# Patient Record
Sex: MALE | Race: BLACK OR AFRICAN AMERICAN | NOT HISPANIC OR LATINO | URBAN - METROPOLITAN AREA
[De-identification: names, ages, dates, MRNs, and addresses within clinical notes are randomized per-mention and may not be internally consistent; named-entity substitution may affect disease eponyms.]

---

## 2019-02-12 VITALS
OXYGEN SATURATION: 98 % | SYSTOLIC BLOOD PRESSURE: 147 MMHG | RESPIRATION RATE: 18 BRPM | WEIGHT: 192.9 LBS | DIASTOLIC BLOOD PRESSURE: 86 MMHG | TEMPERATURE: 98 F | HEART RATE: 107 BPM

## 2019-02-12 LAB
ALBUMIN SERPL ELPH-MCNC: 3.8 G/DL — SIGNIFICANT CHANGE UP (ref 3.3–5)
ALP SERPL-CCNC: 115 U/L — SIGNIFICANT CHANGE UP (ref 40–120)
ALT FLD-CCNC: SIGNIFICANT CHANGE UP U/L (ref 10–45)
ANION GAP SERPL CALC-SCNC: 12 MMOL/L — SIGNIFICANT CHANGE UP (ref 5–17)
APTT BLD: 28.9 SEC — SIGNIFICANT CHANGE UP (ref 27.5–36.3)
AST SERPL-CCNC: SIGNIFICANT CHANGE UP U/L (ref 10–40)
BASOPHILS # BLD AUTO: 0.02 K/UL — SIGNIFICANT CHANGE UP (ref 0–0.2)
BASOPHILS NFR BLD AUTO: 0.2 % — SIGNIFICANT CHANGE UP (ref 0–2)
BILIRUB SERPL-MCNC: 0.4 MG/DL — SIGNIFICANT CHANGE UP (ref 0.2–1.2)
BUN SERPL-MCNC: 42 MG/DL — HIGH (ref 7–23)
CALCIUM SERPL-MCNC: 9.2 MG/DL — SIGNIFICANT CHANGE UP (ref 8.4–10.5)
CHLORIDE SERPL-SCNC: 112 MMOL/L — HIGH (ref 96–108)
CO2 SERPL-SCNC: 16 MMOL/L — LOW (ref 22–31)
CREAT SERPL-MCNC: 3.31 MG/DL — HIGH (ref 0.5–1.3)
EOSINOPHIL # BLD AUTO: 0.45 K/UL — SIGNIFICANT CHANGE UP (ref 0–0.5)
EOSINOPHIL NFR BLD AUTO: 3.8 % — SIGNIFICANT CHANGE UP (ref 0–6)
GLUCOSE SERPL-MCNC: 245 MG/DL — HIGH (ref 70–99)
HCT VFR BLD CALC: 40.7 % — SIGNIFICANT CHANGE UP (ref 39–50)
HGB BLD-MCNC: 12.2 G/DL — LOW (ref 13–17)
IMM GRANULOCYTES NFR BLD AUTO: 0.5 % — SIGNIFICANT CHANGE UP (ref 0–1.5)
INR BLD: 1 — SIGNIFICANT CHANGE UP (ref 0.88–1.16)
LYMPHOCYTES # BLD AUTO: 1.44 K/UL — SIGNIFICANT CHANGE UP (ref 1–3.3)
LYMPHOCYTES # BLD AUTO: 12 % — LOW (ref 13–44)
MAGNESIUM SERPL-MCNC: 2.3 MG/DL — SIGNIFICANT CHANGE UP (ref 1.6–2.6)
MCHC RBC-ENTMCNC: 27.1 PG — SIGNIFICANT CHANGE UP (ref 27–34)
MCHC RBC-ENTMCNC: 30 GM/DL — LOW (ref 32–36)
MCV RBC AUTO: 90.2 FL — SIGNIFICANT CHANGE UP (ref 80–100)
MONOCYTES # BLD AUTO: 1.02 K/UL — HIGH (ref 0–0.9)
MONOCYTES NFR BLD AUTO: 8.5 % — SIGNIFICANT CHANGE UP (ref 2–14)
NEUTROPHILS # BLD AUTO: 8.97 K/UL — HIGH (ref 1.8–7.4)
NEUTROPHILS NFR BLD AUTO: 75 % — SIGNIFICANT CHANGE UP (ref 43–77)
NRBC # BLD: 0 /100 WBCS — SIGNIFICANT CHANGE UP (ref 0–0)
PLATELET # BLD AUTO: 207 K/UL — SIGNIFICANT CHANGE UP (ref 150–400)
POTASSIUM SERPL-MCNC: SIGNIFICANT CHANGE UP MMOL/L (ref 3.5–5.3)
POTASSIUM SERPL-SCNC: SIGNIFICANT CHANGE UP MMOL/L (ref 3.5–5.3)
PROT SERPL-MCNC: 7.9 G/DL — SIGNIFICANT CHANGE UP (ref 6–8.3)
PROTHROM AB SERPL-ACNC: 11.3 SEC — SIGNIFICANT CHANGE UP (ref 10–12.9)
RAPID RVP RESULT: SIGNIFICANT CHANGE UP
RBC # BLD: 4.51 M/UL — SIGNIFICANT CHANGE UP (ref 4.2–5.8)
RBC # FLD: 17.6 % — HIGH (ref 10.3–14.5)
SODIUM SERPL-SCNC: 140 MMOL/L — SIGNIFICANT CHANGE UP (ref 135–145)
WBC # BLD: 11.96 K/UL — HIGH (ref 3.8–10.5)
WBC # FLD AUTO: 11.96 K/UL — HIGH (ref 3.8–10.5)

## 2019-02-12 PROCEDURE — 71045 X-RAY EXAM CHEST 1 VIEW: CPT | Mod: 26

## 2019-02-12 PROCEDURE — 70544 MR ANGIOGRAPHY HEAD W/O DYE: CPT | Mod: 26,59

## 2019-02-12 PROCEDURE — 70551 MRI BRAIN STEM W/O DYE: CPT | Mod: 26

## 2019-02-12 PROCEDURE — 70450 CT HEAD/BRAIN W/O DYE: CPT | Mod: 26

## 2019-02-12 PROCEDURE — 70547 MR ANGIOGRAPHY NECK W/O DYE: CPT | Mod: 26

## 2019-02-12 RX ORDER — SODIUM CHLORIDE 9 MG/ML
1000 INJECTION INTRAMUSCULAR; INTRAVENOUS; SUBCUTANEOUS ONCE
Qty: 0 | Refills: 0 | Status: COMPLETED | OUTPATIENT
Start: 2019-02-12 | End: 2019-02-12

## 2019-02-12 RX ADMIN — SODIUM CHLORIDE 2000 MILLILITER(S): 9 INJECTION INTRAMUSCULAR; INTRAVENOUS; SUBCUTANEOUS at 21:09

## 2019-02-12 RX ADMIN — SODIUM CHLORIDE 2000 MILLILITER(S): 9 INJECTION INTRAMUSCULAR; INTRAVENOUS; SUBCUTANEOUS at 18:59

## 2019-02-12 NOTE — ED PROVIDER NOTE - OBJECTIVE STATEMENT
52 y/o M w/hx IDDM, prior CVA w/L sided weakness, reports no other medical problems, p/w slurred speech, global fatigue and worsening of his LLE weakness now also with RLE weakness, unable to stand/walk. Sx began yesterday morning (>24hrs onset), and when he attempted to walk from bed to bathroom he collapsed and hit the front of his head/forehead. No LOC, remembers event. Crawled into bed, has been urinating into a jar near his bed since that time. Denies CP, SOB, palpitations. No cough or fever/chills. Denies n/v/c/d or abd pain. No urinary sx.  No LE pain or swelling. Denies HA, visual changes, neck pain/stiffness.

## 2019-02-12 NOTE — ED ADULT NURSE REASSESSMENT NOTE - NS ED NURSE REASSESS COMMENT FT1
Patient returned from [facility], no distress noted.  IVF continue to infuse as order, rpt CMP to be done after IVF infusing.

## 2019-02-12 NOTE — ED ADULT NURSE NOTE - CHPI ED NUR SYMPTOMS NEG
no fever/no confusion/no blurred vision/no vomiting/no dizziness/no loss of consciousness/no nausea/no numbness/no change in level of consciousness

## 2019-02-12 NOTE — ED ADULT TRIAGE NOTE - CHIEF COMPLAINT QUOTE
pt BIBA c/o bilateral leg weakness since yesterday morning. as per pt he was feeling weak mostly on his right leg yesterday abd had a fall last night. denies head injury or LOC. pt A&O x4 at triage, able to move all extremities, no sensory deficits present on exam, able to speak clear and in full sentences.  denies dizziness, blurred vision.  PMH CVA 2010. .

## 2019-02-12 NOTE — ED PROVIDER NOTE - CLINICAL SUMMARY MEDICAL DECISION MAKING FREE TEXT BOX
weakness w/report of slurred speech, though speech is intact on exam, onset >24hrs, d/w Stroke attending Dr. Javed, will obtain MRI/MRA given pt's severe CKD, unknown if CKD is new. Attempts to contact PMD's office were unsuccessful after hours. Pt states he normally has good renal function. Pending MR results, will require admission for IV hydration, trending of Cr. Mild leukocytosis w/out fever, no clear source of infection, uc sent. weakness w/report of slurred speech, though speech is intact on exam, onset >24hrs, d/w Stroke attending Dr. Javed, will obtain MRI/MRA given pt's severe CKD, unknown if CKD is new. Attempts to contact PMD's office were unsuccessful after hours. Pt states he normally has good renal function. Pending MR results, will require admission for IV hydration, trending of Cr. Mild leukocytosis w/out fever, no clear source of infection, uc sent.    + acute lacunar infacrts L macrina, accepted to stroke team under telemetry

## 2019-02-12 NOTE — ED ADULT NURSE NOTE - NSIMPLEMENTINTERV_GEN_ALL_ED
Implemented All Universal Safety Interventions:  [place] to call system. Call bell, personal items and telephone within reach. Instruct patient to call for assistance. Room bathroom lighting operational. Non-slip footwear when patient is off stretcher. Physically safe environment: no spills, clutter or unnecessary equipment. Stretcher in lowest position, wheels locked, appropriate side rails in place.

## 2019-02-12 NOTE — ED ADULT NURSE NOTE - OBJECTIVE STATEMENT
53 year old male patient brought in by EMS for weakness since last night.  Patient states alittle before 11PM he went to shut the lights off in the kitchen and fell.  "My legs just gave out on me".  PAtient states hx of CVA 2010.  Patient denies tingling dizziness, loc @ time of fall, abrasion between eyebrows noted with mild swelling.  Patient states "it's from the fall".  A+OX4, weakness noted to bilateral legs & L arm.  As per patient "my legs are always weak but they feel weaker they normal".  Speech noted to be slightly slurred, as per patient "my speech is never slurred & I was on the phone w my friend and she heard my speech which is why she made me call the ambulance because it sounded slurred to her".  NO distress noted.  No facial droop noted.  Following commands appropriately.  Denies change in vision.  Breathing appears easy & unlabored, denies chest pain.

## 2019-02-12 NOTE — ED PROVIDER NOTE - PHYSICAL EXAMINATION
VITAL SIGNS: I have reviewed nursing notes and confirm.  CONSTITUTIONAL: Well-developed; well-nourished; in no acute distress.  SKIN: Agree with RN documentation regarding decubitus evaluation. Remainder of skin exam is warm and dry, no acute rash.  HEAD: Normocephalic; atraumatic.  EYES: PERRL, EOM intact; conjunctiva and sclera clear.  ENT: No nasal discharge; airway clear.  NECK: Supple; non tender.  CARD: S1, S2 normal; no murmurs, gallops, or rubs. RRR  RESP: No wheezes, rales or rhonchi. CTA w/good excursion  ABD: Normal bowel sounds; soft; non-distended; non-tender  EXT: Normal ROM. No clubbing, cyanosis or edema.  LYMPH: No acute cervical adenopathy.  NEURO: Alert, oriented. CN 2-12 intact b/l, + mildly slurred speech, 5/5 strength UE's b/l, 5/5 strength RLE, 4/5 strength LLE, no sensory deficits,   PSYCH: Cooperative, appropriate.

## 2019-02-12 NOTE — ED PROVIDER NOTE - SECONDARY DIAGNOSIS.
Chronic kidney disease, unspecified CKD stage Type 2 diabetes mellitus with complication, with long-term current use of insulin CVA (cerebral vascular accident)

## 2019-02-12 NOTE — ED PROVIDER NOTE - CARE PLAN
Principal Discharge DX:	Weakness of both legs  Secondary Diagnosis:	Chronic kidney disease, unspecified CKD stage  Secondary Diagnosis:	Type 2 diabetes mellitus with complication, with long-term current use of insulin  Secondary Diagnosis:	CVA (cerebral vascular accident) Principal Discharge DX:	Cerebrovascular accident (CVA), unspecified mechanism  Secondary Diagnosis:	Chronic kidney disease, unspecified CKD stage  Secondary Diagnosis:	Type 2 diabetes mellitus with complication, with long-term current use of insulin

## 2019-02-13 ENCOUNTER — INPATIENT (INPATIENT)
Facility: HOSPITAL | Age: 54
LOS: 11 days | Discharge: NURSING, REHAB OR LONG-TERM CARE | DRG: 65 | End: 2019-02-25
Attending: PSYCHIATRY & NEUROLOGY | Admitting: PSYCHIATRY & NEUROLOGY
Payer: MEDICAID

## 2019-02-13 DIAGNOSIS — E11.9 TYPE 2 DIABETES MELLITUS WITHOUT COMPLICATIONS: ICD-10-CM

## 2019-02-13 DIAGNOSIS — I10 ESSENTIAL (PRIMARY) HYPERTENSION: ICD-10-CM

## 2019-02-13 DIAGNOSIS — Z29.9 ENCOUNTER FOR PROPHYLACTIC MEASURES, UNSPECIFIED: ICD-10-CM

## 2019-02-13 DIAGNOSIS — N18.4 CHRONIC KIDNEY DISEASE, STAGE 4 (SEVERE): ICD-10-CM

## 2019-02-13 DIAGNOSIS — I63.9 CEREBRAL INFARCTION, UNSPECIFIED: ICD-10-CM

## 2019-02-13 DIAGNOSIS — E78.5 HYPERLIPIDEMIA, UNSPECIFIED: ICD-10-CM

## 2019-02-13 DIAGNOSIS — R63.8 OTHER SYMPTOMS AND SIGNS CONCERNING FOOD AND FLUID INTAKE: ICD-10-CM

## 2019-02-13 DIAGNOSIS — Z91.89 OTHER SPECIFIED PERSONAL RISK FACTORS, NOT ELSEWHERE CLASSIFIED: ICD-10-CM

## 2019-02-13 LAB
ALBUMIN SERPL ELPH-MCNC: 3.3 G/DL — SIGNIFICANT CHANGE UP (ref 3.3–5)
ALP SERPL-CCNC: 105 U/L — SIGNIFICANT CHANGE UP (ref 40–120)
ALT FLD-CCNC: 14 U/L — SIGNIFICANT CHANGE UP (ref 10–45)
ANION GAP SERPL CALC-SCNC: 11 MMOL/L — SIGNIFICANT CHANGE UP (ref 5–17)
ANION GAP SERPL CALC-SCNC: 16 MMOL/L — SIGNIFICANT CHANGE UP (ref 5–17)
AST SERPL-CCNC: 14 U/L — SIGNIFICANT CHANGE UP (ref 10–40)
BILIRUB SERPL-MCNC: 0.4 MG/DL — SIGNIFICANT CHANGE UP (ref 0.2–1.2)
BUN SERPL-MCNC: 44 MG/DL — HIGH (ref 7–23)
BUN SERPL-MCNC: 45 MG/DL — HIGH (ref 7–23)
CALCIUM SERPL-MCNC: 8.3 MG/DL — LOW (ref 8.4–10.5)
CALCIUM SERPL-MCNC: 9.4 MG/DL — SIGNIFICANT CHANGE UP (ref 8.4–10.5)
CHLORIDE SERPL-SCNC: 110 MMOL/L — HIGH (ref 96–108)
CHLORIDE SERPL-SCNC: 111 MMOL/L — HIGH (ref 96–108)
CHOLEST SERPL-MCNC: 144 MG/DL — SIGNIFICANT CHANGE UP (ref 10–199)
CO2 SERPL-SCNC: 14 MMOL/L — LOW (ref 22–31)
CO2 SERPL-SCNC: 17 MMOL/L — LOW (ref 22–31)
CREAT SERPL-MCNC: 3.21 MG/DL — HIGH (ref 0.5–1.3)
CREAT SERPL-MCNC: 3.25 MG/DL — HIGH (ref 0.5–1.3)
GLUCOSE BLDC GLUCOMTR-MCNC: 203 MG/DL — HIGH (ref 70–99)
GLUCOSE BLDC GLUCOMTR-MCNC: 258 MG/DL — HIGH (ref 70–99)
GLUCOSE SERPL-MCNC: 122 MG/DL — HIGH (ref 70–99)
GLUCOSE SERPL-MCNC: 363 MG/DL — HIGH (ref 70–99)
HBA1C BLD-MCNC: 8.1 % — HIGH (ref 4–5.6)
HCT VFR BLD CALC: 40.6 % — SIGNIFICANT CHANGE UP (ref 39–50)
HDLC SERPL-MCNC: 53 MG/DL — SIGNIFICANT CHANGE UP
HGB BLD-MCNC: 11.8 G/DL — LOW (ref 13–17)
LIPID PNL WITH DIRECT LDL SERPL: 66 MG/DL — SIGNIFICANT CHANGE UP
MAGNESIUM SERPL-MCNC: 2.2 MG/DL — SIGNIFICANT CHANGE UP (ref 1.6–2.6)
MCHC RBC-ENTMCNC: 26.8 PG — LOW (ref 27–34)
MCHC RBC-ENTMCNC: 29.1 GM/DL — LOW (ref 32–36)
MCV RBC AUTO: 92.3 FL — SIGNIFICANT CHANGE UP (ref 80–100)
NRBC # BLD: 0 /100 WBCS — SIGNIFICANT CHANGE UP (ref 0–0)
PHOSPHATE SERPL-MCNC: 3.4 MG/DL — SIGNIFICANT CHANGE UP (ref 2.5–4.5)
PLATELET # BLD AUTO: 185 K/UL — SIGNIFICANT CHANGE UP (ref 150–400)
POTASSIUM SERPL-MCNC: 4.5 MMOL/L — SIGNIFICANT CHANGE UP (ref 3.5–5.3)
POTASSIUM SERPL-MCNC: 4.6 MMOL/L — SIGNIFICANT CHANGE UP (ref 3.5–5.3)
POTASSIUM SERPL-SCNC: 4.5 MMOL/L — SIGNIFICANT CHANGE UP (ref 3.5–5.3)
POTASSIUM SERPL-SCNC: 4.6 MMOL/L — SIGNIFICANT CHANGE UP (ref 3.5–5.3)
PROT SERPL-MCNC: 6.5 G/DL — SIGNIFICANT CHANGE UP (ref 6–8.3)
RBC # BLD: 4.4 M/UL — SIGNIFICANT CHANGE UP (ref 4.2–5.8)
RBC # FLD: 17.4 % — HIGH (ref 10.3–14.5)
SODIUM SERPL-SCNC: 138 MMOL/L — SIGNIFICANT CHANGE UP (ref 135–145)
SODIUM SERPL-SCNC: 141 MMOL/L — SIGNIFICANT CHANGE UP (ref 135–145)
TOTAL CHOLESTEROL/HDL RATIO MEASUREMENT: 2.7 RATIO — LOW (ref 3.4–9.6)
TRIGL SERPL-MCNC: 125 MG/DL — SIGNIFICANT CHANGE UP (ref 10–149)
TSH SERPL-MCNC: 0.53 UIU/ML — SIGNIFICANT CHANGE UP (ref 0.35–4.94)
WBC # BLD: 12.57 K/UL — HIGH (ref 3.8–10.5)
WBC # FLD AUTO: 12.57 K/UL — HIGH (ref 3.8–10.5)

## 2019-02-13 PROCEDURE — 99285 EMERGENCY DEPT VISIT HI MDM: CPT

## 2019-02-13 PROCEDURE — 93306 TTE W/DOPPLER COMPLETE: CPT | Mod: 26

## 2019-02-13 RX ORDER — DEXTROSE 50 % IN WATER 50 %
25 SYRINGE (ML) INTRAVENOUS ONCE
Qty: 0 | Refills: 0 | Status: DISCONTINUED | OUTPATIENT
Start: 2019-02-13 | End: 2019-02-25

## 2019-02-13 RX ORDER — ATORVASTATIN CALCIUM 80 MG/1
80 TABLET, FILM COATED ORAL AT BEDTIME
Qty: 0 | Refills: 0 | Status: DISCONTINUED | OUTPATIENT
Start: 2019-02-13 | End: 2019-02-25

## 2019-02-13 RX ORDER — HEPARIN SODIUM 5000 [USP'U]/ML
5000 INJECTION INTRAVENOUS; SUBCUTANEOUS EVERY 8 HOURS
Qty: 0 | Refills: 0 | Status: DISCONTINUED | OUTPATIENT
Start: 2019-02-13 | End: 2019-02-25

## 2019-02-13 RX ORDER — POTASSIUM CHLORIDE 20 MEQ
40 PACKET (EA) ORAL ONCE
Qty: 0 | Refills: 0 | Status: DISCONTINUED | OUTPATIENT
Start: 2019-02-13 | End: 2019-02-13

## 2019-02-13 RX ORDER — LISINOPRIL 2.5 MG/1
10 TABLET ORAL DAILY
Qty: 0 | Refills: 0 | Status: DISCONTINUED | OUTPATIENT
Start: 2019-02-13 | End: 2019-02-13

## 2019-02-13 RX ORDER — DEXTROSE 50 % IN WATER 50 %
15 SYRINGE (ML) INTRAVENOUS ONCE
Qty: 0 | Refills: 0 | Status: DISCONTINUED | OUTPATIENT
Start: 2019-02-13 | End: 2019-02-25

## 2019-02-13 RX ORDER — ASPIRIN/CALCIUM CARB/MAGNESIUM 324 MG
81 TABLET ORAL DAILY
Qty: 0 | Refills: 0 | Status: DISCONTINUED | OUTPATIENT
Start: 2019-02-13 | End: 2019-02-25

## 2019-02-13 RX ORDER — DEXTROSE 50 % IN WATER 50 %
12.5 SYRINGE (ML) INTRAVENOUS ONCE
Qty: 0 | Refills: 0 | Status: DISCONTINUED | OUTPATIENT
Start: 2019-02-13 | End: 2019-02-25

## 2019-02-13 RX ORDER — SODIUM CHLORIDE 9 MG/ML
1000 INJECTION, SOLUTION INTRAVENOUS
Qty: 0 | Refills: 0 | Status: DISCONTINUED | OUTPATIENT
Start: 2019-02-13 | End: 2019-02-25

## 2019-02-13 RX ORDER — CLOPIDOGREL BISULFATE 75 MG/1
300 TABLET, FILM COATED ORAL ONCE
Qty: 0 | Refills: 0 | Status: COMPLETED | OUTPATIENT
Start: 2019-02-13 | End: 2019-02-13

## 2019-02-13 RX ORDER — INSULIN HUMAN 100 [IU]/ML
10 INJECTION, SOLUTION SUBCUTANEOUS ONCE
Qty: 0 | Refills: 0 | Status: COMPLETED | OUTPATIENT
Start: 2019-02-13 | End: 2019-02-13

## 2019-02-13 RX ORDER — CLOPIDOGREL BISULFATE 75 MG/1
75 TABLET, FILM COATED ORAL DAILY
Qty: 0 | Refills: 0 | Status: DISCONTINUED | OUTPATIENT
Start: 2019-02-14 | End: 2019-02-25

## 2019-02-13 RX ORDER — GLUCAGON INJECTION, SOLUTION 0.5 MG/.1ML
1 INJECTION, SOLUTION SUBCUTANEOUS ONCE
Qty: 0 | Refills: 0 | Status: DISCONTINUED | OUTPATIENT
Start: 2019-02-13 | End: 2019-02-25

## 2019-02-13 RX ORDER — INFLUENZA VIRUS VACCINE 15; 15; 15; 15 UG/.5ML; UG/.5ML; UG/.5ML; UG/.5ML
0.5 SUSPENSION INTRAMUSCULAR ONCE
Qty: 0 | Refills: 0 | Status: COMPLETED | OUTPATIENT
Start: 2019-02-13 | End: 2019-02-19

## 2019-02-13 RX ORDER — INSULIN LISPRO 100/ML
VIAL (ML) SUBCUTANEOUS
Qty: 0 | Refills: 0 | Status: DISCONTINUED | OUTPATIENT
Start: 2019-02-13 | End: 2019-02-25

## 2019-02-13 RX ORDER — METOPROLOL TARTRATE 50 MG
25 TABLET ORAL DAILY
Qty: 0 | Refills: 0 | Status: DISCONTINUED | OUTPATIENT
Start: 2019-02-13 | End: 2019-02-25

## 2019-02-13 RX ORDER — INSULIN GLARGINE 100 [IU]/ML
20 INJECTION, SOLUTION SUBCUTANEOUS AT BEDTIME
Qty: 0 | Refills: 0 | Status: DISCONTINUED | OUTPATIENT
Start: 2019-02-13 | End: 2019-02-16

## 2019-02-13 RX ADMIN — CLOPIDOGREL BISULFATE 300 MILLIGRAM(S): 75 TABLET, FILM COATED ORAL at 18:13

## 2019-02-13 RX ADMIN — Medication 81 MILLIGRAM(S): at 03:13

## 2019-02-13 RX ADMIN — ATORVASTATIN CALCIUM 80 MILLIGRAM(S): 80 TABLET, FILM COATED ORAL at 03:13

## 2019-02-13 RX ADMIN — Medication 6: at 16:49

## 2019-02-13 RX ADMIN — Medication 25 MILLIGRAM(S): at 06:19

## 2019-02-13 RX ADMIN — Medication 2: at 11:48

## 2019-02-13 RX ADMIN — HEPARIN SODIUM 5000 UNIT(S): 5000 INJECTION INTRAVENOUS; SUBCUTANEOUS at 15:29

## 2019-02-13 RX ADMIN — HEPARIN SODIUM 5000 UNIT(S): 5000 INJECTION INTRAVENOUS; SUBCUTANEOUS at 06:19

## 2019-02-13 RX ADMIN — Medication 4: at 21:30

## 2019-02-13 RX ADMIN — INSULIN GLARGINE 20 UNIT(S): 100 INJECTION, SOLUTION SUBCUTANEOUS at 21:31

## 2019-02-13 RX ADMIN — HEPARIN SODIUM 5000 UNIT(S): 5000 INJECTION INTRAVENOUS; SUBCUTANEOUS at 21:31

## 2019-02-13 RX ADMIN — ATORVASTATIN CALCIUM 80 MILLIGRAM(S): 80 TABLET, FILM COATED ORAL at 21:31

## 2019-02-13 RX ADMIN — INSULIN HUMAN 10 UNIT(S): 100 INJECTION, SOLUTION SUBCUTANEOUS at 02:00

## 2019-02-13 NOTE — PROGRESS NOTE ADULT - SUBJECTIVE AND OBJECTIVE BOX
OVERNIGHT EVENTS: ZULY admitted to [facility]    SUBJECTIVE / INTERVAL HPI: Patient seen and examined at bedside. Pt mentions continued motor weakness of LLE; Otherwise denies any new motor weakness, paresthesias, HA, nausea, emesis;     VITAL SIGNS:  Vital Signs Last 24 Hrs  T(C): 36.6 (13 Feb 2019 13:10), Max: 36.8 (12 Feb 2019 18:27)  T(F): 97.9 (13 Feb 2019 13:10), Max: 98.3 (12 Feb 2019 20:58)  HR: 86 (13 Feb 2019 15:12) (86 - 107)  BP: 179/83 (13 Feb 2019 15:12) (120/84 - 183/96)  BP(mean): 110 (13 Feb 2019 13:35) (106 - 139)  RR: 16 (13 Feb 2019 15:12) (16 - 18)  SpO2: 98% (13 Feb 2019 15:12) (94% - 99%)    PHYSICAL EXAM:    General: WDWN  HEENT: NC/AT; PERRL, anicteric sclera; MMM  Neck: supple  Cardiovascular: +S1/S2, RRR  Respiratory: CTA B/L; no W/R/R  Gastrointestinal: soft, NT/ND; +BSx4  Extremities: WWP; no edema, clubbing or cyanosis  Vascular: 2+ radial, DP/PT pulses B/L  Neurological: AAOx3; no focal deficits    MEDICATIONS:  MEDICATIONS  (STANDING):  aspirin  chewable 81 milliGRAM(s) Oral daily  atorvastatin 80 milliGRAM(s) Oral at bedtime  clopidogrel Tablet 300 milliGRAM(s) Oral once  dextrose 5%. 1000 milliLiter(s) (50 mL/Hr) IV Continuous <Continuous>  dextrose 50% Injectable 12.5 Gram(s) IV Push once  dextrose 50% Injectable 25 Gram(s) IV Push once  dextrose 50% Injectable 25 Gram(s) IV Push once  heparin  Injectable 5000 Unit(s) SubCutaneous every 8 hours  influenza   Vaccine 0.5 milliLiter(s) IntraMuscular once  insulin glargine Injectable (LANTUS) 20 Unit(s) SubCutaneous at bedtime  insulin lispro (HumaLOG) corrective regimen sliding scale   SubCutaneous Before meals and at bedtime  metoprolol succinate ER 25 milliGRAM(s) Oral daily    MEDICATIONS  (PRN):  dextrose 40% Gel 15 Gram(s) Oral once PRN Blood Glucose LESS THAN 70 milliGRAM(s)/deciliter  glucagon  Injectable 1 milliGRAM(s) IntraMuscular once PRN Glucose LESS THAN 70 milligrams/deciliter      ALLERGIES:  Allergies    No Known Allergies    Intolerances        LABS:                        11.8   12.57 )-----------( 185      ( 13 Feb 2019 06:27 )             40.6     02-13    141  |  111<H>  |  45<H>  ----------------------------<  122<H>  4.6   |  14<L>  |  3.25<H>    Ca    9.4      13 Feb 2019 06:26  Phos  3.4     02-13  Mg     2.2     02-13    TPro  6.5  /  Alb  3.3  /  TBili  0.4  /  DBili  x   /  AST  14  /  ALT  14  /  AlkPhos  105  02-13    PT/INR - ( 12 Feb 2019 19:02 )   PT: 11.3 sec;   INR: 1.00          PTT - ( 12 Feb 2019 19:02 )  PTT:28.9 sec    CAPILLARY BLOOD GLUCOSE      POCT Blood Glucose.: 258 mg/dL (13 Feb 2019 16:36)      RADIOLOGY & ADDITIONAL TESTS: Reviewed. OVERNIGHT EVENTS: ZULY admitted to [facility]    SUBJECTIVE / INTERVAL HPI: Patient seen and examined at bedside. Pt mentions continued motor weakness of LLE; Otherwise denies any new motor weakness, paresthesias, HA, nausea, emesis;     VITAL SIGNS:  Vital Signs Last 24 Hrs  T(C): 36.6 (13 Feb 2019 13:10), Max: 36.8 (12 Feb 2019 18:27)  T(F): 97.9 (13 Feb 2019 13:10), Max: 98.3 (12 Feb 2019 20:58)  HR: 86 (13 Feb 2019 15:12) (86 - 107)  BP: 179/83 (13 Feb 2019 15:12) (120/84 - 183/96)  BP(mean): 110 (13 Feb 2019 13:35) (106 - 139)  RR: 16 (13 Feb 2019 15:12) (16 - 18)  SpO2: 98% (13 Feb 2019 15:12) (94% - 99%)    PHYSICAL EXAM:  General:  in NAD   HEENT: NCAT, PERRL, EOMI, MMM; poor dentition   Neck: supple, no LAD or thyromegaly, no JVD  Respiratory: CTA b/l   Cardiovascular: RRR, normal S1/S2  Abdominal: soft, non-tender, non-distended, +BS  Extremities: WWP, no edema; s/p amputation of left 4th and 5th toes  Vascular: radial pulses 2+ bilaterally   Neurological: AAOx3, CN intact and symmetric bilaterally, strength 5/5 in bilateral UE, 5/5 in RLE, 4/4 in LLE, sensation intact throughout  Skin: no gross skin abnormalities or rashes    MEDICATIONS:  MEDICATIONS  (STANDING):  aspirin  chewable 81 milliGRAM(s) Oral daily  atorvastatin 80 milliGRAM(s) Oral at bedtime  clopidogrel Tablet 300 milliGRAM(s) Oral once  dextrose 5%. 1000 milliLiter(s) (50 mL/Hr) IV Continuous <Continuous>  dextrose 50% Injectable 12.5 Gram(s) IV Push once  dextrose 50% Injectable 25 Gram(s) IV Push once  dextrose 50% Injectable 25 Gram(s) IV Push once  heparin  Injectable 5000 Unit(s) SubCutaneous every 8 hours  influenza   Vaccine 0.5 milliLiter(s) IntraMuscular once  insulin glargine Injectable (LANTUS) 20 Unit(s) SubCutaneous at bedtime  insulin lispro (HumaLOG) corrective regimen sliding scale   SubCutaneous Before meals and at bedtime  metoprolol succinate ER 25 milliGRAM(s) Oral daily    MEDICATIONS  (PRN):  dextrose 40% Gel 15 Gram(s) Oral once PRN Blood Glucose LESS THAN 70 milliGRAM(s)/deciliter  glucagon  Injectable 1 milliGRAM(s) IntraMuscular once PRN Glucose LESS THAN 70 milligrams/deciliter      ALLERGIES:  Allergies    No Known Allergies    Intolerances        LABS:                        11.8   12.57 )-----------( 185      ( 13 Feb 2019 06:27 )             40.6     02-13    141  |  111<H>  |  45<H>  ----------------------------<  122<H>  4.6   |  14<L>  |  3.25<H>    Ca    9.4      13 Feb 2019 06:26  Phos  3.4     02-13  Mg     2.2     02-13    TPro  6.5  /  Alb  3.3  /  TBili  0.4  /  DBili  x   /  AST  14  /  ALT  14  /  AlkPhos  105  02-13    PT/INR - ( 12 Feb 2019 19:02 )   PT: 11.3 sec;   INR: 1.00          PTT - ( 12 Feb 2019 19:02 )  PTT:28.9 sec    CAPILLARY BLOOD GLUCOSE      POCT Blood Glucose.: 258 mg/dL (13 Feb 2019 16:36)      RADIOLOGY & ADDITIONAL TESTS: Reviewed. OVERNIGHT EVENTS: ZULY admitted to [facility]    SUBJECTIVE / INTERVAL HPI: Patient seen and examined at bedside. Pt mentions continued motor weakness of LLE; Otherwise denies any new motor weakness, paresthesias, HA, nausea, emesis; Plan for pt is to continue with asa/plavix/lipitor.and plavix loaded dose 300qd today with continued 75qd tomorrow; KATHY ordered in setting of likely emboli;     VITAL SIGNS:  Vital Signs Last 24 Hrs  T(C): 36.6 (13 Feb 2019 13:10), Max: 36.8 (12 Feb 2019 18:27)  T(F): 97.9 (13 Feb 2019 13:10), Max: 98.3 (12 Feb 2019 20:58)  HR: 86 (13 Feb 2019 15:12) (86 - 107)  BP: 179/83 (13 Feb 2019 15:12) (120/84 - 183/96)  BP(mean): 110 (13 Feb 2019 13:35) (106 - 139)  RR: 16 (13 Feb 2019 15:12) (16 - 18)  SpO2: 98% (13 Feb 2019 15:12) (94% - 99%)    PHYSICAL EXAM:  General:  in NAD   HEENT: NCAT, PERRL, EOMI, MMM; poor dentition   Neck: supple, no LAD or thyromegaly, no JVD  Respiratory: CTA b/l   Cardiovascular: RRR, normal S1/S2  Abdominal: soft, non-tender, non-distended, +BS  Extremities: WWP, no edema; s/p amputation of left 4th and 5th toes  Vascular: radial pulses 2+ bilaterally   Neurological: AAOx3, CN intact and symmetric bilaterally, strength 5/5 in bilateral UE, 5/5 in RLE, 4/4 in LLE, sensation intact throughout  Skin: no gross skin abnormalities or rashes    MEDICATIONS:  MEDICATIONS  (STANDING):  aspirin  chewable 81 milliGRAM(s) Oral daily  atorvastatin 80 milliGRAM(s) Oral at bedtime  clopidogrel Tablet 300 milliGRAM(s) Oral once  dextrose 5%. 1000 milliLiter(s) (50 mL/Hr) IV Continuous <Continuous>  dextrose 50% Injectable 12.5 Gram(s) IV Push once  dextrose 50% Injectable 25 Gram(s) IV Push once  dextrose 50% Injectable 25 Gram(s) IV Push once  heparin  Injectable 5000 Unit(s) SubCutaneous every 8 hours  influenza   Vaccine 0.5 milliLiter(s) IntraMuscular once  insulin glargine Injectable (LANTUS) 20 Unit(s) SubCutaneous at bedtime  insulin lispro (HumaLOG) corrective regimen sliding scale   SubCutaneous Before meals and at bedtime  metoprolol succinate ER 25 milliGRAM(s) Oral daily    MEDICATIONS  (PRN):  dextrose 40% Gel 15 Gram(s) Oral once PRN Blood Glucose LESS THAN 70 milliGRAM(s)/deciliter  glucagon  Injectable 1 milliGRAM(s) IntraMuscular once PRN Glucose LESS THAN 70 milligrams/deciliter      ALLERGIES:  Allergies    No Known Allergies    Intolerances        LABS:                        11.8   12.57 )-----------( 185      ( 13 Feb 2019 06:27 )             40.6     02-13    141  |  111<H>  |  45<H>  ----------------------------<  122<H>  4.6   |  14<L>  |  3.25<H>    Ca    9.4      13 Feb 2019 06:26  Phos  3.4     02-13  Mg     2.2     02-13    TPro  6.5  /  Alb  3.3  /  TBili  0.4  /  DBili  x   /  AST  14  /  ALT  14  /  AlkPhos  105  02-13    PT/INR - ( 12 Feb 2019 19:02 )   PT: 11.3 sec;   INR: 1.00          PTT - ( 12 Feb 2019 19:02 )  PTT:28.9 sec    CAPILLARY BLOOD GLUCOSE      POCT Blood Glucose.: 258 mg/dL (13 Feb 2019 16:36)      RADIOLOGY & ADDITIONAL TESTS: Reviewed.

## 2019-02-13 NOTE — H&P ADULT - PROBLEM SELECTOR PLAN 3
- hold home lisinopril 10mg qd in setting of acute ischemic stroke  - permissive HTN, goal 140-180 x24 hours - hold home lisinopril 10mg qd in setting of acute ischemic stroke  - continue home Toprol 25mg qd  - permissive HTN, goal 140-180 x24 hours  - obtain collateral from PCP on PMH and medication

## 2019-02-13 NOTE — H&P ADULT - PROBLEM SELECTOR PLAN 1
Patient has history of previous CVA with residual left-sided weakness but had new onset of bilateral LE weakness one day prior to admission. MRI brain significant for acute left lacunar infarcts in corona radiata and macrina, in addition to chronic bilateral basal ganglia infarcts, microangiopathic disease. CT head and MRA head/neck negative.  - ASA 81mg qd  - Lipitor 80mg qhs  - f/u lipid panel, HbA1c, TSH  - PT/OT  - dysphagia screen  - f/u echo

## 2019-02-13 NOTE — H&P ADULT - PROBLEM SELECTOR PLAN 2
History of insulin-dependent diabetes mellitus, on lantus and lispro at home. FSG elevated to 300s in ED, given 10u regular insulin.  - f/u HbA1c History of insulin-dependent diabetes mellitus, on Basaglar 20u qhs, Humalog sliding scale at home. FSG elevated to 300s in ED, given 10u regular insulin.  - f/u HbA1c  - continue lantus 20u qhs  - MISS

## 2019-02-13 NOTE — H&P ADULT - HISTORY OF PRESENT ILLNESS
52yo M with PMH of IDDM, prior CVA (residual left-sided weakness), who presents to the ED with one day of slurred speech, worsening weakness of BLE. He reports that symptoms began upon waking up one day prior to admission, with worsening of his baseline LLE weakness and new onset of weakness in his RLE, which progressively worsened until he had difficulty walking. He had one fall upon walking to the restroom, which he attributes to worsening weakness. He reports hitting his head during this fall, denies LOC. Denies slurred speech or difficulty speaking, denies chest pain, palpitations, shortness of breath, abdominal pain, nausea/vomiting, headache, dizziness, or lightheadedness.     ED Course: Vitals upon arrival T 98.2, ->93, /86, RR 18, SpO2 98%. Labs significant for WBCs 12k, Hb 12.2, hyperchloremic metabolic acidosis with HCO3 16, BUN 42/3.31. CT head with no acute infarcts, bilateral chronic basal ganglia infarcts, MRI brain with acute infarcts in L corona radiata an L macrina, and MRA head/neck with no occlusions or stenosis. Patient given 2L NS boluses, 10u regular insulin while in ED. Patient admitted to [facility] for CVA workup and management. 52yo M with PMH of IDDM, prior CVA (residual left-sided weakness), HTN, HLD, and CKD, who presents to the ED with one day of worsening BLE weakness progressing until patient was unable to get out of bed. He reports that symptoms began upon waking up one day prior to admission, with worsening of his baseline LLE weakness and new onset of weakness in his RLE, which progressively worsened until he had difficulty walking and inability to get out of bed. He had one fall upon walking to the restroom, which he attributes to worsening weakness. He reports hitting his head during this fall, denies LOC. He also reports that he spoke with a friend on the phone, who told him his speech sounded slurred, which prompted him to come to the ED. Denies chest pain, palpitations, shortness of breath, abdominal pain, nausea/vomiting, headache, dizziness, or lightheadedness, constipation, or diarrhea.     ED Course: Vitals upon arrival T 98.2, ->93, /86, RR 18, SpO2 98%. Labs significant for WBCs 12k, Hb 12.2, hyperchloremic metabolic acidosis with HCO3 16, BUN 42/3.31. CT head with no acute infarcts, bilateral chronic basal ganglia infarcts, MRI brain with acute infarcts in L corona radiata an L macrina, and MRA head/neck with no occlusions or stenosis. Patient given 2L NS boluses, 10u regular insulin while in ED. Patient admitted to [facility] for CVA workup and management.

## 2019-02-13 NOTE — PHYSICAL THERAPY INITIAL EVALUATION ADULT - COORDINATION ASSESSED, REHAB EVAL
heel to shin/finger to nose/tremors noted in FTN, HTS impaired bilaterally due to decreased ROM and strength

## 2019-02-13 NOTE — ED ADULT NURSE REASSESSMENT NOTE - NS ED NURSE REASSESS COMMENT FT1
Pt received from Karla KERR at midnight. Pt remains aox3, calm, and cooperative. Pt continues to report generalized weakness and unable to ambulate due to bilateral leg weakness. pt speaks clear, MAEx4, unlabored breathing. Abd soft, nt nd. Skin dry warm.  Pt was given 10 units of Humalin due to high FS, see lab results. Pt is admitted to [facility]. Will endorse to [facility].

## 2019-02-13 NOTE — PATIENT PROFILE ADULT - HAS THE PATIENT EXPERIENCED ANY OF THE FOLLOWING WITHIN THE WEEK PRIOR TO ADMISSION?
GENERAL:  Patient denies fevers, chills, night sweats, excessive fatigue, anorexia, weight loss, Appetite fair. Weight up 6 lbs.   ALLERGIC/IMMUNOLOGIC: no reactions  EYES:  Patient denies  significant visual difficulties, diplopia  ENT/MOUTH:  Patient denies problems with hearing, sore throat, sinus drainage, mouth sores  ENDOCRINE:  Patient denies diabetes, thyroid disease, hormone replacement, hot flashes or night sweats  HEMATOLOGIC/LYMPHATIC: Patient denies easy bruising or bleeding, tender or palpable lymph nodes  BREASTS: Patient denies abnormal masses of breast, nipple discharge, pain  RESPIRATORY:  Patient denies dyspnea on exertion, chest pain, cough, hemoptysis  CARDIOVASCULAR:  Patient denies anginal chest pain, palpitations, orthopnea, peripheral edema   GASTROINTESTINAL: Patient denies nausea, vomiting, diarrhea, GI bleeding, constipation, change in bowel habits, heartburn, early satiety   (FEMALE):  Patient denies abnormal genital masses, hematuria, hesitancy, incontinence, vaginal bleeding, discharge, other problems with urination, Is on an antibiotic for a UTI   MUSCULOSKELETAL:  Patient denies joint pain, swelling or redness, decreased range of motion, leg or ankle swelling  SKIN:  Patient denies chronic rashes, inflammation, ulcerations or skin changes  NEUROLOGIC:  Patient denies headache, blurred vision, areas of focal weakness or numbness, abnormal gait, sensory problems  PSYCHIATRIC: Patient denies anxiety, insomnia, depression, laney or mood swings, psychotropic drugs    Permission obtained to discuss medical information with family present.      cerebrovascular accident

## 2019-02-13 NOTE — PHYSICAL THERAPY INITIAL EVALUATION ADULT - RANGE OF MOTION EXAMINATION, REHAB EVAL
deficits as listed below/left foot deformity with amputated digits/bilateral upper extremity ROM was WFL (within functional limits)/Right LE ROM was WFL (within functional limits)

## 2019-02-13 NOTE — PROGRESS NOTE ADULT - PROBLEM SELECTOR PLAN 2
History of insulin-dependent diabetes mellitus, on Basaglar 20u qhs, Humalog sliding scale at home. FSG elevated to 300s in ED, given 10u regular insulin.  - f/u HbA1c  - continue lantus 20u qhs  - MISS History of insulin-dependent diabetes mellitus, on Basaglar 20u qhs, Humalog sliding scale at home. FSG elevated to 300s in ED, given 10u regular insulin.  - f/u HbA1c 8.1   - continue lantus 20u qhs  - MISS

## 2019-02-13 NOTE — PHYSICAL THERAPY INITIAL EVALUATION ADULT - ADDITIONAL COMMENTS
Patient lives in residence with elevator access no steps to negotiate. Patient has RW and uses cane. Patient lives in residence with elevator access no steps to negotiate. Patient has RW and uses cane. NO other DME and no home health aid.     Face symmetric, tongue midline, vision intact with smooth pursuit, peripheral fields intact. Patient legally blind in right eye.

## 2019-02-13 NOTE — H&P ADULT - NSHPLABSRESULTS_GEN_ALL_CORE
LABS:                        12.2   11.96 )-----------( 207      ( 12 Feb 2019 19:02 )             40.7     02-13    138  |  110<H>  |  44<H>  ----------------------------<  363<H>  4.5   |  17<L>  |  3.21<H>    Ca    8.3<L>      13 Feb 2019 00:51  Mg     2.3     02-12    TPro  6.5  /  Alb  3.3  /  TBili  0.4  /  DBili  x   /  AST  14  /  ALT  14  /  AlkPhos  105  02-13    PT/INR - ( 12 Feb 2019 19:02 )   PT: 11.3 sec;   INR: 1.00     PTT - ( 12 Feb 2019 19:02 )  PTT:28.9 sec      RADIOLOGY & ADDITIONAL TESTS:  CT head Non-contrast  IMPRESSION:   1.  No intracranial hemorrhage or calvarial fracture. No CT evidence of recent transcortical infarction.  2.  Chronic microangiopathic disease including bilateral chronic (remote) infarcts of the basal ganglia, as above. If there is concern for acute infarct, consideration could be given to brain MR with diffusion-weighted imaging as this is a more sensitive study for its detection.  3.  Progression of parenchymal volume loss since 2009.    MR Head No Cont (02.12.19 @ 23:24)  IMPRESSION:   1.  Acute lacunar infarctions within the left corona radiata and left macrina. No hemorrhage.  2.  Multiple chronic lacunar infarctions as above.  3.  Moderate small vessel ischemic disease.< from: MR Angio Head No Cont (02.12.19 @ 23:24) >    MR Angio Head No Cont (02.12.19 @ 23:24)  IMPRESSION: No large vessel occlusion or significant stenosis.    MR Angio Neck No Cont (02.12.19 @ 23:24)  IMPRESSION:   1.  No significant stenosis of the carotid or vertebral arteries.  2.  Dilated esophagus.

## 2019-02-13 NOTE — PROGRESS NOTE ADULT - PROBLEM SELECTOR PLAN 3
- hold home lisinopril 10mg qd in setting of acute ischemic stroke  - continue home Toprol 25mg qd  - permissive HTN, goal 140-180 x24 hours  - obtain collateral from PCP on PMH and medication - hold home lisinopril 10mg qd in setting of acute ischemic stroke  - continue home Toprol 25mg qd  - permissive HTN, goal 140-180 x24 hours

## 2019-02-13 NOTE — PROGRESS NOTE ADULT - PROBLEM SELECTOR PLAN 1
Patient has history of previous CVA with residual left-sided weakness but had new onset of bilateral LE weakness one day prior to admission. MRI brain significant for acute left lacunar infarcts in corona radiata and macrina, in addition to chronic bilateral basal ganglia infarcts, microangiopathic disease. CT head and MRA head/neck negative.  - ASA 81mg qd  - Lipitor 80mg qhs  - f/u lipid panel, HbA1c 8.1 not well controlled, TSH nL   - PT/OT  - dysphagia screen- passed   - f/u echo- EF 50-55 Conc LVH impaired LV relaxation Patient has history of previous CVA with residual left-sided weakness but had new onset of bilateral LE weakness one day prior to admission. MRI brain significant for acute left lacunar infarcts in corona radiata and macrina, in addition to chronic bilateral basal ganglia infarcts, microangiopathic disease. CT head and MRA head/neck negative.  - ASA 81mg qd, Lipitor 80mg qhs  - Plavix 300 loaded 2/13 and to continue 75qd tomorrow   - f/u lipid panel, HbA1c 8.1 not well controlled, TSH nL   - PT/OT  - dysphagia screen- passed   - f/u echo- EF 50-55 Conc LVH impaired LV relaxation  - KATHY ordered in setting of likely emboli  - C/w Permissive -180

## 2019-02-13 NOTE — H&P ADULT - ASSESSMENT
54yo M with PMH of IDDM, prior CVA (residual left-sided weakness), who presents to the ED with one day of slurred speech, worsening weakness of BLE, found to have acute lacunar infarct in left corona radiata and macrina on MRI, admitted for management of acute CVA.

## 2019-02-13 NOTE — H&P ADULT - PMH
Cerebral artery occlusion with cerebral infarction  Cerebral vascular accident  Hyperlipidemia  Hyperlipidemia  Hypertension    Type 2 diabetes mellitus  Diabetes mellitus

## 2019-02-13 NOTE — H&P ADULT - PROBLEM SELECTOR PLAN 8
1) PCP Contacted on Admission: (Y/N) --> Name & Phone #:  2) Date of Contact with PCP:  3) PCP Contacted at Discharge: (Y/N)  4) Summary of Handoff Given to PCP:   5) Post-Discharge Appointment Date and Location: 1) PCP Contacted on Admission: (Y/N) --> Dr. Anil Hahn, [phone]  2) Date of Contact with PCP:  3) PCP Contacted at Discharge: (Y/N)  4) Summary of Handoff Given to PCP:   5) Post-Discharge Appointment Date and Location:

## 2019-02-13 NOTE — H&P ADULT - NSHPPHYSICALEXAM_GEN_ALL_CORE
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

## 2019-02-13 NOTE — H&P ADULT - PROBLEM SELECTOR PLAN 4
BUN/Cr elevated to 42/3.31 on admission, unknown baseline. Cr 3.21 following 2L NS in ED.  - monitor BMP  - renal dosing of meds, avoid nephrotoxic medications

## 2019-02-14 LAB
ANION GAP SERPL CALC-SCNC: 13 MMOL/L — SIGNIFICANT CHANGE UP (ref 5–17)
BUN SERPL-MCNC: 40 MG/DL — HIGH (ref 7–23)
CALCIUM SERPL-MCNC: 9.2 MG/DL — SIGNIFICANT CHANGE UP (ref 8.4–10.5)
CHLORIDE SERPL-SCNC: 113 MMOL/L — HIGH (ref 96–108)
CO2 SERPL-SCNC: 16 MMOL/L — LOW (ref 22–31)
CREAT SERPL-MCNC: 3.16 MG/DL — HIGH (ref 0.5–1.3)
GLUCOSE BLDC GLUCOMTR-MCNC: 110 MG/DL — HIGH (ref 70–99)
GLUCOSE BLDC GLUCOMTR-MCNC: 119 MG/DL — HIGH (ref 70–99)
GLUCOSE BLDC GLUCOMTR-MCNC: 173 MG/DL — HIGH (ref 70–99)
GLUCOSE BLDC GLUCOMTR-MCNC: 183 MG/DL — HIGH (ref 70–99)
GLUCOSE SERPL-MCNC: 200 MG/DL — HIGH (ref 70–99)
HCT VFR BLD CALC: 37.5 % — LOW (ref 39–50)
HGB BLD-MCNC: 11.2 G/DL — LOW (ref 13–17)
MAGNESIUM SERPL-MCNC: 2 MG/DL — SIGNIFICANT CHANGE UP (ref 1.6–2.6)
MCHC RBC-ENTMCNC: 26.9 PG — LOW (ref 27–34)
MCHC RBC-ENTMCNC: 29.9 GM/DL — LOW (ref 32–36)
MCV RBC AUTO: 90.1 FL — SIGNIFICANT CHANGE UP (ref 80–100)
NRBC # BLD: 0 /100 WBCS — SIGNIFICANT CHANGE UP (ref 0–0)
PHOSPHATE SERPL-MCNC: 3.2 MG/DL — SIGNIFICANT CHANGE UP (ref 2.5–4.5)
PLATELET # BLD AUTO: 198 K/UL — SIGNIFICANT CHANGE UP (ref 150–400)
POTASSIUM SERPL-MCNC: 4.6 MMOL/L — SIGNIFICANT CHANGE UP (ref 3.5–5.3)
POTASSIUM SERPL-SCNC: 4.6 MMOL/L — SIGNIFICANT CHANGE UP (ref 3.5–5.3)
RBC # BLD: 4.16 M/UL — LOW (ref 4.2–5.8)
RBC # FLD: 17.2 % — HIGH (ref 10.3–14.5)
SODIUM SERPL-SCNC: 142 MMOL/L — SIGNIFICANT CHANGE UP (ref 135–145)
WBC # BLD: 10.25 K/UL — SIGNIFICANT CHANGE UP (ref 3.8–10.5)
WBC # FLD AUTO: 10.25 K/UL — SIGNIFICANT CHANGE UP (ref 3.8–10.5)

## 2019-02-14 PROCEDURE — 93320 DOPPLER ECHO COMPLETE: CPT | Mod: 26

## 2019-02-14 PROCEDURE — 99233 SBSQ HOSP IP/OBS HIGH 50: CPT

## 2019-02-14 PROCEDURE — 93325 DOPPLER ECHO COLOR FLOW MAPG: CPT | Mod: 26

## 2019-02-14 PROCEDURE — 93312 ECHO TRANSESOPHAGEAL: CPT | Mod: 26

## 2019-02-14 RX ADMIN — Medication 25 MILLIGRAM(S): at 06:28

## 2019-02-14 RX ADMIN — CLOPIDOGREL BISULFATE 75 MILLIGRAM(S): 75 TABLET, FILM COATED ORAL at 16:43

## 2019-02-14 RX ADMIN — INSULIN GLARGINE 20 UNIT(S): 100 INJECTION, SOLUTION SUBCUTANEOUS at 21:43

## 2019-02-14 RX ADMIN — HEPARIN SODIUM 5000 UNIT(S): 5000 INJECTION INTRAVENOUS; SUBCUTANEOUS at 21:43

## 2019-02-14 RX ADMIN — Medication 81 MILLIGRAM(S): at 16:44

## 2019-02-14 RX ADMIN — Medication 2: at 07:26

## 2019-02-14 RX ADMIN — ATORVASTATIN CALCIUM 80 MILLIGRAM(S): 80 TABLET, FILM COATED ORAL at 21:42

## 2019-02-14 RX ADMIN — HEPARIN SODIUM 5000 UNIT(S): 5000 INJECTION INTRAVENOUS; SUBCUTANEOUS at 06:27

## 2019-02-14 RX ADMIN — Medication 2: at 21:43

## 2019-02-14 NOTE — PROGRESS NOTE ADULT - PROBLEM SELECTOR PLAN 1
Patient has history of previous CVA with residual left-sided weakness but had new onset of bilateral LE weakness one day prior to admission. MRI brain significant for acute left lacunar infarcts in corona radiata and macrina, in addition to chronic bilateral basal ganglia infarcts, microangiopathic disease. CT head and MRA head/neck negative.  - ASA 81mg qd, Lipitor 80mg qhs  - Plavix 300 loaded 2/13 and continue 75qd  - f/u lipid panel nL , HbA1c 8.1 not well controlled, TSH nL   - PT/OT  - dysphagia screen- passed   - f/u echo- EF 50-55 Conc LVH impaired LV relaxation  - KATHY ordered in setting of likely emboli  - C/w Permissive -180  - F/u hypercoag workup

## 2019-02-14 NOTE — PROGRESS NOTE ADULT - PROBLEM SELECTOR PLAN 3
- hold home lisinopril 10mg qd in setting of acute ischemic stroke  - continue home Toprol 25mg qd  - permissive HTN, goal 140-180 x24 hours

## 2019-02-14 NOTE — PROGRESS NOTE ADULT - SUBJECTIVE AND OBJECTIVE BOX
OVERNIGHT EVENTS: NAEO sBPs in 160-170s range;     SUBJECTIVE / INTERVAL HPI: Patient seen and examined at bedside. Patient remains with continue LLE weakness, unchanged from presentation, otherwise denies any new neurological changes; Plan for patient is KATHY today and hypercoagulability workup 2/2 likely embolic picture;     VITAL SIGNS:  Vital Signs Last 24 Hrs  T(C): 36.3 (14 Feb 2019 10:32), Max: 37.1 (13 Feb 2019 18:47)  T(F): 97.4 (14 Feb 2019 10:32), Max: 98.8 (13 Feb 2019 18:47)  HR: 98 (14 Feb 2019 09:27) (86 - 108)  BP: 163/83 (14 Feb 2019 09:27) (149/72 - 179/83)  BP(mean): 110 (14 Feb 2019 09:27) (108 - 154)  RR: 16 (14 Feb 2019 09:27) (16 - 16)  SpO2: 98% (14 Feb 2019 09:27) (96% - 99%)    PHYSICAL EXAM:  General:  in NAD   HEENT: NCAT, PERRL, EOMI, MMM; poor dentition   Neck: supple, no LAD or thyromegaly, no JVD  Respiratory: CTA b/l   Cardiovascular: RRR, normal S1/S2  Abdominal: soft, non-tender, non-distended, +BS  Extremities: WWP, no edema; s/p amputation of left 4th and 5th toes  Vascular: radial pulses 2+ bilaterally   Neurological: AAOx3, CN intact and symmetric bilaterally, strength 5/5 in bilateral UE, 5/5 in RLE, 4/4 in LLE, sensation intact throughout  Skin: no gross skin abnormalities or rashes    MEDICATIONS:  MEDICATIONS  (STANDING):  aspirin  chewable 81 milliGRAM(s) Oral daily  atorvastatin 80 milliGRAM(s) Oral at bedtime  clopidogrel Tablet 75 milliGRAM(s) Oral daily  dextrose 5%. 1000 milliLiter(s) (50 mL/Hr) IV Continuous <Continuous>  dextrose 50% Injectable 12.5 Gram(s) IV Push once  dextrose 50% Injectable 25 Gram(s) IV Push once  dextrose 50% Injectable 25 Gram(s) IV Push once  heparin  Injectable 5000 Unit(s) SubCutaneous every 8 hours  influenza   Vaccine 0.5 milliLiter(s) IntraMuscular once  insulin glargine Injectable (LANTUS) 20 Unit(s) SubCutaneous at bedtime  insulin lispro (HumaLOG) corrective regimen sliding scale   SubCutaneous Before meals and at bedtime  metoprolol succinate ER 25 milliGRAM(s) Oral daily    MEDICATIONS  (PRN):  dextrose 40% Gel 15 Gram(s) Oral once PRN Blood Glucose LESS THAN 70 milliGRAM(s)/deciliter  glucagon  Injectable 1 milliGRAM(s) IntraMuscular once PRN Glucose LESS THAN 70 milligrams/deciliter      ALLERGIES:  Allergies    No Known Allergies    Intolerances        LABS:                        11.2   10.25 )-----------( 198      ( 14 Feb 2019 08:55 )             37.5     02-14    142  |  113<H>  |  40<H>  ----------------------------<  200<H>  4.6   |  16<L>  |  3.16<H>    Ca    9.2      14 Feb 2019 07:26  Phos  3.2     02-14  Mg     2.0     02-14    TPro  6.5  /  Alb  3.3  /  TBili  0.4  /  DBili  x   /  AST  14  /  ALT  14  /  AlkPhos  105  02-13    PT/INR - ( 12 Feb 2019 19:02 )   PT: 11.3 sec;   INR: 1.00          PTT - ( 12 Feb 2019 19:02 )  PTT:28.9 sec    CAPILLARY BLOOD GLUCOSE      POCT Blood Glucose.: 183 mg/dL (14 Feb 2019 06:59)      RADIOLOGY & ADDITIONAL TESTS: Reviewed.

## 2019-02-14 NOTE — PROGRESS NOTE ADULT - PROBLEM SELECTOR PLAN 2
History of insulin-dependent diabetes mellitus, on Basaglar 20u qhs, Humalog sliding scale at home. FSG elevated to 300s in ED, given 10u regular insulin.  - f/u HbA1c 8.1   - continue lantus 20u qhs  - MISS

## 2019-02-14 NOTE — PROGRESS NOTE ADULT - PROBLEM SELECTOR PLAN 4
BUN/Cr elevated to 42/3.31 on admission, unknown baseline. Cr 3.21 following 2L NS in ED. Likely 2/2 HTN HLD;   - monitor BMP  - renal dosing of meds, avoid nephrotoxic medications

## 2019-02-15 LAB
ANION GAP SERPL CALC-SCNC: 11 MMOL/L — SIGNIFICANT CHANGE UP (ref 5–17)
APCR PPP: 2.93 RATIO — SIGNIFICANT CHANGE UP
AT III ACT/NOR PPP CHRO: 98 % — SIGNIFICANT CHANGE UP (ref 85–135)
B2 GLYCOPROT1 AB SER QL: NEGATIVE — SIGNIFICANT CHANGE UP
BUN SERPL-MCNC: 40 MG/DL — HIGH (ref 7–23)
CALCIUM SERPL-MCNC: 9.2 MG/DL — SIGNIFICANT CHANGE UP (ref 8.4–10.5)
CARDIOLIPIN AB SER-ACNC: NEGATIVE — SIGNIFICANT CHANGE UP
CHLORIDE SERPL-SCNC: 112 MMOL/L — HIGH (ref 96–108)
CO2 SERPL-SCNC: 17 MMOL/L — LOW (ref 22–31)
CONFIRM APTT STACLOT: POSITIVE
CREAT SERPL-MCNC: 3.18 MG/DL — HIGH (ref 0.5–1.3)
DRVVT SCREEN TO CONFIRM RATIO: SIGNIFICANT CHANGE UP
GLUCOSE BLDC GLUCOMTR-MCNC: 109 MG/DL — HIGH (ref 70–99)
GLUCOSE BLDC GLUCOMTR-MCNC: 129 MG/DL — HIGH (ref 70–99)
GLUCOSE BLDC GLUCOMTR-MCNC: 177 MG/DL — HIGH (ref 70–99)
GLUCOSE BLDC GLUCOMTR-MCNC: 61 MG/DL — LOW (ref 70–99)
GLUCOSE BLDC GLUCOMTR-MCNC: 74 MG/DL — SIGNIFICANT CHANGE UP (ref 70–99)
GLUCOSE SERPL-MCNC: 74 MG/DL — SIGNIFICANT CHANGE UP (ref 70–99)
HCT VFR BLD CALC: 38.5 % — LOW (ref 39–50)
HCYS SERPL-MCNC: 11.8 UMOL/L — SIGNIFICANT CHANGE UP (ref 5–15)
HGB BLD-MCNC: 11.5 G/DL — LOW (ref 13–17)
LA NT DPL PPP QL: 28.4 SEC — SIGNIFICANT CHANGE UP
MAGNESIUM SERPL-MCNC: 2 MG/DL — SIGNIFICANT CHANGE UP (ref 1.6–2.6)
MCHC RBC-ENTMCNC: 26.9 PG — LOW (ref 27–34)
MCHC RBC-ENTMCNC: 29.9 GM/DL — LOW (ref 32–36)
MCV RBC AUTO: 90 FL — SIGNIFICANT CHANGE UP (ref 80–100)
NRBC # BLD: 0 /100 WBCS — SIGNIFICANT CHANGE UP (ref 0–0)
PLATELET # BLD AUTO: 215 K/UL — SIGNIFICANT CHANGE UP (ref 150–400)
POTASSIUM SERPL-MCNC: 4.2 MMOL/L — SIGNIFICANT CHANGE UP (ref 3.5–5.3)
POTASSIUM SERPL-SCNC: 4.2 MMOL/L — SIGNIFICANT CHANGE UP (ref 3.5–5.3)
PROT C ACT/NOR PPP: 92 % — SIGNIFICANT CHANGE UP (ref 74–150)
RBC # BLD: 4.28 M/UL — SIGNIFICANT CHANGE UP (ref 4.2–5.8)
RBC # FLD: 17.4 % — HIGH (ref 10.3–14.5)
SODIUM SERPL-SCNC: 140 MMOL/L — SIGNIFICANT CHANGE UP (ref 135–145)
WBC # BLD: 9.16 K/UL — SIGNIFICANT CHANGE UP (ref 3.8–10.5)
WBC # FLD AUTO: 9.16 K/UL — SIGNIFICANT CHANGE UP (ref 3.8–10.5)

## 2019-02-15 PROCEDURE — 99233 SBSQ HOSP IP/OBS HIGH 50: CPT

## 2019-02-15 RX ADMIN — Medication 81 MILLIGRAM(S): at 10:25

## 2019-02-15 RX ADMIN — INSULIN GLARGINE 20 UNIT(S): 100 INJECTION, SOLUTION SUBCUTANEOUS at 22:35

## 2019-02-15 RX ADMIN — ATORVASTATIN CALCIUM 80 MILLIGRAM(S): 80 TABLET, FILM COATED ORAL at 22:35

## 2019-02-15 RX ADMIN — CLOPIDOGREL BISULFATE 75 MILLIGRAM(S): 75 TABLET, FILM COATED ORAL at 10:25

## 2019-02-15 RX ADMIN — Medication 25 MILLIGRAM(S): at 06:28

## 2019-02-15 RX ADMIN — Medication 2: at 16:46

## 2019-02-15 RX ADMIN — HEPARIN SODIUM 5000 UNIT(S): 5000 INJECTION INTRAVENOUS; SUBCUTANEOUS at 06:27

## 2019-02-15 RX ADMIN — HEPARIN SODIUM 5000 UNIT(S): 5000 INJECTION INTRAVENOUS; SUBCUTANEOUS at 22:35

## 2019-02-15 RX ADMIN — HEPARIN SODIUM 5000 UNIT(S): 5000 INJECTION INTRAVENOUS; SUBCUTANEOUS at 14:55

## 2019-02-15 NOTE — PROGRESS NOTE ADULT - ASSESSMENT
52yo M with PMH of IDDM, prior CVA (residual left-sided weakness), who presents to the ED with one day of slurred speech, worsening weakness of BLE, found to have acute lacunar infarct in left corona radiata and macrina on MRI, admitted for management of acute CVA.

## 2019-02-15 NOTE — PROGRESS NOTE ADULT - PROBLEM SELECTOR PLAN 4
- On admission, BUN/Cr elevated to 42/3.31, downtrending to 3.18 s/p IV NS with baseline CKD  - renal dosing of meds, avoid nephrotoxic medications

## 2019-02-15 NOTE — PROGRESS NOTE ADULT - SUBJECTIVE AND OBJECTIVE BOX
OVERNIGHT EVENTS: NAEO BP within goal.    SUBJECTIVE / INTERVAL HPI: Patient seen and examined at bedside. Pt mentions feeling well, with continued unchanged LLE weakness but otherwise denies any new changes. Pt also with continued chronic incontinence.     VITAL SIGNS:  Vital Signs Last 24 Hrs  T(C): 36.7 (15 Feb 2019 10:01), Max: 37.2 (14 Feb 2019 22:28)  T(F): 98 (15 Feb 2019 10:01), Max: 99 (14 Feb 2019 22:28)  HR: 86 (15 Feb 2019 08:40) (86 - 100)  BP: 120/80 (15 Feb 2019 08:40) (120/61 - 173/100)  BP(mean): 93 (15 Feb 2019 08:40) (82 - 128)  RR: 16 (15 Feb 2019 08:40) (15 - 16)  SpO2: 98% (15 Feb 2019 08:40) (96% - 100%)    PHYSICAL EXAM:    General: WDWN  HEENT: NC/AT; PERRL, anicteric sclera; MMM  Neck: supple  Cardiovascular: +S1/S2, RRR  Respiratory: CTA B/L; no W/R/R  Gastrointestinal: soft, NT/ND; +BSx4  Extremities: WWP; no edema, clubbing or cyanosis  Vascular: 2+ radial, DP/PT pulses B/L  Neurological: AAOx3; no focal deficits    MEDICATIONS:  MEDICATIONS  (STANDING):  aspirin  chewable 81 milliGRAM(s) Oral daily  atorvastatin 80 milliGRAM(s) Oral at bedtime  clopidogrel Tablet 75 milliGRAM(s) Oral daily  dextrose 5%. 1000 milliLiter(s) (50 mL/Hr) IV Continuous <Continuous>  dextrose 50% Injectable 12.5 Gram(s) IV Push once  dextrose 50% Injectable 25 Gram(s) IV Push once  dextrose 50% Injectable 25 Gram(s) IV Push once  heparin  Injectable 5000 Unit(s) SubCutaneous every 8 hours  influenza   Vaccine 0.5 milliLiter(s) IntraMuscular once  insulin glargine Injectable (LANTUS) 20 Unit(s) SubCutaneous at bedtime  insulin lispro (HumaLOG) corrective regimen sliding scale   SubCutaneous Before meals and at bedtime  metoprolol succinate ER 25 milliGRAM(s) Oral daily    MEDICATIONS  (PRN):  dextrose 40% Gel 15 Gram(s) Oral once PRN Blood Glucose LESS THAN 70 milliGRAM(s)/deciliter  glucagon  Injectable 1 milliGRAM(s) IntraMuscular once PRN Glucose LESS THAN 70 milligrams/deciliter      ALLERGIES:  Allergies    No Known Allergies    Intolerances        LABS:                        11.5   9.16  )-----------( 215      ( 15 Feb 2019 07:00 )             38.5     02-15    140  |  112<H>  |  40<H>  ----------------------------<  74  4.2   |  17<L>  |  3.18<H>    Ca    9.2      15 Feb 2019 07:00  Phos  3.2     02-14  Mg     2.0     02-15          CAPILLARY BLOOD GLUCOSE      POCT Blood Glucose.: 129 mg/dL (15 Feb 2019 11:48)      RADIOLOGY & ADDITIONAL TESTS: Reviewed. TRANSFER [facility] TO [facility]:  HOSPITAL COURSE:  53YOM with PMH of IDDM, prior CVA (residual left-sided weakness), HTN, HLD, and CKD, who presented to the ED with one day of worsening BLE weakness with inability to get OOB. Patient with baseline LLE weakness after CVA, but now noticed worsening of weakness resulting in fall with head trama, no LOC, as well as associated dysarthria. At ED Vitals stable except for tachycardia in low 100s that then resolved (T 98.2, ->93, /86, RR 18, SpO2 98%). Labs significant for WBCs 12k, Hb 12.2, hyperchloremic metabolic acidosis with HCO3 16, BUN 42/3.31. CT head with no acute infarcts, bilateral chronic basal ganglia infarcts, MRI brain with acute infarcts in L corona radiata an L macrina, and MRA head/neck with no occlusions or stenosis. Patient given 2L NS boluses, 10u regular insulin while in ED and admitted to [facility] for CVA workup. Patient continued with ASA 81mg qd, Lipitor 80mg qhs, Plavix 300 loaded 2/13 and continued with 75qd. Lipid panel nL , HbA1c 8.1 not well controlled, TSH nL. Echo illustrated- EF 50-55 Conc LVH impaired LV relaxation and KATHY with no sx of clot. Patient remained with permissive -180. Hypercoagulability workup obtained with so far Hexagonal+. Patient remained with VSS and unchanged neuro exam(continued LLE weakness), therefore stepped down to [facility], pending acute rehab.     OVERNIGHT EVENTS: NAEO BP within goal.    SUBJECTIVE / INTERVAL HPI: Patient seen and examined at bedside. Pt mentions feeling well, with continued unchanged LLE weakness but otherwise denies any new changes. Pt also with continued chronic incontinence.     VITAL SIGNS:  Vital Signs Last 24 Hrs  T(C): 36.7 (15 Feb 2019 10:01), Max: 37.2 (14 Feb 2019 22:28)  T(F): 98 (15 Feb 2019 10:01), Max: 99 (14 Feb 2019 22:28)  HR: 86 (15 Feb 2019 08:40) (86 - 100)  BP: 120/80 (15 Feb 2019 08:40) (120/61 - 173/100)  BP(mean): 93 (15 Feb 2019 08:40) (82 - 128)  RR: 16 (15 Feb 2019 08:40) (15 - 16)  SpO2: 98% (15 Feb 2019 08:40) (96% - 100%)    PHYSICAL EXAM:  General:  in NAD   HEENT: NCAT, PERRL, EOMI, MMM; poor dentition   Neck: supple, no LAD or thyromegaly, no JVD  Respiratory: CTA b/l   Cardiovascular: RRR, normal S1/S2  Abdominal: soft, non-tender, non-distended, +BS  Extremities: WWP, no edema; s/p amputation of left 4th and 5th toes  Vascular: radial pulses 2+ bilaterally   Neurological: AAOx3, CN intact and symmetric bilaterally, strength 5/5 in bilateral UE, 5/5 in RLE, 4/4 in LLE, sensation intact throughout  Skin: no gross skin abnormalities or rashes    MEDICATIONS:  MEDICATIONS  (STANDING):  aspirin  chewable 81 milliGRAM(s) Oral daily  atorvastatin 80 milliGRAM(s) Oral at bedtime  clopidogrel Tablet 75 milliGRAM(s) Oral daily  dextrose 5%. 1000 milliLiter(s) (50 mL/Hr) IV Continuous <Continuous>  dextrose 50% Injectable 12.5 Gram(s) IV Push once  dextrose 50% Injectable 25 Gram(s) IV Push once  dextrose 50% Injectable 25 Gram(s) IV Push once  heparin  Injectable 5000 Unit(s) SubCutaneous every 8 hours  influenza   Vaccine 0.5 milliLiter(s) IntraMuscular once  insulin glargine Injectable (LANTUS) 20 Unit(s) SubCutaneous at bedtime  insulin lispro (HumaLOG) corrective regimen sliding scale   SubCutaneous Before meals and at bedtime  metoprolol succinate ER 25 milliGRAM(s) Oral daily    MEDICATIONS  (PRN):  dextrose 40% Gel 15 Gram(s) Oral once PRN Blood Glucose LESS THAN 70 milliGRAM(s)/deciliter  glucagon  Injectable 1 milliGRAM(s) IntraMuscular once PRN Glucose LESS THAN 70 milligrams/deciliter      ALLERGIES:  Allergies    No Known Allergies    Intolerances        LABS:                        11.5   9.16  )-----------( 215      ( 15 Feb 2019 07:00 )             38.5     02-15    140  |  112<H>  |  40<H>  ----------------------------<  74  4.2   |  17<L>  |  3.18<H>    Ca    9.2      15 Feb 2019 07:00  Phos  3.2     02-14  Mg     2.0     02-15          CAPILLARY BLOOD GLUCOSE      POCT Blood Glucose.: 129 mg/dL (15 Feb 2019 11:48)      RADIOLOGY & ADDITIONAL TESTS: Reviewed.

## 2019-02-15 NOTE — PROGRESS NOTE ADULT - SUBJECTIVE AND OBJECTIVE BOX
TRANSFER ACCEPT NOTE [facility] to [facility]    Hospital Course     This is a 52 yo man with history of IDDM, prior CVA (residual left-sided weakness), HTN, HLD, and CKD who presented to ED for one day of bilateral LE weakness, inability to get out of bed, and reported slurred speech. Patient reports a fall with head trauma but no loss of consciousness On admission, patient /86,  --> 93. CT head without acute bleeds. MRI with acute L corona radiata and L macrina infarcts, and MRA head/neck without occlusion or stenosis Patient was admitted for post CVA monitoring and workup to telemetry unit. Aspirin, Lipitor were continued. Plavix was loaded and continued. Echo showed EF 50-55% with concentric LVH and impaired LV relaxation, KATHY without clots. Patient neurologic exam remained unchanged with LLE weakness and patient was stable for stepdown to [facility] pending acute rehab placement.    SUBJECTIVE / INTERVAL HPI: Patient seen and examined at bedside. reports feeling well, continued LLE weakness. Reports being back to baseline. Tolerating PO, +BM. No HA, no shortness of breath, no CP, no N/V, no lightheadedness or dizziness.    VITAL SIGNS:  Vital Signs Last 24 Hrs  T(C): 37.2 (15 Feb 2019 23:08), Max: 37.2 (15 Feb 2019 23:08)  T(F): 98.9 (15 Feb 2019 23:08), Max: 98.9 (15 Feb 2019 23:08)  HR: 96 (15 Feb 2019 20:19) (86 - 100)  BP: 122/75 (15 Feb 2019 20:19) (120/61 - 155/81)  BP(mean): 93 (15 Feb 2019 20:19) (82 - 114)  RR: 16 (15 Feb 2019 20:19) (16 - 16)  SpO2: 99% (15 Feb 2019 20:19) (98% - 99%)    PHYSICAL EXAM:    General: Well developed, well nourished  man, no acute distress  HEENT: NC/AT; PERRL, anicteric sclera; MMM  Neck: supple, no LAD  Cardiovascular: +S1/S2, RRR, no murmurs, rubs, gallops  Respiratory: CTA B/L; no W/R/R  Gastrointestinal: soft, NT/ND; +BSx4  Extremities: WWP; no LE edema  Vascular: 2+ radial and pedal pulses bilaterally  Neurological: AAOx3; 5/5 RUE and LUE, 5/5 RLE, 4+/5 LLE, intact sensation to light touch bilaterally    MEDICATIONS:  MEDICATIONS  (STANDING):  aspirin  chewable 81 milliGRAM(s) Oral daily  atorvastatin 80 milliGRAM(s) Oral at bedtime  clopidogrel Tablet 75 milliGRAM(s) Oral daily  dextrose 5%. 1000 milliLiter(s) (50 mL/Hr) IV Continuous <Continuous>  dextrose 50% Injectable 12.5 Gram(s) IV Push once  dextrose 50% Injectable 25 Gram(s) IV Push once  dextrose 50% Injectable 25 Gram(s) IV Push once  heparin  Injectable 5000 Unit(s) SubCutaneous every 8 hours  influenza   Vaccine 0.5 milliLiter(s) IntraMuscular once  insulin glargine Injectable (LANTUS) 20 Unit(s) SubCutaneous at bedtime  insulin lispro (HumaLOG) corrective regimen sliding scale   SubCutaneous Before meals and at bedtime  metoprolol succinate ER 25 milliGRAM(s) Oral daily    MEDICATIONS  (PRN):  dextrose 40% Gel 15 Gram(s) Oral once PRN Blood Glucose LESS THAN 70 milliGRAM(s)/deciliter  glucagon  Injectable 1 milliGRAM(s) IntraMuscular once PRN Glucose LESS THAN 70 milligrams/deciliter      ALLERGIES:  Allergies    No Known Allergies    LABS:                        11.5   9.16  )-----------( 215      ( 15 Feb 2019 07:00 )             38.5     02-15    140  |  112<H>  |  40<H>  ----------------------------<  74  4.2   |  17<L>  |  3.18<H>    Ca    9.2      15 Feb 2019 07:00  Phos  3.2     02-14  Mg     2.0     02-15    POCT Blood Glucose.: 109 mg/dL (15 Feb 2019 20:53)    RADIOLOGY & ADDITIONAL TESTS: Reviewed.

## 2019-02-15 NOTE — PROGRESS NOTE ADULT - PROBLEM SELECTOR PLAN 1
- Hx of CVA with residual L weakness  - Presented with new b/l LE weakness, fall, and slurred speech  - MRI showing L corona radiata and macrina lacunar infarct  - Continue ASA 81mg daily, Lipitor 80mg daily, plavix 75mg daily (new medication on this admission)  - A1c 8.1%, total cholesterol 144, LDL 66  - Echo with EF 50-55%, concentric LVH and impaired LV relaxation. KATHY without clots  - Pending acute rehab placement, continue PT/OT inpatient

## 2019-02-15 NOTE — PROGRESS NOTE ADULT - ASSESSMENT
This is a 52 yo man with history of IDDM, prior CVA with residual L weakness, HTN, and HLD who presents for 1 day of LE weakness with subsequent fall and slurred speech admitted for acute lacunar infarct of L corona radiata and macrina now pending acute rehab.

## 2019-02-15 NOTE — PROGRESS NOTE ADULT - PROBLEM SELECTOR PLAN 2
- On home Basaglar 20u qhs, Humalog sliding scale at home  - Continue lantus 20u qhs  - mISS, accuchecks  - Consider adding premeal if necessary

## 2019-02-15 NOTE — PROGRESS NOTE ADULT - PROBLEM SELECTOR PLAN 3
- Home lisinopril 10mg held in setting of acute ischemic stroke. Consider restarting tomorrow for BP control  - Continue home Toprol 25mg daily

## 2019-02-15 NOTE — PROGRESS NOTE ADULT - PROBLEM SELECTOR PLAN 1
Patient has history of previous CVA with residual left-sided weakness but had new onset of bilateral LE weakness one day prior to admission. MRI brain significant for acute left lacunar infarcts in corona radiata and macrina, in addition to chronic bilateral basal ganglia infarcts, microangiopathic disease. CT head and MRA head/neck negative.  - ASA 81mg qd, Lipitor 80mg qhs  - Plavix 300 loaded 2/13 and continue 75qd  - f/u lipid panel nL , HbA1c 8.1 not well controlled, TSH nL   - PT/OT  - dysphagia screen- passed   - f/u echo- EF 50-55 Conc LVH impaired LV relaxation  - KATHY EF55%   - C/w Permissive -180  - F/u hypercoag workup- Hexagonal+;

## 2019-02-16 LAB
ANION GAP SERPL CALC-SCNC: 13 MMOL/L — SIGNIFICANT CHANGE UP (ref 5–17)
BUN SERPL-MCNC: 46 MG/DL — HIGH (ref 7–23)
CALCIUM SERPL-MCNC: 9.3 MG/DL — SIGNIFICANT CHANGE UP (ref 8.4–10.5)
CHLORIDE SERPL-SCNC: 111 MMOL/L — HIGH (ref 96–108)
CO2 SERPL-SCNC: 17 MMOL/L — LOW (ref 22–31)
CREAT SERPL-MCNC: 3.42 MG/DL — HIGH (ref 0.5–1.3)
GLUCOSE BLDC GLUCOMTR-MCNC: 118 MG/DL — HIGH (ref 70–99)
GLUCOSE BLDC GLUCOMTR-MCNC: 127 MG/DL — HIGH (ref 70–99)
GLUCOSE BLDC GLUCOMTR-MCNC: 150 MG/DL — HIGH (ref 70–99)
GLUCOSE BLDC GLUCOMTR-MCNC: 155 MG/DL — HIGH (ref 70–99)
GLUCOSE BLDC GLUCOMTR-MCNC: 69 MG/DL — LOW (ref 70–99)
GLUCOSE SERPL-MCNC: 99 MG/DL — SIGNIFICANT CHANGE UP (ref 70–99)
HCT VFR BLD CALC: 35.6 % — LOW (ref 39–50)
HGB BLD-MCNC: 10.8 G/DL — LOW (ref 13–17)
MAGNESIUM SERPL-MCNC: 2.1 MG/DL — SIGNIFICANT CHANGE UP (ref 1.6–2.6)
MCHC RBC-ENTMCNC: 27.2 PG — SIGNIFICANT CHANGE UP (ref 27–34)
MCHC RBC-ENTMCNC: 30.3 GM/DL — LOW (ref 32–36)
MCV RBC AUTO: 89.7 FL — SIGNIFICANT CHANGE UP (ref 80–100)
NRBC # BLD: 0 /100 WBCS — SIGNIFICANT CHANGE UP (ref 0–0)
PLATELET # BLD AUTO: 212 K/UL — SIGNIFICANT CHANGE UP (ref 150–400)
POTASSIUM SERPL-MCNC: 4.7 MMOL/L — SIGNIFICANT CHANGE UP (ref 3.5–5.3)
POTASSIUM SERPL-SCNC: 4.7 MMOL/L — SIGNIFICANT CHANGE UP (ref 3.5–5.3)
RBC # BLD: 3.97 M/UL — LOW (ref 4.2–5.8)
RBC # FLD: 17.3 % — HIGH (ref 10.3–14.5)
SODIUM SERPL-SCNC: 141 MMOL/L — SIGNIFICANT CHANGE UP (ref 135–145)
WBC # BLD: 10.43 K/UL — SIGNIFICANT CHANGE UP (ref 3.8–10.5)
WBC # FLD AUTO: 10.43 K/UL — SIGNIFICANT CHANGE UP (ref 3.8–10.5)

## 2019-02-16 PROCEDURE — 99233 SBSQ HOSP IP/OBS HIGH 50: CPT

## 2019-02-16 RX ORDER — INSULIN GLARGINE 100 [IU]/ML
15 INJECTION, SOLUTION SUBCUTANEOUS AT BEDTIME
Qty: 0 | Refills: 0 | Status: DISCONTINUED | OUTPATIENT
Start: 2019-02-16 | End: 2019-02-25

## 2019-02-16 RX ORDER — NYSTATIN CREAM 100000 [USP'U]/G
1 CREAM TOPICAL
Qty: 0 | Refills: 0 | Status: DISCONTINUED | OUTPATIENT
Start: 2019-02-16 | End: 2019-02-16

## 2019-02-16 RX ADMIN — INSULIN GLARGINE 15 UNIT(S): 100 INJECTION, SOLUTION SUBCUTANEOUS at 22:52

## 2019-02-16 RX ADMIN — HEPARIN SODIUM 5000 UNIT(S): 5000 INJECTION INTRAVENOUS; SUBCUTANEOUS at 05:53

## 2019-02-16 RX ADMIN — Medication 2: at 17:47

## 2019-02-16 RX ADMIN — CLOPIDOGREL BISULFATE 75 MILLIGRAM(S): 75 TABLET, FILM COATED ORAL at 12:41

## 2019-02-16 RX ADMIN — HEPARIN SODIUM 5000 UNIT(S): 5000 INJECTION INTRAVENOUS; SUBCUTANEOUS at 22:52

## 2019-02-16 RX ADMIN — Medication 81 MILLIGRAM(S): at 12:41

## 2019-02-16 RX ADMIN — Medication 25 MILLIGRAM(S): at 05:53

## 2019-02-16 RX ADMIN — ATORVASTATIN CALCIUM 80 MILLIGRAM(S): 80 TABLET, FILM COATED ORAL at 22:52

## 2019-02-16 NOTE — PROGRESS NOTE ADULT - SUBJECTIVE AND OBJECTIVE BOX
OVERNIGHT EVENTS: ZULY    SUBJECTIVE: He reports feeling well. No pain anywhere. Left leg is still weak. Patient says he did not sleep as well because he was being moved down in the middle of the night from [facility]. Otherwise, no chest pain, shortness of breath, no urinary issues. He is having bowel movements, last one yesterday.     Vital Signs Last 12 Hrs  T(F): 98.7 (02-16-19 @ 05:15), Max: 98.9 (02-15-19 @ 23:08)  HR: 84 (02-16-19 @ 05:15) (84 - 98)  BP: 154/82 (02-16-19 @ 05:15) (114/74 - 154/82)  BP(mean): 89 (02-15-19 @ 23:32) (89 - 89)  RR: 17 (02-16-19 @ 05:15) (16 - 17)  SpO2: 93% (02-16-19 @ 05:15) (93% - 98%)  I&O's Summary    15 Feb 2019 07:01  -  16 Feb 2019 07:00  --------------------------------------------------------  IN: 970 mL / OUT: 1300 mL / NET: -330 mL      PHYSICAL EXAM:  Constitutional: NAD, comfortable in bed.  HEENT: NC/AT, PERRLA, EOMI, no conjunctival pallor or scleral icterus, MMM  Neck: Supple, no JVD  Respiratory: Normal rate, rhythm, depth, effort. CTAB. No w/r/r.   Cardiovascular: RRR, normal S1 and S2, no m/r/g.   Gastrointestinal: +BS, soft NTND, no guarding or rebound tenderness, no palpable masses   Extremities: wwp; no cyanosis, clubbing or edema.   Vascular: Pulses equal and strong throughout.   Neurological: AAOx3, CN II to XII intact, no facial droop, LLE weakness 4/5 in strength compared to RLE 5/5. Upper extremity 5/5 strength throughout. No sensory deficits.   Skin: No gross skin abnormalities or rashes        LABS:                        10.8   10.43 )-----------( 212      ( 16 Feb 2019 07:13 )             35.6     02-16    141  |  111<H>  |  46<H>  ----------------------------<  99  4.7   |  17<L>  |  3.42<H>    Ca    9.3      16 Feb 2019 07:13  Mg     2.1     02-16        RADIOLOGY & ADDITIONAL TESTS:  < from: CT Head No Cont (02.12.19 @ 19:52) >  IMPRESSION:   1.  No intracranial hemorrhage or calvarial fracture. No CT evidence of   recent transcortical infarction.  2.  Chronic microangiopathic disease including bilateral chronic (remote)   infarcts of the basal ganglia, as above. If there is concern for acute   infarct, consideration could be given to brain MR with diffusion-weighted   imaging as this is a more sensitive study for its detection.  3.  Progression of parenchymal volume loss since 2009.    < end of copied text >        MEDICATIONS  (STANDING):  aspirin  chewable 81 milliGRAM(s) Oral daily  atorvastatin 80 milliGRAM(s) Oral at bedtime  clopidogrel Tablet 75 milliGRAM(s) Oral daily  dextrose 5%. 1000 milliLiter(s) (50 mL/Hr) IV Continuous <Continuous>  dextrose 50% Injectable 12.5 Gram(s) IV Push once  dextrose 50% Injectable 25 Gram(s) IV Push once  dextrose 50% Injectable 25 Gram(s) IV Push once  heparin  Injectable 5000 Unit(s) SubCutaneous every 8 hours  influenza   Vaccine 0.5 milliLiter(s) IntraMuscular once  insulin glargine Injectable (LANTUS) 20 Unit(s) SubCutaneous at bedtime  insulin lispro (HumaLOG) corrective regimen sliding scale   SubCutaneous Before meals and at bedtime  metoprolol succinate ER 25 milliGRAM(s) Oral daily    MEDICATIONS  (PRN):  dextrose 40% Gel 15 Gram(s) Oral once PRN Blood Glucose LESS THAN 70 milliGRAM(s)/deciliter  glucagon  Injectable 1 milliGRAM(s) IntraMuscular once PRN Glucose LESS THAN 70 milligrams/deciliter

## 2019-02-16 NOTE — PROGRESS NOTE ADULT - ASSESSMENT
This is a 54 yo man with history of IDDM, prior CVA with residual L weakness, HTN, and HLD who presents for 1 day of LE weakness with subsequent fall and slurred speech admitted for acute lacunar infarct of L corona radiata and macrina now pending acute rehab.

## 2019-02-17 LAB
ANION GAP SERPL CALC-SCNC: 11 MMOL/L — SIGNIFICANT CHANGE UP (ref 5–17)
BUN SERPL-MCNC: 48 MG/DL — HIGH (ref 7–23)
CALCIUM SERPL-MCNC: 9.3 MG/DL — SIGNIFICANT CHANGE UP (ref 8.4–10.5)
CHLORIDE SERPL-SCNC: 111 MMOL/L — HIGH (ref 96–108)
CO2 SERPL-SCNC: 17 MMOL/L — LOW (ref 22–31)
CREAT SERPL-MCNC: 3.19 MG/DL — HIGH (ref 0.5–1.3)
GLUCOSE BLDC GLUCOMTR-MCNC: 108 MG/DL — HIGH (ref 70–99)
GLUCOSE BLDC GLUCOMTR-MCNC: 109 MG/DL — HIGH (ref 70–99)
GLUCOSE BLDC GLUCOMTR-MCNC: 133 MG/DL — HIGH (ref 70–99)
GLUCOSE BLDC GLUCOMTR-MCNC: 159 MG/DL — HIGH (ref 70–99)
GLUCOSE SERPL-MCNC: 116 MG/DL — HIGH (ref 70–99)
HCT VFR BLD CALC: 35.9 % — LOW (ref 39–50)
HGB BLD-MCNC: 10.8 G/DL — LOW (ref 13–17)
MCHC RBC-ENTMCNC: 26.8 PG — LOW (ref 27–34)
MCHC RBC-ENTMCNC: 30.1 GM/DL — LOW (ref 32–36)
MCV RBC AUTO: 89.1 FL — SIGNIFICANT CHANGE UP (ref 80–100)
NRBC # BLD: 0 /100 WBCS — SIGNIFICANT CHANGE UP (ref 0–0)
PLATELET # BLD AUTO: 227 K/UL — SIGNIFICANT CHANGE UP (ref 150–400)
POTASSIUM SERPL-MCNC: 4.5 MMOL/L — SIGNIFICANT CHANGE UP (ref 3.5–5.3)
POTASSIUM SERPL-SCNC: 4.5 MMOL/L — SIGNIFICANT CHANGE UP (ref 3.5–5.3)
RBC # BLD: 4.03 M/UL — LOW (ref 4.2–5.8)
RBC # FLD: 17.1 % — HIGH (ref 10.3–14.5)
SODIUM SERPL-SCNC: 139 MMOL/L — SIGNIFICANT CHANGE UP (ref 135–145)
WBC # BLD: 9.78 K/UL — SIGNIFICANT CHANGE UP (ref 3.8–10.5)
WBC # FLD AUTO: 9.78 K/UL — SIGNIFICANT CHANGE UP (ref 3.8–10.5)

## 2019-02-17 PROCEDURE — 99232 SBSQ HOSP IP/OBS MODERATE 35: CPT

## 2019-02-17 RX ADMIN — ATORVASTATIN CALCIUM 80 MILLIGRAM(S): 80 TABLET, FILM COATED ORAL at 22:44

## 2019-02-17 RX ADMIN — HEPARIN SODIUM 5000 UNIT(S): 5000 INJECTION INTRAVENOUS; SUBCUTANEOUS at 14:06

## 2019-02-17 RX ADMIN — Medication 25 MILLIGRAM(S): at 06:42

## 2019-02-17 RX ADMIN — Medication 81 MILLIGRAM(S): at 12:19

## 2019-02-17 RX ADMIN — Medication 2: at 13:01

## 2019-02-17 RX ADMIN — HEPARIN SODIUM 5000 UNIT(S): 5000 INJECTION INTRAVENOUS; SUBCUTANEOUS at 22:45

## 2019-02-17 RX ADMIN — INSULIN GLARGINE 15 UNIT(S): 100 INJECTION, SOLUTION SUBCUTANEOUS at 22:44

## 2019-02-17 RX ADMIN — HEPARIN SODIUM 5000 UNIT(S): 5000 INJECTION INTRAVENOUS; SUBCUTANEOUS at 06:42

## 2019-02-17 RX ADMIN — CLOPIDOGREL BISULFATE 75 MILLIGRAM(S): 75 TABLET, FILM COATED ORAL at 12:20

## 2019-02-17 NOTE — DIETITIAN INITIAL EVALUATION ADULT. - PROBLEM SELECTOR PLAN 2
History of insulin-dependent diabetes mellitus, on Basaglar 20u qhs, Humalog sliding scale at home. FSG elevated to 300s in ED, given 10u regular insulin.  - f/u HbA1c  - continue lantus 20u qhs  - MISS

## 2019-02-17 NOTE — DIETITIAN INITIAL EVALUATION ADULT. - OTHER INFO
53M with PMH of IDDM, prior CVA (residual left-sided weakness), HTN, HLD, and CKD, who presents to the ED with one day of worsening BLE weakness progressing until patient was unable to get out of bed, presenting with CVA. Pt is now stable pending placement at rehab. Pt moved from 7LA o/n and is lethargic d/t lack of sleep, requesting to return later, reported tolerating diet. No noted n/v/c, chewing/ swallowing issues or pain impacting intake, skin is intact. NKFA or changes in wt PTA. Will continue to follow per protocol.

## 2019-02-17 NOTE — DIETITIAN INITIAL EVALUATION ADULT. - ENERGY NEEDS
ABW used for calculations as pt between % of IBW.   ABW 79.8kg, IBW 78kg, 102% IBW, ht 71", BMI 24.5   Nutrient needs based on [facility] standards of care for maintenance in adults.

## 2019-02-17 NOTE — DIETITIAN INITIAL EVALUATION ADULT. - PROBLEM SELECTOR PLAN 3
- hold home lisinopril 10mg qd in setting of acute ischemic stroke  - continue home Toprol 25mg qd  - permissive HTN, goal 140-180 x24 hours  - obtain collateral from PCP on PMH and medication

## 2019-02-17 NOTE — DIETITIAN INITIAL EVALUATION ADULT. - PROBLEM SELECTOR PLAN 8
1) PCP Contacted on Admission: (Y/N) --> Dr. Anil Hahn, [phone]  2) Date of Contact with PCP:  3) PCP Contacted at Discharge: (Y/N)  4) Summary of Handoff Given to PCP:   5) Post-Discharge Appointment Date and Location:

## 2019-02-18 ENCOUNTER — TRANSCRIPTION ENCOUNTER (OUTPATIENT)
Age: 54
End: 2019-02-18

## 2019-02-18 LAB
ANION GAP SERPL CALC-SCNC: 13 MMOL/L — SIGNIFICANT CHANGE UP (ref 5–17)
BUN SERPL-MCNC: 54 MG/DL — HIGH (ref 7–23)
CALCIUM SERPL-MCNC: 8.9 MG/DL — SIGNIFICANT CHANGE UP (ref 8.4–10.5)
CHLORIDE SERPL-SCNC: 105 MMOL/L — SIGNIFICANT CHANGE UP (ref 96–108)
CO2 SERPL-SCNC: 16 MMOL/L — LOW (ref 22–31)
CREAT SERPL-MCNC: 3.31 MG/DL — HIGH (ref 0.5–1.3)
GLUCOSE BLDC GLUCOMTR-MCNC: 130 MG/DL — HIGH (ref 70–99)
GLUCOSE BLDC GLUCOMTR-MCNC: 169 MG/DL — HIGH (ref 70–99)
GLUCOSE BLDC GLUCOMTR-MCNC: 180 MG/DL — HIGH (ref 70–99)
GLUCOSE BLDC GLUCOMTR-MCNC: 86 MG/DL — SIGNIFICANT CHANGE UP (ref 70–99)
GLUCOSE SERPL-MCNC: 250 MG/DL — HIGH (ref 70–99)
HCT VFR BLD CALC: 35.3 % — LOW (ref 39–50)
HGB BLD-MCNC: 10.8 G/DL — LOW (ref 13–17)
MAGNESIUM SERPL-MCNC: 2.1 MG/DL — SIGNIFICANT CHANGE UP (ref 1.6–2.6)
MCHC RBC-ENTMCNC: 27.4 PG — SIGNIFICANT CHANGE UP (ref 27–34)
MCHC RBC-ENTMCNC: 30.6 GM/DL — LOW (ref 32–36)
MCV RBC AUTO: 89.6 FL — SIGNIFICANT CHANGE UP (ref 80–100)
NRBC # BLD: 0 /100 WBCS — SIGNIFICANT CHANGE UP (ref 0–0)
PLATELET # BLD AUTO: 236 K/UL — SIGNIFICANT CHANGE UP (ref 150–400)
POTASSIUM SERPL-MCNC: 4.3 MMOL/L — SIGNIFICANT CHANGE UP (ref 3.5–5.3)
POTASSIUM SERPL-SCNC: 4.3 MMOL/L — SIGNIFICANT CHANGE UP (ref 3.5–5.3)
RBC # BLD: 3.94 M/UL — LOW (ref 4.2–5.8)
RBC # FLD: 16.8 % — HIGH (ref 10.3–14.5)
SODIUM SERPL-SCNC: 134 MMOL/L — LOW (ref 135–145)
WBC # BLD: 9.14 K/UL — SIGNIFICANT CHANGE UP (ref 3.8–10.5)
WBC # FLD AUTO: 9.14 K/UL — SIGNIFICANT CHANGE UP (ref 3.8–10.5)

## 2019-02-18 PROCEDURE — 99231 SBSQ HOSP IP/OBS SF/LOW 25: CPT

## 2019-02-18 RX ADMIN — ATORVASTATIN CALCIUM 80 MILLIGRAM(S): 80 TABLET, FILM COATED ORAL at 22:46

## 2019-02-18 RX ADMIN — INSULIN GLARGINE 15 UNIT(S): 100 INJECTION, SOLUTION SUBCUTANEOUS at 22:49

## 2019-02-18 RX ADMIN — HEPARIN SODIUM 5000 UNIT(S): 5000 INJECTION INTRAVENOUS; SUBCUTANEOUS at 06:46

## 2019-02-18 RX ADMIN — HEPARIN SODIUM 5000 UNIT(S): 5000 INJECTION INTRAVENOUS; SUBCUTANEOUS at 22:46

## 2019-02-18 RX ADMIN — Medication 2: at 08:27

## 2019-02-18 RX ADMIN — HEPARIN SODIUM 5000 UNIT(S): 5000 INJECTION INTRAVENOUS; SUBCUTANEOUS at 13:11

## 2019-02-18 RX ADMIN — Medication 81 MILLIGRAM(S): at 13:11

## 2019-02-18 RX ADMIN — CLOPIDOGREL BISULFATE 75 MILLIGRAM(S): 75 TABLET, FILM COATED ORAL at 13:11

## 2019-02-18 RX ADMIN — Medication 25 MILLIGRAM(S): at 06:46

## 2019-02-18 NOTE — DISCHARGE NOTE ADULT - PROVIDER TOKENS
PROVIDER:[TOKEN:[[id number]]] PROVIDER:[TOKEN:[[id number]]],PROVIDER:[TOKEN:[[id number]]]

## 2019-02-18 NOTE — DISCHARGE NOTE ADULT - PATIENT PORTAL LINK FT
You can access the [facility] Patient Portal, offered by [facility], by registering with the following website: [email or web address]

## 2019-02-18 NOTE — PROGRESS NOTE ADULT - SUBJECTIVE AND OBJECTIVE BOX
OVERNIGHT EVENTS: ZULY     SUBJECTIVE: He reports he is feeling well. No complaints. Still wants to see a podiatrist for his feet as he feels that it prevents him from walking well. No shortness of breath, chest pain,     Vital Signs Last 12 Hrs  T(F): 97.9 (02-18-19 @ 05:04), Max: 97.9 (02-18-19 @ 05:04)  HR: 89 (02-18-19 @ 05:04) (79 - 89)  BP: 154/78 (02-18-19 @ 05:04) (138/81 - 154/78)  RR: 17 (02-18-19 @ 05:04) (17 - 18)  SpO2: 97% (02-18-19 @ 05:04) (96% - 97%)  I&O's Summary      PHYSICAL EXAM:  Constitutional: NAD, comfortable in bed.  HEENT: NC/AT, PERRLA, EOMI, no conjunctival pallor or scleral icterus, MMM  Neck: Supple, no JVD  Respiratory: Normal rate, rhythm, depth, effort. CTAB. No w/r/r.   Cardiovascular: RRR, normal S1 and S2, no m/r/g.   Gastrointestinal: +BS, soft NTND, no guarding or rebound tenderness, no palpable masses   Extremities: wwp; no cyanosis, clubbing or edema.   Vascular: Pulses equal and strong throughout.   Neurological: AAOx3, no CN deficits, strength 4/5 RLE and 5/5 LLE, sensation intact throughout  Skin: No gross skin abnormalities or rashes        LABS:                        10.8   9.14  )-----------( 236      ( 18 Feb 2019 06:39 )             35.3     02-18    134<L>  |  105  |  54<H>  ----------------------------<  250<H>  4.3   |  16<L>  |  3.31<H>    Ca    8.9      18 Feb 2019 06:39  Mg     2.1     02-18            RADIOLOGY & ADDITIONAL TESTS:    MEDICATIONS  (STANDING):  aspirin  chewable 81 milliGRAM(s) Oral daily  atorvastatin 80 milliGRAM(s) Oral at bedtime  clopidogrel Tablet 75 milliGRAM(s) Oral daily  dextrose 5%. 1000 milliLiter(s) (50 mL/Hr) IV Continuous <Continuous>  dextrose 50% Injectable 12.5 Gram(s) IV Push once  dextrose 50% Injectable 25 Gram(s) IV Push once  dextrose 50% Injectable 25 Gram(s) IV Push once  heparin  Injectable 5000 Unit(s) SubCutaneous every 8 hours  influenza   Vaccine 0.5 milliLiter(s) IntraMuscular once  insulin glargine Injectable (LANTUS) 15 Unit(s) SubCutaneous at bedtime  insulin lispro (HumaLOG) corrective regimen sliding scale   SubCutaneous Before meals and at bedtime  metoprolol succinate ER 25 milliGRAM(s) Oral daily    MEDICATIONS  (PRN):  dextrose 40% Gel 15 Gram(s) Oral once PRN Blood Glucose LESS THAN 70 milliGRAM(s)/deciliter  glucagon  Injectable 1 milliGRAM(s) IntraMuscular once PRN Glucose LESS THAN 70 milligrams/deciliter OVERNIGHT EVENTS: ZULY     SUBJECTIVE: He reports he is feeling well. No complaints. Still wants to see a podiatrist for his feet as he feels that it prevents him from walking well. No shortness of breath, chest pain,     Vital Signs Last 12 Hrs  T(F): 97.9 (02-18-19 @ 05:04), Max: 97.9 (02-18-19 @ 05:04)  HR: 89 (02-18-19 @ 05:04) (79 - 89)  BP: 154/78 (02-18-19 @ 05:04) (138/81 - 154/78)  RR: 17 (02-18-19 @ 05:04) (17 - 18)  SpO2: 97% (02-18-19 @ 05:04) (96% - 97%)  I&O's Summary      PHYSICAL EXAM:  Constitutional: NAD, comfortable in bed.  HEENT: NC/AT, PERRLA, EOMI, no conjunctival pallor or scleral icterus, MMM  Neck: Supple, no JVD  Respiratory: Normal rate, rhythm, depth, effort. CTAB. No w/r/r.   Cardiovascular: RRR, normal S1 and S2, no m/r/g.   Gastrointestinal: +BS, soft NTND, no guarding or rebound tenderness, no palpable masses   Extremities: wwp; no cyanosis, clubbing or edema.   Vascular: Pulses equal and strong throughout.   Neurological: AAOx3, no CN deficits, strength 4/5 RLE and 5/5 LLE, sensation intact throughout, no facial droop, no dysmetry  Skin: No gross skin abnormalities or rashes        LABS:                        10.8   9.14  )-----------( 236      ( 18 Feb 2019 06:39 )             35.3     02-18    134<L>  |  105  |  54<H>  ----------------------------<  250<H>  4.3   |  16<L>  |  3.31<H>    Ca    8.9      18 Feb 2019 06:39  Mg     2.1     02-18            RADIOLOGY & ADDITIONAL TESTS:    MEDICATIONS  (STANDING):  aspirin  chewable 81 milliGRAM(s) Oral daily  atorvastatin 80 milliGRAM(s) Oral at bedtime  clopidogrel Tablet 75 milliGRAM(s) Oral daily  dextrose 5%. 1000 milliLiter(s) (50 mL/Hr) IV Continuous <Continuous>  dextrose 50% Injectable 12.5 Gram(s) IV Push once  dextrose 50% Injectable 25 Gram(s) IV Push once  dextrose 50% Injectable 25 Gram(s) IV Push once  heparin  Injectable 5000 Unit(s) SubCutaneous every 8 hours  influenza   Vaccine 0.5 milliLiter(s) IntraMuscular once  insulin glargine Injectable (LANTUS) 15 Unit(s) SubCutaneous at bedtime  insulin lispro (HumaLOG) corrective regimen sliding scale   SubCutaneous Before meals and at bedtime  metoprolol succinate ER 25 milliGRAM(s) Oral daily    MEDICATIONS  (PRN):  dextrose 40% Gel 15 Gram(s) Oral once PRN Blood Glucose LESS THAN 70 milliGRAM(s)/deciliter  glucagon  Injectable 1 milliGRAM(s) IntraMuscular once PRN Glucose LESS THAN 70 milligrams/deciliter

## 2019-02-18 NOTE — DISCHARGE NOTE ADULT - CARE PROVIDERS DIRECT ADDRESSES
[email or web address]

## 2019-02-18 NOTE — DISCHARGE NOTE ADULT - HOSPITAL COURSE
This is a 54 yo man with history of IDDM, prior CVA (residual left-sided weakness), HTN, HLD, and CKD who presented to ED for one day of bilateral LE weakness, inability to get out of bed, and reported slurred speech. Patient reports a fall with head trauma but no loss of consciousness On admission, patient /86,  --> 93. CT head without acute bleeds. MRI with acute L corona radiata and L macrina infarcts, and MRA head/neck without occlusion or stenosis. Patient was admitted for post CVA monitoring and workup to telemetry unit. Aspirin, Lipitor were continued. Plavix was loaded and continued. Echo showed EF 50-55% with concentric LVH and impaired LV relaxation, KATHY without clots. Patient neurologic exam remained unchanged with LLE weakness but with some improvement and patient is stable for discharge to acute rehab on aspirin, plavix and statin. This is a 54yo M with PMH of IDDM, prior CVA (residual left-sided weakness), HTN, HLD, and CKD, who presents to the ED with 1 day of worsening b/l lower extremity weakness and slurred speech to the point he was unable to get out of bed; initially admitted on 2/13. Hospital course c/b acute lacunar infarcts in the L corona radiata and macrina in which he was started on aspirin, plavix and statin. MRA head/neck without occlusion or stenosis. KATHY negative for clots and EF 50-55% with concentric LVH and impaired relaxation. Initially admitted to [facility] for further tele/monitoring and BPs stable in the 140s and stepped down to [facility] on 2/15. Patient's neurological exam remained unchanged with LLE weakness 4/5 in strength. Started patient on 10mg lisinopril 2/19 (home dose). Patients creatinine remains 3.2-3.3 and believe that this is his baseline. Patient stable for discharge to acute rehab

## 2019-02-18 NOTE — DISCHARGE NOTE ADULT - CARE PROVIDER_API CALL
Casandra San)  Neurology; Vascular Neurology  [address], [address] [place] [address]  Phone: [phone]  Fax: [phone]  Follow Up Time: Casandra San)  Neurology; Vascular Neurology  [address], [address] [place] [address]  Phone: [phone]  Fax: [phone]  Follow Up Time:     Elda Petty)  Internal Medicine  [address] [place] [address]  Phone: [phone]  Fax: [phone]  Follow Up Time:

## 2019-02-18 NOTE — PROGRESS NOTE ADULT - PROBLEM SELECTOR PLAN 4
- On admission, BUN/Cr elevated to 42/3.31, downtrending to 3.18 s/p IV NS with baseline CKD  - renal dosing of meds, avoid nephrotoxic medications - On admission, BUN/Cr elevated to 42/3.31; this appears to be his baseline creatinine   - renal dosing of meds, avoid nephrotoxic medications

## 2019-02-18 NOTE — DISCHARGE NOTE ADULT - MEDICATION SUMMARY - MEDICATIONS TO TAKE
I will START or STAY ON the medications listed below when I get home from the hospital:    aspirin 81 mg oral tablet  -- 1 tab(s) by mouth once a day  -- Indication: For Stroke Prophylaxis    lisinopril 10 mg oral tablet  -- 1 tab(s) by mouth once a day  -- Indication: For Hypertension    Lipitor 20 mg oral tablet  -- 1 tab(s) by mouth once a day  -- Indication: For Stroke Prophylaxis    Metoprolol Succinate ER 25 mg oral tablet, extended release  -- 1 tab(s) by mouth once a day  -- Indication: For Hypertension

## 2019-02-18 NOTE — DISCHARGE NOTE ADULT - SECONDARY DIAGNOSIS.
CKD (chronic kidney disease) stage 4, GFR 15-29 ml/min Hyperlipidemia Hypertension Type 2 diabetes mellitus

## 2019-02-18 NOTE — DISCHARGE NOTE ADULT - ADDITIONAL INSTRUCTIONS
Please follow up with Dr. San (neurology) within 2 weeks from discharge. Please follow up with Dr. San (neurology) within 2 weeks from discharge.  Please follow up with Dr. Petty in 5 weeks

## 2019-02-18 NOTE — DISCHARGE NOTE ADULT - PLAN OF CARE
to improve cognition and motor function You were found to have a stroke which caused you to have left sided weakness. You will go to rehab to regain strength. You were started on medications here to prevent future stroke. Please try lifestyle modifications such as decreasing the amount of cholesterol you eat as well. You are stable to go to rehab with close follow up with neurology. to improve kidney function You had elevated creatinine level which is a function of your kidney status. There was no evidence of obstruction and you are not having any abdominal pain with urination. We believe this is your baseline creatinine functioning. Please follow up with your PCP. to improve cholesterol You were previously diagnosed with high cholesterol. Please continue on your home medications at this time and follow up with your PMD for further surveillance and management of your high cholesterol. to improve blood pressure You were previously diagnosed with high blood pressure. Please continue on your home medications at this time and follow up with your PMD for further surveillance and management of your high blood pressure. to improve diabetes You were previously diagnosed with Diabetes. Please continue on your home medications at this time and follow up with your PMD for further surveillance and management of your Diabetes.

## 2019-02-18 NOTE — DISCHARGE NOTE ADULT - CARE PLAN
Principal Discharge DX:	Cerebrovascular accident (CVA), unspecified mechanism  Goal:	to improve cognition and motor function  Assessment and plan of treatment:	You were found to have a stroke which caused you to have left sided weakness. You will go to rehab to regain strength. You were started on medications here to prevent future stroke. Please try lifestyle modifications such as decreasing the amount of cholesterol you eat as well. You are stable to go to rehab with close follow up with neurology.  Secondary Diagnosis:	CKD (chronic kidney disease) stage 4, GFR 15-29 ml/min  Goal:	to improve kidney function  Assessment and plan of treatment:	You had elevated creatinine level which is a function of your kidney status. There was no evidence of obstruction and you are not having any abdominal pain with urination. We believe this is your baseline creatinine functioning. Please follow up with your PCP.  Secondary Diagnosis:	Hyperlipidemia  Goal:	to improve cholesterol  Assessment and plan of treatment:	You were previously diagnosed with high cholesterol. Please continue on your home medications at this time and follow up with your PMD for further surveillance and management of your high cholesterol.  Secondary Diagnosis:	Hypertension  Goal:	to improve blood pressure  Assessment and plan of treatment:	You were previously diagnosed with high blood pressure. Please continue on your home medications at this time and follow up with your PMD for further surveillance and management of your high blood pressure.  Secondary Diagnosis:	Type 2 diabetes mellitus  Goal:	to improve diabetes  Assessment and plan of treatment:	You were previously diagnosed with Diabetes. Please continue on your home medications at this time and follow up with your PMD for further surveillance and management of your Diabetes.

## 2019-02-19 LAB
ANION GAP SERPL CALC-SCNC: 14 MMOL/L — SIGNIFICANT CHANGE UP (ref 5–17)
BUN SERPL-MCNC: 53 MG/DL — HIGH (ref 7–23)
CALCIUM SERPL-MCNC: 9.1 MG/DL — SIGNIFICANT CHANGE UP (ref 8.4–10.5)
CHLORIDE SERPL-SCNC: 108 MMOL/L — SIGNIFICANT CHANGE UP (ref 96–108)
CO2 SERPL-SCNC: 16 MMOL/L — LOW (ref 22–31)
CREAT SERPL-MCNC: 3.21 MG/DL — HIGH (ref 0.5–1.3)
GLUCOSE BLDC GLUCOMTR-MCNC: 103 MG/DL — HIGH (ref 70–99)
GLUCOSE BLDC GLUCOMTR-MCNC: 116 MG/DL — HIGH (ref 70–99)
GLUCOSE BLDC GLUCOMTR-MCNC: 150 MG/DL — HIGH (ref 70–99)
GLUCOSE BLDC GLUCOMTR-MCNC: 168 MG/DL — HIGH (ref 70–99)
GLUCOSE SERPL-MCNC: 117 MG/DL — HIGH (ref 70–99)
POTASSIUM SERPL-MCNC: 4.2 MMOL/L — SIGNIFICANT CHANGE UP (ref 3.5–5.3)
POTASSIUM SERPL-SCNC: 4.2 MMOL/L — SIGNIFICANT CHANGE UP (ref 3.5–5.3)
PROT S FREE PPP-ACNC: 22 % NORMAL — LOW (ref 70–150)
SODIUM SERPL-SCNC: 138 MMOL/L — SIGNIFICANT CHANGE UP (ref 135–145)

## 2019-02-19 PROCEDURE — 99231 SBSQ HOSP IP/OBS SF/LOW 25: CPT

## 2019-02-19 RX ORDER — LISINOPRIL 2.5 MG/1
10 TABLET ORAL EVERY 24 HOURS
Qty: 0 | Refills: 0 | Status: DISCONTINUED | OUTPATIENT
Start: 2019-02-19 | End: 2019-02-25

## 2019-02-19 RX ADMIN — HEPARIN SODIUM 5000 UNIT(S): 5000 INJECTION INTRAVENOUS; SUBCUTANEOUS at 13:36

## 2019-02-19 RX ADMIN — HEPARIN SODIUM 5000 UNIT(S): 5000 INJECTION INTRAVENOUS; SUBCUTANEOUS at 06:41

## 2019-02-19 RX ADMIN — ATORVASTATIN CALCIUM 80 MILLIGRAM(S): 80 TABLET, FILM COATED ORAL at 21:59

## 2019-02-19 RX ADMIN — LISINOPRIL 10 MILLIGRAM(S): 2.5 TABLET ORAL at 10:22

## 2019-02-19 RX ADMIN — INSULIN GLARGINE 15 UNIT(S): 100 INJECTION, SOLUTION SUBCUTANEOUS at 22:03

## 2019-02-19 RX ADMIN — Medication 1 DROP(S): at 17:09

## 2019-02-19 RX ADMIN — Medication 2: at 17:38

## 2019-02-19 RX ADMIN — Medication 25 MILLIGRAM(S): at 06:41

## 2019-02-19 RX ADMIN — INFLUENZA VIRUS VACCINE 0.5 MILLILITER(S): 15; 15; 15; 15 SUSPENSION INTRAMUSCULAR at 10:20

## 2019-02-19 RX ADMIN — HEPARIN SODIUM 5000 UNIT(S): 5000 INJECTION INTRAVENOUS; SUBCUTANEOUS at 21:59

## 2019-02-19 RX ADMIN — Medication 81 MILLIGRAM(S): at 11:45

## 2019-02-19 RX ADMIN — CLOPIDOGREL BISULFATE 75 MILLIGRAM(S): 75 TABLET, FILM COATED ORAL at 11:45

## 2019-02-19 NOTE — PROGRESS NOTE ADULT - PROBLEM SELECTOR PLAN 2
- On home Basaglar 20u qhs, Humalog sliding scale at home  - Continue lantus 20u qhs  - mISS, accuchecks  - Consider adding premeal if necessary - On home Basaglar 20u qhs, Humalog sliding scale at home  - Continue lantus 15u qhs  - mISS, accuchecks  - Consider adding premeal if necessary

## 2019-02-19 NOTE — PROGRESS NOTE ADULT - PROBLEM SELECTOR PLAN 3
- Home lisinopril 10mg held in setting of acute ischemic stroke. Consider restarting tomorrow for BP control  - Continue home Toprol 25mg daily - Home lisinopril 10mg held in setting of acute ischemic stroke. Restarted today 2/19; believe his baseline creatinine is 3.3 and was on it prior to admission and will restart it   - Continue home Toprol 25mg daily

## 2019-02-19 NOTE — PROGRESS NOTE ADULT - PROBLEM SELECTOR PLAN 4
- On admission, BUN/Cr elevated to 42/3.31; this appears to be his baseline creatinine   - renal dosing of meds, avoid nephrotoxic medications - On admission, BUN/Cr elevated to 42/3.31; this appears to be his baseline creatinine and restarted him on lisinopril 10mg today 2/19

## 2019-02-19 NOTE — PROGRESS NOTE ADULT - SUBJECTIVE AND OBJECTIVE BOX
OVERNIGHT EVENTS: ZULY     SUBJECTIVE: Patient stating he is doing well. No complaints of chest pain, shortness of breath. He walked yesterday with PT with the walker and felt that he had more strength in his left leg. No numbness/tingling. Having bowel movements.     Vital Signs Last 12 Hrs  T(F): 98 (02-19-19 @ 05:08), Max: 98.2 (02-18-19 @ 21:22)  HR: 88 (02-19-19 @ 05:08) (87 - 88)  BP: 136/78 (02-19-19 @ 05:08) (131/79 - 136/78)  RR: 18 (02-19-19 @ 05:08) (18 - 18)  SpO2: 98% (02-19-19 @ 05:08) (97% - 98%)  I&O's Summary      PHYSICAL EXAM:  Constitutional: NAD, comfortable in bed.  HEENT: NC/AT, PERRLA, EOMI, no conjunctival pallor or scleral icterus, MMM  Neck: Supple, no JVD  Respiratory: Normal rate, rhythm, depth, effort. CTAB. No w/r/r.   Cardiovascular: RRR, normal S1 and S2, no m/r/g.   Gastrointestinal: +BS, soft NTND, no guarding or rebound tenderness, no palpable masses   Extremities: wwp; no cyanosis, clubbing or edema.   Vascular: Pulses equal and strong throughout.   Neurological: AAOx3, no CN deficits, strength and sensation intact throughout.   Skin: No gross skin abnormalities or rashes        LABS:                        10.8   9.14  )-----------( 236      ( 18 Feb 2019 06:39 )             35.3     02-19    138  |  108  |  53<H>  ----------------------------<  117<H>  4.2   |  16<L>  |  3.21<H>    Ca    9.1      19 Feb 2019 06:22  Mg     2.1     02-18      RADIOLOGY & ADDITIONAL TESTS:  < from: MR Head No Cont (02.12.19 @ 23:24) >    IMPRESSION:   1.  Acute lacunar infarctions within the left corona radiata and left   macrina. No hemorrhage.  2.  Multiple chronic lacunar infarctions as above.  3.  Moderate small vessel ischemic disease.      < end of copied text >        < from: CT Head No Cont (02.12.19 @ 19:52) >  COMPARISON: CT head 1/2/2009.     FINDINGS:     There is mild parenchymal volume loss as manifested by enlargement of the   ventricles, cisternal spaces, and cortical sulci throughout, progressed   from the prior exam.    There is no acute intracranial hemorrhage. There is no mass effect,   midline shift or extra axial collection.     The gray white differentiation appears preserved without evidence of an   acute transcortical infarction. There is moderate patchy periventricular   and subcortical white matter diminished attenuation, likely the sequela   of small vessel ischemic disease. Chronic lacunar infarctions are seen   within the left caudate/anterior limb of the internal capsule, new from   the prior exam. There is associated ex vacuo dilatation of thefrontal   horn of the left lateral ventricle. 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     The bony windows demonstrates no calvarial fracture. The visualized   paranasal sinuses and mastoid air cells are predominantly clear. The   right native occular lens is again absent.    IMPRESSION:   1.  No intracranial hemorrhage or calvarial fracture. No CT evidence of   recent transcortical infarction.  2.  Chronic microangiopathic disease including bilateral chronic (remote)   infarcts of the basal ganglia, as above. If there is concern for acute   infarct, consideration could be given to brain MR with diffusion-weighted   imaging as this is a more sensitive study for its detection.  3.  Progression of parenchymal volume loss since 2009.      < end of copied text >      MEDICATIONS  (STANDING):  artificial  tears Solution 1 Drop(s) Both EYES two times a day  aspirin  chewable 81 milliGRAM(s) Oral daily  atorvastatin 80 milliGRAM(s) Oral at bedtime  clopidogrel Tablet 75 milliGRAM(s) Oral daily  dextrose 5%. 1000 milliLiter(s) (50 mL/Hr) IV Continuous <Continuous>  dextrose 50% Injectable 12.5 Gram(s) IV Push once  dextrose 50% Injectable 25 Gram(s) IV Push once  dextrose 50% Injectable 25 Gram(s) IV Push once  heparin  Injectable 5000 Unit(s) SubCutaneous every 8 hours  influenza   Vaccine 0.5 milliLiter(s) IntraMuscular once  insulin glargine Injectable (LANTUS) 15 Unit(s) SubCutaneous at bedtime  insulin lispro (HumaLOG) corrective regimen sliding scale   SubCutaneous Before meals and at bedtime  lisinopril 10 milliGRAM(s) Oral every 24 hours  metoprolol succinate ER 25 milliGRAM(s) Oral daily    MEDICATIONS  (PRN):  dextrose 40% Gel 15 Gram(s) Oral once PRN Blood Glucose LESS THAN 70 milliGRAM(s)/deciliter  glucagon  Injectable 1 milliGRAM(s) IntraMuscular once PRN Glucose LESS THAN 70 milligrams/deciliter Hospital Course  This is a 52yo M with PMH of IDDM, prior CVA (residual left-sided weakness), HTN, HLD, and CKD, who presents to the ED with one day of worsening b/l lower extremity weakness and slurred speech progressively worsening with being unable to get out of bed initially admitted on February 13. Hospital course c/b acute lacunar infarcts in the L corona radiata and macrina in which he was started on aspirin, plavix and statin. He had no chest pain, palpitations, shortness of breath, abdominal pain, nausea/vomiting, headache, dizziness, or lightheadedness, constipation, or diarrhea.     OVERNIGHT EVENTS: ZULY     SUBJECTIVE: Patient stating he is doing well. No complaints of chest pain, shortness of breath. He walked yesterday with PT with the walker and felt that he had more strength in his left leg. No numbness/tingling. Having bowel movements.     Vital Signs Last 12 Hrs  T(F): 98 (02-19-19 @ 05:08), Max: 98.2 (02-18-19 @ 21:22)  HR: 88 (02-19-19 @ 05:08) (87 - 88)  BP: 136/78 (02-19-19 @ 05:08) (131/79 - 136/78)  RR: 18 (02-19-19 @ 05:08) (18 - 18)  SpO2: 98% (02-19-19 @ 05:08) (97% - 98%)  I&O's Summary      PHYSICAL EXAM:  Constitutional: NAD, comfortable in bed.  HEENT: NC/AT, PERRLA, EOMI, no conjunctival pallor or scleral icterus, MMM  Neck: Supple, no JVD  Respiratory: Normal rate, rhythm, depth, effort. CTAB. No w/r/r.   Cardiovascular: RRR, normal S1 and S2, no m/r/g.   Gastrointestinal: +BS, soft NTND, no guarding or rebound tenderness, no palpable masses   Extremities: wwp; no cyanosis, clubbing or edema.   Vascular: Pulses equal and strong throughout.   Neurological: AAOx3, no CN deficits, strength and sensation intact throughout.   Skin: No gross skin abnormalities or rashes        LABS:                        10.8   9.14  )-----------( 236      ( 18 Feb 2019 06:39 )             35.3     02-19    138  |  108  |  53<H>  ----------------------------<  117<H>  4.2   |  16<L>  |  3.21<H>    Ca    9.1      19 Feb 2019 06:22  Mg     2.1     02-18      RADIOLOGY & ADDITIONAL TESTS:  < from: MR Head No Cont (02.12.19 @ 23:24) >    IMPRESSION:   1.  Acute lacunar infarctions within the left corona radiata and left   macrina. No hemorrhage.  2.  Multiple chronic lacunar infarctions as above.  3.  Moderate small vessel ischemic disease.      < end of copied text >        < from: CT Head No Cont (02.12.19 @ 19:52) >  COMPARISON: CT head 1/2/2009.     FINDINGS:     There is mild parenchymal volume loss as manifested by enlargement of the   ventricles, cisternal spaces, and cortical sulci throughout, progressed   from the prior exam.    There is no acute intracranial hemorrhage. There is no mass effect,   midline shift or extra axial collection.     The gray white differentiation appears preserved without evidence of an   acute transcortical infarction. There is moderate patchy periventricular   and subcortical white matter diminished attenuation, likely the sequela   of small vessel ischemic disease. Chronic lacunar infarctions are seen   within the left caudate/anterior limb of the internal capsule, new from   the prior exam. There is associated ex vacuo dilatation of thefrontal   horn of the left lateral ventricle. 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     The bony windows demonstrates no calvarial fracture. The visualized   paranasal sinuses and mastoid air cells are predominantly clear. The   right native occular lens is again absent.    IMPRESSION:   1.  No intracranial hemorrhage or calvarial fracture. No CT evidence of   recent transcortical infarction.  2.  Chronic microangiopathic disease including bilateral chronic (remote)   infarcts of the basal ganglia, as above. If there is concern for acute   infarct, consideration could be given to brain MR with diffusion-weighted   imaging as this is a more sensitive study for its detection.  3.  Progression of parenchymal volume loss since 2009.      < end of copied text >      MEDICATIONS  (STANDING):  artificial  tears Solution 1 Drop(s) Both EYES two times a day  aspirin  chewable 81 milliGRAM(s) Oral daily  atorvastatin 80 milliGRAM(s) Oral at bedtime  clopidogrel Tablet 75 milliGRAM(s) Oral daily  dextrose 5%. 1000 milliLiter(s) (50 mL/Hr) IV Continuous <Continuous>  dextrose 50% Injectable 12.5 Gram(s) IV Push once  dextrose 50% Injectable 25 Gram(s) IV Push once  dextrose 50% Injectable 25 Gram(s) IV Push once  heparin  Injectable 5000 Unit(s) SubCutaneous every 8 hours  influenza   Vaccine 0.5 milliLiter(s) IntraMuscular once  insulin glargine Injectable (LANTUS) 15 Unit(s) SubCutaneous at bedtime  insulin lispro (HumaLOG) corrective regimen sliding scale   SubCutaneous Before meals and at bedtime  lisinopril 10 milliGRAM(s) Oral every 24 hours  metoprolol succinate ER 25 milliGRAM(s) Oral daily    MEDICATIONS  (PRN):  dextrose 40% Gel 15 Gram(s) Oral once PRN Blood Glucose LESS THAN 70 milliGRAM(s)/deciliter  glucagon  Injectable 1 milliGRAM(s) IntraMuscular once PRN Glucose LESS THAN 70 milligrams/deciliter Hospital Course  This is a 54yo M with PMH of IDDM, prior CVA (residual left-sided weakness), HTN, HLD, and CKD, who presents to the ED with 1 day of worsening b/l lower extremity weakness and slurred speech to the point he was unable to get out of bed; initially admitted on Feb 13. Hospital course c/b acute lacunar infarcts in the L corona radiata and macrina in which he was started on aspirin, plavix and statin. MRA head/neck without occlusion or stenosis. KATHY negative for clots and EF 50-55% with concentric LVH and impaired relaxation. Initially admitted to [facility] for further tele/monitoring and BPs stable in the 140s and stepped down to [facility] on 2/15. Patient's neurological exam remained unchanged with LLE weakness 4/5 in strength. Started patient on 10mg lisinopril 2/19 (home dose). Patients creatinine remains 3.2-3.3 and believe that this is his baseline. Patient stable for discharge to acute rehab; currently waiting for bed.     OVERNIGHT EVENTS: ZULY     SUBJECTIVE: Patient stating he is doing well. No complaints of chest pain, shortness of breath. He walked yesterday with PT with the walker and felt that he had more strength in his left leg. No numbness/tingling. Having bowel movements.     Vital Signs Last 12 Hrs  T(F): 98 (02-19-19 @ 05:08), Max: 98.2 (02-18-19 @ 21:22)  HR: 88 (02-19-19 @ 05:08) (87 - 88)  BP: 136/78 (02-19-19 @ 05:08) (131/79 - 136/78)  RR: 18 (02-19-19 @ 05:08) (18 - 18)  SpO2: 98% (02-19-19 @ 05:08) (97% - 98%)  I&O's Summary      PHYSICAL EXAM:  Constitutional: NAD, comfortable in bed.  HEENT: NC/AT, PERRLA, EOMI, no conjunctival pallor or scleral icterus, MMM  Neck: Supple, no JVD  Respiratory: Normal rate, rhythm, depth, effort. CTAB. No w/r/r.   Cardiovascular: RRR, normal S1 and S2, no m/r/g.   Gastrointestinal: +BS, soft NTND, no guarding or rebound tenderness, no palpable masses   Extremities: wwp; no cyanosis, clubbing or edema.   Vascular: Pulses equal and strong throughout.   Neurological: AAOx3, CN II to XII intact, B/l upper extremities 5/5 throughout, LLE 4/5 in strength, RLE 5/5 in strength, no sensory deficits throughout, no pronator drift, no dysmetria   Skin: No gross skin abnormalities or rashes        LABS:                        10.8   9.14  )-----------( 236      ( 18 Feb 2019 06:39 )             35.3     02-19    138  |  108  |  53<H>  ----------------------------<  117<H>  4.2   |  16<L>  |  3.21<H>    Ca    9.1      19 Feb 2019 06:22  Mg     2.1     02-18      RADIOLOGY & ADDITIONAL TESTS:  < from: MR Head No Cont (02.12.19 @ 23:24) >    IMPRESSION:   1.  Acute lacunar infarctions within the left corona radiata and left   macrina. No hemorrhage.  2.  Multiple chronic lacunar infarctions as above.  3.  Moderate small vessel ischemic disease.      < end of copied text >        < from: CT Head No Cont (02.12.19 @ 19:52) >  COMPARISON: CT head 1/2/2009.     FINDINGS:     There is mild parenchymal volume loss as manifested by enlargement of the   ventricles, cisternal spaces, and cortical sulci throughout, progressed   from the prior exam.    There is no acute intracranial hemorrhage. There is no mass effect,   midline shift or extra axial collection.     The gray white differentiation appears preserved without evidence of an   acute transcortical infarction. There is moderate patchy periventricular   and subcortical white matter diminished attenuation, likely the sequela   of small vessel ischemic disease. Chronic lacunar infarctions are seen   within the left caudate/anterior limb of the internal capsule, new from   the prior exam. There is associated ex vacuo dilatation of thefrontal   horn of the left lateral ventricle. 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     The bony windows demonstrates no calvarial fracture. The visualized   paranasal sinuses and mastoid air cells are predominantly clear. The   right native occular lens is again absent.    IMPRESSION:   1.  No intracranial hemorrhage or calvarial fracture. No CT evidence of   recent transcortical infarction.  2.  Chronic microangiopathic disease including bilateral chronic (remote)   infarcts of the basal ganglia, as above. If there is concern for acute   infarct, consideration could be given to brain MR with diffusion-weighted   imaging as this is a more sensitive study for its detection.  3.  Progression of parenchymal volume loss since 2009.      < end of copied text >      MEDICATIONS  (STANDING):  artificial  tears Solution 1 Drop(s) Both EYES two times a day  aspirin  chewable 81 milliGRAM(s) Oral daily  atorvastatin 80 milliGRAM(s) Oral at bedtime  clopidogrel Tablet 75 milliGRAM(s) Oral daily  dextrose 5%. 1000 milliLiter(s) (50 mL/Hr) IV Continuous <Continuous>  dextrose 50% Injectable 12.5 Gram(s) IV Push once  dextrose 50% Injectable 25 Gram(s) IV Push once  dextrose 50% Injectable 25 Gram(s) IV Push once  heparin  Injectable 5000 Unit(s) SubCutaneous every 8 hours  influenza   Vaccine 0.5 milliLiter(s) IntraMuscular once  insulin glargine Injectable (LANTUS) 15 Unit(s) SubCutaneous at bedtime  insulin lispro (HumaLOG) corrective regimen sliding scale   SubCutaneous Before meals and at bedtime  lisinopril 10 milliGRAM(s) Oral every 24 hours  metoprolol succinate ER 25 milliGRAM(s) Oral daily    MEDICATIONS  (PRN):  dextrose 40% Gel 15 Gram(s) Oral once PRN Blood Glucose LESS THAN 70 milliGRAM(s)/deciliter  glucagon  Injectable 1 milliGRAM(s) IntraMuscular once PRN Glucose LESS THAN 70 milligrams/deciliter

## 2019-02-20 LAB
ANION GAP SERPL CALC-SCNC: 13 MMOL/L — SIGNIFICANT CHANGE UP (ref 5–17)
BUN SERPL-MCNC: 67 MG/DL — HIGH (ref 7–23)
CALCIUM SERPL-MCNC: 9.2 MG/DL — SIGNIFICANT CHANGE UP (ref 8.4–10.5)
CHLORIDE SERPL-SCNC: 105 MMOL/L — SIGNIFICANT CHANGE UP (ref 96–108)
CO2 SERPL-SCNC: 18 MMOL/L — LOW (ref 22–31)
CREAT SERPL-MCNC: 3.38 MG/DL — HIGH (ref 0.5–1.3)
GLUCOSE BLDC GLUCOMTR-MCNC: 113 MG/DL — HIGH (ref 70–99)
GLUCOSE BLDC GLUCOMTR-MCNC: 165 MG/DL — HIGH (ref 70–99)
GLUCOSE BLDC GLUCOMTR-MCNC: 184 MG/DL — HIGH (ref 70–99)
GLUCOSE BLDC GLUCOMTR-MCNC: 187 MG/DL — HIGH (ref 70–99)
GLUCOSE SERPL-MCNC: 139 MG/DL — HIGH (ref 70–99)
HCT VFR BLD CALC: 35.1 % — LOW (ref 39–50)
HGB BLD-MCNC: 10.4 G/DL — LOW (ref 13–17)
MAGNESIUM SERPL-MCNC: 2.2 MG/DL — SIGNIFICANT CHANGE UP (ref 1.6–2.6)
MCHC RBC-ENTMCNC: 26.6 PG — LOW (ref 27–34)
MCHC RBC-ENTMCNC: 29.6 GM/DL — LOW (ref 32–36)
MCV RBC AUTO: 89.8 FL — SIGNIFICANT CHANGE UP (ref 80–100)
NRBC # BLD: 0 /100 WBCS — SIGNIFICANT CHANGE UP (ref 0–0)
PLATELET # BLD AUTO: 264 K/UL — SIGNIFICANT CHANGE UP (ref 150–400)
POTASSIUM SERPL-MCNC: 4.3 MMOL/L — SIGNIFICANT CHANGE UP (ref 3.5–5.3)
POTASSIUM SERPL-SCNC: 4.3 MMOL/L — SIGNIFICANT CHANGE UP (ref 3.5–5.3)
RBC # BLD: 3.91 M/UL — LOW (ref 4.2–5.8)
RBC # FLD: 16.8 % — HIGH (ref 10.3–14.5)
SODIUM SERPL-SCNC: 136 MMOL/L — SIGNIFICANT CHANGE UP (ref 135–145)
WBC # BLD: 9.17 K/UL — SIGNIFICANT CHANGE UP (ref 3.8–10.5)
WBC # FLD AUTO: 9.17 K/UL — SIGNIFICANT CHANGE UP (ref 3.8–10.5)

## 2019-02-20 PROCEDURE — 99222 1ST HOSP IP/OBS MODERATE 55: CPT

## 2019-02-20 PROCEDURE — 99231 SBSQ HOSP IP/OBS SF/LOW 25: CPT

## 2019-02-20 RX ADMIN — Medication 2: at 12:26

## 2019-02-20 RX ADMIN — INSULIN GLARGINE 15 UNIT(S): 100 INJECTION, SOLUTION SUBCUTANEOUS at 22:28

## 2019-02-20 RX ADMIN — CLOPIDOGREL BISULFATE 75 MILLIGRAM(S): 75 TABLET, FILM COATED ORAL at 12:26

## 2019-02-20 RX ADMIN — Medication 1 DROP(S): at 18:01

## 2019-02-20 RX ADMIN — Medication 1 DROP(S): at 06:38

## 2019-02-20 RX ADMIN — LISINOPRIL 10 MILLIGRAM(S): 2.5 TABLET ORAL at 09:32

## 2019-02-20 RX ADMIN — HEPARIN SODIUM 5000 UNIT(S): 5000 INJECTION INTRAVENOUS; SUBCUTANEOUS at 06:37

## 2019-02-20 RX ADMIN — Medication 2: at 18:00

## 2019-02-20 RX ADMIN — HEPARIN SODIUM 5000 UNIT(S): 5000 INJECTION INTRAVENOUS; SUBCUTANEOUS at 21:15

## 2019-02-20 RX ADMIN — ATORVASTATIN CALCIUM 80 MILLIGRAM(S): 80 TABLET, FILM COATED ORAL at 21:15

## 2019-02-20 RX ADMIN — Medication 25 MILLIGRAM(S): at 06:37

## 2019-02-20 RX ADMIN — HEPARIN SODIUM 5000 UNIT(S): 5000 INJECTION INTRAVENOUS; SUBCUTANEOUS at 15:22

## 2019-02-20 RX ADMIN — Medication 2: at 22:28

## 2019-02-20 RX ADMIN — Medication 81 MILLIGRAM(S): at 12:26

## 2019-02-20 NOTE — PROGRESS NOTE ADULT - PROBLEM SELECTOR PLAN 4
- On admission, BUN/Cr elevated to 42/3.31; this appears to be his baseline creatinine and restarted him on lisinopril 10mg on 2/19  - trend BMP

## 2019-02-20 NOTE — PROGRESS NOTE ADULT - PROBLEM SELECTOR PLAN 2
- On home Basaglar 20u qhs, Humalog sliding scale at home  - Continue lantus 15u qhs  - mISS, accuchecks  - Consider adding premeal if necessary

## 2019-02-20 NOTE — PROGRESS NOTE ADULT - SUBJECTIVE AND OBJECTIVE BOX
EDEN DACOSTA 53y Male    Interval HPI:  ZULY overnight. Patient denies any pain, episodes of confusion or sensory/motor changes.       Patient seen and examined at bedside:    T(C): 36.9 (02-20-19 @ 06:21), Max: 36.9 (02-19-19 @ 10:16)  HR: 81 (02-20-19 @ 06:21) (81 - 87)  BP: 155/81 (02-20-19 @ 06:21) (127/65 - 155/81)  RR: 18 (02-20-19 @ 06:21) (18 - 20)  SpO2: 96% (02-20-19 @ 06:21) (96% - 100%)    Review of systems negative except as mentioned above    artificial  tears Solution 1 Drop(s) Both EYES two times a day  aspirin  chewable 81 milliGRAM(s) Oral daily  atorvastatin 80 milliGRAM(s) Oral at bedtime  clopidogrel Tablet 75 milliGRAM(s) Oral daily  dextrose 40% Gel 15 Gram(s) Oral once PRN  dextrose 5%. 1000 milliLiter(s) IV Continuous <Continuous>  dextrose 50% Injectable 12.5 Gram(s) IV Push once  dextrose 50% Injectable 25 Gram(s) IV Push once  dextrose 50% Injectable 25 Gram(s) IV Push once  glucagon  Injectable 1 milliGRAM(s) IntraMuscular once PRN  heparin  Injectable 5000 Unit(s) SubCutaneous every 8 hours  insulin glargine Injectable (LANTUS) 15 Unit(s) SubCutaneous at bedtime  insulin lispro (HumaLOG) corrective regimen sliding scale   SubCutaneous Before meals and at bedtime  lisinopril 10 milliGRAM(s) Oral every 24 hours  metoprolol succinate ER 25 milliGRAM(s) Oral daily      Physical Exam:    GENERAL: NAD,  male  HEENT: NC/AT, MMM, PERRL  NECK: Supple, no LAD, no JVD  RESPIRATORY: CTAB, good effort and depth  CV: S1S2 appeciated, RRR, no m/r/g  GI: Soft, NT/ND, normal active bowel sounds  EXT: WWP, no cyanosis, no edema  MSK: NROM, 4/5 strength in RLE, 5/5 strength in LLE  Neuro: CN II-XII grossly intact, sensory grossly intact in upper and lower ext B/L    Labs:                        10.4   9.17  )-----------( 264      ( 20 Feb 2019 06:08 )             35.1     02-20    136  |  105  |  67<H>  ----------------------------<  139<H>  4.3   |  18<L>  |  3.38<H>    Ca    9.2      20 Feb 2019 06:08  Mg     2.2     02-20            CAPILLARY BLOOD GLUCOSE      POCT Blood Glucose.: 113 mg/dL (20 Feb 2019 08:16)  POCT Blood Glucose.: 150 mg/dL (19 Feb 2019 21:47)  POCT Blood Glucose.: 168 mg/dL (19 Feb 2019 17:28)  POCT Blood Glucose.: 116 mg/dL (19 Feb 2019 12:32)            Imaging:

## 2019-02-21 DIAGNOSIS — R76.8 OTHER SPECIFIED ABNORMAL IMMUNOLOGICAL FINDINGS IN SERUM: ICD-10-CM

## 2019-02-21 LAB
EXTRA SST TUBE: SIGNIFICANT CHANGE UP
GLUCOSE BLDC GLUCOMTR-MCNC: 123 MG/DL — HIGH (ref 70–99)
GLUCOSE BLDC GLUCOMTR-MCNC: 146 MG/DL — HIGH (ref 70–99)
GLUCOSE BLDC GLUCOMTR-MCNC: 151 MG/DL — HIGH (ref 70–99)
GLUCOSE BLDC GLUCOMTR-MCNC: 177 MG/DL — HIGH (ref 70–99)

## 2019-02-21 PROCEDURE — 99231 SBSQ HOSP IP/OBS SF/LOW 25: CPT

## 2019-02-21 PROCEDURE — 99232 SBSQ HOSP IP/OBS MODERATE 35: CPT

## 2019-02-21 RX ORDER — CLOPIDOGREL BISULFATE 75 MG/1
1 TABLET, FILM COATED ORAL
Qty: 30 | Refills: 0
Start: 2019-02-21 | End: 2019-03-22

## 2019-02-21 RX ORDER — INSULIN GLARGINE 100 [IU]/ML
20 INJECTION, SOLUTION SUBCUTANEOUS
Qty: 0 | Refills: 0 | COMMUNITY

## 2019-02-21 RX ADMIN — Medication 1 DROP(S): at 06:25

## 2019-02-21 RX ADMIN — Medication 25 MILLIGRAM(S): at 06:25

## 2019-02-21 RX ADMIN — ATORVASTATIN CALCIUM 80 MILLIGRAM(S): 80 TABLET, FILM COATED ORAL at 21:26

## 2019-02-21 RX ADMIN — Medication 1 DROP(S): at 17:44

## 2019-02-21 RX ADMIN — CLOPIDOGREL BISULFATE 75 MILLIGRAM(S): 75 TABLET, FILM COATED ORAL at 13:07

## 2019-02-21 RX ADMIN — LISINOPRIL 10 MILLIGRAM(S): 2.5 TABLET ORAL at 09:24

## 2019-02-21 RX ADMIN — HEPARIN SODIUM 5000 UNIT(S): 5000 INJECTION INTRAVENOUS; SUBCUTANEOUS at 13:06

## 2019-02-21 RX ADMIN — Medication 81 MILLIGRAM(S): at 13:07

## 2019-02-21 RX ADMIN — Medication 2: at 13:06

## 2019-02-21 RX ADMIN — Medication 2: at 08:48

## 2019-02-21 RX ADMIN — INSULIN GLARGINE 15 UNIT(S): 100 INJECTION, SOLUTION SUBCUTANEOUS at 21:53

## 2019-02-21 RX ADMIN — HEPARIN SODIUM 5000 UNIT(S): 5000 INJECTION INTRAVENOUS; SUBCUTANEOUS at 21:26

## 2019-02-21 RX ADMIN — HEPARIN SODIUM 5000 UNIT(S): 5000 INJECTION INTRAVENOUS; SUBCUTANEOUS at 06:25

## 2019-02-21 NOTE — PROGRESS NOTE ADULT - ASSESSMENT
Patient has a long-standing diabetes and at this point I feel his stroke is probably secondary to a regular risk factor rather than from the slightly elevated anticardiolipin antibody... orders discussed in detail with Catrachita and at this point we'll leave patient on 2 antiplatelet therapy rather than anticoagulation

## 2019-02-21 NOTE — PROGRESS NOTE ADULT - SUBJECTIVE AND OBJECTIVE BOX
EDEN DACOSTA 53y Male    Interval HPI:  ZULY overnight. Patient comfortable and asymptomatic    Patient seen and examined at bedside:    T(C): 36.4 (02-21-19 @ 08:50), Max: 36.5 (02-20-19 @ 15:56)  HR: 82 (02-21-19 @ 08:50) (73 - 82)  BP: 138/83 (02-21-19 @ 08:50) (138/83 - 147/84)  RR: 18 (02-21-19 @ 08:50) (17 - 18)  SpO2: 98% (02-21-19 @ 08:50) (97% - 98%)    Review of systems negative except as mentioned above    artificial  tears Solution 1 Drop(s) Both EYES two times a day  aspirin  chewable 81 milliGRAM(s) Oral daily  atorvastatin 80 milliGRAM(s) Oral at bedtime  clopidogrel Tablet 75 milliGRAM(s) Oral daily  dextrose 40% Gel 15 Gram(s) Oral once PRN  dextrose 5%. 1000 milliLiter(s) IV Continuous <Continuous>  dextrose 50% Injectable 12.5 Gram(s) IV Push once  dextrose 50% Injectable 25 Gram(s) IV Push once  dextrose 50% Injectable 25 Gram(s) IV Push once  glucagon  Injectable 1 milliGRAM(s) IntraMuscular once PRN  heparin  Injectable 5000 Unit(s) SubCutaneous every 8 hours  insulin glargine Injectable (LANTUS) 15 Unit(s) SubCutaneous at bedtime  insulin lispro (HumaLOG) corrective regimen sliding scale   SubCutaneous Before meals and at bedtime  lisinopril 10 milliGRAM(s) Oral every 24 hours  metoprolol succinate ER 25 milliGRAM(s) Oral daily      Physical Exam:    GENERAL: NAD,  male  HEENT: NC/AT, MMM, PERRL  NECK: Supple, no LAD, no JVD  RESPIRATORY: CTAB, good effort and depth  CV: S1S2 appeciated, RRR, no m/r/g  GI: Soft, NT/ND, normal active bowel sounds  EXT: WWP, no cyanosis, no edema  MSK: NROM, 4/5 strength in RLE, 5/5 strength in LLE  Neuro: CN II-XII grossly intact, sensory grossly intact in upper and lower ext B/L    Labs:                        10.4   9.17  )-----------( 264      ( 20 Feb 2019 06:08 )             35.1     02-20    136  |  105  |  67<H>  ----------------------------<  139<H>  4.3   |  18<L>  |  3.38<H>    Ca    9.2      20 Feb 2019 06:08  Mg     2.2     02-20            CAPILLARY BLOOD GLUCOSE      POCT Blood Glucose.: 177 mg/dL (21 Feb 2019 12:38)  POCT Blood Glucose.: 151 mg/dL (21 Feb 2019 08:20)  POCT Blood Glucose.: 165 mg/dL (20 Feb 2019 22:17)  POCT Blood Glucose.: 184 mg/dL (20 Feb 2019 17:46)            Imaging:

## 2019-02-21 NOTE — PROGRESS NOTE ADULT - PROBLEM SELECTOR PLAN 1
Hx of CVA with residual L weakness, presented with new b/l LE weakness, fall, and slurred speech  - MRI showing L corona radiata and macrina lacunar infarct  - Continue ASA 81mg daily, Lipitor 80mg daily, plavix 75mg daily (new medication on this admission)  - A1c 8.1%, total cholesterol 144, LDL 66  - Echo with EF 50-55%, concentric LVH and impaired LV relaxation. KATHY without clots  - Pending acute rehab placement, continue PT/OT inpatient

## 2019-02-21 NOTE — CHART NOTE - NSCHARTNOTEFT_GEN_A_CORE
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, traumatic, left, sequela      Current Nutrition Order: DASH/TLC, CCD No Snacks        PO Intake: Good (%) [ x  ]  Fair (50-75%) [ x  ] Poor (<25%) [   ]    GI Issues: +BM, loose, no noted n/v/c    Pain: no pain noted     Skin Integrity: kurt 17, intact     Labs:   02-20    136  |  105  |  67<H>  ----------------------------<  139<H>  4.3   |  18<L>  |  3.38<H>    Ca    9.2      20 Feb 2019 06:08  Mg     2.2     02-20      CAPILLARY BLOOD GLUCOSE      POCT Blood Glucose.: 177 mg/dL (21 Feb 2019 12:38)  POCT Blood Glucose.: 151 mg/dL (21 Feb 2019 08:20)  POCT Blood Glucose.: 165 mg/dL (20 Feb 2019 22:17)  POCT Blood Glucose.: 184 mg/dL (20 Feb 2019 17:46)      Medications:  MEDICATIONS  (STANDING):  artificial  tears Solution 1 Drop(s) Both EYES two times a day  aspirin  chewable 81 milliGRAM(s) Oral daily  atorvastatin 80 milliGRAM(s) Oral at bedtime  clopidogrel Tablet 75 milliGRAM(s) Oral daily  dextrose 5%. 1000 milliLiter(s) (50 mL/Hr) IV Continuous <Continuous>  dextrose 50% Injectable 12.5 Gram(s) IV Push once  dextrose 50% Injectable 25 Gram(s) IV Push once  dextrose 50% Injectable 25 Gram(s) IV Push once  heparin  Injectable 5000 Unit(s) SubCutaneous every 8 hours  insulin glargine Injectable (LANTUS) 15 Unit(s) SubCutaneous at bedtime  insulin lispro (HumaLOG) corrective regimen sliding scale   SubCutaneous Before meals and at bedtime  lisinopril 10 milliGRAM(s) Oral every 24 hours  metoprolol succinate ER 25 milliGRAM(s) Oral daily    MEDICATIONS  (PRN):  dextrose 40% Gel 15 Gram(s) Oral once PRN Blood Glucose LESS THAN 70 milliGRAM(s)/deciliter  glucagon  Injectable 1 milliGRAM(s) IntraMuscular once PRN Glucose LESS THAN 70 milligrams/deciliter      Weight: 79.8kg     Weight Change: no wt change noted, please retake     Nutrition Focused Physical Exam: Completed [   ]  Not Pertinent [x   ]    Estimated energy needs:   ABW used for calculations as pt between % of IBW.   ABW 79.8kg, IBW 78kg, 102% IBW, ht 71", BMI 24.5   Nutrient needs based on [facility] standards of care for maintenance in adults.    1995-2394kcal (25-30kcal/kg)   59-79g pro (.8-1g/kg pro)   1995-2394ml (25-30ml/kg)     Subjective:   53M with PMH of IDDM, prior CVA (residual left-sided weakness), HTN, HLD, and CKD, who presents to the ED with one day of worsening BLE weakness progressing until patient was unable to get out of bed, presenting with CVA. Pt is now stable pending placement at rehab. Pt continues to be stable pending insurance authorization prior to DC, likely before weekend.     Previous Nutrition Diagnosis: food and nutrition related knowledge deficit r/t DM diet AEB A1C 8.1     Active [ x  ]  Resolved [   ]    If resolved, new PES:     Goal: verbalize 2-3 diet recommendations for DM     Recommendations:  1. Reinforce ed as needed   2. Monitor and replete lytes prn   3. Encourage intake     Education: discussed DM diet     Risk Level: High [   ] Moderate [  x ] Low [   ]

## 2019-02-21 NOTE — PROGRESS NOTE ADULT - SUBJECTIVE AND OBJECTIVE BOX
HEALTH ISSUES - PROBLEM Dx:  Transition of care performed with sharing of clinical summary: Transition of care performed with sharing of clinical summary  Prophylactic measure: Prophylactic measure  Nutrition, metabolism, and development symptoms: Nutrition, metabolism, and development symptoms  Hyperlipidemia: Hyperlipidemia  CKD (chronic kidney disease) stage 4, GFR 15-29 ml/min: CKD (chronic kidney disease) stage 4, GFR 15-29 ml/min  Hypertension: Hypertension  Type 2 diabetes mellitus: Type 2 diabetes mellitus  CVA (cerebral vascular accident): CVA (cerebral vascular accident)          CHEMOTHERAPY REGIMEN:        Day:                          Diet:  Protocol:                                    IVF:      MEDICATIONS  (STANDING):  artificial  tears Solution 1 Drop(s) Both EYES two times a day  aspirin  chewable 81 milliGRAM(s) Oral daily  atorvastatin 80 milliGRAM(s) Oral at bedtime  clopidogrel Tablet 75 milliGRAM(s) Oral daily  dextrose 5%. 1000 milliLiter(s) (50 mL/Hr) IV Continuous <Continuous>  dextrose 50% Injectable 12.5 Gram(s) IV Push once  dextrose 50% Injectable 25 Gram(s) IV Push once  dextrose 50% Injectable 25 Gram(s) IV Push once  heparin  Injectable 5000 Unit(s) SubCutaneous every 8 hours  insulin glargine Injectable (LANTUS) 15 Unit(s) SubCutaneous at bedtime  insulin lispro (HumaLOG) corrective regimen sliding scale   SubCutaneous Before meals and at bedtime  lisinopril 10 milliGRAM(s) Oral every 24 hours  metoprolol succinate ER 25 milliGRAM(s) Oral daily    MEDICATIONS  (PRN):  dextrose 40% Gel 15 Gram(s) Oral once PRN Blood Glucose LESS THAN 70 milliGRAM(s)/deciliter  glucagon  Injectable 1 milliGRAM(s) IntraMuscular once PRN Glucose LESS THAN 70 milligrams/deciliter      Allergies    No Known Allergies    Intolerances        DVT Prophylaxis: [ ] YES [ ] NO      Antibiotics: [ ] YES [ ] NO    Pain Scale (1-10):       Location:    Vital Signs Last 24 Hrs  T(C): 36.4 (21 Feb 2019 16:00), Max: 36.4 (21 Feb 2019 05:09)  T(F): 97.6 (21 Feb 2019 16:00), Max: 97.6 (21 Feb 2019 05:09)  HR: 78 (21 Feb 2019 16:00) (73 - 82)  BP: 148/76 (21 Feb 2019 16:00) (138/83 - 148/76)  BP(mean): --  RR: 18 (21 Feb 2019 16:00) (17 - 18)  SpO2: 99% (21 Feb 2019 16:00) (97% - 99%)    Drug Dosing Weight  Height (cm): 180.34 (13 Feb 2019 08:46)  Weight (kg): 79.8 (13 Feb 2019 08:46)  BMI (kg/m2): 24.5 (13 Feb 2019 08:46)  BSA (m2): 2 (13 Feb 2019 08:46)     Physical Exam:  · Constitutional	detailed exam  · Constitutional Details	well-developed; well-groomed  · Eyes	EOMI; PERRL; no drainage or redness  · ENMT Comments	dry mucous membranes  · Respiratory	detailed exam  · Respiratory Comments	normal breath sounds at the lung bases bilaterally  · Cardiovascular	Regular rate & rhythm, normal S1, S2; no murmurs, gallops or rubs; no S3, S4  · Abd-Soft non tender  ·Ext-no edema, clubbing or cyanosis    URINARY CATHETER: [ ] YES [ ] NO     LABS:  CBC Full  -  ( 20 Feb 2019 06:08 )  WBC Count : 9.17 K/uL  Hemoglobin : 10.4 g/dL  Hematocrit : 35.1 %  Platelet Count - Automated : 264 K/uL  Mean Cell Volume : 89.8 fl  Mean Cell Hemoglobin : 26.6 pg  Mean Cell Hemoglobin Concentration : 29.6 gm/dL  Auto Neutrophil # : x  Auto Lymphocyte # : x  Auto Monocyte # : x  Auto Eosinophil # : x  Auto Basophil # : x  Auto Neutrophil % : x  Auto Lymphocyte % : x  Auto Monocyte % : x  Auto Eosinophil % : x  Auto Basophil % : x    02-20    136  |  105  |  67<H>  ----------------------------<  139<H>  4.3   |  18<L>  |  3.38<H>    Ca    9.2      20 Feb 2019 06:08  Mg     2.2     02-20            CULTURES:    RADIOLOGY & ADDITIONAL STUDIES:

## 2019-02-21 NOTE — CHART NOTE - NSCHARTNOTEFT_GEN_A_CORE
Patient discussed with Dr. Petty and Dr. San  Weighing risks and benefits, would continue patient on dual antiplatelets, not full anticoagulation  Pt will need repeat lupus anticoagulant studies in 5 weeks, and will need to follow up outpatient with Dr. Petty in 5 weeks.   Pt will need to follow up with Dr. San's NP in 3-4 weeks.  Patient cleared for discharge to acute rehab.  Discussed with Resident

## 2019-02-22 LAB
GLUCOSE BLDC GLUCOMTR-MCNC: 144 MG/DL — HIGH (ref 70–99)
GLUCOSE BLDC GLUCOMTR-MCNC: 147 MG/DL — HIGH (ref 70–99)
GLUCOSE BLDC GLUCOMTR-MCNC: 148 MG/DL — HIGH (ref 70–99)
GLUCOSE BLDC GLUCOMTR-MCNC: 94 MG/DL — SIGNIFICANT CHANGE UP (ref 70–99)

## 2019-02-22 PROCEDURE — 99232 SBSQ HOSP IP/OBS MODERATE 35: CPT

## 2019-02-22 RX ADMIN — Medication 81 MILLIGRAM(S): at 12:05

## 2019-02-22 RX ADMIN — CLOPIDOGREL BISULFATE 75 MILLIGRAM(S): 75 TABLET, FILM COATED ORAL at 12:02

## 2019-02-22 RX ADMIN — HEPARIN SODIUM 5000 UNIT(S): 5000 INJECTION INTRAVENOUS; SUBCUTANEOUS at 06:28

## 2019-02-22 RX ADMIN — Medication 1 DROP(S): at 06:28

## 2019-02-22 RX ADMIN — INSULIN GLARGINE 15 UNIT(S): 100 INJECTION, SOLUTION SUBCUTANEOUS at 22:43

## 2019-02-22 RX ADMIN — HEPARIN SODIUM 5000 UNIT(S): 5000 INJECTION INTRAVENOUS; SUBCUTANEOUS at 13:55

## 2019-02-22 RX ADMIN — Medication 1 DROP(S): at 18:48

## 2019-02-22 RX ADMIN — LISINOPRIL 10 MILLIGRAM(S): 2.5 TABLET ORAL at 09:01

## 2019-02-22 RX ADMIN — Medication 25 MILLIGRAM(S): at 06:28

## 2019-02-22 RX ADMIN — HEPARIN SODIUM 5000 UNIT(S): 5000 INJECTION INTRAVENOUS; SUBCUTANEOUS at 22:43

## 2019-02-22 RX ADMIN — ATORVASTATIN CALCIUM 80 MILLIGRAM(S): 80 TABLET, FILM COATED ORAL at 22:43

## 2019-02-22 NOTE — PROGRESS NOTE ADULT - SUBJECTIVE AND OBJECTIVE BOX
HEALTH ISSUES - PROBLEM Dx:  Anticardiolipin antibody positive: Anticardiolipin antibody positive  Transition of care performed with sharing of clinical summary: Transition of care performed with sharing of clinical summary  Prophylactic measure: Prophylactic measure  Nutrition, metabolism, and development symptoms: Nutrition, metabolism, and development symptoms  Hyperlipidemia: Hyperlipidemia  CKD (chronic kidney disease) stage 4, GFR 15-29 ml/min: CKD (chronic kidney disease) stage 4, GFR 15-29 ml/min  Hypertension: Hypertension  Type 2 diabetes mellitus: Type 2 diabetes mellitus  CVA (cerebral vascular accident): CVA (cerebral vascular accident)          CHEMOTHERAPY REGIMEN:        Day:                          Diet:  Protocol:                                    IVF:      MEDICATIONS  (STANDING):  artificial  tears Solution 1 Drop(s) Both EYES two times a day  aspirin  chewable 81 milliGRAM(s) Oral daily  atorvastatin 80 milliGRAM(s) Oral at bedtime  clopidogrel Tablet 75 milliGRAM(s) Oral daily  dextrose 5%. 1000 milliLiter(s) (50 mL/Hr) IV Continuous <Continuous>  dextrose 50% Injectable 12.5 Gram(s) IV Push once  dextrose 50% Injectable 25 Gram(s) IV Push once  dextrose 50% Injectable 25 Gram(s) IV Push once  heparin  Injectable 5000 Unit(s) SubCutaneous every 8 hours  insulin glargine Injectable (LANTUS) 15 Unit(s) SubCutaneous at bedtime  insulin lispro (HumaLOG) corrective regimen sliding scale   SubCutaneous Before meals and at bedtime  lisinopril 10 milliGRAM(s) Oral every 24 hours  metoprolol succinate ER 25 milliGRAM(s) Oral daily    MEDICATIONS  (PRN):  dextrose 40% Gel 15 Gram(s) Oral once PRN Blood Glucose LESS THAN 70 milliGRAM(s)/deciliter  glucagon  Injectable 1 milliGRAM(s) IntraMuscular once PRN Glucose LESS THAN 70 milligrams/deciliter      Allergies    No Known Allergies    Intolerances        DVT Prophylaxis: [ ] YES [ ] NO      Antibiotics: [ ] YES [ ] NO    Pain Scale (1-10):       Location:    Vital Signs Last 24 Hrs  T(C): 36.4 (22 Feb 2019 08:45), Max: 36.7 (21 Feb 2019 20:38)  T(F): 97.6 (22 Feb 2019 08:45), Max: 98.1 (21 Feb 2019 20:38)  HR: 85 (22 Feb 2019 08:45) (78 - 85)  BP: 136/79 (22 Feb 2019 08:45) (135/74 - 153/79)  BP(mean): --  RR: 18 (22 Feb 2019 08:45) (17 - 18)  SpO2: 98% (22 Feb 2019 04:34) (97% - 99%)    Drug Dosing Weight  Height (cm): 180.34 (13 Feb 2019 08:46)  Weight (kg): 79.8 (13 Feb 2019 08:46)  BMI (kg/m2): 24.5 (13 Feb 2019 08:46)  BSA (m2): 2 (13 Feb 2019 08:46)     Physical Exam:  · Constitutional	detailed exam  · Constitutional Details	well-developed; well-groomed  · Eyes	EOMI; PERRL; no drainage or redness  · ENMT Comments	dry mucous membranes  · Respiratory	detailed exam  · Respiratory Comments	normal breath sounds at the lung bases bilaterally  · Cardiovascular	Regular rate & rhythm, normal S1, S2; no murmurs, gallops or rubs; no S3, S4  · Abd-Soft non tender  ·Ext-no edema, clubbing or cyanosis    URINARY CATHETER: [ ] YES [ ] NO     LABS:                CULTURES:    RADIOLOGY & ADDITIONAL STUDIES:

## 2019-02-22 NOTE — PROGRESS NOTE ADULT - SUBJECTIVE AND OBJECTIVE BOX
EDEN DACOSTA 53y Male    Interval HPI:  ZULY overnight Patient denies any new confusion or change in motor or sensory abnormalities.     Patient seen and examined at bedside:    T(C): 36.2 (02-22-19 @ 16:46), Max: 36.7 (02-21-19 @ 20:38)  HR: 80 (02-22-19 @ 16:46) (78 - 85)  BP: 155/77 (02-22-19 @ 16:46) (135/74 - 155/77)  RR: 18 (02-22-19 @ 16:46) (17 - 18)  SpO2: 96% (02-22-19 @ 16:46) (96% - 98%)    Review of systems negative except as mentioned above    artificial  tears Solution 1 Drop(s) Both EYES two times a day  aspirin  chewable 81 milliGRAM(s) Oral daily  atorvastatin 80 milliGRAM(s) Oral at bedtime  clopidogrel Tablet 75 milliGRAM(s) Oral daily  dextrose 40% Gel 15 Gram(s) Oral once PRN  dextrose 5%. 1000 milliLiter(s) IV Continuous <Continuous>  dextrose 50% Injectable 12.5 Gram(s) IV Push once  dextrose 50% Injectable 25 Gram(s) IV Push once  dextrose 50% Injectable 25 Gram(s) IV Push once  glucagon  Injectable 1 milliGRAM(s) IntraMuscular once PRN  heparin  Injectable 5000 Unit(s) SubCutaneous every 8 hours  insulin glargine Injectable (LANTUS) 15 Unit(s) SubCutaneous at bedtime  insulin lispro (HumaLOG) corrective regimen sliding scale   SubCutaneous Before meals and at bedtime  lisinopril 10 milliGRAM(s) Oral every 24 hours  metoprolol succinate ER 25 milliGRAM(s) Oral daily      Physical Exam:    GENERAL: NAD,  male  HEENT: NC/AT, MMM, PERRL  NECK: Supple, no LAD, no JVD  RESPIRATORY: CTAB, good effort and depth  CV: S1S2 appeciated, RRR, no m/r/g  GI: Soft, NT/ND, normal active bowel sounds  EXT: WWP, no cyanosis, no edema  MSK: NROM, 4/5 strength in RLE, 5/5 strength in LLE  Neuro: CN II-XII grossly intact, sensory grossly intact in upper and lower ext B/L    Labs:                CAPILLARY BLOOD GLUCOSE      POCT Blood Glucose.: 144 mg/dL (22 Feb 2019 11:57)  POCT Blood Glucose.: 94 mg/dL (22 Feb 2019 08:42)  POCT Blood Glucose.: 146 mg/dL (21 Feb 2019 21:46)            Imaging:

## 2019-02-23 LAB
GLUCOSE BLDC GLUCOMTR-MCNC: 106 MG/DL — HIGH (ref 70–99)
GLUCOSE BLDC GLUCOMTR-MCNC: 127 MG/DL — HIGH (ref 70–99)
GLUCOSE BLDC GLUCOMTR-MCNC: 177 MG/DL — HIGH (ref 70–99)
GLUCOSE BLDC GLUCOMTR-MCNC: 209 MG/DL — HIGH (ref 70–99)

## 2019-02-23 PROCEDURE — 99233 SBSQ HOSP IP/OBS HIGH 50: CPT

## 2019-02-23 RX ADMIN — HEPARIN SODIUM 5000 UNIT(S): 5000 INJECTION INTRAVENOUS; SUBCUTANEOUS at 21:43

## 2019-02-23 RX ADMIN — Medication 1 DROP(S): at 06:22

## 2019-02-23 RX ADMIN — Medication 2: at 21:43

## 2019-02-23 RX ADMIN — ATORVASTATIN CALCIUM 80 MILLIGRAM(S): 80 TABLET, FILM COATED ORAL at 21:42

## 2019-02-23 RX ADMIN — HEPARIN SODIUM 5000 UNIT(S): 5000 INJECTION INTRAVENOUS; SUBCUTANEOUS at 13:04

## 2019-02-23 RX ADMIN — Medication 1 DROP(S): at 18:50

## 2019-02-23 RX ADMIN — Medication 25 MILLIGRAM(S): at 06:22

## 2019-02-23 RX ADMIN — Medication 4: at 13:03

## 2019-02-23 RX ADMIN — CLOPIDOGREL BISULFATE 75 MILLIGRAM(S): 75 TABLET, FILM COATED ORAL at 11:24

## 2019-02-23 RX ADMIN — INSULIN GLARGINE 15 UNIT(S): 100 INJECTION, SOLUTION SUBCUTANEOUS at 21:43

## 2019-02-23 RX ADMIN — Medication 81 MILLIGRAM(S): at 11:24

## 2019-02-23 RX ADMIN — LISINOPRIL 10 MILLIGRAM(S): 2.5 TABLET ORAL at 09:19

## 2019-02-23 RX ADMIN — HEPARIN SODIUM 5000 UNIT(S): 5000 INJECTION INTRAVENOUS; SUBCUTANEOUS at 06:22

## 2019-02-23 NOTE — PROGRESS NOTE ADULT - SUBJECTIVE AND OBJECTIVE BOX
=====================   STROKE ATTENDING NOTE  =====================     EDEN DACOSTA   [id number]  Summary:  53y/M with Diabetes Mellitus, h/o CVA (bASeline LLE weakness), Hypertension dyslipidemia CKD, presented with worsening BLE weakness (worsening LLE weakness, new onset R LE weakness), patient unable to get out of bed.  Symptoms started 1 day prior, had fall walking to restroom.  Brought to ED.  CT showed bilateral chronic BG infarcts; MRI showed acute infarction L corona radiata L macrina and MRA H&N.  Admitted to [facility] on .    COURSE IN THE HOSPITAL   Admitted to [facility]    PMH:  Hypertension Hyperlipidemia Cerebral artery occlusion with cerebral infarction Type 2 diabetes mellitus Personal history of other diseases of circulatory system  Allergies:  No Known Allergies   Home meds: ASA 81mg PO daily lipitor 20mg PO daily lisinopril 10mg PO daily metoprolol 25mg PO daily     PHYSICAL EXAMINATION  T(C): 36.6 (- @ 05:09), Max: 36.6 (- @ 05:09) HR: 84 (- @ 05:09) (80 - 92) BP: 139/74 (- @ 05:09) (112/72 - 155/77) RR: 18 (- @ 05:09) (18 - 18) SpO2: 97% ( @ 05:09) (96% - 97%)  NEUROLOGIC EXAMINATION:  Patient is awake, alert, fully oriented, pupils 2-3mm equal and briskly reactive to light, EOMs intact, muscle strength 5/5 on all 4 extremities  GENERAL:  not intubated, not in cardiorespiratory distress  EENT: anicteric  CARDIOVASC:  (+) S1 S2, normal rate and regular rhythm  PULMONARY:  clear to auscultation bilaterally  ABDOMEN:  soft, nontender, with normoactive bowel sounds  EXTREMITIES:  no edema  SKIN:  no rash    LABS:  CAPILLARY BLOOD GLUCOSE 209 127 148 147  No new labs    Bacteriology:  CSF studies:  EEG:  Neuroimagin/12 MRI:  acute lacunar infarctions L corona radaiata, L macrina, multiple chronic lacunar infarctions, moderate SVID   CT:  no recent transcortical infarction, chronic infarcts basal ganglia  Other imaging:    MEDICATIONS: artificial tears BID asa 81mg PO daily atorvastatin 80mg PO HS clopidogrel 75mg PO daily SQH q8h lantus 15 HS mod ISS lisinopril 10mg PO q24h metoprolol 25mg PO daily     IV FLUIDS:  DRIPS:  DIET:  Lines:    CODE STATUS:  full code                       GOALS OF CARE:  aggressive                      DISPOSITION:  7La =====================   STROKE ATTENDING NOTE  =====================     EDEN DACOSTA   [id number]  Summary:  53y/M with Diabetes Mellitus, h/o CVA (bASeline LLE weakness), Hypertension dyslipidemia CKD, presented with worsening BLE weakness (worsening LLE weakness, new onset R LE weakness), patient unable to get out of bed.  Symptoms started 1 day prior, had fall walking to restroom.  Brought to ED.  CT showed bilateral chronic BG infarcts; MRI showed acute infarction L corona radiata L macrina and MRA H&N.  Admitted to [facility] on .    COURSE IN THE HOSPITAL   Admitted to [facility]    PMH:  Hypertension Hyperlipidemia Cerebral artery occlusion with cerebral infarction Type 2 diabetes mellitus Personal history of other diseases of circulatory system  Allergies:  No Known Allergies   Home meds: ASA 81mg PO daily lipitor 20mg PO daily lisinopril 10mg PO daily metoprolol 25mg PO daily     PHYSICAL EXAMINATION  T(C): 36.6 ( @ 05:09), Max: 36.6 ( @ 05:09) HR: 84 ( @ 05:09) (80 - 92) BP: 139/74 (- @ 05:09) (112/72 - 155/77) RR: 18 (- @ 05:09) (18 - 18) SpO2: 97% ( @ 05:09) (96% - 97%)  NEUROLOGIC EXAMINATION:  Patient is awake, pupils 2-3mm equal and briskly reactive to light, EOMs intact, moves all 4s 4/5 R LE 5/5 L LE  GENERAL:  not intubated, not in cardiorespiratory distress  EENT: anicteric  CARDIOVASC:  (+) S1 S2, normal rate and regular rhythm  PULMONARY:  clear to auscultation bilaterally  ABDOMEN:  soft, nontender, with normoactive bowel sounds  EXTREMITIES:  no edema  SKIN:  no rash    LABS:  CAPILLARY BLOOD GLUCOSE 209 127 148 147  No new labs    Bacteriology:  CSF studies:  EEG:  Neuroimagin/12 MRI:  acute lacunar infarctions L corona radiata L macrina, multiple chronic lacunar infarctions, moderate SVID   CT:  no recent transcortical infarction, chronic infarcts basal ganglia  Other imaging:    MEDICATIONS: artificial tears BID asa 81mg PO daily atorvastatin 80mg PO HS clopidogrel 75mg PO daily SQH q8h lantus 15 HS mod ISS lisinopril 10mg PO q24h metoprolol 25mg PO daily     IV FLUIDS:  DRIPS:  DIET:  Lines:    CODE STATUS:  full code                       GOALS OF CARE:  aggressive                      DISPOSITION:  7La

## 2019-02-23 NOTE — PROGRESS NOTE ADULT - ASSESSMENT
53y/M with  1.  multifocal infarctions, acute in L corona radiata, L macrina, chronic basal ganglia infarctions  2.  Hypertension dyslipidemia  Diabetes Mellitus    PLAN:   - DISPO planning to acute rehab pending authorization  - repeat lupus coag in 5 weeks 53y/M with  1.  multifocal infarctions, acute in L corona radiata, L macrina, chronic basal ganglia infarctions  2.  Hypertension dyslipidemia  Diabetes Mellitus    PLAN:   - DISPO planning to acute rehab pending authorization  - repeat lupus coag in 5 weeks  - continue dual antiplatelet therapy and high dose statins  - SCDs and SQH for DVT chemoprophylaxis

## 2019-02-24 LAB
GLUCOSE BLDC GLUCOMTR-MCNC: 108 MG/DL — HIGH (ref 70–99)
GLUCOSE BLDC GLUCOMTR-MCNC: 128 MG/DL — HIGH (ref 70–99)
GLUCOSE BLDC GLUCOMTR-MCNC: 129 MG/DL — HIGH (ref 70–99)
GLUCOSE BLDC GLUCOMTR-MCNC: 201 MG/DL — HIGH (ref 70–99)

## 2019-02-24 PROCEDURE — 99231 SBSQ HOSP IP/OBS SF/LOW 25: CPT | Mod: 24

## 2019-02-24 RX ADMIN — INSULIN GLARGINE 15 UNIT(S): 100 INJECTION, SOLUTION SUBCUTANEOUS at 22:36

## 2019-02-24 RX ADMIN — CLOPIDOGREL BISULFATE 75 MILLIGRAM(S): 75 TABLET, FILM COATED ORAL at 11:31

## 2019-02-24 RX ADMIN — Medication 81 MILLIGRAM(S): at 11:31

## 2019-02-24 RX ADMIN — HEPARIN SODIUM 5000 UNIT(S): 5000 INJECTION INTRAVENOUS; SUBCUTANEOUS at 23:14

## 2019-02-24 RX ADMIN — Medication 1 DROP(S): at 06:34

## 2019-02-24 RX ADMIN — LISINOPRIL 10 MILLIGRAM(S): 2.5 TABLET ORAL at 09:06

## 2019-02-24 RX ADMIN — Medication 4: at 17:28

## 2019-02-24 RX ADMIN — HEPARIN SODIUM 5000 UNIT(S): 5000 INJECTION INTRAVENOUS; SUBCUTANEOUS at 14:16

## 2019-02-24 RX ADMIN — Medication 25 MILLIGRAM(S): at 06:34

## 2019-02-24 RX ADMIN — HEPARIN SODIUM 5000 UNIT(S): 5000 INJECTION INTRAVENOUS; SUBCUTANEOUS at 06:34

## 2019-02-24 RX ADMIN — ATORVASTATIN CALCIUM 80 MILLIGRAM(S): 80 TABLET, FILM COATED ORAL at 23:14

## 2019-02-24 RX ADMIN — Medication 1 DROP(S): at 17:30

## 2019-02-24 NOTE — PROGRESS NOTE ADULT - PROBLEM SELECTOR PROBLEM 2
Type 2 diabetes mellitus
Anticardiolipin antibody positive
Type 2 diabetes mellitus

## 2019-02-24 NOTE — PROGRESS NOTE ADULT - PROBLEM SELECTOR PLAN 6
F: None  E: Replete PRN  N: DASH/TLC  FULL CODE  Dispo: To F pending acute rehab
F: none  E: replete PRN  N: DASH/TLC

## 2019-02-24 NOTE — PROGRESS NOTE ADULT - PROBLEM SELECTOR PLAN 7
DVT ppx: heparin subQ, SCDs
DVT ppx: heparin subQ, SCDs    Full Code    Dispo: [facility]

## 2019-02-24 NOTE — PROGRESS NOTE ADULT - PROBLEM SELECTOR PLAN 5
History of HLD, on Lipitor 20mg at home  - Lipitor 80mg qhs
Reportedly with history of HLD, on Lipitor 20mg at home.  - Lipitor 80mg qhs

## 2019-02-24 NOTE — PROGRESS NOTE ADULT - PROBLEM SELECTOR PROBLEM 1
Anticardiolipin antibody positive
CVA (cerebral vascular accident)

## 2019-02-24 NOTE — PROGRESS NOTE ADULT - PROBLEM SELECTOR PROBLEM 4
CKD (chronic kidney disease) stage 4, GFR 15-29 ml/min

## 2019-02-24 NOTE — PROGRESS NOTE ADULT - ASSESSMENT
53y/M with  1.  multifocal infarctions, acute in L corona radiata, L macrina, chronic basal ganglia infarctions  2.  Hypertension dyslipidemia  Diabetes Mellitus    PLAN:   - DISPO planning to acute rehab pending authorization  - repeat lupus coag in 5 weeks  - continue dual antiplatelet therapy and high dose statins  - SCDs and SQH for DVT chemoprophylaxis

## 2019-02-24 NOTE — PROGRESS NOTE ADULT - SUBJECTIVE AND OBJECTIVE BOX
EDEN DACOSTA 53y Male    Interval HPI:      Patient seen and examined at bedside:    T(C): 36.2 (02-23-19 @ 20:35), Max: 36.2 (02-23-19 @ 16:00)  HR: 83 (02-23-19 @ 20:35) (83 - 85)  BP: 141/85 (02-23-19 @ 20:35) (128/74 - 141/85)  RR: 19 (02-23-19 @ 20:35) (18 - 19)  SpO2: 100% (02-23-19 @ 20:35) (100% - 100%)    Review of systems negative except as mentioned above    artificial  tears Solution 1 Drop(s) Both EYES two times a day  aspirin  chewable 81 milliGRAM(s) Oral daily  atorvastatin 80 milliGRAM(s) Oral at bedtime  clopidogrel Tablet 75 milliGRAM(s) Oral daily  dextrose 40% Gel 15 Gram(s) Oral once PRN  dextrose 5%. 1000 milliLiter(s) IV Continuous <Continuous>  dextrose 50% Injectable 12.5 Gram(s) IV Push once  dextrose 50% Injectable 25 Gram(s) IV Push once  dextrose 50% Injectable 25 Gram(s) IV Push once  glucagon  Injectable 1 milliGRAM(s) IntraMuscular once PRN  heparin  Injectable 5000 Unit(s) SubCutaneous every 8 hours  insulin glargine Injectable (LANTUS) 15 Unit(s) SubCutaneous at bedtime  insulin lispro (HumaLOG) corrective regimen sliding scale   SubCutaneous Before meals and at bedtime  lisinopril 10 milliGRAM(s) Oral every 24 hours  metoprolol succinate ER 25 milliGRAM(s) Oral daily      Physical Exam:    GENERAL:  HEENT:  NECK:  RESPIRATORY:  CV:  GI:  EXT:  MSK:    Labs:                CAPILLARY BLOOD GLUCOSE      POCT Blood Glucose.: 177 mg/dL (23 Feb 2019 21:22)  POCT Blood Glucose.: 106 mg/dL (23 Feb 2019 17:26)  POCT Blood Glucose.: 209 mg/dL (23 Feb 2019 12:25)  POCT Blood Glucose.: 127 mg/dL (23 Feb 2019 08:20)            Imaging: EDEN DACOSTA 53y Male    Interval HPI:  ZULY. Patient denies any confusion, sensory or motor changes or fevers.     Patient seen and examined at bedside:    T(C): 36.2 (02-23-19 @ 20:35), Max: 36.2 (02-23-19 @ 16:00)  HR: 83 (02-23-19 @ 20:35) (83 - 85)  BP: 141/85 (02-23-19 @ 20:35) (128/74 - 141/85)  RR: 19 (02-23-19 @ 20:35) (18 - 19)  SpO2: 100% (02-23-19 @ 20:35) (100% - 100%)    Review of systems negative except as mentioned above    artificial  tears Solution 1 Drop(s) Both EYES two times a day  aspirin  chewable 81 milliGRAM(s) Oral daily  atorvastatin 80 milliGRAM(s) Oral at bedtime  clopidogrel Tablet 75 milliGRAM(s) Oral daily  dextrose 40% Gel 15 Gram(s) Oral once PRN  dextrose 5%. 1000 milliLiter(s) IV Continuous <Continuous>  dextrose 50% Injectable 12.5 Gram(s) IV Push once  dextrose 50% Injectable 25 Gram(s) IV Push once  dextrose 50% Injectable 25 Gram(s) IV Push once  glucagon  Injectable 1 milliGRAM(s) IntraMuscular once PRN  heparin  Injectable 5000 Unit(s) SubCutaneous every 8 hours  insulin glargine Injectable (LANTUS) 15 Unit(s) SubCutaneous at bedtime  insulin lispro (HumaLOG) corrective regimen sliding scale   SubCutaneous Before meals and at bedtime  lisinopril 10 milliGRAM(s) Oral every 24 hours  metoprolol succinate ER 25 milliGRAM(s) Oral daily      Physical Exam:    GENERAL: NAD,  male  HEENT: NC/AT, MMM, PERRL  NECK: Supple, no LAD, no JVD  RESPIRATORY: CTAB, good effort and depth  CV: S1S2 appeciated, RRR, no m/r/g  GI: Soft, NT/ND, normal active bowel sounds  EXT: WWP, no cyanosis, no edema  MSK: NROM, 4/5 strength in RLE, 5/5 strength in LLE  Neuro: CN II-XII grossly intact, sensory grossly intact in upper and lower ext B/L  Labs:                CAPILLARY BLOOD GLUCOSE      POCT Blood Glucose.: 177 mg/dL (23 Feb 2019 21:22)  POCT Blood Glucose.: 106 mg/dL (23 Feb 2019 17:26)  POCT Blood Glucose.: 209 mg/dL (23 Feb 2019 12:25)  POCT Blood Glucose.: 127 mg/dL (23 Feb 2019 08:20)            Imaging:

## 2019-02-24 NOTE — PROGRESS NOTE ADULT - PROBLEM SELECTOR PLAN 8
1) PCP Contacted on Admission: (Y/N) --> Dr. Anil Hahn, [phone]  2) Date of Contact with PCP: TBD  3) PCP Contacted at Discharge: (Y/N) TBD  4) Summary of Handoff Given to PCP: TBD  5) Post-Discharge Appointment Date and Location: D
1) PCP Contacted on Admission: (Y/N) --> Dr. Anil Hahn, [phone]  2) Date of Contact with PCP:  3) PCP Contacted at Discharge: (Y/N)  4) Summary of Handoff Given to PCP:   5) Post-Discharge Appointment Date and Location:
1) PCP Contacted on Admission: (Y/N) --> Dr. Anil Hahn, [phone]  2) Date of Contact with PCP: TBD  3) PCP Contacted at Discharge: (Y/N) TBD  4) Summary of Handoff Given to PCP: TBD  5) Post-Discharge Appointment Date and Location: D
1) PCP Contacted on Admission: (Y/N) --> Dr. Anil Hahn, [phone]  2) Date of Contact with PCP: TBD  3) PCP Contacted at Discharge: (Y/N) TBD  4) Summary of Handoff Given to PCP: TBD  5) Post-Discharge Appointment Date and Location: D
1) PCP Contacted on Admission: (Y/N) --> Dr. Anil Hahn, [phone]  2) Date of Contact with PCP: TBD  3) PCP Contacted at Discharge: (Y/N) TBD  4) Summary of Handoff Given to PCP: TBD  5) Post-Discharge Appointment Date and Location: D
1) PCP Contacted on Admission: (Y/N) --> Dr. Anil Hahn, [phone]  2) Date of Contact with PCP: TBD  3) PCP Contacted at Discharge: (Y/N) TBD  4) Summary of Handoff Given to PCP: TBD  5) Post-Discharge Appointment Date and Location: D
1) PCP Contacted on Admission: (Y/N) --> Dr. Anil Hahn, [phone]  2) Date of Contact with PCP: TBD  3) PCP Contacted at Discharge: (Y/N) TBD  4) Summary of Handoff Given to PCP: TBD  5) Post-Discharge Appointment Date and Location: D
1) PCP Contacted on Admission: (Y/N) --> Dr. Anil Hahn, [phone]  2) Date of Contact with PCP: TBD  3) PCP Contacted at Discharge: (Y/N) TBD  4) Summary of Handoff Given to PCP: TBD  5) Post-Discharge Appointment Date and Location: D
1) PCP Contacted on Admission: (Y/N) --> Dr. Anil Hahn, [phone]  2) Date of Contact with PCP: TBD  3) PCP Contacted at Discharge: (Y/N) TBD  4) Summary of Handoff Given to PCP: TBD  5) Post-Discharge Appointment Date and Location: D
1) PCP Contacted on Admission: (Y/N) --> Dr. Anil Hahn, [phone]  2) Date of Contact with PCP:  3) PCP Contacted at Discharge: (Y/N)  4) Summary of Handoff Given to PCP:   5) Post-Discharge Appointment Date and Location:
1) PCP Contacted on Admission: (Y/N) --> Dr. Anil Hahn, [phone]  2) Date of Contact with PCP:  3) PCP Contacted at Discharge: (Y/N)  4) Summary of Handoff Given to PCP:   5) Post-Discharge Appointment Date and Location:

## 2019-02-24 NOTE — PROGRESS NOTE ADULT - SUBJECTIVE AND OBJECTIVE BOX
=====================   STROKE ATTENDING NOTE  =====================     EDEN DACOSTA   MRN-[id number]  Summary:  53y/M with Diabetes Mellitus, h/o CVA (bASeline LLE weakness), Hypertension dyslipidemia CKD, presented with worsening BLE weakness (worsening LLE weakness, new onset R LE weakness), patient unable to get out of bed.  Symptoms started 1 day prior, had fall walking to restroom.  Brought to ED.  CT showed bilateral chronic BG infarcts; MRI showed acute infarction L corona radiata L macrina and MRA H&N.  Admitted to [facility] on .    COURSE IN THE HOSPITAL   Admitted to [facility]    PMH:  Hypertension Hyperlipidemia Cerebral artery occlusion with cerebral infarction Type 2 diabetes mellitus Personal history of other diseases of circulatory system  Allergies:  No Known Allergies   Home meds: ASA 81mg PO daily lipitor 20mg PO daily lisinopril 10mg PO daily metoprolol 25mg PO daily     PHYSICAL EXAMINATION  T(C): 36.6 ( @ 05:09), Max: 36.6 ( @ 05:09) HR: 84 ( @ 05:09) (80 - 92) BP: 139/74 (- @ 05:09) (112/72 - 155/77) RR: 18 (- @ 05:09) (18 - 18) SpO2: 97% ( @ 05:09) (96% - 97%)  NEUROLOGIC EXAMINATION:  Patient is awake, pupils 2-3mm equal and briskly reactive to light, EOMs intact, moves all 4s 4/5 R LE 5/5 L LE  GENERAL:  not intubated, not in cardiorespiratory distress  EENT: anicteric  CARDIOVASC:  (+) S1 S2, normal rate and regular rhythm  PULMONARY:  clear to auscultation bilaterally  ABDOMEN:  soft, nontender, with normoactive bowel sounds  EXTREMITIES:  no edema  SKIN:  no rash    LABS:  CAPILLARY BLOOD GLUCOSE 209 127 148 147  No new labs    Bacteriology:  CSF studies:  EEG:  Neuroimagin/12 MRI:  acute lacunar infarctions L corona radiata L macrina, multiple chronic lacunar infarctions, moderate SVID   CT:  no recent transcortical infarction, chronic infarcts basal ganglia  Other imaging:    MEDICATIONS: artificial tears BID asa 81mg PO daily atorvastatin 80mg PO HS clopidogrel 75mg PO daily SQH q8h lantus 15 HS mod ISS lisinopril 10mg PO q24h metoprolol 25mg PO daily     IV FLUIDS:  DRIPS:  DIET:  Lines:    CODE STATUS:  full code                       GOALS OF CARE:  aggressive                      DISPOSITION:  7La =====================   STROKE ATTENDING NOTE  =====================     EDEN DACOSTA   MRN-[id number]  Summary:  53y/M with Diabetes Mellitus, h/o CVA (bASeline LLE weakness), Hypertension dyslipidemia CKD, presented with worsening BLE weakness (worsening LLE weakness, new onset R LE weakness), patient unable to get out of bed.  Symptoms started 1 day prior, had fall walking to restroom.  Brought to ED.  CT showed bilateral chronic BG infarcts; MRI showed acute infarction L corona radiata L macrina and MRA H&N.  Admitted to [facility] on .    COURSE IN THE HOSPITAL   Admitted to [facility]    PMH:  Hypertension Hyperlipidemia Cerebral artery occlusion with cerebral infarction Type 2 diabetes mellitus Personal history of other diseases of circulatory system  Allergies:  No Known Allergies   Home meds: ASA 81mg PO daily lipitor 20mg PO daily lisinopril 10mg PO daily metoprolol 25mg PO daily     PHYSICAL EXAMINATION  T(C): 35.6 ( @ 08:05), Max: 36.3 ( @ 05:45) HR: 78 ( @ 08:05) (78 - 88) BP: 139/79 (- @ 08:05) (128/74 - 141/85) RR: 18 ( @ 08:05) (18 - 19) SpO2: 100% ( @ 08:05) (100% - 100%)  NEUROLOGIC EXAMINATION:  Patient is awake, pupils 2-3mm equal and briskly reactive to light, EOMs intact, moves all 4s 4/5 R LE 5/5 L LE  GENERAL:  not intubated, not in cardiorespiratory distress  EENT: anicteric  CARDIOVASC:  (+) S1 S2, normal rate and regular rhythm  PULMONARY:  clear to auscultation bilaterally  ABDOMEN:  soft, nontender, with normoactive bowel sounds  EXTREMITIES:  no edema  SKIN:  no rash    LABS:  CAPILLARY BLOOD GLUCOSE 108 128 177 106    02-23 @ 07:01  -   @ 07:00  IN: 0 mL / OUT: 1320 mL / NET: -1320 mL    Bacteriology:  CSF studies:  EEG:  Neuroimagin/12 MRI:  acute lacunar infarctions L corona radiata L macrina, multiple chronic lacunar infarctions, moderate SVID   CT:  no recent transcortical infarction, chronic infarcts basal ganglia  Other imaging:    MEDICATIONS: artificial tears BID asa 81mg PO daily atorvastatin 80mg PO HS clopidogrel 75mg PO daily SQH q8h lantus 15 HS mod ISS lisinopril 10mg PO q24h metoprolol 25mg PO daily     IV FLUIDS:  DRIPS:  DIET:  Lines:    CODE STATUS:  full code                       GOALS OF CARE:  aggressive                      DISPOSITION:  7La

## 2019-02-24 NOTE — PROGRESS NOTE ADULT - PROBLEM SELECTOR PROBLEM 5
Hyperlipidemia

## 2019-02-25 ENCOUNTER — INBOUND DOCUMENT (OUTPATIENT)
Age: 54
End: 2019-02-25

## 2019-02-25 VITALS — SYSTOLIC BLOOD PRESSURE: 128 MMHG | HEART RATE: 84 BPM | DIASTOLIC BLOOD PRESSURE: 82 MMHG | OXYGEN SATURATION: 98 %

## 2019-02-25 LAB
GLUCOSE BLDC GLUCOMTR-MCNC: 122 MG/DL — HIGH (ref 70–99)
GLUCOSE BLDC GLUCOMTR-MCNC: 145 MG/DL — HIGH (ref 70–99)

## 2019-02-25 PROCEDURE — 99238 HOSP IP/OBS DSCHRG MGMT 30/<: CPT

## 2019-02-25 RX ADMIN — Medication 1 DROP(S): at 07:21

## 2019-02-25 RX ADMIN — Medication 25 MILLIGRAM(S): at 07:20

## 2019-02-25 RX ADMIN — CLOPIDOGREL BISULFATE 75 MILLIGRAM(S): 75 TABLET, FILM COATED ORAL at 11:35

## 2019-02-25 RX ADMIN — HEPARIN SODIUM 5000 UNIT(S): 5000 INJECTION INTRAVENOUS; SUBCUTANEOUS at 07:20

## 2019-02-25 RX ADMIN — LISINOPRIL 10 MILLIGRAM(S): 2.5 TABLET ORAL at 11:35

## 2019-02-25 RX ADMIN — Medication 81 MILLIGRAM(S): at 11:35

## 2019-02-25 NOTE — PROGRESS NOTE ADULT - SUBJECTIVE AND OBJECTIVE BOX
Neurology Stroke Progress Note    INTERVAL HPI/OVERNIGHT EVENTS:  Patient seen and examined this AM. Pt doing well, and is aware that he is likely going to rehab today. Discussed with patient the atmosphere of acute rehab, he is motivated to do therapy and spend his time there regaining strength. Pt has no other complaints.     MEDICATIONS  (STANDING):  artificial  tears Solution 1 Drop(s) Both EYES two times a day  aspirin  chewable 81 milliGRAM(s) Oral daily  atorvastatin 80 milliGRAM(s) Oral at bedtime  clopidogrel Tablet 75 milliGRAM(s) Oral daily  dextrose 5%. 1000 milliLiter(s) (50 mL/Hr) IV Continuous <Continuous>  dextrose 50% Injectable 12.5 Gram(s) IV Push once  dextrose 50% Injectable 25 Gram(s) IV Push once  dextrose 50% Injectable 25 Gram(s) IV Push once  heparin  Injectable 5000 Unit(s) SubCutaneous every 8 hours  insulin glargine Injectable (LANTUS) 15 Unit(s) SubCutaneous at bedtime  insulin lispro (HumaLOG) corrective regimen sliding scale   SubCutaneous Before meals and at bedtime  lisinopril 10 milliGRAM(s) Oral every 24 hours  metoprolol succinate ER 25 milliGRAM(s) Oral daily    MEDICATIONS  (PRN):  dextrose 40% Gel 15 Gram(s) Oral once PRN Blood Glucose LESS THAN 70 milliGRAM(s)/deciliter  glucagon  Injectable 1 milliGRAM(s) IntraMuscular once PRN Glucose LESS THAN 70 milligrams/deciliter      Allergies    No Known Allergies    Intolerances        ROS: As per HPI, otherwise negative    Vital Signs Last 24 Hrs  T(C): 36.6 (25 Feb 2019 09:00), Max: 36.6 (25 Feb 2019 09:00)  T(F): 97.9 (25 Feb 2019 09:00), Max: 97.9 (25 Feb 2019 09:00)  HR: 84 (25 Feb 2019 09:10) (82 - 84)  BP: 128/82 (25 Feb 2019 09:10) (121/78 - 131/77)  BP(mean): --  RR: 18 (25 Feb 2019 09:00) (16 - 18)  SpO2: 98% (25 Feb 2019 09:10) (96% - 98%)    Physical exam:  General: awake and alert, sitting comfortably, no acute distress  CV: RRR, no murmurs  Pulm: CTA bilaterally  Neurologic:  Mental status: awake, alert, oriented to person, place, and time. Speech is fluent - able to name, repeat, and describe picture fully. Follows commands. Attention/concentration intact. No aphasia.  Cranial nerves:   II: visual fields are full to confrontation.   III, IV, VI: EOMI without nystagmus   VII: no facial droop  VIII: hearing is intact  Motor:  4/5 strength in RLE, 5/5 strength in LLE  Sensation: intact to light touch  Gait: deferred    Radiology:    < from: KATHY w/Doppler (02.14.19 @ 14:53) >  .No   clot seen in the left atrium or in the left atrial appendage    < end of copied text >    Assessment and Plan  52 yo Male with PMHx of IDDM, prior CVA (residual left-sided weakness), who presented to the ED with one day of slurred speech, worsening LE weakness, MRI showing acute lacunar infarct in left corona radiata and macrina, patient admitted for management of acute CVA.     Consulted with Dr. Petty regarding use of dual antiplatelets for secondary stroke prevention.  KATHY showing no clot or shunt. BP's stable.   Plan to d/c patient to acute rehab for improvement of strength. repeat lupus coags in 5 weeks, f/u appointment with neurology.

## 2019-02-28 DIAGNOSIS — I63.9 CEREBRAL INFARCTION, UNSPECIFIED: ICD-10-CM

## 2019-02-28 DIAGNOSIS — E78.5 HYPERLIPIDEMIA, UNSPECIFIED: ICD-10-CM

## 2019-02-28 DIAGNOSIS — I69.354 HEMIPLEGIA AND HEMIPARESIS FOLLOWING CEREBRAL INFARCTION AFFECTING LEFT NON-DOMINANT SIDE: ICD-10-CM

## 2019-02-28 DIAGNOSIS — E87.2 ACIDOSIS: ICD-10-CM

## 2019-02-28 DIAGNOSIS — N18.4 CHRONIC KIDNEY DISEASE, STAGE 4 (SEVERE): ICD-10-CM

## 2019-02-28 DIAGNOSIS — I12.9 HYPERTENSIVE CHRONIC KIDNEY DISEASE WITH STAGE 1 THROUGH STAGE 4 CHRONIC KIDNEY DISEASE, OR UNSPECIFIED CHRONIC KIDNEY DISEASE: ICD-10-CM

## 2019-02-28 DIAGNOSIS — E11.22 TYPE 2 DIABETES MELLITUS WITH DIABETIC CHRONIC KIDNEY DISEASE: ICD-10-CM

## 2019-02-28 DIAGNOSIS — R76.0 RAISED ANTIBODY TITER: ICD-10-CM

## 2019-04-03 PROBLEM — I10 ESSENTIAL (PRIMARY) HYPERTENSION: Chronic | Status: ACTIVE | Noted: 2019-02-13

## 2019-05-29 ENCOUNTER — APPOINTMENT (OUTPATIENT)
Dept: HEMATOLOGY ONCOLOGY | Facility: CLINIC | Age: 54
End: 2019-05-29
Payer: MEDICAID

## 2019-05-29 VITALS
OXYGEN SATURATION: 98 % | HEART RATE: 72 BPM | SYSTOLIC BLOOD PRESSURE: 130 MMHG | TEMPERATURE: 98.1 F | DIASTOLIC BLOOD PRESSURE: 81 MMHG | RESPIRATION RATE: 14 BRPM

## 2019-05-29 VITALS — WEIGHT: 192 LBS | BODY MASS INDEX: 28.35 KG/M2

## 2019-05-29 PROCEDURE — 36415 COLL VENOUS BLD VENIPUNCTURE: CPT

## 2019-05-29 PROCEDURE — 99214 OFFICE O/P EST MOD 30 MIN: CPT | Mod: 25

## 2019-05-30 LAB
25(OH)D3 SERPL-MCNC: 11.6 NG/ML
ALBUMIN SERPL ELPH-MCNC: 4.2 G/DL
ALP BLD-CCNC: 140 U/L
ALT SERPL-CCNC: 10 U/L
ANION GAP SERPL CALC-SCNC: 11 MMOL/L
APTT IMM NP/PRE NP PPP: NORMAL SEC
APTT INV RATIO PPP: 33.1 SEC
AST SERPL-CCNC: 14 U/L
B2 MICROGLOB SERPL-MCNC: 5.1 MG/L
BASOPHILS # BLD AUTO: 0.05 K/UL
BASOPHILS NFR BLD AUTO: 0.7 %
BILIRUB SERPL-MCNC: 0.2 MG/DL
BUN SERPL-MCNC: 35 MG/DL
CALCIUM SERPL-MCNC: 9.2 MG/DL
CHLORIDE SERPL-SCNC: 110 MMOL/L
CO2 SERPL-SCNC: 22 MMOL/L
CONFIRM: 26.1 SEC
CREAT SERPL-MCNC: 2.9 MG/DL
DRVVT IMM 1:2 NP PPP: NORMAL
DRVVT SCREEN TO CONFIRM RATIO: 0.83 RATIO
EOSINOPHIL # BLD AUTO: 0.43 K/UL
EOSINOPHIL NFR BLD AUTO: 5.8 %
ERYTHROCYTE [SEDIMENTATION RATE] IN BLOOD BY WESTERGREN METHOD: 37 MM/HR
ESTIMATED AVERAGE GLUCOSE: 157 MG/DL
GLUCOSE SERPL-MCNC: 104 MG/DL
HBA1C MFR BLD HPLC: 7.1 %
HCT VFR BLD CALC: 40.4 %
HGB BLD-MCNC: 12.1 G/DL
IMM GRANULOCYTES NFR BLD AUTO: 0.3 %
LYMPHOCYTES # BLD AUTO: 2.3 K/UL
LYMPHOCYTES NFR BLD AUTO: 30.9 %
MAN DIFF?: NORMAL
MCHC RBC-ENTMCNC: 27.8 PG
MCHC RBC-ENTMCNC: 30 GM/DL
MCV RBC AUTO: 92.7 FL
MONOCYTES # BLD AUTO: 0.48 K/UL
MONOCYTES NFR BLD AUTO: 6.5 %
NEUTROPHILS # BLD AUTO: 4.16 K/UL
NEUTROPHILS NFR BLD AUTO: 55.8 %
NPP NORMAL POOLED PLASMA: NORMAL SECS
PLATELET # BLD AUTO: 249 K/UL
POTASSIUM SERPL-SCNC: 5.7 MMOL/L
PROT SERPL-MCNC: 7 G/DL
RBC # BLD: 4.36 M/UL
RBC # FLD: 15.4 %
SCREEN DRVVT: 26.2 SEC
SILICA CLOTTING TIME INTERPRETATION: NORMAL
SILICA CLOTTING TIME S/C: 0.84 RATIO
SODIUM SERPL-SCNC: 142 MMOL/L
VIT B12 SERPL-MCNC: 1159 PG/ML
WBC # FLD AUTO: 7.44 K/UL

## 2019-05-30 NOTE — HISTORY OF PRESENT ILLNESS
[de-identified] : pt with HTN, DM developed a stoke...found to have positive Lupus anticoagulants...

## 2019-05-30 NOTE — CONSULT LETTER
[Dear  ___] : Dear  [unfilled], [Consult Letter:] : I had the pleasure of evaluating your patient, [unfilled]. [Please see my note below.] : Please see my note below. [Consult Closing:] : Thank you very much for allowing me to participate in the care of this patient.  If you have any questions, please do not hesitate to contact me. [Sincerely,] : Sincerely, [FreeTextEntry3] : Elda Petty MD\par  [DrMarianne  ___] : Dr. RODRIGUEZ

## 2019-05-30 NOTE — ASSESSMENT
[FreeTextEntry1] : Repeat blood work pt does NOT have presence of  lupus anticoagulants anymore!\par \par He should remain on dual antiplt antibodies for stroke prevention.\par \par Of note pt's Vitamin D is low and he was started on replacement.\par

## 2019-05-31 LAB
ALBUMIN MFR SERPL ELPH: 55.4 %
ALBUMIN SERPL-MCNC: 3.9 G/DL
ALBUMIN/GLOB SERPL: 1.3 RATIO
ALPHA1 GLOB MFR SERPL ELPH: 4.6 %
ALPHA1 GLOB SERPL ELPH-MCNC: 0.3 G/DL
ALPHA2 GLOB MFR SERPL ELPH: 12.8 %
ALPHA2 GLOB SERPL ELPH-MCNC: 0.9 G/DL
B-GLOBULIN MFR SERPL ELPH: 13.5 %
B-GLOBULIN SERPL ELPH-MCNC: 0.9 G/DL
CARDIOLIPIN AB SER IA-ACNC: NEGATIVE
DEPRECATED KAPPA LC FREE/LAMBDA SER: 2.34 RATIO
GAMMA GLOB FLD ELPH-MCNC: 1 G/DL
GAMMA GLOB MFR SERPL ELPH: 13.7 %
IGA SER QL IEP: 293 MG/DL
IGG SER QL IEP: 961 MG/DL
IGM SER QL IEP: 22 MG/DL
INTERPRETATION SERPL IEP-IMP: NORMAL
KAPPA LC CSF-MCNC: 2.4 MG/DL
KAPPA LC SERPL-MCNC: 5.62 MG/DL
M PROTEIN SPEC IFE-MCNC: NORMAL
PROT SERPL-MCNC: 7 G/DL

## 2019-06-03 LAB
B2 GLYCOPROT1 IGA SERPL IA-ACNC: 5.2 SAU
B2 GLYCOPROT1 IGG SER-ACNC: <5 SGU
B2 GLYCOPROT1 IGM SER-ACNC: <5 SMU

## 2019-06-04 ENCOUNTER — APPOINTMENT (OUTPATIENT)
Dept: NEUROLOGY | Facility: CLINIC | Age: 54
End: 2019-06-04
Payer: MEDICAID

## 2019-06-04 VITALS
HEIGHT: 69 IN | HEART RATE: 73 BPM | DIASTOLIC BLOOD PRESSURE: 79 MMHG | SYSTOLIC BLOOD PRESSURE: 149 MMHG | OXYGEN SATURATION: 98 %

## 2019-06-04 DIAGNOSIS — F17.200 NICOTINE DEPENDENCE, UNSPECIFIED, UNCOMPLICATED: ICD-10-CM

## 2019-06-04 DIAGNOSIS — H54.40 BLINDNESS, ONE EYE, UNSPECIFIED EYE: ICD-10-CM

## 2019-06-04 DIAGNOSIS — F19.90 OTHER PSYCHOACTIVE SUBSTANCE USE, UNSPECIFIED, UNCOMPLICATED: ICD-10-CM

## 2019-06-04 PROCEDURE — 99215 OFFICE O/P EST HI 40 MIN: CPT

## 2019-06-04 RX ORDER — ATORVASTATIN CALCIUM 80 MG/1
TABLET, FILM COATED ORAL
Refills: 0 | Status: DISCONTINUED | COMMUNITY
End: 2019-06-04

## 2019-06-04 RX ORDER — METOPROLOL TARTRATE 75 MG/1
TABLET, FILM COATED ORAL
Refills: 0 | Status: DISCONTINUED | COMMUNITY
End: 2019-06-04

## 2019-06-07 ENCOUNTER — APPOINTMENT (OUTPATIENT)
Dept: ENDOCRINOLOGY | Facility: CLINIC | Age: 54
End: 2019-06-07
Payer: MEDICAID

## 2019-06-07 VITALS — HEART RATE: 72 BPM | SYSTOLIC BLOOD PRESSURE: 151 MMHG | DIASTOLIC BLOOD PRESSURE: 81 MMHG

## 2019-06-07 LAB — GLUCOSE BLDC GLUCOMTR-MCNC: 101

## 2019-06-07 PROCEDURE — 82962 GLUCOSE BLOOD TEST: CPT

## 2019-06-07 PROCEDURE — 99205 OFFICE O/P NEW HI 60 MIN: CPT | Mod: 25

## 2019-06-07 NOTE — REASON FOR VISIT
[Initial Eval - Existing Diagnosis] : an initial evaluation of an existing diagnosis [FreeTextEntry1] : DM

## 2019-06-07 NOTE — ADDENDUM
[FreeTextEntry1] : I, Donniejulianne Cruz, acted solely as a scribe for Dr. Rashaad Garcia on this date. 06/07/2019.\par

## 2019-06-07 NOTE — HISTORY OF PRESENT ILLNESS
[FreeTextEntry1] : 54 y/o M pt with Hx of T1DM (dx as a teenager, started on insulin initially), referred by Dr. 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 \par \par 5/29/19 A1c 7.1%, Vit B12 1159, s. creat 2.90, Vit D 25-OH 11.6,\par \par Pt is feeling well, and he states his blood sugars are "doing well". He notes he has high and low blood sugars depending on what he eats. Pt states he is on 20u Lantus and takes Admelog 4-6u ac meals if his blood sugar is > 150. \par  \par Current Meds: Lantus 20u, Admelog 4-6u ac meals, Atorvastatin 20mg, Losartan 50mg, Metoprolol 50 mg, Aspirin.

## 2019-06-07 NOTE — PHYSICAL EXAM
[Alert] : alert [Normal Sclera/Conjunctiva] : normal sclera/conjunctiva [No Respiratory Distress] : no respiratory distress [No Neck Mass] : no neck mass was observed [Normal Outer Ear/Nose] : the ears and nose were normal in appearance [Normal Bowel Sounds] : normal bowel sounds [No Edema] : there was no peripheral edema [Left Foot Was Examined] : left foot ~C was examined [Right Foot Was Examined] : right foot ~C was examined [1+] : 1+ in the dorsalis pedis [Oriented x3] : oriented to person, place, and time [de-identified] : sinus normal  [de-identified] : wheel chair bound  [de-identified] : L 4th and 5th toe amputee, 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

## 2019-06-07 NOTE — END OF VISIT
[FreeTextEntry3] : All medical record entries made by the Scribe were at my, Dr. Rashaad Garcia, direction and personally dictated by me on 06/07/2019. I have reviewed the chart and agree that the record accurately reflects my personal performance of the history, physical exam, assessment and plan. I have also personally directed, reviewed and agreed with the chart.\par  [>50% of Time Spent on Counseling for ____] : Greater than 50% of the encounter time was spent on counseling for [unfilled] [Time Spent: ___ minutes] : I have spent [unfilled] minutes of face to face time with the patient

## 2019-06-07 NOTE — ASSESSMENT
[FreeTextEntry1] : 54 y/o M pt with:\par 1. Hx of longstanding DM, poorly controlled with multiple DM related complications of CKD, severe peripheral neuropathy, and stroke (in 2018):\par There is very limited information regarding pt's DM management and DM complications. Pt states "he is doing well." Assessment for DM related complications, pt is at increased risk for cardiac events. Diabetes treatment goals discussed along with hypoglycemia prevention and treatments with pt. Pt was instructed to bring in glucose and food records at next appointment; he was also instructed to switch the sites of insulin injections. Recommend pt continue with prandial insulin ac meals. Will order labs. \par \par Appointment with NP, f/u with endocrinologist in 3 months.

## 2019-07-10 PROCEDURE — 87798 DETECT AGENT NOS DNA AMP: CPT

## 2019-07-10 PROCEDURE — 83735 ASSAY OF MAGNESIUM: CPT

## 2019-07-10 PROCEDURE — 85307 ASSAY ACTIVATED PROTEIN C: CPT

## 2019-07-10 PROCEDURE — 36415 COLL VENOUS BLD VENIPUNCTURE: CPT

## 2019-07-10 PROCEDURE — 85730 THROMBOPLASTIN TIME PARTIAL: CPT

## 2019-07-10 PROCEDURE — 87633 RESP VIRUS 12-25 TARGETS: CPT

## 2019-07-10 PROCEDURE — 82962 GLUCOSE BLOOD TEST: CPT

## 2019-07-10 PROCEDURE — 85300 ANTITHROMBIN III ACTIVITY: CPT

## 2019-07-10 PROCEDURE — 99285 EMERGENCY DEPT VISIT HI MDM: CPT

## 2019-07-10 PROCEDURE — 80048 BASIC METABOLIC PNL TOTAL CA: CPT

## 2019-07-10 PROCEDURE — 83090 ASSAY OF HOMOCYSTEINE: CPT

## 2019-07-10 PROCEDURE — 97112 NEUROMUSCULAR REEDUCATION: CPT

## 2019-07-10 PROCEDURE — 70547 MR ANGIOGRAPHY NECK W/O DYE: CPT

## 2019-07-10 PROCEDURE — 90686 IIV4 VACC NO PRSV 0.5 ML IM: CPT

## 2019-07-10 PROCEDURE — 84100 ASSAY OF PHOSPHORUS: CPT

## 2019-07-10 PROCEDURE — 85610 PROTHROMBIN TIME: CPT

## 2019-07-10 PROCEDURE — 70551 MRI BRAIN STEM W/O DYE: CPT

## 2019-07-10 PROCEDURE — 70544 MR ANGIOGRAPHY HEAD W/O DYE: CPT

## 2019-07-10 PROCEDURE — 71045 X-RAY EXAM CHEST 1 VIEW: CPT

## 2019-07-10 PROCEDURE — 86146 BETA-2 GLYCOPROTEIN ANTIBODY: CPT

## 2019-07-10 PROCEDURE — 85303 CLOT INHIBIT PROT C ACTIVITY: CPT

## 2019-07-10 PROCEDURE — 70450 CT HEAD/BRAIN W/O DYE: CPT

## 2019-07-10 PROCEDURE — 80061 LIPID PANEL: CPT

## 2019-07-10 PROCEDURE — 85306 CLOT INHIBIT PROT S FREE: CPT

## 2019-07-10 PROCEDURE — 93306 TTE W/DOPPLER COMPLETE: CPT

## 2019-07-10 PROCEDURE — 87486 CHLMYD PNEUM DNA AMP PROBE: CPT

## 2019-07-10 PROCEDURE — 97161 PT EVAL LOW COMPLEX 20 MIN: CPT

## 2019-07-10 PROCEDURE — 85027 COMPLETE CBC AUTOMATED: CPT

## 2019-07-10 PROCEDURE — 97110 THERAPEUTIC EXERCISES: CPT

## 2019-07-10 PROCEDURE — 80053 COMPREHEN METABOLIC PANEL: CPT

## 2019-07-10 PROCEDURE — 83036 HEMOGLOBIN GLYCOSYLATED A1C: CPT

## 2019-07-10 PROCEDURE — 85025 COMPLETE CBC W/AUTO DIFF WBC: CPT

## 2019-07-10 PROCEDURE — 85613 RUSSELL VIPER VENOM DILUTED: CPT

## 2019-07-10 PROCEDURE — 93312 ECHO TRANSESOPHAGEAL: CPT

## 2019-07-10 PROCEDURE — 87581 M.PNEUMON DNA AMP PROBE: CPT

## 2019-07-10 PROCEDURE — 97116 GAIT TRAINING THERAPY: CPT

## 2019-07-10 PROCEDURE — 85598 HEXAGNAL PHOSPH PLTLT NEUTRL: CPT

## 2019-07-10 PROCEDURE — 84443 ASSAY THYROID STIM HORMONE: CPT

## 2019-07-23 ENCOUNTER — APPOINTMENT (OUTPATIENT)
Dept: NEPHROLOGY | Facility: CLINIC | Age: 54
End: 2019-07-23
Payer: MEDICAID

## 2019-07-23 VITALS — SYSTOLIC BLOOD PRESSURE: 140 MMHG | RESPIRATION RATE: 14 BRPM | DIASTOLIC BLOOD PRESSURE: 82 MMHG | HEART RATE: 75 BPM

## 2019-07-23 PROCEDURE — 99204 OFFICE O/P NEW MOD 45 MIN: CPT

## 2019-07-29 LAB
24R-OH-CALCIDIOL SERPL-MCNC: 53.5 PG/ML
25(OH)D3 SERPL-MCNC: 25.3 NG/ML
ALBUMIN SERPL ELPH-MCNC: 4.3 G/DL
ANA SER IF-ACNC: NEGATIVE
ANION GAP SERPL CALC-SCNC: 13 MMOL/L
APPEARANCE: CLEAR
BACTERIA: NEGATIVE
BASOPHILS # BLD AUTO: 0.04 K/UL
BASOPHILS NFR BLD AUTO: 0.5 %
BILIRUBIN URINE: NEGATIVE
BLOOD URINE: NORMAL
BUN SERPL-MCNC: 37 MG/DL
C3 SERPL-MCNC: 116 MG/DL
C4 SERPL-MCNC: 25 MG/DL
CALCIUM SERPL-MCNC: 9.4 MG/DL
CALCIUM SERPL-MCNC: 9.4 MG/DL
CHLORIDE SERPL-SCNC: 112 MMOL/L
CO2 SERPL-SCNC: 19 MMOL/L
COLOR: NORMAL
CREAT SERPL-MCNC: 3.14 MG/DL
CREAT SPEC-SCNC: 140 MG/DL
CREAT SPEC-SCNC: 140 MG/DL
CREAT/PROT UR: 1.2 RATIO
DSDNA AB SER-ACNC: <12 IU/ML
EOSINOPHIL # BLD AUTO: 0.48 K/UL
EOSINOPHIL NFR BLD AUTO: 5.9 %
ESTIMATED AVERAGE GLUCOSE: 146 MG/DL
FERRITIN SERPL-MCNC: 41 NG/ML
GLUCOSE QUALITATIVE U: NEGATIVE
GLUCOSE SERPL-MCNC: 71 MG/DL
HBA1C MFR BLD HPLC: 6.7 %
HBV CORE IGG+IGM SER QL: NONREACTIVE
HBV CORE IGM SER QL: NONREACTIVE
HBV SURFACE AB SER QL: NONREACTIVE
HBV SURFACE AG SER QL: NONREACTIVE
HCT VFR BLD CALC: 41.1 %
HCV AB SER QL: NONREACTIVE
HCV S/CO RATIO: 0.13 S/CO
HGB BLD-MCNC: 12.1 G/DL
HIV1+2 AB SPEC QL IA.RAPID: NONREACTIVE
HYALINE CASTS: 2 /LPF
IMM GRANULOCYTES NFR BLD AUTO: 0.7 %
IRON SATN MFR SERPL: 27 %
IRON SERPL-MCNC: 85 UG/DL
KETONES URINE: NEGATIVE
LEUKOCYTE ESTERASE URINE: NEGATIVE
LYMPHOCYTES # BLD AUTO: 2.77 K/UL
LYMPHOCYTES NFR BLD AUTO: 34.2 %
MAN DIFF?: NORMAL
MCHC RBC-ENTMCNC: 27.9 PG
MCHC RBC-ENTMCNC: 29.4 GM/DL
MCV RBC AUTO: 94.9 FL
MICROALBUMIN 24H UR DL<=1MG/L-MCNC: 90.4 MG/DL
MICROALBUMIN/CREAT 24H UR-RTO: 628 MG/G
MICROSCOPIC-UA: NORMAL
MONOCYTES # BLD AUTO: 0.67 K/UL
MONOCYTES NFR BLD AUTO: 8.3 %
NEUTROPHILS # BLD AUTO: 4.09 K/UL
NEUTROPHILS NFR BLD AUTO: 50.4 %
NITRITE URINE: NEGATIVE
PARATHYROID HORMONE INTACT: 126 PG/ML
PH URINE: 6
PHOSPHATE SERPL-MCNC: 4 MG/DL
PHOSPHOLIPASE A2 RECEPTOR ELISA: 2 RU/ML
PHOSPHOLIPASE A2 RECEPTOR IFA: NEGATIVE
PLATELET # BLD AUTO: 255 K/UL
POTASSIUM SERPL-SCNC: 5 MMOL/L
POTASSIUM SERPL-SCNC: 5 MMOL/L
PROT UR-MCNC: 164 MG/DL
PROTEIN URINE: ABNORMAL
RBC # BLD: 4.33 M/UL
RBC # FLD: 15.1 %
RED BLOOD CELLS URINE: 5 /HPF
SODIUM SERPL-SCNC: 144 MMOL/L
SPECIFIC GRAVITY URINE: 1.01
SQUAMOUS EPITHELIAL CELLS: 1 /HPF
T PALLIDUM AB SER QL IA: NEGATIVE
TIBC SERPL-MCNC: 310 UG/DL
UIBC SERPL-MCNC: 225 UG/DL
URATE SERPL-MCNC: 8.5 MG/DL
UROBILINOGEN URINE: NORMAL
WBC # FLD AUTO: 8.11 K/UL
WHITE BLOOD CELLS URINE: 1 /HPF

## 2019-07-29 NOTE — ASSESSMENT
[FreeTextEntry1] : 52yo black male smoker with HTN, longstanding IDDM1 ischemic stroke 2/19 w residual L sided weakness wheelchair bound now referred by PCP Dr. Wallis for CKD IV: \par \par # CKD IV egfr 27 w hyperkalemia w longstanding HTN and DMII - May 2019 labs, u/a proteinuria, pt unaware of kidney problems. Alb 4.2 \par Reviewed records as far back as 2012 - Cr 2 at that time - long standing. \par Per pt had laser tx on eyes likely retinopathy, R eye largely blind 2/2 childhood accident. \par HTN - tsh wnl, + lupus anticoagulants during hospitalization for stroke per Dr. Petty documentation however repeat outpt were negative. \par Discussed with patient the importance of both diabetic and hypertension control on delaying the progression of renal disease. Goal HbA1C <7%, exercise advised, low Na diet rich in fruits and vegetables, to avoid pre-packaged foods, eating out, juices/sodas/sweets. Advised to check BP regularly at home, record values and bring to future appointments. To call if BP consistently above goal 130/80 so we can adjust regimen in between appointments as needed.\par \par # Anemia - 12 May.19 spep wnl, FLC 2.3 wnl for renal fx - check Fe\par \par Addendum 7/29: reviewed Labs Cr up to 3.1 from 2.9 egfr 21 likely 2/2 diabetic/hypertensive nephrosclerosis. Needs ergo for secondary hyperparathyroidism, Fe for anemia, Nabicarb for metabolic acidosis. Serologic GN w/u negative. Left voicemail to discuss w pt.

## 2019-07-29 NOTE — CONSULT LETTER
[Dear  ___] : Dear  [unfilled], [Consult Letter:] : I had the pleasure of evaluating your patient, [unfilled]. [Please see my note below.] : Please see my note below. [Consult Closing:] : Thank you very much for allowing me to participate in the care of this patient.  If you have any questions, please do not hesitate to contact me. [FreeTextEntry3] : Vicki Grace MD\par  of Medicine\par Division of Kidney Diseases and Hypertension\par [facility] \par [employer] [facility] Tel: [phone]\par Email: [email or web address]\par \par  [Sincerely,] : Sincerely,

## 2019-07-29 NOTE — PHYSICAL EXAM
[General Appearance - In No Acute Distress] : in no acute distress [General Appearance - Alert] : alert [Extraocular Movements] : extraocular movements were intact [Outer Ear] : the ears and nose were normal in appearance [Sclera] : the sclera and conjunctiva were normal [PERRL With Normal Accommodation] : pupils were equal in size, round, and reactive to light [Jugular Venous Distention Increased] : there was no jugular-venous distention [Examination Of The Oral Cavity] : the lips and gums were normal [Auscultation Breath Sounds / Voice Sounds] : lungs were clear to auscultation bilaterally [Heart Rate And Rhythm] : heart rate was normal and rhythm regular [Heart Sounds] : normal S1 and S2 [Edema] : there was no peripheral edema [Abdomen Soft] : soft [Abdomen Tenderness] : non-tender [Bowel Sounds] : normal bowel sounds [No CVA Tenderness] : no ~M costovertebral angle tenderness [Musculoskeletal - Swelling] : no joint swelling seen [Involuntary Movements] : no involuntary movements were seen [Skin Color & Pigmentation] : normal skin color and pigmentation [Skin Turgor] : normal skin turgor [FreeTextEntry1] : LUE/LLE weakness [] : no rash [Impaired Insight] : insight and judgment were intact [Oriented To Time, Place, And Person] : oriented to person, place, and time

## 2019-07-29 NOTE — HISTORY OF PRESENT ILLNESS
[FreeTextEntry1] : 52yo black male smoker with HTN, longstanding IDDM1 ischemic stroke 2/19 w residual L sided weakness wheelchair bound now referred by PCP Dr. Wallis for CKD IV: \par \par Feels better daily, good energy, positive outlook. Doing PT getting stronger, at times can walk w/o wheelchair. \par Good appetite, no uremic sx. \par No SOB/orthopnea. LE edema improving. \par No current nsaids or herbals. \par \par Fam: Aunt with kidney problems since passed. Mother with DMII.

## 2019-08-06 ENCOUNTER — APPOINTMENT (OUTPATIENT)
Dept: ENDOCRINOLOGY | Facility: CLINIC | Age: 54
End: 2019-08-06
Payer: MEDICAID

## 2019-08-06 VITALS
BODY MASS INDEX: 28.8 KG/M2 | SYSTOLIC BLOOD PRESSURE: 103 MMHG | HEART RATE: 78 BPM | DIASTOLIC BLOOD PRESSURE: 69 MMHG | WEIGHT: 195 LBS

## 2019-08-06 PROCEDURE — 82962 GLUCOSE BLOOD TEST: CPT

## 2019-08-06 PROCEDURE — 99214 OFFICE O/P EST MOD 30 MIN: CPT | Mod: 25

## 2019-08-09 LAB — GLUCOSE BLDC GLUCOMTR-MCNC: 163

## 2019-08-09 NOTE — ADDENDUM
[FreeTextEntry1] : I, Van Lo, acted solely as a scribe for Dr. Rashaad Garcia on this date. 08/06/2019. \par I, Laura Cruz, acted solely as a scribe for Dr. Rashaad Garcia on this date. 08/06/2019.

## 2019-08-09 NOTE — END OF VISIT
[FreeTextEntry3] : All medical record entries made by the Scribe were at my, Dr. Rashaad Garcia, direction and personally dictated by me on 08/06/2019 I have reviewed the chart and agree that the record accurately reflects my personal performance of the history, physical exam, assessment and plan. I have also personally directed, reviewed and agreed with the chart.  [>50% of Time Spent on Counseling for ____] : Greater than 50% of the encounter time was spent on counseling for [unfilled] [Time Spent: ___ minutes] : I have spent [unfilled] minutes of face to face time with the patient

## 2019-08-09 NOTE — PHYSICAL EXAM
[Normal Sclera/Conjunctiva] : normal sclera/conjunctiva [Alert] : alert [No Respiratory Distress] : no respiratory distress [Normal Outer Ear/Nose] : the ears and nose were normal in appearance [No Neck Mass] : no neck mass was observed [Normal Bowel Sounds] : normal bowel sounds [No Edema] : there was no peripheral edema [Left Foot Was Examined] : left foot ~C was examined [Right Foot Was Examined] : right foot ~C was examined [1+] : 1+ in the dorsalis pedis [Oriented x3] : oriented to person, place, and time [Normal Rate] : heart rate was normal  [No Spinal Tenderness] : no spinal tenderness [Normal Reflexes] : deep tendon reflexes were 2+ and symmetric [de-identified] : wheel chair bound  [de-identified] : L 4th and 5th toe amputee, 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

## 2019-08-09 NOTE — HISTORY OF PRESENT ILLNESS
[FreeTextEntry1] : 5/29/19 A1c 7.1%, Vit B12 1159, s. creat 2.90, Vit D 25-OH 11.6\par 7/23/19: Vit D 25-OH 25.3, Vit D 1-25-OH 53.3, A1c 6.7%, iPTH 126, Ca 9.4, Microalb/creat ratio 628\par \par 52 y/o M pt /69, BMI 28.8, with Hx of T1DM (dx as a teenager, started on insulin initially), referred by Dr. 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 \par Follows nephrologist\par Has not seen a RD yet\par \par 6/7/2019\par Pt is feeling well, and he states his blood sugars are "doing well". He notes he has high and low blood sugars depending on what he eats. Pt states he is on 20u Lantus and takes Admelog 4-6u ac meals if his blood sugar is > 150. \par \par 08/06/2019 \par Pt presents today for DM f/u, feeling well. Pt reports that he saw his nephrologist and that his kidney function is at 20% efficiency. Pt notes that he needs a kidney transplant, but is not on the transplant list as of yet. Pt states that he was rx kidney medication from his nephrologist and is not on dialysis. Pt brought BS readings today: FBS , 40% of FBS are below 130. Pt claims to have a better diet.\par Pt notes is not taking any medications for Ca or parathyroid. \par \par Current Meds: Lantus 18u, Admelog 5-6u ac meals, Atorvastatin 20mg, Losartan 50mg, Metoprolol 50 mg, Aspirin,

## 2019-09-09 ENCOUNTER — APPOINTMENT (OUTPATIENT)
Dept: NEUROLOGY | Facility: CLINIC | Age: 54
End: 2019-09-09
Payer: MEDICAID

## 2019-09-09 VITALS — HEART RATE: 77 BPM | DIASTOLIC BLOOD PRESSURE: 88 MMHG | SYSTOLIC BLOOD PRESSURE: 159 MMHG | OXYGEN SATURATION: 100 %

## 2019-09-09 PROCEDURE — 99213 OFFICE O/P EST LOW 20 MIN: CPT

## 2019-09-09 RX ORDER — ASPIRIN 81 MG/1
81 TABLET, COATED ORAL
Qty: 30 | Refills: 0 | Status: ACTIVE | COMMUNITY
Start: 2019-05-13

## 2019-09-09 NOTE — DATA REVIEWED
[de-identified] : Labs (7/23/2019): \par Hemoglobin A1C 6.7%\par Renal panel - WNL except creatinine 3.14\par CBC - WNL except Hgb 12.1

## 2019-09-09 NOTE — HISTORY OF PRESENT ILLNESS
[FreeTextEntry1] : 54 year-old RH male with PMH HTN, hyperlipidemia, IDDM, prior CVA (residual left-sided weakness), CKD presents for follow up of right leg weakness secondary to acute infarct in left corona radiata and left macrina.  \par \par He still uses a wheelchair when he goes out but a walker at home.  He exercises at home with weights on his legs. No more formal physical therapy since he had less energy due to kidney disease.  His appetite has returned now that Dr. Grace, Nephrology, started him on medications.\par \par No numbness, speech or visual deficits.\par \par Aspirin - No bleeding in urine or stool.\par Statin - No muscle pains or cramps.\par \par Stroke risk factors - He insists that he's compliant with his medications.\par HTN - usually low; he thinks that he's anxious today about arriving on time\par Hyperlipidemia - on statin\par Diabetes - under control.  He's checking his finger sticks; 102 this morning.  \par Smoking - "trying to quit" - smokes 3-4 per day\par \par He never went to the Electrophysiologist appointment.

## 2019-09-09 NOTE — ASSESSMENT
[FreeTextEntry1] : 54 year-old RH male with PMH HTN, hyperlipidemia, IDDM, prior CVA (residual left-sided weakness), CKD presents for follow up of right leg weakness secondary to acute infarct in left corona radiata and left macrina.  \par \par Plan:\par 1) Secondary stroke prevention - \par a) Aspirin - No bleeding in urine or stool.\par b) Statin - No muscle pains or cramps.\par \par 2) Stroke risk factors - \par a) HTN - needs better control but could be related to his kidney disease; deferred to PMD\par b) Hyperlipidemia - on statin\par c) Diabetes - better controlled based on repeat hemoglobin A1C so continue current regimen\par \par 3) Further workup - Referred again to EP for possible placement of loop recorder since had two separate areas of stroke in location and times.\par \par Note: He doesn't have computer but does have a cell phone.  He's willing to try Exercise Rx on his cell phone.

## 2019-09-09 NOTE — PHYSICAL EXAM
[FreeTextEntry1] : General - sitting in wheelchair, NAD\par Eyes: anicteric sclera\par CV: RRR\par Extremities: 2+ radial pulses bilaterally, decreased ROM at knees bilaterally\par \par Neurologic:\par Mental status: awake, alert, oriented to person, place, and time. Speech is fluent - able to name, repeat, and describe picture fully. Follows commands. Attention/concentration intact. No aphasia.\par Cranial nerves: \par II: visual fields are full to confrontation. \par III, IV, VI: EOMI without nystagmus \par VII: no facial droop\par VIII: hearing is intact\par Motor: No pronator drift, Upper extremities 5/5 bilaterally except for decreased fine finger movements on right hand, otherwise relatively similar; Good strength on individual muscle testing in lower extremities - at least 5-/5\par Sensation: intact to light touch bilaterally\par Cerebellar: Right finger-nose and heel-shin not as accurate as left finger-nose and heel-shin\par Gait: deferred since wheelchair bound

## 2019-09-20 ENCOUNTER — NON-APPOINTMENT (OUTPATIENT)
Age: 54
End: 2019-09-20

## 2019-10-09 NOTE — PHYSICAL THERAPY INITIAL EVALUATION ADULT - IMPAIRED TRANSFERS: BED/CHAIR, REHAB EVAL
[id number] Kayla Garzon for meloxicam for now    After that, diclofenac pill only as needed  Gel as needed as well    We will call with bloodwork results    Followup in 6-12 months, sooner if needed    Julio C Allison SCREENING SCHEDULE   Tests on this list are recommended Abdominal aortic aneurysm screening (once between ages 73-68)  No results found for this or any previous visit.  Limited to patients who meet one of the following criteria:   • Men who are 73-68 years old and have smoked more than 100 cigarettes in their disease   Hemophiliacs who received Factor VIII or IX concentrates   Clients of institutions for the mentally retarded   Persons who live in the same house as a HepB virus carrier   Homosexual men   Illicit injectable drug abusers     Tetanus Toxoid- Only impaired balance/decreased ROM/decreased strength

## 2019-12-03 ENCOUNTER — APPOINTMENT (OUTPATIENT)
Dept: NEPHROLOGY | Facility: CLINIC | Age: 54
End: 2019-12-03
Payer: MEDICAID

## 2019-12-03 VITALS — SYSTOLIC BLOOD PRESSURE: 155 MMHG | HEART RATE: 75 BPM | RESPIRATION RATE: 16 BRPM | DIASTOLIC BLOOD PRESSURE: 90 MMHG

## 2019-12-03 VITALS — SYSTOLIC BLOOD PRESSURE: 152 MMHG | DIASTOLIC BLOOD PRESSURE: 88 MMHG

## 2019-12-03 DIAGNOSIS — E87.2 ACIDOSIS: ICD-10-CM

## 2019-12-03 PROCEDURE — 99215 OFFICE O/P EST HI 40 MIN: CPT

## 2019-12-04 ENCOUNTER — APPOINTMENT (OUTPATIENT)
Dept: NEUROLOGY | Facility: CLINIC | Age: 54
End: 2019-12-04
Payer: MEDICAID

## 2019-12-04 VITALS
BODY MASS INDEX: 28.14 KG/M2 | DIASTOLIC BLOOD PRESSURE: 72 MMHG | TEMPERATURE: 97.5 F | OXYGEN SATURATION: 99 % | WEIGHT: 190 LBS | HEART RATE: 86 BPM | SYSTOLIC BLOOD PRESSURE: 129 MMHG | HEIGHT: 69 IN

## 2019-12-04 LAB
25(OH)D3 SERPL-MCNC: 44 NG/ML
ALBUMIN SERPL ELPH-MCNC: 4.3 G/DL
ANION GAP SERPL CALC-SCNC: 12 MMOL/L
APPEARANCE: CLEAR
BACTERIA: NEGATIVE
BASOPHILS # BLD AUTO: 0.05 K/UL
BASOPHILS NFR BLD AUTO: 0.7 %
BILIRUBIN URINE: NEGATIVE
BLOOD URINE: NEGATIVE
BUN SERPL-MCNC: 52 MG/DL
CALCIUM SERPL-MCNC: 9.6 MG/DL
CALCIUM SERPL-MCNC: 9.6 MG/DL
CHLORIDE SERPL-SCNC: 110 MMOL/L
CO2 SERPL-SCNC: 22 MMOL/L
COLOR: NORMAL
CREAT SERPL-MCNC: 3.23 MG/DL
CREAT SPEC-SCNC: 108 MG/DL
CREAT SPEC-SCNC: 108 MG/DL
CREAT/PROT UR: 1.2 RATIO
EOSINOPHIL # BLD AUTO: 0.47 K/UL
EOSINOPHIL NFR BLD AUTO: 7 %
ESTIMATED AVERAGE GLUCOSE: 143 MG/DL
FERRITIN SERPL-MCNC: 42 NG/ML
GLUCOSE QUALITATIVE U: NEGATIVE
GLUCOSE SERPL-MCNC: 76 MG/DL
HBA1C MFR BLD HPLC: 6.6 %
HCT VFR BLD CALC: 41.8 %
HGB BLD-MCNC: 12.3 G/DL
HYALINE CASTS: 0 /LPF
IMM GRANULOCYTES NFR BLD AUTO: 0.3 %
IRON SATN MFR SERPL: 18 %
IRON SERPL-MCNC: 61 UG/DL
KETONES URINE: NEGATIVE
LEUKOCYTE ESTERASE URINE: NEGATIVE
LYMPHOCYTES # BLD AUTO: 2.13 K/UL
LYMPHOCYTES NFR BLD AUTO: 31.6 %
MAGNESIUM SERPL-MCNC: 2.2 MG/DL
MAN DIFF?: NORMAL
MCHC RBC-ENTMCNC: 27.9 PG
MCHC RBC-ENTMCNC: 29.4 GM/DL
MCV RBC AUTO: 94.8 FL
MICROALBUMIN 24H UR DL<=1MG/L-MCNC: 83.5 MG/DL
MICROALBUMIN/CREAT 24H UR-RTO: 775 MG/G
MICROSCOPIC-UA: NORMAL
MONOCYTES # BLD AUTO: 0.63 K/UL
MONOCYTES NFR BLD AUTO: 9.3 %
NEUTROPHILS # BLD AUTO: 3.44 K/UL
NEUTROPHILS NFR BLD AUTO: 51.1 %
NITRITE URINE: NEGATIVE
PARATHYROID HORMONE INTACT: 152 PG/ML
PH URINE: 6
PHOSPHATE SERPL-MCNC: 4.7 MG/DL
PLATELET # BLD AUTO: 279 K/UL
POTASSIUM SERPL-SCNC: 5.4 MMOL/L
PROT UR-MCNC: 129 MG/DL
PROTEIN URINE: ABNORMAL
RBC # BLD: 4.41 M/UL
RBC # FLD: 13.9 %
RED BLOOD CELLS URINE: 4 /HPF
SODIUM SERPL-SCNC: 144 MMOL/L
SPECIFIC GRAVITY URINE: 1.01
SQUAMOUS EPITHELIAL CELLS: 0 /HPF
TIBC SERPL-MCNC: 331 UG/DL
UIBC SERPL-MCNC: 270 UG/DL
URATE SERPL-MCNC: 8.2 MG/DL
UROBILINOGEN URINE: NORMAL
WBC # FLD AUTO: 6.74 K/UL
WHITE BLOOD CELLS URINE: 1 /HPF

## 2019-12-04 PROCEDURE — 99214 OFFICE O/P EST MOD 30 MIN: CPT

## 2019-12-04 NOTE — DATA REVIEWED
[de-identified] : Labs (12/3/2019): \par CBC - WNL except anemic to 12.3\par Iron 61\par BMP - renal with creatinine 3.23\par Hemoglobin A1C 6.6%\par

## 2019-12-04 NOTE — ASSESSMENT
[FreeTextEntry1] : 55yo black male smoker with longstanding HTN, longstanding IDDM1 w retinopathy, ischemic stroke 2/19 w residual L sided weakness now wheelchair bound here for CKD IV f/u: \par \par # Longstanding CKD IV \par -reviewed July labs with patient, Cr up to 3.1 from 2.9 egfr 21 likely 2/2 diabetic/hypertensive nephrosclerosis. -Cr 2's as far back as 2012 \par - started ergo for secondary hyperparathyroidism, Fe for anemia (May/19 spep wnl, FLC 2.3 wnl), Nabicarb for metabolic acidosis last visit. - Serologic GN w/u negative. \par - recheck renal sono and bladder w PVR - last checked 2014 showed R 11.4cm with increased echogenicity and cortical thickness 1.4cm, L 10.7cm 1 simple cyst, increased echogenicity and cortical thickness 1.3-cm\par 188cc in bladder pt refused to void, otherwise normal bladder/prostate\par - HTN uncontrolled, stressed low Na diet, and added Nifedipine 30mg daily to regimen. Could not complete med rec because he doesn't know his med names, he will make a list from his bottles tonight and will let us know\par \par # CVA: improving L hemiparesis,  + lupus anticoagulants during hospitalization for stroke per Dr. Petty documentation however repeat outpt were negative. \par \par Addendum: renal fx stable,K 5.4 - reinforced low K diet, represcribed ergo and Fe tabs BID\par \par .\par \par

## 2019-12-04 NOTE — HISTORY OF PRESENT ILLNESS
[FreeTextEntry1] : 53yo black male smoker with longstanding HTN, longstanding IDDM1 w retinopathy, ischemic stroke 2/19 w residual L sided weakness now wheelchair bound here for CKD IV f/u: \par \par PCP Dr Wallis\par \par Feeling very well, just found out he's getting his aunt's house along with a cousin in [place]. Not planning on moving soon. \par Noted significant improvement in edema since starting torsemide this summer. Says BG well controlled lately. No changes to meds made by physicians since last visit in July. \par Walks with a walker in house, still using wheelchair in the street because he's insecure about falling, walking a lot better. \par Minimized salt use, doesn’t check BP at home. \par \par Fam: Aunt with kidney problems since passed. Mother with DMII.

## 2019-12-04 NOTE — ASSESSMENT
[FreeTextEntry1] : 54 year-old RH male with PMH HTN, hyperlipidemia, IDDM, prior CVA (residual left-sided weakness), CKD presents for follow up of right leg weakness secondary to acute infarct in left corona radiata and left macrina.  \par \par Plan:\par 1) Secondary stroke prevention - \par a) Aspirin - No bleeding in urine or stool.\par b) Statin - No muscle pains or cramps.\par \par 2) Stroke risk factors - \par a) HTN - variable since controlled now but higher at Nephrology.  deferred to PMD\par b) Hyperlipidemia - on statin\par c) Diabetes - better controlled based on repeat hemoglobin A1C so continue current regimen\par \par 3) Further workup - Referred again to EP for possible placement of loop recorder since had two separate areas of stroke in location and times.\par \par Patient seen with me and Dr. Orellana.

## 2019-12-04 NOTE — HISTORY OF PRESENT ILLNESS
[FreeTextEntry1] : 54 year-old RH male with PMH HTN, hyperlipidemia, IDDM, prior CVA (residual left-sided weakness), CKD presents for follow up of right leg weakness secondary to acute infarct in left corona radiata and left macrina.  \par \par He still uses a wheelchair when he goes out but a walker at home.  He exercises at home with weights on his legs. No more formal physical therapy since he had less energy due to kidney disease.  His appetite has returned now that Dr. Grace, Nephrology, started him on medications.\par \par No numbness, speech or visual deficits.\par \par Aspirin - No bleeding in urine or stool.\par Statin - No muscle pains or cramps.\par \par Stroke risk factors - He insists that he's compliant with his medications.\par HTN - usually low; he thinks that he's anxious today about arriving on time\par Hyperlipidemia - on statin\par Diabetes - under control.  He's checking his finger sticks; 102 this morning.  \par Smoking - "trying to quit" - smokes 3-4 per day\par \par He still never went to the Electrophysiologist appointment.

## 2019-12-04 NOTE — PHYSICAL EXAM
[General Appearance - Alert] : alert [Sclera] : the sclera and conjunctiva were normal [General Appearance - In No Acute Distress] : in no acute distress [PERRL With Normal Accommodation] : pupils were equal in size, round, and reactive to light [Extraocular Movements] : extraocular movements were intact [Outer Ear] : the ears and nose were normal in appearance [Examination Of The Oral Cavity] : the lips and gums were normal [Jugular Venous Distention Increased] : there was no jugular-venous distention [Auscultation Breath Sounds / Voice Sounds] : lungs were clear to auscultation bilaterally [Heart Rate And Rhythm] : heart rate was normal and rhythm regular [Edema] : there was no peripheral edema [Heart Sounds] : normal S1 and S2 [Bowel Sounds] : normal bowel sounds [Abdomen Soft] : soft [Abdomen Tenderness] : non-tender [No CVA Tenderness] : no ~M costovertebral angle tenderness [Involuntary Movements] : no involuntary movements were seen [Musculoskeletal - Swelling] : no joint swelling seen [Skin Color & Pigmentation] : normal skin color and pigmentation [] : no rash [Skin Turgor] : normal skin turgor [Oriented To Time, Place, And Person] : oriented to person, place, and time [Impaired Insight] : insight and judgment were intact [FreeTextEntry1] : LUE/LLE weakness

## 2019-12-06 LAB — 24R-OH-CALCIDIOL SERPL-MCNC: 55.4 PG/ML

## 2019-12-10 ENCOUNTER — APPOINTMENT (OUTPATIENT)
Dept: ENDOCRINOLOGY | Facility: CLINIC | Age: 54
End: 2019-12-10
Payer: MEDICAID

## 2020-01-08 ENCOUNTER — APPOINTMENT (OUTPATIENT)
Dept: ENDOCRINOLOGY | Facility: CLINIC | Age: 55
End: 2020-01-08
Payer: MEDICAID

## 2020-01-08 VITALS
DIASTOLIC BLOOD PRESSURE: 88 MMHG | SYSTOLIC BLOOD PRESSURE: 150 MMHG | HEART RATE: 89 BPM | HEIGHT: 69 IN | WEIGHT: 185 LBS | BODY MASS INDEX: 27.4 KG/M2

## 2020-01-08 PROCEDURE — 82962 GLUCOSE BLOOD TEST: CPT

## 2020-01-08 PROCEDURE — 99214 OFFICE O/P EST MOD 30 MIN: CPT | Mod: 25

## 2020-01-10 NOTE — END OF VISIT
[FreeTextEntry3] : All medical record entries made by the Scribe were at my, Dr. Rashaad Garcia, direction and personally dictated by me on 01/08/2020. I have reviewed the chart and agree that the record accurately reflects my personal performance of the history, physical exam, assessment and plan. I have also personally directed, reviewed and agreed with the chart.  [>50% of Time Spent on Counseling for ____] : Greater than 50% of the encounter time was spent on counseling for [unfilled] [Time Spent: ___ minutes] : I have spent [unfilled] minutes of face to face time with the patient

## 2020-01-10 NOTE — ASSESSMENT
[Importance of Diet and Exercise] : importance of diet and exercise to improve glycemic control, achieve weight loss and improve cardiovascular health [Self Monitoring of Blood Glucose] : self monitoring of blood glucose [FreeTextEntry1] : 53 y/o M pt with:\par \par 1. Hx of fairly controlled T2DM with DM related complications of retinopathy, stage 4 chronic kidney disease and CAD (ischemic stroke):\par Hypoglycemia prevention and treatment discussed\par Pt is focused on his DM management and mindful of his lifestyle. Recommend pt to continue with multiple injections. \par Pt's kidney function stable and he is being managed by Dr. Grace. Advised pt to f/u with podiatrist for amputation and charcot foot . Recommend pt to f/u with ophthalmologist. \par \par Return in: 9 months [Hypoglycemia Management] : hypoglycemia management [Injection Technique, Storage, Sharps Disposal] : injection technique, storage, and sharps disposal

## 2020-01-10 NOTE — ADDENDUM
[FreeTextEntry1] : I, Raj Durbin, acted solely as a scribe for Dr. Rashaad Garcia on this date. 01/08/2020.

## 2020-01-10 NOTE — PHYSICAL EXAM
[Alert] : alert [Normal Outer Ear/Nose] : the ears and nose were normal in appearance [Normal Sclera/Conjunctiva] : normal sclera/conjunctiva [No Respiratory Distress] : no respiratory distress [No Neck Mass] : no neck mass was observed [Normal Rate] : heart rate was normal  [Normal Bowel Sounds] : normal bowel sounds [No Edema] : there was no peripheral edema [No Spinal Tenderness] : no spinal tenderness [Right Foot Was Examined] : right foot ~C was examined [Left Foot Was Examined] : left foot ~C was examined [Oriented x3] : oriented to person, place, and time [Normal Reflexes] : deep tendon reflexes were 2+ and symmetric [de-identified] : lipodystrophy b/l  [de-identified] : wheel chair bound  [de-identified] : L 4th and 5th toe amputee, 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

## 2020-01-10 NOTE — HISTORY OF PRESENT ILLNESS
[FreeTextEntry1] : 5/29/19 A1c 7.1%, Vit B12 1159, s. creat 2.90, Vit D 25-OH 11.6\par 7/23/19: Vit D 25-OH 25.3, Vit D 1-25-OH 53.3, A1c 6.7%, iPTH 126, Ca 9.4, Microalb/creat ratio 628\par 12/3/19: A1c 6.6%, Microalb/Creat Ratio 775, Vit D 25-OH 44, iPTH 152\par \par 53 y/o M pt /88, BMI 27.32, with Hx of T1DM (dx as a teenager) with DM related complications of retinopathy, stage 4 chronic kidney disease and CAD (ischemic stroke)\par Pt in wheel chair 2/2 ischemic stroke \par Other PMHx: HTN, hypercholesterolemia, Vit D deficiency, acute ischemic stroke (in 2/2019), L 4th and 5th toe amputee, L charcot foot \par Last funduscopic visit: in 2018, pt notes he is legally blind in R eye due to an injury. \par Last podiatrist visit: 12/28/19\par Follows nephrologist\par Has not seen a RD yet\par \par 6/7/2019\par Pt is feeling well, and he states his blood sugars are "doing well". He notes he has high and low blood sugars depending on what he eats. Pt states he is on 20u Lantus and takes Admelog 4-6u ac meals if his blood sugar is > 150. \par \par 08/06/2019 \par Pt presents today for DM f/u, feeling well. Pt reports that he saw his nephrologist and that his kidney function is at 20% efficiency. Pt notes that he needs a kidney transplant, but is not on the transplant list as of yet. Pt states that he was rx kidney medication from his nephrologist and is not on dialysis. Pt brought BS readings today: FBS , 40% of FBS are below 130. Pt claims to have a better diet.\par Pt notes is not taking any medications for Ca or parathyroid. \par \par 01/08/2020 \par Pt presents today with POCT 85 for DM f/u feeling well with no major physical complaints. He states he has been eating less and drinking plenty of water. He reportedly checks BS 2-3x a day and notes average BS of ~130. Denies hypoglycemic episodes.  \par \par Current Meds: Lantus 12u, Admelog 6-8u ac meals, Atorvastatin 20mg, Losartan 50mg, Metoprolol ER 50mg, Aspirin, Basaglar 14u QPM, Torsemide 10mg, Sodium bicarbonate 50mg BID, Nifedipine ER 30mg, Vitamin C
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
## 2020-04-13 LAB — GLUCOSE BLDC GLUCOMTR-MCNC: 85

## 2020-04-21 ENCOUNTER — APPOINTMENT (OUTPATIENT)
Dept: NEPHROLOGY | Facility: CLINIC | Age: 55
End: 2020-04-21

## 2020-06-02 ENCOUNTER — APPOINTMENT (OUTPATIENT)
Dept: NEUROLOGY | Facility: CLINIC | Age: 55
End: 2020-06-02

## 2020-06-30 ENCOUNTER — APPOINTMENT (OUTPATIENT)
Dept: NEPHROLOGY | Facility: CLINIC | Age: 55
End: 2020-06-30
Payer: MEDICAID

## 2020-06-30 VITALS — DIASTOLIC BLOOD PRESSURE: 78 MMHG | SYSTOLIC BLOOD PRESSURE: 158 MMHG | HEART RATE: 80 BPM

## 2020-06-30 PROCEDURE — 93784 AMBL BP MNTR W/SOFTWARE: CPT | Mod: 59

## 2020-06-30 PROCEDURE — 99214 OFFICE O/P EST MOD 30 MIN: CPT

## 2020-06-30 NOTE — ASSESSMENT
[FreeTextEntry1] : reviewed lab results in detail with patient from 6/16/20 \par \par 53yo black male smoker with longstanding HTN, longstanding IDDM1 w retinopathy, ischemic stroke 2/19 w residual L sided weakness for CKD IV f/u: \par \par # Longstanding CKD IV \par -Cr slow trend up to 3.3 (eGFR 23) from 2.9 last year likely 2/2 diabetic/hypertensive nephrosclerosis. Cr 2's as far back as 2012.  possibly over diuresed, instructed to reduce torsemide to TIW. \par - recheck renal sono and bladder w PVR - last checked 2014: R 11.4cm with increased echogenicity and cortical thickness 1.4cm, L 10.7cm 1 simple cyst, increased echogenicity and cortical thickness 1.3cm; 188cc in bladder pt refused to void, otherwise normal bladder/prostate\par - Continue ergo for secondary hyperparathyroidism, to take Fe BID for anemia , bicarb okay continue 1tab Nabicarb daily\par - Serologic GN w/u negative  (May19: SPEP wnl, FLC 2.3 wnl)\par - HTN - uncontrolled in office today.  Reportedly good at other offices, possible some white coat effect.  Pt agreed to do ABPM, placed on left arm.  Reemphasized low Na diet\par -hyperkalemia - high normal, improved. continue with low K diet, informational handouts given. \par \par # CVA: improving L hemiparesis,  + lupus anticoagulants during hospitalization for stroke per Dr. Petty documentation however repeat outpt were negative. \par \par will call ABPM results\par f/u 3 months, to repeat labs prior to visit

## 2020-06-30 NOTE — HISTORY OF PRESENT ILLNESS
[FreeTextEntry1] : 53 yo black male smoker with longstanding HTN, longstanding IDDM1 w retinopathy, ischemic stroke 2/2019 w residual L sided weakness, for CKD IV f/u: \par \par PCP Dr Wallis\par \par Has been well, no complaints.  Much better mobility, no longer with wheelchair, ambulating with walker now, trying to graduate to cane.  He had gone to PCP earlier this month and was told his BP was good although he cannot recall numbers.  Does not have monitor to check at home, unable to afford now.   \par Home fingersticks reported to be good, but A1C 7.6.  Admits to have taken some liberties with his diet.  \par Has been compliant with all his medications, taking everything once daily.  \par Cutting down on cigarette use, maybe one every few days.  \par Denies HA, CP, SOB, dizziness.\par No flank pain, dysuria, hematuria, frothy urine.\par  \par Fam: Aunt with kidney problems since passed. Mother with DMII.

## 2020-06-30 NOTE — PHYSICAL EXAM
[General Appearance - In No Acute Distress] : in no acute distress [General Appearance - Alert] : alert [Sclera] : the sclera and conjunctiva were normal [Outer Ear] : the ears and nose were normal in appearance [Extraocular Movements] : extraocular movements were intact [Examination Of The Oral Cavity] : the lips and gums were normal [Auscultation Breath Sounds / Voice Sounds] : lungs were clear to auscultation bilaterally [Heart Sounds] : normal S1 and S2 [Heart Rate And Rhythm] : heart rate was normal and rhythm regular [Edema] : there was no peripheral edema [No CVA Tenderness] : no ~M costovertebral angle tenderness [Involuntary Movements] : no involuntary movements were seen [Musculoskeletal - Swelling] : no joint swelling seen [] : no rash [Skin Turgor] : normal skin turgor [Skin Color & Pigmentation] : normal skin color and pigmentation [Oriented To Time, Place, And Person] : oriented to person, place, and time [Neck Cervical Mass (___cm)] : no neck mass was observed [Jugular Venous Distention Increased] : there was no jugular-venous distention [Heart Sounds Gallop] : no gallops [Murmurs] : no murmurs [Heart Sounds Pericardial Friction Rub] : no pericardial rub [No Spinal Tenderness] : no spinal tenderness [Affect] : the affect was normal [FreeTextEntry1] : residual LUE/LLE weakness

## 2020-07-15 ENCOUNTER — APPOINTMENT (OUTPATIENT)
Dept: ENDOCRINOLOGY | Facility: CLINIC | Age: 55
End: 2020-07-15

## 2020-08-11 LAB
24R-OH-CALCIDIOL SERPL-MCNC: 71.1 PG/ML
25(OH)D3 SERPL-MCNC: 44.1 NG/ML
ALBUMIN SERPL ELPH-MCNC: 4.8 G/DL
ANION GAP SERPL CALC-SCNC: 13 MMOL/L
BASOPHILS # BLD AUTO: 0.05 K/UL
BASOPHILS NFR BLD AUTO: 0.7 %
BUN SERPL-MCNC: 52 MG/DL
CALCIUM SERPL-MCNC: 9.7 MG/DL
CALCIUM SERPL-MCNC: 9.7 MG/DL
CHLORIDE SERPL-SCNC: 107 MMOL/L
CO2 SERPL-SCNC: 21 MMOL/L
CREAT SERPL-MCNC: 3.65 MG/DL
EOSINOPHIL # BLD AUTO: 0.51 K/UL
EOSINOPHIL NFR BLD AUTO: 7.1 %
FERRITIN SERPL-MCNC: 56 NG/ML
GLUCOSE SERPL-MCNC: 98 MG/DL
HCT VFR BLD CALC: 36.5 %
HGB BLD-MCNC: 10.9 G/DL
IMM GRANULOCYTES NFR BLD AUTO: 0.6 %
IRON SATN MFR SERPL: 21 %
IRON SERPL-MCNC: 72 UG/DL
LYMPHOCYTES # BLD AUTO: 2.17 K/UL
LYMPHOCYTES NFR BLD AUTO: 30 %
MAN DIFF?: NORMAL
MCHC RBC-ENTMCNC: 28.4 PG
MCHC RBC-ENTMCNC: 29.9 GM/DL
MCV RBC AUTO: 95.1 FL
MONOCYTES # BLD AUTO: 0.59 K/UL
MONOCYTES NFR BLD AUTO: 8.2 %
NEUTROPHILS # BLD AUTO: 3.87 K/UL
NEUTROPHILS NFR BLD AUTO: 53.4 %
PARATHYROID HORMONE INTACT: 179 PG/ML
PHOSPHATE SERPL-MCNC: 3.9 MG/DL
PLATELET # BLD AUTO: 253 K/UL
POTASSIUM SERPL-SCNC: 5.2 MMOL/L
RBC # BLD: 3.84 M/UL
RBC # FLD: 14.1 %
SODIUM SERPL-SCNC: 141 MMOL/L
TIBC SERPL-MCNC: 348 UG/DL
UIBC SERPL-MCNC: 276 UG/DL
URATE SERPL-MCNC: 9 MG/DL
WBC # FLD AUTO: 7.23 K/UL

## 2020-09-22 ENCOUNTER — APPOINTMENT (OUTPATIENT)
Dept: NEPHROLOGY | Facility: CLINIC | Age: 55
End: 2020-09-22

## 2020-10-08 NOTE — HISTORY OF PRESENT ILLNESS
[FreeTextEntry1] : ********************INCOMPLETE NOTE-NO SHOW********************************\par \par 54 year-old RH male with PMH HTN, hyperlipidemia, IDDM, prior CVA (residual left-sided weakness), CKD presents for follow up of right leg weakness secondary to acute infarct in left corona radiata and left macrina.  \par \par He still uses a wheelchair when he goes out but a walker at home.  He exercises at home with weights on his legs. No more formal physical therapy since he had less energy due to kidney disease.  His appetite has returned now that Dr. Grace, Nephrology, started him on medications.\par \par No numbness, speech or visual deficits.\par \par Aspirin - No bleeding in urine or stool.\par Statin - No muscle pains or cramps.\par \par Stroke risk factors - He insists that he's compliant with his medications.\par HTN - usually low; he thinks that he's anxious today about arriving on time\par Hyperlipidemia - on statin\par Diabetes - under control.  He's checking his finger sticks; 102 this morning.  \par Smoking - "trying to quit" - smokes 3-4 per day\par \par He still never went to the Electrophysiologist appointment.

## 2022-10-16 ENCOUNTER — INPATIENT (INPATIENT)
Facility: HOSPITAL | Age: 57
LOS: 15 days | Discharge: NURSING, REHAB OR LONG-TERM CARE | DRG: 64 | End: 2022-11-01
Attending: INTERNAL MEDICINE | Admitting: HOSPITALIST
Payer: MEDICAID

## 2022-10-16 VITALS
HEIGHT: 71 IN | DIASTOLIC BLOOD PRESSURE: 74 MMHG | HEART RATE: 100 BPM | SYSTOLIC BLOOD PRESSURE: 131 MMHG | RESPIRATION RATE: 18 BRPM | OXYGEN SATURATION: 99 % | TEMPERATURE: 98 F | WEIGHT: 199.96 LBS

## 2022-10-16 LAB
ALBUMIN SERPL ELPH-MCNC: 3.2 G/DL — LOW (ref 3.3–5)
ALBUMIN SERPL ELPH-MCNC: 3.5 G/DL — SIGNIFICANT CHANGE UP (ref 3.3–5)
ALP SERPL-CCNC: 102 U/L — SIGNIFICANT CHANGE UP (ref 40–120)
ALP SERPL-CCNC: 95 U/L — SIGNIFICANT CHANGE UP (ref 40–120)
ALT FLD-CCNC: 21 U/L — SIGNIFICANT CHANGE UP (ref 10–45)
ALT FLD-CCNC: 23 U/L — SIGNIFICANT CHANGE UP (ref 10–45)
AMPHET UR-MCNC: NEGATIVE — SIGNIFICANT CHANGE UP
ANION GAP SERPL CALC-SCNC: 16 MMOL/L — SIGNIFICANT CHANGE UP (ref 5–17)
ANION GAP SERPL CALC-SCNC: 17 MMOL/L — SIGNIFICANT CHANGE UP (ref 5–17)
APPEARANCE UR: CLEAR — SIGNIFICANT CHANGE UP
APTT BLD: 32.7 SEC — SIGNIFICANT CHANGE UP (ref 27.5–35.5)
AST SERPL-CCNC: 19 U/L — SIGNIFICANT CHANGE UP (ref 10–40)
AST SERPL-CCNC: 22 U/L — SIGNIFICANT CHANGE UP (ref 10–40)
BACTERIA # UR AUTO: PRESENT /HPF
BARBITURATES UR SCN-MCNC: NEGATIVE — SIGNIFICANT CHANGE UP
BASOPHILS # BLD AUTO: 0.03 K/UL — SIGNIFICANT CHANGE UP (ref 0–0.2)
BASOPHILS NFR BLD AUTO: 0.2 % — SIGNIFICANT CHANGE UP (ref 0–2)
BENZODIAZ UR-MCNC: NEGATIVE — SIGNIFICANT CHANGE UP
BILIRUB SERPL-MCNC: <0.2 MG/DL — SIGNIFICANT CHANGE UP (ref 0.2–1.2)
BILIRUB SERPL-MCNC: <0.2 MG/DL — SIGNIFICANT CHANGE UP (ref 0.2–1.2)
BILIRUB UR-MCNC: NEGATIVE — SIGNIFICANT CHANGE UP
BLD GP AB SCN SERPL QL: NEGATIVE — SIGNIFICANT CHANGE UP
BUN SERPL-MCNC: 91 MG/DL — HIGH (ref 7–23)
BUN SERPL-MCNC: 97 MG/DL — HIGH (ref 7–23)
CALCIUM SERPL-MCNC: 7.9 MG/DL — LOW (ref 8.4–10.5)
CALCIUM SERPL-MCNC: 8.6 MG/DL — SIGNIFICANT CHANGE UP (ref 8.4–10.5)
CHLORIDE SERPL-SCNC: 121 MMOL/L — HIGH (ref 96–108)
CHLORIDE SERPL-SCNC: 123 MMOL/L — HIGH (ref 96–108)
CO2 SERPL-SCNC: 14 MMOL/L — LOW (ref 22–31)
CO2 SERPL-SCNC: 15 MMOL/L — LOW (ref 22–31)
COCAINE METAB.OTHER UR-MCNC: NEGATIVE — SIGNIFICANT CHANGE UP
COLOR SPEC: YELLOW — SIGNIFICANT CHANGE UP
COMMENT - URINE: SIGNIFICANT CHANGE UP
CREAT SERPL-MCNC: 7.42 MG/DL — HIGH (ref 0.5–1.3)
CREAT SERPL-MCNC: 7.99 MG/DL — HIGH (ref 0.5–1.3)
DIFF PNL FLD: ABNORMAL
EGFR: 7 ML/MIN/1.73M2 — LOW
EGFR: 8 ML/MIN/1.73M2 — LOW
EOSINOPHIL # BLD AUTO: 0.28 K/UL — SIGNIFICANT CHANGE UP (ref 0–0.5)
EOSINOPHIL NFR BLD AUTO: 1.8 % — SIGNIFICANT CHANGE UP (ref 0–6)
EPI CELLS # UR: SIGNIFICANT CHANGE UP /HPF (ref 0–5)
ETHANOL SERPL-MCNC: <10 MG/DL — SIGNIFICANT CHANGE UP (ref 0–10)
GLUCOSE SERPL-MCNC: 120 MG/DL — HIGH (ref 70–99)
GLUCOSE SERPL-MCNC: 136 MG/DL — HIGH (ref 70–99)
GLUCOSE UR QL: 100
HCT VFR BLD CALC: 23.8 % — LOW (ref 39–50)
HGB BLD-MCNC: 6.9 G/DL — CRITICAL LOW (ref 13–17)
IMM GRANULOCYTES NFR BLD AUTO: 0.4 % — SIGNIFICANT CHANGE UP (ref 0–0.9)
INR BLD: 1.05 — SIGNIFICANT CHANGE UP (ref 0.88–1.16)
KETONES UR-MCNC: NEGATIVE — SIGNIFICANT CHANGE UP
LEUKOCYTE ESTERASE UR-ACNC: NEGATIVE — SIGNIFICANT CHANGE UP
LYMPHOCYTES # BLD AUTO: 0.93 K/UL — LOW (ref 1–3.3)
LYMPHOCYTES # BLD AUTO: 5.8 % — LOW (ref 13–44)
MCHC RBC-ENTMCNC: 21.5 PG — LOW (ref 27–34)
MCHC RBC-ENTMCNC: 29 GM/DL — LOW (ref 32–36)
MCV RBC AUTO: 74.1 FL — LOW (ref 80–100)
METHADONE UR-MCNC: NEGATIVE — SIGNIFICANT CHANGE UP
MONOCYTES # BLD AUTO: 0.74 K/UL — SIGNIFICANT CHANGE UP (ref 0–0.9)
MONOCYTES NFR BLD AUTO: 4.6 % — SIGNIFICANT CHANGE UP (ref 2–14)
NEUTROPHILS # BLD AUTO: 13.89 K/UL — HIGH (ref 1.8–7.4)
NEUTROPHILS NFR BLD AUTO: 87.2 % — HIGH (ref 43–77)
NITRITE UR-MCNC: NEGATIVE — SIGNIFICANT CHANGE UP
NRBC # BLD: 0 /100 WBCS — SIGNIFICANT CHANGE UP (ref 0–0)
OPIATES UR-MCNC: NEGATIVE — SIGNIFICANT CHANGE UP
PCP SPEC-MCNC: SIGNIFICANT CHANGE UP
PCP UR-MCNC: NEGATIVE — SIGNIFICANT CHANGE UP
PH UR: 6 — SIGNIFICANT CHANGE UP (ref 5–8)
PLATELET # BLD AUTO: 416 K/UL — HIGH (ref 150–400)
POTASSIUM SERPL-MCNC: 4.9 MMOL/L — SIGNIFICANT CHANGE UP (ref 3.5–5.3)
POTASSIUM SERPL-MCNC: 5.2 MMOL/L — SIGNIFICANT CHANGE UP (ref 3.5–5.3)
POTASSIUM SERPL-SCNC: 4.9 MMOL/L — SIGNIFICANT CHANGE UP (ref 3.5–5.3)
POTASSIUM SERPL-SCNC: 5.2 MMOL/L — SIGNIFICANT CHANGE UP (ref 3.5–5.3)
PROCALCITONIN SERPL-MCNC: 0.3 NG/ML — HIGH (ref 0.02–0.1)
PROT SERPL-MCNC: 6.8 G/DL — SIGNIFICANT CHANGE UP (ref 6–8.3)
PROT SERPL-MCNC: 7.3 G/DL — SIGNIFICANT CHANGE UP (ref 6–8.3)
PROT UR-MCNC: 100 MG/DL
PROTHROM AB SERPL-ACNC: 12.5 SEC — SIGNIFICANT CHANGE UP (ref 10.5–13.4)
RBC # BLD: 3.21 M/UL — LOW (ref 4.2–5.8)
RBC # FLD: 23.5 % — HIGH (ref 10.3–14.5)
RBC CASTS # UR COMP ASSIST: < 5 /HPF — SIGNIFICANT CHANGE UP
RH IG SCN BLD-IMP: POSITIVE — SIGNIFICANT CHANGE UP
SARS-COV-2 RNA SPEC QL NAA+PROBE: NEGATIVE — SIGNIFICANT CHANGE UP
SODIUM SERPL-SCNC: 153 MMOL/L — HIGH (ref 135–145)
SODIUM SERPL-SCNC: 153 MMOL/L — HIGH (ref 135–145)
SP GR SPEC: 1.02 — SIGNIFICANT CHANGE UP (ref 1–1.03)
THC UR QL: NEGATIVE — SIGNIFICANT CHANGE UP
UROBILINOGEN FLD QL: 0.2 E.U./DL — SIGNIFICANT CHANGE UP
WBC # BLD: 15.94 K/UL — HIGH (ref 3.8–10.5)
WBC # FLD AUTO: 15.94 K/UL — HIGH (ref 3.8–10.5)
WBC UR QL: < 5 /HPF — SIGNIFICANT CHANGE UP

## 2022-10-16 PROCEDURE — 99232 SBSQ HOSP IP/OBS MODERATE 35: CPT

## 2022-10-16 PROCEDURE — 71045 X-RAY EXAM CHEST 1 VIEW: CPT | Mod: 26

## 2022-10-16 PROCEDURE — 99253 IP/OBS CNSLTJ NEW/EST LOW 45: CPT | Mod: GC

## 2022-10-16 PROCEDURE — 99285 EMERGENCY DEPT VISIT HI MDM: CPT

## 2022-10-16 PROCEDURE — G1004: CPT

## 2022-10-16 PROCEDURE — 70450 CT HEAD/BRAIN W/O DYE: CPT | Mod: 26,MG

## 2022-10-16 RX ORDER — SODIUM CHLORIDE 9 MG/ML
1000 INJECTION INTRAMUSCULAR; INTRAVENOUS; SUBCUTANEOUS ONCE
Refills: 0 | Status: COMPLETED | OUTPATIENT
Start: 2022-10-16 | End: 2022-10-16

## 2022-10-16 RX ADMIN — SODIUM CHLORIDE 1000 MILLILITER(S): 9 INJECTION INTRAMUSCULAR; INTRAVENOUS; SUBCUTANEOUS at 20:40

## 2022-10-16 RX ADMIN — SODIUM CHLORIDE 1000 MILLILITER(S): 9 INJECTION INTRAMUSCULAR; INTRAVENOUS; SUBCUTANEOUS at 21:40

## 2022-10-16 RX ADMIN — SODIUM CHLORIDE 1000 MILLILITER(S): 9 INJECTION INTRAMUSCULAR; INTRAVENOUS; SUBCUTANEOUS at 18:42

## 2022-10-16 NOTE — ED PROVIDER NOTE - ATTENDING APP SHARED VISIT CONTRIBUTION OF CARE
57 yo , ho of DM, 2 CVA in past, HTN, CKD, HLD   speech slurred for a few weeks, cousin came to check on pt and on floor / incontinent, generally weak,   + slurring, no apparent injuries, + L sided weakness which is old from old CVA, + dry mucous membranes, poor hygeine, + tachy,   eval for DM emergency, infection , metabolic abnormality , new CVA ( out of window) , + anemia, no apparent BRBPR or melena. unclear cz of anemia , + ALEXIS , eval CK pending, + transfusion, + admission,   dispo pending workup / eval.

## 2022-10-16 NOTE — CONSULT NOTE ADULT - SUBJECTIVE AND OBJECTIVE BOX
ICU CONSULT NOTE    Patient is a 57y old  Male who presents with a chief complaint of         OBJECTIVE:  Vital Signs Last 24 Hrs  T(C): 37.1 (16 Oct 2022 22:28), Max: 37.1 (16 Oct 2022 22:28)  T(F): 98.7 (16 Oct 2022 22:28), Max: 98.7 (16 Oct 2022 22:28)  HR: 100 (16 Oct 2022 22:) (100 - 100)  BP: 156/70 (16 Oct 2022 22:) (131/74 - 156/70)  BP(mean): --  RR: 17 (16 Oct 2022 22:) (17 - 18)  SpO2: 100% (16 Oct 2022 22:) (99% - 100%)    Parameters below as of 16 Oct 2022 22:  Patient On (Oxygen Delivery Method): room air    PHYSICAL EXAM: **INCOMPLETE**  General: NAD  HEENT: NC/AT; PERRL, clear conjunctiva  Neck: supple  Respiratory: CTA b/l  Cardiovascular: +S1/S2; RRR  Abdomen: soft, NT/ND; +BS x4  Extremities: 2+ peripheral pulses b/l; no LE edema  Skin: normal color and turgor; no rash  Neurological: AAO x 3. no FND.  Psychiatry: appropriate mood/affect       LABS                        6.9    15.94 )-----------( 416      ( 16 Oct 2022 17:56 )             23.8     10-16    153<H>  |  123<H>  |  91<H>  ----------------------------<  120<H>  4.9   |  14<L>  |  7.42<H>    Ca    7.9<L>      16 Oct 2022 22:30    TPro  6.8  /  Alb  3.2<L>  /  TBili  <0.2  /  DBili  x   /  AST  19  /  ALT  21  /  AlkPhos  95  10-16    PT/INR - ( 16 Oct 2022 17:56 )   PT: 12.5 sec;   INR: 1.05          PTT - ( 16 Oct 2022 17:56 )  PTT:32.7 sec    Urinalysis Basic - ( 16 Oct 2022 22:30 )    Color: Yellow / Appearance: Clear / S.025 / pH: x  Gluc: x / Ketone: NEGATIVE  / Bili: Negative / Urobili: 0.2 E.U./dL   Blood: x / Protein: 100 mg/dL / Nitrite: NEGATIVE   Leuk Esterase: NEGATIVE / RBC: < 5 /HPF / WBC < 5 /HPF   Sq Epi: x / Non Sq Epi: 0-5 /HPF / Bacteria: Present /HPF        IMAGING   CXR -   EKG -  ICU CONSULT NOTE    Patient is a 57y old  Male w/ h/o IDDM, CVA x 2 (residual left side weakness), HTN, HLD, CKD presents c/o slurred speech, weakness over the past several days.  Admitted initially to [facility] overnight, found to be persistently tachycardic in ED and with BUN/Cr 91/7.42.   ICU consulted for need for closer monitoring and possible dialysis.    Pt is unsure how long his symptoms have been going on for, is a poor historian.  Pt's cousin accompanied pt to ED stating the patient called him 3 days prior to presentation. His speech sounded slurred but cousin also reports they do not speak frequently and he has not seen him in years.  Pt's cousin couldn't go to him on Thursday, went Saturday (yesterday) and found pt on the floor of the apartment, pt was soiled in his own feces and urine, was unsure how he ended up on the floor.  Pt was helped into a bed, the cousin did some grocery shopping for him, reports he advised him to go to the hospital and pt declined.  Pt's cousin left him alone on Saturday and returned today and found the patient on the floor again, and again had soiled himself and was unsure how he got to the floor.  Pt was brought to the ED for evaluation.  Pt stating he doesn't think he fell, has no pain or injuries.  Pt acknowledging his speech is slurred, unsure how long this has been going on for.          OBJECTIVE:  Vital Signs Last 24 Hrs  T(C): 37.1 (16 Oct 2022 22:28), Max: 37.1 (16 Oct 2022 22:28)  T(F): 98.7 (16 Oct 2022 22:28), Max: 98.7 (16 Oct 2022 22:28)  HR: 100 (16 Oct 2022 22:28) (100 - 100)  BP: 156/70 (16 Oct 2022 22:28) (131/74 - 156/70)  BP(mean): --  RR: 17 (16 Oct 2022 22:28) (17 - 18)  SpO2: 100% (16 Oct 2022 22:28) (99% - 100%)    Parameters below as of 16 Oct 2022 22:28  Patient On (Oxygen Delivery Method): room air    PHYSICAL EXAM: **INCOMPLETE**  General: NAD  HEENT: NC/AT; PERRL, clear conjunctiva  Neck: supple  Respiratory: CTA b/l  Cardiovascular: +S1/S2; RRR  Abdomen: soft, NT/ND; +BS x4  Extremities: 2+ peripheral pulses b/l; no LE edema  Skin: normal color and turgor; no rash  Neurological: AAO x 3. no FND.  Psychiatry: appropriate mood/affect       LABS                        6.9    15.94 )-----------( 416      ( 16 Oct 2022 17:56 )             23.8     10-16    153<H>  |  123<H>  |  91<H>  ----------------------------<  120<H>  4.9   |  14<L>  |  7.42<H>    Ca    7.9<L>      16 Oct 2022 22:30    TPro  6.8  /  Alb  3.2<L>  /  TBili  <0.2  /  DBili  x   /  AST  19  /  ALT  21  /  AlkPhos  95  10-16    PT/INR - ( 16 Oct 2022 17:56 )   PT: 12.5 sec;   INR: 1.05          PTT - ( 16 Oct 2022 17:56 )  PTT:32.7 sec    Urinalysis Basic - ( 16 Oct 2022 22:30 )    Color: Yellow / Appearance: Clear / S.025 / pH: x  Gluc: x / Ketone: NEGATIVE  / Bili: Negative / Urobili: 0.2 E.U./dL   Blood: x / Protein: 100 mg/dL / Nitrite: NEGATIVE   Leuk Esterase: NEGATIVE / RBC: < 5 /HPF / WBC < 5 /HPF   Sq Epi: x / Non Sq Epi: 0-5 /HPF / Bacteria: Present /HPF        IMAGING   CXR -   EKG -  ICU CONSULT NOTE    Patient is a 57y old  Male w/ h/o IDDM, CVA x 2 (residual left side weakness), HTN, HLD, CKD presents c/o slurred speech, weakness over the past several days.  Admitted initially to [facility] overnight, found to be persistently tachycardic in ED and with BUN/Cr 91/7.42.   ICU consulted for need for closer monitoring and possible dialysis.    He reports overall malaise and weakness resulting in inability to walk starting around Wednesday. Reports that he has help coming to the house M-W, and that after the aide left, he was unable to care for himself. He says he feels like this every week, but his week it was worse to the point that he could not walk at all, and his legs gave out when he tried to stand up. He denies any falls., LOC or hitting his head during this time period. He notices that he has slurred speech but says this has been going on for "a while" and is in part due to previous strokes. He endorses generalized weakness without any focal or one-sided new weakness.     Pt is unsure how long his symptoms have been going on for, is a poor historian.  Pt's cousin accompanied pt to ED stating the patient called him 3 days prior to presentation. His speech sounded slurred but cousin also reports they do not speak frequently and he has not seen him in years.  Pt's cousin couldn't go to him on Thursday, went Saturday (yesterday) and found pt on the floor of the apartment, pt was soiled in his own feces and urine, was unsure how he ended up on the floor.  Pt was helped into a bed, the cousin did some grocery shopping for him, reports he advised him to go to the hospital and pt declined.  Pt's cousin left him alone on Saturday and returned today and found the patient on the floor again, and again had soiled himself and was unsure how he got to the floor.  Pt was brought to the ED for evaluation.  Pt stating he doesn't think he fell, has no pain or injuries.  Pt acknowledging his speech is slurred, unsure how long this has been going on for.        OBJECTIVE:  Vital Signs Last 24 Hrs  T(C): 37.1 (16 Oct 2022 22:28), Max: 37.1 (16 Oct 2022 22:28)  T(F): 98.7 (16 Oct 2022 22:), Max: 98.7 (16 Oct 2022 22:)  HR: 100 (16 Oct 2022 22:) (100 - 100)  BP: 156/70 (16 Oct 2022 22:) (131/74 - 156/70)  BP(mean): --  RR: 17 (16 Oct 2022 22:) (17 - 18)  SpO2: 100% (16 Oct 2022 22:) (99% - 100%)    Parameters below as of 16 Oct 2022 22:28  Patient On (Oxygen Delivery Method): room air    PHYSICAL EXAM:   General: NAD, elderly male resting in bed, poor hygeine, speaking in full sentences with slurred speech  HEENT: NC/AT; PERRL, clear conjunctiva  Neck: supple  Respiratory: CTA b/l  Cardiovascular: +S1/S2; RRR  Abdomen: soft, NT/ND; +BS x4  Extremities: 2+ peripheral pulses b/l; no LE edema  Skin: abrasions on lower right back  Neurological: AAO x 3 but does not remember recent events very well. left sided weakness L>R in upper and lower extremities (not new finding)        LABS                        6.9    15.94 )-----------( 416      ( 16 Oct 2022 17:56 )             23.8     10-16    153<H>  |  123<H>  |  91<H>  ----------------------------<  120<H>  4.9   |  14<L>  |  7.42<H>    Ca    7.9<L>      16 Oct 2022 22:30    TPro  6.8  /  Alb  3.2<L>  /  TBili  <0.2  /  DBili  x   /  AST  19  /  ALT  21  /  AlkPhos  95  10-16    PT/INR - ( 16 Oct 2022 17:56 )   PT: 12.5 sec;   INR: 1.05          PTT - ( 16 Oct 2022 17:56 )  PTT:32.7 sec    Urinalysis Basic - ( 16 Oct 2022 22:30 )    Color: Yellow / Appearance: Clear / S.025 / pH: x  Gluc: x / Ketone: NEGATIVE  / Bili: Negative / Urobili: 0.2 E.U./dL   Blood: x / Protein: 100 mg/dL / Nitrite: NEGATIVE   Leuk Esterase: NEGATIVE / RBC: < 5 /HPF / WBC < 5 /HPF   Sq Epi: x / Non Sq Epi: 0-5 /HPF / Bacteria: Present /HPF        IMAGING   < from: CT Head No Cont (10.16.22 @ 20:00) >    IMPRESSION: Since 2019, no new acute intracranial hemorrhage, mass   effect, or demarcated territorial infarct.    < end of copied text >

## 2022-10-16 NOTE — ED ADULT NURSE NOTE - CHIEF COMPLAINT QUOTE
queenie from c/o slurred speech onset 3 days ago.  Patient's brother states he called patient 3 days ago and noticed the slurred speech and called him back yesterday still slurring his words.  Patient was found on the floor of [facility] by ems covered in urine.  On arrival patient A&Ox3, + slurred speech.  on scene

## 2022-10-16 NOTE — ED PROVIDER NOTE - CLINICAL SUMMARY MEDICAL DECISION MAKING FREE TEXT BOX
58 y/o m hx DM, CVA x 2 (residual left side weakness), HTN, HLD, CKD presents with slurred speech for the past several days, weakness.  Pt mildly tachy at triage, in no distress, has old left side weakness, noted to have slurred speech, no facial droop or other deficits noted.  Labs significant for wbc 16, hemoglobin 6.9, creatinine 7.9.  Pt given IV fluid, appears dehydrated on exam.  Rectal exam done with brown stool, pt signed consent for blood transfusion and will plan to transfuse 1unit PRBC.  CT head pending result, EKG with no acute abnormalities.  Will collect urine if pt gives, no infiltrate on cxr.  Pt will need admission, f/u CT head to determine if stroke team needs to be involved during ED w/u.

## 2022-10-16 NOTE — ED PROVIDER NOTE - NEUROLOGICAL, MLM
Alert and oriented, no focal deficits, +left side weakness to upper and lower ext (not new per pt), CN II-XII intact, no facial droop

## 2022-10-16 NOTE — ED PROVIDER NOTE - OBJECTIVE STATEMENT
56 y/o m hx DM, CVA x 2 (residual left side weakness), HTN, HLD, CKD presents c/o slurred speech, weakness over the past several days.  Pt unsure how long his symptoms have been going on for, is a poor historian.  Pt's cousin accompanied pt to ED stating the patient called him on Thusday this past week, (3 days ago) and asked him to come over stating he needed help at home.  Pt's cousin stating his speech sounded slurred on the phone but they are not close and hadn't seen him in a few years.  Pt's cousin couldn't go to him on Thursday, went Saturday (yesterday) and found pt on the floor of the apartment, pt was soiled in his own feces and urine, was unsure how he ended up on the floor.  Pt was helped into a bed, the cousin did some grocery shopping for him, reports he advised him to go to the hospital and pt declined.  Pt's cousin left him alone on Saturday and returned today and found the patient on the floor again, and again had soiled himself and was unsure how he got to the floor.  Pt was brought to the ED for evaluation.  Pt stating he doesn't think he fell, has no pain or injuries.  Pt acknowledging his speech is slurred, unsure how long this has been going on for.  Denies headache, neck/back pain, fever, chills, n/v/d, CP, SOB, abd pain, dysuria, all other ROS negative.

## 2022-10-16 NOTE — ED PROVIDER NOTE - PSYCHIATRIC, MLM
Alert and oriented to person, place, time/situation. normal mood and affect. no apparent risk to self or others. Expected Date Of Service: 02/17/2021

## 2022-10-16 NOTE — ED ADULT TRIAGE NOTE - CHIEF COMPLAINT QUOTE
queenie from c/o slurred speech onset 3 days ago.  Patient's brother states he called patient 3 days ago and noticed the slurred speech and called him back yesterday still slurring his words.  Patient was found on the floor of [facility], [place], operated by [facility] by ems covered in urine.  On arrival patient A&Ox3, + slurred speech.  on scene

## 2022-10-16 NOTE — CONSULT NOTE ADULT - ASSESSMENT
**INCOMPLETE**  **INCOMPLETE UNTIL REVIEWED WITH ICU ATTENDING**      EDEN DACOSTA is a 57y Male with a past medical history of   ICU consulted for     NEUROLOGY      CARDIOVASCULAR      RESPIRATORY       RENAL       ENDOCRINE      GASTROINTESTINAL      INFECTIOUS DISEASE      HEMATOLOGY/ONCOLOGY      MSK      METABOLIC      ACCESS/LINES/DRAINS      ETHICS/GOALS OF CARE      PROPHYLACTIC  FLUIDS:   DIET:   GI PPX:   VTE PPX:   CODE: FULL  DISPO: PENDING ASSESSMENT **INCOMPLETE UNTIL REVIEWED WITH ICU ATTENDING**      EDEN DACOSTA is a 57y old  Male w/ h/o IDDM, CVA x 2 (residual left side weakness), HTN, HLD, CKD presents c/o slurred speech, weakness over the past several days.  Admitted initially to [facility] overnight, found to be persistently tachycardic in ED and with BUN/Cr 91/7.42.   ICU consulted for need for closer monitoring and possible dialysis.    NEUROLOGY  # weakness  Presenting with generalized weakness. Appears to be worsening of chronic decline. CTH without acute changes. Has known history of CVA with residual L side weakness which is apparent on exam. No new focal deficits on exam. Likely multifactorial etiology of weakness and failure to thrive/malnutrition/poor PO intake. Also with acute kidney failure and worsening BUN/Cr, likely will require HD.  - Would consider Neurology consult in AM given prior stroke history.  - MRI Brain (noncon given ALEXIS or time with Dialysis)    # h/o CVA  - c/w ASA, Atorvastatin  - needs MED REC    CARDIOVASCULAR  # Tachycardia  PAtient tachycardic in ED, persistent in 110s. Possibly due to hypovolemia in setting of poor PO intake. Per HIE was previously prescribed Metoprolol. Also with anemia to 6.9.  - monitor on telemetry  - monitor for s/sx bleeding. None on exam or in history.   - restart rate control if tachycardia does not resolve.    # h/o HTN  Per patient he is not taking and blood pressure medications. Poor compliance overall and reports he does not see doctors.   Likely will need dialysis. Start Antihypertensive agents if persistently hypertensive.     # HLD  - c/w statin    RESPIRATORY   Normal sats on RA. Denies dyspnea/cough.    RENAL   # ALEXIS on CKD  H/o CKD4, with baseline Cr around 3, now with BUN/Cr 97/7.99. Makes urine.  - Obtain Urine lytes  - Strict I/O  - Consult Nephrology   - q12h BMP    # Hypernatremia  Presenting with Sodium 153, likely in setting of poor PO intake.   - Urine lytes, serum osm  - s/p 2L NS, can consider further fluids  - f/u repeat BMP  - Nephro consult    ENDOCRINE  # Diabetes Mellitis, insulin-dependent  - f/u A1C  - c/w home lantus and mISS  - CC diet     GASTROINTESTINAL  # r/o constipation  Abdomen slightly distended, and cxr with possible distension. Not sure when he last had bm.   - obtain abdo xray  - bowel regimen prn    HEME/ONC:  # Anemia  Hb of 6.9 with last baseline 12 in 2019. Receiving 1 unit RBC in ED. No history of bleeding, no melena. Possibly due to worsening renal disease.  - keep active T&S  - transfusion goal >7  - monitor s/sx of bleeding    PROPHYLACTIC  FLUIDS: s/p 2L NS  DIET: Dysphagia screen, encourage PO intake  VTE PPX: SCDs, heparin sq  CODE: FULL  DISPO: 7L/telemetry

## 2022-10-16 NOTE — ED ADULT NURSE NOTE - OBJECTIVE STATEMENT
Patient presents to the ED complaining of slurred speech and difficulty walking since thursday. Patient with his cousin at bedside who states the patient called him thursday that he wasn't feeling well. Patient reports history of two prior strokes. Patient awake and alert, noted to be slurring speech.

## 2022-10-16 NOTE — ED ADULT NURSE NOTE - BREATHING, MLM
Spontaneous, unlabored and symmetrical Burow's Advancement Flap Text: The defect edges were debeveled with a #15 scalpel blade.  Given the location of the defect and the proximity to free margins a Burow's advancement flap was deemed most appropriate.  Using a sterile surgical marker, the appropriate advancement flap was drawn incorporating the defect and placing the expected incisions within the relaxed skin tension lines where possible.    The area thus outlined was incised deep to adipose tissue with a #15 scalpel blade.  The skin margins were undermined to an appropriate distance in all directions utilizing iris scissors.

## 2022-10-16 NOTE — ED PROVIDER NOTE - NS ED ATTENDING STATEMENT MOD
This was a shared visit with the ANA PAULA. I reviewed and verified the documentation and independently performed the documented:

## 2022-10-17 DIAGNOSIS — R65.10 SYSTEMIC INFLAMMATORY RESPONSE SYNDROME (SIRS) OF NON-INFECTIOUS ORIGIN WITHOUT ACUTE ORGAN DYSFUNCTION: ICD-10-CM

## 2022-10-17 DIAGNOSIS — E11.9 TYPE 2 DIABETES MELLITUS WITHOUT COMPLICATIONS: ICD-10-CM

## 2022-10-17 DIAGNOSIS — R47.81 SLURRED SPEECH: ICD-10-CM

## 2022-10-17 DIAGNOSIS — Z29.9 ENCOUNTER FOR PROPHYLACTIC MEASURES, UNSPECIFIED: ICD-10-CM

## 2022-10-17 DIAGNOSIS — D64.9 ANEMIA, UNSPECIFIED: ICD-10-CM

## 2022-10-17 DIAGNOSIS — E87.0 HYPEROSMOLALITY AND HYPERNATREMIA: ICD-10-CM

## 2022-10-17 DIAGNOSIS — E78.5 HYPERLIPIDEMIA, UNSPECIFIED: ICD-10-CM

## 2022-10-17 DIAGNOSIS — N17.9 ACUTE KIDNEY FAILURE, UNSPECIFIED: ICD-10-CM

## 2022-10-17 DIAGNOSIS — N18.4 CHRONIC KIDNEY DISEASE, STAGE 4 (SEVERE): ICD-10-CM

## 2022-10-17 DIAGNOSIS — I10 ESSENTIAL (PRIMARY) HYPERTENSION: ICD-10-CM

## 2022-10-17 DIAGNOSIS — G93.41 METABOLIC ENCEPHALOPATHY: ICD-10-CM

## 2022-10-17 LAB
A1C WITH ESTIMATED AVERAGE GLUCOSE RESULT: 7.2 % — HIGH (ref 4–5.6)
ALBUMIN SERPL ELPH-MCNC: 3.6 G/DL — SIGNIFICANT CHANGE UP (ref 3.3–5)
ALP SERPL-CCNC: 79 U/L — SIGNIFICANT CHANGE UP (ref 40–120)
ALT FLD-CCNC: 19 U/L — SIGNIFICANT CHANGE UP (ref 10–45)
ANION GAP SERPL CALC-SCNC: 14 MMOL/L — SIGNIFICANT CHANGE UP (ref 5–17)
ANISOCYTOSIS BLD QL: SIGNIFICANT CHANGE UP
AST SERPL-CCNC: 15 U/L — SIGNIFICANT CHANGE UP (ref 10–40)
BASOPHILS # BLD AUTO: 0 K/UL — SIGNIFICANT CHANGE UP (ref 0–0.2)
BASOPHILS NFR BLD AUTO: 0 % — SIGNIFICANT CHANGE UP (ref 0–2)
BILIRUB SERPL-MCNC: 0.3 MG/DL — SIGNIFICANT CHANGE UP (ref 0.2–1.2)
BUN SERPL-MCNC: 98 MG/DL — HIGH (ref 7–23)
BURR CELLS BLD QL SMEAR: PRESENT — SIGNIFICANT CHANGE UP
CALCIUM SERPL-MCNC: 8 MG/DL — LOW (ref 8.4–10.5)
CHLORIDE SERPL-SCNC: 126 MMOL/L — HIGH (ref 96–108)
CK MB CFR SERPL CALC: 11.8 NG/ML — HIGH (ref 0–6.7)
CK MB CFR SERPL CALC: 18.2 NG/ML — HIGH (ref 0–6.7)
CK SERPL-CCNC: 482 U/L — HIGH (ref 30–200)
CK SERPL-CCNC: 621 U/L — HIGH (ref 30–200)
CO2 SERPL-SCNC: 14 MMOL/L — LOW (ref 22–31)
CREAT ?TM UR-MCNC: 94 MG/DL — SIGNIFICANT CHANGE UP
CREAT SERPL-MCNC: 7.95 MG/DL — HIGH (ref 0.5–1.3)
EGFR: 7 ML/MIN/1.73M2 — LOW
EOSINOPHIL # BLD AUTO: 0 K/UL — SIGNIFICANT CHANGE UP (ref 0–0.5)
EOSINOPHIL NFR BLD AUTO: 0 % — SIGNIFICANT CHANGE UP (ref 0–6)
ESTIMATED AVERAGE GLUCOSE: 160 MG/DL — HIGH (ref 68–114)
FERRITIN SERPL-MCNC: 34 NG/ML — SIGNIFICANT CHANGE UP (ref 30–400)
GIANT PLATELETS BLD QL SMEAR: PRESENT — SIGNIFICANT CHANGE UP
GLUCOSE BLDC GLUCOMTR-MCNC: 131 MG/DL — HIGH (ref 70–99)
GLUCOSE BLDC GLUCOMTR-MCNC: 133 MG/DL — HIGH (ref 70–99)
GLUCOSE BLDC GLUCOMTR-MCNC: 163 MG/DL — HIGH (ref 70–99)
GLUCOSE BLDC GLUCOMTR-MCNC: 170 MG/DL — HIGH (ref 70–99)
GLUCOSE BLDC GLUCOMTR-MCNC: 92 MG/DL — SIGNIFICANT CHANGE UP (ref 70–99)
GLUCOSE SERPL-MCNC: 134 MG/DL — HIGH (ref 70–99)
HAPTOGLOB SERPL-MCNC: 386 MG/DL — HIGH (ref 34–200)
HAPTOGLOB SERPL-MCNC: SIGNIFICANT CHANGE UP (ref 34–200)
HCT VFR BLD CALC: 24.5 % — LOW (ref 39–50)
HCT VFR BLD CALC: 24.6 % — LOW (ref 39–50)
HCT VFR BLD CALC: 29.6 % — LOW (ref 39–50)
HCV AB S/CO SERPL IA: 0.03 S/CO — SIGNIFICANT CHANGE UP
HCV AB SERPL-IMP: SIGNIFICANT CHANGE UP
HGB BLD-MCNC: 7 G/DL — CRITICAL LOW (ref 13–17)
HGB BLD-MCNC: 7 G/DL — CRITICAL LOW (ref 13–17)
HGB BLD-MCNC: 9.1 G/DL — LOW (ref 13–17)
IRON SATN MFR SERPL: 149 UG/DL — SIGNIFICANT CHANGE UP (ref 45–165)
IRON SATN MFR SERPL: 58 % — HIGH (ref 16–55)
LACTATE SERPL-SCNC: 0.6 MMOL/L — SIGNIFICANT CHANGE UP (ref 0.5–2)
LDH SERPL L TO P-CCNC: 293 U/L — HIGH (ref 50–242)
LDH SERPL L TO P-CCNC: SIGNIFICANT CHANGE UP (ref 50–242)
LYMPHOCYTES # BLD AUTO: 0.86 K/UL — LOW (ref 1–3.3)
LYMPHOCYTES # BLD AUTO: 6.9 % — LOW (ref 13–44)
MACROCYTES BLD QL: SLIGHT — SIGNIFICANT CHANGE UP
MAGNESIUM SERPL-MCNC: 1.9 MG/DL — SIGNIFICANT CHANGE UP (ref 1.6–2.6)
MAGNESIUM SERPL-MCNC: 2 MG/DL — SIGNIFICANT CHANGE UP (ref 1.6–2.6)
MANUAL SMEAR VERIFICATION: SIGNIFICANT CHANGE UP
MCHC RBC-ENTMCNC: 21.9 PG — LOW (ref 27–34)
MCHC RBC-ENTMCNC: 22.3 PG — LOW (ref 27–34)
MCHC RBC-ENTMCNC: 24.3 PG — LOW (ref 27–34)
MCHC RBC-ENTMCNC: 28.5 GM/DL — LOW (ref 32–36)
MCHC RBC-ENTMCNC: 28.6 GM/DL — LOW (ref 32–36)
MCHC RBC-ENTMCNC: 30.7 GM/DL — LOW (ref 32–36)
MCV RBC AUTO: 77.1 FL — LOW (ref 80–100)
MCV RBC AUTO: 78 FL — LOW (ref 80–100)
MCV RBC AUTO: 78.9 FL — LOW (ref 80–100)
MICROCYTES BLD QL: SIGNIFICANT CHANGE UP
MONOCYTES # BLD AUTO: 0.11 K/UL — SIGNIFICANT CHANGE UP (ref 0–0.9)
MONOCYTES NFR BLD AUTO: 0.9 % — LOW (ref 2–14)
NEUTROPHILS # BLD AUTO: 11.43 K/UL — HIGH (ref 1.8–7.4)
NEUTROPHILS NFR BLD AUTO: 92.2 % — HIGH (ref 43–77)
NRBC # BLD: 0 /100 WBCS — SIGNIFICANT CHANGE UP (ref 0–0)
NRBC # BLD: 0 /100 WBCS — SIGNIFICANT CHANGE UP (ref 0–0)
NRBC # BLD: 1 /100 — HIGH (ref 0–0)
NRBC # BLD: SIGNIFICANT CHANGE UP /100 WBCS (ref 0–0)
OSMOLALITY UR: 387 MOSM/KG — SIGNIFICANT CHANGE UP (ref 300–900)
OVALOCYTES BLD QL SMEAR: SLIGHT — SIGNIFICANT CHANGE UP
PHOSPHATE SERPL-MCNC: 6.3 MG/DL — HIGH (ref 2.5–4.5)
PHOSPHATE SERPL-MCNC: 6.8 MG/DL — HIGH (ref 2.5–4.5)
PLAT MORPH BLD: ABNORMAL
PLATELET # BLD AUTO: 302 K/UL — SIGNIFICANT CHANGE UP (ref 150–400)
PLATELET # BLD AUTO: 342 K/UL — SIGNIFICANT CHANGE UP (ref 150–400)
PLATELET # BLD AUTO: 375 K/UL — SIGNIFICANT CHANGE UP (ref 150–400)
POIKILOCYTOSIS BLD QL AUTO: SIGNIFICANT CHANGE UP
POTASSIUM SERPL-MCNC: 4.8 MMOL/L — SIGNIFICANT CHANGE UP (ref 3.5–5.3)
POTASSIUM SERPL-SCNC: 4.8 MMOL/L — SIGNIFICANT CHANGE UP (ref 3.5–5.3)
POTASSIUM UR-SCNC: 19 MMOL/L — SIGNIFICANT CHANGE UP
PROT ?TM UR-MCNC: 174 MG/DL — HIGH (ref 0–12)
PROT SERPL-MCNC: 6.5 G/DL — SIGNIFICANT CHANGE UP (ref 6–8.3)
PROT/CREAT UR-RTO: 1.9 RATIO — HIGH (ref 0–0.2)
RBC # BLD: 3.14 M/UL — LOW (ref 4.2–5.8)
RBC # BLD: 3.19 M/UL — LOW (ref 4.2–5.8)
RBC # BLD: 3.75 M/UL — LOW (ref 4.2–5.8)
RBC # FLD: 20.6 % — HIGH (ref 10.3–14.5)
RBC # FLD: 22.1 % — HIGH (ref 10.3–14.5)
RBC # FLD: 22.1 % — HIGH (ref 10.3–14.5)
RBC BLD AUTO: ABNORMAL
RETICS #: 34.5 K/UL — SIGNIFICANT CHANGE UP (ref 25–125)
RETICS/RBC NFR: 1.1 % — SIGNIFICANT CHANGE UP (ref 0.5–2.5)
RH IG SCN BLD-IMP: POSITIVE — SIGNIFICANT CHANGE UP
SCHISTOCYTES BLD QL AUTO: SLIGHT — SIGNIFICANT CHANGE UP
SODIUM SERPL-SCNC: 154 MMOL/L — HIGH (ref 135–145)
SODIUM UR-SCNC: 56 MMOL/L — SIGNIFICANT CHANGE UP
SPHEROCYTES BLD QL SMEAR: SLIGHT — SIGNIFICANT CHANGE UP
TIBC SERPL-MCNC: 257 UG/DL — SIGNIFICANT CHANGE UP (ref 220–430)
TROPONIN T SERPL-MCNC: 0.13 NG/ML — CRITICAL HIGH (ref 0–0.01)
TROPONIN T SERPL-MCNC: 0.15 NG/ML — CRITICAL HIGH (ref 0–0.01)
TROPONIN T SERPL-MCNC: 0.16 NG/ML — CRITICAL HIGH (ref 0–0.01)
UIBC SERPL-MCNC: 108 UG/DL — LOW (ref 110–370)
UUN UR-MCNC: 564 MG/DL — SIGNIFICANT CHANGE UP
WBC # BLD: 11.45 K/UL — HIGH (ref 3.8–10.5)
WBC # BLD: 12.4 K/UL — HIGH (ref 3.8–10.5)
WBC # BLD: 12.48 K/UL — HIGH (ref 3.8–10.5)
WBC # FLD AUTO: 11.45 K/UL — HIGH (ref 3.8–10.5)
WBC # FLD AUTO: 12.4 K/UL — HIGH (ref 3.8–10.5)
WBC # FLD AUTO: 12.48 K/UL — HIGH (ref 3.8–10.5)

## 2022-10-17 PROCEDURE — 99255 IP/OBS CONSLTJ NEW/EST HI 80: CPT

## 2022-10-17 PROCEDURE — 99232 SBSQ HOSP IP/OBS MODERATE 35: CPT

## 2022-10-17 PROCEDURE — 74018 RADEX ABDOMEN 1 VIEW: CPT | Mod: 26

## 2022-10-17 PROCEDURE — 70551 MRI BRAIN STEM W/O DYE: CPT | Mod: 26

## 2022-10-17 PROCEDURE — 99223 1ST HOSP IP/OBS HIGH 75: CPT | Mod: GC

## 2022-10-17 RX ORDER — METOPROLOL TARTRATE 50 MG
1 TABLET ORAL
Qty: 0 | Refills: 0 | DISCHARGE

## 2022-10-17 RX ORDER — DEXTROSE 50 % IN WATER 50 %
12.5 SYRINGE (ML) INTRAVENOUS ONCE
Refills: 0 | Status: DISCONTINUED | OUTPATIENT
Start: 2022-10-17 | End: 2022-11-01

## 2022-10-17 RX ORDER — SODIUM CHLORIDE 9 MG/ML
1000 INJECTION, SOLUTION INTRAVENOUS
Refills: 0 | Status: DISCONTINUED | OUTPATIENT
Start: 2022-10-17 | End: 2022-11-01

## 2022-10-17 RX ORDER — GLUCAGON INJECTION, SOLUTION 0.5 MG/.1ML
1 INJECTION, SOLUTION SUBCUTANEOUS ONCE
Refills: 0 | Status: DISCONTINUED | OUTPATIENT
Start: 2022-10-17 | End: 2022-11-01

## 2022-10-17 RX ORDER — DEXTROSE 50 % IN WATER 50 %
25 SYRINGE (ML) INTRAVENOUS ONCE
Refills: 0 | Status: DISCONTINUED | OUTPATIENT
Start: 2022-10-17 | End: 2022-11-01

## 2022-10-17 RX ORDER — ATORVASTATIN CALCIUM 80 MG/1
80 TABLET, FILM COATED ORAL AT BEDTIME
Refills: 0 | Status: DISCONTINUED | OUTPATIENT
Start: 2022-10-17 | End: 2022-11-01

## 2022-10-17 RX ORDER — SODIUM CHLORIDE 9 MG/ML
1000 INJECTION, SOLUTION INTRAVENOUS
Refills: 0 | Status: DISCONTINUED | OUTPATIENT
Start: 2022-10-17 | End: 2022-10-18

## 2022-10-17 RX ORDER — INFLUENZA VIRUS VACCINE 15; 15; 15; 15 UG/.5ML; UG/.5ML; UG/.5ML; UG/.5ML
0.5 SUSPENSION INTRAMUSCULAR ONCE
Refills: 0 | Status: COMPLETED | OUTPATIENT
Start: 2022-10-17 | End: 2022-10-17

## 2022-10-17 RX ORDER — INSULIN GLARGINE 100 [IU]/ML
10 INJECTION, SOLUTION SUBCUTANEOUS AT BEDTIME
Refills: 0 | Status: DISCONTINUED | OUTPATIENT
Start: 2022-10-17 | End: 2022-10-19

## 2022-10-17 RX ORDER — ASPIRIN/CALCIUM CARB/MAGNESIUM 324 MG
1 TABLET ORAL
Qty: 0 | Refills: 0 | DISCHARGE

## 2022-10-17 RX ORDER — ATORVASTATIN CALCIUM 80 MG/1
1 TABLET, FILM COATED ORAL
Qty: 0 | Refills: 0 | DISCHARGE

## 2022-10-17 RX ORDER — LISINOPRIL 2.5 MG/1
1 TABLET ORAL
Qty: 0 | Refills: 0 | DISCHARGE

## 2022-10-17 RX ORDER — INSULIN LISPRO 100/ML
VIAL (ML) SUBCUTANEOUS
Refills: 0 | Status: DISCONTINUED | OUTPATIENT
Start: 2022-10-17 | End: 2022-10-25

## 2022-10-17 RX ORDER — SODIUM CHLORIDE 9 MG/ML
1000 INJECTION, SOLUTION INTRAVENOUS
Refills: 0 | Status: DISCONTINUED | OUTPATIENT
Start: 2022-10-17 | End: 2022-10-17

## 2022-10-17 RX ORDER — DEXTROSE 50 % IN WATER 50 %
15 SYRINGE (ML) INTRAVENOUS ONCE
Refills: 0 | Status: DISCONTINUED | OUTPATIENT
Start: 2022-10-17 | End: 2022-11-01

## 2022-10-17 RX ADMIN — SODIUM CHLORIDE 125 MILLILITER(S): 9 INJECTION, SOLUTION INTRAVENOUS at 17:51

## 2022-10-17 RX ADMIN — ATORVASTATIN CALCIUM 80 MILLIGRAM(S): 80 TABLET, FILM COATED ORAL at 22:17

## 2022-10-17 RX ADMIN — Medication 2: at 17:50

## 2022-10-17 RX ADMIN — SODIUM CHLORIDE 125 MILLILITER(S): 9 INJECTION, SOLUTION INTRAVENOUS at 10:33

## 2022-10-17 RX ADMIN — INSULIN GLARGINE 10 UNIT(S): 100 INJECTION, SOLUTION SUBCUTANEOUS at 22:16

## 2022-10-17 NOTE — PROVIDER CONTACT NOTE (CRITICAL VALUE NOTIFICATION) - SITUATION
hematologist called to report critical value, hemoglobin 7.0 MD Dailey notified. will continue to monitor

## 2022-10-17 NOTE — SWALLOW BEDSIDE ASSESSMENT ADULT - SLP GENERAL OBSERVATIONS
Received awake in bed, A&Ox3-4, breathing comfortably on RA. Speech is mildly dysarthric with imprecise articulation. According to pt, this is baseline s/p prior CVA.

## 2022-10-17 NOTE — PROGRESS NOTE ADULT - ASSESSMENT
Patient is a 57y old Male w/ PMHx of IDDM, CVA x 2 (residual left side weakness), HTN, HLD, and CKD presents c/o slurred speech, weakness over the past several days. Admitted initially to [facility] overnight, found to be persistently tachycardic in ED and with BUN/Cr 91/7.42. ICU consulted for need for closer monitoring and possible dialysis, now admitted to telemetry. Patient is a 57y old Male w/ PMHx of IDDM, CVA x 2 (residual left side weakness), HTN, HLD, and CKD presents c/o slurred speech, weakness over the past several days. Admitted initially to [facility] overnight, found to be persistently tachycardic in ED and with BUN/Cr 91/7.42. Admitted to telemetry for further management.

## 2022-10-17 NOTE — SWALLOW BEDSIDE ASSESSMENT ADULT - SLP PERTINENT HISTORY OF CURRENT PROBLEM
PMHx of IDDM, CVA x 2 (residual left side weakness), HTN, HLD, and CKD presents c/o slurred speech, weakness over the past several days. Admitted initially to [facility] overnight, found to be persistently tachycardic in ED and with BUN/Cr 91/7.42. Admitted to telemetry for further management.

## 2022-10-17 NOTE — PROGRESS NOTE ADULT - PROBLEM SELECTOR PLAN 7
Hx of HLD. Home med: atorvastatin 20mg    - start atorvastatin 80 iso hx of CVAx2 and presenting w/ slurred speech Hx of HTN. Per surescripts no recent HTN meds. Per HIE in 2020 was taking metoprolol ER 50 and Nifedipine ER 30 in 2020.     - monitor BPs  - consider starting BP meds if becomes hypertensive

## 2022-10-17 NOTE — PROGRESS NOTE ADULT - PROBLEM SELECTOR PLAN 5
Hx of insulin-dependent diabetes mellitus. Per surescritessa takes Lantus 15 qhs and patient says he has been taking this.    - start Lantus 10units qhs  - start mISS Pt w/ Hx of CKD stage 4, GFR 15-29 ml/min Prior baseline per HIE Cr 2.9, eGFR 27 (05/2019). Now presenting w/ BUN/ Cr 97/ 7.99.    - nephro consult  - trend creatinine  - avoid nephrotoxic meds  - strict I and Os Hx of CKD stage 4. per HIE, baseline Cr 2.9, eGFR 27 (05/2019). Now presenting w/ BUN/ Cr 97/ 7.99.  - may be post-obstructive, as pt straight-cath'd with UOP 500cc  - will leave in [place] for now, will f/u renal   Plan:  - f/u Renal US & Urine lytes  - consulted renal, will f/u recs   - trend Cr. Avoid nephrotoxic meds. Strict I/Os

## 2022-10-17 NOTE — PROVIDER CONTACT NOTE (CHANGE IN STATUS NOTIFICATION) - ACTION/TREATMENT ORDERED:
team feels patient is uremic and waxes and wanes, consulting neurology at this time and if neuro feels patient need stroke code one can be called at that time.

## 2022-10-17 NOTE — PROGRESS NOTE ADULT - PROBLEM SELECTOR PLAN 3
Pt w/ hemoglobin of 6.9. Baseline hemoglobin 12 (2/2019). Receiving 1upRBC for anemia. No apparent BRBPR or melena, and no other sources of bleeding.    - f/u post-transfusion CBC  - trend CBC  - transfuse for Hgb <7  - active Type and Screen  - defer iron studies at this time, will need outpatient Presented w 153. s/p 2L NS in ED - likely 2/2 dehydration   - 10/17 AM: Na 154 ; free water deficit 5.4L  - Started D5 125cc/hr for 24h (3L total)     - dysphagia screen   - f/u am BMP  - encourage PO intake  - if worsening can consider starting D5 Presented w 153. s/p 2L NS in ED - likely 2/2 dehydration   - 10/17 AM: Na 154 ; free water deficit 5.4L  - Started D5 125cc/hr for 24h (3L total)   - f/u dysphagia screen, encourage PO intake   - Continue to monitor

## 2022-10-17 NOTE — H&P ADULT - PROBLEM SELECTOR PLAN 4
Pt w/ Hx of CKD stage 4, GFR 15-29 ml/min Prior baseline per HIE Cr 2.9, eGFR 27 (05/2019). Now presenting w/ BUN/ Cr 97/ 7.99.    - nephro consult  - trend creatinine  - avoid nephrotoxic meds Pt w/ Hx of CKD stage 4, GFR 15-29 ml/min Prior baseline per HIE Cr 2.9, eGFR 27 (05/2019). Now presenting w/ BUN/ Cr 97/ 7.99.    - nephro consult  - trend creatinine  - avoid nephrotoxic meds  - strict I and Os

## 2022-10-17 NOTE — H&P ADULT - PROBLEM SELECTOR PLAN 8
F: s/p 2LNS in ED  E: replete as necessary  GI:   N: pending dysphagia screening  DVT Prophy: SCDs iso anemia  Dispo: 7Lach F: s/p 2LNS in ED  E: replete as necessary  N: pending dysphagia screening  DVT Prophy: SCDs iso anemia  Dispo: 7Lach

## 2022-10-17 NOTE — H&P ADULT - PROBLEM SELECTOR PLAN 2
Patient presenting w/ hypernatremia of 153. Free water deficit 4.4L. Received 1L NS bolus after Na 153. Total of 2L NS given in ED.    - dysphagia screen   - f/u am BMP  - encourage PO intake  - if worsening can consider starting D5

## 2022-10-17 NOTE — H&P ADULT - NSICDXPASTMEDICALHX_GEN_ALL_CORE_FT
PAST MEDICAL HISTORY:  Cerebral artery occlusion with cerebral infarction Cerebral vascular accident    Hyperlipidemia Hyperlipidemia    Hypertension     Type 2 diabetes mellitus Diabetes mellitus

## 2022-10-17 NOTE — PATIENT PROFILE ADULT - FALL HARM RISK - HARM RISK INTERVENTIONS
Assistance with ambulation/Assistance OOB with selected safe patient handling equipment/Communicate Risk of Fall with Harm to all staff/Discuss with provider need for PT consult/Monitor gait and stability/Provide patient with walking aids - walker, cane, crutches/Reinforce activity limits and safety measures with patient and family/Review medications for side effects contributing to fall risk/Tailored Fall Risk Interventions/Use of alarms - bed, chair and/or voice tab/Visual Cue: Yellow wristband and red socks/Bed in lowest position, wheels locked, appropriate side rails in place/Call bell, personal items and telephone in reach/Instruct patient to call for assistance before getting out of bed or chair/Non-slip footwear when patient is out of bed/[place] to call system/Physically safe environment - no spills, clutter or unnecessary equipment/Purposeful Proactive Rounding/Room/bathroom lighting operational, light cord in reach

## 2022-10-17 NOTE — PROVIDER CONTACT NOTE (CHANGE IN STATUS NOTIFICATION) - SITUATION
patient has acute change in mental status upon arrival patient very alert conversive follow commands assign to watch TV, at this time very lethargic not following command opens eyes to pain and localizes but major change from 1.5hrs ago

## 2022-10-17 NOTE — H&P ADULT - PROBLEM SELECTOR PLAN 5
Hx of insulin-dependent diabetes mellitus. Per surescripts takes Lantus 15 qhs, unsure if patient has been taking this.    - start mISS and basal bolus based on po intake Hx of insulin-dependent diabetes mellitus. Per surescritessa takes Lantus 15 qhs and patient says he has been taking this.    - start Lantus 10units qhs  - start mISS

## 2022-10-17 NOTE — PROGRESS NOTE ADULT - SUBJECTIVE AND OBJECTIVE BOX
SUBJECTIVE / INTERVAL HPI: Patient seen and examined at bedside. Appeared very lethargic, responds to noxious stimuli. Unable to assess ROS    VITAL SIGNS:  Vital Signs Last 24 Hrs  T(C): 36.9 (17 Oct 2022 10:00), Max: 37.1 (16 Oct 2022 22:28)  T(F): 98.4 (17 Oct 2022 10:00), Max: 98.7 (16 Oct 2022 22:28)  HR: 104 (17 Oct 2022 11:35) (100 - 110)  BP: 142/88 (17 Oct 2022 11:35) (124/60 - 156/70)  BP(mean): 109 (17 Oct 2022 11:35) (85 - 109)  RR: 14 (17 Oct 2022 11:35) (12 - 18)  SpO2: 99% (17 Oct 2022 11:35) (95% - 100%)    Parameters below as of 17 Oct 2022 11:35  Patient On (Oxygen Delivery Method): room air        PHYSICAL EXAM:    General: NAD  HEENT: NCAT, poor dentition. Very dry MM. Pupils nearly pinpoint, R larger than L, but reactive. R eye w/ coloboma.   Neck: supple, trachea midline  Cardiovascular: S1, S2 normal; RRR, no M/G/R  Respiratory: CTABL; no W/R/R  Gastrointestinal: soft, nontender, nondistended. bowel sounds present.  Skin: abrasions on lower right back. Dry skin in bilateral lower extremities. B/l thickened w brown discoloration.   Extremities: 2+ DP pulses. 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 No cyanosis or edema.   Vascular: 2+ radial, DP/PT pulses B/L  Neurological: AAOx2 (self, place), but very lethargic. Responds to noxious stimuli. 1-word answers but falls back asleep immediately. Left sided weakness L>R (known, from past CVA)    MEDICATIONS:  MEDICATIONS  (STANDING):  atorvastatin 80 milliGRAM(s) Oral at bedtime  dextrose 5%. 1000 milliLiter(s) (125 mL/Hr) IV Continuous <Continuous>  dextrose 5%. 1000 milliLiter(s) (100 mL/Hr) IV Continuous <Continuous>  dextrose 5%. 1000 milliLiter(s) (50 mL/Hr) IV Continuous <Continuous>  dextrose 50% Injectable 25 Gram(s) IV Push once  dextrose 50% Injectable 12.5 Gram(s) IV Push once  dextrose 50% Injectable 25 Gram(s) IV Push once  glucagon  Injectable 1 milliGRAM(s) IntraMuscular once  insulin glargine Injectable (LANTUS) 10 Unit(s) SubCutaneous at bedtime  insulin lispro (ADMELOG) corrective regimen sliding scale   SubCutaneous Before meals and at bedtime    MEDICATIONS  (PRN):  bisacodyl 5 milliGRAM(s) Oral every 12 hours PRN Constipation  dextrose Oral Gel 15 Gram(s) Oral once PRN Blood Glucose LESS THAN 70 milliGRAM(s)/deciliter      ALLERGIES:  Allergies    No Known Allergies    Intolerances        LABS:                        7.0    12.48 )-----------( 342      ( 17 Oct 2022 09:54 )             24.6     10-    154<H>  |  126<H>  |  98<H>  ----------------------------<  134<H>  4.8   |  14<L>  |  7.95<H>    Ca    8.0<L>      17 Oct 2022 05:30  Phos  6.8     10-  Mg     2.0     10-    TPro  6.5  /  Alb  3.6  /  TBili  0.3  /  DBili  x   /  AST  15  /  ALT  19  /  AlkPhos  79  10-17    PT/INR - ( 16 Oct 2022 17:56 )   PT: 12.5 sec;   INR: 1.05          PTT - ( 16 Oct 2022 17:56 )  PTT:32.7 sec  Urinalysis Basic - ( 16 Oct 2022 22:30 )    Color: Yellow / Appearance: Clear / S.025 / pH: x  Gluc: x / Ketone: NEGATIVE  / Bili: Negative / Urobili: 0.2 E.U./dL   Blood: x / Protein: 100 mg/dL / Nitrite: NEGATIVE   Leuk Esterase: NEGATIVE / RBC: < 5 /HPF / WBC < 5 /HPF   Sq Epi: x / Non Sq Epi: 0-5 /HPF / Bacteria: Present /HPF      CAPILLARY BLOOD GLUCOSE      POCT Blood Glucose.: 133 mg/dL (17 Oct 2022 11:29)      RADIOLOGY & ADDITIONAL TESTS: Reviewed.

## 2022-10-17 NOTE — H&P ADULT - PROBLEM SELECTOR PLAN 3
Pt w/ hemoglobin of 6.9. Baseline hemoglobin 12 (2/2019). Receiving 1upRBC for anemia.     - f/u post-transfusion CBC  - trend CBC  - transfuse for Hgb <7  - active Type and Screen  - defer iron studies at this time, will need outpatient Pt w/ hemoglobin of 6.9. Baseline hemoglobin 12 (2/2019). Receiving 1upRBC for anemia. No apparent BRBPR or melena, and no other sources of bleeding.    - f/u post-transfusion CBC  - trend CBC  - transfuse for Hgb <7  - active Type and Screen  - defer iron studies at this time, will need outpatient

## 2022-10-17 NOTE — H&P ADULT - PROBLEM SELECTOR PLAN 6
Hx of HTN. Per surescripts no recent HTN meds. Per HIE in 2020 was taking metoprolol ER 50 and Nifedipine ER 30 in 2020.     - monitor BPs  - consider starting BP meds if becomes hypertensive

## 2022-10-17 NOTE — PROGRESS NOTE ADULT - PROBLEM SELECTOR PLAN 2
Patient presenting w/ hypernatremia of 153. Free water deficit 4.4L. Received 1L NS bolus after Na 153. Total of 2L NS given in ED.    - dysphagia screen   - f/u am BMP  - encourage PO intake  - if worsening can consider starting D5 - Hx of CVA x2. Presenting w/ slurred speech and generalized weakness. Waxing/waning in alertness, intermittently very lethargic w/ nearly pinpoint pupils, responsive to noxious stimuli   - Per Neuro HIE- 2/2019 acute infarct in left corona radiata and left macrina seen on MRI Brain, had been referred to EP for loop recorder placement but lost to f/u  - CT Head on admission - no acute findings. Abd X-ray: no acute findings.   Plan:   - Neuro consulted, will f/u recs  - Continue atorvastatin 80mg qhs. Restart ASA 81 after hemoglobin stable  - f/u MRI head - Hx of CVA x2. Presenting w/ slurred speech and generalized weakness. Waxing/waning in alertness, intermittently very lethargic w/ nearly pinpoint pupils, responsive to noxious stimuli   - Per Neuro HIE- 2/2019 acute infarct in left corona radiata and left macrina seen on MRI Brain, had been referred to EP for loop recorder placement but lost to f/u  - CT Head on admission - no acute findings. Abd X-ray: no acute findings.   - started dulcolax 5mg PO q12h  Plan:   - Neuro consulted, will f/u recs  - Continue atorvastatin 80mg qhs. Restart ASA 81 after hemoglobin stable  - f/u MRI head  - f/u ammonia levels

## 2022-10-17 NOTE — H&P ADULT - PROBLEM SELECTOR PLAN 1
Pt w/ Hx of CKD Stage 4. Prior baseline per HIE Cr Hx of CVA x2. Now presenting w/ slurred speech, per cousin unsure if this has been his baseline as they haven't spoken in years. Per Neuro HIE- 2/2019 acute infarct in left corona radiata and left macrina seen on MRI Brain. Referred to EP for possible placement of loop recorder since had two separate areas of stroke in location and times, but lost to follow-up.     - start Atorva 80  - start Aspirin 81 after hemoglobin stable  - MRI Brain  - consider neuro consult Hx of CVA x2. Now presenting w/ slurred speech, per cousin unsure if this has been his baseline as they haven't spoken in years. Per Neuro HIE- 2/2019 acute infarct in left corona radiata and left macrina seen on MRI Brain. Referred to EP for possible placement of loop recorder since had two separate areas of stroke in location and times, but lost to follow-up. CT Head: Since 2/12/2019, no new acute intracranial hemorrhage, mass effect, or demarcated territorial infarct.    - start Atorvastatin 80  - start Aspirin 81 after hemoglobin stable  - MRI Brain  - consider neuro consult Hx of CVA x2. Now presenting w/ slurred speech and generalized weakness, per cousin unsure if this has been his baseline as they haven't spoken in years. Per Neuro HIE- 2/2019 acute infarct in left corona radiata and left macrina seen on MRI Brain. Referred to EP for possible placement of loop recorder since had two separate areas of stroke in location and times, but lost to follow-up. CT Head: Since 2/12/2019, no new acute intracranial hemorrhage, mass effect, or demarcated territorial infarct.    - start Atorvastatin 80  - start Aspirin 81 after hemoglobin stable  - MRI Brain  - consider neuro consult

## 2022-10-17 NOTE — CONSULT NOTE ADULT - SUBJECTIVE AND OBJECTIVE BOX
Neurology Consult    Patient is a 57y old  Male who presents with a chief complaint of slurred speech and weakness (17 Oct 2022 07:10)the patient was referred to neurology for AMS and slurred speech. The patient was seen and examined at the bedside. No family members available. The patient was able to give history, and he stated that he was admitted because he fell down. He denied any LOC or trauma of his head or neck. The patient stated that he is not able to take care of himself and need help doing his ADL. He states that his speech is slurred but this since his stroke. In addition, he can not see in his Rt eye after an injury many years ago.   No other new neurology complaints.   Patient states that his Lt side is weak since his stroke       HPI:  Patient is a 57y old Male w/ PMHx of IDDM, CVA x 2 (residual left side weakness), HTN, HLD, CKD presents c/o slurred speech, weakness over the past several days. Admitted initially to [facility] overnight, found to be persistently tachycardic in ED and with BUN/Cr 91/7.42. ICU consulted for need for closer monitoring and possible dialysis, now admitted to telemetry.  Pt is unsure how long his symptoms have been going on for, is a poor historian.  Pt's cousin accompanied pt to ED stating the patient called him 3 days prior to presentation. His speech sounded slurred but cousin also reports they do not speak frequently and he has not seen him in years. Pt's cousin says he is supposed to have 24/7 aid but found out the patient has only been getting 3 days a week of aid. Pt's cousin couldn't go to him on Thursday, went Saturday (yesterday) and found pt on the floor of the apartment, pt was soiled in his own feces and urine, was unsure how he ended up on the floor. Pt was helped into a bed, the cousin did some grocery shopping for him, reports he advised him to go to the hospital and pt declined.  Pt's cousin left him alone on Saturday and returned today and found the patient on the floor again, and again had soiled himself and was unsure how he got to the floor. Pt was brought to the ED for evaluation.  Pt stating he doesn't think he fell, has no pain or injuries.  Pt acknowledging his speech is slurred, unsure how long this has been going on for.     In the ED:  Initial vital signs: T: 98.3 F, HR: 100, BP: 131/74, R: 18, SpO2: 99% on RA  Labs: significant for WBC 15.94, Hgb 6.9, Plt 416, Na 153, BUN/Cr 97/7.99,   Imaging:  CT Head: Since 2019, no new acute intracranial hemorrhage, mass effect, or demarcated territorial infarct.  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 1000 milliLiter(s) (125 mL/Hr) IV Continuous <Continuous>  dextrose 5%. 1000 milliLiter(s) (100 mL/Hr) IV Continuous <Continuous>  dextrose 5%. 1000 milliLiter(s) (50 mL/Hr) IV Continuous <Continuous>  dextrose 50% Injectable 25 Gram(s) IV Push once  dextrose 50% Injectable 12.5 Gram(s) IV Push once  dextrose 50% Injectable 25 Gram(s) IV Push once  glucagon  Injectable 1 milliGRAM(s) IntraMuscular once  influenza   Vaccine 0.5 milliLiter(s) IntraMuscular once  insulin glargine Injectable (LANTUS) 10 Unit(s) SubCutaneous at bedtime  insulin lispro (ADMELOG) corrective regimen sliding scale   SubCutaneous Before meals and at bedtime    MEDICATIONS  (PRN):  bisacodyl 5 milliGRAM(s) Oral every 12 hours PRN Constipation  dextrose Oral Gel 15 Gram(s) Oral once PRN Blood Glucose LESS THAN 70 milliGRAM(s)/deciliter      Review of systems:    Constitutional: as per HPI  Eyes: No eye pain or discharge  ENMT:  No difficulty hearing; No sinus or throat pain  Neck: No pain or stiffness  Respiratory: No cough, wheezing, chills or hemoptysis  Cardiovascular: No chest pain, palpitations, shortness of breath, dyspnea on exertion  Gastrointestinal: No abdominal pain, nausea, vomiting or hematemesis; No diarrhea or constipation.   Genitourinary: No dysuria, frequency, hematuria or incontinence  Neurological: As per HPI  Skin: No rashes or lesions   Endocrine: No heat or cold intolerance; No hair loss  Musculoskeletal: No joint pain or swelling  Psychiatric: No depression, anxiety, mood swings  Heme/Lymph: No easy bruising or bleeding gums    Vital Signs Last 24 Hrs  T(C): 36.9 (17 Oct 2022 10:00), Max: 37.1 (16 Oct 2022 22:28)  T(F): 98.4 (17 Oct 2022 10:00), Max: 98.7 (16 Oct 2022 22:28)  HR: 104 (17 Oct 2022 11:35) (100 - 110)  BP: 142/88 (17 Oct 2022 11:35) (124/60 - 156/70)  BP(mean): 109 (17 Oct 2022 11:35) (85 - 109)  RR: 14 (17 Oct 2022 11:35) (12 - 18)  SpO2: 99% (17 Oct 2022 11:35) (95% - 100%)    Parameters below as of 17 Oct 2022 11:35  Patient On (Oxygen Delivery Method): room air        Examination:  General:  Appearance is consistent with chronologic age.  No abnormal facies.  Gross skin survey within normal limits.    Cognitive/Language:  The patient is oriented to person, place, was able to tell the year but not the month. Language with normal repetition, comprehension and naming.  Mild dysarthric.  He was able to count backward from 20, and do simple tasks.   Eyes: intact VA movement in Rt eye and normal Lt eye. EOMI w/o nystagmus, skew or reported double vision. Esotropia of the Rt eye. PERRL.  No ptosis/weakness of eyelid closure.    Face:  Facial sensation normal V1 - 3, no facial asymmetry.    Ears/Nose/Throat:  Hearing grossly intact b/l.  Palate elevates midline.  Tongue and uvula midline.   Motor examination: Increased tone in both legs. No tenderness, twitching, but some intentional tremors, and asterixis.  Formal Muscle Strength Testing: (MRC grade R/L) 5/4+ UE; 4+/4- LE.    Reflexes:   2+ b/l biceps, triceps, brachioradialis, absent patella and Achilles.  Plantar response downgoing Rt and equivocal Lt.    Sensory examination: Intact to light touch and pinprick in all extremities.  Cerebellum: FTN mild intentional tremor, with normal JAYCE in all limbs.  No dysmetria   Gait deferred         Labs:   CBC Full  -  ( 17 Oct 2022 09:54 )  WBC Count : 12.48 K/uL  RBC Count : 3.19 M/uL  Hemoglobin : 7.0 g/dL  Hematocrit : 24.6 %  Platelet Count - Automated : 342 K/uL  Mean Cell Volume : 77.1 fl  Mean Cell Hemoglobin : 21.9 pg  Mean Cell Hemoglobin Concentration : 28.5 gm/dL  Auto Neutrophil # : x  Auto Lymphocyte # : x  Auto Monocyte # : x  Auto Eosinophil # : x  Auto Basophil # : x  Auto Neutrophil % : x  Auto Lymphocyte % : x  Auto Monocyte % : x  Auto Eosinophil % : x  Auto Basophil % : x    10-17    154<H>  |  126<H>  |  98<H>  ----------------------------<  134<H>  4.8   |  14<L>  |  7.95<H>    Ca    8.0<L>      17 Oct 2022 05:30  Phos  6.8     10-17  Mg     2.0     10-17    TPro  6.5  /  Alb  3.6  /  TBili  0.3  /  DBili  x   /  AST  15  /  ALT  19  /  AlkPhos  79  10-17    LIVER FUNCTIONS - ( 17 Oct 2022 05:30 )  Alb: 3.6 g/dL / Pro: 6.5 g/dL / ALK PHOS: 79 U/L / ALT: 19 U/L / AST: 15 U/L / GGT: x           PT/INR - ( 16 Oct 2022 17:56 )   PT: 12.5 sec;   INR: 1.05          PTT - ( 16 Oct 2022 17:56 )  PTT:32.7 sec  Urinalysis Basic - ( 16 Oct 2022 22:30 )    Color: Yellow / Appearance: Clear / S.025 / pH: x  Gluc: x / Ketone: NEGATIVE  / Bili: Negative / Urobili: 0.2 E.U./dL   Blood: x / Protein: 100 mg/dL / Nitrite: NEGATIVE   Leuk Esterase: NEGATIVE / RBC: < 5 /HPF / WBC < 5 /HPF   Sq Epi: x / Non Sq Epi: 0-5 /HPF / Bacteria: Present /HPF          Neuroimaging:  NCHCT: CT Head No Cont:   ACC: [id number] EXAM:  CT BRAIN                          PROCEDURE DATE:  10/16/2022          INTERPRETATION:  PROCEDURE: CT head without intravenous contrast    INDICATIONS: Slurred speech.    TECHNIQUE:  Serial axial images were obtained from the skull base to the   vertex without the use of intravenous contrast. Coronal and sagittal   reformatted images were obtained.    COMPARISON EXAMINATION: Most recent CT head exam dated 2019.    FINDINGS:  There is mild parenchymal volume loss as manifested by enlargement of the  ventricles, cisternal spaces, and cortical sulci throughout, similar to  the prior exam.    There is no acute intracranial hemorrhage. There is no mass effect,   midline  shift, or extra axial collection.    The gray white differentiation appears preserved without evidence of an  acute transcortical infarction. There is moderate patchy and confluent   periventricular and subcortical white matter diminished attenuation,   likely the sequela of small vessel ischemic disease. Chronic lacunar   infarctions are again seen within the left caudate/anterior limb of the   internal capsule. There is associated ex vacuo dilatation of the frontal   horn of the left lateral ventricle. 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    The bony windows demonstrates no calvarial fracture. The visualized  paranasal sinuses are clear. Mastoid air cells are well aerated. Status   post rightocular lens replacement    IMPRESSION: Since 2019, no new acute intracranial hemorrhage, mass   effect, or demarcated territorial infarct.    --- End of Report ---          HALEY EVERETT MD; Resident Radiologist  This document has been electronically signed.  JASPER BOOTH MD; Attending Radiologist  This document has been electronically signed. Oct 17 2022  2:20AM (10-16-22 @ 20:00)      10-17-22 @ 14:02          Thank you for sharing this patient with me; please do not hesitate to contact me in case of any question.        Neurology Consult    Patient is a 57y old  Male who presents with a chief complaint of slurred speech and weakness (17 Oct 2022 07:10)the patient was referred to neurology for AMS and slurred speech. The patient was seen and examined at the bedside. No family members available. The patient was able to give history, and he stated that he was admitted because he fell down. He denied any LOC or trauma of his head or neck. The patient stated that he is not able to take care of himself and need help doing his ADL. He states that his speech is slurred but this since his stroke. In addition, he can not see in his Rt eye after an injury many years ago.   No other new neurologic complaints.   Patient states that his Lt side is weak and has had slurred speech since his stroke       Initial HPI:  Patient is a 57y old Male w/ PMHx of IDDM, CVA x 2 (residual left side weakness), HTN, HLD, CKD presents c/o slurred speech, weakness over the past several days. Admitted initially to [facility] overnight, found to be persistently tachycardic in ED and with BUN/Cr 91/7.42. ICU consulted for need for closer monitoring and possible dialysis, now admitted to telemetry.  Pt is unsure how long his symptoms have been going on for, is a poor historian.  Pt's cousin accompanied pt to ED stating the patient called him 3 days prior to presentation. His speech sounded slurred but cousin also reports they do not speak frequently and he has not seen him in years. Pt's cousin says he is supposed to have 24/7 aid but found out the patient has only been getting 3 days a week of aid. Pt's cousin couldn't go to him on Thursday, went Saturday (yesterday) and found pt on the floor of the apartment, pt was soiled in his own feces and urine, was unsure how he ended up on the floor. Pt was helped into a bed, the cousin did some grocery shopping for him, reports he advised him to go to the hospital and pt declined.  Pt's cousin left him alone on Saturday and returned today and found the patient on the floor again, and again had soiled himself and was unsure how he got to the floor. Pt was brought to the ED for evaluation.  Pt stating he doesn't think he fell, has no pain or injuries.  Pt acknowledging his speech is slurred, unsure how long this has been going on for.     In the ED:  Initial vital signs: T: 98.3 F, HR: 100, BP: 131/74, R: 18, SpO2: 99% on RA  Labs: significant for WBC 15.94, Hgb 6.9, Plt 416, Na 153, BUN/Cr 97/7.99,   Imaging:  CT Head: Since 2019, no new acute intracranial hemorrhage, mass effect, or demarcated territorial infarct.  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 1000 milliLiter(s) (125 mL/Hr) IV Continuous <Continuous>  dextrose 5%. 1000 milliLiter(s) (100 mL/Hr) IV Continuous <Continuous>  dextrose 5%. 1000 milliLiter(s) (50 mL/Hr) IV Continuous <Continuous>  dextrose 50% Injectable 25 Gram(s) IV Push once  dextrose 50% Injectable 12.5 Gram(s) IV Push once  dextrose 50% Injectable 25 Gram(s) IV Push once  glucagon  Injectable 1 milliGRAM(s) IntraMuscular once  influenza   Vaccine 0.5 milliLiter(s) IntraMuscular once  insulin glargine Injectable (LANTUS) 10 Unit(s) SubCutaneous at bedtime  insulin lispro (ADMELOG) corrective regimen sliding scale   SubCutaneous Before meals and at bedtime    MEDICATIONS  (PRN):  bisacodyl 5 milliGRAM(s) Oral every 12 hours PRN Constipation  dextrose Oral Gel 15 Gram(s) Oral once PRN Blood Glucose LESS THAN 70 milliGRAM(s)/deciliter      Review of systems:    Constitutional: as per HPI  Eyes: No eye pain or discharge  ENMT:  No difficulty hearing; No sinus or throat pain  Neck: No pain or stiffness  Respiratory: No cough, wheezing, chills or hemoptysis  Cardiovascular: No chest pain, palpitations, shortness of breath, dyspnea on exertion  Gastrointestinal: No abdominal pain, nausea, vomiting or hematemesis; No diarrhea or constipation.   Genitourinary: No dysuria, frequency, hematuria or incontinence  Neurological: As per HPI  Skin: No rashes or lesions   Endocrine: No heat or cold intolerance; No hair loss  Musculoskeletal: No joint pain or swelling  Psychiatric: No depression, anxiety, mood swings  Heme/Lymph: No easy bruising or bleeding gums    Vital Signs Last 24 Hrs  T(C): 36.9 (17 Oct 2022 10:00), Max: 37.1 (16 Oct 2022 22:28)  T(F): 98.4 (17 Oct 2022 10:00), Max: 98.7 (16 Oct 2022 22:28)  HR: 104 (17 Oct 2022 11:35) (100 - 110)  BP: 142/88 (17 Oct 2022 11:35) (124/60 - 156/70)  BP(mean): 109 (17 Oct 2022 11:35) (85 - 109)  RR: 14 (17 Oct 2022 11:35) (12 - 18)  SpO2: 99% (17 Oct 2022 11:35) (95% - 100%)    Parameters below as of 17 Oct 2022 11:35  Patient On (Oxygen Delivery Method): room air        Examination:  General:  Appearance is consistent with chronologic age.  No abnormal facies.  Gross skin survey within normal limits.    Cognitive/Language:  The patient is oriented to person, place, was able to tell the year but not the month. Language with normal repetition, comprehension and naming.  Mild dysarthric.  He was able to count backward from 20, and do simple tasks.   Eyes: intact VA movement in Rt eye and normal Lt eye. EOMI w/o nystagmus, skew or reported double vision. Esotropia of the Rt eye. PERRL.  No ptosis/weakness of eyelid closure.    Face:  Facial sensation normal V1 - 3, no facial asymmetry.    Ears/Nose/Throat:  Hearing grossly intact b/l.  Palate elevates midline.  Tongue and uvula midline.   Motor examination: Increased tone in both legs. No tenderness, twitching, but some intentional tremors, and asterixis.  Formal Muscle Strength Testing: (MRC grade R/L) 5/4+ UE; 4+/4- LE.    Reflexes:   2+ b/l biceps, triceps, brachioradialis, absent patella and Achilles.  Plantar response downgoing Rt and equivocal Lt.    Sensory examination: Intact to light touch and pinprick in all extremities.  Cerebellum: FTN mild intentional tremor, with normal JAYCE in all limbs.  No dysmetria   Gait deferred         Labs:   CBC Full  -  ( 17 Oct 2022 09:54 )  WBC Count : 12.48 K/uL  RBC Count : 3.19 M/uL  Hemoglobin : 7.0 g/dL  Hematocrit : 24.6 %  Platelet Count - Automated : 342 K/uL  Mean Cell Volume : 77.1 fl  Mean Cell Hemoglobin : 21.9 pg  Mean Cell Hemoglobin Concentration : 28.5 gm/dL  Auto Neutrophil # : x  Auto Lymphocyte # : x  Auto Monocyte # : x  Auto Eosinophil # : x  Auto Basophil # : x  Auto Neutrophil % : x  Auto Lymphocyte % : x  Auto Monocyte % : x  Auto Eosinophil % : x  Auto Basophil % : x    10-17    154<H>  |  126<H>  |  98<H>  ----------------------------<  134<H>  4.8   |  14<L>  |  7.95<H>    Ca    8.0<L>      17 Oct 2022 05:30  Phos  6.8     10-17  Mg     2.0     10-17    TPro  6.5  /  Alb  3.6  /  TBili  0.3  /  DBili  x   /  AST  15  /  ALT  19  /  AlkPhos  79  10-17    LIVER FUNCTIONS - ( 17 Oct 2022 05:30 )  Alb: 3.6 g/dL / Pro: 6.5 g/dL / ALK PHOS: 79 U/L / ALT: 19 U/L / AST: 15 U/L / GGT: x           PT/INR - ( 16 Oct 2022 17:56 )   PT: 12.5 sec;   INR: 1.05          PTT - ( 16 Oct 2022 17:56 )  PTT:32.7 sec  Urinalysis Basic - ( 16 Oct 2022 22:30 )    Color: Yellow / Appearance: Clear / S.025 / pH: x  Gluc: x / Ketone: NEGATIVE  / Bili: Negative / Urobili: 0.2 E.U./dL   Blood: x / Protein: 100 mg/dL / Nitrite: NEGATIVE   Leuk Esterase: NEGATIVE / RBC: < 5 /HPF / WBC < 5 /HPF   Sq Epi: x / Non Sq Epi: 0-5 /HPF / Bacteria: Present /HPF          Neuroimaging:  NCHCT: CT Head No Cont:   ACC: [id number] EXAM:  CT BRAIN                          PROCEDURE DATE:  10/16/2022          INTERPRETATION:  PROCEDURE: CT head without intravenous contrast    INDICATIONS: Slurred speech.    TECHNIQUE:  Serial axial images were obtained from the skull base to the   vertex without the use of intravenous contrast. Coronal and sagittal   reformatted images were obtained.    COMPARISON EXAMINATION: Most recent CT head exam dated 2019.    FINDINGS:  There is mild parenchymal volume loss as manifested by enlargement of the  ventricles, cisternal spaces, and cortical sulci throughout, similar to  the prior exam.    There is no acute intracranial hemorrhage. There is no mass effect,   midline  shift, or extra axial collection.    The gray white differentiation appears preserved without evidence of an  acute transcortical infarction. There is moderate patchy and confluent   periventricular and subcortical white matter diminished attenuation,   likely the sequela of small vessel ischemic disease. Chronic lacunar   infarctions are again seen within the left caudate/anterior limb of the   internal capsule. There is associated ex vacuo dilatation of the frontal   horn of the left lateral ventricle. 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    The bony windows demonstrates no calvarial fracture. The visualized  paranasal sinuses are clear. Mastoid air cells are well aerated. Status   post rightocular lens replacement    IMPRESSION: Since 2019, no new acute intracranial hemorrhage, mass   effect, or demarcated territorial infarct.    --- End of Report ---          HALEY EVERETT MD; Resident Radiologist  This document has been electronically signed.  JASPER BOOTH MD; Attending Radiologist  This document has been electronically signed. Oct 17 2022  2:20AM (10-16-22 @ 20:00)      10-17-22 @ 14:02          Thank you for sharing this patient with me; please do not hesitate to contact me in case of any question.

## 2022-10-17 NOTE — H&P ADULT - ASSESSMENT
Patient is a 57y old Male w/ PMHx of IDDM, CVA x 2 (residual left side weakness), HTN, HLD, and CKD presents c/o slurred speech, weakness over the past several days. Admitted initially to [facility] overnight, found to be persistently tachycardic in ED and with BUN/Cr 91/7.42. ICU consulted for need for closer monitoring and possible dialysis, now admitted to telemetry.

## 2022-10-17 NOTE — H&P ADULT - HISTORY OF PRESENT ILLNESS
Patient is a 57y old Male w/ PMHx of IDDM, CVA x 2 (residual left side weakness), HTN, HLD, CKD presents c/o slurred speech, weakness over the past several days. Admitted initially to [facility] overnight, found to be persistently tachycardic in ED and with BUN/Cr 91/7.42. ICU consulted for need for closer monitoring and possible dialysis, now admitted to telemetry.  Pt is unsure how long his symptoms have been going on for, is a poor historian.  Pt's cousin accompanied pt to ED stating the patient called him 3 days prior to presentation. His speech sounded slurred but cousin also reports they do not speak frequently and he has not seen him in years.  Pt's cousin couldn't go to him on Thursday, went Saturday (yesterday) and found pt on the floor of the apartment, pt was soiled in his own feces and urine, was unsure how he ended up on the floor.  Pt was helped into a bed, the cousin did some grocery shopping for him, reports he advised him to go to the hospital and pt declined.  Pt's cousin left him alone on Saturday and returned today and found the patient on the floor again, and again had soiled himself and was unsure how he got to the floor.  Pt was brought to the ED for evaluation.  Pt stating he doesn't think he fell, has no pain or injuries.  Pt acknowledging his speech is slurred, unsure how long this has been going on for.     In the ED:  Initial vital signs: T: XX F, HR: XX, BP: XX, R: XX, SpO2: XX% on RA  Labs: significant for  Imaging:  CXR:   EKG:   Medications:   Consults: none    Patient is a 57y old Male w/ PMHx of IDDM, CVA x 2 (residual left side weakness), HTN, HLD, CKD presents c/o slurred speech, weakness over the past several days. Admitted initially to [facility] overnight, found to be persistently tachycardic in ED and with BUN/Cr 91/7.42. ICU consulted for need for closer monitoring and possible dialysis, now admitted to telemetry.  Pt is unsure how long his symptoms have been going on for, is a poor historian.  Pt's cousin accompanied pt to ED stating the patient called him 3 days prior to presentation. His speech sounded slurred but cousin also reports they do not speak frequently and he has not seen him in years. Pt's cousin says he is supposed to have 24/7 aid but found out the patient has only been getting 3 days a week of aid. Pt's cousin couldn't go to him on Thursday, went Saturday (yesterday) and found pt on the floor of the apartment, pt was soiled in his own feces and urine, was unsure how he ended up on the floor. Pt was helped into a bed, the cousin did some grocery shopping for him, reports he advised him to go to the hospital and pt declined.  Pt's cousin left him alone on Saturday and returned today and found the patient on the floor again, and again had soiled himself and was unsure how he got to the floor. Pt was brought to the ED for evaluation.  Pt stating he doesn't think he fell, has no pain or injuries.  Pt acknowledging his speech is slurred, unsure how long this has been going on for.     In the ED:  Initial vital signs: T: 98.3 F, HR: 100, BP: 131/74, R: 18, SpO2: 99% on RA  Labs: significant for WBC 15.94, Hgb 6.9, Plt 416, Na 153, BUN/Cr 97/7.99,   Imaging:  CT Head: Since 2/12/2019, no new acute intracranial hemorrhage, mass effect, or demarcated territorial infarct.  EKG: NSR  Medications: 2L NS  Consults: ICU

## 2022-10-17 NOTE — H&P ADULT - NSICDXPASTSURGICALHX_GEN_ALL_CORE_FT
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

## 2022-10-17 NOTE — PROGRESS NOTE ADULT - PROBLEM SELECTOR PLAN 6
Hx of HTN. Per surescripts no recent HTN meds. Per HIE in 2020 was taking metoprolol ER 50 and Nifedipine ER 30 in 2020.     - monitor BPs  - consider starting BP meds if becomes hypertensive Hx of insulin-dependent diabetes mellitus. Per surescritessa takes Lantus 15 qhs and patient says he has been taking this.    - start Lantus 10units qhs  - start mISS Hx of insulin-dependent diabetes mellitus. Per surescripts: Lantus 15 qhs  A1c 7.2% (10/17/22)    - c/w Lantus 10units qhs & mISS, adjust as necessary

## 2022-10-17 NOTE — H&P ADULT - NSHPPHYSICALEXAM_GEN_ALL_CORE
VITAL SIGNS:  T(C): 36.8 (10-17-22 @ 02:25), Max: 37.1 (10-16-22 @ 22:28)  T(F): 98.2 (10-17-22 @ 02:25), Max: 98.7 (10-16-22 @ 22:28)  HR: 108 (10-17-22 @ 02:17) (100 - 108)  BP: 129/60 (10-17-22 @ 02:17) (129/60 - 156/70)  BP(mean): 87 (10-17-22 @ 02:17) (87 - 87)  RR: 18 (10-17-22 @ 02:17) (17 - 18)  SpO2: 95% (10-17-22 @ 02:17) (95% - 100%)  Wt(kg): --    PHYSICAL EXAM:  Constitutional: NAD resting comfortably in bed;   Head: NC/AT  Eyes: PERRL, EOMI, anicteric sclera  ENT: no nasal discharge; uvula midline, no oropharyngeal erythema or exudates; MMM  Neck: supple; no JVD or thyromegaly  Respiratory: CTA B/L;   Cardiac: +S1/S2; RRR; no M/R/G; PMI non-displaced  Gastrointestinal: abdomen soft, NT/ND; no rebound or guarding  Extremities: Left foot w/ last two toes amputated; no clubbing or cyanosis; no peripheral edema  Musculoskeletal: NROM x4; no joint swelling, tenderness or erythema  Vascular: 2+ radial, DP pulses B/L  Dermatologic: severely dry skin in bilateral lower extremities;  Neurologic: AAOx3; CNII-XII grossly intact; no focal deficits  Psychiatric: affect and characteristics of appearance, verbalizations, behaviors are appropriate VITAL SIGNS:  T(C): 36.8 (10-17-22 @ 02:25), Max: 37.1 (10-16-22 @ 22:28)  T(F): 98.2 (10-17-22 @ 02:25), Max: 98.7 (10-16-22 @ 22:28)  HR: 108 (10-17-22 @ 02:17) (100 - 108)  BP: 129/60 (10-17-22 @ 02:17) (129/60 - 156/70)  BP(mean): 87 (10-17-22 @ 02:17) (87 - 87)  RR: 18 (10-17-22 @ 02:17) (17 - 18)  SpO2: 95% (10-17-22 @ 02:17) (95% - 100%)  Wt(kg): --    PHYSICAL EXAM:  Constitutional: NAD resting comfortably in bed;   Head: NC/AT  Eyes: PERRL, EOMI, anicteric sclera  ENT: no nasal discharge; dry mucous membranes;  Neck: supple; no JVD or thyromegaly  Respiratory: CTA B/L;   Cardiac: +S1/S2; RRR; no M/R/G;   Gastrointestinal: abdomen soft, NT/ND; no rebound or guarding  Extremities: Left foot w/ last two toes amputated, ; no cyanosis; no peripheral edema  Musculoskeletal: NROM x4; no joint swelling, tenderness or erythema  Vascular: 2+ radial, DP pulses B/L  Dermatologic: severely dry skin in bilateral lower extremities, b/l thickened toenails w/ brown discoloration;  Neurologic: AAOx3; CNII-XII grossly intact; no focal deficits  Psychiatric: affect and characteristics of appearance, verbalizations, behaviors are appropriate VITAL SIGNS:  T(C): 36.8 (10-17-22 @ 02:25), Max: 37.1 (10-16-22 @ 22:28)  T(F): 98.2 (10-17-22 @ 02:25), Max: 98.7 (10-16-22 @ 22:28)  HR: 108 (10-17-22 @ 02:17) (100 - 108)  BP: 129/60 (10-17-22 @ 02:17) (129/60 - 156/70)  BP(mean): 87 (10-17-22 @ 02:17) (87 - 87)  RR: 18 (10-17-22 @ 02:17) (17 - 18)  SpO2: 95% (10-17-22 @ 02:17) (95% - 100%)  Wt(kg): --      PHYSICAL EXAM:  General: NAD, elderly male resting in bed, poor hygiene, speaking in full sentences with slurred speech  HEENT: NC/AT; PERRL, clear conjunctiva, dry mucous membranes  Neck: supple  Respiratory: CTA b/l  Cardiovascular: +S1/S2; RRR  Abdomen: soft, NT/ND; +BS x4  Extremities: 2+ peripheral pulses b/l; Left foot w/ last two toes amputated, ; no cyanosis; no peripheral edema  Skin: abrasions on lower right back, severely dry skin in bilateral lower extremities, b/l thickened toenails w/ brown discoloration;  Neurological: AAO x 3 but does not remember recent events very well. left sided weakness L>R in upper and lower extremities (not new finding)

## 2022-10-17 NOTE — SWALLOW BEDSIDE ASSESSMENT ADULT - NS SPL SWALLOW CLINIC TRIAL FT
Poor self-monitoring of bolus size/drinking rate d/t impulsivity/distractibility. Verbal/tactile prompts provided to assist with monitoring. Mastication of solids was mildly prolonged but function with adequate clearance. Laryngeal movement palpated. No clinical overt s/s of aspiration appreciated.

## 2022-10-17 NOTE — PATIENT PROFILE ADULT - 
ADDITIONAL INFORMATION
What Type Of Note Output Would You Prefer (Optional)?: Standard Output Hpi Title: Evaluation of Skin Lesions How Severe Are Your Spot(S)?: mild Have Your Spot(S) Been Treated In The Past?: has not been treated Pt got two doses of vaccine. Cannot remember brand or time of either. States they were last year. Dates estimated for the purpose of this document.

## 2022-10-17 NOTE — PROGRESS NOTE ADULT - PROBLEM SELECTOR PLAN 8
F: s/p 2LNS in ED  E: replete as necessary  N: pending dysphagia screening  DVT Prophy: SCDs iso anemia  Dispo: [facility] Hx of HLD. Home med: atorvastatin 20mg    - start atorvastatin 80 iso hx of CVAx2 and presenting w/ slurred speech

## 2022-10-17 NOTE — CONSULT NOTE ADULT - ATTENDING COMMENTS
I agree with the fellow's findings and plans as written above with the following additions/amendments:    Seen and examined at bedside. AMS and ALEXIS likely 2/2 hypovolemia, hypernatremia. Will need correction of this first to be able to assess possible uremia, and then will need detailed discussion with family for HD. Further recs and workup as above. Discussed with primary team

## 2022-10-17 NOTE — CONSULT NOTE ADULT - ASSESSMENT
This 57y old with complicated PMH referred to neurology for AMS and slurred speech. As per the patient, he did not have LOC; however, the event was not witnessed, and seizure could not be r/o. Although he states that the slurred speech is not new, with his current general worsening and his PMH of stroke, a new stroke need to be r/o. Most likely explanation of his situation is toxic/metabolic in light of all the derangements he has    Recommendations:  - Ativan 2mg IV for GTCs > 2 min only  - Neurological assessment Q8hrs  - Seizure & Fall precautions  - Maintain Mg> 2 mmol/l  - vEEG  - MR brain wo   - Metabolic/toxic management as per primary team  This 57y old with complicated PMH referred to neurology for AMS and slurred speech. As per the patient, he did not have LOC; however, the event was not witnessed, and seizure could not be r/o. Although he states that the slurred speech is not new, with his current general worsening and his PMH of stroke, a new stroke need to be r/o. Most likely explanation of his situation is toxic/metabolic in light of worsening kidney function, hypernatremia    Recommendations:  - Ativan 2mg IV for GTCs > 2 min only  - Neurological assessment Q8hrs  - Seizure & Fall precautions  - Maintain Mg> 2 mmol/l  - vEEG  - MR brain wo   - Metabolic/toxic management as per primary team

## 2022-10-17 NOTE — H&P ADULT - PROBLEM SELECTOR PLAN 7
Hx of HLD. Home med: atorvastatin 20mg    - start atorva 80 iso hx of CVAx2 and presenting w/ slurred speech Hx of HLD. Home med: atorvastatin 20mg    - start atorvastatin 80 iso hx of CVAx2 and presenting w/ slurred speech

## 2022-10-17 NOTE — H&P ADULT - ATTENDING COMMENTS
seen and d/w [facility] team  57 m, h/o DM, CKD, CVA w weakness, inability to complete ADL, tachtcardia, anemia and worsening renal failure.  Pt w/o regular f/u only takes insulin,  .Clinically dehydrated, labs reviewed. CT w/o acute changes.  Plan  tfuse  nephrology consult  follow Na, liberalize PO intake, consider IV if needed  NAG acidosis noted--will likely need Bicarb replacement  Repeat WBC.  If persistent leukocytosis, or fever culture

## 2022-10-17 NOTE — CONSULT NOTE ADULT - ATTENDING COMMENTS
Patient seen on 10/18/22  Admitted with AMS, found to have ALEXIS on CKD, WBC elevated but afebrile, ESR also elevated. Slurred speech and left sided weakness since prior stroke.   On our exam he was awake, alert, oriented x 3 (was off by one day), with dysarthria and left sided weakness mostly in the hand and LE  Differential includes toxic-metabolic encephalopathy, seizure, stroke  EEG thus far unremarkable - generalized slowing, no seizures or epileptiform discharges  since that correlates with the patient’s symptoms   MRI brain with a few small acute to subacute infarcts, likely embolic; however not likely to be cause of AMS given small size. Stroke team to see patient going forward

## 2022-10-17 NOTE — CONSULT NOTE ADULT - ASSESSMENT
58 yo M w/ longstanding IDDM1 w retinopathy, HTN, CKD 4 presented w/ slurred speech, generalized weakness for several days. Nephrology consulted for AMS in the setting of advanced CKD    Assessment/Plan:  #Elevated Cr-ALEXIS vs progression of CKD  Last known Cr 3.3 in Jun/2020 which was progression of his CKD. Serologic GN workup in 2019 negative  Now presenting w/ Cr 7.99 on admission  UA w/ trace protein and moderate blood in the absence of RBCs  Non-oliguric; 400 ml of UO overnight  UPCR 1.9, Eleanor 56  Etiology unclear at this time. Component of Pre-renal disease likely given pt was found down and has been hypernatremic. iATN unlikely given no documented episodes of hypotension. Component of post-renal as well as large PVR on POCUS. Could also be progression of underlying CKD    Recommend:  No indication of urgent HD at this time  Maintain net even to slightly positive fluid balance  Will recommend Borja catheter  Strict I&Os  Avoid nephrotoxic agents  Renal diet    #Hypernatremia  Due to dehydration and no access to water  Agree with D5w at 125cc/hr  Daily BMP    #Anemia  Hb 6.9  Recommend getting iron studies    Thank you for the opportunity to participate in the care of your patient. The nephrology service remains available to assist with any questions or concerns. Please feel free to reach us by paging the on-call nephrology fellow for urgent issues or as below.     Alberto Cordova M.D.  PGY 4 - Nephrology Fellow  [phone]   58 yo M w/ longstanding IDDM1 w retinopathy, HTN, CKD 4 presented w/ slurred speech, generalized weakness for several days. Nephrology consulted for AMS in the setting of advanced CKD    Assessment/Plan:  #Elevated Cr-ALEXIS vs progression of CKD  Last known Cr 3.3 in Jun/2020 which was progression of his CKD. Serologic GN workup in 2019 negative  Now presenting w/ Cr 7.99 on admission  UA w/ trace protein and moderate blood in the absence of RBCs  Non-oliguric; 400 ml of UO overnight  UPCR 1.9, Eleanor 56  Etiology unclear at this time. Component of Pre-renal disease likely given pt was found down and has been hypernatremic. iATN unlikely given no documented episodes of hypotension. Component of post-renal as well as large PVR on POCUS. Could also be progression of underlying CKD    Recommend:  No indication of urgent HD at this time  Maintain net positive fluid balance with D5W  Will recommend Borja catheter  Strict I&Os  Avoid nephrotoxic agents  Renal diet    #Hypernatremia  Due to dehydration and no access to water  Agree with D5w at 125cc/hr  Daily BMP    #Anemia  Hb 6.9  Recommend getting iron studies    AMS - will first correct hypernatremia and see if ALEXIS and AMS improves. Will need in detail discussion with family to assess GOC regarding long term HD    Thank you for the opportunity to participate in the care of your patient. The nephrology service remains available to assist with any questions or concerns. Please feel free to reach us by paging the on-call nephrology fellow for urgent issues or as below.     Alberto Cordova M.D.  PGY 4 - Nephrology Fellow  [phone]

## 2022-10-17 NOTE — PROGRESS NOTE ADULT - PROBLEM SELECTOR PLAN 9
F: s/p 2LNS in ED  E: replete as necessary  N: pending dysphagia screening  DVT Prophy: SCDs iso anemia  Dispo: [facility] F: D5 125cc/hr   E: replete as necessary  N: failed dysphagia screening  DVT Prophy: SCDs iso anemia  Dispo: [facility]

## 2022-10-17 NOTE — PROGRESS NOTE ADULT - PROBLEM SELECTOR PLAN 4
Pt w/ Hx of CKD stage 4, GFR 15-29 ml/min Prior baseline per HIE Cr 2.9, eGFR 27 (05/2019). Now presenting w/ BUN/ Cr 97/ 7.99.    - nephro consult  - trend creatinine  - avoid nephrotoxic meds  - strict I and Os Pt w/ hemoglobin of 6.9. Baseline hemoglobin 12 (2/2019). Receiving 1upRBC for anemia. No apparent BRBPR or melena, and no other sources of bleeding.    - f/u post-transfusion CBC  - trend CBC  - transfuse for Hgb <7  - active Type and Screen  - defer iron studies at this time, will need outpatient Basline hemoglobin 12 (Feb 2019), presented w Hb 6.9 ---> s/p 3u pRBC total (1 in ED, 2 on [facility])   - No apparent BRBPR or melena, and no other sources of bleeding. May be also 2/2 renal dysfn.   - , Haptoglobin 386, Reticulocute 34.5 **   ----->  added on to 10/17 AM labs, after 1u pRBC, may be affected. Could not add on to admission labs (hemolyzed)   Plan:  - will f/u iron panel   - f/u FOBT  - f/u post-transfusion CBC  - transfuse for Hgb <7, maintain active T&S

## 2022-10-17 NOTE — H&P ADULT - NSHPLABSRESULTS_GEN_ALL_CORE
LABS:                         6.9    15.94 )-----------( 416      ( 16 Oct 2022 17:56 )             23.8     10-16    153<H>  |  123<H>  |  91<H>  ----------------------------<  120<H>  4.9   |  14<L>  |  7.42<H>    Ca    7.9<L>      16 Oct 2022 22:30    TPro  6.8  /  Alb  3.2<L>  /  TBili  <0.2  /  DBili  x   /  AST  19  /  ALT  21  /  AlkPhos  95  10-16    PT/INR - ( 16 Oct 2022 17:56 )   PT: 12.5 sec;   INR: 1.05          PTT - ( 16 Oct 2022 17:56 )  PTT:32.7 sec  Urinalysis Basic - ( 16 Oct 2022 22:30 )    Color: Yellow / Appearance: Clear / S.025 / pH: x  Gluc: x / Ketone: NEGATIVE  / Bili: Negative / Urobili: 0.2 E.U./dL   Blood: x / Protein: 100 mg/dL / Nitrite: NEGATIVE   Leuk Esterase: NEGATIVE / RBC: < 5 /HPF / WBC < 5 /HPF   Sq Epi: x / Non Sq Epi: 0-5 /HPF / Bacteria: Present /HPF        Lactate, Blood: 0.6 mmol/L (10-17 @ 00:50)      RADIOLOGY, EKG & ADDITIONAL TESTS: Reviewed.

## 2022-10-17 NOTE — PROGRESS NOTE ADULT - PROBLEM SELECTOR PLAN 1
Hx of CVA x2. Now presenting w/ slurred speech and generalized weakness, per cousin unsure if this has been his baseline as they haven't spoken in years. Per Neuro HIE- 2/2019 acute infarct in left corona radiata and left macrina seen on MRI Brain. Referred to EP for possible placement of loop recorder since had two separate areas of stroke in location and times, but lost to follow-up. CT Head: Since 2/12/2019, no new acute intracranial hemorrhage, mass effect, or demarcated territorial infarct.    - start Atorvastatin 80  - start Aspirin 81 after hemoglobin stable  - MRI Brain  - consider neuro consult - On admission, , WBC 15.94 (2/4 SIRS)   - likely reactive iso fall, dehydration (found down for unknown amount of time)   - no evidence of infectious etiology - On admission, , WBC 15.94 (2/4 SIRS)   - likely reactive iso fall, dehydration (found down for unknown amount of time)   - no evidence of infectious etiology, will ctm

## 2022-10-17 NOTE — CONSULT NOTE ADULT - SUBJECTIVE AND OBJECTIVE BOX
HPI:  Patient is a 57y old Male w/ PMHx of IDDM, CVA x 2 (residual left side weakness), HTN, HLD, CKD presents c/o slurred speech, weakness over the past several days. Admitted initially to [facility] overnight, found to be persistently tachycardic in ED and with BUN/Cr 91/7.42. ICU consulted for need for closer monitoring and possible dialysis, now admitted to telemetry.  Pt is unsure how long his symptoms have been going on for, is a poor historian.  Pt's cousin accompanied pt to ED stating the patient called him 3 days prior to presentation. His speech sounded slurred but cousin also reports they do not speak frequently and he has not seen him in years. Pt's cousin says he is supposed to have 24/7 aid but found out the patient has only been getting 3 days a week of aid. Pt's cousin couldn't go to him on Thursday, went Saturday (yesterday) and found pt on the floor of the apartment, pt was soiled in his own feces and urine, was unsure how he ended up on the floor. Pt was helped into a bed, the cousin did some grocery shopping for him, reports he advised him to go to the hospital and pt declined.  Pt's cousin left him alone on Saturday and returned today and found the patient on the floor again, and again had soiled himself and was unsure how he got to the floor. Pt was brought to the ED for evaluation.  Pt stating he doesn't think he fell, has no pain or injuries.  Pt acknowledging his speech is slurred, unsure how long this has been going on for.     In the ED:  Initial vital signs: T: 98.3 F, HR: 100, BP: 131/74, R: 18, SpO2: 99% on RA  Labs: significant for WBC 15.94, Hgb 6.9, Plt 416, Na 153, BUN/Cr 97/7.99,   Imaging:  CT Head: Since 2019, no new acute intracranial hemorrhage, mass effect, or demarcated territorial infarct.  EKG: NSR  Medications: 2L NS  Consults: ICU   (17 Oct 2022 02:10)      PAST MEDICAL & SURGICAL HISTORY:  Type 2 diabetes mellitus  Diabetes mellitus      Cerebral artery occlusion with cerebral infarction  Cerebral vascular accident      Hyperlipidemia  Hyperlipidemia      Hypertension      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, traumatic, left, sequela            Allergies:  No Known Allergies      Home Medications:   Lantus 100 units/mL subcutaneous solution: 15 unit(s) subcutaneous once a day (at bedtime)      Hospital Medications:   MEDICATIONS  (STANDING):  atorvastatin 80 milliGRAM(s) Oral at bedtime  dextrose 5%. 1000 milliLiter(s) (125 mL/Hr) IV Continuous <Continuous>  dextrose 5%. 1000 milliLiter(s) (100 mL/Hr) IV Continuous <Continuous>  dextrose 5%. 1000 milliLiter(s) (50 mL/Hr) IV Continuous <Continuous>  dextrose 50% Injectable 25 Gram(s) IV Push once  dextrose 50% Injectable 12.5 Gram(s) IV Push once  dextrose 50% Injectable 25 Gram(s) IV Push once  glucagon  Injectable 1 milliGRAM(s) IntraMuscular once  influenza   Vaccine 0.5 milliLiter(s) IntraMuscular once  insulin glargine Injectable (LANTUS) 10 Unit(s) SubCutaneous at bedtime  insulin lispro (ADMELOG) corrective regimen sliding scale   SubCutaneous Before meals and at bedtime      SOCIAL HISTORY:  Denies ETOh, Smoking    Family History:  FAMILY HISTORY:  Family history of diabetes mellitus (Mother)  Family history of diabetes mellitus (DM)          VITALS:  T(F): 98 (10-17-22 @ 14:00), Max: 98.7 (10-16-22 @ 22:28)  HR: 104 (10-17-22 @ 12:52)  BP: 147/71 (10-17-22 @ 12:52)  RR: 16 (10-17-22 @ 12:52)  SpO2: 98% (10-17-22 @ 12:52)  Wt(kg): --    10-16 @ 07:01  -  10-17 @ 07:00  --------------------------------------------------------  IN: 301 mL / OUT: 400 mL / NET: -99 mL    10-17 @ 07:01  -  10-17 @ 14:57  --------------------------------------------------------  IN: 912.5 mL / OUT: 480 mL / NET: 432.5 mL      Height (cm): 180.3 (10-16 @ 16:59)  Weight (kg): 90.7 (10-16 @ 16:59)  BMI (kg/m2): 27.9 (10-16 @ 16:59)  BSA (m2): 2.11 (10-16 @ 16:59)  CAPILLARY BLOOD GLUCOSE      POCT Blood Glucose.: 133 mg/dL (17 Oct 2022 11:29)  POCT Blood Glucose.: 131 mg/dL (17 Oct 2022 06:17)      Review of Systems:  Unable to obtain due to pt's mental status    PHYSICAL EXAM:  GENERAL: Drowsy, not interactive, only answers   HEENT: ADRIANO, no JVP  CHEST/LUNG: Bilateral clear breath sounds, no accessory muscle use  HEART: Regular rate and rhythm, no murmur  ABDOMEN: Soft, nontender, non distended  : Condom cath in place, urinary leakage around condom cath  EXTREMITIES: no pedal edema, warm  Neurology: Drowsy, not interactive, unable to assess neurological status    LABS:  10-17    154<H>  |  126<H>  |  98<H>  ----------------------------<  134<H>  4.8   |  14<L>  |  7.95<H>    Ca    8.0<L>      17 Oct 2022 05:30  Phos  6.8     10-17  Mg     2.0     10-17    TPro  6.5  /  Alb  3.6  /  TBili  0.3  /  DBili      /  AST  15  /  ALT  19  /  AlkPhos  79  10-17    Creatinine Trend: 7.95 <--, 7.42 <--, 7.99 <--                        7.0    12.48 )-----------( 342      ( 17 Oct 2022 09:54 )             24.6     Urine Studies:  Urinalysis Basic - ( 16 Oct 2022 22:30 )    Color: Yellow / Appearance: Clear / S.025 / pH:   Gluc:  / Ketone: NEGATIVE  / Bili: Negative / Urobili: 0.2 E.U./dL   Blood:  / Protein: 100 mg/dL / Nitrite: NEGATIVE   Leuk Esterase: NEGATIVE / RBC: < 5 /HPF / WBC < 5 /HPF   Sq Epi:  / Non Sq Epi: 0-5 /HPF / Bacteria: Present /HPF      Sodium, Random Urine: 56 mmol/L (10-17 @ 11:01)  Creatinine, Random Urine: 94 mg/dL (10-17 @ 11:01)  Protein/Creatinine Ratio Calculation: 1.9 Ratio (10-17 @ 11:)  Osmolality, Random Urine: 387 mosm/kg (10-17 @ 11:01)  Potassium, Random Urine: 19 mmol/L (10-17 @ 11:01)                 HPI:  Patient is a 56 yo M with IDDM with retinopathy, CVA x 2 residual left sided weakness, HTN, HLD, CKD 4 presented with slurred speech generalized weakness found to have ALEXIS on CKD and AMS, found to have hypernatremia. Patient unable to give further history, limited by AMS. Currently on D5W for hyperntremia and undergoing neuro workup      PAST MEDICAL & SURGICAL HISTORY:  Type 2 diabetes mellitus  Diabetes mellitus      Cerebral artery occlusion with cerebral infarction  Cerebral vascular accident      Hyperlipidemia  Hyperlipidemia      Hypertension      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, traumatic, left, sequela            Allergies:  No Known Allergies      Home Medications:   Lantus 100 units/mL subcutaneous solution: 15 unit(s) subcutaneous once a day (at bedtime)      Hospital Medications:   MEDICATIONS  (STANDING):  atorvastatin 80 milliGRAM(s) Oral at bedtime  dextrose 5%. 1000 milliLiter(s) (125 mL/Hr) IV Continuous <Continuous>  dextrose 5%. 1000 milliLiter(s) (100 mL/Hr) IV Continuous <Continuous>  dextrose 5%. 1000 milliLiter(s) (50 mL/Hr) IV Continuous <Continuous>  dextrose 50% Injectable 25 Gram(s) IV Push once  dextrose 50% Injectable 12.5 Gram(s) IV Push once  dextrose 50% Injectable 25 Gram(s) IV Push once  glucagon  Injectable 1 milliGRAM(s) IntraMuscular once  influenza   Vaccine 0.5 milliLiter(s) IntraMuscular once  insulin glargine Injectable (LANTUS) 10 Unit(s) SubCutaneous at bedtime  insulin lispro (ADMELOG) corrective regimen sliding scale   SubCutaneous Before meals and at bedtime      SOCIAL HISTORY:  Denies ETOh, Smoking    Family History:  FAMILY HISTORY:  Family history of diabetes mellitus (Mother)  Family history of diabetes mellitus (DM)          VITALS:  T(F): 98 (10-17-22 @ 14:00), Max: 98.7 (10-16-22 @ 22:28)  HR: 104 (10-17-22 @ 12:52)  BP: 147/71 (10-17-22 @ 12:52)  RR: 16 (10-17-22 @ 12:52)  SpO2: 98% (10-17-22 @ 12:52)  Wt(kg): --    10-16 @ 07:01  -  10-17 @ 07:00  --------------------------------------------------------  IN: 301 mL / OUT: 400 mL / NET: -99 mL    10-17 @ 07:01  -  10-17 @ 14:57  --------------------------------------------------------  IN: 912.5 mL / OUT: 480 mL / NET: 432.5 mL      Height (cm): 180.3 (10-16 @ 16:59)  Weight (kg): 90.7 (10-16 @ 16:59)  BMI (kg/m2): 27.9 (10-16 @ 16:59)  BSA (m2): 2.11 (10-16 @ 16:59)  CAPILLARY BLOOD GLUCOSE      POCT Blood Glucose.: 133 mg/dL (17 Oct 2022 11:29)  POCT Blood Glucose.: 131 mg/dL (17 Oct 2022 06:17)      Review of Systems:  Unable to obtain due to pt's mental status    PHYSICAL EXAM:  GENERAL: Drowsy, not interactive, only answers   HEENT: ADRIANO, no JVP  CHEST/LUNG: Bilateral clear breath sounds, no accessory muscle use  HEART: Regular rate and rhythm, no murmur  ABDOMEN: Soft, nontender, non distended  : Condom cath in place, urinary leakage around condom cath  EXTREMITIES: no pedal edema, warm  Neurology: Drowsy, not interactive, unable to assess neurological status    LABS:  10-17    154<H>  |  126<H>  |  98<H>  ----------------------------<  134<H>  4.8   |  14<L>  |  7.95<H>    Ca    8.0<L>      17 Oct 2022 05:30  Phos  6.8     10-17  Mg     2.0     10-17    TPro  6.5  /  Alb  3.6  /  TBili  0.3  /  DBili      /  AST  15  /  ALT  19  /  AlkPhos  79  10-17    Creatinine Trend: 7.95 <--, 7.42 <--, 7.99 <--                        7.0    12.48 )-----------( 342      ( 17 Oct 2022 09:54 )             24.6     Urine Studies:  Urinalysis Basic - ( 16 Oct 2022 22:30 )    Color: Yellow / Appearance: Clear / S.025 / pH:   Gluc:  / Ketone: NEGATIVE  / Bili: Negative / Urobili: 0.2 E.U./dL   Blood:  / Protein: 100 mg/dL / Nitrite: NEGATIVE   Leuk Esterase: NEGATIVE / RBC: < 5 /HPF / WBC < 5 /HPF   Sq Epi:  / Non Sq Epi: 0-5 /HPF / Bacteria: Present /HPF      Sodium, Random Urine: 56 mmol/L (10-17 @ 11:01)  Creatinine, Random Urine: 94 mg/dL (10-17 @ 11:01)  Protein/Creatinine Ratio Calculation: 1.9 Ratio (10-17 @ 11:01)  Osmolality, Random Urine: 387 mosm/kg (10-17 @ 11:01)  Potassium, Random Urine: 19 mmol/L (10-17 @ 11:01)        Creatinine, Serum: 7.95 mg/dL (10-17-22 @ 05:30)  Creatinine, Serum: 7.42 mg/dL (10-16-22 @ 22:30)  Creatinine, Serum: 7.99 mg/dL (10-16-22 @ 17:56)  Creatinine, Serum: 3.38 mg/dL (19 @ 06:08)  Creatinine, Serum: 3.21 mg/dL (19 @ 06:22)  Creatinine, Serum: 3.31 mg/dL (19 @ 06:39)  Creatinine, Serum: 3.19 mg/dL (19 @ 08:39)  Creatinine, Serum: 3.42 mg/dL (19 @ 07:13)  Creatinine, Serum: 3.18 mg/dL (02-15-19 @ 07:00)  Creatinine, Serum: 3.16 mg/dL (19 @ 07:26)

## 2022-10-18 LAB
ALBUMIN SERPL ELPH-MCNC: 3.2 G/DL — LOW (ref 3.3–5)
ALP SERPL-CCNC: 82 U/L — SIGNIFICANT CHANGE UP (ref 40–120)
ALT FLD-CCNC: 16 U/L — SIGNIFICANT CHANGE UP (ref 10–45)
ANION GAP SERPL CALC-SCNC: 13 MMOL/L — SIGNIFICANT CHANGE UP (ref 5–17)
AST SERPL-CCNC: 12 U/L — SIGNIFICANT CHANGE UP (ref 10–40)
BASOPHILS # BLD AUTO: 0.02 K/UL — SIGNIFICANT CHANGE UP (ref 0–0.2)
BASOPHILS NFR BLD AUTO: 0.2 % — SIGNIFICANT CHANGE UP (ref 0–2)
BILIRUB SERPL-MCNC: 0.3 MG/DL — SIGNIFICANT CHANGE UP (ref 0.2–1.2)
BLD GP AB SCN SERPL QL: NEGATIVE — SIGNIFICANT CHANGE UP
BUN SERPL-MCNC: 90 MG/DL — HIGH (ref 7–23)
CALCIUM SERPL-MCNC: 7.9 MG/DL — LOW (ref 8.4–10.5)
CHLORIDE SERPL-SCNC: 120 MMOL/L — HIGH (ref 96–108)
CO2 SERPL-SCNC: 14 MMOL/L — LOW (ref 22–31)
CREAT SERPL-MCNC: 7.88 MG/DL — HIGH (ref 0.5–1.3)
CRP SERPL-MCNC: 58 MG/L — HIGH (ref 0–4)
EGFR: 7 ML/MIN/1.73M2 — LOW
EOSINOPHIL # BLD AUTO: 0.2 K/UL — SIGNIFICANT CHANGE UP (ref 0–0.5)
EOSINOPHIL NFR BLD AUTO: 1.9 % — SIGNIFICANT CHANGE UP (ref 0–6)
ERYTHROCYTE [SEDIMENTATION RATE] IN BLOOD: 85 MM/HR — HIGH
GLUCOSE BLDC GLUCOMTR-MCNC: 112 MG/DL — HIGH (ref 70–99)
GLUCOSE BLDC GLUCOMTR-MCNC: 113 MG/DL — HIGH (ref 70–99)
GLUCOSE BLDC GLUCOMTR-MCNC: 196 MG/DL — HIGH (ref 70–99)
GLUCOSE SERPL-MCNC: 128 MG/DL — HIGH (ref 70–99)
HCT VFR BLD CALC: 26.9 % — LOW (ref 39–50)
HGB BLD-MCNC: 8.4 G/DL — LOW (ref 13–17)
IMM GRANULOCYTES NFR BLD AUTO: 0.5 % — SIGNIFICANT CHANGE UP (ref 0–0.9)
LYMPHOCYTES # BLD AUTO: 1.19 K/UL — SIGNIFICANT CHANGE UP (ref 1–3.3)
LYMPHOCYTES # BLD AUTO: 11.2 % — LOW (ref 13–44)
MAGNESIUM SERPL-MCNC: 1.8 MG/DL — SIGNIFICANT CHANGE UP (ref 1.6–2.6)
MCHC RBC-ENTMCNC: 24.5 PG — LOW (ref 27–34)
MCHC RBC-ENTMCNC: 31.2 GM/DL — LOW (ref 32–36)
MCV RBC AUTO: 78.4 FL — LOW (ref 80–100)
MONOCYTES # BLD AUTO: 0.8 K/UL — SIGNIFICANT CHANGE UP (ref 0–0.9)
MONOCYTES NFR BLD AUTO: 7.5 % — SIGNIFICANT CHANGE UP (ref 2–14)
NEUTROPHILS # BLD AUTO: 8.38 K/UL — HIGH (ref 1.8–7.4)
NEUTROPHILS NFR BLD AUTO: 78.7 % — HIGH (ref 43–77)
NRBC # BLD: 0 /100 WBCS — SIGNIFICANT CHANGE UP (ref 0–0)
PHOSPHATE SERPL-MCNC: 5.2 MG/DL — HIGH (ref 2.5–4.5)
PLATELET # BLD AUTO: 338 K/UL — SIGNIFICANT CHANGE UP (ref 150–400)
POTASSIUM SERPL-MCNC: 4.4 MMOL/L — SIGNIFICANT CHANGE UP (ref 3.5–5.3)
POTASSIUM SERPL-SCNC: 4.4 MMOL/L — SIGNIFICANT CHANGE UP (ref 3.5–5.3)
PROT SERPL-MCNC: 6.3 G/DL — SIGNIFICANT CHANGE UP (ref 6–8.3)
RBC # BLD: 3.43 M/UL — LOW (ref 4.2–5.8)
RBC # FLD: 20.6 % — HIGH (ref 10.3–14.5)
RH IG SCN BLD-IMP: POSITIVE — SIGNIFICANT CHANGE UP
SODIUM SERPL-SCNC: 147 MMOL/L — HIGH (ref 135–145)
WBC # BLD: 10.64 K/UL — HIGH (ref 3.8–10.5)
WBC # FLD AUTO: 10.64 K/UL — HIGH (ref 3.8–10.5)

## 2022-10-18 PROCEDURE — 93306 TTE W/DOPPLER COMPLETE: CPT | Mod: 26

## 2022-10-18 PROCEDURE — 99232 SBSQ HOSP IP/OBS MODERATE 35: CPT

## 2022-10-18 PROCEDURE — 76770 US EXAM ABDO BACK WALL COMP: CPT | Mod: 26

## 2022-10-18 PROCEDURE — 99233 SBSQ HOSP IP/OBS HIGH 50: CPT | Mod: GC

## 2022-10-18 PROCEDURE — 99253 IP/OBS CNSLTJ NEW/EST LOW 45: CPT

## 2022-10-18 PROCEDURE — 99232 SBSQ HOSP IP/OBS MODERATE 35: CPT | Mod: GC

## 2022-10-18 RX ORDER — MAGNESIUM SULFATE 500 MG/ML
2 VIAL (ML) INJECTION ONCE
Refills: 0 | Status: COMPLETED | OUTPATIENT
Start: 2022-10-18 | End: 2022-10-18

## 2022-10-18 RX ORDER — SODIUM CHLORIDE 9 MG/ML
1000 INJECTION, SOLUTION INTRAVENOUS
Refills: 0 | Status: DISCONTINUED | OUTPATIENT
Start: 2022-10-18 | End: 2022-10-19

## 2022-10-18 RX ADMIN — INSULIN GLARGINE 10 UNIT(S): 100 INJECTION, SOLUTION SUBCUTANEOUS at 22:30

## 2022-10-18 RX ADMIN — SODIUM CHLORIDE 80 MILLILITER(S): 9 INJECTION, SOLUTION INTRAVENOUS at 10:40

## 2022-10-18 RX ADMIN — Medication 2: at 21:44

## 2022-10-18 RX ADMIN — Medication 25 GRAM(S): at 16:01

## 2022-10-18 RX ADMIN — ATORVASTATIN CALCIUM 80 MILLIGRAM(S): 80 TABLET, FILM COATED ORAL at 22:52

## 2022-10-18 RX ADMIN — SODIUM CHLORIDE 125 MILLILITER(S): 9 INJECTION, SOLUTION INTRAVENOUS at 04:10

## 2022-10-18 NOTE — DIETITIAN INITIAL EVALUATION ADULT - PERTINENT MEDS FT
MEDICATIONS  (STANDING):  atorvastatin 80 milliGRAM(s) Oral at bedtime  dextrose 5%. 1000 milliLiter(s) (80 mL/Hr) IV Continuous <Continuous>  dextrose 5%. 1000 milliLiter(s) (100 mL/Hr) IV Continuous <Continuous>  dextrose 5%. 1000 milliLiter(s) (50 mL/Hr) IV Continuous <Continuous>  dextrose 50% Injectable 25 Gram(s) IV Push once  dextrose 50% Injectable 12.5 Gram(s) IV Push once  dextrose 50% Injectable 25 Gram(s) IV Push once  glucagon  Injectable 1 milliGRAM(s) IntraMuscular once  influenza   Vaccine 0.5 milliLiter(s) IntraMuscular once  insulin glargine Injectable (LANTUS) 10 Unit(s) SubCutaneous at bedtime  insulin lispro (ADMELOG) corrective regimen sliding scale   SubCutaneous Before meals and at bedtime  magnesium sulfate  IVPB 2 Gram(s) IV Intermittent once    MEDICATIONS  (PRN):  bisacodyl 5 milliGRAM(s) Oral every 12 hours PRN Constipation  dextrose Oral Gel 15 Gram(s) Oral once PRN Blood Glucose LESS THAN 70 milliGRAM(s)/deciliter

## 2022-10-18 NOTE — DIETITIAN INITIAL EVALUATION ADULT - NSFNSPHYEXAMSKINFT_GEN_A_CORE
Pressure Injury 1: Right:,upper,back, none  Pressure Injury 2: Bilateral:,buttocks, Stage II  Pressure Injury 3: none, none  Pressure Injury 4: none, none  Pressure Injury 5: none, none  Pressure Injury 6: none, none  Pressure Injury 7: none, none  Pressure Injury 8: none, none  Pressure Injury 9: none, none  Pressure Injury 10: none, none  Pressure Injury 11: none, none, Pressure Injury 1: Right:,flank area, Stage II  Pressure Injury 2: Left:,Right:,buttocks, Stage II  Pressure Injury 3: none, none  Pressure Injury 4: none, none  Pressure Injury 5: none, none  Pressure Injury 6: none, none  Pressure Injury 7: none, none  Pressure Injury 8: none, none  Pressure Injury 9: none, none  Pressure Injury 10: none, none  Pressure Injury 11: none, none

## 2022-10-18 NOTE — PROGRESS NOTE ADULT - SUBJECTIVE AND OBJECTIVE BOX
EDEN DACOSTA 57y Male  MRN#: [id number]   CODE STATUS: FULL      SUBJECTIVE  Patient is a 57y old Male who presents with a chief complaint of ALEXIS     (18 Oct 2022 12:33)  Currently admitted to medicine with the primary diagnosis of ALEXIS (acute kidney injury)    Hospital course has been complicated by MRI finding of acute and subacute infarcts    Today is hospital day 2d, and this morning he is resting in bed comfortably. He repeatedly falls asleep during exam.         Present Today:           Borja Catheter (x)No/ ()Yes? Indication:          Central Line (x)No/ ()Yes? Indication:          IV Fluids (x)No/ ()Yes? Type:  Rate:  Indication:      OBJECTIVE  PAST MEDICAL & SURGICAL HISTORY  Type 2 diabetes mellitus  Diabetes mellitus    Cerebral artery occlusion with cerebral infarction  Cerebral vascular accident    Hyperlipidemia  Hyperlipidemia    Hypertension    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, traumatic, left, sequela      Home Meds:  Lantus 100 units/mL subcutaneous solution: 15 unit(s) subcutaneous once a day (at bedtime)    ALLERGIES:  No Known Allergies    MEDICATIONS:  STANDING MEDICATIONS  atorvastatin 80 milliGRAM(s) Oral at bedtime  dextrose 5%. 1000 milliLiter(s) IV Continuous <Continuous>  dextrose 5%. 1000 milliLiter(s) IV Continuous <Continuous>  dextrose 5%. 1000 milliLiter(s) IV Continuous <Continuous>  dextrose 50% Injectable 25 Gram(s) IV Push once  dextrose 50% Injectable 12.5 Gram(s) IV Push once  dextrose 50% Injectable 25 Gram(s) IV Push once  glucagon  Injectable 1 milliGRAM(s) IntraMuscular once  influenza   Vaccine 0.5 milliLiter(s) IntraMuscular once  insulin glargine Injectable (LANTUS) 10 Unit(s) SubCutaneous at bedtime  insulin lispro (ADMELOG) corrective regimen sliding scale   SubCutaneous Before meals and at bedtime  magnesium sulfate  IVPB 2 Gram(s) IV Intermittent once    PRN MEDICATIONS  bisacodyl 5 milliGRAM(s) Oral every 12 hours PRN  dextrose Oral Gel 15 Gram(s) Oral once PRN      VITAL SIGNS: Last 24 Hours  T(C): 36.7 (18 Oct 2022 10:50), Max: 37.1 (18 Oct 2022 01:00)  T(F): 98.1 (18 Oct 2022 10:50), Max: 98.8 (18 Oct 2022 01:00)  HR: 108 (18 Oct 2022 09:37) (88 - 108)  BP: 145/79 (18 Oct 2022 09:37) (145/79 - 180/83)  BP(mean): 105 (18 Oct 2022 09:37) (98 - 119)  RR: 16 (18 Oct 2022 09:37) (12 - 18)  SpO2: 100% (18 Oct 2022 09:37) (99% - 100%)    LABS:                        8.4    10.64 )-----------( 338      ( 18 Oct 2022 05:30 )             26.9     10-18    147<H>  |  120<H>  |  90<H>  ----------------------------<  128<H>  4.4   |  14<L>  |  7.88<H>    Ca    7.9<L>      18 Oct 2022 05:30  Phos  5.2     10-18  Mg     1.8     10-18    TPro  6.3  /  Alb  3.2<L>  /  TBili  0.3  /  DBili  x   /  AST  12  /  ALT  16  /  AlkPhos  82  10-18    PT/INR - ( 16 Oct 2022 17:56 )   PT: 12.5 sec;   INR: 1.05          PTT - ( 16 Oct 2022 17:56 )  PTT:32.7 sec  Urinalysis Basic - ( 16 Oct 2022 22:30 )    Color: Yellow / Appearance: Clear / S.025 / pH: x  Gluc: x / Ketone: NEGATIVE  / Bili: Negative / Urobili: 0.2 E.U./dL   Blood: x / Protein: 100 mg/dL / Nitrite: NEGATIVE   Leuk Esterase: NEGATIVE / RBC: < 5 /HPF / WBC < 5 /HPF   Sq Epi: x / Non Sq Epi: 0-5 /HPF / Bacteria: Present /HPF            Urinalysis with Rflx Culture (collected 16 Oct 2022 22:30)      CARDIAC MARKERS ( 17 Oct 2022 09:54 )  x     / 0.15 ng/mL / 482 U/L / x     / 11.8 ng/mL  CARDIAC MARKERS ( 17 Oct 2022 05:30 )  x     / 0.16 ng/mL / x     / x     / x      CARDIAC MARKERS ( 16 Oct 2022 22:30 )  x     / 0.13 ng/mL / 621 U/L / x     / 18.2 ng/mL  CARDIAC MARKERS ( 16 Oct 2022 17:56 )  x     / x     / 681 U/L / x     / x          RADIOLOGY:      PHYSICAL EXAM:  General: NAD, AAOx3, lethargic and falling asleep during exam.   HEENT: atraumatic, normocephalic, poor dentition.   Pulmonary: clear to auscultation bilaterally; No wheeze  Cardiovascular: Regular rate and rhythm; no murmurs, rubs or gallops. Normal S1S2  Gastrointestinal: Soft, nontender, nondistended; bowel sounds present  Musculoskeletal: 2+ peripheral pulses, no clubbing, cyanosis or edema  Neurology: Pt. alert and oriented, fluent speech, decreased strength in left lower extremity.   Skin: no rashes or lesions           EDEN DACOSTA 57y Male  MRN#: [id number]   CODE STATUS: FULL      SUBJECTIVE  Patient is a 57y old Male who presents with a chief complaint of ALEXIS     (18 Oct 2022 12:33)  Currently admitted to medicine with the primary diagnosis of ALEXIS (acute kidney injury)    Hospital course has been complicated by MRI finding of acute and subacute infarcts    Today is hospital day 2d, and this morning he is resting in bed comfortably. He repeatedly falls asleep during exam.     ===HOSPITAL COURSE: ===    Patient is a 57y old Male w/ PMHx of IDDM, CVA x 2 (residual left side weakness), HTN, HLD, CKD presented 10/16 for slurred speech, weakness over the past several days. Pt's cousin accompanied him, unsure how long he was down for, but pt found down in his own feces and urine. In ED, pt did not think he fell, speech was slurred. patient was afebrile/VSS, WBC 15.94 (likely 2/2 dehydration, improved), Hb 6.9, Na 153, BUN/Cr 97/7.99 ,. He was [id number] pRBC in ED. Patient was admitted to [address] [place] [address] for persistent tachycardia. On hospital day 2(10/17) pt extremely lethargic with pinpoint but reactive pupils, however stroke code not called as patient was easily aroused without focal deficits. Labs revealed poor response to PRBCs with Hgb only 7.0 after 1 unit, and was given additional 2u pRBC on tele. Started on D5 for hyper Na+. Neuro consulted for concern of seizure/fall precautions, vEEG. MRI showed multiple scattered foci/ subacute and acute infarcts with central embolic source. Renal consulted for BUN/Cr ratio, placed puckett & ordered urine studies. GI not consulted for anemia as pt had no further episodes melena, but FOBT pending. Hb stayed stable. Na improving with D5, lowered rate to 80cc/hr. Stable for stepdown to [facility]. Pending: Renal recs, neuro recs, consider stroke consult given MRI findings, f/u echo w/ bubble, trend Na.       Present Today:           Puckett Catheter (x)No/ ()Yes? Indication:          Central Line (x)No/ ()Yes? Indication:          IV Fluids (x)No/ ()Yes? Type:  Rate:  Indication:      OBJECTIVE  PAST MEDICAL & SURGICAL HISTORY  Type 2 diabetes mellitus  Diabetes mellitus    Cerebral artery occlusion with cerebral infarction  Cerebral vascular accident    Hyperlipidemia  Hyperlipidemia    Hypertension    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, traumatic, left, sequela      Home Meds:  Lantus 100 units/mL subcutaneous solution: 15 unit(s) subcutaneous once a day (at bedtime)    ALLERGIES:  No Known Allergies    MEDICATIONS:  STANDING MEDICATIONS  atorvastatin 80 milliGRAM(s) Oral at bedtime  dextrose 5%. 1000 milliLiter(s) IV Continuous <Continuous>  dextrose 5%. 1000 milliLiter(s) IV Continuous <Continuous>  dextrose 5%. 1000 milliLiter(s) IV Continuous <Continuous>  dextrose 50% Injectable 25 Gram(s) IV Push once  dextrose 50% Injectable 12.5 Gram(s) IV Push once  dextrose 50% Injectable 25 Gram(s) IV Push once  glucagon  Injectable 1 milliGRAM(s) IntraMuscular once  influenza   Vaccine 0.5 milliLiter(s) IntraMuscular once  insulin glargine Injectable (LANTUS) 10 Unit(s) SubCutaneous at bedtime  insulin lispro (ADMELOG) corrective regimen sliding scale   SubCutaneous Before meals and at bedtime  magnesium sulfate  IVPB 2 Gram(s) IV Intermittent once    PRN MEDICATIONS  bisacodyl 5 milliGRAM(s) Oral every 12 hours PRN  dextrose Oral Gel 15 Gram(s) Oral once PRN      VITAL SIGNS: Last 24 Hours  T(C): 36.7 (18 Oct 2022 10:50), Max: 37.1 (18 Oct 2022 01:00)  T(F): 98.1 (18 Oct 2022 10:50), Max: 98.8 (18 Oct 2022 01:00)  HR: 108 (18 Oct 2022 09:37) (88 - 108)  BP: 145/79 (18 Oct 2022 09:37) (145/79 - 180/83)  BP(mean): 105 (18 Oct 2022 09:37) (98 - 119)  RR: 16 (18 Oct 2022 09:37) (12 - 18)  SpO2: 100% (18 Oct 2022 09:37) (99% - 100%)    LABS:                        8.4    10.64 )-----------( 338      ( 18 Oct 2022 05:30 )             26.9     10-18    147<H>  |  120<H>  |  90<H>  ----------------------------<  128<H>  4.4   |  14<L>  |  7.88<H>    Ca    7.9<L>      18 Oct 2022 05:30  Phos  5.2     10-18  Mg     1.8     10-18    TPro  6.3  /  Alb  3.2<L>  /  TBili  0.3  /  DBili  x   /  AST  12  /  ALT  16  /  AlkPhos  82  10-18    PT/INR - ( 16 Oct 2022 17:56 )   PT: 12.5 sec;   INR: 1.05          PTT - ( 16 Oct 2022 17:56 )  PTT:32.7 sec  Urinalysis Basic - ( 16 Oct 2022 22:30 )    Color: Yellow / Appearance: Clear / S.025 / pH: x  Gluc: x / Ketone: NEGATIVE  / Bili: Negative / Urobili: 0.2 E.U./dL   Blood: x / Protein: 100 mg/dL / Nitrite: NEGATIVE   Leuk Esterase: NEGATIVE / RBC: < 5 /HPF / WBC < 5 /HPF   Sq Epi: x / Non Sq Epi: 0-5 /HPF / Bacteria: Present /HPF            Urinalysis with Rflx Culture (collected 16 Oct 2022 22:30)      CARDIAC MARKERS ( 17 Oct 2022 09:54 )  x     / 0.15 ng/mL / 482 U/L / x     / 11.8 ng/mL  CARDIAC MARKERS ( 17 Oct 2022 05:30 )  x     / 0.16 ng/mL / x     / x     / x      CARDIAC MARKERS ( 16 Oct 2022 22:30 )  x     / 0.13 ng/mL / 621 U/L / x     / 18.2 ng/mL  CARDIAC MARKERS ( 16 Oct 2022 17:56 )  x     / x     / 681 U/L / x     / x          RADIOLOGY:      PHYSICAL EXAM:  General: NAD, AAOx3, lethargic and falling asleep during exam.   HEENT: atraumatic, normocephalic, poor dentition.   Pulmonary: clear to auscultation bilaterally; No wheeze  Cardiovascular: Regular rate and rhythm; no murmurs, rubs or gallops. Normal S1S2  Gastrointestinal: Soft, nontender, nondistended; bowel sounds present  Musculoskeletal: 2+ peripheral pulses, no clubbing, cyanosis or edema  Neurology: Pt. alert and oriented, fluent speech, decreased strength in left lower extremity.   Skin: no rashes or lesions           EDEN DACOSTA 57y Male  MRN#: [id number]   CODE STATUS: FULL      SUBJECTIVE  Patient is a 57y old Male who presents with a chief complaint of ALEXIS     (18 Oct 2022 12:33)  Currently admitted to medicine with the primary diagnosis of ALEXIS (acute kidney injury)    Hospital course has been complicated by MRI finding of acute and subacute infarcts    Today is hospital day 2d, and this morning he is resting in bed comfortably. He repeatedly falls asleep during exam.     ===HOSPITAL COURSE: ===    Patient is a 57y old Male w/ PMHx of IDDM, CVA x 2 (residual left side weakness), HTN, HLD, CKD presented 10/16 for slurred speech, weakness over the past several days. Pt's cousin accompanied him, unsure how long he was down for, but pt found down in his own feces and urine. In ED, pt did not think he fell, speech was slurred. patient was afebrile/VSS, WBC 15.94 (likely 2/2 dehydration, improved), Hb 6.9, Na 153, BUN/Cr 97/7.99 ,. He was [id number] pRBC in ED and 2 additional units in 7-lachman. Patient was admitted to [address] [place] [address] for persistent tachycardia. On hospital day 2 (10/17) pt extremely lethargic with pinpoint but reactive pupils, however stroke code not called as patient was easily aroused without focal deficits. Nephro was consulted for hypernatremia and determined etiology was likely pre-renal complicated by post-renal obstruction. He was started on D5 for hyper Na+. Neuro consulted for concern of seizure/fall precautions and vEEG. MRI showed multiple scattered foci/ subacute and acute infarcts with central embolic source. GI not consulted for anemia as pt had no further episodes melena with stable hgb, but FOBT pending. Na improving with D5, lowered rate to 80cc/hr. Stable for stepdown to [facility]. Pending: Renal recs, neuro recs, consider stroke consult given MRI findings, f/u echo w/ bubble, trend Na.       Present Today:           Borja Catheter ()No/ (x)Yes? Indication: retention          Central Line (x)No/ ()Yes? Indication:          IV Fluids (x)No/ ()Yes? Type:  Rate:  Indication:      OBJECTIVE  PAST MEDICAL & SURGICAL HISTORY  Type 2 diabetes mellitus  Diabetes mellitus    Cerebral artery occlusion with cerebral infarction  Cerebral vascular accident    Hyperlipidemia  Hyperlipidemia    Hypertension    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, traumatic, left, sequela      Home Meds:  Lantus 100 units/mL subcutaneous solution: 15 unit(s) subcutaneous once a day (at bedtime)    ALLERGIES:  No Known Allergies    MEDICATIONS:  STANDING MEDICATIONS  atorvastatin 80 milliGRAM(s) Oral at bedtime  dextrose 5%. 1000 milliLiter(s) IV Continuous <Continuous>  dextrose 5%. 1000 milliLiter(s) IV Continuous <Continuous>  dextrose 5%. 1000 milliLiter(s) IV Continuous <Continuous>  dextrose 50% Injectable 25 Gram(s) IV Push once  dextrose 50% Injectable 12.5 Gram(s) IV Push once  dextrose 50% Injectable 25 Gram(s) IV Push once  glucagon  Injectable 1 milliGRAM(s) IntraMuscular once  influenza   Vaccine 0.5 milliLiter(s) IntraMuscular once  insulin glargine Injectable (LANTUS) 10 Unit(s) SubCutaneous at bedtime  insulin lispro (ADMELOG) corrective regimen sliding scale   SubCutaneous Before meals and at bedtime  magnesium sulfate  IVPB 2 Gram(s) IV Intermittent once    PRN MEDICATIONS  bisacodyl 5 milliGRAM(s) Oral every 12 hours PRN  dextrose Oral Gel 15 Gram(s) Oral once PRN      VITAL SIGNS: Last 24 Hours  T(C): 36.7 (18 Oct 2022 10:50), Max: 37.1 (18 Oct 2022 01:00)  T(F): 98.1 (18 Oct 2022 10:50), Max: 98.8 (18 Oct 2022 01:00)  HR: 108 (18 Oct 2022 09:37) (88 - 108)  BP: 145/79 (18 Oct 2022 09:37) (145/79 - 180/83)  BP(mean): 105 (18 Oct 2022 09:37) (98 - 119)  RR: 16 (18 Oct 2022 09:37) (12 - 18)  SpO2: 100% (18 Oct 2022 09:37) (99% - 100%)    LABS:                        8.4    10.64 )-----------( 338      ( 18 Oct 2022 05:30 )             26.9     10-18    147<H>  |  120<H>  |  90<H>  ----------------------------<  128<H>  4.4   |  14<L>  |  7.88<H>    Ca    7.9<L>      18 Oct 2022 05:30  Phos  5.2     10-18  Mg     1.8     10-18    TPro  6.3  /  Alb  3.2<L>  /  TBili  0.3  /  DBili  x   /  AST  12  /  ALT  16  /  AlkPhos  82  10-18    PT/INR - ( 16 Oct 2022 17:56 )   PT: 12.5 sec;   INR: 1.05          PTT - ( 16 Oct 2022 17:56 )  PTT:32.7 sec  Urinalysis Basic - ( 16 Oct 2022 22:30 )    Color: Yellow / Appearance: Clear / S.025 / pH: x  Gluc: x / Ketone: NEGATIVE  / Bili: Negative / Urobili: 0.2 E.U./dL   Blood: x / Protein: 100 mg/dL / Nitrite: NEGATIVE   Leuk Esterase: NEGATIVE / RBC: < 5 /HPF / WBC < 5 /HPF   Sq Epi: x / Non Sq Epi: 0-5 /HPF / Bacteria: Present /HPF            Urinalysis with Rflx Culture (collected 16 Oct 2022 22:30)      CARDIAC MARKERS ( 17 Oct 2022 09:54 )  x     / 0.15 ng/mL / 482 U/L / x     / 11.8 ng/mL  CARDIAC MARKERS ( 17 Oct 2022 05:30 )  x     / 0.16 ng/mL / x     / x     / x      CARDIAC MARKERS ( 16 Oct 2022 22:30 )  x     / 0.13 ng/mL / 621 U/L / x     / 18.2 ng/mL  CARDIAC MARKERS ( 16 Oct 2022 17:56 )  x     / x     / 681 U/L / x     / x          RADIOLOGY:  < from: MR Head No Cont (10.17.22 @ 21:39) >  IMPRESSION:  1. Motion degraded exam. Axial FLAIR series are, for the most part,  nondiagnostic.  2. Multiple scattered very small foci of restricted diffusion. Lesions   are in  multiple vascular territories, and suggest acute/subacute infarcts, with   the  central embolic source.      < end of copied text >      PHYSICAL EXAM:  General: NAD, AAOx3, lethargic and falling asleep during exam.   HEENT: atraumatic, normocephalic, poor dentition.   Pulmonary: clear to auscultation bilaterally; No wheeze  Cardiovascular: Regular rate and rhythm; no murmurs, rubs or gallops. Normal S1S2  Gastrointestinal: Soft, nontender, nondistended; bowel sounds present  Musculoskeletal: 2+ peripheral pulses, no clubbing, cyanosis or edema  Neurology: Pt. alert and oriented, fluent speech, decreased strength in left lower extremity.   Skin: no rashes or lesions

## 2022-10-18 NOTE — CONSULT NOTE ADULT - ASSESSMENT
This is a 57y Male with PMHx of IDDM, CVA x2 (residual left sided weakness), HTN, HLD, CKD presented on 10/16 for AMS and slurred speech, with weakness over several days admitted for a toxic metabolic workup and being treated for ALEXIS. Found to have multiple acute to subacute infarcts on MRI suggestive of a cardioembolic source. Stroke service consults for further workup.     Plan:   - Recommend starting anti-platelet with DAPT (ASA 81mg/75mg) if no contraindication or concern for bleeding  - continue Atorvastatin 80mg PO daily  - Recommend repeating hypercoagulable workup  - Obtain CRP/ESR  - Send blood cultures  - Follow up read echo    Stroke risk factors  - A1C: 7.2  - LDL: 66   - HTN- continue current regimen    DVT prophylaxis   -Lovenox SQ and SCDs    Discussed with Neurology Attending Dr. Casandra San This is a 57y Male with PMHx of IDDM, CVA x2 (residual left sided weakness), HTN, HLD, CKD presented on 10/16 for AMS and slurred speech, with weakness over several days admitted for a toxic metabolic workup and being treated for ALEXIS. Found to have multiple acute to subacute infarcts on MRI suggestive of a cardioembolic source. Stroke service consults for further workup.     Plan:   - Recommend starting anti-platelet with DAPT (ASA 81mg/plavix 75mg) if no contraindication or concern for bleeding  - continue Atorvastatin 80mg PO daily  - Recommend repeating hypercoagulable workup  - Obtain CRP/ESR  - Send blood cultures  - Follow up read echo    Stroke risk factors  - A1C: 7.2  - LDL: 66   - HTN- continue current regimen    DVT prophylaxis   -Lovenox SQ and SCDs    Discussed with Neurology Attending Dr. Casandra San

## 2022-10-18 NOTE — PROGRESS NOTE ADULT - PROBLEM SELECTOR PLAN 1
- On admission, , WBC 15.94 (2/4 SIRS)   - likely reactive iso fall, dehydration (found down for unknown amount of time)   - no evidence of infectious etiology, will ctm

## 2022-10-18 NOTE — PROGRESS NOTE ADULT - SUBJECTIVE AND OBJECTIVE BOX
**TRANSFER FROM [facility] TO [facility]**  HOSPITAL COURSE:   Patient is a 57y old Male w/ PMHx of IDDM, CVA x 2 (residual left side weakness), HTN, HLD, CKD presented 10/16 c/o slurred speech, weakness over the past several days. Pt's cousin accompanied him, unsure how long he was down for, but pt found down in his own feces and urine. In ED, pt did not think he fell, speech was slurred. in ED, afebrile/VSS, WBC 15.94 (likely 2/2 dehydration, improved), Hb 6.9, Na 153, BUN/Cr 97/7.99 ,. Received 1u pRBC in ED. Admitted initially to [facility] overnight, found to be persistently tachycardic in ED, admitted to telemetry. While on tele, 10/17 pt extremely lethargic, pinpoint but reactive pupils, but stroke code not called as pt became more alert later. Hb 7.0 (poor response to 1u), received 2u pRBC on tele. Started on D5 for hyper Na+. Neuro consulted, ordered seizure/fall precautions, vEEG. MRI showed multiple scattered foci/ subacute and acute infarcts with central embolic source. Renal consulted for BUN/Cr ratio, placed puckett & ordered urine studies. GI not consulted for anemia as pt had no further episodes melena, but FOBT pending. Hb stayed stable. Na improving with D5, lowered rate to 80cc/hr. Stable for stepdown to [facility]. Pending: Renal recs, neuro recs, consider stroke consult given MRI findings, f/u echo w/ bubble, trend Na.     OVERNIGHT EVENTS:    SUBJECTIVE / INTERVAL HPI: Patient seen and examined at bedside. No complaints at this time. No chest pain, dyspnea, fever, chills. Slept comfortably last night.     VITAL SIGNS:  Vital Signs Last 24 Hrs  T(C): 36.7 (18 Oct 2022 10:50), Max: 37.1 (18 Oct 2022 01:00)  T(F): 98.1 (18 Oct 2022 10:50), Max: 98.8 (18 Oct 2022 01:00)  HR: 108 (18 Oct 2022 09:37) (88 - 108)  BP: 145/79 (18 Oct 2022 09:37) (142/88 - 180/83)  BP(mean): 105 (18 Oct 2022 09:37) (98 - 119)  RR: 16 (18 Oct 2022 09:37) (12 - 18)  SpO2: 100% (18 Oct 2022 09:37) (98% - 100%)    Parameters below as of 18 Oct 2022 09:37  Patient On (Oxygen Delivery Method): room air        PHYSICAL EXAM:    General: NAD  HEENT: NCAT, poor dentition. Very dry MM. Pupils still nearly pinpoint, R larger than L, but reactive. R eye w/ coloboma.   Neck: supple, trachea midline  Cardiovascular: S1, S2 normal; RRR, no M/G/R  Respiratory: CTABL; no W/R/R  Gastrointestinal: soft, nontender, nondistended. bowel sounds present.  Skin: abrasions on lower right back. Dry skin in bilateral lower extremities. B/l thickened w brown discoloration.   Extremities: 2+ DP pulses. 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 No cyanosis or edema.   Vascular: 2+ radial, DP/PT pulses B/L  Neurological: AAOx3, CN grossly intact. Left sided weakness L>R (known, from past CVA)    MEDICATIONS:  MEDICATIONS  (STANDING):  atorvastatin 80 milliGRAM(s) Oral at bedtime  dextrose 5%. 1000 milliLiter(s) (80 mL/Hr) IV Continuous <Continuous>  dextrose 5%. 1000 milliLiter(s) (100 mL/Hr) IV Continuous <Continuous>  dextrose 5%. 1000 milliLiter(s) (50 mL/Hr) IV Continuous <Continuous>  dextrose 50% Injectable 25 Gram(s) IV Push once  dextrose 50% Injectable 12.5 Gram(s) IV Push once  dextrose 50% Injectable 25 Gram(s) IV Push once  glucagon  Injectable 1 milliGRAM(s) IntraMuscular once  influenza   Vaccine 0.5 milliLiter(s) IntraMuscular once  insulin glargine Injectable (LANTUS) 10 Unit(s) SubCutaneous at bedtime  insulin lispro (ADMELOG) corrective regimen sliding scale   SubCutaneous Before meals and at bedtime    MEDICATIONS  (PRN):  bisacodyl 5 milliGRAM(s) Oral every 12 hours PRN Constipation  dextrose Oral Gel 15 Gram(s) Oral once PRN Blood Glucose LESS THAN 70 milliGRAM(s)/deciliter      ALLERGIES:  Allergies    No Known Allergies    Intolerances        LABS:                        8.4    10.64 )-----------( 338      ( 18 Oct 2022 05:30 )             26.9     10-18    147<H>  |  120<H>  |  90<H>  ----------------------------<  128<H>  4.4   |  14<L>  |  7.88<H>    Ca    7.9<L>      18 Oct 2022 05:30  Phos  5.2     10-18  Mg     1.8     10-18    TPro  6.3  /  Alb  3.2<L>  /  TBili  0.3  /  DBili  x   /  AST  12  /  ALT  16  /  AlkPhos  82  10-18    PT/INR - ( 16 Oct 2022 17:56 )   PT: 12.5 sec;   INR: 1.05          PTT - ( 16 Oct 2022 17:56 )  PTT:32.7 sec  Urinalysis Basic - ( 16 Oct 2022 22:30 )    Color: Yellow / Appearance: Clear / S.025 / pH: x  Gluc: x / Ketone: NEGATIVE  / Bili: Negative / Urobili: 0.2 E.U./dL   Blood: x / Protein: 100 mg/dL / Nitrite: NEGATIVE   Leuk Esterase: NEGATIVE / RBC: < 5 /HPF / WBC < 5 /HPF   Sq Epi: x / Non Sq Epi: 0-5 /HPF / Bacteria: Present /HPF      CAPILLARY BLOOD GLUCOSE      POCT Blood Glucose.: 112 mg/dL (18 Oct 2022 06:08)      RADIOLOGY & ADDITIONAL TESTS: Reviewed.

## 2022-10-18 NOTE — PROGRESS NOTE ADULT - ASSESSMENT
56 yo M w/ longstanding IDDM1 w retinopathy, HTN, CKD 4 presented w/ slurred speech, generalized weakness for several days. Nephrology consulted for AMS in the setting of advanced CKD    Assessment/Plan:  #Elevated Cr-ALEXIS vs progression of CKD  Last known Cr 3.3 in Jun/2020 which was progression of his CKD. Serologic GN workup in 2019 negative  Now presenting w/ Cr 7.99 on admission, downtrending 7.88 today  UA w/ trace protein and moderate blood in the absence of RBCs  Non-oliguric; 1.6L/24 hours  UPCR 1.9, Eleanor 56  Etiology includes Pre-renal disease likely given pt was found down and has been hypernatremic. iATN unlikely given no documented episodes of hypotension. Component of post-renal as well as large PVR on POCUS. Could also be progression of underlying CKD    Recommend:  No indication of urgent HD at this time. However, given his advance CKD, will likely require HD in the future   Maintain net positive fluid balance with D5W  Strict I&Os  Avoid nephrotoxic agents  Renal diet    #Hypernatremia-resolved  Due to dehydration and no access to water  Na 147 today  c/w with D5w at 125cc/hr for now  Daily BMP    #Anemia  Hb 8.4  Iron sat 58%, ferritin 34  likely ACD  Epo 10K U Qweekly    Thank you for the opportunity to participate in the care of your patient. The nephrology service remains available to assist with any questions or concerns. Please feel free to reach us by paging the on-call nephrology fellow for urgent issues or as below.     Alberto Cordova M.D.  PGY 4 - Nephrology Fellow  [phone]

## 2022-10-18 NOTE — PROGRESS NOTE ADULT - PROBLEM SELECTOR PLAN 8
Hx of HLD. Home med: atorvastatin 20mg    - start atorvastatin 80 iso hx of CVAx2 and presenting w/ slurred speech

## 2022-10-18 NOTE — DIETITIAN INITIAL EVALUATION ADULT - ADD RECOMMEND
-Continue current diet with appropriate textures/consistencies   -Add Nepro TID  -Encourage good PO intake and ONS compliance   -Monitor chemistry, GI fxn, and skin integrity

## 2022-10-18 NOTE — PROGRESS NOTE ADULT - PROBLEM SELECTOR PLAN 6
Hx of insulin-dependent diabetes mellitus. Per surescripts: Lantus 15 qhs  A1c 7.2% (10/17/22)    - c/w Lantus 10units qhs & mISS, adjust as necessary

## 2022-10-18 NOTE — PROGRESS NOTE ADULT - PROBLEM SELECTOR PLAN 3
Presented w 153. s/p 2L NS in ED - likely 2/2 dehydration   - started D5 125cc/hr, Na improved to 147  - continue with D5 80cc/hr  - Continue to monitor

## 2022-10-18 NOTE — PROGRESS NOTE ADULT - PROBLEM SELECTOR PLAN 4
Baseline hemoglobin 12 (Feb 2019), presented w Hb 6.9 ---> s/p 3u pRBC total   - No apparent sources of bleeding, no further melena  - , Haptoglobin 386, Reticulocute 34.5 **   ----->  added on to 10/17 AM labs, after 1u pRBC, may be affected. Could not add on to admission labs (hemolyzed)   Plan:  - will f/u iron panel   - f/u FOBT  - transfuse for Hgb <7, maintain active T&S Baseline hemoglobin 12 (Feb 2019), presented w Hb 6.9 ---> s/p 3u pRBC total   - No apparent sources of bleeding, no further melena  - , Haptoglobin 386, Reticulocute 34.5 **   ----->  added on to 10/17 AM labs, after 1u pRBC, may be affected. Could not add on to admission labs (hemolyzed)   Plan:  - will f/u iron panel (collected after PRBC transfusion which will confound the results)  - f/u FOBT  - transfuse for Hgb <7, maintain active T&S (10/18)

## 2022-10-18 NOTE — PROGRESS NOTE ADULT - PROBLEM SELECTOR PLAN 4
Basline hemoglobin 12 (Feb 2019), presented w Hb 6.9 ---> s/p 3u pRBC total (1 in ED, 2 on 7La)   - No apparent BRBPR or melena, and no other sources of bleeding. May be also 2/2 renal dysfn.   - , Haptoglobin 386, Reticulocute 34.5 **   ----->  added on to 10/17 AM labs, after 1u pRBC, may be affected. Could not add on to admission labs (hemolyzed)   Plan:  - will f/u iron panel   - f/u FOBT  - f/u post-transfusion CBC  - transfuse for Hgb <7, maintain active T&S Baseline hemoglobin 12 (Feb 2019), presented w Hb 6.9 ---> s/p 3u pRBC total   - No apparent sources of bleeding, no further melena  - , Haptoglobin 386, Reticulocute 34.5 **   ----->  added on to 10/17 AM labs, after 1u pRBC, may be affected. Could not add on to admission labs (hemolyzed)   Plan:  - will f/u iron panel   - f/u FOBT  - transfuse for Hgb <7, maintain active T&S

## 2022-10-18 NOTE — PROGRESS NOTE ADULT - PROBLEM SELECTOR PLAN 5
Hx of CKD stage 4. per HIE, baseline Cr 2.9, eGFR 27 (05/2019). Now presenting w/ BUN/ Cr 97/ 7.99.  - may be post-obstructive, as pt straight-cath'd with UOP 500cc  - will leave in puckett for now, will f/u renal   Plan:  - f/u Renal US & Urine lytes  - consulted renal, will f/u recs   - trend Cr. Avoid nephrotoxic meds. Strict I/Os

## 2022-10-18 NOTE — PROGRESS NOTE ADULT - ASSESSMENT
Patient is a 58 yo M w/ longstanding IDDM1 w retinopathy, CVA x 2 (residual left lower extremity weakness), HTN, HLD, and CKD presents c/o slurred speech, weakness over the past several days, found to be tachycardic, hypernatremic, and with BUN/Cr 91/7.42, MRI with findings of acute and subacute infarcts, neuro & renal following, medically optimized for step-down to [facility].

## 2022-10-18 NOTE — PROGRESS NOTE ADULT - PROBLEM SELECTOR PLAN 2
- Hx of CVA x2. Presenting w/ slurred speech and generalized weakness. Waxing/waning in alertness, intermittently very lethargic w/ nearly pinpoint pupils, responsive to noxious stimuli   - Per Neuro HIE- 2/2019 acute infarct in left corona radiata and left macrina seen on MRI Brain, had been referred to EP for loop recorder placement but lost to f/u  - CT Head on admission - no acute findings. Abd X-ray: no acute findings.   - started dulcolax 5mg PO q12h  Plan:   - Neuro consulted, will f/u recs  - Continue atorvastatin 80mg qhs. Restart ASA 81 after hemoglobin stable  - f/u MRI head  - f/u ammonia levels - Hx of CVA x2. Presenting w/ slurred speech and generalized weakness. Waxing/waning in alertness, intermittently very lethargic w/ nearly pinpoint pupils, responsive to noxious stimuli   - Per Neuro HIE- 2/2019 acute infarct in left corona radiata and left macrina seen on MRI Brain, had been referred to EP for loop recorder placement but lost to f/u  - CT Head on admission - no acute findings. Abd X-ray: no acute findings.   - started dulcolax 5mg PO q12h  - MRI head - several acute/subacute infarcts, likely embolic source  Plan:   - Neuro consulted, started fall/seizure precautions. F/u recs.   - f/u echo w/ bubble study  - consider stroke consult   - Continue atorvastatin 80mg qhs. Restart ASA 81 after hemoglobin stable  - f/u ammonia levels

## 2022-10-18 NOTE — PROGRESS NOTE ADULT - PROBLEM SELECTOR PLAN 1
- On admission, , WBC 15.94 (2/4 SIRS)   - likely reactive iso fall, dehydration (found down for unknown amount of time)   - no evidence of infectious etiology, will ctm Resolved  - On admission, , WBC 15.94 (2/4 SIRS)   - likely hemoconcentrated and dehydration reactive iso fall,(found down for unknown amount of time)   - no evidence of infectious etiology, will ctm

## 2022-10-18 NOTE — PROGRESS NOTE ADULT - PROBLEM SELECTOR PLAN 9
F: D5 125cc/hr   E: replete as necessary  N: failed dysphagia screening  DVT Prophy: SCDs iso anemia  Dispo: [facility] F: D5 80cc/hr   E: replete as necessary  N: Renal diet  DVT Prophy: SCDs iso anemia  Dispo: [facility]--> [facility]

## 2022-10-18 NOTE — EEG REPORT - NS EEG TEXT BOX
Day 1  10/18/2022 from 00:03:34 to 11:35:42  Awake Background:  The awake electrographic background was characterized by the presence of a well organized mixture of mainly alpha and some theta frequencies.  Fragments of an 8 Hz posterior dominant rhythm were present.    Sleep Background:  During drowsiness, slow roving eye movements, attenuation and fragmentation of the posterior dominant rhythm, symmetrical vertex waves and increased diffuse background slowing were present.  Stage 2 sleep was characterized by the presence of synchronous and symmetric  sleep spindles and K-complexes.  Slow wave sleep architecture was preserved.    Background Slowing:  Mild generalized background slowing was present.    Focal Slowing:  No focal slowing was present    Other Paroxysmal Non-Epileptiform Findings:    None.    Spontaneous Activity:  None.    Activation Procedures:  No activation procedures have been performed during this study.    Clinical Events:  No clinical events occurred on this date.  No electrographic or electroclinical seizures occurred on this date    Impression:  This is an abnormal study due to the presence of  Generalized background slowing    Clinical correlation  This is an abnormal study.  The above mentioned findings indicate the presence mild non specific diffuse cerebral dysfunction.  No epileptiform discharges present.  No electrographic or electroclinical seizures occurred.        The undersigned attending physicians have reviewed portions of this record on the dates documented below:  On 10/18/2022 the study was reviewed from 00:03:34 to 11:35:42 by MD Katie Montaño MS  Attending Neurologist, [facility]

## 2022-10-18 NOTE — PROGRESS NOTE ADULT - SUBJECTIVE AND OBJECTIVE BOX
Patient is a 57y Male seen and evaluated at bedside. Overnight events noted. Pt much more awake, alert, interactive today. Appears to be back to his baseline mentation. Denies any new symptoms. Has been eating, reports good appetite. MRI head done. Na improved to 147. SCr 7.95-->7.88, UO 1.6L/24 hrs      Meds:    atorvastatin 80 at bedtime  bisacodyl 5 every 12 hours PRN  dextrose 5%. 1000 <Continuous>  dextrose 5%. 1000 <Continuous>  dextrose 5%. 1000 <Continuous>  dextrose 50% Injectable 25 once  dextrose 50% Injectable 12.5 once  dextrose 50% Injectable 25 once  dextrose Oral Gel 15 once PRN  glucagon  Injectable 1 once  influenza   Vaccine 0.5 once  insulin glargine Injectable (LANTUS) 10 at bedtime  insulin lispro (ADMELOG) corrective regimen sliding scale  Before meals and at bedtime      T(C): , Max: 37.1 (10-18-22 @ 01:00)  T(F): , Max: 98.8 (10-18-22 @ 01:00)  HR: 108 (10-18-22 @ 09:37)  BP: 145/79 (10-18-22 @ 09:37)  BP(mean): 105 (10-18-22 @ 09:37)  RR: 16 (10-18-22 @ 09:37)  SpO2: 100% (10-18-22 @ 09:37)  Wt(kg): --    10-17 @ 07:01  -  10-18 @ 07:00  --------------------------------------------------------  IN: 3562.5 mL / OUT: 1630 mL / NET: 1932.5 mL    10-18 @ 07:01  -  10-18 @ 11:46  --------------------------------------------------------  IN: 490 mL / OUT: 750 mL / NET: -260 mL          Review of Systems:  ROS negative except as per HPI      PHYSICAL EXAM:  GENERAL: Awake, alert. interactive   HEENT: ADRIANO, no JVP  CHEST/LUNG: Bilateral clear breath sounds, no accessory muscle use  HEART: Regular rate and rhythm, no murmur  ABDOMEN: Soft, nontender, non distended  EXTREMITIES: no pedal edema, warm  Neurology: AAOx3, no focal deficits noted      LABS:                        8.4    10.64 )-----------( 338      ( 18 Oct 2022 05:30 )             26.9     10-18    147<H>  |  120<H>  |  90<H>  ----------------------------<  128<H>  4.4   |  14<L>  |  7.88<H>    Ca    7.9<L>      18 Oct 2022 05:30  Phos  5.2     10-18  Mg     1.8     10-18    TPro  6.3  /  Alb  3.2<L>  /  TBili  0.3  /  DBili  x   /  AST  12  /  ALT  16  /  AlkPhos  82  10-18      PT/INR - ( 16 Oct 2022 17:56 )   PT: 12.5 sec;   INR: 1.05          PTT - ( 16 Oct 2022 17:56 )  PTT:32.7 sec  Urinalysis Basic - ( 16 Oct 2022 22:30 )    Color: Yellow / Appearance: Clear / S.025 / pH: x  Gluc: x / Ketone: NEGATIVE  / Bili: Negative / Urobili: 0.2 E.U./dL   Blood: x / Protein: 100 mg/dL / Nitrite: NEGATIVE   Leuk Esterase: NEGATIVE / RBC: < 5 /HPF / WBC < 5 /HPF   Sq Epi: x / Non Sq Epi: 0-5 /HPF / Bacteria: Present /HPF      Sodium, Random Urine: 56 mmol/L (10-17 @ 11:01)  Creatinine, Random Urine: 94 mg/dL (10-17 @ 11:01)  Protein/Creatinine Ratio Calculation: 1.9 Ratio (10-17 @ 11:01)  Osmolality, Random Urine: 387 mosm/kg (10-17 @ 11:01)  Potassium, Random Urine: 19 mmol/L (10-17 @ 11:01)        RADIOLOGY & ADDITIONAL STUDIES:           Patient is a 57y Male seen and evaluated at bedside. Overnight events noted. Pt much more awake, alert, interactive today. Appears to be back to his baseline mentation. Denies any new symptoms. Has been eating, reports good appetite. MRI head done. Na improved to 147. SCr 7.95-->7.88, UO 1.6L/24 hrs      Meds:    atorvastatin 80 at bedtime  bisacodyl 5 every 12 hours PRN  dextrose 5%. 1000 <Continuous>  dextrose 5%. 1000 <Continuous>  dextrose 5%. 1000 <Continuous>  dextrose 50% Injectable 25 once  dextrose 50% Injectable 12.5 once  dextrose 50% Injectable 25 once  dextrose Oral Gel 15 once PRN  glucagon  Injectable 1 once  influenza   Vaccine 0.5 once  insulin glargine Injectable (LANTUS) 10 at bedtime  insulin lispro (ADMELOG) corrective regimen sliding scale  Before meals and at bedtime      T(C): , Max: 37.1 (10-18-22 @ 01:00)  T(F): , Max: 98.8 (10-18-22 @ 01:00)  HR: 108 (10-18-22 @ 09:37)  BP: 145/79 (10-18-22 @ 09:37)  BP(mean): 105 (10-18-22 @ 09:37)  RR: 16 (10-18-22 @ 09:37)  SpO2: 100% (10-18-22 @ 09:37)  Wt(kg): --    10-17 @ 07:01  -  10-18 @ 07:00  --------------------------------------------------------  IN: 3562.5 mL / OUT: 1630 mL / NET: 1932.5 mL    10-18 @ 07:01  -  10-18 @ 11:46  --------------------------------------------------------  IN: 490 mL / OUT: 750 mL / NET: -260 mL          Review of Systems:  ROS negative except as per HPI      PHYSICAL EXAM:  GENERAL: Awake, alert. interactive   HEENT: ADRIANO, no JVP  CHEST/LUNG: Bilateral clear breath sounds, no accessory muscle use  HEART: Regular rate and rhythm, no murmur  ABDOMEN: Soft, nontender, non distended  EXTREMITIES: no pedal edema, warm  Neurology: Answer questions appropriately, no focal deficits noted      LABS:                        8.4    10.64 )-----------( 338      ( 18 Oct 2022 05:30 )             26.9     10-18    147<H>  |  120<H>  |  90<H>  ----------------------------<  128<H>  4.4   |  14<L>  |  7.88<H>    Ca    7.9<L>      18 Oct 2022 05:30  Phos  5.2     10-  Mg     1.8     10-18    TPro  6.3  /  Alb  3.2<L>  /  TBili  0.3  /  DBili  x   /  AST  12  /  ALT  16  /  AlkPhos  82  10-18      PT/INR - ( 16 Oct 2022 17:56 )   PT: 12.5 sec;   INR: 1.05          PTT - ( 16 Oct 2022 17:56 )  PTT:32.7 sec  Urinalysis Basic - ( 16 Oct 2022 22:30 )    Color: Yellow / Appearance: Clear / S.025 / pH: x  Gluc: x / Ketone: NEGATIVE  / Bili: Negative / Urobili: 0.2 E.U./dL   Blood: x / Protein: 100 mg/dL / Nitrite: NEGATIVE   Leuk Esterase: NEGATIVE / RBC: < 5 /HPF / WBC < 5 /HPF   Sq Epi: x / Non Sq Epi: 0-5 /HPF / Bacteria: Present /HPF      Sodium, Random Urine: 56 mmol/L (10-17 @ 11:01)  Creatinine, Random Urine: 94 mg/dL (10-17 @ 11:01)  Protein/Creatinine Ratio Calculation: 1.9 Ratio (10-17 @ 11:01)  Osmolality, Random Urine: 387 mosm/kg (10-17 @ 11:01)  Potassium, Random Urine: 19 mmol/L (10-17 @ 11:01)        RADIOLOGY & ADDITIONAL STUDIES:      Creatinine, Serum: 7.88 mg/dL (10-18-22 @ 05:30)  Creatinine, Serum: 7.95 mg/dL (10-17-22 @ 05:30)  Creatinine, Serum: 7.42 mg/dL (10-16-22 @ 22:30)  Creatinine, Serum: 7.99 mg/dL (10-16-22 @ 17:56)  Creatinine, Serum: 3.38 mg/dL (19 @ 06:08)  Creatinine, Serum: 3.21 mg/dL (19 @ 06:22)  Creatinine, Serum: 3.31 mg/dL (19 @ 06:39)  Creatinine, Serum: 3.19 mg/dL (19 @ 08:39)  Creatinine, Serum: 3.42 mg/dL (19 @ 07:13)  Creatinine, Serum: 3.18 mg/dL (02-15-19 @ 07:00)

## 2022-10-18 NOTE — PROGRESS NOTE ADULT - PROBLEM SELECTOR PLAN 9
F: D5 80cc/hr   E: replete as necessary  N: Renal diet  DVT Prophy: SCDs iso anemia  Dispo: 7[facility]--> [facility]

## 2022-10-18 NOTE — PROGRESS NOTE ADULT - PROBLEM SELECTOR PLAN 2
- Hx of CVA x2. Presenting w/ slurred speech and generalized weakness. Waxing/waning in alertness, intermittently very lethargic w/ nearly pinpoint pupils, responsive to noxious stimuli   - Per Neuro HIE- 2/2019 acute infarct in left corona radiata and left macrina seen on MRI Brain, had been referred to EP for loop recorder placement but lost to f/u  - CT Head on admission - no acute findings. Abd X-ray: no acute findings.   - started dulcolax 5mg PO q12h  - MRI head - several acute/subacute infarcts, likely embolic source  Plan:   - Neuro consulted, started fall/seizure precautions. F/u recs.   - f/u echo w/ bubble study  - consider stroke consult   - Continue atorvastatin 80mg qhs. Restart ASA 81 after hemoglobin stable  - f/u ammonia levels - Hx of CVA x2. Presenting w/ slurred speech and generalized weakness. Waxing/waning in alertness, intermittently very lethargic w/ nearly pinpoint pupils, responsive to noxious stimuli   - Patient frequently falling asleep during exam.   - Per Neuro HIE- 2/2019 acute infarct in left corona radiata and left macrina seen on MRI Brain, had been referred to EP for loop recorder placement but lost to f/u  - CT Head on admission - no acute findings. Abd X-ray: no acute findings.   - started dulcolax 5mg PO q12h  - MRI head - several acute/subacute infarcts, likely embolic source  Plan:   - Neuro consulted, started fall/seizure precautions. F/u recs.   - f/u echo w/ bubble study  - consider stroke consult   - Continue atorvastatin 80mg qhs. Restart ASA 81 after hemoglobin stable  - f/u ammonia levels

## 2022-10-18 NOTE — DIETITIAN INITIAL EVALUATION ADULT - OTHER INFO
Patient is a 57y old Male w/ PMHx of IDDM, CVA x 2 (residual left side weakness), HTN, HLD, and CKD presents c/o slurred speech, weakness over the past several days. [facility], found to be persistently tachycardic in ED and with BUN/Cr 91/7.42. ICU consulted for need for closer monitoring and possible dialysis, now admitted to telemetry.    Pt assessed at bedside. Rx and labs reviewed. Pt presents with moderate orbital, clavicle and severe buccal wasting; limited NFPE d/t EEG and limited pt participation. Pt alert but unresponsive to nutrition assessment; gather full NFPE and nutrition hx as able at f/u. Pt meets ASPEN criteria for PCM. Pt with pressure injury stage II to R flank and buttocks; recommend ONS regimen iso PI and PCM. No reports GI distress or other nutritional concern at this time. RDN will continue to reassess, intervene, and monitor as appropriate.     Pain: 0 per chart review   GI: Abdomen ND/NT, +BS x4, LBM 10/18  Skin: Stg II PI R flank, b/l buttocks, no edema noted

## 2022-10-18 NOTE — DIETITIAN INITIAL EVALUATION ADULT - OTHER CALCULATIONS
Actual body weight used to calculate needs due to pt's current body weight within % ideal body weight adjusted for wound, PCM.

## 2022-10-18 NOTE — PROGRESS NOTE ADULT - ATTENDING COMMENTS
I agree with the fellow's findings and plans as written above with the following additions/amendments:    Seen and examined at bedside. Much improved mental status with improvement in hypernatremia, less likely to be uremia therefore. Monitoring for acute indications for HD, currently improving, monitoring closely. Further recs as above

## 2022-10-18 NOTE — DIETITIAN INITIAL EVALUATION ADULT - PERTINENT LABORATORY DATA
10-18    147<H>  |  120<H>  |  90<H>  ----------------------------<  128<H>  4.4   |  14<L>  |  7.88<H>    Ca    7.9<L>      18 Oct 2022 05:30  Phos  5.2     10-18  Mg     1.8     10-18    TPro  6.3  /  Alb  3.2<L>  /  TBili  0.3  /  DBili  x   /  AST  12  /  ALT  16  /  AlkPhos  82  10-18  POCT Blood Glucose.: 112 mg/dL (10-18-22 @ 06:08)  A1C with Estimated Average Glucose Result: 7.2 % (10-17-22 @ 05:30)

## 2022-10-18 NOTE — PROGRESS NOTE ADULT - PROBLEM SELECTOR PLAN 7
Hx of HTN. Per surescripts no recent HTN meds. Per HIE in 2020 was taking metoprolol ER 50 and Nifedipine ER 30 in 2020.     - monitor BPs  - consider starting BP meds if becomes hypertensive Hx of HTN. Per surescripts no recent HTN meds. Per HIE in 2020 was taking metoprolol ER 50 and Nifedipine ER 30 in 2020.     - monitor BPs  - Allowing permissive hypertension for stroke.   - consider starting BP meds if becomes hypertensive

## 2022-10-18 NOTE — CONSULT NOTE ADULT - SUBJECTIVE AND OBJECTIVE BOX
**STROKE CONSULT NOTE**    HPI: 57y Male with PMHx of     T(C): 36.7 (10-18-22 @ 10:50), Max: 37.1 (10-18-22 @ 01:00)  HR: 108 (10-18-22 @ 09:37) (88 - 108)  BP: 145/79 (10-18-22 @ 09:37) (145/79 - 180/83)  RR: 16 (10-18-22 @ 09:37) (12 - 18)  SpO2: 100% (10-18-22 @ 09:37) (99% - 100%)    PAST MEDICAL & SURGICAL HISTORY:  Type 2 diabetes mellitus  Diabetes mellitus      Cerebral artery occlusion with cerebral infarction  Cerebral vascular accident      Hyperlipidemia  Hyperlipidemia      Hypertension      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, traumatic, left, sequela          FAMILY HISTORY:  Family history of diabetes mellitus (Mother)  Family history of diabetes mellitus (DM)        SOCIAL HISTORY:   Patient lives with *** at ***.   Smoking status:  Drinking:  Drug Use:     ROS: ***  Constitutional: No fever, weight loss or fatigue  Eyes: No eye pain, visual disturbances, or discharge  ENMT:  No difficulty hearing, tinnitus; No sinus or throat pain  Neck: No pain or stiffness  Respiratory: No cough, wheezing, chills or hemoptysis  Cardiovascular: No chest pain, palpitations, shortness of breath, or leg swelling  Gastrointestinal: No abdominal pain. No nausea, vomiting or hematemesis; No diarrhea or constipation. Nohematochezia.  Genitourinary: No dysuria, frequency, hematuria or incontinence  Neurological: As per HPI  Skin: No itching, burning, rashes or lesions   Endocrine: No heat or cold intolerance; No hair loss  Musculoskeletal: No joint pain or swelling; No muscle, back or extremity pain  Heme/Lymph: No easy bruising or bleeding gums    MEDICATIONS  (STANDING):  atorvastatin 80 milliGRAM(s) Oral at bedtime  dextrose 5%. 1000 milliLiter(s) (80 mL/Hr) IV Continuous <Continuous>  dextrose 5%. 1000 milliLiter(s) (100 mL/Hr) IV Continuous <Continuous>  dextrose 5%. 1000 milliLiter(s) (50 mL/Hr) IV Continuous <Continuous>  dextrose 50% Injectable 25 Gram(s) IV Push once  dextrose 50% Injectable 12.5 Gram(s) IV Push once  dextrose 50% Injectable 25 Gram(s) IV Push once  glucagon  Injectable 1 milliGRAM(s) IntraMuscular once  influenza   Vaccine 0.5 milliLiter(s) IntraMuscular once  insulin glargine Injectable (LANTUS) 10 Unit(s) SubCutaneous at bedtime  insulin lispro (ADMELOG) corrective regimen sliding scale   SubCutaneous Before meals and at bedtime  magnesium sulfate  IVPB 2 Gram(s) IV Intermittent once    MEDICATIONS  (PRN):  bisacodyl 5 milliGRAM(s) Oral every 12 hours PRN Constipation  dextrose Oral Gel 15 Gram(s) Oral once PRN Blood Glucose LESS THAN 70 milliGRAM(s)/deciliter    Allergies    No Known Allergies    Intolerances      Vital Signs Last 24 Hrs  T(C): 36.7 (18 Oct 2022 10:50), Max: 37.1 (18 Oct 2022 01:00)  T(F): 98.1 (18 Oct 2022 10:50), Max: 98.8 (18 Oct 2022 01:00)  HR: 108 (18 Oct 2022 09:37) (88 - 108)  BP: 145/79 (18 Oct 2022 09:37) (145/79 - 180/83)  BP(mean): 105 (18 Oct 2022 09:37) (98 - 119)  RR: 16 (18 Oct 2022 09:37) (12 - 18)  SpO2: 100% (18 Oct 2022 09:37) (99% - 100%)    Parameters below as of 18 Oct 2022 09:37  Patient On (Oxygen Delivery Method): room air        Physical exam:  Constitutional: No acute distress, conversant  Eyes: Anicteric sclerae, moist conjunctivae, see below for CNs  Neck: trachea midline, FROM, supple, no thyromegaly or lymphadenopathy  Cardiovascular: Regular rate and rhythm, no murmurs, rubs, or gallops. No carotid bruits.   Pulmonary: Anterior breath sounds clear bilaterally, no crackles or wheezing. No use of accessory muscles  GI: Abdomen soft, non-distended, non-tender  Extremities: Radial and DP pulses +2, no edema    Neurologic:  -Mental status: Awake, alert, oriented to person, place, and time. Speech is fluent with intact naming, repetition, and comprehension, no dysarthria. Recent and remote memory intact. Follows commands. Attention/concentration intact. Fund of knowledge appropriate.  -Cranial nerves:   II: Visual fields are full to confrontation.  III, IV, VI: Extraocular movements are intact without nystagmus. Pupils equally round and reactive to light  V:  Facial sensation V1-V3 equal and intact   VII: Face is symmetric with normal eye closure and smile  VIII: Hearing is bilaterally intact to finger rub  IX, X: Uvula is midline and soft palate rises symmetrically  XI: Head turning and shoulder shrug are intact.  XII: Tongue protrudes midline  Motor: Normal bulk and tone. No pronator drift. Strength bilateral upper extremity 5/5, bilateral lower extremities 5/5.  Rapid alternating movements intact and symmetric  Sensation: Intact to light touch bilaterally. No neglect or extinction on double simultaneous testing.  Coordination: No dysmetria on finger-to-nose and heel-to-shin bilaterally  Reflexes: Downgoing toes bilaterally   Gait: Narrow gait and steady    NIHSS: **** ASPECT Score: ***** ICH Score: ****** (GCS)    Fingerstick Blood Glucose: CAPILLARY BLOOD GLUCOSE      POCT Blood Glucose.: 112 mg/dL (18 Oct 2022 06:08)    LABS:                        8.4    10.64 )-----------( 338      ( 18 Oct 2022 05:30 )             26.9     10-18    147<H>  |  120<H>  |  90<H>  ----------------------------<  128<H>  4.4   |  14<L>  |  7.88<H>    Ca    7.9<L>      18 Oct 2022 05:30  Phos  5.2     10-18  Mg     1.8     10-18    TPro  6.3  /  Alb  3.2<L>  /  TBili  0.3  /  DBili  x   /  AST  12  /  ALT  16  /  AlkPhos  82  10-18    PT/INR - ( 16 Oct 2022 17:56 )   PT: 12.5 sec;   INR: 1.05          PTT - ( 16 Oct 2022 17:56 )  PTT:32.7 sec  CARDIAC MARKERS ( 17 Oct 2022 09:54 )  x     / 0.15 ng/mL / 482 U/L / x     / 11.8 ng/mL  CARDIAC MARKERS ( 17 Oct 2022 05:30 )  x     / 0.16 ng/mL / x     / x     / x      CARDIAC MARKERS ( 16 Oct 2022 22:30 )  x     / 0.13 ng/mL / 621 U/L / x     / 18.2 ng/mL  CARDIAC MARKERS ( 16 Oct 2022 17:56 )  x     / x     / 681 U/L / x     / x          Urinalysis Basic - ( 16 Oct 2022 22:30 )    Color: Yellow / Appearance: Clear / S.025 / pH: x  Gluc: x / Ketone: NEGATIVE  / Bili: Negative / Urobili: 0.2 E.U./dL   Blood: x / Protein: 100 mg/dL / Nitrite: NEGATIVE   Leuk Esterase: NEGATIVE / RBC: < 5 /HPF / WBC < 5 /HPF   Sq Epi: x / Non Sq Epi: 0-5 /HPF / Bacteria: Present /HPF        RADIOLOGY & ADDITIONAL STUDIES:      -----------------------------------------------------------------------------------------------------------------  IV-tPA (Y/N):    ***                              Bolus time:    Alteplase Dose Verification w/ RN:  Reason IV-tPA not given: ***    **STROKE CONSULT NOTE**    HPI: 57y Male with PMHx of IDDM, CVA x2 (residual left sided weakness), HTN, HLD, CKD presented on 10/16 for AMS and slurred speech, with weakness over several days. Per chart review patient was found on the floor in his house by his cousin with presenting symptoms. On arrival patient was found to have an increase WBC count, anemia (Hgb 6.9), hypernatremia, and elevated BUN/Cr 97/7.99. He was admitted under medicine service and received a total of 3 units of PRBCs. He is also being followed by nephrology due to his ALEXIS and hypernatremia for possible dialysis. Neurology was also consulted and recommended VEEG and MRI to rule out seizures and any new strokes. MRI completed and revealed multiple scattered small acute to subacute infarcts in multiple vascular territories suggestive of a cardioembolic source. Stroke service was consulted for further workup of MRI findings.   Upon further chart review this patient was admitted in 2019  after presenting with slurred speech and LE weakness and was found to have acute lacunar infarcts in the left corona radiata and macrina. At that time his workup consisted of a KATHY and Echo that showed no clot or shunt with a normal ejection fraction. He also underwent a hypercoagulable workup that initially showed a positive lupus assay that was then repeated as outpatient and found to be negative. It appears that he followed up for a short time with both Hematology and Neurology as outpatient and was referred to have an ILR placed but unclear if he did.     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 No nausea, vomiting or hematemesis; No diarrhea or constipation. Nohematochezia.  Genitourinary: No dysuria, frequency, hematuria or incontinence  Neurological: As per HPI  Skin: No itching, burning, rashes or lesions   Endocrine: No heat or cold intolerance; No hair loss  Musculoskeletal: No joint pain or swelling; No muscle, back or extremity pain  Heme/Lymph: No easy bruising or bleeding gums    MEDICATIONS  (STANDING):  atorvastatin 80 milliGRAM(s) Oral at bedtime  dextrose 5%. 1000 milliLiter(s) (80 mL/Hr) IV Continuous <Continuous>  dextrose 5%. 1000 milliLiter(s) (100 mL/Hr) IV Continuous <Continuous>  dextrose 5%. 1000 milliLiter(s) (50 mL/Hr) IV Continuous <Continuous>  dextrose 50% Injectable 25 Gram(s) IV Push once  dextrose 50% Injectable 12.5 Gram(s) IV Push once  dextrose 50% Injectable 25 Gram(s) IV Push once  glucagon  Injectable 1 milliGRAM(s) IntraMuscular once  influenza   Vaccine 0.5 milliLiter(s) IntraMuscular once  insulin glargine Injectable (LANTUS) 10 Unit(s) SubCutaneous at bedtime  insulin lispro (ADMELOG) corrective regimen sliding scale   SubCutaneous Before meals and at bedtime  magnesium sulfate  IVPB 2 Gram(s) IV Intermittent once    MEDICATIONS  (PRN):  bisacodyl 5 milliGRAM(s) Oral every 12 hours PRN Constipation  dextrose Oral Gel 15 Gram(s) Oral once PRN Blood Glucose LESS THAN 70 milliGRAM(s)/deciliter    Allergies    No Known Allergies    Intolerances      Vital Signs Last 24 Hrs  T(C): 36.7 (18 Oct 2022 10:50), Max: 37.1 (18 Oct 2022 01:00)  T(F): 98.1 (18 Oct 2022 10:50), Max: 98.8 (18 Oct 2022 01:00)  HR: 108 (18 Oct 2022 09:37) (88 - 108)  BP: 145/79 (18 Oct 2022 09:37) (145/79 - 180/83)  BP(mean): 105 (18 Oct 2022 09:37) (98 - 119)  RR: 16 (18 Oct 2022 09:37) (12 - 18)  SpO2: 100% (18 Oct 2022 09:37) (99% - 100%)    Parameters below as of 18 Oct 2022 09:37  Patient On (Oxygen Delivery Method): room air        **INCOMPLETE**  Physical exam:  Constitutional: No acute distress, conversant  Eyes: Anicteric sclerae, moist conjunctivae, see below for CNs  Neck: trachea midline, FROM, supple, no thyromegaly or lymphadenopathy  Cardiovascular: Regular rate and rhythm, no murmurs, rubs, or gallops. No carotid bruits.   Pulmonary: Anterior breath sounds clear bilaterally, no crackles or wheezing. No use of accessory muscles  GI: Abdomen soft, non-distended, non-tender  Extremities: Radial and DP pulses +2, no edema    **INCOMPLETE**  Neurologic:  -Mental status: Awake, alert, oriented to person, place, and time. Speech is fluent with intact naming, repetition, and comprehension, no dysarthria. Recent and remote memory intact. Follows commands. Attention/concentration intact. Fund of knowledge appropriate.  -Cranial nerves:   II: Visual fields are full to confrontation.  III, IV, VI: Extraocular movements are intact without nystagmus. Pupils equally round and reactive to light  V:  Facial sensation V1-V3 equal and intact   VII: Face is symmetric with normal eye closure and smile  VIII: Hearing is bilaterally intact to finger rub  IX, X: Uvula is midline and soft palate rises symmetrically  XI: Head turning and shoulder shrug are intact.  XII: Tongue protrudes midline  Motor: Normal bulk and tone. No pronator drift. Strength bilateral upper extremity 5/5, bilateral lower extremities 5/5.  Rapid alternating movements intact and symmetric  Sensation: Intact to light touch bilaterally. No neglect or extinction on double simultaneous testing.  Coordination: No dysmetria on finger-to-nose and heel-to-shin bilaterally  Reflexes: Downgoing toes bilaterally   Gait: Narrow gait and steady    NIHSS:     Fingerstick Blood Glucose: CAPILLARY BLOOD GLUCOSE      POCT Blood Glucose.: 112 mg/dL (18 Oct 2022 06:08)    LABS:                        8.4    10.64 )-----------( 338      ( 18 Oct 2022 05:30 )             26.9     10-18    147<H>  |  120<H>  |  90<H>  ----------------------------<  128<H>  4.4   |  14<L>  |  7.88<H>    Ca    7.9<L>      18 Oct 2022 05:30  Phos  5.2     10-18  Mg     1.8     10-18    TPro  6.3  /  Alb  3.2<L>  /  TBili  0.3  /  DBili  x   /  AST  12  /  ALT  16  /  AlkPhos  82  10-18    PT/INR - ( 16 Oct 2022 17:56 )   PT: 12.5 sec;   INR: 1.05          PTT - ( 16 Oct 2022 17:56 )  PTT:32.7 sec  CARDIAC MARKERS ( 17 Oct 2022 09:54 )  x     / 0.15 ng/mL / 482 U/L / x     / 11.8 ng/mL  CARDIAC MARKERS ( 17 Oct 2022 05:30 )  x     / 0.16 ng/mL / x     / x     / x      CARDIAC MARKERS ( 16 Oct 2022 22:30 )  x     / 0.13 ng/mL / 621 U/L / x     / 18.2 ng/mL  CARDIAC MARKERS ( 16 Oct 2022 17:56 )  x     / x     / 681 U/L / x     / x          Urinalysis Basic - ( 16 Oct 2022 22:30 )    Color: Yellow / Appearance: Clear / S.025 / pH: x  Gluc: x / Ketone: NEGATIVE  / Bili: Negative / Urobili: 0.2 E.U./dL   Blood: x / Protein: 100 mg/dL / Nitrite: NEGATIVE   Leuk Esterase: NEGATIVE / RBC: < 5 /HPF / WBC < 5 /HPF   Sq Epi: x / Non Sq Epi: 0-5 /HPF / Bacteria: Present /HPF        RADIOLOGY & ADDITIONAL STUDIES:  < from: MR Head No Cont (10.17.22 @ 21:39) >  Brain: Parenchymal brain volume loss is within normal limits for the   patient&apos;s  reported age. Multiple T2/FLAIR hyperintense foci in the supratentorial   white  matter and the macrina are nonspecific, but likely represent sequelae of   chronic  ischemic small vessel disease. No midline shift. Basal cisterns are   patent.  Approximately 10 mm linear focus of restricted diffusion in the left   frontal  lobe, periventricular white matter. Additional small linear right sub   insular  focus of restricted diffusion. Subcentimeter focus of restricted diffusion  adjacent to the atrium of the left lateral ventricle (series 203, image   216).  Possible punctate focus of restricted diffusion in the left parietal lobe  (series 203, image 248) and right parietal lobe (image 232).  Cerebral ventricles: No hydrocephalus.  Bones/joints:  Not well evaluated.  Paranasal sinuses:  No evident air-fluid levels, not well evaluated.  Mastoid air cells:  Appear to be well aerated.  Orbital cavities:  Not well evaluated  Soft tissues:  Grossly unremarkable.    IMPRESSION:  1. Motion degraded exam. Axial FLAIR series are, for the most part,  nondiagnostic.  2. Multiple scattered very small foci of restricted diffusion. Lesions   are in  multiple vascular territories, and suggest acute/subacute infarcts, with   the  central embolic source.      < end of copied text >        **STROKE CONSULT NOTE**    HPI: 57y Male with PMHx of IDDM, CVA x2 (residual left sided weakness), HTN, HLD, CKD presented on 10/16 for AMS and slurred speech, with weakness over several days. Per chart review patient was found on the floor in his house by his cousin with presenting symptoms. On arrival patient was found to have an increase WBC count, anemia (Hgb 6.9), hypernatremia, and elevated BUN/Cr 97/7.99. He was admitted under medicine service and received a total of 3 units of PRBCs. He is also being followed by nephrology due to his ALEXIS and hypernatremia for possible dialysis. Neurology was also consulted and recommended VEEG and MRI to rule out seizures and any new strokes. MRI completed and revealed multiple scattered small acute to subacute infarcts in multiple vascular territories suggestive of a cardioembolic source. Stroke service was consulted for further workup of MRI findings.   Upon further chart review this patient was admitted in 2019  after presenting with slurred speech and LE weakness and was found to have acute lacunar infarcts in the left corona radiata and macrina. At that time his workup consisted of a KATHY and Echo that showed no clot or shunt with a normal ejection fraction. He also underwent a hypercoagulable workup that initially showed a positive lupus assay that was then repeated as outpatient and found to be negative. It appears that he followed up for a short time with both Hematology and Neurology as outpatient and was referred to have an ILR placed but unclear if he did.     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 No nausea, vomiting or hematemesis; No diarrhea or constipation. Nohematochezia.  Genitourinary: No dysuria, frequency, hematuria or incontinence  Neurological: As per HPI  Skin: No itching, burning, rashes or lesions   Endocrine: No heat or cold intolerance; No hair loss  Musculoskeletal: No joint pain or swelling; No muscle, back or extremity pain  Heme/Lymph: No easy bruising or bleeding gums    MEDICATIONS  (STANDING):  atorvastatin 80 milliGRAM(s) Oral at bedtime  dextrose 5%. 1000 milliLiter(s) (80 mL/Hr) IV Continuous <Continuous>  dextrose 5%. 1000 milliLiter(s) (100 mL/Hr) IV Continuous <Continuous>  dextrose 5%. 1000 milliLiter(s) (50 mL/Hr) IV Continuous <Continuous>  dextrose 50% Injectable 25 Gram(s) IV Push once  dextrose 50% Injectable 12.5 Gram(s) IV Push once  dextrose 50% Injectable 25 Gram(s) IV Push once  glucagon  Injectable 1 milliGRAM(s) IntraMuscular once  influenza   Vaccine 0.5 milliLiter(s) IntraMuscular once  insulin glargine Injectable (LANTUS) 10 Unit(s) SubCutaneous at bedtime  insulin lispro (ADMELOG) corrective regimen sliding scale   SubCutaneous Before meals and at bedtime  magnesium sulfate  IVPB 2 Gram(s) IV Intermittent once    MEDICATIONS  (PRN):  bisacodyl 5 milliGRAM(s) Oral every 12 hours PRN Constipation  dextrose Oral Gel 15 Gram(s) Oral once PRN Blood Glucose LESS THAN 70 milliGRAM(s)/deciliter    Allergies    No Known Allergies    Intolerances      Vital Signs Last 24 Hrs  T(C): 36.7 (18 Oct 2022 10:50), Max: 37.1 (18 Oct 2022 01:00)  T(F): 98.1 (18 Oct 2022 10:50), Max: 98.8 (18 Oct 2022 01:00)  HR: 108 (18 Oct 2022 09:37) (88 - 108)  BP: 145/79 (18 Oct 2022 09:37) (145/79 - 180/83)  BP(mean): 105 (18 Oct 2022 09:37) (98 - 119)  RR: 16 (18 Oct 2022 09:37) (12 - 18)  SpO2: 100% (18 Oct 2022 09:37) (99% - 100%)    Parameters below as of 18 Oct 2022 09:37  Patient On (Oxygen Delivery Method): room air        Physical exam:  Constitutional: No acute distress, conversant  Eyes: Anicteric sclerae, moist conjunctivae, see below for CNs  Neck: trachea midline, FROM, supple, no thyromegaly or lymphadenopathy  Cardiovascular: Regular rate and rhythm, no murmurs, rubs, or gallops. No carotid bruits.   Pulmonary: Anterior breath sounds clear bilaterally, no crackles or wheezing. No use of accessory muscles  GI: Abdomen soft, non-distended, non-tender  Extremities: Radial and DP pulses +2, no edema      Neurologic:  -Mental status: Awake, alert, remained attentive throughout exam, oriented to person, place, and time. Speech is slurred (pt reports that his speech is at baseline) with intact naming, repetition, and comprehension, no dysarthria. Recent and remote memory diminished. Follows commands. Attention/concentration intact. Lacking some insight, explaining that he didn't know he had kidney disease.  -Cranial nerves:   II: Visual fields are full to confrontation.  III, IV, VI: Extraocular movements are intact without nystagmus. Pupils equally round and reactive to light  V:  Facial sensation V1-V3 equal and intact   VII: Face is symmetric with normal eye closure and smile  XII: Tongue protrudes midline  Motor: Normal bulk and tone. Right upper extremity 5/5, pronates without drift. Left upper extremity 5/5. Right lower extremity 5/5.  Left lower extremity 4/5, unable to lift off of bed under own strength, with assistance able to remain antigravity without drift.  Sensation: Intact to light touch bilaterally. No neglect or extinction on double simultaneous testing.  Coordination: No dysmetria on finger-to-nose    NIHSS: 3    Fingerstick Blood Glucose: CAPILLARY BLOOD GLUCOSE      POCT Blood Glucose.: 112 mg/dL (18 Oct 2022 06:08)    LABS:                        8.4    10.64 )-----------( 338      ( 18 Oct 2022 05:30 )             26.9     10-18    147<H>  |  120<H>  |  90<H>  ----------------------------<  128<H>  4.4   |  14<L>  |  7.88<H>    Ca    7.9<L>      18 Oct 2022 05:30  Phos  5.2     10-18  Mg     1.8     10-18    TPro  6.3  /  Alb  3.2<L>  /  TBili  0.3  /  DBili  x   /  AST  12  /  ALT  16  /  AlkPhos  82  10-18    PT/INR - ( 16 Oct 2022 17:56 )   PT: 12.5 sec;   INR: 1.05          PTT - ( 16 Oct 2022 17:56 )  PTT:32.7 sec  CARDIAC MARKERS ( 17 Oct 2022 09:54 )  x     / 0.15 ng/mL / 482 U/L / x     / 11.8 ng/mL  CARDIAC MARKERS ( 17 Oct 2022 05:30 )  x     / 0.16 ng/mL / x     / x     / x      CARDIAC MARKERS ( 16 Oct 2022 22:30 )  x     / 0.13 ng/mL / 621 U/L / x     / 18.2 ng/mL  CARDIAC MARKERS ( 16 Oct 2022 17:56 )  x     / x     / 681 U/L / x     / x          Urinalysis Basic - ( 16 Oct 2022 22:30 )    Color: Yellow / Appearance: Clear / S.025 / pH: x  Gluc: x / Ketone: NEGATIVE  / Bili: Negative / Urobili: 0.2 E.U./dL   Blood: x / Protein: 100 mg/dL / Nitrite: NEGATIVE   Leuk Esterase: NEGATIVE / RBC: < 5 /HPF / WBC < 5 /HPF   Sq Epi: x / Non Sq Epi: 0-5 /HPF / Bacteria: Present /HPF        RADIOLOGY & ADDITIONAL STUDIES:  < from: MR Head No Cont (10.17.22 @ 21:39) >  Brain: Parenchymal brain volume loss is within normal limits for the   patient&apos;s  reported age. Multiple T2/FLAIR hyperintense foci in the supratentorial   white  matter and the macrina are nonspecific, but likely represent sequelae of   chronic  ischemic small vessel disease. No midline shift. Basal cisterns are   patent.  Approximately 10 mm linear focus of restricted diffusion in the left   frontal  lobe, periventricular white matter. Additional small linear right sub   insular  focus of restricted diffusion. Subcentimeter focus of restricted diffusion  adjacent to the atrium of the left lateral ventricle (series 203, image   216).  Possible punctate focus of restricted diffusion in the left parietal lobe  (series 203, image 248) and right parietal lobe (image 232).  Cerebral ventricles: No hydrocephalus.  Bones/joints:  Not well evaluated.  Paranasal sinuses:  No evident air-fluid levels, not well evaluated.  Mastoid air cells:  Appear to be well aerated.  Orbital cavities:  Not well evaluated  Soft tissues:  Grossly unremarkable.    IMPRESSION:  1. Motion degraded exam. Axial FLAIR series are, for the most part,  nondiagnostic.  2. Multiple scattered very small foci of restricted diffusion. Lesions   are in  multiple vascular territories, and suggest acute/subacute infarcts, with   the  central embolic source.      < end of copied text >        **STROKE CONSULT NOTE**    HPI: 57y Male with PMHx of IDDM, CVA x2 (residual left sided weakness), HTN, HLD, CKD presented on 10/16 for AMS and slurred speech, with weakness over several days. Per chart review patient was found on the floor in his house by his cousin with presenting symptoms. On arrival patient was found to have an increase WBC count, anemia (Hgb 6.9), hypernatremia, and elevated BUN/Cr 97/7.99. He was admitted under medicine service and received a total of 3 units of PRBCs. He is also being followed by nephrology due to his ALEXIS and hypernatremia for possible dialysis. Neurology was also consulted and recommended VEEG and MRI to rule out seizures and any new strokes. MRI completed and revealed multiple scattered small acute to subacute infarcts in multiple vascular territories suggestive of a cardioembolic source. Stroke service was consulted for further workup of MRI findings.   Upon further chart review this patient was admitted in 2019  after presenting with slurred speech and LE weakness and was found to have acute lacunar infarcts in the left corona radiata and macrina. At that time his workup consisted of a KATHY and Echo that showed no clot or shunt with a normal ejection fraction. He also underwent a hypercoagulable workup that initially showed a positive lupus assay that was then repeated as outpatient and found to be negative. It appears that he followed up for a short time with both Hematology and Neurology as outpatient and was referred to have an ILR placed but never followed up.     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 No nausea, vomiting or hematemesis; No diarrhea or constipation. Nohematochezia.  Genitourinary: No dysuria, frequency, hematuria or incontinence  Neurological: As per HPI  Skin: No itching, burning, rashes or lesions   Endocrine: No heat or cold intolerance; No hair loss  Musculoskeletal: No joint pain or swelling; No muscle, back or extremity pain  Heme/Lymph: No easy bruising or bleeding gums    MEDICATIONS  (STANDING):  atorvastatin 80 milliGRAM(s) Oral at bedtime  dextrose 5%. 1000 milliLiter(s) (80 mL/Hr) IV Continuous <Continuous>  dextrose 5%. 1000 milliLiter(s) (100 mL/Hr) IV Continuous <Continuous>  dextrose 5%. 1000 milliLiter(s) (50 mL/Hr) IV Continuous <Continuous>  dextrose 50% Injectable 25 Gram(s) IV Push once  dextrose 50% Injectable 12.5 Gram(s) IV Push once  dextrose 50% Injectable 25 Gram(s) IV Push once  glucagon  Injectable 1 milliGRAM(s) IntraMuscular once  influenza   Vaccine 0.5 milliLiter(s) IntraMuscular once  insulin glargine Injectable (LANTUS) 10 Unit(s) SubCutaneous at bedtime  insulin lispro (ADMELOG) corrective regimen sliding scale   SubCutaneous Before meals and at bedtime  magnesium sulfate  IVPB 2 Gram(s) IV Intermittent once    MEDICATIONS  (PRN):  bisacodyl 5 milliGRAM(s) Oral every 12 hours PRN Constipation  dextrose Oral Gel 15 Gram(s) Oral once PRN Blood Glucose LESS THAN 70 milliGRAM(s)/deciliter    Allergies    No Known Allergies    Intolerances      Vital Signs Last 24 Hrs  T(C): 36.7 (18 Oct 2022 10:50), Max: 37.1 (18 Oct 2022 01:00)  T(F): 98.1 (18 Oct 2022 10:50), Max: 98.8 (18 Oct 2022 01:00)  HR: 108 (18 Oct 2022 09:37) (88 - 108)  BP: 145/79 (18 Oct 2022 09:37) (145/79 - 180/83)  BP(mean): 105 (18 Oct 2022 09:37) (98 - 119)  RR: 16 (18 Oct 2022 09:37) (12 - 18)  SpO2: 100% (18 Oct 2022 09:37) (99% - 100%)    Parameters below as of 18 Oct 2022 09:37  Patient On (Oxygen Delivery Method): room air        Physical exam:  Constitutional: No acute distress, conversant  Eyes: Anicteric sclerae, moist conjunctivae, see below for CNs  Neck: trachea midline, FROM, supple, no thyromegaly or lymphadenopathy  Cardiovascular: Regular rate and rhythm, no murmurs, rubs, or gallops. No carotid bruits.   Pulmonary: Anterior breath sounds clear bilaterally, no crackles or wheezing. No use of accessory muscles  GI: Abdomen soft, non-distended, non-tender  Extremities: Radial and DP pulses +2, no edema      Neurologic:  -Mental status: Awake, alert, remained attentive throughout exam, oriented to person, place, and time. Speech is slurred (pt reports that his speech is at baseline) with intact naming, repetition, and comprehension, no dysarthria. Recent and remote memory diminished. Follows commands. Attention/concentration intact. Lacking some insight, explaining that he didn't know he had kidney disease.  -Cranial nerves:   II: Visual fields are full to confrontation.  III, IV, VI: Extraocular movements are intact without nystagmus. Pupils equally round and reactive to light  V:  Facial sensation V1-V3 equal and intact   VII: Face is symmetric with normal eye closure and smile  XII: Tongue protrudes midline  Motor: Normal bulk and tone. Right upper extremity 5/5, pronates without drift. Left upper extremity 5/5. Right lower extremity 5/5.  Left lower extremity 4/5, unable to lift off of bed under own strength, with assistance able to remain antigravity without drift.  Sensation: Intact to light touch bilaterally. No neglect or extinction on double simultaneous testing.  Coordination: No dysmetria on finger-to-nose    NIHSS: 3    Fingerstick Blood Glucose: CAPILLARY BLOOD GLUCOSE      POCT Blood Glucose.: 112 mg/dL (18 Oct 2022 06:08)    LABS:                        8.4    10.64 )-----------( 338      ( 18 Oct 2022 05:30 )             26.9     10-18    147<H>  |  120<H>  |  90<H>  ----------------------------<  128<H>  4.4   |  14<L>  |  7.88<H>    Ca    7.9<L>      18 Oct 2022 05:30  Phos  5.2     10-18  Mg     1.8     10-18    TPro  6.3  /  Alb  3.2<L>  /  TBili  0.3  /  DBili  x   /  AST  12  /  ALT  16  /  AlkPhos  82  10-18    PT/INR - ( 16 Oct 2022 17:56 )   PT: 12.5 sec;   INR: 1.05          PTT - ( 16 Oct 2022 17:56 )  PTT:32.7 sec  CARDIAC MARKERS ( 17 Oct 2022 09:54 )  x     / 0.15 ng/mL / 482 U/L / x     / 11.8 ng/mL  CARDIAC MARKERS ( 17 Oct 2022 05:30 )  x     / 0.16 ng/mL / x     / x     / x      CARDIAC MARKERS ( 16 Oct 2022 22:30 )  x     / 0.13 ng/mL / 621 U/L / x     / 18.2 ng/mL  CARDIAC MARKERS ( 16 Oct 2022 17:56 )  x     / x     / 681 U/L / x     / x          Urinalysis Basic - ( 16 Oct 2022 22:30 )    Color: Yellow / Appearance: Clear / S.025 / pH: x  Gluc: x / Ketone: NEGATIVE  / Bili: Negative / Urobili: 0.2 E.U./dL   Blood: x / Protein: 100 mg/dL / Nitrite: NEGATIVE   Leuk Esterase: NEGATIVE / RBC: < 5 /HPF / WBC < 5 /HPF   Sq Epi: x / Non Sq Epi: 0-5 /HPF / Bacteria: Present /HPF        RADIOLOGY & ADDITIONAL STUDIES:  < from: MR Head No Cont (10.17.22 @ 21:39) >  Brain: Parenchymal brain volume loss is within normal limits for the   patient&apos;s  reported age. Multiple T2/FLAIR hyperintense foci in the supratentorial   white  matter and the macrina are nonspecific, but likely represent sequelae of   chronic  ischemic small vessel disease. No midline shift. Basal cisterns are   patent.  Approximately 10 mm linear focus of restricted diffusion in the left   frontal  lobe, periventricular white matter. Additional small linear right sub   insular  focus of restricted diffusion. Subcentimeter focus of restricted diffusion  adjacent to the atrium of the left lateral ventricle (series 203, image   216).  Possible punctate focus of restricted diffusion in the left parietal lobe  (series 203, image 248) and right parietal lobe (image 232).  Cerebral ventricles: No hydrocephalus.  Bones/joints:  Not well evaluated.  Paranasal sinuses:  No evident air-fluid levels, not well evaluated.  Mastoid air cells:  Appear to be well aerated.  Orbital cavities:  Not well evaluated  Soft tissues:  Grossly unremarkable.    IMPRESSION:  1. Motion degraded exam. Axial FLAIR series are, for the most part,  nondiagnostic.  2. Multiple scattered very small foci of restricted diffusion. Lesions   are in  multiple vascular territories, and suggest acute/subacute infarcts, with   the  central embolic source.      < end of copied text >

## 2022-10-18 NOTE — DIETITIAN INITIAL EVALUATION ADULT - NS FNS DIET ORDER
Diet, Renal Restrictions:   For patients receiving Renal Replacement - No Protein Restr, No Conc K, No Conc Phos, Low Sodium (10-17-22 @ 15:34)

## 2022-10-18 NOTE — PROGRESS NOTE ADULT - ASSESSMENT
Patient is a 57y old Male w/ PMHx of IDDM, CVA x 2 (residual left side weakness), HTN, HLD, and CKD presents c/o slurred speech, weakness over the past several days. Admitted initially to [facility] overnight, found to be persistently tachycardic in ED and with BUN/Cr 91/7.42. Admitted to telemetry for further management. Patient is a 57y old Male w/ PMHx of IDDM, CVA x 2 (residual left side weakness), HTN, HLD, and CKD presents c/o slurred speech, weakness over the past several days, found to be tachycardic, hypernatremic, and with BUN/Cr 91/7.42, neuro & renal following, stable for step-down to [facility].

## 2022-10-18 NOTE — DIETITIAN INITIAL EVALUATION ADULT - NUTRITIONGOAL OUTCOME1
Pt will meet 75% or more of protein/energy needs via most appropriate route for nutrition and reduce/resolve s/sx PCM.

## 2022-10-18 NOTE — DIETITIAN NUTRITION RISK NOTIFICATION - TREATMENT: THE FOLLOWING DIET HAS BEEN RECOMMENDED
Diet, Renal Restrictions:   For patients receiving Renal Replacement - No Protein Restr, No Conc K, No Conc Phos, Low Sodium  Supplement Feeding Modality:  Oral  Nepro Cans or Servings Per Day:  1       Frequency:  Three Times a day (10-18-22 @ 12:48) [Active]

## 2022-10-18 NOTE — PROGRESS NOTE ADULT - PROBLEM SELECTOR PLAN 3
Presented w 153. s/p 2L NS in ED - likely 2/2 dehydration   - 10/17 AM: Na 154 ; free water deficit 5.4L  - Started D5 125cc/hr for 24h (3L total)   - f/u dysphagia screen, encourage PO intake   - Continue to monitor Presented w 153. s/p 2L NS in ED - likely 2/2 dehydration   - started D5 125cc/hr, Na improved to 147  - continue with D5 80cc/hr  - Continue to monitor

## 2022-10-18 NOTE — PROGRESS NOTE ADULT - ATTENDING COMMENTS
57 year old male with a PMHx of IDDM (A1c 7.2%), CVA x 2 (with residual LLE weakness), HTN, HLD and CKD (baseline Cr 2.9) who presented with slurred speech and weakness after being found down.  Initially admitted to [facility] but stepped up to telemetry for persistent tachycardia. Neurology consulted and MRI showed multiple scattered small foci of restricted diffusion with lesions in multiple vascular territories suggesting acute/subacute infarcts with a central embolic source.  Additionally, patient found to be hypernatremia (improving with IVFs), anemic (s/p 3 units pRBCs without definitive source of bleeding identified) and with significant ALEXIS.      Plan:    -consult neuro-stroke service given MRI findings, f/u formal recommendations   -f/u TTE with bubble study   -obtain LE dopplers given persistent tachycardia (despite IVFs) and immobilization while found down (although not hypoxic)  -continue with atorvastatin 80mg daily, hold ASA 81mg daily until hemoglobin stable    -continue with D5 at 80cc/hr, hypernatremia improving as correct rate    -nephrology consulted, appreciate recommendations, urine electrolytes suggestive of intrinsic pathology but etiology remains unclear and likely multifactorial (pre-renal from dehydration vs. post-renal from retention vs. intrinsic from natural progress of CKD), plan continue weekly EPO, may need to start PO sodium bicarbonate  -monitor blood pressure closely, will likely need to start anti-hypertensives in coming days but will allow for permissive hypertension in setting of recent stroke  -f/u FOBT (reported dark stools prior to admission), will need age appropriate cancer screen as outpatient     Rest of plan as per resident note.

## 2022-10-19 LAB
ALBUMIN SERPL ELPH-MCNC: 3.5 G/DL — SIGNIFICANT CHANGE UP (ref 3.3–5)
ALP SERPL-CCNC: 88 U/L — SIGNIFICANT CHANGE UP (ref 40–120)
ALT FLD-CCNC: 15 U/L — SIGNIFICANT CHANGE UP (ref 10–45)
ANION GAP SERPL CALC-SCNC: 14 MMOL/L — SIGNIFICANT CHANGE UP (ref 5–17)
ANION GAP SERPL CALC-SCNC: 16 MMOL/L — SIGNIFICANT CHANGE UP (ref 5–17)
AST SERPL-CCNC: 13 U/L — SIGNIFICANT CHANGE UP (ref 10–40)
BASOPHILS # BLD AUTO: 0.02 K/UL — SIGNIFICANT CHANGE UP (ref 0–0.2)
BASOPHILS NFR BLD AUTO: 0.2 % — SIGNIFICANT CHANGE UP (ref 0–2)
BILIRUB SERPL-MCNC: 0.2 MG/DL — SIGNIFICANT CHANGE UP (ref 0.2–1.2)
BUN SERPL-MCNC: 87 MG/DL — HIGH (ref 7–23)
BUN SERPL-MCNC: 92 MG/DL — HIGH (ref 7–23)
CALCIUM SERPL-MCNC: 8.2 MG/DL — LOW (ref 8.4–10.5)
CALCIUM SERPL-MCNC: 8.4 MG/DL — SIGNIFICANT CHANGE UP (ref 8.4–10.5)
CHLORIDE SERPL-SCNC: 111 MMOL/L — HIGH (ref 96–108)
CHLORIDE SERPL-SCNC: 113 MMOL/L — HIGH (ref 96–108)
CO2 SERPL-SCNC: 15 MMOL/L — LOW (ref 22–31)
CO2 SERPL-SCNC: 15 MMOL/L — LOW (ref 22–31)
CREAT SERPL-MCNC: 7.32 MG/DL — HIGH (ref 0.5–1.3)
CREAT SERPL-MCNC: 7.56 MG/DL — HIGH (ref 0.5–1.3)
CRP SERPL-MCNC: 36.9 MG/L — HIGH (ref 0–4)
EGFR: 8 ML/MIN/1.73M2 — LOW
EGFR: 8 ML/MIN/1.73M2 — LOW
EOSINOPHIL # BLD AUTO: 0.24 K/UL — SIGNIFICANT CHANGE UP (ref 0–0.5)
EOSINOPHIL NFR BLD AUTO: 2 % — SIGNIFICANT CHANGE UP (ref 0–6)
ERYTHROCYTE [SEDIMENTATION RATE] IN BLOOD: 95 MM/HR — HIGH
GLUCOSE BLDC GLUCOMTR-MCNC: 103 MG/DL — HIGH (ref 70–99)
GLUCOSE BLDC GLUCOMTR-MCNC: 187 MG/DL — HIGH (ref 70–99)
GLUCOSE BLDC GLUCOMTR-MCNC: 344 MG/DL — HIGH (ref 70–99)
GLUCOSE BLDC GLUCOMTR-MCNC: 80 MG/DL — SIGNIFICANT CHANGE UP (ref 70–99)
GLUCOSE SERPL-MCNC: 191 MG/DL — HIGH (ref 70–99)
GLUCOSE SERPL-MCNC: 86 MG/DL — SIGNIFICANT CHANGE UP (ref 70–99)
HCT VFR BLD CALC: 28.3 % — LOW (ref 39–50)
HCT VFR BLD CALC: 29.8 % — LOW (ref 39–50)
HGB BLD-MCNC: 8.6 G/DL — LOW (ref 13–17)
HGB BLD-MCNC: 9 G/DL — LOW (ref 13–17)
IMM GRANULOCYTES NFR BLD AUTO: 0.4 % — SIGNIFICANT CHANGE UP (ref 0–0.9)
LYMPHOCYTES # BLD AUTO: 1.08 K/UL — SIGNIFICANT CHANGE UP (ref 1–3.3)
LYMPHOCYTES # BLD AUTO: 8.9 % — LOW (ref 13–44)
MAGNESIUM SERPL-MCNC: 2 MG/DL — SIGNIFICANT CHANGE UP (ref 1.6–2.6)
MCHC RBC-ENTMCNC: 23.9 PG — LOW (ref 27–34)
MCHC RBC-ENTMCNC: 23.9 PG — LOW (ref 27–34)
MCHC RBC-ENTMCNC: 30.2 GM/DL — LOW (ref 32–36)
MCHC RBC-ENTMCNC: 30.4 GM/DL — LOW (ref 32–36)
MCV RBC AUTO: 78.6 FL — LOW (ref 80–100)
MCV RBC AUTO: 79 FL — LOW (ref 80–100)
MONOCYTES # BLD AUTO: 0.9 K/UL — SIGNIFICANT CHANGE UP (ref 0–0.9)
MONOCYTES NFR BLD AUTO: 7.4 % — SIGNIFICANT CHANGE UP (ref 2–14)
NEUTROPHILS # BLD AUTO: 9.87 K/UL — HIGH (ref 1.8–7.4)
NEUTROPHILS NFR BLD AUTO: 81.1 % — HIGH (ref 43–77)
NRBC # BLD: 0 /100 WBCS — SIGNIFICANT CHANGE UP (ref 0–0)
NRBC # BLD: 0 /100 WBCS — SIGNIFICANT CHANGE UP (ref 0–0)
PHOSPHATE SERPL-MCNC: 5.7 MG/DL — HIGH (ref 2.5–4.5)
PLATELET # BLD AUTO: 315 K/UL — SIGNIFICANT CHANGE UP (ref 150–400)
PLATELET # BLD AUTO: 329 K/UL — SIGNIFICANT CHANGE UP (ref 150–400)
POTASSIUM SERPL-MCNC: 4 MMOL/L — SIGNIFICANT CHANGE UP (ref 3.5–5.3)
POTASSIUM SERPL-MCNC: 4 MMOL/L — SIGNIFICANT CHANGE UP (ref 3.5–5.3)
POTASSIUM SERPL-SCNC: 4 MMOL/L — SIGNIFICANT CHANGE UP (ref 3.5–5.3)
POTASSIUM SERPL-SCNC: 4 MMOL/L — SIGNIFICANT CHANGE UP (ref 3.5–5.3)
PROT SERPL-MCNC: 6.5 G/DL — SIGNIFICANT CHANGE UP (ref 6–8.3)
RBC # BLD: 3.6 M/UL — LOW (ref 4.2–5.8)
RBC # BLD: 3.77 M/UL — LOW (ref 4.2–5.8)
RBC # FLD: 21.3 % — HIGH (ref 10.3–14.5)
RBC # FLD: 21.7 % — HIGH (ref 10.3–14.5)
SODIUM SERPL-SCNC: 142 MMOL/L — SIGNIFICANT CHANGE UP (ref 135–145)
SODIUM SERPL-SCNC: 142 MMOL/L — SIGNIFICANT CHANGE UP (ref 135–145)
WBC # BLD: 10.71 K/UL — HIGH (ref 3.8–10.5)
WBC # BLD: 12.16 K/UL — HIGH (ref 3.8–10.5)
WBC # FLD AUTO: 10.71 K/UL — HIGH (ref 3.8–10.5)
WBC # FLD AUTO: 12.16 K/UL — HIGH (ref 3.8–10.5)

## 2022-10-19 PROCEDURE — 99233 SBSQ HOSP IP/OBS HIGH 50: CPT

## 2022-10-19 PROCEDURE — 95720 EEG PHY/QHP EA INCR W/VEEG: CPT

## 2022-10-19 PROCEDURE — 99233 SBSQ HOSP IP/OBS HIGH 50: CPT | Mod: GC

## 2022-10-19 PROCEDURE — 93970 EXTREMITY STUDY: CPT | Mod: 26

## 2022-10-19 PROCEDURE — 99232 SBSQ HOSP IP/OBS MODERATE 35: CPT

## 2022-10-19 RX ORDER — SODIUM CHLORIDE 9 MG/ML
1000 INJECTION, SOLUTION INTRAVENOUS
Refills: 0 | Status: DISCONTINUED | OUTPATIENT
Start: 2022-10-19 | End: 2022-10-20

## 2022-10-19 RX ORDER — INSULIN GLARGINE 100 [IU]/ML
8 INJECTION, SOLUTION SUBCUTANEOUS AT BEDTIME
Refills: 0 | Status: DISCONTINUED | OUTPATIENT
Start: 2022-10-19 | End: 2022-10-20

## 2022-10-19 RX ORDER — CLOPIDOGREL BISULFATE 75 MG/1
75 TABLET, FILM COATED ORAL DAILY
Refills: 0 | Status: DISCONTINUED | OUTPATIENT
Start: 2022-10-19 | End: 2022-10-20

## 2022-10-19 RX ORDER — MODAFINIL 200 MG/1
50 TABLET ORAL DAILY
Refills: 0 | Status: DISCONTINUED | OUTPATIENT
Start: 2022-10-20 | End: 2022-10-26

## 2022-10-19 RX ORDER — ASPIRIN/CALCIUM CARB/MAGNESIUM 324 MG
81 TABLET ORAL DAILY
Refills: 0 | Status: DISCONTINUED | OUTPATIENT
Start: 2022-10-19 | End: 2022-11-01

## 2022-10-19 RX ADMIN — SODIUM CHLORIDE 130 MILLILITER(S): 9 INJECTION, SOLUTION INTRAVENOUS at 17:41

## 2022-10-19 RX ADMIN — INSULIN GLARGINE 8 UNIT(S): 100 INJECTION, SOLUTION SUBCUTANEOUS at 22:15

## 2022-10-19 RX ADMIN — Medication 8: at 17:40

## 2022-10-19 RX ADMIN — SODIUM CHLORIDE 80 MILLILITER(S): 9 INJECTION, SOLUTION INTRAVENOUS at 04:06

## 2022-10-19 RX ADMIN — CLOPIDOGREL BISULFATE 75 MILLIGRAM(S): 75 TABLET, FILM COATED ORAL at 12:20

## 2022-10-19 RX ADMIN — Medication 81 MILLIGRAM(S): at 12:20

## 2022-10-19 RX ADMIN — Medication 2: at 22:15

## 2022-10-19 RX ADMIN — ATORVASTATIN CALCIUM 80 MILLIGRAM(S): 80 TABLET, FILM COATED ORAL at 21:10

## 2022-10-19 NOTE — PROGRESS NOTE ADULT - PROBLEM SELECTOR PLAN 5
Hx of insulin-dependent diabetes mellitus. Per surescripts: Lantus 15 qhs  A1c 7.2% (10/17/22)    - c/w Lantus 10units qhs & mISS, adjust as necessary Hx of insulin-dependent diabetes mellitus. Per surescripts: Lantus 15 qhs  A1c 7.2% (10/17/22)  POCT @80 in the AM   - c/w Lantus decreased by 20% to 8 units qhs & mISS, adjust as necessary

## 2022-10-19 NOTE — PROGRESS NOTE ADULT - SUBJECTIVE AND OBJECTIVE BOX
OVERNIGHT EVENTS:    SUBJECTIVE / INTERVAL HPI: Patient seen and examined at bedside.     VITAL SIGNS:  Vital Signs Last 24 Hrs  T(C): 36.7 (19 Oct 2022 11:42), Max: 36.8 (18 Oct 2022 21:59)  T(F): 98.1 (19 Oct 2022 11:42), Max: 98.3 (18 Oct 2022 21:59)  HR: 90 (19 Oct 2022 11:42) (90 - 99)  BP: 155/86 (19 Oct 2022 11:42) (155/86 - 178/84)  BP(mean): --  RR: 16 (19 Oct 2022 11:42) (16 - 18)  SpO2: 97% (19 Oct 2022 11:42) (97% - 100%)    Parameters below as of 19 Oct 2022 11:42  Patient On (Oxygen Delivery Method): room air        PHYSICAL EXAM:    General: In bed in no acute distress   HEENT: NCAT; PERRL, anicteric sclera; MMM  Neck: supple, trachea midline  Cardiovascular: S1, S2 normal; RRR, no M/G/R  Respiratory: CTABL; no W/R/R  Gastrointestinal: soft, nontender, nondistended. bowel sounds present.  Skin: no ulcerations or visible rashes appreciated  Extremities: WWP; no edema, clubbing or cyanosis, left lower extremity weakness (unable to move against gravity), RLE normal strength   Vascular: 2+ radial, DP/PT pulses B/L  Neurological: AAOx3; CN II-XII grossly intact; no focal deficits    MEDICATIONS:  MEDICATIONS  (STANDING):  aspirin  chewable 81 milliGRAM(s) Oral daily  atorvastatin 80 milliGRAM(s) Oral at bedtime  clopidogrel Tablet 75 milliGRAM(s) Oral daily  dextrose 5%. 1000 milliLiter(s) (100 mL/Hr) IV Continuous <Continuous>  dextrose 5%. 1000 milliLiter(s) (50 mL/Hr) IV Continuous <Continuous>  dextrose 50% Injectable 25 Gram(s) IV Push once  dextrose 50% Injectable 12.5 Gram(s) IV Push once  dextrose 50% Injectable 25 Gram(s) IV Push once  glucagon  Injectable 1 milliGRAM(s) IntraMuscular once  influenza   Vaccine 0.5 milliLiter(s) IntraMuscular once  insulin glargine Injectable (LANTUS) 8 Unit(s) SubCutaneous at bedtime  insulin lispro (ADMELOG) corrective regimen sliding scale   SubCutaneous Before meals and at bedtime    MEDICATIONS  (PRN):  bisacodyl 5 milliGRAM(s) Oral every 12 hours PRN Constipation  dextrose Oral Gel 15 Gram(s) Oral once PRN Blood Glucose LESS THAN 70 milliGRAM(s)/deciliter      ALLERGIES:  Allergies    No Known Allergies    Intolerances        LABS:                        8.6    12.16 )-----------( 329      ( 19 Oct 2022 08:53 )             28.3     10-19    142  |  113<H>  |  92<H>  ----------------------------<  86  4.0   |  15<L>  |  7.32<H>    Ca    8.2<L>      19 Oct 2022 08:53  Phos  5.7     10-19  Mg     2.0     10-19    TPro  6.5  /  Alb  3.5  /  TBili  0.2  /  DBili  x   /  AST  13  /  ALT  15  /  AlkPhos  88  10-19        CAPILLARY BLOOD GLUCOSE      POCT Blood Glucose.: 103 mg/dL (19 Oct 2022 14:35)      RADIOLOGY & ADDITIONAL TESTS: Reviewed. OVERNIGHT EVENTS: No acute events overnight     SUBJECTIVE / INTERVAL HPI: Patient in bed in no acute distress. No SOB, CP, or palpitations. No diarrhea or constipation.     VITAL SIGNS:  Vital Signs Last 24 Hrs  T(C): 36.7 (19 Oct 2022 11:42), Max: 36.8 (18 Oct 2022 21:59)  T(F): 98.1 (19 Oct 2022 11:42), Max: 98.3 (18 Oct 2022 21:59)  HR: 90 (19 Oct 2022 11:42) (90 - 99)  BP: 155/86 (19 Oct 2022 11:42) (155/86 - 178/84)  BP(mean): --  RR: 16 (19 Oct 2022 11:42) (16 - 18)  SpO2: 97% (19 Oct 2022 11:42) (97% - 100%)    Parameters below as of 19 Oct 2022 11:42  Patient On (Oxygen Delivery Method): room air        PHYSICAL EXAM:    General: In bed in no acute distress   HEENT: NCAT; PERRL, anicteric sclera; MMM  Neck: supple, trachea midline  Cardiovascular: S1, S2 normal; RRR, no M/G/R  Respiratory: CTABL; no W/R/R  Gastrointestinal: soft, nontender, nondistended. bowel sounds present.  Skin: no ulcerations or visible rashes appreciated  Extremities: WWP; no edema, clubbing or cyanosis, left lower extremity weakness (unable to move against gravity), RLE normal strength   Vascular: 2+ radial, DP/PT pulses B/L  Neurological: AAOx3; CN II-XII grossly intact; no focal deficits    MEDICATIONS:  MEDICATIONS  (STANDING):  aspirin  chewable 81 milliGRAM(s) Oral daily  atorvastatin 80 milliGRAM(s) Oral at bedtime  clopidogrel Tablet 75 milliGRAM(s) Oral daily  dextrose 5%. 1000 milliLiter(s) (100 mL/Hr) IV Continuous <Continuous>  dextrose 5%. 1000 milliLiter(s) (50 mL/Hr) IV Continuous <Continuous>  dextrose 50% Injectable 25 Gram(s) IV Push once  dextrose 50% Injectable 12.5 Gram(s) IV Push once  dextrose 50% Injectable 25 Gram(s) IV Push once  glucagon  Injectable 1 milliGRAM(s) IntraMuscular once  influenza   Vaccine 0.5 milliLiter(s) IntraMuscular once  insulin glargine Injectable (LANTUS) 8 Unit(s) SubCutaneous at bedtime  insulin lispro (ADMELOG) corrective regimen sliding scale   SubCutaneous Before meals and at bedtime    MEDICATIONS  (PRN):  bisacodyl 5 milliGRAM(s) Oral every 12 hours PRN Constipation  dextrose Oral Gel 15 Gram(s) Oral once PRN Blood Glucose LESS THAN 70 milliGRAM(s)/deciliter      ALLERGIES:  Allergies    No Known Allergies    Intolerances        LABS:                        8.6    12.16 )-----------( 329      ( 19 Oct 2022 08:53 )             28.3     10-19    142  |  113<H>  |  92<H>  ----------------------------<  86  4.0   |  15<L>  |  7.32<H>    Ca    8.2<L>      19 Oct 2022 08:53  Phos  5.7     10-19  Mg     2.0     10-19    TPro  6.5  /  Alb  3.5  /  TBili  0.2  /  DBili  x   /  AST  13  /  ALT  15  /  AlkPhos  88  10-19        CAPILLARY BLOOD GLUCOSE      POCT Blood Glucose.: 103 mg/dL (19 Oct 2022 14:35)      RADIOLOGY & ADDITIONAL TESTS: Reviewed.

## 2022-10-19 NOTE — PROGRESS NOTE ADULT - SUBJECTIVE AND OBJECTIVE BOX
Neurology Stroke Progress Note    INTERVAL HPI/OVERNIGHT EVENTS:  Patient seen and examined.     MEDICATIONS  (STANDING):  aspirin  chewable 81 milliGRAM(s) Oral daily  atorvastatin 80 milliGRAM(s) Oral at bedtime  clopidogrel Tablet 75 milliGRAM(s) Oral daily  dextrose 5%. 1000 milliLiter(s) (100 mL/Hr) IV Continuous <Continuous>  dextrose 5%. 1000 milliLiter(s) (50 mL/Hr) IV Continuous <Continuous>  dextrose 50% Injectable 25 Gram(s) IV Push once  dextrose 50% Injectable 12.5 Gram(s) IV Push once  dextrose 50% Injectable 25 Gram(s) IV Push once  glucagon  Injectable 1 milliGRAM(s) IntraMuscular once  influenza   Vaccine 0.5 milliLiter(s) IntraMuscular once  insulin glargine Injectable (LANTUS) 8 Unit(s) SubCutaneous at bedtime  insulin lispro (ADMELOG) corrective regimen sliding scale   SubCutaneous Before meals and at bedtime    MEDICATIONS  (PRN):  bisacodyl 5 milliGRAM(s) Oral every 12 hours PRN Constipation  dextrose Oral Gel 15 Gram(s) Oral once PRN Blood Glucose LESS THAN 70 milliGRAM(s)/deciliter      Allergies    No Known Allergies    Intolerances        Vital Signs Last 24 Hrs  T(C): 36.7 (19 Oct 2022 11:42), Max: 36.8 (18 Oct 2022 21:59)  T(F): 98.1 (19 Oct 2022 11:42), Max: 98.3 (18 Oct 2022 21:59)  HR: 90 (19 Oct 2022 11:42) (90 - 99)  BP: 155/86 (19 Oct 2022 11:42) (155/86 - 178/84)  BP(mean): --  RR: 16 (19 Oct 2022 11:42) (16 - 18)  SpO2: 97% (19 Oct 2022 11:42) (97% - 100%)    Parameters below as of 19 Oct 2022 11:42  Patient On (Oxygen Delivery Method): room air      Neurologic:  -Mental status: Awake, somewhat lethargic, oriented to person, place, and time. Speech is slurred (pt reports that his speech is at baseline) with intact naming, repetition, and comprehension. Recent and remote memory diminished. Follows commands.   -Cranial nerves:   II: Visual fields are full to confrontation.  III, IV, VI: Extraocular movements are intact without nystagmus. Pupils equally round and reactive to light  V:  Facial sensation V1-V3 equal and intact   VII: Face is symmetric with normal eye closure and smile  XII: Tongue protrudes midline  Motor: Normal bulk and tone. Right upper extremity 5/5, pronates without drift. Left upper extremity 5/5. Right lower extremity 5/5.  Left lower extremity 4/5, unable to lift off of bed under own strength, with assistance able to remain antigravity without drift.  Sensation: Intact to light touch bilaterally. No neglect or extinction on double simultaneous testing.  Coordination: No dysmetria on finger-to-nose    LABS:                        9.0    10.71 )-----------( 315      ( 19 Oct 2022 15:22 )             29.8     10-19    142  |  113<H>  |  92<H>  ----------------------------<  86  4.0   |  15<L>  |  7.32<H>    Ca    8.2<L>      19 Oct 2022 08:53  Phos  5.7     10-19  Mg     2.0     10-19    TPro  6.5  /  Alb  3.5  /  TBili  0.2  /  DBili  x   /  AST  13  /  ALT  15  /  AlkPhos  88  10-19          RADIOLOGY & ADDITIONAL TESTS:    < from: MR Head No Cont (10.17.22 @ 21:39) >    IMPRESSION:  1. Motion degraded exam. Axial FLAIR series are, for the most part,  nondiagnostic.  2. Multiple scattered very small foci of restricted diffusion. Lesions   are in multiple vascular territories, and suggest acute/subacute infarcts, with   the central embolic source.    < end of copied text >  < from: Echocardiogram w/ Bubble and Doppler (10.18.22 @ 13:49) >  CONCLUSIONS:     1. Technically difficult study.   2. Borderline low left ventricular systolic function.   3. Injectionof agitated saline via a peripheral vein reveals no   evidence of an obvious right-to-left shunt.   4. No significant valvular disease.   5. Pulmonary artery systolic pressure is 21 mmHg.   6. No pericardial effusion.    < end of copied text >

## 2022-10-19 NOTE — PROGRESS NOTE ADULT - PROBLEM SELECTOR PLAN 6
Hx of HTN. Per surescripts no recent HTN meds. Per HIE in 2020 was taking metoprolol ER 50 and Nifedipine ER 30 in 2020.     - monitor BPs  - consider starting BP meds if becomes hypertensive Hx of HTN. Per surescripts no recent HTN meds. Per [facility]IE in 2020 was taking metoprolol ER 50 and Nifedipine ER 30 in 2020.   - Monitor BPs  - Consider starting BP meds if becomes hypertensive

## 2022-10-19 NOTE — PROGRESS NOTE ADULT - PROBLEM SELECTOR PLAN 4
Hx of CKD stage 4. per [facility]IE, baseline Cr 2.9, eGFR 27 (05/2019). Now presenting w/ BUN/ Cr 97/ 7.99.  - may be post-obstructive, as pt straight-cath'd with UOP 500cc  - will leave in puckett for now, will f/u renal   Plan:  - f/u Renal US & Urine lytes  - consulted renal, will f/u recs   - trend Cr. Avoid nephrotoxic meds. Strict I/Os Hx of CKD stage 4. per HIE, baseline Cr 2.9, eGFR 27 (05/2019). Now presenting w/ BUN/ Cr 97/ 7.99.  May be post-obstructive, as pt straight-cath'd with UOP 500cc  Patient may get post obstructive diuresis. Will require close monitoring of UOP. Will consider starting LR depending on UOP.   - C/w puckett  - Nephro following. Recs appreciated     - High phos, f/u iPTH     - Persistently>5.5, recommend starting ca acetate   - Trend Cr. Avoid nephrotoxic meds. Strict I/Os  - BID BMP

## 2022-10-19 NOTE — PROGRESS NOTE ADULT - PROBLEM SELECTOR PLAN 1
- Hx of CVA x2. Presenting w/ slurred speech and generalized weakness. Waxing/waning in alertness, intermittently very lethargic w/ nearly pinpoint pupils, responsive to noxious stimuli   - Per Neuro HIE- 2/2019 acute infarct in left corona radiata and left macrina seen on MRI Brain, had been referred to EP for loop recorder placement but lost to f/u  - CT Head on admission - no acute findings. Abd X-ray: no acute findings.   - MRI head - several acute/subacute infarcts, likely embolic source  - Echo without evidence of endocarditis     Plan  - Neuro consulted, started fall/seizure precautions. F/u recs.   - Continue atorvastatin 80mg qhs. Restart ASA 81 after hemoglobin stable  - f/u ammonia levels - Hx of CVA x2. Presenting w/ slurred speech and generalized weakness. Waxing/waning in alertness, intermittently very lethargic w/ nearly pinpoint pupils, responsive to noxious stimuli   Per Neuro HIE- 2/2019 acute infarct in left corona radiata and left macrina seen on MRI Brain, had been referred to EP for loop recorder placement but lost to f/u  CT Head on admission - no acute findings. Abd X-ray: no acute findings.   MRI head - several acute/subacute infarcts, likely embolic source  Echo without evidence of endocarditis   - Neuro consulted, started fall/seizure precautions. F/u recs.   - Started on Aspirin/Plavix   - Continue atorvastatin 80mg qhs

## 2022-10-19 NOTE — PROGRESS NOTE ADULT - ASSESSMENT
Patient is a 57y old Male w/ PMHx of IDDM, CVA x 2 (residual left side weakness), HTN, HLD, and CKD presents c/o slurred speech, weakness over the past several days, found to be tachycardic, hypernatremic, and with BUN/Cr 91/7.42, neuro & renal following, stable for step-down to [facility].

## 2022-10-19 NOTE — PROGRESS NOTE ADULT - PROBLEM SELECTOR PLAN 7
Hx of HLD. Home med: atorvastatin 20mg    - start atorvastatin 80 iso hx of CVAx2 and presenting w/ slurred speech Hx of HLD. Home med: atorvastatin 20mg  - C/w atorvastatin 80 iso hx of CVAx2 and presenting w/ slurred speech

## 2022-10-19 NOTE — PROGRESS NOTE ADULT - PROBLEM SELECTOR PLAN 2
Presented w 153. s/p 2L NS in ED - likely 2/2 dehydration   - started D5 125cc/hr, Na improved to 147  - continue with D5 80cc/hr  - Continue to monitor Presented w 153. s/p 2L NS in ED - likely 2/2 dehydration   Started D5 100cc/hr. Na improved to 142  - Patient tolerating PO   - D/c D5   - Continue to monitor  - Nephrology following. Recs appreciated

## 2022-10-19 NOTE — PROGRESS NOTE ADULT - SUBJECTIVE AND OBJECTIVE BOX
Patient is a 57y Male seen and evaluated at bedside. Overnight events noted. Pt laying in bed comfortably. Denies any symptoms. Na 142 today. SCr trending down 7.88-->7.32, 1.5L UO last 24 hours      Meds:    aspirin  chewable 81 daily  atorvastatin 80 at bedtime  bisacodyl 5 every 12 hours PRN  clopidogrel Tablet 75 daily  dextrose 5%. 1000 <Continuous>  dextrose 5%. 1000 <Continuous>  dextrose 5%. 1000 <Continuous>  dextrose 50% Injectable 25 once  dextrose 50% Injectable 12.5 once  dextrose 50% Injectable 25 once  dextrose Oral Gel 15 once PRN  glucagon  Injectable 1 once  influenza   Vaccine 0.5 once  insulin glargine Injectable (LANTUS) 8 at bedtime  insulin lispro (ADMELOG) corrective regimen sliding scale  Before meals and at bedtime      T(C): , Max: 36.8 (10-18-22 @ 21:59)  T(F): , Max: 98.3 (10-18-22 @ 21:59)  HR: 99 (10-19-22 @ 10:02)  BP: 178/84 (10-19-22 @ 10:02)  BP(mean): --  RR: 18 (10-19-22 @ 10:02)  SpO2: 100% (10-19-22 @ 06:01)  Wt(kg): --    10-18 @ 07:01  -  10-19 @ 07:00  --------------------------------------------------------  IN: 490 mL / OUT: 1550 mL / NET: -1060 mL    10-19 @ 07:01  -  10-19 @ 11:43  --------------------------------------------------------  IN: 0 mL / OUT: 550 mL / NET: -550 mL          Review of Systems:  ROS negative except as per HPI      PHYSICAL EXAM:  GENERAL: Awake, alert. interactive   HEENT: ADRIANO, no JVP  CHEST/LUNG: Bilateral clear breath sounds, no accessory muscle use  HEART: Regular rate and rhythm, no murmur  ABDOMEN: Soft, nontender, non distended  EXTREMITIES: no pedal edema, warm  Neurology: Answer questions appropriately, no focal deficits noted      LABS:                        8.6    12.16 )-----------( 329      ( 19 Oct 2022 08:53 )             28.3     10-19    142  |  113<H>  |  92<H>  ----------------------------<  86  4.0   |  15<L>  |  7.32<H>    Ca    8.2<L>      19 Oct 2022 08:53  Phos  5.7     10-19  Mg     2.0     10-19    TPro  6.5  /  Alb  3.5  /  TBili  0.2  /  DBili  x   /  AST  13  /  ALT  15  /  AlkPhos  88  10-19                RADIOLOGY & ADDITIONAL STUDIES:           Patient is a 57y Male seen and evaluated at bedside. Overnight events noted. Pt laying in bed comfortably. Denies any symptoms. Na 142 today. SCr trending down 7.88-->7.32, 1.5L UO last 24 hours      Meds:    aspirin  chewable 81 daily  atorvastatin 80 at bedtime  bisacodyl 5 every 12 hours PRN  clopidogrel Tablet 75 daily  dextrose 5%. 1000 <Continuous>  dextrose 5%. 1000 <Continuous>  dextrose 5%. 1000 <Continuous>  dextrose 50% Injectable 25 once  dextrose 50% Injectable 12.5 once  dextrose 50% Injectable 25 once  dextrose Oral Gel 15 once PRN  glucagon  Injectable 1 once  influenza   Vaccine 0.5 once  insulin glargine Injectable (LANTUS) 8 at bedtime  insulin lispro (ADMELOG) corrective regimen sliding scale  Before meals and at bedtime      T(C): , Max: 36.8 (10-18-22 @ 21:59)  T(F): , Max: 98.3 (10-18-22 @ 21:59)  HR: 99 (10-19-22 @ 10:02)  BP: 178/84 (10-19-22 @ 10:02)  BP(mean): --  RR: 18 (10-19-22 @ 10:02)  SpO2: 100% (10-19-22 @ 06:01)  Wt(kg): --    10-18 @ 07:01  -  10-19 @ 07:00  --------------------------------------------------------  IN: 490 mL / OUT: 1550 mL / NET: -1060 mL    10-19 @ 07:01  -  10-19 @ 11:43  --------------------------------------------------------  IN: 0 mL / OUT: 550 mL / NET: -550 mL          Review of Systems:  ROS negative except as per HPI      PHYSICAL EXAM:  GENERAL: Awake, alert. interactive   HEENT: ADRIANO, no JVP  CHEST/LUNG: Bilateral clear breath sounds, no accessory muscle use  HEART: Regular rate and rhythm, no murmur  ABDOMEN: Soft, nontender, non distended  EXTREMITIES: no pedal edema, warm  Neurology: Answer questions appropriately, no focal deficits noted      LABS:                        8.6    12.16 )-----------( 329      ( 19 Oct 2022 08:53 )             28.3     10-19    142  |  113<H>  |  92<H>  ----------------------------<  86  4.0   |  15<L>  |  7.32<H>    Ca    8.2<L>      19 Oct 2022 08:53  Phos  5.7     10-19  Mg     2.0     10-19    TPro  6.5  /  Alb  3.5  /  TBili  0.2  /  DBili  x   /  AST  13  /  ALT  15  /  AlkPhos  88  10-19                RADIOLOGY & ADDITIONAL STUDIES:    Creatinine, Serum: 7.32 mg/dL (10-19-22 @ 08:53)  Creatinine, Serum: 7.88 mg/dL (10-18-22 @ 05:30)  Creatinine, Serum: 7.95 mg/dL (10-17-22 @ 05:30)  Creatinine, Serum: 7.42 mg/dL (10-16-22 @ 22:30)  Creatinine, Serum: 7.99 mg/dL (10-16-22 @ 17:56)  Creatinine, Serum: 3.38 mg/dL (02-20-19 @ 06:08)  Creatinine, Serum: 3.21 mg/dL (02-19-19 @ 06:22)  Creatinine, Serum: 3.31 mg/dL (02-18-19 @ 06:39)  Creatinine, Serum: 3.19 mg/dL (02-17-19 @ 08:39)  Creatinine, Serum: 3.42 mg/dL (02-16-19 @ 07:13)

## 2022-10-19 NOTE — PROGRESS NOTE ADULT - PROBLEM SELECTOR PLAN 3
Baseline hemoglobin 12 (Feb 2019), presented w Hb 6.9 ---> s/p 3u pRBC total   - No apparent sources of bleeding, no further melena  - , Haptoglobin 386, Reticulocute 34.5 **   ----->  added on to 10/17 AM labs, after 1u pRBC, may be affected. Could not add on to admission labs (hemolyzed)   Plan:  - will f/u iron panel   - f/u FOBT  - transfuse for Hgb <7, maintain active T&S Baseline hemoglobin 12 (Feb 2019), presented w Hb 6.9 ---> s/p 3u pRBC total   - No apparent sources of bleeding, no further melena  - , Haptoglobin 386, Reticulocute 34.5 **   ----->  added on to 10/17 AM labs, after 1u pRBC, may be affected. Could not add on to admission labs (hemolyzed)   - Continue trending Hb   - transfuse for Hgb <7, maintain active T&S  - f/u FOBT

## 2022-10-19 NOTE — EEG REPORT - NS EEG TEXT BOX
Day 2  10/19/2022 from 00:00:00 to 09:30:00  Awake Background:  The awake electrographic background was characterized by the presence of a well organized mixture of mainly alpha and some theta frequencies.  Fragments of an 8 Hz posterior dominant rhythm were present.    Sleep Background:  During drowsiness, slow roving eye movements, attenuation and fragmentation of the posterior dominant rhythm, symmetrical vertex waves and increased diffuse background slowing were present.  Stage 2 sleep was characterized by the presence of synchronous and symmetric  sleep spindles and K-complexes.  Slow wave sleep architecture was preserved.    Background Slowing:  Mild generalized background slowing was present.    Focal Slowing:  No focal slowing was present    Other Paroxysmal Non-Epileptiform Findings:    None.    Spontaneous Activity:  None.    Activation Procedures:  No activation procedures have been performed during this study.    Clinical Events:  No clinical events occurred on this date.  No electrographic or electroclinical seizures occurred on this date    Day 2 Impression:  This is an abnormal study due to the presence of  Generalized background slowing    Day 2 Clinical correlation  This is an abnormal study.  The above mentioned findings indicate the presence mild non specific diffuse cerebral dysfunction.  No epileptiform discharges present.  No electrographic or electroclinical seizures occurred.    The undersigned attending physicians have reviewed portions of this record on the dates documented below:  On 10/19/2022 the study was reviewed from 10/18/2022 at 11:35:42 to 10/19/2022 at 09:30:30 by MD Katie Montaño MS  Attending Neurologist, [facility]

## 2022-10-19 NOTE — PROGRESS NOTE ADULT - ATTENDING COMMENTS
I agree with the fellow's findings and plans as written above with the following additions/amendments:    Seen and examined at bedside. Discussed ALEXIS, possible HD in near future but no current indications, and that he may need to leave with the puckett or have another trial of void prior to discharge; otherwise feels improved, awaiting neurologic workup, eating and drinking better. Further recs as above

## 2022-10-19 NOTE — PROGRESS NOTE ADULT - ATTENDING COMMENTS
H/H remains stable  no etiology of stroke found but given MR findings, per neuro cardioembolic stroke should be ruled out. will obtain serial EKGs tomorrow to rule out pAFIB and patient will get ILR. will consider KATHY  TOV today, CR and BUN have plateaued -- continue to monitor UOP and monitor for post obstructive diuresis    plan for dc home once KATHY completed, TOV completed.

## 2022-10-19 NOTE — PROGRESS NOTE ADULT - ASSESSMENT
56 yo M w/ longstanding IDDM1 w/ retinopathy, HTN, CKD 4 presented w/ slurred speech, generalized weakness for several days. Nephrology consulted for AMS in the setting of advanced CKD    Assessment/Plan:  #Elevated Cr-ALEXIS vs progression of CKD  Last known Cr 3.3 in Jun/2020 which was progression of his CKD. Serologic GN workup in 2019 negative  Now presenting w/ Cr 7.99 on admission, downtrending 7.32 today  UA w/ trace protein and moderate blood in the absence of RBCs  Non-oliguric; 1.5 L/24 hours  UPCR 1.9, Eleanor 56  Etiology includes Pre-renal disease likely given pt was found down and has been hypernatremic. iATN unlikely given no documented episodes of hypotension. Component of post-renal as well as large PVR on POCUS. Could also be progression of underlying CKD    Recommend:  No indication of urgent HD at this time. However, given his advance CKD, will likely require HD in the future   Maintain net positive fluid balance (0.5L)  Strict I&Os  Avoid nephrotoxic agents  Renal diet    #Hypernatremia-resolved  Due to dehydration and no access to water  Na 142 today  Daily BMP  Encourage PO intake    #Anemia  Hb 8.6  Iron sat 58%, ferritin 34  likely ACD  Epo 10K U Qweekly    #Hypertension  Recommend starting nifedipine 30mg    #Met Acidosis  Likely due to CKD  Recommend starting sodium bicarb 650mg TID     #BMD-CKD  Phos above goal  Check iPTH  If persistently>5.5, recommend starting ca acetate     Thank you for the opportunity to participate in the care of your patient. The nephrology service remains available to assist with any questions or concerns. Please feel free to reach us by paging the on-call nephrology fellow for urgent issues or as below.     Alberto Cordova M.D.  PGY 4 - Nephrology Fellow  [phone]

## 2022-10-19 NOTE — PROGRESS NOTE ADULT - ASSESSMENT
This is a 57y Male with PMHx of IDDM, CVA x2 (residual left sided weakness), HTN, HLD, CKD presented on 10/16 for AMS and slurred speech, with weakness over several days admitted for a toxic metabolic workup and being treated for ALEXIS. Found to have multiple acute to subacute infarcts on MRI suggestive of a cardioembolic source. Stroke service consulted for further workup.     Plan:   - c/w DAPT  - continue Atorvastatin 80mg PO daily  - Recommend repeating hypercoagulable workup, mainly lupus anticoagulant  - CRP 36.9/ESR 96  - Send blood cultures  - TTE: EF 50-55%, no PFO  - will need KATHY and ILR as no obvious cause of stroke yet  - q4h stroke checks      Stroke risk factors  - A1C: 7.2  - LDL: 66   - HTN     DVT prophylaxis   -Lovenox SQ and SCDs    Discussed with Neurology Attending Dr. Casandra San   This is a 57y Male with PMHx of IDDM, CVA x2 (residual left sided weakness), HTN, HLD, CKD presented on 10/16 for AMS and slurred speech, with weakness over several days admitted for a toxic metabolic workup and being treated for ALEXIS. Found to have multiple acute to subacute infarcts on MRI suggestive of a cardioembolic source. Stroke service consulted for further workup.     Plan:   - c/w DAPT  - continue Atorvastatin 80mg PO daily  - Recommend repeating hypercoagulable workup, mainly lupus anticoagulant  - CRP 36.9/ESR 96  - Send blood cultures  - TTE: EF 50-55%, no PFO  - will need KATHY and ILR as no obvious cause of stroke yet  - q4h stroke checks  - SBP < 180      Stroke risk factors  - A1C: 7.2  - LDL: 66   - HTN     DVT prophylaxis   -Lovenox SQ and SCDs    Discussed with Neurology Attending Dr. Casandra San   This is a 57y Male with PMHx of IDDM, CVA x2 (residual left sided weakness), HTN, HLD, CKD presented on 10/16 for AMS and slurred speech, with weakness over several days admitted for a toxic metabolic workup and being treated for ALEXIS. Found to have multiple acute to subacute infarcts on MRI suggestive of a cardioembolic source. Stroke service consulted for further workup.     Plan:   - c/w DAPT  - continue Atorvastatin 80mg PO daily  - Recommend repeating hypercoagulable workup, mainly lupus anticoagulant  - start modafinil 50mg for lethargy  - CRP 36.9/ESR 96  - Send blood cultures  - TTE: EF 50-55%, no PFO  - will need KATHY and ILR as no obvious cause of stroke yet  - q4h stroke checks  - SBP < 180      Stroke risk factors  - A1C: 7.2  - LDL: 66   - HTN     DVT prophylaxis   -Lovenox SQ and SCDs    Discussed with Neurology Attending Dr. Casandra San   This is a 57y Male with PMHx of IDDM, CVA x2 (residual left sided weakness), HTN, HLD, CKD presented on 10/16 for AMS and slurred speech, with weakness over several days admitted for a toxic metabolic workup and being treated for ALEXIS. Found to have multiple acute to subacute infarcts on MRI suggestive of a cardioembolic source. Stroke service consulted for further workup.     Plan:   - c/w DAPT  - continue Atorvastatin 80mg PO daily  - Recommend repeating hypercoagulable workup, mainly lupus anticoagulant  - start modafinil 50mg for lethargy  - CRP 36.9/ESR 96  - Send blood cultures  - TTE: EF 50-55%, no PFO  - will need KATHY and ILR as no obvious cause of stroke yet  - q4h stroke checks  - SBP < 180  - EEG with Generalized background slowing  - c/w EEG      Stroke risk factors  - A1C: 7.2  - LDL: 66   - HTN     DVT prophylaxis   -Lovenox SQ and SCDs    Discussed with Neurology Attending Dr. Casandra San

## 2022-10-20 DIAGNOSIS — I63.9 CEREBRAL INFARCTION, UNSPECIFIED: ICD-10-CM

## 2022-10-20 LAB
ALBUMIN SERPL ELPH-MCNC: 2.9 G/DL — LOW (ref 3.3–5)
ALP SERPL-CCNC: 81 U/L — SIGNIFICANT CHANGE UP (ref 40–120)
ALT FLD-CCNC: 17 U/L — SIGNIFICANT CHANGE UP (ref 10–45)
ANION GAP SERPL CALC-SCNC: 16 MMOL/L — SIGNIFICANT CHANGE UP (ref 5–17)
APCR PPP: 2.46 RATIO — SIGNIFICANT CHANGE UP
APTT BLD: 30.4 SEC — SIGNIFICANT CHANGE UP (ref 27.5–35.5)
APTT BLD: 31.2 SEC — SIGNIFICANT CHANGE UP (ref 27.5–35.5)
AST SERPL-CCNC: 15 U/L — SIGNIFICANT CHANGE UP (ref 10–40)
AT III ACT/NOR PPP CHRO: 104 % — SIGNIFICANT CHANGE UP (ref 85–135)
B2 GLYCOPROT1 AB SER QL: NEGATIVE — SIGNIFICANT CHANGE UP
BASOPHILS # BLD AUTO: 0.02 K/UL — SIGNIFICANT CHANGE UP (ref 0–0.2)
BASOPHILS NFR BLD AUTO: 0.2 % — SIGNIFICANT CHANGE UP (ref 0–2)
BILIRUB SERPL-MCNC: 0.2 MG/DL — SIGNIFICANT CHANGE UP (ref 0.2–1.2)
BLD GP AB SCN SERPL QL: NEGATIVE — SIGNIFICANT CHANGE UP
BUN SERPL-MCNC: 85 MG/DL — HIGH (ref 7–23)
CALCIUM SERPL-MCNC: 7.8 MG/DL — LOW (ref 8.4–10.5)
CALCIUM SERPL-MCNC: 8.3 MG/DL — LOW (ref 8.4–10.5)
CARDIOLIPIN AB SER-ACNC: NEGATIVE — SIGNIFICANT CHANGE UP
CHLORIDE SERPL-SCNC: 112 MMOL/L — HIGH (ref 96–108)
CO2 SERPL-SCNC: 14 MMOL/L — LOW (ref 22–31)
CREAT SERPL-MCNC: 7.8 MG/DL — HIGH (ref 0.5–1.3)
EGFR: 7 ML/MIN/1.73M2 — LOW
EOSINOPHIL # BLD AUTO: 0.28 K/UL — SIGNIFICANT CHANGE UP (ref 0–0.5)
EOSINOPHIL NFR BLD AUTO: 2.5 % — SIGNIFICANT CHANGE UP (ref 0–6)
GLUCOSE BLDC GLUCOMTR-MCNC: 152 MG/DL — HIGH (ref 70–99)
GLUCOSE BLDC GLUCOMTR-MCNC: 200 MG/DL — HIGH (ref 70–99)
GLUCOSE BLDC GLUCOMTR-MCNC: 82 MG/DL — SIGNIFICANT CHANGE UP (ref 70–99)
GLUCOSE BLDC GLUCOMTR-MCNC: 95 MG/DL — SIGNIFICANT CHANGE UP (ref 70–99)
GLUCOSE SERPL-MCNC: 87 MG/DL — SIGNIFICANT CHANGE UP (ref 70–99)
HCT VFR BLD CALC: 28.5 % — LOW (ref 39–50)
HCYS SERPL-MCNC: 19.7 UMOL/L — HIGH
HGB BLD-MCNC: 8.6 G/DL — LOW (ref 13–17)
IMM GRANULOCYTES NFR BLD AUTO: 0.5 % — SIGNIFICANT CHANGE UP (ref 0–0.9)
INR BLD: 1 — SIGNIFICANT CHANGE UP (ref 0.88–1.16)
INR BLD: 1.04 — SIGNIFICANT CHANGE UP (ref 0.88–1.16)
LYMPHOCYTES # BLD AUTO: 0.92 K/UL — LOW (ref 1–3.3)
LYMPHOCYTES # BLD AUTO: 8.2 % — LOW (ref 13–44)
MAGNESIUM SERPL-MCNC: 1.8 MG/DL — SIGNIFICANT CHANGE UP (ref 1.6–2.6)
MCHC RBC-ENTMCNC: 23.6 PG — LOW (ref 27–34)
MCHC RBC-ENTMCNC: 30.2 GM/DL — LOW (ref 32–36)
MCV RBC AUTO: 78.3 FL — LOW (ref 80–100)
MONOCYTES # BLD AUTO: 0.75 K/UL — SIGNIFICANT CHANGE UP (ref 0–0.9)
MONOCYTES NFR BLD AUTO: 6.7 % — SIGNIFICANT CHANGE UP (ref 2–14)
NEUTROPHILS # BLD AUTO: 9.18 K/UL — HIGH (ref 1.8–7.4)
NEUTROPHILS NFR BLD AUTO: 81.9 % — HIGH (ref 43–77)
NRBC # BLD: 0 /100 WBCS — SIGNIFICANT CHANGE UP (ref 0–0)
PHOSPHATE SERPL-MCNC: 5.6 MG/DL — HIGH (ref 2.5–4.5)
PLATELET # BLD AUTO: 308 K/UL — SIGNIFICANT CHANGE UP (ref 150–400)
POTASSIUM SERPL-MCNC: 4.1 MMOL/L — SIGNIFICANT CHANGE UP (ref 3.5–5.3)
POTASSIUM SERPL-SCNC: 4.1 MMOL/L — SIGNIFICANT CHANGE UP (ref 3.5–5.3)
PROT C ACT/NOR PPP: 74 % — SIGNIFICANT CHANGE UP (ref 74–150)
PROT SERPL-MCNC: 6.2 G/DL — SIGNIFICANT CHANGE UP (ref 6–8.3)
PROTHROM AB SERPL-ACNC: 11.9 SEC — SIGNIFICANT CHANGE UP (ref 10.5–13.4)
PROTHROM AB SERPL-ACNC: 12.4 SEC — SIGNIFICANT CHANGE UP (ref 10.5–13.4)
PTH-INTACT FLD-MCNC: 260 PG/ML — HIGH (ref 15–65)
RBC # BLD: 3.64 M/UL — LOW (ref 4.2–5.8)
RBC # FLD: 21.5 % — HIGH (ref 10.3–14.5)
RH IG SCN BLD-IMP: POSITIVE — SIGNIFICANT CHANGE UP
SODIUM SERPL-SCNC: 142 MMOL/L — SIGNIFICANT CHANGE UP (ref 135–145)
WBC # BLD: 11.21 K/UL — HIGH (ref 3.8–10.5)
WBC # FLD AUTO: 11.21 K/UL — HIGH (ref 3.8–10.5)

## 2022-10-20 PROCEDURE — 99233 SBSQ HOSP IP/OBS HIGH 50: CPT | Mod: GC

## 2022-10-20 PROCEDURE — 99232 SBSQ HOSP IP/OBS MODERATE 35: CPT

## 2022-10-20 PROCEDURE — 33285 INSJ SUBQ CAR RHYTHM MNTR: CPT

## 2022-10-20 PROCEDURE — 95720 EEG PHY/QHP EA INCR W/VEEG: CPT

## 2022-10-20 RX ORDER — CLOPIDOGREL BISULFATE 75 MG/1
75 TABLET, FILM COATED ORAL DAILY
Refills: 0 | Status: DISCONTINUED | OUTPATIENT
Start: 2022-10-20 | End: 2022-11-01

## 2022-10-20 RX ORDER — INSULIN GLARGINE 100 [IU]/ML
4 INJECTION, SOLUTION SUBCUTANEOUS AT BEDTIME
Refills: 0 | Status: DISCONTINUED | OUTPATIENT
Start: 2022-10-20 | End: 2022-10-21

## 2022-10-20 RX ORDER — TAMSULOSIN HYDROCHLORIDE 0.4 MG/1
0.4 CAPSULE ORAL AT BEDTIME
Refills: 0 | Status: DISCONTINUED | OUTPATIENT
Start: 2022-10-20 | End: 2022-11-01

## 2022-10-20 RX ORDER — SODIUM BICARBONATE 1 MEQ/ML
650 SYRINGE (ML) INTRAVENOUS THREE TIMES A DAY
Refills: 0 | Status: DISCONTINUED | OUTPATIENT
Start: 2022-10-20 | End: 2022-10-22

## 2022-10-20 RX ORDER — NIFEDIPINE 30 MG
30 TABLET, EXTENDED RELEASE 24 HR ORAL EVERY 24 HOURS
Refills: 0 | Status: DISCONTINUED | OUTPATIENT
Start: 2022-10-20 | End: 2022-10-28

## 2022-10-20 RX ORDER — MAGNESIUM SULFATE 500 MG/ML
1 VIAL (ML) INJECTION ONCE
Refills: 0 | Status: COMPLETED | OUTPATIENT
Start: 2022-10-20 | End: 2022-10-20

## 2022-10-20 RX ORDER — SODIUM CHLORIDE 9 MG/ML
1000 INJECTION, SOLUTION INTRAVENOUS
Refills: 0 | Status: DISCONTINUED | OUTPATIENT
Start: 2022-10-20 | End: 2022-10-20

## 2022-10-20 RX ADMIN — Medication 2: at 21:40

## 2022-10-20 RX ADMIN — Medication 100 GRAM(S): at 12:10

## 2022-10-20 RX ADMIN — TAMSULOSIN HYDROCHLORIDE 0.4 MILLIGRAM(S): 0.4 CAPSULE ORAL at 21:41

## 2022-10-20 RX ADMIN — CLOPIDOGREL BISULFATE 75 MILLIGRAM(S): 75 TABLET, FILM COATED ORAL at 12:10

## 2022-10-20 RX ADMIN — Medication 81 MILLIGRAM(S): at 12:10

## 2022-10-20 RX ADMIN — SODIUM CHLORIDE 100 MILLILITER(S): 9 INJECTION, SOLUTION INTRAVENOUS at 09:01

## 2022-10-20 RX ADMIN — Medication 650 MILLIGRAM(S): at 15:00

## 2022-10-20 RX ADMIN — MODAFINIL 50 MILLIGRAM(S): 200 TABLET ORAL at 05:07

## 2022-10-20 RX ADMIN — Medication 650 MILLIGRAM(S): at 21:41

## 2022-10-20 RX ADMIN — Medication 2: at 17:34

## 2022-10-20 RX ADMIN — INSULIN GLARGINE 4 UNIT(S): 100 INJECTION, SOLUTION SUBCUTANEOUS at 21:40

## 2022-10-20 RX ADMIN — ATORVASTATIN CALCIUM 80 MILLIGRAM(S): 80 TABLET, FILM COATED ORAL at 21:41

## 2022-10-20 RX ADMIN — Medication 30 MILLIGRAM(S): at 14:56

## 2022-10-20 NOTE — PROGRESS NOTE ADULT - ATTENDING COMMENTS
passed TOV, continue monitoring UOP  u/s of LLE neg for DVT + Baker cyst  plan for KATHY and ILR placement tomorrow  PT consult / eval  continue DAPT and statin   hypercoag workup sent.

## 2022-10-20 NOTE — PHYSICAL THERAPY INITIAL EVALUATION ADULT - ANKLE DORSIFLEXION MMT, REHAB EVAL
4+ = good plus/(5) normal, right
contact guard/minimum assist (75% patients effort)/Olu required with no AD, decreased to CGA with cane

## 2022-10-20 NOTE — PHYSICAL THERAPY INITIAL EVALUATION ADULT - MODALITIES TREATMENT COMMENTS
Facial strength, sebsation, symmetry intact, hearing intact bilat; + vision loss in R eye due to cataract, intact in 4 quadrants in L eye; oculomotor intact w/o nystagmus; shoulder shrug 5/5 bilat; speech slurred, but no word finding difficulties.

## 2022-10-20 NOTE — PROGRESS NOTE ADULT - PROBLEM SELECTOR PLAN 5
Hx of insulin-dependent diabetes mellitus. Per surescripts: Lantus 15 qhs  A1c 7.2% (10/17/22)  POCT @80 in the AM   - c/w Lantus decreased by 20% to 8 units qhs & mISS, adjust as necessary

## 2022-10-20 NOTE — PROGRESS NOTE ADULT - SUBJECTIVE AND OBJECTIVE BOX
Patient is a 57y Male seen and evaluated at bedside. Pt laying in bed comfortably. Denies any symptoms. Na 142. Cr 7.5-->7.8, UO 1750 ml/24hrs      Meds:    aspirin  chewable 81 daily  atorvastatin 80 at bedtime  bisacodyl 5 every 12 hours PRN  clopidogrel Tablet 75 daily  dextrose 5%. 1000 <Continuous>  dextrose 5%. 1000 <Continuous>  dextrose 50% Injectable 25 once  dextrose 50% Injectable 12.5 once  dextrose 50% Injectable 25 once  dextrose Oral Gel 15 once PRN  glucagon  Injectable 1 once  influenza   Vaccine 0.5 once  insulin glargine Injectable (LANTUS) 8 at bedtime  insulin lispro (ADMELOG) corrective regimen sliding scale  Before meals and at bedtime  magnesium sulfate  IVPB 1 once  modafinil 50 daily      T(C): , Max: 36.9 (10-20-22 @ 05:39)  T(F): , Max: 98.4 (10-20-22 @ 05:39)  HR: 92 (10-20-22 @ 05:39)  BP: 156/80 (10-20-22 @ 05:39)  BP(mean): --  RR: 18 (10-20-22 @ 05:39)  SpO2: 98% (10-20-22 @ 05:39)  Wt(kg): --    10-19 @ 07:01  -  10-20 @ 07:00  --------------------------------------------------------  IN: 0 mL / OUT: 1750 mL / NET: -1750 mL          Review of Systems:  ROS negative except as per HPI      PHYSICAL EXAM:  GENERAL: Awake, alert. interactive   HEENT: ADRIANO, no JVP  CHEST/LUNG: Bilateral clear breath sounds, no accessory muscle use  HEART: Regular rate and rhythm, no murmur  ABDOMEN: Soft, nontender, non distended  EXTREMITIES: no pedal edema, warm  Neurology: Answer questions appropriately, no focal deficits noted      LABS:                        8.6    11.21 )-----------( 308      ( 20 Oct 2022 05:30 )             28.5     10-20    142  |  112<H>  |  85<H>  ----------------------------<  87  4.1   |  14<L>  |  7.80<H>    Ca    8.3<L>      20 Oct 2022 05:30  Phos  5.6     10-20  Mg     1.8     10-20    TPro  6.2  /  Alb  2.9<L>  /  TBili  0.2  /  DBili  x   /  AST  15  /  ALT  17  /  AlkPhos  81  10-20      PT/INR - ( 20 Oct 2022 05:30 )   PT: 11.9 sec;   INR: 1.00          PTT - ( 20 Oct 2022 10:14 )  PTT:30.4 sec          RADIOLOGY & ADDITIONAL STUDIES:           Patient is a 57y Male seen and evaluated at bedside. Pt laying in bed comfortably. Denies any symptoms. Na 142. Cr 7.5-->7.8, UO 1750 ml/24hrs      Meds:    aspirin  chewable 81 daily  atorvastatin 80 at bedtime  bisacodyl 5 every 12 hours PRN  clopidogrel Tablet 75 daily  dextrose 5%. 1000 <Continuous>  dextrose 5%. 1000 <Continuous>  dextrose 50% Injectable 25 once  dextrose 50% Injectable 12.5 once  dextrose 50% Injectable 25 once  dextrose Oral Gel 15 once PRN  glucagon  Injectable 1 once  influenza   Vaccine 0.5 once  insulin glargine Injectable (LANTUS) 8 at bedtime  insulin lispro (ADMELOG) corrective regimen sliding scale  Before meals and at bedtime  magnesium sulfate  IVPB 1 once  modafinil 50 daily      T(C): , Max: 36.9 (10-20-22 @ 05:39)  T(F): , Max: 98.4 (10-20-22 @ 05:39)  HR: 92 (10-20-22 @ 05:39)  BP: 156/80 (10-20-22 @ 05:39)  BP(mean): --  RR: 18 (10-20-22 @ 05:39)  SpO2: 98% (10-20-22 @ 05:39)  Wt(kg): --    10-19 @ 07:01  -  10-20 @ 07:00  --------------------------------------------------------  IN: 0 mL / OUT: 1750 mL / NET: -1750 mL          Review of Systems:  ROS negative except as per HPI      PHYSICAL EXAM:  GENERAL: Awake, alert. interactive   HEENT: ADRIANO, no JVP  CHEST/LUNG: Bilateral clear breath sounds, no accessory muscle use  HEART: Regular rate and rhythm, no murmur  ABDOMEN: Soft, nontender, non distended  EXTREMITIES: no pedal edema, warm  Neurology: Answer questions appropriately, no focal deficits noted      LABS:                        8.6    11.21 )-----------( 308      ( 20 Oct 2022 05:30 )             28.5     10-20    142  |  112<H>  |  85<H>  ----------------------------<  87  4.1   |  14<L>  |  7.80<H>    Ca    8.3<L>      20 Oct 2022 05:30  Phos  5.6     10-20  Mg     1.8     10-20    TPro  6.2  /  Alb  2.9<L>  /  TBili  0.2  /  DBili  x   /  AST  15  /  ALT  17  /  AlkPhos  81  10-20      PT/INR - ( 20 Oct 2022 05:30 )   PT: 11.9 sec;   INR: 1.00          PTT - ( 20 Oct 2022 10:14 )  PTT:30.4 sec          RADIOLOGY & ADDITIONAL STUDIES:      Creatinine, Serum: 7.80 mg/dL (10-20-22 @ 05:30)  Creatinine, Serum: 7.56 mg/dL (10-19-22 @ 18:58)  Creatinine, Serum: 7.32 mg/dL (10-19-22 @ 08:53)  Creatinine, Serum: 7.88 mg/dL (10-18-22 @ 05:30)  Creatinine, Serum: 7.95 mg/dL (10-17-22 @ 05:30)  Creatinine, Serum: 7.42 mg/dL (10-16-22 @ 22:30)  Creatinine, Serum: 7.99 mg/dL (10-16-22 @ 17:56)  Creatinine, Serum: 3.38 mg/dL (02-20-19 @ 06:08)  Creatinine, Serum: 3.21 mg/dL (02-19-19 @ 06:22)  Creatinine, Serum: 3.31 mg/dL (02-18-19 @ 06:39)

## 2022-10-20 NOTE — PROGRESS NOTE ADULT - PROBLEM SELECTOR PLAN 6
Hx of HTN. Per surescripts no recent HTN meds. Per HIE in 2020 was taking metoprolol ER 50 and Nifedipine ER 30 in 2020.   - Monitor BPs  - Consider starting BP meds if becomes hypertensive Hx of HTN. Per surescripts no recent HTN meds. Per MAURO in 2020 was taking metoprolol ER 50 and Nifedipine ER 30 in 2020.   Patient has not been taking these medication and is unaware that he has elevated Bp  - Monitor BPs  - Consider starting BP meds if becomes hypertensive

## 2022-10-20 NOTE — PHYSICAL THERAPY INITIAL EVALUATION ADULT - PERTINENT HX OF CURRENT PROBLEM, REHAB EVAL
Pt. is a 57 y.o R hand dominant  male with h/o prior CVA in 2018 with residual L sided weakness; currently p/w new onset slurred speech and worsening L sided weakness x several days PTA. Pt's MRI findings significant for multiple foci of acute/subacute infarcts of embolic etiology. Pt. has been admitted for further neuro w/u and management.

## 2022-10-20 NOTE — PROGRESS NOTE ADULT - PROBLEM SELECTOR PLAN 1
- Hx of CVA x2. Presenting w/ slurred speech and generalized weakness. Waxing/waning in alertness, intermittently very lethargic w/ nearly pinpoint pupils, responsive to noxious stimuli   Per Neuro HIE- 2/2019 acute infarct in left corona radiata and left macrina seen on MRI Brain, had been referred to EP for loop recorder placement but lost to f/u  CT Head on admission - no acute findings. Abd X-ray: no acute findings.   MRI head - several acute/subacute infarcts, likely embolic source  Echo without evidence of endocarditis   - Neuro consulted, started fall/seizure precautions. F/u recs.   - Started on Aspirin/Plavix   - Continue atorvastatin 80mg qhs - Hx of CVA x2. Presenting w/ slurred speech and generalized weakness. Waxing/waning in alertness, intermittently very lethargic w/ nearly pinpoint pupils, responsive to noxious stimuli   Per Neuro HIE- 2/2019 acute infarct in left corona radiata and left macrina seen on MRI Brain, had been referred to EP for loop recorder placement but lost to f/u  CT Head on admission - no acute findings. Abd X-ray: no acute findings.   MRI head - several acute/subacute infarcts, likely embolic source  Echo without evidence of endocarditis   - Neuro consulted, started fall/seizure precautions. F/u recs.  - Planned for KATHY on 10/21/22   - EP consulted for ILR. Will be placed after KATHY   - C/w on Aspirin/Plavix   - Continue atorvastatin 80mg qhs

## 2022-10-20 NOTE — PHYSICAL THERAPY INITIAL EVALUATION ADULT - ADDITIONAL COMMENTS
Pt. lives alone, = elevator access, uses RW for ambulation, has HHA Mon-Wed - unable to specify hrs.

## 2022-10-20 NOTE — PROGRESS NOTE ADULT - ASSESSMENT
Patient is a 57y old Male w/ PMHx of IDDM, CVA x 2 (residual left side weakness), HTN, HLD, and CKD presents c/o slurred speech, weakness over the past several days, found to be tachycardic, hypernatremic, and with BUN/Cr 91/7.42, neuro & renal following, stable for step-down to [facility].   Patient is a 57y old Male w/ PMHx of IDDM, CVA x 2 (residual left side weakness), HTN, HLD, and CKD presents c/o slurred speech, weakness over the past several days, found to have embolic stroke of unclear etiology. Admitted for further work up to determine the etiology of his stroke

## 2022-10-20 NOTE — PROGRESS NOTE ADULT - SUBJECTIVE AND OBJECTIVE BOX
OVERNIGHT EVENTS:    SUBJECTIVE / INTERVAL HPI: Patient seen and examined at bedside.     VITAL SIGNS:  Vital Signs Last 24 Hrs  T(C): 36.9 (20 Oct 2022 05:39), Max: 36.9 (20 Oct 2022 05:39)  T(F): 98.4 (20 Oct 2022 05:39), Max: 98.4 (20 Oct 2022 05:39)  HR: 92 (20 Oct 2022 05:39) (90 - 99)  BP: 156/80 (20 Oct 2022 05:39) (150/74 - 178/84)  BP(mean): --  RR: 18 (20 Oct 2022 05:39) (16 - 18)  SpO2: 98% (20 Oct 2022 05:39) (97% - 98%)    Parameters below as of 20 Oct 2022 05:39  Patient On (Oxygen Delivery Method): room air        PHYSICAL EXAM:    General: WDWN  HEENT: NCAT; PERRL, anicteric sclera; MMM  Neck: supple, trachea midline  Cardiovascular: S1, S2 normal; RRR, no M/G/R  Respiratory: CTABL; no W/R/R  Gastrointestinal: soft, nontender, nondistended. bowel sounds present.  Skin: no ulcerations or visible rashes appreciated  Extremities: WWP; no edema, clubbing or cyanosis  Vascular: 2+ radial, DP/PT pulses B/L  Neurological: AAOx3; CN II-XII grossly intact; no focal deficits    MEDICATIONS:  MEDICATIONS  (STANDING):  aspirin  chewable 81 milliGRAM(s) Oral daily  atorvastatin 80 milliGRAM(s) Oral at bedtime  clopidogrel Tablet 75 milliGRAM(s) Oral daily  dextrose 5%. 1000 milliLiter(s) (100 mL/Hr) IV Continuous <Continuous>  dextrose 5%. 1000 milliLiter(s) (50 mL/Hr) IV Continuous <Continuous>  dextrose 50% Injectable 25 Gram(s) IV Push once  dextrose 50% Injectable 12.5 Gram(s) IV Push once  dextrose 50% Injectable 25 Gram(s) IV Push once  glucagon  Injectable 1 milliGRAM(s) IntraMuscular once  influenza   Vaccine 0.5 milliLiter(s) IntraMuscular once  insulin glargine Injectable (LANTUS) 8 Unit(s) SubCutaneous at bedtime  insulin lispro (ADMELOG) corrective regimen sliding scale   SubCutaneous Before meals and at bedtime  lactated ringers. 1000 milliLiter(s) (130 mL/Hr) IV Continuous <Continuous>  modafinil 50 milliGRAM(s) Oral daily    MEDICATIONS  (PRN):  bisacodyl 5 milliGRAM(s) Oral every 12 hours PRN Constipation  dextrose Oral Gel 15 Gram(s) Oral once PRN Blood Glucose LESS THAN 70 milliGRAM(s)/deciliter      ALLERGIES:  Allergies    No Known Allergies    Intolerances        LABS:                        9.0    10.71 )-----------( 315      ( 19 Oct 2022 15:22 )             29.8     10-19    142  |  111<H>  |  87<H>  ----------------------------<  191<H>  4.0   |  15<L>  |  7.56<H>    Ca    8.4      19 Oct 2022 18:58  Phos  5.7     10-19  Mg     2.0     10-19    TPro  6.5  /  Alb  3.5  /  TBili  0.2  /  DBili  x   /  AST  13  /  ALT  15  /  AlkPhos  88  10-19        CAPILLARY BLOOD GLUCOSE      POCT Blood Glucose.: 187 mg/dL (19 Oct 2022 22:07)      RADIOLOGY & ADDITIONAL TESTS: Reviewed. OVERNIGHT EVENTS: No overnight events     SUBJECTIVE / INTERVAL HPI: Endorses left knee pain. Denies any new weakness, slurred speech. He reports that his speech improved today. He not had any SOB, CP, palpitations, abdominal pain, N/V, diarrhea or constipation. He has been urinating, currently has a condom cath. His last bowel movement was this AM and was normal.     VITAL SIGNS:  Vital Signs Last 24 Hrs  T(C): 36.9 (20 Oct 2022 05:39), Max: 36.9 (20 Oct 2022 05:39)  T(F): 98.4 (20 Oct 2022 05:39), Max: 98.4 (20 Oct 2022 05:39)  HR: 92 (20 Oct 2022 05:39) (90 - 99)  BP: 156/80 (20 Oct 2022 05:39) (150/74 - 178/84)  BP(mean): --  RR: 18 (20 Oct 2022 05:39) (16 - 18)  SpO2: 98% (20 Oct 2022 05:39) (97% - 98%)    Parameters below as of 20 Oct 2022 05:39  Patient On (Oxygen Delivery Method): room air        PHYSICAL EXAM:    General: In bed in no acute distress   HEENT: NCAT; PERRL, anicteric sclera; MMM  Neck: supple, trachea midline  Cardiovascular: S1, S2 normal; RRR, no M/G/R  Respiratory: CTABL; no W/R/R  Gastrointestinal: soft, nontender, nondistended. bowel sounds present.  Skin: no ulcerations or visible rashes appreciated  Extremities: WWP; no edema, clubbing or cyanosis, left lower extremity weakness (unable to move against gravity), RLE normal strength   Vascular: 2+ radial, DP/PT pulses B/L  Neurological: AAOx3; CN II-XII grossly intact; no focal deficits    MEDICATIONS:  MEDICATIONS  (STANDING):  aspirin  chewable 81 milliGRAM(s) Oral daily  atorvastatin 80 milliGRAM(s) Oral at bedtime  clopidogrel Tablet 75 milliGRAM(s) Oral daily  dextrose 5%. 1000 milliLiter(s) (100 mL/Hr) IV Continuous <Continuous>  dextrose 5%. 1000 milliLiter(s) (50 mL/Hr) IV Continuous <Continuous>  dextrose 50% Injectable 25 Gram(s) IV Push once  dextrose 50% Injectable 12.5 Gram(s) IV Push once  dextrose 50% Injectable 25 Gram(s) IV Push once  glucagon  Injectable 1 milliGRAM(s) IntraMuscular once  influenza   Vaccine 0.5 milliLiter(s) IntraMuscular once  insulin glargine Injectable (LANTUS) 8 Unit(s) SubCutaneous at bedtime  insulin lispro (ADMELOG) corrective regimen sliding scale   SubCutaneous Before meals and at bedtime  lactated ringers. 1000 milliLiter(s) (130 mL/Hr) IV Continuous <Continuous>  modafinil 50 milliGRAM(s) Oral daily    MEDICATIONS  (PRN):  bisacodyl 5 milliGRAM(s) Oral every 12 hours PRN Constipation  dextrose Oral Gel 15 Gram(s) Oral once PRN Blood Glucose LESS THAN 70 milliGRAM(s)/deciliter      ALLERGIES:  Allergies    No Known Allergies    Intolerances        LABS:                        9.0    10.71 )-----------( 315      ( 19 Oct 2022 15:22 )             29.8     10-19    142  |  111<H>  |  87<H>  ----------------------------<  191<H>  4.0   |  15<L>  |  7.56<H>    Ca    8.4      19 Oct 2022 18:58  Phos  5.7     10-19  Mg     2.0     10-19    TPro  6.5  /  Alb  3.5  /  TBili  0.2  /  DBili  x   /  AST  13  /  ALT  15  /  AlkPhos  88  10-19        CAPILLARY BLOOD GLUCOSE      POCT Blood Glucose.: 187 mg/dL (19 Oct 2022 22:07)      RADIOLOGY & ADDITIONAL TESTS: Reviewed.

## 2022-10-20 NOTE — PROGRESS NOTE ADULT - PROBLEM SELECTOR PLAN 2
Presented w 153. s/p 2L NS in ED - likely 2/2 dehydration   Started D5 100cc/hr. Na improved to 142  - Patient tolerating PO   - D/c D5   - Continue to monitor  - Nephrology following. Recs appreciated Resolved   Presented w 153. s/p 2L NS in ED - likely 2/2 dehydration   Started D5 100cc/hr. Na improved to 142  - Patient tolerating PO   - D/c D5   - Continue to monitor  - Nephrology following. Recs appreciated

## 2022-10-20 NOTE — PROGRESS NOTE ADULT - SUBJECTIVE AND OBJECTIVE BOX
Procedure- ILR implant  Location- Bedside, [facility]    The rationale for insertion of an implantable loop recorder was discussed with the patient in detail.  The risks of infection, bleeding, pocket hematoma and pain were discussed.  Vendor presence for technical support was also discussed.   Alternatives were reviewed and all questions were answered.  The patient agrees to proceed, and informed consent was signed and placed in the chart.  The patient was in a non-fasting state.    Cloroprep was used to clean procedure site (L chest) and patient was draped in a sterile manner.  The procedure was performed under local anesthetic only.  The site was infiltrated with 1% Lidocaine with 0.001% Epi.  A small incision was made with a specialized scalpel in the region of the fourth intercostal space along the left sternal border.  Using the ILR insertion tool the ILR was “injected” in the subcutaneous plane and deployed (please see CCW report for model number) in the region of optimal R-wave sensing.  Hemostasis was achieved and the wound was closed in a running fashion using 4-0 Monocryl suture.  Skin was closed using Dermabond.  Tegaderm was placed over incision.    Complications- None.  The patient can be discharged post implant.      Plan of Care-   -	Patient to keep Tegaderm on for 24 hours and then should be removed by the patient.    -	Any bleeding, discharge or issues at procedure site please call [phone].    -	Patient should follow up with DEONTE Linder 12/8/22 @ 2:10 pm

## 2022-10-20 NOTE — PROGRESS NOTE ADULT - PROBLEM SELECTOR PLAN 3
Baseline hemoglobin 12 (Feb 2019), presented w Hb 6.9 ---> s/p 3u pRBC total   - No apparent sources of bleeding, no further melena  - , Haptoglobin 386, Reticulocute 34.5 **   ----->  added on to 10/17 AM labs, after 1u pRBC, may be affected. Could not add on to admission labs (hemolyzed)   - Continue trending Hb   - transfuse for Hgb <7, maintain active T&S  - f/u FOBT Baseline hemoglobin 12 (Feb 2019), presented w Hb 6.9 ---> s/p 3u pRBC total   - No apparent sources of bleeding, no further melena  - Stable HB  - Continue trending Hb   - transfuse for Hgb <7, maintain active T&S  - f/u FOBT

## 2022-10-20 NOTE — PROGRESS NOTE ADULT - ASSESSMENT
56 yo M w/ longstanding IDDM1 w/ retinopathy, HTN, CKD 4 presented w/ slurred speech, generalized weakness for several days. Nephrology consulted for AMS in the setting of advanced CKD    Assessment/Plan:  #Progression of CKD V  Last known Cr 3.3 in Jun/2020 which was progression of his CKD. Serologic GN workup in 2019 negative  Now presenting w/ Cr 7.99 on admission, stable at the moment, 7.80 today  UA w/ trace protein and moderate blood in the absence of RBCs  Non-oliguric; 1.75 L/24 hours  UPCR 1.9, Eleanor 56    Recommend:  No indication of urgent HD at this time. However, given his advance CKD, will likely require HD in the future   Maintain net even fluid balance   Strict I&Os  Given pt had urinary retention on admission, will recommend a trial of flomax. Bladder scan Q6hr post void to rule out retention  Avoid nephrotoxic agents  Renal diet    #Hypernatremia-resolved  Due to dehydration and no access to water  Na 142 today  Daily BMP  Encourage PO intake    #Anemia  Hb 8.6  Iron sat 58%, ferritin 34  likely ACD  Epo 10K U Qweekly    #Hypertension  Recommend starting nifedipine 30mg    #Met Acidosis  Likely due to CKD  Recommend starting sodium bicarb 650mg TID     #BMD-CKD  Phos above goal  Check iPTH  If persistently>5.5, recommend starting ca acetate     Thank you for the opportunity to participate in the care of your patient. The nephrology service remains available to assist with any questions or concerns. Please feel free to reach us by paging the on-call nephrology fellow for urgent issues or as below.     Alberto Cordova M.D.  PGY 4 - Nephrology Fellow  [phone]

## 2022-10-20 NOTE — PROGRESS NOTE ADULT - PROBLEM SELECTOR PLAN 4
Hx of CKD stage 4. per HIE, baseline Cr 2.9, eGFR 27 (05/2019). Now presenting w/ BUN/ Cr 97/ 7.99.  May be post-obstructive, as pt straight-cath'd with UOP 500cc  Patient may get post obstructive diuresis. Will require close monitoring of UOP. Will consider starting LR depending on UOP.   - C/w puckett  - Nephro following. Recs appreciated     - High phos, f/u iPTH     - Persistently>5.5, recommend starting ca acetate   - Trend Cr. Avoid nephrotoxic meds. Strict I/Os  - BID BMP Hx of CKD stage 4. per HIE, baseline Cr 2.9, eGFR 27 (05/2019). Now presenting w/ BUN/ Cr 97/ 7.99.  May be post-obstructive, as pt straight-cath'd with UOP 500cc  Patient may get post obstructive diuresis. Will require close monitoring of UOP. Will consider starting LR depending on UOP.   - C/w puckett  - Nephro following. Recs appreciated   - Trend Cr. Avoid nephrotoxic meds. Strict I/Os  - Daily BMP Hx of CKD stage 4. per HIE, baseline Cr 2.9, eGFR 27 (05/2019). Now presenting w/ BUN/ Cr 97/ 7.99.  May be post-obstructive, as pt straight-cath'd with UOP 500cc  Patient may get post obstructive diuresis. Will require close monitoring of UOP. Will consider starting LR depending on UOP.   - C/w condom cath and assess urinary retention with bladder scans  - Nephro following. Recs appreciated   - Trend Cr. Avoid nephrotoxic meds. Strict I/Os  - Daily BMP

## 2022-10-20 NOTE — PHYSICAL THERAPY INITIAL EVALUATION ADULT - NAME OF DISCHARGE PLANNER
Unable to discuss plan with CM/SW - off the floor on first attempt and in IDR on second. Will f/u on

## 2022-10-20 NOTE — PROGRESS NOTE ADULT - PROBLEM SELECTOR PLAN 7
Hx of HLD. Home med: atorvastatin 20mg  - C/w atorvastatin 80 iso hx of CVAx2 and presenting w/ slurred speech

## 2022-10-20 NOTE — PROGRESS NOTE ADULT - ATTENDING COMMENTS
I agree with the fellow's findings and plans as written above with the following additions/amendments:    Seen and examined at bedside. Continues to appear dry, states eating more but unclear. Feels well, monitoring closely, no current indications for HD. Further recs as above

## 2022-10-20 NOTE — EEG REPORT - NS EEG TEXT BOX
Day 3  10/20/2022 from 00:00:00 to 08:55:00  Awake Background:  The awake electrographic background was characterized by the presence of a well organized mixture of mainly alpha and some theta frequencies.  Fragments of an 8 Hz posterior dominant rhythm were present.    Sleep Background:  During drowsiness, slow roving eye movements, attenuation and fragmentation of the posterior dominant rhythm, symmetrical vertex waves and increased diffuse background slowing were present.  Stage 2 sleep was characterized by the presence of synchronous and symmetric  sleep spindles and K-complexes.  Slow wave sleep architecture was preserved.    Background Slowing:  Mild generalized background slowing was present.    Focal Slowing:  No focal slowing was present    Other Paroxysmal Non-Epileptiform Findings:    None.    Spontaneous Activity:  None.    Activation Procedures:  No activation procedures have been performed during this study.    Clinical Events:  No clinical events occurred on this date.  No electrographic or electroclinical seizures occurred on this date    Day 3 Impression:  This is an abnormal study due to the presence of  Generalized background slowing    Day 3 Clinical correlation  This is an abnormal study.  The above mentioned findings indicate the presence mild non specific diffuse cerebral dysfunction.  No epileptiform discharges present.  No electrographic or electroclinical seizures occurred.    The undersigned attending physicians have reviewed portions of this record on the dates documented below:  On 10/20/2022 the study was reviewed from 10/19/2022 at 09:30:30 to 10/20/2022 at 08:55:00 by Katie Dunn MD

## 2022-10-21 ENCOUNTER — TRANSCRIPTION ENCOUNTER (OUTPATIENT)
Age: 57
End: 2022-10-21

## 2022-10-21 LAB
ANION GAP SERPL CALC-SCNC: 14 MMOL/L — SIGNIFICANT CHANGE UP (ref 5–17)
B2 GLYCOPROT1 AB SER QL: NEGATIVE — SIGNIFICANT CHANGE UP
BASOPHILS # BLD AUTO: 0.01 K/UL — SIGNIFICANT CHANGE UP (ref 0–0.2)
BASOPHILS NFR BLD AUTO: 0.1 % — SIGNIFICANT CHANGE UP (ref 0–2)
BUN SERPL-MCNC: 94 MG/DL — HIGH (ref 7–23)
CALCIUM SERPL-MCNC: 8.6 MG/DL — SIGNIFICANT CHANGE UP (ref 8.4–10.5)
CARDIOLIPIN AB SER-ACNC: NEGATIVE — SIGNIFICANT CHANGE UP
CHLORIDE SERPL-SCNC: 108 MMOL/L — SIGNIFICANT CHANGE UP (ref 96–108)
CO2 SERPL-SCNC: 17 MMOL/L — LOW (ref 22–31)
CONFIRM APTT STACLOT: POSITIVE
CREAT SERPL-MCNC: 7.6 MG/DL — HIGH (ref 0.5–1.3)
DRVVT SCREEN TO CONFIRM RATIO: SIGNIFICANT CHANGE UP
EGFR: 8 ML/MIN/1.73M2 — LOW
EOSINOPHIL # BLD AUTO: 0.34 K/UL — SIGNIFICANT CHANGE UP (ref 0–0.5)
EOSINOPHIL NFR BLD AUTO: 3.3 % — SIGNIFICANT CHANGE UP (ref 0–6)
GLUCOSE BLDC GLUCOMTR-MCNC: 101 MG/DL — HIGH (ref 70–99)
GLUCOSE BLDC GLUCOMTR-MCNC: 104 MG/DL — HIGH (ref 70–99)
GLUCOSE BLDC GLUCOMTR-MCNC: 110 MG/DL — HIGH (ref 70–99)
GLUCOSE BLDC GLUCOMTR-MCNC: 110 MG/DL — HIGH (ref 70–99)
GLUCOSE BLDC GLUCOMTR-MCNC: 161 MG/DL — HIGH (ref 70–99)
GLUCOSE BLDC GLUCOMTR-MCNC: 240 MG/DL — HIGH (ref 70–99)
GLUCOSE SERPL-MCNC: 126 MG/DL — HIGH (ref 70–99)
HCT VFR BLD CALC: 27.6 % — LOW (ref 39–50)
HGB BLD-MCNC: 8.2 G/DL — LOW (ref 13–17)
IMM GRANULOCYTES NFR BLD AUTO: 0.5 % — SIGNIFICANT CHANGE UP (ref 0–0.9)
LA NT DPL PPP QL: 33 SEC — SIGNIFICANT CHANGE UP
LYMPHOCYTES # BLD AUTO: 0.93 K/UL — LOW (ref 1–3.3)
LYMPHOCYTES # BLD AUTO: 9 % — LOW (ref 13–44)
MAGNESIUM SERPL-MCNC: 2.2 MG/DL — SIGNIFICANT CHANGE UP (ref 1.6–2.6)
MCHC RBC-ENTMCNC: 23.3 PG — LOW (ref 27–34)
MCHC RBC-ENTMCNC: 29.7 GM/DL — LOW (ref 32–36)
MCV RBC AUTO: 78.4 FL — LOW (ref 80–100)
MONOCYTES # BLD AUTO: 0.65 K/UL — SIGNIFICANT CHANGE UP (ref 0–0.9)
MONOCYTES NFR BLD AUTO: 6.3 % — SIGNIFICANT CHANGE UP (ref 2–14)
NEUTROPHILS # BLD AUTO: 8.33 K/UL — HIGH (ref 1.8–7.4)
NEUTROPHILS NFR BLD AUTO: 80.8 % — HIGH (ref 43–77)
NRBC # BLD: 0 /100 WBCS — SIGNIFICANT CHANGE UP (ref 0–0)
PHOSPHATE SERPL-MCNC: 5.8 MG/DL — HIGH (ref 2.5–4.5)
PLATELET # BLD AUTO: 330 K/UL — SIGNIFICANT CHANGE UP (ref 150–400)
POTASSIUM SERPL-MCNC: 4 MMOL/L — SIGNIFICANT CHANGE UP (ref 3.5–5.3)
POTASSIUM SERPL-SCNC: 4 MMOL/L — SIGNIFICANT CHANGE UP (ref 3.5–5.3)
RBC # BLD: 3.52 M/UL — LOW (ref 4.2–5.8)
RBC # FLD: 21.4 % — HIGH (ref 10.3–14.5)
SODIUM SERPL-SCNC: 139 MMOL/L — SIGNIFICANT CHANGE UP (ref 135–145)
WBC # BLD: 10.31 K/UL — SIGNIFICANT CHANGE UP (ref 3.8–10.5)
WBC # FLD AUTO: 10.31 K/UL — SIGNIFICANT CHANGE UP (ref 3.8–10.5)

## 2022-10-21 PROCEDURE — 99233 SBSQ HOSP IP/OBS HIGH 50: CPT | Mod: GC

## 2022-10-21 PROCEDURE — 99232 SBSQ HOSP IP/OBS MODERATE 35: CPT

## 2022-10-21 PROCEDURE — 12345: CPT | Mod: NC

## 2022-10-21 PROCEDURE — 99233 SBSQ HOSP IP/OBS HIGH 50: CPT

## 2022-10-21 PROCEDURE — 93312 ECHO TRANSESOPHAGEAL: CPT | Mod: 26

## 2022-10-21 RX ORDER — INSULIN GLARGINE 100 [IU]/ML
8 INJECTION, SOLUTION SUBCUTANEOUS AT BEDTIME
Refills: 0 | Status: DISCONTINUED | OUTPATIENT
Start: 2022-10-21 | End: 2022-10-23

## 2022-10-21 RX ORDER — CALCIUM ACETATE 667 MG
667 TABLET ORAL
Refills: 0 | Status: DISCONTINUED | OUTPATIENT
Start: 2022-10-21 | End: 2022-11-01

## 2022-10-21 RX ORDER — HEPARIN SODIUM 5000 [USP'U]/ML
5000 INJECTION INTRAVENOUS; SUBCUTANEOUS EVERY 8 HOURS
Refills: 0 | Status: DISCONTINUED | OUTPATIENT
Start: 2022-10-21 | End: 2022-11-01

## 2022-10-21 RX ADMIN — TAMSULOSIN HYDROCHLORIDE 0.4 MILLIGRAM(S): 0.4 CAPSULE ORAL at 22:41

## 2022-10-21 RX ADMIN — INSULIN GLARGINE 8 UNIT(S): 100 INJECTION, SOLUTION SUBCUTANEOUS at 22:42

## 2022-10-21 RX ADMIN — MODAFINIL 50 MILLIGRAM(S): 200 TABLET ORAL at 06:49

## 2022-10-21 RX ADMIN — CLOPIDOGREL BISULFATE 75 MILLIGRAM(S): 75 TABLET, FILM COATED ORAL at 18:43

## 2022-10-21 RX ADMIN — Medication 4: at 22:17

## 2022-10-21 RX ADMIN — HEPARIN SODIUM 5000 UNIT(S): 5000 INJECTION INTRAVENOUS; SUBCUTANEOUS at 22:42

## 2022-10-21 RX ADMIN — Medication 650 MILLIGRAM(S): at 06:49

## 2022-10-21 RX ADMIN — Medication 667 MILLIGRAM(S): at 18:44

## 2022-10-21 RX ADMIN — Medication 81 MILLIGRAM(S): at 18:44

## 2022-10-21 RX ADMIN — ATORVASTATIN CALCIUM 80 MILLIGRAM(S): 80 TABLET, FILM COATED ORAL at 22:41

## 2022-10-21 RX ADMIN — Medication 30 MILLIGRAM(S): at 18:44

## 2022-10-21 RX ADMIN — Medication 2: at 08:59

## 2022-10-21 RX ADMIN — Medication 650 MILLIGRAM(S): at 18:44

## 2022-10-21 RX ADMIN — Medication 650 MILLIGRAM(S): at 23:04

## 2022-10-21 NOTE — PROGRESS NOTE ADULT - ASSESSMENT
Patient is a 57y old Male w/ PMHx of IDDM, CVA x 2 (residual left side weakness), HTN, HLD, and CKD presents c/o slurred speech, weakness over the past several days, found to have embolic stroke of unclear etiology. Admitted for further work up to determine the etiology of his stroke

## 2022-10-21 NOTE — PROGRESS NOTE ADULT - ATTENDING COMMENTS
57M with DM HLD, CKD who presented with slurred speech found to have new acute stroke. Now s/p work up, pending Acute Rehab placement    Labs and imaging reviewed    Problem List as above, dispo pending acceptance to [facility]

## 2022-10-21 NOTE — EEG REPORT - NS EEG TEXT BOX
Brief Clinical History:  This is a 57 y old man with IDDM, CVA x 2 (residual left side weakness), HTN, HLD, and CKD. Now with paroxysmal events of unclear nature.  Prolonged VEEG obtained to rule out subclinical seizures.    Diagnosis Code:         Acquisition Details:  Electroencephalography was acquired using a minimum of 21 channels on an XLEpiEP Neurology system v 8.5.1 with electrode placement according to the standard International 10-20 system following ACNS (American Clinical Neurophysiology Society) guidelines for Long-Term Video EEG monitoring.  Anterior temporal T1 and T2 electrodes were utilized whenever possible.   The XLTEK automated spike & seizure detections were all reviewed in detail, in addition to extensive portions of raw EEG.      Day 1  10/18/2022 from 00:33:34 to 23:59:59  Awake Background:  The awake electrographic background was characterized by the presence of a well organized mixture of mainly alpha and some theta frequencies.  Fragments of an 8 Hz posterior dominant rhythm were present.    Sleep Background:  During drowsiness, slow roving eye movements, attenuation and fragmentation of the posterior dominant rhythm, symmetrical vertex waves and increased diffuse background slowing were present.  Stage 2 sleep was characterized by the presence of synchronous and symmetric  sleep spindles and K-complexes.  Slow wave sleep architecture was preserved.    Background Slowing:  Mild generalized background slowing was present.    Focal Slowing:  No focal slowing was present    Other Paroxysmal Non-Epileptiform Findings:    None.    Spontaneous Activity:  None.    Activation Procedures:  No activation procedures have been performed during this study.    Clinical Events:  No clinical events occurred on this date.  No electrographic or electroclinical seizures occurred on this date    Day 1 Impression:  This is an abnormal study due to the presence of  Generalized background slowing    Day 1 Clinical correlation  This is an abnormal study.  The above mentioned findings indicate the presence mild non specific diffuse cerebral dysfunction.  No epileptiform discharges present.  No electrographic or electroclinical seizures occurred.        The undersigned attending physicians have reviewed portions of this record on the dates documented below:  On 10/18/2022 the study was reviewed from 00:03:34 to 11:35:42 by Katie Dunn MD      Day 2  10/19/2022 from 00:00:00 to 23:59:59  Awake Background:  The awake electrographic background was characterized by the presence of a well organized mixture of mainly alpha and some theta frequencies.  Fragments of an 8 Hz posterior dominant rhythm were present.    Sleep Background:  During drowsiness, slow roving eye movements, attenuation and fragmentation of the posterior dominant rhythm, symmetrical vertex waves and increased diffuse background slowing were present.  Stage 2 sleep was characterized by the presence of synchronous and symmetric  sleep spindles and K-complexes.  Slow wave sleep architecture was preserved.    Background Slowing:  Mild generalized background slowing was present.    Focal Slowing:  No focal slowing was present    Other Paroxysmal Non-Epileptiform Findings:    None.    Spontaneous Activity:  None.    Activation Procedures:  No activation procedures have been performed during this study.    Clinical Events:  No clinical events occurred on this date.  No electrographic or electroclinical seizures occurred on this date    Day 2 Impression:  This is an abnormal study due to the presence of  Generalized background slowing    Day 2 Clinical correlation  This is an abnormal study.  The above mentioned findings indicate the presence mild non specific diffuse cerebral dysfunction.  No epileptiform discharges present.  No electrographic or electroclinical seizures occurred.    The undersigned attending physicians have reviewed portions of this record on the dates documented below:  On 10/19/2022 the study was reviewed from 10/18/2022 at 11:35:42 to 10/19/2022 at 09:30:30 by Katie Dunn MD      Day 3  10/20/2022 from 00:00:00 to 16:30:43   Awake Background:  The awake electrographic background was characterized by the presence of a well organized mixture of mainly alpha and some theta frequencies.  Fragments of an 8 Hz posterior dominant rhythm were present.    Sleep Background:  During drowsiness, slow roving eye movements, attenuation and fragmentation of the posterior dominant rhythm, symmetrical vertex waves and increased diffuse background slowing were present.  Stage 2 sleep was characterized by the presence of synchronous and symmetric  sleep spindles and K-complexes.  Slow wave sleep architecture was preserved.    Background Slowing:  Mild generalized background slowing was present.    Focal Slowing:  No focal slowing was present    Other Paroxysmal Non-Epileptiform Findings:    None.    Spontaneous Activity:  None.    Activation Procedures:  No activation procedures have been performed during this study.    Clinical Events:  No clinical events occurred on this date.  No electrographic or electroclinical seizures occurred on this date    Day 3 Impression:  This is an abnormal study due to the presence of  Generalized background slowing    Day 3 Clinical correlation  This is an abnormal study.  The above mentioned findings indicate the presence mild non specific diffuse cerebral dysfunction.  No epileptiform discharges present.  No electrographic or electroclinical seizures occurred.    The undersigned attending physicians have reviewed portions of this record on the dates documented below:  On 10/20/2022 the study was reviewed from 10/19/2022 at 09:30:30 to 10/20/2022 at 08:55:00 by Katie Dunn MD  On 10/21/2022 the study was reviewed from 10/20/2022 at 08:55:00 to 16:30:43 by Katie Dunn MD        Final Impression:  This is an abnormal study due to the presence of  Generalized background slowing    Final Clinical correlation  This is an abnormal study.  The above mentioned findings indicate the presence mild non specific diffuse cerebral dysfunction.  No epileptiform discharges present.  No electrographic or electroclinical seizures occurred.    Katie Dunn MS  Attending Neurologist, [facility]      The undersigned attending physicians have reviewed portions of this record on the dates documented below:  On 10/18/2022 the study was reviewed from 00:03:34 to 11:35:42 by Katie Dunn MD  On 10/19/2022 the study was reviewed from 10/18/2022 at 11:35:42 to 10/19/2022 at 09:30:30 by Katie Dunn MD  On 10/20/2022 the study was reviewed from 10/19/2022 at 09:30:30 to 10/20/2022 at 08:55:00 by Katie Dunn MD  On 10/21/2022 the study was reviewed from 10/20/2022 at 08:55:00 to 16:30:43 by Katie Dunn MD

## 2022-10-21 NOTE — DISCHARGE NOTE PROVIDER - PROVIDER TOKENS
PROVIDER:[TOKEN:[[id number]]],PROVIDER:[TOKEN:[[id number]]] PROVIDER:[TOKEN:[[id number]]],FREE:[LAST:[Jose],FIRST:[Van WARNER],PHONE:[phone]],FAX:[(   )    -],ADDRESS:[Nephrology  [address], [address] [place] [address]],SCHEDULEDAPPT:[11/10/2022],SCHEDULEDAPPTTIME:[02:20 PM]] PROVIDER:[TOKEN:[[id number]]],PROVIDER:[TOKEN:[[id number]],FOLLOWUP:[1 week]],PROVIDER:[TOKEN:[[id number]],FOLLOWUP:[1 week]],PROVIDER:[TOKEN:[[id number]],FOLLOWUP:[2 weeks]],FREE:[LAST:[Jose],FIRST:[Van WARNRE],PHONE:[phone]:[(   )    -],ADDRESS:[Nephrology  [address], [address]th [place] [address]],SCHEDULEDAPPT:[11/10/2022],SCHEDULEDAPPTTIME:[02:20 PM]] PROVIDER:[TOKEN:[[id number]]],PROVIDER:[TOKEN:[[id number]],FOLLOWUP:[1 week]],PROVIDER:[TOKEN:[[id number]],FOLLOWUP:[2 weeks]],FREE:[LAST:[Jose],FIRST:[Van WARNER],PHONE:[phone]],FAX:[(   )    -],ADDRESS:[Nephrology  [address], [address]th [place] [address]],SCHEDULEDAPPT:[11/10/2022],SCHEDULEDAPPTTIME:[02:20 PM]],FREE:[LAST:[Alejandrina Velez],FIRST:[Jaspal],PHONE:[phone]],FAX:[(   )    -],ADDRESS:[Endocrinology  [address], [place] [address]],SCHEDULEDAPPT:[11/09/2022],SCHEDULEDAPPTTIME:[10:00 AM]] PROVIDER:[TOKEN:[[id number]],SCHEDULEDAPPT:[03/08/2023],SCHEDULEDAPPTTIME:[01:40 PM]],PROVIDER:[TOKEN:[[id number]],FOLLOWUP:[1 week]],PROVIDER:[TOKEN:[[id number]],FOLLOWUP:[2 weeks]],FREE:[LAST:[Jose],FIRST:[Van WARNER],PHONE:[phone]:[(   )    -],ADDRESS:[Nephrology  [address], [address]th [place] [address]],SCHEDULEDAPPT:[11/10/2022],SCHEDULEDAPPTTIME:[02:20 PM]],FREE:[LAST:[Alejandrina Velez],FIRST:[Jaspal],PHONE:[phone]:[(   )    -],ADDRESS:[Endocrinology  [address] [place] [address]],SCHEDULEDAPPT:[11/09/2022],SCHEDULEDAPPTTIME:[10:00 AM]] PROVIDER:[TOKEN:[[id number]],SCHEDULEDAPPT:[03/08/2023],SCHEDULEDAPPTTIME:[01:40 PM]],PROVIDER:[TOKEN:[[id number]],SCHEDULEDAPPT:[11/08/2022],SCHEDULEDAPPTTIME:[01:30 PM]],FREE:[LAST:[Jose],FIRST:[Van WARNER],PHONE:[phone]:[(   )    -],ADDRESS:[Nephrology  [address], [address]th [place] [address]],SCHEDULEDAPPT:[11/10/2022],SCHEDULEDAPPTTIME:[02:20 PM]],FREE:[LAST:[Tinoco Velez],FIRST:[Jaspal],PHONE:[phone]:[(   )    -],ADDRESS:[Endocrinology  [address], [place] [address]],SCHEDULEDAPPT:[11/09/2022],SCHEDULEDAPPTTIME:[10:00 AM]],FREE:[LAST:[Hol[place]],FIRST:[Jas],PHONE:[phone]],FAX:[(   )    -],ADDRESS:[Hematology/Oncology  [address] [place] [address]],SCHEDULEDAPPT:[11/08/2022],SCHEDULEDAPPTTIME:[03:45 PM]]

## 2022-10-21 NOTE — PROGRESS NOTE ADULT - ASSESSMENT
This is a 57y Male with PMHx of IDDM, CVA x2 (residual left sided weakness), HTN, HLD, CKD presented on 10/16 for AMS and slurred speech, with weakness over several days admitted for a toxic metabolic workup and being treated for ALEXIS. Found to have multiple acute to subacute infarcts on MRI suggestive of a cardioembolic source. Stroke service consulted for further workup.     Plan:   - c/w DAPT  - continue Atorvastatin 80mg PO daily  - homocysteine elevated - f/u b12 level  - PTH elevated - recommend endocrine consult  - hexagonal phase positive  - c/w modafinil 50mg for lethargy  - CRP 36.9/ESR 96  - Send blood cultures  - TTE: EF 50-55%, no PFO  - will need KATHY   - ILR placed  - q4h stroke checks  - SBP < 180  - EEG with Generalized background slowing  - c/w EEG      Stroke risk factors  - A1C: 7.2  - LDL: 66   - HTN     DVT prophylaxis   -Lovenox SQ and SCDs    Discussed with Neurology Attending Dr. Casandra San

## 2022-10-21 NOTE — DISCHARGE NOTE PROVIDER - NSDCFUSCHEDAPPT_GEN_ALL_CORE_FT
Colin Anand Physician [facility]  HEARTVASC [address]  Scheduled Appointment: 12/08/2022     Van Garcia  [facility] Physician [facility]  NEPHRO [address]  Scheduled Appointment: 11/10/2022    Colin Anand  [facility]  HEARTVASC [address]  Scheduled Appointment: 12/08/2022     Jaspal Uribe  [facility] Physician [facility]  ENDOCRIN [address]  Scheduled Appointment: 11/09/2022    Van Garcia  [facility]  NEPHRO [address]  Scheduled Appointment: 11/10/2022    Colin Anand  [facility]  HEARTVASC [address]  Scheduled Appointment: 12/08/2022     [facility] Physician [facility]  INTMED [address]  Scheduled Appointment: 11/08/2022    Jaspal Uribe  [facility] Physician [facility]  ENDOCRIN [address]  Scheduled Appointment: 11/09/2022    Van Garcia  [facility]  NEPHRO [address]  Scheduled Appointment: 11/10/2022    Colin Anand  [facility]  HEARTVASC [address]  Scheduled Appointment: 12/08/2022

## 2022-10-21 NOTE — DISCHARGE NOTE PROVIDER - NSDCFUADDAPPT_GEN_ALL_CORE_FT
Neurology will reach out to you for an outpatient follow up. You will have an appointment schedule with Dr. San  Neurology will reach out to you for an outpatient follow up. You will have an appointment schedule with Dr. San     Please call [phone] and schedule an appointment with Nephrologist Dr. Bubba Garcia for management of your chronic kidney disease and hypertension   (1) Neurology will reach out to you for an outpatient follow up. You will have an appointment schedule with Dr. San     Please bring your Insurance card, Photo ID and Discharge paperwork to the following appointment:    (2) Please follow up with your Nephrology Provider, Dr. Van Garcia for management of your chronic kidney disease and hypertension. Location will be at [address] [place] [address], [address]th [place] [address] on 11/10/2022 at 2:20pm.    Appointment was scheduled by Ms. ASAD El, Referral Coordinator.        (1) Neurology will reach out to you for an outpatient follow up. You will have an appointment schedule with Dr. San     Please bring your Insurance card, Photo ID and Discharge paperwork to the following appointments:    (2) Please follow up with your Endocrinology Provider, Dr. Jaspal Velez at [address] [place] [address], [address] [address] on 11/09/2022 at 10:00am.    Appointment was scheduled by Ms. ASAD El, Referral Coordinator.    (3) Please follow up with your Nephrology Provider, Dr. Van Garcia for management of your chronic kidney disease and hypertension. Location will be at [address], [address]th [place] [address] on 11/10/2022 at 2:20pm.    Appointment was scheduled by Ms. ASAD El, Referral Coordinator.        Please bring your Insurance card, Photo ID and Discharge paperwork to the following appointments:    (1) Please follow up with your Endocrinology Provider, Dr. Jaspal Velez at [address], [address] [place] [address] on 11/09/2022 at 10:00am.    Appointment was scheduled by Ms. ASAD El, Referral Coordinator.    (2) Please follow up with your Nephrology Provider, Dr. Van Garcia for management of your chronic kidney disease and hypertension. Location will be at [address], [address]th [place] [address] on 11/10/2022 at 2:20pm.    Appointment was scheduled by Ms. ASAD El, Referral Coordinator.    (3) Please follow up with your Neurology Provider, Dr. Casandra San at [address], [address] [place] [address] on 03/08/2023 at 1:40pm.    Please note that the office will contact you for an earlier appointment once available.    Appointment was scheduled by Ms. ASAD El, Referral Coordinator.            Please bring your Insurance card, Photo ID and Discharge paperwork to the following appointments:    (1) Please follow up with your Primary Care Provider, Dr. Regan Dorado on behalf of Dr. Andrzej Goldstein at [address] [place], [address] [place] [address] on 11/08/2022 at 1:30pm.    Please obtain referrals from your Primary Care for upcoming specialty appointments: Hematology/Oncology, Endocrinology, Nephrology and Neurology appointments.    Appointment was scheduled by Ms. ASAD El, Referral Coordinator.    (2) Please follow up with your Hematology/Oncology Provider, Dr. Jas Jaramillo at [address] [address] on 11/08/2022 at 3:45pm.    Appointment was scheduled by Ms. ASAD El Referral Coordinator.      (3) Please follow up with your Endocrinology Provider, Dr. Jaspal Velez at [address] [place], [address] [address] on 11/09/2022 at 10:00am.    Appointment was scheduled by Ms. ASAD El Referral Coordinator.    (4) Please follow up with your Nephrology Provider, Dr. Van Garcia for management of your chronic kidney disease and hypertension. Location will be at [address], [address] [place] [address] on 11/10/2022 at 2:20pm.    Appointment was scheduled by Ms. ASAD El, Referral Coordinator.    (5) Please follow up with your Neurology Provider, Dr. Casandra San at [address], [address] [place] [address] on 03/08/2023 at 1:40pm.    Please note that the office will contact you for an earlier appointment once available.    Appointment was scheduled by Ms. ASAD El, Referral Coordinator.

## 2022-10-21 NOTE — PROGRESS NOTE ADULT - PROBLEM SELECTOR PLAN 4
Hx of CKD stage 4. per HIE, baseline Cr 2.9, eGFR 27 (05/2019). Now presenting w/ BUN/ Cr 97/ 7.99.  May be post-obstructive, as pt straight-cath'd with UOP 500cc  Patient may get post obstructive diuresis. Will require close monitoring of UOP. Will consider starting LR depending on UOP.   Currently making urine   - Nephro following. Recs appreciated   - Trend Cr. Avoid nephrotoxic meds. Strict I/Os  - Daily BMP

## 2022-10-21 NOTE — DISCHARGE NOTE PROVIDER - NPI NUMBER (FOR SYSADMIN USE ONLY) :
[[id number]],[[id number] [[id number]],[[id number]] [[id number]],[UNKNOWN] [[id number]],[[id number]],[[id number]],[[id number]],[UNKNOWN] [[id number]],[[id number]],[[id number]],[UNKNOWN],[UNKNOWN]

## 2022-10-21 NOTE — PROGRESS NOTE ADULT - SUBJECTIVE AND OBJECTIVE BOX
OVERNIGHT EVENTS: No acute event overnight     SUBJECTIVE / INTERVAL HPI: Denied any worsening slurred speech or weakness. No SOB, CP, palpitations, no abdominal pain, N/V, constipation or diarrhea.     VITAL SIGNS:  Vital Signs Last 24 Hrs  T(C): 36.6 (21 Oct 2022 04:51), Max: 36.6 (20 Oct 2022 12:22)  T(F): 97.9 (21 Oct 2022 04:51), Max: 97.9 (21 Oct 2022 04:51)  HR: 86 (21 Oct 2022 04:51) (86 - 100)  BP: 166/96 (21 Oct 2022 04:51) (130/82 - 166/96)  BP(mean): --  RR: 18 (21 Oct 2022 04:51) (18 - 18)  SpO2: 98% (21 Oct 2022 04:51) (97% - 98%)    Parameters below as of 21 Oct 2022 04:51  Patient On (Oxygen Delivery Method): room air        PHYSICAL EXAM:  General: In bed in no acute distress   HEENT: NCAT; PERRL, anicteric sclera; MMM  Neck: supple, trachea midline  Cardiovascular: S1, S2 normal; RRR, no M/G/R  Respiratory: CTABL; no W/R/R  Gastrointestinal: soft, nontender, nondistended. bowel sounds present.  Skin: no ulcerations or visible rashes appreciated  Extremities: WWP; no edema, clubbing or cyanosis, left lower extremity weakness (unable to move against gravity), RLE normal strength   Vascular: 2+ radial, DP/PT pulses B/L  Neurological: AAOx3; CN II-XII grossly intact; no focal deficits    MEDICATIONS:  MEDICATIONS  (STANDING):  aspirin  chewable 81 milliGRAM(s) Oral daily  atorvastatin 80 milliGRAM(s) Oral at bedtime  clopidogrel Tablet 75 milliGRAM(s) Oral daily  dextrose 5%. 1000 milliLiter(s) (100 mL/Hr) IV Continuous <Continuous>  dextrose 5%. 1000 milliLiter(s) (50 mL/Hr) IV Continuous <Continuous>  dextrose 50% Injectable 25 Gram(s) IV Push once  dextrose 50% Injectable 12.5 Gram(s) IV Push once  dextrose 50% Injectable 25 Gram(s) IV Push once  glucagon  Injectable 1 milliGRAM(s) IntraMuscular once  influenza   Vaccine 0.5 milliLiter(s) IntraMuscular once  insulin glargine Injectable (LANTUS) 4 Unit(s) SubCutaneous at bedtime  insulin lispro (ADMELOG) corrective regimen sliding scale   SubCutaneous Before meals and at bedtime  modafinil 50 milliGRAM(s) Oral daily  NIFEdipine XL 30 milliGRAM(s) Oral every 24 hours  sodium bicarbonate 650 milliGRAM(s) Oral three times a day  tamsulosin 0.4 milliGRAM(s) Oral at bedtime    MEDICATIONS  (PRN):  bisacodyl 5 milliGRAM(s) Oral every 12 hours PRN Constipation  dextrose Oral Gel 15 Gram(s) Oral once PRN Blood Glucose LESS THAN 70 milliGRAM(s)/deciliter      ALLERGIES:  Allergies    No Known Allergies    Intolerances        LABS:                        8.6    11.21 )-----------( 308      ( 20 Oct 2022 05:30 )             28.5     10-20    142  |  112<H>  |  85<H>  ----------------------------<  87  4.1   |  14<L>  |  7.80<H>    Ca    8.3<L>      20 Oct 2022 05:30  Phos  5.6     10-20  Mg     1.8     10-20    TPro  6.2  /  Alb  2.9<L>  /  TBili  0.2  /  DBili  x   /  AST  15  /  ALT  17  /  AlkPhos  81  10-20    PT/INR - ( 20 Oct 2022 19:00 )   PT: 12.4 sec;   INR: 1.04          PTT - ( 20 Oct 2022 10:14 )  PTT:30.4 sec    CAPILLARY BLOOD GLUCOSE      POCT Blood Glucose.: 161 mg/dL (21 Oct 2022 08:12)      RADIOLOGY & ADDITIONAL TESTS: Reviewed.

## 2022-10-21 NOTE — DISCHARGE NOTE PROVIDER - NSDCMRMEDTOKEN_GEN_ALL_CORE_FT
Lantus 100 units/mL subcutaneous solution: 15 unit(s) subcutaneous once a day (at bedtime)   aspirin 81 mg oral tablet, chewable: 1 tab(s) orally once a day  atorvastatin 80 mg oral tablet: 1 tab(s) orally once a day (at bedtime)  Lantus 100 units/mL subcutaneous solution: 15 unit(s) subcutaneous once a day (at bedtime)  NIFEdipine 30 mg oral tablet, extended release: 1 tab(s) orally every 24 hours  tamsulosin 0.4 mg oral capsule: 1 cap(s) orally once a day (at bedtime)   Admelog 100 units/mL injectable solution: 5 unit(s) injectable 3 times a day (before meals)  aspirin 81 mg oral tablet, chewable: 1 tab(s) orally once a day  atorvastatin 80 mg oral tablet: 1 tab(s) orally once a day (at bedtime)  clopidogrel 75 mg oral tablet: 1 tab(s) orally once a day  Lantus 100 units/mL subcutaneous solution: 6 unit(s) subcutaneous once a day (at bedtime)  NIFEdipine 30 mg oral tablet, extended release: 1 tab(s) orally every 24 hours  tamsulosin 0.4 mg oral capsule: 1 cap(s) orally once a day (at bedtime)   Admelog 100 units/mL injectable solution: 5 unit(s) injectable 3 times a day (before meals)  aspirin 81 mg oral tablet, chewable: 1 tab(s) orally once a day  atorvastatin 80 mg oral tablet: 1 tab(s) orally once a day (at bedtime)  clopidogrel 75 mg oral tablet: 1 tab(s) orally once a day  Lantus 100 units/mL subcutaneous solution: 5 unit(s) subcutaneous once a day (at bedtime)  modafinil 100 mg oral tablet: 1 tab(s) orally once a day (in the morning)  NIFEdipine 30 mg oral tablet, extended release: 1 tab(s) orally every 24 hours  tamsulosin 0.4 mg oral capsule: 1 cap(s) orally once a day (at bedtime)   aspirin 81 mg oral tablet, chewable: 1 tab(s) orally once a day  atorvastatin 80 mg oral tablet: 1 tab(s) orally once a day (at bedtime)  clopidogrel 75 mg oral tablet: 1 tab(s) orally once a day  modafinil 100 mg oral tablet: 1 tab(s) orally once a day (in the morning)  Procardia XL 60 mg oral tablet, extended release: 1 tab(s) orally every 24 hours  SITagliptin 25 mg oral tablet: 1 tab(s) orally once a day  tamsulosin 0.4 mg oral capsule: 1 cap(s) orally once a day (at bedtime)   aspirin 81 mg oral tablet, chewable: 1 tab(s) orally once a day  atorvastatin 80 mg oral tablet: 1 tab(s) orally once a day (at bedtime)  heparin: 5000 unit(s) subcutaneous every 8 hours  modafinil 100 mg oral tablet: 1 tab(s) orally once a day (in the morning)  Plavix 75 mg oral tablet: 1 tab(s) orally once a day until 11/8/2022  Procardia XL 60 mg oral tablet, extended release: 1 tab(s) orally every 24 hours  SITagliptin 25 mg oral tablet: 1 tab(s) orally once a day before breakfast   tamsulosin 0.4 mg oral capsule: 1 cap(s) orally once a day (at bedtime)

## 2022-10-21 NOTE — PROGRESS NOTE ADULT - ASSESSMENT
56 yo M w/ longstanding IDDM1 w/ retinopathy, HTN, CKD 4 presented w/ slurred speech, generalized weakness for several days. Nephrology consulted for AMS in the setting of advanced CKD    Assessment/Plan:  #Progression of CKD V  Last known Cr 3.3 in Jun/2020 which was progression of his CKD. Serologic GN workup in 2019 negative  Now presenting w/ Cr 7.99 on admission, stable at the moment  UA w/ trace protein and moderate blood in the absence of RBCs  Non-oliguric  UPCR 1.9, Eleanor 56    Recommend:  No indication of urgent HD at this time. However, given his advance CKD, will likely require HD in the future   Maintain net even fluid balance   Strict I&Os  Given pt had urinary retention on admission, will recommend a trial of flomax. Bladder scan at least BID post void to rule out retention  Avoid nephrotoxic agents  Renal diet    #Hypernatremia-resolved  Due to dehydration and no access to water  Na 142 today  Daily BMP  Encourage PO intake    #Anemia  Hb 8.6  Iron sat 58%, ferritin 34  likely ACD  Epo 10K U Qweekly    #Hypertension  Continue nifedipine 30mg    #Met Acidosis  Likely due to CKD  Continue sodium bicarb 650mg TID     #BMD-CKD  Phos above goal  Check iPTH  If persistently>5.5, recommend starting ca acetate 667 qAC

## 2022-10-21 NOTE — DISCHARGE NOTE PROVIDER - CARE PROVIDERS DIRECT ADDRESSES
,DirectAddress_Unknown,[email or web address] ,[email or web address],[email or web address] ,[email or web address],[email or web address] ,[email or web address],DirectAddress_Unknown,DirectAddress_Unknown ,[email or web address],DirectAddress_Unknown,DirectAddress_Unknown,DirectAddress_Unknown

## 2022-10-21 NOTE — PROGRESS NOTE ADULT - PROBLEM SELECTOR PLAN 1
- Hx of CVA x2. Presenting w/ slurred speech and generalized weakness. Waxing/waning in alertness, intermittently very lethargic w/ nearly pinpoint pupils, responsive to noxious stimuli   Per Neuro HIE- 2/2019 acute infarct in left corona radiata and left macrina seen on MRI Brain, had been referred to EP for loop recorder placement but lost to f/u  CT Head on admission - no acute findings. Abd X-ray: no acute findings.   MRI head - several acute/subacute infarcts, likely embolic source  Echo without evidence of endocarditis   Reseived ILR from EP   - Neuro consulted, started fall/seizure precautions. F/u recs.  - KATHY planned for today 10/21  - EP consulted for ILR. Will be placed after KATHY   - C/w on Aspirin/Plavix   - Continue atorvastatin 80mg qhs

## 2022-10-21 NOTE — PROGRESS NOTE ADULT - SUBJECTIVE AND OBJECTIVE BOX
Neurology Stroke Progress Note    INTERVAL HPI/OVERNIGHT EVENTS:  Patient seen and examined.     MEDICATIONS  (STANDING):  aspirin  chewable 81 milliGRAM(s) Oral daily  atorvastatin 80 milliGRAM(s) Oral at bedtime  calcium acetate 667 milliGRAM(s) Oral three times a day with meals  clopidogrel Tablet 75 milliGRAM(s) Oral daily  dextrose 5%. 1000 milliLiter(s) (100 mL/Hr) IV Continuous <Continuous>  dextrose 5%. 1000 milliLiter(s) (50 mL/Hr) IV Continuous <Continuous>  dextrose 50% Injectable 25 Gram(s) IV Push once  dextrose 50% Injectable 12.5 Gram(s) IV Push once  dextrose 50% Injectable 25 Gram(s) IV Push once  glucagon  Injectable 1 milliGRAM(s) IntraMuscular once  heparin   Injectable 5000 Unit(s) SubCutaneous every 8 hours  influenza   Vaccine 0.5 milliLiter(s) IntraMuscular once  insulin glargine Injectable (LANTUS) 8 Unit(s) SubCutaneous at bedtime  insulin lispro (ADMELOG) corrective regimen sliding scale   SubCutaneous Before meals and at bedtime  modafinil 50 milliGRAM(s) Oral daily  NIFEdipine XL 30 milliGRAM(s) Oral every 24 hours  sodium bicarbonate 650 milliGRAM(s) Oral three times a day  tamsulosin 0.4 milliGRAM(s) Oral at bedtime    MEDICATIONS  (PRN):  bisacodyl 5 milliGRAM(s) Oral every 12 hours PRN Constipation  dextrose Oral Gel 15 Gram(s) Oral once PRN Blood Glucose LESS THAN 70 milliGRAM(s)/deciliter      Allergies    No Known Allergies    Intolerances        Vital Signs Last 24 Hrs  T(C): 36.6 (21 Oct 2022 04:51), Max: 36.6 (20 Oct 2022 20:56)  T(F): 97.9 (21 Oct 2022 04:51), Max: 97.9 (21 Oct 2022 04:51)  HR: 86 (21 Oct 2022 04:51) (86 - 100)  BP: 166/96 (21 Oct 2022 04:51) (154/82 - 166/96)  BP(mean): --  RR: 18 (21 Oct 2022 04:51) (18 - 18)  SpO2: 98% (21 Oct 2022 04:51) (97% - 98%)    Parameters below as of 21 Oct 2022 04:51  Patient On (Oxygen Delivery Method): room air        Neurologic:  -Mental status: Awake, somewhat lethargic, oriented to person, place, and time. Speech is slurred (pt reports that his speech is at baseline) with intact naming, repetition, and comprehension. Recent and remote memory diminished. Follows commands.   -Cranial nerves:   II: Visual fields are full to confrontation.  III, IV, VI: Extraocular movements are intact without nystagmus. Pupils equally round and reactive to light  V:  Facial sensation V1-V3 equal and intact   VII: Face is symmetric with normal eye closure and smile  XII: Tongue protrudes midline  Motor: Normal bulk and tone. Right upper extremity 5/5, pronates without drift. Left upper extremity 5/5. Right lower extremity 5/5.  Left lower extremity 4/5, unable to lift off of bed under own strength, with assistance able to remain antigravity without drift.  Sensation: Intact to light touch bilaterally. No neglect or extinction on double simultaneous testing.  Coordination: No dysmetria on finger-to-nose    LABS:                        8.2    10.31 )-----------( 330      ( 21 Oct 2022 10:46 )             27.6     10-21    139  |  108  |  94<H>  ----------------------------<  126<H>  4.0   |  17<L>  |  7.60<H>    Ca    8.6      21 Oct 2022 10:46  Phos  5.8     10-21  Mg     2.2     10-21    TPro  6.2  /  Alb  2.9<L>  /  TBili  0.2  /  DBili  x   /  AST  15  /  ALT  17  /  AlkPhos  81  10-20    PT/INR - ( 20 Oct 2022 19:00 )   PT: 12.4 sec;   INR: 1.04          PTT - ( 20 Oct 2022 10:14 )  PTT:30.4 sec      RADIOLOGY & ADDITIONAL TESTS:

## 2022-10-21 NOTE — DISCHARGE NOTE PROVIDER - DETAILS OF MALNUTRITION DIAGNOSIS/DIAGNOSES
This patient has been assessed with a concern for Malnutrition and was treated during this hospitalization for the following Nutrition diagnosis/diagnoses:     -  10/18/2022: Moderate protein-calorie malnutrition   This patient has been assessed with a concern for Malnutrition and was treated during this hospitalization for the following Nutrition diagnosis/diagnoses:     -  10/27/2022: Severe protein-calorie malnutrition   -  10/18/2022: Moderate protein-calorie malnutrition

## 2022-10-21 NOTE — DISCHARGE NOTE PROVIDER - ATTENDING DISCHARGE PHYSICAL EXAMINATION:
Physical Exam:  General: NAD  Resp: no increased WOB, CTAB  CVS: RRR, no m/r/g, no pitting edema  GI: +BS, nt/nd  Neuro: alert and oriented to person place and time, LLE weakness

## 2022-10-21 NOTE — PROGRESS NOTE ADULT - PROBLEM SELECTOR PLAN 2
Resolved   Presented w 153. s/p 2L NS in ED - likely 2/2 dehydration   Started D5 100cc/hr. Na improved to 142  - Patient tolerating PO   - D/c D5   - Continue to monitor  - Nephrology following. Recs appreciated

## 2022-10-21 NOTE — PROGRESS NOTE ADULT - PROBLEM SELECTOR PLAN 6
Hx of HTN. Per surescripts no recent HTN meds. Per [facility] in 2020 was taking metoprolol ER 50 and Nifedipine ER 30 in 2020.   Patient has not been taking these medication and is unaware that he has elevated Bp  - Started Nifedipine 30 mg daily

## 2022-10-21 NOTE — PROGRESS NOTE ADULT - SUBJECTIVE AND OBJECTIVE BOX
NEPHROLOGY PROGRESS NOTE    Subjective: Patient seen and examined at bedside. First cousin at bedside as well. Feels well, no complaints, not eating and drinking awaiting KATHY. Otherwise no issues.     [OBJECTIVE]:    Vital Signs:  T(F): , Max: 97.9 (10-21-22 @ 04:51)  HR:  (86 - 100)  BP:  (154/82 - 166/96)  BP(mean): --  ABP: --  ABP(mean): --  RR:  (18 - 18)  SpO2:  (97% - 98%)  CVP(mm Hg): --  CVP(cm H2O): --      10-20 @ 07:01  -  10-21 @ 07:00  --------------------------------------------------------  IN: 0 mL / OUT: 400 mL / NET: -400 mL    10-21 @ 07:01  -  10-21 @ 16:57  --------------------------------------------------------  IN: 0 mL / OUT: 750 mL / NET: -750 mL      CAPILLARY BLOOD GLUCOSE      POCT Blood Glucose.: 110 mg/dL (21 Oct 2022 16:44)      Physical Exam:  T(F): 97.9 (10-21-22 @ 04:51)  HR: 86 (10-21-22 @ 04:51)  BP: 166/96 (10-21-22 @ 04:51)  RR: 18 (10-21-22 @ 04:51)  SpO2: 98% (10-21-22 @ 04:51)  Wt(kg): --    PHYSICAL EXAM:  GENERAL: Awake, alert. interactive   HEENT: ADRIANO, no JVP  CHEST/LUNG: Bilateral clear breath sounds, no accessory muscle use  HEART: Regular rate and rhythm, no murmur  ABDOMEN: Soft, nontender, non distended  EXTREMITIES: no pedal edema, warm  Neurology: Answer questions appropriately, no focal deficits noted  Derm: Dry skin, no visible rashes      Medications:  MEDICATIONS  (STANDING):  aspirin  chewable 81 milliGRAM(s) Oral daily  atorvastatin 80 milliGRAM(s) Oral at bedtime  calcium acetate 667 milliGRAM(s) Oral three times a day with meals  clopidogrel Tablet 75 milliGRAM(s) Oral daily  dextrose 5%. 1000 milliLiter(s) (50 mL/Hr) IV Continuous <Continuous>  dextrose 5%. 1000 milliLiter(s) (100 mL/Hr) IV Continuous <Continuous>  dextrose 50% Injectable 25 Gram(s) IV Push once  dextrose 50% Injectable 12.5 Gram(s) IV Push once  dextrose 50% Injectable 25 Gram(s) IV Push once  glucagon  Injectable 1 milliGRAM(s) IntraMuscular once  influenza   Vaccine 0.5 milliLiter(s) IntraMuscular once  insulin glargine Injectable (LANTUS) 4 Unit(s) SubCutaneous at bedtime  insulin lispro (ADMELOG) corrective regimen sliding scale   SubCutaneous Before meals and at bedtime  modafinil 50 milliGRAM(s) Oral daily  NIFEdipine XL 30 milliGRAM(s) Oral every 24 hours  sodium bicarbonate 650 milliGRAM(s) Oral three times a day  tamsulosin 0.4 milliGRAM(s) Oral at bedtime    MEDICATIONS  (PRN):  bisacodyl 5 milliGRAM(s) Oral every 12 hours PRN Constipation  dextrose Oral Gel 15 Gram(s) Oral once PRN Blood Glucose LESS THAN 70 milliGRAM(s)/deciliter      Allergies:  Allergies    No Known Allergies    Intolerances        Labs:                        8.2    10.31 )-----------( 330      ( 21 Oct 2022 10:46 )             27.6     10-21    139  |  108  |  94<H>  ----------------------------<  126<H>  4.0   |  17<L>  |  7.60<H>    Ca    8.6      21 Oct 2022 10:46  Phos  5.8     10-21  Mg     2.2     10-21    TPro  6.2  /  Alb  2.9<L>  /  TBili  0.2  /  DBili  x   /  AST  15  /  ALT  17  /  AlkPhos  81  10-20    PT/INR - ( 20 Oct 2022 19:00 )   PT: 12.4 sec;   INR: 1.04          PTT - ( 20 Oct 2022 10:14 )  PTT:30.4 sec      Radiology and other tests:  Creatinine, Serum: 7.60 mg/dL (10-21-22 @ 10:46)  Creatinine, Serum: 7.80 mg/dL (10-20-22 @ 05:30)  Creatinine, Serum: 7.56 mg/dL (10-19-22 @ 18:58)  Creatinine, Serum: 7.32 mg/dL (10-19-22 @ 08:53)  Creatinine, Serum: 7.88 mg/dL (10-18-22 @ 05:30)  Creatinine, Serum: 7.95 mg/dL (10-17-22 @ 05:30)  Creatinine, Serum: 7.42 mg/dL (10-16-22 @ 22:30)  Creatinine, Serum: 7.99 mg/dL (10-16-22 @ 17:56)  Creatinine, Serum: 3.38 mg/dL (02-20-19 @ 06:08)  Creatinine, Serum: 3.21 mg/dL (02-19-19 @ 06:22)

## 2022-10-21 NOTE — DISCHARGE NOTE PROVIDER - HOSPITAL COURSE
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 Abd X-ray: no acute findings.   - MRI head showed Multiple scattered very small foci of restricted diffusion. Lesions are in  multiple vascular territories, and suggest acute/subacute infarcts, with the  central embolic source.        Patient was discharged to: (home/[facility]/acute rehab/hospice, etc, and with what services – home health PT/RN? 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 Admitted initially to [facility] overnight, found to be persistently tachycardic was placed in the telemetry unit overnight and stepped down to the regional medical floor.  - CT Head on admission - no acute findings  - MRI head showed Multiple scattered very small foci of restricted diffusion. Lesions are in  multiple vascular territories, and suggest acute/subacute infarcts, with the  central embolic source.  - EEG without evidence of epileptic discharges   - Hypercoagulable work up only positive for hexagonal phase   - TTE without evidence of valvular disease and no right to left shunt  - KATHY XXXXXXX  - ILR placed to assess for arrhythmia as the underlying cause of his embolic stroke   - Started on ASA 81mg /Plavix 75mg daily and atorvastatin 80 mg daily     #Hypernatremia   Presented with Na of 153 which improved with fluids. Was likely 2/2 to dehydration   - Given D5 and encouraged PO intake     #Acute kidney injury superimposed on CKD   Patient with hx of CKD stage 4. His baseline Cr from prior admission was 2.9 and on this admission Cr was 7.99   - Nephrology consulted   UA w/ trace protein and moderate blood in the absence of RBCs  UPCR 1.9, Eleanor 56  Etiology includes Pre-renal disease likely given pt was found down and has been hypernatremic. iATN unlikely given no documented episodes of hypotension. Component of post-renal as well as large PVR on POCUS. Could also be progression of underlying CKD  - Patient not requiring HD at this time but may require it in the future considering his CKD   - Renal US showed normal size kidneys, no hydronephrosis and markedly echogenic kidneys consistent with medical renal disease.    #Hypertension   -Started nifedipine 30 mg   #Metabolic acidosis  - Started on sodium bicarb 650 mg TID     #Anemia   Presented with a Hb of 6.9 and received 3 RBC units with improvement in his Hb. He had no clear source of BRBPR or melena, and no other sources of bleeding.   - Remained stable post transfusion   - Iron studies showed normal Iron levels and Ferritin       Patient was discharged to: (home/[facility]/acute rehab/hospice, etc, and with what services – home health PT/RN? Home O2?)    New medications:   Changes to old medications:  Medications that were stopped:    Items to follow up as outpatient:  - Hypercoagulable work up    Physical exam at the time of discharge:       #Discharge: do not delete    Patient is 56 yo male with past medical history of IDDM, CVA x 2 (residual left side weakness), HTN, HLD, and CKD presented with slurred speech, weakness over the past several days, found to have embolic stroke of unknown etiology     Hospital course (by problem):   #Embolic stroke   Patient presented  slurred speech, weakness. Admitted initially to [facility] overnight, found to be persistently tachycardic was placed in the telemetry unit overnight and stepped down to the regional medical floor.  - CT Head on admission - no acute findings  - MRI head showed Multiple scattered very small foci of restricted diffusion. Lesions are in  multiple vascular territories, and suggest acute/subacute infarcts, with the  central embolic source.  - EEG without evidence of epileptic discharges   - Hypercoagulable work up only positive for hexagonal phase   - TTE without evidence of valvular disease and no right to left shunt  - KATHY XXXXXXX  - ILR placed to assess for arrhythmia as the underlying cause of his embolic stroke   - Started on ASA 81mg /Plavix 75mg daily and atorvastatin 80 mg daily     #Hypernatremia   Presented with Na of 153 which improved with fluids. Was likely 2/2 to dehydration   - Given D5 and encouraged PO intake     #Acute kidney injury superimposed on CKD   Patient with hx of CKD stage 4. His baseline Cr from prior admission was 2.9 and on this admission Cr was 7.99   - Nephrology consulted   UA w/ trace protein and moderate blood in the absence of RBCs  UPCR 1.9, Eleanor 56  Etiology includes Pre-renal disease likely given pt was found down and has been hypernatremic. iATN unlikely given no documented episodes of hypotension. Component of post-renal as well as large PVR on POCUS. Could also be progression of underlying CKD  - Patient not requiring HD at this time but may require it in the future considering his CKD   - Renal US showed normal size kidneys, no hydronephrosis and markedly echogenic kidneys consistent with medical renal disease.    #Hypertension   -Started nifedipine 30 mg   #Metabolic acidosis  - Started on sodium bicarb 650 mg TID     #Anemia   Presented with a Hb of 6.9 and received 3 RBC units with improvement in his Hb. He had no clear source of BRBPR or melena, and no other sources of bleeding.   - Remained stable post transfusion   - Iron studies showed normal Iron levels and Ferritin       Patient was discharged to: (home/[facility]/acute rehab/hospice, etc, and with what services – home health PT/RN? Home O2?)    New medications:  Nifedipine 30 mg, Aspirin 81 mg, Plavix 75mg   Changes to old medications: None   Medications that were stopped:    Items to follow up as outpatient:      Physical exam at the time of discharge:  PHYSICAL EXAM  General: NAD  Head: NC/AT; MMM; PERRL; EOMI;  Neck: Supple; no JVD  Respiratory: CTAB; no wheezes/rales/rhonchi  Cardiovascular: Regular rhythm/rate; S1/S2+, no murmurs, rubs gallops   Gastrointestinal: Soft; NTND; bowel sounds normal and present  Extremities: WWP; no edema/cyanosis, LLE   Neurological: A&Ox3, CNII-XII grossly intact; no obvious focal deficits       #Discharge: do not delete    Patient is 56 yo male with past medical history of IDDM, CVA x 2 (residual left side weakness), HTN, HLD, and CKD presented with slurred speech, weakness over the past several days, found to have embolic stroke of unknown etiology     Hospital course (by problem):   #Embolic stroke   Patient presented  slurred speech, weakness. Admitted initially to [facility] overnight, found to be persistently tachycardic was placed in the telemetry unit overnight and stepped down to the regional medical floor.  - CT Head on admission - no acute findings  - MRI head showed Multiple scattered very small foci of restricted diffusion. Lesions are in  multiple vascular territories, and suggest acute/subacute infarcts, with the  central embolic source.  - EEG without evidence of epileptic discharges   - Hypercoagulable work up only positive for hexagonal phase   - TTE without evidence of valvular disease and no right to left shunt  - KATHY XXXXXXX  - ILR placed to assess for arrhythmia as the underlying cause of his embolic stroke   - Started on ASA 81mg /Plavix 75mg daily and atorvastatin 80 mg daily     #Hypernatremia   Presented with Na of 153 which improved with fluids. Was likely 2/2 to dehydration   - Given D5 and encouraged PO intake     #Acute kidney injury superimposed on CKD   Patient with hx of CKD stage 4. His baseline Cr from prior admission was 2.9 and on this admission Cr was 7.99   - Nephrology consulted   UA w/ trace protein and moderate blood in the absence of RBCs  UPCR 1.9, Eleanor 56  Etiology includes Pre-renal disease likely given pt was found down and has been hypernatremic. iATN unlikely given no documented episodes of hypotension. Component of post-renal as well as large PVR on POCUS. Could also be progression of underlying CKD  - Patient not requiring HD at this time but may require it in the future considering his CKD   - Renal US showed normal size kidneys, no hydronephrosis and markedly echogenic kidneys consistent with medical renal disease.    #Hypertension   -Started nifedipine 30 mg   #Metabolic acidosis  - Started on sodium bicarb 650 mg TID     #Anemia   Presented with a Hb of 6.9 and received 3 RBC units with improvement in his Hb. He had no clear source of BRBPR or melena, and no other sources of bleeding.   - Remained stable post transfusion   - Iron studies showed normal Iron levels and Ferritin       Patient was discharged to: (home/[facility]/acute rehab/hospice, etc, and with what services – home health PT/RN? Home O2?)    New medications:  Nifedipine 30 mg, Aspirin 81 mg, Plavix 75mg   Changes to old medications: None   Medications that were stopped:    Items to follow up as outpatient:      Physical exam at the time of discharge:  PHYSICAL EXAM  General: In bed in no acute distress   HEENT: NCAT; PERRL, anicteric sclera; MMM  Neck: supple, trachea midline  Cardiovascular: S1, S2 normal; RRR, no M/G/R  Respiratory: CTABL; no W/R/R  Gastrointestinal: soft, nontender, nondistended. bowel sounds present.  Skin: no ulcerations or visible rashes appreciated  Extremities: WWP; no edema, clubbing or cyanosis, left lower extremity weakness (unable to move against gravity), RLE normal strength   Vascular: 2+ radial, DP/PT pulses B/L  Neurological: AAOx3; CN II-XII grossly intact; no focal deficits     #Discharge: do not delete    Patient is 58 yo male with past medical history of IDDM, CVA x 2 (residual left side weakness), HTN, HLD, and CKD presented with slurred speech, weakness over the past several days, found to have embolic stroke of unknown etiology     Hospital course (by problem):   #Embolic stroke   Patient presented  slurred speech, weakness. Admitted initially to [facility] overnight, found to be persistently tachycardic was placed in the telemetry unit overnight and stepped down to the regional medical floor.  - CT Head on admission - no acute findings  - MRI head showed Multiple scattered very small foci of restricted diffusion. Lesions are in  multiple vascular territories, and suggest acute/subacute infarcts, with the  central embolic source.  - EEG without evidence of epileptic discharges   - Hypercoagulable work up only positive for hexagonal phase   - TTE without evidence of valvular disease and no right to left shunt  - KATHY: No LA/RA/SEAN/RAA thrombus seen, there is minimal non-mobile plaque seen in the visualized portion of the descending aorta. There is minimal non-mobile plaque seen in the visualized portion of the aortic arch.  - ILR placed to assess for arrhythmia as the underlying cause of his embolic stroke   - Started on ASA 81mg /Plavix 75mg daily and atorvastatin 80 mg daily     #Hypernatremia   Presented with Na of 153 which improved with fluids. Was likely 2/2 to dehydration   - Given D5 and encouraged PO intake     #Advanced CKD (Stage 5)   Patient with hx of CKD stage 4. His baseline Cr from prior admission was 2.9 and on this admission Cr was 7.99   - Nephrology consulted   UA w/ trace protein and moderate blood in the absence of RBCs  UPCR 1.9, Eleanor 56  Etiology includes Pre-renal disease likely given pt was found down and has been hypernatremic. iATN unlikely given no documented episodes of hypotension. Component of post-renal as well as large PVR on POCUS. Could also be progression of underlying CKD  - Patient not requiring HD at this time but may require it in the future considering his CKD   - Renal US showed normal size kidneys, no hydronephrosis and markedly echogenic kidneys consistent with medical renal disease.    #Elevated PTH   Likely 2/2 CKD, secondary hyperparathyroidism in the setting of CKD   - Urine Ca and Phosphorus were sent, to be f/u   - Endocrinology consulted. [phone]    #Type 2 diabetes   Patient take lantus 15 units at home. Was put on multiple regimen inpatient for glycemic control  A1c 7.2% (10/17/22)  - Endocrinology consulted [phone]    #Hypertension   - Nephrology consulted. Recs   - Started nifedipine 30 mg     #Metabolic acidosis  - Started on sodium bicarb 650 mg TID     #Anemia of chronic disease   Presented with a Hb of 6.9 and received 3 RBC units with improvement in his Hb. He had no clear source of BRBPR or melena, and no other sources of bleeding.   Likely 2/2 to CKD   - Remained stable post transfusion   - Iron studies showed normal Iron levels and Ferritin       Patient was discharged to: (home/[facility]/acute rehab/hospice, etc, and with what services – home health PT/RN? Home O2?)    New medications:  Nifedipine 30 mg, Aspirin 81 mg, Plavix 75mg   Changes to old medications: None   Medications that were stopped:    Items to follow up as outpatient:   Urine Ca and Phosphorus       Physical exam at the time of discharge:  PHYSICAL EXAM  General: In bed in no acute distress   HEENT: NCAT; PERRL, anicteric sclera; MMM  Neck: supple, trachea midline  Cardiovascular: S1, S2 normal; RRR, no M/G/R  Respiratory: CTABL; no W/R/R  Gastrointestinal: soft, nontender, nondistended. bowel sounds present.  Skin: no ulcerations or visible rashes appreciated  Extremities: WWP; no edema, clubbing or cyanosis, left lower extremity weakness (unable to move against gravity), RLE normal strength   Vascular: 2+ radial, DP/PT pulses B/L  Neurological: AAOx3; CN II-XII grossly intact; no focal deficits     #Discharge: do not delete    Patient is 56 yo male with past medical history of IDDM, CVA x 2 (residual left side weakness), HTN, HLD, and CKD presented with slurred speech, weakness over the past several days, found to have embolic stroke of unknown etiology     Hospital course (by problem):   #Embolic stroke   Patient presented  slurred speech, weakness. Admitted initially to [facility] overnight, found to be persistently tachycardic was placed in the telemetry unit overnight and stepped down to the regional medical floor.  - CT Head on admission - no acute findings  - MRI head showed Multiple scattered very small foci of restricted diffusion. Lesions are in  multiple vascular territories, and suggest acute/subacute infarcts, with the  central embolic source.  - EEG without evidence of epileptic discharges   - Hypercoagulable work up only positive for hexagonal phase   - TTE without evidence of valvular disease and no right to left shunt  - KATHY: No LA/RA/SEAN/RAA thrombus seen, there is minimal non-mobile plaque seen in the visualized portion of the descending aorta. There is minimal non-mobile plaque seen in the visualized portion of the aortic arch.  - ILR placed to assess for arrhythmia as the underlying cause of his embolic stroke   - Started on ASA 81mg /Plavix 75mg daily and atorvastatin 80 mg daily     #Hypernatremia   Presented with Na of 153 which improved with fluids. Was likely 2/2 to dehydration   - Given D5 and encouraged PO intake     #Advanced CKD (Stage 5)   Patient with hx of CKD stage 4. His baseline Cr from prior admission was 2.9 and on this admission Cr was 7.99   - Nephrology consulted   UA w/ trace protein and moderate blood in the absence of RBCs  UPCR 1.9, Eleanor 56  Etiology includes Pre-renal disease likely given pt was found down and has been hypernatremic. iATN unlikely given no documented episodes of hypotension. Component of post-renal as well as large PVR on POCUS. Could also be progression of underlying CKD  - Patient not requiring HD at this time but may require it in the future considering his CKD   - Renal US showed normal size kidneys, no hydronephrosis and markedly echogenic kidneys consistent with medical renal disease.    #Elevated PTH   Likely 2/2 CKD, secondary hyperparathyroidism in the setting of CKD   - Urine Ca and Phosphorus were sent, to be f/u   - Endocrinology consulted. [phone]    #Type 2 diabetes   Patient take lantus 15 units at home. Was put on multiple regimen inpatient for glycemic control  A1c 7.2% (10/17/22)  - Endocrinology consulted [phone]    #Hypertension   - Nephrology consulted. Recs   - Started nifedipine 30 mg     #Metabolic acidosis  - Started on sodium bicarb 650 mg TID     #Anemia of chronic disease   Presented with a Hb of 6.9 and received 3 RBC units with improvement in his Hb. He had no clear source of BRBPR or melena, and no other sources of bleeding.   Likely 2/2 to CKD   - Remained stable post transfusion   - Iron studies showed normal Iron levels and Ferritin       Patient was discharged to: (home/[facility]/acute rehab/hospice, etc, and with what services – home health PT/RN? Home O2?)    New medications:  Nifedipine 30 mg, Aspirin 81 mg, Plavix 75mg   Changes to old medications: None   Medications that were stopped:    Items to follow up as outpatient:   Urine Ca and Phosphorus       Physical exam at the time of discharge:  PHYSICAL EXAM  General: In bed in no acute distress   HEENT: NCAT; PERRL, anicteric sclera; MMM  Neck: supple, trachea midline  Cardiovascular: S1, S2 normal; RRR, no M/G/R  Respiratory: CTABL; no W/R/R  Gastrointestinal: soft, nontender, nondistended. bowel sounds present.  Skin: no ulcerations or visible rashes appreciated  Extremities: WWP; no edema, clubbing or cyanosis, left lower extremity weakness (unable to move against gravity), RLE normal strength   Vascular: 2+ radial, DP/PT pulses B/L  Neurological: AAOx3; CN II-XII grossly intact; no focal deficits     #Discharge: do not delete    Patient is 56 yo male with past medical history of IDDM, CVA x 2 (residual left side weakness), HTN, HLD, and CKD presented with slurred speech, weakness over the past several days, found to have embolic stroke of unknown etiology     Hospital course (by problem):   #Embolic stroke   Patient presented  slurred speech, weakness. Admitted initially to [facility] overnight, found to be persistently tachycardic was placed in the telemetry unit overnight and stepped down to the regional medical floor. Discharge NIHSS 8  - CT Head on admission - no acute findings  - MRI head showed Multiple scattered very small foci of restricted diffusion. Lesions are in  multiple vascular territories, and suggest acute/subacute infarcts, with the  central embolic source.  - EEG without evidence of epileptic discharges   - Hypercoagulable work up only positive for hexagonal phase   - TTE without evidence of valvular disease and no right to left shunt  - KATHY: No LA/RA/SEAN/RAA thrombus seen, there is minimal non-mobile plaque seen in the visualized portion of the descending aorta. There is minimal non-mobile plaque seen in the visualized portion of the aortic arch.  - ILR placed to assess for arrhythmia as the underlying cause of his embolic stroke   - Started on ASA 81mg /Plavix 75mg daily and atorvastatin 80 mg daily     #Hypernatremia   Presented with Na of 153 which improved with fluids. Was likely 2/2 to dehydration   - Given D5 and encouraged PO intake     #Advanced CKD (Stage 5)   Patient with hx of CKD stage 4. His baseline Cr from prior admission was 2.9 and on this admission Cr was 7.99   - Nephrology consulted   UA w/ trace protein and moderate blood in the absence of RBCs  UPCR 1.9, Eleanor 56  Etiology includes Pre-renal disease likely given pt was found down and has been hypernatremic. iATN unlikely given no documented episodes of hypotension. Component of post-renal as well as large PVR on POCUS. Could also be progression of underlying CKD  - Patient not requiring HD at this time but may require it in the future considering his CKD   - Renal US showed normal size kidneys, no hydronephrosis and markedly echogenic kidneys consistent with medical renal disease.    #Elevated PTH   Likely 2/2 CKD, secondary hyperparathyroidism in the setting of CKD   - Urine Ca and Phosphorus were sent, to be f/u   - Endocrinology consulted. [phone]    #Type 2 diabetes   Patient take lantus 15 units at home. Was put on multiple regimen inpatient for glycemic control  A1c 7.2% (10/17/22)  - Endocrinology consulted [phone]    #Hypertension   - Nephrology consulted. Recs   - Started nifedipine 30 mg     #Metabolic acidosis  - Started on sodium bicarb 650 mg TID     #Anemia of chronic disease   Presented with a Hb of 6.9 and received 3 RBC units with improvement in his Hb. He had no clear source of BRBPR or melena, and no other sources of bleeding.   Likely 2/2 to CKD   - Remained stable post transfusion   - Iron studies showed normal Iron levels and Ferritin       Patient was discharged to: (home/[facility]/acute rehab/hospice, etc, and with what services – home health PT/RN? Home O2?)    New medications:  Nifedipine 30 mg, Aspirin 81 mg, Plavix 75mg   Changes to old medications: None   Medications that were stopped:    Items to follow up as outpatient:   Urine Ca and Phosphorus       Physical exam at the time of discharge:  PHYSICAL EXAM  General: In bed in no acute distress   HEENT: NCAT; PERRL, anicteric sclera; MMM  Neck: supple, trachea midline  Cardiovascular: S1, S2 normal; RRR, no M/G/R  Respiratory: CTABL; no W/R/R  Gastrointestinal: soft, nontender, nondistended. bowel sounds present.  Skin: no ulcerations or visible rashes appreciated  Extremities: WWP; no edema, clubbing or cyanosis, left lower extremity weakness (unable to move against gravity), RLE normal strength   Vascular: 2+ radial, DP/PT pulses B/L  Neurological: AAOx3; CN II-XII grossly intact; no focal deficits     #Discharge: do not delete    Patient is 58 yo male with past medical history of IDDM, CVA x 2 (residual left side weakness), HTN, HLD, and CKD presented with slurred speech, weakness over the past several days, found to have embolic stroke of unknown etiology     Hospital course (by problem):   #Embolic stroke   Patient presented  slurred speech, weakness. Admitted initially to [facility] overnight, found to be persistently tachycardic was placed in the telemetry unit overnight and stepped down to the regional medical floor. Discharge NIHSS 8  - CT Head on admission - no acute findings  - MRI head showed Multiple scattered very small foci of restricted diffusion. Lesions are in  multiple vascular territories, and suggest acute/subacute infarcts, with the  central embolic source.  - EEG without evidence of epileptic discharges   - Hypercoagulable work up only positive for hexagonal phase   - TTE without evidence of valvular disease and no right to left shunt  - KATHY: No LA/RA/SEAN/RAA thrombus seen, there is minimal non-mobile plaque seen in the visualized portion of the descending aorta. There is minimal non-mobile plaque seen in the visualized portion of the aortic arch.  - ILR placed to assess for arrhythmia as the underlying cause of his embolic stroke   - Started on ASA 81mg /Plavix 75mg daily and atorvastatin 80 mg daily     #Hypernatremia   Presented with Na of 153 which improved with fluids. Was likely 2/2 to dehydration   - Given D5 and encouraged PO intake   - Hyponatremia resolved     #Advanced CKD (Stage 5)   Patient with hx of CKD stage 4. His baseline Cr from prior admission was 2.9 and on this admission Cr was 7.99   - Nephrology consulted   UA w/ trace protein and moderate blood in the absence of RBCs  UPCR 1.9, Eleanor 56  Etiology includes Pre-renal disease likely given pt was found down and has been hypernatremic. iATN unlikely given no documented episodes of hypotension. Component of post-renal as well as large PVR on POCUS. Could also be progression of underlying CKD  - Patient not requiring HD at this time but may require it in the future considering his CKD   - Renal US showed normal size kidneys, no hydronephrosis and markedly echogenic kidneys consistent with medical renal disease.    #Elevated PTH   Likely 2/2 CKD, secondary hyperparathyroidism in the setting of CKD   - Urine Ca and Phosphorus were sent, to be f/u   - Endocrinology consulted.  Per discussion with Endo, likely 2/2 to CKD       #Type 2 diabetes   Patient take lantus 15 units at home. Was put on multiple regimen inpatient for glycemic control  A1c 7.2% (10/17/22)  - Endocrinology consulted:     - Continued on lantus 6 units at bedtime.     - Continued lispro 5 units before each meal.    - Continued lispro low dose sliding scale four times daily with meals and at bedtime. Start low dose sliding scale at bedtime starting at 250mg/dL.    #Hypertension   - Nephrology consulted. Recs   - Started nifedipine 30 mg   - F/u with nephrology outpatient for HTN management     #Metabolic acidosis  - Started on sodium bicarb 650 mg TID     #Anemia of chronic disease   Presented with a Hb of 6.9 and received 3 RBC units with improvement in his Hb. He had no clear source of BRBPR or melena, and no other sources of bleeding.   Likely 2/2 to CKD   - Remained stable post transfusion   - Iron studies showed normal Iron levels and Ferritin     Patient was discharged to: [facility]    New medications:  Nifedipine 30 mg, Aspirin 81 mg, Plavix 75mg   Changes to old medications: None   Medications that were stopped: None     Items to follow up as outpatient:   Urine Ca and Phosphorus       Physical exam at the time of discharge:  PHYSICAL EXAM  General: In bed in no acute distress   HEENT: NCAT; PERRL, anicteric sclera; MMM  Neck: supple, trachea midline  Cardiovascular: S1, S2 normal; RRR, no M/G/R  Respiratory: CTABL; no W/R/R  Gastrointestinal: soft, nontender, nondistended. bowel sounds present.  Skin: no ulcerations or visible rashes appreciated  Extremities: WWP; no edema, clubbing or cyanosis, left lower extremity weakness (unable to move against gravity), RLE normal strength   Vascular: 2+ radial, DP/PT pulses B/L  Neurological: AAOx3; CN II-XII grossly intact; no focal deficits     #Discharge: do not delete    Patient is 56 yo male with past medical history of IDDM, CVA x 2 (residual left side weakness), HTN, HLD, and CKD presented with slurred speech, weakness over the past several days, found to have embolic stroke of unknown etiology     Hospital course (by problem):   #Embolic stroke   Patient presented  slurred speech, weakness. Admitted initially to [facility] overnight, found to be persistently tachycardic was placed in the telemetry unit overnight and stepped down to the regional medical floor. Discharge NIHSS 8  - CT Head on admission - no acute findings  - MRI head showed Multiple scattered very small foci of restricted diffusion. Lesions are in  multiple vascular territories, and suggest acute/subacute infarcts, with the  central embolic source.  - EEG without evidence of epileptic discharges   - Hypercoagulable work up only positive for hexagonal phase   - TTE without evidence of valvular disease and no right to left shunt  - KATHY: No LA/RA/SEAN/RAA thrombus seen, there is minimal non-mobile plaque seen in the visualized portion of the descending aorta. There is minimal non-mobile plaque seen in the visualized portion of the aortic arch.  - ILR placed to assess for arrhythmia as the underlying cause of his embolic stroke   - Started on ASA 81mg /Plavix 75mg daily and atorvastatin 80 mg daily     #Hypernatremia   Presented with Na of 153 which improved with fluids. Was likely 2/2 to dehydration   - Given D5 and encouraged PO intake   - Hyponatremia resolved     #Advanced CKD (Stage 5)   Patient with hx of CKD stage 4. His baseline Cr from prior admission was 2.9 and on this admission Cr was 7.99   - Nephrology consulted   UA w/ trace protein and moderate blood in the absence of RBCs  UPCR 1.9, Eleanor 56  Etiology includes Pre-renal disease likely given pt was found down and has been hypernatremic. iATN unlikely given no documented episodes of hypotension. Component of post-renal as well as large PVR on POCUS. Could also be progression of underlying CKD  - Patient not requiring HD at this time but may require it in the future considering his CKD   - Renal US showed normal size kidneys, no hydronephrosis and markedly echogenic kidneys consistent with medical renal disease.    #Elevated PTH   Likely 2/2 CKD, secondary hyperparathyroidism in the setting of CKD   - Urine Ca and Phosphorus were sent, to be f/u   - Endocrinology consulted.  Per discussion with Endo, likely 2/2 to CKD     #Type 2 diabetes   Patient take lantus 15 units at home. Was put on multiple regimen inpatient for glycemic control  A1c 7.2% (10/17/22)  - Endocrinology consulted:     - Continued on lantus 6 units at bedtime.     - Continued lispro 5 units before each meal.    - Continued lispro low dose sliding scale four times daily with meals and at bedtime. Start low dose sliding scale at bedtime starting at 250mg/dL.    #Hypertension   - Nephrology consulted. Recs   - Started nifedipine 30 mg   - F/u with nephrology outpatient for HTN management     #Metabolic acidosis  - Started on sodium bicarb 650 mg TID     #Anemia of chronic disease   Presented with a Hb of 6.9 and received 3 RBC units with improvement in his Hb. He had no clear source of BRBPR or melena, and no other sources of bleeding.   Likely 2/2 to CKD   - Remained stable post transfusion   - Iron studies showed normal Iron levels and Ferritin     Patient was discharged to: AR    New medications:  Nifedipine 30 mg, Aspirin 81 mg, Plavix 75mg   Changes to old medications: None   Medications that were stopped: None     Items to follow up as outpatient:   Urine Ca and Phosphorus       Physical exam at the time of discharge:  PHYSICAL EXAM  General: In bed in no acute distress   HEENT: NCAT; PERRL, anicteric sclera; MMM  Neck: supple, trachea midline  Cardiovascular: S1, S2 normal; RRR, no M/G/R  Respiratory: CTABL; no W/R/R  Gastrointestinal: soft, nontender, nondistended. bowel sounds present.  Skin: no ulcerations or visible rashes appreciated  Extremities: WWP; no edema, clubbing or cyanosis, left lower extremity weakness (unable to move against gravity), RLE normal strength   Vascular: 2+ radial, DP/PT pulses B/L  Neurological: AAOx3; CN II-XII grossly intact; no focal deficits     #Discharge: do not delete    Patient is 56 yo male with past medical history of DM2, CVA x 2 (residual left side weakness), HTN, HLD, and CKD presented with slurred speech, weakness over the past several days, found to have embolic stroke of unknown etiology     Hospital course (by problem):   #Embolic stroke   Patient presented  slurred speech, weakness. Admitted initially to [facility] overnight, found to be persistently tachycardic was placed in the telemetry unit overnight and stepped down to the regional medical floor. Discharge NIHSS 8  - CT Head on admission - no acute findings  - MRI head showed Multiple scattered very small foci of restricted diffusion. Lesions are in  multiple vascular territories, and suggest acute/subacute infarcts, with the  central embolic source.  - EEG without evidence of epileptic discharges   - Hypercoagulable work up only positive for hexagonal phase   - TTE without evidence of valvular disease and no right to left shunt  - KATHY: No LA/RA/SEAN/RAA thrombus seen, there is minimal non-mobile plaque seen in the visualized portion of the descending aorta. There is minimal non-mobile plaque seen in the visualized portion of the aortic arch.  - ILR placed to assess for arrhythmia as the underlying cause of his embolic stroke   - Started on ASA 81mg /Plavix 75mg daily and atorvastatin 80 mg daily     #Hypernatremia   Presented with Na of 153 which improved with fluids. Was likely 2/2 to dehydration   - Given D5 and encouraged PO intake   - Hyponatremia resolved     #Advanced CKD (Stage 5)   Patient with hx of CKD stage 4. His baseline Cr from prior admission was 2.9 and on this admission Cr was 7.99   - Nephrology consulted   UA w/ trace protein and moderate blood in the absence of RBCs  UPCR 1.9, Eleanor 56  Etiology includes Pre-renal disease likely given pt was found down and has been hypernatremic. iATN unlikely given no documented episodes of hypotension. Component of post-renal as well as large PVR on POCUS. Could also be progression of underlying CKD  - Patient not requiring HD at this time but may require it in the future considering his CKD   - Renal US showed normal size kidneys, no hydronephrosis and markedly echogenic kidneys consistent with medical renal disease.    #Elevated PTH   Likely 2/2 CKD, secondary hyperparathyroidism in the setting of CKD   - Urine Ca and Phosphorus were sent, to be f/u   - Endocrinology consulted.  Per discussion with Endo, likely 2/2 to CKD     #Type 2 diabetes   Patient take lantus 15 units at home. Was put on multiple regimen inpatient for glycemic control  A1c 7.2% (10/17/22)  Patient found to have C peptide of 2.1. Stopped insulin and switched to Januvia 25mg daily before breakfast per Endocrine recommendation and continued sliding scale     #Hypertension   - Nephrology consulted. Recs   - Started nifedipine 30 mg   - F/u with nephrology outpatient for HTN management     #Metabolic acidosis  - Started on sodium bicarb 650 mg TID     #Anemia of chronic disease   Presented with a Hb of 6.9 and received 3 RBC units with improvement in his Hb. He had no clear source of BRBPR or melena, and no other sources of bleeding.   Likely 2/2 to CKD   - Remained stable post transfusion   - Iron studies showed normal Iron levels and Ferritin     Patient was discharged to: [facility]    New medications:  Nifedipine 30 mg, Aspirin 81 mg, Plavix 75mg   Changes to old medications: None   Medications that were stopped: None     Items to follow up as outpatient:   Urine Ca and Phosphorus       Physical exam at the time of discharge:  PHYSICAL EXAM  General: In bed in no acute distress   HEENT: NCAT; PERRL, anicteric sclera; MMM  Neck: supple, trachea midline  Cardiovascular: S1, S2 normal; RRR, no M/G/R  Respiratory: CTABL; no W/R/R  Gastrointestinal: soft, nontender, nondistended. bowel sounds present.  Skin: no ulcerations or visible rashes appreciated  Extremities: WWP; no edema, clubbing or cyanosis, left lower extremity weakness (unable to move against gravity), RLE normal strength   Vascular: 2+ radial, DP/PT pulses B/L  Neurological: AAOx3; CN II-XII grossly intact; no focal deficits     #Discharge: do not delete    Patient is 58 yo male with past medical history of DM2, CVA x 2 (residual left side weakness), HTN, HLD, and CKD presented with slurred speech, weakness over the past several days, found to have embolic stroke of unknown etiology     Hospital course (by problem):   #Embolic stroke   Patient presented  slurred speech, weakness. Admitted initially to [facility] overnight, found to be persistently tachycardic was placed in the telemetry unit overnight and stepped down to the regional medical floor. Discharge NIHSS 8  - CT Head on admission - no acute findings  - MRI head showed Multiple scattered very small foci of restricted diffusion. Lesions are in  multiple vascular territories, and suggest acute/subacute infarcts, with the  central embolic source.  - EEG without evidence of epileptic discharges   - Hypercoagulable work up only positive for hexagonal phase   - TTE without evidence of valvular disease and no right to left shunt  - KATHY: No LA/RA/SEAN/RAA thrombus seen, there is minimal non-mobile plaque seen in the visualized portion of the descending aorta. There is minimal non-mobile plaque seen in the visualized portion of the aortic arch.  - ILR placed to assess for arrhythmia as the underlying cause of his embolic stroke   - Started on ASA 81mg /Plavix 75mg daily and atorvastatin 80 mg daily     #Hypernatremia   Presented with Na of 153 which improved with fluids. Was likely 2/2 to dehydration   - Given D5 and encouraged PO intake   - Hyponatremia resolved     #Advanced CKD (Stage 5)   Patient with hx of CKD stage 4. His baseline Cr from prior admission was 2.9 and on this admission Cr was 7.99   - Nephrology consulted   UA w/ trace protein and moderate blood in the absence of RBCs  UPCR 1.9, Eleanor 56  Etiology includes Pre-renal disease likely given pt was found down and has been hypernatremic. iATN unlikely given no documented episodes of hypotension. Component of post-renal as well as large PVR on POCUS. Could also be progression of underlying CKD  - Patient not requiring HD at this time but may require it in the future considering his CKD   - Renal US showed normal size kidneys, no hydronephrosis and markedly echogenic kidneys consistent with medical renal disease.    #Elevated PTH   Likely 2/2 CKD, secondary hyperparathyroidism in the setting of CKD   - Urine Ca and Phosphorus were sent, to be f/u   - Endocrinology consulted.  Per discussion with Endo, likely 2/2 to CKD     #Type 2 diabetes   Patient take lantus 15 units at home. Was put on multiple regimen inpatient for glycemic control  A1c 7.2% (10/17/22)  Patient found to have C peptide of 2.1. Stopped insulin and switched to Januvia 25mg daily before breakfast per Endocrine recommendation and continued sliding scale     #Hypertension   - Nephrology consulted. Recs   - Started nifedipine 30 mg and increased to 60mg   - F/u with nephrology outpatient for HTN management     #Metabolic acidosis  - Started on sodium bicarb 650 mg TID     #Anemia of chronic disease   Presented with a Hb of 6.9 and received 3 RBC units with improvement in his Hb. He had no clear source of BRBPR or melena, and no other sources of bleeding.   Likely 2/2 to CKD   - Remained stable post transfusion   - Iron studies showed normal Iron levels and Ferritin     Patient was discharged to: [facility]    New medications:  Nifedipine 60 mg, Aspirin 81 mg, Plavix 75mg (continue until 11/8 and the stop), Januvia 25mg  Changes to old medications: None   Medications that were stopped: None     Items to follow up as outpatient:   Urine Ca and Phosphorus       Physical exam at the time of discharge:  PHYSICAL EXAM  General: In bed in no acute distress   HEENT: NCAT; PERRL, anicteric sclera; MMM  Neck: supple, trachea midline  Cardiovascular: S1, S2 normal; RRR, no M/G/R  Respiratory: CTABL; no W/R/R  Gastrointestinal: soft, nontender, nondistended. bowel sounds present.  Skin: no ulcerations or visible rashes appreciated  Extremities: WWP; no edema, clubbing or cyanosis, left lower extremity weakness (unable to move against gravity), RLE normal strength   Vascular: 2+ radial, DP/PT pulses B/L  Neurological: AAOx3; CN II-XII grossly intact; no focal deficits

## 2022-10-21 NOTE — DISCHARGE NOTE PROVIDER - CARE PROVIDER_API CALL
Van Garcia)  [facility]  [address] [address]  Phone: [phone]  Fax: [phone]  Follow Up Time:     Casandra San)  Neurology; Vascular Neurology  [address], [address] [place] [address]  Phone: [phone]  Fax: [phone]  Follow Up Time:    Casandra San)  Neurology; Vascular Neurology  [address], [address] [place] [address]  Phone: [phone]  Fax: [phone]  Follow Up Time:     Van Garcia  Nephrology  [address], [address] [place] [address]  Phone: [phone]  Fax: (   )    -  Scheduled Appointment: 11/10/2022 02:20 PM   Casandra San)  Neurology; Vascular Neurology  [address], [address] [place] [address]  Phone: [phone]  Fax: [phone]  Follow Up Time:     Amparo Farrell)  HematologyOncology; Internal Medicine; Medical Oncology  [address] [address]  Phone: [phone]  Fax: [phone]  Follow Up Time: 1 week    Carlo Palm)  EndocrinologyMetabDiabetes; Internal Medicine  [address] [address]  Phone: [phone]  Fax: [phone]  Follow Up Time: 1 week    Andrzej Goldstein)  Internal Medicine  [address] [place] [address]  Phone: [phone]  Fax: [phone]  Follow Up Time: 2 weeks    Van Garcia  Nephrology  [address], [address] [place] [address]  Phone: [phone]  Fax: (   )    -  Scheduled Appointment: 11/10/2022 02:20 PM   Casandra San)  Neurology; Vascular Neurology  [address], [address] [place] [address]  Phone: [phone]  Fax: [phone]  Follow Up Time:     Amparo Farrell)  HematologyOncology; Internal Medicine; Medical Oncology  [address] [address]  Phone: [phone]  Fax: [phone]  Follow Up Time: 1 week    Andrzej Goldstein)  Internal Medicine  [address], [address] [place] [address]  Phone: [phone]  Fax: [phone]  Follow Up Time: 2 weeks    Van Garcia  Nephrology  [address], [address] [place] [address]  Phone: [phone]  Fax: (   )    -  Scheduled Appointment: 11/10/2022 02:20 PM    Jaspal Uribe  Endocrinology  [address], [address]  [place] [address]  Phone: [phone]  Fax: (   )    -  Scheduled Appointment: 11/09/2022 10:00 AM   Casandra San)  Neurology; Vascular Neurology  [address], [address] [place] [address]  Phone: [phone]  Fax: [phone]  Scheduled Appointment: 03/08/2023 01:40 PM    Amparo Farrell)  HematologyOncology; Internal Medicine; Medical Oncology  [address] [address]  Phone: [phone]  Fax: [phone]  Follow Up Time: 1 week    Andrzej Goldstein)  Internal Medicine  [address], [address]nd [place] [address]  Phone: [phone]  Fax: [phone]  Follow Up Time: 2 weeks    Van Garcia  Nephrology  [address], [address] [place] [address]  Phone: [phone]  Fax: (   )    -  Scheduled Appointment: 11/10/2022 02:20 PM    Jaspal Uribe  Endocrinology  [address], [address]  [place] [address]  Phone: [phone]  Fax: (   )    -  Scheduled Appointment: 11/09/2022 10:00 AM   Casandra San)  Neurology; Vascular Neurology  [address], [address] [place] [address]  Phone: [phone]  Fax: [phone]  Scheduled Appointment: 03/08/2023 01:40 PM    Andrzej Goldstein)  Internal Medicine  [address], [address]  [place] [address]  Phone: [phone]  Fax: [phone]  Scheduled Appointment: 11/08/2022 01:30 PM    Van Garcia  Nephrology  [address] [place] [address]  Phone: [phone]  Fax: (   )    -  Scheduled Appointment: 11/10/2022 02:20 PM    Jaspal Uribe  Endocrinology  [address], [address]  [place] [address]  Phone: [phone]  Fax: (   )    -  Scheduled Appointment: 11/09/2022 10:00 AM    Jas Jaramillo  Hematology/Oncology  [address] [place] [address]  Phone: [phone]  Fax: (   )    -  Scheduled Appointment: 11/08/2022 03:45 PM

## 2022-10-21 NOTE — DISCHARGE NOTE PROVIDER - NSDCCPCAREPLAN_GEN_ALL_CORE_FT
PRINCIPAL DISCHARGE DIAGNOSIS  Diagnosis: Embolic stroke  Assessment and Plan of Treatment: An ischemic stroke occurs when blood flow to part of your brain is blocked. The block is usually caused by a blood clot that gets stuck in a narrow blood vessel. When oxygen cannot get to an area of the brain, tissue in that area may get damaged. The damage can cause loss of body functions controlled by that area of the brain. A stroke is a medical emergency that needs immediate treatment. Most medicines and treatments work best the sooner they are given.      SECONDARY DISCHARGE DIAGNOSES  Diagnosis: ALEXIS (acute kidney injury)  Assessment and Plan of Treatment:     Diagnosis: Dehydration  Assessment and Plan of Treatment:      PRINCIPAL DISCHARGE DIAGNOSIS  Diagnosis: Embolic stroke  Assessment and Plan of Treatment: An ischemic stroke occurs when blood flow to part of your brain is blocked. The block is usually caused by a blood clot that gets stuck in a narrow blood vessel. When oxygen cannot get to an area of the brain, tissue in that area may get damaged. The damage can cause loss of body functions controlled by that area of the brain. A stroke is a medical emergency that needs immediate treatment. Most medicines and treatments work best the sooner they are given.   •Have any symptoms of a stroke. "BE FAST" is an easy way to remember the main warning signs of a stroke:  •B - Balance. Signs are dizziness, sudden trouble walking, or loss of balance.  •E - Eyes. Signs are trouble seeing or a sudden change in vision.  •F - Face. Signs are sudden weakness or numbness of the face, or the face or eyelid drooping on one side.  •A - Arms. Signs are weakness or numbness in an arm. This happens suddenly and usually on one side of the body.  •S - Speech. Signs are sudden trouble speaking, slurred speech, or trouble understanding what people say.  •T - Time. Time to call emergency services. Write down what time symptoms started. Your emergency responders will need to know this information.  •Have other signs of a stroke, such as:  •A sudden, severe headache with no known cause.  •Nausea or vomiting.  •Seizure.        SECONDARY DISCHARGE DIAGNOSES  Diagnosis: Stage 5 chronic kidney disease not on chronic dialysis  Assessment and Plan of Treatment: Chronic kidney disease is when lasting damage happens to the kidneys slowly over a long time. The kidneys help to:  •Make pee (urine).  •Make hormones.  •Keep the right amount of fluids and chemicals in the body.  Most often, this disease does not go away. You must take steps to help keep the kidney damage from getting worse. If steps are not taken, the kidneys might stop working forever.  What are the causes?  •Diabetes.  •High blood pressure.  •Diseases that affect the heart and blood vessels.  •Other kidney diseases.  •Diseases of the body's disease-fighting system.  •A problem with the flow of pee.  •Infections of the organs that make pee, store it, and take it out of the body.  •Swelling or irritation of your blood vessels.  How is this treated?  Often, there is no cure for this disease. Treatment can help with symptoms and help keep the disease from getting worse. You may need to:  •Avoid alcohol.  •Avoid foods that are high in salt, potassium, phosphorous, and protein.  •Take medicines for symptoms and to help control other conditions.  •Have dialysis. This treatment gets harmful waste out of your body.  •Treat other problems that cause your kidney disease or make it worse.  Follow these instructions at home:  Medicines   •Take over-the-counter and prescription medicines only as told by your doctor.  • Do not take any new medicines, vitamins, or supplements unless your doctor says it is okay.      Diagnosis: Dehydration  Assessment and Plan of Treatment:     Diagnosis: ALEXIS (acute kidney injury)  Assessment and Plan of Treatment:

## 2022-10-21 NOTE — PROGRESS NOTE ADULT - PROBLEM SELECTOR PLAN 3
Baseline hemoglobin 12 (Feb 2019), presented w Hb 6.9 ---> s/p 3u pRBC total   - No apparent sources of bleeding, no further melena  - Stable HB  - Continue trending Hb   - transfuse for Hgb <7, maintain active T&S

## 2022-10-21 NOTE — PROGRESS NOTE ADULT - PROBLEM SELECTOR PLAN 5
Hx of insulin-dependent diabetes mellitus. Per surescripts: Lantus 15 qhs  A1c 7.2% (10/17/22)  POCT @80 in the AM   - c/w Lantus 8 units qhs & mISS, adjust as necessary

## 2022-10-22 DIAGNOSIS — N18.5 CHRONIC KIDNEY DISEASE, STAGE 5: ICD-10-CM

## 2022-10-22 LAB
ANION GAP SERPL CALC-SCNC: 17 MMOL/L — SIGNIFICANT CHANGE UP (ref 5–17)
BUN SERPL-MCNC: 93 MG/DL — HIGH (ref 7–23)
CALCIUM SERPL-MCNC: 8.6 MG/DL — SIGNIFICANT CHANGE UP (ref 8.4–10.5)
CHLORIDE SERPL-SCNC: 110 MMOL/L — HIGH (ref 96–108)
CO2 SERPL-SCNC: 15 MMOL/L — LOW (ref 22–31)
CREAT ?TM UR-MCNC: 54 MG/DL — SIGNIFICANT CHANGE UP
CREAT SERPL-MCNC: 7.65 MG/DL — HIGH (ref 0.5–1.3)
EGFR: 8 ML/MIN/1.73M2 — LOW
GLUCOSE BLDC GLUCOMTR-MCNC: 111 MG/DL — HIGH (ref 70–99)
GLUCOSE BLDC GLUCOMTR-MCNC: 184 MG/DL — HIGH (ref 70–99)
GLUCOSE BLDC GLUCOMTR-MCNC: 197 MG/DL — HIGH (ref 70–99)
GLUCOSE BLDC GLUCOMTR-MCNC: 212 MG/DL — HIGH (ref 70–99)
GLUCOSE SERPL-MCNC: 116 MG/DL — HIGH (ref 70–99)
HCT VFR BLD CALC: 28 % — LOW (ref 39–50)
HGB BLD-MCNC: 8.6 G/DL — LOW (ref 13–17)
MAGNESIUM SERPL-MCNC: 2.2 MG/DL — SIGNIFICANT CHANGE UP (ref 1.6–2.6)
MCHC RBC-ENTMCNC: 24 PG — LOW (ref 27–34)
MCHC RBC-ENTMCNC: 30.7 GM/DL — LOW (ref 32–36)
MCV RBC AUTO: 78.2 FL — LOW (ref 80–100)
NRBC # BLD: 0 /100 WBCS — SIGNIFICANT CHANGE UP (ref 0–0)
PHOSPHATE SERPL-MCNC: 5.6 MG/DL — HIGH (ref 2.5–4.5)
PLATELET # BLD AUTO: 334 K/UL — SIGNIFICANT CHANGE UP (ref 150–400)
POTASSIUM SERPL-MCNC: 4 MMOL/L — SIGNIFICANT CHANGE UP (ref 3.5–5.3)
POTASSIUM SERPL-SCNC: 4 MMOL/L — SIGNIFICANT CHANGE UP (ref 3.5–5.3)
PROT S FREE PPP-ACNC: 46 % — LOW (ref 63–140)
RBC # BLD: 3.58 M/UL — LOW (ref 4.2–5.8)
RBC # FLD: 21.4 % — HIGH (ref 10.3–14.5)
SODIUM SERPL-SCNC: 142 MMOL/L — SIGNIFICANT CHANGE UP (ref 135–145)
WBC # BLD: 9.92 K/UL — SIGNIFICANT CHANGE UP (ref 3.8–10.5)
WBC # FLD AUTO: 9.92 K/UL — SIGNIFICANT CHANGE UP (ref 3.8–10.5)

## 2022-10-22 PROCEDURE — 99233 SBSQ HOSP IP/OBS HIGH 50: CPT | Mod: GC

## 2022-10-22 RX ORDER — IRON SUCROSE 20 MG/ML
200 INJECTION, SOLUTION INTRAVENOUS EVERY 24 HOURS
Refills: 0 | Status: DISCONTINUED | OUTPATIENT
Start: 2022-10-22 | End: 2022-10-24

## 2022-10-22 RX ORDER — IRON SUCROSE 20 MG/ML
200 INJECTION, SOLUTION INTRAVENOUS EVERY 24 HOURS
Refills: 0 | Status: DISCONTINUED | OUTPATIENT
Start: 2022-10-22 | End: 2022-10-22

## 2022-10-22 RX ORDER — SODIUM BICARBONATE 1 MEQ/ML
1300 SYRINGE (ML) INTRAVENOUS THREE TIMES A DAY
Refills: 0 | Status: DISCONTINUED | OUTPATIENT
Start: 2022-10-22 | End: 2022-11-01

## 2022-10-22 RX ADMIN — Medication 667 MILLIGRAM(S): at 17:38

## 2022-10-22 RX ADMIN — HEPARIN SODIUM 5000 UNIT(S): 5000 INJECTION INTRAVENOUS; SUBCUTANEOUS at 06:32

## 2022-10-22 RX ADMIN — IRON SUCROSE 110 MILLIGRAM(S): 20 INJECTION, SOLUTION INTRAVENOUS at 12:01

## 2022-10-22 RX ADMIN — Medication 2: at 17:38

## 2022-10-22 RX ADMIN — INSULIN GLARGINE 8 UNIT(S): 100 INJECTION, SOLUTION SUBCUTANEOUS at 22:38

## 2022-10-22 RX ADMIN — HEPARIN SODIUM 5000 UNIT(S): 5000 INJECTION INTRAVENOUS; SUBCUTANEOUS at 22:39

## 2022-10-22 RX ADMIN — Medication 2: at 12:16

## 2022-10-22 RX ADMIN — Medication 650 MILLIGRAM(S): at 06:32

## 2022-10-22 RX ADMIN — HEPARIN SODIUM 5000 UNIT(S): 5000 INJECTION INTRAVENOUS; SUBCUTANEOUS at 13:31

## 2022-10-22 RX ADMIN — Medication 81 MILLIGRAM(S): at 11:51

## 2022-10-22 RX ADMIN — Medication 667 MILLIGRAM(S): at 11:51

## 2022-10-22 RX ADMIN — TAMSULOSIN HYDROCHLORIDE 0.4 MILLIGRAM(S): 0.4 CAPSULE ORAL at 22:41

## 2022-10-22 RX ADMIN — Medication 30 MILLIGRAM(S): at 12:14

## 2022-10-22 RX ADMIN — Medication 1300 MILLIGRAM(S): at 22:40

## 2022-10-22 RX ADMIN — MODAFINIL 50 MILLIGRAM(S): 200 TABLET ORAL at 06:32

## 2022-10-22 RX ADMIN — ATORVASTATIN CALCIUM 80 MILLIGRAM(S): 80 TABLET, FILM COATED ORAL at 22:38

## 2022-10-22 RX ADMIN — Medication 1300 MILLIGRAM(S): at 13:27

## 2022-10-22 RX ADMIN — CLOPIDOGREL BISULFATE 75 MILLIGRAM(S): 75 TABLET, FILM COATED ORAL at 11:51

## 2022-10-22 RX ADMIN — Medication 4: at 22:38

## 2022-10-22 NOTE — PROGRESS NOTE ADULT - SUBJECTIVE AND OBJECTIVE BOX
CC: ALEXIS        INTERVAL HISTORY: no complaints  feels well      ROS: No chest pain, no sob, no abd pain. No n/v/d    PAST MEDICAL & SURGICAL HISTORY:  Type 2 diabetes mellitus  Diabetes mellitus    Personal history of other diseases of circulatory system  H/O: HTN (hypertension)    Cerebral artery occlusion with cerebral infarction  Cerebral vascular accident    Hyperlipidemia  Hyperlipidemia    Hypertension    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, traumatic, left, sequela        PHYSICAL EXAM:  T(C): 36.7 (10-22-22 @ 05:47), Max: 36.7 (10-22-22 @ 05:47)  HR: 103 (10-22-22 @ 05:47)  BP: 155/78 (10-22-22 @ 05:47) (145/81 - 165/90)  RR: 17 (10-22-22 @ 05:47)  SpO2: 98% (10-22-22 @ 05:47)  Wt(kg): --  I&O's Summary    21 Oct 2022 07:01  -  22 Oct 2022 07:00  --------------------------------------------------------  IN: 0 mL / OUT: 1550 mL / NET: -1550 mL      Weight 90.7 (10-16 @ 16:59)  General:   NAD.  HEENT: moist mucous membranes, no pallor/cyanosis.  Neck: no JVD visible.  Cardiac: S1, S2. RRR. No murmurs   Respratory: CTA b/l, no access muscle use.   Abdomen: soft. nontender. nondistended  Skin: no rashes.  Extremities: no LE edema b/l  Access:       DATA:                        8.6<L>  9.92  )-----------( 334      ( 22 Oct 2022 06:24 )             28.0<L>    Ferritin, Serum: 34 ng/mL (10-17 @ 19:13)      142    |  110<H>  |  93<H>  ----------------------------<  116<H>  Ca:8.6   (22 Oct 2022 06:24)  4.0     |  15<L>  |  7.65<H>        TPro  6.2    /  Alb  2.9<L>  /  TBili  0.2    /  DBili  x      /  AST  15     /  ALT  17     /  AlkPhos  81     20 Oct 2022 05:30            Protein/Creatinine Ratio Calculation: 1.9 Ratio *H* (10-17 @ 11:01)          MEDICATIONS  (STANDING):  aspirin  chewable 81 milliGRAM(s) Oral daily  atorvastatin 80 milliGRAM(s) Oral at bedtime  calcium acetate 667 milliGRAM(s) Oral three times a day with meals  clopidogrel Tablet 75 milliGRAM(s) Oral daily  dextrose 5%. 1000 milliLiter(s) (100 mL/Hr) IV Continuous <Continuous>  dextrose 5%. 1000 milliLiter(s) (50 mL/Hr) IV Continuous <Continuous>  dextrose 50% Injectable 25 Gram(s) IV Push once  dextrose 50% Injectable 12.5 Gram(s) IV Push once  dextrose 50% Injectable 25 Gram(s) IV Push once  glucagon  Injectable 1 milliGRAM(s) IntraMuscular once  heparin   Injectable 5000 Unit(s) SubCutaneous every 8 hours  influenza   Vaccine 0.5 milliLiter(s) IntraMuscular once  insulin glargine Injectable (LANTUS) 8 Unit(s) SubCutaneous at bedtime  insulin lispro (ADMELOG) corrective regimen sliding scale   SubCutaneous Before meals and at bedtime  modafinil 50 milliGRAM(s) Oral daily  NIFEdipine XL 30 milliGRAM(s) Oral every 24 hours  sodium bicarbonate 650 milliGRAM(s) Oral three times a day  tamsulosin 0.4 milliGRAM(s) Oral at bedtime    MEDICATIONS  (PRN):  bisacodyl 5 milliGRAM(s) Oral every 12 hours PRN Constipation  dextrose Oral Gel 15 Gram(s) Oral once PRN Blood Glucose LESS THAN 70 milliGRAM(s)/deciliter

## 2022-10-22 NOTE — PROGRESS NOTE ADULT - SUBJECTIVE AND OBJECTIVE BOX
Neurology Stroke Progress Note    INTERVAL HPI/OVERNIGHT EVENTS:  Patient seen and examined. No complaints, eating breakfast. No overnight events. KATHY completed yesterday with small PFO vs AVM. LE doppler negative. ILR placed 10/20    MEDICATIONS  (STANDING):  aspirin  chewable 81 milliGRAM(s) Oral daily  atorvastatin 80 milliGRAM(s) Oral at bedtime  calcium acetate 667 milliGRAM(s) Oral three times a day with meals  clopidogrel Tablet 75 milliGRAM(s) Oral daily  dextrose 5%. 1000 milliLiter(s) (100 mL/Hr) IV Continuous <Continuous>  dextrose 5%. 1000 milliLiter(s) (50 mL/Hr) IV Continuous <Continuous>  dextrose 50% Injectable 25 Gram(s) IV Push once  dextrose 50% Injectable 12.5 Gram(s) IV Push once  dextrose 50% Injectable 25 Gram(s) IV Push once  glucagon  Injectable 1 milliGRAM(s) IntraMuscular once  heparin   Injectable 5000 Unit(s) SubCutaneous every 8 hours  influenza   Vaccine 0.5 milliLiter(s) IntraMuscular once  insulin glargine Injectable (LANTUS) 8 Unit(s) SubCutaneous at bedtime  insulin lispro (ADMELOG) corrective regimen sliding scale   SubCutaneous Before meals and at bedtime  iron sucrose Injectable 200 milliGRAM(s) IV Push every 24 hours  modafinil 50 milliGRAM(s) Oral daily  NIFEdipine XL 30 milliGRAM(s) Oral every 24 hours  sodium bicarbonate 1300 milliGRAM(s) Oral three times a day  tamsulosin 0.4 milliGRAM(s) Oral at bedtime    MEDICATIONS  (PRN):  bisacodyl 5 milliGRAM(s) Oral every 12 hours PRN Constipation  dextrose Oral Gel 15 Gram(s) Oral once PRN Blood Glucose LESS THAN 70 milliGRAM(s)/deciliter      Allergies    No Known Allergies    Intolerances        Vital Signs Last 24 Hrs  T(C): 36.7 (22 Oct 2022 05:47), Max: 36.7 (22 Oct 2022 05:47)  T(F): 98.1 (22 Oct 2022 05:47), Max: 98.1 (22 Oct 2022 05:47)  HR: 103 (22 Oct 2022 05:47) (92 - 103)  BP: 155/78 (22 Oct 2022 05:47) (145/81 - 165/90)  BP(mean): --  RR: 17 (22 Oct 2022 05:47) (17 - 20)  SpO2: 98% (22 Oct 2022 05:47) (98% - 98%)    Parameters below as of 22 Oct 2022 05:47  Patient On (Oxygen Delivery Method): room air        Physical exam:  General: No acute distress, awake and alert  Eyes: Anicteric sclerae, moist conjunctivae, see below for CNs  Neck: trachea midline, FROM, supple, no thyromegaly or lymphadenopathy  Extremities: no edema    Neurologic:  -Mental status: Awake, alert, oriented to person, place, and time. Speech is fluent with intact naming, repetition, and comprehension, mild dysarthria and slow speech.  Follows commands. Attention/concentration intact. Fund of knowledge appropriate.  -Cranial nerves:   II: Visual fields are full to confrontation on the left. Hand motion on the right   III, IV, VI: Extraocular movements are intact without nystagmus. Pupils equally round and reactive to light  V:  Facial sensation V1-V3 equal and intact   VII: Face is symmetric with normal eye closure and smile  VIII: Hearing is bilaterally intact to finger rub  IX, X: Uvula is midline and soft palate rises symmetrically  XI: Head turning and shoulder shrug are intact.  XII: Tongue protrudes midline  Motor: Normal bulk and tone. No pronator drift. Strength bilateral upper extremity 5/5, bilateral lower extremities 5/5.  Rapid alternating movements intact and symmetric  Sensation: Intact to light touch bilaterally. No neglect or extinction on double simultaneous testing.  Coordination: No dysmetria on finger-to-nose and heel-to-shin bilaterally  Reflexes: Downgoing toes bilaterally   Gait: Narrow gait and steady    LABS:                        8.6    9.92  )-----------( 334      ( 22 Oct 2022 06:24 )             28.0     10-22    142  |  110<H>  |  93<H>  ----------------------------<  116<H>  4.0   |  15<L>  |  7.65<H>    Ca    8.6      22 Oct 2022 06:24  Phos  5.6     10-22  Mg     2.2     10-22      PT/INR - ( 20 Oct 2022 19:00 )   PT: 12.4 sec;   INR: 1.04                RADIOLOGY & ADDITIONAL TESTS:     Neurology Stroke Progress Note    INTERVAL HPI/OVERNIGHT EVENTS:  Patient seen and examined. No complaints, eating breakfast. No overnight events. KATHY completed yesterday with small PFO vs AVM. LE doppler negative. ILR placed 10/20    MEDICATIONS  (STANDING):  aspirin  chewable 81 milliGRAM(s) Oral daily  atorvastatin 80 milliGRAM(s) Oral at bedtime  calcium acetate 667 milliGRAM(s) Oral three times a day with meals  clopidogrel Tablet 75 milliGRAM(s) Oral daily  dextrose 5%. 1000 milliLiter(s) (100 mL/Hr) IV Continuous <Continuous>  dextrose 5%. 1000 milliLiter(s) (50 mL/Hr) IV Continuous <Continuous>  dextrose 50% Injectable 25 Gram(s) IV Push once  dextrose 50% Injectable 12.5 Gram(s) IV Push once  dextrose 50% Injectable 25 Gram(s) IV Push once  glucagon  Injectable 1 milliGRAM(s) IntraMuscular once  heparin   Injectable 5000 Unit(s) SubCutaneous every 8 hours  influenza   Vaccine 0.5 milliLiter(s) IntraMuscular once  insulin glargine Injectable (LANTUS) 8 Unit(s) SubCutaneous at bedtime  insulin lispro (ADMELOG) corrective regimen sliding scale   SubCutaneous Before meals and at bedtime  iron sucrose Injectable 200 milliGRAM(s) IV Push every 24 hours  modafinil 50 milliGRAM(s) Oral daily  NIFEdipine XL 30 milliGRAM(s) Oral every 24 hours  sodium bicarbonate 1300 milliGRAM(s) Oral three times a day  tamsulosin 0.4 milliGRAM(s) Oral at bedtime    MEDICATIONS  (PRN):  bisacodyl 5 milliGRAM(s) Oral every 12 hours PRN Constipation  dextrose Oral Gel 15 Gram(s) Oral once PRN Blood Glucose LESS THAN 70 milliGRAM(s)/deciliter      Allergies    No Known Allergies    Intolerances        Vital Signs Last 24 Hrs  T(C): 36.7 (22 Oct 2022 05:47), Max: 36.7 (22 Oct 2022 05:47)  T(F): 98.1 (22 Oct 2022 05:47), Max: 98.1 (22 Oct 2022 05:47)  HR: 103 (22 Oct 2022 05:47) (92 - 103)  BP: 155/78 (22 Oct 2022 05:47) (145/81 - 165/90)  BP(mean): --  RR: 17 (22 Oct 2022 05:47) (17 - 20)  SpO2: 98% (22 Oct 2022 05:47) (98% - 98%)    Parameters below as of 22 Oct 2022 05:47  Patient On (Oxygen Delivery Method): room air        Physical exam:  General: No acute distress, awake and alert  Eyes: Anicteric sclerae, moist conjunctivae, see below for CNs  Neck: trachea midline, FROM, supple, no thyromegaly or lymphadenopathy  Extremities: no edema    Neurologic:  -Mental status: Awake, alert, oriented to person, place, and time. Speech is fluent with intact naming, repetition, and comprehension, mild dysarthria and slow speech.  Follows commands. Attention/concentration intact. Fund of knowledge appropriate.  -Cranial nerves:   II: Visual fields are full to confrontation on the left. Hand motion on the right   III, IV, VI: Extraocular movements are intact without nystagmus. Left pupil round, reactive to light 1mm. Right pupil 2mm, occluded by corneal scar/opacificiation  V:  Facial sensation V1-V3 equal and intact   VII: Face is symmetric with normal eye closure and smile  VIII: Hearing is bilaterally intact   XII: Tongue protrudes midline  Motor: Normal bulk and tone. Bilateral pronation of arms with minimal drift. Strength bilateral upper extremity 5/5, right leg 5/5. Left leg strength 4+/5.   Sensation: Intact to light touch bilaterally. No neglect or extinction on double simultaneous testing.  Coordination: No dysmetria on finger-to-nose bilaterally      LABS:                        8.6    9.92  )-----------( 334      ( 22 Oct 2022 06:24 )             28.0     10-22    142  |  110<H>  |  93<H>  ----------------------------<  116<H>  4.0   |  15<L>  |  7.65<H>    Ca    8.6      22 Oct 2022 06:24  Phos  5.6     10-22  Mg     2.2     10-22      PT/INR - ( 20 Oct 2022 19:00 )   PT: 12.4 sec;   INR: 1.04                RADIOLOGY & ADDITIONAL TESTS:

## 2022-10-22 NOTE — PROGRESS NOTE ADULT - ASSESSMENT
58 yo M w/ longstanding IDDM1 w/ retinopathy, HTN, CKD 4 presented w/ slurred speech, generalized weakness for several days

## 2022-10-22 NOTE — PROGRESS NOTE ADULT - SUBJECTIVE AND OBJECTIVE BOX
Patient is a 57y old  Male who presents with a chief complaint of slurred speech and weakness (22 Oct 2022 08:01)    INTERVAL EVENTS:ANEON    SUBJECTIVE:  Patient was seen and examined at bedside. Denies urinary problem or change bowel habits, bladder scan 200ml, Scr 7.65 stable     Review of systems: No fever, chills, dizziness, HA, Changes in vision, CP, dyspnea, nausea or vomiting, dysuria, changes in bowel movements, LE edema. Rest of 12 point Review of systems negative unless otherwise documented elsewhere in note.     Diet, Renal Restrictions:   For patients receiving Renal Replacement - No Protein Restr, No Conc K, No Conc Phos, Low Sodium  Supplement Feeding Modality:  Oral  Nepro Cans or Servings Per Day:  1       Frequency:  Three Times a day (10-18-22 @ 12:48) [Active]      MEDICATIONS:  MEDICATIONS  (STANDING):  aspirin  chewable 81 milliGRAM(s) Oral daily  atorvastatin 80 milliGRAM(s) Oral at bedtime  calcium acetate 667 milliGRAM(s) Oral three times a day with meals  clopidogrel Tablet 75 milliGRAM(s) Oral daily  dextrose 5%. 1000 milliLiter(s) (100 mL/Hr) IV Continuous <Continuous>  dextrose 5%. 1000 milliLiter(s) (50 mL/Hr) IV Continuous <Continuous>  dextrose 50% Injectable 25 Gram(s) IV Push once  dextrose 50% Injectable 12.5 Gram(s) IV Push once  dextrose 50% Injectable 25 Gram(s) IV Push once  glucagon  Injectable 1 milliGRAM(s) IntraMuscular once  heparin   Injectable 5000 Unit(s) SubCutaneous every 8 hours  influenza   Vaccine 0.5 milliLiter(s) IntraMuscular once  insulin glargine Injectable (LANTUS) 8 Unit(s) SubCutaneous at bedtime  insulin lispro (ADMELOG) corrective regimen sliding scale   SubCutaneous Before meals and at bedtime  modafinil 50 milliGRAM(s) Oral daily  NIFEdipine XL 30 milliGRAM(s) Oral every 24 hours  sodium bicarbonate 650 milliGRAM(s) Oral three times a day  tamsulosin 0.4 milliGRAM(s) Oral at bedtime    MEDICATIONS  (PRN):  bisacodyl 5 milliGRAM(s) Oral every 12 hours PRN Constipation  dextrose Oral Gel 15 Gram(s) Oral once PRN Blood Glucose LESS THAN 70 milliGRAM(s)/deciliter      Allergies    No Known Allergies    Intolerances        OBJECTIVE:  Vital Signs Last 24 Hrs  T(C): 36.7 (22 Oct 2022 05:47), Max: 36.7 (22 Oct 2022 05:47)  T(F): 98.1 (22 Oct 2022 05:47), Max: 98.1 (22 Oct 2022 05:47)  HR: 103 (22 Oct 2022 05:47) (92 - 103)  BP: 155/78 (22 Oct 2022 05:47) (145/81 - 165/90)  BP(mean): --  RR: 17 (22 Oct 2022 05:47) (17 - 20)  SpO2: 98% (22 Oct 2022 05:47) (98% - 98%)    Parameters below as of 22 Oct 2022 05:47  Patient On (Oxygen Delivery Method): room air      I&O's Summary    21 Oct 2022 07:01  -  22 Oct 2022 07:00  --------------------------------------------------------  IN: 0 mL / OUT: 1550 mL / NET: -1550 mL        PHYSICAL EXAM:  Gen: Reclining in bed at time of exam, appears stated age  HEENT: NCAT, MMM, clear OP  Neck: supple, trachea at midline  CV: RRR, +S1/S2  Pulm: adequate respiratory effort, no increase in work of breathing  Abd: soft, NTND  Skin: warm and dry,   Ext: WWP, no LE edema  Neuro: AOx3, no gross focal neurological deficits  Psych: affect and behavior appropriate, pleasant at time of interview  :     LABS:                        8.6    9.92  )-----------( 334      ( 22 Oct 2022 06:24 )             28.0     10-22    142  |  110<H>  |  93<H>  ----------------------------<  116<H>  4.0   |  15<L>  |  7.65<H>    Ca    8.6      22 Oct 2022 06:24  Phos  5.6     10-22  Mg     2.2     10-22        PT/INR - ( 20 Oct 2022 19:00 )   PT: 12.4 sec;   INR: 1.04          PTT - ( 20 Oct 2022 10:14 )  PTT:30.4 sec  CAPILLARY BLOOD GLUCOSE      POCT Blood Glucose.: 111 mg/dL (22 Oct 2022 08:11)  POCT Blood Glucose.: 240 mg/dL (21 Oct 2022 21:18)  POCT Blood Glucose.: 101 mg/dL (21 Oct 2022 17:29)  POCT Blood Glucose.: 110 mg/dL (21 Oct 2022 16:44)  POCT Blood Glucose.: 104 mg/dL (21 Oct 2022 14:49)  POCT Blood Glucose.: 110 mg/dL (21 Oct 2022 12:29)        MICRODATA:      RADIOLOGY/OTHER STUDIES:

## 2022-10-22 NOTE — PROGRESS NOTE ADULT - ASSESSMENT
This is a 57y Male with PMHx of IDDM, CVA x2 (residual left sided weakness), HTN, HLD, CKD presented on 10/16 for AMS and slurred speech, with weakness over several days admitted for a toxic metabolic workup and being treated for ALEXIS. Found to have multiple acute to subacute infarcts on MRI suggestive of a cardioembolic source. Stroke service consulted for further workup. Echo results with possible PFO vs pulm AVM. LE doppler negative. S/p ILR 10/20.     Plan:   - c/w aspirin 81mg and plavix 75mg daily x 21 days (last dose 11/8). Afterwards, dc plavix and continue with aspirin 81mg daily monotherapy   - continue Atorvastatin 80mg PO daily  - homocysteine elevated - f/u b12 level  - PTH elevated - recommend endocrine consult  - hexagonal phase positive, protein S activity low   - c/w modafinil 50mg for lethargy  - CRP 36.9/ESR 96  - TTE: EF 50-55%, no PFO  - KATHY: small PFO vs pulm AVM  - LE doppler: negative for DVT   - ILR placed 10/20  - q8h neuro checks  - SBP < 180  - EEG with generalized slowing     Stroke risk factors  - A1C: 7.2  - LDL: 66   - HTN     DVT prophylaxis   -Lovenox SQ and SCDs    Discussed with Neurology Attending Dr. Hogue   This is a 57y Male with PMHx of IDDM, CVA x2 (residual left sided weakness), HTN, HLD, CKD presented on 10/16 for AMS and slurred speech, with weakness over several days admitted for a toxic metabolic workup and being treated for ALEXIS. Found to have multiple acute to subacute infarcts on MRI suggestive of a cardioembolic source. Stroke service consulted for further workup. Echo results with possible PFO vs pulm AVM. LE doppler negative. S/p ILR 10/20.     Plan:   - c/w aspirin 81mg and plavix 75mg daily x 21 days (last dose 11/8). Afterwards, dc plavix and continue with aspirin 81mg daily monotherapy   - continue Atorvastatin 80mg PO daily  - homocysteine elevated - f/u b12 level  - hexagonal phase positive, protein S activity low   - c/w modafinil 50mg for lethargy  - CRP 36.9/ESR 96  - TTE: EF 50-55%, no PFO  - KATHY: small PFO vs pulm AVM  - LE doppler: negative for DVT   - ILR placed 10/20  - q8h neuro checks  - SBP < 180  - EEG with generalized slowing     Stroke risk factors  - A1C: 7.2  - LDL: 66   - HTN     DVT prophylaxis   -Lovenox SQ and SCDs    Discussed with Neurology Attending Dr. Hogue   This is a 57y Male with PMHx of IDDM, CVA x2 (residual left sided weakness), HTN, HLD, CKD presented on 10/16 for AMS and slurred speech, with weakness over several days admitted for a toxic metabolic workup and being treated for ALEXIS. Found to have multiple acute to subacute infarcts on MRI suggestive of a cardioembolic source. Stroke service consulted for further workup. Echo results with possible PFO vs pulm AVM. LE doppler negative. S/p ILR 10/20.     Plan:   - c/w aspirin 81mg and plavix 75mg daily x 21 days (last dose 11/8). Afterwards, dc plavix and continue with aspirin 81mg daily monotherapy   - continue Atorvastatin 80mg PO daily  - homocysteine elevated - f/u b12 level  - hexagonal phase positive, protein S activity low   - c/w modafinil 50mg for lethargy  - CRP 36.9/ESR 96  - TTE: EF 50-55%, no PFO  - KATHY: small PFO vs pulm AVM  - LE doppler: negative for DVT   - ILR placed 10/20  - q8h neuro checks  - SBP < 180  - EEG with generalized slowing     Stroke risk factors  - A1C: 7.2  - LDL: 66   - HTN     DVT prophylaxis   -Lovenox SQ and SCDs

## 2022-10-22 NOTE — PROGRESS NOTE ADULT - ASSESSMENT
Patient is a 57y old Male w/ PMHx of IDDM, CVA x 2 (residual left side weakness), HTN, HLD, and CKD presents c/o slurred speech, weakness over the past several days, found to have embolic stroke of unclear etiology. Admitted for further work up to determine the etiology of his stroke     Nutritional Assessment:  · Nutritional Assessment	This patient has been assessed with a concern for Malnutrition and has been determined to have a diagnosis/diagnoses of Moderate protein-calorie malnutrition.    This patient is being managed with:   Diet NPO after Midnight-     NPO Start Date: 20-Oct-2022   NPO Start Time: 23:59  Except Medications  With Ice Chips/Sips of Water  Entered: Oct 20 2022  5:21PM    Diet Renal Restrictions-  For patients receiving Renal Replacement - No Protein Restr No Conc K No Conc Phos Low Sodium  Supplement Feeding Modality:  Oral  Nepro Cans or Servings Per Day:  1       Frequency:  Three Times a day  Entered: Oct 18 2022 12:48PM    Problem/Plan - 1:  ·  Problem: Embolic stroke.   ·  Plan: - Hx of CVA x2. Presenting w/ slurred speech and generalized weakness. Waxing/waning in alertness, intermittently very lethargic w/ nearly pinpoint pupils, responsive to noxious stimuli   Per Neuro HIE- 2/2019 acute infarct in left corona radiata and left macrina seen on MRI Brain, had been referred to EP for loop recorder placement but lost to f/u  CT Head on admission - no acute findings. Abd X-ray: no acute findings.   MRI head - several acute/subacute infarcts, likely embolic source  Echo without evidence of endocarditis   Reseived ILR from EP   - Neuro consulted, started fall/seizure precautions. F/u recs.  - KATHY planned for today 10/21  - EP consulted for ILR. Will be placed after KATHY   - C/w on Aspirin/Plavix   - Continue atorvastatin 80mg qhs.    Problem/Plan - 2:  ·  Problem: Hypernatremia.   ·  Plan: Resolved   Presented w 153. s/p 2L NS in ED - likely 2/2 dehydration   Started D5 100cc/hr. Na improved to 142  - Patient tolerating PO   - D/c D5   - Continue to monitor  - Nephrology following. Recs appreciated.    Problem/Plan - 3:  ·  Problem: Anemia.   ·  Plan: Baseline hemoglobin 12 (Feb 2019), presented w Hb 6.9 ---> s/p 3u pRBC total   - No apparent sources of bleeding, no further melena  - Stable HB  - Continue trending Hb   - transfuse for Hgb <7, maintain active T&S.    Problem/Plan - 4:  ·  Problem: Acute kidney injury superimposed on CKD.   ·  Plan: Hx of CKD stage 4. per HIE, baseline Cr 2.9, eGFR 27 (05/2019). Now presenting w/ BUN/ Cr 97/ 7.99.  May be post-obstructive, as pt straight-cath'd with UOP 500cc  Patient may get post obstructive diuresis. Will require close monitoring of UOP. Will consider starting LR depending on UOP.   Currently making urine   - Nephro following. Recs appreciated   - Trend Cr. Avoid nephrotoxic meds. Strict I/Os  - Daily BMP.    Problem/Plan - 5:  ·  Problem: Type 2 diabetes mellitus.   ·  Plan: Hx of insulin-dependent diabetes mellitus. Per surescripts: Lantus 15 qhs  A1c 7.2% (10/17/22)  POCT @80 in the AM   - c/w Lantus 8 units qhs & mISS, adjust as necessary.    Problem/Plan - 6:  ·  Problem: Hypertension.   ·  Plan: Hx of HTN. Per surescripts no recent HTN meds. Per HIE in 2020 was taking metoprolol ER 50 and Nifedipine ER 30 in 2020.   Patient has not been taking these medication and is unaware that he has elevated Bp  - Started Nifedipine 30 mg daily.    Problem/Plan - 7:  ·  Problem: Hyperlipidemia.   ·  Plan: Hx of HLD. Home med: atorvastatin 20mg  - C/w atorvastatin 80 iso hx of CVAx2 and presenting w/ slurred speech.    Problem/Plan - 8:  ·  Problem: Preventive measure.   ·  Plan: F: D5 80cc/hr   E: replete as necessary  N: Renal diet  DVT Prophy: SCDs iso anemia  Dispo: [facility].    Attestation Statements:   Attestation Statements:  I have personally seen and examined the patient.  I fully participated in the care of this patient.  I have made amendments to the documentation where necessary, and agree with the history, physical exam, and plan as documented by the Resident.     57M with DM HLD, CKD who presented with slurred speech found to have new acute stroke. Now s/p work up, pending Acute Rehab placement    Labs and imaging reviewed    Problem List as above, dispo pending acceptance to [facility] .       Patient is a 57y old Male w/ PMHx of IDDM, CVA x 2 (residual left side weakness), HTN, HLD, and CKD presents c/o slurred speech, weakness over the past several days, found to have embolic stroke of unclear etiology. Admitted for further work up to determine the etiology of his stroke     Nutritional Assessment:  · Nutritional Assessment	This patient has been assessed with a concern for Malnutrition and has been determined to have a diagnosis/diagnoses of Moderate protein-calorie malnutrition.    This patient is being managed with:   Diet NPO after Midnight-     NPO Start Date: 20-Oct-2022   NPO Start Time: 23:59  Except Medications  With Ice Chips/Sips of Water  Entered: Oct 20 2022  5:21PM    Diet Renal Restrictions-  For patients receiving Renal Replacement - No Protein Restr No Conc K No Conc Phos Low Sodium  Supplement Feeding Modality:  Oral  Nepro Cans or Servings Per Day:  1       Frequency:  Three Times a day  Entered: Oct 18 2022 12:48PM    Problem/Plan - 1:  ·  Problem: Embolic stroke.   ·  Plan: - Hx of CVA x2. Presenting w/ slurred speech and generalized weakness. Waxing/waning in alertness, intermittently very lethargic w/ nearly pinpoint pupils, responsive to noxious stimuli   Per Neuro HIE- 2/2019 acute infarct in left corona radiata and left macrina seen on MRI Brain, had been referred to EP for loop recorder placement but lost to f/u  CT Head on admission - no acute findings. Abd X-ray: no acute findings.   MRI head - several acute/subacute infarcts, likely embolic source  Echo without evidence of endocarditis   Reseived ILR from EP   - Neuro consulted, started fall/seizure precautions. F/u recs.  - KATHY 10/21: no thrombus  - EP consulted for ILR. Will be placed after KATHY   - C/w on Aspirin/Plavix   - Continue atorvastatin 80mg qhs.    Problem/Plan - 2:  ·  Problem: Hypernatremia.   ·  Plan: Resolved   Presented w 153. s/p 2L NS in ED - likely 2/2 dehydration   Started D5 100cc/hr. Na improved to 142  - Patient tolerating PO   - D/c D5   - Continue to monitor  - Nephrology following. Recs appreciated.    Problem/Plan - 3:  ·  Problem: Anemia.   ·  Plan: Baseline hemoglobin 12 (Feb 2019), presented w Hb 6.9 ---> s/p 3u pRBC total   - No apparent sources of bleeding, no further melena  - Stable HB  - Continue trending Hb stable 8.6  - transfuse for Hgb <7, maintain active T&S.  - start EPO 10,000 units sq weekly per Renal     Problem/Plan - 4:  ·  Problem: Acute kidney injury superimposed on CKD.   ·  Plan: Hx of CKD stage 4. per HIE, baseline Cr 2.9, eGFR 27 (05/2019). Now presenting w/ BUN/ Cr 97/ 7.99.  May be post-obstructive, as pt straight-cath'd with UOP 500cc  Patient may get post obstructive diuresis. Will require close monitoring of UOP. Will consider starting LR depending on UOP.   Currently making urine   - Nephro following. Recs appreciated   - Trend Cr. Avoid nephrotoxic meds. Strict I/Os  - Daily BMP., stable SCr 7.65    Problem/Plan - 5:  ·  Problem: Type 2 diabetes mellitus.   ·  Plan: Hx of insulin-dependent diabetes mellitus. Per surescripts: Lantus 15 qhs  A1c 7.2% (10/17/22)  POCT @80 in the AM   - c/w Lantus 8 units qhs & mISS, adjust as necessary.    Problem/Plan - 6:  ·  Problem: Hypertension.   ·  Plan: Hx of HTN. Per surescripts no recent HTN meds. Per HIE in 2020 was taking metoprolol ER 50 and Nifedipine ER 30 in 2020.   Patient has not been taking these medication and is unaware that he has elevated Bp  - Started Nifedipine 30 mg daily.    Problem/Plan - 7:  ·  Problem: Hyperlipidemia.   ·  Plan: Hx of HLD. Home med: atorvastatin 20mg  - C/w atorvastatin 80 iso hx of CVAx2 and presenting w/ slurred speech.    Problem/Plan - 8:  ·  Problem: Preventive measure.   ·  Plan: F: D5 80cc/hr   E: replete as necessary  N: Renal diet  DVT Prophy: SCDs iso anemia  Dispo:dispo pending acceptance to [facility] .    # POC as d/w 7WO team        Patient is a 57y old Male w/ PMHx of IDDM, CVA x 2 (residual left side weakness), HTN, HLD, and CKD presents c/o slurred speech, weakness over the past several days, found to have embolic stroke of unclear etiology. Admitted for further work up to determine the etiology of his stroke     Nutritional Assessment:  · Nutritional Assessment	This patient has been assessed with a concern for Malnutrition and has been determined to have a diagnosis/diagnoses of Moderate protein-calorie malnutrition.    This patient is being managed with:   Diet NPO after Midnight-     NPO Start Date: 20-Oct-2022   NPO Start Time: 23:59  Except Medications  With Ice Chips/Sips of Water  Entered: Oct 20 2022  5:21PM    Diet Renal Restrictions-  For patients receiving Renal Replacement - No Protein Restr No Conc K No Conc Phos Low Sodium  Supplement Feeding Modality:  Oral  Nepro Cans or Servings Per Day:  1       Frequency:  Three Times a day  Entered: Oct 18 2022 12:48PM    Problem/Plan - 1:  ·  Problem: Embolic stroke.   ·  Plan: - Hx of CVA x2. Presenting w/ slurred speech and generalized weakness. Waxing/waning in alertness, intermittently very lethargic w/ nearly pinpoint pupils, responsive to noxious stimuli   Per Neuro HIE- 2/2019 acute infarct in left corona radiata and left macrina seen on MRI Brain, had been referred to EP for loop recorder placement but lost to f/u  CT Head on admission - no acute findings. Abd X-ray: no acute findings.   MRI head - several acute/subacute infarcts, likely embolic source  Echo without evidence of endocarditis   Reseived ILR from EP   - Neuro consulted, started fall/seizure precautions. F/u recs.  - KATHY 10/21: no thrombus  - EP consulted appreciated, s/p ILR( 10/21)   - C/w on Aspirin/Plavix ( Plavix for 21 days )   - Continue atorvastatin 80mg qhs.    Problem/Plan - 2:  ·  Problem: Hypernatremia.   ·  Plan: Resolved   Presented w 153. s/p 2L NS in ED - likely 2/2 dehydration   Started D5 100cc/hr. Na improved to 142  - Patient tolerating PO   - D/c D5   - Continue to monitor  - Nephrology following. Recs appreciated.    Problem/Plan - 3:  ·  Problem: Anemia.   ·  Plan: Baseline hemoglobin 12 (Feb 2019), presented w Hb 6.9 ---> s/p 3u pRBC total   - No apparent sources of bleeding, no further melena  - Stable HB  - Continue trending Hb stable 8.6  - transfuse for Hgb <7, maintain active T&S.  - noted Ferritin 34, will add IV Iron sucrose 200mg iv daily for 5 days for ARABELLA  - check B12 level ( Homocysteine level elevated)     Problem/Plan - 4:  ·  Problem: Acute kidney injury superimposed on CKD.   ·  Plan: Hx of CKD stage 4. per HIE, baseline Cr 2.9, eGFR 27 (05/2019). Now presenting w/ BUN/ Cr 97/ 7.99.  May be post-obstructive, as pt straight-cath'd with UOP 500cc  Patient may get post obstructive diuresis. Will require close monitoring of UOP. Will consider starting LR depending on UOP.   Currently making urine   - Nephro following. Recs appreciated   - Trend Cr. Avoid nephrotoxic meds. Strict I/Os  - Daily BMP., stable SCr 7.65  - d/w Endo re:elevated intact PTH, likely secondary hyperparathyroidism     Problem/Plan - 5:  ·  Problem: Type 2 diabetes mellitus.   ·  Plan: Hx of insulin-dependent diabetes mellitus. Per surescripts: Lantus 15 qhs  A1c 7.2% (10/17/22)  POCT @80 in the AM   - c/w Lantus 8 units qhs & mISS, adjust as necessary.    Problem/Plan - 6:  ·  Problem: Hypertension.   ·  Plan: Hx of HTN. Per surescripts no recent HTN meds. Per HIE in 2020 was taking metoprolol ER 50 and Nifedipine ER 30 in 2020.   Patient has not been taking these medication and is unaware that he has elevated Bp  - Started Nifedipine 30 mg daily.    Problem/Plan - 7:  ·  Problem: Hyperlipidemia.   ·  Plan: Hx of HLD. Home med: atorvastatin 20mg  - C/w atorvastatin 80 iso hx of CVAx2 and presenting w/ slurred speech.    # DVT Prophy: SCDs iso anemia  Dispo:dispo pending acceptance to [facility] .    # POC as d/w 7WO team

## 2022-10-22 NOTE — PROGRESS NOTE ADULT - PROBLEM SELECTOR PLAN 1
probable CKD 5 from diabetic nephropathy  in light of pending ESRD with future need for hemodialysis can check PTH, Hepatitis profile  start EPO 10,000U IV q week for anemia (Fe stores adequate)  NaHCO3 for acidosis

## 2022-10-23 LAB
ALBUMIN SERPL ELPH-MCNC: 3 G/DL — LOW (ref 3.3–5)
ALP SERPL-CCNC: 96 U/L — SIGNIFICANT CHANGE UP (ref 40–120)
ALT FLD-CCNC: 21 U/L — SIGNIFICANT CHANGE UP (ref 10–45)
ANION GAP SERPL CALC-SCNC: 16 MMOL/L — SIGNIFICANT CHANGE UP (ref 5–17)
AST SERPL-CCNC: 21 U/L — SIGNIFICANT CHANGE UP (ref 10–40)
BILIRUB SERPL-MCNC: <0.2 MG/DL — SIGNIFICANT CHANGE UP (ref 0.2–1.2)
BUN SERPL-MCNC: 89 MG/DL — HIGH (ref 7–23)
CALCIUM SERPL-MCNC: 8.6 MG/DL — SIGNIFICANT CHANGE UP (ref 8.4–10.5)
CHLORIDE SERPL-SCNC: 109 MMOL/L — HIGH (ref 96–108)
CO2 SERPL-SCNC: 17 MMOL/L — LOW (ref 22–31)
CREAT SERPL-MCNC: 7.2 MG/DL — HIGH (ref 0.5–1.3)
EGFR: 8 ML/MIN/1.73M2 — LOW
GLUCOSE BLDC GLUCOMTR-MCNC: 199 MG/DL — HIGH (ref 70–99)
GLUCOSE BLDC GLUCOMTR-MCNC: 213 MG/DL — HIGH (ref 70–99)
GLUCOSE BLDC GLUCOMTR-MCNC: 386 MG/DL — HIGH (ref 70–99)
GLUCOSE BLDC GLUCOMTR-MCNC: 408 MG/DL — HIGH (ref 70–99)
GLUCOSE BLDC GLUCOMTR-MCNC: 48 MG/DL — CRITICAL LOW (ref 70–99)
GLUCOSE BLDC GLUCOMTR-MCNC: 53 MG/DL — CRITICAL LOW (ref 70–99)
GLUCOSE BLDC GLUCOMTR-MCNC: 60 MG/DL — LOW (ref 70–99)
GLUCOSE SERPL-MCNC: 46 MG/DL — CRITICAL LOW (ref 70–99)
HCT VFR BLD CALC: 28.4 % — LOW (ref 39–50)
HGB BLD-MCNC: 8.5 G/DL — LOW (ref 13–17)
MAGNESIUM SERPL-MCNC: 1.9 MG/DL — SIGNIFICANT CHANGE UP (ref 1.6–2.6)
MCHC RBC-ENTMCNC: 23.3 PG — LOW (ref 27–34)
MCHC RBC-ENTMCNC: 29.9 GM/DL — LOW (ref 32–36)
MCV RBC AUTO: 77.8 FL — LOW (ref 80–100)
NRBC # BLD: 0 /100 WBCS — SIGNIFICANT CHANGE UP (ref 0–0)
PHOSPHATE SERPL-MCNC: 4.5 MG/DL — SIGNIFICANT CHANGE UP (ref 2.5–4.5)
PLATELET # BLD AUTO: 334 K/UL — SIGNIFICANT CHANGE UP (ref 150–400)
POTASSIUM SERPL-MCNC: 4 MMOL/L — SIGNIFICANT CHANGE UP (ref 3.5–5.3)
POTASSIUM SERPL-SCNC: 4 MMOL/L — SIGNIFICANT CHANGE UP (ref 3.5–5.3)
PROT SERPL-MCNC: 6.7 G/DL — SIGNIFICANT CHANGE UP (ref 6–8.3)
RBC # BLD: 3.65 M/UL — LOW (ref 4.2–5.8)
RBC # FLD: 21.5 % — HIGH (ref 10.3–14.5)
SARS-COV-2 RNA SPEC QL NAA+PROBE: SIGNIFICANT CHANGE UP
SODIUM SERPL-SCNC: 142 MMOL/L — SIGNIFICANT CHANGE UP (ref 135–145)
VIT B12 SERPL-MCNC: 778 PG/ML — SIGNIFICANT CHANGE UP (ref 232–1245)
WBC # BLD: 7.32 K/UL — SIGNIFICANT CHANGE UP (ref 3.8–10.5)
WBC # FLD AUTO: 7.32 K/UL — SIGNIFICANT CHANGE UP (ref 3.8–10.5)

## 2022-10-23 PROCEDURE — 99233 SBSQ HOSP IP/OBS HIGH 50: CPT | Mod: GC

## 2022-10-23 RX ORDER — INSULIN GLARGINE 100 [IU]/ML
3 INJECTION, SOLUTION SUBCUTANEOUS AT BEDTIME
Refills: 0 | Status: DISCONTINUED | OUTPATIENT
Start: 2022-10-23 | End: 2022-10-23

## 2022-10-23 RX ORDER — DEXTROSE 50 % IN WATER 50 %
50 SYRINGE (ML) INTRAVENOUS ONCE
Refills: 0 | Status: COMPLETED | OUTPATIENT
Start: 2022-10-23 | End: 2022-10-23

## 2022-10-23 RX ADMIN — Medication 30 MILLIGRAM(S): at 11:15

## 2022-10-23 RX ADMIN — Medication 10: at 17:34

## 2022-10-23 RX ADMIN — Medication 50 MILLILITER(S): at 09:25

## 2022-10-23 RX ADMIN — ATORVASTATIN CALCIUM 80 MILLIGRAM(S): 80 TABLET, FILM COATED ORAL at 21:29

## 2022-10-23 RX ADMIN — Medication 1300 MILLIGRAM(S): at 06:26

## 2022-10-23 RX ADMIN — Medication 1300 MILLIGRAM(S): at 21:29

## 2022-10-23 RX ADMIN — CLOPIDOGREL BISULFATE 75 MILLIGRAM(S): 75 TABLET, FILM COATED ORAL at 11:15

## 2022-10-23 RX ADMIN — MODAFINIL 50 MILLIGRAM(S): 200 TABLET ORAL at 06:25

## 2022-10-23 RX ADMIN — HEPARIN SODIUM 5000 UNIT(S): 5000 INJECTION INTRAVENOUS; SUBCUTANEOUS at 13:25

## 2022-10-23 RX ADMIN — Medication 667 MILLIGRAM(S): at 13:22

## 2022-10-23 RX ADMIN — HEPARIN SODIUM 5000 UNIT(S): 5000 INJECTION INTRAVENOUS; SUBCUTANEOUS at 21:29

## 2022-10-23 RX ADMIN — Medication 12: at 21:14

## 2022-10-23 RX ADMIN — Medication 667 MILLIGRAM(S): at 09:34

## 2022-10-23 RX ADMIN — Medication 81 MILLIGRAM(S): at 11:15

## 2022-10-23 RX ADMIN — Medication 667 MILLIGRAM(S): at 17:35

## 2022-10-23 RX ADMIN — HEPARIN SODIUM 5000 UNIT(S): 5000 INJECTION INTRAVENOUS; SUBCUTANEOUS at 06:26

## 2022-10-23 RX ADMIN — TAMSULOSIN HYDROCHLORIDE 0.4 MILLIGRAM(S): 0.4 CAPSULE ORAL at 21:29

## 2022-10-23 RX ADMIN — Medication 1300 MILLIGRAM(S): at 13:23

## 2022-10-23 RX ADMIN — Medication 4: at 12:51

## 2022-10-23 RX ADMIN — IRON SUCROSE 110 MILLIGRAM(S): 20 INJECTION, SOLUTION INTRAVENOUS at 11:15

## 2022-10-23 NOTE — PROGRESS NOTE ADULT - PROBLEM SELECTOR PLAN 6
Hx of HTN. Per surescripts no recent HTN meds. Per [facility] in 2020 was taking metoprolol ER 50 and Nifedipine ER 30 in 2020.   Patient has not been taking these medication and is unaware that he has elevated Bp  - Started Nifedipine 30 mg daily Hx of HLD. Home med: atorvastatin 20mg  - C/w atorvastatin 80 iso hx of CVAx2 and presenting w/ slurred speech

## 2022-10-23 NOTE — PROGRESS NOTE ADULT - PROBLEM SELECTOR PLAN 2
Resolved   Presented w 153. s/p 2L NS in ED - likely 2/2 dehydration   Started D5 100cc/hr. Na improved to 142  - Patient tolerating PO   - D/c D5   - Continue to monitor  - Nephrology following. Recs appreciated Baseline hemoglobin 12 (Feb 2019), presented w Hb 6.9 ---> s/p 3u pRBC total   - No apparent sources of bleeding, no further melena  - Stable HB  - Continue trending Hb   - transfuse for Hgb <7, maintain active T&S

## 2022-10-23 NOTE — PROGRESS NOTE ADULT - SUBJECTIVE AND OBJECTIVE BOX
CC: ALEXIS        INTERVAL HISTORY: resting comfortably in bed in NAD      ROS: No chest pain, no sob, no abd pain. 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  HEENT: moist mucous membranes, no pallor/cyanosis.  Neck: no JVD visible.  Cardiac: S1, S2. RRR. No murmurs   Respratory: CTA b/l, no access muscle use.   Abdomen: soft. nontender. nondistended  Skin: no rashes.  Extremities: no LE edema b/l  Access:       DATA:                        8.5<L>  7.32  )-----------( 334      ( 23 Oct 2022 07:46 )             28.4<L>    Ferritin, Serum: 34 ng/mL (10-17 @ 19:13)      142    |  110<H>  |  93<H>  ----------------------------<  116<H>  Ca:8.6   (22 Oct 2022 06:24)  4.0     |  15<L>  |  7.65<H>                  Creatinine, Random Urine: 54 mg/dL (10-22 @ 10:48)    Protein/Creatinine Ratio Calculation: 1.9 Ratio *H* (10-17 @ 11:01)          MEDICATIONS  (STANDING):  aspirin  chewable 81 milliGRAM(s) Oral daily  atorvastatin 80 milliGRAM(s) Oral at bedtime  calcium acetate 667 milliGRAM(s) Oral three times a day with meals  clopidogrel Tablet 75 milliGRAM(s) Oral daily  dextrose 5%. 1000 milliLiter(s) (100 mL/Hr) IV Continuous <Continuous>  dextrose 5%. 1000 milliLiter(s) (50 mL/Hr) IV Continuous <Continuous>  dextrose 50% Injectable 25 Gram(s) IV Push once  dextrose 50% Injectable 12.5 Gram(s) IV Push once  dextrose 50% Injectable 25 Gram(s) IV Push once  glucagon  Injectable 1 milliGRAM(s) IntraMuscular once  heparin   Injectable 5000 Unit(s) SubCutaneous every 8 hours  influenza   Vaccine 0.5 milliLiter(s) IntraMuscular once  insulin glargine Injectable (LANTUS) 8 Unit(s) SubCutaneous at bedtime  insulin lispro (ADMELOG) corrective regimen sliding scale   SubCutaneous Before meals and at bedtime  iron sucrose IVPB 200 milliGRAM(s) IV Intermittent every 24 hours  modafinil 50 milliGRAM(s) Oral daily  NIFEdipine XL 30 milliGRAM(s) Oral every 24 hours  sodium bicarbonate 1300 milliGRAM(s) Oral three times a day  tamsulosin 0.4 milliGRAM(s) Oral at bedtime    MEDICATIONS  (PRN):  bisacodyl 5 milliGRAM(s) Oral every 12 hours PRN Constipation  dextrose Oral Gel 15 Gram(s) Oral once PRN Blood Glucose LESS THAN 70 milliGRAM(s)/deciliter

## 2022-10-23 NOTE — PROVIDER CONTACT NOTE (HYPOGLYCEMIA EVENT) - NS PROVIDER CONTACT BACKGROUND-HYPO
Age: 57y    Gender: Male    POCT Blood Glucose:  199 mg/dL (10-23-22 @ 09:34)  60 mg/dL (10-23-22 @ 09:05)  53 mg/dL (10-23-22 @ 08:43)  48 mg/dL (10-23-22 @ 08:41)  212 mg/dL (10-22-22 @ 21:40)  197 mg/dL (10-22-22 @ 17:19)  184 mg/dL (10-22-22 @ 12:11)      eMAR:atorvastatin   80 milliGRAM(s) Oral (10-22-22 @ 22:38)    dextrose 50% Injectable   50 milliLiter(s) IV Push (10-23-22 @ 09:25)    insulin glargine Injectable (LANTUS)   8 Unit(s) SubCutaneous (10-22-22 @ 22:38)    insulin lispro (ADMELOG) corrective regimen sliding scale   4 Unit(s) SubCutaneous (10-22-22 @ 22:38)   2 Unit(s) SubCutaneous (10-22-22 @ 17:38)   2 Unit(s) SubCutaneous (10-22-22 @ 12:16)

## 2022-10-23 NOTE — PROGRESS NOTE ADULT - PROBLEM SELECTOR PLAN 1
- Hx of CVA x2. Presenting w/ slurred speech and generalized weakness. Waxing/waning in alertness, intermittently very lethargic w/ nearly pinpoint pupils, responsive to noxious stimuli   Per Neuro HIE- 2/2019 acute infarct in left corona radiata and left macrina seen on MRI Brain, had been referred to EP for loop recorder placement but lost to f/u  CT Head on admission - no acute findings. Abd X-ray: no acute findings.   MRI head - several acute/subacute infarcts, likely embolic source  Echo without evidence of endocarditis   Reseived ILR from EP   - Neuro consulted, started fall/seizure precautions. F/u recs.  - KATHY planned for today 10/21  - EP consulted for ILR. Will be placed after KATHY   - C/w on Aspirin/Plavix   - Continue atorvastatin 80mg qhs - Hx of CVA x2. Presenting w/ slurred speech and generalized weakness. Waxing/waning in alertness, intermittently very lethargic w/ nearly pinpoint pupils, responsive to noxious stimuli   Per Neuro HIE- 2/2019 acute infarct in left corona radiata and left macrina seen on MRI Brain, had been referred to EP for loop recorder placement but lost to f/u    - CT Head on admission - no acute findings. Abd X-ray: no acute findings.   - MRI head - several acute/subacute infarcts, likely embolic source  - Neuro consulted, started fall/seizure precautions. F/u recs.  - 10/21 KATHY with no LA/RA/SEAN/RAA thrombus. +LVH with EF 50%. Nml RV size and function. Mild TR.   - s/p loop recorder placement by EP    - C/w on Aspirin/Plavix   - Continue atorvastatin 80mg qhs

## 2022-10-23 NOTE — PROGRESS NOTE ADULT - PROBLEM SELECTOR PLAN 8
F: D5 80cc/hr   E: replete as necessary  N: Renal diet  DVT Prophy: SCDs iso anemia  Dispo: F F: s/p D5  E: replete as necessary  N: Renal diet  DVT Prophy: SCDs iso anemia  Dispo: [facility]

## 2022-10-23 NOTE — PROGRESS NOTE ADULT - ATTENDING COMMENTS
57y old Male w/ PMHx of HTN,HLD, IDDM,CKD4, CVA x 2 (residual left side weakness), presents c/o slurred speech, weakness over the past several days, found to have embolic stroke of unclear etiology, s/p KATHY negative and ILR placement (10/21), ALEXIS on CKD4 with stable Scr 7.20, ARABELLA ( Ferritin 34), awaiting acute rehab placement   - c/w DAPT (plavix started 10/19, last day Plavix 11/8 for total 21 days) and ASA life-time for embolic stroke   - c/w high dose statin   - HTN: Nifedipine XL 30mg po daily   - ARABELLA: Iron Sucrose 200mg iv daily ( 10/22-) Day 2 , replete iron store prior starting EPO  - CKD5: Renal f/u appreciated, SCr 7.20 stable, c/w Sodium Bicarbonate  - BPH: tamsulosin   - T2DM: Hypoglycemia this am, given D50, adjusted Lantus/lispro, goal -180   - CVA: PT/OT f/u appreciated, awaiting acute rehab   - DVT ppx  - FULL CODE   - POC as d/w 7WO team Dr. Yissel Sin 57y old Male w/ PMHx of HTN,HLD, IDDM,CKD4, CVA x 2 (residual left side weakness), presents c/o slurred speech, weakness over the past several days, found to have embolic stroke of unclear etiology, s/p KATHY negative and ILR placement (10/21), ALEXIS on CKD4 with stable Scr 7.20, ARABELLA ( Ferritin 34), awaiting acute rehab placement   - c/w DAPT (plavix started 10/19, last day Plavix 11/8 for total 21 days) and ASA life-time for embolic stroke   - c/w high dose statin   - HTN: Nifedipine XL 30mg po daily   - ARABELLA: Iron Sucrose 200mg iv daily ( 10/22-) Day 2 , replete iron store prior starting EPO  - CKD5: Renal f/u appreciated, SCr 7.20 stable, c/w Sodium Bicarbonate  - elevated homocysteine level: f/u B12 level   - elevated intact PTH: endo f/u   - BPH: tamsulosin   - T2DM: Hypoglycemia this am, given D50, adjusted Lantus/lispro, goal -180   - CVA: PT/OT f/u appreciated, awaiting acute rehab   - DVT ppx  - FULL CODE   - POC as d/w 7WO team Dr. Yissel Sin

## 2022-10-23 NOTE — PROGRESS NOTE ADULT - PROBLEM SELECTOR PLAN 3
Baseline hemoglobin 12 (Feb 2019), presented w Hb 6.9 ---> s/p 3u pRBC total   - No apparent sources of bleeding, no further melena  - Stable HB  - Continue trending Hb   - transfuse for Hgb <7, maintain active T&S Hx of CKD stage 4. per HIE, baseline Cr 2.9, eGFR 27 (05/2019). Now presenting w/ BUN/ Cr 97/ 7.99.  May be post-obstructive, as pt straight-cath'd with UOP 500cc  Patient may get post obstructive diuresis. Will require close monitoring of UOP. Will consider starting LR depending on UOP.   Currently making urine     - Nephro following. Recs appreciated   - Trend Cr. Avoid nephrotoxic meds. Strict I/Os  - Daily BMP

## 2022-10-23 NOTE — PROGRESS NOTE ADULT - PROBLEM SELECTOR PLAN 7
Hx of HLD. Home med: atorvastatin 20mg  - C/w atorvastatin 80 iso hx of CVAx2 and presenting w/ slurred speech Resolved   Presented w 153. s/p 2L NS in ED - likely 2/2 dehydration   Started D5 100cc/hr. Na improved to 142  - Patient tolerating PO   - D/c D5   - Continue to monitor  - Nephrology following. Recs appreciated

## 2022-10-23 NOTE — PROGRESS NOTE ADULT - PROBLEM SELECTOR PLAN 4
Hx of CKD stage 4. per HIE, baseline Cr 2.9, eGFR 27 (05/2019). Now presenting w/ BUN/ Cr 97/ 7.99.  May be post-obstructive, as pt straight-cath'd with UOP 500cc  Patient may get post obstructive diuresis. Will require close monitoring of UOP. Will consider starting LR depending on UOP.   Currently making urine   - Nephro following. Recs appreciated   - Trend Cr. Avoid nephrotoxic meds. Strict I/Os  - Daily BMP Hx of insulin-dependent diabetes mellitus. Per surescripts: Lantus 15 qhs  A1c 7.2% (10/17/22)    - POCT 48 this morning, resolved after juice and 1amp D50  - Dc'd Lantus 8U  - mISS  - monitor FSG and adjust insulin regimen as necessary

## 2022-10-23 NOTE — PROGRESS NOTE ADULT - PROBLEM SELECTOR PLAN 5
Hx of insulin-dependent diabetes mellitus. Per surescripts: Lantus 15 qhs  A1c 7.2% (10/17/22)  POCT @80 in the AM   - c/w Lantus 8 units qhs & mISS, adjust as necessary Hx of HTN. Per surescripts no recent HTN meds. Per [facility] in 2020 was taking metoprolol ER 50 and Nifedipine ER 30 in 2020.   Patient has not been taking these medication and is unaware that he has elevated Bp    - C/w Nifedipine 30 mg daily

## 2022-10-23 NOTE — PROGRESS NOTE ADULT - PROBLEM SELECTOR PLAN 1
stable CKD  does not need blood draws every day  NaHCO3 started  patient's transferrin saturation is 56%- does not need additional Fe  would start EPO 10,000U IV q week  check PTH  check Hepatitis profile

## 2022-10-24 DIAGNOSIS — E10.9 TYPE 1 DIABETES MELLITUS WITHOUT COMPLICATIONS: ICD-10-CM

## 2022-10-24 DIAGNOSIS — D63.8 ANEMIA IN OTHER CHRONIC DISEASES CLASSIFIED ELSEWHERE: ICD-10-CM

## 2022-10-24 LAB
ANION GAP SERPL CALC-SCNC: 16 MMOL/L — SIGNIFICANT CHANGE UP (ref 5–17)
BASOPHILS # BLD AUTO: 0.03 K/UL — SIGNIFICANT CHANGE UP (ref 0–0.2)
BASOPHILS NFR BLD AUTO: 0.4 % — SIGNIFICANT CHANGE UP (ref 0–2)
BLD GP AB SCN SERPL QL: NEGATIVE — SIGNIFICANT CHANGE UP
BUN SERPL-MCNC: 83 MG/DL — HIGH (ref 7–23)
CALCIUM SERPL-MCNC: 8.8 MG/DL — SIGNIFICANT CHANGE UP (ref 8.4–10.5)
CHLORIDE SERPL-SCNC: 107 MMOL/L — SIGNIFICANT CHANGE UP (ref 96–108)
CO2 SERPL-SCNC: 18 MMOL/L — LOW (ref 22–31)
CREAT SERPL-MCNC: 7.01 MG/DL — HIGH (ref 0.5–1.3)
DNA PLOIDY SPEC FC-IMP: SIGNIFICANT CHANGE UP
EGFR: 8 ML/MIN/1.73M2 — LOW
EOSINOPHIL # BLD AUTO: 0.19 K/UL — SIGNIFICANT CHANGE UP (ref 0–0.5)
EOSINOPHIL NFR BLD AUTO: 2.4 % — SIGNIFICANT CHANGE UP (ref 0–6)
GLUCOSE BLDC GLUCOMTR-MCNC: 108 MG/DL — HIGH (ref 70–99)
GLUCOSE BLDC GLUCOMTR-MCNC: 141 MG/DL — HIGH (ref 70–99)
GLUCOSE BLDC GLUCOMTR-MCNC: 156 MG/DL — HIGH (ref 70–99)
GLUCOSE BLDC GLUCOMTR-MCNC: 262 MG/DL — HIGH (ref 70–99)
GLUCOSE SERPL-MCNC: 108 MG/DL — HIGH (ref 70–99)
HCT VFR BLD CALC: 27.1 % — LOW (ref 39–50)
HGB BLD-MCNC: 8.4 G/DL — LOW (ref 13–17)
IMM GRANULOCYTES NFR BLD AUTO: 0.6 % — SIGNIFICANT CHANGE UP (ref 0–0.9)
LYMPHOCYTES # BLD AUTO: 1.23 K/UL — SIGNIFICANT CHANGE UP (ref 1–3.3)
LYMPHOCYTES # BLD AUTO: 15.5 % — SIGNIFICANT CHANGE UP (ref 13–44)
MAGNESIUM SERPL-MCNC: 1.8 MG/DL — SIGNIFICANT CHANGE UP (ref 1.6–2.6)
MCHC RBC-ENTMCNC: 23.9 PG — LOW (ref 27–34)
MCHC RBC-ENTMCNC: 31 GM/DL — LOW (ref 32–36)
MCV RBC AUTO: 77.2 FL — LOW (ref 80–100)
MONOCYTES # BLD AUTO: 0.69 K/UL — SIGNIFICANT CHANGE UP (ref 0–0.9)
MONOCYTES NFR BLD AUTO: 8.7 % — SIGNIFICANT CHANGE UP (ref 2–14)
NEUTROPHILS # BLD AUTO: 5.76 K/UL — SIGNIFICANT CHANGE UP (ref 1.8–7.4)
NEUTROPHILS NFR BLD AUTO: 72.4 % — SIGNIFICANT CHANGE UP (ref 43–77)
NRBC # BLD: 0 /100 WBCS — SIGNIFICANT CHANGE UP (ref 0–0)
PHOSPHATE SERPL-MCNC: 4.1 MG/DL — SIGNIFICANT CHANGE UP (ref 2.5–4.5)
PLATELET # BLD AUTO: 397 K/UL — SIGNIFICANT CHANGE UP (ref 150–400)
POTASSIUM SERPL-MCNC: 4 MMOL/L — SIGNIFICANT CHANGE UP (ref 3.5–5.3)
POTASSIUM SERPL-SCNC: 4 MMOL/L — SIGNIFICANT CHANGE UP (ref 3.5–5.3)
PTR INTERPRETATION: SIGNIFICANT CHANGE UP
RBC # BLD: 3.51 M/UL — LOW (ref 4.2–5.8)
RBC # FLD: 21.4 % — HIGH (ref 10.3–14.5)
RH IG SCN BLD-IMP: POSITIVE — SIGNIFICANT CHANGE UP
SODIUM SERPL-SCNC: 141 MMOL/L — SIGNIFICANT CHANGE UP (ref 135–145)
WBC # BLD: 7.95 K/UL — SIGNIFICANT CHANGE UP (ref 3.8–10.5)
WBC # FLD AUTO: 7.95 K/UL — SIGNIFICANT CHANGE UP (ref 3.8–10.5)

## 2022-10-24 PROCEDURE — 99232 SBSQ HOSP IP/OBS MODERATE 35: CPT

## 2022-10-24 PROCEDURE — 99232 SBSQ HOSP IP/OBS MODERATE 35: CPT | Mod: GC

## 2022-10-24 PROCEDURE — 99222 1ST HOSP IP/OBS MODERATE 55: CPT | Mod: GC

## 2022-10-24 RX ORDER — TAMSULOSIN HYDROCHLORIDE 0.4 MG/1
1 CAPSULE ORAL
Qty: 0 | Refills: 0 | DISCHARGE
Start: 2022-10-24

## 2022-10-24 RX ORDER — INSULIN LISPRO 100/ML
4 VIAL (ML) SUBCUTANEOUS
Refills: 0 | Status: DISCONTINUED | OUTPATIENT
Start: 2022-10-24 | End: 2022-10-24

## 2022-10-24 RX ORDER — ATORVASTATIN CALCIUM 80 MG/1
1 TABLET, FILM COATED ORAL
Qty: 0 | Refills: 0 | DISCHARGE
Start: 2022-10-24

## 2022-10-24 RX ORDER — ASPIRIN/CALCIUM CARB/MAGNESIUM 324 MG
1 TABLET ORAL
Qty: 0 | Refills: 0 | DISCHARGE
Start: 2022-10-24

## 2022-10-24 RX ORDER — INSULIN GLARGINE 100 [IU]/ML
6 INJECTION, SOLUTION SUBCUTANEOUS AT BEDTIME
Refills: 0 | Status: DISCONTINUED | OUTPATIENT
Start: 2022-10-24 | End: 2022-10-24

## 2022-10-24 RX ORDER — INSULIN LISPRO 100/ML
6 VIAL (ML) SUBCUTANEOUS
Refills: 0 | Status: DISCONTINUED | OUTPATIENT
Start: 2022-10-24 | End: 2022-10-25

## 2022-10-24 RX ORDER — NIFEDIPINE 30 MG
1 TABLET, EXTENDED RELEASE 24 HR ORAL
Qty: 0 | Refills: 0 | DISCHARGE
Start: 2022-10-24

## 2022-10-24 RX ORDER — INSULIN GLARGINE 100 [IU]/ML
15 INJECTION, SOLUTION SUBCUTANEOUS AT BEDTIME
Refills: 0 | Status: DISCONTINUED | OUTPATIENT
Start: 2022-10-24 | End: 2022-10-25

## 2022-10-24 RX ADMIN — HEPARIN SODIUM 5000 UNIT(S): 5000 INJECTION INTRAVENOUS; SUBCUTANEOUS at 06:26

## 2022-10-24 RX ADMIN — Medication 6 UNIT(S): at 18:18

## 2022-10-24 RX ADMIN — CLOPIDOGREL BISULFATE 75 MILLIGRAM(S): 75 TABLET, FILM COATED ORAL at 12:10

## 2022-10-24 RX ADMIN — Medication 667 MILLIGRAM(S): at 08:51

## 2022-10-24 RX ADMIN — MODAFINIL 50 MILLIGRAM(S): 200 TABLET ORAL at 06:26

## 2022-10-24 RX ADMIN — INSULIN GLARGINE 15 UNIT(S): 100 INJECTION, SOLUTION SUBCUTANEOUS at 22:09

## 2022-10-24 RX ADMIN — HEPARIN SODIUM 5000 UNIT(S): 5000 INJECTION INTRAVENOUS; SUBCUTANEOUS at 14:09

## 2022-10-24 RX ADMIN — ATORVASTATIN CALCIUM 80 MILLIGRAM(S): 80 TABLET, FILM COATED ORAL at 22:09

## 2022-10-24 RX ADMIN — Medication 667 MILLIGRAM(S): at 12:10

## 2022-10-24 RX ADMIN — Medication 81 MILLIGRAM(S): at 12:10

## 2022-10-24 RX ADMIN — Medication 1300 MILLIGRAM(S): at 06:26

## 2022-10-24 RX ADMIN — Medication 6: at 12:11

## 2022-10-24 RX ADMIN — Medication 30 MILLIGRAM(S): at 14:04

## 2022-10-24 RX ADMIN — TAMSULOSIN HYDROCHLORIDE 0.4 MILLIGRAM(S): 0.4 CAPSULE ORAL at 22:09

## 2022-10-24 RX ADMIN — Medication 2: at 18:17

## 2022-10-24 RX ADMIN — HEPARIN SODIUM 5000 UNIT(S): 5000 INJECTION INTRAVENOUS; SUBCUTANEOUS at 22:09

## 2022-10-24 RX ADMIN — Medication 1300 MILLIGRAM(S): at 14:03

## 2022-10-24 RX ADMIN — Medication 4 UNIT(S): at 12:17

## 2022-10-24 RX ADMIN — Medication 1300 MILLIGRAM(S): at 22:10

## 2022-10-24 RX ADMIN — Medication 667 MILLIGRAM(S): at 18:18

## 2022-10-24 NOTE — PROGRESS NOTE ADULT - PROBLEM SELECTOR PLAN 5
Hx of HTN. Per surescripts no recent HTN meds. Per [facility] in 2020 was taking metoprolol ER 50 and Nifedipine ER 30 in 2020.   Patient has not been taking these medication and is unaware that he has elevated Bp    - C/w Nifedipine 30 mg daily Hx of HTN. Per surescripts no recent HTN meds. Per [facility] in 2020 was taking metoprolol ER 50 and Nifedipine ER 30 in 2020.   Patient has not been taking these medication and is unaware that he has elevated Bp  - C/w Nifedipine 30 mg daily

## 2022-10-24 NOTE — PROGRESS NOTE ADULT - ASSESSMENT
58 yo M w/ longstanding IDDM1 w/ retinopathy, HTN, CKD 4 presented w/ slurred speech, generalized weakness for several days. Nephrology consulted for AMS in the setting of advanced CKD    Assessment/Plan:  #Progression of CKD V  Last known Cr 3.3 in Jun/2020 which was progression of his CKD. Serologic GN workup in 2019 negative  Now presenting w/ Cr 7.99 on admission, stable at the moment  UA w/ trace protein and moderate blood in the absence of RBCs  Non-oliguric  UPCR 1.9, Eleanor 56    Recommend:  No indication of urgent HD at this time. However, given his advance CKD, will likely require HD in the future   Maintain net even fluid balance   Strict I&Os  Continue Flomax  Avoid nephrotoxic agents  Renal diet  Pt OK for discharge from renal standpoint. Ensure pt follows up with nephrology outpatient    #Hypernatremia-resolved  Due to dehydration and no access to water  Na 141 today  Daily BMP  Encourage PO intake    #Anemia  Hb 8.4  Iron sat 58%, ferritin 34  likely ACD  Defer Epo in the setting of cryptogenic stroke    #Hypertension  Continue nifedipine 30mg. May increase the dose if needed  Will defer initiation of ACEi/ARBs at outpatient nephrology visits    #Met Acidosis  Likely due to CKD  Continue sodium bicarb 650mg TID     #BMD-CKD  Phos above goal  Check iPTH  Continue ca acetate [facility]      Alberto Cordova M.D  PGY-4 Nephrology  [phone]

## 2022-10-24 NOTE — PROGRESS NOTE ADULT - PROBLEM SELECTOR PLAN 3
Hx of CKD stage 4. per HIE, baseline Cr 2.9, eGFR 27 (05/2019). Now presenting w/ BUN/ Cr 97/ 7.99.  May be post-obstructive, as pt straight-cath'd with UOP 500cc  Patient may get post obstructive diuresis. Will require close monitoring of UOP. Will consider starting LR depending on UOP.   Currently making urine     - Nephro following. Recs appreciated   - Trend Cr. Avoid nephrotoxic meds. Strict I/Os  - Daily BMP Baseline hemoglobin 12 (Feb 2019), presented w Hb 6.9 ---> s/p 3u pRBC total   No apparent sources of bleeding, no further melena  Likely 2/2 to CKD   - Stable HB  - transfuse for Hgb <7, maintain active T&S  - Epo not given Hx of stroke

## 2022-10-24 NOTE — PROGRESS NOTE ADULT - SUBJECTIVE AND OBJECTIVE BOX
OVERNIGHT EVENTS:    SUBJECTIVE / INTERVAL HPI: Patient seen and examined at bedside.     VITAL SIGNS:  Vital Signs Last 24 Hrs  T(C): 36.9 (24 Oct 2022 05:17), Max: 36.9 (24 Oct 2022 05:17)  T(F): 98.4 (24 Oct 2022 05:17), Max: 98.4 (24 Oct 2022 05:17)  HR: 94 (24 Oct 2022 05:17) (94 - 98)  BP: 144/81 (24 Oct 2022 05:17) (131/85 - 155/92)  BP(mean): --  RR: 18 (24 Oct 2022 05:17) (17 - 18)  SpO2: 95% (24 Oct 2022 05:17) (95% - 95%)    Parameters below as of 24 Oct 2022 05:17  Patient On (Oxygen Delivery Method): room air        PHYSICAL EXAM:    General: WDWN  HEENT: NCAT; PERRL, anicteric sclera; MMM  Neck: supple, trachea midline  Cardiovascular: S1, S2 normal; RRR, no M/G/R  Respiratory: CTABL; no W/R/R  Gastrointestinal: soft, nontender, nondistended. bowel sounds present.  Skin: no ulcerations or visible rashes appreciated  Extremities: WWP; no edema, clubbing or cyanosis  Vascular: 2+ radial, DP/PT pulses B/L  Neurological: AAOx3; CN II-XII grossly intact; no focal deficits    MEDICATIONS:  MEDICATIONS  (STANDING):  aspirin  chewable 81 milliGRAM(s) Oral daily  atorvastatin 80 milliGRAM(s) Oral at bedtime  calcium acetate 667 milliGRAM(s) Oral three times a day with meals  clopidogrel Tablet 75 milliGRAM(s) Oral daily  dextrose 5%. 1000 milliLiter(s) (100 mL/Hr) IV Continuous <Continuous>  dextrose 5%. 1000 milliLiter(s) (50 mL/Hr) IV Continuous <Continuous>  dextrose 50% Injectable 25 Gram(s) IV Push once  dextrose 50% Injectable 12.5 Gram(s) IV Push once  dextrose 50% Injectable 25 Gram(s) IV Push once  glucagon  Injectable 1 milliGRAM(s) IntraMuscular once  heparin   Injectable 5000 Unit(s) SubCutaneous every 8 hours  influenza   Vaccine 0.5 milliLiter(s) IntraMuscular once  insulin glargine Injectable (LANTUS) 6 Unit(s) SubCutaneous at bedtime  insulin lispro (ADMELOG) corrective regimen sliding scale   SubCutaneous Before meals and at bedtime  insulin lispro Injectable (ADMELOG) 4 Unit(s) SubCutaneous three times a day before meals  modafinil 50 milliGRAM(s) Oral daily  NIFEdipine XL 30 milliGRAM(s) Oral every 24 hours  sodium bicarbonate 1300 milliGRAM(s) Oral three times a day  tamsulosin 0.4 milliGRAM(s) Oral at bedtime    MEDICATIONS  (PRN):  bisacodyl 5 milliGRAM(s) Oral every 12 hours PRN Constipation  dextrose Oral Gel 15 Gram(s) Oral once PRN Blood Glucose LESS THAN 70 milliGRAM(s)/deciliter      ALLERGIES:  Allergies    No Known Allergies    Intolerances        LABS:                        8.5    7.32  )-----------( 334      ( 23 Oct 2022 07:46 )             28.4     10-23    142  |  109<H>  |  89<H>  ----------------------------<  46<LL>  4.0   |  17<L>  |  7.20<H>    Ca    8.6      23 Oct 2022 07:46  Phos  4.5     10-23  Mg     1.9     10-23    TPro  6.7  /  Alb  3.0<L>  /  TBili  <0.2  /  DBili  x   /  AST  21  /  ALT  21  /  AlkPhos  96  10-23        CAPILLARY BLOOD GLUCOSE      POCT Blood Glucose.: 408 mg/dL (23 Oct 2022 21:06)      RADIOLOGY & ADDITIONAL TESTS: Reviewed. OVERNIGHT EVENTS: No acute events overnight     SUBJECTIVE / INTERVAL HPI: Patient has no CP, SOB, palpitations. No new weakness, and n    VITAL SIGNS:  Vital Signs Last 24 Hrs  T(C): 36.9 (24 Oct 2022 05:17), Max: 36.9 (24 Oct 2022 05:17)  T(F): 98.4 (24 Oct 2022 05:17), Max: 98.4 (24 Oct 2022 05:17)  HR: 94 (24 Oct 2022 05:17) (94 - 98)  BP: 144/81 (24 Oct 2022 05:17) (131/85 - 155/92)  BP(mean): --  RR: 18 (24 Oct 2022 05:17) (17 - 18)  SpO2: 95% (24 Oct 2022 05:17) (95% - 95%)    Parameters below as of 24 Oct 2022 05:17  Patient On (Oxygen Delivery Method): room air        PHYSICAL EXAM:    General: WDWN  HEENT: NCAT; PERRL, anicteric sclera; MMM  Neck: supple, trachea midline  Cardiovascular: S1, S2 normal; RRR, no M/G/R  Respiratory: CTABL; no W/R/R  Gastrointestinal: soft, nontender, nondistended. bowel sounds present.  Skin: no ulcerations or visible rashes appreciated  Extremities: WWP; no edema, clubbing or cyanosis  Vascular: 2+ radial, DP/PT pulses B/L  Neurological: AAOx3; CN II-XII grossly intact; no focal deficits    MEDICATIONS:  MEDICATIONS  (STANDING):  aspirin  chewable 81 milliGRAM(s) Oral daily  atorvastatin 80 milliGRAM(s) Oral at bedtime  calcium acetate 667 milliGRAM(s) Oral three times a day with meals  clopidogrel Tablet 75 milliGRAM(s) Oral daily  dextrose 5%. 1000 milliLiter(s) (100 mL/Hr) IV Continuous <Continuous>  dextrose 5%. 1000 milliLiter(s) (50 mL/Hr) IV Continuous <Continuous>  dextrose 50% Injectable 25 Gram(s) IV Push once  dextrose 50% Injectable 12.5 Gram(s) IV Push once  dextrose 50% Injectable 25 Gram(s) IV Push once  glucagon  Injectable 1 milliGRAM(s) IntraMuscular once  heparin   Injectable 5000 Unit(s) SubCutaneous every 8 hours  influenza   Vaccine 0.5 milliLiter(s) IntraMuscular once  insulin glargine Injectable (LANTUS) 6 Unit(s) SubCutaneous at bedtime  insulin lispro (ADMELOG) corrective regimen sliding scale   SubCutaneous Before meals and at bedtime  insulin lispro Injectable (ADMELOG) 4 Unit(s) SubCutaneous three times a day before meals  modafinil 50 milliGRAM(s) Oral daily  NIFEdipine XL 30 milliGRAM(s) Oral every 24 hours  sodium bicarbonate 1300 milliGRAM(s) Oral three times a day  tamsulosin 0.4 milliGRAM(s) Oral at bedtime    MEDICATIONS  (PRN):  bisacodyl 5 milliGRAM(s) Oral every 12 hours PRN Constipation  dextrose Oral Gel 15 Gram(s) Oral once PRN Blood Glucose LESS THAN 70 milliGRAM(s)/deciliter      ALLERGIES:  Allergies    No Known Allergies    Intolerances        LABS:                        8.5    7.32  )-----------( 334      ( 23 Oct 2022 07:46 )             28.4     10-23    142  |  109<H>  |  89<H>  ----------------------------<  46<LL>  4.0   |  17<L>  |  7.20<H>    Ca    8.6      23 Oct 2022 07:46  Phos  4.5     10-23  Mg     1.9     10-23    TPro  6.7  /  Alb  3.0<L>  /  TBili  <0.2  /  DBili  x   /  AST  21  /  ALT  21  /  AlkPhos  96  10-23        CAPILLARY BLOOD GLUCOSE      POCT Blood Glucose.: 408 mg/dL (23 Oct 2022 21:06)      RADIOLOGY & ADDITIONAL TESTS: Reviewed. OVERNIGHT EVENTS: No acute events overnight     SUBJECTIVE / INTERVAL HPI: Patient has no CP, SOB, palpitations. No new weakness, and no worsening of weakness or slurred speech     VITAL SIGNS:  Vital Signs Last 24 Hrs  T(C): 36.9 (24 Oct 2022 05:17), Max: 36.9 (24 Oct 2022 05:17)  T(F): 98.4 (24 Oct 2022 05:17), Max: 98.4 (24 Oct 2022 05:17)  HR: 94 (24 Oct 2022 05:17) (94 - 98)  BP: 144/81 (24 Oct 2022 05:17) (131/85 - 155/92)  BP(mean): --  RR: 18 (24 Oct 2022 05:17) (17 - 18)  SpO2: 95% (24 Oct 2022 05:17) (95% - 95%)    Parameters below as of 24 Oct 2022 05:17  Patient On (Oxygen Delivery Method): room air      PHYSICAL EXAM:  General: In bed in no acute distress   HEENT: NCAT; PERRL, anicteric sclera; MMM  Neck: supple, trachea midline  Cardiovascular: S1, S2 normal; RRR, no M/G/R  Respiratory: CTABL; no W/R/R  Gastrointestinal: soft, nontender, nondistended. bowel sounds present.  Skin: no ulcerations or visible rashes appreciated  Extremities: WWP; no edema, clubbing or cyanosis, left lower extremity weakness (unable to move against gravity), RLE normal strength   Vascular: 2+ radial, DP/PT pulses B/L  Neurological: AAOx3; CN II-XII grossly intact; no focal deficits    MEDICATIONS:  MEDICATIONS  (STANDING):  aspirin  chewable 81 milliGRAM(s) Oral daily  atorvastatin 80 milliGRAM(s) Oral at bedtime  calcium acetate 667 milliGRAM(s) Oral three times a day with meals  clopidogrel Tablet 75 milliGRAM(s) Oral daily  dextrose 5%. 1000 milliLiter(s) (100 mL/Hr) IV Continuous <Continuous>  dextrose 5%. 1000 milliLiter(s) (50 mL/Hr) IV Continuous <Continuous>  dextrose 50% Injectable 25 Gram(s) IV Push once  dextrose 50% Injectable 12.5 Gram(s) IV Push once  dextrose 50% Injectable 25 Gram(s) IV Push once  glucagon  Injectable 1 milliGRAM(s) IntraMuscular once  heparin   Injectable 5000 Unit(s) SubCutaneous every 8 hours  influenza   Vaccine 0.5 milliLiter(s) IntraMuscular once  insulin glargine Injectable (LANTUS) 6 Unit(s) SubCutaneous at bedtime  insulin lispro (ADMELOG) corrective regimen sliding scale   SubCutaneous Before meals and at bedtime  insulin lispro Injectable (ADMELOG) 4 Unit(s) SubCutaneous three times a day before meals  modafinil 50 milliGRAM(s) Oral daily  NIFEdipine XL 30 milliGRAM(s) Oral every 24 hours  sodium bicarbonate 1300 milliGRAM(s) Oral three times a day  tamsulosin 0.4 milliGRAM(s) Oral at bedtime    MEDICATIONS  (PRN):  bisacodyl 5 milliGRAM(s) Oral every 12 hours PRN Constipation  dextrose Oral Gel 15 Gram(s) Oral once PRN Blood Glucose LESS THAN 70 milliGRAM(s)/deciliter      ALLERGIES:  Allergies    No Known Allergies    Intolerances        LABS:                        8.5    7.32  )-----------( 334      ( 23 Oct 2022 07:46 )             28.4     10-23    142  |  109<H>  |  89<H>  ----------------------------<  46<LL>  4.0   |  17<L>  |  7.20<H>    Ca    8.6      23 Oct 2022 07:46  Phos  4.5     10-23  Mg     1.9     10-23    TPro  6.7  /  Alb  3.0<L>  /  TBili  <0.2  /  DBili  x   /  AST  21  /  ALT  21  /  AlkPhos  96  10-23        CAPILLARY BLOOD GLUCOSE      POCT Blood Glucose.: 408 mg/dL (23 Oct 2022 21:06)      RADIOLOGY & ADDITIONAL TESTS: Reviewed.

## 2022-10-24 NOTE — PROGRESS NOTE ADULT - PROBLEM SELECTOR PLAN 1
- Hx of CVA x2. Presenting w/ slurred speech and generalized weakness. Waxing/waning in alertness, intermittently very lethargic w/ nearly pinpoint pupils, responsive to noxious stimuli   Per Neuro HIE- 2/2019 acute infarct in left corona radiata and left macrina seen on MRI Brain, had been referred to EP for loop recorder placement but lost to f/u    - CT Head on admission - no acute findings. Abd X-ray: no acute findings.   - MRI head - several acute/subacute infarcts, likely embolic source  - Neuro consulted, started fall/seizure precautions. F/u recs.  - 10/21 KATHY with no LA/RA/SEAN/RAA thrombus. +LVH with EF 50%. Nml RV size and function. Mild TR.   - s/p loop recorder placement by EP    - C/w on Aspirin/Plavix   - Continue atorvastatin 80mg qhs

## 2022-10-24 NOTE — OCCUPATIONAL THERAPY INITIAL EVALUATION ADULT - DIAGNOSIS, OT EVAL
Pt p/w residual CVA deficits demonstrating decrease in balance, strength with B LE ( R LE> L LE) flexor synergy pattern affecting ADLs and functional mobility.

## 2022-10-24 NOTE — OCCUPATIONAL THERAPY INITIAL EVALUATION ADULT - PERTINENT HX OF CURRENT PROBLEM, REHAB EVAL
56 yo M w/ longstanding IDDM1 w/ retinopathy, HTN, CKD 4 presented w/ slurred speech, generalized weakness for several days. Nephrology consulted for AMS in the setting of advanced CKD

## 2022-10-24 NOTE — CONSULT NOTE ADULT - ATTENDING COMMENTS
Pt seen on rounds this afternoon.  57-yo man who presented to the ED with 3 days of slurred speech and weakness, also found twice on the floor of his apartment by a friend with AMS and urinary/fecal incontinence.  Takes Lantus 15 units at home, probably premeal lispro but does not know the dose.  A1c level is only 7%.  Says that he was told that he has type 1 DM.    Admitting labwork also showed stage 5 CKD.  Current serum creatinine level is 7.0 mg%.  Was hypoglycemic yesterday AM after 8 units of Lantus the night before, and this has been discontinued.  Fingersticks yesterday were markedly elevated to the 300 range, decreased to 108 this morning after 12 units lispro coverage  Was started on standing premeal lispro, but only at 4 units, and first dose was before lunch today.  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  --Given his report that he has type 1 DM, need to restart Lantus, but will do so only at 6 units qhs  --The A1c level of 7% despite what is likely inconsistent insulin administration at home and marked post-prandial hyperglycemia raises the distinct question as to whether the A1c is artifactually low because of frequent hypoglycemia.  HIs report of hypoglycemia unawareness also makes is likely that he has had frequent hypoglycemia, tommy at night--quite possible if he was taking a dose as high as 15 units of Lantus at home  --Start a pre-meal lispro dose of 5 units  --Change to low-dose correction scale  --Says that he is eating "half of my food."  Will continue Nepro until adequacy of PO intake can be assessed  --I am suspicious that he in fact has type 2 DM.  Will check a C-peptide level Pt seen on rounds this afternoon.  57-yo man who presented to the ED with 3 days of slurred speech and weakness, also found twice on the floor of his apartment by a friend with AMS and urinary/fecal incontinence.  Takes Lantus 15 units at home, probably premeal lispro but does not know the dose.  A1c level is only 7%.  Says that he was told that he has type 1 DM.    Admitting labwork also showed stage 5 CKD.  Current serum creatinine level is 7.0 mg%.  Was hypoglycemic yesterday AM after 8 units of Lantus the night before, and this has been discontinued.  Fingersticks yesterday were markedly elevated to the 300 range, decreased to 108 this morning after 12 units lispro coverage  Was started on standing premeal lispro, but only at 4 units, and first dose was before lunch today.  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  --Given his report that he has type 1 DM, need to restart Lantus, but will do so only at 6 units qhs  --The A1c level of 7% despite what is likely inconsistent insulin administration at home and marked post-prandial hyperglycemia raises the distinct question as to whether the A1c is artifactually low because of frequent hypoglycemia.  HIs report of hypoglycemia unawareness also makes is likely that he has had frequent hypoglycemia, tommy at night--quite possible if he was taking a dose as high as 15 units of Lantus at home.  (It is also quite possible that the two occasions when his friend found him down at home with evidence of incontinence that these episodes were the result of marked hypoglycemia).   --Start a pre-meal lispro dose of 5 units  --Change to low-dose correction scale  --Says that he is eating "half of my food."  Will continue Nepro until adequacy of PO intake can be assessed

## 2022-10-24 NOTE — PROGRESS NOTE ADULT - PROBLEM SELECTOR PLAN 8
F: s/p D5  E: replete as necessary  N: Renal diet  DVT Prophy: SCDs iso anemia  Dispo: [facility] F: s/p D5  E: replete as necessary  N: Renal diet  DVT Prophy: SCDs iso anemia  Dispo: Pending PT/OT and referral send out

## 2022-10-24 NOTE — PROGRESS NOTE ADULT - PROBLEM SELECTOR PLAN 4
Hx of insulin-dependent diabetes mellitus. Per surescripts: Lantus 15 qhs  A1c 7.2% (10/17/22)    - POCT 48 this morning, resolved after juice and 1amp D50  - Dc'd Lantus 8U  - mISS  - monitor FSG and adjust insulin regimen as necessary Hx of insulin-dependent diabetes mellitus. Per surescripts: Lantus 15 qhs  A1c 7.2% (10/17/22)  POCT had been elevated in the evenings and low in the morning   - Endocrinology consulted: Patient was found to have DM1. Will F/u Recs  - Patient will not be discharged, awaiting management of DM1

## 2022-10-24 NOTE — OCCUPATIONAL THERAPY INITIAL EVALUATION ADULT - RANGE OF MOTION EXAMINATION, LOWER EXTREMITY
with B LE flexor synergy pattern with B hip flexion ( R >L)/bilateral LE Active ROM was WFL  (within functional limits)

## 2022-10-24 NOTE — CONSULT NOTE ADULT - SUBJECTIVE AND OBJECTIVE BOX
HISTORY OF PRESENT ILLNESS  EDEN DACOSTA is a 57y Male w/ PMHx of IDDM, CVA x 2 (residual left side weakness), HTN, HLD, CKD presents c/o slurred speech, weakness over the past several days. Admitted initially to [facility] overnight, found to be persistently tachycardic in ED and with BUN/Cr 91/7.42. ICU consulted for need for closer monitoring and possible dialysis, now admitted to telemetry.  Pt is unsure how long his symptoms have been going on for, is a poor historian.  Pt's cousin accompanied pt to ED stating the patient called him 3 days prior to presentation. His speech sounded slurred but cousin also reports they do not speak frequently and he has not seen him in years. Pt's cousin says he is supposed to have 24/7 aid but found out the patient has only been getting 3 days a week of aid. Pt's cousin couldn't go to him on Thursday, went Saturday (yesterday) and found pt on the floor of the apartment, pt was soiled in his own feces and urine, was unsure how he ended up on the floor. Pt was helped into a bed, the cousin did some grocery shopping for him, reports he advised him to go to the hospital and pt declined.  Pt's cousin left him alone on Saturday and returned today and found the patient on the floor again, and again had soiled himself and was unsure how he got to the floor. Pt was brought to the ED for evaluation.  Pt stating he doesn't think he fell, has no pain or injuries.  Pt acknowledging his speech is slurred, unsure how long this has been going on for. Admitted on 10/17 for CVA management, ALEXIS on CKD and anemia.     Home regiment: lantus 15u daily. A1c 10/17 7.2%. On admission, started on lantus 10u daily, mISS and renal diet. FSG 80 the following am, lantus decreased to 8u daily.   Episode of hypoglycemia on 10/23, am FSG 53. Treated w/ juice and 1 Amp D50, discontinued lantus 8u daily. Following FSG significant for hyperglycemia. This AM, restarted on lantus 6u daily at bedtime and lispro 4u TID before meals, w/ mISS. Added consistent carbs to renal diet.     DIABETES HISTORY  - Age at diagnosis:   - Symptoms at time of diagnosis:   - Current Therapy: Home Lantus 15u daily  - History of other regimens:   - History of hypoglycemia:   - History of DKA/HHS:   - Complications:   - Home FSG:        > Fasting: *** mg/dL.        > Before meals: *** mg/dL.        > Bedtime: *** mg/dL.  - Diet:          > Breakfast:         > Lunch:        > Dinner:        > Snacks:  - Physical activity:    - Outpatient follow-up:     PAST MEDICAL & SURGICAL HISTORY:  As per history of present illness.     FAMILY HISTORY  - DM:  - Thyroid:  - Autoimmune:  - Other:    SOCIAL HISTORY  - Work:  - Alcohol:  - Smoking:  - Recreational Drugs:    ALLERGIES  No Known Allergies    CURRENT MEDICATIONS  aspirin  chewable 81 milliGRAM(s) Oral daily  atorvastatin 80 milliGRAM(s) Oral at bedtime  bisacodyl 5 milliGRAM(s) Oral every 12 hours PRN  calcium acetate 667 milliGRAM(s) Oral three times a day with meals  clopidogrel Tablet 75 milliGRAM(s) Oral daily  dextrose 5%. 1000 milliLiter(s) IV Continuous <Continuous>  dextrose 5%. 1000 milliLiter(s) IV Continuous <Continuous>  dextrose 50% Injectable 25 Gram(s) IV Push once  dextrose 50% Injectable 12.5 Gram(s) IV Push once  dextrose 50% Injectable 25 Gram(s) IV Push once  dextrose Oral Gel 15 Gram(s) Oral once PRN  glucagon  Injectable 1 milliGRAM(s) IntraMuscular once  heparin   Injectable 5000 Unit(s) SubCutaneous every 8 hours  influenza   Vaccine 0.5 milliLiter(s) IntraMuscular once  insulin glargine Injectable (LANTUS) 6 Unit(s) SubCutaneous at bedtime  insulin lispro (ADMELOG) corrective regimen sliding scale   SubCutaneous Before meals and at bedtime  insulin lispro Injectable (ADMELOG) 4 Unit(s) SubCutaneous three times a day before meals  modafinil 50 milliGRAM(s) Oral daily  NIFEdipine XL 30 milliGRAM(s) Oral every 24 hours  sodium bicarbonate 1300 milliGRAM(s) Oral three times a day  tamsulosin 0.4 milliGRAM(s) Oral at bedtime    REVIEW OF SYSTEMS  Constitutional:  Negative fever, chills or loss of appetite.  Eyes:  Negative blurry vision or double vision.  Cardiovascular:  Negative for chest pain or palpitations.  Respiratory:  Negative for cough, wheezing, or SOB.   Gastrointestinal:  Negative for nausea, vomiting, diarrhea, constipation, or abdominal pain.  Genitourinary:  Negative frequency, urgency or dysuria.  Neurologic:  No headache, confusion, dizziness, lightheadedness.    PHYSICAL EXAM  Vital Signs Last 24 Hrs  T(C): 36.9 (24 Oct 2022 05:17), Max: 36.9 (24 Oct 2022 05:17)  T(F): 98.4 (24 Oct 2022 05:17), Max: 98.4 (24 Oct 2022 05:17)  HR: 94 (24 Oct 2022 05:17) (94 - 95)  BP: 144/81 (24 Oct 2022 05:17) (144/81 - 155/92)  BP(mean): --  RR: 18 (24 Oct 2022 05:17) (18 - 18)  SpO2: 95% (24 Oct 2022 05:17) (95% - 95%)    Parameters below as of 24 Oct 2022 05:17  Patient On (Oxygen Delivery Method): room air      Constitutional: Awake, alert, in no acute distress.   HEENT: Normocephalic, atraumatic, ADRIANO, no proptosis or lid retraction.   Neck: supple, no acanthosis, no thyromegaly or palpable thyroid nodules.  Respiratory: Lungs clear to ausculation bilaterally.   Cardiovascular: regular rhythm, normal S1 and S2, no audible murmurs.   GI: soft, non-tender, non-distended, bowel sounds present, no masses appreciated.  Extremities: No lower extremity edema, peripheral pulses present.   Skin: no rashes.   Psychiatric: AAO x 3. Normal affect/mood.     LABS                        8.4    7.95  )-----------( 397      ( 24 Oct 2022 05:30 )             27.1     10-24    141  |  107  |  83<H>  ----------------------------<  108<H>  4.0   |  18<L>  |  7.01<H>    Ca    8.8      24 Oct 2022 05:30  Phos  4.1     10-24  Mg     1.8     10-24    TPro  6.7  /  Alb  3.0<L>  /  TBili  <0.2  /  DBili  x   /  AST  21  /  ALT  21  /  AlkPhos  96  10-23    LIVER FUNCTIONS - ( 23 Oct 2022 07:46 )  Alb: 3.0 g/dL / Pro: 6.7 g/dL / ALK PHOS: 96 U/L / ALT: 21 U/L / AST: 21 U/L / GGT: x                     CAPILLARY BLOOD GLUCOSE & INSULIN RECEIVED  Yesterday  - AM FS mg/dL = juice + 1 Amp D50.  - AM FSG: following Amp 199 mg/dL.  - Noon FS mg/dL = 0 units of premeal Lispro + 4 units of Lispro sliding scale.  - Dinner FS mg/dL = 0 units of premeal Lispro + 10 units of Lispro sliding scale.   - Bedtime FS mg/dL = 0 units of Lantus + 12 units Lispro sliding scale.     Today  - Breakfast FS mg/dL = 0 units of premeal Lispro + 0 units of Lispro sliding scale.   - Lunch FS mg/dL = 4 units of premeal Lispro + 6 units of Lispro sliding scale.     CAPILLARY BLOOD GLUCOSE      POCT Blood Glucose.: 262 mg/dL (24 Oct 2022 11:58)  POCT Blood Glucose.: 108 mg/dL (24 Oct 2022 08:42)  POCT Blood Glucose.: 408 mg/dL (23 Oct 2022 21:06)  POCT Blood Glucose.: 386 mg/dL (23 Oct 2022 17:28)        ASSESSMENT / RECOMMENDATIONS      A1C: 7.2%  Weight: 90.7kg  BMI: 27.9  Creatinine: 7.01  GFR: 8  Ejection Fraction: 50%    # Type 2 diabetes mellitus  - Please continue lantus *** units at bedtime.   - Continue lispro *** units before each meal.  - Continue lispro moderate dose sliding scale four times daily with meals and at bedtime.  - Patient's fingerstick glucose goal is 100-180 mg/dL.    - For discharge, patient can ***.    - Patient can follow up at discharge with [facility] by calling [phone] to make an appointment.      Case discussed with Dr. Palm. Primary team updated.       Rere Goel  PGY1 IM Resident  Service Pager: [phone]  **INCOMPLETE NOTE**    HISTORY OF PRESENT ILLNESS  EDEN DACOSTA is a 57y Male w/ PMHx of IDDM, CVA x 2 (residual left side weakness), HTN, HLD, CKD presents c/o slurred speech, weakness over the past several days. Admitted initially to [facility] overnight, found to be persistently tachycardic in ED and with BUN/Cr 91/7.42. ICU consulted for need for closer monitoring and possible dialysis, now admitted to telemetry.  Pt is unsure how long his symptoms have been going on for, is a poor historian.  Pt's cousin accompanied pt to ED stating the patient called him 3 days prior to presentation. His speech sounded slurred but cousin also reports they do not speak frequently and he has not seen him in years. Pt's cousin says he is supposed to have 24/7 aid but found out the patient has only been getting 3 days a week of aid. Pt's cousin couldn't go to him on Thursday, went Saturday (yesterday) and found pt on the floor of the apartment, pt was soiled in his own feces and urine, was unsure how he ended up on the floor. Pt was helped into a bed, the cousin did some grocery shopping for him, reports he advised him to go to the hospital and pt declined.  Pt's cousin left him alone on Saturday and returned today and found the patient on the floor again, and again had soiled himself and was unsure how he got to the floor. Pt was brought to the ED for evaluation.  Pt stating he doesn't think he fell, has no pain or injuries.  Pt acknowledging his speech is slurred, unsure how long this has been going on for. Admitted on 10/17 for CVA management, ALEXIS on CKD and anemia.     Home regiment: lantus 15u daily. A1c 10/17 7.2%. On admission, started on lantus 10u daily, mISS and renal diet. FSG 80 the following am, lantus decreased to 8u daily.   Episode of hypoglycemia on 10/23, am FSG 53. Treated w/ juice and 1 Amp D50, discontinued lantus 8u daily. Following FSG significant for hyperglycemia. This AM, restarted on lantus 6u daily at bedtime and lispro 4u TID before meals, w/ mISS. Added consistent carbs to renal diet.     DIABETES HISTORY  - Age at diagnosis: teenager, unknown exactly when  - Symptoms at time of diagnosis: unknown  - Current Therapy: Home Lantus 15u daily, reportedly injects insulin TID but cannot say how much  - History of other regimens: unknown  - History of hypoglycemia: has had glucose in the 40s at home, asymptomatic, eats sugar cookies  - History of DKA/HHS: Denies history of DKA  - Complications: Retinopathy, stage IV CKD, CAD  - Home FSG: Reports usually less than 150 mg/dL. One time 40 mg/dL recently. Cannot give exact FSG.  - Diet: Reports to make "normal food" for himself. Cooks chicken, steak, rice, wheat bread, fruit and vegetables. Denies eating sugar.   - Physical activity: Reports going on long walks around twice a week. Goes out to convenience stores with [employer].  - Outpatient follow-up: Dr. Gifty woodward/ [facility] as per [facility] WADE    PAST MEDICAL & SURGICAL HISTORY:  As per history of present illness.     FAMILY HISTORY  - DM: None  - Thyroid: None  - Autoimmune: None    SOCIAL HISTORY  - Alcohol: Denies any alcohol use.  - Smoking: Smokes 2-3 cig/day for the last 40 years.  - Recreational Drugs: Smokes marijuana occasionally.     ALLERGIES  No Known Allergies    CURRENT MEDICATIONS  aspirin  chewable 81 milliGRAM(s) Oral daily  atorvastatin 80 milliGRAM(s) Oral at bedtime  bisacodyl 5 milliGRAM(s) Oral every 12 hours PRN  calcium acetate 667 milliGRAM(s) Oral three times a day with meals  clopidogrel Tablet 75 milliGRAM(s) Oral daily  dextrose 5%. 1000 milliLiter(s) IV Continuous <Continuous>  dextrose 5%. 1000 milliLiter(s) IV Continuous <Continuous>  dextrose 50% Injectable 25 Gram(s) IV Push once  dextrose 50% Injectable 12.5 Gram(s) IV Push once  dextrose 50% Injectable 25 Gram(s) IV Push once  dextrose Oral Gel 15 Gram(s) Oral once PRN  glucagon  Injectable 1 milliGRAM(s) IntraMuscular once  heparin   Injectable 5000 Unit(s) SubCutaneous every 8 hours  influenza   Vaccine 0.5 milliLiter(s) IntraMuscular once  insulin glargine Injectable (LANTUS) 6 Unit(s) SubCutaneous at bedtime  insulin lispro (ADMELOG) corrective regimen sliding scale   SubCutaneous Before meals and at bedtime  insulin lispro Injectable (ADMELOG) 4 Unit(s) SubCutaneous three times a day before meals  modafinil 50 milliGRAM(s) Oral daily  NIFEdipine XL 30 milliGRAM(s) Oral every 24 hours  sodium bicarbonate 1300 milliGRAM(s) Oral three times a day  tamsulosin 0.4 milliGRAM(s) Oral at bedtime    REVIEW OF SYSTEMS  Constitutional:  Negative fever, chills or loss of appetite.  Eyes:  Negative blurry vision or double vision.  Cardiovascular:  Negative for chest pain or palpitations.  Respiratory:  Negative for cough, wheezing, or SOB.   Gastrointestinal:  Negative for nausea, vomiting, diarrhea, constipation, or abdominal pain.  Genitourinary:  Negative frequency, urgency or dysuria.  Neurologic:  No headache, confusion, dizziness, lightheadedness.    PHYSICAL EXAM  Vital Signs Last 24 Hrs  T(C): 36.9 (24 Oct 2022 05:17), Max: 36.9 (24 Oct 2022 05:17)  T(F): 98.4 (24 Oct 2022 05:17), Max: 98.4 (24 Oct 2022 05:17)  HR: 94 (24 Oct 2022 05:17) (94 - 95)  BP: 144/81 (24 Oct 2022 05:17) (144/81 - 155/92)  BP(mean): --  RR: 18 (24 Oct 2022 05:17) (18 - 18)  SpO2: 95% (24 Oct 2022 05:17) (95% - 95%)    Parameters below as of 24 Oct 2022 05:17  Patient On (Oxygen Delivery Method): room air      Constitutional: Awake, alert, in no acute distress. Slurred speech.   HEENT: Normocephalic, atraumatic, ADRIANO, no proptosis or lid retraction.   Neck: supple, no acanthosis, no thyromegaly or palpable thyroid nodules.  Respiratory: Lungs clear to ausculation bilaterally.   Cardiovascular: regular rhythm, normal S1 and S2, no audible murmurs.   GI: soft, non-tender, non-distended, bowel sounds present, no masses appreciated.  Extremities: No lower extremity edema, peripheral pulses present.   Skin: no rashes.   Psychiatric: AAO x 3. Normal affect/mood.     LABS                        8.4    7.95  )-----------( 397      ( 24 Oct 2022 05:30 )             27.1     10-    141  |  107  |  83<H>  ----------------------------<  108<H>  4.0   |  18<L>  |  7.01<H>    Ca    8.8      24 Oct 2022 05:30  Phos  4.1     10-24  Mg     1.8     10-24    TPro  6.7  /  Alb  3.0<L>  /  TBili  <0.2  /  DBili  x   /  AST  21  /  ALT  21  /  AlkPhos  96  10-23    LIVER FUNCTIONS - ( 23 Oct 2022 07:46 )  Alb: 3.0 g/dL / Pro: 6.7 g/dL / ALK PHOS: 96 U/L / ALT: 21 U/L / AST: 21 U/L / GGT: x                     CAPILLARY BLOOD GLUCOSE & INSULIN RECEIVED  Yesterday  - AM FS mg/dL = juice + 1 Amp D50.  - AM FSG: following Amp 199 mg/dL.  - Noon FS mg/dL = 0 units of premeal Lispro + 4 units of Lispro sliding scale.  - Dinner FS mg/dL = 0 units of premeal Lispro + 10 units of Lispro sliding scale.   - Bedtime FS mg/dL = 0 units of Lantus + 12 units Lispro sliding scale.     Today  - Breakfast FS mg/dL = 0 units of premeal Lispro + 0 units of Lispro sliding scale.   - Lunch FS mg/dL = 4 units of premeal Lispro + 6 units of Lispro sliding scale.     CAPILLARY BLOOD GLUCOSE      POCT Blood Glucose.: 262 mg/dL (24 Oct 2022 11:58)  POCT Blood Glucose.: 108 mg/dL (24 Oct 2022 08:42)  POCT Blood Glucose.: 408 mg/dL (23 Oct 2022 21:06)  POCT Blood Glucose.: 386 mg/dL (23 Oct 2022 17:28)        ASSESSMENT / RECOMMENDATIONS      A1C: 7.2%  Weight: 90.7kg  BMI: 27.9  Creatinine: 7.01  GFR: 8  Ejection Fraction: 50%    # Type 2 diabetes mellitus  - Please continue lantus 15 units at bedtime.   - Continue lispro 6 units before each meal.  - Continue lispro moderate dose sliding scale four times daily with meals and at bedtime.  - Patient's fingerstick glucose goal is 100-180 mg/dL.    - For discharge, patient can continue home regiment lantus 15u at bedtime, lispro 6-8u before each meal.    - Patient can follow up at discharge with [facility] by calling [phone] to make an appointment.      Rere Goel  PGY1 IM Resident  Service Pager: [phone]  HISTORY OF PRESENT ILLNESS  EDEN DACOSTA is a 57y Male w/ PMHx of IDDM, CVA x 2 (residual left side weakness), HTN, HLD, CKD presents c/o slurred speech, weakness over the past several days. Admitted initially to [facility] overnight, found to be persistently tachycardic in ED and with BUN/Cr 91/7.42. ICU consulted for need for closer monitoring and possible dialysis, now admitted to telemetry.  Pt is unsure how long his symptoms have been going on for, is a poor historian.  Pt's cousin accompanied pt to ED stating the patient called him 3 days prior to presentation. His speech sounded slurred but cousin also reports they do not speak frequently and he has not seen him in years. Pt's cousin says he is supposed to have 24/7 aid but found out the patient has only been getting 3 days a week of aid. Pt's cousin couldn't go to him on Thursday, went Saturday (yesterday) and found pt on the floor of the apartment, pt was soiled in his own feces and urine, was unsure how he ended up on the floor. Pt was helped into a bed, the cousin did some grocery shopping for him, reports he advised him to go to the hospital and pt declined.  Pt's cousin left him alone on Saturday and returned today and found the patient on the floor again, and again had soiled himself and was unsure how he got to the floor. Pt was brought to the ED for evaluation.  Pt stating he doesn't think he fell, has no pain or injuries.  Pt acknowledging his speech is slurred, unsure how long this has been going on for. Admitted on 10/17 for CVA management, ALEXIS on CKD and anemia.     Home regiment: lantus 15u daily. A1c 10/17 7.2%. On admission, started on lantus 10u daily, mISS and renal diet. FSG 80 the following am, lantus decreased to 8u daily.   Episode of hypoglycemia on 10/23, am FSG 53. Treated w/ juice and 1 Amp D50, discontinued lantus 8u daily. Following FSG significant for hyperglycemia. This AM, restarted on lantus 6u daily at bedtime and lispro 4u TID before meals, w/ mISS. Added consistent carbs to renal diet.     DIABETES HISTORY  - Age at diagnosis: teenager, unknown exactly when  - Symptoms at time of diagnosis: unknown  - Current Therapy: Home Lantus 15u daily, reportedly injects insulin TID but cannot say how much  - History of other regimens: unknown  - History of hypoglycemia: has had glucose in the 40s at home, asymptomatic, eats sugar cookies  - History of DKA/HHS: Denies history of DKA  - Complications: Retinopathy, stage IV CKD, CAD  - Home FSG: Reports usually less than 150 mg/dL. One time 40 mg/dL recently. Cannot give exact FSG.  - Diet: Reports to make "normal food" for himself. Cooks chicken, steak, rice, wheat bread, fruit and vegetables. Denies eating sugar.   - Physical activity: Reports going on long walks around twice a week. Goes out to convenience stores with [employer].  - Outpatient follow-up: Dr. Gifty woodward/ [facility] as per Melod[facility] WADE    PAST MEDICAL & SURGICAL HISTORY:  As per history of present illness.     FAMILY HISTORY  - DM: None  - Thyroid: None  - Autoimmune: None    SOCIAL HISTORY  - Alcohol: Denies any alcohol use.  - Smoking: Smokes 2-3 cig/day for the last 40 years.  - Recreational Drugs: Smokes marijuana occasionally.     ALLERGIES  No Known Allergies    CURRENT MEDICATIONS  aspirin  chewable 81 milliGRAM(s) Oral daily  atorvastatin 80 milliGRAM(s) Oral at bedtime  bisacodyl 5 milliGRAM(s) Oral every 12 hours PRN  calcium acetate 667 milliGRAM(s) Oral three times a day with meals  clopidogrel Tablet 75 milliGRAM(s) Oral daily  dextrose 5%. 1000 milliLiter(s) IV Continuous <Continuous>  dextrose 5%. 1000 milliLiter(s) IV Continuous <Continuous>  dextrose 50% Injectable 25 Gram(s) IV Push once  dextrose 50% Injectable 12.5 Gram(s) IV Push once  dextrose 50% Injectable 25 Gram(s) IV Push once  dextrose Oral Gel 15 Gram(s) Oral once PRN  glucagon  Injectable 1 milliGRAM(s) IntraMuscular once  heparin   Injectable 5000 Unit(s) SubCutaneous every 8 hours  influenza   Vaccine 0.5 milliLiter(s) IntraMuscular once  insulin glargine Injectable (LANTUS) 6 Unit(s) SubCutaneous at bedtime  insulin lispro (ADMELOG) corrective regimen sliding scale   SubCutaneous Before meals and at bedtime  insulin lispro Injectable (ADMELOG) 4 Unit(s) SubCutaneous three times a day before meals  modafinil 50 milliGRAM(s) Oral daily  NIFEdipine XL 30 milliGRAM(s) Oral every 24 hours  sodium bicarbonate 1300 milliGRAM(s) Oral three times a day  tamsulosin 0.4 milliGRAM(s) Oral at bedtime    REVIEW OF SYSTEMS  Constitutional:  Negative fever, chills or loss of appetite.  Eyes:  Negative blurry vision or double vision.  Cardiovascular:  Negative for chest pain or palpitations.  Respiratory:  Negative for cough, wheezing, or SOB.   Gastrointestinal:  Negative for nausea, vomiting, diarrhea, constipation, or abdominal pain.  Genitourinary:  Negative frequency, urgency or dysuria.  Neurologic:  No headache, confusion, dizziness, lightheadedness.    PHYSICAL EXAM  Vital Signs Last 24 Hrs  T(C): 36.9 (24 Oct 2022 05:17), Max: 36.9 (24 Oct 2022 05:17)  T(F): 98.4 (24 Oct 2022 05:17), Max: 98.4 (24 Oct 2022 05:17)  HR: 94 (24 Oct 2022 05:17) (94 - 95)  BP: 144/81 (24 Oct 2022 05:17) (144/81 - 155/92)  BP(mean): --  RR: 18 (24 Oct 2022 05:17) (18 - 18)  SpO2: 95% (24 Oct 2022 05:17) (95% - 95%)    Parameters below as of 24 Oct 2022 05:17  Patient On (Oxygen Delivery Method): room air      Constitutional: Awake, alert, in no acute distress. Slurred speech.   HEENT: Normocephalic, atraumatic, ADRIANO, no proptosis or lid retraction.   Neck: supple, no acanthosis, no thyromegaly or palpable thyroid nodules.  Respiratory: Lungs clear to ausculation bilaterally.   Cardiovascular: regular rhythm, normal S1 and S2, no audible murmurs.   GI: soft, non-tender, non-distended, bowel sounds present, no masses appreciated.  Extremities: No lower extremity edema, peripheral pulses present.   Skin: no rashes.   Psychiatric: AAO x 3. Normal affect/mood.     LABS                        8.4    7.95  )-----------( 397      ( 24 Oct 2022 05:30 )             27.1     10-    141  |  107  |  83<H>  ----------------------------<  108<H>  4.0   |  18<L>  |  7.01<H>    Ca    8.8      24 Oct 2022 05:30  Phos  4.1     10  Mg     1.8     10-24    TPro  6.7  /  Alb  3.0<L>  /  TBili  <0.2  /  DBili  x   /  AST  21  /  ALT  21  /  AlkPhos  96  10-23    LIVER FUNCTIONS - ( 23 Oct 2022 07:46 )  Alb: 3.0 g/dL / Pro: 6.7 g/dL / ALK PHOS: 96 U/L / ALT: 21 U/L / AST: 21 U/L / GGT: x                     CAPILLARY BLOOD GLUCOSE & INSULIN RECEIVED  Yesterday  - AM FS mg/dL = juice + 1 Amp D50.  - AM FSG: following Amp 199 mg/dL.  - Noon FS mg/dL = 0 units of premeal Lispro + 4 units of Lispro sliding scale.  - Dinner FS mg/dL = 0 units of premeal Lispro + 10 units of Lispro sliding scale.   - Bedtime FS mg/dL = 0 units of Lantus + 12 units Lispro sliding scale.     Today  - Breakfast FS mg/dL = 0 units of premeal Lispro + 0 units of Lispro sliding scale.   - Lunch FS mg/dL = 4 units of premeal Lispro + 6 units of Lispro sliding scale.     CAPILLARY BLOOD GLUCOSE      POCT Blood Glucose.: 262 mg/dL (24 Oct 2022 11:58)  POCT Blood Glucose.: 108 mg/dL (24 Oct 2022 08:42)  POCT Blood Glucose.: 408 mg/dL (23 Oct 2022 21:06)  POCT Blood Glucose.: 386 mg/dL (23 Oct 2022 17:28)        ASSESSMENT / RECOMMENDATIONS      A1C: 7.2%  Weight: 90.7kg  BMI: 27.9  Creatinine: 7.01  GFR: 8  Ejection Fraction: 50%    # Type 2 diabetes mellitus  - Please continue lantus 6 units at bedtime.   - Lispro 5 units before each meal TID  - Lispro low dose sliding scale three times daily with meals starting at 150 mg/dL -> lispro 1u, 2u, 3u...  - At bedtime lispro low dose sliding scale starting at 250 mg/dL -> lispro 2u, 3u, 4u...  - Patient's fingerstick glucose goal is 100-180 mg/dL.    - Patient can stop drinking Nephro as long as oral food intake is good  - Endocrinology will continue to follow  - Patient can follow up at discharge with [facility] by calling [phone] to make an appointment.      Rere Goel  PGY1 IM Resident  Service Pager: [phone]

## 2022-10-24 NOTE — CHART NOTE - NSCHARTNOTEFT_GEN_A_CORE
57y Male with PMHx of IDDM, CVA x2 (residual left sided weakness), HTN, HLD, CKD presented on 10/16 for AMS and slurred speech, with weakness over several days admitted for a toxic metabolic workup and being treated for ALEXIS. Found to have multiple acute to subacute infarcts on MRI suggestive of a cardioembolic source. Echo results with possible PFO vs pulm AVM. LE doppler negative. S/p ILR 10/20.     Recommendations:  - c/w aspirin 81mg daily and plavix 75mg daily for 21 days (last dose 11/8). Then can switch to aspirin monotherapy.  - hematology consult regarding positive hexagonal phase and low protein S. Patient was seen by Dr. Petty ([facility]) in 2019 with positive anticardiolipin.   - stroke clinic will call patient to schedule follow up appointment.      Please call STROKE team for discharge NIHSS before patient is discharged.  Stroke to sign off. Please call if any questions. 57y Male with PMHx of IDDM, CVA x2 (residual left sided weakness), HTN, HLD, CKD presented on 10/16 for AMS and slurred speech, with weakness over several days admitted for a toxic metabolic workup and being treated for ALEXIS. Found to have multiple acute to subacute infarcts on MRI suggestive of a cardioembolic source. Echo results with possible PFO vs pulm AVM. LE doppler negative. S/p ILR 10/20.     Recommendations:  - c/w aspirin 81mg daily and plavix 75mg daily for 21 days (last dose 11/8). Then can switch to aspirin monotherapy.  - referral to hematology to f/u hypercoagable results (pt seen by Dr. Petty in 2019 at [facility])  - stroke clinic will call patient to schedule follow up appointment.      Please call STROKE team for discharge NIHSS before patient is discharged.  Stroke to sign off. Please call if any questions.

## 2022-10-24 NOTE — OCCUPATIONAL THERAPY INITIAL EVALUATION ADULT - LIVES WITH, PROFILE
Pt lives alone in apt. Pt reports he has HHA 3 days a week, however when HHA at not around he is able to perform ADLs and functional mobility with RW. He has shower/tub without AD, and only performs bathing tasks and community mobility with HHA./alone

## 2022-10-24 NOTE — PROGRESS NOTE ADULT - ATTENDING COMMENTS
I agree with the fellow's findings and plans as written above with the following additions/amendments:    Seen and examined at bedside. Feels well, eating and drinkig better. Discussed d/c planning with cousin at bedside, awaiting minna placement, and followup. Further recs as above

## 2022-10-24 NOTE — CONSULT NOTE ADULT - REASON FOR ADMISSION
slurred speech and weakness
slurred speech and weakness
Acute renal failure
slurred speech and weakness
slurred speech and weakness

## 2022-10-24 NOTE — PROGRESS NOTE ADULT - ASSESSMENT
Patient is a 57y old Male w/ PMHx of IDDM, CVA x 2 (residual left side weakness), HTN, HLD, and CKD presents c/o slurred speech, weakness over the past several days, found to have embolic stroke of unclear etiology. Admitted for further work up to determine the etiology of his stroke  Patient is a 57y old Male w/ PMHx of IDDM, CVA x 2 (residual left side weakness), HTN, HLD, and CKD presents c/o slurred speech, weakness over the past several days, found to have embolic stroke on MRI. Admitted for further work up to determine the etiology of his stroke. Now s/p ILR placement, TTE and KATHY which were all negative. In the hospital pending PT/OT assessment.

## 2022-10-24 NOTE — PROGRESS NOTE ADULT - ATTENDING COMMENTS
Anemia of chronic disease due to CKD  CKD progression  LV thrombus/ SEAN thrombus ruled out  ILR inserted to rule out pAfib of any other cardioembolic source of stroke  making urine, Cr remains stable.  plan for AR -- pending follow up from PT/OT and referral send-out; auth not initiated as of yet.

## 2022-10-25 DIAGNOSIS — R19.5 OTHER FECAL ABNORMALITIES: ICD-10-CM

## 2022-10-25 LAB
ANION GAP SERPL CALC-SCNC: 13 MMOL/L — SIGNIFICANT CHANGE UP (ref 5–17)
BUN SERPL-MCNC: 83 MG/DL — HIGH (ref 7–23)
CALCIUM SERPL-MCNC: 8.8 MG/DL — SIGNIFICANT CHANGE UP (ref 8.4–10.5)
CHLORIDE SERPL-SCNC: 106 MMOL/L — SIGNIFICANT CHANGE UP (ref 96–108)
CO2 SERPL-SCNC: 21 MMOL/L — LOW (ref 22–31)
CREAT SERPL-MCNC: 6.89 MG/DL — HIGH (ref 0.5–1.3)
EGFR: 9 ML/MIN/1.73M2 — LOW
GLUCOSE BLDC GLUCOMTR-MCNC: 117 MG/DL — HIGH (ref 70–99)
GLUCOSE BLDC GLUCOMTR-MCNC: 132 MG/DL — HIGH (ref 70–99)
GLUCOSE BLDC GLUCOMTR-MCNC: 167 MG/DL — HIGH (ref 70–99)
GLUCOSE BLDC GLUCOMTR-MCNC: 225 MG/DL — HIGH (ref 70–99)
GLUCOSE BLDC GLUCOMTR-MCNC: 50 MG/DL — CRITICAL LOW (ref 70–99)
GLUCOSE SERPL-MCNC: 51 MG/DL — CRITICAL LOW (ref 70–99)
MAGNESIUM SERPL-MCNC: 1.9 MG/DL — SIGNIFICANT CHANGE UP (ref 1.6–2.6)
PHOSPHATE SERPL-MCNC: 3.9 MG/DL — SIGNIFICANT CHANGE UP (ref 2.5–4.5)
POTASSIUM SERPL-MCNC: 3.8 MMOL/L — SIGNIFICANT CHANGE UP (ref 3.5–5.3)
POTASSIUM SERPL-SCNC: 3.8 MMOL/L — SIGNIFICANT CHANGE UP (ref 3.5–5.3)
SODIUM SERPL-SCNC: 140 MMOL/L — SIGNIFICANT CHANGE UP (ref 135–145)

## 2022-10-25 PROCEDURE — 99231 SBSQ HOSP IP/OBS SF/LOW 25: CPT | Mod: GC

## 2022-10-25 PROCEDURE — 99231 SBSQ HOSP IP/OBS SF/LOW 25: CPT

## 2022-10-25 PROCEDURE — 99232 SBSQ HOSP IP/OBS MODERATE 35: CPT

## 2022-10-25 RX ORDER — DEXTROSE 50 % IN WATER 50 %
50 SYRINGE (ML) INTRAVENOUS ONCE
Refills: 0 | Status: COMPLETED | OUTPATIENT
Start: 2022-10-25 | End: 2022-10-25

## 2022-10-25 RX ORDER — INSULIN GLARGINE 100 [IU]/ML
6 INJECTION, SOLUTION SUBCUTANEOUS AT BEDTIME
Refills: 0 | Status: DISCONTINUED | OUTPATIENT
Start: 2022-10-25 | End: 2022-10-26

## 2022-10-25 RX ORDER — INSULIN LISPRO 100/ML
VIAL (ML) SUBCUTANEOUS
Refills: 0 | Status: DISCONTINUED | OUTPATIENT
Start: 2022-10-25 | End: 2022-11-01

## 2022-10-25 RX ORDER — INSULIN LISPRO 100/ML
5 VIAL (ML) SUBCUTANEOUS
Refills: 0 | Status: DISCONTINUED | OUTPATIENT
Start: 2022-10-25 | End: 2022-10-27

## 2022-10-25 RX ORDER — INSULIN LISPRO 100/ML
VIAL (ML) SUBCUTANEOUS AT BEDTIME
Refills: 0 | Status: DISCONTINUED | OUTPATIENT
Start: 2022-10-25 | End: 2022-11-01

## 2022-10-25 RX ADMIN — Medication 667 MILLIGRAM(S): at 13:04

## 2022-10-25 RX ADMIN — Medication 2: at 12:18

## 2022-10-25 RX ADMIN — HEPARIN SODIUM 5000 UNIT(S): 5000 INJECTION INTRAVENOUS; SUBCUTANEOUS at 06:56

## 2022-10-25 RX ADMIN — HEPARIN SODIUM 5000 UNIT(S): 5000 INJECTION INTRAVENOUS; SUBCUTANEOUS at 13:04

## 2022-10-25 RX ADMIN — Medication 1300 MILLIGRAM(S): at 22:24

## 2022-10-25 RX ADMIN — INSULIN GLARGINE 6 UNIT(S): 100 INJECTION, SOLUTION SUBCUTANEOUS at 22:24

## 2022-10-25 RX ADMIN — MODAFINIL 50 MILLIGRAM(S): 200 TABLET ORAL at 06:56

## 2022-10-25 RX ADMIN — Medication 81 MILLIGRAM(S): at 13:04

## 2022-10-25 RX ADMIN — Medication 1300 MILLIGRAM(S): at 13:04

## 2022-10-25 RX ADMIN — Medication 30 MILLIGRAM(S): at 13:04

## 2022-10-25 RX ADMIN — ATORVASTATIN CALCIUM 80 MILLIGRAM(S): 80 TABLET, FILM COATED ORAL at 22:24

## 2022-10-25 RX ADMIN — Medication 5 UNIT(S): at 17:14

## 2022-10-25 RX ADMIN — HEPARIN SODIUM 5000 UNIT(S): 5000 INJECTION INTRAVENOUS; SUBCUTANEOUS at 22:24

## 2022-10-25 RX ADMIN — Medication 1300 MILLIGRAM(S): at 06:56

## 2022-10-25 RX ADMIN — Medication 667 MILLIGRAM(S): at 08:01

## 2022-10-25 RX ADMIN — CLOPIDOGREL BISULFATE 75 MILLIGRAM(S): 75 TABLET, FILM COATED ORAL at 13:04

## 2022-10-25 RX ADMIN — Medication 50 MILLILITER(S): at 09:11

## 2022-10-25 RX ADMIN — Medication 667 MILLIGRAM(S): at 17:15

## 2022-10-25 RX ADMIN — Medication 5 UNIT(S): at 12:18

## 2022-10-25 RX ADMIN — TAMSULOSIN HYDROCHLORIDE 0.4 MILLIGRAM(S): 0.4 CAPSULE ORAL at 22:24

## 2022-10-25 NOTE — PROGRESS NOTE ADULT - ATTENDING COMMENTS
Pt seen on rounds this afternoon.  Was slightly more communicative today, and cognitive status ?? a bit better.  PO intake appears to be about the same--usually half of each tray (by his description).  He is unable to give a reliable diet recall  As noted by Dr. Goel, pt received 15 units of Lantus last night rather than the suggested 6 units, and was hypoglycemic this morning.  It does not appear that he had any symptoms.  --Will try 6 units of Lantus tonight  --Continue premeal standing insulin at 5 units  --Given the rather low insulin requirements, ?? whether pt actually has type 1 DM.  To check a C-peptide level--(which will also help determine whether he has sufficient insulin reserves (if he is in fact type 2) that he might be controllable with oral agents.  His cognitive dysfunction is significant, and makes it highly unlikely that he could manage a basal/bolus insulin regimen at home

## 2022-10-25 NOTE — PROGRESS NOTE ADULT - SUBJECTIVE AND OBJECTIVE BOX
OVERNIGHT: No acute events overnight.  Received lantus 15u overnight instead of 6u.  SUBJECTIVE: Patient was seen and examined this morning. Received 1 Amp D50 for FSG 51.    CAPILLARY BLOOD GLUCOSE & INSULIN RECEIVED  Yesterday  - Dinner FS mg/dL = 6 units of premeal Lispro + 2 units of Lispro sliding scale.   - Bedtime FS mg/dL = 15 units of Lantus + 0 units Lispro sliding scale.     Today  - Breakfast FS mg/dL = 0 units of premeal Lispro + 0 units of Lispro sliding scale.   - Lunch FS mg/dL = 5 units of premeal Lispro + 2 units of Lispro sliding scale.     CAPILLARY BLOOD GLUCOSE      POCT Blood Glucose.: 225 mg/dL (25 Oct 2022 11:48)  POCT Blood Glucose.: 167 mg/dL (25 Oct 2022 09:43)  POCT Blood Glucose.: 50 mg/dL (25 Oct 2022 08:41)  POCT Blood Glucose.: 141 mg/dL (24 Oct 2022 21:59)  POCT Blood Glucose.: 156 mg/dL (24 Oct 2022 17:45)      REVIEW OF SYSTEMS  Constitutional:  Negative fever, chills or loss of appetite.  Eyes:  Negative blurry vision or double vision.  Cardiovascular:  Negative for chest pain or palpitations.  Respiratory:  Negative for cough, wheezing, or SOB.   Gastrointestinal:  Negative for nausea, vomiting, diarrhea, constipation, or abdominal pain.  Genitourinary:  Negative frequency, urgency or dysuria.  Neurologic:  No headache, confusion, dizziness, lightheadedness.    PHYSICAL EXAM  Vital Signs Last 24 Hrs  T(C): 37.1 (25 Oct 2022 12:29), Max: 37.1 (25 Oct 2022 12:29)  T(F): 98.7 (25 Oct 2022 12:29), Max: 98.7 (25 Oct 2022 12:29)  HR: 84 (25 Oct 2022 12:29) (84 - 101)  BP: 117/74 (25 Oct 2022 12:29) (117/74 - 138/79)  BP(mean): --  RR: 16 (25 Oct 2022 12:29) (16 - 16)  SpO2: 98% (25 Oct 2022 12:29) (95% - 98%)    Parameters below as of 25 Oct 2022 12:29  Patient On (Oxygen Delivery Method): room air        Constitutional: Awake, alert, in no acute distress. Slurred speech.  HEENT: Normocephalic, atraumatic, ADRIANO, no proptosis or lid retraction.   Neck: supple, no acanthosis, no thyromegaly or palpable thyroid nodules.  Respiratory: Lungs clear to ausculation bilaterally.   Cardiovascular: regular rhythm, normal S1 and S2, no audible murmurs.   GI: soft, non-tender, non-distended, bowel sounds present, no masses appreciated.  Extremities: No lower extremity edema, peripheral pulses present.   Skin: no rashes.   Psychiatric: AAO x 3. Normal affect/mood.     LABS                        8.4    7.95  )-----------( 397      ( 24 Oct 2022 05:30 )             27.1     10-25    140  |  106  |  83<H>  ----------------------------<  51<LL>  3.8   |  21<L>  |  6.89<H>    Ca    8.8      25 Oct 2022 05:30  Phos  3.9     10-25  Mg     1.9     10-25                MEDICATIONS  MEDICATIONS  (STANDING):  aspirin  chewable 81 milliGRAM(s) Oral daily  atorvastatin 80 milliGRAM(s) Oral at bedtime  calcium acetate 667 milliGRAM(s) Oral three times a day with meals  clopidogrel Tablet 75 milliGRAM(s) Oral daily  dextrose 5%. 1000 milliLiter(s) (100 mL/Hr) IV Continuous <Continuous>  dextrose 5%. 1000 milliLiter(s) (50 mL/Hr) IV Continuous <Continuous>  dextrose 50% Injectable 25 Gram(s) IV Push once  dextrose 50% Injectable 12.5 Gram(s) IV Push once  dextrose 50% Injectable 25 Gram(s) IV Push once  glucagon  Injectable 1 milliGRAM(s) IntraMuscular once  heparin   Injectable 5000 Unit(s) SubCutaneous every 8 hours  influenza   Vaccine 0.5 milliLiter(s) IntraMuscular once  insulin glargine Injectable (LANTUS) 6 Unit(s) SubCutaneous at bedtime  insulin lispro (ADMELOG) corrective regimen sliding scale   SubCutaneous three times a day before meals  insulin lispro (ADMELOG) corrective regimen sliding scale   SubCutaneous at bedtime  insulin lispro Injectable (ADMELOG) 5 Unit(s) SubCutaneous three times a day before meals  modafinil 50 milliGRAM(s) Oral daily  NIFEdipine XL 30 milliGRAM(s) Oral every 24 hours  sodium bicarbonate 1300 milliGRAM(s) Oral three times a day  tamsulosin 0.4 milliGRAM(s) Oral at bedtime    MEDICATIONS  (PRN):  bisacodyl 5 milliGRAM(s) Oral every 12 hours PRN Constipation  dextrose Oral Gel 15 Gram(s) Oral once PRN Blood Glucose LESS THAN 70 milliGRAM(s)/deciliter      ASSESSMENT / RECOMMENDATIONS      A1C: 7.2%  Weight: 90.7kg  BMI: 27.9  Creatinine: 6.89  GFR: 9  Ejection Fraction: 50%    ***INCOMPLETE NOTE***    # Type 2 diabetes mellitus  - Please continue lantus 6 units at bedtime.   - Continue lispro 5 units before each meal.  - Continue lispro low dose sliding scale four times daily with meals and at bedtime.  - Patient's fingerstick glucose goal is 100-180 mg/dL.    - Patient can follow up at discharge with [facility] by calling [phone] to make an appointment.        Rere Goel  PGY1 IM Resident  Service Pager: [phone]    OVERNIGHT: No acute events overnight.  Received lantus 15u overnight instead of 6u.  SUBJECTIVE: Patient was seen and examined this morning. Received 1 Amp D50 for FSG 51.    CAPILLARY BLOOD GLUCOSE & INSULIN RECEIVED  Yesterday  - Dinner FS mg/dL = 6 units of premeal Lispro + 2 units of Lispro sliding scale.   - Bedtime FS mg/dL = 15 units of Lantus + 0 units Lispro sliding scale.     Today  - Breakfast FS mg/dL = 0 units of premeal Lispro + 0 units of Lispro sliding scale.   - Lunch FS mg/dL = 5 units of premeal Lispro + 2 units of Lispro sliding scale.     CAPILLARY BLOOD GLUCOSE      POCT Blood Glucose.: 225 mg/dL (25 Oct 2022 11:48)  POCT Blood Glucose.: 167 mg/dL (25 Oct 2022 09:43)  POCT Blood Glucose.: 50 mg/dL (25 Oct 2022 08:41)  POCT Blood Glucose.: 141 mg/dL (24 Oct 2022 21:59)  POCT Blood Glucose.: 156 mg/dL (24 Oct 2022 17:45)      REVIEW OF SYSTEMS  Constitutional:  Negative fever, chills or loss of appetite.  Eyes:  Negative blurry vision or double vision.  Cardiovascular:  Negative for chest pain or palpitations.  Respiratory:  Negative for cough, wheezing, or SOB.   Gastrointestinal:  Negative for nausea, vomiting, diarrhea, constipation, or abdominal pain.  Genitourinary:  Negative frequency, urgency or dysuria.  Neurologic:  No headache, confusion, dizziness, lightheadedness.    PHYSICAL EXAM  Vital Signs Last 24 Hrs  T(C): 37.1 (25 Oct 2022 12:29), Max: 37.1 (25 Oct 2022 12:29)  T(F): 98.7 (25 Oct 2022 12:29), Max: 98.7 (25 Oct 2022 12:29)  HR: 84 (25 Oct 2022 12:29) (84 - 101)  BP: 117/74 (25 Oct 2022 12:29) (117/74 - 138/79)  BP(mean): --  RR: 16 (25 Oct 2022 12:29) (16 - 16)  SpO2: 98% (25 Oct 2022 12:29) (95% - 98%)    Parameters below as of 25 Oct 2022 12:29  Patient On (Oxygen Delivery Method): room air        Constitutional: Awake, alert, in no acute distress. Slurred speech.  HEENT: Normocephalic, atraumatic, ADRIANO, no proptosis or lid retraction.   Neck: supple, no acanthosis, no thyromegaly or palpable thyroid nodules.  Respiratory: Lungs clear to ausculation bilaterally.   Cardiovascular: regular rhythm, normal S1 and S2, no audible murmurs.   GI: soft, non-tender, non-distended, bowel sounds present, no masses appreciated.  Extremities: No lower extremity edema, peripheral pulses present.   Skin: no rashes.   Psychiatric: AAO x 3. Normal affect/mood.     LABS                        8.4    7.95  )-----------( 397      ( 24 Oct 2022 05:30 )             27.1     10-25    140  |  106  |  83<H>  ----------------------------<  51<LL>  3.8   |  21<L>  |  6.89<H>    Ca    8.8      25 Oct 2022 05:30  Phos  3.9     10-25  Mg     1.9     10-25                MEDICATIONS  MEDICATIONS  (STANDING):  aspirin  chewable 81 milliGRAM(s) Oral daily  atorvastatin 80 milliGRAM(s) Oral at bedtime  calcium acetate 667 milliGRAM(s) Oral three times a day with meals  clopidogrel Tablet 75 milliGRAM(s) Oral daily  dextrose 5%. 1000 milliLiter(s) (100 mL/Hr) IV Continuous <Continuous>  dextrose 5%. 1000 milliLiter(s) (50 mL/Hr) IV Continuous <Continuous>  dextrose 50% Injectable 25 Gram(s) IV Push once  dextrose 50% Injectable 12.5 Gram(s) IV Push once  dextrose 50% Injectable 25 Gram(s) IV Push once  glucagon  Injectable 1 milliGRAM(s) IntraMuscular once  heparin   Injectable 5000 Unit(s) SubCutaneous every 8 hours  influenza   Vaccine 0.5 milliLiter(s) IntraMuscular once  insulin glargine Injectable (LANTUS) 6 Unit(s) SubCutaneous at bedtime  insulin lispro (ADMELOG) corrective regimen sliding scale   SubCutaneous three times a day before meals  insulin lispro (ADMELOG) corrective regimen sliding scale   SubCutaneous at bedtime  insulin lispro Injectable (ADMELOG) 5 Unit(s) SubCutaneous three times a day before meals  modafinil 50 milliGRAM(s) Oral daily  NIFEdipine XL 30 milliGRAM(s) Oral every 24 hours  sodium bicarbonate 1300 milliGRAM(s) Oral three times a day  tamsulosin 0.4 milliGRAM(s) Oral at bedtime    MEDICATIONS  (PRN):  bisacodyl 5 milliGRAM(s) Oral every 12 hours PRN Constipation  dextrose Oral Gel 15 Gram(s) Oral once PRN Blood Glucose LESS THAN 70 milliGRAM(s)/deciliter      ASSESSMENT / RECOMMENDATIONS      A1C: 7.2%  Weight: 90.7kg  BMI: 27.9  Creatinine: 6.89  GFR: 9  Ejection Fraction: 50%      # Type 2 diabetes mellitus  - Please obtain C peptide level  - Please continue lantus 6 units at bedtime.   - Continue lispro 5 units before each meal.  - Continue lispro low dose sliding scale four times daily with meals and at bedtime. Start low dose sliding scale at bedtime starting at 250mg/dL.  - Patient's fingerstick glucose goal is 100-180 mg/dL.    - Patient can follow up at discharge with [facility] by calling [phone] to make an appointment.        Rere Goel  PGY1 IM Resident  Service Pager: [phone]

## 2022-10-25 NOTE — PROGRESS NOTE ADULT - SUBJECTIVE AND OBJECTIVE BOX
Patient is a 57y Male seen and evaluated at bedside. Overnight events noted. Pt laying in bed resting comfortably. Denies any new symptoms.       Meds:    aspirin  chewable 81 daily  atorvastatin 80 at bedtime  bisacodyl 5 every 12 hours PRN  calcium acetate 667 three times a day with meals  clopidogrel Tablet 75 daily  dextrose 5%. 1000 <Continuous>  dextrose 5%. 1000 <Continuous>  dextrose 50% Injectable 25 once  dextrose 50% Injectable 12.5 once  dextrose 50% Injectable 25 once  dextrose Oral Gel 15 once PRN  glucagon  Injectable 1 once  heparin   Injectable 5000 every 8 hours  influenza   Vaccine 0.5 once  insulin glargine Injectable (LANTUS) 6 at bedtime  insulin lispro (ADMELOG) corrective regimen sliding scale  three times a day before meals  insulin lispro (ADMELOG) corrective regimen sliding scale  at bedtime  insulin lispro Injectable (ADMELOG) 5 three times a day before meals  modafinil 50 daily  NIFEdipine XL 30 every 24 hours  sodium bicarbonate 1300 three times a day  tamsulosin 0.4 at bedtime      T(C): , Max: 37.1 (10-25-22 @ 12:29)  T(F): , Max: 98.7 (10-25-22 @ 12:29)  HR: 84 (10-25-22 @ 12:29)  BP: 117/74 (10-25-22 @ 12:29)  BP(mean): --  RR: 16 (10-25-22 @ 12:29)  SpO2: 98% (10-25-22 @ 12:29)  Wt(kg): --    10-24 @ 07:01  -  10-25 @ 07:00  --------------------------------------------------------  IN: 0 mL / OUT: 300 mL / NET: -300 mL    10-25 @ 07:01  -  10-25 @ 15:18  --------------------------------------------------------  IN: 0 mL / OUT: 475 mL / NET: -475 mL          Review of Systems:  ROS negative except as per HPI      PHYSICAL EXAM:  GENERAL: Awake, alert. interactive   HEENT: ADRIANO, no JVP  CHEST/LUNG: Bilateral clear breath sounds, no accessory muscle use  HEART: Regular rate and rhythm, no murmur  ABDOMEN: Soft, nontender, non distended  EXTREMITIES: no pedal edema, warm  Neurology: Answer questions appropriately, no focal deficits noted  Derm: Dry skin, no visible rash    LABS:                        8.4    7.95  )-----------( 397      ( 24 Oct 2022 05:30 )             27.1     10-25    140  |  106  |  83<H>  ----------------------------<  51<LL>  3.8   |  21<L>  |  6.89<H>    Ca    8.8      25 Oct 2022 05:30  Phos  3.9     10-25  Mg     1.9     10-25                  RADIOLOGY & ADDITIONAL STUDIES:

## 2022-10-25 NOTE — PROGRESS NOTE ADULT - PROBLEM SELECTOR PLAN 5
Hx of insulin-dependent diabetes mellitus. Per surescripts: Lantus 15 qhs  A1c 7.2% (10/17/22)  POCT had been elevated in the evenings and low in the morning   - Endocrinology consulted. Recs appreciated.      - C/w lantus 6 units at bedtime.      - Lispro 5 units before each meal TID     - Lispro low dose sliding scale three times daily with meals starting at 150 mg/dL -> lispro 1u, 2u, 3u

## 2022-10-25 NOTE — PROGRESS NOTE ADULT - SUBJECTIVE AND OBJECTIVE BOX
OVERNIGHT EVENTS: ZULY    SUBJECTIVE / INTERVAL HPI: Reports loose stools this AM. No other symptoms     VITAL SIGNS:  Vital Signs Last 24 Hrs  T(C): 36.9 (25 Oct 2022 05:56), Max: 36.9 (24 Oct 2022 20:45)  T(F): 98.5 (25 Oct 2022 05:56), Max: 98.5 (24 Oct 2022 20:45)  HR: 85 (25 Oct 2022 05:56) (85 - 101)  BP: 124/71 (25 Oct 2022 05:56) (124/71 - 152/86)  BP(mean): --  RR: 16 (25 Oct 2022 05:56) (16 - 18)  SpO2: 97% (25 Oct 2022 05:56) (95% - 98%)    Parameters below as of 25 Oct 2022 05:56  Patient On (Oxygen Delivery Method): room air        PHYSICAL EXAM:  General: In bed in no acute distress   HEENT: NCAT; PERRL, anicteric sclera; MMM  Neck: supple, trachea midline  Cardiovascular: S1, S2 normal; RRR, no M/G/R  Respiratory: CTABL; no W/R/R  Gastrointestinal: soft, nontender, nondistended. bowel sounds present.  Skin: no ulcerations or visible rashes appreciated  Extremities: WWP; no edema, clubbing or cyanosis, left lower extremity weakness (unable to move against gravity), RLE normal strength   Vascular: 2+ radial, DP/PT pulses B/L  Neurological: AAOx3; CN II-XII grossly intact; no focal deficits      MEDICATIONS:  MEDICATIONS  (STANDING):  aspirin  chewable 81 milliGRAM(s) Oral daily  atorvastatin 80 milliGRAM(s) Oral at bedtime  calcium acetate 667 milliGRAM(s) Oral three times a day with meals  clopidogrel Tablet 75 milliGRAM(s) Oral daily  dextrose 5%. 1000 milliLiter(s) (100 mL/Hr) IV Continuous <Continuous>  dextrose 5%. 1000 milliLiter(s) (50 mL/Hr) IV Continuous <Continuous>  dextrose 50% Injectable 25 Gram(s) IV Push once  dextrose 50% Injectable 12.5 Gram(s) IV Push once  dextrose 50% Injectable 25 Gram(s) IV Push once  glucagon  Injectable 1 milliGRAM(s) IntraMuscular once  heparin   Injectable 5000 Unit(s) SubCutaneous every 8 hours  influenza   Vaccine 0.5 milliLiter(s) IntraMuscular once  insulin glargine Injectable (LANTUS) 6 Unit(s) SubCutaneous at bedtime  insulin lispro (ADMELOG) corrective regimen sliding scale   SubCutaneous three times a day before meals  insulin lispro (ADMELOG) corrective regimen sliding scale   SubCutaneous at bedtime  insulin lispro Injectable (ADMELOG) 5 Unit(s) SubCutaneous three times a day before meals  modafinil 50 milliGRAM(s) Oral daily  NIFEdipine XL 30 milliGRAM(s) Oral every 24 hours  sodium bicarbonate 1300 milliGRAM(s) Oral three times a day  tamsulosin 0.4 milliGRAM(s) Oral at bedtime    MEDICATIONS  (PRN):  bisacodyl 5 milliGRAM(s) Oral every 12 hours PRN Constipation  dextrose Oral Gel 15 Gram(s) Oral once PRN Blood Glucose LESS THAN 70 milliGRAM(s)/deciliter      ALLERGIES:  Allergies    No Known Allergies    Intolerances        LABS:                        8.4    7.95  )-----------( 397      ( 24 Oct 2022 05:30 )             27.1     10-25    140  |  106  |  83<H>  ----------------------------<  51<LL>  3.8   |  21<L>  |  6.89<H>    Ca    8.8      25 Oct 2022 05:30  Phos  3.9     10-25  Mg     1.9     10-25          CAPILLARY BLOOD GLUCOSE      POCT Blood Glucose.: 167 mg/dL (25 Oct 2022 09:43)      RADIOLOGY & ADDITIONAL TESTS: Reviewed.

## 2022-10-25 NOTE — PROGRESS NOTE ADULT - PROBLEM SELECTOR PLAN 6
Hx of HTN. Per surescripts no recent HTN meds. Per [facility]O in 2020 was taking metoprolol ER 50 and Nifedipine ER 30 in 2020.   Patient has not been taking these medication and is unaware that he has elevated Bp  - C/w Nifedipine 30 mg daily

## 2022-10-25 NOTE — PROGRESS NOTE ADULT - ASSESSMENT
Patient is a 57y old Male w/ PMHx of IDDM, CVA x 2 (residual left side weakness), HTN, HLD, and CKD presents c/o slurred speech, weakness over the past several days, found to have embolic stroke on MRI. Admitted for further work up to determine the etiology of his stroke. Now s/p ILR placement, TTE and KATHY which were all negative. PT/OT recommending AR

## 2022-10-25 NOTE — PROGRESS NOTE ADULT - PROBLEM SELECTOR PLAN 4
Baseline hemoglobin 12 (Feb 2019), presented w Hb 6.9 ---> s/p 3u pRBC total   No apparent sources of bleeding, no further melena  Likely 2/2 to CKD   - Stable HB  - transfuse for Hgb <7, maintain active T&S  - Epo not given Hx of stroke

## 2022-10-25 NOTE — PROGRESS NOTE ADULT - PROBLEM SELECTOR PLAN 3
The patient developed loose stools this AM. Will monitor his BM throughout the day prior to discharge to determine need for further w/u

## 2022-10-25 NOTE — PROGRESS NOTE ADULT - ATTENDING COMMENTS
I agree with the fellow's findings and plans as written above with the following additions/amendments:    Seen and examined at bedside. Feels well, no issues, eating and drinking well. Further recs as above

## 2022-10-25 NOTE — PROGRESS NOTE ADULT - ASSESSMENT
58 yo M w/ longstanding IDDM1 w/ retinopathy, HTN, CKD 4 presented w/ slurred speech, generalized weakness for several days. Nephrology consulted for AMS in the setting of advanced CKD    Assessment/Plan:  #Progression of CKD V  Last known Cr 3.3 in Jun/2020 which was progression of his CKD. Serologic GN workup in 2019 negative  Now presenting w/ Cr 7.99 on admission, stable at the moment  UA w/ trace protein and moderate blood in the absence of RBCs  Non-oliguric  UPCR 1.9, Eleanor 56    Recommend:  No indication of urgent HD at this time. However, given his advance CKD, will likely require HD in the future   Maintain net even fluid balance   Strict I&Os  Continue Flomax  Avoid nephrotoxic agents  Renal diet  Pt OK for discharge from renal standpoint. Ensure pt follows up with nephrology outpatient    #Hypernatremia-resolved  Due to dehydration and no access to water  Na 140 today  Daily BMP  Encourage PO intake    #Anemia  Hb 8.4  Iron sat 58%, ferritin 34  likely ACD  Defer Epo in the setting of cryptogenic stroke    #Hypertension  Continue nifedipine 30mg. May increase the dose if needed  Will defer initiation of ACEi/ARBs at outpatient nephrology visits    #Met Acidosis  Likely due to CKD  Continue sodium bicarb 650mg TID     #BMD-CKD  Phos at goal today  iPTH 260  Continue ca acetate 667 [facility]      Alberto Cordova M.D  PGY-4 Nephrology  [phone]

## 2022-10-25 NOTE — CHART NOTE - NSCHARTNOTEFT_GEN_A_CORE
Patient Casey Shetty can tolerate 3 hours of Physical Therapy and Occupational Therapy at an Acute Rehabilitation Facility.

## 2022-10-26 LAB
C PEPTIDE SERPL-MCNC: 2.4 NG/ML — SIGNIFICANT CHANGE UP (ref 1.1–4.4)
CALCIUM UR-MCNC: 2.3 MG/DL — SIGNIFICANT CHANGE UP
GLUCOSE BLDC GLUCOMTR-MCNC: 108 MG/DL — HIGH (ref 70–99)
GLUCOSE BLDC GLUCOMTR-MCNC: 139 MG/DL — HIGH (ref 70–99)
GLUCOSE BLDC GLUCOMTR-MCNC: 155 MG/DL — HIGH (ref 70–99)
GLUCOSE BLDC GLUCOMTR-MCNC: 91 MG/DL — SIGNIFICANT CHANGE UP (ref 70–99)
PHOSPHATE 24H UR-MCNC: 23.7 MG/DL — SIGNIFICANT CHANGE UP
SARS-COV-2 RNA SPEC QL NAA+PROBE: SIGNIFICANT CHANGE UP

## 2022-10-26 PROCEDURE — 99232 SBSQ HOSP IP/OBS MODERATE 35: CPT | Mod: GC

## 2022-10-26 PROCEDURE — 99231 SBSQ HOSP IP/OBS SF/LOW 25: CPT

## 2022-10-26 PROCEDURE — 99231 SBSQ HOSP IP/OBS SF/LOW 25: CPT | Mod: GC

## 2022-10-26 RX ORDER — CLOPIDOGREL BISULFATE 75 MG/1
1 TABLET, FILM COATED ORAL
Qty: 30 | Refills: 0
Start: 2022-10-26 | End: 2022-11-24

## 2022-10-26 RX ORDER — INSULIN LISPRO 100/ML
5 VIAL (ML) SUBCUTANEOUS
Qty: 0 | Refills: 0 | DISCHARGE
Start: 2022-10-26

## 2022-10-26 RX ORDER — TAMSULOSIN HYDROCHLORIDE 0.4 MG/1
1 CAPSULE ORAL
Qty: 30 | Refills: 0
Start: 2022-10-26 | End: 2022-11-24

## 2022-10-26 RX ORDER — INSULIN GLARGINE 100 [IU]/ML
5 INJECTION, SOLUTION SUBCUTANEOUS
Qty: 0 | Refills: 0 | DISCHARGE
Start: 2022-10-26

## 2022-10-26 RX ORDER — MODAFINIL 200 MG/1
50 TABLET ORAL EVERY 24 HOURS
Refills: 0 | Status: DISCONTINUED | OUTPATIENT
Start: 2022-10-27 | End: 2022-11-01

## 2022-10-26 RX ORDER — INSULIN GLARGINE 100 [IU]/ML
15 INJECTION, SOLUTION SUBCUTANEOUS
Qty: 0 | Refills: 0 | DISCHARGE

## 2022-10-26 RX ORDER — ATORVASTATIN CALCIUM 80 MG/1
1 TABLET, FILM COATED ORAL
Qty: 30 | Refills: 0
Start: 2022-10-26 | End: 2022-11-24

## 2022-10-26 RX ORDER — NIFEDIPINE 30 MG
1 TABLET, EXTENDED RELEASE 24 HR ORAL
Qty: 30 | Refills: 0
Start: 2022-10-26 | End: 2022-11-24

## 2022-10-26 RX ORDER — INSULIN GLARGINE 100 [IU]/ML
6 INJECTION, SOLUTION SUBCUTANEOUS
Qty: 0 | Refills: 0 | DISCHARGE
Start: 2022-10-26

## 2022-10-26 RX ORDER — INSULIN GLARGINE 100 [IU]/ML
5 INJECTION, SOLUTION SUBCUTANEOUS AT BEDTIME
Refills: 0 | Status: DISCONTINUED | OUTPATIENT
Start: 2022-10-26 | End: 2022-10-27

## 2022-10-26 RX ADMIN — MODAFINIL 50 MILLIGRAM(S): 200 TABLET ORAL at 06:09

## 2022-10-26 RX ADMIN — Medication 667 MILLIGRAM(S): at 11:44

## 2022-10-26 RX ADMIN — Medication 1300 MILLIGRAM(S): at 14:58

## 2022-10-26 RX ADMIN — TAMSULOSIN HYDROCHLORIDE 0.4 MILLIGRAM(S): 0.4 CAPSULE ORAL at 22:11

## 2022-10-26 RX ADMIN — Medication 667 MILLIGRAM(S): at 18:58

## 2022-10-26 RX ADMIN — ATORVASTATIN CALCIUM 80 MILLIGRAM(S): 80 TABLET, FILM COATED ORAL at 22:12

## 2022-10-26 RX ADMIN — INSULIN GLARGINE 5 UNIT(S): 100 INJECTION, SOLUTION SUBCUTANEOUS at 22:12

## 2022-10-26 RX ADMIN — HEPARIN SODIUM 5000 UNIT(S): 5000 INJECTION INTRAVENOUS; SUBCUTANEOUS at 22:12

## 2022-10-26 RX ADMIN — Medication 1300 MILLIGRAM(S): at 06:09

## 2022-10-26 RX ADMIN — Medication 30 MILLIGRAM(S): at 14:57

## 2022-10-26 RX ADMIN — Medication 5 UNIT(S): at 12:15

## 2022-10-26 RX ADMIN — HEPARIN SODIUM 5000 UNIT(S): 5000 INJECTION INTRAVENOUS; SUBCUTANEOUS at 14:57

## 2022-10-26 RX ADMIN — Medication 5 UNIT(S): at 09:49

## 2022-10-26 RX ADMIN — Medication 667 MILLIGRAM(S): at 09:50

## 2022-10-26 RX ADMIN — Medication 5 UNIT(S): at 17:35

## 2022-10-26 RX ADMIN — CLOPIDOGREL BISULFATE 75 MILLIGRAM(S): 75 TABLET, FILM COATED ORAL at 11:43

## 2022-10-26 RX ADMIN — Medication 1300 MILLIGRAM(S): at 22:12

## 2022-10-26 RX ADMIN — HEPARIN SODIUM 5000 UNIT(S): 5000 INJECTION INTRAVENOUS; SUBCUTANEOUS at 06:09

## 2022-10-26 RX ADMIN — Medication 1: at 17:36

## 2022-10-26 RX ADMIN — Medication 81 MILLIGRAM(S): at 11:43

## 2022-10-26 NOTE — PROGRESS NOTE ADULT - PROBLEM SELECTOR PLAN 9
F: s/p D5  E: replete as necessary  N: Renal diet  DVT Prophy: SCDs iso anemia  Dispo: Pending AR placement

## 2022-10-26 NOTE — PROGRESS NOTE ADULT - SUBJECTIVE AND OBJECTIVE BOX
OVERNIGHT: No acute events overnight.  SUBJECTIVE: Patient was seen and examined this morning. Denies any complaints. Tolerating meals well.    CAPILLARY BLOOD GLUCOSE & INSULIN RECEIVED  Yesterday  - Dinner FS mg/dL = 5 units of premeal Lispro + 0 units of Lispro sliding scale.   - Bedtime FS mg/dL = 6 units of Lantus + 0 units Lispro sliding scale.     Today  - Breakfast FS mg/dL = 5 units of premeal Lispro + 0 units of Lispro sliding scale.   - Lunch FS mg/dL = 5 units of premeal Lispro + 0 units of Lispro sliding scale.     CAPILLARY BLOOD GLUCOSE      POCT Blood Glucose.: 108 mg/dL (26 Oct 2022 12:03)  POCT Blood Glucose.: 91 mg/dL (26 Oct 2022 08:10)  POCT Blood Glucose.: 132 mg/dL (25 Oct 2022 21:44)  POCT Blood Glucose.: 117 mg/dL (25 Oct 2022 16:21)      REVIEW OF SYSTEMS  Constitutional:  Negative fever, chills or loss of appetite.  Eyes:  Negative blurry vision or double vision.  Cardiovascular:  Negative for chest pain or palpitations.  Respiratory:  Negative for cough, wheezing, or SOB.   Gastrointestinal:  Negative for nausea, vomiting, diarrhea, constipation, or abdominal pain.  Genitourinary:  Negative frequency, urgency or dysuria.  Neurologic:  No headache, confusion, dizziness, lightheadedness.    PHYSICAL EXAM  Vital Signs Last 24 Hrs  T(C): 36.4 (26 Oct 2022 10:49), Max: 36.7 (25 Oct 2022 20:04)  T(F): 97.5 (26 Oct 2022 10:49), Max: 98 (25 Oct 2022 20:04)  HR: 92 (26 Oct 2022 10:49) (92 - 94)  BP: 146/81 (26 Oct 2022 10:49) (144/78 - 156/80)  BP(mean): --  RR: 18 (26 Oct 2022 10:49) (16 - 18)  SpO2: 97% (26 Oct 2022 10:49) (97% - 97%)    Parameters below as of 26 Oct 2022 10:49  Patient On (Oxygen Delivery Method): room air        Constitutional: Awake, alert, in no acute distress.   HEENT: Normocephalic, atraumatic, ADRIANO, no proptosis or lid retraction.   Neck: supple, no acanthosis, no thyromegaly or palpable thyroid nodules.  Respiratory: Lungs clear to ausculation bilaterally.   Cardiovascular: regular rhythm, normal S1 and S2, no audible murmurs.   GI: soft, non-tender, non-distended, bowel sounds present, no masses appreciated.  Extremities: No lower extremity edema, peripheral pulses present.   Skin: no rashes.   Psychiatric: AAO x 3. Normal affect/mood.     LABS    10-    140  |  106  |  83<H>  ----------------------------<  51<LL>  3.8   |  21<L>  |  6.89<H>    Ca    8.8      25 Oct 2022 05:30  Phos  3.9     10-25  Mg     1.9     10-25                MEDICATIONS  MEDICATIONS  (STANDING):  aspirin  chewable 81 milliGRAM(s) Oral daily  atorvastatin 80 milliGRAM(s) Oral at bedtime  calcium acetate 667 milliGRAM(s) Oral three times a day with meals  clopidogrel Tablet 75 milliGRAM(s) Oral daily  dextrose 5%. 1000 milliLiter(s) (100 mL/Hr) IV Continuous <Continuous>  dextrose 5%. 1000 milliLiter(s) (50 mL/Hr) IV Continuous <Continuous>  dextrose 50% Injectable 25 Gram(s) IV Push once  dextrose 50% Injectable 12.5 Gram(s) IV Push once  dextrose 50% Injectable 25 Gram(s) IV Push once  glucagon  Injectable 1 milliGRAM(s) IntraMuscular once  heparin   Injectable 5000 Unit(s) SubCutaneous every 8 hours  insulin glargine Injectable (LANTUS) 6 Unit(s) SubCutaneous at bedtime  insulin lispro (ADMELOG) corrective regimen sliding scale   SubCutaneous three times a day before meals  insulin lispro (ADMELOG) corrective regimen sliding scale   SubCutaneous at bedtime  insulin lispro Injectable (ADMELOG) 5 Unit(s) SubCutaneous three times a day before meals  modafinil 50 milliGRAM(s) Oral daily  NIFEdipine XL 30 milliGRAM(s) Oral every 24 hours  sodium bicarbonate 1300 milliGRAM(s) Oral three times a day  tamsulosin 0.4 milliGRAM(s) Oral at bedtime    MEDICATIONS  (PRN):  bisacodyl 5 milliGRAM(s) Oral every 12 hours PRN Constipation  dextrose Oral Gel 15 Gram(s) Oral once PRN Blood Glucose LESS THAN 70 milliGRAM(s)/deciliter      ASSESSMENT / RECOMMENDATIONS      A1C: 7.2%  Weight: 90.7kg  BMI: 27.9  Creatinine: 6.89  GFR: 9  Ejection Fraction: 50%    # Type 2 diabetes mellitus  - Please continue lantus *** units at bedtime.   - Continue lispro *** units before each meal.  - Continue lispro moderate / low dose sliding scale four times daily with meals and at bedtime.  - Patient's fingerstick glucose goal is 100-180 mg/dL.    - For discharge, patient can ***.    - Patient can follow up at discharge with [facility] by calling [phone] to make an appointment.      Thank you for allowing us to participate in the care of ***.    Will continue to monitor.       Case discussed with Dr. Palm. Primary team updated.       Jaspal Veelz    Endocrinology Fellow    Service Pager: [phone]    OVERNIGHT: No acute events overnight.  SUBJECTIVE: Patient was seen and examined this morning. Denies any complaints. Tolerating meals well.    CAPILLARY BLOOD GLUCOSE & INSULIN RECEIVED  Yesterday  - Dinner FS mg/dL = 5 units of premeal Lispro + 0 units of Lispro sliding scale.   - Bedtime FS mg/dL = 6 units of Lantus + 0 units Lispro sliding scale.     Today  - Breakfast FS mg/dL = 5 units of premeal Lispro + 0 units of Lispro sliding scale.   - Lunch FS mg/dL = 5 units of premeal Lispro + 0 units of Lispro sliding scale.     CAPILLARY BLOOD GLUCOSE      POCT Blood Glucose.: 108 mg/dL (26 Oct 2022 12:03)  POCT Blood Glucose.: 91 mg/dL (26 Oct 2022 08:10)  POCT Blood Glucose.: 132 mg/dL (25 Oct 2022 21:44)  POCT Blood Glucose.: 117 mg/dL (25 Oct 2022 16:21)      REVIEW OF SYSTEMS  Constitutional:  Negative fever, chills or loss of appetite.  Eyes:  Negative blurry vision or double vision.  Cardiovascular:  Negative for chest pain or palpitations.  Respiratory:  Negative for cough, wheezing, or SOB.   Gastrointestinal:  Negative for nausea, vomiting, diarrhea, constipation, or abdominal pain.  Genitourinary:  Negative frequency, urgency or dysuria.  Neurologic:  No headache, confusion, dizziness, lightheadedness.    PHYSICAL EXAM  Vital Signs Last 24 Hrs  T(C): 36.4 (26 Oct 2022 10:49), Max: 36.7 (25 Oct 2022 20:04)  T(F): 97.5 (26 Oct 2022 10:49), Max: 98 (25 Oct 2022 20:04)  HR: 92 (26 Oct 2022 10:49) (92 - 94)  BP: 146/81 (26 Oct 2022 10:49) (144/78 - 156/80)  BP(mean): --  RR: 18 (26 Oct 2022 10:49) (16 - 18)  SpO2: 97% (26 Oct 2022 10:49) (97% - 97%)    Parameters below as of 26 Oct 2022 10:49  Patient On (Oxygen Delivery Method): room air        Constitutional: Awake, alert, in no acute distress. Slurred speech.  HEENT: Normocephalic, atraumatic, ADRIANO, no proptosis or lid retraction.   Neck: supple, no acanthosis, no thyromegaly or palpable thyroid nodules.  Respiratory: Lungs clear to ausculation bilaterally.   Cardiovascular: regular rhythm, normal S1 and S2, no audible murmurs.   GI: soft, non-tender, non-distended, bowel sounds present, no masses appreciated.  Extremities: No lower extremity edema, peripheral pulses present.   Skin: no rashes.   Psychiatric: AAO x 3. Normal affect/mood.     LABS    10-25    140  |  106  |  83<H>  ----------------------------<  51<LL>  3.8   |  21<L>  |  6.89<H>    Ca    8.8      25 Oct 2022 05:30  Phos  3.9     10-25  Mg     1.9     10-25                MEDICATIONS  MEDICATIONS  (STANDING):  aspirin  chewable 81 milliGRAM(s) Oral daily  atorvastatin 80 milliGRAM(s) Oral at bedtime  calcium acetate 667 milliGRAM(s) Oral three times a day with meals  clopidogrel Tablet 75 milliGRAM(s) Oral daily  dextrose 5%. 1000 milliLiter(s) (100 mL/Hr) IV Continuous <Continuous>  dextrose 5%. 1000 milliLiter(s) (50 mL/Hr) IV Continuous <Continuous>  dextrose 50% Injectable 25 Gram(s) IV Push once  dextrose 50% Injectable 12.5 Gram(s) IV Push once  dextrose 50% Injectable 25 Gram(s) IV Push once  glucagon  Injectable 1 milliGRAM(s) IntraMuscular once  heparin   Injectable 5000 Unit(s) SubCutaneous every 8 hours  insulin glargine Injectable (LANTUS) 6 Unit(s) SubCutaneous at bedtime  insulin lispro (ADMELOG) corrective regimen sliding scale   SubCutaneous three times a day before meals  insulin lispro (ADMELOG) corrective regimen sliding scale   SubCutaneous at bedtime  insulin lispro Injectable (ADMELOG) 5 Unit(s) SubCutaneous three times a day before meals  modafinil 50 milliGRAM(s) Oral daily  NIFEdipine XL 30 milliGRAM(s) Oral every 24 hours  sodium bicarbonate 1300 milliGRAM(s) Oral three times a day  tamsulosin 0.4 milliGRAM(s) Oral at bedtime    MEDICATIONS  (PRN):  bisacodyl 5 milliGRAM(s) Oral every 12 hours PRN Constipation  dextrose Oral Gel 15 Gram(s) Oral once PRN Blood Glucose LESS THAN 70 milliGRAM(s)/deciliter      ASSESSMENT / RECOMMENDATIONS      A1C: 7.2%  Weight: 90.7kg  BMI: 27.9  Creatinine: 6.89  GFR: 9  Ejection Fraction: 50%    # Type 2 diabetes mellitus  - Please decrease lantus to 5 units at bedtime.   - Continue lispro 5 units before each meal.  - Continue lispro moderate dose sliding scale four times daily with meals and at bedtime.  - Patient's fingerstick glucose goal is 100-180 mg/dL.    - Patient can follow up at discharge with [facility] by calling [phone] to make an appointment.      Case discussed with Dr. Palm. Primary team updated.       Rere Goel  PGY1 IM Resident  Service Pager: [phone]

## 2022-10-26 NOTE — PROGRESS NOTE ADULT - SUBJECTIVE AND OBJECTIVE BOX
Patient is a 57y Male seen and evaluated at bedside. No acute distress and does not offer any complaints at this time.       Meds:    aspirin  chewable 81 daily  atorvastatin 80 at bedtime  bisacodyl 5 every 12 hours PRN  calcium acetate 667 three times a day with meals  clopidogrel Tablet 75 daily  dextrose 5%. 1000 <Continuous>  dextrose 5%. 1000 <Continuous>  dextrose 50% Injectable 25 once  dextrose 50% Injectable 12.5 once  dextrose 50% Injectable 25 once  dextrose Oral Gel 15 once PRN  glucagon  Injectable 1 once  heparin   Injectable 5000 every 8 hours  insulin glargine Injectable (LANTUS) 6 at bedtime  insulin lispro (ADMELOG) corrective regimen sliding scale  three times a day before meals  insulin lispro (ADMELOG) corrective regimen sliding scale  at bedtime  insulin lispro Injectable (ADMELOG) 5 three times a day before meals  modafinil 50 daily  NIFEdipine XL 30 every 24 hours  sodium bicarbonate 1300 three times a day  tamsulosin 0.4 at bedtime      T(C): , Max: 36.7 (10-25-22 @ 20:04)  T(F): , Max: 98 (10-25-22 @ 20:04)  HR: 92 (10-26-22 @ 10:49)  BP: 146/81 (10-26-22 @ 10:49)  RR: 18 (10-26-22 @ 10:49)  SpO2: 97% (10-26-22 @ 10:49)      10-25 @ 07:01  -  10-26 @ 07:00  --------------------------------------------------------  IN: 0 mL / OUT: 475 mL / NET: -475 mL          Review of Systems:  ROS negative except as per HPI      PHYSICAL EXAM:  GENERAL: Awake, alert resting in bed    HEENT: ADRIANO, no JVP  CHEST/LUNG: Bilateral clear breath sounds, no accessory muscle use  HEART: Regular rate and rhythm, no murmur  ABDOMEN: Soft, nontender, non distended  EXTREMITIES: no pedal edema, warm  Neurology: Answer questions appropriately, no focal deficits noted  Derm: Dry skin, no visible rash      LABS:    10-25    140  |  106  |  83<H>  ----------------------------<  51<LL>  3.8   |  21<L>  |  6.89<H>    Ca    8.8      25 Oct 2022 05:30  Phos  3.9     10-25  Mg     1.9     10-25                  RADIOLOGY & ADDITIONAL STUDIES:

## 2022-10-26 NOTE — PROGRESS NOTE ADULT - ATTENDING COMMENTS
Pt seen on rounds this afternoon.  Is more interactive, and cognitive status seems a bit improved.  He is now able to provide a diet recall, but its accuracy is highly questionable  Glucoses are distinctly better--last 4 values are in the  range.  --Was not hypoglycemic this morning after 6 units of Lantus, but was still somewhat "tight" at 91 mg%  Will decrease the Lantus to 5 units  --Continue 5 units lispro premeal  --Still doubt that he is a type 1 diabetic.  C-peptide level is pending

## 2022-10-26 NOTE — PROGRESS NOTE ADULT - PROBLEM SELECTOR PLAN 3
The patient developed loose stools this AM. Will monitor his BM throughout the day prior to discharge to determine need for further w/u Resolved  The patient developed loose stools this AM. Will monitor his BM throughout the day prior to discharge to determine need for further w/u

## 2022-10-26 NOTE — PROGRESS NOTE ADULT - ASSESSMENT
58 yo M w/ longstanding IDDM1 w/ retinopathy, HTN, CKD 4 presented w/ slurred speech, generalized weakness for several days. Nephrology consulted for AMS in the setting of advanced CKD    Assessment/Plan:    #Progression of CKD V (Last known Cr 3.3 in Jun/2020 which was progression of his CKD.)  Presented w/ Cr 7.99 on admission, down trending at 6.89   Serologic GN workup in 2019 negative  UA w/ trace protein and moderate blood in the absence of RBCs, UPCR 1.9, Eleanor 56  Non-oliguric    Plan:   No indication of urgent HD at this time. However, given his advance CKD, will likely require HD in the future   Maintain net even fluid balance   Strict I&Os  Encourage PO intake   Avoid nephrotoxic agents  Renal diet  Pt OK for discharge from renal standpoint. Ensure pt follows up with nephrology outpatient    #Hypernatremia-resolved  Due to dehydration and no access to water  Na 140 today  Daily BMP  Encourage PO intake    #Anemia  Hb 8.4  Iron sat 58%, ferritin 34  likely ACD  Defer Epo in the setting of cryptogenic stroke    #Hypertension  Continue nifedipine 30mg. May increase the dose if needed  Will defer initiation of ACEi/ARBs at outpatient nephrology visits    #Met Acidosis  Likely due to CKD  Continue sodium bicarb 650mg TID     #BMD-CKD  Phos at goal today  iPTH 260  Continue ca acetate 667 qAC

## 2022-10-26 NOTE — PROGRESS NOTE ADULT - SUBJECTIVE AND OBJECTIVE BOX
OVERNIGHT EVENTS: ZULY    SUBJECTIVE / INTERVAL HPI: No new symptoms. Feeling well   VITAL SIGNS:  Vital Signs Last 24 Hrs  T(C): 37.1 (27 Oct 2022 05:18), Max: 37.1 (27 Oct 2022 05:18)  T(F): 98.7 (27 Oct 2022 05:18), Max: 98.7 (27 Oct 2022 05:18)  HR: 87 (27 Oct 2022 05:18) (87 - 92)  BP: 157/92 (27 Oct 2022 05:18) (142/88 - 157/92)  BP(mean): --  RR: 18 (27 Oct 2022 05:18) (16 - 18)  SpO2: 98% (27 Oct 2022 05:18) (96% - 98%)    Parameters below as of 27 Oct 2022 05:18  Patient On (Oxygen Delivery Method): room air        PHYSICAL EXAM:    General: In bed in no acute distress   HEENT: NCAT; PERRL, anicteric sclera; MMM  Neck: supple, trachea midline  Cardiovascular: S1, S2 normal; RRR, no M/G/R  Respiratory: CTABL; no W/R/R  Gastrointestinal: soft, nontender, nondistended. bowel sounds present.  Skin: no ulcerations or visible rashes appreciated  Extremities: WWP; no edema, clubbing or cyanosis, left lower extremity weakness (unable to move against gravity), RLE normal strength   Vascular: 2+ radial, DP/PT pulses B/L  Neurological: AAOx3; CN II-XII grossly intact; no focal deficits    MEDICATIONS:  MEDICATIONS  (STANDING):  aspirin  chewable 81 milliGRAM(s) Oral daily  atorvastatin 80 milliGRAM(s) Oral at bedtime  calcium acetate 667 milliGRAM(s) Oral three times a day with meals  clopidogrel Tablet 75 milliGRAM(s) Oral daily  dextrose 5%. 1000 milliLiter(s) (100 mL/Hr) IV Continuous <Continuous>  dextrose 5%. 1000 milliLiter(s) (50 mL/Hr) IV Continuous <Continuous>  dextrose 50% Injectable 25 Gram(s) IV Push once  dextrose 50% Injectable 12.5 Gram(s) IV Push once  dextrose 50% Injectable 25 Gram(s) IV Push once  glucagon  Injectable 1 milliGRAM(s) IntraMuscular once  heparin   Injectable 5000 Unit(s) SubCutaneous every 8 hours  insulin glargine Injectable (LANTUS) 5 Unit(s) SubCutaneous at bedtime  insulin lispro (ADMELOG) corrective regimen sliding scale   SubCutaneous three times a day before meals  insulin lispro (ADMELOG) corrective regimen sliding scale   SubCutaneous at bedtime  insulin lispro Injectable (ADMELOG) 5 Unit(s) SubCutaneous three times a day before meals  modafinil 50 milliGRAM(s) Oral every 24 hours  NIFEdipine XL 30 milliGRAM(s) Oral every 24 hours  sodium bicarbonate 1300 milliGRAM(s) Oral three times a day  tamsulosin 0.4 milliGRAM(s) Oral at bedtime    MEDICATIONS  (PRN):  bisacodyl 5 milliGRAM(s) Oral every 12 hours PRN Constipation  dextrose Oral Gel 15 Gram(s) Oral once PRN Blood Glucose LESS THAN 70 milliGRAM(s)/deciliter      ALLERGIES:  Allergies    No Known Allergies    Intolerances        LABS:              CAPILLARY BLOOD GLUCOSE      POCT Blood Glucose.: 139 mg/dL (26 Oct 2022 21:41)      RADIOLOGY & ADDITIONAL TESTS: Reviewed.

## 2022-10-26 NOTE — PROGRESS NOTE ADULT - ATTENDING COMMENTS
I agree with the fellow's findings and plans as written above with the following additions/amendments:    Seen and examined at bedside, feels well, eating and drinking well. No signs of uremia. Further recs as above

## 2022-10-26 NOTE — PROGRESS NOTE ADULT - PROBLEM SELECTOR PLAN 5
Hx of insulin-dependent diabetes mellitus. Per surescripts: Lantus 15 qhs  A1c 7.2% (10/17/22)  POCT had been elevated in the evenings and low in the morning   - Endocrinology consulted. Recs appreciated.      - C/w lantus 6 units at bedtime.      - Lispro 5 units before each meal TID     - Lispro low dose sliding scale three times daily with meals starting at 150 mg/dL -> lispro 1u, 2u, 3u Hx of insulin-dependent diabetes mellitus. Per surescripts: Lantus 15 qhs  A1c 7.2% (10/17/22)  POCT had been elevated in the evenings and low in the morning   - Endocrinology consulted. Recs appreciated.      - C/w lantus 5 units at bedtime.      - Lispro 5 units before each meal TID     - Lispro low dose sliding scale three times daily with meals starting at 150 mg/dL -> lispro 1u, 2u, 3u

## 2022-10-27 DIAGNOSIS — E11.9 TYPE 2 DIABETES MELLITUS WITHOUT COMPLICATIONS: ICD-10-CM

## 2022-10-27 LAB
GLUCOSE BLDC GLUCOMTR-MCNC: 100 MG/DL — HIGH (ref 70–99)
GLUCOSE BLDC GLUCOMTR-MCNC: 122 MG/DL — HIGH (ref 70–99)
GLUCOSE BLDC GLUCOMTR-MCNC: 139 MG/DL — HIGH (ref 70–99)
GLUCOSE BLDC GLUCOMTR-MCNC: 153 MG/DL — HIGH (ref 70–99)

## 2022-10-27 PROCEDURE — 99231 SBSQ HOSP IP/OBS SF/LOW 25: CPT | Mod: GC

## 2022-10-27 PROCEDURE — 99232 SBSQ HOSP IP/OBS MODERATE 35: CPT | Mod: GC

## 2022-10-27 RX ADMIN — Medication 1300 MILLIGRAM(S): at 12:56

## 2022-10-27 RX ADMIN — HEPARIN SODIUM 5000 UNIT(S): 5000 INJECTION INTRAVENOUS; SUBCUTANEOUS at 05:29

## 2022-10-27 RX ADMIN — HEPARIN SODIUM 5000 UNIT(S): 5000 INJECTION INTRAVENOUS; SUBCUTANEOUS at 12:56

## 2022-10-27 RX ADMIN — Medication 667 MILLIGRAM(S): at 17:37

## 2022-10-27 RX ADMIN — ATORVASTATIN CALCIUM 80 MILLIGRAM(S): 80 TABLET, FILM COATED ORAL at 22:38

## 2022-10-27 RX ADMIN — Medication 5 UNIT(S): at 10:47

## 2022-10-27 RX ADMIN — Medication 1300 MILLIGRAM(S): at 05:29

## 2022-10-27 RX ADMIN — Medication 667 MILLIGRAM(S): at 09:41

## 2022-10-27 RX ADMIN — Medication 1300 MILLIGRAM(S): at 22:36

## 2022-10-27 RX ADMIN — TAMSULOSIN HYDROCHLORIDE 0.4 MILLIGRAM(S): 0.4 CAPSULE ORAL at 22:36

## 2022-10-27 RX ADMIN — MODAFINIL 50 MILLIGRAM(S): 200 TABLET ORAL at 05:29

## 2022-10-27 RX ADMIN — HEPARIN SODIUM 5000 UNIT(S): 5000 INJECTION INTRAVENOUS; SUBCUTANEOUS at 22:35

## 2022-10-27 RX ADMIN — Medication 1: at 17:38

## 2022-10-27 NOTE — PROGRESS NOTE ADULT - PROBLEM SELECTOR PLAN 5
Hx of insulin-dependent diabetes mellitus. Per surescripts: Lantus 15 qhs  A1c 7.2% (10/17/22)  POCT had been elevated in the evenings and low in the morning   - Endocrinology consulted. Recs appreciated.      - C/w lantus 6 units at bedtime.      - Lispro 5 units before each meal TID     - Lispro low dose sliding scale three times daily with meals starting at 150 mg/dL -> lispro 1u, 2u, 3u Prior documentation of patients diabetes reported type 1 diabetes.  The patient has type 2 DM. His C peptide is 2.1. A1c 7.2% (10/17/22)  - Endocrinology consulted. Recs appreciated.      - D/c lantus and lispro      - Start Januvia 25 mg daily before breakfast       - C/w sliding scale

## 2022-10-27 NOTE — PROGRESS NOTE ADULT - ATTENDING COMMENTS
attestation for 10/27, late entry    still awaiting MIKHAIL  reports no complaints  cousin by bedside updated of the plan

## 2022-10-27 NOTE — PROGRESS NOTE ADULT - SUBJECTIVE AND OBJECTIVE BOX
OVERNIGHT EVENTS: No overnight events     SUBJECTIVE / INTERVAL HPI: Patient seen and examined at bedside.     VITAL SIGNS:  Vital Signs Last 24 Hrs  T(C): 37.1 (27 Oct 2022 05:18), Max: 37.1 (27 Oct 2022 05:18)  T(F): 98.7 (27 Oct 2022 05:18), Max: 98.7 (27 Oct 2022 05:18)  HR: 87 (27 Oct 2022 05:18) (87 - 92)  BP: 157/92 (27 Oct 2022 05:18) (142/88 - 157/92)  BP(mean): --  RR: 18 (27 Oct 2022 05:18) (16 - 18)  SpO2: 98% (27 Oct 2022 05:18) (96% - 98%)    Parameters below as of 27 Oct 2022 05:18  Patient On (Oxygen Delivery Method): room air        PHYSICAL EXAM:    General: In bed in no acute distress   HEENT: NCAT; PERRL, anicteric sclera; MMM  Neck: supple, trachea midline  Cardiovascular: S1, S2 normal; RRR, no M/G/R  Respiratory: CTABL; no W/R/R  Gastrointestinal: soft, nontender, nondistended. bowel sounds present.  Skin: no ulcerations or visible rashes appreciated  Extremities: WWP; no edema, clubbing or cyanosis, left lower extremity weakness (unable to move against gravity), RLE normal strength   Vascular: 2+ radial, DP/PT pulses B/L  Neurological: AAOx3; CN II-XII grossly intact; no focal deficits    MEDICATIONS:  MEDICATIONS  (STANDING):  aspirin  chewable 81 milliGRAM(s) Oral daily  atorvastatin 80 milliGRAM(s) Oral at bedtime  calcium acetate 667 milliGRAM(s) Oral three times a day with meals  clopidogrel Tablet 75 milliGRAM(s) Oral daily  dextrose 5%. 1000 milliLiter(s) (100 mL/Hr) IV Continuous <Continuous>  dextrose 5%. 1000 milliLiter(s) (50 mL/Hr) IV Continuous <Continuous>  dextrose 50% Injectable 25 Gram(s) IV Push once  dextrose 50% Injectable 12.5 Gram(s) IV Push once  dextrose 50% Injectable 25 Gram(s) IV Push once  glucagon  Injectable 1 milliGRAM(s) IntraMuscular once  heparin   Injectable 5000 Unit(s) SubCutaneous every 8 hours  insulin glargine Injectable (LANTUS) 5 Unit(s) SubCutaneous at bedtime  insulin lispro (ADMELOG) corrective regimen sliding scale   SubCutaneous three times a day before meals  insulin lispro (ADMELOG) corrective regimen sliding scale   SubCutaneous at bedtime  insulin lispro Injectable (ADMELOG) 5 Unit(s) SubCutaneous three times a day before meals  modafinil 50 milliGRAM(s) Oral every 24 hours  NIFEdipine XL 30 milliGRAM(s) Oral every 24 hours  sodium bicarbonate 1300 milliGRAM(s) Oral three times a day  tamsulosin 0.4 milliGRAM(s) Oral at bedtime    MEDICATIONS  (PRN):  bisacodyl 5 milliGRAM(s) Oral every 12 hours PRN Constipation  dextrose Oral Gel 15 Gram(s) Oral once PRN Blood Glucose LESS THAN 70 milliGRAM(s)/deciliter      ALLERGIES:  Allergies    No Known Allergies    Intolerances        LABS: No new labs               CAPILLARY BLOOD GLUCOSE      POCT Blood Glucose.: 139 mg/dL (26 Oct 2022 21:41)      RADIOLOGY & ADDITIONAL TESTS: Reviewed.

## 2022-10-27 NOTE — DIETITIAN NUTRITION RISK NOTIFICATION - TREATMENT: THE FOLLOWING DIET HAS BEEN RECOMMENDED
Diet, Renal Restrictions:   For patients receiving Renal Replacement - No Protein Restr, No Conc K, No Conc Phos, Low Sodium  Consistent Carbohydrate {Evening Snack} (CSTCHOSN)  Supplement Feeding Modality:  Oral  Nepro Cans or Servings Per Day:  1       Frequency:  Three Times a day (10-24-22 @ 11:24) [Active]

## 2022-10-27 NOTE — PROGRESS NOTE ADULT - SUBJECTIVE AND OBJECTIVE BOX
OVERNIGHT: No acute events overnight.  SUBJECTIVE: Patient was seen and examined this morning. Denies any complaints. States that he eats about half of the food on his plate.    CAPILLARY BLOOD GLUCOSE & INSULIN RECEIVED  Yesterday  - Dinner FS mg/dL = 5 units of premeal Lispro + 1 units of Lispro sliding scale.   - Bedtime FS mg/dL = 5 units of Lantus + 0 units Lispro sliding scale.     Today  - Breakfast FS mg/dL = 5 units of premeal Lispro + 0 units of Lispro sliding scale.   - Lunch FS mg/dL = 5 units of premeal Lispro + 0 units of Lispro sliding scale.     CAPILLARY BLOOD GLUCOSE      POCT Blood Glucose.: 100 mg/dL (27 Oct 2022 12:15)  POCT Blood Glucose.: 122 mg/dL (27 Oct 2022 10:37)  POCT Blood Glucose.: 139 mg/dL (26 Oct 2022 21:41)  POCT Blood Glucose.: 155 mg/dL (26 Oct 2022 17:01)      REVIEW OF SYSTEMS  Constitutional:  Negative fever, chills or loss of appetite.  Eyes:  Negative blurry vision or double vision.  Cardiovascular:  Negative for chest pain or palpitations.  Respiratory:  Negative for cough, wheezing, or SOB.   Gastrointestinal:  Negative for nausea, vomiting, diarrhea, constipation, or abdominal pain.  Genitourinary:  Negative frequency, urgency or dysuria.  Neurologic:  No headache, confusion, dizziness, lightheadedness.    PHYSICAL EXAM  Vital Signs Last 24 Hrs  T(C): 37.1 (27 Oct 2022 05:18), Max: 37.1 (27 Oct 2022 05:18)  T(F): 98.7 (27 Oct 2022 05:18), Max: 98.7 (27 Oct 2022 05:18)  HR: 87 (27 Oct 2022 05:18) (87 - 92)  BP: 157/92 (27 Oct 2022 05:18) (142/88 - 157/92)  BP(mean): --  RR: 18 (27 Oct 2022 05:18) (16 - 18)  SpO2: 98% (27 Oct 2022 05:18) (96% - 98%)    Parameters below as of 27 Oct 2022 05:18  Patient On (Oxygen Delivery Method): room air        Constitutional: Awake, alert, in no acute distress.   HEENT: Normocephalic, atraumatic, ADRIANO, no proptosis or lid retraction.   Neck: supple, no acanthosis, no thyromegaly or palpable thyroid nodules.  Respiratory: Lungs clear to ausculation bilaterally.   Cardiovascular: regular rhythm, normal S1 and S2, no audible murmurs.   GI: soft, non-tender, non-distended, bowel sounds present, no masses appreciated.  Extremities: No lower extremity edema, peripheral pulses present.   Skin: no rashes.   Psychiatric: AAO x 3. Normal affect/mood.     LABS                    MEDICATIONS  MEDICATIONS  (STANDING):  aspirin  chewable 81 milliGRAM(s) Oral daily  atorvastatin 80 milliGRAM(s) Oral at bedtime  calcium acetate 667 milliGRAM(s) Oral three times a day with meals  clopidogrel Tablet 75 milliGRAM(s) Oral daily  dextrose 5%. 1000 milliLiter(s) (100 mL/Hr) IV Continuous <Continuous>  dextrose 5%. 1000 milliLiter(s) (50 mL/Hr) IV Continuous <Continuous>  dextrose 50% Injectable 25 Gram(s) IV Push once  dextrose 50% Injectable 12.5 Gram(s) IV Push once  dextrose 50% Injectable 25 Gram(s) IV Push once  glucagon  Injectable 1 milliGRAM(s) IntraMuscular once  heparin   Injectable 5000 Unit(s) SubCutaneous every 8 hours  insulin glargine Injectable (LANTUS) 5 Unit(s) SubCutaneous at bedtime  insulin lispro (ADMELOG) corrective regimen sliding scale   SubCutaneous three times a day before meals  insulin lispro (ADMELOG) corrective regimen sliding scale   SubCutaneous at bedtime  insulin lispro Injectable (ADMELOG) 5 Unit(s) SubCutaneous three times a day before meals  modafinil 50 milliGRAM(s) Oral every 24 hours  NIFEdipine XL 30 milliGRAM(s) Oral every 24 hours  sodium bicarbonate 1300 milliGRAM(s) Oral three times a day  tamsulosin 0.4 milliGRAM(s) Oral at bedtime    MEDICATIONS  (PRN):  bisacodyl 5 milliGRAM(s) Oral every 12 hours PRN Constipation  dextrose Oral Gel 15 Gram(s) Oral once PRN Blood Glucose LESS THAN 70 milliGRAM(s)/deciliter      ASSESSMENT / RECOMMENDATIONS      A1C:   Weight: ***   BMI: ***  Creatinine: ***  GFR: ***  Ejection Fraction: ***    # Type 2 diabetes mellitus  - Please continue lantus *** units at bedtime.   - Continue lispro *** units before each meal.  - Continue lispro moderate / low dose sliding scale four times daily with meals and at bedtime.  - Patient's fingerstick glucose goal is 100-180 mg/dL.    - For discharge, patient can ***.    - Patient can follow up at discharge with [facility] by calling [phone] to make an appointment.      Thank you for allowing us to participate in the care of ***.    Will continue to monitor.       Case discussed with Dr. Palm. Primary team updated.       Jaspal Velez    Endocrinology Fellow    Service Pager: [phone]    OVERNIGHT: No acute events overnight.  SUBJECTIVE: Patient was seen and examined this morning. Denies any complaints. States that he eats about half of the food on his plate.    CAPILLARY BLOOD GLUCOSE & INSULIN RECEIVED  Yesterday  - Dinner FS mg/dL = 5 units of premeal Lispro + 1 units of Lispro sliding scale.   - Bedtime FS mg/dL = 5 units of Lantus + 0 units Lispro sliding scale.     Today  - Breakfast FS mg/dL = 5 units of premeal Lispro + 0 units of Lispro sliding scale.   - Lunch FS mg/dL = 5 units of premeal Lispro + 0 units of Lispro sliding scale.     CAPILLARY BLOOD GLUCOSE      POCT Blood Glucose.: 100 mg/dL (27 Oct 2022 12:15)  POCT Blood Glucose.: 122 mg/dL (27 Oct 2022 10:37)  POCT Blood Glucose.: 139 mg/dL (26 Oct 2022 21:41)  POCT Blood Glucose.: 155 mg/dL (26 Oct 2022 17:01)      REVIEW OF SYSTEMS  Constitutional:  Negative fever, chills or loss of appetite.  Eyes:  Negative blurry vision or double vision.  Cardiovascular:  Negative for chest pain or palpitations.  Respiratory:  Negative for cough, wheezing, or SOB.   Gastrointestinal:  Negative for nausea, vomiting, diarrhea, constipation, or abdominal pain.  Genitourinary:  Negative frequency, urgency or dysuria.  Neurologic:  No headache, confusion, dizziness, lightheadedness.    PHYSICAL EXAM  Vital Signs Last 24 Hrs  T(C): 37.1 (27 Oct 2022 05:18), Max: 37.1 (27 Oct 2022 05:18)  T(F): 98.7 (27 Oct 2022 05:18), Max: 98.7 (27 Oct 2022 05:18)  HR: 87 (27 Oct 2022 05:18) (87 - 92)  BP: 157/92 (27 Oct 2022 05:18) (142/88 - 157/92)  BP(mean): --  RR: 18 (27 Oct 2022 05:18) (16 - 18)  SpO2: 98% (27 Oct 2022 05:18) (96% - 98%)    Parameters below as of 27 Oct 2022 05:18  Patient On (Oxygen Delivery Method): room air        Constitutional: Awake, alert, in no acute distress. Slurred speech.  HEENT: Normocephalic, atraumatic, ADRIANO, no proptosis or lid retraction.   Neck: supple, no acanthosis, no thyromegaly or palpable thyroid nodules.  Respiratory: Lungs clear to ausculation bilaterally.   Cardiovascular: regular rhythm, normal S1 and S2, no audible murmurs.   GI: soft, non-tender, non-distended, bowel sounds present, no masses appreciated.  Extremities: No lower extremity edema, peripheral pulses present.   Skin: no rashes.   Psychiatric: AAO x 3. Normal affect/mood.     LABS                    MEDICATIONS  MEDICATIONS  (STANDING):  aspirin  chewable 81 milliGRAM(s) Oral daily  atorvastatin 80 milliGRAM(s) Oral at bedtime  calcium acetate 667 milliGRAM(s) Oral three times a day with meals  clopidogrel Tablet 75 milliGRAM(s) Oral daily  dextrose 5%. 1000 milliLiter(s) (100 mL/Hr) IV Continuous <Continuous>  dextrose 5%. 1000 milliLiter(s) (50 mL/Hr) IV Continuous <Continuous>  dextrose 50% Injectable 25 Gram(s) IV Push once  dextrose 50% Injectable 12.5 Gram(s) IV Push once  dextrose 50% Injectable 25 Gram(s) IV Push once  glucagon  Injectable 1 milliGRAM(s) IntraMuscular once  heparin   Injectable 5000 Unit(s) SubCutaneous every 8 hours  insulin glargine Injectable (LANTUS) 5 Unit(s) SubCutaneous at bedtime  insulin lispro (ADMELOG) corrective regimen sliding scale   SubCutaneous three times a day before meals  insulin lispro (ADMELOG) corrective regimen sliding scale   SubCutaneous at bedtime  insulin lispro Injectable (ADMELOG) 5 Unit(s) SubCutaneous three times a day before meals  modafinil 50 milliGRAM(s) Oral every 24 hours  NIFEdipine XL 30 milliGRAM(s) Oral every 24 hours  sodium bicarbonate 1300 milliGRAM(s) Oral three times a day  tamsulosin 0.4 milliGRAM(s) Oral at bedtime    MEDICATIONS  (PRN):  bisacodyl 5 milliGRAM(s) Oral every 12 hours PRN Constipation  dextrose Oral Gel 15 Gram(s) Oral once PRN Blood Glucose LESS THAN 70 milliGRAM(s)/deciliter      ASSESSMENT / RECOMMENDATIONS      A1C: 7.2%  Weight: 90.7kg  BMI: 27.9  Creatinine: 6.89  GFR: 9  Ejection Fraction: 50%      # Type 2 diabetes mellitus  - Please discontinue lantus and lispro.  - Please start Januvia 25mg daily before breakfast.  - Continue lispro low dose sliding scale four times daily with meals and at bedtime.  - Advised patient to stop drinking Nepro and try to focus on eating meals for better glycemic control.  - Patient's fingerstick glucose goal is 100-180 mg/dL.    - Patient can follow up at discharge with [facility] by calling [phone] to make an appointment.      Will continue to monitor.       Case discussed with Dr. Palm. Primary team updated.       Rree Goel  PGY1 IM Resident  Service Pager: [phone]

## 2022-10-27 NOTE — PROGRESS NOTE ADULT - ATTENDING COMMENTS
Pt seen on rounds this afternoon.  Remains alert and more interactive.  Report of his PO intake is more specific but still of questionable accuracy.  He still says that he eats half of his plate.  Glucoses remain stable and well controlled--155/139 last night, 122/100 so far today  C-peptide level is 2.4 ng/ml--rules out type 1 DM (he is clearly type 2), and also suggests sufficient endogenous insulin reserves that he may well be controllable with oral agents.  Oral agent options are unfortunately limited, however, by his CKD--metformin and SGLT ruled out.  Will try discontinuing the insulin entirely and starting a renal dose of Januvia

## 2022-10-27 NOTE — DIETITIAN NUTRITION RISK NOTIFICATION - MALNUTRITION EVALUATION AS DEMONSTRATED BY (ADULTS > 20 YEARS OF AGE)
Loss of subcutaneous fat.../Loss of muscle...
Weight loss.../Inadequate energy intake.../Loss of subcutaneous fat.../Loss of muscle...

## 2022-10-27 NOTE — CHART NOTE - NSCHARTNOTEFT_GEN_A_CORE
Admitting Diagnosis:   Patient is a 57y old  Male who presents with a chief complaint of slurred speech and weakness (27 Oct 2022 06:09)    PAST MEDICAL & SURGICAL HISTORY:  Type 2 diabetes mellitus  Diabetes mellitus  Cerebral artery occlusion with cerebral infarction  Cerebral vascular accident  Hyperlipidemia  Hyperlipidemia  Hypertension  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, traumatic, left, sequela    Current Nutrition Order:  Renal (no protein restr, no conc K/Phos, low Na)  Consistent carbohydrate with evening snack   ONS: Nepro TID    PO Intake: Good (%) [   ]  Fair (50-75%) [   ] Poor (<25%) [ x ]    GI Issues:   No abd discomfort or distension  No n/v/d/c, last BM 10/26    Pain:  No pain at this time    Skin Integrity:  2 PIs noted:   Adolfo score     Labs:         CAPILLARY BLOOD GLUCOSE      POCT Blood Glucose.: 139 mg/dL (26 Oct 2022 21:41)  POCT Blood Glucose.: 155 mg/dL (26 Oct 2022 17:01)  POCT Blood Glucose.: 108 mg/dL (26 Oct 2022 12:03)      Medications:  MEDICATIONS  (STANDING):  aspirin  chewable 81 milliGRAM(s) Oral daily  atorvastatin 80 milliGRAM(s) Oral at bedtime  calcium acetate 667 milliGRAM(s) Oral three times a day with meals  clopidogrel Tablet 75 milliGRAM(s) Oral daily  dextrose 5%. 1000 milliLiter(s) (100 mL/Hr) IV Continuous <Continuous>  dextrose 5%. 1000 milliLiter(s) (50 mL/Hr) IV Continuous <Continuous>  dextrose 50% Injectable 25 Gram(s) IV Push once  dextrose 50% Injectable 12.5 Gram(s) IV Push once  dextrose 50% Injectable 25 Gram(s) IV Push once  glucagon  Injectable 1 milliGRAM(s) IntraMuscular once  heparin   Injectable 5000 Unit(s) SubCutaneous every 8 hours  insulin glargine Injectable (LANTUS) 5 Unit(s) SubCutaneous at bedtime  insulin lispro (ADMELOG) corrective regimen sliding scale   SubCutaneous three times a day before meals  insulin lispro (ADMELOG) corrective regimen sliding scale   SubCutaneous at bedtime  insulin lispro Injectable (ADMELOG) 5 Unit(s) SubCutaneous three times a day before meals  modafinil 50 milliGRAM(s) Oral every 24 hours  NIFEdipine XL 30 milliGRAM(s) Oral every 24 hours  sodium bicarbonate 1300 milliGRAM(s) Oral three times a day  tamsulosin 0.4 milliGRAM(s) Oral at bedtime    MEDICATIONS  (PRN):  bisacodyl 5 milliGRAM(s) Oral every 12 hours PRN Constipation  dextrose Oral Gel 15 Gram(s) Oral once PRN Blood Glucose LESS THAN 70 milliGRAM(s)/deciliter      Anthropometrics:  Daily     Daily     IBW:     % IBW    Weight Change:     Nutrition Focused Physical Exam: Completed [   ]  Not Pertinent [   ]  Muscle Wasting- Temporal [   ]  Clavicle/Pectoral [   ]  Shoulder/Deltoid [   ]  Scapula [   ]  Interosseous [   ]  Quadriceps [   ]  Gastrocnemius [   ]  Fat Wasting- Orbital [   ]  Buccal [   ]  Triceps [   ]  Rib [   ]  Suspect [PCM] 2/2 to physical assessment, [poor intake], and [wt loss]; please see malnutrition chart note.    Estimated energy needs:   Calories:   Protein:   Fluid:    Subjective:     Previous Nutrition Diagnosis:    Active [   ]  Resolved [   ]    If resolved, new PES:     Goal:    Recommendations:    Education:     Risk Level: High [   ] Moderate [   ] Low [   ] Admitting Diagnosis:   Patient is a 57y old  Male who presents with a chief complaint of slurred speech and weakness (27 Oct 2022 06:09)    PAST MEDICAL & SURGICAL HISTORY:  Type 2 diabetes mellitus  Diabetes mellitus  Cerebral artery occlusion with cerebral infarction  Cerebral vascular accident  Hyperlipidemia  Hyperlipidemia  Hypertension  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, traumatic, left, sequela    Current Nutrition Order:  Renal (no protein restr, no conc K/Phos, low Na)  Consistent carbohydrate with evening snack   ONS: Nepro TID    PO Intake: Good (%) [   ]  Fair (50-75%) [   ] Poor (<25%) [ x ]    GI Issues:   No abd discomfort or distension  No n/v/d/c, last BM 10/26    Pain:  No pain at this time    Skin Integrity:  3 PIs noted: upper back stage II, BL buttocks stage II  Adolfo score 12  No edema noted    Labs:     CAPILLARY BLOOD GLUCOSE    POCT Blood Glucose.: 139 mg/dL (26 Oct 2022 21:41)  POCT Blood Glucose.: 155 mg/dL (26 Oct 2022 17:01)  POCT Blood Glucose.: 108 mg/dL (26 Oct 2022 12:03)    Medications:  MEDICATIONS  (STANDING):  aspirin  chewable 81 milliGRAM(s) Oral daily  atorvastatin 80 milliGRAM(s) Oral at bedtime  calcium acetate 667 milliGRAM(s) Oral three times a day with meals  clopidogrel Tablet 75 milliGRAM(s) Oral daily  dextrose 5%. 1000 milliLiter(s) (100 mL/Hr) IV Continuous <Continuous>  dextrose 5%. 1000 milliLiter(s) (50 mL/Hr) IV Continuous <Continuous>  dextrose 50% Injectable 25 Gram(s) IV Push once  dextrose 50% Injectable 12.5 Gram(s) IV Push once  dextrose 50% Injectable 25 Gram(s) IV Push once  glucagon  Injectable 1 milliGRAM(s) IntraMuscular once  heparin   Injectable 5000 Unit(s) SubCutaneous every 8 hours  insulin glargine Injectable (LANTUS) 5 Unit(s) SubCutaneous at bedtime  insulin lispro (ADMELOG) corrective regimen sliding scale   SubCutaneous three times a day before meals  insulin lispro (ADMELOG) corrective regimen sliding scale   SubCutaneous at bedtime  insulin lispro Injectable (ADMELOG) 5 Unit(s) SubCutaneous three times a day before meals  modafinil 50 milliGRAM(s) Oral every 24 hours  NIFEdipine XL 30 milliGRAM(s) Oral every 24 hours  sodium bicarbonate 1300 milliGRAM(s) Oral three times a day  tamsulosin 0.4 milliGRAM(s) Oral at bedtime    MEDICATIONS  (PRN):  bisacodyl 5 milliGRAM(s) Oral every 12 hours PRN Constipation  dextrose Oral Gel 15 Gram(s) Oral once PRN Blood Glucose LESS THAN 70 milliGRAM(s)/deciliter    Admission Anthropometrics:  Height for BMI (ft): 5 ft  Height for BMI (in): 11 in  Height for BMI (cm): 108.3 cm  Weight for BMI (lb): 199.5 lb  Weight for BMI (kg): 90.7 kg  BMI: 27.9  IBW: 172 lb      %IBW: 116%     Weight Change:   Weight obtained this visit 10/27 - 78 kg/172 lb indicating 27.5 lb/14% weight loss over 12 days.     Nutrition Focused Physical Exam: Completed [ x ]  Not Pertinent [   ]  Muscle Wasting- Mild Temporal [ x ] Mild Clavicle/Pectoral [ x ]  Shoulder/Deltoid [   ] Scapula [   ] Mild Interosseous [ x ] Moderate Quadriceps [ x ]  Moderate Calf [ x ]  Fat Wasting- Mild Orbital [ x ]  Buccal [   ] Moderate Triceps [ x ]  Rib [   ]    Estimated energy needs:   Calories: 9730-6117 kcal (30-35 kcal/kg)  Protein:  g (1.25-1.5 g/kg)  Fluid: 2927-2956 mL (25-30 mL/kg)  Adjust fluids as needed per team   Estimated needs based on ABW as pt's weight is % IBW (100%). Needs adjusted for malnutrition and wound healing.     Subjective:   Patient is a 57y old Male w/ PMHx of IDDM, CVA x 2 (residual left side weakness), HTN, HLD, and CKD presents c/o slurred speech, weakness over the past several days. Admitted initially to [facility] overnight, found to be persistently tachycardic in ED and with BUN/Cr 91/7.42. ICU consulted for need for closer monitoring and possible dialysis, now admitted to telemetry. Nephrology consulted for AMS in setting of advanced CKD. Now s/p ILR placement 10/20, TTE and KATHY 10/20 which were all negative. Pt pending d/c to [facility] per team.        On follow-up assessment, pt awake & alert, pt's cousin at bedside. Per pt report, feeling well this AM. Observed ~25% completion of breakfast, 0% completion of Nepro ONS. Reports poor intake during hospital stay. Per cousin & pt, pt had 0% intake x4 days PTA due to lack of mobility & lack of appetite. Endorses eating more when HHA is present x3 days/week (Mon-Wed). Unaware of UBW, admission weight 200 lb, weight obtained this visit 172 lb, revealing 14% weight loss in 12 days. NFPE significant for moderate wasting. Estimated pt is meeting <25% estimated needs for >2 wks. Per ASPEN guidelines, pt meets criteria for severe acute PCM - see malnutrition sticker. Discussed with pt & caregiver importance of increasing PO intake & consuming ONS, both expressed understanding. Offered pt strategies to improve flavor of ONS, pt will try over ice today. Will evaluate need for appetite stimulant at f/u. No Christian/cultural/ethnic food preferences noted. NKFA. See nutrition recs.     Previous Nutrition Diagnosis:  Moderate PCM RT suspected reduced appetite & limited PO intake AEB significant signs of muscle/fat wasting.    Active [   ]  Resolved [   ] -- still pertinent, see updated dx below    New PES:   Severe PCM RT reduced appetite, limited PO intake, & difficulty preparing meals AEB 14% weight loss in 12 days, intake <25% estimated needs >2 wks, & moderate wasting.    Goal:  Consistently meet >75% estimated needs  Reduce/resolve s/s of PCM    Recommendations:  Continue current diet with Nepro TID  >>Monitor & encourage PO intake, continue with feeding assistance prn  >>Monitor for s/s of GI distress  Continue to monitor BMP, BG, POCT, renal indices, & lytes, replete prn  Pain & bowel regimen prn  Diet edu prn     Education:   Discussed importance of increased PO intake, offered strategies to increase intake/improve flavor of ONS    Risk Level: High [ x ] Moderate [   ] Low [   ] Admitting Diagnosis:   Patient is a 57y old  Male who presents with a chief complaint of slurred speech and weakness (27 Oct 2022 06:09)    PAST MEDICAL & SURGICAL HISTORY:  Type 2 diabetes mellitus  Diabetes mellitus  Cerebral artery occlusion with cerebral infarction  Cerebral vascular accident  Hyperlipidemia  Hyperlipidemia  Hypertension  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, traumatic, left, sequela    Current Nutrition Order:  Renal (no protein restr, no conc K/Phos, low Na)  Consistent carbohydrate with evening snack   ONS: Nepro TID    PO Intake: Good (%) [   ]  Fair (50-75%) [   ] Poor (<25%) [ x ]    GI Issues:   No abd discomfort or distension  No n/v/d/c, last BM 10/26    Pain:  No pain at this time    Skin Integrity:  3 PIs noted: upper back stage II, BL buttocks stage II  Adolfo score 12  No edema noted    Labs:     CAPILLARY BLOOD GLUCOSE    POCT Blood Glucose.: 139 mg/dL (26 Oct 2022 21:41)  POCT Blood Glucose.: 155 mg/dL (26 Oct 2022 17:01)  POCT Blood Glucose.: 108 mg/dL (26 Oct 2022 12:03)    Medications:  MEDICATIONS  (STANDING):  aspirin  chewable 81 milliGRAM(s) Oral daily  atorvastatin 80 milliGRAM(s) Oral at bedtime  calcium acetate 667 milliGRAM(s) Oral three times a day with meals  clopidogrel Tablet 75 milliGRAM(s) Oral daily  dextrose 5%. 1000 milliLiter(s) (100 mL/Hr) IV Continuous <Continuous>  dextrose 5%. 1000 milliLiter(s) (50 mL/Hr) IV Continuous <Continuous>  dextrose 50% Injectable 25 Gram(s) IV Push once  dextrose 50% Injectable 12.5 Gram(s) IV Push once  dextrose 50% Injectable 25 Gram(s) IV Push once  glucagon  Injectable 1 milliGRAM(s) IntraMuscular once  heparin   Injectable 5000 Unit(s) SubCutaneous every 8 hours  insulin glargine Injectable (LANTUS) 5 Unit(s) SubCutaneous at bedtime  insulin lispro (ADMELOG) corrective regimen sliding scale   SubCutaneous three times a day before meals  insulin lispro (ADMELOG) corrective regimen sliding scale   SubCutaneous at bedtime  insulin lispro Injectable (ADMELOG) 5 Unit(s) SubCutaneous three times a day before meals  modafinil 50 milliGRAM(s) Oral every 24 hours  NIFEdipine XL 30 milliGRAM(s) Oral every 24 hours  sodium bicarbonate 1300 milliGRAM(s) Oral three times a day  tamsulosin 0.4 milliGRAM(s) Oral at bedtime    MEDICATIONS  (PRN):  bisacodyl 5 milliGRAM(s) Oral every 12 hours PRN Constipation  dextrose Oral Gel 15 Gram(s) Oral once PRN Blood Glucose LESS THAN 70 milliGRAM(s)/deciliter    Admission Anthropometrics:  Height for BMI (ft): 5 ft  Height for BMI (in): 11 in  Height for BMI (cm): 108.3 cm  Weight for BMI (lb): 199.5 lb  Weight for BMI (kg): 90.7 kg  BMI: 27.9  IBW: 172 lb      %IBW: 116%     Weight Change:   Weight obtained this visit 10/27 - 78 kg/172 lb indicating 27.5 lb/14% weight loss over 12 days.     Nutrition Focused Physical Exam: Completed [ x ]  Not Pertinent [   ]  Muscle Wasting- Mild Temporal [ x ] Mild Clavicle/Pectoral [ x ]  Shoulder/Deltoid [   ] Scapula [   ] Mild Interosseous [ x ] Moderate Quadriceps [ x ]  Moderate Calf [ x ]  Fat Wasting- Mild Orbital [ x ]  Buccal [   ] Moderate Triceps [ x ]  Rib [   ]    Estimated energy needs:   Calories: 7858-9512 kcal (30-35 kcal/kg)  Protein:  g (1.25-1.5 g/kg)  Fluid: 6104-4326 mL (25-30 mL/kg)  Adjust fluids as needed per team   Estimated needs based on ABW as pt's weight is % IBW (100%). Needs adjusted for malnutrition and wound healing.     Subjective:   Patient is a 57y old Male w/ PMHx of IDDM, CVA x 2 (residual left side weakness), HTN, HLD, and CKD presents c/o slurred speech, weakness over the past several days. Admitted initially to F overnight, found to be persistently tachycardic in ED and with BUN/Cr 91/7.42. ICU consulted for need for closer monitoring and possible dialysis, now admitted to telemetry. Nephrology consulted for AMS in setting of advanced CKD. Now s/p ILR placement 10/20, TTE and KATHY 10/20 which were all negative. Pt pending d/c to [facility] per team.        On follow-up assessment, pt awake & alert, pt's cousin at bedside. Per pt report, feeling well this AM. Observed ~25% completion of breakfast, 0% completion of Nepro ONS. Reports poor intake during hospital stay. Per cousin & pt, pt had 0% intake x4 days PTA due to lack of mobility & appetite. Endorses eating more when HHA is present x3 days/week (Mon-Wed). Unaware of UBW, admission weight 200 lb, weight obtained this visit 172 lb, revealing 14% weight loss in 12 days. NFPE significant for moderate wasting. Estimated pt is meeting <25% nutrient needs for >2 wks. Per ASPEN guidelines, pt meets criteria for severe acute PCM - see malnutrition sticker. Discussed with pt & caregiver importance of increasing PO intake & consuming ONS, both expressed understanding. Offered pt strategies to improve flavor of ONS, pt will try over ice today. Will evaluate need for appetite stimulant at f/u. No Confucianist/cultural/ethnic food preferences noted. NKFA. See nutrition recs.     Previous Nutrition Diagnosis:  Moderate PCM RT suspected reduced appetite & limited PO intake AEB significant signs of muscle/fat wasting.    Active [   ]  Resolved [   ] -- still pertinent, see updated dx below    New PES:   Severe PCM RT reduced appetite, limited PO intake, & difficulty preparing meals AEB 14% weight loss in 12 days, intake <25% estimated needs >2 wks, & moderate wasting.    Goal:  Consistently meet >75% estimated needs  Reduce/resolve s/s of PCM    Recommendations:  Continue current diet with Nepro TID  >>Monitor & encourage PO intake, continue with feeding assistance prn  >>Monitor for s/s of GI distress  Continue to monitor BMP, BG, POCT, renal indices, & lytes, replete prn  Pain & bowel regimen prn  Diet edu prn     Education:   Discussed importance of increased PO intake, offered strategies to increase intake/improve flavor of ONS    Risk Level: High [ x ] Moderate [   ] Low [   ]

## 2022-10-28 LAB
GLUCOSE BLDC GLUCOMTR-MCNC: 127 MG/DL — HIGH (ref 70–99)
GLUCOSE BLDC GLUCOMTR-MCNC: 128 MG/DL — HIGH (ref 70–99)
GLUCOSE BLDC GLUCOMTR-MCNC: 146 MG/DL — HIGH (ref 70–99)
GLUCOSE BLDC GLUCOMTR-MCNC: 171 MG/DL — HIGH (ref 70–99)

## 2022-10-28 PROCEDURE — 99232 SBSQ HOSP IP/OBS MODERATE 35: CPT | Mod: GC

## 2022-10-28 PROCEDURE — 99232 SBSQ HOSP IP/OBS MODERATE 35: CPT

## 2022-10-28 PROCEDURE — 99231 SBSQ HOSP IP/OBS SF/LOW 25: CPT | Mod: GC

## 2022-10-28 RX ORDER — SITAGLIPTIN 50 MG/1
1 TABLET, FILM COATED ORAL
Qty: 30 | Refills: 3
Start: 2022-10-28 | End: 2023-02-24

## 2022-10-28 RX ORDER — NIFEDIPINE 30 MG
1 TABLET, EXTENDED RELEASE 24 HR ORAL
Qty: 30 | Refills: 2
Start: 2022-10-28 | End: 2023-01-25

## 2022-10-28 RX ORDER — NIFEDIPINE 30 MG
60 TABLET, EXTENDED RELEASE 24 HR ORAL EVERY 24 HOURS
Refills: 0 | Status: DISCONTINUED | OUTPATIENT
Start: 2022-10-29 | End: 2022-11-01

## 2022-10-28 RX ADMIN — CLOPIDOGREL BISULFATE 75 MILLIGRAM(S): 75 TABLET, FILM COATED ORAL at 11:48

## 2022-10-28 RX ADMIN — Medication 81 MILLIGRAM(S): at 11:48

## 2022-10-28 RX ADMIN — Medication 667 MILLIGRAM(S): at 08:06

## 2022-10-28 RX ADMIN — Medication 667 MILLIGRAM(S): at 18:08

## 2022-10-28 RX ADMIN — HEPARIN SODIUM 5000 UNIT(S): 5000 INJECTION INTRAVENOUS; SUBCUTANEOUS at 22:38

## 2022-10-28 RX ADMIN — Medication 30 MILLIGRAM(S): at 11:48

## 2022-10-28 RX ADMIN — Medication 667 MILLIGRAM(S): at 11:48

## 2022-10-28 RX ADMIN — TAMSULOSIN HYDROCHLORIDE 0.4 MILLIGRAM(S): 0.4 CAPSULE ORAL at 22:38

## 2022-10-28 RX ADMIN — HEPARIN SODIUM 5000 UNIT(S): 5000 INJECTION INTRAVENOUS; SUBCUTANEOUS at 08:06

## 2022-10-28 RX ADMIN — Medication 1300 MILLIGRAM(S): at 22:39

## 2022-10-28 RX ADMIN — MODAFINIL 50 MILLIGRAM(S): 200 TABLET ORAL at 08:07

## 2022-10-28 RX ADMIN — HEPARIN SODIUM 5000 UNIT(S): 5000 INJECTION INTRAVENOUS; SUBCUTANEOUS at 14:37

## 2022-10-28 RX ADMIN — ATORVASTATIN CALCIUM 80 MILLIGRAM(S): 80 TABLET, FILM COATED ORAL at 22:38

## 2022-10-28 RX ADMIN — Medication 1300 MILLIGRAM(S): at 08:08

## 2022-10-28 RX ADMIN — Medication 1300 MILLIGRAM(S): at 11:48

## 2022-10-28 NOTE — PROGRESS NOTE ADULT - ATTENDING COMMENTS
I agree with the fellow's findings and plans as written above with the following additions/amendments:    Seen and examined at bedside. Feels well, denies uremic symptoms of any kind, eating well. Monitoring closely, plan to continue outpatient. Further recs as above

## 2022-10-28 NOTE — PROGRESS NOTE ADULT - SUBJECTIVE AND OBJECTIVE BOX
OVERNIGHT: No acute events overnight.  SUBJECTIVE: Patient was seen and examined this morning. Patient denies any complaints. Still eating about half of each meal.    CAPILLARY BLOOD GLUCOSE & INSULIN RECEIVED  Yesterday  - Dinner FS mg/dL = 0 units of premeal Lispro + 1 units of Lispro sliding scale.   - Bedtime FS mg/dL = 0 units of premeal Lispro + 0 units Lispro sliding scale.     Today  - Breakfast FS mg/dL = 0 units of premeal Lispro + 0 units of Lispro sliding scale.   - Lunch FS mg/dL = Januvia 25mg + 0 units of Lispro sliding scale.     CAPILLARY BLOOD GLUCOSE      POCT Blood Glucose.: 128 mg/dL (28 Oct 2022 12:06)  POCT Blood Glucose.: 127 mg/dL (28 Oct 2022 08:19)  POCT Blood Glucose.: 139 mg/dL (27 Oct 2022 21:37)  POCT Blood Glucose.: 153 mg/dL (27 Oct 2022 17:26)      REVIEW OF SYSTEMS  Constitutional:  Negative fever, chills or loss of appetite.  Eyes:  Negative blurry vision or double vision.  Cardiovascular:  Negative for chest pain or palpitations.  Respiratory:  Negative for cough, wheezing, or SOB.   Gastrointestinal:  Negative for nausea, vomiting, diarrhea, constipation, or abdominal pain.  Genitourinary:  Negative frequency, urgency or dysuria.  Neurologic:  No headache, confusion, dizziness, lightheadedness.    PHYSICAL EXAM  Vital Signs Last 24 Hrs  T(C): 36.6 (28 Oct 2022 11:39), Max: 36.7 (28 Oct 2022 05:54)  T(F): 97.8 (28 Oct 2022 11:39), Max: 98 (28 Oct 2022 05:54)  HR: 94 (28 Oct 2022 11:39) (92 - 94)  BP: 139/79 (28 Oct 2022 11:39) (139/79 - 152/80)  BP(mean): --  RR: 16 (28 Oct 2022 11:39) (16 - 17)  SpO2: 98% (28 Oct 2022 11:39) (98% - 98%)    Parameters below as of 28 Oct 2022 11:39  Patient On (Oxygen Delivery Method): room air        Constitutional: Awake, alert, in no acute distress.   HEENT: Normocephalic, atraumatic, ADRIANO, no proptosis or lid retraction.   Neck: supple, no acanthosis, no thyromegaly or palpable thyroid nodules.  Respiratory: Lungs clear to ausculation bilaterally.   Cardiovascular: regular rhythm, normal S1 and S2, no audible murmurs.   GI: soft, non-tender, non-distended, bowel sounds present, no masses appreciated.  Extremities: No lower extremity edema, peripheral pulses present.   Skin: no rashes.   Psychiatric: AAO x 3. Normal affect/mood.     LABS                    MEDICATIONS  MEDICATIONS  (STANDING):  aspirin  chewable 81 milliGRAM(s) Oral daily  atorvastatin 80 milliGRAM(s) Oral at bedtime  calcium acetate 667 milliGRAM(s) Oral three times a day with meals  clopidogrel Tablet 75 milliGRAM(s) Oral daily  dextrose 5%. 1000 milliLiter(s) (100 mL/Hr) IV Continuous <Continuous>  dextrose 5%. 1000 milliLiter(s) (50 mL/Hr) IV Continuous <Continuous>  dextrose 50% Injectable 25 Gram(s) IV Push once  dextrose 50% Injectable 12.5 Gram(s) IV Push once  dextrose 50% Injectable 25 Gram(s) IV Push once  glucagon  Injectable 1 milliGRAM(s) IntraMuscular once  heparin   Injectable 5000 Unit(s) SubCutaneous every 8 hours  insulin lispro (ADMELOG) corrective regimen sliding scale   SubCutaneous three times a day before meals  insulin lispro (ADMELOG) corrective regimen sliding scale   SubCutaneous at bedtime  modafinil 50 milliGRAM(s) Oral every 24 hours  NIFEdipine XL 30 milliGRAM(s) Oral every 24 hours  sitaGLIPtin 25 milliGRAM(s) Oral daily  sodium bicarbonate 1300 milliGRAM(s) Oral three times a day  tamsulosin 0.4 milliGRAM(s) Oral at bedtime    MEDICATIONS  (PRN):  bisacodyl 5 milliGRAM(s) Oral every 12 hours PRN Constipation  dextrose Oral Gel 15 Gram(s) Oral once PRN Blood Glucose LESS THAN 70 milliGRAM(s)/deciliter      ASSESSMENT / RECOMMENDATIONS      A1C: 7.2%  Weight: 90.7kg  BMI: 7.9  Creatinine: 6.89  GFR: 0  Ejection Fraction: 50%    # Type 2 diabetes mellitus  - Please continue lantus *** units at bedtime.   - Continue lispro *** units before each meal.  - Continue lispro moderate / low dose sliding scale four times daily with meals and at bedtime.  - Patient's fingerstick glucose goal is 100-180 mg/dL.    - For discharge, patient can ***.    - Patient can follow up at discharge with [facility] by calling [phone] to make an appointment.      Thank you for allowing us to participate in the care of ***.    Will continue to monitor.       Case discussed with Dr. Palm. Primary team updated.       Jaspal Velez    Endocrinology Fellow    Service Pager: [phone]    OVERNIGHT: No acute events overnight.  SUBJECTIVE: Patient was seen and examined this morning. Patient denies any complaints. Still eating about half of each meal.    CAPILLARY BLOOD GLUCOSE & INSULIN RECEIVED  Yesterday  - Dinner FS mg/dL = 0 units of premeal Lispro + 1 units of Lispro sliding scale.   - Bedtime FS mg/dL = 0 units of premeal Lispro + 0 units Lispro sliding scale.     Today  - Breakfast FS mg/dL = 0 units of premeal Lispro + 0 units of Lispro sliding scale.   - Lunch FS mg/dL = Januvia 25mg + 0 units of Lispro sliding scale.     CAPILLARY BLOOD GLUCOSE      POCT Blood Glucose.: 128 mg/dL (28 Oct 2022 12:06)  POCT Blood Glucose.: 127 mg/dL (28 Oct 2022 08:19)  POCT Blood Glucose.: 139 mg/dL (27 Oct 2022 21:37)  POCT Blood Glucose.: 153 mg/dL (27 Oct 2022 17:26)      REVIEW OF SYSTEMS  Constitutional:  Negative fever, chills or loss of appetite.  Eyes:  Negative blurry vision or double vision.  Cardiovascular:  Negative for chest pain or palpitations.  Respiratory:  Negative for cough, wheezing, or SOB.   Gastrointestinal:  Negative for nausea, vomiting, diarrhea, constipation, or abdominal pain.  Genitourinary:  Negative frequency, urgency or dysuria.  Neurologic:  No headache, confusion, dizziness, lightheadedness.    PHYSICAL EXAM  Vital Signs Last 24 Hrs  T(C): 36.6 (28 Oct 2022 11:39), Max: 36.7 (28 Oct 2022 05:54)  T(F): 97.8 (28 Oct 2022 11:39), Max: 98 (28 Oct 2022 05:54)  HR: 94 (28 Oct 2022 11:39) (92 - 94)  BP: 139/79 (28 Oct 2022 11:39) (139/79 - 152/80)  BP(mean): --  RR: 16 (28 Oct 2022 11:39) (16 - 17)  SpO2: 98% (28 Oct 2022 11:39) (98% - 98%)    Parameters below as of 28 Oct 2022 11:39  Patient On (Oxygen Delivery Method): room air        Constitutional: Awake, alert, in no acute distress.   HEENT: Normocephalic, atraumatic, ADRIANO, no proptosis or lid retraction.   Neck: supple, no acanthosis, no thyromegaly or palpable thyroid nodules.  Respiratory: Lungs clear to ausculation bilaterally.   Cardiovascular: regular rhythm, normal S1 and S2, no audible murmurs.   GI: soft, non-tender, non-distended, bowel sounds present, no masses appreciated.  Extremities: No lower extremity edema, peripheral pulses present.   Skin: no rashes.   Psychiatric: AAO x 3. Normal affect/mood.     LABS                    MEDICATIONS  MEDICATIONS  (STANDING):  aspirin  chewable 81 milliGRAM(s) Oral daily  atorvastatin 80 milliGRAM(s) Oral at bedtime  calcium acetate 667 milliGRAM(s) Oral three times a day with meals  clopidogrel Tablet 75 milliGRAM(s) Oral daily  dextrose 5%. 1000 milliLiter(s) (100 mL/Hr) IV Continuous <Continuous>  dextrose 5%. 1000 milliLiter(s) (50 mL/Hr) IV Continuous <Continuous>  dextrose 50% Injectable 25 Gram(s) IV Push once  dextrose 50% Injectable 12.5 Gram(s) IV Push once  dextrose 50% Injectable 25 Gram(s) IV Push once  glucagon  Injectable 1 milliGRAM(s) IntraMuscular once  heparin   Injectable 5000 Unit(s) SubCutaneous every 8 hours  insulin lispro (ADMELOG) corrective regimen sliding scale   SubCutaneous three times a day before meals  insulin lispro (ADMELOG) corrective regimen sliding scale   SubCutaneous at bedtime  modafinil 50 milliGRAM(s) Oral every 24 hours  NIFEdipine XL 30 milliGRAM(s) Oral every 24 hours  sitaGLIPtin 25 milliGRAM(s) Oral daily  sodium bicarbonate 1300 milliGRAM(s) Oral three times a day  tamsulosin 0.4 milliGRAM(s) Oral at bedtime    MEDICATIONS  (PRN):  bisacodyl 5 milliGRAM(s) Oral every 12 hours PRN Constipation  dextrose Oral Gel 15 Gram(s) Oral once PRN Blood Glucose LESS THAN 70 milliGRAM(s)/deciliter      ASSESSMENT / RECOMMENDATIONS      A1C: 7.2%  Weight: 90.7kg  BMI: 7.9  Creatinine: 6.89  GFR: 0  Ejection Fraction: 50%    # Type 2 diabetes mellitus  - Please continue Januvia 25mg before breakfast daily.  - Continue lispro low dose sliding scale four times daily with meals and at bedtime.  - Patient's fingerstick glucose goal is 100-180 mg/dL.    - Patient can follow up at discharge with [facility] by calling [phone] to make an appointment.      Case discussed with Dr. Palm. Primary team updated.       Rere Goel  PGY1 IM Resident    Service Pager: [phone]

## 2022-10-28 NOTE — PROGRESS NOTE ADULT - PROBLEM SELECTOR PLAN 5
Prior documentation of patients diabetes reported type 1 diabetes.  The patient has type 2 DM. His C peptide is 2.1. A1c 7.2% (10/17/22)  - Endocrinology consulted. Recs appreciated.      - D/c lantus and lispro      - Start Januvia 25 mg daily before breakfast       - C/w sliding scale

## 2022-10-28 NOTE — PROGRESS NOTE ADULT - ASSESSMENT
56 yo M w/ longstanding IDDM1 w/ retinopathy, HTN, CKD 4 presented w/ slurred speech, generalized weakness for several days. Nephrology consulted for AMS in the setting of advanced CKD    Assessment/Plan:    #Progression of CKD V (Last known Cr 3.3 in Jun/2020 which was progression of his CKD.)  Presented w/ Cr 7.99 on admission, down trending at 6.89   Serologic GN workup in 2019 negative  UA w/ trace protein and moderate blood in the absence of RBCs, UPCR 1.9, Eleanor 56  Non-oliguric    Plan:   No indication of urgent HD at this time. However, given his advance CKD, will likely require HD in the future   Maintain net even fluid balance   Strict I&Os  Encourage PO intake   Avoid nephrotoxic agents  Renal diet  Pt OK for discharge from renal standpoint. Ensure pt follows up with nephrology outpatient    #Hypernatremia-resolved  Daily BMP  Encourage PO intake    #Anemia  Hb not at goal  Iron sat 58%, ferritin 34  likely ACD  Defer Epo in the setting of cryptogenic stroke    #Hypertension  Increase nifedipine to 60mg  Will defer initiation of ACEi/ARBs at outpatient nephrology visits    #Met Acidosis  Likely due to CKD  Continue sodium bicarb 650mg TID     #BMD-CKD  iPTH 260  Continue ca acetate [facility]        Alberto Cordova M.D  PGY-4 Nephrology  [phone]

## 2022-10-28 NOTE — PROGRESS NOTE ADULT - SUBJECTIVE AND OBJECTIVE BOX
OVERNIGHT EVENTS: ZULY    SUBJECTIVE / INTERVAL HPI: No new symptoms, patient feeling well     VITAL SIGNS:  Vital Signs Last 24 Hrs  T(C): 36.6 (28 Oct 2022 11:39), Max: 36.7 (28 Oct 2022 05:54)  T(F): 97.8 (28 Oct 2022 11:39), Max: 98 (28 Oct 2022 05:54)  HR: 94 (28 Oct 2022 11:39) (92 - 94)  BP: 139/79 (28 Oct 2022 11:39) (139/79 - 152/80)  BP(mean): --  RR: 16 (28 Oct 2022 11:39) (16 - 17)  SpO2: 98% (28 Oct 2022 11:39) (98% - 98%)    Parameters below as of 28 Oct 2022 11:39  Patient On (Oxygen Delivery Method): room air        PHYSICAL EXAM:    General: In bed in no acute distress   HEENT: NCAT; PERRL, anicteric sclera; MMM  Neck: supple, trachea midline  Cardiovascular: S1, S2 normal; RRR, no M/G/R  Respiratory: CTABL; no W/R/R  Gastrointestinal: soft, nontender, nondistended. bowel sounds present.  Skin: no ulcerations or visible rashes appreciated  Extremities: WWP; no edema, clubbing or cyanosis, left lower extremity weakness (unable to move against gravity), RLE normal strength   Vascular: 2+ radial, DP/PT pulses B/L  Neurological: AAOx3; CN II-XII grossly intact; no focal deficits    MEDICATIONS:  MEDICATIONS  (STANDING):  aspirin  chewable 81 milliGRAM(s) Oral daily  atorvastatin 80 milliGRAM(s) Oral at bedtime  calcium acetate 667 milliGRAM(s) Oral three times a day with meals  clopidogrel Tablet 75 milliGRAM(s) Oral daily  dextrose 5%. 1000 milliLiter(s) (100 mL/Hr) IV Continuous <Continuous>  dextrose 5%. 1000 milliLiter(s) (50 mL/Hr) IV Continuous <Continuous>  dextrose 50% Injectable 25 Gram(s) IV Push once  dextrose 50% Injectable 12.5 Gram(s) IV Push once  dextrose 50% Injectable 25 Gram(s) IV Push once  glucagon  Injectable 1 milliGRAM(s) IntraMuscular once  heparin   Injectable 5000 Unit(s) SubCutaneous every 8 hours  insulin lispro (ADMELOG) corrective regimen sliding scale   SubCutaneous three times a day before meals  insulin lispro (ADMELOG) corrective regimen sliding scale   SubCutaneous at bedtime  modafinil 50 milliGRAM(s) Oral every 24 hours  sitaGLIPtin 25 milliGRAM(s) Oral daily  sodium bicarbonate 1300 milliGRAM(s) Oral three times a day  tamsulosin 0.4 milliGRAM(s) Oral at bedtime    MEDICATIONS  (PRN):  bisacodyl 5 milliGRAM(s) Oral every 12 hours PRN Constipation  dextrose Oral Gel 15 Gram(s) Oral once PRN Blood Glucose LESS THAN 70 milliGRAM(s)/deciliter      ALLERGIES:  Allergies    No Known Allergies    Intolerances        LABS:              CAPILLARY BLOOD GLUCOSE      POCT Blood Glucose.: 128 mg/dL (28 Oct 2022 12:06)      RADIOLOGY & ADDITIONAL TESTS: Reviewed.

## 2022-10-28 NOTE — PROGRESS NOTE ADULT - PROBLEM SELECTOR PLAN 3
Resolved  The patient developed loose stools this AM. Will monitor his BM throughout the day prior to discharge to determine need for further w/u

## 2022-10-28 NOTE — PROGRESS NOTE ADULT - SUBJECTIVE AND OBJECTIVE BOX
Patient is a 57y Male seen and evaluated at bedside. Laying in bed comfortably eating breakfast. Denies any new symptoms. Pending discharge to acute rehab.      Meds:    aspirin  chewable 81 daily  atorvastatin 80 at bedtime  bisacodyl 5 every 12 hours PRN  calcium acetate 667 three times a day with meals  clopidogrel Tablet 75 daily  dextrose 5%. 1000 <Continuous>  dextrose 5%. 1000 <Continuous>  dextrose 50% Injectable 25 once  dextrose 50% Injectable 12.5 once  dextrose 50% Injectable 25 once  dextrose Oral Gel 15 once PRN  glucagon  Injectable 1 once  heparin   Injectable 5000 every 8 hours  insulin lispro (ADMELOG) corrective regimen sliding scale  three times a day before meals  insulin lispro (ADMELOG) corrective regimen sliding scale  at bedtime  modafinil 50 every 24 hours  NIFEdipine XL 30 every 24 hours  sitaGLIPtin 25 daily  sodium bicarbonate 1300 three times a day  tamsulosin 0.4 at bedtime      T(C): , Max: 36.7 (10-27-22 @ 13:00)  T(F): , Max: 98.1 (10-27-22 @ 13:00)  HR: 94 (10-28-22 @ 11:39)  BP: 139/79 (10-28-22 @ 11:39)  BP(mean): --  RR: 16 (10-28-22 @ 11:39)  SpO2: 98% (10-28-22 @ 11:39)  Wt(kg): --    10-27 @ 07:01  -  10-28 @ 07:00  --------------------------------------------------------  IN: 0 mL / OUT: 800 mL / NET: -800 mL          Review of Systems:  ROS negative except as per HPI      PHYSICAL EXAM:  GENERAL: Awake, alert resting in bed    HEENT: ADRIANO, no JVP  CHEST/LUNG: Bilateral clear breath sounds, no accessory muscle use  HEART: Regular rate and rhythm, no murmur  ABDOMEN: Soft, nontender, non distended  EXTREMITIES: no pedal edema, warm  Neurology: Answer questions appropriately, no focal deficits noted  Derm: Dry skin, no visible rash        LABS:                      RADIOLOGY & ADDITIONAL STUDIES:

## 2022-10-28 NOTE — PROGRESS NOTE ADULT - ATTENDING COMMENTS
Pt seen on rounds this afternoon.  Cognitive status continues to slowly improve.  Appetite still seems to be below normal--(?? whether this is a uremic symptom)  AM fingerstick was 127 today without Lantus last night  Pre-lunch was 128 without Januvia--has been ordered to start today but was not given until pre-lunch  To continue the current regimen, but give Januvia before breakfast (25 mg)

## 2022-10-28 NOTE — PROGRESS NOTE ADULT - PROBLEM SELECTOR PLAN 6
Hx of HTN. Per surescripts no recent HTN meds. Per MAURO in 2020 was taking metoprolol ER 50 and Nifedipine ER 30 in 2020.   Patient has not been taking these medication and is unaware that he has elevated Bp  - Start Nifedipine 60 mg daily

## 2022-10-29 LAB
GLUCOSE BLDC GLUCOMTR-MCNC: 140 MG/DL — HIGH (ref 70–99)
GLUCOSE BLDC GLUCOMTR-MCNC: 149 MG/DL — HIGH (ref 70–99)
GLUCOSE BLDC GLUCOMTR-MCNC: 161 MG/DL — HIGH (ref 70–99)
GLUCOSE BLDC GLUCOMTR-MCNC: 271 MG/DL — HIGH (ref 70–99)

## 2022-10-29 PROCEDURE — 99232 SBSQ HOSP IP/OBS MODERATE 35: CPT | Mod: GC

## 2022-10-29 RX ADMIN — Medication 1300 MILLIGRAM(S): at 22:09

## 2022-10-29 RX ADMIN — Medication 667 MILLIGRAM(S): at 08:50

## 2022-10-29 RX ADMIN — Medication 1300 MILLIGRAM(S): at 06:30

## 2022-10-29 RX ADMIN — CLOPIDOGREL BISULFATE 75 MILLIGRAM(S): 75 TABLET, FILM COATED ORAL at 12:00

## 2022-10-29 RX ADMIN — HEPARIN SODIUM 5000 UNIT(S): 5000 INJECTION INTRAVENOUS; SUBCUTANEOUS at 22:10

## 2022-10-29 RX ADMIN — ATORVASTATIN CALCIUM 80 MILLIGRAM(S): 80 TABLET, FILM COATED ORAL at 22:09

## 2022-10-29 RX ADMIN — Medication 667 MILLIGRAM(S): at 18:42

## 2022-10-29 RX ADMIN — TAMSULOSIN HYDROCHLORIDE 0.4 MILLIGRAM(S): 0.4 CAPSULE ORAL at 22:09

## 2022-10-29 RX ADMIN — MODAFINIL 50 MILLIGRAM(S): 200 TABLET ORAL at 06:29

## 2022-10-29 RX ADMIN — Medication 60 MILLIGRAM(S): at 11:59

## 2022-10-29 RX ADMIN — Medication 667 MILLIGRAM(S): at 12:00

## 2022-10-29 RX ADMIN — Medication 1300 MILLIGRAM(S): at 12:00

## 2022-10-29 RX ADMIN — Medication 81 MILLIGRAM(S): at 12:00

## 2022-10-29 RX ADMIN — Medication 3: at 12:08

## 2022-10-29 RX ADMIN — HEPARIN SODIUM 5000 UNIT(S): 5000 INJECTION INTRAVENOUS; SUBCUTANEOUS at 12:00

## 2022-10-29 NOTE — PROGRESS NOTE ADULT - ATTENDING COMMENTS
58 yo M w/ longstanding IDDM1 w/ retinopathy, CVA x 2 with residual left sided weakness, HTN, CKD 4 on sodium bicarb,  presented w/ slurred speech, generalized weakness for several days, MRI with stroke in multiple area, TTE with bubble negative, KATHY negative for thrombus, LINQ placed,  - stable, waiting for rehab placement.      - slurred speech and weakness- MRI with possible embolic stroke - LINQ placed. TTE /KATHY negative, LE duplex negative for DVT, coagulation work up,  protein S deficiency, MTHFA(DNA)-not detected /Vitamin B 12- 778 ( adequate) now stable , mild left sided weakness from prior cva, neuro follow up out-pt.   -ASA/plavix for 21 days.  - continue with Statin -high dose due to acute stroke.    - CKD stage 4-5 on sodium bicarb/phos binder.  - renal following appreciated.    - IDDM - HbA1c -8.4- endocrine following appreciated.-on Januvia 25mg daily.  - HTN - nifedipine     - BPH- on flomax.   - dvt prophylasix.- heparin    stable waiting for rehab.    poc dw medical team covering Dr. Milagro Summers, Dr.Dinesh Heller.

## 2022-10-29 NOTE — PROGRESS NOTE ADULT - SUBJECTIVE AND OBJECTIVE BOX
CC : comfortable , no complaint.   no focal weakness.  no pain reported.     T(C): 36.9 (10-29-22 @ 14:32), Max: 36.9 (10-29-22 @ 14:32)  HR: 92 (10-29-22 @ 14:32) (80 - 99)  BP: 147/87 (10-29-22 @ 14:32) (147/87 - 181/90)  RR: 16 (10-29-22 @ 14:32) (16 - 17)  SpO2: 98% (10-29-22 @ 14:32) (98% - 100%)    aspirin  chewable 81 milliGRAM(s) Oral daily  atorvastatin 80 milliGRAM(s) Oral at bedtime  bisacodyl 5 milliGRAM(s) Oral every 12 hours PRN  calcium acetate 667 milliGRAM(s) Oral three times a day with meals  clopidogrel Tablet 75 milliGRAM(s) Oral daily  dextrose 5%. 1000 milliLiter(s) IV Continuous <Continuous>  dextrose 5%. 1000 milliLiter(s) IV Continuous <Continuous>  dextrose 50% Injectable 25 Gram(s) IV Push once  dextrose 50% Injectable 12.5 Gram(s) IV Push once  dextrose 50% Injectable 25 Gram(s) IV Push once  dextrose Oral Gel 15 Gram(s) Oral once PRN  glucagon  Injectable 1 milliGRAM(s) IntraMuscular once  heparin   Injectable 5000 Unit(s) SubCutaneous every 8 hours  insulin lispro (ADMELOG) corrective regimen sliding scale   SubCutaneous three times a day before meals  insulin lispro (ADMELOG) corrective regimen sliding scale   SubCutaneous at bedtime  modafinil 50 milliGRAM(s) Oral every 24 hours  NIFEdipine XL 60 milliGRAM(s) Oral every 24 hours  sitaGLIPtin 25 milliGRAM(s) Oral daily  sodium bicarbonate 1300 milliGRAM(s) Oral three times a day  tamsulosin 0.4 milliGRAM(s) Oral at bedtime      Physical Exam :    General exam :  well built, not in distress, saturating well on room air.  Eyes : EOMI, PERRL   CVS : RRR no murmur, JVP not elevated  Lungs : good air entry bilaterally   Abdomen : BS present, soft, not tender, not distended.  Extremities: no edema, no tenderness.  Neuro : AAO x 3 non focal. slightly slurred speech.  Skin : warm and dry.   :  no puckett.                  Daily     Daily   CAPILLARY BLOOD GLUCOSE      POCT Blood Glucose.: 271 mg/dL (29 Oct 2022 12:06)    MEDICATIONS  (STANDING):  aspirin  chewable 81 milliGRAM(s) Oral daily  atorvastatin 80 milliGRAM(s) Oral at bedtime  calcium acetate 667 milliGRAM(s) Oral three times a day with meals  clopidogrel Tablet 75 milliGRAM(s) Oral daily  dextrose 5%. 1000 milliLiter(s) (100 mL/Hr) IV Continuous <Continuous>  dextrose 5%. 1000 milliLiter(s) (50 mL/Hr) IV Continuous <Continuous>  dextrose 50% Injectable 25 Gram(s) IV Push once  dextrose 50% Injectable 12.5 Gram(s) IV Push once  dextrose 50% Injectable 25 Gram(s) IV Push once  glucagon  Injectable 1 milliGRAM(s) IntraMuscular once  heparin   Injectable 5000 Unit(s) SubCutaneous every 8 hours  insulin lispro (ADMELOG) corrective regimen sliding scale   SubCutaneous three times a day before meals  insulin lispro (ADMELOG) corrective regimen sliding scale   SubCutaneous at bedtime  modafinil 50 milliGRAM(s) Oral every 24 hours  NIFEdipine XL 60 milliGRAM(s) Oral every 24 hours  sitaGLIPtin 25 milliGRAM(s) Oral daily  sodium bicarbonate 1300 milliGRAM(s) Oral three times a day  tamsulosin 0.4 milliGRAM(s) Oral at bedtime    MEDICATIONS  (PRN):  bisacodyl 5 milliGRAM(s) Oral every 12 hours PRN Constipation  dextrose Oral Gel 15 Gram(s) Oral once PRN Blood Glucose LESS THAN 70 milliGRAM(s)/deciliter

## 2022-10-30 LAB
GLUCOSE BLDC GLUCOMTR-MCNC: 106 MG/DL — HIGH (ref 70–99)
GLUCOSE BLDC GLUCOMTR-MCNC: 121 MG/DL — HIGH (ref 70–99)
GLUCOSE BLDC GLUCOMTR-MCNC: 123 MG/DL — HIGH (ref 70–99)
GLUCOSE BLDC GLUCOMTR-MCNC: 132 MG/DL — HIGH (ref 70–99)

## 2022-10-30 PROCEDURE — 99232 SBSQ HOSP IP/OBS MODERATE 35: CPT | Mod: GC

## 2022-10-30 RX ADMIN — Medication 60 MILLIGRAM(S): at 11:26

## 2022-10-30 RX ADMIN — MODAFINIL 50 MILLIGRAM(S): 200 TABLET ORAL at 06:26

## 2022-10-30 RX ADMIN — Medication 81 MILLIGRAM(S): at 11:26

## 2022-10-30 RX ADMIN — Medication 667 MILLIGRAM(S): at 17:53

## 2022-10-30 RX ADMIN — TAMSULOSIN HYDROCHLORIDE 0.4 MILLIGRAM(S): 0.4 CAPSULE ORAL at 22:10

## 2022-10-30 RX ADMIN — Medication 1300 MILLIGRAM(S): at 13:29

## 2022-10-30 RX ADMIN — HEPARIN SODIUM 5000 UNIT(S): 5000 INJECTION INTRAVENOUS; SUBCUTANEOUS at 06:27

## 2022-10-30 RX ADMIN — HEPARIN SODIUM 5000 UNIT(S): 5000 INJECTION INTRAVENOUS; SUBCUTANEOUS at 22:10

## 2022-10-30 RX ADMIN — Medication 667 MILLIGRAM(S): at 07:31

## 2022-10-30 RX ADMIN — Medication 1300 MILLIGRAM(S): at 22:09

## 2022-10-30 RX ADMIN — ATORVASTATIN CALCIUM 80 MILLIGRAM(S): 80 TABLET, FILM COATED ORAL at 22:09

## 2022-10-30 RX ADMIN — Medication 1300 MILLIGRAM(S): at 06:27

## 2022-10-30 RX ADMIN — HEPARIN SODIUM 5000 UNIT(S): 5000 INJECTION INTRAVENOUS; SUBCUTANEOUS at 13:29

## 2022-10-30 RX ADMIN — Medication 667 MILLIGRAM(S): at 11:57

## 2022-10-30 RX ADMIN — CLOPIDOGREL BISULFATE 75 MILLIGRAM(S): 75 TABLET, FILM COATED ORAL at 11:26

## 2022-10-30 NOTE — PROGRESS NOTE ADULT - SUBJECTIVE AND OBJECTIVE BOX
OVERNIGHT EVENTS: ZULY    SUBJECTIVE / INTERVAL HPI: Feel well, no CP, palpitations, or SOB. Still dysarthric, but improving. Had LLE weakness which is improving with PT     VITAL SIGNS:  Vital Signs Last 24 Hrs  T(C): 36.7 (30 Oct 2022 09:56), Max: 36.9 (29 Oct 2022 14:32)  T(F): 98 (30 Oct 2022 09:56), Max: 98.4 (29 Oct 2022 14:32)  HR: 85 (30 Oct 2022 09:56) (85 - 100)  BP: 145/87 (30 Oct 2022 09:56) (130/61 - 152/79)  BP(mean): 107 (30 Oct 2022 09:56) (107 - 107)  RR: 15 (30 Oct 2022 09:56) (15 - 16)  SpO2: 97% (30 Oct 2022 09:56) (95% - 98%)    Parameters below as of 30 Oct 2022 09:56  Patient On (Oxygen Delivery Method): room air        PHYSICAL EXAM:    General: In bed in no acute distress   HEENT: NCAT; PERRL, anicteric sclera; MMM  Neck: supple, trachea midline  Cardiovascular: S1, S2 normal; RRR, no M/G/R  Respiratory: CTABL; no W/R/R  Gastrointestinal: soft, nontender, nondistended. bowel sounds present.  Skin: no ulcerations or visible rashes appreciated  Extremities: WWP; no edema, clubbing or cyanosis, left lower extremity weakness (unable to move against gravity), RLE normal strength   Vascular: 2+ radial, DP/PT pulses B/L  Neurological: AAOx3; CN II-XII grossly intact; no focal deficits    MEDICATIONS:  MEDICATIONS  (STANDING):  aspirin  chewable 81 milliGRAM(s) Oral daily  atorvastatin 80 milliGRAM(s) Oral at bedtime  calcium acetate 667 milliGRAM(s) Oral three times a day with meals  clopidogrel Tablet 75 milliGRAM(s) Oral daily  dextrose 5%. 1000 milliLiter(s) (100 mL/Hr) IV Continuous <Continuous>  dextrose 5%. 1000 milliLiter(s) (50 mL/Hr) IV Continuous <Continuous>  dextrose 50% Injectable 25 Gram(s) IV Push once  dextrose 50% Injectable 12.5 Gram(s) IV Push once  dextrose 50% Injectable 25 Gram(s) IV Push once  glucagon  Injectable 1 milliGRAM(s) IntraMuscular once  heparin   Injectable 5000 Unit(s) SubCutaneous every 8 hours  insulin lispro (ADMELOG) corrective regimen sliding scale   SubCutaneous three times a day before meals  insulin lispro (ADMELOG) corrective regimen sliding scale   SubCutaneous at bedtime  modafinil 50 milliGRAM(s) Oral every 24 hours  NIFEdipine XL 60 milliGRAM(s) Oral every 24 hours  sitaGLIPtin 25 milliGRAM(s) Oral daily  sodium bicarbonate 1300 milliGRAM(s) Oral three times a day  tamsulosin 0.4 milliGRAM(s) Oral at bedtime    MEDICATIONS  (PRN):  bisacodyl 5 milliGRAM(s) Oral every 12 hours PRN Constipation  dextrose Oral Gel 15 Gram(s) Oral once PRN Blood Glucose LESS THAN 70 milliGRAM(s)/deciliter      ALLERGIES:  Allergies    No Known Allergies    Intolerances        LABS:              CAPILLARY BLOOD GLUCOSE      POCT Blood Glucose.: 106 mg/dL (30 Oct 2022 09:46)      RADIOLOGY & ADDITIONAL TESTS: Reviewed.

## 2022-10-30 NOTE — PROGRESS NOTE ADULT - ATTENDING COMMENTS
58 yo M w/ longstanding IDDM1 w/ retinopathy, CVA x 2 with residual left sided weakness, HTN, CKD 4 on sodium bicarb,  presented w/ slurred speech, generalized weakness for several days, MRI with stroke in multiple area, TTE with bubble negative, KATHY negative for thrombus, LINQ placed,  - stable, waiting for rehab placement.      - slurred speech and weakness- MRI with possible embolic stroke - LINQ placed. TTE /KATHY negative, LE duplex negative for DVT, coagulation work up,  protein S deficiency, MTHFA(DNA)-not detected /Vitamin B 12- 778 ( adequate) now stable , mild left sided weakness from prior cva, neuro follow up out-pt.   -ASA/plavix for 21 days.  - continue with Statin -high dose due to acute stroke.    - CKD stage 4-5 on sodium bicarb/phos binder.  - renal following appreciated.    - IDDM - HbA1c -8.4- endocrine following appreciated.-on Januvia 25mg daily.  - HTN - nifedipine     - BPH- on flomax.   - dvt prophylasix.- heparin    stable waiting for rehab.  need follow up with Neuro/ Heme/onc, Renal/ Endocrine./EP    poc dw medical team - medically stable for rehab placement.

## 2022-10-30 NOTE — PROGRESS NOTE ADULT - PROBLEM SELECTOR PLAN 8
Medicare Wellness Visit  Plan for Preventive Care    A good way for you to stay healthy is to use preventive care.  Medicare covers many services that can help you stay healthy.* The goal of these services is to find any health problems as quickly as possible. Finding problems early can help make them easier to treat.  Your personal plan below lists the services you may need and when they are due.     Health Maintenance Summary     Diabetes Eye Exam (Yearly)  Overdue - never done    Shingles Vaccine (1 of 2)  Overdue - never done    Pneumococcal Vaccine 65+ (1 of 1 - PPSV23)  Overdue - never done    Medicare Advantage- Medicare Wellness Visit (Yearly - January to December)  Overdue - never done    Colorectal Cancer Screen- (Fecal Occult Blood - Yearly)  Overdue since 1/7/2022    COVID-19 Vaccine (3 - Booster for Pfizer series)  Next due on 5/11/2022    Diabetes A1C (Every 6 Months)  Next due on 7/15/2022    Influenza Vaccine (Season Ended)  Next due on 9/1/2022    DM/CKD GFR (Yearly)  Next due on 1/15/2023    Diabetes Foot Exam (Yearly)  Next due on 2/25/2023    Depression Screening (Yearly)  Next due on 2/25/2023    DTaP/Tdap/Td Vaccine (2 - Td or Tdap)  Next due on 3/9/2028    Hepatitis C Screening   Completed    Osteoporosis Screening   Completed    Hepatitis B Vaccine   Aged Out    Meningococcal Vaccine   Aged Out    HPV Vaccine   Aged Out           Preventive Care for Women and Men    Heart Screenings (Cardiovascular):  · Blood tests are used to check your cholesterol, lipid and triglyceride levels. High levels can increase your risk for heart disease and stroke. High levels can be treated with medications, diet and exercise. Lowering your levels can help keep your heart and blood vessels healthy.  Your provider will order these tests if they are needed.    · An ultrasound is done to see if you have an abdominal aortic aneurysm (AAA).  This is an enlargement of one of the main blood vessels that delivers  blood to the body.   In the [place], 9,000 deaths are caused by AAA.  You may not even know you have this problem and as many as 1 in 3 people will have a serious problem if it is not treated.  Early diagnosis allows for more effective treatment and cure.  If you have a family history of AAA or are a male age 65-75 who has smoked, you are at higher risk of an AAA.  Your provider can order this test, if needed.    Colorectal Screening:  · There are many tests that are used to check for cancer of your colon and rectum. You and your provider should discuss what test is best for you and when to have it done.  Options include:  · Screening Colonoscopy: exam of the entire colon, seen through a flexible lighted tube.  · Flexible Sigmoidoscopy: exam of the last third (sigmoid portion) of the colon and rectum, seen through a flexible lighted tube.  · Cologuard DNA stool test: a sample of your stool is used to screen for cancer and unseen blood in your stool.  · Fecal Occult Blood Test: a sample of your stool is studied to find any unseen blood    Flu Shot:  · An immunization that helps to prevent influenza (the flu). You should get this every year. The best time to get the shot is in the fall.    Pneumococcal Shot:  • Vaccines are available that can help prevent pneumococcal disease, which is any type of infection caused by Streptococcus pneumoniae bacteria.   Their use can prevent some cases of pneumonia, meningitis, and sepsis. There are two types of pneumococcal vaccines:   o Conjugate vaccines (PCV-13 or Prevnar 13®) - helps protect against the 13 types of pneumococcal bacteria that are the most common causes of serious infections in children and adults.    o Polysaccharide vaccine (PPSV23 or Ppvxobblu75®) - helps protect against 23 types of pneumococcal bacteria for patients who are recommended to get it.  These vaccines should be given at least 12 months apart.  A booster is usually not needed.     Hepatitis B  Resolved   Presented w 153. s/p 2L NS in ED - likely 2/2 dehydration   Started D5 100cc/hr. Na improved to 142  - Patient tolerating PO   - D/c D5   - Continue to monitor  - Nephrology following. Recs appreciated Shot:  · An immunization that helps to protect people from getting Hepatitis B. Hepatitis B is a virus that spreads through contact with infected blood or body fluids. Many people with the virus do not have symptoms.  The virus can lead to serious problems, such as liver disease. Some people are at higher risk than others. Your doctor will tell you if you need this shot.     Diabetes Screening:  · A test to measure sugar (glucose) in your blood is called a fasting blood sugar. Fasting means you cannot have food or drink for at least 8 hours before the test. This test can detect diabetes long before you may notice symptoms.    Glaucoma Screening:  · Glaucoma screening is performed by your eye doctor. The test measures the fluid pressure inside your eyes to determine if you have glaucoma.     Hepatitis C Screening:  · A blood test to see if you have the hepatitis C virus.  Hepatitis C attacks the liver and is a major cause of chronic liver disease.  Medicare will cover a single screening for all adults born between 1945 & 1965, or high risk patients (people who have injected illegal drugs or people who have had blood transfusions).  High risk patients who continue to inject illegal drugs can be screened for Hepatitis C every year.    Smoking and Tobacco-Use Cessation Counseling:  · Tobacco is the single greatest cause of disease and early death in our country today. Medication and counseling together can increase a person’s chance of quitting for good.   · Medicare covers two quitting attempts per year, with four counseling sessions per attempt (eight sessions in a 12 month period)    Preventive Screening tests for Women    Screening Mammograms and Breast Exams:  · An x-ray of your breasts to check for breast cancer before you or your doctor may be able to feel it.  If breast cancer is found early it can usually be treated with success.    Pelvic Exams and Pap Tests:  · An exam to check for cervical and vaginal  cancer. A Pap test is a lab test in which cells are taken from your cervix and sent to the lab to look for signs of cervical cancer. If cancer of the cervix is found early, chances for a cure are good. Testing can generally end at age 65, or if a woman has a hysterectomy for a benign condition. Your provider may recommend more frequent testing if certain abnormal results are found.    Bone Mass Measurements:  · A painless x-ray of your bone density to see if you are at risk for a broken bone. Bone density refers to the thickness of bones or how tightly the bone tissue is packed.    Preventive Screening tests for Men    Prostate Screening:  · Should you have a prostate cancer test (PSA)?  It is up to you to decide if you want a prostate cancer test. Talk to your clinician to find out if the test is right for you.  Things for you to consider and talk about should include:  · Benefits and harms of the test  · Your family history  · How your race/ethnicity may influence the test  · If the test may impact other medical conditions you have  · Your values on screenings and treatments    *Medicare pays for many preventive services to keep you healthy. For some of these services, you might have to pay a deductible, coinsurance, and / or copayment.  The amounts vary depending on the type of services you need and the kind of Medicare health plan you have.    For further details on screenings offered by Medicare please visit: https://www.medicare.gov/coverage/preventive-screening-services

## 2022-10-31 DIAGNOSIS — N18.5 CHRONIC KIDNEY DISEASE, STAGE 5: ICD-10-CM

## 2022-10-31 LAB
GLUCOSE BLDC GLUCOMTR-MCNC: 145 MG/DL — HIGH (ref 70–99)
GLUCOSE BLDC GLUCOMTR-MCNC: 146 MG/DL — HIGH (ref 70–99)
GLUCOSE BLDC GLUCOMTR-MCNC: 160 MG/DL — HIGH (ref 70–99)
GLUCOSE BLDC GLUCOMTR-MCNC: 338 MG/DL — HIGH (ref 70–99)

## 2022-10-31 PROCEDURE — 99231 SBSQ HOSP IP/OBS SF/LOW 25: CPT | Mod: GC

## 2022-10-31 PROCEDURE — 99232 SBSQ HOSP IP/OBS MODERATE 35: CPT | Mod: GC

## 2022-10-31 PROCEDURE — 99232 SBSQ HOSP IP/OBS MODERATE 35: CPT

## 2022-10-31 RX ADMIN — Medication 1300 MILLIGRAM(S): at 13:53

## 2022-10-31 RX ADMIN — MODAFINIL 50 MILLIGRAM(S): 200 TABLET ORAL at 06:34

## 2022-10-31 RX ADMIN — Medication 81 MILLIGRAM(S): at 11:35

## 2022-10-31 RX ADMIN — Medication 1300 MILLIGRAM(S): at 06:34

## 2022-10-31 RX ADMIN — HEPARIN SODIUM 5000 UNIT(S): 5000 INJECTION INTRAVENOUS; SUBCUTANEOUS at 13:52

## 2022-10-31 RX ADMIN — Medication 667 MILLIGRAM(S): at 17:28

## 2022-10-31 RX ADMIN — Medication 1: at 12:20

## 2022-10-31 RX ADMIN — HEPARIN SODIUM 5000 UNIT(S): 5000 INJECTION INTRAVENOUS; SUBCUTANEOUS at 21:32

## 2022-10-31 RX ADMIN — CLOPIDOGREL BISULFATE 75 MILLIGRAM(S): 75 TABLET, FILM COATED ORAL at 11:34

## 2022-10-31 RX ADMIN — Medication 667 MILLIGRAM(S): at 11:35

## 2022-10-31 RX ADMIN — Medication 60 MILLIGRAM(S): at 11:34

## 2022-10-31 RX ADMIN — TAMSULOSIN HYDROCHLORIDE 0.4 MILLIGRAM(S): 0.4 CAPSULE ORAL at 21:31

## 2022-10-31 RX ADMIN — ATORVASTATIN CALCIUM 80 MILLIGRAM(S): 80 TABLET, FILM COATED ORAL at 21:31

## 2022-10-31 RX ADMIN — Medication 1300 MILLIGRAM(S): at 21:31

## 2022-10-31 RX ADMIN — Medication 667 MILLIGRAM(S): at 08:04

## 2022-10-31 RX ADMIN — Medication 2: at 21:53

## 2022-10-31 RX ADMIN — HEPARIN SODIUM 5000 UNIT(S): 5000 INJECTION INTRAVENOUS; SUBCUTANEOUS at 06:35

## 2022-10-31 NOTE — PROGRESS NOTE ADULT - NS ATTEST RISK PROBLEM GEN_ALL_CORE FT
Glucoses under control
Need for better glycemic control
Glucoses improved, and insulin doses stabilizing
Improving blood sugars
Trial off insulin

## 2022-10-31 NOTE — PROGRESS NOTE ADULT - PROBLEM SELECTOR PLAN 9
F: s/p D5  E: replete as necessary  N: Renal diet  DVT Prophy: SCDs iso anemia  Dispo: Pending AR placement F: s/p D5  E: replete as necessary  N: Renal diet  DVT Prophy: SCDs iso anemia  Dispo: Pending MIKHAIL placement

## 2022-10-31 NOTE — PROGRESS NOTE ADULT - ASSESSMENT
Patient is a 57y old Male w/ PMHx of IDDM, CVA x 2 (residual left side weakness), HTN, HLD, and CKD presents c/o slurred speech, weakness over the past several days, found to have embolic stroke on MRI. Admitted for further work up to determine the etiology of his stroke. Now s/p ILR placement, TTE and KATHY which were all negative. PT/OT recommending AR  Patient is a 57y old Male w/ PMHx of IDDM, CVA x 2 (residual left side weakness), HTN, HLD, and CKD presents c/o slurred speech, weakness over the past several days, found to have embolic stroke on MRI. Admitted for further work up to determine the etiology of his stroke. Now s/p ILR placement, TTE and KATHY which were all negative. Pending MIKHAIL placement

## 2022-10-31 NOTE — PROGRESS NOTE ADULT - SUBJECTIVE AND OBJECTIVE BOX
OVERNIGHT: No acute events overnight.  SUBJECTIVE: Patient was seen and examined this morning.     CAPILLARY BLOOD GLUCOSE & INSULIN RECEIVED  Yesterday  - Dinner FS mg/dL = 0 units of premeal Lispro + 0 units of Lispro sliding scale.   - Bedtime FS mg/dL = 0 units of Lantus + 0 units Lispro sliding scale.     Today  - Breakfast FS mg/dL = 25mg of Januvia + 0 units of Lispro sliding scale.   - Lunch FSG: 160 mg/dL = 0 units of premeal Lispro + 1 units of Lispro sliding scale.     CAPILLARY BLOOD GLUCOSE      POCT Blood Glucose.: 160 mg/dL (31 Oct 2022 12:05)  POCT Blood Glucose.: 146 mg/dL (31 Oct 2022 08:29)  POCT Blood Glucose.: 121 mg/dL (30 Oct 2022 21:09)  POCT Blood Glucose.: 123 mg/dL (30 Oct 2022 17:04)      REVIEW OF SYSTEMS  Constitutional:  Negative fever, chills or loss of appetite.  Eyes:  Negative blurry vision or double vision.  Cardiovascular:  Negative for chest pain or palpitations.  Respiratory:  Negative for cough, wheezing, or SOB.   Gastrointestinal:  Negative for nausea, vomiting, diarrhea, constipation, or abdominal pain.  Genitourinary:  Negative frequency, urgency or dysuria.  Neurologic:  No headache, confusion, dizziness, lightheadedness.    PHYSICAL EXAM  Vital Signs Last 24 Hrs  T(C): 36.5 (31 Oct 2022 11:37), Max: 37.2 (31 Oct 2022 05:20)  T(F): 97.7 (31 Oct 2022 11:37), Max: 99 (31 Oct 2022 05:20)  HR: 93 (31 Oct 2022 11:37) (87 - 98)  BP: 123/76 (31 Oct 2022 11:37) (123/76 - 168/81)  BP(mean): --  RR: 18 (31 Oct 2022 11:37) (14 - 20)  SpO2: 95% (31 Oct 2022 11:37) (95% - 98%)    Parameters below as of 31 Oct 2022 11:37  Patient On (Oxygen Delivery Method): room air        Constitutional: Awake, alert, in no acute distress.   HEENT: Normocephalic, atraumatic, ADRIANO, no proptosis or lid retraction.   Neck: supple, no acanthosis, no thyromegaly or palpable thyroid nodules.  Respiratory: Lungs clear to ausculation bilaterally.   Cardiovascular: regular rhythm, normal S1 and S2, no audible murmurs.   GI: soft, non-tender, non-distended, bowel sounds present, no masses appreciated.  Extremities: No lower extremity edema, peripheral pulses present.   Skin: no rashes.   Psychiatric: AAO x 3. Normal affect/mood.     LABS                    MEDICATIONS  MEDICATIONS  (STANDING):  aspirin  chewable 81 milliGRAM(s) Oral daily  atorvastatin 80 milliGRAM(s) Oral at bedtime  calcium acetate 667 milliGRAM(s) Oral three times a day with meals  clopidogrel Tablet 75 milliGRAM(s) Oral daily  dextrose 5%. 1000 milliLiter(s) (100 mL/Hr) IV Continuous <Continuous>  dextrose 5%. 1000 milliLiter(s) (50 mL/Hr) IV Continuous <Continuous>  dextrose 50% Injectable 25 Gram(s) IV Push once  dextrose 50% Injectable 12.5 Gram(s) IV Push once  dextrose 50% Injectable 25 Gram(s) IV Push once  glucagon  Injectable 1 milliGRAM(s) IntraMuscular once  heparin   Injectable 5000 Unit(s) SubCutaneous every 8 hours  insulin lispro (ADMELOG) corrective regimen sliding scale   SubCutaneous three times a day before meals  insulin lispro (ADMELOG) corrective regimen sliding scale   SubCutaneous at bedtime  modafinil 50 milliGRAM(s) Oral every 24 hours  NIFEdipine XL 60 milliGRAM(s) Oral every 24 hours  sitaGLIPtin 25 milliGRAM(s) Oral daily  sodium bicarbonate 1300 milliGRAM(s) Oral three times a day  tamsulosin 0.4 milliGRAM(s) Oral at bedtime    MEDICATIONS  (PRN):  bisacodyl 5 milliGRAM(s) Oral every 12 hours PRN Constipation  dextrose Oral Gel 15 Gram(s) Oral once PRN Blood Glucose LESS THAN 70 milliGRAM(s)/deciliter      ASSESSMENT / RECOMMENDATIONS      A1C: 7.2%  Weight: 90.7kg  BMI: 27.9  Creatinine: 6.89  GFR: 9  Ejection Fraction: 50%    # Type 2 diabetes mellitus  - Please continue lantus *** units at bedtime.   - Continue lispro *** units before each meal.  - Continue lispro moderate / low dose sliding scale four times daily with meals and at bedtime.  - Patient's fingerstick glucose goal is 100-180 mg/dL.    - For discharge, patient can ***.    - Patient can follow up at discharge with [facility] by calling [phone] to make an appointment.      Thank you for allowing us to participate in the care of ***.    Will continue to monitor.       Case discussed with Dr. Palm. Primary team updated.       Jaspal Velez    Endocrinology Fellow    Service Pager: [phone]    OVERNIGHT: No acute events overnight.  SUBJECTIVE: Patient was seen and examined this morning. Reports to be eating most of his meals.    CAPILLARY BLOOD GLUCOSE & INSULIN RECEIVED  Yesterday  - Dinner FS mg/dL = 0 units of premeal Lispro + 0 units of Lispro sliding scale.   - Bedtime FS mg/dL = 0 units of Lantus + 0 units Lispro sliding scale.     Today  - Breakfast FS mg/dL = 25mg of Januvia + 0 units of Lispro sliding scale.   - Lunch FSG: 160 mg/dL = 0 units of premeal Lispro + 1 units of Lispro sliding scale.     CAPILLARY BLOOD GLUCOSE      POCT Blood Glucose.: 160 mg/dL (31 Oct 2022 12:05)  POCT Blood Glucose.: 146 mg/dL (31 Oct 2022 08:29)  POCT Blood Glucose.: 121 mg/dL (30 Oct 2022 21:09)  POCT Blood Glucose.: 123 mg/dL (30 Oct 2022 17:04)      REVIEW OF SYSTEMS  Constitutional:  Negative fever, chills or loss of appetite.  Eyes:  Negative blurry vision or double vision.  Cardiovascular:  Negative for chest pain or palpitations.  Respiratory:  Negative for cough, wheezing, or SOB.   Gastrointestinal:  Negative for nausea, vomiting, diarrhea, constipation, or abdominal pain.  Genitourinary:  Negative frequency, urgency or dysuria.  Neurologic:  No headache, confusion, dizziness, lightheadedness.    PHYSICAL EXAM  Vital Signs Last 24 Hrs  T(C): 36.5 (31 Oct 2022 11:37), Max: 37.2 (31 Oct 2022 05:20)  T(F): 97.7 (31 Oct 2022 11:37), Max: 99 (31 Oct 2022 05:20)  HR: 93 (31 Oct 2022 11:37) (87 - 98)  BP: 123/76 (31 Oct 2022 11:37) (123/76 - 168/81)  BP(mean): --  RR: 18 (31 Oct 2022 11:37) (14 - 20)  SpO2: 95% (31 Oct 2022 11:37) (95% - 98%)    Parameters below as of 31 Oct 2022 11:37  Patient On (Oxygen Delivery Method): room air        Constitutional: Awake, alert, in no acute distress.   HEENT: Normocephalic, atraumatic, ADRIANO, no proptosis or lid retraction.   Neck: supple, no acanthosis, no thyromegaly or palpable thyroid nodules.  Respiratory: Lungs clear to ausculation bilaterally.   Cardiovascular: regular rhythm, normal S1 and S2, no audible murmurs.   GI: soft, non-tender, non-distended, bowel sounds present, no masses appreciated.  Extremities: No lower extremity edema, peripheral pulses present. L Charcot foot deformity.  Skin: no rashes.   Psychiatric: AAO x 3. Normal affect/mood.     LABS                    MEDICATIONS  MEDICATIONS  (STANDING):  aspirin  chewable 81 milliGRAM(s) Oral daily  atorvastatin 80 milliGRAM(s) Oral at bedtime  calcium acetate 667 milliGRAM(s) Oral three times a day with meals  clopidogrel Tablet 75 milliGRAM(s) Oral daily  dextrose 5%. 1000 milliLiter(s) (100 mL/Hr) IV Continuous <Continuous>  dextrose 5%. 1000 milliLiter(s) (50 mL/Hr) IV Continuous <Continuous>  dextrose 50% Injectable 25 Gram(s) IV Push once  dextrose 50% Injectable 12.5 Gram(s) IV Push once  dextrose 50% Injectable 25 Gram(s) IV Push once  glucagon  Injectable 1 milliGRAM(s) IntraMuscular once  heparin   Injectable 5000 Unit(s) SubCutaneous every 8 hours  insulin lispro (ADMELOG) corrective regimen sliding scale   SubCutaneous three times a day before meals  insulin lispro (ADMELOG) corrective regimen sliding scale   SubCutaneous at bedtime  modafinil 50 milliGRAM(s) Oral every 24 hours  NIFEdipine XL 60 milliGRAM(s) Oral every 24 hours  sitaGLIPtin 25 milliGRAM(s) Oral daily  sodium bicarbonate 1300 milliGRAM(s) Oral three times a day  tamsulosin 0.4 milliGRAM(s) Oral at bedtime    MEDICATIONS  (PRN):  bisacodyl 5 milliGRAM(s) Oral every 12 hours PRN Constipation  dextrose Oral Gel 15 Gram(s) Oral once PRN Blood Glucose LESS THAN 70 milliGRAM(s)/deciliter      ASSESSMENT / RECOMMENDATIONS      A1C: 7.2%  Weight: 90.7kg  BMI: 27.9  Creatinine: 6.89  GFR: 9  Ejection Fraction: 50%    # Type 2 diabetes mellitus  - Please continue Januvia 25mg daily before breakfast.  - Continue lispro low dose sliding scale four times daily with meals and at bedtime.  - Patient's fingerstick glucose goal is 100-180 mg/dL.    - For discharge, patient can continue Januvia 25mg daily before breakfast and lispro low dose sliding scale four times daily with meals and at breakfast.  - Patient can follow up at discharge with [facility] by calling [phone] to make an appointment.        Case discussed with Dr. Palm. Primary team updated.       Rere Goel  PGY1 IM Resident  Service Pager: [phone]

## 2022-10-31 NOTE — PROGRESS NOTE ADULT - SUBJECTIVE AND OBJECTIVE BOX
NEPHROLOGY PROGRESS NOTE    Subjective: Patient seen and examined at bedside. Feels well, no particular complaints other than being in bed, hoping to get PT to help transfer if possible. No other complaints.    [OBJECTIVE]:    Vital Signs:  T(F): , Max: 99 (10-31-22 @ 05:20)  HR:  (87 - 98)  BP:  (123/76 - 168/81)  BP(mean): --  ABP: --  ABP(mean): --  RR:  (14 - 20)  SpO2:  (95% - 98%)  CVP(mm Hg): --  CVP(cm H2O): --      10-30 @ 07:01  -  10-31 @ 07:00  --------------------------------------------------------  IN: 0 mL / OUT: 1000 mL / NET: -1000 mL      CAPILLARY BLOOD GLUCOSE      POCT Blood Glucose.: 160 mg/dL (31 Oct 2022 12:05)      Physical Exam:  T(F): 97.7 (10-31-22 @ 11:37)  HR: 93 (10-31-22 @ 11:37)  BP: 123/76 (10-31-22 @ 11:37)  RR: 18 (10-31-22 @ 11:37)  SpO2: 95% (10-31-22 @ 11:37)  Wt(kg): --    GENERAL: Awake, alert resting in bed    HEENT: ADRIANO, no JVP  CHEST/LUNG: Bilateral clear breath sounds, no accessory muscle use  HEART: Regular rate and rhythm, no murmur  ABDOMEN: Soft, nontender, non distended  EXTREMITIES: no pedal edema, warm  Neurology: Answer questions appropriately, no focal deficits noted  Derm: Dry skin, no visible rash      Medications:  MEDICATIONS  (STANDING):  aspirin  chewable 81 milliGRAM(s) Oral daily  atorvastatin 80 milliGRAM(s) Oral at bedtime  calcium acetate 667 milliGRAM(s) Oral three times a day with meals  clopidogrel Tablet 75 milliGRAM(s) Oral daily  dextrose 5%. 1000 milliLiter(s) (100 mL/Hr) IV Continuous <Continuous>  dextrose 5%. 1000 milliLiter(s) (50 mL/Hr) IV Continuous <Continuous>  dextrose 50% Injectable 25 Gram(s) IV Push once  dextrose 50% Injectable 12.5 Gram(s) IV Push once  dextrose 50% Injectable 25 Gram(s) IV Push once  glucagon  Injectable 1 milliGRAM(s) IntraMuscular once  heparin   Injectable 5000 Unit(s) SubCutaneous every 8 hours  insulin lispro (ADMELOG) corrective regimen sliding scale   SubCutaneous three times a day before meals  insulin lispro (ADMELOG) corrective regimen sliding scale   SubCutaneous at bedtime  modafinil 50 milliGRAM(s) Oral every 24 hours  NIFEdipine XL 60 milliGRAM(s) Oral every 24 hours  sitaGLIPtin 25 milliGRAM(s) Oral daily  sodium bicarbonate 1300 milliGRAM(s) Oral three times a day  tamsulosin 0.4 milliGRAM(s) Oral at bedtime    MEDICATIONS  (PRN):  bisacodyl 5 milliGRAM(s) Oral every 12 hours PRN Constipation  dextrose Oral Gel 15 Gram(s) Oral once PRN Blood Glucose LESS THAN 70 milliGRAM(s)/deciliter      Allergies:  Allergies    No Known Allergies    Intolerances        Labs:                Radiology and other tests:

## 2022-10-31 NOTE — PROGRESS NOTE ADULT - ATTENDING COMMENTS
Pt seen on rounds this afternoon and events of the weekend reviewed.  Despite what appears to be improved PO intake, his glucoses remain well controlled on the renal-dose Januvia  (120-160 mg%)  He is scheduled for transfer to Rehab.  Ambulation is likely impaired by what appears to be a Charcot L foot  To continue monotherapy with Januvia

## 2022-10-31 NOTE — PROGRESS NOTE ADULT - TIME BILLING
As above; Coordination of care with primary team, discussed ALEXIS, KRT indications  This excludes any time spent on separate procedures or teaching.
As above; Coordination of care with primary team, discussed mentation, kidney failure  This excludes any time spent on separate procedures or teaching.
Assessment of Januvia efficacy
Insulin adjustment
As above; Coordination of care with primary team, discussed ALEXIS, fluids  This excludes any time spent on separate procedures or teaching.
As above; Coordination of care with primary team, discussed HD indications, ALEXIS  This excludes any time spent on separate procedures or teaching.
Trial of discontinuation of insulin
As above; Coordination of care with primary team, discussed CKD, followup  This excludes any time spent on separate procedures or teaching.
Assessment of response to Januvia
Insulin adjustment

## 2022-10-31 NOTE — PROGRESS NOTE ADULT - PROBLEM SELECTOR PLAN 2
Hx of CKD stage 4. per HIE, baseline Cr 2.9, eGFR 27 (05/2019). Now presenting w/ BUN/ Cr 97/ 7.99.  May be post-obstructive, as pt straight-cath'd with UOP 500cc  Currently making urine     - Nephro following. Recs appreciated     - Presented w/ Cr 7.99 on admission, down trending at 6.89     - Serologic GN workup in 2019 negative    - UA w/ trace protein and moderate blood in the absence of RBCs, UPCR 1.9, Eleanor 56    - Non-oliguric  - Trend Cr. Avoid nephrotoxic meds. Strict I/Os  - Daily BMP

## 2022-10-31 NOTE — PROGRESS NOTE ADULT - SUBJECTIVE AND OBJECTIVE BOX
OVERNIGHT EVENTS:    SUBJECTIVE / INTERVAL HPI: Patient seen and examined at bedside.     VITAL SIGNS:  Vital Signs Last 24 Hrs  T(C): 36.5 (31 Oct 2022 11:37), Max: 37.2 (31 Oct 2022 05:20)  T(F): 97.7 (31 Oct 2022 11:37), Max: 99 (31 Oct 2022 05:20)  HR: 93 (31 Oct 2022 11:37) (90 - 98)  BP: 123/76 (31 Oct 2022 11:37) (123/76 - 168/81)  BP(mean): --  RR: 18 (31 Oct 2022 11:37) (17 - 20)  SpO2: 95% (31 Oct 2022 11:37) (95% - 98%)    Parameters below as of 31 Oct 2022 11:37  Patient On (Oxygen Delivery Method): room air        PHYSICAL EXAM:    General: In bed in no acute distress   HEENT: NCAT; PERRL, anicteric sclera; MMM  Neck: supple, trachea midline  Cardiovascular: S1, S2 normal; RRR, no M/G/R  Respiratory: CTABL; no W/R/R  Gastrointestinal: soft, nontender, nondistended. bowel sounds present.  Skin: no ulcerations or visible rashes appreciated  Extremities: WWP; no edema, clubbing or cyanosis, left lower extremity weakness (unable to move against gravity), RLE normal strength   Vascular: 2+ radial, DP/PT pulses B/L  Neurological: AAOx3; CN II-XII grossly intact; no focal deficits    MEDICATIONS:  MEDICATIONS  (STANDING):  aspirin  chewable 81 milliGRAM(s) Oral daily  atorvastatin 80 milliGRAM(s) Oral at bedtime  calcium acetate 667 milliGRAM(s) Oral three times a day with meals  clopidogrel Tablet 75 milliGRAM(s) Oral daily  dextrose 5%. 1000 milliLiter(s) (100 mL/Hr) IV Continuous <Continuous>  dextrose 5%. 1000 milliLiter(s) (50 mL/Hr) IV Continuous <Continuous>  dextrose 50% Injectable 25 Gram(s) IV Push once  dextrose 50% Injectable 12.5 Gram(s) IV Push once  dextrose 50% Injectable 25 Gram(s) IV Push once  glucagon  Injectable 1 milliGRAM(s) IntraMuscular once  heparin   Injectable 5000 Unit(s) SubCutaneous every 8 hours  insulin lispro (ADMELOG) corrective regimen sliding scale   SubCutaneous three times a day before meals  insulin lispro (ADMELOG) corrective regimen sliding scale   SubCutaneous at bedtime  modafinil 50 milliGRAM(s) Oral every 24 hours  NIFEdipine XL 60 milliGRAM(s) Oral every 24 hours  sitaGLIPtin 25 milliGRAM(s) Oral daily  sodium bicarbonate 1300 milliGRAM(s) Oral three times a day  tamsulosin 0.4 milliGRAM(s) Oral at bedtime    MEDICATIONS  (PRN):  bisacodyl 5 milliGRAM(s) Oral every 12 hours PRN Constipation  dextrose Oral Gel 15 Gram(s) Oral once PRN Blood Glucose LESS THAN 70 milliGRAM(s)/deciliter      ALLERGIES:  Allergies    No Known Allergies    Intolerances        LABS:              CAPILLARY BLOOD GLUCOSE      POCT Blood Glucose.: 160 mg/dL (31 Oct 2022 12:05)      RADIOLOGY & ADDITIONAL TESTS: Reviewed. OVERNIGHT EVENTS: ZULY    SUBJECTIVE / INTERVAL HPI: No SOB, CP or palpitations. Reports that dysarthria is improved, weakness is better and he has no lower extremity edema    VITAL SIGNS:  Vital Signs Last 24 Hrs  T(C): 36.5 (31 Oct 2022 11:37), Max: 37.2 (31 Oct 2022 05:20)  T(F): 97.7 (31 Oct 2022 11:37), Max: 99 (31 Oct 2022 05:20)  HR: 93 (31 Oct 2022 11:37) (90 - 98)  BP: 123/76 (31 Oct 2022 11:37) (123/76 - 168/81)  BP(mean): --  RR: 18 (31 Oct 2022 11:37) (17 - 20)  SpO2: 95% (31 Oct 2022 11:37) (95% - 98%)    Parameters below as of 31 Oct 2022 11:37  Patient On (Oxygen Delivery Method): room air        PHYSICAL EXAM:    General: In bed in no acute distress   HEENT: NCAT; PERRL, anicteric sclera; MMM  Neck: supple, trachea midline  Cardiovascular: S1, S2 normal; RRR, no M/G/R  Respiratory: CTABL; no W/R/R  Gastrointestinal: soft, nontender, nondistended. bowel sounds present.  Skin: no ulcerations or visible rashes appreciated  Extremities: WWP; no edema, clubbing or cyanosis, left lower extremity weakness (unable to move against gravity), RLE normal strength   Vascular: 2+ radial, DP/PT pulses B/L  Neurological: AAOx3; CN II-XII grossly intact; no focal deficits    MEDICATIONS:  MEDICATIONS  (STANDING):  aspirin  chewable 81 milliGRAM(s) Oral daily  atorvastatin 80 milliGRAM(s) Oral at bedtime  calcium acetate 667 milliGRAM(s) Oral three times a day with meals  clopidogrel Tablet 75 milliGRAM(s) Oral daily  dextrose 5%. 1000 milliLiter(s) (100 mL/Hr) IV Continuous <Continuous>  dextrose 5%. 1000 milliLiter(s) (50 mL/Hr) IV Continuous <Continuous>  dextrose 50% Injectable 25 Gram(s) IV Push once  dextrose 50% Injectable 12.5 Gram(s) IV Push once  dextrose 50% Injectable 25 Gram(s) IV Push once  glucagon  Injectable 1 milliGRAM(s) IntraMuscular once  heparin   Injectable 5000 Unit(s) SubCutaneous every 8 hours  insulin lispro (ADMELOG) corrective regimen sliding scale   SubCutaneous three times a day before meals  insulin lispro (ADMELOG) corrective regimen sliding scale   SubCutaneous at bedtime  modafinil 50 milliGRAM(s) Oral every 24 hours  NIFEdipine XL 60 milliGRAM(s) Oral every 24 hours  sitaGLIPtin 25 milliGRAM(s) Oral daily  sodium bicarbonate 1300 milliGRAM(s) Oral three times a day  tamsulosin 0.4 milliGRAM(s) Oral at bedtime    MEDICATIONS  (PRN):  bisacodyl 5 milliGRAM(s) Oral every 12 hours PRN Constipation  dextrose Oral Gel 15 Gram(s) Oral once PRN Blood Glucose LESS THAN 70 milliGRAM(s)/deciliter      ALLERGIES:  Allergies    No Known Allergies    Intolerances        LABS:              CAPILLARY BLOOD GLUCOSE      POCT Blood Glucose.: 160 mg/dL (31 Oct 2022 12:05)      RADIOLOGY & ADDITIONAL TESTS: Reviewed.

## 2022-10-31 NOTE — PROGRESS NOTE ADULT - ASSESSMENT
58 yo M w/ longstanding IDDM1 w/ retinopathy, HTN, CKD 4 presented w/ slurred speech, generalized weakness for several days. Nephrology consulted for AMS in the setting of advanced CKD    Assessment/Plan:    #Progression of CKD V (Last known Cr 3.3 in Jun/2020 which was progression of his CKD.)  Presented w/ Cr 7.99 on admission, down trending at 6.89   Serologic GN workup in 2019 negative  UA w/ trace protein and moderate blood in the absence of RBCs, UPCR 1.9, Eleanor 56  Non-oliguric    Plan:   No indication of urgent HD at this time. However, given his advance CKD, will likely require HD in the future   Maintain net even fluid balance   Strict I&Os  Encourage PO intake   Avoid nephrotoxic agents  Renal diet    #Hypernatremia-resolved  Daily BMP  Encourage PO intake    #Anemia  Hb not at goal  Iron sat 58%, ferritin 34  likely ACD  Defer Epo in the setting of cryptogenic stroke    #Hypertension  Continue nifedipine as is, some contribution of modafinil likely  Will defer initiation of ACEi/ARBs at outpatient nephrology visits    #Met Acidosis  Likely due to CKD  Continue sodium bicarb 1300TID     #BMD-CKD  iPTH 260  Continue ca acetate 667 qAC

## 2022-11-01 ENCOUNTER — TRANSCRIPTION ENCOUNTER (OUTPATIENT)
Age: 57
End: 2022-11-01

## 2022-11-01 VITALS
TEMPERATURE: 98 F | DIASTOLIC BLOOD PRESSURE: 72 MMHG | HEART RATE: 92 BPM | SYSTOLIC BLOOD PRESSURE: 147 MMHG | OXYGEN SATURATION: 95 % | RESPIRATION RATE: 18 BRPM

## 2022-11-01 LAB
GLUCOSE BLDC GLUCOMTR-MCNC: 102 MG/DL — HIGH (ref 70–99)
GLUCOSE BLDC GLUCOMTR-MCNC: 119 MG/DL — HIGH (ref 70–99)

## 2022-11-01 PROCEDURE — 85027 COMPLETE CBC AUTOMATED: CPT

## 2022-11-01 PROCEDURE — 85613 RUSSELL VIPER VENOM DILUTED: CPT

## 2022-11-01 PROCEDURE — 83935 ASSAY OF URINE OSMOLALITY: CPT

## 2022-11-01 PROCEDURE — 95700 EEG CONT REC W/VID EEG TECH: CPT

## 2022-11-01 PROCEDURE — 83090 ASSAY OF HOMOCYSTEINE: CPT

## 2022-11-01 PROCEDURE — 85307 ASSAY ACTIVATED PROTEIN C: CPT

## 2022-11-01 PROCEDURE — 82607 VITAMIN B-12: CPT

## 2022-11-01 PROCEDURE — 82728 ASSAY OF FERRITIN: CPT

## 2022-11-01 PROCEDURE — 97535 SELF CARE MNGMENT TRAINING: CPT

## 2022-11-01 PROCEDURE — 86146 BETA-2 GLYCOPROTEIN ANTIBODY: CPT

## 2022-11-01 PROCEDURE — 81291 MTHFR GENE: CPT

## 2022-11-01 PROCEDURE — 84681 ASSAY OF C-PEPTIDE: CPT

## 2022-11-01 PROCEDURE — 82570 ASSAY OF URINE CREATININE: CPT

## 2022-11-01 PROCEDURE — 85730 THROMBOPLASTIN TIME PARTIAL: CPT

## 2022-11-01 PROCEDURE — 85045 AUTOMATED RETICULOCYTE COUNT: CPT

## 2022-11-01 PROCEDURE — 74018 RADEX ABDOMEN 1 VIEW: CPT

## 2022-11-01 PROCEDURE — 36430 TRANSFUSION BLD/BLD COMPNT: CPT

## 2022-11-01 PROCEDURE — 86901 BLOOD TYPING SEROLOGIC RH(D): CPT

## 2022-11-01 PROCEDURE — 83735 ASSAY OF MAGNESIUM: CPT

## 2022-11-01 PROCEDURE — C8925: CPT

## 2022-11-01 PROCEDURE — 70551 MRI BRAIN STEM W/O DYE: CPT

## 2022-11-01 PROCEDURE — 84105 ASSAY OF URINE PHOSPHORUS: CPT

## 2022-11-01 PROCEDURE — 84156 ASSAY OF PROTEIN URINE: CPT

## 2022-11-01 PROCEDURE — 84100 ASSAY OF PHOSPHORUS: CPT

## 2022-11-01 PROCEDURE — G1004: CPT

## 2022-11-01 PROCEDURE — 97530 THERAPEUTIC ACTIVITIES: CPT

## 2022-11-01 PROCEDURE — 86900 BLOOD TYPING SEROLOGIC ABO: CPT

## 2022-11-01 PROCEDURE — 93306 TTE W/DOPPLER COMPLETE: CPT

## 2022-11-01 PROCEDURE — 80048 BASIC METABOLIC PNL TOTAL CA: CPT

## 2022-11-01 PROCEDURE — 86850 RBC ANTIBODY SCREEN: CPT

## 2022-11-01 PROCEDURE — 96360 HYDRATION IV INFUSION INIT: CPT

## 2022-11-01 PROCEDURE — 84484 ASSAY OF TROPONIN QUANT: CPT

## 2022-11-01 PROCEDURE — 85610 PROTHROMBIN TIME: CPT

## 2022-11-01 PROCEDURE — 85598 HEXAGNAL PHOSPH PLTLT NEUTRL: CPT

## 2022-11-01 PROCEDURE — 97112 NEUROMUSCULAR REEDUCATION: CPT

## 2022-11-01 PROCEDURE — 82553 CREATINE MB FRACTION: CPT

## 2022-11-01 PROCEDURE — 96361 HYDRATE IV INFUSION ADD-ON: CPT

## 2022-11-01 PROCEDURE — 97161 PT EVAL LOW COMPLEX 20 MIN: CPT

## 2022-11-01 PROCEDURE — 85025 COMPLETE CBC W/AUTO DIFF WBC: CPT

## 2022-11-01 PROCEDURE — 80307 DRUG TEST PRSMV CHEM ANLYZR: CPT

## 2022-11-01 PROCEDURE — 71045 X-RAY EXAM CHEST 1 VIEW: CPT

## 2022-11-01 PROCEDURE — 84300 ASSAY OF URINE SODIUM: CPT

## 2022-11-01 PROCEDURE — 99285 EMERGENCY DEPT VISIT HI MDM: CPT

## 2022-11-01 PROCEDURE — 76770 US EXAM ABDO BACK WALL COMP: CPT

## 2022-11-01 PROCEDURE — 83615 LACTATE (LD) (LDH) ENZYME: CPT

## 2022-11-01 PROCEDURE — 81001 URINALYSIS AUTO W/SCOPE: CPT

## 2022-11-01 PROCEDURE — 70450 CT HEAD/BRAIN W/O DYE: CPT | Mod: MG

## 2022-11-01 PROCEDURE — 86140 C-REACTIVE PROTEIN: CPT

## 2022-11-01 PROCEDURE — C1764: CPT

## 2022-11-01 PROCEDURE — 92610 EVALUATE SWALLOWING FUNCTION: CPT

## 2022-11-01 PROCEDURE — 83550 IRON BINDING TEST: CPT

## 2022-11-01 PROCEDURE — 93970 EXTREMITY STUDY: CPT

## 2022-11-01 PROCEDURE — 82310 ASSAY OF CALCIUM: CPT

## 2022-11-01 PROCEDURE — 85652 RBC SED RATE AUTOMATED: CPT

## 2022-11-01 PROCEDURE — 85306 CLOT INHIBIT PROT S FREE: CPT

## 2022-11-01 PROCEDURE — 83970 ASSAY OF PARATHORMONE: CPT

## 2022-11-01 PROCEDURE — 82340 ASSAY OF CALCIUM IN URINE: CPT

## 2022-11-01 PROCEDURE — 85300 ANTITHROMBIN III ACTIVITY: CPT

## 2022-11-01 PROCEDURE — 95716 VEEG EA 12-26HR CONT MNTR: CPT

## 2022-11-01 PROCEDURE — 84145 PROCALCITONIN (PCT): CPT

## 2022-11-01 PROCEDURE — 84133 ASSAY OF URINE POTASSIUM: CPT

## 2022-11-01 PROCEDURE — 97110 THERAPEUTIC EXERCISES: CPT

## 2022-11-01 PROCEDURE — 99239 HOSP IP/OBS DSCHRG MGMT >30: CPT | Mod: GC

## 2022-11-01 PROCEDURE — 86923 COMPATIBILITY TEST ELECTRIC: CPT

## 2022-11-01 PROCEDURE — 86147 CARDIOLIPIN ANTIBODY EA IG: CPT

## 2022-11-01 PROCEDURE — U0005: CPT

## 2022-11-01 PROCEDURE — 83010 ASSAY OF HAPTOGLOBIN QUANT: CPT

## 2022-11-01 PROCEDURE — 84540 ASSAY OF URINE/UREA-N: CPT

## 2022-11-01 PROCEDURE — 85303 CLOT INHIBIT PROT C ACTIVITY: CPT

## 2022-11-01 PROCEDURE — 82962 GLUCOSE BLOOD TEST: CPT

## 2022-11-01 PROCEDURE — 81240 F2 GENE: CPT

## 2022-11-01 PROCEDURE — 82550 ASSAY OF CK (CPK): CPT

## 2022-11-01 PROCEDURE — 36415 COLL VENOUS BLD VENIPUNCTURE: CPT

## 2022-11-01 PROCEDURE — P9016: CPT

## 2022-11-01 PROCEDURE — U0003: CPT

## 2022-11-01 PROCEDURE — 83605 ASSAY OF LACTIC ACID: CPT

## 2022-11-01 PROCEDURE — 83540 ASSAY OF IRON: CPT

## 2022-11-01 PROCEDURE — 81241 F5 GENE: CPT

## 2022-11-01 PROCEDURE — 97116 GAIT TRAINING THERAPY: CPT

## 2022-11-01 PROCEDURE — 86803 HEPATITIS C AB TEST: CPT

## 2022-11-01 PROCEDURE — 80053 COMPREHEN METABOLIC PANEL: CPT

## 2022-11-01 PROCEDURE — 83036 HEMOGLOBIN GLYCOSYLATED A1C: CPT

## 2022-11-01 PROCEDURE — 87635 SARS-COV-2 COVID-19 AMP PRB: CPT

## 2022-11-01 RX ORDER — NIFEDIPINE 30 MG
1 TABLET, EXTENDED RELEASE 24 HR ORAL
Qty: 0 | Refills: 0 | DISCHARGE
Start: 2022-11-01

## 2022-11-01 RX ORDER — ATORVASTATIN CALCIUM 80 MG/1
1 TABLET, FILM COATED ORAL
Qty: 0 | Refills: 0 | DISCHARGE
Start: 2022-11-01

## 2022-11-01 RX ORDER — TAMSULOSIN HYDROCHLORIDE 0.4 MG/1
1 CAPSULE ORAL
Qty: 0 | Refills: 0 | DISCHARGE
Start: 2022-11-01

## 2022-11-01 RX ORDER — CLOPIDOGREL BISULFATE 75 MG/1
1 TABLET, FILM COATED ORAL
Qty: 0 | Refills: 0 | DISCHARGE
Start: 2022-11-01 | End: 2022-11-08

## 2022-11-01 RX ORDER — HEPARIN SODIUM 5000 [USP'U]/ML
5000 INJECTION INTRAVENOUS; SUBCUTANEOUS
Qty: 0 | Refills: 0 | DISCHARGE
Start: 2022-11-01

## 2022-11-01 RX ORDER — SITAGLIPTIN 50 MG/1
1 TABLET, FILM COATED ORAL
Qty: 0 | Refills: 0 | DISCHARGE
Start: 2022-11-01

## 2022-11-01 RX ADMIN — HEPARIN SODIUM 5000 UNIT(S): 5000 INJECTION INTRAVENOUS; SUBCUTANEOUS at 13:33

## 2022-11-01 RX ADMIN — Medication 1300 MILLIGRAM(S): at 06:17

## 2022-11-01 RX ADMIN — HEPARIN SODIUM 5000 UNIT(S): 5000 INJECTION INTRAVENOUS; SUBCUTANEOUS at 06:18

## 2022-11-01 RX ADMIN — CLOPIDOGREL BISULFATE 75 MILLIGRAM(S): 75 TABLET, FILM COATED ORAL at 10:44

## 2022-11-01 RX ADMIN — Medication 667 MILLIGRAM(S): at 10:36

## 2022-11-01 RX ADMIN — Medication 1300 MILLIGRAM(S): at 13:33

## 2022-11-01 RX ADMIN — Medication 667 MILLIGRAM(S): at 13:33

## 2022-11-01 RX ADMIN — Medication 60 MILLIGRAM(S): at 10:45

## 2022-11-01 RX ADMIN — Medication 81 MILLIGRAM(S): at 10:44

## 2022-11-01 RX ADMIN — MODAFINIL 50 MILLIGRAM(S): 200 TABLET ORAL at 06:18

## 2022-11-01 NOTE — DISCHARGE NOTE NURSING/CASE MANAGEMENT/SOCIAL WORK - NSDCVIVACCINE_GEN_ALL_CORE_FT
influenza, injectable, quadrivalent, preservative free; 19-Feb-2019 10:20; Sandy Naranjo (RN); [employer]; [id number] (Exp. Date: 30-Jun-2019); IntraMuscular; Deltoid Left.; 0.5 milliLiter(s); VIS (VIS Published: 07-Aug-2015, VIS Presented: 19-Feb-2019);

## 2022-11-01 NOTE — PROGRESS NOTE ADULT - ASSESSMENT
Patient is a 57y old Male w/ PMHx of IDDM, CVA x 2 (residual left side weakness), HTN, HLD, and CKD presents c/o slurred speech, weakness over the past several days, found to have embolic stroke on MRI. Admitted for further work up to determine the etiology of his stroke. Now s/p ILR placement, TTE and KATHY which were all negative. Pending MIKHAIL placement

## 2022-11-01 NOTE — DISCHARGE NOTE NURSING/CASE MANAGEMENT/SOCIAL WORK - PATIENT PORTAL LINK FT
You can access the FollowMyHealth Patient Portal offered by [facility] by registering at the following website: [email or web address]. By joining [employer]’s FollowMyHealth portal, you will also be able to view your health information using other applications (apps) compatible with our system.

## 2022-11-01 NOTE — PROGRESS NOTE ADULT - ATTENDING COMMENTS
Case reviewed with renal team at length.   Interim chart reviewed.   Concern for advanced CKD; Trend labs closely and montor UO.  Nephrology f/u must must be secured if d/c'ed.

## 2022-11-01 NOTE — DISCHARGE NOTE NURSING/CASE MANAGEMENT/SOCIAL WORK - NSDCFUADDAPPT_GEN_ALL_CORE_FT
Please bring your Insurance card, Photo ID and Discharge paperwork to the following appointments:    (1) Please follow up with your Primary Care Provider, Dr. Regan Dorado on behalf of Dr. Andrzej Goldstein at [address], [address] [place] [address] on 11/08/2022 at 1:30pm.    Please obtain referrals from your Primary Care for upcoming specialty appointments: Hematology/Oncology, Endocrinology, Nephrology and Neurology appointments.    Appointment was scheduled by Ms. ASAD El, Referral Coordinator.    (2) Please follow up with your Hematology/Oncology Provider, Dr. Jas Jaramillo at [address] [address] on 11/08/2022 at 3:45pm.    Appointment was scheduled by Ms. ASAD El Referral Coordinator.      (3) Please follow up with your Endocrinology Provider, Dr. Jaspal Velez at [address], [address] [address] on 11/09/2022 at 10:00am.    Appointment was scheduled by Ms. ASAD El Referral Coordinator.    (4) Please follow up with your Nephrology Provider, Dr. Van Garcia for management of your chronic kidney disease and hypertension. Location will be at [address] [place] [address] on 11/10/2022 at 2:20pm.    Appointment was scheduled by Ms. ASAD El, Referral Coordinator.    (5) Please follow up with your Neurology Provider, Dr. Casandra San at [address] [place] [address] on 03/08/2023 at 1:40pm.    Please note that the office will contact you for an earlier appointment once available.    Appointment was scheduled by Ms. ASAD El, Referral Coordinator.

## 2022-11-01 NOTE — PROGRESS NOTE ADULT - ATTENDING SUPERVISION STATEMENT
Fellow
Resident
Fellow
Resident
Fellow
Fellow
Resident

## 2022-11-01 NOTE — PROGRESS NOTE ADULT - NUTRITIONAL ASSESSMENT
This patient has been assessed with a concern for Malnutrition and has been determined to have a diagnosis/diagnoses of Moderate protein-calorie malnutrition.    This patient is being managed with:   Diet NPO after Midnight-     NPO Start Date: 20-Oct-2022   NPO Start Time: 23:59  Except Medications  With Ice Chips/Sips of Water  Entered: Oct 20 2022  5:21PM    Diet Renal Restrictions-  For patients receiving Renal Replacement - No Protein Restr No Conc K No Conc Phos Low Sodium  Supplement Feeding Modality:  Oral  Nepro Cans or Servings Per Day:  1       Frequency:  Three Times a day  Entered: Oct 18 2022 12:48PM    
This patient has been assessed with a concern for Malnutrition and has been determined to have a diagnosis/diagnoses of Moderate protein-calorie malnutrition.    This patient is being managed with:   Diet Renal Restrictions-  For patients receiving Renal Replacement - No Protein Restr No Conc K No Conc Phos Low Sodium  Consistent Carbohydrate {Evening Snack} (CSTCHOSN)  Supplement Feeding Modality:  Oral  Nepro Cans or Servings Per Day:  1       Frequency:  Three Times a day  Entered: Oct 24 2022 11:25AM    
This patient has been assessed with a concern for Malnutrition and has been determined to have a diagnosis/diagnoses of Moderate protein-calorie malnutrition.    This patient is being managed with:   Diet Renal Restrictions-  For patients receiving Renal Replacement - No Protein Restr No Conc K No Conc Phos Low Sodium  Consistent Carbohydrate {Evening Snack} (CSTCHOSN)  Supplement Feeding Modality:  Oral  Nepro Cans or Servings Per Day:  1       Frequency:  Three Times a day  Entered: Oct 24 2022 11:25AM    
This patient has been assessed with a concern for Malnutrition and has been determined to have a diagnosis/diagnoses of Severe protein-calorie malnutrition.    This patient is being managed with:   Diet Renal Restrictions-  For patients receiving Renal Replacement - No Protein Restr No Conc K No Conc Phos Low Sodium  Consistent Carbohydrate {Evening Snack} (CSTCHOSN)    Start Time: 23:00  Entered: Oct 27 2022  4:10PM    
This patient has been assessed with a concern for Malnutrition and has been determined to have a diagnosis/diagnoses of Moderate protein-calorie malnutrition.    This patient is being managed with:   Diet Renal Restrictions-  For patients receiving Renal Replacement - No Protein Restr No Conc K No Conc Phos Low Sodium  Supplement Feeding Modality:  Oral  Nepro Cans or Servings Per Day:  1       Frequency:  Three Times a day  Entered: Oct 18 2022 12:48PM    
This patient has been assessed with a concern for Malnutrition and has been determined to have a diagnosis/diagnoses of Moderate protein-calorie malnutrition.    This patient is being managed with:   Diet Renal Restrictions-  For patients receiving Renal Replacement - No Protein Restr No Conc K No Conc Phos Low Sodium  Consistent Carbohydrate {Evening Snack} (CSTCHOSN)  Supplement Feeding Modality:  Oral  Nepro Cans or Servings Per Day:  1       Frequency:  Three Times a day  Entered: Oct 24 2022 11:25AM    
This patient has been assessed with a concern for Malnutrition and has been determined to have a diagnosis/diagnoses of Moderate protein-calorie malnutrition.    This patient is being managed with:   Diet Renal Restrictions-  For patients receiving Renal Replacement - No Protein Restr No Conc K No Conc Phos Low Sodium  Consistent Carbohydrate {Evening Snack} (CSTCHOSN)  Supplement Feeding Modality:  Oral  Nepro Cans or Servings Per Day:  1       Frequency:  Three Times a day  Entered: Oct 24 2022 11:25AM    
This patient has been assessed with a concern for Malnutrition and has been determined to have a diagnosis/diagnoses of Moderate protein-calorie malnutrition.    This patient is being managed with:   Diet Renal Restrictions-  For patients receiving Renal Replacement - No Protein Restr No Conc K No Conc Phos Low Sodium  Supplement Feeding Modality:  Oral  Nepro Cans or Servings Per Day:  1       Frequency:  Three Times a day  Entered: Oct 18 2022 12:48PM    
This patient has been assessed with a concern for Malnutrition and has been determined to have a diagnosis/diagnoses of Severe protein-calorie malnutrition.    This patient is being managed with:   Diet Renal Restrictions-  For patients receiving Renal Replacement - No Protein Restr No Conc K No Conc Phos Low Sodium  Consistent Carbohydrate {Evening Snack} (CSTCHOSN)    Start Time: 23:00  Entered: Oct 27 2022  4:10PM    
This patient has been assessed with a concern for Malnutrition and has been determined to have a diagnosis/diagnoses of Moderate protein-calorie malnutrition.    This patient is being managed with:   Diet Renal Restrictions-  For patients receiving Renal Replacement - No Protein Restr No Conc K No Conc Phos Low Sodium  Consistent Carbohydrate {Evening Snack} (CSTCHOSN)  Supplement Feeding Modality:  Oral  Nepro Cans or Servings Per Day:  1       Frequency:  Three Times a day  Entered: Oct 24 2022 11:25AM    
This patient has been assessed with a concern for Malnutrition and has been determined to have a diagnosis/diagnoses of Moderate protein-calorie malnutrition.    This patient is being managed with:   Diet Renal Restrictions-  For patients receiving Renal Replacement - No Protein Restr No Conc K No Conc Phos Low Sodium  Supplement Feeding Modality:  Oral  Nepro Cans or Servings Per Day:  1       Frequency:  Three Times a day  Entered: Oct 18 2022 12:48PM    
This patient has been assessed with a concern for Malnutrition and has been determined to have a diagnosis/diagnoses of Severe protein-calorie malnutrition.    This patient is being managed with:   Diet Renal Restrictions-  For patients receiving Renal Replacement - No Protein Restr No Conc K No Conc Phos Low Sodium  Consistent Carbohydrate {Evening Snack} (CSTCHOSN)    Start Time: 23:00  Entered: Oct 27 2022  4:10PM    
This patient has been assessed with a concern for Malnutrition and has been determined to have a diagnosis/diagnoses of Severe protein-calorie malnutrition.    This patient is being managed with:   Diet Renal Restrictions-  For patients receiving Renal Replacement - No Protein Restr No Conc K No Conc Phos Low Sodium  Consistent Carbohydrate {Evening Snack} (CSTCHOSN)  Supplement Feeding Modality:  Oral  Nepro Cans or Servings Per Day:  1       Frequency:  Three Times a day  Entered: Oct 24 2022 11:25AM    
This patient has been assessed with a concern for Malnutrition and has been determined to have a diagnosis/diagnoses of Severe protein-calorie malnutrition.    This patient is being managed with:   Diet Renal Restrictions-  For patients receiving Renal Replacement - No Protein Restr No Conc K No Conc Phos Low Sodium  Consistent Carbohydrate {Evening Snack} (CSTCHOSN)    Start Time: 23:00  Entered: Oct 27 2022  4:10PM    
This patient has been assessed with a concern for Malnutrition and has been determined to have a diagnosis/diagnoses of Moderate protein-calorie malnutrition.    This patient is being managed with:   Diet Renal Restrictions-  For patients receiving Renal Replacement - No Protein Restr No Conc K No Conc Phos Low Sodium  Supplement Feeding Modality:  Oral  Nepro Cans or Servings Per Day:  1       Frequency:  Three Times a day  Entered: Oct 18 2022 12:48PM    
This patient has been assessed with a concern for Malnutrition and has been determined to have a diagnosis/diagnoses of Moderate protein-calorie malnutrition.    This patient is being managed with:   Diet NPO after Midnight-     NPO Start Date: 19-Oct-2022   NPO Start Time: 23:59  Except Medications  Entered: Oct 19 2022  5:16PM    Diet Renal Restrictions-  For patients receiving Renal Replacement - No Protein Restr No Conc K No Conc Phos Low Sodium  Supplement Feeding Modality:  Oral  Nepro Cans or Servings Per Day:  1       Frequency:  Three Times a day  Entered: Oct 18 2022 12:48PM    
This patient has been assessed with a concern for Malnutrition and has been determined to have a diagnosis/diagnoses of Moderate protein-calorie malnutrition.    This patient is being managed with:   Diet Renal Restrictions-  For patients receiving Renal Replacement - No Protein Restr No Conc K No Conc Phos Low Sodium  Supplement Feeding Modality:  Oral  Nepro Cans or Servings Per Day:  1       Frequency:  Three Times a day  Entered: Oct 18 2022 12:48PM    
This patient has been assessed with a concern for Malnutrition and has been determined to have a diagnosis/diagnoses of Severe protein-calorie malnutrition.    This patient is being managed with:   Diet Renal Restrictions-  For patients receiving Renal Replacement - No Protein Restr No Conc K No Conc Phos Low Sodium  Consistent Carbohydrate {Evening Snack} (CSTCHOSN)    Start Time: 23:00  Entered: Oct 27 2022  4:10PM

## 2022-11-01 NOTE — DISCHARGE NOTE NURSING/CASE MANAGEMENT/SOCIAL WORK - NSDCPEFALRISK_GEN_ALL_CORE
For information on Fall & Injury Prevention, visit: [email or web address] OR  [email or web address] OR  https://www.cdc.gov/steadi/patient.html

## 2022-11-01 NOTE — DISCHARGE NOTE NURSING/CASE MANAGEMENT/SOCIAL WORK - NSDCPEWEB_GEN_ALL_CORE
[facility] for Tobacco Control website --- [email or web address] website --- [email or web address]

## 2022-11-01 NOTE — PROGRESS NOTE ADULT - SUBJECTIVE AND OBJECTIVE BOX
OVERNIGHT EVENTS: ZULY    SUBJECTIVE / INTERVAL HPI: No SOB, CP or palpitations. Reports that dysarthria is improved, weakness is better and he has no lower extremity edema    VITAL SIGNS:  Vital Signs Last 24 Hrs  T(C): 36.8 (01 Nov 2022 10:49), Max: 36.9 (31 Oct 2022 20:55)  T(F): 98.2 (01 Nov 2022 10:49), Max: 98.5 (31 Oct 2022 20:55)  HR: 92 (01 Nov 2022 10:49) (92 - 100)  BP: 147/72 (01 Nov 2022 10:49) (130/80 - 155/82)  BP(mean): --  RR: 18 (01 Nov 2022 10:49) (16 - 18)  SpO2: 95% (01 Nov 2022 10:49) (95% - 100%)    Parameters below as of 01 Nov 2022 10:49  Patient On (Oxygen Delivery Method): room air        PHYSICAL EXAM:    General: WDWN  HEENT: NCAT; PERRL, anicteric sclera; MMM  Neck: supple, trachea midline  Cardiovascular: S1, S2 normal; RRR, no M/G/R  Respiratory: CTABL; no W/R/R  Gastrointestinal: soft, nontender, nondistended. bowel sounds present.  Skin: no ulcerations or visible rashes appreciated  Extremities: WWP; no edema, clubbing or cyanosis  Vascular: 2+ radial, DP/PT pulses B/L  Neurological: AAOx3; CN II-XII grossly intact; no focal deficits    MEDICATIONS:  MEDICATIONS  (STANDING):  aspirin  chewable 81 milliGRAM(s) Oral daily  atorvastatin 80 milliGRAM(s) Oral at bedtime  calcium acetate 667 milliGRAM(s) Oral three times a day with meals  clopidogrel Tablet 75 milliGRAM(s) Oral daily  dextrose 5%. 1000 milliLiter(s) (100 mL/Hr) IV Continuous <Continuous>  dextrose 5%. 1000 milliLiter(s) (50 mL/Hr) IV Continuous <Continuous>  dextrose 50% Injectable 25 Gram(s) IV Push once  dextrose 50% Injectable 12.5 Gram(s) IV Push once  dextrose 50% Injectable 25 Gram(s) IV Push once  glucagon  Injectable 1 milliGRAM(s) IntraMuscular once  heparin   Injectable 5000 Unit(s) SubCutaneous every 8 hours  insulin lispro (ADMELOG) corrective regimen sliding scale   SubCutaneous three times a day before meals  insulin lispro (ADMELOG) corrective regimen sliding scale   SubCutaneous at bedtime  modafinil 50 milliGRAM(s) Oral every 24 hours  NIFEdipine XL 60 milliGRAM(s) Oral every 24 hours  sitaGLIPtin 25 milliGRAM(s) Oral daily  sodium bicarbonate 1300 milliGRAM(s) Oral three times a day  tamsulosin 0.4 milliGRAM(s) Oral at bedtime    MEDICATIONS  (PRN):  bisacodyl 5 milliGRAM(s) Oral every 12 hours PRN Constipation  dextrose Oral Gel 15 Gram(s) Oral once PRN Blood Glucose LESS THAN 70 milliGRAM(s)/deciliter      ALLERGIES:  Allergies    No Known Allergies    Intolerances        LABS:              CAPILLARY BLOOD GLUCOSE      POCT Blood Glucose.: 102 mg/dL (01 Nov 2022 08:24)      RADIOLOGY & ADDITIONAL TESTS: Reviewed.

## 2022-11-01 NOTE — PROGRESS NOTE ADULT - PROBLEM SELECTOR PLAN 9
F: s/p D5  E: replete as necessary  N: Renal diet  DVT Prophy: SCDs iso anemia  Dispo: Pending MIKHAIL placement

## 2022-11-01 NOTE — PROGRESS NOTE ADULT - ASSESSMENT
58 yo M w/ longstanding IDDM1 w/ retinopathy, HTN, CKD 4 presented w/ slurred speech, generalized weakness for several days. Nephrology consulted for AMS in the setting of advanced CKD    Assessment/Plan:    #Progression of CKD V (Last known Cr 3.3 in Jun/2020 which was progression of his CKD.)  Presented w/ Cr 7.99 on admission, down trended to 6.89 (10/25)  Serologic GN workup in 2019 negative  UA w/ trace protein and moderate blood in the absence of RBCs, UPCR 1.9, Eleanor 56  Non-oliguric    Plan:   No indication of urgent HD at this time. However, given his advance CKD, will likely require HD in the future   Maintain net even fluid balance   Strict I&Os  Encourage PO intake   Avoid nephrotoxic agents  Renal diet    #Hypernatremia-resolved  Daily BMP  Encourage PO intake    #Anemia  Hb not at goal  Iron sat 58%, ferritin 34  likely ACD  Defer Epo in the setting of cryptogenic stroke    #Hypertension  Continue nifedipine as is, some contribution of modafinil likely  Will defer initiation of ACEi/ARBs at outpatient nephrology visits    #Met Acidosis  Likely due to CKD  Continue sodium bicarb 1300TID     #BMD-CKD  iPTH 260  Continue ca acetate 667 [facility]        Alberto Cordova M.D  PGY-4 Nephrology  [phone]

## 2022-11-01 NOTE — PROGRESS NOTE ADULT - SUBJECTIVE AND OBJECTIVE BOX
Patient is a 57y Male seen and evaluated at bedside. No acute events overnight. Pending discharge to rehab. Denies any active symptoms. Resting comfortably in bed. Reports good appetite.       Meds:    aspirin  chewable 81 daily  atorvastatin 80 at bedtime  bisacodyl 5 every 12 hours PRN  calcium acetate 667 three times a day with meals  clopidogrel Tablet 75 daily  dextrose 5%. 1000 <Continuous>  dextrose 5%. 1000 <Continuous>  dextrose 50% Injectable 25 once  dextrose 50% Injectable 12.5 once  dextrose 50% Injectable 25 once  dextrose Oral Gel 15 once PRN  glucagon  Injectable 1 once  heparin   Injectable 5000 every 8 hours  insulin lispro (ADMELOG) corrective regimen sliding scale  three times a day before meals  insulin lispro (ADMELOG) corrective regimen sliding scale  at bedtime  modafinil 50 every 24 hours  NIFEdipine XL 60 every 24 hours  sitaGLIPtin 25 daily  sodium bicarbonate 1300 three times a day  tamsulosin 0.4 at bedtime      T(C): , Max: 36.9 (10-31-22 @ 20:55)  T(F): , Max: 98.5 (10-31-22 @ 20:55)  HR: 93 (11-01-22 @ 05:52)  BP: 155/82 (11-01-22 @ 05:52)  BP(mean): --  RR: 18 (11-01-22 @ 05:52)  SpO2: 96% (11-01-22 @ 05:52)  Wt(kg): --        Review of Systems:  ROS negative except as per HPI      PHYSICAL EXAM:  GENERAL: Awake, alert resting in bed    HEENT: ADRIANO, no JVP  CHEST/LUNG: Bilateral clear breath sounds, no accessory muscle use  HEART: Regular rate and rhythm, no murmur  ABDOMEN: Soft, nontender, non distended  EXTREMITIES: no pedal edema, warm  Neurology: Answer questions appropriately, no focal deficits noted  Derm: Dry skin, no visible rash      LABS:                      RADIOLOGY & ADDITIONAL STUDIES:

## 2022-11-01 NOTE — PROGRESS NOTE ADULT - PROVIDER SPECIALTY LIST ADULT
Internal Medicine
Nephrology
Nephrology
Neurology
Electrophysiology
Endocrinology
Internal Medicine
Nephrology
Neurology
Neurology
Endocrinology
Hospitalist
Internal Medicine
Internal Medicine
Nephrology
Hospitalist
Nephrology
Internal Medicine

## 2022-11-01 NOTE — PROGRESS NOTE ADULT - REASON FOR ADMISSION
slurred speech and weakness

## 2022-11-07 ENCOUNTER — NON-APPOINTMENT (OUTPATIENT)
Age: 57
End: 2022-11-07

## 2022-11-07 DIAGNOSIS — I69.954 HEMIPLEGIA AND HEMIPARESIS FOLLOWING UNSPECIFIED CEREBROVASCULAR DISEASE AFFECTING LEFT NON-DOMINANT SIDE: ICD-10-CM

## 2022-11-07 DIAGNOSIS — N25.81 SECONDARY HYPERPARATHYROIDISM OF RENAL ORIGIN: ICD-10-CM

## 2022-11-07 DIAGNOSIS — N40.0 BENIGN PROSTATIC HYPERPLASIA WITHOUT LOWER URINARY TRACT SYMPTOMS: ICD-10-CM

## 2022-11-07 DIAGNOSIS — R65.10 SYSTEMIC INFLAMMATORY RESPONSE SYNDROME (SIRS) OF NON-INFECTIOUS ORIGIN WITHOUT ACUTE ORGAN DYSFUNCTION: ICD-10-CM

## 2022-11-07 DIAGNOSIS — I63.40 CEREBRAL INFARCTION DUE TO EMBOLISM OF UNSPECIFIED CEREBRAL ARTERY: ICD-10-CM

## 2022-11-07 DIAGNOSIS — E86.0 DEHYDRATION: ICD-10-CM

## 2022-11-07 DIAGNOSIS — D63.1 ANEMIA IN CHRONIC KIDNEY DISEASE: ICD-10-CM

## 2022-11-07 DIAGNOSIS — N18.5 CHRONIC KIDNEY DISEASE, STAGE 5: ICD-10-CM

## 2022-11-07 DIAGNOSIS — N17.9 ACUTE KIDNEY FAILURE, UNSPECIFIED: ICD-10-CM

## 2022-11-07 DIAGNOSIS — E87.0 HYPEROSMOLALITY AND HYPERNATREMIA: ICD-10-CM

## 2022-11-07 DIAGNOSIS — E78.5 HYPERLIPIDEMIA, UNSPECIFIED: ICD-10-CM

## 2022-11-07 DIAGNOSIS — R29.703 NIHSS SCORE 3: ICD-10-CM

## 2022-11-07 DIAGNOSIS — Z79.4 LONG TERM (CURRENT) USE OF INSULIN: ICD-10-CM

## 2022-11-07 DIAGNOSIS — I12.0 HYPERTENSIVE CHRONIC KIDNEY DISEASE WITH STAGE 5 CHRONIC KIDNEY DISEASE OR END STAGE RENAL DISEASE: ICD-10-CM

## 2022-11-07 DIAGNOSIS — E87.20 ACIDOSIS, UNSPECIFIED: ICD-10-CM

## 2022-11-07 DIAGNOSIS — E11.319 TYPE 2 DIABETES MELLITUS WITH UNSPECIFIED DIABETIC RETINOPATHY WITHOUT MACULAR EDEMA: ICD-10-CM

## 2022-11-07 DIAGNOSIS — E11.649 TYPE 2 DIABETES MELLITUS WITH HYPOGLYCEMIA WITHOUT COMA: ICD-10-CM

## 2022-11-07 DIAGNOSIS — E11.22 TYPE 2 DIABETES MELLITUS WITH DIABETIC CHRONIC KIDNEY DISEASE: ICD-10-CM

## 2022-11-07 DIAGNOSIS — R47.81 SLURRED SPEECH: ICD-10-CM

## 2022-11-07 DIAGNOSIS — E43 UNSPECIFIED SEVERE PROTEIN-CALORIE MALNUTRITION: ICD-10-CM

## 2022-11-07 DIAGNOSIS — G93.41 METABOLIC ENCEPHALOPATHY: ICD-10-CM

## 2022-11-08 ENCOUNTER — APPOINTMENT (OUTPATIENT)
Dept: INTERNAL MEDICINE | Facility: CLINIC | Age: 57
End: 2022-11-08

## 2022-11-08 VITALS
BODY MASS INDEX: 23.85 KG/M2 | SYSTOLIC BLOOD PRESSURE: 126 MMHG | WEIGHT: 161 LBS | DIASTOLIC BLOOD PRESSURE: 88 MMHG | HEART RATE: 101 BPM | OXYGEN SATURATION: 91 % | TEMPERATURE: 97.6 F | HEIGHT: 69 IN

## 2022-11-08 DIAGNOSIS — E78.00 PURE HYPERCHOLESTEROLEMIA, UNSPECIFIED: ICD-10-CM

## 2022-11-08 PROCEDURE — 99496 TRANSJ CARE MGMT HIGH F2F 7D: CPT | Mod: GC

## 2022-11-09 ENCOUNTER — APPOINTMENT (OUTPATIENT)
Dept: ENDOCRINOLOGY | Facility: CLINIC | Age: 57
End: 2022-11-09

## 2022-11-09 VITALS — SYSTOLIC BLOOD PRESSURE: 121 MMHG | HEART RATE: 112 BPM | HEIGHT: 70 IN | DIASTOLIC BLOOD PRESSURE: 74 MMHG

## 2022-11-09 DIAGNOSIS — Z86.39 PERSONAL HISTORY OF OTHER ENDOCRINE, NUTRITIONAL AND METABOLIC DISEASE: ICD-10-CM

## 2022-11-09 PROBLEM — E78.00 HIGH BLOOD CHOLESTEROL: Status: ACTIVE | Noted: 2019-06-04

## 2022-11-09 PROCEDURE — 83036 HEMOGLOBIN GLYCOSYLATED A1C: CPT | Mod: QW

## 2022-11-09 PROCEDURE — 82962 GLUCOSE BLOOD TEST: CPT

## 2022-11-09 PROCEDURE — 99204 OFFICE O/P NEW MOD 45 MIN: CPT | Mod: 25,GC

## 2022-11-10 ENCOUNTER — APPOINTMENT (OUTPATIENT)
Dept: NEPHROLOGY | Facility: CLINIC | Age: 57
End: 2022-11-10

## 2022-11-10 VITALS
OXYGEN SATURATION: 96 % | RESPIRATION RATE: 14 BRPM | SYSTOLIC BLOOD PRESSURE: 120 MMHG | DIASTOLIC BLOOD PRESSURE: 82 MMHG | HEART RATE: 99 BPM

## 2022-11-10 PROCEDURE — 99205 OFFICE O/P NEW HI 60 MIN: CPT

## 2022-11-10 RX ORDER — LOSARTAN POTASSIUM 50 MG/1
50 TABLET, FILM COATED ORAL DAILY
Refills: 0 | Status: DISCONTINUED | COMMUNITY
End: 2022-11-10

## 2022-11-10 RX ORDER — ERGOCALCIFEROL 1.25 MG/1
1.25 MG CAPSULE, LIQUID FILLED ORAL
Qty: 12 | Refills: 0 | Status: DISCONTINUED | COMMUNITY
Start: 2019-05-30 | End: 2022-11-10

## 2022-11-10 RX ORDER — ASPIRIN 81 MG/1
81 TABLET, CHEWABLE ORAL
Refills: 0 | Status: DISCONTINUED | COMMUNITY
End: 2022-11-10

## 2022-11-10 RX ORDER — NIFEDIPINE 30 MG/1
30 TABLET, EXTENDED RELEASE ORAL DAILY
Qty: 90 | Refills: 3 | Status: DISCONTINUED | COMMUNITY
Start: 2019-12-03 | End: 2022-11-10

## 2022-11-10 RX ORDER — TORSEMIDE 10 MG/1
10 TABLET ORAL
Qty: 36 | Refills: 2 | Status: DISCONTINUED | COMMUNITY
Start: 2019-07-23 | End: 2022-11-10

## 2022-11-10 RX ORDER — SODIUM BICARBONATE 650 MG/1
650 TABLET ORAL
Qty: 180 | Refills: 3 | Status: DISCONTINUED | COMMUNITY
Start: 2019-07-29 | End: 2022-11-10

## 2022-11-10 RX ORDER — ISOPROPYL ALCOHOL 70 ML/100ML
70 SWAB TOPICAL
Qty: 200 | Refills: 0 | Status: DISCONTINUED | COMMUNITY
Start: 2018-12-12 | End: 2022-11-10

## 2022-11-10 RX ORDER — INSULIN GLARGINE 100 [IU]/ML
100 INJECTION, SOLUTION SUBCUTANEOUS
Refills: 0 | Status: DISCONTINUED | COMMUNITY
End: 2022-11-10

## 2022-11-10 RX ORDER — FERROUS GLUCONATE 324(37.5)
324 (37.5 FE) TABLET ORAL
Qty: 180 | Refills: 1 | Status: DISCONTINUED | COMMUNITY
Start: 2019-07-29 | End: 2022-11-10

## 2022-11-10 RX ORDER — BLOOD SUGAR DIAGNOSTIC
STRIP MISCELLANEOUS
Qty: 100 | Refills: 0 | Status: DISCONTINUED | COMMUNITY
Start: 2019-05-13 | End: 2022-11-10

## 2022-11-10 RX ORDER — ATORVASTATIN CALCIUM 20 MG/1
20 TABLET, FILM COATED ORAL DAILY
Qty: 90 | Refills: 1 | Status: DISCONTINUED | COMMUNITY
Start: 1900-01-01 | End: 2022-11-10

## 2022-11-10 RX ORDER — METOPROLOL SUCCINATE 50 MG/1
50 TABLET, EXTENDED RELEASE ORAL DAILY
Refills: 0 | Status: DISCONTINUED | COMMUNITY
End: 2022-11-10

## 2022-11-10 RX ORDER — LANCETS 28 GAUGE
EACH MISCELLANEOUS
Qty: 100 | Refills: 0 | Status: DISCONTINUED | COMMUNITY
Start: 2019-05-13 | End: 2022-11-10

## 2022-11-10 RX ORDER — PEN NEEDLE, DIABETIC 29 G X1/2"
31G X 5 MM NEEDLE, DISPOSABLE MISCELLANEOUS
Qty: 100 | Refills: 0 | Status: DISCONTINUED | COMMUNITY
Start: 2019-05-13 | End: 2022-11-10

## 2022-11-10 RX ORDER — INSULIN LISPRO 100 U/ML
100 INJECTION, SOLUTION SUBCUTANEOUS
Qty: 9 | Refills: 0 | Status: DISCONTINUED | COMMUNITY
Start: 2019-05-13 | End: 2022-11-10

## 2022-11-10 NOTE — PHYSICAL EXAM
[Alert] : alert [No Acute Distress] : no acute distress [Normal Sclera/Conjunctiva] : normal sclera/conjunctiva [EOMI] : extra ocular movement intact [No Proptosis] : no proptosis [Supple] : the neck was supple [Thyroid Not Enlarged] : the thyroid was not enlarged [No Thyroid Nodules] : no palpable thyroid nodules [No Respiratory Distress] : no respiratory distress [No Accessory Muscle Use] : no accessory muscle use [Clear to Auscultation] : lungs were clear to auscultation bilaterally [Normal S1, S2] : normal S1 and S2 [Normal Rate] : heart rate was normal [Regular Rhythm] : with a regular rhythm [No Edema] : no peripheral edema [Pedal Pulses Normal] : the pedal pulses are present [Normal Bowel Sounds] : normal bowel sounds [Not Tender] : non-tender [Not Distended] : not distended [Soft] : abdomen soft [No Stigmata of Cushings Syndrome] : no stigmata of Cushings Syndrome [No Tremors] : no tremors [Acanthosis Nigricans] : no acanthosis nigricans [de-identified] : Wheelchair-bound, Elderly male. [de-identified] : Left-sided residual weakness, more pronounced over lower extremity. [de-identified] : Small ulcer over lateral aspect of left foot with no signs of infection.  [de-identified] : Oriented x2

## 2022-11-10 NOTE — ASSESSMENT
[FreeTextEntry1] : Mr. 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 \par \par # Type 2 diabetes mellitus\par - Continue with Januvia 25 mg daily. \par - Last hemoglobin A1C is 5.8%\par - Fingerstick glucose: He doesn't check. \par - Denies hypoglycemic episodes. \par - Neuropathy: Decreased sensation in both feet. Charcot foot on left with ?ulcer. Referred to wound care clinic. \par - Nephropathy: CKD stage 5.  Follows with nephrology.\par - Check lipid profile. He's already on a statin.

## 2022-11-10 NOTE — CONSULT LETTER
[Dear  ___] : Dear  [unfilled], [Consult Letter:] : I had the pleasure of evaluating your patient, [unfilled]. [Please see my note below.] : Please see my note below. [Consult Closing:] : Thank you very much for allowing me to participate in the care of this patient.  If you have any questions, please do not hesitate to contact me. [FreeTextEntry3] : Warm regards,\par Van Garcia MD MA

## 2022-11-10 NOTE — END OF VISIT
[] : Fellow [FreeTextEntry3] : History of type 1 diabetes diagnosed as a teenager, he has  multiple complications that include CKD 4/5, CAD, ischemic stroke x and in November was hospitalized with a new episode of ischemic stroke, he has been on MDI with with adherence during his recent hospitalizations studies for C-peptide was performed which shows 2.4 glucose of 91 with s.creatinine of 6.8.  Therefore patient was started on Januvia recent A1c is 5.8%\par C-peptide half-life is approximately 20 minutes and is excreted mainly by the kidney therefore its interpretation must be done with caution.  Patient with type 1 diabetes my still producing very small levels of insulin.\par For DM, will continue with low DPPiv inh and continue addressing prevention of cardiac events. Rx statins\par Return to clinic in 2 months\par \par , half-life of C-peptide [Time Spent: ___ minutes] : I have spent [unfilled] minutes of time on the encounter.

## 2022-11-10 NOTE — HISTORY OF PRESENT ILLNESS
[FreeTextEntry1] : 57M with pmhx of CKD Stage 5, HTN, IDDM1 w/ retinopathy, stroke 2/2019 w/ residual L sided weakness presenting for CKD follow up. Per patient and loved ones unaware of current status with kidneys. Discussed with patient about dialysis options. Given current comorbidities will be difficult for patient to qualify for home dialysis modalities and thus in-center HD would likely be the best choice. Patient at this time is interested in pursuing preparation for hemodialysis.\par \par Leo Cisneros ([phone] Cousin as point person)

## 2022-11-10 NOTE — PHYSICAL EXAM
[General Appearance - Alert] : alert [General Appearance - In No Acute Distress] : in no acute distress [General Appearance - Well Nourished] : well nourished [General Appearance - Well-Appearing] : healthy appearing [Sclera] : the sclera and conjunctiva were normal [PERRL With Normal Accommodation] : pupils were equal in size, round, and reactive to light [Extraocular Movements] : extraocular movements were intact [Outer Ear] : the ears and nose were normal in appearance [Hearing Threshold Finger Rub Not Mason] : hearing was normal [Oropharynx] : the oropharynx was normal [Neck Appearance] : the appearance of the neck was normal [Jugular Venous Distention Increased] : there was no jugular-venous distention [Respiration, Rhythm And Depth] : normal respiratory rhythm and effort [Auscultation Breath Sounds / Voice Sounds] : lungs were clear to auscultation bilaterally [Apical Impulse] : the apical impulse was normal [Heart Rate And Rhythm] : heart rate was normal and rhythm regular [Heart Sounds] : normal S1 and S2 [Heart Sounds Gallop] : no gallops [Murmurs] : no murmurs [Heart Sounds Pericardial Friction Rub] : no pericardial rub [Bowel Sounds] : normal bowel sounds [Abdomen Soft] : soft [Abdomen Tenderness] : non-tender [Musculoskeletal - Swelling] : no joint swelling seen [Skin Color & Pigmentation] : normal skin color and pigmentation [Skin Turgor] : normal skin turgor [] : no rash [Oriented To Time, Place, And Person] : oriented to person, place, and time [Impaired Insight] : insight and judgment were intact [Affect] : the affect was normal [FreeTextEntry1] : mildly dysarthric speech

## 2022-11-10 NOTE — ASSESSMENT
[FreeTextEntry1] : 57M with pmhx of CKD Stage 5, HTN, IDDM1 w/ retinopathy, stroke 2/2019 w/ residual L sided weakness presenting for CKD follow up.\par \par #CKD Stage V\par Pt presenting as hospital follow up, with CKD V. Previously had seen Dr. Grace in [id number]. Discussed with patient given modalities not a good candidate for home dialysis therapies. Pt in agreement will need to start hemodialysis in the near future with in-center. Discussed with patient what to look for in terms of requiring acute HD including but not limited to. SOB, loss of appetite, fatigue, increased swelling or weight loss.\par -Referral to vascular surgery for vein mapping and fistula planning\par -Will get repeat labs today; CMP, Cystatin C, UA\par -Advised to limit sodium intake and monitor potassium intake\par \par #HTN\par On nifedipine 60mg Qday\par -Continue with current meds\par \par #IDDM\par -Last A1C 6.4; insulin stopped as A1C within range, but this might be the case because insulin may be lasting longer in his system due to poor kidney function\par -Continue with orals for now\par \par #Hx of stroke\par -F/u with Neuro\par -Remains on Provigil 100mg Qday\par \par RTC in 1 month, labs sent with patient to be faxed to me by rehab after resulted

## 2022-11-10 NOTE — HISTORY OF PRESENT ILLNESS
[FreeTextEntry1] : Mr. 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 \par \par He was recently hospitalized at [facility] (10/16/22 - 11/1/22) after he presented with several days of slurred speech/weakness and was found to have an embolic stroke. Patient was evaluated by endocrinology while inpatient and he was started on Januvia with adequate fingerstick glucose levels (120-160 mg/dL throughout the day). Of note, he was previously labeled as type 1 diabetic since he was very young (he cannot remember the exact age) and was started on insulin since then. However, he has memory problems and couldn't’t remember how much insulin he used during the day or how frequently. A C-peptide level was checked during his hospitalization given suspicion that he was not actually type 1 based on his history of inconsistent insulin use. Patient's C-peptide level was 2.4 with a glucose level of 91 mg/dL. Therefore, his insulin regimen was switched to Januvia 25 mg daily (renally adjusted) while inpatient. He was monitored for over a week on the medication with adequate control of his glucose levels and no signs of diabetic ketoacidosis. Patient was later discharged to subacute rehabilitation.

## 2022-11-13 LAB
CHOLEST SERPL-MCNC: 148 MG/DL
GLUCOSE BLDC GLUCOMTR-MCNC: 167
HBA1C MFR BLD HPLC: 6.4
HDLC SERPL-MCNC: 72 MG/DL
LDLC SERPL CALC-MCNC: 55 MG/DL
NONHDLC SERPL-MCNC: 76 MG/DL
TRIGL SERPL-MCNC: 106 MG/DL

## 2022-11-15 ENCOUNTER — NON-APPOINTMENT (OUTPATIENT)
Age: 57
End: 2022-11-15

## 2022-12-08 ENCOUNTER — APPOINTMENT (OUTPATIENT)
Dept: HEART AND VASCULAR | Facility: CLINIC | Age: 57
End: 2022-12-08

## 2022-12-09 ENCOUNTER — APPOINTMENT (OUTPATIENT)
Dept: HEART AND VASCULAR | Facility: CLINIC | Age: 57
End: 2022-12-09

## 2022-12-09 ENCOUNTER — NON-APPOINTMENT (OUTPATIENT)
Age: 57
End: 2022-12-09

## 2022-12-09 PROCEDURE — G2066: CPT | Mod: NC

## 2022-12-09 PROCEDURE — 93298 REM INTERROG DEV EVAL SCRMS: CPT

## 2022-12-10 NOTE — ED ADULT TRIAGE NOTE - HEART RATE (BEATS/MIN)
Constitutional: (-) fever (-) weakness (-) diaphoresis (-) pain  Eyes: (-) change in vision (-) photophobia (-) eye pain  ENT: (-) sore throat (-) ear pain  (-) nasal discharge (+) congestion  Cardiovascular: (-) chest pain (-) palpitations  Respiratory: (-) SOB (-) cough (-) WOB   GI: (-) abdominal pain (-) nausea (+) vomiting (-) diarrhea (-) constipation  : (-) dysuria (-) hematuria (-) increased frequency (-) increased urgency  Integumentary: (-) rash (-) redness (-) joint pain (-) MSK pain (-) swelling  Neurological:  (-) focal deficit (-) altered mental status (-) dizziness  General: (+) recent travel (+) sick contacts (+) decreased PO (-) urine output 107

## 2022-12-15 ENCOUNTER — APPOINTMENT (OUTPATIENT)
Dept: NEPHROLOGY | Facility: CLINIC | Age: 57
End: 2022-12-15

## 2022-12-22 ENCOUNTER — APPOINTMENT (OUTPATIENT)
Dept: INTERNAL MEDICINE | Facility: CLINIC | Age: 57
End: 2022-12-22

## 2022-12-30 ENCOUNTER — APPOINTMENT (OUTPATIENT)
Dept: NEPHROLOGY | Facility: CLINIC | Age: 57
End: 2022-12-30

## 2023-01-12 ENCOUNTER — APPOINTMENT (OUTPATIENT)
Dept: NEPHROLOGY | Facility: CLINIC | Age: 58
End: 2023-01-12
Payer: MEDICAID

## 2023-01-12 VITALS
HEART RATE: 102 BPM | OXYGEN SATURATION: 98 % | SYSTOLIC BLOOD PRESSURE: 93 MMHG | DIASTOLIC BLOOD PRESSURE: 64 MMHG | RESPIRATION RATE: 12 BRPM

## 2023-01-12 PROCEDURE — 99215 OFFICE O/P EST HI 40 MIN: CPT

## 2023-01-13 ENCOUNTER — APPOINTMENT (OUTPATIENT)
Dept: HEART AND VASCULAR | Facility: CLINIC | Age: 58
End: 2023-01-13

## 2023-01-15 NOTE — ASSESSMENT
[FreeTextEntry1] : 57M with pmhx of CKD Stage 5, HTN, IDDM1 w/ retinopathy, stroke 2/2019 w/ residual L sided weakness presenting for CKD follow up.\par \par #CKD Stage V\par Pt presenting as hospital follow up, with CKD V. Previously had seen Dr. Grace in [id number]. Discussed with patient given modalities not a good candidate for home dialysis therapies. Pt in agreement will need to start hemodialysis in the near future with in-center. Discussed with patient what to look for in terms of requiring acute HD including but not limited to. SOB, loss of appetite, fatigue, increased swelling or weight loss.\par -Re-Referral to vascular surgery for vein mapping and fistula planning\par -Will get repeat labs today; CMP, Cystatin C, UA\par -Advised to limit sodium intake and monitor potassium intake\par \par #HTN\par On nifedipine 60mg Qday - BPs low today, would decrease to 30\par -Continue with current meds\par \par #IDDM\par -Last A1C 6.4; insulin stopped as A1C within range, but this might be the case because insulin may be lasting longer in his system due to poor kidney function\par -Continue with orals for now\par \par #Hx of stroke\par -F/u with Neuro\par -Remains on Provigil 100mg Qday\par \par RTC in 1 month, labs sent with patient to be faxed to me by rehab after resulted.

## 2023-01-15 NOTE — HISTORY OF PRESENT ILLNESS
[FreeTextEntry1] : 57M with pmhx of CKD Stage 5, HTN, IDDM1 w/ retinopathy, stroke 2/2019 w/ residual L sided weakness presenting for CKD follow up.\par \par No uremic symptoms. Denies uremic symptoms such as nausea, vomiting, fatigue, anorexia, weight loss, muscle cramps, pruritus, dysgeusia, or changes in mental status \par \par Saw vascular surgery in [place], will get surgery for fistula\par

## 2023-01-15 NOTE — PHYSICAL EXAM
[General Appearance - Alert] : alert [General Appearance - In No Acute Distress] : in no acute distress [Sclera] : the sclera and conjunctiva were normal [PERRL With Normal Accommodation] : pupils were equal in size, round, and reactive to light [Extraocular Movements] : extraocular movements were intact [Outer Ear] : the ears and nose were normal in appearance [Oropharynx] : the oropharynx was normal [Neck Appearance] : the appearance of the neck was normal [Neck Cervical Mass (___cm)] : no neck mass was observed [Jugular Venous Distention Increased] : there was no jugular-venous distention [Respiration, Rhythm And Depth] : normal respiratory rhythm and effort [Exaggerated Use Of Accessory Muscles For Inspiration] : no accessory muscle use [Auscultation Breath Sounds / Voice Sounds] : lungs were clear to auscultation bilaterally [Heart Rate And Rhythm] : heart rate was normal and rhythm regular [Heart Sounds] : normal S1 and S2 [Heart Sounds Gallop] : no gallops [Murmurs] : no murmurs [Heart Sounds Pericardial Friction Rub] : no pericardial rub [Edema] : there was no peripheral edema [Veins - Varicosity Changes] : there were no varicosital changes [Bowel Sounds] : normal bowel sounds [Abdomen Soft] : soft [Abdomen Tenderness] : non-tender [Abdomen Mass (___ Cm)] : no abdominal mass palpated [No CVA Tenderness] : no ~M costovertebral angle tenderness [No Spinal Tenderness] : no spinal tenderness [Involuntary Movements] : no involuntary movements were seen [FreeTextEntry1] : In wheelchair [Skin Color & Pigmentation] : normal skin color and pigmentation [Skin Turgor] : normal skin turgor [] : no rash

## 2023-01-17 ENCOUNTER — FORM ENCOUNTER (OUTPATIENT)
Age: 58
End: 2023-01-17

## 2023-02-10 ENCOUNTER — APPOINTMENT (OUTPATIENT)
Dept: NEPHROLOGY | Facility: CLINIC | Age: 58
End: 2023-02-10
Payer: MEDICAID

## 2023-02-10 VITALS
HEART RATE: 99 BPM | SYSTOLIC BLOOD PRESSURE: 112 MMHG | DIASTOLIC BLOOD PRESSURE: 74 MMHG | RESPIRATION RATE: 12 BRPM | OXYGEN SATURATION: 99 %

## 2023-02-10 PROCEDURE — 99215 OFFICE O/P EST HI 40 MIN: CPT

## 2023-02-15 NOTE — ASSESSMENT
[FreeTextEntry1] : 57M with pmhx of CKD Stage 5, HTN, IDDM1 w/ retinopathy, stroke 2/2019 w/ residual L sided weakness presenting for CKD follow up.\par \par #CKD Stage V\par Pt presenting as hospital follow up, with CKD V. Previously had seen Dr. Grace in [id number]. Discussed with patient given modalities not a good candidate for home dialysis therapies. Pt in agreement will need to start hemodialysis in the near future with in-center. Discussed with patient what to look for in terms of requiring acute HD including but not limited to. SOB, loss of appetite, fatigue, increased swelling or weight loss.\par -Will get repeat labs today; CMP, Cystatin C, UA\par -Advised to limit sodium intake and monitor potassium intake\par \par #HTN\par On nifedipine 60mg Qday - BPs low today, would decrease to 30\par -Continue with current meds\par \par #IDDM\par -Last A1C 6.4; insulin stopped as A1C within range, but this might be the case because insulin may be lasting longer in his system due to poor kidney function\par -Continue with orals for now\par \par #Hx of stroke\par -F/u with Neuro\par -Remains on Provigil 100mg Qday\par \par RTC in 1 month, labs sent with patient to be faxed to me by rehab after resulted. \par \par ADDENDUM: Discussed with vascular in [place], will get fistula done in near future with monitoring for uremia\par

## 2023-02-15 NOTE — HISTORY OF PRESENT ILLNESS
[FreeTextEntry1] : 57M with pmhx of CKD Stage 5, HTN, IDDM1 w/ retinopathy, stroke 2/2019 w/ residual L sided weakness presenting for CKD follow up.\par \par Last labs from 1/16 reviewed with patient, stable weak kidneys no acute indication for HD\par \par Denies uremic symptoms such as nausea, vomiting, fatigue, anorexia, weight loss, muscle cramps, pruritus, dysgeusia, or changes in mental status \par \par Working well with PT, doing weights, feels has good improvement\par \par Will discuss with vascular, left message for callback

## 2023-02-17 ENCOUNTER — APPOINTMENT (OUTPATIENT)
Dept: HEART AND VASCULAR | Facility: CLINIC | Age: 58
End: 2023-02-17

## 2023-02-26 ENCOUNTER — FORM ENCOUNTER (OUTPATIENT)
Age: 58
End: 2023-02-26

## 2023-03-08 ENCOUNTER — APPOINTMENT (OUTPATIENT)
Dept: NEUROLOGY | Facility: CLINIC | Age: 58
End: 2023-03-08

## 2023-03-10 ENCOUNTER — APPOINTMENT (OUTPATIENT)
Dept: ENDOCRINOLOGY | Facility: CLINIC | Age: 58
End: 2023-03-10

## 2023-03-24 ENCOUNTER — APPOINTMENT (OUTPATIENT)
Dept: HEART AND VASCULAR | Facility: CLINIC | Age: 58
End: 2023-03-24

## 2023-03-26 ENCOUNTER — FORM ENCOUNTER (OUTPATIENT)
Age: 58
End: 2023-03-26

## 2023-04-17 ENCOUNTER — APPOINTMENT (OUTPATIENT)
Dept: VASCULAR SURGERY | Facility: CLINIC | Age: 58
End: 2023-04-17
Payer: MEDICAID

## 2023-04-17 VITALS
DIASTOLIC BLOOD PRESSURE: 74 MMHG | WEIGHT: 133 LBS | HEART RATE: 94 BPM | SYSTOLIC BLOOD PRESSURE: 133 MMHG | BODY MASS INDEX: 19.04 KG/M2 | HEIGHT: 70 IN

## 2023-04-17 PROCEDURE — 99204 OFFICE O/P NEW MOD 45 MIN: CPT

## 2023-04-17 PROCEDURE — 93986 DUP-SCAN HEMO COMPL UNI STD: CPT

## 2023-04-17 RX ORDER — POLYVINYL ALCOHOL 14 MG/ML
1.4 SOLUTION/ DROPS OPHTHALMIC
Qty: 15 | Refills: 0 | Status: DISCONTINUED | COMMUNITY
Start: 2019-07-15 | End: 2023-04-17

## 2023-04-17 RX ORDER — SITAGLIPTIN 25 MG/1
25 TABLET, FILM COATED ORAL DAILY
Qty: 90 | Refills: 1 | Status: DISCONTINUED | COMMUNITY
Start: 2022-11-10 | End: 2023-04-17

## 2023-04-17 NOTE — HISTORY OF PRESENT ILLNESS
[FreeTextEntry1] : 56 yo M, known to the practice who was last seen in 2015 due to diabetic ulcer referred now by Dr. Garcia to be evaluated for an AVF creation in the setting of impending dialysis. Patient with HTN, IDDM Type1, with retinopathy, stroke 2019 w/residual left sided weakness, CKD stage V. Patient is doing well overall, denies uremic symptoms as N/V, fatigue, muscle cramps. He resides at [facility].

## 2023-04-17 NOTE — PHYSICAL EXAM
[Respiratory Effort] : normal respiratory effort [Normal Heart Sounds] : normal heart sounds [2+] : left 2+ [No Rash or Lesion] : No rash or lesion [Alert] : alert [Calm] : calm [Ankle Swelling (On Exam)] : not present [Abdomen Tenderness] : ~T ~M No abdominal tenderness [de-identified] : WN/WD, NAD, on a wheelchair [de-identified] : NC/AT [de-identified] : supple [de-identified] : FROM

## 2023-04-17 NOTE — REVIEW OF SYSTEMS
[Negative] : Heme/Lymph [As Noted in HPI] : as noted in HPI [Limb Weakness] : limb weakness [Difficulty Walking] : difficulty walking

## 2023-04-17 NOTE — PROCEDURE
[FreeTextEntry1] : LUE vein mapping was done in the office demonstrating borderline size veins to create AV conduit

## 2023-04-17 NOTE — ASSESSMENT
[Other: _____] : [unfilled] [FreeTextEntry1] : 56 yo M, known to the practice who was last seen in 2015 due to diabetic ulcer referred now by Dr. Garcia to be evaluated for an AVF creation in the setting of impending dialysis. \par Patient with right hand dominant.\par LUE vein mapping was done in the office demonstrating borderline size veins to create AV conduit.\par We discussed the procedure of AVF/AVG creation, RABs were discussed, all questions were answered.\par He agrees and would like to proceed.\par We will schedule him for next week for left AVF/AVG creation.

## 2023-04-17 NOTE — ADDENDUM
[FreeTextEntry1] : This note was written by Tawanna BLACK, acting as a scribe for Dr. Caleb Royal.  I, Dr. Caleb Royal, have read and attest that all the information, medical decision-making, and discharge instructions within are true and accurate.\par \par I, Dr. Caleb Royal, personally performed the evaluation and management (E/M) services for this new patient.  That E/M includes conducting the initial examination, assessing all conditions, and establishing the plan of care.  Today, my ACP, Tawanna BLACK, was here to observe my evaluation and management services for this patient to be followed going forward.\par \par \par

## 2023-04-21 RX ORDER — FERROUS SULFATE 325(65) MG
324 TABLET ORAL
Refills: 0 | Status: ACTIVE | COMMUNITY

## 2023-04-21 RX ORDER — CLOPIDOGREL 75 MG/1
75 TABLET, FILM COATED ORAL
Refills: 0 | Status: ACTIVE | COMMUNITY

## 2023-04-21 RX ORDER — ATORVASTATIN CALCIUM 80 MG/1
80 TABLET, FILM COATED ORAL
Refills: 0 | Status: ACTIVE | COMMUNITY

## 2023-04-21 RX ORDER — TAMSULOSIN HYDROCHLORIDE 0.4 MG/1
0.4 CAPSULE ORAL
Refills: 0 | Status: ACTIVE | COMMUNITY

## 2023-04-21 RX ORDER — MODAFINIL 100 MG/1
100 TABLET ORAL
Refills: 0 | Status: ACTIVE | COMMUNITY

## 2023-04-21 RX ORDER — LINAGLIPTIN 5 MG/1
5 TABLET, FILM COATED ORAL
Refills: 0 | Status: ACTIVE | COMMUNITY

## 2023-04-24 ENCOUNTER — TRANSCRIPTION ENCOUNTER (OUTPATIENT)
Age: 58
End: 2023-04-24

## 2023-04-24 NOTE — ASU PATIENT PROFILE, ADULT - NSICDXPASTSURGICALHX_GEN_ALL_CORE_FT
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

## 2023-04-24 NOTE — ASU PATIENT PROFILE, ADULT - FALL HARM RISK - HARM RISK INTERVENTIONS
Assistance with ambulation/Assistance OOB with selected safe patient handling equipment/Communicate Risk of Fall with Harm to all staff/Discuss with provider need for PT consult/Monitor for mental status changes/Monitor gait and stability/Move patient closer to nurses' station/Provide patient with walking aids - walker, cane, crutches/Reinforce activity limits and safety measures with patient and family/Reorient to person, place and time as needed/Tailored Fall Risk Interventions/Toileting schedule using arm’s reach rule for commode and bathroom/Use of alarms - bed, chair and/or voice tab/Visual Cue: Yellow wristband and red socks/Bed in lowest position, wheels locked, appropriate side rails in place/Call bell, personal items and telephone in reach/Instruct patient to call for assistance before getting out of bed or chair/Non-slip footwear when patient is out of bed/[place] to call system/Physically safe environment - no spills, clutter or unnecessary equipment/Purposeful Proactive Rounding/Room/bathroom lighting operational, light cord in reach

## 2023-04-24 NOTE — ASU PATIENT PROFILE, ADULT - NSICDXPASTMEDICALHX_GEN_ALL_CORE_FT
PAST MEDICAL HISTORY:  Cerebral artery occlusion with cerebral infarction Cerebral vascular accident    Hyperlipidemia Hyperlipidemia    Hypertension     Type 2 diabetes mellitus Diabetes mellitus     PAST MEDICAL HISTORY:  Cerebral artery occlusion with cerebral infarction Cerebral vascular accident    H/O diabetic retinopathy     Hyperlipidemia Hyperlipidemia    Hypertension     Stage 4 chronic kidney disease     Type 2 diabetes mellitus Diabetes mellitus

## 2023-04-25 ENCOUNTER — TRANSCRIPTION ENCOUNTER (OUTPATIENT)
Age: 58
End: 2023-04-25

## 2023-04-25 ENCOUNTER — APPOINTMENT (OUTPATIENT)
Dept: VASCULAR SURGERY | Facility: CLINIC | Age: 58
End: 2023-04-25

## 2023-04-25 ENCOUNTER — OUTPATIENT (OUTPATIENT)
Dept: OUTPATIENT SERVICES | Facility: HOSPITAL | Age: 58
LOS: 1 days | Discharge: HOME | End: 2023-04-25
Payer: MEDICAID

## 2023-04-25 VITALS
DIASTOLIC BLOOD PRESSURE: 84 MMHG | HEIGHT: 70 IN | WEIGHT: 132.94 LBS | HEART RATE: 89 BPM | RESPIRATION RATE: 18 BRPM | OXYGEN SATURATION: 100 % | TEMPERATURE: 98 F | SYSTOLIC BLOOD PRESSURE: 160 MMHG

## 2023-04-25 VITALS
SYSTOLIC BLOOD PRESSURE: 149 MMHG | DIASTOLIC BLOOD PRESSURE: 81 MMHG | HEART RATE: 88 BPM | OXYGEN SATURATION: 100 % | RESPIRATION RATE: 13 BRPM

## 2023-04-25 LAB
GLUCOSE BLDC GLUCOMTR-MCNC: 139 MG/DL — HIGH (ref 70–99)
ISTAT VENOUS BE: -5 MMOL/L — LOW (ref -2–3)
ISTAT VENOUS GLUCOSE: 149 MG/DL — HIGH (ref 70–99)
ISTAT VENOUS HCO3: 20 MMOL/L — LOW (ref 23–28)
ISTAT VENOUS HEMATOCRIT: 33 % — LOW (ref 39–50)
ISTAT VENOUS HEMOGLOBIN: 11.2 GM/DL — LOW (ref 13–17)
ISTAT VENOUS IONIZED CALCIUM: 1.2 MMOL/L — SIGNIFICANT CHANGE UP (ref 1.12–1.3)
ISTAT VENOUS PCO2: 40 MMHG — LOW (ref 41–51)
ISTAT VENOUS PH: 7.32 — SIGNIFICANT CHANGE UP (ref 7.31–7.41)
ISTAT VENOUS PO2: <66 MMHG — SIGNIFICANT CHANGE UP (ref 35–40)
ISTAT VENOUS POTASSIUM: 4.9 MMOL/L — SIGNIFICANT CHANGE UP (ref 3.5–5.3)
ISTAT VENOUS SO2: 71 % — SIGNIFICANT CHANGE UP
ISTAT VENOUS SODIUM: 143 MMOL/L — SIGNIFICANT CHANGE UP (ref 135–145)
ISTAT VENOUS TCO2: 22 MMOL/L — SIGNIFICANT CHANGE UP (ref 22–31)

## 2023-04-25 PROCEDURE — 36821 AV FUSION DIRECT ANY SITE: CPT | Mod: GC

## 2023-04-25 DEVICE — SURGIFLO HEMOSTATIC MATRIX KIT: Type: IMPLANTABLE DEVICE | Site: LEFT | Status: FUNCTIONAL

## 2023-04-25 DEVICE — CLIP APPLIER ETHICON LIGACLIP 9 3/8" SMALL: Type: IMPLANTABLE DEVICE | Site: LEFT | Status: FUNCTIONAL

## 2023-04-25 DEVICE — SURGICEL 4 X 8": Type: IMPLANTABLE DEVICE | Site: LEFT | Status: FUNCTIONAL

## 2023-04-25 DEVICE — CLIP APPLIER ETHICON LIGACLIP 11.5" MEDIUM: Type: IMPLANTABLE DEVICE | Site: LEFT | Status: FUNCTIONAL

## 2023-04-25 RX ORDER — ACETAMINOPHEN 500 MG
650 TABLET ORAL EVERY 6 HOURS
Refills: 0 | Status: DISCONTINUED | OUTPATIENT
Start: 2023-04-25 | End: 2023-04-25

## 2023-04-25 NOTE — ASU DISCHARGE PLAN (ADULT/PEDIATRIC) - ASU DC SPECIAL INSTRUCTIONSFT
OK to shower. There is skin glue on your incision. Do not scrub, pat dry.    Take XS Tylenol as needed for pain control.    Please follow-up in 2 weeks with Dr. Arnaud Royal.

## 2023-04-25 NOTE — BRIEF OPERATIVE NOTE - OPERATION/FINDINGS
Pre-op bedside u/s showed adequate cephalic vein. Transverse AC Fossa incision. Brachial artery 5mm. Cephalic/AC Vein 2.5mm. End to side anastomosis. Palpable thrill at the end of the case. Hemostasis achieved. Closed in layers.

## 2023-04-25 NOTE — ASU DISCHARGE PLAN (ADULT/PEDIATRIC) - NS MD DC FALL RISK RISK
For information on Fall & Injury Prevention, visit: [email or web address] OR  [email or web address] OR  https://www.cdc.gov/steadi/patient.html

## 2023-04-25 NOTE — DISCHARGE NOTE NURSING/CASE MANAGEMENT/SOCIAL WORK - PATIENT PORTAL LINK FT
You can access the FollowMyHealth Patient Portal offered by [facility] by registering at the following website: [email or web address]. By joining [employer]’s FollowMyHealth portal, you will also be able to view your health information using other applications (apps) compatible with our system.

## 2023-04-25 NOTE — BRIEF OPERATIVE NOTE - NSICDXBRIEFPREOP_GEN_ALL_CORE_FT
PRE-OP DIAGNOSIS:  Stage 5 chronic kidney disease not on chronic dialysis 25-Apr-2023 11:33:47  Leo Prieto

## 2023-04-25 NOTE — DISCHARGE NOTE NURSING/CASE MANAGEMENT/SOCIAL WORK - NSDCVIVACCINE_GEN_ALL_CORE_FT
influenza, injectable, quadrivalent, preservative free; 19-Feb-2019 10:20; Sandy Naranjo (RN); [employer]; [id number] (Exp. Date: 30-Jun-2019); IntraMuscular; Deltoid Left.; 0.5 milliLiter(s); VIS (VIS Published: 07-Aug-2015, VIS Presented: 19-Feb-2019);

## 2023-04-25 NOTE — BRIEF OPERATIVE NOTE - NSICDXBRIEFPOSTOP_GEN_ALL_CORE_FT
POST-OP DIAGNOSIS:  Stage 5 chronic kidney disease not on chronic dialysis 25-Apr-2023 11:33:59  Leo Prieto

## 2023-04-25 NOTE — PRE-ANESTHESIA EVALUATION ADULT - NSANTHOSAYNRD_GEN_A_CORE
No. LOLIS screening performed.  STOP BANG Legend: 0-2 = LOW Risk; 3-4 = INTERMEDIATE Risk; 5-8 = HIGH Risk

## 2023-04-25 NOTE — ASU DISCHARGE PLAN (ADULT/PEDIATRIC) - CARE PROVIDER_API CALL
Caleb Royal)  Vascular Surgery  [address] [place] [address]  Phone: [phone]  Fax: [phone]  Follow Up Time: 2 weeks

## 2023-04-27 PROBLEM — Z86.39 PERSONAL HISTORY OF OTHER ENDOCRINE, NUTRITIONAL AND METABOLIC DISEASE: Chronic | Status: ACTIVE | Noted: 2023-04-24

## 2023-04-28 ENCOUNTER — APPOINTMENT (OUTPATIENT)
Dept: HEART AND VASCULAR | Facility: CLINIC | Age: 58
End: 2023-04-28

## 2023-05-08 ENCOUNTER — APPOINTMENT (OUTPATIENT)
Dept: VASCULAR SURGERY | Facility: CLINIC | Age: 58
End: 2023-05-08
Payer: MEDICAID

## 2023-05-08 VITALS
DIASTOLIC BLOOD PRESSURE: 72 MMHG | WEIGHT: 133 LBS | BODY MASS INDEX: 19.04 KG/M2 | HEIGHT: 70 IN | SYSTOLIC BLOOD PRESSURE: 115 MMHG | HEART RATE: 99 BPM

## 2023-05-08 PROCEDURE — 99024 POSTOP FOLLOW-UP VISIT: CPT

## 2023-05-08 NOTE — ASSESSMENT
[Other: _____] : [unfilled] [FreeTextEntry1] : 56 yo M with CKD5, approaching dialysis, now s/p left brachiocephalic AVF on 4/25/23 returns for a post-op visit. Patient is overall doing well, denies arm pain, edema, hand numbness. \par LUE with well healed incision over brachial fossa, good thrill appreciated, palpable radial pulse, no neurologic deficits.\par Patient was recommended to do hand exercises for the fistula to grow.\par He may f/u as needed.

## 2023-05-08 NOTE — PHYSICAL EXAM
[2+] : left 2+ [Alert] : alert [Calm] : calm [FreeTextEntry1] : LUE with well healed incision over brachial fossa, good thrill appreciated, palpable radial pulse [de-identified] : WN/WD, NAD [de-identified] : +FROM, on a wheelchair

## 2023-05-08 NOTE — HISTORY OF PRESENT ILLNESS
[FreeTextEntry1] : 56 yo M with CKD5, approaching dialysis, now s/p left brachiocephalic AVF on 4/25/23 returns for a post-op visit. Patient is overall doing well, denies arm pain, edema, hand numbness. No fever, chills.

## 2023-05-08 NOTE — ADDENDUM
[FreeTextEntry1] : I, Dr. Caleb Royal, personally performed the evaluation and management (E/M) services for this established patient who presents today with (an) existing condition(s).  That E/M includes conducting the examination, assessing all conditions, and (re)establishing/reinforcing a plan of care.  Today, my ACP, Tawanna BLACK, was here to observe my evaluation and management services for this condition to be followed going forward.\par \par \par

## 2023-05-24 ENCOUNTER — NON-APPOINTMENT (OUTPATIENT)
Age: 58
End: 2023-05-24

## 2023-05-24 ENCOUNTER — APPOINTMENT (OUTPATIENT)
Dept: ENDOCRINOLOGY | Facility: CLINIC | Age: 58
End: 2023-05-24
Payer: MEDICAID

## 2023-05-24 VITALS
WEIGHT: 279 LBS | BODY MASS INDEX: 40.03 KG/M2 | SYSTOLIC BLOOD PRESSURE: 145 MMHG | HEART RATE: 88 BPM | DIASTOLIC BLOOD PRESSURE: 75 MMHG

## 2023-05-24 VITALS
BODY MASS INDEX: 19.51 KG/M2 | SYSTOLIC BLOOD PRESSURE: 130 MMHG | WEIGHT: 136 LBS | HEART RATE: 94 BPM | DIASTOLIC BLOOD PRESSURE: 63 MMHG

## 2023-05-24 DIAGNOSIS — E78.5 HYPERLIPIDEMIA, UNSPECIFIED: ICD-10-CM

## 2023-05-24 LAB — GLUCOSE BLDC GLUCOMTR-MCNC: 99

## 2023-05-24 PROCEDURE — 83036 HEMOGLOBIN GLYCOSYLATED A1C: CPT | Mod: QW

## 2023-05-24 PROCEDURE — XXXXX: CPT

## 2023-05-24 PROCEDURE — 82962 GLUCOSE BLOOD TEST: CPT

## 2023-05-24 PROCEDURE — 99213 OFFICE O/P EST LOW 20 MIN: CPT | Mod: 25,GC

## 2023-05-25 PROBLEM — E78.5 HYPERLIPIDEMIA: Status: ACTIVE | Noted: 2023-05-25

## 2023-05-25 LAB
GLUCOSE BLDC GLUCOMTR-MCNC: 176
HBA1C MFR BLD HPLC: 7.2

## 2023-05-25 NOTE — REVIEW OF SYSTEMS
[Fatigue] : no fatigue [Decreased Appetite] : appetite not decreased [Chest Pain] : no chest pain [Palpitations] : no palpitations [Shortness Of Breath] : no shortness of breath [Nausea] : no nausea [Constipation] : no constipation [Vomiting] : no vomiting [Diarrhea] : no diarrhea [FreeTextEntry2] : W

## 2023-05-25 NOTE — ADDENDUM
[FreeTextEntry1] : Attending:  Pt seen with Dr. Tinoco. Is known to me from his hospitalization last fall.  We were able to document at that time that he did not have type 1 DM, but rather type 2 disease, and changed him to Januvia.  His glycemic control has been fairly good, with current A1c level only slightly above goal. He nonetheless has significant renal disease, and is now 1 months s/p placement of AVF for HD\par His current A1c level is higher than the last, though he does not monitor fingersticks--so we are unable to tell where hyperglycemia may be occurring\par He is wheelchair-bound, and has significant muscle wasting in his legs.  His weakness is likely from peripheral neuropathy, and he also has a Charcot foot.  The incision from the AVF in his left arm has healed well, and the fistula has good pulsations and thrill at this point\par To continue the current regimen\par \par SCOOTER Palm MD\par

## 2023-05-25 NOTE — HISTORY OF PRESENT ILLNESS
[FreeTextEntry1] : Mr. 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 \par \par He was previously hospitalized at [facility] (10/16/22 - 11/1/22) after he presented with several days of slurred speech/weakness and was found to have an embolic stroke. Patient was evaluated by endocrinology while inpatient and he was started on Januvia with adequate fingerstick glucose levels (120-160 mg/dL throughout the day). Of note, he was previously labeled as type 1 diabetic since he was very young (he cannot remember the exact age) and was started on insulin since then. However, he has memory problems and couldn't’t remember how much insulin he used during the day or how frequently. A C-peptide level was checked during his hospitalization given suspicion that he was not actually type 1 based on his history of inconsistent insulin use. Patient's C-peptide level was 2.4 with a glucose level of 91 mg/dL. Therefore, his insulin regimen was switched to Januvia 25 mg daily (renally adjusted) while inpatient. He was monitored for over a week on the medication with adequate control of his glucose levels and no signs of diabetic ketoacidosis. Patient was later discharged to subacute rehabilitation. \par \par Today, the reports that he has continued to take Januvia 25 mg daily. He was previously referred to vascular and wound care doctor for management of foot ulcer. His left Charcot foot's ulcer appears to be healed today. He doesn't remember following with vascular for his foot, only for his recent AVF placement (earlier this week). He doesn't check fingerstick glucose at home. He has decreased sensation in both feet.

## 2023-05-25 NOTE — ASSESSMENT
[FreeTextEntry1] : Mr. 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 \par \par # Type 2 diabetes mellitus\par - Continue with Januvia 25 mg daily. \par - Last hemoglobin A1C is 7.2%\par - Fingerstick glucose: He doesn't check. \par - Neuropathy: Decreased sensation in both feet. Previously referred to vascular/wound care. He had a foot ulcer which appears to have healed, but he doesn't remember following as instructed. Encouraged him to follow with podiatry/vascular. \par - Nephropathy: CKD stage 5.  Follows with nephrology. Had recent AVF placement, planned for dialysis in the near future. \par \par # Hyperlipidemia\par - Last LDL was 55 mg/dL (11/2022).\par - Continue with atorvastatin 80 mg at bedtime.\par \par RTC in 6 months. Case discussed with Dr. Palm. \par

## 2023-05-25 NOTE — PHYSICAL EXAM
[Alert] : alert [No Acute Distress] : no acute distress [Normal Sclera/Conjunctiva] : normal sclera/conjunctiva [No Proptosis] : no proptosis [Supple] : the neck was supple [Thyroid Not Enlarged] : the thyroid was not enlarged [No Thyroid Nodules] : no palpable thyroid nodules [No Respiratory Distress] : no respiratory distress [No Accessory Muscle Use] : no accessory muscle use [Clear to Auscultation] : lungs were clear to auscultation bilaterally [Normal S1, S2] : normal S1 and S2 [Normal Rate] : heart rate was normal [Regular Rhythm] : with a regular rhythm [No Edema] : no peripheral edema [Pedal Pulses Normal] : the pedal pulses are present [Normal Bowel Sounds] : normal bowel sounds [Not Tender] : non-tender [Not Distended] : not distended [No Stigmata of Cushings Syndrome] : no stigmata of Cushings Syndrome [No Tremors] : no tremors [Acanthosis Nigricans] : no acanthosis nigricans [de-identified] : Wheelchair-bound, Elderly male. [de-identified] : Left-sided residual weakness, more pronounced over lower extremity. [de-identified] : Small ulcer over lateral aspect of left foot with no signs of infection.  [de-identified] : Oriented x2

## 2023-06-02 ENCOUNTER — APPOINTMENT (OUTPATIENT)
Dept: HEART AND VASCULAR | Facility: CLINIC | Age: 58
End: 2023-06-02

## 2023-06-04 ENCOUNTER — FORM ENCOUNTER (OUTPATIENT)
Age: 58
End: 2023-06-04

## 2023-06-26 ENCOUNTER — APPOINTMENT (OUTPATIENT)
Dept: VASCULAR SURGERY | Facility: CLINIC | Age: 58
End: 2023-06-26
Payer: MEDICAID

## 2023-06-26 VITALS
DIASTOLIC BLOOD PRESSURE: 91 MMHG | SYSTOLIC BLOOD PRESSURE: 150 MMHG | HEART RATE: 116 BPM | BODY MASS INDEX: 19.47 KG/M2 | HEIGHT: 70 IN | WEIGHT: 136 LBS

## 2023-06-26 PROCEDURE — 99214 OFFICE O/P EST MOD 30 MIN: CPT | Mod: 24

## 2023-06-26 NOTE — REVIEW OF SYSTEMS
[Negative] : Heme/Lymph [Limb Pain] : limb pain [As Noted in HPI] : as noted in HPI [Skin Wound] : skin wound [Fever] : no fever [Chills] : no chills [Limb Swelling] : no limb swelling

## 2023-06-26 NOTE — ASSESSMENT
[FreeTextEntry1] : 57yoM with CKD5, approaching dialysis, now s/p left brachiocephalic AVF on 4/25/23 returns today to be evaluated for right heel ulcer.\par Palpable strong peripheral pulses, R foot is well perfused. R heel with superficial lateral heel wound, with minimal exudate, no foul smell, no surrounding erythema, no signs of infection\par We discussed the findings and explained that he has enough of arterial flow down to his foot for the wound to heal with proper wound care.\par Recommend to see a podiatrist, also place a pillow under a calf so the heel doesn't touch a bed when patient is lying down.\par No vascular intervention is necessary at this time.\par F/u as needed.  [Ulcer Care] : ulcer care

## 2023-06-26 NOTE — PHYSICAL EXAM
[Alert] : alert [Calm] : calm [2+] : right 2+ [de-identified] : WN/WD, NAD, on a wheelchair [de-identified] : +FROM, on a wheelchair [FreeTextEntry1] : LUE with well healed incision over brachial fossa, good thrill appreciated, palpable radial pulse [de-identified] : R heel: +superficial lateral heel wound, with minimal exudate, no foul smell, no surrounding erythema, no signs of infection

## 2023-06-26 NOTE — HISTORY OF PRESENT ILLNESS
[FreeTextEntry1] : 57yoM with CKD5, approaching dialysis, now s/p left brachiocephalic AVF on 4/25/23 returns today to be evaluated for right heel ulcer.  Patient is a wheelchair bound and mostly in his bed during a day. He is unable to ambulate, denies pain in his legs. He c/o pain over his heel when dressings are changed. No fever, chills.

## 2023-06-26 NOTE — ADDENDUM
[FreeTextEntry1] : I, Dr. Caleb Royal, personally performed the evaluation and management (E/M) services for this established patient who presents today with (a) new problem(s)/exacerbation of (an) existing condition(s).  That E/M includes conducting the examination, assessing all new/exacerbated conditions, and establishing a new plan of care.  Today, my ACP, Tawanna BLACK, was here to observe my evaluation and management services for this new problem/exacerbated condition to be followed going forward.\par \par \par \par \par \par

## 2023-06-30 ENCOUNTER — APPOINTMENT (OUTPATIENT)
Dept: NEPHROLOGY | Facility: CLINIC | Age: 58
End: 2023-06-30
Payer: MEDICAID

## 2023-06-30 PROCEDURE — 99215 OFFICE O/P EST HI 40 MIN: CPT

## 2023-07-04 ENCOUNTER — TRANSCRIPTION ENCOUNTER (OUTPATIENT)
Age: 58
End: 2023-07-04

## 2023-07-04 VITALS — SYSTOLIC BLOOD PRESSURE: 135 MMHG | DIASTOLIC BLOOD PRESSURE: 81 MMHG | RESPIRATION RATE: 12 BRPM | HEART RATE: 99 BPM

## 2023-07-04 NOTE — PHYSICAL EXAM
[General Appearance - Alert] : alert [General Appearance - In No Acute Distress] : in no acute distress [Sclera] : the sclera and conjunctiva were normal [PERRL With Normal Accommodation] : pupils were equal in size, round, and reactive to light [Extraocular Movements] : extraocular movements were intact [Outer Ear] : the ears and nose were normal in appearance [Oropharynx] : the oropharynx was normal [Neck Cervical Mass (___cm)] : no neck mass was observed [Neck Appearance] : the appearance of the neck was normal [Jugular Venous Distention Increased] : there was no jugular-venous distention [Respiration, Rhythm And Depth] : normal respiratory rhythm and effort [Exaggerated Use Of Accessory Muscles For Inspiration] : no accessory muscle use [Auscultation Breath Sounds / Voice Sounds] : lungs were clear to auscultation bilaterally [Heart Rate And Rhythm] : heart rate was normal and rhythm regular [Heart Sounds] : normal S1 and S2 [Heart Sounds Gallop] : no gallops [Murmurs] : no murmurs [Heart Sounds Pericardial Friction Rub] : no pericardial rub [Edema] : there was no peripheral edema [Veins - Varicosity Changes] : there were no varicosital changes [Bowel Sounds] : normal bowel sounds [Abdomen Soft] : soft [Abdomen Tenderness] : non-tender [Abdomen Mass (___ Cm)] : no abdominal mass palpated [No CVA Tenderness] : no ~M costovertebral angle tenderness [No Spinal Tenderness] : no spinal tenderness [FreeTextEntry1] : In wheelchair, rhythmic spasms bilateral upper extremities [Skin Color & Pigmentation] : normal skin color and pigmentation [Skin Turgor] : normal skin turgor [] : no rash [Affect] : the affect was normal [Mood] : the mood was normal

## 2023-07-04 NOTE — HISTORY OF PRESENT ILLNESS
[FreeTextEntry1] : 57M with pmhx of CKD Stage 5 s/p left brachiocephalic AVF on 4/25/23, HTN, IDDM1 w/ retinopathy, stroke 2/2019 w/ residual L sided weakness presenting for CKD follow up.\par \par L AVF growing well, diameter a bit small but length adequate\par \par Does still have spasm in bilateral arms, when L arm is strapped down would be able to tolerate HD but would need accomodation for such\par \par Denies uremic symptoms such as nausea, vomiting, fatigue, anorexia, weight loss, muscle cramps, pruritus, dysgeusia, or changes in mental status \par \par \par

## 2023-07-04 NOTE — ASSESSMENT
[FreeTextEntry1] : 57M with pmhx of CKD Stage 5, HTN, IDDM1 w/ retinopathy, stroke 2/2019 w/ residual L sided weakness presenting for CKD follow up.\par \par #CKD Stage V\par Pt presenting as hospital follow up, with CKD V. Previously had seen Dr. Grace in [id number]. Discussed with patient given modalities not a good candidate for home dialysis therapies. Pt in agreement will need to start hemodialysis in the near future with in-center. Discussed with patient what to look for in terms of requiring acute HD including but not limited to. SOB, loss of appetite, fatigue, increased swelling or weight loss.\par -Advised to limit sodium intake and monitor potassium intake\par \par Access - growing, diameter not adequate yet\par \par #HTN\par On nifedipine 60mg Qday - BPs low today, would decrease to 30\par -Continue with current meds\par \par #IDDM\par -Last A1C 6.4; insulin stopped as A1C within range, but this might be the case because insulin may be lasting longer in his system due to poor kidney function\par -Continue with orals for now\par \par #Hx of stroke\par -F/u with Neuro\par -Remains on Provigil 100mg Qday\par \par RTC in 1 month to monitor for uremic symptoms\par

## 2023-07-05 ENCOUNTER — INPATIENT (INPATIENT)
Facility: HOSPITAL | Age: 58
LOS: 12 days | Discharge: HOME | DRG: 853 | End: 2023-07-18
Attending: SURGERY | Admitting: INTERNAL MEDICINE
Payer: MEDICAID

## 2023-07-05 ENCOUNTER — TRANSCRIPTION ENCOUNTER (OUTPATIENT)
Age: 58
End: 2023-07-05

## 2023-07-05 VITALS
SYSTOLIC BLOOD PRESSURE: 117 MMHG | OXYGEN SATURATION: 96 % | DIASTOLIC BLOOD PRESSURE: 73 MMHG | RESPIRATION RATE: 16 BRPM | HEART RATE: 120 BPM | WEIGHT: 192.9 LBS | HEIGHT: 70 IN | TEMPERATURE: 101 F

## 2023-07-05 LAB
ALBUMIN SERPL ELPH-MCNC: 3.7 G/DL — SIGNIFICANT CHANGE UP (ref 3.3–5)
ALP SERPL-CCNC: 88 U/L — SIGNIFICANT CHANGE UP (ref 40–120)
ALT FLD-CCNC: 12 U/L — SIGNIFICANT CHANGE UP (ref 10–45)
ANION GAP SERPL CALC-SCNC: 18 MMOL/L — HIGH (ref 5–17)
ANION GAP SERPL CALC-SCNC: 22 MMOL/L — HIGH (ref 5–17)
APPEARANCE UR: ABNORMAL
APTT BLD: 25.7 SEC — LOW (ref 27.5–35.5)
AST SERPL-CCNC: 14 U/L — SIGNIFICANT CHANGE UP (ref 10–40)
B-OH-BUTYR SERPL-SCNC: 0.6 MMOL/L — HIGH
BACTERIA # UR AUTO: PRESENT /HPF
BASE EXCESS BLDV CALC-SCNC: -9.5 MMOL/L — LOW (ref -2–3)
BASOPHILS # BLD AUTO: 0.23 K/UL — HIGH (ref 0–0.2)
BASOPHILS NFR BLD AUTO: 0.9 % — SIGNIFICANT CHANGE UP (ref 0–2)
BILIRUB SERPL-MCNC: 0.2 MG/DL — SIGNIFICANT CHANGE UP (ref 0.2–1.2)
BILIRUB UR-MCNC: NEGATIVE — SIGNIFICANT CHANGE UP
BUN SERPL-MCNC: 111 MG/DL — HIGH (ref 7–23)
BUN SERPL-MCNC: 119 MG/DL — HIGH (ref 7–23)
CA-I SERPL-SCNC: 1.31 MMOL/L — SIGNIFICANT CHANGE UP (ref 1.15–1.33)
CALCIUM SERPL-MCNC: 10.2 MG/DL — SIGNIFICANT CHANGE UP (ref 8.4–10.5)
CALCIUM SERPL-MCNC: 8.8 MG/DL — SIGNIFICANT CHANGE UP (ref 8.4–10.5)
CHLORIDE SERPL-SCNC: 109 MMOL/L — HIGH (ref 96–108)
CHLORIDE SERPL-SCNC: 111 MMOL/L — HIGH (ref 96–108)
CO2 BLDV-SCNC: 19.4 MMOL/L — LOW (ref 22–26)
CO2 SERPL-SCNC: 12 MMOL/L — LOW (ref 22–31)
CO2 SERPL-SCNC: 15 MMOL/L — LOW (ref 22–31)
COLOR SPEC: YELLOW — SIGNIFICANT CHANGE UP
COMMENT - URINE: SIGNIFICANT CHANGE UP
CREAT ?TM UR-MCNC: 100 MG/DL — SIGNIFICANT CHANGE UP
CREAT SERPL-MCNC: 8.84 MG/DL — HIGH (ref 0.5–1.3)
CREAT SERPL-MCNC: 9.75 MG/DL — HIGH (ref 0.5–1.3)
DIFF PNL FLD: ABNORMAL
EGFR: 6 ML/MIN/1.73M2 — LOW
EGFR: 6 ML/MIN/1.73M2 — LOW
EOSINOPHIL # BLD AUTO: 0 K/UL — SIGNIFICANT CHANGE UP (ref 0–0.5)
EOSINOPHIL NFR BLD AUTO: 0 % — SIGNIFICANT CHANGE UP (ref 0–6)
EPI CELLS # UR: SIGNIFICANT CHANGE UP /HPF (ref 0–5)
GAS PNL BLDV: 142 MMOL/L — SIGNIFICANT CHANGE UP (ref 136–145)
GAS PNL BLDV: SIGNIFICANT CHANGE UP
GIANT PLATELETS BLD QL SMEAR: PRESENT — SIGNIFICANT CHANGE UP
GLUCOSE BLDC GLUCOMTR-MCNC: 255 MG/DL — HIGH (ref 70–99)
GLUCOSE BLDC GLUCOMTR-MCNC: 256 MG/DL — HIGH (ref 70–99)
GLUCOSE SERPL-MCNC: 247 MG/DL — HIGH (ref 70–99)
GLUCOSE SERPL-MCNC: 273 MG/DL — HIGH (ref 70–99)
GLUCOSE UR QL: NEGATIVE — SIGNIFICANT CHANGE UP
HCO3 BLDV-SCNC: 18 MMOL/L — LOW (ref 22–29)
HCT VFR BLD CALC: 28.6 % — LOW (ref 39–50)
HGB BLD-MCNC: 8.9 G/DL — LOW (ref 13–17)
INR BLD: 1.13 — SIGNIFICANT CHANGE UP (ref 0.88–1.16)
KETONES UR-MCNC: NEGATIVE — SIGNIFICANT CHANGE UP
LACTATE SERPL-SCNC: 1.2 MMOL/L — SIGNIFICANT CHANGE UP (ref 0.5–2)
LEUKOCYTE ESTERASE UR-ACNC: NEGATIVE — SIGNIFICANT CHANGE UP
LYMPHOCYTES # BLD AUTO: 0.89 K/UL — LOW (ref 1–3.3)
LYMPHOCYTES # BLD AUTO: 3.4 % — LOW (ref 13–44)
MAGNESIUM SERPL-MCNC: 1.9 MG/DL — SIGNIFICANT CHANGE UP (ref 1.6–2.6)
MANUAL SMEAR VERIFICATION: SIGNIFICANT CHANGE UP
MCHC RBC-ENTMCNC: 28.4 PG — SIGNIFICANT CHANGE UP (ref 27–34)
MCHC RBC-ENTMCNC: 31.1 GM/DL — LOW (ref 32–36)
MCV RBC AUTO: 91.4 FL — SIGNIFICANT CHANGE UP (ref 80–100)
MONOCYTES # BLD AUTO: 1.12 K/UL — HIGH (ref 0–0.9)
MONOCYTES NFR BLD AUTO: 4.3 % — SIGNIFICANT CHANGE UP (ref 2–14)
NEUTROPHILS # BLD AUTO: 23.8 K/UL — HIGH (ref 1.8–7.4)
NEUTROPHILS NFR BLD AUTO: 91.4 % — HIGH (ref 43–77)
NITRITE UR-MCNC: NEGATIVE — SIGNIFICANT CHANGE UP
OSMOLALITY UR: 357 MOSM/KG — SIGNIFICANT CHANGE UP (ref 300–900)
PCO2 BLDV: 44 MMHG — SIGNIFICANT CHANGE UP (ref 42–55)
PH BLDV: 7.22 — LOW (ref 7.32–7.43)
PH UR: 5.5 — SIGNIFICANT CHANGE UP (ref 5–8)
PHOSPHATE SERPL-MCNC: 3.7 MG/DL — SIGNIFICANT CHANGE UP (ref 2.5–4.5)
PLAT MORPH BLD: ABNORMAL
PLATELET # BLD AUTO: 496 K/UL — HIGH (ref 150–400)
PO2 BLDV: 37 MMHG — SIGNIFICANT CHANGE UP (ref 25–45)
POLYCHROMASIA BLD QL SMEAR: SLIGHT — SIGNIFICANT CHANGE UP
POTASSIUM BLDV-SCNC: 3.5 MMOL/L — SIGNIFICANT CHANGE UP (ref 3.5–5.1)
POTASSIUM SERPL-MCNC: 3.6 MMOL/L — SIGNIFICANT CHANGE UP (ref 3.5–5.3)
POTASSIUM SERPL-MCNC: 3.8 MMOL/L — SIGNIFICANT CHANGE UP (ref 3.5–5.3)
POTASSIUM SERPL-SCNC: 3.6 MMOL/L — SIGNIFICANT CHANGE UP (ref 3.5–5.3)
POTASSIUM SERPL-SCNC: 3.8 MMOL/L — SIGNIFICANT CHANGE UP (ref 3.5–5.3)
PROT SERPL-MCNC: 8.7 G/DL — HIGH (ref 6–8.3)
PROT UR-MCNC: 100 MG/DL
PROTHROM AB SERPL-ACNC: 13.5 SEC — HIGH (ref 10.5–13.4)
RAPID RVP RESULT: SIGNIFICANT CHANGE UP
RBC # BLD: 3.13 M/UL — LOW (ref 4.2–5.8)
RBC # FLD: 14.4 % — SIGNIFICANT CHANGE UP (ref 10.3–14.5)
RBC BLD AUTO: NORMAL — SIGNIFICANT CHANGE UP
RBC CASTS # UR COMP ASSIST: > 10 /HPF
SAO2 % BLDV: 62.2 % — LOW (ref 67–88)
SARS-COV-2 RNA SPEC QL NAA+PROBE: SIGNIFICANT CHANGE UP
SODIUM SERPL-SCNC: 141 MMOL/L — SIGNIFICANT CHANGE UP (ref 135–145)
SODIUM SERPL-SCNC: 146 MMOL/L — HIGH (ref 135–145)
SODIUM UR-SCNC: 47 MMOL/L — SIGNIFICANT CHANGE UP
SP GR SPEC: 1.02 — SIGNIFICANT CHANGE UP (ref 1–1.03)
TROPONIN T, HIGH SENSITIVITY RESULT: 126 NG/L — CRITICAL HIGH (ref 0–51)
UROBILINOGEN FLD QL: 0.2 E.U./DL — SIGNIFICANT CHANGE UP
WBC # BLD: 26.04 K/UL — HIGH (ref 3.8–10.5)
WBC # FLD AUTO: 26.04 K/UL — HIGH (ref 3.8–10.5)
WBC UR QL: < 5 /HPF — SIGNIFICANT CHANGE UP

## 2023-07-05 PROCEDURE — C1889: CPT

## 2023-07-05 PROCEDURE — 36821 AV FUSION DIRECT ANY SITE: CPT

## 2023-07-05 PROCEDURE — 82947 ASSAY GLUCOSE BLOOD QUANT: CPT

## 2023-07-05 PROCEDURE — 82330 ASSAY OF CALCIUM: CPT

## 2023-07-05 PROCEDURE — 84132 ASSAY OF SERUM POTASSIUM: CPT

## 2023-07-05 PROCEDURE — 71045 X-RAY EXAM CHEST 1 VIEW: CPT | Mod: 26

## 2023-07-05 PROCEDURE — 99285 EMERGENCY DEPT VISIT HI MDM: CPT

## 2023-07-05 PROCEDURE — 99222 1ST HOSP IP/OBS MODERATE 55: CPT | Mod: GC

## 2023-07-05 PROCEDURE — 84295 ASSAY OF SERUM SODIUM: CPT

## 2023-07-05 PROCEDURE — 82803 BLOOD GASES ANY COMBINATION: CPT

## 2023-07-05 PROCEDURE — 82962 GLUCOSE BLOOD TEST: CPT

## 2023-07-05 PROCEDURE — 85014 HEMATOCRIT: CPT

## 2023-07-05 RX ORDER — CHLORHEXIDINE GLUCONATE 213 G/1000ML
1 SOLUTION TOPICAL
Refills: 0 | Status: DISCONTINUED | OUTPATIENT
Start: 2023-07-05 | End: 2023-07-06

## 2023-07-05 RX ORDER — ACETAMINOPHEN 500 MG
650 TABLET ORAL ONCE
Refills: 0 | Status: COMPLETED | OUTPATIENT
Start: 2023-07-05 | End: 2023-07-05

## 2023-07-05 RX ORDER — VANCOMYCIN HCL 1 G
1000 VIAL (EA) INTRAVENOUS ONCE
Refills: 0 | Status: COMPLETED | OUTPATIENT
Start: 2023-07-05 | End: 2023-07-05

## 2023-07-05 RX ORDER — PIPERACILLIN AND TAZOBACTAM 4; .5 G/20ML; G/20ML
3.38 INJECTION, POWDER, LYOPHILIZED, FOR SOLUTION INTRAVENOUS ONCE
Refills: 0 | Status: COMPLETED | OUTPATIENT
Start: 2023-07-05 | End: 2023-07-05

## 2023-07-05 RX ORDER — ATORVASTATIN CALCIUM 80 MG/1
40 TABLET, FILM COATED ORAL AT BEDTIME
Refills: 0 | Status: DISCONTINUED | OUTPATIENT
Start: 2023-07-05 | End: 2023-07-14

## 2023-07-05 RX ORDER — INSULIN HUMAN 100 [IU]/ML
9 INJECTION, SOLUTION SUBCUTANEOUS ONCE
Refills: 0 | Status: COMPLETED | OUTPATIENT
Start: 2023-07-05 | End: 2023-07-05

## 2023-07-05 RX ORDER — SODIUM CHLORIDE 9 MG/ML
1000 INJECTION, SOLUTION INTRAVENOUS
Refills: 0 | Status: DISCONTINUED | OUTPATIENT
Start: 2023-07-05 | End: 2023-07-06

## 2023-07-05 RX ORDER — MODAFINIL 200 MG/1
100 TABLET ORAL DAILY
Refills: 0 | Status: DISCONTINUED | OUTPATIENT
Start: 2023-07-05 | End: 2023-07-12

## 2023-07-05 RX ORDER — MAGNESIUM SULFATE 500 MG/ML
1 VIAL (ML) INJECTION ONCE
Refills: 0 | Status: COMPLETED | OUTPATIENT
Start: 2023-07-05 | End: 2023-07-05

## 2023-07-05 RX ORDER — SODIUM CHLORIDE 9 MG/ML
2600 INJECTION INTRAMUSCULAR; INTRAVENOUS; SUBCUTANEOUS ONCE
Refills: 0 | Status: COMPLETED | OUTPATIENT
Start: 2023-07-05 | End: 2023-07-05

## 2023-07-05 RX ORDER — ASPIRIN/CALCIUM CARB/MAGNESIUM 324 MG
81 TABLET ORAL DAILY
Refills: 0 | Status: DISCONTINUED | OUTPATIENT
Start: 2023-07-05 | End: 2023-07-18

## 2023-07-05 RX ORDER — POTASSIUM CHLORIDE 20 MEQ
40 PACKET (EA) ORAL ONCE
Refills: 0 | Status: COMPLETED | OUTPATIENT
Start: 2023-07-05 | End: 2023-07-05

## 2023-07-05 RX ORDER — HEPARIN SODIUM 5000 [USP'U]/ML
5000 INJECTION INTRAVENOUS; SUBCUTANEOUS EVERY 8 HOURS
Refills: 0 | Status: DISCONTINUED | OUTPATIENT
Start: 2023-07-05 | End: 2023-07-07

## 2023-07-05 RX ORDER — POTASSIUM CHLORIDE 20 MEQ
10 PACKET (EA) ORAL
Refills: 0 | Status: DISCONTINUED | OUTPATIENT
Start: 2023-07-05 | End: 2023-07-05

## 2023-07-05 RX ORDER — CLOPIDOGREL BISULFATE 75 MG/1
75 TABLET, FILM COATED ORAL DAILY
Refills: 0 | Status: DISCONTINUED | OUTPATIENT
Start: 2023-07-05 | End: 2023-07-18

## 2023-07-05 RX ORDER — INSULIN HUMAN 100 [IU]/ML
9 INJECTION, SOLUTION SUBCUTANEOUS
Qty: 50 | Refills: 0 | Status: DISCONTINUED | OUTPATIENT
Start: 2023-07-05 | End: 2023-07-06

## 2023-07-05 RX ORDER — PIPERACILLIN AND TAZOBACTAM 4; .5 G/20ML; G/20ML
4.5 INJECTION, POWDER, LYOPHILIZED, FOR SOLUTION INTRAVENOUS EVERY 12 HOURS
Refills: 0 | Status: COMPLETED | OUTPATIENT
Start: 2023-07-06 | End: 2023-07-12

## 2023-07-05 RX ORDER — TAMSULOSIN HYDROCHLORIDE 0.4 MG/1
0.4 CAPSULE ORAL AT BEDTIME
Refills: 0 | Status: DISCONTINUED | OUTPATIENT
Start: 2023-07-05 | End: 2023-07-10

## 2023-07-05 RX ADMIN — Medication 40 MILLIEQUIVALENT(S): at 19:35

## 2023-07-05 RX ADMIN — Medication 650 MILLIGRAM(S): at 21:42

## 2023-07-05 RX ADMIN — SODIUM CHLORIDE 200 MILLILITER(S): 9 INJECTION, SOLUTION INTRAVENOUS at 21:42

## 2023-07-05 RX ADMIN — PIPERACILLIN AND TAZOBACTAM 200 GRAM(S): 4; .5 INJECTION, POWDER, LYOPHILIZED, FOR SOLUTION INTRAVENOUS at 18:40

## 2023-07-05 RX ADMIN — TAMSULOSIN HYDROCHLORIDE 0.4 MILLIGRAM(S): 0.4 CAPSULE ORAL at 23:18

## 2023-07-05 RX ADMIN — ATORVASTATIN CALCIUM 40 MILLIGRAM(S): 80 TABLET, FILM COATED ORAL at 23:18

## 2023-07-05 RX ADMIN — Medication 100 GRAM(S): at 23:18

## 2023-07-05 RX ADMIN — INSULIN HUMAN 9 UNIT(S)/HR: 100 INJECTION, SOLUTION SUBCUTANEOUS at 23:19

## 2023-07-05 RX ADMIN — SODIUM CHLORIDE 200 MILLILITER(S): 9 INJECTION, SOLUTION INTRAVENOUS at 23:18

## 2023-07-05 RX ADMIN — INSULIN HUMAN 9 UNIT(S)/HR: 100 INJECTION, SOLUTION SUBCUTANEOUS at 21:42

## 2023-07-05 RX ADMIN — INSULIN HUMAN 9 UNIT(S): 100 INJECTION, SOLUTION SUBCUTANEOUS at 19:43

## 2023-07-05 RX ADMIN — Medication 250 MILLIGRAM(S): at 19:02

## 2023-07-05 RX ADMIN — SODIUM CHLORIDE 2600 MILLILITER(S): 9 INJECTION INTRAMUSCULAR; INTRAVENOUS; SUBCUTANEOUS at 18:32

## 2023-07-05 NOTE — ED PROVIDER NOTE - CLINICAL SUMMARY MEDICAL DECISION MAKING FREE TEXT BOX
a&ox2 (baseline ase per EMS) bedbound  BIBA From [facility] c.o. hyperglycemia. as per [facility]" his blood sugar was 363 in the facility at 3pm, they gave him 4 units subq insulin in the facility." [facility] does not know what kind of insulin the patient was given. PMH of TIA, DM, CKD. has not started dialysis yet. fistula to L arm.  on exam, heart rate 120, temp 100.8, vital signs otherwise within normal limits.      DDx: Sepsis secondary to pneumonia versus UTI versus viral illness.  DKA also considered     plan:  –EKG: Sinus tachycardia, no  no ST elevation, ST depression, or T wave inversion  –fingerstick 260  –Labs: Sodium 146, anion gap 22, pH 7.2, lactate normal, white blood cell 26  –chest x-ray: Clear  –patient given 2 L normal saline  –Vanco Zosyn ordered due to patient meeting sepsis criteria  –Tylenol for fever  –insulin IV bolus of 0.1 units/kg ordered as well as insulin IV  drip at a rate of 0.1 units/kg/h ordered with potassium supplementation due to potassium being 3.8 as well as D5 NS due to glucose being 247 under 250  –ICU consulted and evaluated patient and recommended admission to the ICU for further care

## 2023-07-05 NOTE — ED PROVIDER NOTE - OBJECTIVE STATEMENT
57M c PMH of DM2, CVA x 2 (residual left side weakness), HTN, HLD, and CKD (not on HD yet), bph, Brought in by EMS from nursing home for hyperglycemia.  Per EMS, patient was given insulin at the nursing home for a blood sugar of 363.  Patient denies any symptoms.  Patient denies fever, chills, chest pain, shortness of breath, vomiting, abdominal pain.    per triage note: "a&ox2 (baseline ase per EMS) bedbound  [facility] the [facility] at 3pm, they gave him 4 units subq insulin in the facility." halfway does not know what kind of insulin the patient was given. PMH of TIA, DM, CKD. has not started dialysis yet. fistula to L arm."

## 2023-07-05 NOTE — ED PROVIDER NOTE - NS ED ROS FT
[Alert] : alert [Well Nourished] : well nourished [Healthy Appearance] : healthy appearance [No Acute Distress] : no acute distress [Well Developed] : well developed [Normal Pupil & Iris Size/Symmetry] : normal pupil and iris size and symmetry [Normal Lips/Tongue] : the lips and tongue were normal [Normal Outer Ear/Nose] : the ears and nose were normal in appearance [Normal Tonsils] : normal tonsils [Supple] : the neck was supple [Normal Rate and Effort] : normal respiratory rhythm and effort [No Crackles] : no crackles [No Retractions] : no retractions [Bilateral Audible Breath Sounds] : bilateral audible breath sounds [Normal Rate] : heart rate was normal  [Normal S1, S2] : normal S1 and S2 [No murmur] : no murmur [Regular Rhythm] : with a regular rhythm [Soft] : abdomen soft [Not Tender] : non-tender [Not Distended] : not distended [Normal Cervical Lymph Nodes] : cervical [Skin Intact] : skin intact  [No Rash] : no rash [No Skin Lesions] : no skin lesions [Normal Mood] : mood was normal [Normal Affect] : affect was normal [Alert, Awake, Oriented as Age-Appropriate] : alert, awake, oriented as age appropriate Positive: None   negative: Fever, chills, congestion, cough, rhinorrhea, chest pain, shortness of breath, nausea, vomiting, diarrhea, abdominal pain, dysuria, hematuria, leg edema, rash

## 2023-07-05 NOTE — ED ADULT NURSE NOTE - CHIEF COMPLAINT QUOTE
a&ox2 (baseline ase per EMS) bedbound  BIBA From [facility] c.o. hyperglycemia. [facility] per [facility]" his blood sugar was 363 in the facility at 3pm, they gave him 4 units subq insulin in the facility." assisted does not know what kind of insulin the patient was given. PMH of TIA, DM, CKD. has not started dialysis yet. fistula to L arm.

## 2023-07-05 NOTE — ED PROVIDER NOTE - PHYSICAL EXAMINATION
GENERAL:  Awake, alert and in NAD, non-toxic appearing  ENMT: Airway patent  EYES: conjunctiva clear  CARDIAC: tachycardic  rate, regular rhythm.  Heart sounds S1, S2, no S3, S4. No murmurs, rubs or gallops.  RESPIRATORY: Breath sounds clear and equal in bilateral anterior lung fields, no wheezes/ronchi/crackles/stridor; pt breathing and speaking comfortably with no increased WOB, no accessory mm. use, no intercostal retractions, no nasal flaring  GI: abdomen soft, non-distended, non-tender, no rebound or guarding.  SKIN: warm and dry, no rashes  PSYCH: awake, alert, calm and cooperative  EXTREMITIES: no ble edema  NEURO: gcs 15

## 2023-07-05 NOTE — CONSULT NOTE ADULT - SUBJECTIVE AND OBJECTIVE BOX
HPI:  57M with PMH of DM2, CVA x 2 (one ischemic one embolic, residual left side weakness), HTN, HLD, and CKD V (not on HD yet), BPH, Brought in by EMS from nursing home for hyperglycemia to 363.  Per EMS, patient was given insulin at the nursing home for a blood sugar of 363.  Patient denies any symptoms.  Patient denies fever, chills, chest pain, shortness of breath, vomiting, abdominal pain. The patient does report a dry cough over the last 2 days but no other infectious symptoms or sick contacts. ICU was consulted for DKA though the patient does not have evidence of ketoacidosis at this time. The patient will be admitted to the MICU for management of HAGMA.     Per the nursing home record, the patient is bedbound at baseline and is able to mobilize via wheelchair. The patient reports that he still produces urine.       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, traumatic, left, sequela,          Home Medications:  aspirin 81 mg oral tablet, chewable: 1 tab(s) orally once a day (25 Apr 2023 07:45)  atorvastatin 80 mg oral tablet: 1 tab(s) orally once a day (at bedtime) (25 Apr 2023 07:45)  modafinil 100 mg oral tablet: 1 tab(s) orally once a day (in the morning) (25 Apr 2023 07:45)  Plavix 75 mg oral tablet: 1 tab(s) orally once a day until 11/8/2022 (25 Apr 2023 07:45)  Procardia XL 60 mg oral tablet, extended release: 1 tab(s) orally every 24 hours (25 Apr 2023 07:45)  tamsulosin 0.4 mg oral capsule: 1 cap(s) orally once a day (at bedtime) (25 Apr 2023 07:45)  Tradjenta 5 mg oral tablet: 1 orally once a day (25 Apr 2023 07:45)    MEDICATIONS  (STANDING):  acetaminophen     Tablet .. 650 milliGRAM(s) Oral once  dextrose 5% + sodium chloride 0.9% 1000 milliLiter(s) (200 mL/Hr) IV Continuous <Continuous>  insulin regular Infusion 9 Unit(s)/Hr (9 mL/Hr) IV Continuous <Continuous>    MEDICATIONS  (PRN):      Allergies    No Known Allergies    Intolerances        SOCIAL HX:       FAMILY HISTORY:  FAMILY HISTORY:  Family history of diabetes mellitus (Mother)  Family history of diabetes mellitus (DM)    :      PHYSICAL EXAM:    ICU Vital Signs Last 24 Hrs  T(C): 38.2 (05 Jul 2023 17:20), Max: 38.2 (05 Jul 2023 17:20)  T(F): 100.8 (05 Jul 2023 17:20), Max: 100.8 (05 Jul 2023 17:20)  HR: 120 (05 Jul 2023 17:20) (120 - 120)  BP: 117/73 (05 Jul 2023 17:20) (117/73 - 117/73)  BP(mean): --  ABP: --  ABP(mean): --  RR: 16 (05 Jul 2023 17:20) (16 - 16)  SpO2: 96% (05 Jul 2023 17:20) (96% - 96%)    O2 Parameters below as of 05 Jul 2023 17:20  Patient On (Oxygen Delivery Method): room air            General: NC/AT, lying in bed, muscle wasting present   HEENT:  NC/AT, EOMI, sclera anicteric, PERRL, POOR dentition with DRY mucous membranes     Lymphatic system: No LN  Lungs: CTA Bilaterally   Cardiovascular: tachycardic with a regular Rhythm  nl s1, s2, no m/r/g appreciated  Gastrointestinal: abdomen soft, NTND, bowel sounds present  Musculoskeletal: No clubbing.  Left sided neurological deficits as well as baseline shaking of the RUE are both reported to be baseline characteristics following the patients multiple CVA's  Skin: Warm, dry, intact. No rashes noted. Notably, the patients sacral area was not examined, this should be examined moving forward   Neurological: A&Ox2        LABS:                          8.9    26.04 )-----------( 496      ( 05 Jul 2023 17:45 )             28.6                                               07-05    146<H>  |  109<H>  |  119<H>  ----------------------------<  247<H>  3.8   |  15<L>  |  9.75<H>    Ca    10.2      05 Jul 2023 17:45  Phos  4.4     07-05    TPro  8.7<H>  /  Alb  3.7  /  TBili  0.2  /  DBili  x   /  AST  14  /  ALT  12  /  AlkPhos  88  07-05      PT/INR - ( 05 Jul 2023 18:21 )   PT: 13.5 sec;   INR: 1.13          PTT - ( 05 Jul 2023 18:21 )  PTT:25.7 sec                                       Urinalysis Basic - ( 05 Jul 2023 17:45 )    Color: x / Appearance: x / SG: x / pH: x  Gluc: 247 mg/dL / Ketone: x  / Bili: x / Urobili: x   Blood: x / Protein: x / Nitrite: x   Leuk Esterase: x / RBC: x / WBC x   Sq Epi: x / Non Sq Epi: x / Bacteria: x                                                  LIVER FUNCTIONS - ( 05 Jul 2023 17:45 )  Alb: 3.7 g/dL / Pro: 8.7 g/dL / ALK PHOS: 88 U/L / ALT: 12 U/L / AST: 14 U/L / GGT: x

## 2023-07-05 NOTE — ED PROVIDER NOTE - NSICDXPASTMEDICALHX_GEN_ALL_CORE_FT
PAST MEDICAL HISTORY:  Cerebral artery occlusion with cerebral infarction Cerebral vascular accident    H/O diabetic retinopathy     Hyperlipidemia Hyperlipidemia    Hypertension     Stage 4 chronic kidney disease     Type 2 diabetes mellitus Diabetes mellitus

## 2023-07-05 NOTE — ED PROVIDER NOTE - NSICDXPASTSURGICALHX_GEN_ALL_CORE_FT
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

## 2023-07-05 NOTE — CHART NOTE - NSCHARTNOTEFT_GEN_A_CORE
Casey Shetty Is a 56 yo M w/ PMH of CKD V w/ AVF not currently on HD, DM2, HTN, CVA who presented to ED from NH due to hyperglycemia. In the ED, found to be febrile and tachycardic. Labs concerning for leukocytosis, metabolic acidosis with anion gap, hyperglycemia. MICU consulted with concern for DKA. Nephrology contacted for management of dialysis. Chart reviewed and discussed with attending Dr. Garcia, who saw patient recently in the outpatient setting. BUN and creatinine are elevated at baseline, doubt uremia to be causative etiology for this acute presentation. Would recommend treatment for suspected DKA, can receive fluids as necessary. Also would recommend infectious workup with leukocytosis and fever. No apparent urgent indication for HD at this time. Please reach out to on-call Nephrology fellow for any urgent issues overnight, and full consult will follow in AM. Casey Shetty is a 56 yo M w/ PMH of CKD V w/ AVF not currently on HD, DM2, HTN, CVA who presents from NH due to hyperglycemia. In the ED, found to be febrile and tachycardic. Labs concerning for leukocytosis, metabolic acidosis with anion gap, hyperglycemia. MICU consulted with concern for DKA. Nephrology contacted for management of dialysis. Chart reviewed and discussed with attending Dr. Garcia, who saw patient recently in the outpatient setting. BUN and creatinine are elevated at baseline, doubt uremia to be causative etiology for this acute presentation. Would recommend workup and treatment for suspected DKA, can receive fluids as necessary. Also would recommend infectious workup with leukocytosis and fever. No apparent urgent indication for HD at this time. Please reach out to on-call Nephrology fellow for any urgent issues overnight, and full consult will follow in AM. Casey Shetty is a 56 yo M w/ PMH of CKD V w/ AVF not currently on HD, DM2, HTN, CVA who presents from NH due to hyperglycemia. In the ED, found to be febrile and tachycardic. Labs concerning for leukocytosis, metabolic acidosis with anion gap, hyperglycemia. MICU consulted with concern for DKA. Nephrology contacted for management of dialysis. Chart reviewed and discussed with attending Dr. Garcia, who saw patient recently in the outpatient setting. BUN and creatinine are elevated at baseline, doubt uremia to be causative etiology for this acute presentation. Would recommend workup and treatment for suspected DKA, can receive fluids as necessary. Also would recommend infectious workup with leukocytosis and fever. No apparent urgent indication for HD at this time. Please reach out to on-call Nephrology fellow for any urgent issues or if patient status changes overnight, and full consult will follow in AM. MICU team updated.

## 2023-07-05 NOTE — ED ADULT TRIAGE NOTE - CHIEF COMPLAINT QUOTE
a&ox2 (baseline ase per EMS) bedbound  BIBA From [facility] c.o. hyperglycemia. as per [facility]" his blood sugar was 363 in the facility at 3pm, they gave him 4 units subq insulin in the facility." long term does not know what kind of insulin the patient was given. PMH of TIA, DM, CKD. has not started dialysis yet. fistula to L arm.

## 2023-07-05 NOTE — CONSULT NOTE ADULT - ASSESSMENT
57M with PMH of DM2, CVA x 2 (one ischemic one embolic, residual left side weakness), HTN, HLD, and CKD V (not on HD yet) and BPH admitted to the MICU for HAGMA with hyperglycemia as well as acute renal failure.     NEURO:  #Hx of CVA  Patient with both ischemic and embolic CVA's, with residual left sided neurological deficits. No change from baseline at this time, no concern for active CVA. AOx2, at baseline.   - c/w home ASA 81mg daily   - c/w home Plavix 75mg daily   - c/w home atorvastatin 40mg daily    - c/w home modafinil 100mg daily     CARDIOVASCULAR:  #Elevated Troponin   High sensitivity Trop elevated on presentation, likely in the setting of increased demand given SIRS positivity. No evidence of ischemia on EKG.   - q6hrs Trop trend to peak   - serial EKG's while trop is rising (take in mind the ARF, trop will not clear quickly)     #HTN   Pt with moderately well controlled HTN. Home medications of procardia 60mg daily.   - hold home anti-HTN therapy as infected and normotensive at this time     PULM:  RAQUEL, saturating well on RA.   - f/u RVP that was obtained in the setting of the recent history of cough     GI:  Keep the patient NPO until acid-base status stabilized     RENAL:  #Acute renal failure on CKD V  Patient with baseline Cr 6-7 or so via outpatient and historical labs. Follows closely with Dr. Garcia in the clinic. Has never started HD, though LUE fistula is maturing at this time.   - follow urine studies   - renal diet once diet is started   - Nephrology consulted, follow recs   - obtain renal US (eval kidneys as well as investigate potential source of infection)     #HAGMA  Pt with acidosis and an anion gap of 22 on admission. ICU consulted for DKA though the patient does not have evidence of ketoacidosis at this time (BHB 0.6, no UA obtained). Likely uremia-driven anion gap with contribution by low level ketoacidosis.   - s/p 9U insulin bolus   - currently on insulin gtt at 9U/hr   - c/w D5 1/2NS at 200cc/hr with K  - replete Phos to 2.2 and Mg to 2.2  - q4hr BMP's  - q1hr finger sticks via DKA protocol   - caution with K repletion given ARF     :  #BPH  - c/w home tamsulosin 0.4mg daily     ENDO:   #HAGMA with hyperglycemia   See above     #DM2  Pt with long standing DM with diabetic retinopathy. Home medications include Trulicity 0.75 weekly, 12 U basal insulin, Sliding Scale.   - after insulin drip, begin basal insulin with mISS  - f/u A1c     ID:   #SIRS Positivity   Patient with fever, tachycardia and elevated wbc with neutrophil predominance. No known source of infection. Will be important to examine sacral region given bed-bound status. Though we do not have active source, the patient appears septic on exam.   - c/w Vancomycin renal dosing (likely by trough given ARF, got 1G 6PM 7/5)  - c/w pseud dosing Zosyn (renal dosing 4.5g q12hrs)   - De-escalate Abx therapy as clinically appropriate   - f/u Blood Cx  - f/u MRSA Swab   - strict I/O's   - renal US as above to search for infectious source     HEME/ONC:  #Anemia   AOCD given CKD. No active bleeding suspected at this time. Takes Ferrous sulfate at home, hold given unknown infection at this time.   - trend and transfuse with goal of 7     PREVENTIVE:   Fluids: 200cc/hr D5 1/2NS with 40 mEq K per 1 liter   Diet: NPO  DVT ppx: Sub Q Heparin   GI ppx: None

## 2023-07-06 ENCOUNTER — TRANSCRIPTION ENCOUNTER (OUTPATIENT)
Age: 58
End: 2023-07-06

## 2023-07-06 ENCOUNTER — RESULT REVIEW (OUTPATIENT)
Age: 58
End: 2023-07-06

## 2023-07-06 LAB
A1C WITH ESTIMATED AVERAGE GLUCOSE RESULT: 7.8 % — HIGH (ref 4–5.6)
ALBUMIN SERPL ELPH-MCNC: 3 G/DL — LOW (ref 3.3–5)
ALBUMIN SERPL ELPH-MCNC: 3.1 G/DL — LOW (ref 3.3–5)
ALP SERPL-CCNC: 68 U/L — SIGNIFICANT CHANGE UP (ref 40–120)
ALP SERPL-CCNC: 73 U/L — SIGNIFICANT CHANGE UP (ref 40–120)
ALT FLD-CCNC: 10 U/L — SIGNIFICANT CHANGE UP (ref 10–45)
ALT FLD-CCNC: 10 U/L — SIGNIFICANT CHANGE UP (ref 10–45)
ANION GAP SERPL CALC-SCNC: 11 MMOL/L — SIGNIFICANT CHANGE UP (ref 5–17)
ANION GAP SERPL CALC-SCNC: 15 MMOL/L — SIGNIFICANT CHANGE UP (ref 5–17)
ANION GAP SERPL CALC-SCNC: 16 MMOL/L — SIGNIFICANT CHANGE UP (ref 5–17)
APTT BLD: 29 SEC — SIGNIFICANT CHANGE UP (ref 27.5–35.5)
AST SERPL-CCNC: 11 U/L — SIGNIFICANT CHANGE UP (ref 10–40)
AST SERPL-CCNC: 12 U/L — SIGNIFICANT CHANGE UP (ref 10–40)
BASOPHILS # BLD AUTO: 0.07 K/UL — SIGNIFICANT CHANGE UP (ref 0–0.2)
BASOPHILS NFR BLD AUTO: 0.3 % — SIGNIFICANT CHANGE UP (ref 0–2)
BILIRUB SERPL-MCNC: 0.2 MG/DL — SIGNIFICANT CHANGE UP (ref 0.2–1.2)
BILIRUB SERPL-MCNC: 0.2 MG/DL — SIGNIFICANT CHANGE UP (ref 0.2–1.2)
BLD GP AB SCN SERPL QL: NEGATIVE — SIGNIFICANT CHANGE UP
BUN SERPL-MCNC: 105 MG/DL — HIGH (ref 7–23)
BUN SERPL-MCNC: 105 MG/DL — HIGH (ref 7–23)
BUN SERPL-MCNC: 106 MG/DL — HIGH (ref 7–23)
CALCIUM SERPL-MCNC: 9.1 MG/DL — SIGNIFICANT CHANGE UP (ref 8.4–10.5)
CALCIUM SERPL-MCNC: 9.2 MG/DL — SIGNIFICANT CHANGE UP (ref 8.4–10.5)
CALCIUM SERPL-MCNC: 9.2 MG/DL — SIGNIFICANT CHANGE UP (ref 8.4–10.5)
CHLORIDE SERPL-SCNC: 115 MMOL/L — HIGH (ref 96–108)
CHLORIDE SERPL-SCNC: 115 MMOL/L — HIGH (ref 96–108)
CHLORIDE SERPL-SCNC: 116 MMOL/L — HIGH (ref 96–108)
CK MB CFR SERPL CALC: 1.4 NG/ML — SIGNIFICANT CHANGE UP (ref 0–6.7)
CK MB CFR SERPL CALC: 1.7 NG/ML — SIGNIFICANT CHANGE UP (ref 0–6.7)
CK SERPL-CCNC: 101 U/L — SIGNIFICANT CHANGE UP (ref 30–200)
CK SERPL-CCNC: 93 U/L — SIGNIFICANT CHANGE UP (ref 30–200)
CO2 SERPL-SCNC: 13 MMOL/L — LOW (ref 22–31)
CO2 SERPL-SCNC: 14 MMOL/L — LOW (ref 22–31)
CO2 SERPL-SCNC: 15 MMOL/L — LOW (ref 22–31)
CREAT SERPL-MCNC: 8.14 MG/DL — HIGH (ref 0.5–1.3)
CREAT SERPL-MCNC: 9.22 MG/DL — HIGH (ref 0.5–1.3)
CREAT SERPL-MCNC: 9.29 MG/DL — HIGH (ref 0.5–1.3)
CRP SERPL-MCNC: 175.9 MG/L — HIGH (ref 0–4)
EGFR: 6 ML/MIN/1.73M2 — LOW
EGFR: 6 ML/MIN/1.73M2 — LOW
EGFR: 7 ML/MIN/1.73M2 — LOW
EOSINOPHIL # BLD AUTO: 0.11 K/UL — SIGNIFICANT CHANGE UP (ref 0–0.5)
EOSINOPHIL NFR BLD AUTO: 0.4 % — SIGNIFICANT CHANGE UP (ref 0–6)
ERYTHROCYTE [SEDIMENTATION RATE] IN BLOOD: 124 MM/HR — HIGH
ESTIMATED AVERAGE GLUCOSE: 177 MG/DL — HIGH (ref 68–114)
GLUCOSE BLDC GLUCOMTR-MCNC: 121 MG/DL — HIGH (ref 70–99)
GLUCOSE BLDC GLUCOMTR-MCNC: 127 MG/DL — HIGH (ref 70–99)
GLUCOSE BLDC GLUCOMTR-MCNC: 151 MG/DL — HIGH (ref 70–99)
GLUCOSE BLDC GLUCOMTR-MCNC: 151 MG/DL — HIGH (ref 70–99)
GLUCOSE BLDC GLUCOMTR-MCNC: 162 MG/DL — HIGH (ref 70–99)
GLUCOSE BLDC GLUCOMTR-MCNC: 170 MG/DL — HIGH (ref 70–99)
GLUCOSE BLDC GLUCOMTR-MCNC: 183 MG/DL — HIGH (ref 70–99)
GLUCOSE BLDC GLUCOMTR-MCNC: 198 MG/DL — HIGH (ref 70–99)
GLUCOSE BLDC GLUCOMTR-MCNC: 204 MG/DL — HIGH (ref 70–99)
GLUCOSE BLDC GLUCOMTR-MCNC: 219 MG/DL — HIGH (ref 70–99)
GLUCOSE BLDC GLUCOMTR-MCNC: 265 MG/DL — HIGH (ref 70–99)
GLUCOSE BLDC GLUCOMTR-MCNC: 299 MG/DL — HIGH (ref 70–99)
GLUCOSE BLDC GLUCOMTR-MCNC: 312 MG/DL — HIGH (ref 70–99)
GLUCOSE SERPL-MCNC: 134 MG/DL — HIGH (ref 70–99)
GLUCOSE SERPL-MCNC: 207 MG/DL — HIGH (ref 70–99)
GLUCOSE SERPL-MCNC: 295 MG/DL — HIGH (ref 70–99)
GRAM STN FLD: SIGNIFICANT CHANGE UP
HCT VFR BLD CALC: 24.3 % — LOW (ref 39–50)
HCT VFR BLD CALC: 27.3 % — LOW (ref 39–50)
HGB BLD-MCNC: 7.3 G/DL — LOW (ref 13–17)
HGB BLD-MCNC: 8.3 G/DL — LOW (ref 13–17)
IMM GRANULOCYTES NFR BLD AUTO: 1.8 % — HIGH (ref 0–0.9)
INR BLD: 1.12 — SIGNIFICANT CHANGE UP (ref 0.88–1.16)
LACTATE SERPL-SCNC: 0.9 MMOL/L — SIGNIFICANT CHANGE UP (ref 0.5–2)
LYMPHOCYTES # BLD AUTO: 1.29 K/UL — SIGNIFICANT CHANGE UP (ref 1–3.3)
LYMPHOCYTES # BLD AUTO: 5.1 % — LOW (ref 13–44)
MAGNESIUM SERPL-MCNC: 2.2 MG/DL — SIGNIFICANT CHANGE UP (ref 1.6–2.6)
MAGNESIUM SERPL-MCNC: 2.2 MG/DL — SIGNIFICANT CHANGE UP (ref 1.6–2.6)
MCHC RBC-ENTMCNC: 28 PG — SIGNIFICANT CHANGE UP (ref 27–34)
MCHC RBC-ENTMCNC: 28.3 PG — SIGNIFICANT CHANGE UP (ref 27–34)
MCHC RBC-ENTMCNC: 30 GM/DL — LOW (ref 32–36)
MCHC RBC-ENTMCNC: 30.4 GM/DL — LOW (ref 32–36)
MCV RBC AUTO: 93.1 FL — SIGNIFICANT CHANGE UP (ref 80–100)
MCV RBC AUTO: 93.2 FL — SIGNIFICANT CHANGE UP (ref 80–100)
MONOCYTES # BLD AUTO: 1.87 K/UL — HIGH (ref 0–0.9)
MONOCYTES NFR BLD AUTO: 7.4 % — SIGNIFICANT CHANGE UP (ref 2–14)
MRSA PCR RESULT.: NEGATIVE — SIGNIFICANT CHANGE UP
NEUTROPHILS # BLD AUTO: 21.55 K/UL — HIGH (ref 1.8–7.4)
NEUTROPHILS NFR BLD AUTO: 85 % — HIGH (ref 43–77)
NRBC # BLD: 0 /100 WBCS — SIGNIFICANT CHANGE UP (ref 0–0)
NRBC # BLD: 0 /100 WBCS — SIGNIFICANT CHANGE UP (ref 0–0)
PHOSPHATE SERPL-MCNC: 3 MG/DL — SIGNIFICANT CHANGE UP (ref 2.5–4.5)
PHOSPHATE SERPL-MCNC: 3.2 MG/DL — SIGNIFICANT CHANGE UP (ref 2.5–4.5)
PLATELET # BLD AUTO: 403 K/UL — HIGH (ref 150–400)
PLATELET # BLD AUTO: 442 K/UL — HIGH (ref 150–400)
POTASSIUM SERPL-MCNC: 3.7 MMOL/L — SIGNIFICANT CHANGE UP (ref 3.5–5.3)
POTASSIUM SERPL-MCNC: 4.2 MMOL/L — SIGNIFICANT CHANGE UP (ref 3.5–5.3)
POTASSIUM SERPL-MCNC: 4.4 MMOL/L — SIGNIFICANT CHANGE UP (ref 3.5–5.3)
POTASSIUM SERPL-SCNC: 3.7 MMOL/L — SIGNIFICANT CHANGE UP (ref 3.5–5.3)
POTASSIUM SERPL-SCNC: 4.2 MMOL/L — SIGNIFICANT CHANGE UP (ref 3.5–5.3)
POTASSIUM SERPL-SCNC: 4.4 MMOL/L — SIGNIFICANT CHANGE UP (ref 3.5–5.3)
PROT SERPL-MCNC: 7 G/DL — SIGNIFICANT CHANGE UP (ref 6–8.3)
PROT SERPL-MCNC: 7.4 G/DL — SIGNIFICANT CHANGE UP (ref 6–8.3)
PROTHROM AB SERPL-ACNC: 13.4 SEC — SIGNIFICANT CHANGE UP (ref 10.5–13.4)
RBC # BLD: 2.61 M/UL — LOW (ref 4.2–5.8)
RBC # BLD: 2.93 M/UL — LOW (ref 4.2–5.8)
RBC # FLD: 14.3 % — SIGNIFICANT CHANGE UP (ref 10.3–14.5)
RBC # FLD: 14.4 % — SIGNIFICANT CHANGE UP (ref 10.3–14.5)
RH IG SCN BLD-IMP: POSITIVE — SIGNIFICANT CHANGE UP
S AUREUS DNA NOSE QL NAA+PROBE: NEGATIVE — SIGNIFICANT CHANGE UP
SODIUM SERPL-SCNC: 141 MMOL/L — SIGNIFICANT CHANGE UP (ref 135–145)
SODIUM SERPL-SCNC: 144 MMOL/L — SIGNIFICANT CHANGE UP (ref 135–145)
SODIUM SERPL-SCNC: 145 MMOL/L — SIGNIFICANT CHANGE UP (ref 135–145)
SPECIMEN SOURCE: SIGNIFICANT CHANGE UP
TROPONIN T, HIGH SENSITIVITY RESULT: 101 NG/L — CRITICAL HIGH (ref 0–51)
TROPONIN T, HIGH SENSITIVITY RESULT: 107 NG/L — CRITICAL HIGH (ref 0–51)
WBC # BLD: 23.42 K/UL — HIGH (ref 3.8–10.5)
WBC # BLD: 25.35 K/UL — HIGH (ref 3.8–10.5)
WBC # FLD AUTO: 23.42 K/UL — HIGH (ref 3.8–10.5)
WBC # FLD AUTO: 25.35 K/UL — HIGH (ref 3.8–10.5)

## 2023-07-06 PROCEDURE — 27882 AMPUTATION OF LOWER LEG: CPT | Mod: GC

## 2023-07-06 PROCEDURE — 99222 1ST HOSP IP/OBS MODERATE 55: CPT

## 2023-07-06 PROCEDURE — 73620 X-RAY EXAM OF FOOT: CPT | Mod: 26,50

## 2023-07-06 PROCEDURE — 76770 US EXAM ABDO BACK WALL COMP: CPT | Mod: 26

## 2023-07-06 PROCEDURE — 99233 SBSQ HOSP IP/OBS HIGH 50: CPT | Mod: GC

## 2023-07-06 PROCEDURE — 88311 DECALCIFY TISSUE: CPT | Mod: 26

## 2023-07-06 PROCEDURE — 73700 CT LOWER EXTREMITY W/O DYE: CPT | Mod: 26,RT

## 2023-07-06 PROCEDURE — 88307 TISSUE EXAM BY PATHOLOGIST: CPT | Mod: 26

## 2023-07-06 PROCEDURE — 99255 IP/OBS CONSLTJ NEW/EST HI 80: CPT

## 2023-07-06 RX ORDER — CHLORHEXIDINE GLUCONATE 213 G/1000ML
1 SOLUTION TOPICAL
Refills: 0 | Status: DISCONTINUED | OUTPATIENT
Start: 2023-07-06 | End: 2023-07-13

## 2023-07-06 RX ORDER — INSULIN HUMAN 100 [IU]/ML
5 INJECTION, SOLUTION SUBCUTANEOUS
Qty: 50 | Refills: 0 | Status: DISCONTINUED | OUTPATIENT
Start: 2023-07-06 | End: 2023-07-06

## 2023-07-06 RX ORDER — OXYCODONE HYDROCHLORIDE 5 MG/1
5 TABLET ORAL EVERY 4 HOURS
Refills: 0 | Status: DISCONTINUED | OUTPATIENT
Start: 2023-07-06 | End: 2023-07-11

## 2023-07-06 RX ORDER — OXYCODONE HYDROCHLORIDE 5 MG/1
10 TABLET ORAL EVERY 4 HOURS
Refills: 0 | Status: DISCONTINUED | OUTPATIENT
Start: 2023-07-06 | End: 2023-07-11

## 2023-07-06 RX ORDER — ACETAMINOPHEN 500 MG
650 TABLET ORAL EVERY 4 HOURS
Refills: 0 | Status: DISCONTINUED | OUTPATIENT
Start: 2023-07-06 | End: 2023-07-18

## 2023-07-06 RX ORDER — PIPERACILLIN AND TAZOBACTAM 4; .5 G/20ML; G/20ML
4.5 INJECTION, POWDER, LYOPHILIZED, FOR SOLUTION INTRAVENOUS ONCE
Refills: 0 | Status: COMPLETED | OUTPATIENT
Start: 2023-07-06 | End: 2023-07-06

## 2023-07-06 RX ORDER — SODIUM CHLORIDE 9 MG/ML
1000 INJECTION, SOLUTION INTRAVENOUS
Refills: 0 | Status: DISCONTINUED | OUTPATIENT
Start: 2023-07-06 | End: 2023-07-06

## 2023-07-06 RX ORDER — SODIUM CHLORIDE 9 MG/ML
1000 INJECTION, SOLUTION INTRAVENOUS
Refills: 0 | Status: DISCONTINUED | OUTPATIENT
Start: 2023-07-06 | End: 2023-07-07

## 2023-07-06 RX ORDER — GLUCAGON INJECTION, SOLUTION 0.5 MG/.1ML
1 INJECTION, SOLUTION SUBCUTANEOUS ONCE
Refills: 0 | Status: DISCONTINUED | OUTPATIENT
Start: 2023-07-06 | End: 2023-07-18

## 2023-07-06 RX ORDER — INSULIN GLARGINE 100 [IU]/ML
6 INJECTION, SOLUTION SUBCUTANEOUS ONCE
Refills: 0 | Status: COMPLETED | OUTPATIENT
Start: 2023-07-06 | End: 2023-07-06

## 2023-07-06 RX ORDER — INSULIN LISPRO 100/ML
VIAL (ML) SUBCUTANEOUS
Refills: 0 | Status: DISCONTINUED | OUTPATIENT
Start: 2023-07-06 | End: 2023-07-07

## 2023-07-06 RX ORDER — DEXTROSE 50 % IN WATER 50 %
15 SYRINGE (ML) INTRAVENOUS ONCE
Refills: 0 | Status: DISCONTINUED | OUTPATIENT
Start: 2023-07-06 | End: 2023-07-18

## 2023-07-06 RX ORDER — DEXTROSE 50 % IN WATER 50 %
25 SYRINGE (ML) INTRAVENOUS ONCE
Refills: 0 | Status: DISCONTINUED | OUTPATIENT
Start: 2023-07-06 | End: 2023-07-18

## 2023-07-06 RX ORDER — DEXTROSE 50 % IN WATER 50 %
12.5 SYRINGE (ML) INTRAVENOUS ONCE
Refills: 0 | Status: DISCONTINUED | OUTPATIENT
Start: 2023-07-06 | End: 2023-07-18

## 2023-07-06 RX ORDER — SODIUM CHLORIDE 9 MG/ML
1000 INJECTION, SOLUTION INTRAVENOUS
Refills: 0 | Status: DISCONTINUED | OUTPATIENT
Start: 2023-07-06 | End: 2023-07-18

## 2023-07-06 RX ORDER — HYDROMORPHONE HYDROCHLORIDE 2 MG/ML
0.5 INJECTION INTRAMUSCULAR; INTRAVENOUS; SUBCUTANEOUS
Refills: 0 | Status: DISCONTINUED | OUTPATIENT
Start: 2023-07-06 | End: 2023-07-11

## 2023-07-06 RX ADMIN — HEPARIN SODIUM 5000 UNIT(S): 5000 INJECTION INTRAVENOUS; SUBCUTANEOUS at 06:10

## 2023-07-06 RX ADMIN — ATORVASTATIN CALCIUM 40 MILLIGRAM(S): 80 TABLET, FILM COATED ORAL at 22:11

## 2023-07-06 RX ADMIN — PIPERACILLIN AND TAZOBACTAM 25 GRAM(S): 4; .5 INJECTION, POWDER, LYOPHILIZED, FOR SOLUTION INTRAVENOUS at 18:00

## 2023-07-06 RX ADMIN — TAMSULOSIN HYDROCHLORIDE 0.4 MILLIGRAM(S): 0.4 CAPSULE ORAL at 22:11

## 2023-07-06 RX ADMIN — SODIUM CHLORIDE 100 MILLILITER(S): 9 INJECTION, SOLUTION INTRAVENOUS at 10:47

## 2023-07-06 RX ADMIN — SODIUM CHLORIDE 200 MILLILITER(S): 9 INJECTION, SOLUTION INTRAVENOUS at 02:23

## 2023-07-06 RX ADMIN — PIPERACILLIN AND TAZOBACTAM 25 GRAM(S): 4; .5 INJECTION, POWDER, LYOPHILIZED, FOR SOLUTION INTRAVENOUS at 06:10

## 2023-07-06 RX ADMIN — Medication 2: at 11:36

## 2023-07-06 RX ADMIN — CHLORHEXIDINE GLUCONATE 1 APPLICATION(S): 213 SOLUTION TOPICAL at 06:09

## 2023-07-06 RX ADMIN — Medication 100 MILLIGRAM(S): at 14:27

## 2023-07-06 RX ADMIN — Medication 81 MILLIGRAM(S): at 11:36

## 2023-07-06 RX ADMIN — HEPARIN SODIUM 5000 UNIT(S): 5000 INJECTION INTRAVENOUS; SUBCUTANEOUS at 14:31

## 2023-07-06 RX ADMIN — HEPARIN SODIUM 5000 UNIT(S): 5000 INJECTION INTRAVENOUS; SUBCUTANEOUS at 22:11

## 2023-07-06 RX ADMIN — INSULIN HUMAN 9 UNIT(S)/HR: 100 INJECTION, SOLUTION SUBCUTANEOUS at 00:45

## 2023-07-06 RX ADMIN — INSULIN GLARGINE 6 UNIT(S): 100 INJECTION, SOLUTION SUBCUTANEOUS at 07:16

## 2023-07-06 RX ADMIN — Medication 2: at 06:13

## 2023-07-06 RX ADMIN — MODAFINIL 100 MILLIGRAM(S): 200 TABLET ORAL at 11:36

## 2023-07-06 RX ADMIN — Medication 8: at 19:33

## 2023-07-06 RX ADMIN — CLOPIDOGREL BISULFATE 75 MILLIGRAM(S): 75 TABLET, FILM COATED ORAL at 11:36

## 2023-07-06 RX ADMIN — Medication 100 MILLIGRAM(S): at 22:53

## 2023-07-06 RX ADMIN — INSULIN HUMAN 5 UNIT(S)/HR: 100 INJECTION, SOLUTION SUBCUTANEOUS at 07:31

## 2023-07-06 NOTE — H&P ADULT - NSHPSOCIALHISTORY_GEN_ALL_CORE
Work:  Tobacco use:   EtOH use:  Illicit drug use:    Living situation: Tobacco use: former smoker  EtOH use: denies  Illicit drug use: denies    Living situation: Pt from [facility]. He is bedbound.

## 2023-07-06 NOTE — H&P ADULT - HISTORY OF PRESENT ILLNESS
HPI:     In the ED:  Initial vital signs: T: XX F, HR: XX, BP: XX, R: XX, SpO2: XX% on RA  ED course:   Labs: significant for  Imaging:  CXR:   EKG:   Medications:   Consults: none      HPI: 57M with PMH of DM2, CVA x 2 (one ischemic one embolic, residual left side weakness), HTN, HLD, and CKD V (not on HD yet), BPH, BIBEMS from nursing home for hyperglycemia to 363.  Per EMS, patient was given insulin at the nursing home.  Patient denies any symptoms.  Patient denies fever, chills, chest pain, shortness of breath, vomiting, abdominal pain. The patient does report a dry cough over the last 2 days but no other infectious symptoms or sick contacts. ICU was consulted for DKA with FS at 260 and positive BHB. The patient will be admitted to the MICU for management of HAGMA in setting of likely DKA.    Per the nursing home record, the patient is bedbound at baseline and is able to mobilize via wheelchair. The patient reports that he still produces urine.      In the ED:  Initial vital signs: T: 100.8 F, HR: 120, BP: 117/73, R:16, SpO2: 96% on RA  ED course:   Labs: significant for WBC 26.04, Hgb 8.9, plt 496, glucose 260, Na+ 146, K+ 3.8, Cl- 109, HCO3- 15, AG 22, BHB 0.6, trop 126, VBG with metabolic acidosis  Imaging:  CXR: clear  EKG: sinus tachycardia, no ST elevation/depression or TWI  Medications:  2L NS, 1 dose vanc + zosyn, insulin 9 units bolus, started on insulin gtt at 9 units/hr  Consults: MICU HPI: 57M with PMH of DM2, CVA x 2 (one ischemic one embolic, residual left side weakness), HTN, HLD, and CKD V (not on HD yet), BPH, BIBEMS from nursing home for hyperglycemia to 363.  On arrival, patient found to have SIRS (tachycardia, fever, leukocytosis) and was felt to be septic w/o identified infectious source. Also noted to have anion gap but likely attributable to uremia as well as hyperglycemia, but of note not in DKA. Upon investigation, patient's right heel ulcer was noted to have foul smell so podiatry was consulted and cross-sectional imaging obtained revealing gas tracking along achilles sheath. Vascular surgery was consulted for surgical evaluation.     Patient endorses pain of the right heel. He states the wound has been present for about a month. Patient denies fever, chills, chest pain, shortness of breath, vomiting, abdominal pain.     In the ED:  Initial vital signs: T: 100.8 F, HR: 120, BP: 117/73, R:16, SpO2: 96% on RA  ED course:   Labs: significant for WBC 26.04, Hgb 8.9, plt 496, glucose 260, Na+ 146, K+ 3.8, Cl- 109, HCO3- 15, AG 22, BHB 0.6, trop 126, VBG with metabolic acidosis  Imaging:  CXR: clear  EKG: sinus tachycardia, no ST elevation/depression or TWI  Medications:  2L NS, 1 dose vanc + zosyn, insulin 9 units bolus, started on insulin gtt at 9 units/hr  Consults: MICU    MICU course:

## 2023-07-06 NOTE — PROGRESS NOTE ADULT - SUBJECTIVE AND OBJECTIVE BOX
Patient is a 57y old  Male who presents with a chief complaint of DKA (06 Jul 2023 04:16)    Overnight: Was on insulin drip overnight.    Subjective:    ICU Vital Signs Last 24 Hrs  T(C): 37 (06 Jul 2023 05:55), Max: 38.2 (05 Jul 2023 17:20)  T(F): 98.6 (06 Jul 2023 05:55), Max: 100.8 (05 Jul 2023 17:20)  HR: 107 (06 Jul 2023 07:00) (89 - 120)  BP: 133/64 (06 Jul 2023 07:00) (117/73 - 148/71)  BP(mean): 92 (06 Jul 2023 07:00) (83 - 100)  ABP: --  ABP(mean): --  RR: 20 (06 Jul 2023 07:00) (15 - 20)  SpO2: 97% (06 Jul 2023 07:00) (96% - 100%)    O2 Parameters below as of 06 Jul 2023 07:00  Patient On (Oxygen Delivery Method): room air    I&O's Summary    05 Jul 2023 07:01  -  06 Jul 2023 07:00  --------------------------------------------------------  IN: 1804 mL / OUT: 1020 mL / NET: 784 mL    06 Jul 2023 07:01  -  06 Jul 2023 07:28  --------------------------------------------------------  IN: 25 mL / OUT: 0 mL / NET: 25 mL    LABS:                        8.3    25.35 )-----------( 442      ( 06 Jul 2023 05:05 )             27.3     07-06    145  |  116<H>  |  106<H>  ----------------------------<  134<H>  4.4   |  13<L>  |  9.22<H>    Ca    9.2      06 Jul 2023 05:05  Phos  3.0     07-06  Mg     2.2     07-06    TPro  7.4  /  Alb  3.1<L>  /  TBili  0.2  /  DBili  x   /  AST  12  /  ALT  10  /  AlkPhos  73  07-06    PT/INR - ( 05 Jul 2023 18:21 )   PT: 13.5 sec;   INR: 1.13          PTT - ( 05 Jul 2023 18:21 )  PTT:25.7 sec      CAPILLARY BLOOD GLUCOSE    POCT Blood Glucose.: 170 mg/dL (06 Jul 2023 07:22)  POCT Blood Glucose.: 151 mg/dL (06 Jul 2023 06:10)  POCT Blood Glucose.: 127 mg/dL (06 Jul 2023 05:01)  POCT Blood Glucose.: 121 mg/dL (06 Jul 2023 04:02)  POCT Blood Glucose.: 162 mg/dL (06 Jul 2023 03:06)  POCT Blood Glucose.: 198 mg/dL (06 Jul 2023 02:04)  POCT Blood Glucose.: 204 mg/dL (06 Jul 2023 01:02)    RADIOLOGY & ADDITIONAL TESTS:    Consultant(s) Notes Reviewed:  [x ] YES  [ ] NO    MEDICATIONS  (STANDING):  aspirin  chewable 81 milliGRAM(s) Oral daily  atorvastatin 40 milliGRAM(s) Oral at bedtime  chlorhexidine 4% Liquid 1 Application(s) Topical <User Schedule>  clopidogrel Tablet 75 milliGRAM(s) Oral daily  dextrose 5%. 1000 milliLiter(s) (50 mL/Hr) IV Continuous <Continuous>  dextrose 5%. 1000 milliLiter(s) (100 mL/Hr) IV Continuous <Continuous>  dextrose 50% Injectable 25 Gram(s) IV Push once  dextrose 50% Injectable 12.5 Gram(s) IV Push once  dextrose 50% Injectable 25 Gram(s) IV Push once  glucagon  Injectable 1 milliGRAM(s) IntraMuscular once  heparin   Injectable 5000 Unit(s) SubCutaneous every 8 hours  insulin lispro (ADMELOG) corrective regimen sliding scale   SubCutaneous three times a day before meals  insulin regular Infusion 5 Unit(s)/Hr (5 mL/Hr) IV Continuous <Continuous>  modafinil 100 milliGRAM(s) Oral daily  piperacillin/tazobactam IVPB.. 4.5 Gram(s) IV Intermittent every 12 hours  tamsulosin 0.4 milliGRAM(s) Oral at bedtime    MEDICATIONS  (PRN):  dextrose Oral Gel 15 Gram(s) Oral once PRN Blood Glucose LESS THAN 70 milliGRAM(s)/deciliter    GENERAL: NAD, lying in bed, cachectic   HEAD:  Atraumatic, normocephalic  EYES: PERRLA, conjunctiva and sclera clear  ENT: dry mucous membranes  NECK: Supple, no JVD  HEART: Regular rate and rhythm, no murmurs, rubs, or gallops  LUNGS: Unlabored respirations.  Clear to auscultation anteriorly, no crackles, wheezing, or rhonchi  ABDOMEN: Soft, nontender, nondistended, +BS  EXTREMITIES: warm, palpable distal pulses. No clubbing, cyanosis, or edema  NERVOUS SYSTEM:  A&Ox2, left sided neurologic deficits, RUE tremor (at baseline)  SKIN: R heel pressure wound, malodorous, no sacral wound    Care Discussed with Consultants/Other Providers [ x] YES  [ ] NO Hospital course:  57M with PMH of DM2, CVA x 2 (one ischemic one embolic, residual left side weakness), HTN, HLD, and CKD V (not on HD yet), BPH, BIBEMS from nursing home for hyperglycemia to 363.  Per EMS, patient was given insulin at the nursing home. Patient denies fever, chills, chest pain, shortness of breath, vomiting, abdominal pain. The patient does report a dry cough over the last 2 days but no other infectious symptoms or sick contacts. ICU was consulted for DKA with FS at 260 and positive BHB. The patient was initially admitted to the MICU for management of HAGMA in setting of possible DKA. However on admission, BHB 0.6 - HAGMA likely i/s/o acute on chronic renal failure, starvation ketosis, and leukocytosis. CXR was unremarkable and EKG showed sinus tachycardia. S/p2L NS, 1 dose vanc + zosyn, insulin 9 units bolus in the ED. Started on insulin gtt at 9 units/hr overnight. On physical exam, patient has a malodorous right heel ulcer without any drainage. Xray wet read c/f emphysema posterior to calcaneus. CT of R foot was ordered. Dietitian, podiatry, ID, and vascular were consulted. Currently on clindamycin and zosyn. Patient is stable for discharge from ICU to [facility].    Subjective: Seen and examined at bedside. Resting comfortably. Denies chest pain, SOB, lightheadedness.    ICU Vital Signs Last 24 Hrs  T(C): 37 (06 Jul 2023 05:55), Max: 38.2 (05 Jul 2023 17:20)  T(F): 98.6 (06 Jul 2023 05:55), Max: 100.8 (05 Jul 2023 17:20)  HR: 107 (06 Jul 2023 07:00) (89 - 120)  BP: 133/64 (06 Jul 2023 07:00) (117/73 - 148/71)  BP(mean): 92 (06 Jul 2023 07:00) (83 - 100)  ABP: --  ABP(mean): --  RR: 20 (06 Jul 2023 07:00) (15 - 20)  SpO2: 97% (06 Jul 2023 07:00) (96% - 100%)    O2 Parameters below as of 06 Jul 2023 07:00  Patient On (Oxygen Delivery Method): room air    I&O's Summary    05 Jul 2023 07:01  -  06 Jul 2023 07:00  --------------------------------------------------------  IN: 1804 mL / OUT: 1020 mL / NET: 784 mL    06 Jul 2023 07:01  -  06 Jul 2023 07:28  --------------------------------------------------------  IN: 25 mL / OUT: 0 mL / NET: 25 mL    LABS:                        8.3    25.35 )-----------( 442      ( 06 Jul 2023 05:05 )             27.3     07-06    145  |  116<H>  |  106<H>  ----------------------------<  134<H>  4.4   |  13<L>  |  9.22<H>    Ca    9.2      06 Jul 2023 05:05  Phos  3.0     07-06  Mg     2.2     07-06    TPro  7.4  /  Alb  3.1<L>  /  TBili  0.2  /  DBili  x   /  AST  12  /  ALT  10  /  AlkPhos  73  07-06    PT/INR - ( 05 Jul 2023 18:21 )   PT: 13.5 sec;   INR: 1.13       PTT - ( 05 Jul 2023 18:21 )  PTT:25.7 sec    CAPILLARY BLOOD GLUCOSE    POCT Blood Glucose.: 170 mg/dL (06 Jul 2023 07:22)  POCT Blood Glucose.: 151 mg/dL (06 Jul 2023 06:10)  POCT Blood Glucose.: 127 mg/dL (06 Jul 2023 05:01)    RADIOLOGY & ADDITIONAL TESTS: reviewed.    CXR Urgent 7/6/23:  IMPRESSION: No infiltrate lung consolidation pleural effusion or pneumothorax. Left chest loop recorder. Mild hypoinflation. Normal size heart. No acute bone   abnormality.    Consultant(s) Notes Reviewed:  [x ] YES  [ ] NO    MEDICATIONS  (STANDING):  aspirin  chewable 81 milliGRAM(s) Oral daily  atorvastatin 40 milliGRAM(s) Oral at bedtime  chlorhexidine 4% Liquid 1 Application(s) Topical <User Schedule>  clopidogrel Tablet 75 milliGRAM(s) Oral daily  dextrose 5%. 1000 milliLiter(s) (50 mL/Hr) IV Continuous <Continuous>  dextrose 5%. 1000 milliLiter(s) (100 mL/Hr) IV Continuous <Continuous>  dextrose 50% Injectable 25 Gram(s) IV Push once  dextrose 50% Injectable 12.5 Gram(s) IV Push once  dextrose 50% Injectable 25 Gram(s) IV Push once  glucagon  Injectable 1 milliGRAM(s) IntraMuscular once  heparin   Injectable 5000 Unit(s) SubCutaneous every 8 hours  insulin lispro (ADMELOG) corrective regimen sliding scale   SubCutaneous three times a day before meals  insulin regular Infusion 5 Unit(s)/Hr (5 mL/Hr) IV Continuous <Continuous>  modafinil 100 milliGRAM(s) Oral daily  piperacillin/tazobactam IVPB.. 4.5 Gram(s) IV Intermittent every 12 hours  tamsulosin 0.4 milliGRAM(s) Oral at bedtime    MEDICATIONS  (PRN):  dextrose Oral Gel 15 Gram(s) Oral once PRN Blood Glucose LESS THAN 70 milliGRAM(s)/deciliter    GENERAL: NAD, lying in bed, cachectic   HEAD:  Atraumatic, normocephalic  EYES: PERRLA, conjunctiva and sclera clear  ENT: dry mucous membranes  NECK: Supple, no JVD  HEART: Regular rate and rhythm, no murmurs, rubs, or gallops  LUNGS: CTAB  ABDOMEN: Soft, nontender, nondistended, +BS  EXTREMITIES: warm, palpable distal pulses. No clubbing, cyanosis, or edema  NERVOUS SYSTEM: left sided neurologic deficits, RUE tremor (at baseline)  SKIN: R heel pressure wound, malodorous, not draining, no sacral wound    Care Discussed with Consultants/Other Providers [ x] YES  [ ] NO Hospital course:    57M with PMH of DM2, CVA x 2 (one ischemic one embolic, residual left side weakness), HTN, HLD, and CKD V (not on HD yet), BPH, BIBEMS from nursing home for hyperglycemia to 363.  Per EMS, patient was given insulin at the nursing home. Patient denies fever, chills, chest pain, shortness of breath, vomiting, abdominal pain. The patient does report a dry cough over the last 2 days but no other infectious symptoms or sick contacts. ICU was consulted for DKA with FS at 260 and positive BHB. The patient was initially admitted to the MICU for management of HAGMA in setting of possible DKA. However on admission, BHB 0.6 - HAGMA likely i/s/o acute on chronic renal failure, starvation ketosis, and leukocytosis. CXR was unremarkable and EKG showed sinus tachycardia. S/p2L NS, 1 dose vanc + zosyn, insulin 9 units bolus in the ED. Started on insulin gtt at 9 units/hr overnight. On physical exam, patient has a malodorous right heel ulcer without any drainage. Xray wet read c/f emphysema posterior to calcaneus. CT of R foot was ordered. Dietitian, podiatry, ID, and vascular were consulted. Currently on clindamycin and zosyn. Patient is stable for discharge from ICU to [facility].    Subjective: Seen and examined at bedside. Resting comfortably. Denies chest pain, SOB, lightheadedness.    ICU Vital Signs Last 24 Hrs  T(C): 37 (06 Jul 2023 05:55), Max: 38.2 (05 Jul 2023 17:20)  T(F): 98.6 (06 Jul 2023 05:55), Max: 100.8 (05 Jul 2023 17:20)  HR: 107 (06 Jul 2023 07:00) (89 - 120)  BP: 133/64 (06 Jul 2023 07:00) (117/73 - 148/71)  BP(mean): 92 (06 Jul 2023 07:00) (83 - 100)  ABP: --  ABP(mean): --  RR: 20 (06 Jul 2023 07:00) (15 - 20)  SpO2: 97% (06 Jul 2023 07:00) (96% - 100%)    O2 Parameters below as of 06 Jul 2023 07:00  Patient On (Oxygen Delivery Method): room air    I&O's Summary    05 Jul 2023 07:01  -  06 Jul 2023 07:00  --------------------------------------------------------  IN: 1804 mL / OUT: 1020 mL / NET: 784 mL    06 Jul 2023 07:01  -  06 Jul 2023 07:28  --------------------------------------------------------  IN: 25 mL / OUT: 0 mL / NET: 25 mL    LABS:                        8.3    25.35 )-----------( 442      ( 06 Jul 2023 05:05 )             27.3     07-06    145  |  116<H>  |  106<H>  ----------------------------<  134<H>  4.4   |  13<L>  |  9.22<H>    Ca    9.2      06 Jul 2023 05:05  Phos  3.0     07-06  Mg     2.2     07-06    TPro  7.4  /  Alb  3.1<L>  /  TBili  0.2  /  DBili  x   /  AST  12  /  ALT  10  /  AlkPhos  73  07-06    PT/INR - ( 05 Jul 2023 18:21 )   PT: 13.5 sec;   INR: 1.13       PTT - ( 05 Jul 2023 18:21 )  PTT:25.7 sec    CAPILLARY BLOOD GLUCOSE    POCT Blood Glucose.: 170 mg/dL (06 Jul 2023 07:22)  POCT Blood Glucose.: 151 mg/dL (06 Jul 2023 06:10)  POCT Blood Glucose.: 127 mg/dL (06 Jul 2023 05:01)    RADIOLOGY & ADDITIONAL TESTS: reviewed.    CXR Urgent 7/6/23:  IMPRESSION: No infiltrate lung consolidation pleural effusion or pneumothorax. Left chest loop recorder. Mild hypoinflation. Normal size heart. No acute bone   abnormality.    Consultant(s) Notes Reviewed:  [x ] YES  [ ] NO    MEDICATIONS  (STANDING):  aspirin  chewable 81 milliGRAM(s) Oral daily  atorvastatin 40 milliGRAM(s) Oral at bedtime  chlorhexidine 4% Liquid 1 Application(s) Topical <User Schedule>  clopidogrel Tablet 75 milliGRAM(s) Oral daily  dextrose 5%. 1000 milliLiter(s) (50 mL/Hr) IV Continuous <Continuous>  dextrose 5%. 1000 milliLiter(s) (100 mL/Hr) IV Continuous <Continuous>  dextrose 50% Injectable 25 Gram(s) IV Push once  dextrose 50% Injectable 12.5 Gram(s) IV Push once  dextrose 50% Injectable 25 Gram(s) IV Push once  glucagon  Injectable 1 milliGRAM(s) IntraMuscular once  heparin   Injectable 5000 Unit(s) SubCutaneous every 8 hours  insulin lispro (ADMELOG) corrective regimen sliding scale   SubCutaneous three times a day before meals  insulin regular Infusion 5 Unit(s)/Hr (5 mL/Hr) IV Continuous <Continuous>  modafinil 100 milliGRAM(s) Oral daily  piperacillin/tazobactam IVPB.. 4.5 Gram(s) IV Intermittent every 12 hours  tamsulosin 0.4 milliGRAM(s) Oral at bedtime    MEDICATIONS  (PRN):  dextrose Oral Gel 15 Gram(s) Oral once PRN Blood Glucose LESS THAN 70 milliGRAM(s)/deciliter    GENERAL: NAD, lying in bed, cachectic   HEAD:  Atraumatic, normocephalic  EYES: PERRLA, conjunctiva and sclera clear  ENT: dry mucous membranes  NECK: Supple, no JVD  HEART: Regular rate and rhythm, no murmurs, rubs, or gallops  LUNGS: CTAB  ABDOMEN: Soft, nontender, nondistended, +BS  EXTREMITIES: warm, palpable distal pulses. No clubbing, cyanosis, or edema  NERVOUS SYSTEM: left sided neurologic deficits, RUE tremor (at baseline)  SKIN: R heel pressure wound, malodorous, not draining, no sacral wound    Care Discussed with Consultants/Other Providers [ x] YES  [ ] NO ************************TRANSFER FROM ICU TO [facility]****************************    Hospital course:    57M with PMH of DM2, CVA x 2 (one ischemic one embolic, residual left side weakness), HTN, HLD, and CKD V (not on HD yet), BPH, BIBEMS from nursing home for hyperglycemia to [facility].  Per EMS, patient was given insulin at the nursing home. Patient denies fever, chills, chest pain, shortness of breath, vomiting, abdominal pain. The patient does report a dry cough over the last 2 days but no other infectious symptoms or sick contacts. ICU was consulted for DKA with FS at 260 and positive BHB. The patient was initially admitted to the MICU for management of HAGMA in setting of possible DKA. However on admission, BHB 0.6 - HAGMA likely i/s/o acute on chronic renal failure, starvation ketosis, and leukocytosis. CXR was unremarkable and EKG showed sinus tachycardia. S/p2L NS, 1 dose vanc + zosyn, insulin 9 units bolus in the ED. Started on insulin gtt at 9 units/hr overnight. On physical exam, patient has a malodorous right heel ulcer without any drainage. Xray prelim read c/f emphysema posterior to calcaneus. CT of R foot ordered for further evaluation. Dietitian, podiatry, ID, and vascular were consulted, pending BKA today by vascular. Currently on clindamycin, vancomycin, and zosyn. Patient is stable for discharge from ICU to [facility].    Subjective: Seen and examined at bedside. Resting comfortably. Denies chest pain, SOB, lightheadedness.    ICU Vital Signs Last 24 Hrs  T(C): 37 (06 Jul 2023 05:55), Max: 38.2 (05 Jul 2023 17:20)  T(F): 98.6 (06 Jul 2023 05:55), Max: 100.8 (05 Jul 2023 17:20)  HR: 107 (06 Jul 2023 07:00) (89 - 120)  BP: 133/64 (06 Jul 2023 07:00) (117/73 - 148/71)  BP(mean): 92 (06 Jul 2023 07:00) (83 - 100)  ABP: --  ABP(mean): --  RR: 20 (06 Jul 2023 07:00) (15 - 20)  SpO2: 97% (06 Jul 2023 07:00) (96% - 100%)    O2 Parameters below as of 06 Jul 2023 07:00  Patient On (Oxygen Delivery Method): room air    I&O's Summary    05 Jul 2023 07:01  -  06 Jul 2023 07:00  --------------------------------------------------------  IN: 1804 mL / OUT: 1020 mL / NET: 784 mL    06 Jul 2023 07:01  -  06 Jul 2023 07:28  --------------------------------------------------------  IN: 25 mL / OUT: 0 mL / NET: 25 mL    LABS:                        8.3    25.35 )-----------( 442      ( 06 Jul 2023 05:05 )             27.3     07-06    145  |  116<H>  |  106<H>  ----------------------------<  134<H>  4.4   |  13<L>  |  9.22<H>    Ca    9.2      06 Jul 2023 05:05  Phos  3.0     07-06  Mg     2.2     07-06    TPro  7.4  /  Alb  3.1<L>  /  TBili  0.2  /  DBili  x   /  AST  12  /  ALT  10  /  AlkPhos  73  07-06    PT/INR - ( 05 Jul 2023 18:21 )   PT: 13.5 sec;   INR: 1.13       PTT - ( 05 Jul 2023 18:21 )  PTT:25.7 sec    CAPILLARY BLOOD GLUCOSE    POCT Blood Glucose.: 170 mg/dL (06 Jul 2023 07:22)  POCT Blood Glucose.: 151 mg/dL (06 Jul 2023 06:10)  POCT Blood Glucose.: 127 mg/dL (06 Jul 2023 05:01)    RADIOLOGY & ADDITIONAL TESTS: reviewed.    CXR Urgent 7/6/23:  IMPRESSION: No infiltrate lung consolidation pleural effusion or pneumothorax. Left chest loop recorder. Mild hypoinflation. Normal size heart. No acute bone   abnormality.    Consultant(s) Notes Reviewed:  [x ] YES  [ ] NO    MEDICATIONS  (STANDING):  aspirin  chewable 81 milliGRAM(s) Oral daily  atorvastatin 40 milliGRAM(s) Oral at bedtime  chlorhexidine 4% Liquid 1 Application(s) Topical <User Schedule>  clopidogrel Tablet 75 milliGRAM(s) Oral daily  dextrose 5%. 1000 milliLiter(s) (50 mL/Hr) IV Continuous <Continuous>  dextrose 5%. 1000 milliLiter(s) (100 mL/Hr) IV Continuous <Continuous>  dextrose 50% Injectable 25 Gram(s) IV Push once  dextrose 50% Injectable 12.5 Gram(s) IV Push once  dextrose 50% Injectable 25 Gram(s) IV Push once  glucagon  Injectable 1 milliGRAM(s) IntraMuscular once  heparin   Injectable 5000 Unit(s) SubCutaneous every 8 hours  insulin lispro (ADMELOG) corrective regimen sliding scale   SubCutaneous three times a day before meals  insulin regular Infusion 5 Unit(s)/Hr (5 mL/Hr) IV Continuous <Continuous>  modafinil 100 milliGRAM(s) Oral daily  piperacillin/tazobactam IVPB.. 4.5 Gram(s) IV Intermittent every 12 hours  tamsulosin 0.4 milliGRAM(s) Oral at bedtime    MEDICATIONS  (PRN):  dextrose Oral Gel 15 Gram(s) Oral once PRN Blood Glucose LESS THAN 70 milliGRAM(s)/deciliter    GENERAL: NAD, lying in bed, cachectic   HEAD:  Atraumatic, normocephalic  EYES: PERRLA, conjunctiva and sclera clear  ENT: dry mucous membranes  NECK: Supple, no JVD  HEART: Regular rate and rhythm, no murmurs, rubs, or gallops  LUNGS: CTAB  ABDOMEN: Soft, nontender, nondistended, +BS  EXTREMITIES: warm, palpable distal pulses. No clubbing, cyanosis, or edema  NERVOUS SYSTEM: left sided neurologic deficits, RUE tremor (at baseline)  SKIN: R heel pressure wound, malodorous, not draining, no sacral wound    Care Discussed with Consultants/Other Providers [ x] YES  [ ] NO     Hospital course:    57M with PMH of DM2, CVA x 2 (one ischemic one embolic, residual left side weakness), HTN, HLD, and CKD V (not on HD yet), BPH, BIBEMS from nursing home for hyperglycemia to 363.  Per EMS, patient was given insulin at the nursing home. Patient denies fever, chills, chest pain, shortness of breath, vomiting, abdominal pain. The patient does report a dry cough over the last 2 days but no other infectious symptoms or sick contacts. ICU was consulted for DKA with FS at 260 and positive BHB. The patient was initially admitted to the MICU for management of HAGMA in setting of possible DKA. However on admission, BHB 0.6 - HAGMA likely i/s/o acute on chronic renal failure, starvation ketosis, and leukocytosis. CXR was unremarkable and EKG showed sinus tachycardia. S/p2L NS, 1 dose vanc + zosyn, insulin 9 units bolus in the ED. Started on insulin gtt at 9 units/hr overnight. On physical exam, patient has a malodorous right heel ulcer without any drainage. Xray prelim read c/f emphysema posterior to calcaneus. CT of R foot ordered for further evaluation. Dietitian, podiatry, ID, and vascular were consulted, pending BKA today by vascular. Currently on clindamycin, vancomycin, and zosyn. Patient is stable for discharge from ICU to [facility].    Subjective: Seen and examined at bedside. Resting comfortably. Denies chest pain, SOB, lightheadedness.    ICU Vital Signs Last 24 Hrs  T(C): 37 (06 Jul 2023 05:55), Max: 38.2 (05 Jul 2023 17:20)  T(F): 98.6 (06 Jul 2023 05:55), Max: 100.8 (05 Jul 2023 17:20)  HR: 107 (06 Jul 2023 07:00) (89 - 120)  BP: 133/64 (06 Jul 2023 07:00) (117/73 - 148/71)  BP(mean): 92 (06 Jul 2023 07:00) (83 - 100)  ABP: --  ABP(mean): --  RR: 20 (06 Jul 2023 07:00) (15 - 20)  SpO2: 97% (06 Jul 2023 07:00) (96% - 100%)    O2 Parameters below as of 06 Jul 2023 07:00  Patient On (Oxygen Delivery Method): room air    I&O's Summary    05 Jul 2023 07:01  -  06 Jul 2023 07:00  --------------------------------------------------------  IN: 1804 mL / OUT: 1020 mL / NET: 784 mL    06 Jul 2023 07:01  -  06 Jul 2023 07:28  --------------------------------------------------------  IN: 25 mL / OUT: 0 mL / NET: 25 mL    LABS:                        8.3    25.35 )-----------( 442      ( 06 Jul 2023 05:05 )             27.3     07-06    145  |  116<H>  |  106<H>  ----------------------------<  134<H>  4.4   |  13<L>  |  9.22<H>    Ca    9.2      06 Jul 2023 05:05  Phos  3.0     07-06  Mg     2.2     07-06    TPro  7.4  /  Alb  3.1<L>  /  TBili  0.2  /  DBili  x   /  AST  12  /  ALT  10  /  AlkPhos  73  07-06    PT/INR - ( 05 Jul 2023 18:21 )   PT: 13.5 sec;   INR: 1.13       PTT - ( 05 Jul 2023 18:21 )  PTT:25.7 sec    CAPILLARY BLOOD GLUCOSE    POCT Blood Glucose.: 170 mg/dL (06 Jul 2023 07:22)  POCT Blood Glucose.: 151 mg/dL (06 Jul 2023 06:10)  POCT Blood Glucose.: 127 mg/dL (06 Jul 2023 05:01)    RADIOLOGY & ADDITIONAL TESTS: reviewed.    CXR Urgent 7/6/23:  IMPRESSION: No infiltrate lung consolidation pleural effusion or pneumothorax. Left chest loop recorder. Mild hypoinflation. Normal size heart. No acute bone   abnormality.    Consultant(s) Notes Reviewed:  [x ] YES  [ ] NO    MEDICATIONS  (STANDING):  aspirin  chewable 81 milliGRAM(s) Oral daily  atorvastatin 40 milliGRAM(s) Oral at bedtime  chlorhexidine 4% Liquid 1 Application(s) Topical <User Schedule>  clopidogrel Tablet 75 milliGRAM(s) Oral daily  dextrose 5%. 1000 milliLiter(s) (50 mL/Hr) IV Continuous <Continuous>  dextrose 5%. 1000 milliLiter(s) (100 mL/Hr) IV Continuous <Continuous>  dextrose 50% Injectable 25 Gram(s) IV Push once  dextrose 50% Injectable 12.5 Gram(s) IV Push once  dextrose 50% Injectable 25 Gram(s) IV Push once  glucagon  Injectable 1 milliGRAM(s) IntraMuscular once  heparin   Injectable 5000 Unit(s) SubCutaneous every 8 hours  insulin lispro (ADMELOG) corrective regimen sliding scale   SubCutaneous three times a day before meals  insulin regular Infusion 5 Unit(s)/Hr (5 mL/Hr) IV Continuous <Continuous>  modafinil 100 milliGRAM(s) Oral daily  piperacillin/tazobactam IVPB.. 4.5 Gram(s) IV Intermittent every 12 hours  tamsulosin 0.4 milliGRAM(s) Oral at bedtime    MEDICATIONS  (PRN):  dextrose Oral Gel 15 Gram(s) Oral once PRN Blood Glucose LESS THAN 70 milliGRAM(s)/deciliter    GENERAL: NAD, lying in bed, cachectic   HEAD:  Atraumatic, normocephalic  EYES: PERRLA, conjunctiva and sclera clear  ENT: dry mucous membranes  NECK: Supple, no JVD  HEART: Regular rate and rhythm, no murmurs, rubs, or gallops  LUNGS: CTAB  ABDOMEN: Soft, nontender, nondistended, +BS  EXTREMITIES: warm, palpable distal pulses. No clubbing, cyanosis, or edema  NERVOUS SYSTEM: left sided neurologic deficits, RUE tremor (at baseline)  SKIN: R heel pressure wound, malodorous, not draining, no sacral wound    Care Discussed with Consultants/Other Providers [ x] YES  [ ] NO

## 2023-07-06 NOTE — PROGRESS NOTE ADULT - SUBJECTIVE AND OBJECTIVE BOX
POST-OPERATIVE NOTE    Procedure: Right below knee guillotine amputation    Diagnosis/Indication: gas gangrene of right foot    Surgeon: Dr. Cuevas    S: Pt c/o mild RLE pain. Denies CP, SOB, N/V. Pain controlled with medication.    O:  T(C): --  T(F): --  HR: 87 (07-06-23 @ 20:00) (87 - 110)  BP: 139/79 (07-06-23 @ 20:00) (139/79 - 164/74)  RR: 13 (07-06-23 @ 20:00) (13 - 15)  SpO2: 100% (07-06-23 @ 20:00) (100% - 100%)  Wt(kg): --                        7.3    23.42 )-----------( 403      ( 06 Jul 2023 18:29 )             24.3     07-06    141  |  115<H>  |  105<H>  ----------------------------<  295<H>  4.2   |  15<L>  |  8.14<H>    Ca    9.2      06 Jul 2023 18:29  Phos  3.0     07-06  Mg     2.2     07-06    TPro  7.4  /  Alb  3.1<L>  /  TBili  0.2  /  DBili  x   /  AST  12  /  ALT  10  /  AlkPhos  73  07-06      Gen: NAD, resting comfortably in bed  Neuro: follows commands and answers questions  C/V: NSR  Pulm: Nonlabored breathing, no respiratory distress, on room air  Abd: soft, NT/ND  Extrem: WWP, no edema. s/p R BKA, dressing c/d/i, no strikethrough.      A/P: 57y Male s/p Right guillotine BKA  Vascular will continue to follow and perform dressing changes  Pain/nausea control  Rest of care per MICU  Any questions call [phone]

## 2023-07-06 NOTE — CONSULT NOTE ADULT - SUBJECTIVE AND OBJECTIVE BOX
Vascular Attending:  Faith    HPI:  HPI: 57M with PMH of DM2, CVA x 2 (one ischemic one embolic, residual left side weakness), HTN, HLD, and CKD V (not on HD yet), BPH, BIBEMS from nursing home for hyperglycemia to 363.  On arrival, patient found to have SIRS (tachycardia, fever, leukocytosis) and was felt to be septic w/o identified infectious source. Also noted to have anion gap but likely attributable to uremia as well as hyperglycemia, but of note not in DKA. Upon investigation, patient's right heel ulcer was noted to have foul smell so podiatry was consulted and cross-sectional imaging obtained revealing gas tracking along achilles sheath. Vascular surgery was consulted for surgical evaluation.     Patient endorses pain of the right heel. He states the wound has been present for about a month. Patient denies fever, chills, chest pain, shortness of breath, vomiting, abdominal pain.     In the ED:  Initial vital signs: T: 100.8 F, HR: 120, BP: 117/73, R:16, SpO2: 96% on RA  ED course:   Labs: significant for WBC 26.04, Hgb 8.9, plt 496, glucose 260, Na+ 146, K+ 3.8, Cl- 109, HCO3- 15, AG 22, BHB 0.6, trop 126, VBG with metabolic acidosis  Imaging:  CXR: clear  EKG: sinus tachycardia, no ST elevation/depression or TWI  Medications:  2L NS, 1 dose vanc + zosyn, insulin 9 units bolus, started on insulin gtt at 9 units/hr  Consults: MICU    MICU course:  (06 Jul 2023 04:16)      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, traumatic, left, sequela,          REVIEW OF SYSTEMS      General:	    Skin/Breast:  	  Ophthalmologic:  	  ENMT:	    Respiratory and Thorax:  	  Cardiovascular:	    Gastrointestinal:	    Genitourinary:	    Musculoskeletal:	    Neurological:	    Psychiatric:	    Hematology/Lymphatics:	    Endocrine:	    Allergic/Immunologic:	    MEDICATIONS  (STANDING):  aspirin  chewable 81 milliGRAM(s) Oral daily  atorvastatin 40 milliGRAM(s) Oral at bedtime  chlorhexidine 2% Cloths 1 Application(s) Topical <User Schedule>  clindamycin IVPB 900 milliGRAM(s) IV Intermittent every 8 hours  clopidogrel Tablet 75 milliGRAM(s) Oral daily  dextrose 5% 1000 milliLiter(s) (100 mL/Hr) IV Continuous <Continuous>  dextrose 5%. 1000 milliLiter(s) (50 mL/Hr) IV Continuous <Continuous>  dextrose 5%. 1000 milliLiter(s) (100 mL/Hr) IV Continuous <Continuous>  dextrose 50% Injectable 25 Gram(s) IV Push once  dextrose 50% Injectable 25 Gram(s) IV Push once  dextrose 50% Injectable 12.5 Gram(s) IV Push once  glucagon  Injectable 1 milliGRAM(s) IntraMuscular once  heparin   Injectable 5000 Unit(s) SubCutaneous every 8 hours  insulin lispro (ADMELOG) corrective regimen sliding scale   SubCutaneous three times a day before meals  insulin regular Infusion 5 Unit(s)/Hr (5 mL/Hr) IV Continuous <Continuous>  modafinil 100 milliGRAM(s) Oral daily  piperacillin/tazobactam IVPB.. 4.5 Gram(s) IV Intermittent every 12 hours  tamsulosin 0.4 milliGRAM(s) Oral at bedtime    MEDICATIONS  (PRN):  dextrose Oral Gel 15 Gram(s) Oral once PRN Blood Glucose LESS THAN 70 milliGRAM(s)/deciliter      Allergies    No Known Allergies    Intolerances        SOCIAL HISTORY:    FAMILY HISTORY:  Family history of diabetes mellitus (Mother)  Family history of diabetes mellitus (DM)        Vital Signs Last 24 Hrs  T(C): 37 (06 Jul 2023 13:04), Max: 38.2 (05 Jul 2023 17:20)  T(F): 98.6 (06 Jul 2023 13:04), Max: 100.8 (05 Jul 2023 17:20)  HR: 112 (06 Jul 2023 14:13) (89 - 120)  BP: 159/108 (06 Jul 2023 14:13) (117/73 - 162/87)  BP(mean): 129 (06 Jul 2023 14:13) (83 - 129)  RR: 20 (06 Jul 2023 14:13) (14 - 20)  SpO2: 95% (06 Jul 2023 14:13) (95% - 100%)    Parameters below as of 06 Jul 2023 14:13  Patient On (Oxygen Delivery Method): room air        PHYSICAL EXAM:      Constitutional:    Eyes:    ENMT:    Neck:    Breasts:    Back:    Respiratory:    Cardiovascular:    Gastrointestinal:    Genitourinary:    Rectal:    Extremities:    Vascular:    Neurological:    Skin:    Lymph Nodes:    Musculoskeletal:    Psychiatric:        LABS:                        8.3    25.35 )-----------( 442      ( 06 Jul 2023 05:05 )             27.3     07-06    145  |  116<H>  |  106<H>  ----------------------------<  134<H>  4.4   |  13<L>  |  9.22<H>    Ca    9.2      06 Jul 2023 05:05  Phos  3.0     07-06  Mg     2.2     07-06    TPro  7.4  /  Alb  3.1<L>  /  TBili  0.2  /  DBili  x   /  AST  12  /  ALT  10  /  AlkPhos  73  07-06    PT/INR - ( 05 Jul 2023 18:21 )   PT: 13.5 sec;   INR: 1.13          PTT - ( 05 Jul 2023 18:21 )  PTT:25.7 sec  Urinalysis Basic - ( 06 Jul 2023 05:05 )    Color: x / Appearance: x / SG: x / pH: x  Gluc: 134 mg/dL / Ketone: x  / Bili: x / Urobili: x   Blood: x / Protein: x / Nitrite: x   Leuk Esterase: x / RBC: x / WBC x   Sq Epi: x / Non Sq Epi: x / Bacteria: x        RADIOLOGY & ADDITIONAL STUDIES Vascular Attending:  Faith    HPI: 57M with PMH of DM2, CVA x 2 (one ischemic one embolic, residual left side weakness), HTN, HLD, and CKD V (not on HD yet), BPH, BIBEMS from nursing home for hyperglycemia to 363.  On arrival, patient found to have SIRS (tachycardia, fever 100.8, leukocytosis) and was felt to be septic w/o identified infectious source. Also noted to have anion gap but likely attributable to uremia as well as hyperglycemia, but of note not in DKA. Upon investigation, patient's right heel ulcer was noted to have foul smell so podiatry was consulted and cross-sectional imaging obtained revealing gas tracking along achilles sheath. WBC 25, , .  Vascular surgery was consulted for surgical evaluation. Afebrile on vanc/zosyn/clinda. Patient endorses pain of the right heel. He states the wound has been present for about a month. Patient denies fever, chills, chest pain, shortness of breath, vomiting, abdominal pain.     PAST MEDICAL & SURGICAL HISTORY:  Type 2 diabetes mellitus  Diabetes mellitus  Cerebral artery occlusion with cerebral infarction  Cerebral vascular accident  Hyperlipidemia  Hyperlipidemia  Hypertension  Stage 4 chronic kidney disease  H/O diabetic retinopathy  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, traumatic, left, sequela,    REVIEW OF SYSTEMS  Negative except as above    MEDICATIONS  (STANDING):  aspirin  chewable 81 milliGRAM(s) Oral daily  atorvastatin 40 milliGRAM(s) Oral at bedtime  chlorhexidine 2% Cloths 1 Application(s) Topical <User Schedule>  clindamycin IVPB 900 milliGRAM(s) IV Intermittent every 8 hours  clopidogrel Tablet 75 milliGRAM(s) Oral daily  dextrose 5% 1000 milliLiter(s) (100 mL/Hr) IV Continuous <Continuous>  dextrose 5%. 1000 milliLiter(s) (50 mL/Hr) IV Continuous <Continuous>  dextrose 5%. 1000 milliLiter(s) (100 mL/Hr) IV Continuous <Continuous>  dextrose 50% Injectable 25 Gram(s) IV Push once  dextrose 50% Injectable 25 Gram(s) IV Push once  dextrose 50% Injectable 12.5 Gram(s) IV Push once  glucagon  Injectable 1 milliGRAM(s) IntraMuscular once  heparin   Injectable 5000 Unit(s) SubCutaneous every 8 hours  insulin lispro (ADMELOG) corrective regimen sliding scale   SubCutaneous three times a day before meals  insulin regular Infusion 5 Unit(s)/Hr (5 mL/Hr) IV Continuous <Continuous>  modafinil 100 milliGRAM(s) Oral daily  piperacillin/tazobactam IVPB.. 4.5 Gram(s) IV Intermittent every 12 hours  tamsulosin 0.4 milliGRAM(s) Oral at bedtime    MEDICATIONS  (PRN):  dextrose Oral Gel 15 Gram(s) Oral once PRN Blood Glucose LESS THAN 70 milliGRAM(s)/deciliter      Allergies    No Known Allergies    Intolerances    SOCIAL HISTORY:    FAMILY HISTORY:  Family history of diabetes mellitus (Mother)  Family history of diabetes mellitus (DM)    Vital Signs Last 24 Hrs  T(C): 37 (06 Jul 2023 13:04), Max: 38.2 (05 Jul 2023 17:20)  T(F): 98.6 (06 Jul 2023 13:04), Max: 100.8 (05 Jul 2023 17:20)  HR: 112 (06 Jul 2023 14:13) (89 - 120)  BP: 159/108 (06 Jul 2023 14:13) (117/73 - 162/87)  BP(mean): 129 (06 Jul 2023 14:13) (83 - 129)  RR: 20 (06 Jul 2023 14:13) (14 - 20)  SpO2: 95% (06 Jul 2023 14:13) (95% - 100%)    Parameters below as of 06 Jul 2023 14:13  Patient On (Oxygen Delivery Method): room air        PHYSICAL EXAM:  General: Frail appearing, chronically ill appearing gentleman.   CV: Tachycardic, regular. Warm and perfused.   Pulm: On room air.   Abd: Soft/nontender/nondistended.   Extremity: RLE w/ foul-smelling dry ulcer of right heel. 7x4cm w/ stable eschar. Mild erythema around wound. No obvious edema or crepitus proximally. S/p L 4th and 5th ray amp, well healed.     LABS:                        8.3    25.35 )-----------( 442      ( 06 Jul 2023 05:05 )             27.3     07-06    145  |  116<H>  |  106<H>  ----------------------------<  134<H>  4.4   |  13<L>  |  9.22<H>    Ca    9.2      06 Jul 2023 05:05  Phos  3.0     07-06  Mg     2.2     07-06    TPro  7.4  /  Alb  3.1<L>  /  TBili  0.2  /  DBili  x   /  AST  12  /  ALT  10  /  AlkPhos  73  07-06    PT/INR - ( 05 Jul 2023 18:21 )   PT: 13.5 sec;   INR: 1.13          PTT - ( 05 Jul 2023 18:21 )  PTT:25.7 sec  Urinalysis Basic - ( 06 Jul 2023 05:05 )    Color: x / Appearance: x / SG: x / pH: x  Gluc: 134 mg/dL / Ketone: x  / Bili: x / Urobili: x   Blood: x / Protein: x / Nitrite: x   Leuk Esterase: x / RBC: x / WBC x   Sq Epi: x / Non Sq Epi: x / Bacteria: x    RADIOLOGY & ADDITIONAL STUDIES    CT RLE     IMPRESSION:    There are multiple foci of gas within the soft tissues about the posterior calcaneus including gas within the Kager's fat pad extending into the soft tissues adjacent to the lateral calcaneus. Sequelae of infection with gas-forming organism cannot be entirely excluded. No CT evidence for associated abscess or osteomyelitis at this time.    CXR     IMPRESSION:    No infiltrate lung consolidation pleural effusion or pneumothorax. Left chest loop recorder. Mild hypoinflation. Normal size heart. No acute bone abnormality.    A/P:  57M with PMH of DM2, CVA x 2 (one ischemic one embolic, residual left side weakness), HTN, HLD, and CKD V (not on HD yet), BPH admitted with sepsis with CT imaging findings consistent with gas gangrene of the right lower extremity calcaneus ascending along the Achilles sheath.     - Level 1 OR for jd NOBLE w/ Dr. Cuevas  - Please obtain preop labs (T&S, coags, CBC, BMP)  - Please make patient NPO  - Rest of care per MICU.     Discussed with chief. Staffed with Dr. Cuevas.

## 2023-07-06 NOTE — CONSULT NOTE ADULT - SUBJECTIVE AND OBJECTIVE BOX
INFECTIOUS DISEASES INITIAL CONSULT NOTE    HPI:  HPI: 57M with PMH of DM2, CVA x 2 (one ischemic one embolic, residual left side weakness), HTN, HLD, and CKD V (not on HD yet), BPH, BIBEMS from nursing home for hyperglycemia to 363.  Per EMS, patient was given insulin at the nursing home.  Patient denies any symptoms.  Patient denies fever, chills, chest pain, shortness of breath, vomiting, abdominal pain. The patient does report a dry cough over the last 2 days but no other infectious symptoms or sick contacts. ICU was consulted for DKA with FS at 260 and positive BHB. The patient will be admitted to the MICU for management of HAGMA in setting of likely DKA.    Per the nursing home record, the patient is bedbound at baseline and is able to mobilize via wheelchair. The patient reports that he still produces urine.      In the ED:  Initial vital signs: T: 100.8 F, HR: 120, BP: 117/73, R:16, SpO2: 96% on RA  ED course:   Labs: significant for WBC 26.04, Hgb 8.9, plt 496, glucose 260, Na+ 146, K+ 3.8, Cl- 109, HCO3- 15, AG 22, BHB 0.6, trop 126, VBG with metabolic acidosis  Imaging:  CXR: clear  EKG: sinus tachycardia, no ST elevation/depression or TWI  Medications:  2L NS, 1 dose vanc + zosyn, insulin 9 units bolus, started on insulin gtt at 9 units/hr  Consults: MICU (06 Jul 2023 04:16)      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, traumatic, left, sequela,            Review of Systems:   Constitutional, eyes, ENT, cardiovascular, respiratory, gastrointestinal, genitourinary, integumentary, neurological, psychiatric and heme/lymph are otherwise negative other than noted above       ANTIBIOTICS:  MEDICATIONS  (STANDING):  aspirin  chewable 81 milliGRAM(s) Oral daily  atorvastatin 40 milliGRAM(s) Oral at bedtime  chlorhexidine 2% Cloths 1 Application(s) Topical <User Schedule>  clindamycin IVPB 900 milliGRAM(s) IV Intermittent every 8 hours  clopidogrel Tablet 75 milliGRAM(s) Oral daily  dextrose 5% 1000 milliLiter(s) (100 mL/Hr) IV Continuous <Continuous>  dextrose 5%. 1000 milliLiter(s) (100 mL/Hr) IV Continuous <Continuous>  dextrose 5%. 1000 milliLiter(s) (50 mL/Hr) IV Continuous <Continuous>  dextrose 50% Injectable 12.5 Gram(s) IV Push once  dextrose 50% Injectable 25 Gram(s) IV Push once  dextrose 50% Injectable 25 Gram(s) IV Push once  glucagon  Injectable 1 milliGRAM(s) IntraMuscular once  heparin   Injectable 5000 Unit(s) SubCutaneous every 8 hours  insulin lispro (ADMELOG) corrective regimen sliding scale   SubCutaneous three times a day before meals  insulin regular Infusion 5 Unit(s)/Hr (5 mL/Hr) IV Continuous <Continuous>  modafinil 100 milliGRAM(s) Oral daily  piperacillin/tazobactam IVPB.. 4.5 Gram(s) IV Intermittent every 12 hours  tamsulosin 0.4 milliGRAM(s) Oral at bedtime    MEDICATIONS  (PRN):  dextrose Oral Gel 15 Gram(s) Oral once PRN Blood Glucose LESS THAN 70 milliGRAM(s)/deciliter      Allergies    No Known Allergies    Intolerances        SOCIAL HISTORY:    FAMILY HISTORY:  Family history of diabetes mellitus (Mother)  Family history of diabetes mellitus (DM)     no FH leading to current infection    Vital Signs Last 24 Hrs  T(C): 37 (06 Jul 2023 13:04), Max: 38.2 (05 Jul 2023 17:20)  T(F): 98.6 (06 Jul 2023 13:04), Max: 100.8 (05 Jul 2023 17:20)  HR: 111 (06 Jul 2023 13:00) (89 - 120)  BP: 162/87 (06 Jul 2023 13:00) (117/73 - 162/87)  BP(mean): 118 (06 Jul 2023 13:00) (83 - 118)  RR: 18 (06 Jul 2023 13:00) (14 - 20)  SpO2: 100% (06 Jul 2023 13:00) (96% - 100%)    Parameters below as of 06 Jul 2023 13:00  Patient On (Oxygen Delivery Method): room air        07-05-23 @ 07:01  -  07-06-23 @ 07:00  --------------------------------------------------------  IN: 2049 mL / OUT: 1020 mL / NET: 1029 mL    07-06-23 @ 07:01  -  07-06-23 @ 13:18  --------------------------------------------------------  IN: 30 mL / OUT: 0 mL / NET: 30 mL        PHYSICAL EXAM:  Constitutional: alert, NAD  Eyes: the sclera and conjunctiva were normal.   ENT: the ears and nose were normal in appearance.   Neck: the appearance of the neck was normal and the neck was supple.   Pulmonary: no respiratory distress and lungs were clear to auscultation bilaterally.   Heart: heart rate was normal and rhythm regular, normal S1 and S2  Vascular:. there was no peripheral edema  Abdomen: normal bowel sounds, soft, non-tender  Neurological: no focal deficits.   Psychiatric: the affect was normal      LABS:                        8.3    25.35 )-----------( 442      ( 06 Jul 2023 05:05 )             27.3     07-06    145  |  116<H>  |  106<H>  ----------------------------<  134<H>  4.4   |  13<L>  |  9.22<H>    Ca    9.2      06 Jul 2023 05:05  Phos  3.0     07-06  Mg     2.2     07-06    TPro  7.4  /  Alb  3.1<L>  /  TBili  0.2  /  DBili  x   /  AST  12  /  ALT  10  /  AlkPhos  73  07-06    PT/INR - ( 05 Jul 2023 18:21 )   PT: 13.5 sec;   INR: 1.13          PTT - ( 05 Jul 2023 18:21 )  PTT:25.7 sec  Urinalysis Basic - ( 06 Jul 2023 05:05 )    Color: x / Appearance: x / SG: x / pH: x  Gluc: 134 mg/dL / Ketone: x  / Bili: x / Urobili: x   Blood: x / Protein: x / Nitrite: x   Leuk Esterase: x / RBC: x / WBC x   Sq Epi: x / Non Sq Epi: x / Bacteria: x        MICROBIOLOGY:    RADIOLOGY & ADDITIONAL STUDIES:   INFECTIOUS DISEASES INITIAL CONSULT NOTE    HPI:  HPI: 57M with PMH of DM2, CVA x 2 (one ischemic one embolic, residual left side weakness), HTN, HLD, and CKD V (not on HD yet), BPH, BIBEMS from nursing home for hyperglycemia to 363.  Per EMS, patient was given insulin at the nursing home.  Patient denies any symptoms.  Patient denies fever, chills, chest pain, shortness of breath, vomiting, abdominal pain. The patient does report a dry cough over the last 2 days but no other infectious symptoms or sick contacts. Admitted to MICU for DKA. Podiatry consulted for heel ulcer and Xray w/ concerns of gas gangrene for which ID was consulted. During interim of consultation, patient underwent CT R foot which was consistent with gas gangrene and was taken to OR for BKA. Patient denies pain at foot and notes he had it dressed at his nursing home. Denies purulence or fevers, chills, nausea, abdominal pain, dysuria.       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, traumatic, left, sequela,      ANTIBIOTICS:  MEDICATIONS  (STANDING):  aspirin  chewable 81 milliGRAM(s) Oral daily  atorvastatin 40 milliGRAM(s) Oral at bedtime  chlorhexidine 2% Cloths 1 Application(s) Topical <User Schedule>  clindamycin IVPB 900 milliGRAM(s) IV Intermittent every 8 hours  clopidogrel Tablet 75 milliGRAM(s) Oral daily  dextrose 5% 1000 milliLiter(s) (100 mL/Hr) IV Continuous <Continuous>  dextrose 5%. 1000 milliLiter(s) (100 mL/Hr) IV Continuous <Continuous>  dextrose 5%. 1000 milliLiter(s) (50 mL/Hr) IV Continuous <Continuous>  dextrose 50% Injectable 12.5 Gram(s) IV Push once  dextrose 50% Injectable 25 Gram(s) IV Push once  dextrose 50% Injectable 25 Gram(s) IV Push once  glucagon  Injectable 1 milliGRAM(s) IntraMuscular once  heparin   Injectable 5000 Unit(s) SubCutaneous every 8 hours  insulin lispro (ADMELOG) corrective regimen sliding scale   SubCutaneous three times a day before meals  insulin regular Infusion 5 Unit(s)/Hr (5 mL/Hr) IV Continuous <Continuous>  modafinil 100 milliGRAM(s) Oral daily  piperacillin/tazobactam IVPB.. 4.5 Gram(s) IV Intermittent every 12 hours  tamsulosin 0.4 milliGRAM(s) Oral at bedtime    MEDICATIONS  (PRN):  dextrose Oral Gel 15 Gram(s) Oral once PRN Blood Glucose LESS THAN 70 milliGRAM(s)/deciliter      Allergies    No Known Allergies    Intolerances        SOCIAL HISTORY:    FAMILY HISTORY:  Family history of diabetes mellitus (Mother)  Family history of diabetes mellitus (DM)     no FH leading to current infection    Vital Signs Last 24 Hrs  T(C): 37 (06 Jul 2023 13:04), Max: 38.2 (05 Jul 2023 17:20)  T(F): 98.6 (06 Jul 2023 13:04), Max: 100.8 (05 Jul 2023 17:20)  HR: 111 (06 Jul 2023 13:00) (89 - 120)  BP: 162/87 (06 Jul 2023 13:00) (117/73 - 162/87)  BP(mean): 118 (06 Jul 2023 13:00) (83 - 118)  RR: 18 (06 Jul 2023 13:00) (14 - 20)  SpO2: 100% (06 Jul 2023 13:00) (96% - 100%)    Parameters below as of 06 Jul 2023 13:00  Patient On (Oxygen Delivery Method): room air        07-05-23 @ 07:01  -  07-06-23 @ 07:00  --------------------------------------------------------  IN: 2049 mL / OUT: 1020 mL / NET: 1029 mL    07-06-23 @ 07:01  -  07-06-23 @ 13:18  --------------------------------------------------------  IN: 30 mL / OUT: 0 mL / NET: 30 mL        PHYSICAL EXAM:  Constitutional: alert, NAD pleasant  Eyes: the sclera and conjunctiva were normal.   ENT: the ears and nose were normal in appearance.   Neck: the appearance of the neck was normal and the neck was supple.   Pulmonary: no respiratory distress and lungs were clear to auscultation bilaterally.   Heart: heart rate was normal and rhythm regular, normal S1 and S2  Vascular:. there was no peripheral edema  Abdomen: normal bowel sounds, soft, non-tender  Neurological: 3/5 strength L upper and lower extremities. Dysphagia/slightly slurred speech    Psychiatric: the affect was normal  Foot exam: R calcaneal purulent ulcer 5-6 cm in diameter. L foot with 4th and 5th MTA amputation well healed.       LABS:                        8.3    25.35 )-----------( 442      ( 06 Jul 2023 05:05 )             27.3     07-06    145  |  116<H>  |  106<H>  ----------------------------<  134<H>  4.4   |  13<L>  |  9.22<H>    Ca    9.2      06 Jul 2023 05:05  Phos  3.0     07-06  Mg     2.2     07-06    TPro  7.4  /  Alb  3.1<L>  /  TBili  0.2  /  DBili  x   /  AST  12  /  ALT  10  /  AlkPhos  73  07-06    PT/INR - ( 05 Jul 2023 18:21 )   PT: 13.5 sec;   INR: 1.13          PTT - ( 05 Jul 2023 18:21 )  PTT:25.7 sec  Urinalysis Basic - ( 06 Jul 2023 05:05 )    Color: x / Appearance: x / SG: x / pH: x  Gluc: 134 mg/dL / Ketone: x  / Bili: x / Urobili: x   Blood: x / Protein: x / Nitrite: x   Leuk Esterase: x / RBC: x / WBC x   Sq Epi: x / Non Sq Epi: x / Bacteria: x        MICROBIOLOGY:    RADIOLOGY & ADDITIONAL STUDIES:

## 2023-07-06 NOTE — STROKE CODE NOTE - [facility] STROKE SCALE: 6A. MOTOR LEG, LEFT, QM
Left voice mail he can come Friday 7-1-22 podiatry clinic 2:30. Instructed him to call back to let me know he got the message
(4) No movement

## 2023-07-06 NOTE — H&P ADULT - ASSESSMENT
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 Pt found to meet 3/4 SIRS criteria with unknown source. On labs, found to have HAGMA with BHB of 0.6, acidosis likely in setting of DKA vs. uricemia from CKD vs. infection. Admitted to MICU for management of DKA and further work-up.     Neuro  #CVA  Patient with both ischemic and embolic CVA's, with residual left sided neurological deficits. No change from baseline at this time, no concern for active CVA. AOx2, at baseline.   - c/w home ASA 81mg daily   - c/w home Plavix 75mg daily   - c/w home atorvastatin 40mg daily    - c/w home modafinil 100mg daily     Cardiovascular  #Elevated Troponin   High sensitivity Trop elevated on presentation, likely in the setting of increased demand given SIRS positivity. No evidence of ischemia on EKG.   - q6hrs Trop trend to peak   - serial EKG's while trop is rising (take in mind the ARF, trop will not clear quickly)     #HTN  Pt with moderately well controlled HTN. Home medications of procardia 60mg daily.   - hold home anti-HTN therapy as infected and normotensive at this time     Pulmonary  RAQUEL, saturating well on RA.   - f/u RVP that was obtained in the setting of the recent history of cough  - Maintain O2 saturation > 94%    GI  Keep the patient NPO until acid-base status stabilized     Renal  #Acute renal failure on CKD V  Patient with baseline Cr 6-7 or so via outpatient and historical labs. Follows closely with Dr. Garcia in the clinic. Has never started HD, though LUE fistula is maturing at this time.   - UA w/ protein, moderate blood, + RBC, no casts  - Ulytes (07/06): FeNa intrinsic  - Trend BUN/Cr with CMP  - Renal diet once diet is started   - Nephrology consulted, follow recs   - f/u renal US (eval kidneys as well as investigate potential source of infection)   -Avoid nephrotoxic agents and dose drugs renally    #HAGMA  Pt with acidosis and an anion gap of 22 on admission. Pt with glucose in 200s, BHB 0.6, with UA demonstrating no ketones. Pt likely in DKA, though AG likely driven by uremia with contribution by low level ketoacidosis.  - s/p 9U insulin bolus   - currently on insulin gtt at 9U/hr   - c/w D5 1/2NS at 200cc/hr with K  - replete Phos to 2.2 and Mg to 2.2  - q4hr BMP's  - q1hr finger sticks via DKA protocol   - caution with K repletion given ARF        #BPH  - c/w home tamsulosin 0.4mg daily     Endo  #HAGMA with hyperglycemia  See above     #DM2  Pt with long standing DM with diabetic retinopathy. Home medications include Trulicity 0.75 weekly, 12 U basal insulin, Sliding Scale.   - after insulin drip, begin basal insulin with mISS  - f/u A1c     Heme/onc  #Anemia   AOCD given CKD. No active bleeding suspected at this time. Takes Ferrous sulfate at home, hold given unknown infection at this time.   - trend and transfuse with goal of 7     ID  #SIRS positive  Patient with fever, tachycardia and elevated wbc with neutrophil predominance. Meets 3/4 SIRS criteria. Pt appears ill septic on exam. No known source of infection at this time likely R heel wound vs. renal. UA negative, and low suspicion for pneumonia (RVP neg, MRSA neg, CXR clear). Started on broad abx coverage   - s/p vanc + zosyn in the ED  - c/w Vancomycin renal dosing (likely by trough given ARF, got 1G 6PM 7/5)  - c/w pseud dosing Zosyn (renal dosing 4.5g q12hrs)   - De-escalate Abx therapy as clinically appropriate   - f/u Blood Cx  - f/u MRSA Swab   - strict I/O's   - renal US as above to search for infectious source     Prophylaxis  Fluids: 200cc/hr D5 1/2NS with 40 mEq K per 1 liter   Electrolytes: Replete K < 4 and Mg < 2  Nutrition: NPO  DVT ppx: 5000 units subQ q8h  Code: Full code  Dispo: MICU   57M with PMH of DM2, CVA x 2 (one ischemic one embolic, residual left side weakness), HTN, HLD, and CKD V (not on HD yet), BPH, BIBEMS from nursing home for hyperglycemia. Pt found to meet 3/4 SIRS criteria with unknown source (possible R heel ulcer?). On labs, found to have HAGMA with BHB of 0.6, acidosis likely in setting of DKA vs. hyperuricemia from CKD vs. infection. Admitted to MICU for management of DKA and further work-up.     Neuro  #CVA  Patient with both ischemic and embolic CVA's, with residual left sided neurological deficits. No change from baseline at this time, no concern for active CVA. AOx2, at baseline.   - c/w home ASA 81mg daily   - c/w home Plavix 75mg daily   - c/w home atorvastatin 40mg daily    - c/w home modafinil 100mg daily     Cardiovascular  #Elevated Troponin   High sensitivity Trop elevated on presentation, likely in the setting of increased demand given SIRS positivity. No evidence of ischemia on EKG.   - q6hrs Trop trend to peak   - serial EKG's while trop is rising (take in mind the ARF, trop will not clear quickly)     #HTN  Pt with moderately well controlled HTN. Home medications of procardia 60mg daily.   - hold home anti-HTN therapy as infected and normotensive at this time     Pulmonary  RAQUEL, saturating well on RA.   - f/u RVP that was obtained in the setting of the recent history of cough  - Maintain O2 saturation > 94%    GI  Keep the patient NPO until acid-base status stabilized     Renal  #Acute renal failure on CKD V  Patient with baseline Cr 6-7 or so via outpatient and historical labs. Follows closely with Dr. Garcia in the clinic. Has never started HD, though LUE fistula is maturing at this time.   - UA w/ protein, moderate blood, + RBC, no casts  - Ulytes (07/06): FeNa intrinsic  - Trend BUN/Cr with CMP  - Renal diet once diet is started   - Nephrology consulted, follow recs   - f/u renal US (eval kidneys as well as investigate potential source of infection)   -Avoid nephrotoxic agents and dose drugs renally    #HAGMA  Pt with acidosis and an anion gap of 22 on admission. Pt with glucose in 200s, BHB 0.6, with UA demonstrating no ketones. Pt likely in DKA, though AG likely driven by uremia with contribution by low level ketoacidosis.  - s/p 9U insulin bolus   - currently on insulin gtt at 9U/hr   - c/w D5 1/2NS at 200cc/hr with K  - replete Phos to 2.2 and Mg to 2.2  - q4hr BMP's  - q1hr finger sticks via DKA protocol   - caution with K repletion given ARF        #BPH  - c/w home tamsulosin 0.4mg daily     Endo  #HAGMA with hyperglycemia  See above     #DM2  Pt with long standing DM with diabetic retinopathy. Home medications include Trulicity 0.75 weekly, 12 U basal insulin, Sliding Scale.   - after insulin drip, begin basal insulin with mISS  - f/u A1c     Heme/onc  #Anemia   AOCD given CKD. No active bleeding suspected at this time. Takes Ferrous sulfate at home, hold given unknown infection at this time.   - trend and transfuse with goal of 7     ID  #SIRS positive  Patient with fever, tachycardia and elevated wbc with neutrophil predominance. Meets 3/4 SIRS criteria. Pt appears ill septic on exam. No known source of infection at this time likely R heel wound vs. renal. UA negative, and low suspicion for pneumonia (RVP neg, MRSA neg, CXR clear). Started on broad abx coverage   - s/p vanc + zosyn in the ED  - c/w Vancomycin renal dosing (likely by trough given ARF, got 1G 6PM 7/5)  - c/w pseud dosing Zosyn (renal dosing 4.5g q12hrs)   - De-escalate Abx therapy as clinically appropriate   - f/u Blood Cx  - f/u MRSA Swab   - strict I/O's   - renal US as above to search for infectious source     #R heel ulcer  Pt with malodorous pressure ulcer on R heel.   - wound care consulted    Prophylaxis  Fluids: 200cc/hr D5 1/2NS with 40 mEq K per 1 liter   Electrolytes: Replete K < 4 and Mg < 2  Nutrition: NPO  DVT ppx: 5000 units subQ q8h  Code: Full code  Dispo: MICU   57M with PMH of DM2, CVA x 2 (one ischemic one embolic, residual left side weakness), HTN, HLD, and CKD V (not on HD yet), BPH, BIBEMS from nursing home for hyperglycemia. Pt found to meet 3/4 SIRS criteria with unknown source (possible R heel ulcer?). On labs, found to have HAGMA with BHB of 0.6, acidosis likely in setting of DKA vs. hyperuricemia from CKD vs. infection. Admitted to MICU for management of DKA and further work-up.     Neuro  #CVA  Patient with both ischemic and embolic CVA's, with residual left sided neurological deficits. No change from baseline at this time, no concern for active CVA. AOx2, at baseline.   - c/w home ASA 81mg daily   - c/w home Plavix 75mg daily   - c/w home atorvastatin 40mg daily    - c/w home modafinil 100mg daily     Cardiovascular  #Elevated Troponin   High sensitivity Trop elevated on presentation, likely in the setting of increased demand given SIRS positivity. No evidence of ischemia on EKG.   - q6hrs Trop trend to peak   - serial EKG's while trop is rising (take in mind the ARF, trop will not clear quickly)     #HTN  Pt with moderately well controlled HTN. Home medications of procardia 60mg daily.   - hold home anti-HTN therapy as infected and normotensive at this time     Pulmonary  RAQUEL, saturating well on RA.   - f/u RVP that was obtained in the setting of the recent history of cough  - Maintain O2 saturation > 94%    GI  Keep the patient NPO until acid-base status stabilized     Renal  #Acute renal failure on CKD V  Patient with baseline Cr 6-7 or so via outpatient and historical labs. Follows closely with Dr. Garcia in the clinic. Has never started HD, though LUE fistula is maturing at this time.   - UA w/ protein, moderate blood, + RBC, no casts  - Ulytes (07/06): FeNa intrinsic  - Trend BUN/Cr with CMP  - Renal diet once diet is started   - Nephrology consulted, follow recs   - f/u renal US (eval kidneys as well as investigate potential source of infection)   -Avoid nephrotoxic agents and dose drugs renally    #DKA  Pt with acidosis and an anion gap of 22 on admission. Pt with glucose in 200s, BHB 0.6, with UA demonstrating no ketones. Pt likely in DKA, with AG driven by ketoacidosis with contribution by hyperuricemia.  - s/p 9U insulin bolus   - currently on insulin gtt at 9U/hr   - c/w D5 1/2NS at 200cc/hr with K  - replete Phos to 2.2 and Mg to 2.2  - q4hr BMP's  - q1hr finger sticks via DKA protocol   - caution with K repletion given ARF        #BPH  - c/w home tamsulosin 0.4mg daily     Endo  #HAGMA with hyperglycemia  See above     #DM2  Pt with long standing DM with diabetic retinopathy. Home medications include Trulicity 0.75 weekly, 12 U basal insulin, Sliding Scale.   - after insulin drip, begin basal insulin with mISS  - f/u A1c     Heme/onc  #Anemia   AOCD given CKD. No active bleeding suspected at this time. Takes Ferrous sulfate at home, hold given unknown infection at this time.   - trend and transfuse with goal of 7     ID  #SIRS positive  Patient with fever, tachycardia and elevated wbc with neutrophil predominance. Meets 3/4 SIRS criteria. Pt appears ill septic on exam. No known source of infection at this time likely R heel wound vs. renal. UA negative, and low suspicion for pneumonia (RVP neg, MRSA neg, CXR clear). Started on broad abx coverage   - s/p vanc + zosyn in the ED  - c/w Vancomycin renal dosing (likely by trough given ARF, got 1G 6PM 7/5)  - c/w pseud dosing Zosyn (renal dosing 4.5g q12hrs)   - De-escalate Abx therapy as clinically appropriate   - f/u Blood Cx  - f/u MRSA Swab   - strict I/O's   - renal US as above to search for infectious source     #R heel ulcer  Pt with malodorous pressure ulcer on R heel.   - wound care consulted    Prophylaxis  Fluids: 200cc/hr D5 1/2NS with 40 mEq K per 1 liter   Electrolytes: Replete K < 4 and Mg < 2  Nutrition: NPO  DVT ppx: 5000 units subQ q8h  Code: Full code  Dispo: MICU   57M with PMH of DM2, CVA x 2 (one ischemic one embolic, residual left side weakness), HTN, HLD, and CKD V (not on HD yet), BPH, BIBEMS from nursing home for hyperglycemia. Pt found to meet 3/4 SIRS criteria with unknown source (possible R heel ulcer?). On labs, found to have HAGMA with BHB of 0.6, acidosis likely in setting of DKA vs. hyperuricemia from CKD vs. infection. Admitted to MICU for management of DKA and further work-up.     Neuro  #CVA  Patient with both ischemic and embolic CVA's, with residual left sided neurological deficits. No change from baseline at this time, no concern for active CVA. AOx2, at baseline.   - c/w home ASA 81mg daily   - c/w home Plavix 75mg daily   - c/w home atorvastatin 40mg daily    - c/w home modafinil 100mg daily     Cardiovascular  #Elevated Troponin   High sensitivity Trop elevated on presentation, likely in the setting of increased demand given SIRS positivity. No evidence of ischemia on EKG.   - q6hrs Trop trend to peak   - serial EKG's while trop is rising (take in mind the ARF, trop will not clear quickly)     #HTN  Pt with moderately well controlled HTN. Home medications of procardia 60mg daily.   - hold home anti-HTN therapy as infected and normotensive at this time     Pulmonary  RAQUEL, saturating well on RA.   - f/u RVP that was obtained in the setting of the recent history of cough  - Maintain O2 saturation > 94%    GI  Keep the patient NPO until acid-base status stabilized     Renal  #Acute renal failure on CKD V  Patient with baseline Cr 6-7 or so via outpatient and historical labs. Follows closely with Dr. Garcia in the clinic. Has never started HD, though LUE fistula is maturing at this time.   - UA w/ protein, moderate blood, + RBC, no casts  - Ulytes (07/06): FeNa intrinsic  - Trend BUN/Cr with CMP  - Renal diet once diet is started   - Nephrology consulted, follow recs   - f/u renal US (eval kidneys as well as investigate potential source of infection)   -Avoid nephrotoxic agents and dose drugs renally    #DKA  Pt with acidosis and an anion gap of 22 on admission. Pt with glucose in 200s, BHB 0.6, with UA demonstrating no ketones. Pt likely in DKA, with AG driven by ketoacidosis with contribution by hyperuricemia.  - s/p 9U insulin bolus   - currently on insulin gtt at 9U/hr   - c/w D5 1/2NS at 200cc/hr with K  - replete Phos to 2.2 and Mg to 2.2  - q4hr BMP's  - q1hr finger sticks via DKA protocol   - caution with K repletion given ARF        #BPH  - c/w home tamsulosin 0.4mg daily     Endo  #HAGMA with hyperglycemia  See above     #DM2  Pt with long standing DM with diabetic retinopathy. Home medications include Trulicity 0.75 weekly, 12 U basal insulin, Sliding Scale.   - after insulin drip, begin basal insulin with mISS  - f/u A1c     Heme/onc  #Anemia   AOCD given CKD. No active bleeding suspected at this time. Takes Ferrous sulfate at home, hold given unknown infection at this time.   - trend and transfuse with goal of 7     ID  #SIRS positive  Patient with fever, tachycardia and elevated wbc with neutrophil predominance. Meets 3/4 SIRS criteria. Pt appears ill septic on exam. No known source of infection at this time likely R heel wound vs. renal. UA negative, and low suspicion for pneumonia (RVP neg, MRSA neg, CXR clear, UA neg). Started on broad abx coverage   - s/p vanc + zosyn in the ED  - c/w Vancomycin renal dosing (likely by trough given ARF, got 1G 6PM 7/5)  - c/w pseud dosing Zosyn (renal dosing 4.5g q12hrs)   - De-escalate Abx therapy as clinically appropriate   - f/u Blood Cx  - f/u MRSA Swab   - strict I/O's   - renal US as above to search for infectious source     #R heel ulcer  Pt with malodorous pressure ulcer on R heel.   - wound care consulted    Prophylaxis  Fluids: 200cc/hr D5 1/2NS with 40 mEq K per 1 liter   Electrolytes: Replete K < 4 and Mg < 2  Nutrition: NPO  DVT ppx: 5000 units subQ q8h  Code: Full code  Dispo: MICU   57M with PMH of DM2, CVA x 2 (one ischemic one embolic, residual left side weakness), HTN, HLD, and CKD V (not on HD yet), BPH, BIBEMS from nursing home for hyperglycemia. Pt found to meet 3/4 SIRS criteria with unknown source (possible R heel ulcer?). On labs, found to have HAGMA with hyperglycemia and BHB of 0.6, acidosis likely in setting of DKA vs. hyperuricemia from CKD vs. infection. Admitted to MICU for management of DKA as well as acute on chronic renal failure.    Neuro  #CVA  Patient with both ischemic and embolic CVA's, with residual left sided neurological deficits. No change from baseline at this time, no concern for active CVA. AOx2, at baseline.   - c/w home ASA 81mg daily   - c/w home Plavix 75mg daily   - c/w home atorvastatin 40mg daily    - c/w home modafinil 100mg daily     Cardiovascular  #Elevated Troponin   High sensitivity Trop elevated on presentation, likely in the setting of increased demand given SIRS positivity. No evidence of ischemia on EKG.   - q6hrs Trop trend to peak   - serial EKG's while trop is rising (take in mind the ARF, trop will not clear quickly)     #HTN  Pt with moderately well controlled HTN. Home medications of procardia 60mg daily.   - hold home anti-HTN therapy as infected and normotensive at this time     Pulmonary  RAQUEL, saturating well on RA.   - f/u RVP that was obtained in the setting of the recent history of cough  - Maintain O2 saturation > 94%    GI  Keep the patient NPO until acid-base status stabilized     Renal  #Acute renal failure on CKD V  Patient with baseline Cr 6-7 or so via outpatient and historical labs. Follows closely with Dr. Garcia in the clinic. Has never started HD, though LUE fistula is maturing at this time.   - UA w/ protein, moderate blood, + RBC, no casts  - Ulytes (07/06): FeNa intrinsic  - Trend BUN/Cr with CMP  - Renal diet once diet is started   - Nephrology consulted, follow recs   - f/u renal US (eval kidneys as well as investigate potential source of infection)   -Avoid nephrotoxic agents and dose drugs renally    #DKA  Pt with acidosis and an anion gap of 22 on admission. Pt with glucose in 200s, BHB 0.6, with UA demonstrating no ketones. Pt likely in DKA, with AG driven by ketoacidosis with contribution by hyperuricemia.  - s/p 9U insulin bolus   - currently on insulin gtt at 9U/hr   - c/w D5 1/2NS at 200cc/hr with K  - replete Phos to 2.2 and Mg to 2.2  - q4hr BMP's  - q1hr finger sticks via DKA protocol   - caution with K repletion given ARF        #BPH  - c/w home tamsulosin 0.4mg daily     Endo  #HAGMA with hyperglycemia  See above     #DM2  Pt with long standing DM with diabetic retinopathy. Home medications include Trulicity 0.75 weekly, 12 U basal insulin, Sliding Scale.   - after insulin drip, begin basal insulin with mISS  - f/u A1c     Heme/onc  #Anemia   AOCD given CKD. No active bleeding suspected at this time. Takes Ferrous sulfate at home, hold given unknown infection at this time.   - trend and transfuse with goal of 7     ID  #SIRS positive  Patient with fever, tachycardia and elevated wbc with neutrophil predominance. Meets 3/4 SIRS criteria. Pt appears ill septic on exam. No known source of infection at this time likely R heel wound vs. renal. UA negative, and low suspicion for pneumonia (RVP neg, MRSA neg, CXR clear, UA neg). Started on broad abx coverage   - s/p vanc + zosyn in the ED  - c/w Vancomycin renal dosing (likely by trough given ARF, got 1G 6PM 7/5)  - c/w pseud dosing Zosyn (renal dosing 4.5g q12hrs)   - De-escalate Abx therapy as clinically appropriate   - f/u Blood Cx  - f/u MRSA Swab   - strict I/O's   - renal US as above to search for infectious source     #R heel ulcer  Pt with malodorous pressure ulcer on R heel.   - wound care consulted    Prophylaxis  Fluids: 200cc/hr D5 1/2NS with 40 mEq K per 1 liter   Electrolytes: Replete K < 4 and Mg < 2  Nutrition: NPO  DVT ppx: 5000 units subQ q8h  Code: Full code  Dispo: MICU

## 2023-07-06 NOTE — DIETITIAN INITIAL EVALUATION ADULT - PERTINENT LABORATORY DATA
07-06    145  |  116<H>  |  106<H>  ----------------------------<  134<H>  4.4   |  13<L>  |  9.22<H>    Ca    9.2      06 Jul 2023 05:05  Phos  3.0     07-06  Mg     2.2     07-06    TPro  7.4  /  Alb  3.1<L>  /  TBili  0.2  /  DBili  x   /  AST  12  /  ALT  10  /  AlkPhos  73  07-06  POCT Blood Glucose.: 183 mg/dL (07-06-23 @ 11:22)  A1C with Estimated Average Glucose Result: 7.8 % (07-06-23 @ 05:05)  A1C with Estimated Average Glucose Result: 7.2 % (10-17-22 @ 05:30)

## 2023-07-06 NOTE — DIETITIAN INITIAL EVALUATION ADULT - PERTINENT MEDS FT
MEDICATIONS  (STANDING):  aspirin  chewable 81 milliGRAM(s) Oral daily  atorvastatin 40 milliGRAM(s) Oral at bedtime  chlorhexidine 2% Cloths 1 Application(s) Topical <User Schedule>  clindamycin IVPB 900 milliGRAM(s) IV Intermittent every 8 hours  clopidogrel Tablet 75 milliGRAM(s) Oral daily  dextrose 5% 1000 milliLiter(s) (100 mL/Hr) IV Continuous <Continuous>  dextrose 5%. 1000 milliLiter(s) (50 mL/Hr) IV Continuous <Continuous>  dextrose 5%. 1000 milliLiter(s) (100 mL/Hr) IV Continuous <Continuous>  dextrose 50% Injectable 25 Gram(s) IV Push once  dextrose 50% Injectable 25 Gram(s) IV Push once  dextrose 50% Injectable 12.5 Gram(s) IV Push once  glucagon  Injectable 1 milliGRAM(s) IntraMuscular once  heparin   Injectable 5000 Unit(s) SubCutaneous every 8 hours  insulin lispro (ADMELOG) corrective regimen sliding scale   SubCutaneous three times a day before meals  insulin regular Infusion 5 Unit(s)/Hr (5 mL/Hr) IV Continuous <Continuous>  modafinil 100 milliGRAM(s) Oral daily  piperacillin/tazobactam IVPB.. 4.5 Gram(s) IV Intermittent every 12 hours  tamsulosin 0.4 milliGRAM(s) Oral at bedtime    MEDICATIONS  (PRN):  dextrose Oral Gel 15 Gram(s) Oral once PRN Blood Glucose LESS THAN 70 milliGRAM(s)/deciliter  
English

## 2023-07-06 NOTE — PROGRESS NOTE ADULT - ASSESSMENT
Casey Shetty is a 58 yo M w/ PMH of CKD V w/ AVF not currently on HD, DM2, HTN, CVA who presents from NH with starvation ketoacidosis and gas gangrene. Acidosis resolved. Pt is septic.     Hypovolemic with signs of dehydration during physical exam.   U. output of 1L during last 24h.   K stable, HCO3 13, LA wnl.   ; Scr 9.2    CKD stage V.  BUN and creatinine are elevated at baseline, no signs of uremia.  Bladder scan q6h (530ml retained), intermittent sc vs Borja placement.   Strict I&O.   Daily weights.  Avoid Nephrotoxic drugs.   Start NaHCO3 tabs.   Daily labs.   Repeat CMP/ABG.  No apparent urgent indication for HD at this time.  Currently followed by ID and Vascular sx.   On broad spectrum abx.    If IV contrast need it please notify Nephro and isabela contrast IVF.   Nephrology team will cont. f/u

## 2023-07-06 NOTE — DIETITIAN INITIAL EVALUATION ADULT - OTHER CALCULATIONS
Ideal body weight used to calculate energy needs (estimated dry weight). Adjusted for CKD V (needing HD), DM, age. Fluids per team

## 2023-07-06 NOTE — CONSULT NOTE ADULT - ASSESSMENT
Mr. Shetty is a 57 man with PMH of DM2, CVA x 2 (one ischemic one embolic, residual left side weakness), HTN, HLD, and CKD V (not on HD yet), BPH, BIBEMS from his nursing home for hyperglycemia, admitted to the MICU for DKA. Patient with L heel ulcer w/ concern for gas gangrene on Xray, now confirmed on CT and patient taken to OR emergently for BKA. Patient started on Vanc/Zosyn on admission, with addition of Clindamycin 900 mg q8 for gas gangrene. Given the extension of gas to the lateral calcaneous and plan for BKA, it is likely that source control will be achieved after the surgery and patient will not require prolonged Ab therapy, though will continue until this is confirmed.     Recommendations:   - Please check Vanc level in AM   - No role for daptomycin at this time   - Continue Zosyn 4.5 g q12   - Continue clindamycin 900 mg q8 hrs   - Post op, can discontinue clindamycin unless patient clinically worsening // in shock. Otherwise, ok to discontinue.     Team 1 will continue to follow.

## 2023-07-06 NOTE — DIETITIAN INITIAL EVALUATION ADULT - ADD RECOMMEND
1. Continue current diet order   > monitor need for dietary renal restriction   2. Monitor PO intake; honor pt food preferences as able   3. Monitor need/agreeability to oral nutrition supplement regimen after HD initiation    > Nepro carbsteady 1x/day (420Kcal, 19g protein)  4. Monitor GI fxn, labs, skin integrity, wts   > consider adding nephrovite daily   > continue insulin regimen per team to reach euglycemia; FSBG q4-6hrs   5. RD to remain available for recs adjustment prn or will follow up pt per organizational policy  1. Continue current diet order   > monitor need for dietary renal restriction   2. Monitor PO intake; honor pt food preferences as able   3. Monitor need/agreeability to oral nutrition supplement regimen after HD initiation    > Nepro carbsteady 1x/day (420Kcal, 19g protein)  4. Monitor GI fxn, labs, skin integrity, wts   > consider adding nephrovite daily   > continue insulin regimen per team to reach euglycemia; FSBG q4-6hrs   5. RD to re-attempt diet edu for DM, renal at f/u   6. RD to remain available for recs adjustment prn or will follow up pt per organizational policy

## 2023-07-06 NOTE — CONSULT NOTE ADULT - ASSESSMENT
57M with PMH of DM2, CVA x 2 (one ischemic one embolic, residual left side weakness), HTN, HLD, and CKD V (not on HD yet), BPH, BIBEMS from nursing home for hyperglycemia to 363. Admitted for DKA and acute on chronic renal failure. Podiatry consulted to evaluate R heel wound present for 1 month. At time of consult, pt afebrile, tachy to 104, WBC 25.35, . .9. Wet read X-ray R foot  concerning for gas gangrene posterior to calcaneus.     Plan:   - Obtain urgent CT RLE to evaluate gas   - X-ray reviewed.   - C/w IV antibiotics  - Local wound care with betadine DSD  - FF WBAT to RLE  - Rest of care per primary team     Plan pending d/w Attending. Podiatry following.

## 2023-07-06 NOTE — DIETITIAN INITIAL EVALUATION ADULT - OTHER INFO
57M with PMH of DM2, CVA x 2 (one ischemic one embolic, residual left side weakness), HTN, HLD, and CKD V (not on HD yet) and BPH admitted to the MICU for DKA as well as acute on chronic renal failure.    Visited pt at bedside this AM on 8EA. On assessment pt is awake, disoriented at times. Limited diet/wt hx given mentation. RD to re-attempt   at f/u as able. Reports good appetite/intake pta. Consumes regular diet; NKFA. Reports good appetite in-house; had breakfast this am. Pureed diet (conscho, DASH/TLC) ordered. Reports he gained "a few pounds" recently because he started "eating more often at a seafood place". Unable to quantify wt gain. Noted moderate wasting to clavicles. Denies nvdc; LBM 7/5. Nutrition related labs reviewed; HgA1C 7.8; high FSBG 183, 151, 170- insulin regimen ordered. No edema noted. Skin with wound to R heel; no PUs; kurt scale 13. No pain/discomfort noted during assessment. Please view full recs below. Clinical nutrition services will continue to follow up pt per organizational policy. Please place new consult for any acute nutrition-related issues that may arise prior to follow up

## 2023-07-06 NOTE — H&P ADULT - NSHPPHYSICALEXAM_GEN_ALL_CORE
VITALS:   T(C): 37 (07-06-23 @ 00:29), Max: 38.2 (07-05-23 @ 17:20)  HR: 105 (07-06-23 @ 04:00) (89 - 120)  BP: 137/66 (07-06-23 @ 04:00) (117/73 - 148/71)  RR: 17 (07-06-23 @ 04:00) (15 - 20)  SpO2: 100% (07-06-23 @ 04:00) (96% - 100%)    GENERAL: NAD, lying in bed comfortably  HEAD:  Atraumatic, normocephalic  EYES: EOMI, PERRLA, conjunctiva and sclera clear  ENT: Moist mucous membranes  NECK: Supple, no JVD  HEART: Regular rate and rhythm, no murmurs, rubs, or gallops  LUNGS: Unlabored respirations.  Clear to auscultation bilaterally, no crackles, wheezing, or rhonchi  ABDOMEN: Soft, nontender, nondistended, +BS  EXTREMITIES: 2+ peripheral pulses bilaterally. No clubbing, cyanosis, or edema  NERVOUS SYSTEM:  A&Ox3, no focal deficits   SKIN: No rashes or lesions VITALS:   T(C): 37 (07-06-23 @ 00:29), Max: 38.2 (07-05-23 @ 17:20)  HR: 105 (07-06-23 @ 04:00) (89 - 120)  BP: 137/66 (07-06-23 @ 04:00) (117/73 - 148/71)  RR: 17 (07-06-23 @ 04:00) (15 - 20)  SpO2: 100% (07-06-23 @ 04:00) (96% - 100%)    GENERAL: NAD, lying in bed, cachectic   HEAD:  Atraumatic, normocephalic  EYES: PERRLA, conjunctiva and sclera clear  ENT: dry mucous membranes  NECK: Supple, no JVD  HEART: Regular rate and rhythm, no murmurs, rubs, or gallops  LUNGS: Unlabored respirations.  Clear to auscultation anteriorly, no crackles, wheezing, or rhonchi  ABDOMEN: Soft, nontender, nondistended, +BS  EXTREMITIES: warm, palpable distal pulses. No clubbing, cyanosis, or edema  NERVOUS SYSTEM:  A&Ox2, left sided neurologic deficits, RUE tremor (at baseline)  SKIN: R heel pressure wound, malodorous, no sacral wound

## 2023-07-06 NOTE — CONSULT NOTE ADULT - SUBJECTIVE AND OBJECTIVE BOX
Attending:    Patient is a 57y old  Male who presents with a chief complaint of DKA (06 Jul 2023 07:27)      HPI:  HPI: 57M with PMH of DM2, CVA x 2 (one ischemic one embolic, residual left side weakness), HTN, HLD, and CKD V (not on HD yet), BPH, BIBEMS from nursing home for hyperglycemia to 363.  Per EMS, patient was given insulin at the nursing home.  Patient denies any symptoms.  Patient denies fever, chills, chest pain, shortness of breath, vomiting, abdominal pain. The patient does report a dry cough over the last 2 days but no other infectious symptoms or sick contacts. ICU was consulted for DKA with FS at 260 and positive BHB. The patient will be admitted to the MICU for management of HAGMA in setting of likely DKA.    Per the nursing home record, the patient is bedbound at baseline and is able to mobilize via wheelchair. The patient reports that he still produces urine.   Podiatry consult: 57M with PMH of DM2, CVA x 2 (one ischemic one embolic, residual left side weakness), HTN, HLD, and CKD V (not on HD yet), BPH, BIBEMS from nursing home for hyperglycemia to 363. Admitted for DKA and acute on chronic renal failure. Podiatry consulted to evaluate R heel wound.  Pt previously at [facility] for past 2 months. States developed wound on heel 1 month ago at [facility] since he hadn't been getting out of bed. States he saw a doctor at [facility] for his heel wound and the wound was dressed regularly. Denies noticing any pus or malodor from the wound. Denies recent redness or swelling of RLE. States was recently on antibiotics, but does not recall which or what for.       Review of systems negative except per HPI and as stated below  General:	 no weakness; no fevers, no chills  Skin/Breast: no rash  Respiratory and Thorax: no SOB, no cough  Cardiovascular:	No chest pain  Gastrointestinal:	 no nausea, vomiting , diarrhea  Genitourinary:	no dysuria, no difficulty urinating, no hematuria  Musculoskeletal:	no weakness, no joint swelling/pain  Neurological:	no focal weakness/numbness  Endocrine:	no polyuria, no polydipsia    PAST MEDICAL & SURGICAL HISTORY:  Type 2 diabetes mellitus  Diabetes mellitus      Cerebral artery occlusion with cerebral infarction  Cerebral vascular accident  Hyperlipidemia  Hyperlipidemia  Hypertension  Stage 4 chronic kidney disease  H/O diabetic retinopathy      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, traumatic, left, sequela,        Home Medications:  aspirin 81 mg oral tablet, chewable: 1 tab(s) orally once a day (25 Apr 2023 07:45)  atorvastatin 80 mg oral tablet: 1 tab(s) orally once a day (at bedtime) (25 Apr 2023 07:45)  modafinil 100 mg oral tablet: 1 tab(s) orally once a day (in the morning) (25 Apr 2023 07:45)  Plavix 75 mg oral tablet: 1 tab(s) orally once a day until 11/8/2022 (25 Apr 2023 07:45)  Procardia XL 60 mg oral tablet, extended release: 1 tab(s) orally every 24 hours (25 Apr 2023 07:45)  tamsulosin 0.4 mg oral capsule: 1 cap(s) orally once a day (at bedtime) (25 Apr 2023 07:45)  Tradjenta 5 mg oral tablet: 1 orally once a day (25 Apr 2023 07:45)    Allergies    No Known Allergies    Intolerances      FAMILY HISTORY:  Family history of diabetes mellitus (Mother)  Family history of diabetes mellitus (DM)      Social History:       LABS                        8.3    25.35 )-----------( 442      ( 06 Jul 2023 05:05 )             27.3     07-06    145  |  116<H>  |  106<H>  ----------------------------<  134<H>  4.4   |  13<L>  |  9.22<H>    Ca    9.2      06 Jul 2023 05:05  Phos  3.0     07-06  Mg     2.2     07-06    TPro  7.4  /  Alb  3.1<L>  /  TBili  0.2  /  DBili  x   /  AST  12  /  ALT  10  /  AlkPhos  73  07-06    PT/INR - ( 05 Jul 2023 18:21 )   PT: 13.5 sec;   INR: 1.13          PTT - ( 05 Jul 2023 18:21 )  PTT:25.7 sec  ESR: 124  CRP: --  07-06 @ 05:05    Vital Signs Last 24 Hrs  T(C): 37.5 (06 Jul 2023 09:22), Max: 38.2 (05 Jul 2023 17:20)  T(F): 99.5 (06 Jul 2023 09:22), Max: 100.8 (05 Jul 2023 17:20)  HR: 104 (06 Jul 2023 11:00) (89 - 120)  BP: 144/72 (06 Jul 2023 11:00) (117/73 - 148/71)  BP(mean): 103 (06 Jul 2023 11:00) (83 - 104)  RR: 14 (06 Jul 2023 11:00) (14 - 20)  SpO2: 100% (06 Jul 2023 11:00) (96% - 100%)    Parameters below as of 06 Jul 2023 11:00  Patient On (Oxygen Delivery Method): room air        PHYSICAL EXAM  General: NAD, AA0x3    Lower Extremity Focused:  Vasc: DP/PT 2/4 b/l, no edema or erythema b/l, increased temp gradient to RLE  Derm: plantar posterior heel 7 cm x 4 cm wound with stable eschar, periwound erythema, +malodor, no tracking or tunneling, negative PTB   Neuro: protective sensation in tact  MSK: S/p L partial 4th & 5th ray amp    RADIOLOGY  X-ray R foot wet read - Concern for emphysema posterior to calcaneus

## 2023-07-06 NOTE — PROGRESS NOTE ADULT - SUBJECTIVE AND OBJECTIVE BOX
Patient is a 57y Male, admitted with DKA, now with GAP closed, transitioned to sq Glargine. Possible gas gangrene in the LEs.     ON events/Subjective:     Allergies:  No Known Allergies    Hospital Medications:   MEDICATIONS  (STANDING):  aspirin  chewable 81 milliGRAM(s) Oral daily  atorvastatin 40 milliGRAM(s) Oral at bedtime  chlorhexidine 2% Cloths 1 Application(s) Topical <User Schedule>  clindamycin IVPB 900 milliGRAM(s) IV Intermittent every 8 hours  clopidogrel Tablet 75 milliGRAM(s) Oral daily  dextrose 5% 1000 milliLiter(s) (100 mL/Hr) IV Continuous <Continuous>  dextrose 5%. 1000 milliLiter(s) (50 mL/Hr) IV Continuous <Continuous>  dextrose 5%. 1000 milliLiter(s) (100 mL/Hr) IV Continuous <Continuous>  dextrose 50% Injectable 25 Gram(s) IV Push once  dextrose 50% Injectable 25 Gram(s) IV Push once  dextrose 50% Injectable 12.5 Gram(s) IV Push once  glucagon  Injectable 1 milliGRAM(s) IntraMuscular once  heparin   Injectable 5000 Unit(s) SubCutaneous every 8 hours  insulin lispro (ADMELOG) corrective regimen sliding scale   SubCutaneous three times a day before meals  insulin regular Infusion 5 Unit(s)/Hr (5 mL/Hr) IV Continuous <Continuous>  modafinil 100 milliGRAM(s) Oral daily  piperacillin/tazobactam IVPB.. 4.5 Gram(s) IV Intermittent every 12 hours  piperacillin/tazobactam IVPB.. 4.5 Gram(s) IV Intermittent once  tamsulosin 0.4 milliGRAM(s) Oral at bedtime    REVIEW OF SYSTEMS:  All other review of systems is negative unless indicated above.    VITALS:  T(F): 99.4 (07-06-23 @ 15:30), Max: 99.9 (07-05-23 @ 22:00)  HR: 111 (07-06-23 @ 16:18)  BP: 154/74 (07-06-23 @ 16:18)  RR: 26 (07-06-23 @ 16:18)  SpO2: 100% (07-06-23 @ 16:18)  Wt(kg): --    07-05 @ 07:01  -  07-06 @ 07:00  --------------------------------------------------------  IN: 2049 mL / OUT: 1020 mL / NET: 1029 mL    07-06 @ 07:01  -  07-06 @ 17:29  --------------------------------------------------------  IN: 430 mL / OUT: 450 mL / NET: -20 mL      Height (cm): 177.8 (07-06 @ 16:18)  Weight (kg): 87.5 (07-06 @ 16:18)  BMI (kg/m2): 27.7 (07-06 @ 16:18)  BSA (m2): 2.06 (07-06 @ 16:18)    PHYSICAL EXAM:  GENERAL: NAD, lying in bed, cachectic   EYES: conjunctiva  clear  ENT: dry mucous membranes  NECK: Supple, no JVD  HEART: Regular rate and rhythm, no murmurs, rubs, or gallops  LUNGS:  Clear to auscultation eleazar,  no crackles, wheezing, or rhonchi  ABD: Soft, nontender, nondistended, +BS  Ext: R heel pressure wound, malodorous, no sacral wound  Nerv:  A&Ox2, left hemiparesis.  RUE tremor.   Vascular Access: AVF in E.     LABS:  07-06    145  |  116<H>  |  106<H>  ----------------------------<  134<H>  4.4   |  13<L>  |  9.22<H>    Ca    9.2      06 Jul 2023 05:05  Phos  3.0     07-06  Mg     2.2     07-06    TPro  7.4  /  Alb  3.1<L>  /  TBili  0.2  /  DBili      /  AST  12  /  ALT  10  /  AlkPhos  73  07-06                          8.3    25.35 )-----------( 442      ( 06 Jul 2023 05:05 )             27.3       Urine Studies:  Creatinine Trend: 9.22<--, 9.29<--, 8.84<--, 9.75<--  Urinalysis Basic - ( 06 Jul 2023 05:05 )    Color:  / Appearance:  / SG:  / pH:   Gluc: 134 mg/dL / Ketone:   / Bili:  / Urobili:    Blood:  / Protein:  / Nitrite:    Leuk Esterase:  / RBC:  / WBC    Sq Epi:  / Non Sq Epi:  / Bacteria:       Sodium, Random Urine: 47 mmol/L (07-05 @ 23:05)  Osmolality, Random Urine: 357 mosm/kg (07-05 @ 23:05)  Creatinine, Random Urine: 100 mg/dL (07-05 @ 23:05)    RADIOLOGY & ADDITIONAL STUDIES:  US:   Bladder wall thickening/trabeculation could reflect chronic outlet   obstruction .  No significant change echogenic bilateral kidneys reflecting chronic   disease.  No hydronephrosis.    CT of L ext:   There are multiple foci of gas within the soft tissues about the   posterior calcaneus including gas within the Kager's fat pad extending   into the soft tissues adjacent to the lateral calcaneus. Sequelae of   infection with gas-forming organism cannot be entirely excluded. No CT   evidence for associated abscess or osteomyelitis at this time.       Patient is a 57y Male, admitted with starvation ketoacidosis, now with GAP closed. Septic,  gas gangrene in the LEs.     ON events/Subjective:     Allergies:  No Known Allergies    Hospital Medications:   MEDICATIONS  (STANDING):  aspirin  chewable 81 milliGRAM(s) Oral daily  atorvastatin 40 milliGRAM(s) Oral at bedtime  chlorhexidine 2% Cloths 1 Application(s) Topical <User Schedule>  clindamycin IVPB 900 milliGRAM(s) IV Intermittent every 8 hours  clopidogrel Tablet 75 milliGRAM(s) Oral daily  dextrose 5% 1000 milliLiter(s) (100 mL/Hr) IV Continuous <Continuous>  dextrose 5%. 1000 milliLiter(s) (50 mL/Hr) IV Continuous <Continuous>  dextrose 5%. 1000 milliLiter(s) (100 mL/Hr) IV Continuous <Continuous>  dextrose 50% Injectable 25 Gram(s) IV Push once  dextrose 50% Injectable 25 Gram(s) IV Push once  dextrose 50% Injectable 12.5 Gram(s) IV Push once  glucagon  Injectable 1 milliGRAM(s) IntraMuscular once  heparin   Injectable 5000 Unit(s) SubCutaneous every 8 hours  insulin lispro (ADMELOG) corrective regimen sliding scale   SubCutaneous three times a day before meals  insulin regular Infusion 5 Unit(s)/Hr (5 mL/Hr) IV Continuous <Continuous>  modafinil 100 milliGRAM(s) Oral daily  piperacillin/tazobactam IVPB.. 4.5 Gram(s) IV Intermittent every 12 hours  piperacillin/tazobactam IVPB.. 4.5 Gram(s) IV Intermittent once  tamsulosin 0.4 milliGRAM(s) Oral at bedtime    REVIEW OF SYSTEMS:  All other review of systems is negative unless indicated above.    VITALS:  T(F): 99.4 (07-06-23 @ 15:30), Max: 99.9 (07-05-23 @ 22:00)  HR: 111 (07-06-23 @ 16:18)  BP: 154/74 (07-06-23 @ 16:18)  RR: 26 (07-06-23 @ 16:18)  SpO2: 100% (07-06-23 @ 16:18)  Wt(kg): --    07-05 @ 07:01  -  07-06 @ 07:00  --------------------------------------------------------  IN: 2049 mL / OUT: 1020 mL / NET: 1029 mL    07-06 @ 07:01  -  07-06 @ 17:29  --------------------------------------------------------  IN: 430 mL / OUT: 450 mL / NET: -20 mL      Height (cm): 177.8 (07-06 @ 16:18)  Weight (kg): 87.5 (07-06 @ 16:18)  BMI (kg/m2): 27.7 (07-06 @ 16:18)  BSA (m2): 2.06 (07-06 @ 16:18)    PHYSICAL EXAM:  GENERAL: NAD, lying in bed, cachectic   EYES: conjunctiva  clear  ENT: dry mucous membranes  NECK: Supple, no JVD  HEART: Regular rate and rhythm, no murmurs, rubs, or gallops  LUNGS:  Clear to auscultation eleazar,  no crackles, wheezing, or rhonchi  ABD: Soft, nontender, nondistended, +BS  Ext: R heel pressure wound, malodorous, no sacral wound  Nerv:  A&Ox2, left hemiparesis.  RUE tremor.   Vascular Access: AVF in E.     LABS:  07-06    145  |  116<H>  |  106<H>  ----------------------------<  134<H>  4.4   |  13<L>  |  9.22<H>    Ca    9.2      06 Jul 2023 05:05  Phos  3.0     07-06  Mg     2.2     07-06    TPro  7.4  /  Alb  3.1<L>  /  TBili  0.2  /  DBili      /  AST  12  /  ALT  10  /  AlkPhos  73  07-06                          8.3    25.35 )-----------( 442      ( 06 Jul 2023 05:05 )             27.3       Urine Studies:  Creatinine Trend: 9.22<--, 9.29<--, 8.84<--, 9.75<--  Urinalysis Basic - ( 06 Jul 2023 05:05 )    Color:  / Appearance:  / SG:  / pH:   Gluc: 134 mg/dL / Ketone:   / Bili:  / Urobili:    Blood:  / Protein:  / Nitrite:    Leuk Esterase:  / RBC:  / WBC    Sq Epi:  / Non Sq Epi:  / Bacteria:       Sodium, Random Urine: 47 mmol/L (07-05 @ 23:05)  Osmolality, Random Urine: 357 mosm/kg (07-05 @ 23:05)  Creatinine, Random Urine: 100 mg/dL (07-05 @ 23:05)    RADIOLOGY & ADDITIONAL STUDIES:  US:   Bladder wall thickening/trabeculation could reflect chronic outlet   obstruction .  No significant change echogenic bilateral kidneys reflecting chronic   disease.  No hydronephrosis.    CT of L ext:   There are multiple foci of gas within the soft tissues about the   posterior calcaneus including gas within the Kager's fat pad extending   into the soft tissues adjacent to the lateral calcaneus. Sequelae of   infection with gas-forming organism cannot be entirely excluded. No CT   evidence for associated abscess or osteomyelitis at this time.       Patient is a 57y Male, admitted with starvation ketoacidosis, now with GAP closed. Septic,  gas gangrene in the LEs.     Please see chart note for full history, patient well known to nephrology attending.     Allergies:  No Known Allergies    Hospital Medications:   MEDICATIONS  (STANDING):  aspirin  chewable 81 milliGRAM(s) Oral daily  atorvastatin 40 milliGRAM(s) Oral at bedtime  chlorhexidine 2% Cloths 1 Application(s) Topical <User Schedule>  clindamycin IVPB 900 milliGRAM(s) IV Intermittent every 8 hours  clopidogrel Tablet 75 milliGRAM(s) Oral daily  dextrose 5% 1000 milliLiter(s) (100 mL/Hr) IV Continuous <Continuous>  dextrose 5%. 1000 milliLiter(s) (50 mL/Hr) IV Continuous <Continuous>  dextrose 5%. 1000 milliLiter(s) (100 mL/Hr) IV Continuous <Continuous>  dextrose 50% Injectable 25 Gram(s) IV Push once  dextrose 50% Injectable 25 Gram(s) IV Push once  dextrose 50% Injectable 12.5 Gram(s) IV Push once  glucagon  Injectable 1 milliGRAM(s) IntraMuscular once  heparin   Injectable 5000 Unit(s) SubCutaneous every 8 hours  insulin lispro (ADMELOG) corrective regimen sliding scale   SubCutaneous three times a day before meals  insulin regular Infusion 5 Unit(s)/Hr (5 mL/Hr) IV Continuous <Continuous>  modafinil 100 milliGRAM(s) Oral daily  piperacillin/tazobactam IVPB.. 4.5 Gram(s) IV Intermittent every 12 hours  piperacillin/tazobactam IVPB.. 4.5 Gram(s) IV Intermittent once  tamsulosin 0.4 milliGRAM(s) Oral at bedtime    REVIEW OF SYSTEMS:  All other review of systems is negative unless indicated above.    PMHx: as above    Surgical Hx: Fistula    Family hx: Noncontributory to current admission    Social Hx: no Smoking, IVDU, EtOH    VITALS:  T(F): 99.4 (07-06-23 @ 15:30), Max: 99.9 (07-05-23 @ 22:00)  HR: 111 (07-06-23 @ 16:18)  BP: 154/74 (07-06-23 @ 16:18)  RR: 26 (07-06-23 @ 16:18)  SpO2: 100% (07-06-23 @ 16:18)  Wt(kg): --    07-05 @ 07:01  -  07-06 @ 07:00  --------------------------------------------------------  IN: 2049 mL / OUT: 1020 mL / NET: 1029 mL    07-06 @ 07:01  -  07-06 @ 17:29  --------------------------------------------------------  IN: 430 mL / OUT: 450 mL / NET: -20 mL      Height (cm): 177.8 (07-06 @ 16:18)  Weight (kg): 87.5 (07-06 @ 16:18)  BMI (kg/m2): 27.7 (07-06 @ 16:18)  BSA (m2): 2.06 (07-06 @ 16:18)    PHYSICAL EXAM:  GENERAL: NAD, lying in bed, cachectic   EYES: conjunctiva  clear  ENT: dry mucous membranes  NECK: Supple, no JVD  HEART: Regular rate and rhythm, no murmurs, rubs, or gallops  LUNGS:  Clear to auscultation eleazar,  no crackles, wheezing, or rhonchi  ABD: Soft, nontender, nondistended, +BS  Ext: R heel pressure wound, malodorous, no sacral wound  Nerv:  A&Ox2, left hemiparesis.  RUE tremor.   Vascular Access: AVF in LUE.     LABS:  07-06    145  |  116<H>  |  106<H>  ----------------------------<  134<H>  4.4   |  13<L>  |  9.22<H>    Ca    9.2      06 Jul 2023 05:05  Phos  3.0     07-06  Mg     2.2     07-06    TPro  7.4  /  Alb  3.1<L>  /  TBili  0.2  /  DBili      /  AST  12  /  ALT  10  /  AlkPhos  73  07-06                          8.3    25.35 )-----------( 442      ( 06 Jul 2023 05:05 )             27.3       Urine Studies:  Creatinine Trend: 9.22<--, 9.29<--, 8.84<--, 9.75<--  Urinalysis Basic - ( 06 Jul 2023 05:05 )    Color:  / Appearance:  / SG:  / pH:   Gluc: 134 mg/dL / Ketone:   / Bili:  / Urobili:    Blood:  / Protein:  / Nitrite:    Leuk Esterase:  / RBC:  / WBC    Sq Epi:  / Non Sq Epi:  / Bacteria:       Sodium, Random Urine: 47 mmol/L (07-05 @ 23:05)  Osmolality, Random Urine: 357 mosm/kg (07-05 @ 23:05)  Creatinine, Random Urine: 100 mg/dL (07-05 @ 23:05)    RADIOLOGY & ADDITIONAL STUDIES:  US:   Bladder wall thickening/trabeculation could reflect chronic outlet   obstruction .  No significant change echogenic bilateral kidneys reflecting chronic   disease.  No hydronephrosis.    CT of L ext:   There are multiple foci of gas within the soft tissues about the   posterior calcaneus including gas within the Kager's fat pad extending   into the soft tissues adjacent to the lateral calcaneus. Sequelae of   infection with gas-forming organism cannot be entirely excluded. No CT   evidence for associated abscess or osteomyelitis at this time.

## 2023-07-06 NOTE — PROGRESS NOTE ADULT - ASSESSMENT
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 Pt found to meet 3/4 SIRS criteria with unknown source (possible R heel ulcer?). On labs, found to have HAGMA with hyperglycemia and BHB of 0.6, acidosis likely in setting of DKA vs. hyperuricemia from CKD vs. infection. Admitted to MICU for management of DKA as well as acute on chronic renal failure.    Neuro  #CVA  Patient with both ischemic and embolic CVA's, with residual left sided neurological deficits. No change from baseline at this time, no concern for active CVA. AOx2, at baseline.   - c/w home ASA 81mg daily   - c/w home Plavix 75mg daily   - c/w home atorvastatin 40mg daily    - c/w home modafinil 100mg daily     Cardiovascular  #Elevated Troponin   High sensitivity Trop elevated on presentation, likely in the setting of increased demand given SIRS positivity. No evidence of ischemia on EKG.   - q6hrs Trop trend to peak   - serial EKG's while trop is rising (take in mind the ARF, trop will not clear quickly)     #HTN  Pt with moderately well controlled HTN. Home medications of procardia 60mg daily.   - hold home anti-HTN therapy as infected and normotensive at this time     Pulmonary  RAQUEL, saturating well on RA.   - f/u RVP that was obtained in the setting of the recent history of cough  - Maintain O2 saturation > 94%    GI  Keep the patient NPO until acid-base status stabilized     Renal  #Acute renal failure on CKD V  Patient with baseline Cr 6-7 or so via outpatient and historical labs. Follows closely with Dr. Garcia in the clinic. Has never started HD, though LUE fistula is maturing at this time.   - UA w/ protein, moderate blood, + RBC, no casts  - Ulytes (07/06): FeNa intrinsic  - Trend BUN/Cr with CMP  - Renal diet once diet is started   - Nephrology consulted, follow recs   - f/u renal US (eval kidneys as well as investigate potential source of infection)   -Avoid nephrotoxic agents and dose drugs renally    #DKA  Pt with acidosis and an anion gap of 22 on admission. Pt with glucose in 200s, BHB 0.6, with UA demonstrating no ketones. Pt likely in DKA, with AG driven by ketoacidosis with contribution by hyperuricemia.  - s/p 9U insulin bolus   - currently on insulin gtt at 9U/hr   - c/w D5 1/2NS at 200cc/hr with K  - replete Phos to 2.2 and Mg to 2.2  - q4hr BMP's  - q1hr finger sticks via DKA protocol   - caution with K repletion given ARF        #BPH  - c/w home tamsulosin 0.4mg daily     Endo  #HAGMA with hyperglycemia  See above     #DM2  Pt with long standing DM with diabetic retinopathy. Home medications include Trulicity 0.75 weekly, 12 U basal insulin, Sliding Scale.   - after insulin drip, begin basal insulin with mISS  - f/u A1c     Heme/onc  #Anemia   AOCD given CKD. No active bleeding suspected at this time. Takes Ferrous sulfate at home, hold given unknown infection at this time.   - trend and transfuse with goal of 7     ID  #SIRS positive  Patient with fever, tachycardia and elevated wbc with neutrophil predominance. Meets 3/4 SIRS criteria. Pt appears ill septic on exam. No known source of infection at this time likely R heel wound vs. renal. UA negative, and low suspicion for pneumonia (RVP neg, MRSA neg, CXR clear, UA neg). Started on broad abx coverage   - s/p vanc + zosyn in the ED  - c/w Vancomycin renal dosing (likely by trough given ARF, got 1G 6PM 7/5)  - c/w pseud dosing Zosyn (renal dosing 4.5g q12hrs)   - De-escalate Abx therapy as clinically appropriate   - f/u Blood Cx  - f/u MRSA Swab   - strict I/O's   - renal US as above to search for infectious source     #R heel ulcer  Pt with malodorous pressure ulcer on R heel.   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 Pt found to meet 3/4 SIRS criteria with unknown source (possible R heel ulcer). On labs, found to have HAGMA with hyperglycemia and BHB of 0.6. Admitted for management of possible infected right heel ulcer and HAGMA i/s/o acute on chronic RF and likely starvation ketosis.    Neuro  #CVA  Patient with both ischemic and embolic CVA's, with residual left sided neurological deficits. No change from baseline at this time, no concern for active CVA. AOx2, at baseline.   - c/w home ASA 81mg daily   - c/w home Plavix 75mg daily   - c/w home atorvastatin 40mg daily    - c/w home modafinil 100mg daily     Cardiovascular  #Elevated Troponin   High sensitivity Trop elevated on presentation, likely in the setting of increased demand given SIRS positivity. No evidence of ischemia on EKG.   - trop: 126 --> 107 --> 101    #HTN  Pt with moderately well controlled HTN. Home medications of procardia 60mg daily.   - hold home anti-HTN therapy as infected and normotensive at this time     Pulmonary  RAQUEL, saturating well on RA.   - f/u RVP that was obtained in the setting of the recent history of cough  - Maintain O2 saturation > 94%    GI  started on pureed DASH diet per nutrition consult    Renal  #Acute renal failure on CKD V  Patient with baseline Cr 6-7 or so via outpatient and historical labs. Follows closely with Dr. Garcia in the clinic. Has never started HD, though LUE fistula is maturing at this time.   - UA w/ protein, moderate blood, + RBC, no casts  - Ulytes (07/06): FeNa intrinsic  - Trend BUN/Cr with CMP  - Nephrology consulted, follow recs   - f/u renal US (eval kidneys as well as investigate potential source of infection)   -Avoid nephrotoxic agents and dose drugs renally    #Starvation ketosis  Pt initially admitted to MICU for management of DKA. Pt with acidosis and an anion gap of 22 on admission. Pt with glucose in 200s, BHB 0.6, with UA demonstrating no ketones. As BHB <1 and glucose <300, HAGMA likely 2/2 starvation ketosis and acute on chronic renal failure with AG driven by ketoacidosis with contribution by hyperuricemia.  - s/p 9U insulin bolus   - currently on insulin gtt at 9U/hr   - c/w D5 1/2NS at 200cc/hr with K  - replete Phos to 2.2 and Mg to 2.2  - q4hr BMP's  - q1hr finger sticks  - caution with K repletion given ARF        #BPH  - c/w home tamsulosin 0.4mg daily     Endo  #HAGMA with hyperglycemia  See above     #DM2  Pt with long standing DM with diabetic retinopathy. Home medications include Trulicity 0.75 weekly, 12 U basal insulin, Sliding Scale.   - after insulin drip, begin basal insulin with mISS  - f/u A1c     Heme/onc  #Anemia   AOCD given CKD. No active bleeding suspected at this time. Takes Ferrous sulfate at home, hold given unknown infection at this time.   - trend and transfuse with goal of 7     ID  #SIRS positive  Patient with fever, tachycardia and elevated wbc with neutrophil predominance. Meets 3/4 SIRS criteria. Pt appears ill septic on exam. No known source of infection at this time likely R heel wound vs. renal. UA negative, and low suspicion for pneumonia (RVP neg, MRSA neg, CXR clear, UA neg). Started on broad abx coverage   - s/p vanc + zosyn in the ED  - c/w clindamycin for treatment of gas gangrene  - c/w pseud dosing Zosyn (renal dosing 4.5g q12hrs)   - De-escalate Abx therapy as clinically appropriate   - f/u Blood Cx: no growth at 12 hours  - f/u MRSA Swab: negative   - strict I/O's   - renal US as above to search for infectious source     #R heel ulcer  Pt with malodorous pressure ulcer on R heel.   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 Pt found to meet 3/4 SIRS criteria with unknown source (possible R heel ulcer). On labs, found to have HAGMA with hyperglycemia and BHB of 0.6. Admitted for management of possible infected right heel ulcer and HAGMA i/s/o acute on chronic RF and likely starvation ketosis.    Neuro  #CVA  Patient with both ischemic and embolic CVA's, with residual left sided neurological deficits. No change from baseline at this time, no concern for active CVA. AOx2, at baseline.   - c/w home ASA 81mg daily   - c/w home Plavix 75mg daily   - c/w home atorvastatin 40mg daily    - c/w home modafinil 100mg daily     Cardiovascular  #Elevated Troponin   High sensitivity Trop elevated on presentation, likely in the setting of increased demand given SIRS positivity. No evidence of ischemia on EKG.   - trop: 126 --> 107 --> 101    #HTN  Pt with moderately well controlled HTN. Home medications of procardia 60mg daily.   - hold home anti-HTN therapy as infected and normotensive at this time     Pulmonary  RAQUEL, saturating well on RA.   - f/u RVP that was obtained in the setting of the recent history of cough  - Maintain O2 saturation > 94%    GI  started on pureed DASH diet per nutrition consult    Renal  #Acute renal failure on CKD V  Patient with baseline Cr 6-7 or so via outpatient and historical labs. Follows closely with Dr. Garcia in the clinic. Has never started HD, though LUE fistula is maturing at this time.   - UA w/ protein, moderate blood, + RBC, no casts  - Ulytes (07/06): FeNa intrinsic  - Trend BUN/Cr with CMP  - Nephrology consulted, follow recs   - f/u renal US (eval kidneys as well as investigate potential source of infection)   -Avoid nephrotoxic agents and dose drugs renally    #Starvation ketosis  Pt initially admitted to MICU for management of DKA. Pt with acidosis and an anion gap of 22 on admission. Pt with glucose in 200s, BHB 0.6, with UA demonstrating no ketones. As BHB <1 and glucose <300, HAGMA likely 2/2 starvation ketosis and acute on chronic renal failure with AG driven by ketoacidosis with contribution by hyperuricemia.  - s/p 9U insulin bolus   - currently on insulin gtt at 9U/hr   - c/w D5 1/2NS at 200cc/hr with K  - replete Phos to 2.2 and Mg to 2.2  - q4hr BMP's  - q1hr finger sticks  - caution with K repletion given ARF        #BPH  - c/w home tamsulosin 0.4mg daily     Endo  #HAGMA with hyperglycemia  See above     #DM2  Pt with long standing DM with diabetic retinopathy. Home medications include Trulicity 0.75 weekly, 12 U basal insulin, Sliding Scale.   - after insulin drip, begin basal insulin with mISS  - f/u A1c     Heme/onc  #Anemia   AOCD given CKD. No active bleeding suspected at this time. Takes Ferrous sulfate at home, hold given unknown infection at this time.   - trend and transfuse with goal of 7     ID  #SIRS positive  Patient with fever, tachycardia and elevated wbc with neutrophil predominance. Meets 3/4 SIRS criteria. Pt appears ill septic on exam. Source of infection at this time likely R heel wound. UA negative, and low suspicion for pneumonia (RVP neg, MRSA neg, CXR clear, UA neg). Started on broad abx coverage   - s/p vanc + zosyn in the ED  - c/w Vancomycin renal dosing per ID recs  - c/w clindamycin for treatment of gas gangrene  - c/w pseud dosing Zosyn (renal dosing 4.5g q12hrs)   - De-escalate Abx therapy as clinically appropriate   - f/u Blood Cx: no growth at 12 hours  - f/u MRSA Swab: negative   - strict I/O's     #R heel ulcer  Pt with malodorous pressure ulcer on R heel.   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 Pt found to meet 3/4 SIRS criteria with unknown source (possible R heel ulcer). On labs, found to have HAGMA with hyperglycemia and BHB of 0.6. Admitted for management of possible infected right heel ulcer and HAGMA i/s/o acute on chronic RF and likely starvation ketosis.    Neuro  #CVA  Patient with both ischemic and embolic CVA's, with residual left sided neurological deficits. No change from baseline at this time, no concern for active CVA. AOx2, at baseline.   - c/w home ASA 81mg daily   - c/w home Plavix 75mg daily   - c/w home atorvastatin 40mg daily    - c/w home modafinil 100mg daily     Cardiovascular  #Elevated Troponin   High sensitivity Trop elevated on presentation, likely in the setting of increased demand given SIRS positivity. No evidence of ischemia on EKG.   - trop: 126 --> 107 --> 101    #HTN  Pt with moderately well controlled HTN. Home medications of procardia 60mg daily.   - hold home anti-HTN therapy as infected and normotensive at this time     Pulmonary  RAQUEL, saturating well on RA.   - f/u RVP that was obtained in the setting of the recent history of cough  - Maintain O2 saturation > 94%    GI  started on pureed DASH diet per nutrition consult    Renal  #Acute renal failure on CKD V  Patient with baseline Cr 6-7 or so via outpatient and historical labs. Follows closely with Dr. Garcia in the clinic. Has never started HD, though LUE fistula is maturing at this time.   - UA w/ protein, moderate blood, + RBC, no casts  - Ulytes (07/06): FeNa intrinsic  - Trend BUN/Cr with CMP  - Nephrology consulted, follow recs   - f/u renal US (eval kidneys as well as investigate potential source of infection)   - Avoid nephrotoxic agents and dose drugs renally    #Starvation ketosis  Pt initially admitted to MICU for management of DKA. Pt with acidosis and an anion gap of 22 on admission. Pt with glucose in 200s, BHB 0.6, with UA demonstrating no ketones. As BHB <1 and glucose <300, HAGMA likely 2/2 starvation ketosis and acute on chronic renal failure with AG driven by ketoacidosis with contribution by hyperuricemia.  - s/p 9U insulin bolus   - currently on insulin gtt at 9U/hr   - c/w D5 1/2NS at 200cc/hr with K  - replete Phos to 2.2 and Mg to 2.2  - q4hr BMP's  - q1hr finger sticks  - caution with K repletion given ARF        #BPH  - c/w home tamsulosin 0.4mg daily     Endo  #HAGMA with hyperglycemia  See above     #DM2  Pt with long standing DM with diabetic retinopathy. Home medications include Trulicity 0.75 weekly, 12 U basal insulin, Sliding Scale.   - after insulin drip, begin basal insulin with mISS  - f/u A1c     Heme/onc  #Anemia   AOCD given CKD. No active bleeding suspected at this time. Takes Ferrous sulfate at home, hold given unknown infection at this time.   - trend and transfuse with goal of 7     ID  #SIRS positive  Patient with fever, tachycardia and elevated wbc with neutrophil predominance. Meets 3/4 SIRS criteria. Pt appears ill septic on exam. Source of infection at this time likely R heel wound. UA negative, and low suspicion for pneumonia (RVP neg, MRSA neg, CXR clear, UA neg). Started on broad abx coverage   - s/p vanc + zosyn in the ED  - c/w Vancomycin renal dosing per ID recs  - c/w clindamycin for treatment of gas gangrene  - c/w pseud dosing Zosyn (renal dosing 4.5g q12hrs)   - De-escalate Abx therapy as clinically appropriate   - f/u Blood Cx: no growth at 12 hours  - f/u MRSA Swab: negative   - strict I/O's     #R heel ulcer  Pt with malodorous pressure ulcer on R heel.   - On physical exam, wound is without exudate and nonfluctuant however is malodorous with eschar and erythema  - wound care, vascular, and podiatry consulted  - Xray foot prelim read showed emphysema posterior to calcaneus  - f/u CT foot  - f/u ID, vascular, and podiatry recs    Prophylaxis  Fluids: 200cc/hr D5 1/2NS with 40 mEq K per 1 liter   Electrolytes: Replete K < 4 and Mg < 2  Nutrition: Pureed diet  DVT ppx: 5000 units subQ q8h  Code: Full code  Dispo: [facility]

## 2023-07-06 NOTE — DIETITIAN INITIAL EVALUATION ADULT - NS FNS DIET ORDER
Diet, Pureed:   Consistent Carbohydrate {No Snacks} (CSTCHO)  DASH/TLC {Sodium & Cholesterol Restricted} (DASH) (07-06-23 @ 05:50)

## 2023-07-06 NOTE — H&P ADULT - NSHPLABSRESULTS_GEN_ALL_CORE
.LABS:                         8.9    26.04 )-----------( 496      ( 05 Jul 2023 17:45 )             28.6     07-06    144  |  115<H>  |  105<H>  ----------------------------<  207<H>  3.7   |  14<L>  |  9.29<H>    Ca    9.1      06 Jul 2023 01:09  Phos  3.2     07-06  Mg     2.2     07-06    TPro  7.0  /  Alb  3.0<L>  /  TBili  0.2  /  DBili  x   /  AST  11  /  ALT  10  /  AlkPhos  68  07-06    PT/INR - ( 05 Jul 2023 18:21 )   PT: 13.5 sec;   INR: 1.13          PTT - ( 05 Jul 2023 18:21 )  PTT:25.7 sec  Urinalysis Basic - ( 06 Jul 2023 01:09 )    Color: x / Appearance: x / SG: x / pH: x  Gluc: 207 mg/dL / Ketone: x  / Bili: x / Urobili: x   Blood: x / Protein: x / Nitrite: x   Leuk Esterase: x / RBC: x / WBC x   Sq Epi: x / Non Sq Epi: x / Bacteria: x        Lactate, Blood: 1.2 mmol/L (07-05 @ 17:45)      RADIOLOGY, EKG & ADDITIONAL TESTS: Reviewed. .LABS:                         8.9    26.04 )-----------( 496      ( 05 Jul 2023 17:45 )             28.6     07-06    144  |  115<H>  |  105<H>  ----------------------------<  207<H>  3.7   |  14<L>  |  9.29<H>    Ca    9.1      06 Jul 2023 01:09  Phos  3.2     07-06  Mg     2.2     07-06    TPro  7.0  /  Alb  3.0<L>  /  TBili  0.2  /  DBili  x   /  AST  11  /  ALT  10  /  AlkPhos  68  07-06    PT/INR - ( 05 Jul 2023 18:21 )   PT: 13.5 sec;   INR: 1.13          PTT - ( 05 Jul 2023 18:21 )  PTT:25.7 sec  Urinalysis Basic - ( 06 Jul 2023 01:09 )    Color: x / Appearance: x / SG: x / pH: x  Gluc: 207 mg/dL / Ketone: x  / Bili: x / Urobili: x   Blood: x / Protein: x / Nitrite: x   Leuk Esterase: x / RBC: x / WBC x   Sq Epi: x / Non Sq Epi: x / Bacteria: x      Lactate, Blood: 1.2 mmol/L (07-05 @ 17:45)      RADIOLOGY, EKG & ADDITIONAL TESTS: Reviewed.

## 2023-07-07 LAB
ALBUMIN SERPL ELPH-MCNC: 2.8 G/DL — LOW (ref 3.3–5)
ALP SERPL-CCNC: 59 U/L — SIGNIFICANT CHANGE UP (ref 40–120)
ALT FLD-CCNC: 9 U/L — LOW (ref 10–45)
ANION GAP SERPL CALC-SCNC: 14 MMOL/L — SIGNIFICANT CHANGE UP (ref 5–17)
ANION GAP SERPL CALC-SCNC: 17 MMOL/L — SIGNIFICANT CHANGE UP (ref 5–17)
AST SERPL-CCNC: 11 U/L — SIGNIFICANT CHANGE UP (ref 10–40)
B-OH-BUTYR SERPL-SCNC: 0.5 MMOL/L — HIGH
BASOPHILS # BLD AUTO: 0.03 K/UL — SIGNIFICANT CHANGE UP (ref 0–0.2)
BASOPHILS NFR BLD AUTO: 0.2 % — SIGNIFICANT CHANGE UP (ref 0–2)
BILIRUB SERPL-MCNC: 0.2 MG/DL — SIGNIFICANT CHANGE UP (ref 0.2–1.2)
BUN SERPL-MCNC: 100 MG/DL — HIGH (ref 7–23)
BUN SERPL-MCNC: 102 MG/DL — HIGH (ref 7–23)
CALCIUM SERPL-MCNC: 8.6 MG/DL — SIGNIFICANT CHANGE UP (ref 8.4–10.5)
CALCIUM SERPL-MCNC: 8.8 MG/DL — SIGNIFICANT CHANGE UP (ref 8.4–10.5)
CHLORIDE SERPL-SCNC: 109 MMOL/L — HIGH (ref 96–108)
CHLORIDE SERPL-SCNC: 110 MMOL/L — HIGH (ref 96–108)
CO2 SERPL-SCNC: 17 MMOL/L — LOW (ref 22–31)
CO2 SERPL-SCNC: 18 MMOL/L — LOW (ref 22–31)
CREAT SERPL-MCNC: 8.12 MG/DL — HIGH (ref 0.5–1.3)
CREAT SERPL-MCNC: 8.3 MG/DL — HIGH (ref 0.5–1.3)
EGFR: 7 ML/MIN/1.73M2 — LOW
EGFR: 7 ML/MIN/1.73M2 — LOW
EOSINOPHIL # BLD AUTO: 0.22 K/UL — SIGNIFICANT CHANGE UP (ref 0–0.5)
EOSINOPHIL NFR BLD AUTO: 1.2 % — SIGNIFICANT CHANGE UP (ref 0–6)
GLUCOSE BLDC GLUCOMTR-MCNC: 132 MG/DL — HIGH (ref 70–99)
GLUCOSE BLDC GLUCOMTR-MCNC: 146 MG/DL — HIGH (ref 70–99)
GLUCOSE BLDC GLUCOMTR-MCNC: 204 MG/DL — HIGH (ref 70–99)
GLUCOSE BLDC GLUCOMTR-MCNC: 275 MG/DL — HIGH (ref 70–99)
GLUCOSE SERPL-MCNC: 199 MG/DL — HIGH (ref 70–99)
GLUCOSE SERPL-MCNC: 226 MG/DL — HIGH (ref 70–99)
HCT VFR BLD CALC: 21.1 % — LOW (ref 39–50)
HCT VFR BLD CALC: 28.7 % — LOW (ref 39–50)
HGB BLD-MCNC: 6.5 G/DL — CRITICAL LOW (ref 13–17)
HGB BLD-MCNC: 8.9 G/DL — LOW (ref 13–17)
IMM GRANULOCYTES NFR BLD AUTO: 1.8 % — HIGH (ref 0–0.9)
LYMPHOCYTES # BLD AUTO: 1.08 K/UL — SIGNIFICANT CHANGE UP (ref 1–3.3)
LYMPHOCYTES # BLD AUTO: 5.9 % — LOW (ref 13–44)
MAGNESIUM SERPL-MCNC: 2 MG/DL — SIGNIFICANT CHANGE UP (ref 1.6–2.6)
MCHC RBC-ENTMCNC: 28 PG — SIGNIFICANT CHANGE UP (ref 27–34)
MCHC RBC-ENTMCNC: 28.1 PG — SIGNIFICANT CHANGE UP (ref 27–34)
MCHC RBC-ENTMCNC: 30.8 GM/DL — LOW (ref 32–36)
MCHC RBC-ENTMCNC: 31 GM/DL — LOW (ref 32–36)
MCV RBC AUTO: 90.3 FL — SIGNIFICANT CHANGE UP (ref 80–100)
MCV RBC AUTO: 91.3 FL — SIGNIFICANT CHANGE UP (ref 80–100)
MONOCYTES # BLD AUTO: 0.89 K/UL — SIGNIFICANT CHANGE UP (ref 0–0.9)
MONOCYTES NFR BLD AUTO: 4.8 % — SIGNIFICANT CHANGE UP (ref 2–14)
NEUTROPHILS # BLD AUTO: 15.88 K/UL — HIGH (ref 1.8–7.4)
NEUTROPHILS NFR BLD AUTO: 86.1 % — HIGH (ref 43–77)
NIGHT BLUE STAIN TISS: SIGNIFICANT CHANGE UP
NRBC # BLD: 0 /100 WBCS — SIGNIFICANT CHANGE UP (ref 0–0)
NRBC # BLD: 0 /100 WBCS — SIGNIFICANT CHANGE UP (ref 0–0)
PHOSPHATE SERPL-MCNC: 3.8 MG/DL — SIGNIFICANT CHANGE UP (ref 2.5–4.5)
PLATELET # BLD AUTO: 407 K/UL — HIGH (ref 150–400)
PLATELET # BLD AUTO: 462 K/UL — HIGH (ref 150–400)
POTASSIUM SERPL-MCNC: 3.6 MMOL/L — SIGNIFICANT CHANGE UP (ref 3.5–5.3)
POTASSIUM SERPL-MCNC: 3.7 MMOL/L — SIGNIFICANT CHANGE UP (ref 3.5–5.3)
POTASSIUM SERPL-SCNC: 3.6 MMOL/L — SIGNIFICANT CHANGE UP (ref 3.5–5.3)
POTASSIUM SERPL-SCNC: 3.7 MMOL/L — SIGNIFICANT CHANGE UP (ref 3.5–5.3)
PROT SERPL-MCNC: 6.4 G/DL — SIGNIFICANT CHANGE UP (ref 6–8.3)
RBC # BLD: 2.31 M/UL — LOW (ref 4.2–5.8)
RBC # BLD: 3.18 M/UL — LOW (ref 4.2–5.8)
RBC # FLD: 14.6 % — HIGH (ref 10.3–14.5)
RBC # FLD: 15 % — HIGH (ref 10.3–14.5)
SODIUM SERPL-SCNC: 140 MMOL/L — SIGNIFICANT CHANGE UP (ref 135–145)
SODIUM SERPL-SCNC: 145 MMOL/L — SIGNIFICANT CHANGE UP (ref 135–145)
SPECIMEN SOURCE: SIGNIFICANT CHANGE UP
VANCOMYCIN FLD-MCNC: 9.2 UG/ML — SIGNIFICANT CHANGE UP
WBC # BLD: 18.27 K/UL — HIGH (ref 3.8–10.5)
WBC # BLD: 18.43 K/UL — HIGH (ref 3.8–10.5)
WBC # FLD AUTO: 18.27 K/UL — HIGH (ref 3.8–10.5)
WBC # FLD AUTO: 18.43 K/UL — HIGH (ref 3.8–10.5)

## 2023-07-07 PROCEDURE — 99232 SBSQ HOSP IP/OBS MODERATE 35: CPT

## 2023-07-07 PROCEDURE — 95720 EEG PHY/QHP EA INCR W/VEEG: CPT

## 2023-07-07 PROCEDURE — 99233 SBSQ HOSP IP/OBS HIGH 50: CPT | Mod: GC

## 2023-07-07 RX ORDER — INSULIN GLARGINE 100 [IU]/ML
20 INJECTION, SOLUTION SUBCUTANEOUS AT BEDTIME
Refills: 0 | Status: DISCONTINUED | OUTPATIENT
Start: 2023-07-07 | End: 2023-07-08

## 2023-07-07 RX ORDER — VANCOMYCIN HCL 1 G
1250 VIAL (EA) INTRAVENOUS ONCE
Refills: 0 | Status: COMPLETED | OUTPATIENT
Start: 2023-07-07 | End: 2023-07-07

## 2023-07-07 RX ORDER — HUMAN INSULIN 100 [IU]/ML
20 INJECTION, SUSPENSION SUBCUTANEOUS ONCE
Refills: 0 | Status: COMPLETED | OUTPATIENT
Start: 2023-07-07 | End: 2023-07-07

## 2023-07-07 RX ORDER — NIFEDIPINE 30 MG
60 TABLET, EXTENDED RELEASE 24 HR ORAL EVERY 24 HOURS
Refills: 0 | Status: DISCONTINUED | OUTPATIENT
Start: 2023-07-08 | End: 2023-07-09

## 2023-07-07 RX ORDER — NIFEDIPINE 30 MG
60 TABLET, EXTENDED RELEASE 24 HR ORAL ONCE
Refills: 0 | Status: COMPLETED | OUTPATIENT
Start: 2023-07-07 | End: 2023-07-07

## 2023-07-07 RX ORDER — INSULIN LISPRO 100/ML
VIAL (ML) SUBCUTANEOUS
Refills: 0 | Status: DISCONTINUED | OUTPATIENT
Start: 2023-07-07 | End: 2023-07-18

## 2023-07-07 RX ORDER — WATER FOR INHALATION
1000 VIAL, NEBULIZER (ML) INHALATION
Refills: 0 | Status: DISCONTINUED | OUTPATIENT
Start: 2023-07-07 | End: 2023-07-08

## 2023-07-07 RX ORDER — ONDANSETRON 8 MG/1
4 TABLET, FILM COATED ORAL EVERY 4 HOURS
Refills: 0 | Status: DISCONTINUED | OUTPATIENT
Start: 2023-07-07 | End: 2023-07-08

## 2023-07-07 RX ORDER — HEPARIN SODIUM 5000 [USP'U]/ML
5000 INJECTION INTRAVENOUS; SUBCUTANEOUS EVERY 8 HOURS
Refills: 0 | Status: DISCONTINUED | OUTPATIENT
Start: 2023-07-07 | End: 2023-07-18

## 2023-07-07 RX ADMIN — HUMAN INSULIN 20 UNIT(S): 100 INJECTION, SUSPENSION SUBCUTANEOUS at 11:42

## 2023-07-07 RX ADMIN — CHLORHEXIDINE GLUCONATE 1 APPLICATION(S): 213 SOLUTION TOPICAL at 07:12

## 2023-07-07 RX ADMIN — PIPERACILLIN AND TAZOBACTAM 25 GRAM(S): 4; .5 INJECTION, POWDER, LYOPHILIZED, FOR SOLUTION INTRAVENOUS at 18:35

## 2023-07-07 RX ADMIN — Medication 100 MILLIGRAM(S): at 05:36

## 2023-07-07 RX ADMIN — Medication 166.67 MILLIGRAM(S): at 16:21

## 2023-07-07 RX ADMIN — HEPARIN SODIUM 5000 UNIT(S): 5000 INJECTION INTRAVENOUS; SUBCUTANEOUS at 22:24

## 2023-07-07 RX ADMIN — ATORVASTATIN CALCIUM 40 MILLIGRAM(S): 80 TABLET, FILM COATED ORAL at 22:24

## 2023-07-07 RX ADMIN — PIPERACILLIN AND TAZOBACTAM 25 GRAM(S): 4; .5 INJECTION, POWDER, LYOPHILIZED, FOR SOLUTION INTRAVENOUS at 06:58

## 2023-07-07 RX ADMIN — Medication 6: at 07:10

## 2023-07-07 RX ADMIN — Medication 81 MILLIGRAM(S): at 12:17

## 2023-07-07 RX ADMIN — CLOPIDOGREL BISULFATE 75 MILLIGRAM(S): 75 TABLET, FILM COATED ORAL at 12:17

## 2023-07-07 RX ADMIN — Medication 4: at 12:16

## 2023-07-07 RX ADMIN — Medication 60 MILLIGRAM(S): at 11:45

## 2023-07-07 RX ADMIN — MODAFINIL 100 MILLIGRAM(S): 200 TABLET ORAL at 12:17

## 2023-07-07 RX ADMIN — TAMSULOSIN HYDROCHLORIDE 0.4 MILLIGRAM(S): 0.4 CAPSULE ORAL at 22:24

## 2023-07-07 RX ADMIN — INSULIN GLARGINE 20 UNIT(S): 100 INJECTION, SOLUTION SUBCUTANEOUS at 22:24

## 2023-07-07 NOTE — CONSULT NOTE ADULT - SUBJECTIVE AND OBJECTIVE BOX
SICU CONSULT NOTE    SICU HPI:  57M with PMH of DM2, CVA x 2 (one ischemic, one embolic, with residual left side weakness), HTN, HLD, and CKD V (not on HD), BPH and PSHx of L toe amputation who was brought in by EMS on 7/5 from nursing home for hyperglycemia to [facility].  On presentation, patient complained of R heel pain that had been stable for over a month, but otherwise had no acute complaints. Denied fevers, chills, CP, SOB, nausea, PO intolerance, abdominal pain. Patient was tachycardic, febrile with leukocytosis c/f sepsis likely secondary to gas-producing infection in R foot. CT revealed gas tracking along achilles sheath and vascular surgery was consulted for surgical evaluation. Patient is now POD1 s/p guillotine L BKA. SICU consulted for post operative management and hyperglycemia management.       MEDICATIONS  (STANDING):  aspirin  chewable 81 milliGRAM(s) Oral daily  atorvastatin 40 milliGRAM(s) Oral at bedtime  chlorhexidine 2% Cloths 1 Application(s) Topical <User Schedule>  clindamycin IVPB 900 milliGRAM(s) IV Intermittent every 8 hours  clopidogrel Tablet 75 milliGRAM(s) Oral daily  dextrose 5%. 1000 milliLiter(s) (100 mL/Hr) IV Continuous <Continuous>  dextrose 5%. 1000 milliLiter(s) (50 mL/Hr) IV Continuous <Continuous>  dextrose 50% Injectable 12.5 Gram(s) IV Push once  dextrose 50% Injectable 25 Gram(s) IV Push once  dextrose 50% Injectable 25 Gram(s) IV Push once  glucagon  Injectable 1 milliGRAM(s) IntraMuscular once  insulin glargine Injectable (LANTUS) 20 Unit(s) SubCutaneous at bedtime  insulin lispro (ADMELOG) corrective regimen sliding scale   SubCutaneous Before meals and at bedtime  modafinil 100 milliGRAM(s) Oral daily  ondansetron Injectable 4 milliGRAM(s) IV Push every 4 hours  piperacillin/tazobactam IVPB.. 4.5 Gram(s) IV Intermittent every 12 hours  sterile water 1000 milliLiter(s) (100 mL/Hr) IV Continuous <Continuous>  tamsulosin 0.4 milliGRAM(s) Oral at bedtime    MEDICATIONS  (PRN):  acetaminophen     Tablet .. 650 milliGRAM(s) Oral every 4 hours PRN Mild Pain (1 - 3)  dextrose Oral Gel 15 Gram(s) Oral once PRN Blood Glucose LESS THAN 70 milliGRAM(s)/deciliter  HYDROmorphone  Injectable 0.5 milliGRAM(s) IV Push every 2 hours PRN breakthrough pain  oxyCODONE    IR 5 milliGRAM(s) Oral every 4 hours PRN Moderate Pain (4 - 6)  oxyCODONE    IR 10 milliGRAM(s) Oral every 4 hours PRN Severe Pain (7 - 10)      ICU Vital Signs Last 24 Hrs  T(C): 36.4 (07 Jul 2023 09:12), Max: 37.4 (06 Jul 2023 15:30)  T(F): 97.5 (07 Jul 2023 09:12), Max: 99.4 (06 Jul 2023 15:30)  HR: 104 (07 Jul 2023 13:00) (85 - 111)  BP: 144/73 (07 Jul 2023 13:00) (121/67 - 187/96)  BP(mean): 102 (07 Jul 2023 13:00) (85 - 134)  ABP: 78/48 (06 Jul 2023 19:00) (78/48 - 94/62)  ABP(mean): 58 (06 Jul 2023 19:00) (58 - 73)  RR: 16 (07 Jul 2023 13:00) (11 - 26)  SpO2: 99% (07 Jul 2023 13:00) (99% - 100%)    O2 Parameters below as of 07 Jul 2023 13:00  Patient On (Oxygen Delivery Method): room air      Physical Exam:  General: NAD  HEENT: NC/AT, EOMI, PERRLA, normal hearing, no oral lesions, neck supple w/o LAD  Pulmonary: Nonlabored breathing, no respiratory distress, CTA-B  Cardiovascular: NSR, no murmurs  Abdominal: soft, NT/ND, +BS, no organomegaly  Extremities: WWP, 5/5 strength x 4, no clubbing/cyanosis/edema  Neuro: A/O x3, CNs II-XII grossly intact, normal motor/sensation, no focal deficits  Pulses: palpable distal pulses      I&O's Summary    06 Jul 2023 07:01  -  07 Jul 2023 07:00  --------------------------------------------------------  IN: 2435 mL / OUT: 925 mL / NET: 1510 mL    07 Jul 2023 07:01  -  07 Jul 2023 14:26  --------------------------------------------------------  IN: 25 mL / OUT: 0 mL / NET: 25 mL        LABS:                        8.9    18.27 )-----------( 462      ( 07 Jul 2023 12:25 )             28.7     07-07    145  |  110<H>  |  100<H>  ----------------------------<  199<H>  3.7   |  18<L>  |  8.12<H>    Ca    8.8      07 Jul 2023 12:25  Phos  3.8     07-07  Mg     2.0     07-07    TPro  6.4  /  Alb  2.8<L>  /  TBili  0.2  /  DBili  x   /  AST  11  /  ALT  9<L>  /  AlkPhos  59  07-07    PT/INR - ( 06 Jul 2023 15:32 )   PT: 13.4 sec;   INR: 1.12          PTT - ( 06 Jul 2023 15:32 )  PTT:29.0 sec  Urinalysis Basic - ( 07 Jul 2023 12:25 )    Color: x / Appearance: x / SG: x / pH: x  Gluc: 199 mg/dL / Ketone: x  / Bili: x / Urobili: x   Blood: x / Protein: x / Nitrite: x   Leuk Esterase: x / RBC: x / WBC x   Sq Epi: x / Non Sq Epi: x / Bacteria: x      CAPILLARY BLOOD GLUCOSE      POCT Blood Glucose.: 204 mg/dL (07 Jul 2023 11:32)  POCT Blood Glucose.: 275 mg/dL (07 Jul 2023 06:58)  POCT Blood Glucose.: 265 mg/dL (06 Jul 2023 22:06)  POCT Blood Glucose.: 299 mg/dL (06 Jul 2023 20:34)  POCT Blood Glucose.: 312 mg/dL (06 Jul 2023 19:09)    LIVER FUNCTIONS - ( 07 Jul 2023 04:56 )  Alb: 2.8 g/dL / Pro: 6.4 g/dL / ALK PHOS: 59 U/L / ALT: 9 U/L / AST: 11 U/L / GGT: x             Cultures:Culture Results:   Moderate Escherichia coli  Moderate Morganella morganii  Rare Proteus mirabilis  Few Streptococcus species  Culture in progress (07-06 @ 18:30)  Culture Results:   Testing in progress (07-06 @ 18:30)  Culture Results:   No growth at 1 day. (07-05 @ 17:35)  Culture Results:   No growth at 1 day. (07-05 @ 17:30)      RADIOLOGY & ADDITIONAL STUDIES:  ACC: [id number] EXAM:  CT LWR EXT RT   ORDERED BY: ZHANE ABREU     PROCEDURE DATE:  07/06/2023          INTERPRETATION:  INDICATION: Right foot ulceration, infection    TECHNIQUE: Axial images through the right foot were obtained without the   use of intravenous contrast, or distended protocol. Sagittal and coronal   reformats were obtained from the primary axial data set.    COMPARISON: None available    FINDINGS:    There are multiple foci of gas within the soft tissues about the   posterior calcaneus including gas within the Kager's fat pad extending   into the soft tissues adjacent to the lateral calcaneus. Sequelae of   infection with gas-forming organism cannot be entirely excluded. The air   tracks approximately within the posterior foot, to approximately 2.5 cm   proximal to the Achilles insertion.    No definite evidence for acute osteomyelitis. There is ulceration of the   soft tissues overlying the lateral calcaneus. There is no visualized   thick-walled fluid collection to suggest abscess.    There are degenerative changes of the ankle. There is diffuse   atherosclerotic disease. The bones are diffusely osteopenic.      IMPRESSION:    There are multiple foci of gas within the soft tissues about the   posterior calcaneus including gas within the Kager's fat pad extending   into the soft tissues adjacent to the lateral calcaneus. Sequelae of   infection with gas-forming organism cannot be entirely excluded. No CT   evidence for associated abscess or osteomyelitis at this time.    These findings were discussed with carl Holland on 7/6/2023, 1452   hours.    --- End of Report ---   SICU CONSULT NOTE    SICU HPI:  57M with PMH of DM2, CVA x 2 (one ischemic, one embolic, with residual left side weakness), HTN, HLD, and CKD V (not on HD), BPH and PSHx of L toe amputation who was brought in by EMS on 7/5 from nursing home for hyperglycemia to [facility].  On presentation, patient complained of R heel pain that had been stable for over a month, but otherwise had no acute complaints. Denied fevers, chills, CP, SOB, nausea, PO intolerance, abdominal pain. Patient was tachycardic, febrile with leukocytosis c/f sepsis likely secondary to gas-producing infection in R foot. CT revealed gas tracking along achilles sheath and vascular surgery was consulted for surgical evaluation. Patient is now POD1 s/p guillotine L BKA. SICU consulted for post operative management and hyperglycemia management.       MEDICATIONS  (STANDING):  aspirin  chewable 81 milliGRAM(s) Oral daily  atorvastatin 40 milliGRAM(s) Oral at bedtime  chlorhexidine 2% Cloths 1 Application(s) Topical <User Schedule>  clindamycin IVPB 900 milliGRAM(s) IV Intermittent every 8 hours  clopidogrel Tablet 75 milliGRAM(s) Oral daily  dextrose 5%. 1000 milliLiter(s) (100 mL/Hr) IV Continuous <Continuous>  dextrose 5%. 1000 milliLiter(s) (50 mL/Hr) IV Continuous <Continuous>  dextrose 50% Injectable 12.5 Gram(s) IV Push once  dextrose 50% Injectable 25 Gram(s) IV Push once  dextrose 50% Injectable 25 Gram(s) IV Push once  glucagon  Injectable 1 milliGRAM(s) IntraMuscular once  insulin glargine Injectable (LANTUS) 20 Unit(s) SubCutaneous at bedtime  insulin lispro (ADMELOG) corrective regimen sliding scale   SubCutaneous Before meals and at bedtime  modafinil 100 milliGRAM(s) Oral daily  ondansetron Injectable 4 milliGRAM(s) IV Push every 4 hours  piperacillin/tazobactam IVPB.. 4.5 Gram(s) IV Intermittent every 12 hours  sterile water 1000 milliLiter(s) (100 mL/Hr) IV Continuous <Continuous>  tamsulosin 0.4 milliGRAM(s) Oral at bedtime    MEDICATIONS  (PRN):  acetaminophen     Tablet .. 650 milliGRAM(s) Oral every 4 hours PRN Mild Pain (1 - 3)  dextrose Oral Gel 15 Gram(s) Oral once PRN Blood Glucose LESS THAN 70 milliGRAM(s)/deciliter  HYDROmorphone  Injectable 0.5 milliGRAM(s) IV Push every 2 hours PRN breakthrough pain  oxyCODONE    IR 5 milliGRAM(s) Oral every 4 hours PRN Moderate Pain (4 - 6)  oxyCODONE    IR 10 milliGRAM(s) Oral every 4 hours PRN Severe Pain (7 - 10)      ICU Vital Signs Last 24 Hrs  T(C): 36.4 (07 Jul 2023 09:12), Max: 37.4 (06 Jul 2023 15:30)  T(F): 97.5 (07 Jul 2023 09:12), Max: 99.4 (06 Jul 2023 15:30)  HR: 104 (07 Jul 2023 13:00) (85 - 111)  BP: 144/73 (07 Jul 2023 13:00) (121/67 - 187/96)  BP(mean): 102 (07 Jul 2023 13:00) (85 - 134)  ABP: 78/48 (06 Jul 2023 19:00) (78/48 - 94/62)  ABP(mean): 58 (06 Jul 2023 19:00) (58 - 73)  RR: 16 (07 Jul 2023 13:00) (11 - 26)  SpO2: 99% (07 Jul 2023 13:00) (99% - 100%)    O2 Parameters below as of 07 Jul 2023 13:00  Patient On (Oxygen Delivery Method): room air      Physical Exam:  General: NAD  HEENT: NC/AT, EOMI, PERRLA, normal hearing, no oral lesions, neck supple w/o LAD  Pulmonary: Nonlabored breathing, no respiratory distress, CTA-B, saturating well on RA  Cardiovascular: NSR, no murmurs  Abdominal: soft, NT/ND, +BS, no organomegaly  Extremities: WWP. LLE s/p 4th and 5th toe amp with palpable pulses. R BKA wound appreciated with ACE wrap. B/L UE without edema, RUE AC AVF appreciated with palpable thrill  Neuro: A/Ox2 - not oriented to place, mild R eyelid droop, CNs II-XII grossly intact, generalized L sided weakness appreciated compared to R  Pulses: palpable distal pulses      I&O's Summary    06 Jul 2023 07:01  -  07 Jul 2023 07:00  --------------------------------------------------------  IN: 2435 mL / OUT: 925 mL / NET: 1510 mL    07 Jul 2023 07:01  -  07 Jul 2023 14:26  --------------------------------------------------------  IN: 25 mL / OUT: 0 mL / NET: 25 mL        LABS:                        8.9    18.27 )-----------( 462      ( 07 Jul 2023 12:25 )             28.7     07-07    145  |  110<H>  |  100<H>  ----------------------------<  199<H>  3.7   |  18<L>  |  8.12<H>    Ca    8.8      07 Jul 2023 12:25  Phos  3.8     07-07  Mg     2.0     07-07    TPro  6.4  /  Alb  2.8<L>  /  TBili  0.2  /  DBili  x   /  AST  11  /  ALT  9<L>  /  AlkPhos  59  07-07    PT/INR - ( 06 Jul 2023 15:32 )   PT: 13.4 sec;   INR: 1.12          PTT - ( 06 Jul 2023 15:32 )  PTT:29.0 sec  Urinalysis Basic - ( 07 Jul 2023 12:25 )    Color: x / Appearance: x / SG: x / pH: x  Gluc: 199 mg/dL / Ketone: x  / Bili: x / Urobili: x   Blood: x / Protein: x / Nitrite: x   Leuk Esterase: x / RBC: x / WBC x   Sq Epi: x / Non Sq Epi: x / Bacteria: x      CAPILLARY BLOOD GLUCOSE      POCT Blood Glucose.: 204 mg/dL (07 Jul 2023 11:32)  POCT Blood Glucose.: 275 mg/dL (07 Jul 2023 06:58)  POCT Blood Glucose.: 265 mg/dL (06 Jul 2023 22:06)  POCT Blood Glucose.: 299 mg/dL (06 Jul 2023 20:34)  POCT Blood Glucose.: 312 mg/dL (06 Jul 2023 19:09)    LIVER FUNCTIONS - ( 07 Jul 2023 04:56 )  Alb: 2.8 g/dL / Pro: 6.4 g/dL / ALK PHOS: 59 U/L / ALT: 9 U/L / AST: 11 U/L / GGT: x             Cultures:Culture Results:   Moderate Escherichia coli  Moderate Morganella morganii  Rare Proteus mirabilis  Few Streptococcus species  Culture in progress (07-06 @ 18:30)  Culture Results:   Testing in progress (07-06 @ 18:30)  Culture Results:   No growth at 1 day. (07-05 @ 17:35)  Culture Results:   No growth at 1 day. (07-05 @ 17:30)      RADIOLOGY & ADDITIONAL STUDIES:  ACC: [id number] EXAM:  CT LWR EXT RT   ORDERED BY: ZHANE ABREU     PROCEDURE DATE:  07/06/2023          INTERPRETATION:  INDICATION: Right foot ulceration, infection    TECHNIQUE: Axial images through the right foot were obtained without the   use of intravenous contrast, or distended protocol. Sagittal and coronal   reformats were obtained from the primary axial data set.    COMPARISON: None available    FINDINGS:    There are multiple foci of gas within the soft tissues about the   posterior calcaneus including gas within the Kager's fat pad extending   into the soft tissues adjacent to the lateral calcaneus. Sequelae of   infection with gas-forming organism cannot be entirely excluded. The air   tracks approximately within the posterior foot, to approximately 2.5 cm   proximal to the Achilles insertion.    No definite evidence for acute osteomyelitis. There is ulceration of the   soft tissues overlying the lateral calcaneus. There is no visualized   thick-walled fluid collection to suggest abscess.    There are degenerative changes of the ankle. There is diffuse   atherosclerotic disease. The bones are diffusely osteopenic.      IMPRESSION:    There are multiple foci of gas within the soft tissues about the   posterior calcaneus including gas within the Kager's fat pad extending   into the soft tissues adjacent to the lateral calcaneus. Sequelae of   infection with gas-forming organism cannot be entirely excluded. No CT   evidence for associated abscess or osteomyelitis at this time.    These findings were discussed with carl Holland on 7/6/2023, 1452   hours.    --- End of Report ---

## 2023-07-07 NOTE — PROGRESS NOTE ADULT - ASSESSMENT
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 Pt found to meet 3/4 SIRS criteria with unknown source (possible R heel ulcer). On labs, found to have HAGMA with hyperglycemia and BHB of 0.6. Admitted for management of possible infected right heel ulcer and HAGMA i/s/o acute on chronic RF and likely starvation ketosis.    Neuro  #CVA  Patient with both ischemic and embolic CVA's, with residual left sided neurological deficits. No change from baseline at this time, no concern for active CVA. AOx2, at baseline.   - 7/6 ~8pm, stroke code was called. px had abrupt change in mental status, became unresponsive, not opening eyes, and not following commands. Returned to baseline within AOx2. Per neuro, TIA vs seizure. Started on vEEG.  - c/w home ASA 81mg daily   - c/w home Plavix 75mg daily   - c/w home atorvastatin 40mg daily    - c/w home modafinil 100mg daily     Cardiovascular  #Elevated Troponin   High sensitivity Trop elevated on presentation, likely in the setting of increased demand given SIRS positivity. No evidence of ischemia on EKG.   - trop: 126 --> 107 --> 101    #HTN  Pt with moderately well controlled HTN. Home medications of procardia 60mg daily.   - hold home anti-HTN therapy as infected and normotensive at this time     Pulmonary  RAQUEL, saturating well on RA.   - Maintain O2 saturation > 94%    GI  NPO during surgery; started on pureed DASH diet per nutrition consult    Renal  #Acute renal failure on CKD V  Patient with baseline Cr 6-7 or so via outpatient and historical labs. Follows closely with Dr. Garcia in the clinic. Has never started HD, though LUE fistula is maturing at this time.   - UA w/ protein, moderate blood, + RBC, no casts  - Ulytes (): FeNa intrinsic  - Trend BUN/Cr with CMP  - Nephrology consulted, follow recs   - renal US: possible chronic outlet obstruction and chronic kidney disease.  - Avoid nephrotoxic agents and dose drugs renally, vanc dosed by level    #Starvation ketosis  Pt initially admitted to MICU for management of DKA. Pt with acidosis and an anion gap of 22 on admission. Pt with glucose in 200s, BHB 0.6, with UA demonstrating no ketones. As BHB <1 and glucose <300, HAGMA likely 2/2 starvation ketosis and acute on chronic renal failure with AG driven by ketoacidosis with contribution by hyperuricemia.  - post op fss  - s/p 9U insulin bolus   - bridged to lantus on admission  - currently on sliding scale coverage  - c/w D5 1/2NS at 200cc/hr with K  - replete Phos to 2.2 and Mg to 2.2  - q4hr BMP's  - q1hr finger sticks  - caution with K repletion given ARF        #BPH  - c/w home tamsulosin 0.4mg daily     Endo  #HAGMA with hyperglycemia  See above     #DM2  Pt with long standing DM with diabetic retinopathy. Home medications include Trulicity 0.75 weekly, 12 U basal insulin, Sliding Scale.   - currently on sliding scale  - fs --> 299 --> 265  - will start lantus PM    Heme/onc  #Anemia   AOCD given CKD. No active bleeding suspected at this time. Takes Ferrous sulfate at home, hold given unknown infection at this time.   - 7/7: AM hgb dropped to 6.5, d/margarita heparin subq. vitals stable, no signs of bleeding on exam. ordered 1 unit pRBCs, verbal consent from HCP obtained over the phone, consent form in chart  - trend and transfuse with goal of 7     ID  #SIRS positive  Patient with fever, tachycardia and elevated wbc with neutrophil predominance. Meets 3/4 SIRS criteria. Pt appears ill septic on exam. Source of infection at this time likely R heel wound. UA negative, and low suspicion for pneumonia (RVP neg, MRSA neg, CXR clear, UA neg). Started on broad abx coverage   - s/p vanc + zosyn in the ED  - c/w Vancomycin renal dosing per ID recs  - c/w clindamycin for treatment of gas gangrene  - c/w pseud dosing Zosyn (renal dosing 4.5g q12hrs)   - De-escalate Abx therapy as clinically appropriate   - f/u Blood Cx: no growth at 12 hours  - f/u MRSA Swab: negative   - strict I/O's     #R heel ulcer  Pt with malodorous pressure ulcer on R heel.   - On physical exam, wound is without exudate and nonfluctuant however is malodorous with eschar and erythema  - wound care, vascular, and podiatry consulted  - Xray foot prelim read showed emphysema posterior to calcaneus, CT:  - CT RLE showing multiple foci of gas in soft tissues by posteroid calcaneus extending laterally, can't exclude gas gangrene.   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 Pt found to meet 3/4 SIRS criteria with unknown source (possible R heel ulcer). On labs, found to have HAGMA with hyperglycemia and BHB of 0.6. Admitted for management of possible infected right heel ulcer and HAGMA i/s/o acute on chronic RF and likely starvation ketosis.    Neuro  #CVA  Patient with both ischemic and embolic CVA's, with residual left sided neurological deficits. No change from baseline at this time, no concern for active CVA. AOx2, at baseline.   - 7/6 ~8pm, stroke code was called. px had abrupt change in mental status, became unresponsive, not opening eyes, and not following commands. Returned to baseline within AOx2. Per neuro, TIA vs seizure. Started on vEEG.  - c/w home ASA 81mg daily   - c/w home Plavix 75mg daily   - c/w home atorvastatin 40mg daily    - c/w home modafinil 100mg daily     Cardiovascular  #Elevated Troponin   High sensitivity Trop elevated on presentation, likely in the setting of increased demand given SIRS positivity. No evidence of ischemia on EKG.   - trop: 126 --> 107 --> 101    #HTN  Pt with moderately well controlled HTN. Home medications of procardia 60mg daily.   - hold home anti-HTN therapy as infected and normotensive at this time     Pulmonary  RAQUEL, saturating well on RA.   - Maintain O2 saturation > 94%    GI  NPO during surgery; started on pureed DASH diet per nutrition consult    Renal  #Acute renal failure on CKD V  Patient with baseline Cr 6-7 or so via outpatient and historical labs. Follows closely with Dr. Garcia in the clinic. Has never started HD, though LUE fistula is maturing at this time.   - UA w/ protein, moderate blood, + RBC, no casts  - Ulytes (): FeNa intrinsic  - Trend BUN/Cr with CMP  - Nephrology consulted, follow recs   - renal US: possible chronic outlet obstruction and chronic kidney disease.  - Avoid nephrotoxic agents and dose drugs renally, vanc dosed by level    #Starvation ketosis  Pt initially admitted to MICU for management of DKA. Pt with acidosis and an anion gap of 22 on admission. Pt with glucose in 200s, BHB 0.6, with UA demonstrating no ketones. As BHB <1 and glucose <300, HAGMA likely 2/2 starvation ketosis and acute on chronic renal failure with AG driven by ketoacidosis with contribution by hyperuricemia.  - post op fss  - s/p 9U insulin bolus   - bridged to lantus on admission  - currently on sliding scale coverage  - c/w D5 1/2NS at 200cc/hr with K  - replete Phos to 2.2 and Mg to 2.2  - q4hr BMP's  - q1hr finger sticks  - caution with K repletion given ARF        #BPH  - c/w home tamsulosin 0.4mg daily     Endo  #HAGMA with hyperglycemia  See above     #DM2  Pt with long standing DM with diabetic retinopathy. Home medications include Trulicity 0.75 weekly, 12 U basal insulin, Sliding Scale.   - currently on sliding scale  - fs --> 299 --> 265  - will start lantus PM    Heme/onc  #Anemia   AOCD given CKD. No active bleeding suspected at this time. Takes Ferrous sulfate at home, hold given unknown infection at this time.   - 7/7: AM hgb dropped to 6.5, d/margarita heparin subq. vitals stable, no signs of bleeding on exam. ordered 1 unit pRBCs, verbal consent from HCP obtained over the phone, consent form in chart  - trend and transfuse with goal of 7     ID  #SIRS positive  Patient with fever, tachycardia and elevated wbc with neutrophil predominance. Meets 3/4 SIRS criteria. Pt appears ill septic on exam. Source of infection at this time likely R heel wound. UA negative, and low suspicion for pneumonia (RVP neg, MRSA neg, CXR clear, UA neg). Started on broad abx coverage   - s/p vanc + zosyn in the ED  - c/w Vancomycin renal dosing per ID recs  - c/w clindamycin for treatment of gas gangrene  - c/w pseud dosing Zosyn (renal dosing 4.5g q12hrs)   - De-escalate Abx therapy as clinically appropriate   - Blood Cx: no growth at 12 hours  - MRSA Swab: negative   - Tissue culture of right heel ulcer: moderate E coli, moderate morganella morganii, rare proteus, few streptococcus. Also few gram(-) rods, moderate gram(+) cocci, and few WBCs  - strict I/O's     #R heel ulcer  Pt with malodorous pressure ulcer on R heel.   - On physical exam, wound is without exudate and nonfluctuant however is malodorous with eschar and erythema  - wound care, vascular, and podiatry consulted  - Xray foot prelim read showed emphysema posterior to calcaneus, CT:  - CT RLE showing multiple foci of gas in soft tissues by posteroid calcaneus extending laterally, can't exclude gas gangrene.   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 Pt found to meet 3/4 SIRS criteria with unknown source (possible R heel ulcer). On labs, found to have HAGMA with hyperglycemia and BHB of 0.6. Admitted for management of possible infected right heel ulcer and HAGMA i/s/o acute on chronic RF and likely starvation ketosis.    Neuro  #CVA  Patient with both ischemic and embolic CVA's, with residual left sided neurological deficits. No change from baseline at this time, no concern for active CVA. AOx2, at baseline.   - 7/6 ~8pm, stroke code was called. px had abrupt change in mental status, became unresponsive, not opening eyes, and not following commands. Returned to baseline within AOx2. Per neuro, TIA vs seizure. Started on vEEG which showed no epileptiform activity.  - c/w home ASA 81mg daily   - c/w home Plavix 75mg daily   - c/w home atorvastatin 40mg daily    - c/w home modafinil 100mg daily     Cardiovascular  #Elevated Troponin   High sensitivity Trop elevated on presentation, likely in the setting of increased demand given SIRS positivity. No evidence of ischemia on EKG.   - trop: 126 --> 107 --> 101    #HTN  Pt with moderately well controlled HTN. Home medications of procardia 60mg daily.   - systolic 180s this AM and px did not tolerate breakfast, started on nifedipine 60 state and standing for tomorrow    Pulmonary  RAQUEL, saturating well on RA.   - Maintain O2 saturation > 94%    GI  NPO during surgery; started on pureed DASH diet per nutrition consult    Renal  #Acute renal failure on CKD V  Patient with baseline Cr 6-7 or so via outpatient and historical labs. Follows closely with Dr. Garcia in the clinic. Has never started HD, though LUE fistula is maturing at this time.   - UA w/ protein, moderate blood, + RBC, no casts  - Ulytes (): FeNa intrinsic  - Trend BUN/Cr with CMP  - Nephrology consulted, follow recs   - renal US: possible chronic outlet obstruction and chronic kidney disease.  - Avoid nephrotoxic agents and dose drugs renally, vanc dosed by level    #Starvation ketosis  Pt initially admitted to MICU for management of DKA. Pt with acidosis and an anion gap of 22 on admission. Pt with glucose in 200s, BHB 0.6, with UA demonstrating no ketones. As BHB <1 and glucose <300, HAGMA likely 2/2 starvation ketosis and acute on chronic renal failure with AG driven by ketoacidosis with contribution by hyperuricemia.  - post op fss, now in 200s  - s/p 9U insulin bolus   - bridged to lantus on admission  - currently on sliding scale coverage  - was D5 drip yesterday, dc'd today due to elevated fsg's  - q4hr BMP's  - q1hr finger sticks  - caution with K repletion given ARF   - HAGMA now resolved, BHB 0.5       #BPH  - c/w home tamsulosin 0.4mg daily     Endo  #HAGMA with hyperglycemia  See above     #DM2  Pt with long standing DM with diabetic retinopathy. Home medications include Trulicity 0.75 weekly, 12 U basal insulin, Sliding Scale.   - currently on sliding scale  - last fs  - given NPH this AM, will start lantus PM    Heme/onc  #Anemia   AOCD given CKD. No active bleeding suspected at this time. Takes Ferrous sulfate at home, hold given unknown infection at this time.   - 77: AM hgb dropped to 6.5, d/margarita heparin subq. vitals stable, no signs of bleeding on exam. ordered 1 unit pRBCs, verbal consent from HCP obtained over the phone, consent form in chart  - trend and transfuse with goal of 7     ID  #SIRS positive  Patient with fever, tachycardia and elevated wbc with neutrophil predominance. Meets 3/4 SIRS criteria. Pt appears ill septic on exam. Source of infection at this time likely R heel wound. UA negative, and low suspicion for pneumonia (RVP neg, MRSA neg, CXR clear, UA neg). Started on broad abx coverage   - s/p vanc + zosyn in the ED  - c/w Vancomycin renal dosing per ID recs  - c/w clindamycin for treatment of gas gangrene  - c/w pseud dosing Zosyn (renal dosing 4.5g q12hrs)   - De-escalate Abx therapy as clinically appropriate   - Blood Cx: no growth at 12 hours  - MRSA Swab: negative   - Tissue culture of right heel ulcer: moderate E coli, moderate morganella morganii, rare proteus, few streptococcus. Also few gram(-) rods, moderate gram(+) cocci, and few WBCs  - strict I/O's     #R heel ulcer  Pt with malodorous pressure ulcer on R heel.   - On physical exam, wound is without exudate and nonfluctuant however is malodorous with eschar and erythema  - wound care, vascular, and podiatry consulted  - Xray foot prelim read showed emphysema posterior to calcaneus, CT:  - CT RLE showing multiple foci of gas in soft tissues by posteroid calcaneus extending laterally, can't exclude gas gangrene.   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 Pt found to meet 3/4 SIRS criteria with unknown source (possible R heel ulcer). On labs, found to have HAGMA with hyperglycemia and BHB of 0.6. Admitted for management of possible infected right heel ulcer and HAGMA i/s/o acute on chronic RF and likely starvation ketosis.    Neuro  #CVA  Patient with both ischemic and embolic CVA's, with residual left sided neurological deficits. No change from baseline at this time, no concern for active CVA. AOx2, at baseline.   - 7/6 ~8pm, stroke code was called. px had abrupt change in mental status, became unresponsive, not opening eyes, and not following commands. Returned to baseline within AOx2. Per neuro, TIA vs seizure. Started on vEEG which showed no epileptiform activity.  - c/w home ASA 81mg daily   - c/w home Plavix 75mg daily   - c/w home atorvastatin 40mg daily    - c/w home modafinil 100mg daily     Cardiovascular  #Elevated Troponin   High sensitivity Trop elevated on presentation, likely in the setting of increased demand given SIRS positivity. No evidence of ischemia on EKG.   - trop: 126 --> 107 --> 101    #HTN  Pt with moderately well controlled HTN. Home medications of procardia 60mg daily.   - systolic 180s this AM and px did not tolerate breakfast, started on nifedipine 60 state and standing for tomorrow    Pulmonary  RAQUEL, saturating well on RA.   - Maintain O2 saturation > 94%    GI  NPO during surgery; started on pureed DASH diet per nutrition consult    Renal  #Acute renal failure on CKD V  Patient with baseline Cr 6-7 or so via outpatient and historical labs. Follows closely with Dr. Garcia in the clinic. Has never started HD, though LUE fistula is maturing at this time.   - UA w/ protein, moderate blood, + RBC, no casts  - Ulytes (): FeNa intrinsic  - Trend BUN/Cr with CMP  - Nephrology consulted, follow recs   - renal US: possible chronic outlet obstruction and chronic kidney disease.  - Avoid nephrotoxic agents and dose drugs renally, vanc dosed by level  - started sterile water and bicarb 150 mEq for deficit per renal recs    #Starvation ketosis  Pt initially admitted to MICU for management of DKA. Pt with acidosis and an anion gap of 22 on admission. Pt with glucose in 200s, BHB 0.6, with UA demonstrating no ketones. As BHB <1 and glucose <300, HAGMA likely 2/2 starvation ketosis and acute on chronic renal failure with AG driven by ketoacidosis with contribution by hyperuricemia.  - post op fss, now in 200s  - s/p 9U insulin bolus   - bridged to lantus on admission  - currently on sliding scale coverage  - was D5 drip yesterday, dc'd today due to elevated fsg's  - q4hr BMP's  - q1hr finger sticks  - caution with K repletion given ARF   - HAGMA now resolved, BHB 0.5       #BPH  - c/w home tamsulosin 0.4mg daily   - condom cath in place    Endo  #HAGMA with hyperglycemia  See above     #DM2  Pt with long standing DM with diabetic retinopathy. Home medications include Trulicity 0.75 weekly, 12 U basal insulin, Sliding Scale.   - currently on sliding scale  - last fs  - given NPH 20u this AM, started lantus 20u qhs    Heme/onc  #Anemia   AOCD given CKD. No active bleeding suspected at this time. Takes Ferrous sulfate at home, hold given unknown infection at this time.   - 7: AM hgb dropped to 6.5, d/margarita heparin subq. vitals stable, no signs of bleeding on exam. ordered 1 unit pRBCs, verbal consent from HCP obtained over the phone, consent form in chart  - trend and transfuse with goal of 7     ID  #SIRS positive  Patient with fever, tachycardia and elevated wbc with neutrophil predominance. Meets 3/4 SIRS criteria. Pt appears ill septic on exam. Source of infection at this time likely R heel wound. UA negative, and low suspicion for pneumonia (RVP neg, MRSA neg, CXR clear, UA neg). Started on broad abx coverage   - s/p vanc + zosyn in the ED  - c/w Vancomycin renal dosing per ID recs  - c/w clindamycin for treatment of gas gangrene  - c/w pseud dosing Zosyn (renal dosing 4.5g q12hrs)   - De-escalate Abx therapy as clinically appropriate   - Blood Cx: no growth at 12 hours  - MRSA Swab: negative   - Tissue culture of right heel ulcer: moderate E coli, moderate morganella morganii, rare proteus, few streptococcus. Also few gram(-) rods, moderate gram(+) cocci, and few WBCs  - strict I/O's     #R heel ulcer  Pt with malodorous pressure ulcer on R heel.   - On physical exam, wound is without exudate and nonfluctuant however is malodorous with eschar and erythema  - wound care, vascular, and podiatry consulted  - Xray foot prelim read showed emphysema posterior to calcaneus, CT:  - CT RLE showing multiple foci of gas in soft tissues by posteroid calcaneus extending laterally, can't exclude gas gangrene.   - taken by vascular yesterday for guillotine amputation, will do formal BKA once px no longer septic  - f/u ID, vascular, and podiatry recs    Prophylaxis  Fluids: 200cc/hr D5 1/2NS with 40 mEq K per 1 liter   Electrolytes: Replete K < 4 and Mg < 2  Nutrition: Pureed diet  DVT ppx: None  Code: Full code  Dispo: MICU

## 2023-07-07 NOTE — PROGRESS NOTE ADULT - SUBJECTIVE AND OBJECTIVE BOX
DAILY PROGRESS NOTE    S: Seen w/ chief on AM rounds. Stroke code overnight and vEEG started. Patient w/o complaint right now. Declines pain.     O:     T(C): 36.4 (07-07-23 @ 15:15), Max: 37 (07-06-23 @ 22:04)  HR: 102 (07-07-23 @ 17:15) (85 - 110)  BP: 143/82 (07-07-23 @ 17:15) (121/67 - 187/96)  RR: 16 (07-07-23 @ 17:15) (11 - 20)  SpO2: 98% (07-07-23 @ 17:15) (98% - 100%)    PHYSICAL EXAM:  General: Frail appearing, chronically ill appearing gentleman. vEEG in place.   CV: Tachycardic, regular. Warm and perfused.   Pulm: On room air.   Abd: Soft/nontender/nondistended.   Extremity: RLE w/ OR dressing in place w/o strikethrough    Labs:                       8.9    18.27 )-----------( [id number]      ( 07 Jul 2023 12:25 )             28.7   07-07    145  |  110<H>  |  100<H>  ----------------------------<  199<H>  3.7   |  18<L>  |  8.12<H>    Ca    8.8      07 Jul 2023 12:25  Phos  3.8     07-07  Mg     2.0     07-07    TPro  6.4  /  Alb  2.8<L>  /  TBili  0.2  /  DBili  x   /  AST  11  /  ALT  9<L>  /  AlkPhos  59  07-07    A/P: 57M with PMHx of DM2, 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 Royal) who presented to [facility] on 7/5 from nursing home for hyperglycemia who was found to have infected likely diabetic toe wound with gas-producing organism. 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 CT revealed gas tracking along achilles sheath and vascular surgery was consulted for surgical evaluation. 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     - F/u AM hgb, transfuse PRN  - Planning on return to OR for completion BKA following resolution of sepsis  - Care per MICU

## 2023-07-07 NOTE — PROGRESS NOTE ADULT - ASSESSMENT
Casey Shetty is a 56 yo M w/ PMH of CKD V w/ AVF not currently on HD, DM2, HTN, CVA who presents from NH with starvation ketoacidosis and gas gangrene. Acidosis resolved. 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      Hypovolemic with signs of dehydration during physical exam.   Stable UOP during last 24h.   Hgb drop s/p Sx, pt was transfused and now Hgb stable.     CKD stage V.  BUN and creatinine are elevated at baseline, no signs of uremia at this point.   Pt need bladder scan q6h and intermittent sc vs Borja placement.   Strict I&O.   Daily weights.  Avoid Nephrotoxic drugs.   Start NaHCO3 tabs.   Daily labs.   No apparent urgent indication for HD at this time.  Currently followed by ID and Vascular sx.   On broad spectrum abx.    If IV contrast need it please notify Nephro and isabela contrast IVF.   Nephrology team will cont. f/u

## 2023-07-07 NOTE — PROGRESS NOTE ADULT - ASSESSMENT
Mr. Shetty is a 57 man with PMH of DM2, CVA x 2 (one ischemic one embolic, residual left side weakness), HTN, HLD, and CKD V (not on HD yet), BPH, BIBEMS from his nursing home for hyperglycemia, admitted to the MICU for DKA. 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 Currently on Vanc, Zosyn, Clindamycin. Vancomycin level this AM 9.2.     Recommendations:   INCOMPLETE      Team 1 will continue to follow.  Mr. Shetty is a 57 man with PMH of DM2, CVA x 2 (one ischemic one embolic, residual left side weakness), HTN, HLD, and CKD V (not on HD yet), BPH, BIBEMS from his nursing home for hyperglycemia, admitted to the MICU for DKA. Found to have gas gangrene of R foot to level of calcaneous, now s/p foot amputation with plan to return to OR for total BKA once clinically stabilized. Currently on Vanc, Zosyn, Clindamycin. Vancomycin level this AM 9.2.     Recommendations:   INCOMPLETE      Team 1 will continue to follow.  Mr. Shetty is a 57 man with PMH of DM2, CVA x 2 (one ischemic one embolic, residual left side weakness), HTN, HLD, and CKD V (not on HD yet), BPH, BIBEMS from his nursing home for hyperglycemia, admitted to the MICU for DKA. Found to have gas gangrene of R foot to level of calcaneous, now s/p foot amputation with plan to return to OR for total BKA once clinically stabilized. Currently on Vanc, Zosyn, Clindamycin. Vancomycin level this AM 9.2. Given that the cutlure was taken from the now amputated foot, culture data will not be helpful for antibiotic guidance.     Recommendations:   - discontinue clindamycin   - discontinue vancomycin   - continue zosyn 4.5 g q8    Team 1 will continue to follow.

## 2023-07-07 NOTE — PROGRESS NOTE ADULT - SUBJECTIVE AND OBJECTIVE BOX
INFECTIOUS DISEASES CONSULT FOLLOW-UP NOTE    INTERVAL HPI/OVERNIGHT EVENTS:      ROS:   Constitutional, eyes, ENT, cardiovascular, respiratory, gastrointestinal, genitourinary, integumentary, neurological, psychiatric and heme/lymph are otherwise negative other than noted above       ANTIBIOTICS/RELEVANT:    MEDICATIONS  (STANDING):  aspirin  chewable 81 milliGRAM(s) Oral daily  atorvastatin 40 milliGRAM(s) Oral at bedtime  chlorhexidine 2% Cloths 1 Application(s) Topical <User Schedule>  clindamycin IVPB 900 milliGRAM(s) IV Intermittent every 8 hours  clopidogrel Tablet 75 milliGRAM(s) Oral daily  dextrose 5% 1000 milliLiter(s) (100 mL/Hr) IV Continuous <Continuous>  dextrose 5%. 1000 milliLiter(s) (50 mL/Hr) IV Continuous <Continuous>  dextrose 5%. 1000 milliLiter(s) (100 mL/Hr) IV Continuous <Continuous>  dextrose 50% Injectable 12.5 Gram(s) IV Push once  dextrose 50% Injectable 25 Gram(s) IV Push once  dextrose 50% Injectable 25 Gram(s) IV Push once  glucagon  Injectable 1 milliGRAM(s) IntraMuscular once  insulin lispro (ADMELOG) corrective regimen sliding scale   SubCutaneous Before meals and at bedtime  modafinil 100 milliGRAM(s) Oral daily  piperacillin/tazobactam IVPB.. 4.5 Gram(s) IV Intermittent every 12 hours  tamsulosin 0.4 milliGRAM(s) Oral at bedtime    MEDICATIONS  (PRN):  acetaminophen     Tablet .. 650 milliGRAM(s) Oral every 4 hours PRN Mild Pain (1 - 3)  dextrose Oral Gel 15 Gram(s) Oral once PRN Blood Glucose LESS THAN 70 milliGRAM(s)/deciliter  HYDROmorphone  Injectable 0.5 milliGRAM(s) IV Push every 2 hours PRN breakthrough pain  oxyCODONE    IR 10 milliGRAM(s) Oral every 4 hours PRN Severe Pain (7 - 10)  oxyCODONE    IR 5 milliGRAM(s) Oral every 4 hours PRN Moderate Pain (4 - 6)        Vital Signs Last 24 Hrs  T(C): 36.1 (07 Jul 2023 05:53), Max: 37.5 (06 Jul 2023 09:22)  T(F): 97 (07 Jul 2023 05:53), Max: 99.5 (06 Jul 2023 09:22)  HR: 90 (07 Jul 2023 05:00) (85 - 112)  BP: 164/80 (07 Jul 2023 05:00) (120/81 - 164/80)  BP(mean): 115 (07 Jul 2023 05:00) (89 - 129)  RR: 11 (07 Jul 2023 05:00) (11 - 26)  SpO2: 100% (07 Jul 2023 05:00) (95% - 100%)    Parameters below as of 07 Jul 2023 05:00  Patient On (Oxygen Delivery Method): room air        07-06-23 @ 07:01  -  07-07-23 @ 07:00  --------------------------------------------------------  IN: 1780 mL / OUT: 450 mL / NET: 1330 mL      PHYSICAL EXAM:  Constitutional: alert, NAD  Eyes: the sclera and conjunctiva were normal.   ENT: the ears and nose were normal in appearance.   Neck: the appearance of the neck was normal and the neck was supple.   Pulmonary: no respiratory distress and lungs were clear to auscultation bilaterally.   Heart: heart rate was normal and rhythm regular, normal S1 and S2  Vascular:. there was no peripheral edema  Abdomen: normal bowel sounds, soft, non-tender  Neurological: no focal deficits.   Psychiatric: the affect was normal        LABS:                        6.5    18.43 )-----------( 407      ( 07 Jul 2023 04:56 )             21.1     07-07    140  |  109<H>  |  102<H>  ----------------------------<  226<H>  3.6   |  17<L>  |  8.30<H>    Ca    8.6      07 Jul 2023 04:56  Phos  3.8     07-07  Mg     2.0     07-07    TPro  6.4  /  Alb  2.8<L>  /  TBili  0.2  /  DBili  x   /  AST  11  /  ALT  9<L>  /  AlkPhos  59  07-07    PT/INR - ( 06 Jul 2023 15:32 )   PT: 13.4 sec;   INR: 1.12          PTT - ( 06 Jul 2023 15:32 )  PTT:29.0 sec  Urinalysis Basic - ( 07 Jul 2023 04:56 )    Color: x / Appearance: x / SG: x / pH: x  Gluc: 226 mg/dL / Ketone: x  / Bili: x / Urobili: x   Blood: x / Protein: x / Nitrite: x   Leuk Esterase: x / RBC: x / WBC x   Sq Epi: x / Non Sq Epi: x / Bacteria: x        MICROBIOLOGY:      RADIOLOGY & ADDITIONAL STUDIES:  Reviewed INFECTIOUS DISEASES CONSULT FOLLOW-UP NOTE    INTERVAL HPI/OVERNIGHT EVENTS:  Patient underwent R foot amputation overnight with open wound, hemoglobin low and was given unit of pRBC's. From infectious standpoint, patient asymptomatic Eating breakfast comfortably this AM.     ROS:   Constitutional, eyes, ENT, cardiovascular, respiratory, gastrointestinal, genitourinary, integumentary, neurological, psychiatric and heme/lymph are otherwise negative other than noted above       ANTIBIOTICS/RELEVANT:    MEDICATIONS  (STANDING):  aspirin  chewable 81 milliGRAM(s) Oral daily  atorvastatin 40 milliGRAM(s) Oral at bedtime  chlorhexidine 2% Cloths 1 Application(s) Topical <User Schedule>  clindamycin IVPB 900 milliGRAM(s) IV Intermittent every 8 hours  clopidogrel Tablet 75 milliGRAM(s) Oral daily  dextrose 5% 1000 milliLiter(s) (100 mL/Hr) IV Continuous <Continuous>  dextrose 5%. 1000 milliLiter(s) (50 mL/Hr) IV Continuous <Continuous>  dextrose 5%. 1000 milliLiter(s) (100 mL/Hr) IV Continuous <Continuous>  dextrose 50% Injectable 12.5 Gram(s) IV Push once  dextrose 50% Injectable 25 Gram(s) IV Push once  dextrose 50% Injectable 25 Gram(s) IV Push once  glucagon  Injectable 1 milliGRAM(s) IntraMuscular once  insulin lispro (ADMELOG) corrective regimen sliding scale   SubCutaneous Before meals and at bedtime  modafinil 100 milliGRAM(s) Oral daily  piperacillin/tazobactam IVPB.. 4.5 Gram(s) IV Intermittent every 12 hours  tamsulosin 0.4 milliGRAM(s) Oral at bedtime    MEDICATIONS  (PRN):  acetaminophen     Tablet .. 650 milliGRAM(s) Oral every 4 hours PRN Mild Pain (1 - 3)  dextrose Oral Gel 15 Gram(s) Oral once PRN Blood Glucose LESS THAN 70 milliGRAM(s)/deciliter  HYDROmorphone  Injectable 0.5 milliGRAM(s) IV Push every 2 hours PRN breakthrough pain  oxyCODONE    IR 10 milliGRAM(s) Oral every 4 hours PRN Severe Pain (7 - 10)  oxyCODONE    IR 5 milliGRAM(s) Oral every 4 hours PRN Moderate Pain (4 - 6)        Vital Signs Last 24 Hrs  T(C): 36.1 (07 Jul 2023 05:53), Max: 37.5 (06 Jul 2023 09:22)  T(F): 97 (07 Jul 2023 05:53), Max: 99.5 (06 Jul 2023 09:22)  HR: 90 (07 Jul 2023 05:00) (85 - 112)  BP: 164/80 (07 Jul 2023 05:00) (120/81 - 164/80)  BP(mean): 115 (07 Jul 2023 05:00) (89 - 129)  RR: 11 (07 Jul 2023 05:00) (11 - 26)  SpO2: 100% (07 Jul 2023 05:00) (95% - 100%)    Parameters below as of 07 Jul 2023 05:00  Patient On (Oxygen Delivery Method): room air        07-06-23 @ 07:01  -  07-07-23 @ 07:00  --------------------------------------------------------  IN: 1780 mL / OUT: 450 mL / NET: 1330 mL      PHYSICAL EXAM:  PHYSICAL EXAM:  Constitutional: alert, NAD pleasant  Eyes: the sclera and conjunctiva were normal.   ENT: the ears and nose were normal in appearance.   Neck: the appearance of the neck was normal and the neck was supple.   Pulmonary: no respiratory distress and lungs were clear to auscultation bilaterally.   Heart: heart rate was normal and rhythm regular, normal S1 and S2  Vascular:. there was no peripheral edema  Abdomen: normal bowel sounds, soft, non-tender  Neurological: 3/5 strength L upper and lower extremities. Dysphagia/slightly slurred speech    Psychiatric: the affect was normal  Foot exam: R foot wrapped with open wound, with serosanguinous discharge on dressing. L foot with 4th and 5th MTA amputation well healed.         LABS:                        6.5    18.43 )-----------( 407      ( 07 Jul 2023 04:56 )             21.1     07-07    140  |  109<H>  |  102<H>  ----------------------------<  226<H>  3.6   |  17<L>  |  8.30<H>    Ca    8.6      07 Jul 2023 04:56  Phos  3.8     07-07  Mg     2.0     07-07    TPro  6.4  /  Alb  2.8<L>  /  TBili  0.2  /  DBili  x   /  AST  11  /  ALT  9<L>  /  AlkPhos  59  07-07    PT/INR - ( 06 Jul 2023 15:32 )   PT: 13.4 sec;   INR: 1.12          PTT - ( 06 Jul 2023 15:32 )  PTT:29.0 sec  Urinalysis Basic - ( 07 Jul 2023 04:56 )    Color: x / Appearance: x / SG: x / pH: x  Gluc: 226 mg/dL / Ketone: x  / Bili: x / Urobili: x   Blood: x / Protein: x / Nitrite: x   Leuk Esterase: x / RBC: x / WBC x   Sq Epi: x / Non Sq Epi: x / Bacteria: x        MICROBIOLOGY:      RADIOLOGY & ADDITIONAL STUDIES:  Reviewed

## 2023-07-07 NOTE — PROGRESS NOTE ADULT - SUBJECTIVE AND OBJECTIVE BOX
Patient is a 57y old  Male who presents with a chief complaint of DKA (06 Jul 2023 20:23)      INTERVAL HPI/OVERNIGHT EVENTS:   No overnight events   Afebrile, hemodynamically stable     ICU Vital Signs Last 24 Hrs  T(C): 36.1 (07 Jul 2023 05:53), Max: 37.5 (06 Jul 2023 09:22)  T(F): 97 (07 Jul 2023 05:53), Max: 99.5 (06 Jul 2023 09:22)  HR: 90 (07 Jul 2023 05:00) (85 - 112)  BP: 164/80 (07 Jul 2023 05:00) (120/81 - 164/80)  BP(mean): 115 (07 Jul 2023 05:00) (89 - 129)  ABP: 78/48 (06 Jul 2023 19:00) (78/48 - 94/62)  ABP(mean): 58 (06 Jul 2023 19:00) (58 - 73)  RR: 11 (07 Jul 2023 05:00) (11 - 26)  SpO2: 100% (07 Jul 2023 05:00) (95% - 100%)    O2 Parameters below as of 07 Jul 2023 05:00  Patient On (Oxygen Delivery Method): room air          LABS:                        6.5    18.43 )-----------( 407      ( 07 Jul 2023 04:56 )             21.1     07-07    140  |  109<H>  |  102<H>  ----------------------------<  226<H>  3.6   |  17<L>  |  8.30<H>    Ca    8.6      07 Jul 2023 04:56  Phos  3.8     07-07  Mg     2.0     07-07    TPro  6.4  /  Alb  2.8<L>  /  TBili  0.2  /  DBili  x   /  AST  11  /  ALT  9<L>  /  AlkPhos  59  07-07        Color: x / Appearance: x / SG: x / pH: x  Gluc: 226 mg/dL / Ketone: x  / Bili: x / Urobili: x   Blood: x / Protein: x / Nitrite: x   Leuk Esterase: x / RBC: x / WBC x   Sq Epi: x / Non Sq Epi: x / Bacteria: x      CAPILLARY BLOOD GLUCOSE      POCT Blood Glucose.: 265 mg/dL (06 Jul 2023 22:06)  POCT Blood Glucose.: 299 mg/dL (06 Jul 2023 20:34)  POCT Blood Glucose.: 312 mg/dL (06 Jul 2023 19:09)  POCT Blood Glucose.: 183 mg/dL (06 Jul 2023 11:22)  POCT Blood Glucose.: 151 mg/dL (06 Jul 2023 07:57)  POCT Blood Glucose.: 170 mg/dL (06 Jul 2023 07:22)        RADIOLOGY & ADDITIONAL TESTS:    Consultant(s) Notes Reviewed:  [x ] YES  [ ] NO    MEDICATIONS  (STANDING):  aspirin  chewable 81 milliGRAM(s) Oral daily  atorvastatin 40 milliGRAM(s) Oral at bedtime  chlorhexidine 2% Cloths 1 Application(s) Topical <User Schedule>  clindamycin IVPB 900 milliGRAM(s) IV Intermittent every 8 hours  clopidogrel Tablet 75 milliGRAM(s) Oral daily  dextrose 5% 1000 milliLiter(s) (100 mL/Hr) IV Continuous <Continuous>  dextrose 5%. 1000 milliLiter(s) (50 mL/Hr) IV Continuous <Continuous>  dextrose 5%. 1000 milliLiter(s) (100 mL/Hr) IV Continuous <Continuous>  dextrose 50% Injectable 25 Gram(s) IV Push once  dextrose 50% Injectable 12.5 Gram(s) IV Push once  dextrose 50% Injectable 25 Gram(s) IV Push once  glucagon  Injectable 1 milliGRAM(s) IntraMuscular once  insulin lispro (ADMELOG) corrective regimen sliding scale   SubCutaneous Before meals and at bedtime  modafinil 100 milliGRAM(s) Oral daily  piperacillin/tazobactam IVPB.. 4.5 Gram(s) IV Intermittent every 12 hours  tamsulosin 0.4 milliGRAM(s) Oral at bedtime    MEDICATIONS  (PRN):  acetaminophen     Tablet .. 650 milliGRAM(s) Oral every 4 hours PRN Mild Pain (1 - 3)  dextrose Oral Gel 15 Gram(s) Oral once PRN Blood Glucose LESS THAN 70 milliGRAM(s)/deciliter  HYDROmorphone  Injectable 0.5 milliGRAM(s) IV Push every 2 hours PRN breakthrough pain  oxyCODONE    IR 10 milliGRAM(s) Oral every 4 hours PRN Severe Pain (7 - 10)  oxyCODONE    IR 5 milliGRAM(s) Oral every 4 hours PRN Moderate Pain (4 - 6)      PHYSICAL EXAM:  GENERAL:   HEAD:  Atraumatic, Normocephalic  EYES: EOMI, PERRLA, conjunctiva and sclera clear  NECK: Supple, No JVD, Normal thyroid, no enlarged nodes  NERVOUS SYSTEM:  Alert & Awake.   CHEST/LUNG: B/L good air entry; No rales, rhonchi, or wheezing  HEART: S1S2 normal, no S3, Regular rate and rhythm; No murmurs  ABDOMEN: Soft, Nontender, Nondistended; Bowel sounds present  EXTREMITIES:  2+ Peripheral Pulses, No clubbing, cyanosis, or edema  LYMPH: No lymphadenopathy noted  SKIN: No rashes or lesions    Care Discussed with Consultants/Other Providers [ x] YES  [ ] NO *****************Transfer from MICU to [facility]************************    Hospital Course:    57M with PMH of DM2, CVA x 2 (one ischemic one embolic, residual left side weakness), HTN, HLD, and CKD V (not on HD yet), BPH, BIBEMS from nursing home for hyperglycemia to 363.  Per EMS, patient was given insulin at the nursing home. Patient denies fever, chills, chest pain, shortness of breath, vomiting, abdominal pain. The patient does report a dry cough over the last 2 days but no other infectious symptoms or sick contacts. ICU was consulted for DKA with FS at 260 and positive BHB. The patient was initially admitted to the MICU for management of HAGMA in setting of possible DKA. However on admission, BHB only 0.6 - HAGMA likely i/s/o acute on chronic renal failure, starvation ketosis, and leukocytosis. CXR was unremarkable and EKG showed sinus tachycardia. S/p 2L NS, 1 dose vanc + zosyn, insulin 9 units bolus in the ED. Started on insulin gtt at 9 units/hr overnight On physical exam, patient has a malodorous right heel ulcer without any drainage. Xray prelim read c/f emphysema posterior to calcaneus. CT of R foot ordered for further evaluation. 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 Currently on clindamycin, vancomycin, and zosyn. Patient is stable for discharge from ICU to [facility].    INTERVAL HPI/OVERNIGHT EVENTS:   No overnight events   Afebrile, hemodynamically stable     ICU Vital Signs Last 24 Hrs  T(C): 36.1 (07 Jul 2023 05:53), Max: 37.5 (06 Jul 2023 09:22)  T(F): 97 (07 Jul 2023 05:53), Max: 99.5 (06 Jul 2023 09:22)  HR: 90 (07 Jul 2023 05:00) (85 - 112)  BP: 164/80 (07 Jul 2023 05:00) (120/81 - 164/80)  BP(mean): 115 (07 Jul 2023 05:00) (89 - 129)  ABP: 78/48 (06 Jul 2023 19:00) (78/48 - 94/62)  ABP(mean): 58 (06 Jul 2023 19:00) (58 - 73)  RR: 11 (07 Jul 2023 05:00) (11 - 26)  SpO2: 100% (07 Jul 2023 05:00) (95% - 100%)    O2 Parameters below as of 07 Jul 2023 05:00  Patient On (Oxygen Delivery Method): room air          LABS:                        6.5    18.43 )-----------( 407      ( 07 Jul 2023 04:56 )             21.1     07-07    140  |  109<H>  |  102<H>  ----------------------------<  226<H>  3.6   |  17<L>  |  8.30<H>    Ca    8.6      07 Jul 2023 04:56  Phos  3.8     07-07  Mg     2.0     07-07    TPro  6.4  /  Alb  2.8<L>  /  TBili  0.2  /  DBili  x   /  AST  11  /  ALT  9<L>  /  AlkPhos  59  07-07        Color: x / Appearance: x / SG: x / pH: x  Gluc: 226 mg/dL / Ketone: x  / Bili: x / Urobili: x   Blood: x / Protein: x / Nitrite: x   Leuk Esterase: x / RBC: x / WBC x   Sq Epi: x / Non Sq Epi: x / Bacteria: x      CAPILLARY BLOOD GLUCOSE      POCT Blood Glucose.: 265 mg/dL (06 Jul 2023 22:06)  POCT Blood Glucose.: 299 mg/dL (06 Jul 2023 20:34)  POCT Blood Glucose.: 312 mg/dL (06 Jul 2023 19:09)  POCT Blood Glucose.: 183 mg/dL (06 Jul 2023 11:22)  POCT Blood Glucose.: 151 mg/dL (06 Jul 2023 07:57)  POCT Blood Glucose.: 170 mg/dL (06 Jul 2023 07:22)        RADIOLOGY & ADDITIONAL TESTS:    Consultant(s) Notes Reviewed:  [x ] YES  [ ] NO    MEDICATIONS  (STANDING):  aspirin  chewable 81 milliGRAM(s) Oral daily  atorvastatin 40 milliGRAM(s) Oral at bedtime  chlorhexidine 2% Cloths 1 Application(s) Topical <User Schedule>  clindamycin IVPB 900 milliGRAM(s) IV Intermittent every 8 hours  clopidogrel Tablet 75 milliGRAM(s) Oral daily  dextrose 5% 1000 milliLiter(s) (100 mL/Hr) IV Continuous <Continuous>  dextrose 5%. 1000 milliLiter(s) (50 mL/Hr) IV Continuous <Continuous>  dextrose 5%. 1000 milliLiter(s) (100 mL/Hr) IV Continuous <Continuous>  dextrose 50% Injectable 25 Gram(s) IV Push once  dextrose 50% Injectable 12.5 Gram(s) IV Push once  dextrose 50% Injectable 25 Gram(s) IV Push once  glucagon  Injectable 1 milliGRAM(s) IntraMuscular once  insulin lispro (ADMELOG) corrective regimen sliding scale   SubCutaneous Before meals and at bedtime  modafinil 100 milliGRAM(s) Oral daily  piperacillin/tazobactam IVPB.. 4.5 Gram(s) IV Intermittent every 12 hours  tamsulosin 0.4 milliGRAM(s) Oral at bedtime    MEDICATIONS  (PRN):  acetaminophen     Tablet .. 650 milliGRAM(s) Oral every 4 hours PRN Mild Pain (1 - 3)  dextrose Oral Gel 15 Gram(s) Oral once PRN Blood Glucose LESS THAN 70 milliGRAM(s)/deciliter  HYDROmorphone  Injectable 0.5 milliGRAM(s) IV Push every 2 hours PRN breakthrough pain  oxyCODONE    IR 10 milliGRAM(s) Oral every 4 hours PRN Severe Pain (7 - 10)  oxyCODONE    IR 5 milliGRAM(s) Oral every 4 hours PRN Moderate Pain (4 - 6)      PHYSICAL EXAM:  GENERAL:   HEAD:  Atraumatic, Normocephalic  EYES: EOMI, PERRLA, conjunctiva and sclera clear  NECK: Supple, No JVD, Normal thyroid, no enlarged nodes  NERVOUS SYSTEM:  Alert & Awake.   CHEST/LUNG: B/L good air entry; No rales, rhonchi, or wheezing  HEART: S1S2 normal, no S3, Regular rate and rhythm; No murmurs  ABDOMEN: Soft, Nontender, Nondistended; Bowel sounds present  EXTREMITIES:  2+ Peripheral Pulses, No clubbing, cyanosis, or edema  LYMPH: No lymphadenopathy noted  SKIN: No rashes or lesions    Care Discussed with Consultants/Other Providers [ x] YES  [ ] NO *****************Transfer from MICU to [facility]************************    Hospital Course:    57M with PMH of DM2, CVA x 2 (one ischemic one embolic, residual left side weakness), HTN, HLD, and CKD V (not on HD yet), BPH, BIBEMS from nursing home for hyperglycemia to 363.  Per EMS, patient was given insulin at the nursing home. Patient denies fever, chills, chest pain, shortness of breath, vomiting, abdominal pain. The patient does report a dry cough over the last 2 days but no other infectious symptoms or sick contacts. ICU was consulted for DKA with FS at 260 and positive BHB. The patient was initially admitted to the MICU for management of HAGMA in setting of possible DKA. However on admission, BHB only 0.6 - HAGMA likely i/s/o acute on chronic renal failure, starvation ketosis, and leukocytosis. CXR was unremarkable and EKG showed sinus tachycardia. S/p 2L NS, 1 dose vanc + zosyn, insulin 9 units bolus in the ED. Started on insulin gtt at 9 units/hr overnight On physical exam, patient has a malodorous right heel ulcer without any drainage. Xray prelim read c/f emphysema posterior to calcaneus. CT of R foot ordered for further evaluation. Dietitian, podiatry, ID, and vascular were consulted. Currently on clindamycin, vancomycin, and zosyn. 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 Vascular to do a formal BKA once patient is stable. Patient was initially ready for downgrade from MICU on 7/6, however at ~9pm stroke code was called when patient was not behaving normally, not responding and not oriented, but came back to baseline within 5 minutes so stroke code cancelled. vEEG showed no epileptiform activity. On 7/7, AM hgb dropped to 6.5 and px is now s/p 1u pRBC, post-transfusion hgb is 8.9. Anion gap now resolved, fsg now 200s. Patient is stable for discharge from ICU to [facility].    Overnight: Hgb 6.5, given 1u pRBC.    ICU Vital Signs Last 24 Hrs  T(C): 36.1 (07 Jul 2023 05:53), Max: 37.5 (06 Jul 2023 09:22)  T(F): 97 (07 Jul 2023 05:53), Max: 99.5 (06 Jul 2023 09:22)  HR: 90 (07 Jul 2023 05:00) (85 - 112)  BP: 164/80 (07 Jul 2023 05:00) (120/81 - 164/80)  BP(mean): 115 (07 Jul 2023 05:00) (89 - 129)  ABP: 78/48 (06 Jul 2023 19:00) (78/48 - 94/62)  ABP(mean): 58 (06 Jul 2023 19:00) (58 - 73)  RR: 11 (07 Jul 2023 05:00) (11 - 26)  SpO2: 100% (07 Jul 2023 05:00) (95% - 100%)    O2 Parameters below as of 07 Jul 2023 05:00  Patient On (Oxygen Delivery Method): room air          LABS:                        6.5    18.43 )-----------( 407      ( 07 Jul 2023 04:56 )             21.1     07-07    140  |  109<H>  |  102<H>  ----------------------------<  226<H>  3.6   |  17<L>  |  8.30<H>    Ca    8.6      07 Jul 2023 04:56  Phos  3.8     07-07  Mg     2.0     07-07    TPro  6.4  /  Alb  2.8<L>  /  TBili  0.2  /  DBili  x   /  AST  11  /  ALT  9<L>  /  AlkPhos  59  07-07        Color: x / Appearance: x / SG: x / pH: x  Gluc: 226 mg/dL / Ketone: x  / Bili: x / Urobili: x   Blood: x / Protein: x / Nitrite: x   Leuk Esterase: x / RBC: x / WBC x   Sq Epi: x / Non Sq Epi: x / Bacteria: x      CAPILLARY BLOOD GLUCOSE      POCT Blood Glucose.: 265 mg/dL (06 Jul 2023 22:06)  POCT Blood Glucose.: 299 mg/dL (06 Jul 2023 20:34)  POCT Blood Glucose.: 312 mg/dL (06 Jul 2023 19:09)  POCT Blood Glucose.: 183 mg/dL (06 Jul 2023 11:22)  POCT Blood Glucose.: 151 mg/dL (06 Jul 2023 07:57)  POCT Blood Glucose.: 170 mg/dL (06 Jul 2023 07:22)        RADIOLOGY & ADDITIONAL TESTS:    Consultant(s) Notes Reviewed:  [x ] YES  [ ] NO    MEDICATIONS  (STANDING):  aspirin  chewable 81 milliGRAM(s) Oral daily  atorvastatin 40 milliGRAM(s) Oral at bedtime  chlorhexidine 2% Cloths 1 Application(s) Topical <User Schedule>  clindamycin IVPB 900 milliGRAM(s) IV Intermittent every 8 hours  clopidogrel Tablet 75 milliGRAM(s) Oral daily  dextrose 5% 1000 milliLiter(s) (100 mL/Hr) IV Continuous <Continuous>  dextrose 5%. 1000 milliLiter(s) (50 mL/Hr) IV Continuous <Continuous>  dextrose 5%. 1000 milliLiter(s) (100 mL/Hr) IV Continuous <Continuous>  dextrose 50% Injectable 25 Gram(s) IV Push once  dextrose 50% Injectable 12.5 Gram(s) IV Push once  dextrose 50% Injectable 25 Gram(s) IV Push once  glucagon  Injectable 1 milliGRAM(s) IntraMuscular once  insulin lispro (ADMELOG) corrective regimen sliding scale   SubCutaneous Before meals and at bedtime  modafinil 100 milliGRAM(s) Oral daily  piperacillin/tazobactam IVPB.. 4.5 Gram(s) IV Intermittent every 12 hours  tamsulosin 0.4 milliGRAM(s) Oral at bedtime    MEDICATIONS  (PRN):  acetaminophen     Tablet .. 650 milliGRAM(s) Oral every 4 hours PRN Mild Pain (1 - 3)  dextrose Oral Gel 15 Gram(s) Oral once PRN Blood Glucose LESS THAN 70 milliGRAM(s)/deciliter  HYDROmorphone  Injectable 0.5 milliGRAM(s) IV Push every 2 hours PRN breakthrough pain  oxyCODONE    IR 10 milliGRAM(s) Oral every 4 hours PRN Severe Pain (7 - 10)  oxyCODONE    IR 5 milliGRAM(s) Oral every 4 hours PRN Moderate Pain (4 - 6)      PHYSICAL EXAM:  GENERAL:   HEAD:  Atraumatic, Normocephalic  EYES: EOMI, PERRLA, conjunctiva and sclera clear  NECK: Supple, No JVD, Normal thyroid, no enlarged nodes  NERVOUS SYSTEM:  Alert & Awake.   CHEST/LUNG: B/L good air entry; No rales, rhonchi, or wheezing  HEART: S1S2 normal, no S3, Regular rate and rhythm; No murmurs  ABDOMEN: Soft, Nontender, Nondistended; Bowel sounds present  EXTREMITIES:  2+ Peripheral Pulses, No clubbing, cyanosis, or edema  LYMPH: No lymphadenopathy noted  SKIN: No rashes or lesions    Care Discussed with Consultants/Other Providers [ x] YES  [ ] NO *****************Transfer from MICU to [facility]************************    Hospital Course:    57M with PMH of DM2, CVA x 2 (one ischemic one embolic, residual left side weakness), HTN, HLD, and CKD V (not on HD yet), BPH, BIBEMS from nursing home for hyperglycemia to 363.  Per EMS, patient was given insulin at the nursing home. Patient denies fever, chills, chest pain, shortness of breath, vomiting, abdominal pain. The patient does report a dry cough over the last 2 days but no other infectious symptoms or sick contacts. ICU was consulted for DKA with FS at 260 and positive BHB. The patient was initially admitted to the MICU for management of HAGMA in setting of possible DKA. However on admission, BHB only 0.6 - HAGMA likely i/s/o acute on chronic renal failure, starvation ketosis, and leukocytosis. CXR was unremarkable and EKG showed sinus tachycardia. S/p 2L NS, 1 dose vanc + zosyn, insulin 9 units bolus in the ED. Started on insulin gtt at 9 units/hr overnight On physical exam, patient has a malodorous right heel ulcer without any drainage. Xray prelim read c/f emphysema posterior to calcaneus. CT of R foot ordered for further evaluation. Dietitian, podiatry, ID, and vascular were consulted. Currently on clindamycin, vancomycin, and zosyn. 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 Vascular to do a formal BKA once patient is stable. Patient was initially ready for downgrade from MICU on 7/6, however at ~9pm stroke code was called when patient was not behaving normally, not responding and not oriented, but came back to baseline within 5 minutes so stroke code cancelled. vEEG showed no epileptiform activity. On 7/7, AM hgb dropped to 6.5 and px is now s/p 1u pRBC, post-transfusion hgb is 8.9. Anion gap now resolved, fsg now 200s. Patient is stable for discharge from ICU to [facility].    Overnight: Hgb 6.5, given 1u pRBC.    ICU Vital Signs Last 24 Hrs  T(C): 36.1 (07 Jul 2023 05:53), Max: 37.5 (06 Jul 2023 09:22)  T(F): 97 (07 Jul 2023 05:53), Max: 99.5 (06 Jul 2023 09:22)  HR: 90 (07 Jul 2023 05:00) (85 - 112)  BP: 164/80 (07 Jul 2023 05:00) (120/81 - 164/80)  BP(mean): 115 (07 Jul 2023 05:00) (89 - 129)  ABP: 78/48 (06 Jul 2023 19:00) (78/48 - 94/62)  ABP(mean): 58 (06 Jul 2023 19:00) (58 - 73)  RR: 11 (07 Jul 2023 05:00) (11 - 26)  SpO2: 100% (07 Jul 2023 05:00) (95% - 100%)    O2 Parameters below as of 07 Jul 2023 05:00  Patient On (Oxygen Delivery Method): room air    LABS:                        6.5    18.43 )-----------( 407      ( 07 Jul 2023 04:56 )             21.1     07-07    140  |  109<H>  |  102<H>  ----------------------------<  226<H>  3.6   |  17<L>  |  8.30<H>    Ca    8.6      07 Jul 2023 04:56  Phos  3.8     07-07  Mg     2.0     07-07    TPro  6.4  /  Alb  2.8<L>  /  TBili  0.2  /  DBili  x   /  AST  11  /  ALT  9<L>  /  AlkPhos  59  07-07        Color: x / Appearance: x / SG: x / pH: x  Gluc: 226 mg/dL / Ketone: x  / Bili: x / Urobili: x   Blood: x / Protein: x / Nitrite: x   Leuk Esterase: x / RBC: x / WBC x   Sq Epi: x / Non Sq Epi: x / Bacteria: x      CAPILLARY BLOOD GLUCOSE      POCT Blood Glucose.: 265 mg/dL (06 Jul 2023 22:06)  POCT Blood Glucose.: 299 mg/dL (06 Jul 2023 20:34)  POCT Blood Glucose.: 312 mg/dL (06 Jul 2023 19:09)  POCT Blood Glucose.: 183 mg/dL (06 Jul 2023 11:22)  POCT Blood Glucose.: 151 mg/dL (06 Jul 2023 07:57)  POCT Blood Glucose.: 170 mg/dL (06 Jul 2023 07:22)        RADIOLOGY & ADDITIONAL TESTS:    Consultant(s) Notes Reviewed:  [x ] YES  [ ] NO    MEDICATIONS  (STANDING):  aspirin  chewable 81 milliGRAM(s) Oral daily  atorvastatin 40 milliGRAM(s) Oral at bedtime  chlorhexidine 2% Cloths 1 Application(s) Topical <User Schedule>  clindamycin IVPB 900 milliGRAM(s) IV Intermittent every 8 hours  clopidogrel Tablet 75 milliGRAM(s) Oral daily  dextrose 5% 1000 milliLiter(s) (100 mL/Hr) IV Continuous <Continuous>  dextrose 5%. 1000 milliLiter(s) (50 mL/Hr) IV Continuous <Continuous>  dextrose 5%. 1000 milliLiter(s) (100 mL/Hr) IV Continuous <Continuous>  dextrose 50% Injectable 25 Gram(s) IV Push once  dextrose 50% Injectable 12.5 Gram(s) IV Push once  dextrose 50% Injectable 25 Gram(s) IV Push once  glucagon  Injectable 1 milliGRAM(s) IntraMuscular once  insulin lispro (ADMELOG) corrective regimen sliding scale   SubCutaneous Before meals and at bedtime  modafinil 100 milliGRAM(s) Oral daily  piperacillin/tazobactam IVPB.. 4.5 Gram(s) IV Intermittent every 12 hours  tamsulosin 0.4 milliGRAM(s) Oral at bedtime    MEDICATIONS  (PRN):  acetaminophen     Tablet .. 650 milliGRAM(s) Oral every 4 hours PRN Mild Pain (1 - 3)  dextrose Oral Gel 15 Gram(s) Oral once PRN Blood Glucose LESS THAN 70 milliGRAM(s)/deciliter  HYDROmorphone  Injectable 0.5 milliGRAM(s) IV Push every 2 hours PRN breakthrough pain  oxyCODONE    IR 10 milliGRAM(s) Oral every 4 hours PRN Severe Pain (7 - 10)  oxyCODONE    IR 5 milliGRAM(s) Oral every 4 hours PRN Moderate Pain (4 - 6)      GENERAL: NAD, lying in bed, cachectic   HEAD:  Atraumatic, normocephalic  EYES: PERRLA, conjunctiva and sclera clear  ENT: dry mucous membranes  NECK: Supple, no JVD  HEART: Regular rate and rhythm, no murmurs, rubs, or gallops  LUNGS: Unlabored respirations.  Clear to auscultation anteriorly.  ABDOMEN: mildly distended; nontender, +BS  EXTREMITIES: warm, palpable distal pulses. No clubbing, cyanosis, or edema  NERVOUS SYSTEM:  A&Ox2, left sided neurologic deficits, RUE tremor (at baseline)  SKIN: R heel pressure wound, malodorous, no sacral wound    Care Discussed with Consultants/Other Providers [ x] YES  [ ] NO *****************Transfer from MICU to [facility]************************    Hospital Course:    57M with PMH of DM2, CVA x 2 (one ischemic one embolic, residual left side weakness), HTN, HLD, and CKD V (not on HD yet), BPH, BIBEMS from nursing home for hyperglycemia to 363.  Per EMS, patient was given insulin at the nursing home. Patient denies fever, chills, chest pain, shortness of breath, vomiting, abdominal pain. The patient does report a dry cough over the last 2 days but no other infectious symptoms or sick contacts. ICU was consulted for DKA with FS at 260 and positive BHB. The patient was initially admitted to the MICU for management of HAGMA in setting of possible DKA. However on admission, BHB only 0.6 - HAGMA likely i/s/o acute on chronic renal failure, starvation ketosis, and leukocytosis. CXR was unremarkable and EKG showed sinus tachycardia. Started on insulin gtt at 9 units/hr overnight On physical exam, patient has a malodorous right heel ulcer without any drainage. Xray prelim read c/f emphysema posterior to calcaneus. CT of R foot ordered for further evaluation. Dietitian, podiatry, ID, and vascular were consulted. Currently on clindamycin, vancomycin, and zosyn. 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 Vascular to do a formal BKA once patient is stable. Patient was initially ready for downgrade from MICU on 7/6, however at ~9pm stroke code was called when patient was not behaving normally, not responding and not oriented, but came back to baseline within 5 minutes so stroke code cancelled. vEEG showed no epileptiform activity. On 7/7, AM hgb dropped to 6.5 and px is now s/p 1u pRBC, post-transfusion hgb is 8.9. Anion gap now resolved, fsg now 200s. Patient is stable for discharge from ICU to [facility].    Overnight: Hgb 6.5, given 1u pRBC.    ICU Vital Signs Last 24 Hrs  T(C): 36.1 (07 Jul 2023 05:53), Max: 37.5 (06 Jul 2023 09:22)  T(F): 97 (07 Jul 2023 05:53), Max: 99.5 (06 Jul 2023 09:22)  HR: 90 (07 Jul 2023 05:00) (85 - 112)  BP: 164/80 (07 Jul 2023 05:00) (120/81 - 164/80)  BP(mean): 115 (07 Jul 2023 05:00) (89 - 129)  ABP: 78/48 (06 Jul 2023 19:00) (78/48 - 94/62)  ABP(mean): 58 (06 Jul 2023 19:00) (58 - 73)  RR: 11 (07 Jul 2023 05:00) (11 - 26)  SpO2: 100% (07 Jul 2023 05:00) (95% - 100%)    O2 Parameters below as of 07 Jul 2023 05:00  Patient On (Oxygen Delivery Method): room air    LABS:                        6.5    18.43 )-----------( 407      ( 07 Jul 2023 04:56 )             21.1     07-07    140  |  109<H>  |  102<H>  ----------------------------<  226<H>  3.6   |  17<L>  |  8.30<H>    Ca    8.6      07 Jul 2023 04:56  Phos  3.8     07-07  Mg     2.0     07-07    TPro  6.4  /  Alb  2.8<L>  /  TBili  0.2  /  DBili  x   /  AST  11  /  ALT  9<L>  /  AlkPhos  59  07-07        Color: x / Appearance: x / SG: x / pH: x  Gluc: 226 mg/dL / Ketone: x  / Bili: x / Urobili: x   Blood: x / Protein: x / Nitrite: x   Leuk Esterase: x / RBC: x / WBC x   Sq Epi: x / Non Sq Epi: x / Bacteria: x      CAPILLARY BLOOD GLUCOSE      POCT Blood Glucose.: 265 mg/dL (06 Jul 2023 22:06)  POCT Blood Glucose.: 299 mg/dL (06 Jul 2023 20:34)  POCT Blood Glucose.: 312 mg/dL (06 Jul 2023 19:09)  POCT Blood Glucose.: 183 mg/dL (06 Jul 2023 11:22)  POCT Blood Glucose.: 151 mg/dL (06 Jul 2023 07:57)  POCT Blood Glucose.: 170 mg/dL (06 Jul 2023 07:22)        RADIOLOGY & ADDITIONAL TESTS:    Consultant(s) Notes Reviewed:  [x ] YES  [ ] NO    MEDICATIONS  (STANDING):  aspirin  chewable 81 milliGRAM(s) Oral daily  atorvastatin 40 milliGRAM(s) Oral at bedtime  chlorhexidine 2% Cloths 1 Application(s) Topical <User Schedule>  clindamycin IVPB 900 milliGRAM(s) IV Intermittent every 8 hours  clopidogrel Tablet 75 milliGRAM(s) Oral daily  dextrose 5% 1000 milliLiter(s) (100 mL/Hr) IV Continuous <Continuous>  dextrose 5%. 1000 milliLiter(s) (50 mL/Hr) IV Continuous <Continuous>  dextrose 5%. 1000 milliLiter(s) (100 mL/Hr) IV Continuous <Continuous>  dextrose 50% Injectable 25 Gram(s) IV Push once  dextrose 50% Injectable 12.5 Gram(s) IV Push once  dextrose 50% Injectable 25 Gram(s) IV Push once  glucagon  Injectable 1 milliGRAM(s) IntraMuscular once  insulin lispro (ADMELOG) corrective regimen sliding scale   SubCutaneous Before meals and at bedtime  modafinil 100 milliGRAM(s) Oral daily  piperacillin/tazobactam IVPB.. 4.5 Gram(s) IV Intermittent every 12 hours  tamsulosin 0.4 milliGRAM(s) Oral at bedtime    MEDICATIONS  (PRN):  acetaminophen     Tablet .. 650 milliGRAM(s) Oral every 4 hours PRN Mild Pain (1 - 3)  dextrose Oral Gel 15 Gram(s) Oral once PRN Blood Glucose LESS THAN 70 milliGRAM(s)/deciliter  HYDROmorphone  Injectable 0.5 milliGRAM(s) IV Push every 2 hours PRN breakthrough pain  oxyCODONE    IR 10 milliGRAM(s) Oral every 4 hours PRN Severe Pain (7 - 10)  oxyCODONE    IR 5 milliGRAM(s) Oral every 4 hours PRN Moderate Pain (4 - 6)      GENERAL: NAD, lying in bed, cachectic   HEAD:  Atraumatic, normocephalic  EYES: PERRLA, conjunctiva and sclera clear  ENT: dry mucous membranes  NECK: Supple, no JVD  HEART: Regular rate and rhythm, no murmurs, rubs, or gallops  LUNGS: Unlabored respirations.  Clear to auscultation anteriorly.  ABDOMEN: mildly distended; nontender, +BS  EXTREMITIES: warm, palpable distal pulses. No clubbing, cyanosis, or edema  NERVOUS SYSTEM:  A&Ox2, left sided neurologic deficits, RUE tremor (at baseline)  SKIN: R heel pressure wound, malodorous, no sacral wound    Care Discussed with Consultants/Other Providers [ x] YES  [ ] NO *****************Transfer from MICU to SICU Vascular Service************************    Hospital Course:    57M with PMH of DM2, CVA x 2 (one ischemic one embolic, residual left side weakness), HTN, HLD, and CKD V (not on HD yet), BPH, BIBEMS from nursing home for hyperglycemia to 363.  Per EMS, patient was given insulin at the nursing home. Patient denies fever, chills, chest pain, shortness of breath, vomiting, abdominal pain. The patient does report a dry cough over the last 2 days but no other infectious symptoms or sick contacts. ICU was consulted for DKA with FS at 260 and positive BHB. The patient was initially admitted to the MICU for management of HAGMA in setting of possible DKA. However on admission, BHB only 0.6 - HAGMA likely i/s/o acute on chronic renal failure, starvation ketosis, and leukocytosis. CXR was unremarkable and EKG showed sinus tachycardia. Started on insulin gtt at 9 units/hr overnight On physical exam, patient has a malodorous right heel ulcer without any drainage. Xray prelim read c/f emphysema posterior to calcaneus. CT of R foot ordered for further evaluation. Dietitian, podiatry, ID, and vascular were consulted. Currently on clindamycin, vancomycin, and zosyn. 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 Vascular to do a formal BKA once patient is stable. Patient was initially ready for downgrade from MICU on 7/6, however at ~9pm stroke code was called when patient was not behaving normally, not responding and not oriented, but came back to baseline within 5 minutes so stroke code cancelled. vEEG showed no epileptiform activity. On 7/7, AM hgb dropped to 6.5 and px is now s/p 1u pRBC, post-transfusion hgb is 8.9. Anion gap now resolved, fsg now 200s. Patient is stable for discharge from ICU to [facility].    Overnight: Hgb 6.5, given 1u pRBC.     Subjective: Patient seen and examined at bedside. Denies any pain and did not receive pain meds ovn. Back at baseline mental status AOx2.    ICU Vital Signs Last 24 Hrs  T(C): 36.1 (07 Jul 2023 05:53), Max: 37.5 (06 Jul 2023 09:22)  T(F): 97 (07 Jul 2023 05:53), Max: 99.5 (06 Jul 2023 09:22)  HR: 90 (07 Jul 2023 05:00) (85 - 112)  BP: 164/80 (07 Jul 2023 05:00) (120/81 - 164/80)  BP(mean): 115 (07 Jul 2023 05:00) (89 - 129)  ABP: 78/48 (06 Jul 2023 19:00) (78/48 - 94/62)  ABP(mean): 58 (06 Jul 2023 19:00) (58 - 73)  RR: 11 (07 Jul 2023 05:00) (11 - 26)  SpO2: 100% (07 Jul 2023 05:00) (95% - 100%)    O2 Parameters below as of 07 Jul 2023 05:00  Patient On (Oxygen Delivery Method): room air    LABS:                        6.5    18.43 )-----------( 407      ( 07 Jul 2023 04:56 )             21.1     07-07    140  |  109<H>  |  102<H>  ----------------------------<  226<H>  3.6   |  17<L>  |  8.30<H>    Ca    8.6      07 Jul 2023 04:56  Phos  3.8     07-07  Mg     2.0     07-07    TPro  6.4  /  Alb  2.8<L>  /  TBili  0.2  /  DBili  x   /  AST  11  /  ALT  9<L>  /  AlkPhos  59  07-07        Color: x / Appearance: x / SG: x / pH: x  Gluc: 226 mg/dL / Ketone: x  / Bili: x / Urobili: x   Blood: x / Protein: x / Nitrite: x   Leuk Esterase: x / RBC: x / WBC x   Sq Epi: x / Non Sq Epi: x / Bacteria: x      CAPILLARY BLOOD GLUCOSE      POCT Blood Glucose.: 265 mg/dL (06 Jul 2023 22:06)  POCT Blood Glucose.: 299 mg/dL (06 Jul 2023 20:34)  POCT Blood Glucose.: 312 mg/dL (06 Jul 2023 19:09)  POCT Blood Glucose.: 183 mg/dL (06 Jul 2023 11:22)  POCT Blood Glucose.: 151 mg/dL (06 Jul 2023 07:57)  POCT Blood Glucose.: 170 mg/dL (06 Jul 2023 07:22)    RADIOLOGY & ADDITIONAL TESTS: reviewed.    Consultant(s) Notes Reviewed:  [x ] YES  [ ] NO    MEDICATIONS  (STANDING):  aspirin  chewable 81 milliGRAM(s) Oral daily  atorvastatin 40 milliGRAM(s) Oral at bedtime  chlorhexidine 2% Cloths 1 Application(s) Topical <User Schedule>  clindamycin IVPB 900 milliGRAM(s) IV Intermittent every 8 hours  clopidogrel Tablet 75 milliGRAM(s) Oral daily  dextrose 5% 1000 milliLiter(s) (100 mL/Hr) IV Continuous <Continuous>  dextrose 5%. 1000 milliLiter(s) (50 mL/Hr) IV Continuous <Continuous>  dextrose 5%. 1000 milliLiter(s) (100 mL/Hr) IV Continuous <Continuous>  dextrose 50% Injectable 25 Gram(s) IV Push once  dextrose 50% Injectable 12.5 Gram(s) IV Push once  dextrose 50% Injectable 25 Gram(s) IV Push once  glucagon  Injectable 1 milliGRAM(s) IntraMuscular once  insulin lispro (ADMELOG) corrective regimen sliding scale   SubCutaneous Before meals and at bedtime  modafinil 100 milliGRAM(s) Oral daily  piperacillin/tazobactam IVPB.. 4.5 Gram(s) IV Intermittent every 12 hours  tamsulosin 0.4 milliGRAM(s) Oral at bedtime    MEDICATIONS  (PRN):  acetaminophen     Tablet .. 650 milliGRAM(s) Oral every 4 hours PRN Mild Pain (1 - 3)  dextrose Oral Gel 15 Gram(s) Oral once PRN Blood Glucose LESS THAN 70 milliGRAM(s)/deciliter  HYDROmorphone  Injectable 0.5 milliGRAM(s) IV Push every 2 hours PRN breakthrough pain  oxyCODONE    IR 10 milliGRAM(s) Oral every 4 hours PRN Severe Pain (7 - 10)  oxyCODONE    IR 5 milliGRAM(s) Oral every 4 hours PRN Moderate Pain (4 - 6)      GENERAL: NAD, lying in bed, cachectic   HEAD:  Atraumatic, normocephalic  EYES: PERRLA, conjunctiva and sclera clear  ENT: dry mucous membranes  NECK: Supple, no JVD  HEART: Regular rate and rhythm, no murmurs, rubs, or gallops  LUNGS: Unlabored respirations.  Clear to auscultation anteriorly.  ABDOMEN: mildly distended; nontender, +BS  EXTREMITIES: warm, palpable distal pulses. No clubbing, cyanosis, or edema  NERVOUS SYSTEM:  A&Ox2, left sided neurologic deficits, RUE tremor (at baseline)  SKIN: R heel pressure wound, malodorous, no sacral wound    Care Discussed with Consultants/Other Providers [ x] YES  [ ] NO

## 2023-07-07 NOTE — PROGRESS NOTE ADULT - SUBJECTIVE AND OBJECTIVE BOX
Patient is a 57y Male, admitted with starvation ketoacidosis. Septic, gas gangrene in the LEs, suspected OM, being followed by ID and Sx, Vasc sx. Hemodynamically stable.   Please see chart note for full history, patient well known to nephrology attending.     Allergies:  No Known Allergies    Hospital Medications:   MEDICATIONS  (STANDING):  aspirin  chewable 81 milliGRAM(s) Oral daily  atorvastatin 40 milliGRAM(s) Oral at bedtime  chlorhexidine 2% Cloths 1 Application(s) Topical <User Schedule>  clindamycin IVPB 900 milliGRAM(s) IV Intermittent every 8 hours  clopidogrel Tablet 75 milliGRAM(s) Oral daily  dextrose 5% 1000 milliLiter(s) (100 mL/Hr) IV Continuous <Continuous>  dextrose 5%. 1000 milliLiter(s) (50 mL/Hr) IV Continuous <Continuous>  dextrose 5%. 1000 milliLiter(s) (100 mL/Hr) IV Continuous <Continuous>  dextrose 50% Injectable 25 Gram(s) IV Push once  dextrose 50% Injectable 25 Gram(s) IV Push once  dextrose 50% Injectable 12.5 Gram(s) IV Push once  glucagon  Injectable 1 milliGRAM(s) IntraMuscular once  heparin   Injectable 5000 Unit(s) SubCutaneous every 8 hours  insulin lispro (ADMELOG) corrective regimen sliding scale   SubCutaneous three times a day before meals  insulin regular Infusion 5 Unit(s)/Hr (5 mL/Hr) IV Continuous <Continuous>  modafinil 100 milliGRAM(s) Oral daily  piperacillin/tazobactam IVPB.. 4.5 Gram(s) IV Intermittent every 12 hours  piperacillin/tazobactam IVPB.. 4.5 Gram(s) IV Intermittent once  tamsulosin 0.4 milliGRAM(s) Oral at bedtime    REVIEW OF SYSTEMS:  All other review of systems is negative unless indicated above.    PMHx: as above    Surgical Hx: Fistula    Family hx: Noncontributory to current admission    Social Hx: no Smoking, IVDU, EtOH    VITALS:  T(F): 99.4 (07-06-23 @ 15:30), Max: 99.9 (07-05-23 @ 22:00)  HR: 111 (07-06-23 @ 16:18)  BP: 154/74 (07-06-23 @ 16:18)  RR: 26 (07-06-23 @ 16:18)  SpO2: 100% (07-06-23 @ 16:18)  Wt(kg): --    07-05 @ 07:01  -  07-06 @ 07:00  --------------------------------------------------------  IN: 2049 mL / OUT: 1020 mL / NET: 1029 mL    07-06 @ 07:01  -  07-06 @ 17:29  --------------------------------------------------------  IN: 430 mL / OUT: 450 mL / NET: -20 mL      Height (cm): 177.8 (07-06 @ 16:18)  Weight (kg): 87.5 (07-06 @ 16:18)  BMI (kg/m2): 27.7 (07-06 @ 16:18)  BSA (m2): 2.06 (07-06 @ 16:18)    PHYSICAL EXAM:  GENERAL: NAD, lying in bed, cachectic   EYES: conjunctiva  clear  ENT: dry mucous membranes  NECK: Supple, no JVD  HEART: Regular rate and rhythm, no murmurs, rubs, or gallops  LUNGS:  Clear to auscultation eleazar,  no crackles, wheezing, or rhonchi  ABD: Soft, nontender, nondistended, +BS  Ext: R heel pressure wound, covered with gauze.   Nerv:  A&Ox2, left hemiparesis.  RUE tremor.   Vascular Access: AVF in LUE, with palpable thrill.     LABS:  07-06    145  |  116<H>  |  106<H>  ----------------------------<  134<H>  4.4   |  13<L>  |  9.22<H>    Ca    9.2      06 Jul 2023 05:05  Phos  3.0     07-06  Mg     2.2     07-06    TPro  7.4  /  Alb  3.1<L>  /  TBili  0.2  /  DBili      /  AST  12  /  ALT  10  /  AlkPhos  73  07-06                          8.3    25.35 )-----------( 442      ( 06 Jul 2023 05:05 )             27.3       Urine Studies:  Creatinine Trend: 9.22<--, 9.29<--, 8.84<--, 9.75<--  Urinalysis Basic - ( 06 Jul 2023 05:05 )    Color:  / Appearance:  / SG:  / pH:   Gluc: 134 mg/dL / Ketone:   / Bili:  / Urobili:    Blood:  / Protein:  / Nitrite:    Leuk Esterase:  / RBC:  / WBC    Sq Epi:  / Non Sq Epi:  / Bacteria:       Sodium, Random Urine: 47 mmol/L (07-05 @ 23:05)  Osmolality, Random Urine: 357 mosm/kg (07-05 @ 23:05)  Creatinine, Random Urine: 100 mg/dL (07-05 @ 23:05)    RADIOLOGY & ADDITIONAL STUDIES:  As described by previous progress note.  Patient is a 57y Male, admitted with starvation ketoacidosis. Septic, gas gangrene in the LEs, suspected OM, being followed by ID and Sx, Vasc sx. Hemodynamically stable.       Allergies:  No Known Allergies    Hospital Medications:   MEDICATIONS  (STANDING):  aspirin  chewable 81 milliGRAM(s) Oral daily  atorvastatin 40 milliGRAM(s) Oral at bedtime  chlorhexidine 2% Cloths 1 Application(s) Topical <User Schedule>  clindamycin IVPB 900 milliGRAM(s) IV Intermittent every 8 hours  clopidogrel Tablet 75 milliGRAM(s) Oral daily  dextrose 5% 1000 milliLiter(s) (100 mL/Hr) IV Continuous <Continuous>  dextrose 5%. 1000 milliLiter(s) (50 mL/Hr) IV Continuous <Continuous>  dextrose 5%. 1000 milliLiter(s) (100 mL/Hr) IV Continuous <Continuous>  dextrose 50% Injectable 25 Gram(s) IV Push once  dextrose 50% Injectable 25 Gram(s) IV Push once  dextrose 50% Injectable 12.5 Gram(s) IV Push once  glucagon  Injectable 1 milliGRAM(s) IntraMuscular once  heparin   Injectable 5000 Unit(s) SubCutaneous every 8 hours  insulin lispro (ADMELOG) corrective regimen sliding scale   SubCutaneous three times a day before meals  insulin regular Infusion 5 Unit(s)/Hr (5 mL/Hr) IV Continuous <Continuous>  modafinil 100 milliGRAM(s) Oral daily  piperacillin/tazobactam IVPB.. 4.5 Gram(s) IV Intermittent every 12 hours  piperacillin/tazobactam IVPB.. 4.5 Gram(s) IV Intermittent once  tamsulosin 0.4 milliGRAM(s) Oral at bedtime    REVIEW OF SYSTEMS:  All other review of systems is negative unless indicated above.    PMHx: as above    Surgical Hx: Fistula    Family hx: Noncontributory to current admission    Social Hx: no Smoking, IVDU, EtOH    VITALS:  T(F): 99.4 (07-06-23 @ 15:30), Max: 99.9 (07-05-23 @ 22:00)  HR: 111 (07-06-23 @ 16:18)  BP: 154/74 (07-06-23 @ 16:18)  RR: 26 (07-06-23 @ 16:18)  SpO2: 100% (07-06-23 @ 16:18)  Wt(kg): --    07-05 @ 07:01  -  07-06 @ 07:00  --------------------------------------------------------  IN: 2049 mL / OUT: 1020 mL / NET: 1029 mL    07-06 @ 07:01  -  07-06 @ 17:29  --------------------------------------------------------  IN: 430 mL / OUT: 450 mL / NET: -20 mL      Height (cm): 177.8 (07-06 @ 16:18)  Weight (kg): 87.5 (07-06 @ 16:18)  BMI (kg/m2): 27.7 (07-06 @ 16:18)  BSA (m2): 2.06 (07-06 @ 16:18)    PHYSICAL EXAM:  GENERAL: NAD, lying in bed, cachectic   EYES: conjunctiva  clear  ENT: dry mucous membranes  NECK: Supple, no JVD  HEART: Regular rate and rhythm, no murmurs, rubs, or gallops  LUNGS:  Clear to auscultation eleazar,  no crackles, wheezing, or rhonchi  ABD: Soft, nontender, nondistended, +BS  Ext: R heel pressure wound, covered with gauze.   Nerv:  A&Ox2, left hemiparesis.  RUE tremor.   Vascular Access: AVF in LUE, with palpable thrill.     LABS:  07-06    145  |  116<H>  |  106<H>  ----------------------------<  134<H>  4.4   |  13<L>  |  9.22<H>    Ca    9.2      06 Jul 2023 05:05  Phos  3.0     07-06  Mg     2.2     07-06    TPro  7.4  /  Alb  3.1<L>  /  TBili  0.2  /  DBili      /  AST  12  /  ALT  10  /  AlkPhos  73  07-06                          8.3    25.35 )-----------( 442      ( 06 Jul 2023 05:05 )             27.3       Urine Studies:  Creatinine Trend: 9.22<--, 9.29<--, 8.84<--, 9.75<--  Urinalysis Basic - ( 06 Jul 2023 05:05 )    Color:  / Appearance:  / SG:  / pH:   Gluc: 134 mg/dL / Ketone:   / Bili:  / Urobili:    Blood:  / Protein:  / Nitrite:    Leuk Esterase:  / RBC:  / WBC    Sq Epi:  / Non Sq Epi:  / Bacteria:       Sodium, Random Urine: 47 mmol/L (07-05 @ 23:05)  Osmolality, Random Urine: 357 mosm/kg (07-05 @ 23:05)  Creatinine, Random Urine: 100 mg/dL (07-05 @ 23:05)    RADIOLOGY & ADDITIONAL STUDIES:  As described by previous progress note.

## 2023-07-07 NOTE — PATIENT PROFILE ADULT - FALL HARM RISK - HARM RISK INTERVENTIONS

## 2023-07-07 NOTE — PROVIDER CONTACT NOTE (OTHER) - ASSESSMENT
Pt not following commands, not moving extremities. Not opening eyes or speaking. Right pupil irregular and sluggish. Left pupil 1 and nonreactive. Confirmed decreased LOC with RN from day shift. HR 99, /67, MAP 90. Sp02 100 on room air. Blood glucose 299.

## 2023-07-07 NOTE — EEG REPORT - NS EEG TEXT BOX
[facility] Department of Neurology  Inpatient Continuous video-Electroencephalogram    Patient Name:	EDEN DACOSTA    :	1965  MRN:	[id number]    Study Start Date/Time:  2023, 9:26:16 PM  Study End Date/Time:    Referred by: Casandra San MD    Brief Clinical History:  EDEN DACOSTA is a 57 year old Male; study performed to investigate for seizures or markers of epilepsy.    Diagnosis Code:   R56.9 convulsions/seizure  The live video was: unmonitored.    Pertinent Medications:  n/a    Acquisition Details:  Electroencephalography was acquired using a minimum of 21 channels on an [employer] system v 9.3.1 with electrode placement according to the standard International 10-20 system following ACNS (American Clinical Neurophysiology Society) guidelines for Long-Term Video EEG monitoring.  Anterior temporal T1 and T2 electrodes were utilized whenever possible.   The [employer] automated spike & seizure detections were all reviewed in detail, in addition to extensive portions of raw EEG.      Day 1: 2023 @ 9:26:16 PM to next morning @ 07:00 am  Background:  continuous, with predominantly alpha and beta frequencies.  Symmetry:  No persistent asymmetries of voltage or frequency.  Posterior Dominant Rhythm:  7.5 Hz symmetric, well-organized, and poorly-modulated.  Organization: Appropriate anterior-posterior gradient.  Voltage:  Normal (20+ uV)  Variability: Yes. 		Reactivity: Yes.  N2 sleep: Symmetric, synchronous spindles and K complexes.  Spontaneous Activity:  No epileptiform discharges.  Periodic/rhythmic activity:  None  Events:  No electrographic seizures or significant clinical events.  Provocations:  Hyperventilation and Photic stimulation: was not performed.    Daily Summary:    Continuous Mild generalized   slowing suggestive of a Mild degree of diffuse or multifocal  dysfunction.  No epileptiform activity and no significant clinical events occurred.    Manasa Soria MD  Attending Neurologist, [facility]

## 2023-07-07 NOTE — CONSULT NOTE ADULT - ASSESSMENT
57M with PMHx of DM2, CVA x 2 w/ residual L sided weakness, early onset dementia, HTN, HLD, CKD V, BPH and PSHx of L toe amputation who presented to [facility] on 7/5 from nursing home for hyperglycemia who was found to have infected likely diabetic toe wound with gas-producing organism. 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 CT revealed gas tracking along achilles sheath and vascular surgery was consulted for surgical evaluation. 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 Transferred to SICU from MICU post operatively for post operative care.     Neuro: A&Ox2 at baseline, possible early onset dementia. Pain/nausea control PRN with dilaudid, tylenol, zofran  CV: Hx of CVA with residual L sided weakness - continue home ASA 81, Plavix 75. Hx of HTN - conitnue home nifedipine. Hx of HLD - continue home atorvastatin. Most recent echo 10/22 - LVH present with EF 50% with mild TR  Pulm: saturating well on RA  GI: DASH-Puree diet  FEN/: Hx of CKD V - unknown baseline Cr. Profound ALEXIS with Cr 8.12 - renal recommendations appreciated. Hx of BPH - continue modafinil  ID: OR Cx growing Morganella, E. coli, Proteus, Strep. Zosyn (7/6 - ), Clindamycin (7/6 - ), s/p Vanc (7/6), MRSA negative  Endo: mISS. Lantus 10 qhs. Given 20 NPH today. Monitor FS.  Heme/PPx: SQH/SCD  Lines:  Wounds: R BKA - Xeroform, Kerlix, ACE wrap  PT/OT: Ordered  Dispo: SICU   57M with PMHx of DM2, 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 Royal) who presented [facility] 7/[facility] from nursing home for hyperglycemia who was found to have infected likely diabetic toe wound with gas-producing organism. 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 CT revealed gas tracking along achilles sheath and vascular surgery was consulted for surgical evaluation. 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 Transferred to SICU from MICU post operatively for post operative care.     Neuro: A&Ox2 at baseline, possible early onset dementia. Pain/nausea control PRN with dilaudid, tylenol, zofran  CV: Hx of CVA with residual L sided weakness - continue home ASA 81, Plavix 75. Hx of HTN - continue home nifedipine. Hx of HLD - continue home atorvastatin. Most recent echo 10/22 - LVH present with EF 50% with mild TR  Pulm: saturating well on RA  GI: DASH-Puree diet  FEN/: Hx of CKD V - baseline Cr 7-8.  Profound ALEXIS with Cr 8.12 - renal recommendations appreciated. Hx of BPH - continue modafinil  ID: OR Cx growing Morganella, E. coli, Proteus, Strep. Zosyn (7/6 - ), Clindamycin (7/6 - ), s/p Vanc (7/6), MRSA negative  Endo: mISS. Lantus 10 qhs. Given 20 NPH today. Monitor FS.  Heme/PPx: SQH/SCD  Lines:  Wounds: R BKA - Xeroform, Kerlix, ACE wrap  PT/OT: Ordered  Dispo: SICU   57M with PMHx of DM2, 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 Royal) who presented to [facility] on 7/5 from nursing home for hyperglycemia who was found to have infected likely diabetic toe wound with gas-producing organism. 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 CT revealed gas tracking along achilles sheath and vascular surgery was consulted for surgical evaluation. 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 Transferred to SICU from MICU post operatively for post operative care.     Neuro: A&Ox2 at baseline, possible early onset dementia. Pain control: Tylenol and Oxycodone PO. Nausea control: Zofran  CV: Hx of CVA with residual L sided weakness - continue home ASA 81, Plavix 75. Hx of HTN - continue home nifedipine. Hx of HLD - continue home atorvastatin. Most recent echo 10/22 - LVH present with EF 50% with mild TR  Pulm: saturating well on RA  GI: DASH-Puree diet  FEN/: Hx of CKD V - baseline Cr 7-8.  Most recent Cr 8.12 - renal recommendations appreciated. Hx of BPH - continue modafinil  ID: OR Cx growing Morganella, E. coli, Proteus, Strep. Continue Zosyn (7/6 - ), Clindamycin (7/6 - 7/7), Vanc (7/6 - ) - next trough 7/8 PM, MRSA negative  Endo: mISS. Lantus 10 qhs. Given 20 NPH today. Monitor FS.  Heme/PPx: SQH/SCD  Lines:  Wounds: R BKA - Xeroform, Kerlix, ACE wrap  PT/OT: Ordered  Dispo: SICU

## 2023-07-07 NOTE — PATIENT PROFILE ADULT - NSPROPTRIGHTSUPPORTPHONE_GEN_A_NUR
2:46 pm  Spoke to patient. Verified with two patient identifiers. Informed pt that medication was ready for  and was ready on 11/06/2018. Pt also was curious about her labs. Writer confirmed to pt that fasting 12 hrs will need to be done before her appointment for the lab. Patient verbalized understanding of information, and has no further questions at this time. 2:40 pm, Spoke to pharmacist.  Verified prescription is ready for . Pharmacy verified rx was ready for pick on 11/06/2018. Pharmacy didn't notify patient that medication was ready for . Pharmacy staff stated patient's aren't notified when rx are ready for .
[phone]

## 2023-07-08 LAB
-  AMPICILLIN/SULBACTAM: SIGNIFICANT CHANGE UP
-  AMPICILLIN/SULBACTAM: SIGNIFICANT CHANGE UP
-  AMPICILLIN: SIGNIFICANT CHANGE UP
-  AMPICILLIN: SIGNIFICANT CHANGE UP
-  CEFAZOLIN: SIGNIFICANT CHANGE UP
-  CEFAZOLIN: SIGNIFICANT CHANGE UP
-  CEFEPIME: SIGNIFICANT CHANGE UP
-  CEFTRIAXONE: SIGNIFICANT CHANGE UP
-  CEFTRIAXONE: SIGNIFICANT CHANGE UP
-  CIPROFLOXACIN: SIGNIFICANT CHANGE UP
-  CIPROFLOXACIN: SIGNIFICANT CHANGE UP
-  ERTAPENEM: SIGNIFICANT CHANGE UP
-  ERTAPENEM: SIGNIFICANT CHANGE UP
-  GENTAMICIN: SIGNIFICANT CHANGE UP
-  GENTAMICIN: SIGNIFICANT CHANGE UP
-  PIPERACILLIN/TAZOBACTAM: SIGNIFICANT CHANGE UP
-  PIPERACILLIN/TAZOBACTAM: SIGNIFICANT CHANGE UP
-  TOBRAMYCIN: SIGNIFICANT CHANGE UP
-  TOBRAMYCIN: SIGNIFICANT CHANGE UP
-  TRIMETHOPRIM/SULFAMETHOXAZOLE: SIGNIFICANT CHANGE UP
-  TRIMETHOPRIM/SULFAMETHOXAZOLE: SIGNIFICANT CHANGE UP
ANION GAP SERPL CALC-SCNC: 17 MMOL/L — SIGNIFICANT CHANGE UP (ref 5–17)
ANION GAP SERPL CALC-SCNC: 17 MMOL/L — SIGNIFICANT CHANGE UP (ref 5–17)
BUN SERPL-MCNC: 85 MG/DL — HIGH (ref 7–23)
BUN SERPL-MCNC: 91 MG/DL — HIGH (ref 7–23)
CALCIUM SERPL-MCNC: 8.2 MG/DL — LOW (ref 8.4–10.5)
CALCIUM SERPL-MCNC: 8.4 MG/DL — SIGNIFICANT CHANGE UP (ref 8.4–10.5)
CHLORIDE SERPL-SCNC: 105 MMOL/L — SIGNIFICANT CHANGE UP (ref 96–108)
CHLORIDE SERPL-SCNC: 109 MMOL/L — HIGH (ref 96–108)
CO2 SERPL-SCNC: 22 MMOL/L — SIGNIFICANT CHANGE UP (ref 22–31)
CO2 SERPL-SCNC: 23 MMOL/L — SIGNIFICANT CHANGE UP (ref 22–31)
CREAT SERPL-MCNC: 7.79 MG/DL — HIGH (ref 0.5–1.3)
CREAT SERPL-MCNC: 7.81 MG/DL — HIGH (ref 0.5–1.3)
EGFR: 7 ML/MIN/1.73M2 — LOW
EGFR: 7 ML/MIN/1.73M2 — LOW
GLUCOSE BLDC GLUCOMTR-MCNC: 119 MG/DL — HIGH (ref 70–99)
GLUCOSE BLDC GLUCOMTR-MCNC: 121 MG/DL — HIGH (ref 70–99)
GLUCOSE BLDC GLUCOMTR-MCNC: 124 MG/DL — HIGH (ref 70–99)
GLUCOSE BLDC GLUCOMTR-MCNC: 77 MG/DL — SIGNIFICANT CHANGE UP (ref 70–99)
GLUCOSE SERPL-MCNC: 133 MG/DL — HIGH (ref 70–99)
GLUCOSE SERPL-MCNC: 76 MG/DL — SIGNIFICANT CHANGE UP (ref 70–99)
HCT VFR BLD CALC: 24.4 % — LOW (ref 39–50)
HGB BLD-MCNC: 7.9 G/DL — LOW (ref 13–17)
MAGNESIUM SERPL-MCNC: 1.7 MG/DL — SIGNIFICANT CHANGE UP (ref 1.6–2.6)
MCHC RBC-ENTMCNC: 28 PG — SIGNIFICANT CHANGE UP (ref 27–34)
MCHC RBC-ENTMCNC: 32.4 GM/DL — SIGNIFICANT CHANGE UP (ref 32–36)
MCV RBC AUTO: 86.5 FL — SIGNIFICANT CHANGE UP (ref 80–100)
METHOD TYPE: SIGNIFICANT CHANGE UP
NRBC # BLD: 0 /100 WBCS — SIGNIFICANT CHANGE UP (ref 0–0)
PHOSPHATE SERPL-MCNC: 3.6 MG/DL — SIGNIFICANT CHANGE UP (ref 2.5–4.5)
PLATELET # BLD AUTO: 446 K/UL — HIGH (ref 150–400)
POTASSIUM SERPL-MCNC: 3 MMOL/L — LOW (ref 3.5–5.3)
POTASSIUM SERPL-MCNC: 3.3 MMOL/L — LOW (ref 3.5–5.3)
POTASSIUM SERPL-SCNC: 3 MMOL/L — LOW (ref 3.5–5.3)
POTASSIUM SERPL-SCNC: 3.3 MMOL/L — LOW (ref 3.5–5.3)
RBC # BLD: 2.82 M/UL — LOW (ref 4.2–5.8)
RBC # FLD: 15.3 % — HIGH (ref 10.3–14.5)
SODIUM SERPL-SCNC: 144 MMOL/L — SIGNIFICANT CHANGE UP (ref 135–145)
SODIUM SERPL-SCNC: 149 MMOL/L — HIGH (ref 135–145)
VANCOMYCIN TROUGH SERPL-MCNC: 19.2 UG/ML — SIGNIFICANT CHANGE UP (ref 10–20)
WBC # BLD: 13.93 K/UL — HIGH (ref 3.8–10.5)
WBC # FLD AUTO: 13.93 K/UL — HIGH (ref 3.8–10.5)

## 2023-07-08 PROCEDURE — 95720 EEG PHY/QHP EA INCR W/VEEG: CPT

## 2023-07-08 PROCEDURE — 99232 SBSQ HOSP IP/OBS MODERATE 35: CPT

## 2023-07-08 PROCEDURE — 93971 EXTREMITY STUDY: CPT | Mod: 26,LT

## 2023-07-08 PROCEDURE — 99233 SBSQ HOSP IP/OBS HIGH 50: CPT | Mod: GC

## 2023-07-08 RX ORDER — HYDRALAZINE HCL 50 MG
10 TABLET ORAL ONCE
Refills: 0 | Status: COMPLETED | OUTPATIENT
Start: 2023-07-08 | End: 2023-07-08

## 2023-07-08 RX ORDER — LABETALOL HCL 100 MG
10 TABLET ORAL ONCE
Refills: 0 | Status: COMPLETED | OUTPATIENT
Start: 2023-07-08 | End: 2023-07-08

## 2023-07-08 RX ORDER — LABETALOL HCL 100 MG
10 TABLET ORAL EVERY 6 HOURS
Refills: 0 | Status: DISCONTINUED | OUTPATIENT
Start: 2023-07-08 | End: 2023-07-09

## 2023-07-08 RX ORDER — POTASSIUM CHLORIDE 20 MEQ
40 PACKET (EA) ORAL ONCE
Refills: 0 | Status: COMPLETED | OUTPATIENT
Start: 2023-07-08 | End: 2023-07-08

## 2023-07-08 RX ORDER — MAGNESIUM SULFATE 500 MG/ML
1 VIAL (ML) INJECTION ONCE
Refills: 0 | Status: COMPLETED | OUTPATIENT
Start: 2023-07-08 | End: 2023-07-08

## 2023-07-08 RX ORDER — DEXTROSE 10 % IN WATER 10 %
1000 INTRAVENOUS SOLUTION INTRAVENOUS
Refills: 0 | Status: DISCONTINUED | OUTPATIENT
Start: 2023-07-08 | End: 2023-07-08

## 2023-07-08 RX ORDER — ONDANSETRON 8 MG/1
4 TABLET, FILM COATED ORAL EVERY 4 HOURS
Refills: 0 | Status: DISCONTINUED | OUTPATIENT
Start: 2023-07-08 | End: 2023-07-18

## 2023-07-08 RX ADMIN — CLOPIDOGREL BISULFATE 75 MILLIGRAM(S): 75 TABLET, FILM COATED ORAL at 11:01

## 2023-07-08 RX ADMIN — Medication 60 MILLIGRAM(S): at 09:27

## 2023-07-08 RX ADMIN — Medication 100 GRAM(S): at 10:41

## 2023-07-08 RX ADMIN — PIPERACILLIN AND TAZOBACTAM 25 GRAM(S): 4; .5 INJECTION, POWDER, LYOPHILIZED, FOR SOLUTION INTRAVENOUS at 18:00

## 2023-07-08 RX ADMIN — HEPARIN SODIUM 5000 UNIT(S): 5000 INJECTION INTRAVENOUS; SUBCUTANEOUS at 21:13

## 2023-07-08 RX ADMIN — Medication 40 MILLIEQUIVALENT(S): at 21:53

## 2023-07-08 RX ADMIN — Medication 40 MILLIEQUIVALENT(S): at 10:41

## 2023-07-08 RX ADMIN — TAMSULOSIN HYDROCHLORIDE 0.4 MILLIGRAM(S): 0.4 CAPSULE ORAL at 21:13

## 2023-07-08 RX ADMIN — Medication 10 MILLIGRAM(S): at 04:18

## 2023-07-08 RX ADMIN — Medication 10 MILLIGRAM(S): at 22:37

## 2023-07-08 RX ADMIN — HEPARIN SODIUM 5000 UNIT(S): 5000 INJECTION INTRAVENOUS; SUBCUTANEOUS at 06:22

## 2023-07-08 RX ADMIN — ATORVASTATIN CALCIUM 40 MILLIGRAM(S): 80 TABLET, FILM COATED ORAL at 21:14

## 2023-07-08 RX ADMIN — Medication 100 MILLILITER(S): at 08:00

## 2023-07-08 RX ADMIN — Medication 10 MILLIGRAM(S): at 01:57

## 2023-07-08 RX ADMIN — Medication 81 MILLIGRAM(S): at 11:01

## 2023-07-08 RX ADMIN — MODAFINIL 100 MILLIGRAM(S): 200 TABLET ORAL at 13:07

## 2023-07-08 RX ADMIN — HEPARIN SODIUM 5000 UNIT(S): 5000 INJECTION INTRAVENOUS; SUBCUTANEOUS at 13:07

## 2023-07-08 RX ADMIN — PIPERACILLIN AND TAZOBACTAM 25 GRAM(S): 4; .5 INJECTION, POWDER, LYOPHILIZED, FOR SOLUTION INTRAVENOUS at 06:21

## 2023-07-08 RX ADMIN — Medication 10 MILLIGRAM(S): at 21:14

## 2023-07-08 RX ADMIN — Medication 30 MILLILITER(S): at 08:00

## 2023-07-08 RX ADMIN — Medication 10 MILLIGRAM(S): at 00:29

## 2023-07-08 RX ADMIN — CHLORHEXIDINE GLUCONATE 1 APPLICATION(S): 213 SOLUTION TOPICAL at 07:05

## 2023-07-08 RX ADMIN — ONDANSETRON 4 MILLIGRAM(S): 8 TABLET, FILM COATED ORAL at 18:00

## 2023-07-08 NOTE — PROGRESS NOTE ADULT - SUBJECTIVE AND OBJECTIVE BOX
DAILY PROGRESS NOTE    S:  Patient seen at bedside today. R BKA dressing showed minimal strikethrough. Patient is retaining and was straight cath x2 ON. Patient received 2x labetalol and 1x hydralazine ON. Continues to be hypertensive in the 1400s-160s systolic. Otherwise he is AOx2 with no acute complaints. Denied pain and discomfort, sob, fever, nausea.  O:   T(C): 37 (07-08-23 @ 09:00), Max: 37.7 (07-08-23 @ 05:00)  HR: 90 (07-08-23 @ 09:00) (83 - 104)  BP: 144/82 (07-08-23 @ 09:00) (128/82 - 188/93)  RR: 14 (07-08-23 @ 09:00) (12 - 24)  SpO2: 100% (07-08-23 @ 09:00) (97% - 100%)    Physical Exam:     PHYSICAL EXAM:  General: Frail appearing, chronically ill appearing gentleman. vEEG in place.   CV: Tachycardic, regular. Warm and perfused.   Pulm: On room air.   Abd: Soft/nontender/nondistended.   Extremity: RLE w/ OR dressing in place w/minimal strikethrough        07 Jul 2023 07:01  -  08 Jul 2023 07:00  --------------------------------------------------------  IN:    dextrose 10%: 30 mL    IV PiggyBack: 175 mL    Oral Fluid: 90 mL    Sterile Water (w/ Additives): 2100 mL  Total IN: 2395 mL    OUT:    Intermittent Catheterization - Urethral (mL): 1750 mL  Total OUT: 1750 mL    Total NET: 645 mL      08 Jul 2023 07:01  -  08 Jul 2023 11:02  --------------------------------------------------------  IN:    dextrose 10%: 150 mL    IV PiggyBack: 150 mL    Sterile Water (w/ Additives): 100 mL  Total IN: 400 mL    OUT:    Indwelling Catheter - Urethral (mL): 550 mL  Total OUT: 550 mL    Total NET: -150 mL      Labs:                           7.9    13.93 )-----------( 446      ( 08 Jul 2023 05:30 )             24.4     07-08    144  |  105  |  91<H>  ----------------------------<  76  3.0<L>   |  22  |  7.81<H>    Ca    8.2<L>      08 Jul 2023 05:30  Phos  3.6     07-08  Mg     1.7     07-08    TPro  6.4  /  Alb  2.8<L>  /  TBili  0.2  /  DBili  x   /  AST  11  /  ALT  9<L>  /  AlkPhos  59  07-07        Rads:     A/P:   : 57M with PMHx of DM2, 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 Royal) who presented to [facility] on 7/5 from nursing home for hyperglycemia who was found to have infected likely diabetic toe wound with gas-producing organism. 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 CT revealed gas tracking along achilles sheath and vascular surgery was consulted for surgical evaluation. Patient is POD2 s/p guillotine L BKA (7/8) and in sicu for postoperative and hyperglycemia management.  -F/U Duplex UE read  -F/U w/nephrology for reccs  -Continue ABX per ID reccs  -If retaining on next bladder scan, please start puckett catether

## 2023-07-08 NOTE — PROGRESS NOTE ADULT - SUBJECTIVE AND OBJECTIVE BOX
INTERVAL HPI/OVERNIGHT EVENTS: ZULY.    CONSTITUTIONAL:  Negative fever or chills, feels well, good appetite  EYES:  Negative  blurry vision or double vision  CARDIOVASCULAR:  Negative for chest pain or palpitations  RESPIRATORY:  Negative for cough, wheezing, or SOB   GASTROINTESTINAL:  Negative for nausea, vomiting, diarrhea, constipation, or abdominal pain  GENITOURINARY:  Negative frequency, urgency or dysuria  NEUROLOGIC:  No headache, confusion, dizziness, lightheadedness      ANTIBIOTICS/RELEVANT:    MEDICATIONS  (STANDING):  aspirin  chewable 81 milliGRAM(s) Oral daily  atorvastatin 40 milliGRAM(s) Oral at bedtime  chlorhexidine 2% Cloths 1 Application(s) Topical <User Schedule>  clopidogrel Tablet 75 milliGRAM(s) Oral daily  dextrose 10%. 1000 milliLiter(s) (30 mL/Hr) IV Continuous <Continuous>  dextrose 5%. 1000 milliLiter(s) (50 mL/Hr) IV Continuous <Continuous>  dextrose 5%. 1000 milliLiter(s) (100 mL/Hr) IV Continuous <Continuous>  dextrose 50% Injectable 25 Gram(s) IV Push once  dextrose 50% Injectable 12.5 Gram(s) IV Push once  dextrose 50% Injectable 25 Gram(s) IV Push once  glucagon  Injectable 1 milliGRAM(s) IntraMuscular once  heparin   Injectable 5000 Unit(s) SubCutaneous every 8 hours  insulin lispro (ADMELOG) corrective regimen sliding scale   SubCutaneous Before meals and at bedtime  modafinil 100 milliGRAM(s) Oral daily  NIFEdipine XL 60 milliGRAM(s) Oral every 24 hours  ondansetron Injectable 4 milliGRAM(s) IV Push every 4 hours  piperacillin/tazobactam IVPB.. 4.5 Gram(s) IV Intermittent every 12 hours  tamsulosin 0.4 milliGRAM(s) Oral at bedtime    MEDICATIONS  (PRN):  acetaminophen     Tablet .. 650 milliGRAM(s) Oral every 4 hours PRN Mild Pain (1 - 3)  dextrose Oral Gel 15 Gram(s) Oral once PRN Blood Glucose LESS THAN 70 milliGRAM(s)/deciliter  HYDROmorphone  Injectable 0.5 milliGRAM(s) IV Push every 2 hours PRN breakthrough pain  labetalol Injectable 10 milliGRAM(s) IV Push every 6 hours PRN Systolic blood pressure >160  oxyCODONE    IR 5 milliGRAM(s) Oral every 4 hours PRN Moderate Pain (4 - 6)  oxyCODONE    IR 10 milliGRAM(s) Oral every 4 hours PRN Severe Pain (7 - 10)        Vital Signs Last 24 Hrs  T(C): 37.1 (08 Jul 2023 12:00), Max: 37.7 (08 Jul 2023 05:00)  T(F): 98.7 (08 Jul 2023 12:00), Max: 99.9 (08 Jul 2023 05:00)  HR: 100 (08 Jul 2023 13:00) (83 - 104)  BP: 138/72 (08 Jul 2023 13:00) (128/82 - 188/93)  BP(mean): 98 (08 Jul 2023 13:00) (88 - 132)  RR: 18 (08 Jul 2023 13:00) (12 - 24)  SpO2: 100% (08 Jul 2023 13:00) (97% - 100%)    Parameters below as of 08 Jul 2023 13:00  Patient On (Oxygen Delivery Method): room air        PHYSICAL EXAM:  Constitutional: NAD  Eyes: ADRIANO, EOMI  Ear/Nose/Throat: no oral lesion, no sinus tenderness on percussion	  Neck: no JVD, no lymphadenopathy, supple  Respiratory: CTA eleazar  Cardiovascular: S1S2 RRR, no murmurs  Gastrointestinal:soft, (+) BS, no HSM  Extremities:no e/e/c  Vascular: DP Pulse:	right normal; left normal      LABS:                        7.9    13.93 )-----------( 446      ( 08 Jul 2023 05:30 )             24.4     07-08    144  |  105  |  91<H>  ----------------------------<  76  3.0<L>   |  22  |  7.81<H>    Ca    8.2<L>      08 Jul 2023 05:30  Phos  3.6     07-08  Mg     1.7     07-08    TPro  6.4  /  Alb  2.8<L>  /  TBili  0.2  /  DBili  x   /  AST  11  /  ALT  9<L>  /  AlkPhos  59  07-07    PT/INR - ( 06 Jul 2023 15:32 )   PT: 13.4 sec;   INR: 1.12          PTT - ( 06 Jul 2023 15:32 )  PTT:29.0 sec  Urinalysis Basic - ( 08 Jul 2023 05:30 )    Color: x / Appearance: x / SG: x / pH: x  Gluc: 76 mg/dL / Ketone: x  / Bili: x / Urobili: x   Blood: x / Protein: x / Nitrite: x   Leuk Esterase: x / RBC: x / WBC x   Sq Epi: x / Non Sq Epi: x / Bacteria: x        MICROBIOLOGY: reviewed    RADIOLOGY & ADDITIONAL STUDIES: reviewed

## 2023-07-08 NOTE — PROGRESS NOTE ADULT - ASSESSMENT
58 yo male with CKD V, DM, p/w DKA 2/2 R foot s/p R foot amputation 7/6 with presumed source control. Bcx ng, afebrile, WBC normalizing. Advise d/c Zosyn after this evening's dose and monitor off antibiotics (i.e. antibiotics do not need to be continued as a bridge to BKA closure).    Please reconsult with ?

## 2023-07-08 NOTE — PROGRESS NOTE ADULT - SUBJECTIVE AND OBJECTIVE BOX
Patient is a 57y Male seen and evaluated at bedside. Laying comfortably in bed. No acute events overnight. Per nursing, pt more withered and interactive today compared to yesterday.      Meds:    acetaminophen     Tablet .. 650 every 4 hours PRN  aspirin  chewable 81 daily  atorvastatin 40 at bedtime  chlorhexidine 2% Cloths 1 <User Schedule>  clopidogrel Tablet 75 daily  dextrose 10%. 1000 <Continuous>  dextrose 5%. 1000 <Continuous>  dextrose 5%. 1000 <Continuous>  dextrose 50% Injectable 12.5 once  dextrose 50% Injectable 25 once  dextrose 50% Injectable 25 once  dextrose Oral Gel 15 once PRN  glucagon  Injectable 1 once  heparin   Injectable 5000 every 8 hours  HYDROmorphone  Injectable 0.5 every 2 hours PRN  insulin lispro (ADMELOG) corrective regimen sliding scale  Before meals and at bedtime  labetalol Injectable 10 every 6 hours PRN  magnesium sulfate  IVPB 1 once  modafinil 100 daily  NIFEdipine XL 60 every 24 hours  ondansetron Injectable 4 every 4 hours  oxyCODONE    IR 10 every 4 hours PRN  oxyCODONE    IR 5 every 4 hours PRN  piperacillin/tazobactam IVPB.. 4.5 every 12 hours  potassium chloride   Powder 40 once  tamsulosin 0.4 at bedtime      T(C): , Max: 37.7 (07-08-23 @ 05:00)  T(F): , Max: 99.9 (07-08-23 @ 05:00)  HR: 90 (07-08-23 @ 09:00)  BP: 144/82 (07-08-23 @ 09:00)  BP(mean): 107 (07-08-23 @ 09:00)  RR: 14 (07-08-23 @ 09:00)  SpO2: 100% (07-08-23 @ 09:00)  Wt(kg): --    07-07 @ 07:01  -  07-08 @ 07:00  --------------------------------------------------------  IN: 2395 mL / OUT: 1750 mL / NET: 645 mL    07-08 @ 07:01  -  07-08 @ 09:57  --------------------------------------------------------  IN: 180 mL / OUT: 0 mL / NET: 180 mL          Review of Systems:  ROS negative except as per HPI      PHYSICAL EXAM:  GENERAL: NAD, lying in bed, cachectic   ENT: dry mucous membranes  HEART: Regular rate and rhythm, no murmurs, rubs, or gallops  LUNGS:  Clear to auscultation eleazar,  no crackles, wheezing, or rhonchi  ABD: Soft, nontender, nondistended, +BS  Ext: R heel pressure wound, covered with gauze.   Nerv:  A&Ox2, left hemiparesis.  RUE tremor.   Vascular Access: AVF in LUE, with palpable thrill.       LABS:                        7.9    13.93 )-----------( 446      ( 08 Jul 2023 05:30 )             24.4     07-08    144  |  105  |  91<H>  ----------------------------<  76  3.0<L>   |  22  |  7.81<H>    Ca    8.2<L>      08 Jul 2023 05:30  Phos  3.6     07-08  Mg     1.7     07-08    TPro  6.4  /  Alb  2.8<L>  /  TBili  0.2  /  DBili  x   /  AST  11  /  ALT  9<L>  /  AlkPhos  59  07-07      PT/INR - ( 06 Jul 2023 15:32 )   PT: 13.4 sec;   INR: 1.12          PTT - ( 06 Jul 2023 15:32 )  PTT:29.0 sec  Urinalysis Basic - ( 08 Jul 2023 05:30 )    Color: x / Appearance: x / SG: x / pH: x  Gluc: 76 mg/dL / Ketone: x  / Bili: x / Urobili: x   Blood: x / Protein: x / Nitrite: x   Leuk Esterase: x / RBC: x / WBC x   Sq Epi: x / Non Sq Epi: x / Bacteria: x            RADIOLOGY & ADDITIONAL STUDIES:

## 2023-07-08 NOTE — PROGRESS NOTE ADULT - ASSESSMENT
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 Royal) who presented to [facility] on 7/5 from nursing home for hyperglycemia who was found to have infected likely diabetic toe wound with gas-producing organism. 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 CT revealed gas tracking along achilles sheath and vascular surgery was consulted for surgical evaluation. Patient is now s/p guillizethine L BKA (7/6). Transferred to SICU from MICU post operatively for post operative care.     Neuro: A&Ox2 at baseline, possible early onset dementia. Pain control: Tylenol and Oxycodone PO. Nausea control: Zofran  CV: Hx of CVA with residual L sided weakness - continue home ASA 81, Plavix 75. Hx of HTN - continue home nifedipine. Hx of HLD - continue home atorvastatin. Most recent echo 10/22 - LVH present with EF 50% with mild TR, Labetalol   Pulm: saturating well on RA  GI: DASH-Puree diet  FEN/: Hx of CKD V - baseline Cr 7-8.  Most recent Cr 8.12 - renal recommendations appreciated. Hx of BPH - continue modafinil  ID: OR Cx growing Morganella, E. coli, Proteus, Strep. Continue Zosyn (7/6 - ), Clindamycin (7/6 - 7/7), Vanc (7/6 - ) - next trough 7/8 PM, MRSA negative  Endo:  s/p Lantus 20 on 7/7 pm - now low lantus held- Monitor FS.  Heme/PPx: SQH/SCD  Lines: PIVs  Wounds: R BKA - Xeroform, Kerlix, ACE wrap  PT/OT: Ordered  Dispo: possible SDU in afternoon if BP well-controlled

## 2023-07-08 NOTE — PROGRESS NOTE ADULT - SUBJECTIVE AND OBJECTIVE BOX
ON: duplex ordered to evaluate AVF. HTN . 10 labetolol, remained HTN - bladder scan 450, straight cathed for 425. Remained HTN. 10 of labetolol given - SBP still > 170. 10 hydralazine given with good response.  this am. . Straight cathed for 450. D10 ordered.     SUBJECTIVE: Patient seen and examined at bedside.    MEDICATIONS  (STANDING):  aspirin  chewable 81 milliGRAM(s) Oral daily  atorvastatin 40 milliGRAM(s) Oral at bedtime  chlorhexidine 2% Cloths 1 Application(s) Topical <User Schedule>  clopidogrel Tablet 75 milliGRAM(s) Oral daily  dextrose 10%. 1000 milliLiter(s) (30 mL/Hr) IV Continuous <Continuous>  dextrose 5%. 1000 milliLiter(s) (50 mL/Hr) IV Continuous <Continuous>  dextrose 5%. 1000 milliLiter(s) (100 mL/Hr) IV Continuous <Continuous>  dextrose 50% Injectable 25 Gram(s) IV Push once  dextrose 50% Injectable 12.5 Gram(s) IV Push once  dextrose 50% Injectable 25 Gram(s) IV Push once  glucagon  Injectable 1 milliGRAM(s) IntraMuscular once  heparin   Injectable 5000 Unit(s) SubCutaneous every 8 hours  insulin lispro (ADMELOG) corrective regimen sliding scale   SubCutaneous Before meals and at bedtime  magnesium sulfate  IVPB 1 Gram(s) IV Intermittent once  modafinil 100 milliGRAM(s) Oral daily  NIFEdipine XL 60 milliGRAM(s) Oral every 24 hours  ondansetron Injectable 4 milliGRAM(s) IV Push every 4 hours  piperacillin/tazobactam IVPB.. 4.5 Gram(s) IV Intermittent every 12 hours  potassium chloride   Powder 40 milliEquivalent(s) Oral once  tamsulosin 0.4 milliGRAM(s) Oral at bedtime    MEDICATIONS  (PRN):  acetaminophen     Tablet .. 650 milliGRAM(s) Oral every 4 hours PRN Mild Pain (1 - 3)  dextrose Oral Gel 15 Gram(s) Oral once PRN Blood Glucose LESS THAN 70 milliGRAM(s)/deciliter  HYDROmorphone  Injectable 0.5 milliGRAM(s) IV Push every 2 hours PRN breakthrough pain  labetalol Injectable 10 milliGRAM(s) IV Push every 6 hours PRN Systolic blood pressure >160  oxyCODONE    IR 10 milliGRAM(s) Oral every 4 hours PRN Severe Pain (7 - 10)  oxyCODONE    IR 5 milliGRAM(s) Oral every 4 hours PRN Moderate Pain (4 - 6)      Drips:     ICU Vital Signs Last 24 Hrs  T(C): 37 (08 Jul 2023 09:00), Max: 37.7 (08 Jul 2023 05:00)  T(F): 98.6 (08 Jul 2023 09:00), Max: 99.9 (08 Jul 2023 05:00)  HR: 90 (08 Jul 2023 09:00) (83 - 104)  BP: 144/82 (08 Jul 2023 09:00) (128/82 - 188/93)  BP(mean): 107 (08 Jul 2023 09:00) (92 - 134)  ABP: --  ABP(mean): --  RR: 14 (08 Jul 2023 09:00) (12 - 24)  SpO2: 100% (08 Jul 2023 09:00) (97% - 100%)    O2 Parameters below as of 08 Jul 2023 08:00  Patient On (Oxygen Delivery Method): room air            Physical Exam:  General: NAD  HEENT: NC/AT, EOMI, PERRLA, normal hearing, no oral lesions, neck supple w/o LAD  Pulmonary: Nonlabored breathing, no respiratory distress, CTA-B  Cardiovascular: NSR, no murmurs  Abdominal: soft, NT/ND, +BS, no organomegaly  Extremities: WWP, 5/5 strength x 4, no clubbing/cyanosis/edema  Neuro: A/O x3, CNs II-XII grossly intact, normal motor/sensation, no focal deficits  Pulses: palpable distal pulses    Lines/tubes/drains:  Borja:	      Vent settings:      I&O's Summary    07 Jul 2023 07:01  -  08 Jul 2023 07:00  --------------------------------------------------------  IN: 2395 mL / OUT: 1750 mL / NET: 645 mL    08 Jul 2023 07:01  -  08 Jul 2023 09:59  --------------------------------------------------------  IN: 180 mL / OUT: 0 mL / NET: 180 mL        LABS:                        7.9    13.93 )-----------( 446      ( 08 Jul 2023 05:30 )             24.4     07-08    144  |  105  |  91<H>  ----------------------------<  76  3.0<L>   |  22  |  7.81<H>    Ca    8.2<L>      08 Jul 2023 05:30  Phos  3.6     07-08  Mg     1.7     07-08    TPro  6.4  /  Alb  2.8<L>  /  TBili  0.2  /  DBili  x   /  AST  11  /  ALT  9<L>  /  AlkPhos  59  07-07    PT/INR - ( 06 Jul 2023 15:32 )   PT: 13.4 sec;   INR: 1.12          PTT - ( 06 Jul 2023 15:32 )  PTT:29.0 sec  Urinalysis Basic - ( 08 Jul 2023 05:30 )    Color: x / Appearance: x / SG: x / pH: x  Gluc: 76 mg/dL / Ketone: x  / Bili: x / Urobili: x   Blood: x / Protein: x / Nitrite: x   Leuk Esterase: x / RBC: x / WBC x   Sq Epi: x / Non Sq Epi: x / Bacteria: x      CAPILLARY BLOOD GLUCOSE      POCT Blood Glucose.: 77 mg/dL (08 Jul 2023 07:09)  POCT Blood Glucose.: 132 mg/dL (07 Jul 2023 21:54)  POCT Blood Glucose.: 146 mg/dL (07 Jul 2023 15:42)  POCT Blood Glucose.: 204 mg/dL (07 Jul 2023 11:32)    LIVER FUNCTIONS - ( 07 Jul 2023 04:56 )  Alb: 2.8 g/dL / Pro: 6.4 g/dL / ALK PHOS: 59 U/L / ALT: 9 U/L / AST: 11 U/L / GGT: x             Cultures:Culture Results:   Moderate Escherichia coli  Moderate Morganella morganii  Rare Proteus mirabilis  Few Streptococcus species  Culture in progress (07-06 @ 18:30)  Culture Results:   Testing in progress (07-06 @ 18:30)      RADIOLOGY & ADDITIONAL STUDIES:     ON: duplex ordered to evaluate AVF. HTN . 10 labetolol, remained HTN - bladder scan 450, straight cathed for 425. Remained HTN. 10 of labetolol given - SBP still > 170. 10 hydralazine given with good response.  this am. . Straight cathed for 450. D10 ordered.     SUBJECTIVE: Patient seen and examined at bedside w/ attending. AOx2, in good spirits, pain is well-controlled, able to kevin PO without n/v. Denies cp, sob.    MEDICATIONS  (STANDING):  aspirin  chewable 81 milliGRAM(s) Oral daily  atorvastatin 40 milliGRAM(s) Oral at bedtime  chlorhexidine 2% Cloths 1 Application(s) Topical <User Schedule>  clopidogrel Tablet 75 milliGRAM(s) Oral daily  dextrose 10%. 1000 milliLiter(s) (30 mL/Hr) IV Continuous <Continuous>  dextrose 5%. 1000 milliLiter(s) (50 mL/Hr) IV Continuous <Continuous>  dextrose 5%. 1000 milliLiter(s) (100 mL/Hr) IV Continuous <Continuous>  dextrose 50% Injectable 25 Gram(s) IV Push once  dextrose 50% Injectable 12.5 Gram(s) IV Push once  dextrose 50% Injectable 25 Gram(s) IV Push once  glucagon  Injectable 1 milliGRAM(s) IntraMuscular once  heparin   Injectable 5000 Unit(s) SubCutaneous every 8 hours  insulin lispro (ADMELOG) corrective regimen sliding scale   SubCutaneous Before meals and at bedtime  magnesium sulfate  IVPB 1 Gram(s) IV Intermittent once  modafinil 100 milliGRAM(s) Oral daily  NIFEdipine XL 60 milliGRAM(s) Oral every 24 hours  ondansetron Injectable 4 milliGRAM(s) IV Push every 4 hours  piperacillin/tazobactam IVPB.. 4.5 Gram(s) IV Intermittent every 12 hours  potassium chloride   Powder 40 milliEquivalent(s) Oral once  tamsulosin 0.4 milliGRAM(s) Oral at bedtime    MEDICATIONS  (PRN):  acetaminophen     Tablet .. 650 milliGRAM(s) Oral every 4 hours PRN Mild Pain (1 - 3)  dextrose Oral Gel 15 Gram(s) Oral once PRN Blood Glucose LESS THAN 70 milliGRAM(s)/deciliter  HYDROmorphone  Injectable 0.5 milliGRAM(s) IV Push every 2 hours PRN breakthrough pain  labetalol Injectable 10 milliGRAM(s) IV Push every 6 hours PRN Systolic blood pressure >160  oxyCODONE    IR 10 milliGRAM(s) Oral every 4 hours PRN Severe Pain (7 - 10)  oxyCODONE    IR 5 milliGRAM(s) Oral every 4 hours PRN Moderate Pain (4 - 6)      Drips:     ICU Vital Signs Last 24 Hrs  T(C): 37 (08 Jul 2023 09:00), Max: 37.7 (08 Jul 2023 05:00)  T(F): 98.6 (08 Jul 2023 09:00), Max: 99.9 (08 Jul 2023 05:00)  HR: 90 (08 Jul 2023 09:00) (83 - 104)  BP: 144/82 (08 Jul 2023 09:00) (128/82 - 188/93)  BP(mean): 107 (08 Jul 2023 09:00) (92 - 134)  ABP: --  ABP(mean): --  RR: 14 (08 Jul 2023 09:00) (12 - 24)  SpO2: 100% (08 Jul 2023 09:00) (97% - 100%)    O2 Parameters below as of 08 Jul 2023 08:00  Patient On (Oxygen Delivery Method): room air            Physical Exam:  General: NAD  HEENT: NC/AT, EOMI, PERRLA, normal hearing, no oral lesions, neck supple w/o LAD  Pulmonary: Nonlabored breathing, no respiratory distress, CTA-B  Cardiovascular: NSR, no murmurs  Abdominal: soft, NT/ND, +BS, no organomegaly  Extremities: WWP, 5/5 strength x 4, no clubbing/edema. RUE AVF w/ palpable thrill. LLE w/ palpable pulses, s/p 4th and 5th toe amp. s/p R BKA wrapped in Kerlex and ACE.  Neuro: A/O x3, CNs II-XII grossly intact, normal motor/sensation, no focal deficits  Pulses: palpable distal pulses        I&O's Summary    07 Jul 2023 07:01  -  08 Jul 2023 07:00  --------------------------------------------------------  IN: 2395 mL / OUT: 1750 mL / NET: 645 mL    08 Jul 2023 07:01  -  08 Jul 2023 09:59  --------------------------------------------------------  IN: 180 mL / OUT: 0 mL / NET: 180 mL        LABS:                        7.9    13.93 )-----------( 446      ( 08 Jul 2023 05:30 )             24.4     07-08    144  |  105  |  91<H>  ----------------------------<  76  3.0<L>   |  22  |  7.81<H>    Ca    8.2<L>      08 Jul 2023 05:30  Phos  3.6     07-08  Mg     1.7     07-08    TPro  6.4  /  Alb  2.8<L>  /  TBili  0.2  /  DBili  x   /  AST  11  /  ALT  9<L>  /  AlkPhos  59  07-07    PT/INR - ( 06 Jul 2023 15:32 )   PT: 13.4 sec;   INR: 1.12          PTT - ( 06 Jul 2023 15:32 )  PTT:29.0 sec  Urinalysis Basic - ( 08 Jul 2023 05:30 )    Color: x / Appearance: x / SG: x / pH: x  Gluc: 76 mg/dL / Ketone: x  / Bili: x / Urobili: x   Blood: x / Protein: x / Nitrite: x   Leuk Esterase: x / RBC: x / WBC x   Sq Epi: x / Non Sq Epi: x / Bacteria: x      CAPILLARY BLOOD GLUCOSE      POCT Blood Glucose.: 77 mg/dL (08 Jul 2023 07:09)  POCT Blood Glucose.: 132 mg/dL (07 Jul 2023 21:54)  POCT Blood Glucose.: 146 mg/dL (07 Jul 2023 15:42)  POCT Blood Glucose.: 204 mg/dL (07 Jul 2023 11:32)    LIVER FUNCTIONS - ( 07 Jul 2023 04:56 )  Alb: 2.8 g/dL / Pro: 6.4 g/dL / ALK PHOS: 59 U/L / ALT: 9 U/L / AST: 11 U/L / GGT: x             Cultures:Culture Results:   Moderate Escherichia coli  Moderate Morganella morganii  Rare Proteus mirabilis  Few Streptococcus species  Culture in progress (07-06 @ 18:30)  Culture Results:   Testing in progress (07-06 @ 18:30)      RADIOLOGY & ADDITIONAL STUDIES:

## 2023-07-08 NOTE — EEG REPORT - NS EEG TEXT BOX
Daily Updates (from 07:00 am until 07:00 am):  Day 2 7/7-7/8/2023:   Background:  continuous, with predominantly alpha and beta frequencies.  Symmetry:  No persistent asymmetries of voltage or frequency.  Posterior Dominant Rhythm:  7.5 Hz symmetric, well-organized, and poorly-modulated.  Organization: Appropriate anterior-posterior gradient.  Voltage:  Normal (20+ uV)  Variability: Yes. 		Reactivity: Yes.  N2 sleep: Symmetric, synchronous spindles and K complexes.  Spontaneous Activity:  No epileptiform discharges.  Periodic/rhythmic activity:  None  Events:  No electrographic seizures or significant clinical events.  Provocations:  Hyperventilation and Photic stimulation: was not performed.    Daily Summary:    Continuous Mild generalized   slowing suggestive of a Mild degree of diffuse or multifocal  dysfunction.  No epileptiform activity and no significant clinical events occurred.    Manasa Soria MD  Attending Neurologist, [facility] [place] [facility] Epilepsy [facility]

## 2023-07-08 NOTE — PROGRESS NOTE ADULT - ASSESSMENT
Casey Shetty is a 56 yo M w/ PMH of CKD V w/ AVF not currently on HD, DM2, HTN, CVA who presents from NH with starvation ketoacidosis and gas gangrene. Acidosis resolved. 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      #Hypovolemic with signs of dehydration during physical exam.   Stable UOP, 1.7L/24 hours though requiring straight cath x 3 (each time w/ 400-800ml UO) during last 24h, net pos 0.6L/24 hours, net pos 3L/admission  c/w IV D10 at 30ml/hr, monitor BS, if running high, switch to D5 or NS    #CKD stage V.  BUN and creatinine are elevated at baseline, no signs of uremia at this point  c/w bladder scan q6h and intermittent sc vs Borja placement   Strict I&O  Daily weights  Avoid Nephrotoxic drugs.   Start NaHCO3 tabs if bicarb drops <22  Daily labs.   No apparent urgent indication for HD at this time, will continue to monitor  Currently followed by ID and Vascular sx.   On broad spectrum abx.    If IV contrast need it please notify Nephro and isabela contrast IVF.   Nephrology team will cont. f/u

## 2023-07-08 NOTE — PROGRESS NOTE ADULT - NUTRITIONAL ASSESSMENT
I:   Stable creatinine. HTN borderline controlled.   A: Stable ALEXIS. Acidosis.   P: No indications for dialysis. Follow SCr. Continue bicarb and BP meds.

## 2023-07-09 LAB
ANION GAP SERPL CALC-SCNC: 14 MMOL/L — SIGNIFICANT CHANGE UP (ref 5–17)
ANION GAP SERPL CALC-SCNC: 16 MMOL/L — SIGNIFICANT CHANGE UP (ref 5–17)
BUN SERPL-MCNC: 82 MG/DL — HIGH (ref 7–23)
BUN SERPL-MCNC: 83 MG/DL — HIGH (ref 7–23)
CALCIUM SERPL-MCNC: 8.2 MG/DL — LOW (ref 8.4–10.5)
CALCIUM SERPL-MCNC: 8.4 MG/DL — SIGNIFICANT CHANGE UP (ref 8.4–10.5)
CHLORIDE SERPL-SCNC: 107 MMOL/L — SIGNIFICANT CHANGE UP (ref 96–108)
CHLORIDE SERPL-SCNC: 110 MMOL/L — HIGH (ref 96–108)
CO2 SERPL-SCNC: 23 MMOL/L — SIGNIFICANT CHANGE UP (ref 22–31)
CO2 SERPL-SCNC: 23 MMOL/L — SIGNIFICANT CHANGE UP (ref 22–31)
CREAT SERPL-MCNC: 7.5 MG/DL — HIGH (ref 0.5–1.3)
CREAT SERPL-MCNC: 8.09 MG/DL — HIGH (ref 0.5–1.3)
EGFR: 7 ML/MIN/1.73M2 — LOW
EGFR: 8 ML/MIN/1.73M2 — LOW
GLUCOSE BLDC GLUCOMTR-MCNC: 103 MG/DL — HIGH (ref 70–99)
GLUCOSE BLDC GLUCOMTR-MCNC: 104 MG/DL — HIGH (ref 70–99)
GLUCOSE BLDC GLUCOMTR-MCNC: 125 MG/DL — HIGH (ref 70–99)
GLUCOSE BLDC GLUCOMTR-MCNC: 129 MG/DL — HIGH (ref 70–99)
GLUCOSE BLDC GLUCOMTR-MCNC: 179 MG/DL — HIGH (ref 70–99)
GLUCOSE SERPL-MCNC: 92 MG/DL — SIGNIFICANT CHANGE UP (ref 70–99)
GLUCOSE SERPL-MCNC: 97 MG/DL — SIGNIFICANT CHANGE UP (ref 70–99)
HCT VFR BLD CALC: 25.1 % — LOW (ref 39–50)
HGB BLD-MCNC: 7.9 G/DL — LOW (ref 13–17)
MAGNESIUM SERPL-MCNC: 1.9 MG/DL — SIGNIFICANT CHANGE UP (ref 1.6–2.6)
MCHC RBC-ENTMCNC: 27.7 PG — SIGNIFICANT CHANGE UP (ref 27–34)
MCHC RBC-ENTMCNC: 31.5 GM/DL — LOW (ref 32–36)
MCV RBC AUTO: 88.1 FL — SIGNIFICANT CHANGE UP (ref 80–100)
NRBC # BLD: 0 /100 WBCS — SIGNIFICANT CHANGE UP (ref 0–0)
PHOSPHATE SERPL-MCNC: 4.3 MG/DL — SIGNIFICANT CHANGE UP (ref 2.5–4.5)
PLATELET # BLD AUTO: 402 K/UL — HIGH (ref 150–400)
POTASSIUM SERPL-MCNC: 3.4 MMOL/L — LOW (ref 3.5–5.3)
POTASSIUM SERPL-MCNC: 4 MMOL/L — SIGNIFICANT CHANGE UP (ref 3.5–5.3)
POTASSIUM SERPL-SCNC: 3.4 MMOL/L — LOW (ref 3.5–5.3)
POTASSIUM SERPL-SCNC: 4 MMOL/L — SIGNIFICANT CHANGE UP (ref 3.5–5.3)
RBC # BLD: 2.85 M/UL — LOW (ref 4.2–5.8)
RBC # FLD: 15.6 % — HIGH (ref 10.3–14.5)
SODIUM SERPL-SCNC: 144 MMOL/L — SIGNIFICANT CHANGE UP (ref 135–145)
SODIUM SERPL-SCNC: 149 MMOL/L — HIGH (ref 135–145)
WBC # BLD: 11.25 K/UL — HIGH (ref 3.8–10.5)
WBC # FLD AUTO: 11.25 K/UL — HIGH (ref 3.8–10.5)

## 2023-07-09 PROCEDURE — 99255 IP/OBS CONSLTJ NEW/EST HI 80: CPT

## 2023-07-09 PROCEDURE — 95720 EEG PHY/QHP EA INCR W/VEEG: CPT

## 2023-07-09 PROCEDURE — 99233 SBSQ HOSP IP/OBS HIGH 50: CPT | Mod: GC

## 2023-07-09 RX ORDER — HYDRALAZINE HCL 50 MG
25 TABLET ORAL THREE TIMES A DAY
Refills: 0 | Status: DISCONTINUED | OUTPATIENT
Start: 2023-07-09 | End: 2023-07-18

## 2023-07-09 RX ORDER — NIFEDIPINE 30 MG
90 TABLET, EXTENDED RELEASE 24 HR ORAL DAILY
Refills: 0 | Status: DISCONTINUED | OUTPATIENT
Start: 2023-07-09 | End: 2023-07-10

## 2023-07-09 RX ORDER — POTASSIUM CHLORIDE 20 MEQ
40 PACKET (EA) ORAL ONCE
Refills: 0 | Status: DISCONTINUED | OUTPATIENT
Start: 2023-07-09 | End: 2023-07-09

## 2023-07-09 RX ORDER — POTASSIUM CHLORIDE 20 MEQ
40 PACKET (EA) ORAL ONCE
Refills: 0 | Status: COMPLETED | OUTPATIENT
Start: 2023-07-09 | End: 2023-07-09

## 2023-07-09 RX ADMIN — Medication 40 MILLIEQUIVALENT(S): at 08:29

## 2023-07-09 RX ADMIN — HEPARIN SODIUM 5000 UNIT(S): 5000 INJECTION INTRAVENOUS; SUBCUTANEOUS at 06:48

## 2023-07-09 RX ADMIN — PIPERACILLIN AND TAZOBACTAM 25 GRAM(S): 4; .5 INJECTION, POWDER, LYOPHILIZED, FOR SOLUTION INTRAVENOUS at 17:10

## 2023-07-09 RX ADMIN — CLOPIDOGREL BISULFATE 75 MILLIGRAM(S): 75 TABLET, FILM COATED ORAL at 12:09

## 2023-07-09 RX ADMIN — MODAFINIL 100 MILLIGRAM(S): 200 TABLET ORAL at 17:10

## 2023-07-09 RX ADMIN — PIPERACILLIN AND TAZOBACTAM 25 GRAM(S): 4; .5 INJECTION, POWDER, LYOPHILIZED, FOR SOLUTION INTRAVENOUS at 06:48

## 2023-07-09 RX ADMIN — CHLORHEXIDINE GLUCONATE 1 APPLICATION(S): 213 SOLUTION TOPICAL at 07:54

## 2023-07-09 RX ADMIN — TAMSULOSIN HYDROCHLORIDE 0.4 MILLIGRAM(S): 0.4 CAPSULE ORAL at 22:56

## 2023-07-09 RX ADMIN — ATORVASTATIN CALCIUM 40 MILLIGRAM(S): 80 TABLET, FILM COATED ORAL at 22:56

## 2023-07-09 RX ADMIN — Medication 90 MILLIGRAM(S): at 15:26

## 2023-07-09 RX ADMIN — Medication 2: at 22:57

## 2023-07-09 RX ADMIN — Medication 25 MILLIGRAM(S): at 22:56

## 2023-07-09 RX ADMIN — Medication 25 MILLIGRAM(S): at 12:09

## 2023-07-09 RX ADMIN — HEPARIN SODIUM 5000 UNIT(S): 5000 INJECTION INTRAVENOUS; SUBCUTANEOUS at 15:26

## 2023-07-09 RX ADMIN — Medication 81 MILLIGRAM(S): at 12:09

## 2023-07-09 RX ADMIN — HEPARIN SODIUM 5000 UNIT(S): 5000 INJECTION INTRAVENOUS; SUBCUTANEOUS at 22:55

## 2023-07-09 NOTE — PROGRESS NOTE ADULT - ASSESSMENT
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 Royal) who presented to [facility] on 7/5 from nursing home for hyperglycemia who was found to have infected likely diabetic toe wound with gas-producing organism. 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 CT revealed gas tracking along achilles sheath and vascular surgery was consulted for surgical evaluation. Patient is POD2 s/p guillotine R BKA (7/8) transferred to floor from SICU overnight     Vascular/PAD:  -  L foot necrotizing fasciitis s/p R BKA 7/8  - Dressing change today R BKA - WTD gauze, Kerlix, ACE wrap  - Plan to RTOR 7/11 for closure R BKA    Neuro  - A&Ox2 at baseline, possible early onset dementia.  - Hx of CVA with residual L sided weakness   - F/U neurology   - D/C vEEG monitoring today    HTN/HLD:  - continue home nifedipine. Labetalol/Hydralazine push PRN for hypertension >170s  - Appreciate medicine recs for improved BP control  - continue home atorvastatin.   - Most recent echo 10/22 - LVH present with EF 50% with mild TR,     CAD/CHF:   - continue home ASA 81, Plavix 75.     Diet/DM:  - Appreciate medicine recs for DM amanagement  - Pureed DM diet  - mISS  - Consider resuming home Lantus 20 U nightly, currently held  - Monitor FS.    CKD stage V:   - baseline Cr 7-8.  Most recent Cr 8.09  - Appreciate renal recommendations  - No apparent urgent indication for HD at this time, will continue to monitor  - Strict I&O  - Daily weights  - Avoid Nephrotoxic drugs.   - Start NaHCO3 tabs if bicarb drops <22   - Maintain Borja in place at this time as required multiple straight caths prior to placement    Anemia:   - Trend daily labs  - Maintain active T/S for OR    ID:  - Appreciate ID recommendations  - OR Cx growing Morganella, E. coli, Proteus, Strep. MRSA negative  - Continue on Zosyn at this time for L foot necrotizing fasciitis s/p L BKA 7/8 with improving leukocytosis,  Clindamycin (7/6 - 7/7), Vanc (7/6 7/8), Zosyn (7/6 -)    Activity:   - PT/OT eval  - OOB as tolerated  - Encourage OOB to chair today    DVTPPx:   - SQH/SCDs    Dispo:   - OR Tuesday 7/11 for L BKA closure

## 2023-07-09 NOTE — EEG REPORT - NS EEG TEXT BOX
Daily Updates (from 07:00 am until 07:00 am):  Day 3 7/8-7/9/2023:   Background:  continuous, with predominantly alpha and beta frequencies.  Symmetry:  No persistent asymmetries of voltage or frequency.  Posterior Dominant Rhythm:  7.5 Hz symmetric, well-organized, and poorly-modulated.  Organization: Appropriate anterior-posterior gradient.  Voltage:  Normal (20+ uV)  Variability: Yes. 		Reactivity: Yes.  N2 sleep: Symmetric, synchronous spindles and K complexes.  Spontaneous Activity:  No epileptiform discharges.  Periodic/rhythmic activity:  None  Events:  No electrographic seizures or significant clinical events.  Provocations:  Hyperventilation and Photic stimulation: was not performed.    Daily Summary:    Continuous Mild generalized   slowing suggestive of a Mild degree of diffuse or multifocal  dysfunction.  No epileptiform activity and no significant clinical events occurred.    Manasa Soria MD  Attending Neurologist, [facility]

## 2023-07-09 NOTE — CONSULT NOTE ADULT - ASSESSMENT
57M with PMHx of DM2, CVA x 2 w/ residual L sided weakness, early onset dementia, HTN, HLD, CKD V, BPH and PSHx of L toe amputation and AV fistula creation who presented to [facility] on 7/5 from nursing home for hyperglycemia and was found ot be septic 2/2 RLE gas gangrene s/p R BKA 7/6 Hospital course c/b AMS which has now resolved.     #RLE gas gangrene s/p amputation 7/6  - Care per primary team   - WC normalizing and no further fevers after source control   - Per ID, d/c zosyn   - Plan for RTOR 7/11 for closure R BKA    # AMS   - Appears to be at baseline, conversing and A&O x 2-3  - vEEG with noepileptiform activity and no significant clinical events  - Recommend discontinuing vEEG today     #HTN  - Above goal   - Please increase nifedipine to 90mg qd   - If remains hypertensive, can start hydralazine 25mg tid       #Hx of CVA  - c/w ASA and Plavix    #CKD Stage 5  - Electrolytes are acceptable   - Nephro following  - No indication for urgent HD at this time     #Sepsis 2/2 RLE gas gangrene  - See above   - Abx management as per ID     Dispo: Pending clinical improvement  57M with PMHx of DM2, 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     #RLE gas gangrene s/p amputation 7/6  - Care per primary team   - WC normalizing and no further fevers after source control   - Per ID, d/c zosyn   - Plan for RTOR 7/11 for closure R BKA    # AMS   - Appears to be at baseline, conversing and A&O x 2-3  - vEEG with noepileptiform activity and no significant clinical events  - Recommend discontinuing vEEG today     #HTN  - Above goal   - Please increase nifedipine to 90mg qd   - If remains hypertensive, can start hydralazine 25mg tid       #Hx of CVA  - c/w ASA and Plavix  - c/w modafinil and lipitor     #CKD Stage 5  - Electrolytes are acceptable   - Nephro following  - No indication for urgent HD at this time     #Sepsis 2/2 RLE gas gangrene  - See above   - Abx management as per ID     Dispo: Pending clinical improvement

## 2023-07-09 NOTE — CONSULT NOTE ADULT - SUBJECTIVE AND OBJECTIVE BOX
HPI: 57M with PMH of DM2, CVA x 2 (one ischemic one embolic, residual left side weakness), HTN, HLD, and CKD V (not on HD yet), BPH, BIBEMS from nursing home for hyperglycemia to 363.  On arrival, patient found to be septic 2/2 RLE gas gangrene. Also noted to have anion gap but likely attributable to uremia as well as hyperglycemia, but of note not in DKA. Was initially admitted to MICU and then transferred to Vacular surgery service for R BKA. His hospital course was complicated by AMS, currently on vEEG with no epileptiform activity. Medicine following for comanagement.     Overall, he feels good today and without any pain. His main complaint is the food. Denies SOB/ chest pain or leg edema,       MICU course:  (06 Jul 2023 04:16)      ROS: A 10-point review of systems was otherwise negative.    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, traumatic, left, sequela,          SOCIAL HISTORY:  FAMILY HISTORY:  Family history of diabetes mellitus (Mother)  Family history of diabetes mellitus (DM)        ALLERGIES: 	  No Known Allergies            MEDICATIONS:  acetaminophen     Tablet .. 650 milliGRAM(s) Oral every 4 hours PRN  aspirin  chewable 81 milliGRAM(s) Oral daily  atorvastatin 40 milliGRAM(s) Oral at bedtime  chlorhexidine 2% Cloths 1 Application(s) Topical <User Schedule>  clopidogrel Tablet 75 milliGRAM(s) Oral daily  dextrose 5%. 1000 milliLiter(s) IV Continuous <Continuous>  dextrose 5%. 1000 milliLiter(s) IV Continuous <Continuous>  dextrose 50% Injectable 25 Gram(s) IV Push once  dextrose 50% Injectable 25 Gram(s) IV Push once  dextrose 50% Injectable 12.5 Gram(s) IV Push once  dextrose Oral Gel 15 Gram(s) Oral once PRN  glucagon  Injectable 1 milliGRAM(s) IntraMuscular once  heparin   Injectable 5000 Unit(s) SubCutaneous every 8 hours  HYDROmorphone  Injectable 0.5 milliGRAM(s) IV Push every 2 hours PRN  insulin lispro (ADMELOG) corrective regimen sliding scale   SubCutaneous Before meals and at bedtime  modafinil 100 milliGRAM(s) Oral daily  NIFEdipine XL 60 milliGRAM(s) Oral every 24 hours  ondansetron Injectable 4 milliGRAM(s) IV Push every 4 hours PRN  oxyCODONE    IR 5 milliGRAM(s) Oral every 4 hours PRN  oxyCODONE    IR 10 milliGRAM(s) Oral every 4 hours PRN  piperacillin/tazobactam IVPB.. 4.5 Gram(s) IV Intermittent every 12 hours  tamsulosin 0.4 milliGRAM(s) Oral at bedtime      PHYSICAL EXAM:  T(C): 36 (07-09-23 @ 09:30), Max: 37.1 (07-08-23 @ 12:00)  HR: 96 (07-09-23 @ 08:48) (92 - 114)  BP: 166/85 (07-09-23 @ 08:48) (134/77 - 180/81)  RR: 16 (07-09-23 @ 08:48) (16 - 18)  SpO2: 98% (07-09-23 @ 08:48) (98% - 100%)  Wt(kg): --    GEN: Awake, comfortable. NAD.   HEENT: NCAT, PERRL, EOMI. Mucosa moist. No JVD.   RESP: CTA b/l  CV: RRR, normal s1/s2. No m/r/g.  ABD: Soft, NTND. BS+  EXT: Warm. R foot wrapped.       I&O's Summary    08 Jul 2023 07:01  -  09 Jul 2023 07:00  --------------------------------------------------------  IN: 1070 mL / OUT: 1450 mL / NET: -380 mL    09 Jul 2023 07:01  -  09 Jul 2023 10:44  --------------------------------------------------------  IN: 220 mL / OUT: 0 mL / NET: 220 mL        	  LABS:	 	    CARDIAC MARKERS:                                  7.9    11.25 )-----------( 402      ( 09 Jul 2023 05:30 )             25.1     07-09    149<H>  |  110<H>  |  83<H>  ----------------------------<  92  3.4<L>   |  23  |  8.09<H>    Ca    8.4      09 Jul 2023 05:30  Phos  4.3     07-09  Mg     1.9     07-09

## 2023-07-09 NOTE — PROGRESS NOTE ADULT - SUBJECTIVE AND OBJECTIVE BOX
DAILY PROGRESS NOTE    ON:  SDU. Vanc trough 19.2. DC'd Vanc, on zosyn only. Received 5 mg labetalol and 10 mg hydralazine x1 for hypertension. 40 meq K given.    O:     T(C): 36.2 (07-09-23 @ 05:42), Max: 37.1 (07-08-23 @ 12:00)  HR: 92 (07-09-23 @ 04:59) (90 - 114)  BP: 158/74 (07-09-23 @ 04:59) (134/77 - 180/81)  RR: 16 (07-09-23 @ 04:59) (13 - 19)  SpO2: 100% (07-09-23 @ 04:59) (98% - 100%)    Physical Exam:     General:   CV:  Pulm:   Abd:   Extremity:   Vascular:     I/O (24h)  I:   O:   N:     Labs:       Rads:     A/P:      DAILY PROGRESS NOTE    ON:  SDU. Vanc trough 19.2. DC'd Vanc, on zosyn only. Received 5 mg labetalol and 10 mg hydralazine x1 for hypertension. 40 meq K given.      Subjective: Patient seen and evaluated at bedside with no acute complaints. Pain is well controlled and tolerated a regular diet.  Borja catheter is in place and denies urinary complaints. He denies nausea, vomiting, abdominal pain, fevers, chills, SOB, confusion, dizziness, lightheadedness nor changes in vision.    ROS:   Denies Headache, blurred vision, Chest Pain, SOB, Abdominal pain, nausea or vomiting     Social   piperacillin/tazobactam IVPB.. 4.5  aspirin  chewable 81  clopidogrel Tablet 75  heparin   Injectable 5000  labetalol Injectable 10 PRN  NIFEdipine XL 60  piperacillin/tazobactam IVPB.. 4.5      Allergies  No Known Allergies    Intolerances      Vital Signs Last 24 Hrs  T(C): 36.2 (09 Jul 2023 05:42), Max: 37.1 (08 Jul 2023 12:00)  T(F): 97.2 (09 Jul 2023 05:42), Max: 98.7 (08 Jul 2023 12:00)  HR: 96 (09 Jul 2023 08:48) (92 - 114)  BP: 166/85 (09 Jul 2023 08:48) (134/77 - 180/81)  BP(mean): 98 (08 Jul 2023 14:14) (88 - 114)  RR: 16 (09 Jul 2023 08:48) (15 - 18)  SpO2: 98% (09 Jul 2023 08:48) (98% - 100%)    Parameters below as of 09 Jul 2023 08:48  Patient On (Oxygen Delivery Method): room air      I&O's Summary    08 Jul 2023 07:01  -  09 Jul 2023 07:00  --------------------------------------------------------  IN: 1070 mL / OUT: 1450 mL / NET: -380 mL    09 Jul 2023 07:01  -  09 Jul 2023 09:14  --------------------------------------------------------  IN: 220 mL / OUT: 0 mL / NET: 220 mL      Physical Exam:   General: NAD, frail appearing, resting comfortably   HEENT: Head wrapped with EEG leads in place on monitor, dressing C/D/I no evidence of staining or bleeding  Pulmonary: Nonlabored breathing, regular rate on room air  Cardiovascular: WWP, RRR  Abdominal: Soft, NT/ND, no rebound  Extremities: RLE with ID dressing C/D/I no staining appreciated      LABS:                        7.9    11.25 )-----------( 402      ( 09 Jul 2023 05:30 )             25.1     07-09    149<H>  |  110<H>  |  83<H>  ----------------------------<  92  3.4<L>   |  23  |  8.09<H>    Ca    8.4      09 Jul 2023 05:30  Phos  4.3     07-09  Mg     1.9     07-09      Radiology and Additional Studies:      ----------------------------------------------------------------------  PLEASE CHECK WHEN PRESENT:     [  ]Heart Failure     [  ] Acute     [  ] Acute on Chronic     [  ] Chronic  -------------------------------------------------------------------     [  ]Diastolic [HFpEF]     [  ]Systolic [HFrEF]     [  ]Combined [HFpEF & HFrEF]  .................................................................................     [  ]Other:     [ ] Pulmonary Hypertension     [ ] Afib     [X] Hypertensive Heart Disease  -------------------------------------------------------------------  [ ] Respiratory failure  [ ] Acute cor pulmonale  [ ] Asthma/COPD Exacerbation  [ ] Pleural effusion  [ ] Aspiration pneumonia  [ ] Obstructive Sleep Apnea  -------------------------------------------------------------------  [ ]ALEXIS     [  ]ATN     [  ]Reneal Medullary Necrosis     [  ]Renal Cortical Necrosis     [  ]Other Pathological Lesions:    [  ]CKD 1  [  ]CKD 2  [  ]CKD 3  [  ]CKD 4  [X]CKD 5  [  ]Other  -------------------------------------------------------------------  [X  ] Diabetes  [  ] Diabetic PVD Ulcer  [  ] Neuropathic ulcer to DM  [  ] Diabetes with Nephropathy  [X ] Osteomyelitis due to diabetes  [X] Hyperglycemia   [  ] Hypoglycemia   --------------------------------------------------------------------  [  ]Malnutrition: See Nutrition Note  [  ]Cachexia  [  ]Other:   [  ]Supplement Ordered:  [  ]Morbid Obesity (BMI >=40]  ---------------------------------------------------------------------  [ ] Sepsis/severe sepsis/septic shock  [ ] Noninfectious SIRS  [ ] UTI  [ ] Pneumonia  -----------------------------------------------------------------------  [ ] Acidosis/alkalosis  [ ] Fluid overload  [ ] Hypokalemia  [ ] Hyperkalemia  [ ] Hypomagnesemia  [ ] Hypophosphatemia  [ ] Hyperphosphatemia  ------------------------------------------------------------------------  [ ] Acute blood loss anemia  [ ] Post op blood loss anemia  [ ] Iron deficiency anemia  [ ] Anemia due to chronic disease  [ ] Hypercoagulable state  [ ] Thrombocytopenia  ----------------------------------------------------------------------  [ ] Cerebral infarction  [ ] Transient ischemia attack  [ ] Encephalopathy - Toxic or Metabolic   DAILY PROGRESS NOTE    ON:  SDU. Vanc trough 19.2. DC'd Vanc, on zosyn only. Received 5 mg labetalol and 10 mg hydralazine x1 for hypertension. 40 meq K given.      Subjective: Patient seen and evaluated at bedside with no acute complaints. Pain is well controlled and tolerated a regular diet.  Borja catheter is in place and denies urinary complaints. He denies nausea, vomiting, abdominal pain, fevers, chills, SOB, confusion, dizziness, lightheadedness nor changes in vision.    ROS:   Denies Headache, blurred vision, Chest Pain, SOB, Abdominal pain, nausea or vomiting     Social   piperacillin/tazobactam IVPB.. 4.5  aspirin  chewable 81  clopidogrel Tablet 75  heparin   Injectable 5000  labetalol Injectable 10 PRN  NIFEdipine XL 60  piperacillin/tazobactam IVPB.. 4.5      Allergies  No Known Allergies    Intolerances      Vital Signs Last 24 Hrs  T(C): 36.2 (09 Jul 2023 05:42), Max: 37.1 (08 Jul 2023 12:00)  T(F): 97.2 (09 Jul 2023 05:42), Max: 98.7 (08 Jul 2023 12:00)  HR: 96 (09 Jul 2023 08:48) (92 - 114)  BP: 166/85 (09 Jul 2023 08:48) (134/77 - 180/81)  BP(mean): 98 (08 Jul 2023 14:14) (88 - 114)  RR: 16 (09 Jul 2023 08:48) (15 - 18)  SpO2: 98% (09 Jul 2023 08:48) (98% - 100%)    Parameters below as of 09 Jul 2023 08:48  Patient On (Oxygen Delivery Method): room air      I&O's Summary    08 Jul 2023 07:01  -  09 Jul 2023 07:00  --------------------------------------------------------  IN: 1070 mL / OUT: 1450 mL / NET: -380 mL    09 Jul 2023 07:01  -  09 Jul 2023 09:14  --------------------------------------------------------  IN: 220 mL / OUT: 0 mL / NET: 220 mL      Physical Exam:   General: NAD, frail appearing, resting comfortably   HEENT: Head wrapped with EEG leads in place on monitor, dressing C/D/I no evidence of staining or bleeding  Pulmonary: Nonlabored breathing, regular rate on room air  Cardiovascular: WWP, RRR  Abdominal: Soft, NT/ND, no rebound  Extremities: RLE with OR dressing C/D/I no staining appreciated, Dressing changed at bedside no active bleeding note      LABS:                        7.9    11.25 )-----------( 402      ( 09 Jul 2023 05:30 )             25.1     07-09    149<H>  |  110<H>  |  83<H>  ----------------------------<  92  3.4<L>   |  23  |  8.09<H>    Ca    8.4      09 Jul 2023 05:30  Phos  4.3     07-09  Mg     1.9     07-09      Radiology and Additional Studies:      ----------------------------------------------------------------------  PLEASE CHECK WHEN PRESENT:     [  ]Heart Failure     [  ] Acute     [  ] Acute on Chronic     [  ] Chronic  -------------------------------------------------------------------     [  ]Diastolic [HFpEF]     [  ]Systolic [HFrEF]     [  ]Combined [HFpEF & HFrEF]  .................................................................................     [  ]Other:     [ ] Pulmonary Hypertension     [ ] Afib     [X] Hypertensive Heart Disease  -------------------------------------------------------------------  [ ] Respiratory failure  [ ] Acute cor pulmonale  [ ] Asthma/COPD Exacerbation  [ ] Pleural effusion  [ ] Aspiration pneumonia  [ ] Obstructive Sleep Apnea  -------------------------------------------------------------------  [ ]ALEXIS     [  ]ATN     [  ]Reneal Medullary Necrosis     [  ]Renal Cortical Necrosis     [  ]Other Pathological Lesions:    [  ]CKD 1  [  ]CKD 2  [  ]CKD 3  [  ]CKD 4  [X]CKD 5  [  ]Other  -------------------------------------------------------------------  [X  ] Diabetes  [  ] Diabetic PVD Ulcer  [  ] Neuropathic ulcer to DM  [  ] Diabetes with Nephropathy  [X ] Osteomyelitis due to diabetes  [X] Hyperglycemia   [  ] Hypoglycemia   --------------------------------------------------------------------  [  ]Malnutrition: See Nutrition Note  [  ]Cachexia  [  ]Other:   [  ]Supplement Ordered:  [  ]Morbid Obesity (BMI >=40]  ---------------------------------------------------------------------  [ ] Sepsis/severe sepsis/septic shock  [ ] Noninfectious SIRS  [ ] UTI  [ ] Pneumonia  -----------------------------------------------------------------------  [ ] Acidosis/alkalosis  [ ] Fluid overload  [ ] Hypokalemia  [ ] Hyperkalemia  [ ] Hypomagnesemia  [ ] Hypophosphatemia  [ ] Hyperphosphatemia  ------------------------------------------------------------------------  [ ] Acute blood loss anemia  [ ] Post op blood loss anemia  [ ] Iron deficiency anemia  [ ] Anemia due to chronic disease  [ ] Hypercoagulable state  [ ] Thrombocytopenia  ----------------------------------------------------------------------  [ ] Cerebral infarction  [ ] Transient ischemia attack  [ ] Encephalopathy - Toxic or Metabolic

## 2023-07-09 NOTE — PROGRESS NOTE ADULT - ASSESSMENT
Casey Shetty is a 56 yo M w/ PMH of CKD V w/ AVF not currently on HD, DM2, HTN, CVA who presents from NH with starvation ketoacidosis and gas gangrene. Acidosis resolved. 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    #Hypernatremia  Na 149  FWD 2.2L to bring Na to 140  Start D5w at 80 ml/hr continuously  BMP daily  Encourage PO intake and hydration    #CKD stage V  BUN and creatinine are elevated at baseline, no signs of uremia at this point  c/w puckett as pt with urinary retention 7/8. Urology input will be appreciated  Strict I&O  Daily weights  Avoid Nephrotoxic drugs.   Start NaHCO3 tabs if bicarb drops <22  Daily labs.   No apparent urgent indication for HD at this time, will continue to monitor  If IV contrast need it please notify Nephro and isabela contrast IVF.   Nephrology team will cont. f/u

## 2023-07-09 NOTE — PROGRESS NOTE ADULT - SUBJECTIVE AND OBJECTIVE BOX
Patient is a 57y Male seen and evaluated at bedside. Laying comfortably in bed. Denies any nausea, vomiting, metallic taste, CP, SOB.      Meds:    acetaminophen     Tablet .. 650 every 4 hours PRN  aspirin  chewable 81 daily  atorvastatin 40 at bedtime  chlorhexidine 2% Cloths 1 <User Schedule>  clopidogrel Tablet 75 daily  dextrose 5%. 1000 <Continuous>  dextrose 5%. 1000 <Continuous>  dextrose 50% Injectable 25 once  dextrose 50% Injectable 12.5 once  dextrose 50% Injectable 25 once  dextrose Oral Gel 15 once PRN  glucagon  Injectable 1 once  heparin   Injectable 5000 every 8 hours  HYDROmorphone  Injectable 0.5 every 2 hours PRN  insulin lispro (ADMELOG) corrective regimen sliding scale  Before meals and at bedtime  labetalol Injectable 10 every 6 hours PRN  modafinil 100 daily  NIFEdipine XL 60 every 24 hours  ondansetron Injectable 4 every 4 hours PRN  oxyCODONE    IR 5 every 4 hours PRN  oxyCODONE    IR 10 every 4 hours PRN  piperacillin/tazobactam IVPB.. 4.5 every 12 hours  tamsulosin 0.4 at bedtime      T(C): , Max: 37.1 (07-08-23 @ 12:00)  T(F): , Max: 98.7 (07-08-23 @ 12:00)  HR: 96 (07-09-23 @ 08:48)  BP: 166/85 (07-09-23 @ 08:48)  BP(mean): 98 (07-08-23 @ 14:14)  RR: 16 (07-09-23 @ 08:48)  SpO2: 98% (07-09-23 @ 08:48)  Wt(kg): --    07-08 @ 07:01  -  07-09 @ 07:00  --------------------------------------------------------  IN: 1070 mL / OUT: 1450 mL / NET: -380 mL    07-09 @ 07:01  -  07-09 @ 09:49  --------------------------------------------------------  IN: 220 mL / OUT: 0 mL / NET: 220 mL          Review of Systems:  ROS negative except as per HPI      PHYSICAL EXAM:  GENERAL: NAD, lying in bed, cachectic   HEART: Regular rate and rhythm  LUNGS:  Clear to auscultation eleazar  ABD: Soft, nontender, nondistended, +BS  Ext: R heel pressure wound, covered with gauze.   Nerv:  A&Ox2, left hemiparesis.  RUE tremor.   Vascular Access: AVF in LUE, with palpable thrill.         LABS:                        7.9    11.25 )-----------( 402      ( 09 Jul 2023 05:30 )             25.1     07-09    149<H>  |  110<H>  |  83<H>  ----------------------------<  92  3.4<L>   |  23  |  8.09<H>    Ca    8.4      09 Jul 2023 05:30  Phos  4.3     07-09  Mg     1.9     07-09          Urinalysis Basic - ( 09 Jul 2023 05:30 )    Color: x / Appearance: x / SG: x / pH: x  Gluc: 92 mg/dL / Ketone: x  / Bili: x / Urobili: x   Blood: x / Protein: x / Nitrite: x   Leuk Esterase: x / RBC: x / WBC x   Sq Epi: x / Non Sq Epi: x / Bacteria: x            RADIOLOGY & ADDITIONAL STUDIES:

## 2023-07-10 LAB
-  AMPICILLIN: SIGNIFICANT CHANGE UP
-  VANCOMYCIN: SIGNIFICANT CHANGE UP
ANION GAP SERPL CALC-SCNC: 17 MMOL/L — SIGNIFICANT CHANGE UP (ref 5–17)
BUN SERPL-MCNC: 82 MG/DL — HIGH (ref 7–23)
CALCIUM SERPL-MCNC: 8.3 MG/DL — LOW (ref 8.4–10.5)
CHLORIDE SERPL-SCNC: 110 MMOL/L — HIGH (ref 96–108)
CO2 SERPL-SCNC: 20 MMOL/L — LOW (ref 22–31)
CREAT SERPL-MCNC: 7.61 MG/DL — HIGH (ref 0.5–1.3)
CULTURE RESULTS: SIGNIFICANT CHANGE UP
EGFR: 8 ML/MIN/1.73M2 — LOW
GLUCOSE BLDC GLUCOMTR-MCNC: 124 MG/DL — HIGH (ref 70–99)
GLUCOSE BLDC GLUCOMTR-MCNC: 209 MG/DL — HIGH (ref 70–99)
GLUCOSE BLDC GLUCOMTR-MCNC: 222 MG/DL — HIGH (ref 70–99)
GLUCOSE BLDC GLUCOMTR-MCNC: 231 MG/DL — HIGH (ref 70–99)
GLUCOSE SERPL-MCNC: 124 MG/DL — HIGH (ref 70–99)
HCT VFR BLD CALC: 27.8 % — LOW (ref 39–50)
HGB BLD-MCNC: 8.4 G/DL — LOW (ref 13–17)
MAGNESIUM SERPL-MCNC: 2 MG/DL — SIGNIFICANT CHANGE UP (ref 1.6–2.6)
MCHC RBC-ENTMCNC: 27.5 PG — SIGNIFICANT CHANGE UP (ref 27–34)
MCHC RBC-ENTMCNC: 30.2 GM/DL — LOW (ref 32–36)
MCV RBC AUTO: 91.1 FL — SIGNIFICANT CHANGE UP (ref 80–100)
METHOD TYPE: SIGNIFICANT CHANGE UP
NRBC # BLD: 0 /100 WBCS — SIGNIFICANT CHANGE UP (ref 0–0)
ORGANISM # SPEC MICROSCOPIC CNT: SIGNIFICANT CHANGE UP
PHOSPHATE SERPL-MCNC: 4.5 MG/DL — SIGNIFICANT CHANGE UP (ref 2.5–4.5)
PLATELET # BLD AUTO: 449 K/UL — HIGH (ref 150–400)
POTASSIUM SERPL-MCNC: 3.8 MMOL/L — SIGNIFICANT CHANGE UP (ref 3.5–5.3)
POTASSIUM SERPL-SCNC: 3.8 MMOL/L — SIGNIFICANT CHANGE UP (ref 3.5–5.3)
RBC # BLD: 3.05 M/UL — LOW (ref 4.2–5.8)
RBC # FLD: 15.3 % — HIGH (ref 10.3–14.5)
SODIUM SERPL-SCNC: 147 MMOL/L — HIGH (ref 135–145)
SPECIMEN SOURCE: SIGNIFICANT CHANGE UP
WBC # BLD: 13.68 K/UL — HIGH (ref 3.8–10.5)
WBC # FLD AUTO: 13.68 K/UL — HIGH (ref 3.8–10.5)

## 2023-07-10 PROCEDURE — 99232 SBSQ HOSP IP/OBS MODERATE 35: CPT

## 2023-07-10 PROCEDURE — 99233 SBSQ HOSP IP/OBS HIGH 50: CPT | Mod: GC

## 2023-07-10 RX ORDER — LABETALOL HCL 100 MG
10 TABLET ORAL ONCE
Refills: 0 | Status: DISCONTINUED | OUTPATIENT
Start: 2023-07-10 | End: 2023-07-10

## 2023-07-10 RX ORDER — DOXAZOSIN MESYLATE 4 MG
4 TABLET ORAL AT BEDTIME
Refills: 0 | Status: DISCONTINUED | OUTPATIENT
Start: 2023-07-10 | End: 2023-07-18

## 2023-07-10 RX ORDER — AMLODIPINE BESYLATE 2.5 MG/1
10 TABLET ORAL DAILY
Refills: 0 | Status: DISCONTINUED | OUTPATIENT
Start: 2023-07-10 | End: 2023-07-13

## 2023-07-10 RX ADMIN — HEPARIN SODIUM 5000 UNIT(S): 5000 INJECTION INTRAVENOUS; SUBCUTANEOUS at 06:10

## 2023-07-10 RX ADMIN — Medication 25 MILLIGRAM(S): at 14:59

## 2023-07-10 RX ADMIN — Medication 25 MILLIGRAM(S): at 22:20

## 2023-07-10 RX ADMIN — ATORVASTATIN CALCIUM 40 MILLIGRAM(S): 80 TABLET, FILM COATED ORAL at 22:20

## 2023-07-10 RX ADMIN — PIPERACILLIN AND TAZOBACTAM 25 GRAM(S): 4; .5 INJECTION, POWDER, LYOPHILIZED, FOR SOLUTION INTRAVENOUS at 06:11

## 2023-07-10 RX ADMIN — CHLORHEXIDINE GLUCONATE 1 APPLICATION(S): 213 SOLUTION TOPICAL at 06:10

## 2023-07-10 RX ADMIN — Medication 4: at 12:24

## 2023-07-10 RX ADMIN — Medication 81 MILLIGRAM(S): at 12:23

## 2023-07-10 RX ADMIN — CLOPIDOGREL BISULFATE 75 MILLIGRAM(S): 75 TABLET, FILM COATED ORAL at 12:23

## 2023-07-10 RX ADMIN — PIPERACILLIN AND TAZOBACTAM 25 GRAM(S): 4; .5 INJECTION, POWDER, LYOPHILIZED, FOR SOLUTION INTRAVENOUS at 18:48

## 2023-07-10 RX ADMIN — HEPARIN SODIUM 5000 UNIT(S): 5000 INJECTION INTRAVENOUS; SUBCUTANEOUS at 14:59

## 2023-07-10 RX ADMIN — MODAFINIL 100 MILLIGRAM(S): 200 TABLET ORAL at 12:23

## 2023-07-10 RX ADMIN — Medication 4: at 22:20

## 2023-07-10 RX ADMIN — Medication 25 MILLIGRAM(S): at 06:10

## 2023-07-10 RX ADMIN — Medication 4: at 18:13

## 2023-07-10 RX ADMIN — HEPARIN SODIUM 5000 UNIT(S): 5000 INJECTION INTRAVENOUS; SUBCUTANEOUS at 22:20

## 2023-07-10 RX ADMIN — AMLODIPINE BESYLATE 10 MILLIGRAM(S): 2.5 TABLET ORAL at 12:23

## 2023-07-10 NOTE — PROGRESS NOTE ADULT - ASSESSMENT
Casey Shetty is a 58 yo M w/ PMH of CKD V w/ AVF not currently on HD, DM2, HTN, CVA who presents from NH with starvation ketoacidosis and gas gangrene. Acidosis resolved. 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  Currently on IV Abx.     #Hypernatremia  Na 149->147, stable  Continue free water.   Start D5w at 80 ml/hr continuously  BMP daily  Encourage PO intake and hydration, pte need assistance with feeding.     #CKD stage V  BUN and creatinine are elevated at baseline, no signs of uremia at this point  c/w puckett as pt with urinary retention 7/8.   Strict I&O  Daily weights  Avoid Nephrotoxic drugs.   Start NaHCO3 tabs if bicarb drops <22  Daily labs.   No indications for acute HD at this point.   Nephrology team will cont. f/u

## 2023-07-10 NOTE — SWALLOW BEDSIDE ASSESSMENT ADULT - SLP GENERAL OBSERVATIONS
Alert, breathing RA, sitting upright in bed, oriented x2. Denies hx of dysphagic symptoms; however, ?poor historian.

## 2023-07-10 NOTE — SWALLOW BEDSIDE ASSESSMENT ADULT - SWALLOW EVAL: DIAGNOSIS
Clinical signs of oropharyngeal dysphagia, likely chronic 2/2 prior CVAs and dementia dx. Pt is at an increased risk for aspiration and its negative sequela given reduced mobility, decreased safety awareness, and dependency on others for feeding assistance. Would benefit from MBS to further assess swallow anatomy and physiology. Given presumed chronicity of dysphagia, currently stable respiratory status, and clear lungs, pt does appear safe to continue oral diet prior to instrumental swallow study

## 2023-07-10 NOTE — PHYSICAL THERAPY INITIAL EVALUATION ADULT - PERTINENT HX OF CURRENT PROBLEM, REHAB EVAL
57M with PMH of DM2, CVA x 2 (one ischemic one embolic, residual left side weakness), HTN, HLD, and CKD V (not on HD yet), BPH, BIBEMS from nursing home for hyperglycemia to 363.  On arrival, patient found to have SIRS (tachycardia, fever, leukocytosis) and was felt to be septic w/o identified infectious source. Also noted to have anion gap but likely attributable to uremia as well as hyperglycemia, but of note not in DKA. Upon investigation, patient's right heel ulcer was noted to have foul smell so podiatry was consulted and cross-sectional imaging obtained revealing gas tracking along achilles sheath. Vascular surgery was consulted for surgical evaluation.

## 2023-07-10 NOTE — PRE-ANESTHESIA EVALUATION ADULT - NSANTHADDINFOFT_GEN_ALL_CORE
H&P Adult [Charted Location: [facility] [id number]] [Authored: 06-Jul-2023 04:16]- for Visit: [id number], Incomplete, Revised, Signed w/additional Signatures Pending, Available to Patient    History and Physical:   Source of Information	Chart(s), Patient       Patient Identity:  · Birth Sex	Male  · Patient's Sexual Orientation	Heterosexual  · Patient's Gender Identity	Male     History of Present Illness:  Reason for Admission: DKA  History of Present Illness:   HPI: 57M with PMH of DM2, CVA x 2 (one ischemic one embolic, residual left side weakness), HTN, HLD, and CKD V (not on HD yet), BPH, BIBEMS from nursing home for hyperglycemia to 363.  On arrival, patient found to have SIRS (tachycardia, fever, leukocytosis) and was felt to be septic w/o identified infectious source. Also noted to have anion gap but likely attributable to uremia as well as hyperglycemia, but of note not in DKA. Upon investigation, patient's right heel ulcer was noted to have foul smell so podiatry was consulted and cross-sectional imaging obtained revealing gas tracking along achilles sheath. Vascular surgery was consulted for surgical evaluation.     Patient endorses pain of the right heel. He states the wound has been present for about a month. Patient denies fever, chills, chest pain, shortness of breath, vomiting, abdominal pain.     In the ED:  Initial vital signs: T: 100.8 F, HR: 120, BP: 117/73, R:16, SpO2: 96% on RA  ED course:   Labs: significant for WBC 26.04, Hgb 8.9, plt 496, glucose 260, Na+ 146, K+ 3.8, Cl- 109, HCO3- 15, AG 22, BHB 0.6, trop 126, VBG with metabolic acidosis  Imaging:  CXR: clear  EKG: sinus tachycardia, no ST elevation/depression or TWI  Medications:  2L NS, 1 dose vanc + zosyn, insulin 9 units bolus, started on insulin gtt at 9 units/hr  Consults: MICU    MICU course:         Allergies and Intolerances:        Allergies:  	No Known Allergies:     Home Medications:   * Patient Currently Takes Medications as of 25-Apr-2023 07:45 documented in Structured Notes  · 	tamsulosin 0.4 mg oral capsule: 1 cap(s) orally once a day (at bedtime)  · 	Procardia XL 60 mg oral tablet, extended release: 1 tab(s) orally every 24 hours  · 	Plavix 75 mg oral tablet: 1 tab(s) orally once a day until 11/8/2022  · 	atorvastatin 80 mg oral tablet: 1 tab(s) orally once a day (at bedtime)  · 	aspirin 81 mg oral tablet, chewable: 1 tab(s) orally once a day  · 	modafinil 100 mg oral tablet: 1 tab(s) orally once a day (in the morning)  · 	Tradjenta 5 mg oral tablet: 1 orally once a day    Patient History:    Past Medical, Past Surgical, and Family History:  PAST MEDICAL HISTORY:  Cerebral artery occlusion with cerebral infarction Cerebral vascular accident    H/O diabetic retinopathy     Hyperlipidemia Hyperlipidemia    Hypertension     Stage 4 chronic kidney disease     Type 2 diabetes mellitus Diabetes mellitus.     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 Unknown  Family history of diabetes mellitus, Age at diagnosis: Age Unknown.     Social History:  · Substance use	No  · Social History (marital status, living situation, occupation, and sexual history)	Tobacco use: former smoker  EtOH use: denies  Illicit drug use: denies    Living situation: Pt from [facility]. He is bedbound.     Tobacco Screening:  · Core Measure Site	No  · Has the patient used tobacco in the past 30 days?	No    Risk Assessment:    Present on Admission:  Deep Venous Thrombosis	no  Pulmonary Embolus	no  Urinary Catheter	no  Central Venous Catheter/PICC Line	no  Surgical Site Incision	no  Pressure Ulcer(s)	yes  R heel     HIV Screening:  · In accordance with [place] State law, we offer every patient who comes to our ED an HIV test. Would you like to be tested today?	Opt out    Physical Exam:   Physical Exam: VITALS:   T(C): 37 (07-06-23 @ 00:29), Max: 38.2 (07-05-23 @ 17:20)  HR: 105 (07-06-23 @ 04:00) (89 - 120)  BP: 137/66 (07-06-23 @ 04:00) (117/73 - 148/71)  RR: 17 (07-06-23 @ 04:00) (15 - 20)  SpO2: 100% (07-06-23 @ 04:00) (96% - 100%)    GENERAL: NAD, lying in bed, cachectic   HEAD:  Atraumatic, normocephalic  EYES: PERRLA, conjunctiva and sclera clear  ENT: dry mucous membranes  NECK: Supple, no JVD  HEART: Regular rate and rhythm, no murmurs, rubs, or gallops  LUNGS: Unlabored respirations.  Clear to auscultation anteriorly, no crackles, wheezing, or rhonchi  ABDOMEN: Soft, nontender, nondistended, +BS  EXTREMITIES: warm, palpable distal pulses. No clubbing, cyanosis, or edema  NERVOUS SYSTEM:  A&Ox2, left sided neurologic deficits, RUE tremor (at baseline)  SKIN: R heel pressure wound, malodorous, no sacral wound       Labs and Results:  Labs, Radiology, Cardiology, and Other Results: .LABS:                         8.9    26.04 )-----------( 496      ( 05 Jul 2023 17:45 )             28.6     07-06    144  |  115<H>  |  105<H>  ----------------------------<  207<H>  3.7   |  14<L>  |  9.29<H>    Ca    9.1      06 Jul 2023 01:09  Phos  3.2     07-06  Mg     2.2     07-06    TPro  7.0  /  Alb  3.0<L>  /  TBili  0.2  /  DBili  x   /  AST  11  /  ALT  10  /  AlkPhos  68  07-06    PT/INR - ( 05 Jul 2023 18:21 )   PT: 13.5 sec;   INR: 1.13          PTT - ( 05 Jul 2023 18:21 )  PTT:25.7 sec  Urinalysis Basic - ( 06 Jul 2023 01:09 )    Color: x / Appearance: x / SG: x / pH: x  Gluc: 207 mg/dL / Ketone: x  / Bili: x / Urobili: x   Blood: x / Protein: x / Nitrite: x   Leuk Esterase: x / RBC: x / WBC x   Sq Epi: x / Non Sq Epi: x / Bacteria: x      Lactate, Blood: 1.2 mmol/L (07-05 @ 17:45)      RADIOLOGY, EKG & ADDITIONAL TESTS: Reviewed.    Assessment and Plan:   · Completed VTE Risk Assessment(s)	Medical Assessment Completed on: 06-Jul-2023 05:41  · Completed VTE Risk Assessment(s)	Refer to the Assessment tab to view/cancel completed assessment.     Assessment:  · Assessment	  57M with PMH of DM2, CVA x 2 (one ischemic one embolic, residual left side weakness), HTN, HLD, and CKD V (not on HD yet), BPH, BIBEMS from nursing home for hyperglycemia. Pt found to meet 3/4 SIRS criteria with unknown source (possible R heel ulcer?). On labs, found to have HAGMA with hyperglycemia and BHB of 0.6, acidosis likely in setting of DKA vs. hyperuricemia from CKD vs. infection. Admitted to MICU for management of DKA as well as acute on chronic renal failure.    Neuro  #CVA  Patient with both ischemic and embolic CVA's, with residual left sided neurological deficits. No change from baseline at this time, no concern for active CVA. AOx2, at baseline.   - c/w home ASA 81mg daily   - c/w home Plavix 75mg daily   - c/w home atorvastatin 40mg daily    - c/w home modafinil 100mg daily     Cardiovascular  #Elevated Troponin   High sensitivity Trop elevated on presentation, likely in the setting of increased demand given SIRS positivity. No evidence of ischemia on EKG.   - q6hrs Trop trend to peak   - serial EKG's while trop is rising (take in mind the ARF, trop will not clear quickly)     #HTN  Pt with moderately well controlled HTN. Home medications of procardia 60mg daily.   - hold home anti-HTN therapy as infected and normotensive at this time     Pulmonary  RAQUEL, saturating well on RA.   - f/u RVP that was obtained in the setting of the recent history of cough  - Maintain O2 saturation > 94%    GI  Keep the patient NPO until acid-base status stabilized     Renal  #Acute renal failure on CKD V  Patient with baseline Cr 6-7 or so via outpatient and historical labs. Follows closely with Dr. Garcia in the clinic. Has never started HD, though LUE fistula is maturing at this time.   - UA w/ protein, moderate blood, + RBC, no casts  - Ulytes (07/06): FeNa intrinsic  - Trend BUN/Cr with CMP  - Renal diet once diet is started   - Nephrology consulted, follow recs   - f/u renal US (eval kidneys as well as investigate potential source of infection)   -Avoid nephrotoxic agents and dose drugs renally    #DKA  Pt with acidosis and an anion gap of 22 on admission. Pt with glucose in 200s, BHB 0.6, with UA demonstrating no ketones. Pt likely in DKA, with AG driven by ketoacidosis with contribution by hyperuricemia.  - s/p 9U insulin bolus   - currently on insulin gtt at 9U/hr   - c/w D5 1/2NS at 200cc/hr with K  - replete Phos to 2.2 and Mg to 2.2  - q4hr BMP's  - q1hr finger sticks via DKA protocol   - caution with K repletion given ARF        #BPH  - c/w home tamsulosin 0.4mg daily     Endo  #HAGMA with hyperglycemia  See above     #DM2  Pt with long standing DM with diabetic retinopathy. Home medications include Trulicity 0.75 weekly, 12 U basal insulin, Sliding Scale.   - after insulin drip, begin basal insulin with mISS  - f/u A1c     Heme/onc  #Anemia   AOCD given CKD. No active bleeding suspected at this time. Takes Ferrous sulfate at home, hold given unknown infection at this time.   - trend and transfuse with goal of 7     ID  #SIRS positive  Patient with fever, tachycardia and elevated wbc with neutrophil predominance. Meets 3/4 SIRS criteria. Pt appears ill septic on exam. No known source of infection at this time likely R heel wound vs. renal. UA negative, and low suspicion for pneumonia (RVP neg, MRSA neg, CXR clear, UA neg). Started on broad abx coverage   - s/p vanc + zosyn in the ED  - c/w Vancomycin renal dosing (likely by trough given ARF, got 1G 6PM 7/5)  - c/w pseud dosing Zosyn (renal dosing 4.5g q12hrs)   - De-escalate Abx therapy as clinically appropriate   - f/u Blood Cx  - f/u MRSA Swab   - strict I/O's   - renal US as above to search for infectious source     #R heel ulcer  Pt with malodorous pressure ulcer on R heel.   - wound care consulted    Prophylaxis  Fluids: 200cc/hr D5 1/2NS with 40 mEq K per 1 liter   Electrolytes: Replete K < 4 and Mg < 2  Nutrition: NPO  DVT ppx: 5000 units subQ q8h  Code: Full code  Dispo: MICU           Attestation Statements:  I have personally seen and examined the patient.  I fully participated in the care of this patient.  I have made amendments to the documentation where necessary, and agree with the history, physical exam, and plan as documented by the Resident.     See consultation note.      Electronic Signatures:  Jose Chiu)  (Signed 06-Jul-2023 06:20)  	Authored: Attestation Statements  	Co-Signer: History and Physical, History of Present Illness, Allergies/Medications, Patient History, Physical Exam, Labs and Results, Assessment and Plan  Cassia Cuevas)  (Signature Pending)  	Co-Signer: History of Present Illness  Van Burris)  (Signed 06-Jul-2023 15:13)  	Authored: History of Present Illness  Reyes, Olivia (MD)  (Signed 06-Jul-2023 06:34)  	Authored: History and Physical, History of Present Illness, Allergies/Medications, Patient History, Risk Assessment, Physical Exam, Labs and Results, Assessment and Plan      Last Updated: 06-Jul-2023 15:13 by Van Burris)

## 2023-07-10 NOTE — SWALLOW BEDSIDE ASSESSMENT ADULT - SLP PERTINENT HISTORY OF CURRENT PROBLEM
PMHx significant for DM2, CVA x2 with residual L sided weakness, early onset dementia. Presented to [facility] on 7/5 from nursing home for hyperglycemia who was found to have infected likely diabetic toe wound with gas-producing organism. S/p jd NOBLE (7/8).

## 2023-07-10 NOTE — PROGRESS NOTE ADULT - ASSESSMENT
57M with PMHx of DM2, CVA x 2 w/ residual L sided weakness, early onset dementia, HTN, HLD, CKD V, BPH and PSHx of L toe amputation and AV fistula creation who presented to 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  Per ID, d/c zosyn   Plan for RTOR 7/11 for closure R BKA    2- Toxic Metabolic Encephalopathy - now at baseline   vEEG with noepileptiform activity and no significant clinical events  Recommend discontinuing vEEG today   3-#HTN  - Above goal   - Please increase nifedipine to 90mg qd   - If remains hypertensive, can start hydralazine 25mg tid       4-Hx of CVA  - c/w ASA and Plavix  - c/w modafinil and lipitor     5-CKD Stage 5  - Electrolytes are acceptable   - Nephro following  - No indication for urgent HD at this time     6-Sepsis 2/2 RLE gas gangrene  - See above   - Abx management as per ID     Dispo: Pending clinical improvement

## 2023-07-10 NOTE — PROGRESS NOTE ADULT - SUBJECTIVE AND OBJECTIVE BOX
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  Procedure:  Surgeon:    Consent:                          8.4    13.68 )-----------( 449      ( 10 Jul 2023 05:30 )             27.8     07-10    147<H>  |  110<H>  |  82<H>  ----------------------------<  124<H>  3.8   |  20<L>  |  7.61<H>    Ca    8.3<L>      10 Jul 2023 05:30  Phos  4.5     07-10  Mg     2.0     07-10        Urinalysis Basic - ( 10 Jul 2023 05:30 )    Color: x / Appearance: x / SG: x / pH: x  Gluc: 124 mg/dL / Ketone: x  / Bili: x / Urobili: x   Blood: x / Protein: x / Nitrite: x   Leuk Esterase: x / RBC: x / WBC x   Sq Epi: x / Non Sq Epi: x / Bacteria: x        Type & Screen:          (*With most recent within 72hrs of OR)  CXR:  EKG:  UA:    COVID status/date: [ ]Negative/Date:  [ ]Positive/Date:     *With most recent within 48hrs of OR    Is patient on ACE/ARB? [ ]No [ ]Yes   *If yes, please hold any ACE/ARB the day of surgery    Is patient on Lantus at bedtime?  [ ]No [ ]Yes   *If yes, please half the dose the night before OR since patient will be NPO    Does patient have a contrast allergy? [ ]No [ ]Yes  *If yes, please pre-medicate per protocol    Is patient on anticoagulation? [ ]No [ ] Yes  *If yes, please discuss with team when to hold it    Is the patient Female and <56yo [ ]No [ ] Yes  If yes, pregnancy test must be documented in the chart    Is patient on dialysis? [ ]No [ ]Yes  *If yes, please obtain all labs including K level EARLY the day of surgery   *Also, will NOT require IVF past midnight    A/P: 57yMale pre-op for above procedure  1. NPO past midnight, except medications  2. IVF at midnight:   3. 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 Cuevas  Consent: Pending  HPI:  7M with PMH of DM2, CVA x 2 (one ischemic one embolic, residual left side weakness), HTN, HLD, and CKD V (not on HD yet), BPH, BIBEMS from nursing home for hyperglycemia to 363.  On arrival, patient found to have SIRS (tachycardia, fever 100.8, leukocytosis) and was felt to be septic w/o identified infectious source. Also noted to have anion gap but likely attributable to uremia as well as hyperglycemia, but of note not in DKA. Upon investigation, patient's right heel ulcer was noted to have foul smell so podiatry was consulted and cross-sectional imaging obtained revealing gas tracking along achilles sheath. WBC 25, , .  Vascular surgery was consulted for surgical evaluation. Vascular did R guillotine BKA which has been healing well. His post-op course has been uncomplicated except HTN.He is planned for closure of BKA tomorrow.                          8.4    13.68 )-----------( 449      ( 10 Jul 2023 05:30 )             27.8     07-10    147<H>  |  110<H>  |  82<H>  ----------------------------<  124<H>  3.8   |  20<L>  |  7.61<H>    Ca    8.3<L>      10 Jul 2023 05:30  Phos  4.5     07-10  Mg     2.0     07-10        Urinalysis Basic - ( 10 Jul 2023 05:30 )    Color: x / Appearance: x / SG: x / pH: x  Gluc: 124 mg/dL / Ketone: x  / Bili: x / Urobili: x   Blood: x / Protein: x / Nitrite: x   Leuk Esterase: x / RBC: x / WBC x   Sq Epi: x / Non Sq Epi: x / Bacteria: x        Type & Screen:   Ordered 7/10  Last T x S: 7/6:ABO: B, RH: Positive, Antibody: Negative         CXR:  PROCEDURE DATE: 07/05/2023  IMPRESSION:  No infiltrate lung consolidation pleural effusion or pneumothorax. Left chest loop recorder. Mild hypoinflation. Normal size heart. No acute bone abnormality.          EKG:   Procedure Date: 7/05/2023  Findings:  Rate: 121  MA int: 154 ms  QRS duration: 96 ms  QT:292/414 ms  Sinus tachycardia  Possible Left atrial enlargement  Nonspecific T wave abnormality     COVID status/date: [ ]Negative/Date:  [ ]Positive/Date:     *With most recent within 48hrs of OR    Is patient on ACE/ARB? [ X]No [ ]Yes   *If yes, please hold any ACE/ARB the day of surgery    Is patient on Lantus at bedtime?  [X ]No [ ]Yes   *If yes, please half the dose the night before OR since patient will be NPO    Does patient have a contrast allergy? [X ]No [ ]Yes  *If yes, please pre-medicate per protocol    Is patient on anticoagulation? [X ]No [ ] Yes  *If yes, please discuss with team when to hold it    Is the patient Female and <56yo [X]No [ ] Yes  If yes, pregnancy test must be documented in the chart    Is patient on dialysis? [X ]No [ ]Yes  *If yes, please obtain all labs including K level EARLY the day of surgery   *Also, will NOT require IVF past midnight    A/P: 57yMale pre-op for R BKA closure tomorrow.  1. NPO past midnight, except medications  2. Hold IVF CKD  3. [ X} ood on hold, Units:  4. 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 Cuevas  Consent: Pending  HPI:  7M with PMH of DM2, CVA x 2 (one ischemic one embolic, residual left side weakness), HTN, HLD, and CKD V (not on HD yet), BPH, BIBEMS from nursing home for hyperglycemia to 363.  On arrival, patient found to have SIRS (tachycardia, fever 100.8, leukocytosis) and was felt to be septic w/o identified infectious source. Also noted to have anion gap but likely attributable to uremia as well as hyperglycemia, but of note not in DKA. Upon investigation, patient's right heel ulcer was noted to have foul smell so podiatry was consulted and cross-sectional imaging obtained revealing gas tracking along achilles sheath. WBC 25, , .  Vascular surgery was consulted for surgical evaluation. Vascular did R guillotine BKA which has been healing well. His post-op course has been uncomplicated except HTN.He is planned for closure of BKA tomorrow.                          8.4    13.68 )-----------( 449      ( 10 Jul 2023 05:30 )             27.8     07-10    147<H>  |  110<H>  |  82<H>  ----------------------------<  124<H>  3.8   |  20<L>  |  7.61<H>    Ca    8.3<L>      10 Jul 2023 05:30  Phos  4.5     07-10  Mg     2.0     07-10        Urinalysis Basic - ( 10 Jul 2023 05:30 )    Color: x / Appearance: x / SG: x / pH: x  Gluc: 124 mg/dL / Ketone: x  / Bili: x / Urobili: x   Blood: x / Protein: x / Nitrite: x   Leuk Esterase: x / RBC: x / WBC x   Sq Epi: x / Non Sq Epi: x / Bacteria: x        Type & Screen:   Ordered 7/10  Last T x S: 7/6:ABO: B, RH: Positive, Antibody: Negative         CXR:  PROCEDURE DATE: 07/05/2023  IMPRESSION:  No infiltrate lung consolidation pleural effusion or pneumothorax. Left chest loop recorder. Mild hypoinflation. Normal size heart. No acute bone abnormality.          EKG:   Procedure Date: 7/05/2023  Findings:  Rate: 121  HI int: 154 ms  QRS duration: 96 ms  QT:292/414 ms  Sinus tachycardia  Possible Left atrial enlargement  Nonspecific T wave abnormality     COVID status/date: [ ]Negative/Date:  [ ]Positive/Date:     *With most recent within 48hrs of OR    Is patient on ACE/ARB? [ X]No [ ]Yes   *If yes, please hold any ACE/ARB the day of surgery    Is patient on Lantus at bedtime?  [X ]No [ ]Yes   *If yes, please half the dose the night before OR since patient will be NPO    Does patient have a contrast allergy? [X ]No [ ]Yes  *If yes, please pre-medicate per protocol    Is patient on anticoagulation? [X ]No [ ] Yes  *If yes, please discuss with team when to hold it    Is the patient Female and <56yo [X]No [ ] Yes  If yes, pregnancy test must be documented in the chart    Is patient on dialysis? [X ]No [ ]Yes  *If yes, please obtain all labs including K level EARLY the day of surgery   *Also, will NOT require IVF past midnight    A/P: 57yMale pre-op for R BKA closure tomorrow.  1. NPO past midnight, except medications  2. Hold IVF for CKD  3. [ X} blood on hold, Units:  4. 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 Cuevas  Consent: in chart     HPI:  7M with PMH of DM2, CVA x 2 (one ischemic one embolic, residual left side weakness), HTN, HLD, and CKD V (not on HD yet), BPH, BIBEMS from nursing home for hyperglycemia to 363.  On arrival, patient found to have SIRS (tachycardia, fever 100.8, leukocytosis) and was felt to be septic w/o identified infectious source. Also noted to have anion gap but likely attributable to uremia as well as hyperglycemia, but of note not in DKA. Upon investigation, patient's right heel ulcer was noted to have foul smell so podiatry was consulted and cross-sectional imaging obtained revealing gas tracking along achilles sheath. WBC 25, , .  Vascular surgery was consulted for surgical evaluation. Vascular did R guillotine BKA which has been healing well. His post-op course has been uncomplicated except HTN.He is planned for closure of BKA tomorrow.                          8.4    13.68 )-----------( 449      ( 10 Jul 2023 05:30 )             27.8     07-10    147<H>  |  110<H>  |  82<H>  ----------------------------<  124<H>  3.8   |  20<L>  |  7.61<H>    Ca    8.3<L>      10 Jul 2023 05:30  Phos  4.5     07-10  Mg     2.0     07-10        Urinalysis Basic - ( 10 Jul 2023 05:30 )    Color: x / Appearance: x / SG: x / pH: x  Gluc: 124 mg/dL / Ketone: x  / Bili: x / Urobili: x   Blood: x / Protein: x / Nitrite: x   Leuk Esterase: x / RBC: x / WBC x   Sq Epi: x / Non Sq Epi: x / Bacteria: x        Type & Screen:   Ordered 7/10  Last T x S: 7/6:ABO: B, RH: Positive, Antibody: Negative         CXR:  PROCEDURE DATE: 07/05/2023  IMPRESSION:  No infiltrate lung consolidation pleural effusion or pneumothorax. Left chest loop recorder. Mild hypoinflation. Normal size heart. No acute bone abnormality.          EKG:   Procedure Date: 7/05/2023  Findings:  Rate: 121  DC int: 154 ms  QRS duration: 96 ms  QT:292/414 ms  Sinus tachycardia  Possible Left atrial enlargement  Nonspecific T wave abnormality     COVID status/date: [ ]Negative/Date:  [ ]Positive/Date:   N/A  *With most recent within 48hrs of OR    Is patient on ACE/ARB? [ X]No [ ]Yes   *If yes, please hold any ACE/ARB the day of surgery    Is patient on Lantus at bedtime?  [X ]No [ ]Yes   *If yes, please half the dose the night before OR since patient will be NPO    Does patient have a contrast allergy? [X ]No [ ]Yes  *If yes, please pre-medicate per protocol    Is patient on anticoagulation? [X ]No [ ] Yes  *If yes, please discuss with team when to hold it    Is the patient Female and <54yo [X]No [ ] Yes  If yes, pregnancy test must be documented in the chart    Is patient on dialysis? [X ]No [ ]Yes  *If yes, please obtain all labs including K level EARLY the day of surgery   *Also, will NOT require IVF past midnight    A/P: 57yMale pre-op for R BKA closure tomorrow.  1. NPO past midnight, except medications  2. Hold IVF for CKD  3. [ X} blood on hold, Units:  4. Pre-op labs to be drawn in AM

## 2023-07-10 NOTE — SWALLOW BEDSIDE ASSESSMENT ADULT - ADDITIONAL RECOMMENDATIONS
Control risk factors for dysphagia-related asp. PNA via frequent oral hygiene and increasing physical mobility as possible

## 2023-07-10 NOTE — PROGRESS NOTE ADULT - SUBJECTIVE AND OBJECTIVE BOX
O/N: ZULY, HR in high 90s                      ----------------------------------------------------------------------  PLEASE CHECK WHEN PRESENT:     [  ]Heart Failure     [  ] Acute     [  ] Acute on Chronic     [  ] Chronic  -------------------------------------------------------------------     [  ]Diastolic [HFpEF]     [  ]Systolic [HFrEF]     [  ]Combined [HFpEF & HFrEF]  .................................................................................     [  ]Other:     [ ] Pulmonary Hypertension     [ ] Afib     [X] Hypertensive Heart Disease  -------------------------------------------------------------------  [ ] Respiratory failure  [ ] Acute cor pulmonale  [ ] Asthma/COPD Exacerbation  [ ] Pleural effusion  [ ] Aspiration pneumonia  [ ] Obstructive Sleep Apnea  -------------------------------------------------------------------  [ ]ALEXIS     [  ]ATN     [  ]Reneal Medullary Necrosis     [  ]Renal Cortical Necrosis     [  ]Other Pathological Lesions:    [  ]CKD 1  [  ]CKD 2  [  ]CKD 3  [  ]CKD 4  [X]CKD 5  [  ]Other  -------------------------------------------------------------------  [X  ] Diabetes  [  ] Diabetic PVD Ulcer  [  ] Neuropathic ulcer to DM  [  ] Diabetes with Nephropathy  [X ] Osteomyelitis due to diabetes  [X] Hyperglycemia   [  ] Hypoglycemia   --------------------------------------------------------------------  [  ]Malnutrition: See Nutrition Note  [  ]Cachexia  [  ]Other:   [  ]Supplement Ordered:  [  ]Morbid Obesity (BMI >=40]  ---------------------------------------------------------------------  [ ] Sepsis/severe sepsis/septic shock  [ ] Noninfectious SIRS  [ ] UTI  [ ] Pneumonia  -----------------------------------------------------------------------  [ ] Acidosis/alkalosis  [ ] Fluid overload  [ ] Hypokalemia  [ ] Hyperkalemia  [ ] Hypomagnesemia  [ ] Hypophosphatemia  [ ] Hyperphosphatemia  ------------------------------------------------------------------------  [ ] Acute blood loss anemia  [ ] Post op blood loss anemia  [ ] Iron deficiency anemia  [ ] Anemia due to chronic disease  [ ] Hypercoagulable state  [ ] Thrombocytopenia  ----------------------------------------------------------------------  [ ] Cerebral infarction  [ ] Transient ischemia attack  [ ] Encephalopathy - Toxic or Metabolic        Assessment and Plan:   · Assessment	  A/P: 57M with PMHx of DM2, 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 Royal) who presented to [facility] on 7/5 from nursing home for hyperglycemia who was found to have infected likely diabetic toe wound with gas-producing organism. 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 CT revealed gas tracking along achilles sheath and vascular surgery was consulted for surgical evaluation. Patient is POD2 s/p guillotine R BKA (7/8) transferred to floor from SICU overnight     Vascular/PAD:  -  L foot necrotizing fasciitis s/p R BKA 7/8  - Dressing change today R BKA - WTD gauze, Kerlix, ACE wrap  - Plan to RTOR 7/11 for closure R BKA    Neuro  - A&Ox2 at baseline, possible early onset dementia.  - Hx of CVA with residual L sided weakness   - F/U neurology   - D/C vEEG monitoring today    HTN/HLD:  - continue home nifedipine. Labetalol/Hydralazine push PRN for hypertension >170s  - Appreciate medicine recs for improved BP control  - continue home atorvastatin.   - Most recent echo 10/22 - LVH present with EF 50% with mild TR,     CAD/CHF:   - continue home ASA 81, Plavix 75.     Diet/DM:  - Appreciate medicine recs for DM amanagement  - Pureed DM diet  - mISS  - Consider resuming home Lantus 20 U nightly, currently held  - Monitor FS.    CKD stage V:   - baseline Cr 7-8.  Most recent Cr 8.09  - Appreciate renal recommendations  - No apparent urgent indication for HD at this time, will continue to monitor  - Strict I&O  - Daily weights  - Avoid Nephrotoxic drugs.   - Start NaHCO3 tabs if bicarb drops <22   - Maintain Borja in place at this time as required multiple straight caths prior to placement    Anemia:   - Trend daily labs  - Maintain active T/S for OR    ID:  - Appreciate ID recommendations  - OR Cx growing Morganella, E. coli, Proteus, Strep. MRSA negative  - Continue on Zosyn at this time for L foot necrotizing fasciitis s/p L BKA 7/8 with improving leukocytosis,  Clindamycin (7/6 - 7/7), Vanc (7/6 7/8), Zosyn (7/6 -)    Activity:   - PT/OT eval  - OOB as tolerated  - Encourage OOB to chair today    DVTPPx:   - SQH/SCDs    Dispo:   - OR Tuesday 7/11 for L BKA closure       O/N: ZULY, HR in high 90s      Subjective: PT was laying comfortable in bed this morning, dressing was changed.     ROS:   Denies Headache, blurred vision, Chest Pain, SOB, Abdominal pain, nausea or vomiting     Social   piperacillin/tazobactam IVPB.. 4.5  aspirin  chewable 81  clopidogrel Tablet 75  heparin   Injectable 5000  hydrALAZINE 25  NIFEdipine XL 90  piperacillin/tazobactam IVPB.. 4.5      Allergies    No Known Allergies    Intolerances        Vital Signs Last 24 Hrs  T(C): 36.7 (10 Jul 2023 05:34), Max: 36.7 (09 Jul 2023 22:41)  T(F): 98 (10 Jul 2023 05:34), Max: 98 (09 Jul 2023 22:41)  HR: 105 (10 Jul 2023 05:27) (93 - 105)  BP: 174/91 (10 Jul 2023 05:27) (141/65 - 185/85)  BP(mean): 126 (10 Jul 2023 05:27) (94 - 126)  RR: 16 (10 Jul 2023 05:27) (16 - 18)  SpO2: 100% (10 Jul 2023 05:27) (98% - 100%)    Parameters below as of 10 Jul 2023 05:27  Patient On (Oxygen Delivery Method): room air      I&O's Summary    08 Jul 2023 07:01  -  09 Jul 2023 07:00  --------------------------------------------------------  IN: 1070 mL / OUT: 1450 mL / NET: -380 mL    09 Jul 2023 07:01  -  10 Jul 2023 06:50  --------------------------------------------------------  IN: 385 mL / OUT: 1150 mL / NET: -765 mL        Physical Exam:  General: NAD, frail appearing, resting comfortably   HEENT: Head wrapped with EEG leads in place on monitor, dressing C/D/I no evidence of staining or bleeding  Pulmonary: Nonlabored breathing, regular rate on room air  Cardiovascular: WWP, RRR  Abdominal: Soft, NT/ND, no rebound  Extremities: RLE with OR dressing C/D/I no staining appreciated, Dressing changed at bedside no active bleeding note      LABS:                        8.4    13.68 )-----------( 449      ( 10 Jul 2023 05:30 )             27.8     07-09    144  |  107  |  82<H>  ----------------------------<  97  4.0   |  23  |  7.50<H>    Ca    8.2<L>      09 Jul 2023 12:10  Phos  4.3     07-09  Mg     1.9     07-09      ----------------------------------------------------------------------  PLEASE CHECK WHEN PRESENT:     [  ]Heart Failure     [  ] Acute     [  ] Acute on Chronic     [  ] Chronic  -------------------------------------------------------------------     [  ]Diastolic [HFpEF]     [  ]Systolic [HFrEF]     [  ]Combined [HFpEF & HFrEF]  .................................................................................     [  ]Other:     [ ] Pulmonary Hypertension     [ ] Afib     [X] Hypertensive Heart Disease  -------------------------------------------------------------------  [ ] Respiratory failure  [ ] Acute cor pulmonale  [ ] Asthma/COPD Exacerbation  [ ] Pleural effusion  [ ] Aspiration pneumonia  [ ] Obstructive Sleep Apnea  -------------------------------------------------------------------  [ ]ALEXIS     [  ]ATN     [  ]Reneal Medullary Necrosis     [  ]Renal Cortical Necrosis     [  ]Other Pathological Lesions:    [  ]CKD 1  [  ]CKD 2  [  ]CKD 3  [  ]CKD 4  [X]CKD 5  [  ]Other  -------------------------------------------------------------------  [X  ] Diabetes  [  ] Diabetic PVD Ulcer  [  ] Neuropathic ulcer to DM  [  ] Diabetes with Nephropathy  [X ] Osteomyelitis due to diabetes  [X] Hyperglycemia   [  ] Hypoglycemia   --------------------------------------------------------------------  [  ]Malnutrition: See Nutrition Note  [  ]Cachexia  [  ]Other:   [  ]Supplement Ordered:  [  ]Morbid Obesity (BMI >=40]  ---------------------------------------------------------------------  [ ] Sepsis/severe sepsis/septic shock  [ ] Noninfectious SIRS  [ ] UTI  [ ] Pneumonia  -----------------------------------------------------------------------  [ ] Acidosis/alkalosis  [ ] Fluid overload  [ ] Hypokalemia  [ ] Hyperkalemia  [ ] Hypomagnesemia  [ ] Hypophosphatemia  [ ] Hyperphosphatemia  ------------------------------------------------------------------------  [ ] Acute blood loss anemia  [ ] Post op blood loss anemia  [ ] Iron deficiency anemia  [ ] Anemia due to chronic disease  [ ] Hypercoagulable state  [ ] Thrombocytopenia  ----------------------------------------------------------------------  [ ] Cerebral infarction  [ ] Transient ischemia attack  [ ] Encephalopathy - Toxic or Metabolic        Assessment and Plan:   · Assessment	  A/P: 57M with PMHx of DM2, 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 Royal) who presented to [facility] on 7/5 from nursing home for hyperglycemia who was found to have infected likely diabetic toe wound with gas-producing organism. 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 CT revealed gas tracking along achilles sheath and vascular surgery was consulted for surgical evaluation. Patient is POD2 s/p guillotine R BKA (7/8) transferred to floor from SICU overnight     Vascular/PAD:  -  L foot necrotizing fasciitis s/p R BKA 7/8  - Dressing change today R BKA - WTD gauze, Kerlix, ACE wrap  - Plan to RTOR 7/11 for closure R BKA  - pre op and consent for OT tomorrow.     Neuro  - A&Ox2 at baseline, possible early onset dementia.  - Hx of CVA with residual L sided weakness   - F/U neurology   - D/C vEEG monitoring today    HTN/HLD:  - continue home nifedipine. Labetalol/Hydralazine push PRN for hypertension >170s  - Appreciate medicine recs for improved BP control  - continue home atorvastatin.   - Most recent echo 10/22 - LVH present with EF 50% with mild TR,     CAD/CHF:   - continue home ASA 81, Plavix 75.     Diet/DM:  - Appreciate medicine recs for DM amanagement  - Pureed DM diet  - mISS  - Consider resuming home Lantus 20 U nightly, currently held  - Monitor FS.  - NPO at MN.     CKD stage V:   - baseline Cr 7-8.  Most recent Cr 8.09  - Appreciate renal recommendations  - No apparent urgent indication for HD at this time, will continue to monitor  - Strict I&O  - Daily weights  - Avoid Nephrotoxic drugs.   - Start NaHCO3 tabs if bicarb drops <22   - Maintain Borja in place at this time as required multiple straight caths prior to placement    Anemia:   - Trend daily labs  - Maintain active T/S for OR    ID:  - Appreciate ID recommendations  - OR Cx growing Morganella, E. coli, Proteus, Strep. MRSA negative  - Continue on Zosyn at this time for L foot necrotizing fasciitis s/p L BKA 7/8 with improving leukocytosis,  Clindamycin (7/6 - 7/7), Vanc (7/6 7/8), Zosyn (7/6 -)    Activity:   - PT/OT eval  - OOB as tolerated  - Encourage OOB to chair today    DVTPPx:   - SQH/SCDs    Dispo:   - OR Tuesday 7/11 for L BKA closure

## 2023-07-10 NOTE — SWALLOW BEDSIDE ASSESSMENT ADULT - NS SPL SWALLOW CLINIC TRIAL FT
Adequate bolus containment but slow, prolonged, and reduced mastication of solids with mild diffuse residue. Laryngeal movement palpated; 2-3 swallows per single bolus suggestive of pharyngeal clearance deficits. Immediate coughing fit x2 with self-administered cup sips suggestive of aspiration.

## 2023-07-10 NOTE — PHYSICAL THERAPY INITIAL EVALUATION ADULT - ADDITIONAL COMMENTS
Pt poor historian. As per SW, pt admitted from long term care facility. Pt states he has a wheel chair

## 2023-07-10 NOTE — PROGRESS NOTE ADULT - SUBJECTIVE AND OBJECTIVE BOX
Patient is a 57y Male seen and evaluated at bedside. Laying comfortably in bed. Denies any CP, SOB.      Meds:    acetaminophen     Tablet .. 650 every 4 hours PRN  aspirin  chewable 81 daily  atorvastatin 40 at bedtime  chlorhexidine 2% Cloths 1 <User Schedule>  clopidogrel Tablet 75 daily  dextrose 5%. 1000 <Continuous>  dextrose 5%. 1000 <Continuous>  dextrose 50% Injectable 25 once  dextrose 50% Injectable 12.5 once  dextrose 50% Injectable 25 once  dextrose Oral Gel 15 once PRN  glucagon  Injectable 1 once  heparin   Injectable 5000 every 8 hours  HYDROmorphone  Injectable 0.5 every 2 hours PRN  insulin lispro (ADMELOG) corrective regimen sliding scale  Before meals and at bedtime  labetalol Injectable 10 every 6 hours PRN  modafinil 100 daily  NIFEdipine XL 60 every 24 hours  ondansetron Injectable 4 every 4 hours PRN  oxyCODONE    IR 5 every 4 hours PRN  oxyCODONE    IR 10 every 4 hours PRN  piperacillin/tazobactam IVPB.. 4.5 every 12 hours  tamsulosin 0.4 at bedtime      T(C): , Max: 37.1 (07-08-23 @ 12:00)  T(F): , Max: 98.7 (07-08-23 @ 12:00)  HR: 96 (07-09-23 @ 08:48)  BP: 166/85 (07-09-23 @ 08:48)  BP(mean): 98 (07-08-23 @ 14:14)  RR: 16 (07-09-23 @ 08:48)  SpO2: 98% (07-09-23 @ 08:48)  Wt(kg): --    07-08 @ 07:01  -  07-09 @ 07:00  --------------------------------------------------------  IN: 1070 mL / OUT: 1450 mL / NET: -380 mL    07-09 @ 07:01  -  07-09 @ 09:49  --------------------------------------------------------  IN: 220 mL / OUT: 0 mL / NET: 220 mL          Review of Systems:  ROS negative except as per HPI      PHYSICAL EXAM:  GENERAL: NAD, lying in bed, cachectic   HEART: Regular rate and rhythm  LUNGS:  Clear to auscultation eleazar  ABD: Soft, nontender, nondistended, +BS  Ext: RLU with BKA, covered with band/gauze.   Nerv:  A&Ox2, left hemiparesis.    Vascular Access: AVF in LUE, with palpable thrill.         LABS:                        7.9    11.25 )-----------( 402      ( 09 Jul 2023 05:30 )             25.1     07-09    149<H>  |  110<H>  |  83<H>  ----------------------------<  92  3.4<L>   |  23  |  8.09<H>    Ca    8.4      09 Jul 2023 05:30  Phos  4.3     07-09  Mg     1.9     07-09          Urinalysis Basic - ( 09 Jul 2023 05:30 )    Color: x / Appearance: x / SG: x / pH: x  Gluc: 92 mg/dL / Ketone: x  / Bili: x / Urobili: x   Blood: x / Protein: x / Nitrite: x   Leuk Esterase: x / RBC: x / WBC x   Sq Epi: x / Non Sq Epi: x / Bacteria: x            RADIOLOGY & ADDITIONAL STUDIES:

## 2023-07-10 NOTE — PROGRESS NOTE ADULT - SUBJECTIVE AND OBJECTIVE BOX
HPI: 57M with PMH of DM2, CVA x 2 (one ischemic one embolic, residual left side weakness), HTN, HLD, and CKD V (not on HD yet), BPH, BIBEMS from nursing home for hyperglycemia to 363.  On arrival, patient found to be septic 2/2 RLE gas gangrene. Also noted to have anion gap but likely attributable to uremia as well as hyperglycemia, but of note not in DKA. Was initially admitted to MICU and then transferred to Vacular surgery service for R BKA. His hospital course was complicated by AMS, currently on vEEG with no epileptiform activity. Medicine following for comanagement.     Pt seen and examined by me  no acute events        MICU course:  (06 Jul 2023 04:16)      ROS: A 10-point review of systems was otherwise negative.    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, traumatic, left, sequela,          SOCIAL HISTORY:  FAMILY HISTORY:  Family history of diabetes mellitus (Mother)  Family history of diabetes mellitus (DM)        ALLERGIES: 	  No Known Allergies            MEDICATIONS:  acetaminophen     Tablet .. 650 milliGRAM(s) Oral every 4 hours PRN  aspirin  chewable 81 milliGRAM(s) Oral daily  atorvastatin 40 milliGRAM(s) Oral at bedtime  chlorhexidine 2% Cloths 1 Application(s) Topical <User Schedule>  clopidogrel Tablet 75 milliGRAM(s) Oral daily  dextrose 5%. 1000 milliLiter(s) IV Continuous <Continuous>  dextrose 5%. 1000 milliLiter(s) IV Continuous <Continuous>  dextrose 50% Injectable 25 Gram(s) IV Push once  dextrose 50% Injectable 25 Gram(s) IV Push once  dextrose 50% Injectable 12.5 Gram(s) IV Push once  dextrose Oral Gel 15 Gram(s) Oral once PRN  glucagon  Injectable 1 milliGRAM(s) IntraMuscular once  heparin   Injectable 5000 Unit(s) SubCutaneous every 8 hours  HYDROmorphone  Injectable 0.5 milliGRAM(s) IV Push every 2 hours PRN  insulin lispro (ADMELOG) corrective regimen sliding scale   SubCutaneous Before meals and at bedtime  modafinil 100 milliGRAM(s) Oral daily  NIFEdipine XL 60 milliGRAM(s) Oral every 24 hours  ondansetron Injectable 4 milliGRAM(s) IV Push every 4 hours PRN  oxyCODONE    IR 5 milliGRAM(s) Oral every 4 hours PRN  oxyCODONE    IR 10 milliGRAM(s) Oral every 4 hours PRN  piperacillin/tazobactam IVPB.. 4.5 Gram(s) IV Intermittent every 12 hours  tamsulosin 0.4 milliGRAM(s) Oral at bedtime      PHYSICAL EXAM:  T(C): 36 (07-09-23 @ 09:30), Max: 37.1 (07-08-23 @ 12:00)  HR: 96 (07-09-23 @ 08:48) (92 - 114)  BP: 166/85 (07-09-23 @ 08:48) (134/77 - 180/81)  RR: 16 (07-09-23 @ 08:48) (16 - 18)  SpO2: 98% (07-09-23 @ 08:48) (98% - 100%)  Wt(kg): --    GEN: Awake, comfortable. NAD.   HEENT: NCAT, PERRL, EOMI. Mucosa moist. No JVD.   RESP: CTA b/l  CV: RRR, normal s1/s2. No m/r/g.  ABD: Soft, NTND. BS+  EXT: Warm. R foot wrapped.       I&O's Summary    08 Jul 2023 07:01  -  09 Jul 2023 07:00  --------------------------------------------------------  IN: 1070 mL / OUT: 1450 mL / NET: -380 mL    09 Jul 2023 07:01  -  09 Jul 2023 10:44  --------------------------------------------------------  IN: 220 mL / OUT: 0 mL / NET: 220 mL        	  LABS:	 	    CARDIAC MARKERS:                                  7.9    11.25 )-----------( 402      ( 09 Jul 2023 05:30 )             25.1     07-09    149<H>  |  110<H>  |  83<H>  ----------------------------<  92  3.4<L>   |  23  |  8.09<H>    Ca    8.4      09 Jul 2023 05:30  Phos  4.3     07-09  Mg     1.9     07-09

## 2023-07-10 NOTE — PHYSICAL THERAPY INITIAL EVALUATION ADULT - MANUAL MUSCLE TESTING RESULTS, REHAB EVAL
r knee ext unable to perform, left knee ext 3-/, L knee flex 2+/5, left hip flex 3-/5, left hip ext 2+/5

## 2023-07-11 ENCOUNTER — TRANSCRIPTION ENCOUNTER (OUTPATIENT)
Age: 58
End: 2023-07-11

## 2023-07-11 LAB
ANION GAP SERPL CALC-SCNC: 16 MMOL/L — SIGNIFICANT CHANGE UP (ref 5–17)
ANION GAP SERPL CALC-SCNC: 17 MMOL/L — SIGNIFICANT CHANGE UP (ref 5–17)
ANISOCYTOSIS BLD QL: SLIGHT — SIGNIFICANT CHANGE UP
APTT BLD: 29.7 SEC — SIGNIFICANT CHANGE UP (ref 27.5–35.5)
BASOPHILS # BLD AUTO: 0 K/UL — SIGNIFICANT CHANGE UP (ref 0–0.2)
BASOPHILS NFR BLD AUTO: 0 % — SIGNIFICANT CHANGE UP (ref 0–2)
BLD GP AB SCN SERPL QL: NEGATIVE — SIGNIFICANT CHANGE UP
BUN SERPL-MCNC: 72 MG/DL — HIGH (ref 7–23)
BUN SERPL-MCNC: 74 MG/DL — HIGH (ref 7–23)
BURR CELLS BLD QL SMEAR: PRESENT — SIGNIFICANT CHANGE UP
CALCIUM SERPL-MCNC: 8.4 MG/DL — SIGNIFICANT CHANGE UP (ref 8.4–10.5)
CALCIUM SERPL-MCNC: 8.6 MG/DL — SIGNIFICANT CHANGE UP (ref 8.4–10.5)
CHLORIDE SERPL-SCNC: 110 MMOL/L — HIGH (ref 96–108)
CHLORIDE SERPL-SCNC: 113 MMOL/L — HIGH (ref 96–108)
CO2 SERPL-SCNC: 20 MMOL/L — LOW (ref 22–31)
CO2 SERPL-SCNC: 21 MMOL/L — LOW (ref 22–31)
CREAT SERPL-MCNC: 7.5 MG/DL — HIGH (ref 0.5–1.3)
CREAT SERPL-MCNC: 7.58 MG/DL — HIGH (ref 0.5–1.3)
EGFR: 8 ML/MIN/1.73M2 — LOW
EGFR: 8 ML/MIN/1.73M2 — LOW
EOSINOPHIL # BLD AUTO: 0.31 K/UL — SIGNIFICANT CHANGE UP (ref 0–0.5)
EOSINOPHIL NFR BLD AUTO: 1.8 % — SIGNIFICANT CHANGE UP (ref 0–6)
GLUCOSE BLDC GLUCOMTR-MCNC: 141 MG/DL — HIGH (ref 70–99)
GLUCOSE BLDC GLUCOMTR-MCNC: 148 MG/DL — HIGH (ref 70–99)
GLUCOSE BLDC GLUCOMTR-MCNC: 156 MG/DL — HIGH (ref 70–99)
GLUCOSE BLDC GLUCOMTR-MCNC: 161 MG/DL — HIGH (ref 70–99)
GLUCOSE BLDC GLUCOMTR-MCNC: 186 MG/DL — HIGH (ref 70–99)
GLUCOSE SERPL-MCNC: 147 MG/DL — HIGH (ref 70–99)
GLUCOSE SERPL-MCNC: 154 MG/DL — HIGH (ref 70–99)
HCT VFR BLD CALC: 25.6 % — LOW (ref 39–50)
HCT VFR BLD CALC: 27.5 % — LOW (ref 39–50)
HCT VFR BLD CALC: 28.5 % — LOW (ref 39–50)
HGB BLD-MCNC: 7.5 G/DL — LOW (ref 13–17)
HGB BLD-MCNC: 8.7 G/DL — LOW (ref 13–17)
HGB BLD-MCNC: 9 G/DL — LOW (ref 13–17)
HYPOCHROMIA BLD QL: SLIGHT — SIGNIFICANT CHANGE UP
INR BLD: 1.15 — SIGNIFICANT CHANGE UP (ref 0.88–1.16)
LYMPHOCYTES # BLD AUTO: 1.07 K/UL — SIGNIFICANT CHANGE UP (ref 1–3.3)
LYMPHOCYTES # BLD AUTO: 6.2 % — LOW (ref 13–44)
MACROCYTES BLD QL: SLIGHT — SIGNIFICANT CHANGE UP
MAGNESIUM SERPL-MCNC: 1.8 MG/DL — SIGNIFICANT CHANGE UP (ref 1.6–2.6)
MAGNESIUM SERPL-MCNC: 1.8 MG/DL — SIGNIFICANT CHANGE UP (ref 1.6–2.6)
MANUAL SMEAR VERIFICATION: SIGNIFICANT CHANGE UP
MCHC RBC-ENTMCNC: 27.8 PG — SIGNIFICANT CHANGE UP (ref 27–34)
MCHC RBC-ENTMCNC: 28.6 PG — SIGNIFICANT CHANGE UP (ref 27–34)
MCHC RBC-ENTMCNC: 28.6 PG — SIGNIFICANT CHANGE UP (ref 27–34)
MCHC RBC-ENTMCNC: 29.3 GM/DL — LOW (ref 32–36)
MCHC RBC-ENTMCNC: 31.6 GM/DL — LOW (ref 32–36)
MCHC RBC-ENTMCNC: 31.6 GM/DL — LOW (ref 32–36)
MCV RBC AUTO: 90.5 FL — SIGNIFICANT CHANGE UP (ref 80–100)
MCV RBC AUTO: 90.5 FL — SIGNIFICANT CHANGE UP (ref 80–100)
MCV RBC AUTO: 94.8 FL — SIGNIFICANT CHANGE UP (ref 80–100)
METAMYELOCYTES # FLD: 0.9 % — HIGH (ref 0–0)
MICROCYTES BLD QL: SLIGHT — SIGNIFICANT CHANGE UP
MONOCYTES # BLD AUTO: 0.45 K/UL — SIGNIFICANT CHANGE UP (ref 0–0.9)
MONOCYTES NFR BLD AUTO: 2.6 % — SIGNIFICANT CHANGE UP (ref 2–14)
NEUTROPHILS # BLD AUTO: 15.29 K/UL — HIGH (ref 1.8–7.4)
NEUTROPHILS NFR BLD AUTO: 88.5 % — HIGH (ref 43–77)
NRBC # BLD: 0 /100 WBCS — SIGNIFICANT CHANGE UP (ref 0–0)
PHOSPHATE SERPL-MCNC: 4.2 MG/DL — SIGNIFICANT CHANGE UP (ref 2.5–4.5)
PHOSPHATE SERPL-MCNC: 5 MG/DL — HIGH (ref 2.5–4.5)
PLAT MORPH BLD: ABNORMAL
PLATELET # BLD AUTO: 347 K/UL — SIGNIFICANT CHANGE UP (ref 150–400)
PLATELET # BLD AUTO: 418 K/UL — HIGH (ref 150–400)
PLATELET # BLD AUTO: 420 K/UL — HIGH (ref 150–400)
POIKILOCYTOSIS BLD QL AUTO: SIGNIFICANT CHANGE UP
POLYCHROMASIA BLD QL SMEAR: SLIGHT — SIGNIFICANT CHANGE UP
POTASSIUM SERPL-MCNC: 3.4 MMOL/L — LOW (ref 3.5–5.3)
POTASSIUM SERPL-MCNC: 3.4 MMOL/L — LOW (ref 3.5–5.3)
POTASSIUM SERPL-SCNC: 3.4 MMOL/L — LOW (ref 3.5–5.3)
POTASSIUM SERPL-SCNC: 3.4 MMOL/L — LOW (ref 3.5–5.3)
PROTHROM AB SERPL-ACNC: 13.7 SEC — HIGH (ref 10.5–13.4)
RBC # BLD: 2.7 M/UL — LOW (ref 4.2–5.8)
RBC # BLD: 3.04 M/UL — LOW (ref 4.2–5.8)
RBC # BLD: 3.15 M/UL — LOW (ref 4.2–5.8)
RBC # FLD: 15.3 % — HIGH (ref 10.3–14.5)
RBC # FLD: 15.4 % — HIGH (ref 10.3–14.5)
RBC # FLD: 15.7 % — HIGH (ref 10.3–14.5)
RBC BLD AUTO: ABNORMAL
RH IG SCN BLD-IMP: POSITIVE — SIGNIFICANT CHANGE UP
SCHISTOCYTES BLD QL AUTO: SLIGHT — SIGNIFICANT CHANGE UP
SODIUM SERPL-SCNC: 147 MMOL/L — HIGH (ref 135–145)
SODIUM SERPL-SCNC: 150 MMOL/L — HIGH (ref 135–145)
WBC # BLD: 17.17 K/UL — HIGH (ref 3.8–10.5)
WBC # BLD: 17.28 K/UL — HIGH (ref 3.8–10.5)
WBC # BLD: 18.98 K/UL — HIGH (ref 3.8–10.5)
WBC # FLD AUTO: 17.17 K/UL — HIGH (ref 3.8–10.5)
WBC # FLD AUTO: 17.28 K/UL — HIGH (ref 3.8–10.5)
WBC # FLD AUTO: 18.98 K/UL — HIGH (ref 3.8–10.5)

## 2023-07-11 PROCEDURE — 99233 SBSQ HOSP IP/OBS HIGH 50: CPT | Mod: GC

## 2023-07-11 PROCEDURE — 27880 AMPUTATION OF LOWER LEG: CPT | Mod: GC,58,RT

## 2023-07-11 RX ORDER — SODIUM CHLORIDE 9 MG/ML
1000 INJECTION INTRAMUSCULAR; INTRAVENOUS; SUBCUTANEOUS
Refills: 0 | Status: DISCONTINUED | OUTPATIENT
Start: 2023-07-11 | End: 2023-07-11

## 2023-07-11 RX ORDER — SODIUM CHLORIDE 9 MG/ML
1000 INJECTION, SOLUTION INTRAVENOUS
Refills: 0 | Status: DISCONTINUED | OUTPATIENT
Start: 2023-07-11 | End: 2023-07-12

## 2023-07-11 RX ORDER — VANCOMYCIN HCL 1 G
1250 VIAL (EA) INTRAVENOUS ONCE
Refills: 0 | Status: DISCONTINUED | OUTPATIENT
Start: 2023-07-11 | End: 2023-07-11

## 2023-07-11 RX ORDER — OXYCODONE AND ACETAMINOPHEN 5; 325 MG/1; MG/1
1 TABLET ORAL EVERY 6 HOURS
Refills: 0 | Status: DISCONTINUED | OUTPATIENT
Start: 2023-07-11 | End: 2023-07-15

## 2023-07-11 RX ORDER — VANCOMYCIN HCL 1 G
1250 VIAL (EA) INTRAVENOUS ONCE
Refills: 0 | Status: COMPLETED | OUTPATIENT
Start: 2023-07-11 | End: 2023-07-11

## 2023-07-11 RX ORDER — HYDROMORPHONE HYDROCHLORIDE 2 MG/ML
0.5 INJECTION INTRAMUSCULAR; INTRAVENOUS; SUBCUTANEOUS
Refills: 0 | Status: DISCONTINUED | OUTPATIENT
Start: 2023-07-11 | End: 2023-07-12

## 2023-07-11 RX ADMIN — PIPERACILLIN AND TAZOBACTAM 25 GRAM(S): 4; .5 INJECTION, POWDER, LYOPHILIZED, FOR SOLUTION INTRAVENOUS at 06:35

## 2023-07-11 RX ADMIN — Medication 2: at 22:37

## 2023-07-11 RX ADMIN — Medication 0: at 19:02

## 2023-07-11 RX ADMIN — SODIUM CHLORIDE 80 MILLILITER(S): 9 INJECTION, SOLUTION INTRAVENOUS at 20:21

## 2023-07-11 RX ADMIN — CHLORHEXIDINE GLUCONATE 1 APPLICATION(S): 213 SOLUTION TOPICAL at 06:35

## 2023-07-11 RX ADMIN — AMLODIPINE BESYLATE 10 MILLIGRAM(S): 2.5 TABLET ORAL at 07:00

## 2023-07-11 RX ADMIN — HEPARIN SODIUM 5000 UNIT(S): 5000 INJECTION INTRAVENOUS; SUBCUTANEOUS at 06:52

## 2023-07-11 RX ADMIN — ATORVASTATIN CALCIUM 40 MILLIGRAM(S): 80 TABLET, FILM COATED ORAL at 22:38

## 2023-07-11 RX ADMIN — Medication 4 MILLIGRAM(S): at 22:38

## 2023-07-11 RX ADMIN — HEPARIN SODIUM 5000 UNIT(S): 5000 INJECTION INTRAVENOUS; SUBCUTANEOUS at 23:32

## 2023-07-11 RX ADMIN — Medication 166.67 MILLIGRAM(S): at 23:58

## 2023-07-11 RX ADMIN — PIPERACILLIN AND TAZOBACTAM 25 GRAM(S): 4; .5 INJECTION, POWDER, LYOPHILIZED, FOR SOLUTION INTRAVENOUS at 20:01

## 2023-07-11 RX ADMIN — Medication 25 MILLIGRAM(S): at 07:00

## 2023-07-11 RX ADMIN — Medication 25 MILLIGRAM(S): at 22:39

## 2023-07-11 NOTE — PROGRESS NOTE ADULT - SUBJECTIVE AND OBJECTIVE BOX
O/N: ZULY. Switched flomax to doxazozin (can be crushed). 2am           A/P: 57M with PMHx of DM2, 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 Royal) who presented to [facility] on 7/5 from nursing home for hyperglycemia who was found to have infected likely diabetic toe wound with gas-producing organism. 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 CT revealed gas tracking along achilles sheath and vascular surgery was consulted for surgical evaluation. Patient is POD2 s/p guillotine R BKA (7/8) transferred to floor from SICU overnight     Vascular/PAD:  -  L foot necrotizing fasciitis s/p R BKA 7/8  - Dressing change today R BKA - WTD gauze, Kerlix, ACE wrap  - Plan to RTOR 7/11 for closure R BKA  - pre op and consent for OT today.     Neuro  - A&Ox2 at baseline, possible early onset dementia.  - Hx of CVA with residual L sided weakness   - F/U neurology     HTN/HLD:  - continue hydralazine and amlodipine  - continue home atorvastatin  - Most recent echo 10/22 - LVH present with EF 50% with mild TR,     CAD/CHF:   - continue home ASA 81, Plavix 75.     DM:  - Appreciate medicine recs for DM management  - Pureed DM diet  - mISS + monitor FSG  - Consider resuming home Lantus 20 U nightly, currently held    CKD stage V:   - baseline Cr 7-8.  Most recent Cr 8.09  - Appreciate renal recommendations  - Strict I&Os  - Avoid Nephrotoxic drugs.   - Start NaHCO3 tabs if bicarb drops <22   - Maintain Borja in place at this time as required multiple straight caths prior to placement    Anemia:   - Trend daily labs  - Maintain active T/S for OR    ID:  - Appreciate ID recommendations  - OR Cx growing Morganella, E. coli, Proteus, Strep. MRSA negative  - Continue on Zosyn at this time for L foot necrotizing fasciitis s/p L BKA 7/8 with improving leukocytosis,  Clindamycin (7/6 - 7/7), Vanc (7/6 - 7/8), Zosyn (7/6 -)    Diet: npo for OR      Activity: A/P: 57M with PMHx of DM2, 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 Royal) who presented to [facility] on 7/5 from nursing home for hyperglycemia who was found to have infected likely diabetic toe wound with gas-producing organism. 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 CT revealed gas tracking along achilles sheath and vascular surgery was consulted for surgical evaluation. Patient is POD2 s/p guillotine R BKA (7/8) transferred to floor from SICU overnight     Vascular/PAD:  -  L foot necrotizing fasciitis s/p R BKA 7/8  - Dressing change today R BKA - WTD gauze, Kerlix, ACE wrap  - Plan to RTOR 7/11 for closure R BKA  - pre op and consent for OT tomorrow.     Neuro  - A&Ox2 at baseline, possible early onset dementia.  - Hx of CVA with residual L sided weakness   - F/U neurology   - D/C vEEG monitoring today    HTN/HLD:  - continue home nifedipine. Labetalol/Hydralazine push PRN for hypertension >170s  - Appreciate medicine recs for improved BP control  - continue home atorvastatin.   - Most recent echo 10/22 - LVH present with EF 50% with mild TR,     CAD/CHF:   - continue home ASA 81, Plavix 75.     Diet/DM:  - Appreciate medicine recs for DM amanagement  - Pureed DM diet  - mISS  - Consider resuming home Lantus 20 U nightly, currently held  - Monitor FS.  - NPO at MN.     CKD stage V:   - baseline Cr 7-8.  Most recent Cr 8.09  - Appreciate renal recommendations  - No apparent urgent indication for HD at this time, will continue to monitor  - Strict I&O  - Daily weights  - Avoid Nephrotoxic drugs.   - Start NaHCO3 tabs if bicarb drops <22   - Maintain Borja in place at this time as required multiple straight caths prior to placement    Anemia:   - Trend daily labs  - Maintain active T/S for OR    ID:  - Appreciate ID recommendations  - OR Cx growing Morganella, E. coli, Proteus, Strep. MRSA negative  - Continue on Zosyn at this time for L foot necrotizing fasciitis s/p L BKA 7/8 with improving leukocytosis,  Clindamycin (7/6 - 7/7), Vanc (7/6 7/8), Zosyn (7/6 -)    Activity:   - PT/OT eval  - OOB as tolerated  - Encourage OOB to chair today    DVTPPx:   - SQH/SCDs    Dispo: OOB as tolerated  - PT/OT eval - recommended MIKHAIL      DVTPPx: SQ    Dispo: Pending OR today 7/11 for L BKA closure   O/N: ZULY. Switched flomax to doxazozin (can be crushed). 2am       Subjective: PT was see and examined with the team this morning. He was laying comfortable in bed. PT is planned for R BKA closure today.    ROS:   Denies Headache, blurred vision, Chest Pain, SOB, Abdominal pain, nausea or vomiting     Social   piperacillin/tazobactam IVPB.. 4.5  amLODIPine   Tablet 10  aspirin  chewable 81  clopidogrel Tablet 75  doxazosin 4  heparin   Injectable 5000  hydrALAZINE 25  piperacillin/tazobactam IVPB.. 4.5      Allergies    No Known Allergies    Intolerances        Vital Signs Last 24 Hrs  T(C): 36.7 (11 Jul 2023 05:19), Max: 36.8 (10 Jul 2023 18:00)  T(F): 98.1 (11 Jul 2023 05:19), Max: 98.2 (10 Jul 2023 18:00)  HR: 90 (11 Jul 2023 06:04) (90 - 108)  BP: 160/74 (11 Jul 2023 06:04) (144/67 - 183/92)  BP(mean): 97 (11 Jul 2023 01:22) (97 - 124)  RR: 18 (11 Jul 2023 06:04) (16 - 18)  SpO2: 98% (11 Jul 2023 06:04) (97% - 98%)    Parameters below as of 11 Jul 2023 06:04  Patient On (Oxygen Delivery Method): room air      I&O's Summary    10 Jul 2023 07:01  -  11 Jul 2023 07:00  --------------------------------------------------------  IN: 170 mL / OUT: 850 mL / NET: -680 mL        Physical Exam:  General: NAD, frail appearing, resting comfortably   HEENT: Head wrapped with EEG leads in place on monitor, dressing C/D/I no evidence of staining or bleeding  Pulmonary: Nonlabored breathing, regular rate on room air  Cardiovascular: WWP, RRR  Abdominal: Soft, NT/ND, no rebound  Extremities: RLE with OR dressing C/D/I no staining appreciated, Dressing changed at bedside no active bleeding note    LABS:                        8.4    13.68 )-----------( 449      ( 10 Jul 2023 05:30 )             27.8     07-10    147<H>  |  110<H>  |  82<H>  ----------------------------<  124<H>  3.8   |  20<L>  |  7.61<H>    Ca    8.3<L>      10 Jul 2023 05:30  Phos  4.5     07-10  Mg     2.0     07-10      PT/INR - ( 11 Jul 2023 05:30 )   PT: 13.7 sec;   INR: 1.15          PTT - ( 11 Jul 2023 05:30 )  PTT:29.7 sec      A/P: 57M with PMHx of DM2, 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 Royal) who presented to [facility] on 7/5 from nursing home for hyperglycemia who was found to have infected likely diabetic toe wound with gas-producing organism. 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 CT revealed gas tracking along achilles sheath and vascular surgery was consulted for surgical evaluation. 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)      Vascular/PAD:  -  L foot necrotizing fasciitis s/p R BKA 7/8  - Dressing change today R BKA - WTD gauze, Kerlix, ACE wrap  - Plan to RTOR 7/11 for closure R BKA  - pre op and consent for OR today.     Neuro  - A&Ox2 at baseline, possible early onset dementia.  - Hx of CVA with residual L sided weakness   - F/U neurology     HTN/HLD:  - continue hydralazine and amlodipine  - continue home atorvastatin  - Most recent echo 10/22 - LVH present with EF 50% with mild TR,     CAD/CHF:   - continue home ASA 81, Plavix 75.     DM:  - Appreciate medicine recs for DM management  - Pureed DM diet  - mISS + monitor FSG  - Consider resuming home Lantus 20 U nightly, currently held    CKD stage V:   - baseline Cr 7-8.  Most recent Cr 8.09  - Appreciate renal recommendations  - Strict I&Os  - Avoid Nephrotoxic drugs.   - Start NaHCO3 tabs if bicarb drops <22   - Maintain Borja in place at this time as required multiple straight caths prior to placement    Anemia:   - Trend daily labs  - Maintain active T/S for OR    ID:  - Appreciate ID recommendations  - OR Cx growing Morganella, E. coli, Proteus, Strep. MRSA negative  - Continue on Zosyn at this time for L foot necrotizing fasciitis s/p L BKA 7/8 with improving leukocytosis,  Clindamycin (7/6 - 7/7), Vanc (7/6 - 7/8), Zosyn (7/6 -)    Diet: npo for OR      Activity:   - PT/OT eval  - OOB as tolerated  - Encourage OOB to chair today    DVTPPx:   - SQH/SCDs    Dispo:   OOB as tolerated  - PT/OT eval - recommended MIKHAIL      DVTPPx: SQ    Dispo: Pending OR today 7/11 for L BKA closure

## 2023-07-11 NOTE — PRE-ANESTHESIA EVALUATION ADULT - NSANTHPEFT_GEN_ALL_CORE
56 yo male in NAD awake and alert 58 yo male who appears older than stated age  in NAD.   awake and alert  contracted in bed.

## 2023-07-11 NOTE — BRIEF OPERATIVE NOTE - OPERATION/FINDINGS
Patient placed under general anesthesia. RLE prepped and draped. Completion BKA performed. Bleeding vessels over sewed with 2-0 silk ties. Stump fascia closed 2-0 vicryl and skin with staples.
Patient brought to the OR, intubated, prepped and draped. Circumferential incision made at ankle just above medial malleolus. All named arteries and any significant branches identified and suture ligated. Guillotine amputation with bone saw. No pus identified. Hemostasis achieved with electrocautery. Bone wax applied to tibia and fibula. Hemostasis confirmed. Kerlix and ace wrap applied.

## 2023-07-11 NOTE — PROGRESS NOTE ADULT - ASSESSMENT
57M with PMHx of DM2, CVA x 2 w/ residual L sided weakness, early onset dementia, HTN, HLD, CKD V, BPH and PSHx of L toe amputation and AV fistula creation who presented to [facility] on 7/5 from nursing home for hyperglycemia and was found ot be septic 2/2 RLE gas gangrene s/p R BKA 7/6 Hospital course c/b AMS which has now resolved.     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  Per ID, d/c zosyn   Plan for RTOR for BKA closure today    2- Leukocytosis- source unclear  add on manual differential  check rectal temp     2- Toxic Metabolic Encephalopathy - now at baseline   vEEG with noepileptiform activity and no significant clinical events  Recommend discontinuing vEEG today   3-#HTN  -c/w norvasc and hydralazine    - If remains hypertensive, can start hydralazine 25mg tid       4-Hx of CVA  - c/w ASA and Plavix  - c/w modafinil and lipitor     5-CKD Stage 5  - Electrolytes are acceptable   - Nephro following  - No indication for urgent HD at this time     6-Sepsis 2/2 RLE gas gangrene  - See above   - Abx management as per ID     Dispo: Pending clinical improvement

## 2023-07-11 NOTE — PRE-ANESTHESIA EVALUATION ADULT - NSANTHPMHFT_GEN_ALL_CORE
Hx of CVA
HPI: 57M with PMH of DM2, CVA x 2 (one ischemic one embolic, residual left side weakness), HTN, HLD, and CKD V (not on HD yet), BPH, BIBEMS from nursing home for hyperglycemia to 363.  On arrival, patient found to have SIRS (tachycardia, fever, leukocytosis) and was felt to be septic w/o identified infectious source. Also noted to have anion gap but likely attributable to uremia as well as hyperglycemia, but of note not in DKA. Upon investigation, patient's right heel ulcer was noted to have foul smell so podiatry was consulted and cross-sectional imaging obtained revealing gas tracking along achilles sheath. Vascular surgery was consulted for surgical evaluation.

## 2023-07-11 NOTE — PROGRESS NOTE ADULT - SUBJECTIVE AND OBJECTIVE BOX
POST-OPERATIVE NOTE    Procedure: R completion BKA    Diagnosis/Indication: gas gangrene    Surgeon: Dr. Royal    S: Pt states he is hungry and would like to eat dinner. Denies CP, SOB, N/V, abd pain, pain at surgical site. Pain controlled with medication.    O:  T(C): 36.1 (07-11-23 @ 17:56), Max: 36.1 (07-11-23 @ 17:56)  T(F): 97 (07-11-23 @ 17:56), Max: 97 (07-11-23 @ 17:56)  HR: 88 (07-11-23 @ 18:26) (88 - 90)  BP: 138/74 (07-11-23 @ 18:26) (124/71 - 138/74)  RR: 15 (07-11-23 @ 18:26) (15 - 16)  SpO2: 100% (07-11-23 @ 18:26) (100% - 100%)  Wt(kg): --                        8.7    18.98 )-----------( 418      ( 11 Jul 2023 13:28 )             27.5     07-11    147<H>  |  110<H>  |  72<H>  ----------------------------<  147<H>  3.4<L>   |  21<L>  |  7.50<H>    Ca    8.6      11 Jul 2023 05:30  Phos  4.2     07-11  Mg     1.8     07-11        Gen: NAD, resting comfortably in bed  C/V: NSR, normotensive  Pulm: Nonlabored breathing, no respiratory distress, on 2L NC  Abd: soft, NT/ND  Extrem: s/p R BKA, dressing intact with mild strikethrough      A/P: 57y Male s/p R completion BKA  Diet: CLD  IVF: continue IVF  Pain/nausea control

## 2023-07-11 NOTE — PRE-ANESTHESIA EVALUATION ADULT - MALLAMPATI CLASS
Class III - visualization of the soft palate and the base of the uvula
poor dentition/Class III - visualization of the soft palate and the base of the uvula

## 2023-07-11 NOTE — BRIEF OPERATIVE NOTE - NSICDXBRIEFPREOP_GEN_ALL_CORE_FT
PRE-OP DIAGNOSIS:  Gas gangrene of foot 06-Jul-2023 17:38:23  Van Burris  
PRE-OP DIAGNOSIS:  Gas gangrene of foot 06-Jul-2023 17:38:23  Van Burris

## 2023-07-11 NOTE — BRIEF OPERATIVE NOTE - NSICDXBRIEFPROCEDURE_GEN_ALL_CORE_FT
PROCEDURES:  Amputation below knee 06-Jul-2023 17:38:04 Van Malone  
PROCEDURES:  Amputation below knee 06-Jul-2023 17:38:04 Van Malone

## 2023-07-11 NOTE — BRIEF OPERATIVE NOTE - NSICDXBRIEFPOSTOP_GEN_ALL_CORE_FT
POST-OP DIAGNOSIS:  Gas gangrene of foot 06-Jul-2023 17:38:19  Van Burris  
POST-OP DIAGNOSIS:  Gas gangrene of foot 06-Jul-2023 17:38:19  Van Burris

## 2023-07-12 LAB
ANION GAP SERPL CALC-SCNC: 14 MMOL/L — SIGNIFICANT CHANGE UP (ref 5–17)
ANION GAP SERPL CALC-SCNC: 17 MMOL/L — SIGNIFICANT CHANGE UP (ref 5–17)
BASOPHILS # BLD AUTO: 0.05 K/UL — SIGNIFICANT CHANGE UP (ref 0–0.2)
BASOPHILS NFR BLD AUTO: 0.3 % — SIGNIFICANT CHANGE UP (ref 0–2)
BUN SERPL-MCNC: 67 MG/DL — HIGH (ref 7–23)
BUN SERPL-MCNC: 73 MG/DL — HIGH (ref 7–23)
BUN SERPL-MCNC: SIGNIFICANT CHANGE UP (ref 7–23)
CALCIUM SERPL-MCNC: 7.8 MG/DL — LOW (ref 8.4–10.5)
CALCIUM SERPL-MCNC: 7.9 MG/DL — LOW (ref 8.4–10.5)
CALCIUM SERPL-MCNC: SIGNIFICANT CHANGE UP (ref 8.4–10.5)
CHLORIDE SERPL-SCNC: 108 MMOL/L — SIGNIFICANT CHANGE UP (ref 96–108)
CHLORIDE SERPL-SCNC: 111 MMOL/L — HIGH (ref 96–108)
CHLORIDE SERPL-SCNC: SIGNIFICANT CHANGE UP (ref 96–108)
CO2 SERPL-SCNC: 19 MMOL/L — LOW (ref 22–31)
CO2 SERPL-SCNC: 19 MMOL/L — LOW (ref 22–31)
CO2 SERPL-SCNC: SIGNIFICANT CHANGE UP (ref 22–31)
CREAT SERPL-MCNC: 7.15 MG/DL — HIGH (ref 0.5–1.3)
CREAT SERPL-MCNC: 7.31 MG/DL — HIGH (ref 0.5–1.3)
CREAT SERPL-MCNC: SIGNIFICANT CHANGE UP MG/DL (ref 0.5–1.3)
EGFR: 8 ML/MIN/1.73M2 — LOW
EGFR: 8 ML/MIN/1.73M2 — LOW
EGFR: SIGNIFICANT CHANGE UP ML/MIN/1.73M2
EOSINOPHIL # BLD AUTO: 0.36 K/UL — SIGNIFICANT CHANGE UP (ref 0–0.5)
EOSINOPHIL NFR BLD AUTO: 1.8 % — SIGNIFICANT CHANGE UP (ref 0–6)
GLUCOSE BLDC GLUCOMTR-MCNC: 156 MG/DL — HIGH (ref 70–99)
GLUCOSE BLDC GLUCOMTR-MCNC: 240 MG/DL — HIGH (ref 70–99)
GLUCOSE BLDC GLUCOMTR-MCNC: 284 MG/DL — HIGH (ref 70–99)
GLUCOSE BLDC GLUCOMTR-MCNC: 322 MG/DL — HIGH (ref 70–99)
GLUCOSE SERPL-MCNC: 142 MG/DL — HIGH (ref 70–99)
GLUCOSE SERPL-MCNC: 187 MG/DL — HIGH (ref 70–99)
GLUCOSE SERPL-MCNC: 258 MG/DL — HIGH (ref 70–99)
HCT VFR BLD CALC: 26 % — LOW (ref 39–50)
HGB BLD-MCNC: 7.9 G/DL — LOW (ref 13–17)
IMM GRANULOCYTES NFR BLD AUTO: 2.9 % — HIGH (ref 0–0.9)
LYMPHOCYTES # BLD AUTO: 1.17 K/UL — SIGNIFICANT CHANGE UP (ref 1–3.3)
LYMPHOCYTES # BLD AUTO: 5.9 % — LOW (ref 13–44)
MAGNESIUM SERPL-MCNC: 1.7 MG/DL — SIGNIFICANT CHANGE UP (ref 1.6–2.6)
MCHC RBC-ENTMCNC: 28.3 PG — SIGNIFICANT CHANGE UP (ref 27–34)
MCHC RBC-ENTMCNC: 30.4 GM/DL — LOW (ref 32–36)
MCV RBC AUTO: 93.2 FL — SIGNIFICANT CHANGE UP (ref 80–100)
MONOCYTES # BLD AUTO: 1.21 K/UL — HIGH (ref 0–0.9)
MONOCYTES NFR BLD AUTO: 6.1 % — SIGNIFICANT CHANGE UP (ref 2–14)
NEUTROPHILS # BLD AUTO: 16.37 K/UL — HIGH (ref 1.8–7.4)
NEUTROPHILS NFR BLD AUTO: 83 % — HIGH (ref 43–77)
NRBC # BLD: 0 /100 WBCS — SIGNIFICANT CHANGE UP (ref 0–0)
PHOSPHATE SERPL-MCNC: 5.4 MG/DL — HIGH (ref 2.5–4.5)
PLATELET # BLD AUTO: 332 K/UL — SIGNIFICANT CHANGE UP (ref 150–400)
POTASSIUM SERPL-MCNC: 3.2 MMOL/L — LOW (ref 3.5–5.3)
POTASSIUM SERPL-MCNC: 3.6 MMOL/L — SIGNIFICANT CHANGE UP (ref 3.5–5.3)
POTASSIUM SERPL-MCNC: SIGNIFICANT CHANGE UP (ref 3.5–5.3)
POTASSIUM SERPL-SCNC: 3.2 MMOL/L — LOW (ref 3.5–5.3)
POTASSIUM SERPL-SCNC: 3.6 MMOL/L — SIGNIFICANT CHANGE UP (ref 3.5–5.3)
POTASSIUM SERPL-SCNC: SIGNIFICANT CHANGE UP (ref 3.5–5.3)
RBC # BLD: 2.79 M/UL — LOW (ref 4.2–5.8)
RBC # FLD: 16.2 % — HIGH (ref 10.3–14.5)
SODIUM SERPL-SCNC: 144 MMOL/L — SIGNIFICANT CHANGE UP (ref 135–145)
SODIUM SERPL-SCNC: 144 MMOL/L — SIGNIFICANT CHANGE UP (ref 135–145)
SODIUM SERPL-SCNC: SIGNIFICANT CHANGE UP (ref 135–145)
VANCOMYCIN TROUGH SERPL-MCNC: 23.4 UG/ML — HIGH (ref 10–20)
WBC # BLD: 19.65 K/UL — HIGH (ref 3.8–10.5)
WBC # FLD AUTO: 19.65 K/UL — HIGH (ref 3.8–10.5)

## 2023-07-12 PROCEDURE — 99233 SBSQ HOSP IP/OBS HIGH 50: CPT | Mod: GC

## 2023-07-12 PROCEDURE — 99232 SBSQ HOSP IP/OBS MODERATE 35: CPT

## 2023-07-12 PROCEDURE — 71045 X-RAY EXAM CHEST 1 VIEW: CPT | Mod: 26

## 2023-07-12 RX ORDER — NIFEDIPINE 30 MG
30 TABLET, EXTENDED RELEASE 24 HR ORAL
Refills: 0 | Status: DISCONTINUED | OUTPATIENT
Start: 2023-07-12 | End: 2023-07-18

## 2023-07-12 RX ORDER — MAGNESIUM SULFATE 500 MG/ML
1 VIAL (ML) INJECTION ONCE
Refills: 0 | Status: COMPLETED | OUTPATIENT
Start: 2023-07-12 | End: 2023-07-12

## 2023-07-12 RX ORDER — NIFEDIPINE 30 MG
60 TABLET, EXTENDED RELEASE 24 HR ORAL DAILY
Refills: 0 | Status: DISCONTINUED | OUTPATIENT
Start: 2023-07-12 | End: 2023-07-12

## 2023-07-12 RX ORDER — MODAFINIL 200 MG/1
100 TABLET ORAL DAILY
Refills: 0 | Status: DISCONTINUED | OUTPATIENT
Start: 2023-07-12 | End: 2023-07-18

## 2023-07-12 RX ADMIN — Medication 100 GRAM(S): at 10:38

## 2023-07-12 RX ADMIN — Medication 8: at 12:27

## 2023-07-12 RX ADMIN — SODIUM CHLORIDE 80 MILLILITER(S): 9 INJECTION, SOLUTION INTRAVENOUS at 10:39

## 2023-07-12 RX ADMIN — Medication 4: at 07:46

## 2023-07-12 RX ADMIN — AMLODIPINE BESYLATE 10 MILLIGRAM(S): 2.5 TABLET ORAL at 05:26

## 2023-07-12 RX ADMIN — CLOPIDOGREL BISULFATE 75 MILLIGRAM(S): 75 TABLET, FILM COATED ORAL at 13:12

## 2023-07-12 RX ADMIN — Medication 25 MILLIGRAM(S): at 23:55

## 2023-07-12 RX ADMIN — ATORVASTATIN CALCIUM 40 MILLIGRAM(S): 80 TABLET, FILM COATED ORAL at 23:55

## 2023-07-12 RX ADMIN — CHLORHEXIDINE GLUCONATE 1 APPLICATION(S): 213 SOLUTION TOPICAL at 05:34

## 2023-07-12 RX ADMIN — Medication 4 MILLIGRAM(S): at 23:56

## 2023-07-12 RX ADMIN — PIPERACILLIN AND TAZOBACTAM 25 GRAM(S): 4; .5 INJECTION, POWDER, LYOPHILIZED, FOR SOLUTION INTRAVENOUS at 18:39

## 2023-07-12 RX ADMIN — HEPARIN SODIUM 5000 UNIT(S): 5000 INJECTION INTRAVENOUS; SUBCUTANEOUS at 23:56

## 2023-07-12 RX ADMIN — Medication 81 MILLIGRAM(S): at 13:12

## 2023-07-12 RX ADMIN — PIPERACILLIN AND TAZOBACTAM 25 GRAM(S): 4; .5 INJECTION, POWDER, LYOPHILIZED, FOR SOLUTION INTRAVENOUS at 05:27

## 2023-07-12 RX ADMIN — Medication 6: at 17:36

## 2023-07-12 RX ADMIN — Medication 25 MILLIGRAM(S): at 05:26

## 2023-07-12 RX ADMIN — Medication 2: at 22:39

## 2023-07-12 RX ADMIN — HEPARIN SODIUM 5000 UNIT(S): 5000 INJECTION INTRAVENOUS; SUBCUTANEOUS at 13:12

## 2023-07-12 RX ADMIN — Medication 25 MILLIGRAM(S): at 13:15

## 2023-07-12 RX ADMIN — HEPARIN SODIUM 5000 UNIT(S): 5000 INJECTION INTRAVENOUS; SUBCUTANEOUS at 05:27

## 2023-07-12 NOTE — PROGRESS NOTE ADULT - SUBJECTIVE AND OBJECTIVE BOX
O/N: POC ok, stat labs Hgb 9.0 (8.7), Na 150 (147), started D5W 80cc/hr per nephroMarianne Tucker given.          ---------------------------------------------------------------------------  PLEASE CHECK WHEN PRESENT:     [  ]Heart Failure     [  ] Acute     [  ] Acute on Chronic     [  ] Chronic  -------------------------------------------------------------------     [  ]Diastolic [HFpEF]     [  ]Systolic [HFrEF]     [  ]Combined [HFpEF & HFrEF]  .................................................................................     [  ]Other:     [ ] Pulmonary Hypertension     [ ] Afib     [ x] Hypertensive Heart Disease  -------------------------------------------------------------------  [ ] Respiratory failure  [ ] Acute cor pulmonale  [ ] Asthma/COPD Exacerbation  [ ] Pleural effusion  [ ] Aspiration pneumonia  [ ] Obstructive Sleep Apnea  -------------------------------------------------------------------  [  ]ALEXIS     [  ]ATN     [  ]Reneal Medullary Necrosis     [  ]Renal Cortical Necrosis     [  ]Other Pathological Lesions:    [  ]CKD 1  [  ]CKD 2  [  ]CKD 3  [  ]CKD 4  [ x ]CKD 5  [  ]Other  -------------------------------------------------------------------  [x  ]Diabetes  [  ] Diabetic PVD Ulcer  [  ] Neuropathic ulcer to DM  [x  ] Diabetes with Nephropathy  [  ] Osteomyelitis due to diabetes  [  ] Hyperglycemia   [  ]hypoglycemia   --------------------------------------------------------------------  [  ]Malnutrition: See Nutrition Note  [  ]Cachexia  [  ]Other:   [  ]Supplement Ordered:  [  ]Morbid Obesity (BMI >=40]  ---------------------------------------------------------------------  [x ] Sepsis/severe sepsis/septic shock  [ ] Noninfectious SIRS  [ ] UTI  [ ] Pneumonia  -----------------------------------------------------------------------  [ ] Acidosis/alkalosis  [ ] Fluid overload  [ ] Hypokalemia  [ ] Hyperkalemia  [ ] Hypomagnesemia  [ ] Hypophosphatemia  [ ] Hyperphosphatemia  ------------------------------------------------------------------------  [ ] Acute blood loss anemia  [ ] Post op blood loss anemia  [ ] Iron deficiency anemia  [ ] Anemia due to chronic disease  [ ] Hypercoagulable state  [ ] Thrombocytopenia  ----------------------------------------------------------------------  [x ] Cerebral infarction  [ ] Transient ischemia attack  [ ] Encephalopathy - Toxic or Metabolic        A/P: 57M with PMHx of DM2, 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 Royal) who presented to [facility] on 7/5 from nursing home for hyperglycemia who was found to have infected likely diabetic toe wound with gas-producing organism. 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 CT revealed gas tracking along achilles sheath and vascular surgery was consulted for surgical evaluation. 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  - Hx of CVA with residual L sided weakness   - F/U neurology     HTN/HLD:  - continue hydralazine and amlodipine  - continue home atorvastatin  - Most recent echo 10/22 - LVH present with EF 50% with mild TR,     CAD/CHF:   - continue home ASA 81, Plavix 75.     DM:  - Appreciate medicine recs for DM management  - Pureed DM diet  - mISS + monitor FSG  - Consider resuming home Lantus 20 U nightly, currently held    CKD stage V:   - baseline Cr 7-8.  Most recent Cr 8.09  - Appreciate renal recs  - Strict I&Os  - Avoid Nephrotoxic drugs  - Start NaHCO3 tabs if bicarb drops <22   - Maintain Borja in place at this time as required multiple straight caths prior to placement    Hypernatremia  - appreciate renal recs  - D5W at 80 cc/hr    BPH  -continue doxazosin (home flomax held bc patient having difficulty swallowing pills)    Anemia:   - Trend daily labs  - Maintain active T/S for OR    ID:  - Appreciate ID recs  - OR Cx growing Morganella, E. coli, Proteus, Strep. MRSA negative  - Continue on Zosyn at this time for L foot necrotizing fasciitis s/p L BKA 7/8 with improving leukocytosis,  Clindamycin (7/6 - 7/7), Vanc (7/6 - 7/8, resumed 7/11), Zosyn (7/6 -)    Diet: CLD, will adv to pureed/thick liquids in AM    Activity: OOB as tolerated    DVTPPx: SQH/SCDs    Dispo: PT/OT eval recommended MIKHAIL    DVTPPx: SQH    Dispo: per clinical course, PT/OT rec MIKHAIL     O/N: POC ok, stat labs Hgb 9.0 (8.7), Na 150 (147), started D5W 80cc/hr per nephroMarianne Tucker given.          ---------------------------------------------------------------------------  PLEASE CHECK WHEN PRESENT:     [  ]Heart Failure     [  ] Acute     [  ] Acute on Chronic     [  ] Chronic  -------------------------------------------------------------------     [  ]Diastolic [HFpEF]     [  ]Systolic [HFrEF]     [  ]Combined [HFpEF & HFrEF]  .................................................................................     [  ]Other:     [ ] Pulmonary Hypertension     [ ] Afib     [ x] Hypertensive Heart Disease  -------------------------------------------------------------------  [ ] Respiratory failure  [ ] Acute cor pulmonale  [ ] Asthma/COPD Exacerbation  [ ] Pleural effusion  [ ] Aspiration pneumonia  [ ] Obstructive Sleep Apnea  -------------------------------------------------------------------  [  ]ALEXIS     [  ]ATN     [  ]Reneal Medullary Necrosis     [  ]Renal Cortical Necrosis     [  ]Other Pathological Lesions:    [  ]CKD 1  [  ]CKD 2  [  ]CKD 3  [  ]CKD 4  [ x ]CKD 5  [  ]Other  -------------------------------------------------------------------  [x  ]Diabetes  [  ] Diabetic PVD Ulcer  [  ] Neuropathic ulcer to DM  [x  ] Diabetes with Nephropathy  [  ] Osteomyelitis due to diabetes  [  ] Hyperglycemia   [  ]hypoglycemia   --------------------------------------------------------------------  [  ]Malnutrition: See Nutrition Note  [  ]Cachexia  [  ]Other:   [  ]Supplement Ordered:  [  ]Morbid Obesity (BMI >=40]  ---------------------------------------------------------------------  [x ] Sepsis/severe sepsis/septic shock  [ ] Noninfectious SIRS  [ ] UTI  [ ] Pneumonia  -----------------------------------------------------------------------  [ ] Acidosis/alkalosis  [ ] Fluid overload  [ ] Hypokalemia  [ ] Hyperkalemia  [ ] Hypomagnesemia  [ ] Hypophosphatemia  [ ] Hyperphosphatemia  ------------------------------------------------------------------------  [ ] Acute blood loss anemia  [ ] Post op blood loss anemia  [ ] Iron deficiency anemia  [ ] Anemia due to chronic disease  [ ] Hypercoagulable state  [ ] Thrombocytopenia  ----------------------------------------------------------------------  [x ] Cerebral infarction  [ ] Transient ischemia attack  [ ] Encephalopathy - Toxic or Metabolic        A/P: 57M with PMHx of DM2, 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 Royal) who presented to [facility] on 7/5 from nursing home for hyperglycemia who was found to have infected likely diabetic toe wound with gas-producing organism. 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 CT revealed gas tracking along achilles sheath and vascular surgery was consulted for surgical evaluation. 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  - Hx of CVA with residual L sided weakness     HTN/HLD:  - continue hydralazine and amlodipine  - continue home atorvastatin  - Most recent echo 10/22 - LVH present with EF 50% with mild TR    CAD/CHF:   - continue home ASA and Plavix    DM:  - Appreciate medicine recs for DM management  - Pureed DM diet  - mISS + monitor FSG  - Consider resuming home Lantus 20 U nightly, currently held    CKD stage V:   - baseline Cr 7-8  - Appreciate renal recs  - Strict I&Os  - Avoid Nephrotoxic drugs  - Start NaHCO3 tabs if bicarb drops <22   - Maintain Borja in place at this time as required multiple straight caths prior to placement    Hypernatremia  - appreciate renal recs  - D5W at 80 cc/hr    BPH  -continue doxazosin (home flomax held bc patient having difficulty swallowing pills)    Anemia:   - Trend daily labs  - Maintain active T/S for OR    ID:  - Appreciate ID recs  - OR Cx growing Morganella, E. coli, Proteus, Strep. MRSA negative  - Continue on Zosyn at this time for L foot necrotizing fasciitis s/p L BKA 7/8 with improving leukocytosis,  Clindamycin (7/6 - 7/7), Vanc (7/6 - 7/8, resumed 7/11), Zosyn (7/6 -)    Diet: CLD, will adv to minced and moist in AM    Activity: OOB as tolerated    DVTPPx: SQH/SCDs    Dispo: PT/OT eval recommended MIKHAIL    DVTPPx: SQH    Dispo: per clinical course, PT/OT rec MIKHAIL     O/N: POC ok, stat labs Hgb 9.0 (8.7), Na 150 (147), started D5W 80cc/hr per nephro. Vanc given. mn Na 144.          ---------------------------------------------------------------------------  PLEASE CHECK WHEN PRESENT:     [  ]Heart Failure     [  ] Acute     [  ] Acute on Chronic     [  ] Chronic  -------------------------------------------------------------------     [  ]Diastolic [HFpEF]     [  ]Systolic [HFrEF]     [  ]Combined [HFpEF & HFrEF]  .................................................................................     [  ]Other:     [ ] Pulmonary Hypertension     [ ] Afib     [ x] Hypertensive Heart Disease  -------------------------------------------------------------------  [ ] Respiratory failure  [ ] Acute cor pulmonale  [ ] Asthma/COPD Exacerbation  [ ] Pleural effusion  [ ] Aspiration pneumonia  [ ] Obstructive Sleep Apnea  -------------------------------------------------------------------  [  ]ALEXIS     [  ]ATN     [  ]Reneal Medullary Necrosis     [  ]Renal Cortical Necrosis     [  ]Other Pathological Lesions:    [  ]CKD 1  [  ]CKD 2  [  ]CKD 3  [  ]CKD 4  [ x ]CKD 5  [  ]Other  -------------------------------------------------------------------  [x  ]Diabetes  [  ] Diabetic PVD Ulcer  [  ] Neuropathic ulcer to DM  [x  ] Diabetes with Nephropathy  [  ] Osteomyelitis due to diabetes  [  ] Hyperglycemia   [  ]hypoglycemia   --------------------------------------------------------------------  [  ]Malnutrition: See Nutrition Note  [  ]Cachexia  [  ]Other:   [  ]Supplement Ordered:  [  ]Morbid Obesity (BMI >=40]  ---------------------------------------------------------------------  [x ] Sepsis/severe sepsis/septic shock  [ ] Noninfectious SIRS  [ ] UTI  [ ] Pneumonia  -----------------------------------------------------------------------  [ ] Acidosis/alkalosis  [ ] Fluid overload  [ ] Hypokalemia  [ ] Hyperkalemia  [ ] Hypomagnesemia  [ ] Hypophosphatemia  [ ] Hyperphosphatemia  ------------------------------------------------------------------------  [ ] Acute blood loss anemia  [ ] Post op blood loss anemia  [ ] Iron deficiency anemia  [ ] Anemia due to chronic disease  [ ] Hypercoagulable state  [ ] Thrombocytopenia  ----------------------------------------------------------------------  [x ] Cerebral infarction  [ ] Transient ischemia attack  [ ] Encephalopathy - Toxic or Metabolic        A/P: 57M with PMHx of DM2, 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 Royal) who presented to [facility] on 7/5 from nursing home for hyperglycemia who was found to have infected likely diabetic toe wound with gas-producing organism. 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 CT revealed gas tracking along achilles sheath and vascular surgery was consulted for surgical evaluation. 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  - Hx of CVA with residual L sided weakness     HTN/HLD:  - continue hydralazine and amlodipine  - continue home atorvastatin  - Most recent echo 10/22 - LVH present with EF 50% with mild TR    CAD/CHF:   - continue home ASA and Plavix    DM:  - Appreciate medicine recs for DM management  - Pureed DM diet  - mISS + monitor FSG  - Consider resuming home Lantus 20 U nightly, currently held    CKD stage V:   - baseline Cr 7-8  - Appreciate renal recs  - Strict I&Os  - Avoid Nephrotoxic drugs  - Start NaHCO3 tabs if bicarb drops <22   - Maintain Borja in place at this time as required multiple straight caths prior to placement    Hypernatremia  - appreciate renal recs  - D5W at 80 cc/hr    BPH  -continue doxazosin (home flomax held bc patient having difficulty swallowing pills)    Anemia:   - Trend daily labs  - Maintain active T/S for OR    ID:  - Appreciate ID recs  - OR Cx growing Morganella, E. coli, Proteus, Strep. MRSA negative  - Continue on Zosyn at this time for L foot necrotizing fasciitis s/p L BKA 7/8 with improving leukocytosis,  Clindamycin (7/6 - 7/7), Vanc (7/6 - 7/8, resumed 7/11), Zosyn (7/6 -)    Diet: CLD, will adv to minced and moist in AM    Activity: OOB as tolerated    DVTPPx: SQH/SCDs    Dispo: PT/OT eval recommended MIKHAIL    DVTPPx: SQH    Dispo: per clinical course, PT/OT rec MIKHAIL     O/N: HR btwn . POC ok, stat labs Hgb 9.0 (8.7), Na 150 (147), started D5W 80cc/hr per nephro. Vanc given. mn Na 144.          ---------------------------------------------------------------------------  PLEASE CHECK WHEN PRESENT:     [  ]Heart Failure     [  ] Acute     [  ] Acute on Chronic     [  ] Chronic  -------------------------------------------------------------------     [  ]Diastolic [HFpEF]     [  ]Systolic [HFrEF]     [  ]Combined [HFpEF & HFrEF]  .................................................................................     [  ]Other:     [ ] Pulmonary Hypertension     [ ] Afib     [ x] Hypertensive Heart Disease  -------------------------------------------------------------------  [ ] Respiratory failure  [ ] Acute cor pulmonale  [ ] Asthma/COPD Exacerbation  [ ] Pleural effusion  [ ] Aspiration pneumonia  [ ] Obstructive Sleep Apnea  -------------------------------------------------------------------  [  ]ALEXIS     [  ]ATN     [  ]Reneal Medullary Necrosis     [  ]Renal Cortical Necrosis     [  ]Other Pathological Lesions:    [  ]CKD 1  [  ]CKD 2  [  ]CKD 3  [  ]CKD 4  [ x ]CKD 5  [  ]Other  -------------------------------------------------------------------  [x  ]Diabetes  [  ] Diabetic PVD Ulcer  [  ] Neuropathic ulcer to DM  [x  ] Diabetes with Nephropathy  [  ] Osteomyelitis due to diabetes  [  ] Hyperglycemia   [  ]hypoglycemia   --------------------------------------------------------------------  [  ]Malnutrition: See Nutrition Note  [  ]Cachexia  [  ]Other:   [  ]Supplement Ordered:  [  ]Morbid Obesity (BMI >=40]  ---------------------------------------------------------------------  [x ] Sepsis/severe sepsis/septic shock  [ ] Noninfectious SIRS  [ ] UTI  [ ] Pneumonia  -----------------------------------------------------------------------  [ ] Acidosis/alkalosis  [ ] Fluid overload  [ ] Hypokalemia  [ ] Hyperkalemia  [ ] Hypomagnesemia  [ ] Hypophosphatemia  [ ] Hyperphosphatemia  ------------------------------------------------------------------------  [ ] Acute blood loss anemia  [ ] Post op blood loss anemia  [ ] Iron deficiency anemia  [ ] Anemia due to chronic disease  [ ] Hypercoagulable state  [ ] Thrombocytopenia  ----------------------------------------------------------------------  [x ] Cerebral infarction  [ ] Transient ischemia attack  [ ] Encephalopathy - Toxic or Metabolic        A/P: 57M with PMHx of DM2, 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 Royal) who presented to [facility] on 7/5 from nursing home for hyperglycemia who was found to have infected likely diabetic toe wound with gas-producing organism. 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 CT revealed gas tracking along achilles sheath and vascular surgery was consulted for surgical evaluation. 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  - Hx of CVA with residual L sided weakness     HTN/HLD:  - continue hydralazine and amlodipine  - continue home atorvastatin  - Most recent echo 10/22 - LVH present with EF 50% with mild TR    CAD/CHF:   - continue home ASA and Plavix    DM:  - Appreciate medicine recs for DM management  - Pureed DM diet  - mISS + monitor FSG  - Consider resuming home Lantus 20 U nightly, currently held    CKD stage V:   - baseline Cr 7-8  - Appreciate renal recs  - Strict I&Os  - Avoid Nephrotoxic drugs  - Start NaHCO3 tabs if bicarb drops <22   - Maintain Borja in place at this time as required multiple straight caths prior to placement    Hypernatremia  - appreciate renal recs  - D5W at 80 cc/hr    BPH  -continue doxazosin (home flomax held bc patient having difficulty swallowing pills)    Anemia:   - Trend daily labs  - Maintain active T/S for OR    ID:  - Appreciate ID recs  - OR Cx growing Morganella, E. coli, Proteus, Strep. MRSA negative  - Continue on Zosyn at this time for L foot necrotizing fasciitis s/p L BKA 7/8 with improving leukocytosis,  Clindamycin (7/6 - 7/7), Vanc (7/6 - 7/8, resumed 7/11), Zosyn (7/6 -)    Diet: CLD, will adv to minced and moist in AM    Activity: OOB as tolerated    DVTPPx: SQH/SCDs    Dispo: PT/OT eval recommended MIKHAIL    DVTPPx: SQH    Dispo: per clinical course, PT/OT rec MIKHAIL     O/N: HR btwn . POC ok, stat labs Hgb 9.0 (8.7), Na 150 (147), started D5W 80cc/hr per nephro. Vanc given. mn Na 144.      S: Patient does not have any complaints    O: Examined in bed resting comfortably     ROS: Denies headache, blurred vision, chest pain, SOB, abdominal pain, nausea or vomiting.         piperacillin/tazobactam IVPB.. 4.5  amLODIPine   Tablet 10  aspirin  chewable 81  clopidogrel Tablet 75  doxazosin 4  heparin   Injectable 5000  hydrALAZINE 25  piperacillin/tazobactam IVPB.. 4.5      Allergies    No Known Allergies    Intolerances        Vital Signs Last 24 Hrs  T(C): 36.3 (12 Jul 2023 05:38), Max: 37 (11 Jul 2023 10:20)  T(F): 97.4 (12 Jul 2023 05:38), Max: 98.6 (11 Jul 2023 10:20)  HR: 112 (12 Jul 2023 05:13) (88 - 112)  BP: 149/69 (12 Jul 2023 05:13) (124/71 - 165/79)  BP(mean): 99 (12 Jul 2023 05:13) (91 - 115)  RR: 18 (12 Jul 2023 05:13) (15 - 18)  SpO2: 100% (12 Jul 2023 05:13) (99% - 100%)    Parameters below as of 12 Jul 2023 05:13  Patient On (Oxygen Delivery Method): room air      I&O's Summary    11 Jul 2023 07:01  -  12 Jul 2023 07:00  --------------------------------------------------------  IN: 1070 mL / OUT: 1065 mL / NET: 5 mL        Physical Exam:  General: alert and awake, NAD  Pulmonary: no respiratory distress  Cardiovascular: RRR  Abdominal: soft  Extremities: R BKA dressing with some dry serosanguinous drainage, soft, non tender        LABS:                        9.0    17.17 )-----------( 347      ( 11 Jul 2023 19:36 )             28.5     07-12    144  |  111<H>  |  67<H>  ----------------------------<  187<H>  3.2<L>   |  19<L>  |  7.15<H>    Ca    7.8<L>      12 Jul 2023 00:48  Phos  5.0     07-11  Mg     1.8     07-11      PT/INR - ( 11 Jul 2023 05:30 )   PT: 13.7 sec;   INR: 1.15          PTT - ( 11 Jul 2023 05:30 )  PTT:29.7 sec    Radiology and Additional Studies:    ---------------------------------------------------------------------------  PLEASE CHECK WHEN PRESENT:     [  ]Heart Failure     [  ] Acute     [  ] Acute on Chronic     [  ] Chronic  -------------------------------------------------------------------     [  ]Diastolic [HFpEF]     [  ]Systolic [HFrEF]     [  ]Combined [HFpEF & HFrEF]  .................................................................................     [  ]Other:     [ ] Pulmonary Hypertension     [ ] Afib     [ x] Hypertensive Heart Disease  -------------------------------------------------------------------  [ ] Respiratory failure  [ ] Acute cor pulmonale  [ ] Asthma/COPD Exacerbation  [ ] Pleural effusion  [ ] Aspiration pneumonia  [ ] Obstructive Sleep Apnea  -------------------------------------------------------------------  [  ]ALEXIS     [  ]ATN     [  ]Reneal Medullary Necrosis     [  ]Renal Cortical Necrosis     [  ]Other Pathological Lesions:    [  ]CKD 1  [  ]CKD 2  [  ]CKD 3  [  ]CKD 4  [ x ]CKD 5  [  ]Other  -------------------------------------------------------------------  [x  ]Diabetes  [  ] Diabetic PVD Ulcer  [  ] Neuropathic ulcer to DM  [x  ] Diabetes with Nephropathy  [  ] Osteomyelitis due to diabetes  [  ] Hyperglycemia   [  ]hypoglycemia   --------------------------------------------------------------------  [  ]Malnutrition: See Nutrition Note  [  ]Cachexia  [  ]Other:   [  ]Supplement Ordered:  [  ]Morbid Obesity (BMI >=40]  ---------------------------------------------------------------------  [x ] Sepsis/severe sepsis/septic shock  [ ] Noninfectious SIRS  [ ] UTI  [ ] Pneumonia  -----------------------------------------------------------------------  [ ] Acidosis/alkalosis  [ ] Fluid overload  [ ] Hypokalemia  [ ] Hyperkalemia  [ ] Hypomagnesemia  [ ] Hypophosphatemia  [ ] Hyperphosphatemia  ------------------------------------------------------------------------  [ ] Acute blood loss anemia  [ ] Post op blood loss anemia  [ ] Iron deficiency anemia  [ ] Anemia due to chronic disease  [ ] Hypercoagulable state  [ ] Thrombocytopenia  ----------------------------------------------------------------------  [x ] Cerebral infarction  [ ] Transient ischemia attack  [ ] Encephalopathy - Toxic or Metabolic        A/P: 57M with PMHx of DM2, 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 Royal) who presented to [facility] 7/[facility] from nursing home for hyperglycemia who was found to have infected likely diabetic toe wound with gas-producing organism. 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 CT revealed gas tracking along achilles sheath and vascular surgery was consulted for surgical evaluation. 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  - Hx of CVA with residual L sided weakness     HTN/HLD:  - continue hydralazine and amlodipine  - continue home atorvastatin  - Most recent echo 10/22 - LVH present with EF 50% with mild TR    CAD/CHF:   - continue home ASA and Plavix    DM:  - Appreciate medicine recs for DM management  - Pureed DM diet  - mISS + monitor FSG  - Consider resuming home Lantus 20 U nightly, currently held    CKD stage V:   - baseline Cr 7-8  - Appreciate renal recs  - Strict I&Os  - Avoid Nephrotoxic drugs  - Start NaHCO3 tabs if bicarb drops <22   - Maintain Borja in place at this time as required multiple straight caths prior to placement    Hypernatremia  - appreciate renal recs  - D5W at 80 cc/hr    BPH  -continue doxazosin (home flomax held bc patient having difficulty swallowing pills)    Anemia:   - Trend daily labs  - Maintain active T/S for OR    ID:  - Appreciate ID recs  - OR Cx growing Morganella, E. coli, Proteus, Strep. MRSA negative  - Continue on Zosyn at this time for L foot necrotizing fasciitis s/p L BKA 7/8 with improving leukocytosis,  Clindamycin (7/6 - 7/7), Vanc (7/6 - 7/8, resumed 7/11), Zosyn (7/6 -)    Diet: CLD, will adv to minced and moist in AM    Activity: OOB as tolerated    DVTPPx: SQH/SCDs    Dispo: PT/OT eval recommended [facility]KHAIL    DVTPPx: SQH    Dispo: per clinical course, PT/OT rec MIKHAIL

## 2023-07-12 NOTE — PROGRESS NOTE ADULT - ASSESSMENT
Casey Shetty is a 58 yo M w/ PMH of CKD V w/ AVF not currently on HD, DM2, HTN, CVA who presents from NH with starvation ketoacidosis and gas gangrene. Acidosis resolved. 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  Currently on IV Abx.     #Hypernatremia  Na 149->147->150->144  migue 7/12, s/p d5w and increased free water. Pt with signs of volume depletion s/p BKA.   Stop dw5 now that Na 144.   Please transfuse 1 PRBC.   Continue free water, pt with UOP of 890 ml in last 24h.    BMP daily  Encourage PO intake and hydration, pte need assistance with feeding.     #CKD stage V  BUN and creatinine are elevated at baseline, no signs of uremia at this point  c/w puckett as pt with urinary retention 7/8.   Strict I&O  Daily weights  Avoid Nephrotoxic drugs.   Renally dosed medications.   Start NaHCO3 tabs if bicarb drops <22  No indications for acute HD at this point.   CBG goal 140-180, please readjust Insulin regimen.   Nephrology team will cont. f/u         Casey Shetty is a 58 yo M w/ PMH of CKD V w/ AVF not currently on HD, DM2, HTN, CVA who presents from NH with starvation ketoacidosis and gas gangrene. Acidosis resolved. 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  Currently on IV Abx.     #Hypernatremia  Na 149->147->150->144  today 7/12, s/p d5w and increased free water. Pt with signs of volume depletion s/p BKA.   Stop dw5 now that Na 144.   Please transfuse 1 PRBC.   Continue free water, pt with UOP of 890 ml in last 24h.    BMP daily  Encourage PO intake and hydration, pte need assistance with feeding.     #CKD stage V  BUN and creatinine are elevated at baseline, no signs of uremia at this point  c/w puckett as pt with urinary retention 7/8.   Strict I&O  Daily weights  Avoid Nephrotoxic drugs.   Renally dosed medications.   Start NaHCO3 tabs if bicarb drops <22  No indications for acute HD at this point.   CBG goal 140-180, please readjust Insulin regimen.   Nephrology team will cont. f/u

## 2023-07-12 NOTE — PROGRESS NOTE ADULT - ASSESSMENT
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  Per ID, d/c zosyn   s/p OR yesterday for BKA closure today    2- Leukocytosis- possibly reactive 2/2 surgery   rectal temp 98  no obvious signs of infection  no suprapubic tenderness, no respiratory symptoms  CXR no infiltrates  add on manual differential  check rectal temp     2- Toxic Metabolic Encephalopathy - now at baseline   vEEG with noepileptiform activity and no significant clinical events  Recommend discontinuing vEEG today   3-#HTN  -c/w norvasc and hydralazine    - If remains hypertensive, can start hydralazine 25mg tid       4-Hx of CVA  - c/w ASA and Plavix  - c/w modafinil and lipitor     5-CKD Stage 5  - Electrolytes are acceptable   - Nephro following  - No indication for urgent HD at this time     6-Sepsis 2/2 RLE gas gangrene  - See above   - Abx management as per ID     Dispo: Pending clinical improvement

## 2023-07-12 NOTE — PROGRESS NOTE ADULT - SUBJECTIVE AND OBJECTIVE BOX
Recommended observation. Allergies:  No Known Allergies    Hospital Medications:   MEDICATIONS  (STANDING):  amLODIPine   Tablet 10 milliGRAM(s) Oral daily  aspirin  chewable 81 milliGRAM(s) Oral daily  atorvastatin 40 milliGRAM(s) Oral at bedtime  chlorhexidine 2% Cloths 1 Application(s) Topical <User Schedule>  clopidogrel Tablet 75 milliGRAM(s) Oral daily  dextrose 5%. 1000 milliLiter(s) (50 mL/Hr) IV Continuous <Continuous>  dextrose 5%. 1000 milliLiter(s) (100 mL/Hr) IV Continuous <Continuous>  dextrose 50% Injectable 25 Gram(s) IV Push once  dextrose 50% Injectable 12.5 Gram(s) IV Push once  dextrose 50% Injectable 25 Gram(s) IV Push once  doxazosin 4 milliGRAM(s) Oral at bedtime  glucagon  Injectable 1 milliGRAM(s) IntraMuscular once  heparin   Injectable 5000 Unit(s) SubCutaneous every 8 hours  hydrALAZINE 25 milliGRAM(s) Oral three times a day  HYDROmorphone  Injectable 0.5 milliGRAM(s) IV Push every 15 minutes  insulin lispro (ADMELOG) corrective regimen sliding scale   SubCutaneous Before meals and at bedtime  modafinil 100 milliGRAM(s) Oral daily  piperacillin/tazobactam IVPB.. 4.5 Gram(s) IV Intermittent every 12 hours    VITALS:  T(F): 98.3 (07-12-23 @ 13:49), Max: 98.3 (07-12-23 @ 13:49)  HR: 110 (07-12-23 @ 12:57)  BP: 121/60 (07-12-23 @ 12:57)  RR: 18 (07-12-23 @ 12:57)  SpO2: 99% (07-12-23 @ 12:57)  Wt(kg): --    07-10 @ 07:01  -  07-11 @ 07:00  --------------------------------------------------------  IN: 170 mL / OUT: 850 mL / NET: -680 mL    07-11 @ 07:01  -  07-12 @ 07:00  --------------------------------------------------------  IN: 1385 mL / OUT: 1065 mL / NET: 320 mL    07-12 @ 07:01  -  07-12 @ 14:58  --------------------------------------------------------  IN: 290 mL / OUT: 300 mL / NET: -10 mL      LABS:  07-12    144  |  108  |  73<H>  ----------------------------<  258<H>  3.6   |  19<L>  |  7.31<H>    Ca    7.9<L>      12 Jul 2023 05:30  Phos  5.4     07-12  Mg     1.7     07-12                            7.9    19.65 )-----------( 332      ( 12 Jul 2023 05:30 )             26.0       Urine Studies:  Creatinine Trend: 7.31<--, 7.15<--, See Note<--, 7.58<--, 7.50<--, 7.61<--  Urinalysis Basic - ( 12 Jul 2023 05:30 )    Color:  / Appearance:  / SG:  / pH:   Gluc: 258 mg/dL / Ketone:   / Bili:  / Urobili:    Blood:  / Protein:  / Nitrite:    Leuk Esterase:  / RBC:  / WBC    Sq Epi:  / Non Sq Epi:  / Bacteria:       Sodium, Random Urine: 47 mmol/L (07-05 @ 23:05)  Osmolality, Random Urine: 357 mosm/kg (07-05 @ 23:05)  Creatinine, Random Urine: 100 mg/dL (07-05 @ 23:05)      Review of Systems:  ROS negative except as per HPI      PHYSICAL EXAM:  GENERAL: NAD, lying in bed, cachectic   HEART: Regular rate and rhythm  LUNGS:  Clear to auscultation eleazar  ABD: Soft, nontender, nondistended, +BS  Ext: RLU with BKA, covered with band/gauze.   Nerv:  A&Ox2, left hemiparesis.    Vascular Access: AVF in LUE, with palpable thrill.         RADIOLOGY & ADDITIONAL STUDIES:   Patient seen and examined at bedside. Feels well but was tachycardic and febrile, awaiting workup and blood cultures    Allergies:  No Known Allergies    Hospital Medications:   MEDICATIONS  (STANDING):  amLODIPine   Tablet 10 milliGRAM(s) Oral daily  aspirin  chewable 81 milliGRAM(s) Oral daily  atorvastatin 40 milliGRAM(s) Oral at bedtime  chlorhexidine 2% Cloths 1 Application(s) Topical <User Schedule>  clopidogrel Tablet 75 milliGRAM(s) Oral daily  dextrose 5%. 1000 milliLiter(s) (50 mL/Hr) IV Continuous <Continuous>  dextrose 5%. 1000 milliLiter(s) (100 mL/Hr) IV Continuous <Continuous>  dextrose 50% Injectable 25 Gram(s) IV Push once  dextrose 50% Injectable 12.5 Gram(s) IV Push once  dextrose 50% Injectable 25 Gram(s) IV Push once  doxazosin 4 milliGRAM(s) Oral at bedtime  glucagon  Injectable 1 milliGRAM(s) IntraMuscular once  heparin   Injectable 5000 Unit(s) SubCutaneous every 8 hours  hydrALAZINE 25 milliGRAM(s) Oral three times a day  HYDROmorphone  Injectable 0.5 milliGRAM(s) IV Push every 15 minutes  insulin lispro (ADMELOG) corrective regimen sliding scale   SubCutaneous Before meals and at bedtime  modafinil 100 milliGRAM(s) Oral daily  piperacillin/tazobactam IVPB.. 4.5 Gram(s) IV Intermittent every 12 hours    VITALS:  T(F): 98.3 (07-12-23 @ 13:49), Max: 98.3 (07-12-23 @ 13:49)  HR: 110 (07-12-23 @ 12:57)  BP: 121/60 (07-12-23 @ 12:57)  RR: 18 (07-12-23 @ 12:57)  SpO2: 99% (07-12-23 @ 12:57)  Wt(kg): --    07-10 @ 07:01  -  07-11 @ 07:00  --------------------------------------------------------  IN: 170 mL / OUT: 850 mL / NET: -680 mL    07-11 @ 07:01  -  07-12 @ 07:00  --------------------------------------------------------  IN: 1385 mL / OUT: 1065 mL / NET: 320 mL    07-12 @ 07:01  - 07-12 @ 14:58  --------------------------------------------------------  IN: 290 mL / OUT: 300 mL / NET: -10 mL      LABS:  07-12    144  |  108  |  73<H>  ----------------------------<  258<H>  3.6   |  19<L>  |  7.31<H>    Ca    7.9<L>      12 Jul 2023 05:30  Phos  5.4     07-12  Mg     1.7     07-12                            7.9    19.65 )-----------( 332      ( 12 Jul 2023 05:30 )             26.0       Urine Studies:  Creatinine Trend: 7.31<--, 7.15<--, See Note<--, 7.58<--, 7.50<--, 7.61<--  Urinalysis Basic - ( 12 Jul 2023 05:30 )    Color:  / Appearance:  / SG:  / pH:   Gluc: 258 mg/dL / Ketone:   / Bili:  / Urobili:    Blood:  / Protein:  / Nitrite:    Leuk Esterase:  / RBC:  / WBC    Sq Epi:  / Non Sq Epi:  / Bacteria:       Sodium, Random Urine: 47 mmol/L (07-05 @ 23:05)  Osmolality, Random Urine: 357 mosm/kg (07-05 @ 23:05)  Creatinine, Random Urine: 100 mg/dL (07-05 @ 23:05)      Review of Systems:  ROS negative except as per HPI      PHYSICAL EXAM:  GENERAL: NAD, lying in bed, cachectic   HEART: Tachycardic, regular rhythm   LUNGS:  Clear to auscultation eleazar  ABD: Soft, nontender, nondistended, +BS  Ext: RLU with BKA, covered with band/gauze.   Nerv:  A&Ox2, left hemiparesis.    Vascular Access: AVF in LUE, with palpable thrill.         RADIOLOGY & ADDITIONAL STUDIES:

## 2023-07-13 LAB
ANION GAP SERPL CALC-SCNC: 18 MMOL/L — HIGH (ref 5–17)
APPEARANCE UR: ABNORMAL
BACTERIA # UR AUTO: PRESENT /HPF
BILIRUB UR-MCNC: NEGATIVE — SIGNIFICANT CHANGE UP
BUN SERPL-MCNC: 72 MG/DL — HIGH (ref 7–23)
CALCIUM SERPL-MCNC: 7.8 MG/DL — LOW (ref 8.4–10.5)
CHLORIDE SERPL-SCNC: 108 MMOL/L — SIGNIFICANT CHANGE UP (ref 96–108)
CO2 SERPL-SCNC: 18 MMOL/L — LOW (ref 22–31)
COLOR SPEC: YELLOW — SIGNIFICANT CHANGE UP
COMMENT - URINE: SIGNIFICANT CHANGE UP
CREAT SERPL-MCNC: 7.7 MG/DL — HIGH (ref 0.5–1.3)
CRP SERPL-MCNC: 136.8 MG/L — HIGH (ref 0–4)
DIFF PNL FLD: ABNORMAL
EGFR: 8 ML/MIN/1.73M2 — LOW
EPI CELLS # UR: SIGNIFICANT CHANGE UP /HPF (ref 0–5)
GLUCOSE BLDC GLUCOMTR-MCNC: 162 MG/DL — HIGH (ref 70–99)
GLUCOSE BLDC GLUCOMTR-MCNC: 198 MG/DL — HIGH (ref 70–99)
GLUCOSE BLDC GLUCOMTR-MCNC: 235 MG/DL — HIGH (ref 70–99)
GLUCOSE BLDC GLUCOMTR-MCNC: 344 MG/DL — HIGH (ref 70–99)
GLUCOSE SERPL-MCNC: 165 MG/DL — HIGH (ref 70–99)
GLUCOSE UR QL: NEGATIVE — SIGNIFICANT CHANGE UP
HCT VFR BLD CALC: 26.4 % — LOW (ref 39–50)
HGB BLD-MCNC: 8.6 G/DL — LOW (ref 13–17)
KETONES UR-MCNC: NEGATIVE — SIGNIFICANT CHANGE UP
LEUKOCYTE ESTERASE UR-ACNC: NEGATIVE — SIGNIFICANT CHANGE UP
MAGNESIUM SERPL-MCNC: 1.9 MG/DL — SIGNIFICANT CHANGE UP (ref 1.6–2.6)
MCHC RBC-ENTMCNC: 29.3 PG — SIGNIFICANT CHANGE UP (ref 27–34)
MCHC RBC-ENTMCNC: 32.6 GM/DL — SIGNIFICANT CHANGE UP (ref 32–36)
MCV RBC AUTO: 89.8 FL — SIGNIFICANT CHANGE UP (ref 80–100)
NITRITE UR-MCNC: NEGATIVE — SIGNIFICANT CHANGE UP
NRBC # BLD: 0 /100 WBCS — SIGNIFICANT CHANGE UP (ref 0–0)
PH UR: 5.5 — SIGNIFICANT CHANGE UP (ref 5–8)
PHOSPHATE SERPL-MCNC: 5.7 MG/DL — HIGH (ref 2.5–4.5)
PLATELET # BLD AUTO: 322 K/UL — SIGNIFICANT CHANGE UP (ref 150–400)
POTASSIUM SERPL-MCNC: 3.1 MMOL/L — LOW (ref 3.5–5.3)
POTASSIUM SERPL-SCNC: 3.1 MMOL/L — LOW (ref 3.5–5.3)
PROT UR-MCNC: 100 MG/DL
RBC # BLD: 2.94 M/UL — LOW (ref 4.2–5.8)
RBC # FLD: 15.7 % — HIGH (ref 10.3–14.5)
RBC CASTS # UR COMP ASSIST: > 10 /HPF
SODIUM SERPL-SCNC: 144 MMOL/L — SIGNIFICANT CHANGE UP (ref 135–145)
SP GR SPEC: 1.02 — SIGNIFICANT CHANGE UP (ref 1–1.03)
UROBILINOGEN FLD QL: 0.2 E.U./DL — SIGNIFICANT CHANGE UP
VANCOMYCIN TROUGH SERPL-MCNC: 21.8 UG/ML — HIGH (ref 10–20)
WBC # BLD: 23.28 K/UL — HIGH (ref 3.8–10.5)
WBC # FLD AUTO: 23.28 K/UL — HIGH (ref 3.8–10.5)
WBC UR QL: < 5 /HPF — SIGNIFICANT CHANGE UP

## 2023-07-13 PROCEDURE — 99232 SBSQ HOSP IP/OBS MODERATE 35: CPT

## 2023-07-13 PROCEDURE — 99233 SBSQ HOSP IP/OBS HIGH 50: CPT

## 2023-07-13 RX ORDER — POTASSIUM CHLORIDE 20 MEQ
40 PACKET (EA) ORAL ONCE
Refills: 0 | Status: COMPLETED | OUTPATIENT
Start: 2023-07-13 | End: 2023-07-13

## 2023-07-13 RX ORDER — PIPERACILLIN AND TAZOBACTAM 4; .5 G/20ML; G/20ML
3.38 INJECTION, POWDER, LYOPHILIZED, FOR SOLUTION INTRAVENOUS EVERY 12 HOURS
Refills: 0 | Status: DISCONTINUED | OUTPATIENT
Start: 2023-07-13 | End: 2023-07-13

## 2023-07-13 RX ORDER — SENNA PLUS 8.6 MG/1
2 TABLET ORAL AT BEDTIME
Refills: 0 | Status: DISCONTINUED | OUTPATIENT
Start: 2023-07-13 | End: 2023-07-18

## 2023-07-13 RX ORDER — SODIUM BICARBONATE 1 MEQ/ML
650 SYRINGE (ML) INTRAVENOUS
Refills: 0 | Status: DISCONTINUED | OUTPATIENT
Start: 2023-07-13 | End: 2023-07-17

## 2023-07-13 RX ADMIN — Medication 40 MILLIEQUIVALENT(S): at 14:55

## 2023-07-13 RX ADMIN — Medication 30 MILLIGRAM(S): at 08:39

## 2023-07-13 RX ADMIN — CLOPIDOGREL BISULFATE 75 MILLIGRAM(S): 75 TABLET, FILM COATED ORAL at 11:17

## 2023-07-13 RX ADMIN — Medication 8: at 17:37

## 2023-07-13 RX ADMIN — Medication 650 MILLIGRAM(S): at 16:21

## 2023-07-13 RX ADMIN — Medication 25 MILLIGRAM(S): at 13:27

## 2023-07-13 RX ADMIN — Medication 2: at 11:35

## 2023-07-13 RX ADMIN — Medication 2: at 07:14

## 2023-07-13 RX ADMIN — Medication 4: at 22:35

## 2023-07-13 RX ADMIN — Medication 81 MILLIGRAM(S): at 11:17

## 2023-07-13 RX ADMIN — Medication 25 MILLIGRAM(S): at 08:39

## 2023-07-13 RX ADMIN — PIPERACILLIN AND TAZOBACTAM 25 GRAM(S): 4; .5 INJECTION, POWDER, LYOPHILIZED, FOR SOLUTION INTRAVENOUS at 17:54

## 2023-07-13 RX ADMIN — MODAFINIL 100 MILLIGRAM(S): 200 TABLET ORAL at 13:39

## 2023-07-13 RX ADMIN — HEPARIN SODIUM 5000 UNIT(S): 5000 INJECTION INTRAVENOUS; SUBCUTANEOUS at 13:27

## 2023-07-13 RX ADMIN — HEPARIN SODIUM 5000 UNIT(S): 5000 INJECTION INTRAVENOUS; SUBCUTANEOUS at 07:15

## 2023-07-13 NOTE — PROGRESS NOTE ADULT - ASSESSMENT
58 yo male with CKD V, DM, p/w DKA 2/2 R foot s/p R foot amputation 7/6 with presumed source control, s/p R BKA closure 7/11. ID reconsulted for leukocytosis, specifically neutrophilia. No infectious symptoms or signs on exam (RLE stump ?crepitus and small R knee effusion likely postsurgical). ?Reactive leukocytosis. Advise repeat CRP (was 176 on 7/6); consider hematology evaluation for review of peripheral smear. f/u bcx 7/12. Advise d/c vancomycin and Zosyn (continued since admission 7/5) and observe off antibiotics (current WBC elevation occurred on antibiotics).     Please reconsult with ? 56 yo male with CKD V, DM, p/w DKA 2/2 R foot gas gangrene s/p R foot amputation 7/6 with presumed source control, s/p R BKA closure 7/11. ID reconsulted for leukocytosis, specifically neutrophilia. No infectious symptoms or signs on exam (RLE stump ?crepitus and small R knee effusion likely postsurgical). ?Reactive leukocytosis. Advise repeat CRP (was 176 on 7/6); consider hematology evaluation for review of peripheral smear. f/u bcx 7/12--bcx also negative on admission so unlikely he would have endocarditis or occult metastatic infection. Advise d/c vancomycin and Zosyn (continued since admission 7/5) and observe off antibiotics (current WBC elevation occurred on antibiotics).     Please reconsult with ?

## 2023-07-13 NOTE — PROGRESS NOTE ADULT - SUBJECTIVE AND OBJECTIVE BOX
Patient was seen and examined at bedside. Pt  w/o any complaints this morning. Pt denied having any pain.   OBJECTIVE:  Vital Signs Last 24 Hrs  T(C): 36.9 (13 Jul 2023 09:57), Max: 37.4 (12 Jul 2023 22:27)  T(F): 98.4 (13 Jul 2023 09:57), Max: 99.4 (12 Jul 2023 22:27)  HR: 116 (13 Jul 2023 06:43) (110 - 116)  BP: 135/60 (13 Jul 2023 09:15) (121/60 - 169/73)  BP(mean): 80 (13 Jul 2023 06:43) (80 - 105)  RR: 18 (13 Jul 2023 06:43) (17 - 18)  SpO2: 99% (13 Jul 2023 06:43) (97% - 99%)    Gen: NAD laying in bed  CV: tachycardic   Pulm: CTA b/l no wheezing or crackles   Abd: soft, NTND + BS no rebound or guarding   LE: R BKA ACE bandage w/ dry blood in place                           8.6    23.28 )-----------( 322      ( 13 Jul 2023 05:30 )             26.4     07-13    144  |  108  |  72<H>  ----------------------------<  165<H>  3.1<L>   |  18<L>  |  7.70<H>    Ca    7.8<L>      13 Jul 2023 05:30  Phos  5.7     07-13  Mg     1.9     07-13    CAPILLARY BLOOD GLUCOSE  POCT Blood Glucose.: 162 mg/dL (13 Jul 2023 06:30)  POCT Blood Glucose.: 156 mg/dL (12 Jul 2023 22:00)  POCT Blood Glucose.: 284 mg/dL (12 Jul 2023 17:22)  POCT Blood Glucose.: 322 mg/dL (12 Jul 2023 11:53)    acetaminophen     Tablet .. 650 milliGRAM(s) Oral every 4 hours PRN  aspirin  chewable 81 milliGRAM(s) Oral daily  atorvastatin 40 milliGRAM(s) Oral at bedtime  clopidogrel Tablet 75 milliGRAM(s) Oral daily  doxazosin 4 milliGRAM(s) Oral at bedtime  glucagon  Injectable 1 milliGRAM(s) IntraMuscular once  heparin   Injectable 5000 Unit(s) SubCutaneous every 8 hours  hydrALAZINE 25 milliGRAM(s) Oral three times a day  insulin lispro (ADMELOG) corrective regimen sliding scale   SubCutaneous Before meals and at bedtime  modafinil 100 milliGRAM(s) Oral daily  NIFEdipine IR 30 milliGRAM(s) Oral two times a day  ondansetron Injectable 4 milliGRAM(s) IV Push every 4 hours PRN  oxycodone    5 mG/acetaminophen 325 mG 1 Tablet(s) Oral every 6 hours PRN  senna 2 Tablet(s) Oral at bedtime

## 2023-07-13 NOTE — PROGRESS NOTE ADULT - SUBJECTIVE AND OBJECTIVE BOX
INTERVAL HPI/OVERNIGHT EVENTS: ID reconsulted for neutrophilia.     CONSTITUTIONAL:  Negative fever or chills, feels well, good appetite  EYES:  Negative  blurry vision or double vision  CARDIOVASCULAR:  Negative for chest pain or palpitations  RESPIRATORY:  Negative for cough, wheezing, or SOB   GASTROINTESTINAL:  Negative for nausea, vomiting, diarrhea, constipation, or abdominal pain  GENITOURINARY:  Negative frequency, urgency or dysuria  NEUROLOGIC:  No headache, confusion, dizziness, lightheadedness      ANTIBIOTICS/RELEVANT:    MEDICATIONS  (STANDING):  aspirin  chewable 81 milliGRAM(s) Oral daily  atorvastatin 40 milliGRAM(s) Oral at bedtime  clopidogrel Tablet 75 milliGRAM(s) Oral daily  dextrose 5%. 1000 milliLiter(s) (50 mL/Hr) IV Continuous <Continuous>  dextrose 5%. 1000 milliLiter(s) (100 mL/Hr) IV Continuous <Continuous>  dextrose 50% Injectable 12.5 Gram(s) IV Push once  dextrose 50% Injectable 25 Gram(s) IV Push once  dextrose 50% Injectable 25 Gram(s) IV Push once  doxazosin 4 milliGRAM(s) Oral at bedtime  glucagon  Injectable 1 milliGRAM(s) IntraMuscular once  heparin   Injectable 5000 Unit(s) SubCutaneous every 8 hours  hydrALAZINE 25 milliGRAM(s) Oral three times a day  insulin lispro (ADMELOG) corrective regimen sliding scale   SubCutaneous Before meals and at bedtime  modafinil 100 milliGRAM(s) Oral daily  NIFEdipine IR 30 milliGRAM(s) Oral two times a day  piperacillin/tazobactam IVPB.. 3.375 Gram(s) IV Intermittent every 12 hours  senna 2 Tablet(s) Oral at bedtime  sodium bicarbonate 650 milliGRAM(s) Oral two times a day    MEDICATIONS  (PRN):  acetaminophen     Tablet .. 650 milliGRAM(s) Oral every 4 hours PRN Mild Pain (1 - 3)  dextrose Oral Gel 15 Gram(s) Oral once PRN Blood Glucose LESS THAN 70 milliGRAM(s)/deciliter  ondansetron Injectable 4 milliGRAM(s) IV Push every 4 hours PRN Nausea and/or Vomiting  oxycodone    5 mG/acetaminophen 325 mG 1 Tablet(s) Oral every 6 hours PRN Severe Pain (7 - 10)        Vital Signs Last 24 Hrs  T(C): 37 (2023 14:32), Max: 37.4 (2023 22:27)  T(F): 98.6 (2023 14:32), Max: 99.4 (2023 22:27)  HR: 112 (2023 13:49) (111 - 116)  BP: 142/62 (2023 13:49) (124/65 - 169/73)  BP(mean): 80 (2023 06:43) (80 - 105)  RR: 18 (2023 13:49) (17 - 18)  SpO2: 98% (2023 13:49) (97% - 99%)    Parameters below as of 2023 13:49  Patient On (Oxygen Delivery Method): room air        PHYSICAL EXAM:  Constitutional: NAD  Eyes: ADRIANO, EOMI  Ear/Nose/Throat: no oral lesion, no sinus tenderness on percussion	  Neck: no JVD, no lymphadenopathy, supple  Respiratory: CTA eleazar  Cardiovascular: S1S2 RRR, no murmurs  Gastrointestinal:soft, (+) BS, no HSM  Extremities: s/p R BKA; dressing with some blood; ?slight crepitus proximal to site; no evidence of proximal soft tissue infection; small R knee effusion  Vascular: DP Pulse:	right normal; left normal      LABS:                        8.6    23.28 )-----------( 322      ( 2023 05:30 )             26.4     07-13    144  |  108  |  72<H>  ----------------------------<  165<H>  3.1<L>   |  18<L>  |  7.70<H>    Ca    7.8<L>      2023 05:30  Phos  5.7     07-13  Mg     1.9     07-13        Urinalysis Basic - ( 2023 09:12 )    Color: Yellow / Appearance: Hazy / S.025 / pH: x  Gluc: x / Ketone: NEGATIVE  / Bili: Negative / Urobili: 0.2 E.U./dL   Blood: x / Protein: 100 mg/dL / Nitrite: NEGATIVE   Leuk Esterase: NEGATIVE / RBC: > 10 /HPF / WBC < 5 /HPF   Sq Epi: x / Non Sq Epi: x / Bacteria: Present /HPF        MICROBIOLOGY: reviewed    RADIOLOGY & ADDITIONAL STUDIES: reviewed

## 2023-07-13 NOTE — PROGRESS NOTE ADULT - SUBJECTIVE AND OBJECTIVE BOX
O/N: Nifedipine XL switched to IR 30 bid per pharmacy. EKG sinus tach. BS 10pm 158.        --------------------------------------------------------------------------  PLEASE CHECK WHEN PRESENT:     [  ]Heart Failure     [  ] Acute     [  ] Acute on Chronic     [  ] Chronic  -------------------------------------------------------------------     [  ]Diastolic [HFpEF]     [  ]Systolic [HFrEF]     [  ]Combined [HFpEF & HFrEF]  .................................................................................     [  ]Other:     [ ] Pulmonary Hypertension     [ ] Afib     [ x] Hypertensive Heart Disease  -------------------------------------------------------------------  [ ] Respiratory failure  [ ] Acute cor pulmonale  [ ] Asthma/COPD Exacerbation  [ ] Pleural effusion  [ ] Aspiration pneumonia  [ ] Obstructive Sleep Apnea  -------------------------------------------------------------------  [  ]ALEXIS     [  ]ATN     [  ]Reneal Medullary Necrosis     [  ]Renal Cortical Necrosis     [  ]Other Pathological Lesions:    [  ]CKD 1  [  ]CKD 2  [  ]CKD 3  [  ]CKD 4  [ x ]CKD 5  [  ]Other  -------------------------------------------------------------------  [x  ]Diabetes  [  ] Diabetic PVD Ulcer  [  ] Neuropathic ulcer to DM  [x  ] Diabetes with Nephropathy  [  ] Osteomyelitis due to diabetes  [  ] Hyperglycemia   [  ]hypoglycemia   --------------------------------------------------------------------  [  ]Malnutrition: See Nutrition Note  [  ]Cachexia  [  ]Other:   [  ]Supplement Ordered:  [  ]Morbid Obesity (BMI >=40]  ---------------------------------------------------------------------  [x ] Sepsis/severe sepsis/septic shock  [ ] Noninfectious SIRS  [ ] UTI  [ ] Pneumonia  -----------------------------------------------------------------------  [ ] Acidosis/alkalosis  [ ] Fluid overload  [ ] Hypokalemia  [ ] Hyperkalemia  [ ] Hypomagnesemia  [ ] Hypophosphatemia  [ ] Hyperphosphatemia  ------------------------------------------------------------------------  [ ] Acute blood loss anemia  [ ] Post op blood loss anemia  [ ] Iron deficiency anemia  [ ] Anemia due to chronic disease  [ ] Hypercoagulable state  [ ] Thrombocytopenia  ----------------------------------------------------------------------  [x ] Cerebral infarction  [ ] Transient ischemia attack  [ ] Encephalopathy - Toxic or Metabolic        A/P: 57M with PMHx of DM2, 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 Royal) who presented to [facility] on 7/5 from nursing home for hyperglycemia who was found to have infected likely diabetic toe wound with gas-producing organism. 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 CT revealed gas tracking along achilles sheath and vascular surgery was consulted for surgical evaluation. 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) and completion R BKA 7/11      Vascular/PAD:  - L foot necrotizing fasciitis s/p guillotine R BKA (7/8) and completion R BKA 7/11  - continue Asa and Plavix  - pain control  - continue vanc and zosyn, vanc by level    Neuro  - A&Ox2 at baseline, possible early onset dementia  - Hx of CVA with residual L sided weakness     HTN/HLD:  - continue hydralazine and nifedipine IR  - continue home atorvastatin  - Most recent echo 10/22 - LVH present with EF 50% with mild TR    CAD/CHF:   - continue home ASA and Plavix    DM:  - Appreciate medicine recs for DM management  - Pureed DM diet  - mISS + monitor FSG  - Consider resuming home Lantus 20 U nightly, currently held    CKD stage V:   - baseline Cr 7-8  - Appreciate renal recs  - Strict I&Os  - Avoid Nephrotoxic drugs  - Start NaHCO3 tabs if bicarb drops <22     Hypernatremia  - appreciate renal recs  - f/u daily BMP    BPH  -continue doxazosin (home flomax held bc patient having difficulty swallowing pills)    Anemia:   - Trend daily labs  - Maintain active T/S for OR    ID:  - Appreciate ID recs  - OR Cx growing Morganella, E. coli, Proteus, Strep. MRSA negative  - Continue vanc and zosyn, vanc by level  - Continue on Zosyn at this time for L foot necrotizing fasciitis s/p L BKA 7/8 with improving leukocytosis,  Clindamycin (7/6 - 7/7), Vanc (7/6 - 7/8, resumed 7/11), Zosyn (7/6 -)    Diet: minced and moist    Activity: OOB as tolerated    DVTPPx: SQH/SCDs    Dispo: PT/OT eval recommended [facility]   O/N: Nifedipine XL switched to IR 30 bid per pharmacy. EKG sinus tach. BS 10pm 158. BS 1am 263.        --------------------------------------------------------------------------  PLEASE CHECK WHEN PRESENT:     [  ]Heart Failure     [  ] Acute     [  ] Acute on Chronic     [  ] Chronic  -------------------------------------------------------------------     [  ]Diastolic [HFpEF]     [  ]Systolic [HFrEF]     [  ]Combined [HFpEF & HFrEF]  .................................................................................     [  ]Other:     [ ] Pulmonary Hypertension     [ ] Afib     [ x] Hypertensive Heart Disease  -------------------------------------------------------------------  [ ] Respiratory failure  [ ] Acute cor pulmonale  [ ] Asthma/COPD Exacerbation  [ ] Pleural effusion  [ ] Aspiration pneumonia  [ ] Obstructive Sleep Apnea  -------------------------------------------------------------------  [  ]ALEXIS     [  ]ATN     [  ]Reneal Medullary Necrosis     [  ]Renal Cortical Necrosis     [  ]Other Pathological Lesions:    [  ]CKD 1  [  ]CKD 2  [  ]CKD 3  [  ]CKD 4  [ x ]CKD 5  [  ]Other  -------------------------------------------------------------------  [x  ]Diabetes  [  ] Diabetic PVD Ulcer  [  ] Neuropathic ulcer to DM  [x  ] Diabetes with Nephropathy  [  ] Osteomyelitis due to diabetes  [  ] Hyperglycemia   [  ]hypoglycemia   --------------------------------------------------------------------  [  ]Malnutrition: See Nutrition Note  [  ]Cachexia  [  ]Other:   [  ]Supplement Ordered:  [  ]Morbid Obesity (BMI >=40]  ---------------------------------------------------------------------  [x ] Sepsis/severe sepsis/septic shock  [ ] Noninfectious SIRS  [ ] UTI  [ ] Pneumonia  -----------------------------------------------------------------------  [ ] Acidosis/alkalosis  [ ] Fluid overload  [ ] Hypokalemia  [ ] Hyperkalemia  [ ] Hypomagnesemia  [ ] Hypophosphatemia  [ ] Hyperphosphatemia  ------------------------------------------------------------------------  [ ] Acute blood loss anemia  [ ] Post op blood loss anemia  [ ] Iron deficiency anemia  [ ] Anemia due to chronic disease  [ ] Hypercoagulable state  [ ] Thrombocytopenia  ----------------------------------------------------------------------  [x ] Cerebral infarction  [ ] Transient ischemia attack  [ ] Encephalopathy - Toxic or Metabolic        A/P: 57M with PMHx of DM2, 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 Royal) who presented to [facility] on 7/5 from nursing home for hyperglycemia who was found to have infected likely diabetic toe wound with gas-producing organism. 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 CT revealed gas tracking along achilles sheath and vascular surgery was consulted for surgical evaluation. 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) and completion R BKA 7/11      Vascular/PAD:  - L foot necrotizing fasciitis s/p guillotine R BKA (7/8) and completion R BKA 7/11  - continue Asa and Plavix  - pain control  - continue vanc and zosyn, vanc by level    Neuro  - A&Ox2 at baseline, possible early onset dementia  - Hx of CVA with residual L sided weakness     HTN/HLD:  - continue hydralazine and nifedipine IR  - continue home atorvastatin  - Most recent echo 10/22 - LVH present with EF 50% with mild TR    CAD/CHF:   - continue home ASA and Plavix    DM:  - Appreciate medicine recs for DM management  - Pureed DM diet  - mISS + monitor FSG  - Consider resuming home Lantus 20 U nightly, currently held    CKD stage V:   - baseline Cr 7-8  - Appreciate renal recs  - Strict I&Os  - Avoid Nephrotoxic drugs  - Start NaHCO3 tabs if bicarb drops <22     Hypernatremia  - appreciate renal recs  - f/u daily BMP    BPH  -continue doxazosin (home flomax held bc patient having difficulty swallowing pills)    Anemia:   - Trend daily labs  - Maintain active T/S for OR    ID:  - Appreciate ID recs  - OR Cx growing Morganella, E. coli, Proteus, Strep. MRSA negative  - Continue vanc and zosyn, vanc by level  - Continue on Zosyn at this time for L foot necrotizing fasciitis s/p L BKA 7/8 with improving leukocytosis,  Clindamycin (7/6 - 7/7), Vanc (7/6 - 7/8, resumed 7/11), Zosyn (7/6 -)    Diet: minced and moist    Activity: OOB as tolerated    DVTPPx: SQH/SCDs    Dispo: PT/OT eval recommended [facility]   O/N: Nifedipine XL switched to IR 30 bid per pharmacy. EKG sinus tach. BS 10pm 158. BS 1am 263.    Subjective: Patient seen and examined at the bedside. C/o right leg pain when attempting to extend at the knee. No further complaints.     ROS:   Denies Headache, blurred vision, Chest Pain, SOB, Abdominal pain, nausea or vomiting     Social   aspirin  chewable 81  clopidogrel Tablet 75  doxazosin 4  heparin   Injectable 5000  hydrALAZINE 25  NIFEdipine IR 30      Allergies    No Known Allergies    Intolerances        Vital Signs Last 24 Hrs  T(C): 36.9 (13 Jul 2023 05:16), Max: 37.4 (12 Jul 2023 22:27)  T(F): 98.4 (13 Jul 2023 05:16), Max: 99.4 (12 Jul 2023 22:27)  HR: 116 (13 Jul 2023 06:43) (109 - 116)  BP: 134/56 (13 Jul 2023 06:43) (121/60 - 169/73)  BP(mean): 80 (13 Jul 2023 06:43) (80 - 105)  RR: 18 (13 Jul 2023 06:43) (17 - 18)  SpO2: 99% (13 Jul 2023 06:43) (97% - 99%)    Parameters below as of 13 Jul 2023 06:43  Patient On (Oxygen Delivery Method): room air      I&O's Summary    12 Jul 2023 07:01  -  13 Jul 2023 07:00  --------------------------------------------------------  IN: 410 mL / OUT: 300 mL / NET: 110 mL        Physical Exam:  General: alert and awake, NAD  Pulmonary: no respiratory distress  Cardiovascular: RRR  Abdominal: soft  Extremities: R BKA dressing with some dry bloody drainage      LABS:                        7.9    19.65 )-----------( 332      ( 12 Jul 2023 05:30 )             26.0     07-12    144  |  108  |  73<H>  ----------------------------<  258<H>  3.6   |  19<L>  |  7.31<H>    Ca    7.9<L>      12 Jul 2023 05:30  Phos  5.4     07-12  Mg     1.7     07-12          --------------------------------------------------------------------------  PLEASE CHECK WHEN PRESENT:     [  ]Heart Failure     [  ] Acute     [  ] Acute on Chronic     [  ] Chronic  -------------------------------------------------------------------     [  ]Diastolic [HFpEF]     [  ]Systolic [HFrEF]     [  ]Combined [HFpEF & HFrEF]  .................................................................................     [  ]Other:     [ ] Pulmonary Hypertension     [ ] Afib     [ x] Hypertensive Heart Disease  -------------------------------------------------------------------  [ ] Respiratory failure  [ ] Acute cor pulmonale  [ ] Asthma/COPD Exacerbation  [ ] Pleural effusion  [ ] Aspiration pneumonia  [ ] Obstructive Sleep Apnea  -------------------------------------------------------------------  [  ]ALEXIS     [  ]ATN     [  ]Reneal Medullary Necrosis     [  ]Renal Cortical Necrosis     [  ]Other Pathological Lesions:    [  ]CKD 1  [  ]CKD 2  [  ]CKD 3  [  ]CKD 4  [ x ]CKD 5  [  ]Other  -------------------------------------------------------------------  [x  ]Diabetes  [  ] Diabetic PVD Ulcer  [  ] Neuropathic ulcer to DM  [x  ] Diabetes with Nephropathy  [  ] Osteomyelitis due to diabetes  [  ] Hyperglycemia   [  ]hypoglycemia   --------------------------------------------------------------------  [  ]Malnutrition: See Nutrition Note  [  ]Cachexia  [  ]Other:   [  ]Supplement Ordered:  [  ]Morbid Obesity (BMI >=40]  ---------------------------------------------------------------------  [x ] Sepsis/severe sepsis/septic shock  [ ] Noninfectious SIRS  [ ] UTI  [ ] Pneumonia  -----------------------------------------------------------------------  [ ] Acidosis/alkalosis  [ ] Fluid overload  [ ] Hypokalemia  [ ] Hyperkalemia  [ ] Hypomagnesemia  [ ] Hypophosphatemia  [ ] Hyperphosphatemia  ------------------------------------------------------------------------  [ ] Acute blood loss anemia  [ ] Post op blood loss anemia  [ ] Iron deficiency anemia  [ ] Anemia due to chronic disease  [ ] Hypercoagulable state  [ ] Thrombocytopenia  ----------------------------------------------------------------------  [x ] Cerebral infarction  [ ] Transient ischemia attack  [ ] Encephalopathy - Toxic or Metabolic        A/P: 57M with PMHx of DM2, 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 Royal) who presented to [facility] on 7/5 from nursing home for hyperglycemia who was found to have infected likely diabetic toe wound with gas-producing organism. 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 CT revealed gas tracking along achilles sheath and vascular surgery was consulted for surgical evaluation. 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) and completion R BKA 7/11      Vascular/PAD:  - L foot necrotizing fasciitis s/p guillotine R BKA (7/8) and completion R BKA 7/11  - continue Asa and Plavix  - pain control  - continue vanc and zosyn, vanc by level    Neuro  - A&Ox2 at baseline, possible early onset dementia  - Hx of CVA with residual L sided weakness     HTN/HLD:  - continue hydralazine and nifedipine IR  - continue home atorvastatin  - Most recent echo 10/22 - LVH present with EF 50% with mild TR    CAD/CHF:   - continue home ASA and Plavix    DM:  - Appreciate medicine recs for DM management  - Pureed DM diet  - mISS + monitor FSG  - Consider resuming home Lantus 20 U nightly, currently held    CKD stage V:   - baseline Cr 7-8  - Appreciate renal recs  - Strict I&Os  - Avoid Nephrotoxic drugs  - Start NaHCO3 tabs if bicarb drops <22     Hypernatremia  - appreciate renal recs  - f/u daily BMP    BPH  -continue doxazosin (home flomax held bc patient having difficulty swallowing pills)    Anemia:   - Trend daily labs  - Maintain active T/S for OR    ID:  - Appreciate ID recs  - OR Cx growing Morganella, E. coli, Proteus, Strep. MRSA negative  - Continue vanc and zosyn, vanc by level  - Continue on Zosyn at this time for L foot necrotizing fasciitis s/p L BKA 7/8 with improving leukocytosis,  Clindamycin (7/6 - 7/7), Vanc (7/6 - 7/8, resumed 7/11), Zosyn (7/6 -)    Diet: minced and moist    Activity: OOB as tolerated    DVTPPx: SQH/SCDs    Dispo: PT/OT eval recommended [facility]

## 2023-07-13 NOTE — PROGRESS NOTE ADULT - ASSESSMENT
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 Royal) who presented to [facility] on 7/5 from nursing home for hyperglycemia who was found to have infected likely diabetic toe wound with gas-producing organism. 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 CT revealed gas tracking along achilles sheath and vascular surgery was consulted for surgical evaluation. 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    DM:  - Appreciate medicine recs for DM management  - Pureed DM diet  - mISS + monitor FSG  - Consider resuming home Lantus 20 U nightly, currently held    CKD stage V:   - baseline Cr 7-8  - Appreciate renal recs  - Strict I&Os  - Avoid Nephrotoxic drugs  - Start NaHCO3 tabs if bicarb drops <22     Hypernatremia  - appreciate renal recs  - f/u daily BMP    BPH  -continue doxazosin (home flomax held bc patient having difficulty swallowing pills)    Anemia:   - Trend daily labs  - Maintain active T/S for OR    ID:  - Appreciate ID recs  - OR Cx growing Morganella, E. coli, Proteus, Strep. MRSA negative  - Continue vanc and zosyn, vanc by level  - Continue on Zosyn at this time for L foot necrotizing fasciitis s/p L BKA 7/8 with improving leukocytosis,  Clindamycin (7/6 - 7/7), Vanc (7/6 - 7/8, resumed 7/11), Zosyn (7/6 -)    Diet: minced and moist    Activity: OOB as tolerated    DVTPPx: SQH/SCDs    Dispo: PT/OT eval recommended [facility]           A/P: 57M with PMHx of DM2, 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 Royal) who presented to [facility] on 7/5 from nursing home for hyperglycemia who was found to have infected likely diabetic toe wound with gas-producing organism. 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 CT revealed gas tracking along achilles sheath and vascular surgery was consulted for surgical evaluation. 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) and completion R BKA 7/11    #L foot necrotizing fasciitis s/p guillotine R BKA (7/8) and completion R BKA 7/11  #Leukocytosis   -Management per primary team   -Continue ASA and Plavix  - pain control per primary team     #Early onset dementia  # Hx of CVA with residual L sided weakness   - Continue ASA and statin     #HTN/HLD  - Continue hydralazine,  nifedipine and  atorvastatin      #CAD/CHF:   - continue home ASA and Plavix  - Echo done on 10/22 - LVH present with EF 50% with mild TR    #Tachycardia  - Pt was tachycardic to 116; Pt denied having chest pain or palpation possible secondary to infection as pt w/ worsening WBC to 23 from 19   -Check EKG and TSH       #Diabetes   - Pureed DM diet  - mISS + monitor FSG  - Start Lantus 6 units qhs     #CKD stage V:   -Pt w/  baseline Cr 7-8  - Avoid Nephrotoxic agent s   - Renal following who recommended starting  NaHCO3 tabs if bicarb drops <22   -Per renal there is no indications for acute HD at this point.     #BPH  -continue doxazosin (home flomax held bc patient having difficulty swallowing pills)    #Dispo  -Pt is for  [facility] once medically stable            A/P: 57M with PMHx of DM2, 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 Royal) who presented to [facility] on 7/5 from nursing home for hyperglycemia who was found to have infected likely diabetic toe wound with gas-producing organism. 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 CT revealed gas tracking along achilles sheath and vascular surgery was consulted for surgical evaluation. 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) and completion R BKA 7/11    #L foot necrotizing fasciitis s/p guillotine R BKA (7/8) and completion R BKA 7/11  #Leukocytosis   -Management per primary team   -Continue ASA and Plavix  - pain control per primary team   -Continue Zosyn and dosing vancomycin by level  -Consider ID consult   -Will check U/A    #Early onset dementia  # Hx of CVA with residual L sided weakness   - Continue ASA and statin     #HTN/HLD  - Continue hydralazine,  nifedipine and  atorvastatin      #CAD/CHF:   - continue home ASA and Plavix  - Echo done on 10/22 - LVH present with EF 50% with mild TR    #Tachycardia  - Pt was tachycardic to 116; Pt denied having chest pain or palpation possible secondary to infection as pt w/ worsening WBC to 23 from 19   -Check EKG and TSH       #Diabetes   - Pureed DM diet  - mISS + monitor FSG  - Start Lantus 6 units qhs     #CKD stage V:   -Pt w/  baseline Cr 7-8  - Avoid Nephrotoxic agent s   - Renal following who recommended starting  NaHCO3 tabs if bicarb drops <22   -Per renal there is no indications for acute HD at this point.     #BPH  -continue doxazosin (home flomax held bc patient having difficulty swallowing pills)    #Dispo  -Pt is for  [facility] once medically stable

## 2023-07-13 NOTE — PROGRESS NOTE ADULT - SUBJECTIVE AND OBJECTIVE BOX
Patient seen and examined at bedside, denies complains noted tachycardic in 120s.     Review of Systems:  ROS negative except as per HPI      PHYSICAL EXAM:  GENERAL: NAD, lying in bed, cachectic   HEART: Tachycardic, regular rhythm   LUNGS:  Clear to auscultation eleazar  ABD: Soft, nontender, nondistended, +BS  Ext: RLU with BKA, covered with band/gauze.   Nerv:  A&Ox2, left hemiparesis.    Vascular Access: AVF in LUE, with palpable thrill.     Hospital Medications:   MEDICATIONS  (STANDING):  aspirin  chewable 81 milliGRAM(s) Oral daily  atorvastatin 40 milliGRAM(s) Oral at bedtime  clopidogrel Tablet 75 milliGRAM(s) Oral daily  dextrose 5%. 1000 milliLiter(s) (100 mL/Hr) IV Continuous <Continuous>  dextrose 5%. 1000 milliLiter(s) (50 mL/Hr) IV Continuous <Continuous>  dextrose 50% Injectable 25 Gram(s) IV Push once  dextrose 50% Injectable 12.5 Gram(s) IV Push once  dextrose 50% Injectable 25 Gram(s) IV Push once  doxazosin 4 milliGRAM(s) Oral at bedtime  glucagon  Injectable 1 milliGRAM(s) IntraMuscular once  heparin   Injectable 5000 Unit(s) SubCutaneous every 8 hours  hydrALAZINE 25 milliGRAM(s) Oral three times a day  insulin lispro (ADMELOG) corrective regimen sliding scale   SubCutaneous Before meals and at bedtime  modafinil 100 milliGRAM(s) Oral daily  NIFEdipine IR 30 milliGRAM(s) Oral two times a day  piperacillin/tazobactam IVPB.. 3.375 Gram(s) IV Intermittent every 12 hours  senna 2 Tablet(s) Oral at bedtime    VITALS:  T(F): 98.4 (07-13-23 @ 09:57), Max: 99.4 (07-12-23 @ 22:27)  HR: 116 (07-13-23 @ 06:43)  BP: 135/60 (07-13-23 @ 09:15)  RR: 18 (07-13-23 @ 06:43)  SpO2: 99% (07-13-23 @ 06:43)  Wt(kg): --    07-11 @ 07:01  -  07-12 @ 07:00  --------------------------------------------------------  IN: 1385 mL / OUT: 1065 mL / NET: 320 mL    07-12 @ 07:01 - 07-13 @ 07:00  --------------------------------------------------------  IN: 410 mL / OUT: 300 mL / NET: 110 mL    07-13 @ 07:01 - 07-13 @ 11:24  --------------------------------------------------------  IN: 0 mL / OUT: 230 mL / NET: -230 mL        LABS:  07-13    144  |  108  |  72<H>  ----------------------------<  165<H>  3.1<L>   |  18<L>  |  7.70<H>    Ca    7.8<L>      13 Jul 2023 05:30  Phos  5.7     07-13  Mg     1.9     07-13                            8.6    23.28 )-----------( 322      ( 13 Jul 2023 05:30 )             26.4       Urine Studies:  Creatinine Trend: 7.70<--, 7.31<--, 7.15<--, See Note<--, 7.58<--, 7.50<--  Urinalysis Basic - ( 13 Jul 2023 05:30 )    Color:  / Appearance:  / SG:  / pH:   Gluc: 165 mg/dL / Ketone:   / Bili:  / Urobili:    Blood:  / Protein:  / Nitrite:    Leuk Esterase:  / RBC:  / WBC    Sq Epi:  / Non Sq Epi:  / Bacteria:         RADIOLOGY & ADDITIONAL STUDIES:

## 2023-07-13 NOTE — PROGRESS NOTE ADULT - ASSESSMENT
Casey Shetty is a 58 yo M w/ PMH of CKD V w/ AVF not currently on HD, DM2, HTN, CVA who presents from NH with starvation ketoacidosis and gas gangrene. Acidosis resolved. 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  Currently on IV Abx (Zosyn).   Tachycardic this morning with regular rhythm in the monitor, normotensive, EKG from 7/5 showing sinus tachy, patient is on Hydralazine (?rebound tachy).     #Hypernatremia  Na 149->147->150->144  today, stable.    Continue free water, pt with UOP of 300 ml in last 24h.    BMP daily  Encourage PO intake and hydration, pte need assistance with feeding.     #CKD stage V  BUN and creatinine are elevated at baseline, no signs of uremia at this point  c/w puckett as pt with urinary retention 7/8.   Strict I&O  Daily weights  Avoid Nephrotoxic drugs.   Renally dosed medications.   Start NaHCO3 tabs if bicarb drops <22  No indications for acute HD at this point.   Potassium noted at 3.1, can replenish with 40 meq PO and recheck BMP tomorrow in AM.   Mg of 1.9 noted.   Nephrology team will cont. f/u

## 2023-07-14 ENCOUNTER — APPOINTMENT (OUTPATIENT)
Dept: HEART AND VASCULAR | Facility: CLINIC | Age: 58
End: 2023-07-14

## 2023-07-14 DIAGNOSIS — D64.9 ANEMIA, UNSPECIFIED: ICD-10-CM

## 2023-07-14 DIAGNOSIS — D72.9 DISORDER OF WHITE BLOOD CELLS, UNSPECIFIED: ICD-10-CM

## 2023-07-14 LAB
ALBUMIN SERPL ELPH-MCNC: 2.6 G/DL — LOW (ref 3.3–5)
ALP SERPL-CCNC: 58 U/L — SIGNIFICANT CHANGE UP (ref 40–120)
ALT FLD-CCNC: 12 U/L — SIGNIFICANT CHANGE UP (ref 10–45)
ANION GAP SERPL CALC-SCNC: 11 MMOL/L — SIGNIFICANT CHANGE UP (ref 5–17)
ANION GAP SERPL CALC-SCNC: 16 MMOL/L — SIGNIFICANT CHANGE UP (ref 5–17)
ANION GAP SERPL CALC-SCNC: 19 MMOL/L — HIGH (ref 5–17)
ANISOCYTOSIS BLD QL: SLIGHT — SIGNIFICANT CHANGE UP
ANISOCYTOSIS BLD QL: SLIGHT — SIGNIFICANT CHANGE UP
AST SERPL-CCNC: 17 U/L — SIGNIFICANT CHANGE UP (ref 10–40)
BASOPHILS # BLD AUTO: 0 K/UL — SIGNIFICANT CHANGE UP (ref 0–0.2)
BASOPHILS # BLD AUTO: 0 K/UL — SIGNIFICANT CHANGE UP (ref 0–0.2)
BASOPHILS NFR BLD AUTO: 0 % — SIGNIFICANT CHANGE UP (ref 0–2)
BASOPHILS NFR BLD AUTO: 0 % — SIGNIFICANT CHANGE UP (ref 0–2)
BILIRUB SERPL-MCNC: 0.2 MG/DL — SIGNIFICANT CHANGE UP (ref 0.2–1.2)
BUN SERPL-MCNC: 62 MG/DL — HIGH (ref 7–23)
BUN SERPL-MCNC: 73 MG/DL — HIGH (ref 7–23)
BUN SERPL-MCNC: 75 MG/DL — HIGH (ref 7–23)
BURR CELLS BLD QL SMEAR: PRESENT — SIGNIFICANT CHANGE UP
BURR CELLS BLD QL SMEAR: PRESENT — SIGNIFICANT CHANGE UP
CALCIUM SERPL-MCNC: 6.3 MG/DL — CRITICAL LOW (ref 8.4–10.5)
CALCIUM SERPL-MCNC: 8.4 MG/DL — SIGNIFICANT CHANGE UP (ref 8.4–10.5)
CALCIUM SERPL-MCNC: 8.6 MG/DL — SIGNIFICANT CHANGE UP (ref 8.4–10.5)
CHLORIDE SERPL-SCNC: 112 MMOL/L — HIGH (ref 96–108)
CHLORIDE SERPL-SCNC: 112 MMOL/L — HIGH (ref 96–108)
CHLORIDE SERPL-SCNC: 91 MMOL/L — LOW (ref 96–108)
CO2 SERPL-SCNC: 16 MMOL/L — LOW (ref 22–31)
CO2 SERPL-SCNC: 18 MMOL/L — LOW (ref 22–31)
CO2 SERPL-SCNC: 30 MMOL/L — SIGNIFICANT CHANGE UP (ref 22–31)
CREAT SERPL-MCNC: 6.48 MG/DL — HIGH (ref 0.5–1.3)
CREAT SERPL-MCNC: 8.4 MG/DL — HIGH (ref 0.5–1.3)
CREAT SERPL-MCNC: 8.49 MG/DL — HIGH (ref 0.5–1.3)
DACRYOCYTES BLD QL SMEAR: SLIGHT — SIGNIFICANT CHANGE UP
EGFR: 7 ML/MIN/1.73M2 — LOW
EGFR: 7 ML/MIN/1.73M2 — LOW
EGFR: 9 ML/MIN/1.73M2 — LOW
EOSINOPHIL # BLD AUTO: 0 K/UL — SIGNIFICANT CHANGE UP (ref 0–0.5)
EOSINOPHIL # BLD AUTO: 0.42 K/UL — SIGNIFICANT CHANGE UP (ref 0–0.5)
EOSINOPHIL NFR BLD AUTO: 0 % — SIGNIFICANT CHANGE UP (ref 0–6)
EOSINOPHIL NFR BLD AUTO: 1.7 % — SIGNIFICANT CHANGE UP (ref 0–6)
GIANT PLATELETS BLD QL SMEAR: PRESENT — SIGNIFICANT CHANGE UP
GLUCOSE BLDC GLUCOMTR-MCNC: 215 MG/DL — HIGH (ref 70–99)
GLUCOSE BLDC GLUCOMTR-MCNC: 232 MG/DL — HIGH (ref 70–99)
GLUCOSE BLDC GLUCOMTR-MCNC: 308 MG/DL — HIGH (ref 70–99)
GLUCOSE BLDC GLUCOMTR-MCNC: 316 MG/DL — HIGH (ref 70–99)
GLUCOSE BLDC GLUCOMTR-MCNC: 323 MG/DL — HIGH (ref 70–99)
GLUCOSE SERPL-MCNC: 213 MG/DL — HIGH (ref 70–99)
GLUCOSE SERPL-MCNC: 260 MG/DL — HIGH (ref 70–99)
GLUCOSE SERPL-MCNC: 290 MG/DL — HIGH (ref 70–99)
HAV IGM SER-ACNC: SIGNIFICANT CHANGE UP
HBV CORE AB SER-ACNC: SIGNIFICANT CHANGE UP
HBV CORE IGM SER-ACNC: SIGNIFICANT CHANGE UP
HBV SURFACE AB SER-ACNC: SIGNIFICANT CHANGE UP
HBV SURFACE AG SER-ACNC: SIGNIFICANT CHANGE UP
HCT VFR BLD CALC: 20.5 % — CRITICAL LOW (ref 39–50)
HCT VFR BLD CALC: 25.9 % — LOW (ref 39–50)
HCT VFR BLD CALC: 26.3 % — LOW (ref 39–50)
HCV AB S/CO SERPL IA: 0.04 S/CO — SIGNIFICANT CHANGE UP
HCV AB SERPL-IMP: SIGNIFICANT CHANGE UP
HGB BLD-MCNC: 6.6 G/DL — CRITICAL LOW (ref 13–17)
HGB BLD-MCNC: 8.3 G/DL — LOW (ref 13–17)
HGB BLD-MCNC: 8.3 G/DL — LOW (ref 13–17)
HIV 1+2 AB+HIV1 P24 AG SERPL QL IA: SIGNIFICANT CHANGE UP
KAPPA LC SER QL IFE: 16.63 MG/DL — HIGH (ref 0.33–1.94)
KAPPA/LAMBDA FREE LIGHT CHAIN RATIO, SERUM: 1.54 RATIO — SIGNIFICANT CHANGE UP (ref 0.26–1.65)
LAMBDA LC SER QL IFE: 10.79 MG/DL — HIGH (ref 0.57–2.63)
LYMPHOCYTES # BLD AUTO: 1.29 K/UL — SIGNIFICANT CHANGE UP (ref 1–3.3)
LYMPHOCYTES # BLD AUTO: 1.8 K/UL — SIGNIFICANT CHANGE UP (ref 1–3.3)
LYMPHOCYTES # BLD AUTO: 5.2 % — LOW (ref 13–44)
LYMPHOCYTES # BLD AUTO: 7.1 % — LOW (ref 13–44)
MACROCYTES BLD QL: SLIGHT — SIGNIFICANT CHANGE UP
MAGNESIUM SERPL-MCNC: 1.5 MG/DL — LOW (ref 1.6–2.6)
MAGNESIUM SERPL-MCNC: 2 MG/DL — SIGNIFICANT CHANGE UP (ref 1.6–2.6)
MANUAL SMEAR VERIFICATION: SIGNIFICANT CHANGE UP
MANUAL SMEAR VERIFICATION: SIGNIFICANT CHANGE UP
MCHC RBC-ENTMCNC: 29.1 PG — SIGNIFICANT CHANGE UP (ref 27–34)
MCHC RBC-ENTMCNC: 29.3 PG — SIGNIFICANT CHANGE UP (ref 27–34)
MCHC RBC-ENTMCNC: 29.5 PG — SIGNIFICANT CHANGE UP (ref 27–34)
MCHC RBC-ENTMCNC: 31.6 GM/DL — LOW (ref 32–36)
MCHC RBC-ENTMCNC: 32 GM/DL — SIGNIFICANT CHANGE UP (ref 32–36)
MCHC RBC-ENTMCNC: 32.2 GM/DL — SIGNIFICANT CHANGE UP (ref 32–36)
MCV RBC AUTO: 91.1 FL — SIGNIFICANT CHANGE UP (ref 80–100)
MCV RBC AUTO: 92.2 FL — SIGNIFICANT CHANGE UP (ref 80–100)
MCV RBC AUTO: 92.3 FL — SIGNIFICANT CHANGE UP (ref 80–100)
MICROCYTES BLD QL: SLIGHT — SIGNIFICANT CHANGE UP
MONOCYTES # BLD AUTO: 0.46 K/UL — SIGNIFICANT CHANGE UP (ref 0–0.9)
MONOCYTES # BLD AUTO: 0.84 K/UL — SIGNIFICANT CHANGE UP (ref 0–0.9)
MONOCYTES NFR BLD AUTO: 1.8 % — LOW (ref 2–14)
MONOCYTES NFR BLD AUTO: 3.4 % — SIGNIFICANT CHANGE UP (ref 2–14)
NEUTROPHILS # BLD AUTO: 22.25 K/UL — HIGH (ref 1.8–7.4)
NEUTROPHILS # BLD AUTO: 23.08 K/UL — HIGH (ref 1.8–7.4)
NEUTROPHILS NFR BLD AUTO: 89.7 % — HIGH (ref 43–77)
NEUTROPHILS NFR BLD AUTO: 91.1 % — HIGH (ref 43–77)
NRBC # BLD: 0 /100 WBCS — SIGNIFICANT CHANGE UP (ref 0–0)
OVALOCYTES BLD QL SMEAR: SLIGHT — SIGNIFICANT CHANGE UP
PHOSPHATE SERPL-MCNC: 4 MG/DL — SIGNIFICANT CHANGE UP (ref 2.5–4.5)
PHOSPHATE SERPL-MCNC: 5.4 MG/DL — HIGH (ref 2.5–4.5)
PLAT MORPH BLD: ABNORMAL
PLAT MORPH BLD: NORMAL — SIGNIFICANT CHANGE UP
PLATELET # BLD AUTO: 269 K/UL — SIGNIFICANT CHANGE UP (ref 150–400)
PLATELET # BLD AUTO: 344 K/UL — SIGNIFICANT CHANGE UP (ref 150–400)
PLATELET # BLD AUTO: 350 K/UL — SIGNIFICANT CHANGE UP (ref 150–400)
POIKILOCYTOSIS BLD QL AUTO: SLIGHT — SIGNIFICANT CHANGE UP
POIKILOCYTOSIS BLD QL AUTO: SLIGHT — SIGNIFICANT CHANGE UP
POLYCHROMASIA BLD QL SMEAR: SLIGHT — SIGNIFICANT CHANGE UP
POTASSIUM SERPL-MCNC: 2.8 MMOL/L — CRITICAL LOW (ref 3.5–5.3)
POTASSIUM SERPL-MCNC: 3.2 MMOL/L — LOW (ref 3.5–5.3)
POTASSIUM SERPL-MCNC: 4.3 MMOL/L — SIGNIFICANT CHANGE UP (ref 3.5–5.3)
POTASSIUM SERPL-SCNC: 2.8 MMOL/L — CRITICAL LOW (ref 3.5–5.3)
POTASSIUM SERPL-SCNC: 3.2 MMOL/L — LOW (ref 3.5–5.3)
POTASSIUM SERPL-SCNC: 4.3 MMOL/L — SIGNIFICANT CHANGE UP (ref 3.5–5.3)
PROT SERPL-MCNC: 6.5 G/DL — SIGNIFICANT CHANGE UP (ref 6–8.3)
RBC # BLD: 2.25 M/UL — LOW (ref 4.2–5.8)
RBC # BLD: 2.81 M/UL — LOW (ref 4.2–5.8)
RBC # BLD: 2.85 M/UL — LOW (ref 4.2–5.8)
RBC # FLD: 15.8 % — HIGH (ref 10.3–14.5)
RBC # FLD: 15.9 % — HIGH (ref 10.3–14.5)
RBC # FLD: 15.9 % — HIGH (ref 10.3–14.5)
RBC BLD AUTO: ABNORMAL
RBC BLD AUTO: ABNORMAL
SCHISTOCYTES BLD QL AUTO: SLIGHT — SIGNIFICANT CHANGE UP
SODIUM SERPL-SCNC: 132 MMOL/L — LOW (ref 135–145)
SODIUM SERPL-SCNC: 146 MMOL/L — HIGH (ref 135–145)
SODIUM SERPL-SCNC: 147 MMOL/L — HIGH (ref 135–145)
WBC # BLD: 20.66 K/UL — HIGH (ref 3.8–10.5)
WBC # BLD: 24.81 K/UL — HIGH (ref 3.8–10.5)
WBC # BLD: 25.34 K/UL — HIGH (ref 3.8–10.5)
WBC # FLD AUTO: 20.66 K/UL — HIGH (ref 3.8–10.5)
WBC # FLD AUTO: 24.81 K/UL — HIGH (ref 3.8–10.5)
WBC # FLD AUTO: 25.34 K/UL — HIGH (ref 3.8–10.5)

## 2023-07-14 PROCEDURE — 99253 IP/OBS CNSLTJ NEW/EST LOW 45: CPT

## 2023-07-14 PROCEDURE — 99233 SBSQ HOSP IP/OBS HIGH 50: CPT

## 2023-07-14 PROCEDURE — 74018 RADEX ABDOMEN 1 VIEW: CPT | Mod: 26

## 2023-07-14 PROCEDURE — 93306 TTE W/DOPPLER COMPLETE: CPT | Mod: 26

## 2023-07-14 PROCEDURE — 99233 SBSQ HOSP IP/OBS HIGH 50: CPT | Mod: GC

## 2023-07-14 PROCEDURE — 99222 1ST HOSP IP/OBS MODERATE 55: CPT

## 2023-07-14 RX ORDER — WATER FOR INHALATION
1000 VIAL, NEBULIZER (ML) INHALATION
Refills: 0 | Status: DISCONTINUED | OUTPATIENT
Start: 2023-07-14 | End: 2023-07-14

## 2023-07-14 RX ORDER — ATORVASTATIN CALCIUM 80 MG/1
80 TABLET, FILM COATED ORAL AT BEDTIME
Refills: 0 | Status: DISCONTINUED | OUTPATIENT
Start: 2023-07-14 | End: 2023-07-18

## 2023-07-14 RX ORDER — WATER FOR INHALATION
500 VIAL, NEBULIZER (ML) INHALATION
Refills: 0 | Status: DISCONTINUED | OUTPATIENT
Start: 2023-07-14 | End: 2023-07-14

## 2023-07-14 RX ORDER — POTASSIUM CHLORIDE 20 MEQ
40 PACKET (EA) ORAL ONCE
Refills: 0 | Status: COMPLETED | OUTPATIENT
Start: 2023-07-14 | End: 2023-07-14

## 2023-07-14 RX ORDER — POTASSIUM CHLORIDE 20 MEQ
40 PACKET (EA) ORAL ONCE
Refills: 0 | Status: DISCONTINUED | OUTPATIENT
Start: 2023-07-14 | End: 2023-07-14

## 2023-07-14 RX ORDER — METOPROLOL TARTRATE 50 MG
25 TABLET ORAL DAILY
Refills: 0 | Status: DISCONTINUED | OUTPATIENT
Start: 2023-07-14 | End: 2023-07-18

## 2023-07-14 RX ORDER — WATER FOR INHALATION
1000 VIAL, NEBULIZER (ML) INHALATION
Refills: 0 | Status: DISCONTINUED | OUTPATIENT
Start: 2023-07-14 | End: 2023-07-15

## 2023-07-14 RX ORDER — INSULIN GLARGINE 100 [IU]/ML
16 INJECTION, SOLUTION SUBCUTANEOUS AT BEDTIME
Refills: 0 | Status: DISCONTINUED | OUTPATIENT
Start: 2023-07-14 | End: 2023-07-17

## 2023-07-14 RX ADMIN — Medication 125 MILLILITER(S): at 14:10

## 2023-07-14 RX ADMIN — Medication 8: at 17:13

## 2023-07-14 RX ADMIN — CLOPIDOGREL BISULFATE 75 MILLIGRAM(S): 75 TABLET, FILM COATED ORAL at 13:29

## 2023-07-14 RX ADMIN — HEPARIN SODIUM 5000 UNIT(S): 5000 INJECTION INTRAVENOUS; SUBCUTANEOUS at 23:30

## 2023-07-14 RX ADMIN — Medication 30 MILLIGRAM(S): at 00:07

## 2023-07-14 RX ADMIN — Medication 650 MILLIGRAM(S): at 06:14

## 2023-07-14 RX ADMIN — Medication 30 MILLIGRAM(S): at 09:38

## 2023-07-14 RX ADMIN — HEPARIN SODIUM 5000 UNIT(S): 5000 INJECTION INTRAVENOUS; SUBCUTANEOUS at 13:29

## 2023-07-14 RX ADMIN — Medication 25 MILLIGRAM(S): at 09:39

## 2023-07-14 RX ADMIN — Medication 4 MILLIGRAM(S): at 00:08

## 2023-07-14 RX ADMIN — Medication 650 MILLIGRAM(S): at 09:39

## 2023-07-14 RX ADMIN — Medication 8: at 21:46

## 2023-07-14 RX ADMIN — Medication 4: at 07:41

## 2023-07-14 RX ADMIN — HEPARIN SODIUM 5000 UNIT(S): 5000 INJECTION INTRAVENOUS; SUBCUTANEOUS at 00:08

## 2023-07-14 RX ADMIN — Medication 25 MILLIGRAM(S): at 19:26

## 2023-07-14 RX ADMIN — Medication 25 MILLIGRAM(S): at 13:30

## 2023-07-14 RX ADMIN — Medication 81 MILLIGRAM(S): at 13:28

## 2023-07-14 RX ADMIN — INSULIN GLARGINE 16 UNIT(S): 100 INJECTION, SOLUTION SUBCUTANEOUS at 22:01

## 2023-07-14 RX ADMIN — Medication 40 MILLIEQUIVALENT(S): at 09:49

## 2023-07-14 RX ADMIN — ATORVASTATIN CALCIUM 40 MILLIGRAM(S): 80 TABLET, FILM COATED ORAL at 00:08

## 2023-07-14 RX ADMIN — SENNA PLUS 2 TABLET(S): 8.6 TABLET ORAL at 00:07

## 2023-07-14 RX ADMIN — Medication 30 MILLIGRAM(S): at 19:26

## 2023-07-14 RX ADMIN — MODAFINIL 100 MILLIGRAM(S): 200 TABLET ORAL at 19:26

## 2023-07-14 RX ADMIN — Medication 8: at 13:01

## 2023-07-14 RX ADMIN — Medication 25 MILLIGRAM(S): at 00:08

## 2023-07-14 RX ADMIN — HEPARIN SODIUM 5000 UNIT(S): 5000 INJECTION INTRAVENOUS; SUBCUTANEOUS at 09:38

## 2023-07-14 NOTE — PROGRESS NOTE ADULT - SUBJECTIVE AND OBJECTIVE BOX
SUBJECTIVE :     Fever 101 Rectal this morning  patient denies cough , shortness of breath , headahce , no dysuria , Suprapubic pain , diarrhea , nausea , vomiting  , abdominal pain   reports right BKA site pain is well controlled         OBJECTIVE:  Vital Signs Last 24 Hrs  T(C): 36.8 (14 Jul 2023 09:07), Max: 38.3 (14 Jul 2023 05:30)  T(F): 98.2 (14 Jul 2023 09:07), Max: 101 (14 Jul 2023 05:30)  HR: 113 (14 Jul 2023 09:13) (112 - 124)  BP: 154/72 (14 Jul 2023 09:13) (103/50 - 154/72)  BP(mean): 72 (14 Jul 2023 05:30) (72 - 95)  RR: 19 (14 Jul 2023 09:13) (17 - 19)  SpO2: 97% (14 Jul 2023 09:13) (97% - 99%)    Parameters below as of 14 Jul 2023 09:13  Patient On (Oxygen Delivery Method): room air      Physical Exam     Gen: NAD laying in bed  CV: tachycardic , no murmurs   Pulm: CTA b/l no wheezing or crackles   Abd: soft, NTND + BS no rebound or guarding   LE: R BKA ACE bandage w/ dry blood in place , Left heel with no ulcers    : Condom Catheter  Neuro : AAOX2 ,strength 4/5 bilateral  SKIN ; BKA site clean dry and intact, no sacral ulcers , no other skin breakdown                                      8.3    24.81 )-----------( 344      ( 14 Jul 2023 07:57 )             26.3     07-14    147<H>  |  112<H>  |  73<H>  ----------------------------<  213<H>  2.8<LL>   |  16<L>  |  8.40<H>    Ca    8.4      14 Jul 2023 07:57  Phos  5.4     07-14  Mg     2.0     07-14    TPro  6.5  /  Alb  2.6<L>  /  TBili  0.2  /  DBili  x   /  AST  17  /  ALT  12  /  AlkPhos  58  07-14      CAPILLARY BLOOD GLUCOSE      POCT Blood Glucose.: 232 mg/dL (14 Jul 2023 07:35)  POCT Blood Glucose.: 215 mg/dL (14 Jul 2023 06:17)  POCT Blood Glucose.: 235 mg/dL (13 Jul 2023 21:45)  POCT Blood Glucose.: 344 mg/dL (13 Jul 2023 17:23)  POCT Blood Glucose.: 198 mg/dL (13 Jul 2023 11:29)      MEDICATIONS  (STANDING):  aspirin  chewable 81 milliGRAM(s) Oral daily  atorvastatin 40 milliGRAM(s) Oral at bedtime  clopidogrel Tablet 75 milliGRAM(s) Oral daily  glucagon  Injectable 1 milliGRAM(s) IntraMuscular once  heparin   Injectable 5000 Unit(s) SubCutaneous every 8 hours  hydrALAZINE 25 milliGRAM(s) Oral three times a day  insulin lispro (ADMELOG) corrective regimen sliding scale   SubCutaneous Before meals and at bedtime  modafinil 100 milliGRAM(s) Oral daily  NIFEdipine IR 30 milliGRAM(s) Oral two times a day  senna 2 Tablet(s) Oral at bedtime  sodium bicarbonate 650 milliGRAM(s) Oral two times a day  sterile water 1000 milliLiter(s) (125 mL/Hr) IV Continuous <Continuous>    MEDICATIONS  (PRN):  acetaminophen     Tablet .. 650 milliGRAM(s) Oral every 4 hours PRN Mild Pain (1 - 3)  dextrose Oral Gel 15 Gram(s) Oral once PRN Blood Glucose LESS THAN 70 milliGRAM(s)/deciliter  ondansetron Injectable 4 milliGRAM(s) IV Push every 4 hours PRN Nausea and/or Vomiting  oxycodone    5 mG/acetaminophen 325 mG 1 Tablet(s) Oral every 6 hours PRN Severe Pain (7 - 10)                Plan discussed with Vascular Surgery

## 2023-07-14 NOTE — CONSULT NOTE ADULT - SUBJECTIVE AND OBJECTIVE BOX
Hematology Consult Note    HPI:  HPI: 57M with PMH of DM2, CVA x 2 (one ischemic one embolic, residual left side weakness), HTN, HLD, and CKD V (not on HD yet), BPH, BIBEMS from nursing home for hyperglycemia to 363.  On arrival, patient found to have SIRS (tachycardia, fever, leukocytosis) and was felt to be septic w/o identified infectious source. Also noted to have anion gap but likely attributable to uremia as well as hyperglycemia, but of note not in DKA. Upon investigation, patient's right heel ulcer was noted to have foul smell so podiatry was consulted and cross-sectional imaging obtained revealing gas tracking along achilles sheath. Vascular surgery was consulted for surgical evaluation.     Patient endorses pain of the right heel. He states the wound has been present for about a month. Patient denies fever, chills, chest pain, shortness of breath, vomiting, abdominal pain.     In the ED:  Initial vital signs: T: 100.8 F, HR: 120, BP: 117/73, R:16, SpO2: 96% on RA  ED course:   Labs: significant for WBC 26.04, Hgb 8.9, plt 496, glucose 260, Na+ 146, K+ 3.8, Cl- 109, HCO3- 15, AG 22, BHB 0.6, trop 126, VBG with metabolic acidosis  Imaging:  CXR: clear  EKG: sinus tachycardia, no ST elevation/depression or TWI  Medications:  2L NS, 1 dose vanc + zosyn, insulin 9 units bolus, started on insulin gtt at 9 units/hr  Consults: MICU    MICU course:  (06 Jul 2023 04:16)    Interval history:  CT revealed gas tracking along achilles sheath and vascular surgery was consulted for surgical evaluation. 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) and completion R BKA 7/11 with no further infectious signs or symptoms, found to have persistent leukocytosis. Blood cultures negative, and ID recommended discontinuing Vancomycin and Zosyn which were started 7/5/23-7/13/23, and wants to observe off antibiotics. Hematology consulted for further evaluation of the leukocytosis.       Allergies    No Known Allergies    Intolerances        MEDICATIONS  (STANDING):  aspirin  chewable 81 milliGRAM(s) Oral daily  atorvastatin 40 milliGRAM(s) Oral at bedtime  clopidogrel Tablet 75 milliGRAM(s) Oral daily  dextrose 5%. 1000 milliLiter(s) (50 mL/Hr) IV Continuous <Continuous>  dextrose 5%. 1000 milliLiter(s) (100 mL/Hr) IV Continuous <Continuous>  dextrose 50% Injectable 25 Gram(s) IV Push once  dextrose 50% Injectable 12.5 Gram(s) IV Push once  dextrose 50% Injectable 25 Gram(s) IV Push once  doxazosin 4 milliGRAM(s) Oral at bedtime  glucagon  Injectable 1 milliGRAM(s) IntraMuscular once  heparin   Injectable 5000 Unit(s) SubCutaneous every 8 hours  hydrALAZINE 25 milliGRAM(s) Oral three times a day  insulin lispro (ADMELOG) corrective regimen sliding scale   SubCutaneous Before meals and at bedtime  modafinil 100 milliGRAM(s) Oral daily  NIFEdipine IR 30 milliGRAM(s) Oral two times a day  senna 2 Tablet(s) Oral at bedtime  sodium bicarbonate 650 milliGRAM(s) Oral two times a day  sterile water 500 milliLiter(s) (125 mL/Hr) IV Continuous <Continuous>    MEDICATIONS  (PRN):  acetaminophen     Tablet .. 650 milliGRAM(s) Oral every 4 hours PRN Mild Pain (1 - 3)  dextrose Oral Gel 15 Gram(s) Oral once PRN Blood Glucose LESS THAN 70 milliGRAM(s)/deciliter  ondansetron Injectable 4 milliGRAM(s) IV Push every 4 hours PRN Nausea and/or Vomiting  oxycodone    5 mG/acetaminophen 325 mG 1 Tablet(s) Oral every 6 hours PRN Severe Pain (7 - 10)      PAST MEDICAL & SURGICAL HISTORY:  Type 2 diabetes mellitus  Diabetes mellitus  Cerebral artery occlusion with cerebral infarction  Cerebral vascular accident  Hyperlipidemia  Hyperlipidemia  Hypertension  Stage 4 chronic kidney disease  H/O diabetic retinopathy  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, traumatic, left, sequela,      FAMILY HISTORY:  Family history of diabetes mellitus (Mother)  Family history of diabetes mellitus (DM)        SOCIAL HISTORY: No EtOH, no tobacco    REVIEW OF SYSTEMS:    CONSTITUTIONAL: No weakness, fevers or chills  EYES/ENT: No visual changes;  No vertigo or throat pain   NECK: No pain or stiffness  RESPIRATORY: No cough, wheezing, hemoptysis; No shortness of breath  CARDIOVASCULAR: No chest pain or palpitations  GASTROINTESTINAL: No abdominal or epigastric pain. No nausea, vomiting, or hematemesis; No diarrhea or constipation. No melena or hematochezia.  GENITOURINARY: No dysuria, frequency or hematuria  NEUROLOGICAL: No numbness or weakness  SKIN: No itching, burning, rashes, or lesions   All other review of systems is negative unless indicated above.        T(F): 98.2 (07-14-23 @ 09:07), Max: 101 (07-14-23 @ 05:30)  HR: 113 (07-14-23 @ 09:13)  BP: 154/72 (07-14-23 @ 09:13)  RR: 19 (07-14-23 @ 09:13)  SpO2: 97% (07-14-23 @ 09:13)  Wt(kg): --    GENERAL: NAD, well-developed  HEAD:  Atraumatic, Normocephalic  EYES: EOMI, PERRLA, conjunctiva and sclera clear  NECK: Supple, No JVD  CHEST/LUNG: Clear to auscultation bilaterally; No wheeze  HEART: Regular rate and rhythm; No murmurs, rubs, or gallops  ABDOMEN: Soft, Nontender, Nondistended; Bowel sounds present  EXTREMITIES:  2+ Peripheral Pulses, No clubbing, cyanosis, or edema  NEUROLOGY: non-focal  SKIN: No rashes or lesions                          8.3    24.81 )-----------( 344      ( 14 Jul 2023 07:57 )             26.3       07-14    147<H>  |  112<H>  |  73<H>  ----------------------------<  213<H>  2.8<LL>   |  16<L>  |  8.40<H>    Ca    8.4      14 Jul 2023 07:57  Phos  5.4     07-14  Mg     2.0     07-14    TPro  6.5  /  Alb  2.6<L>  /  TBili  0.2  /  DBili  x   /  AST  17  /  ALT  12  /  AlkPhos  58  07-14      Magnesium: 2.0 mg/dL (07-14 @ 07:57)  Phosphorus: 5.4 mg/dL (07-14 @ 07:57)       Hematology Consult Note    HPI: 57M with PMH of DM2, CVA x 2 (one ischemic one embolic, residual left side weakness), HTN, HLD, and CKD V (not on HD yet), BPH, BIBEMS from nursing home for hyperglycemia to 363.  On arrival, patient found to have SIRS (tachycardia, fever, leukocytosis) and was felt to be septic w/o identified infectious source. Also noted to have anion gap but likely attributable to uremia as well as hyperglycemia, but of note not in DKA. Upon investigation, patient's right heel ulcer was noted to have foul smell so podiatry was consulted and cross-sectional imaging obtained revealing gas tracking along achilles sheath. Vascular surgery was consulted for surgical evaluation.     Patient endorses pain of the right heel. He states the wound has been present for about a month. Patient denies fever, chills, chest pain, shortness of breath, vomiting, abdominal pain.     In the ED:  Initial vital signs: T: 100.8 F, HR: 120, BP: 117/73, R:16, SpO2: 96% on RA  ED course:   Labs: significant for WBC 26.04, Hgb 8.9, plt 496, glucose 260, Na+ 146, K+ 3.8, Cl- 109, HCO3- 15, AG 22, BHB 0.6, trop 126, VBG with metabolic acidosis  Imaging:  CXR: clear  EKG: sinus tachycardia, no ST elevation/depression or TWI  Medications:  2L NS, 1 dose vanc + zosyn, insulin 9 units bolus, started on insulin gtt at 9 units/hr  Consults: MICU    MICU course:  (06 Jul 2023 04:16)    Interval history:  CT revealed gas tracking along achilles sheath and vascular surgery was consulted for surgical evaluation. 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) and completion R BKA 7/11 with no further infectious signs or symptoms, found to have persistent leukocytosis. Blood cultures negative, and ID recommended discontinuing Vancomycin and Zosyn which were started 7/5/23-7/13/23, and wants to observe off antibiotics. Hematology consulted for further evaluation of the leukocytosis.       Allergies    No Known Allergies    Intolerances        MEDICATIONS  (STANDING):  aspirin  chewable 81 milliGRAM(s) Oral daily  atorvastatin 40 milliGRAM(s) Oral at bedtime  clopidogrel Tablet 75 milliGRAM(s) Oral daily  dextrose 5%. 1000 milliLiter(s) (50 mL/Hr) IV Continuous <Continuous>  dextrose 5%. 1000 milliLiter(s) (100 mL/Hr) IV Continuous <Continuous>  dextrose 50% Injectable 25 Gram(s) IV Push once  dextrose 50% Injectable 12.5 Gram(s) IV Push once  dextrose 50% Injectable 25 Gram(s) IV Push once  doxazosin 4 milliGRAM(s) Oral at bedtime  glucagon  Injectable 1 milliGRAM(s) IntraMuscular once  heparin   Injectable 5000 Unit(s) SubCutaneous every 8 hours  hydrALAZINE 25 milliGRAM(s) Oral three times a day  insulin lispro (ADMELOG) corrective regimen sliding scale   SubCutaneous Before meals and at bedtime  modafinil 100 milliGRAM(s) Oral daily  NIFEdipine IR 30 milliGRAM(s) Oral two times a day  senna 2 Tablet(s) Oral at bedtime  sodium bicarbonate 650 milliGRAM(s) Oral two times a day  sterile water 500 milliLiter(s) (125 mL/Hr) IV Continuous <Continuous>    MEDICATIONS  (PRN):  acetaminophen     Tablet .. 650 milliGRAM(s) Oral every 4 hours PRN Mild Pain (1 - 3)  dextrose Oral Gel 15 Gram(s) Oral once PRN Blood Glucose LESS THAN 70 milliGRAM(s)/deciliter  ondansetron Injectable 4 milliGRAM(s) IV Push every 4 hours PRN Nausea and/or Vomiting  oxycodone    5 mG/acetaminophen 325 mG 1 Tablet(s) Oral every 6 hours PRN Severe Pain (7 - 10)      PAST MEDICAL & SURGICAL HISTORY:  Type 2 diabetes mellitus  Diabetes mellitus  Cerebral artery occlusion with cerebral infarction  Cerebral vascular accident  Hyperlipidemia  Hyperlipidemia  Hypertension  Stage 4 chronic kidney disease  H/O diabetic retinopathy  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, traumatic, left, sequela,      FAMILY HISTORY:  Family history of diabetes mellitus (Mother)  Family history of diabetes mellitus (DM)        SOCIAL HISTORY: No EtOH, no tobacco    REVIEW OF SYSTEMS:    CONSTITUTIONAL: No weakness, fevers or chills  EYES/ENT: No visual changes;  No vertigo or throat pain   NECK: No pain or stiffness  RESPIRATORY: No cough, wheezing, hemoptysis; No shortness of breath  CARDIOVASCULAR: No chest pain or palpitations  GASTROINTESTINAL: No abdominal or epigastric pain. No nausea, vomiting, or hematemesis; No diarrhea or constipation. No melena or hematochezia.  GENITOURINARY: No dysuria, frequency or hematuria  NEUROLOGICAL: No numbness or weakness  SKIN: No itching, burning, rashes, or lesions   All other review of systems is negative unless indicated above.        T(F): 98.2 (07-14-23 @ 09:07), Max: 101 (07-14-23 @ 05:30)  HR: 113 (07-14-23 @ 09:13)  BP: 154/72 (07-14-23 @ 09:13)  RR: 19 (07-14-23 @ 09:13)  SpO2: 97% (07-14-23 @ 09:13)  Wt(kg): --    GENERAL: NAD, well-developed  HEAD:  Atraumatic, Normocephalic  EYES: EOMI, PERRLA, conjunctiva and sclera clear  NECK: Supple, No JVD  CHEST/LUNG: Clear to auscultation bilaterally; No wheeze  HEART: Regular rate and rhythm; No murmurs, rubs, or gallops  ABDOMEN: Soft, Nontender, Nondistended; Bowel sounds present  EXTREMITIES:  2+ Peripheral Pulses, No clubbing, cyanosis, or edema  NEUROLOGY: non-focal  SKIN: No rashes or lesions                          8.3    24.81 )-----------( 344      ( 14 Jul 2023 07:57 )             26.3       07-14    147<H>  |  112<H>  |  73<H>  ----------------------------<  213<H>  2.8<LL>   |  16<L>  |  8.40<H>    Ca    8.4      14 Jul 2023 07:57  Phos  5.4     07-14  Mg     2.0     07-14    TPro  6.5  /  Alb  2.6<L>  /  TBili  0.2  /  DBili  x   /  AST  17  /  ALT  12  /  AlkPhos  58  07-14      Magnesium: 2.0 mg/dL (07-14 @ 07:57)  Phosphorus: 5.4 mg/dL (07-14 @ 07:57)       Hematology Consult Note    HPI: 57M with PMH of DM2, CVA x 2 (one ischemic one embolic, residual left side weakness), HTN, HLD, and CKD V (not on HD yet), BPH, BIBEMS from nursing home for hyperglycemia to 363.  On arrival, patient found to have SIRS (tachycardia, fever, leukocytosis) and was felt to be septic w/o identified infectious source. Also noted to have anion gap but likely attributable to uremia as well as hyperglycemia, but of note not in DKA. Upon investigation, patient's right heel ulcer was noted to have foul smell so podiatry was consulted and cross-sectional imaging obtained revealing gas tracking along achilles sheath. Vascular surgery was consulted for surgical evaluation.     Patient endorses pain of the right heel. He states the wound has been present for about a month. Patient denies fever, chills, chest pain, shortness of breath, vomiting, abdominal pain.     In the ED:  Initial vital signs: T: 100.8 F, HR: 120, BP: 117/73, R:16, SpO2: 96% on RA  ED course:   Labs: significant for WBC 26.04, Hgb 8.9, plt 496, glucose 260, Na+ 146, K+ 3.8, Cl- 109, HCO3- 15, AG 22, BHB 0.6, trop 126, VBG with metabolic acidosis  Imaging:  CXR: clear  EKG: sinus tachycardia, no ST elevation/depression or TWI  Medications:  2L NS, 1 dose vanc + zosyn, insulin 9 units bolus, started on insulin gtt at 9 units/hr  Consults: MICU    MICU course:  (06 Jul 2023 04:16)    Interval history:  CT revealed gas tracking along achilles sheath and vascular surgery was consulted for surgical evaluation. 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) and completion R BKA 7/11 with no further infectious signs or symptoms, found to have persistent leukocytosis. Blood cultures negative, and ID recommended discontinuing Vancomycin and Zosyn which were started 7/5/23-7/13/23, and wants to observe off antibiotics. Hematology consulted for further evaluation of the leukocytosis.       Allergies    No Known Allergies    Intolerances        MEDICATIONS  (STANDING):  aspirin  chewable 81 milliGRAM(s) Oral daily  atorvastatin 40 milliGRAM(s) Oral at bedtime  clopidogrel Tablet 75 milliGRAM(s) Oral daily  dextrose 5%. 1000 milliLiter(s) (50 mL/Hr) IV Continuous <Continuous>  dextrose 5%. 1000 milliLiter(s) (100 mL/Hr) IV Continuous <Continuous>  dextrose 50% Injectable 25 Gram(s) IV Push once  dextrose 50% Injectable 12.5 Gram(s) IV Push once  dextrose 50% Injectable 25 Gram(s) IV Push once  doxazosin 4 milliGRAM(s) Oral at bedtime  glucagon  Injectable 1 milliGRAM(s) IntraMuscular once  heparin   Injectable 5000 Unit(s) SubCutaneous every 8 hours  hydrALAZINE 25 milliGRAM(s) Oral three times a day  insulin lispro (ADMELOG) corrective regimen sliding scale   SubCutaneous Before meals and at bedtime  modafinil 100 milliGRAM(s) Oral daily  NIFEdipine IR 30 milliGRAM(s) Oral two times a day  senna 2 Tablet(s) Oral at bedtime  sodium bicarbonate 650 milliGRAM(s) Oral two times a day  sterile water 500 milliLiter(s) (125 mL/Hr) IV Continuous <Continuous>    MEDICATIONS  (PRN):  acetaminophen     Tablet .. 650 milliGRAM(s) Oral every 4 hours PRN Mild Pain (1 - 3)  dextrose Oral Gel 15 Gram(s) Oral once PRN Blood Glucose LESS THAN 70 milliGRAM(s)/deciliter  ondansetron Injectable 4 milliGRAM(s) IV Push every 4 hours PRN Nausea and/or Vomiting  oxycodone    5 mG/acetaminophen 325 mG 1 Tablet(s) Oral every 6 hours PRN Severe Pain (7 - 10)      PAST MEDICAL & SURGICAL HISTORY:  Type 2 diabetes mellitus  Diabetes mellitus  Cerebral artery occlusion with cerebral infarction  Cerebral vascular accident  Hyperlipidemia  Hyperlipidemia  Hypertension  Stage 4 chronic kidney disease  H/O diabetic retinopathy  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, traumatic, left, sequela,      FAMILY HISTORY:  Family history of diabetes mellitus (Mother)  Family history of diabetes mellitus (DM)        SOCIAL HISTORY: Remote smoking history    REVIEW OF SYSTEMS:    CONSTITUTIONAL: No weakness, fevers or chills  EYES/ENT: No visual changes;  No vertigo or throat pain   NECK: No pain or stiffness  RESPIRATORY: No cough, wheezing, hemoptysis; No shortness of breath  CARDIOVASCULAR: No chest pain or palpitations  GASTROINTESTINAL: No abdominal or epigastric pain. No nausea, vomiting, or hematemesis; No diarrhea or constipation. No melena or hematochezia.  GENITOURINARY: No dysuria, frequency or hematuria  NEUROLOGICAL: No numbness or weakness  SKIN: No itching, burning, rashes, or lesions   All other review of systems is negative unless indicated above.        T(F): 98.2 (07-14-23 @ 09:07), Max: 101 (07-14-23 @ 05:30)  HR: 113 (07-14-23 @ 09:13)  BP: 154/72 (07-14-23 @ 09:13)  RR: 19 (07-14-23 @ 09:13)  SpO2: 97% (07-14-23 @ 09:13)  Wt(kg): --    GENERAL: NAD  HEAD:  Atraumatic, Normocephalic  EYES: EOMI, PERRLA, conjunctiva and sclera clear  ENT: poor dentition, no oropharyngeal mucosal lesions  NECK: Supple, No JVD  CHEST/LUNG: Clear to auscultation bilaterally; No wheeze  HEART: tachycardic  ABDOMEN: Soft, Nontender, mildly distended, Bowel sounds present  EXTREMITIES: L BKA  NEUROLOGY: non-focal  SKIN: No rashes or lesions                          8.3    24.81 )-----------( 344      ( 14 Jul 2023 07:57 )             26.3       07-14    147<H>  |  112<H>  |  73<H>  ----------------------------<  213<H>  2.8<LL>   |  16<L>  |  8.40<H>    Ca    8.4      14 Jul 2023 07:57  Phos  5.4     07-14  Mg     2.0     07-14    TPro  6.5  /  Alb  2.6<L>  /  TBili  0.2  /  DBili  x   /  AST  17  /  ALT  12  /  AlkPhos  58  07-14      Magnesium: 2.0 mg/dL (07-14 @ 07:57)  Phosphorus: 5.4 mg/dL (07-14 @ 07:57)

## 2023-07-14 NOTE — PROGRESS NOTE ADULT - SUBJECTIVE AND OBJECTIVE BOX
Patient seen and examined at bedside, denies complains noted tachycardic in 110s.     Review of Systems:  ROS negative except as per HPI      PHYSICAL EXAM:  GENERAL: NAD, lying in bed.  HEART: Tachycardic, regular rhythm   LUNGS:  Clear to auscultation eleazar  ABD: Soft, nontender, nondistended, +BS  Ext: RLU with BKA, covered with band/gauze.   Nerv:  A&Ox2, left hemiparesis.    Vascular Access: AVF in LUE, with palpable thrill.   Blood:  / Protein:  / Nitrite:    Leuk Esterase:  / RBC:  / WBC    Sq Epi:  / Non Sq Epi:  / Bacteria:       Allergies:  No Known Allergies    Hospital Medications:   MEDICATIONS  (STANDING):  aspirin  chewable 81 milliGRAM(s) Oral daily  atorvastatin 40 milliGRAM(s) Oral at bedtime  clopidogrel Tablet 75 milliGRAM(s) Oral daily  dextrose 5%. 1000 milliLiter(s) (50 mL/Hr) IV Continuous <Continuous>  dextrose 5%. 1000 milliLiter(s) (100 mL/Hr) IV Continuous <Continuous>  dextrose 50% Injectable 25 Gram(s) IV Push once  dextrose 50% Injectable 12.5 Gram(s) IV Push once  dextrose 50% Injectable 25 Gram(s) IV Push once  doxazosin 4 milliGRAM(s) Oral at bedtime  glucagon  Injectable 1 milliGRAM(s) IntraMuscular once  heparin   Injectable 5000 Unit(s) SubCutaneous every 8 hours  hydrALAZINE 25 milliGRAM(s) Oral three times a day  insulin glargine Injectable (LANTUS) 16 Unit(s) SubCutaneous at bedtime  insulin lispro (ADMELOG) corrective regimen sliding scale   SubCutaneous Before meals and at bedtime  modafinil 100 milliGRAM(s) Oral daily  NIFEdipine IR 30 milliGRAM(s) Oral two times a day  senna 2 Tablet(s) Oral at bedtime  sodium bicarbonate 650 milliGRAM(s) Oral two times a day  sterile water 1000 milliLiter(s) (125 mL/Hr) IV Continuous <Continuous>    VITALS:  T(F): 98.2 (07-14-23 @ 09:07), Max: 101 (07-14-23 @ 05:30)  HR: 113 (07-14-23 @ 09:13)  BP: 154/72 (07-14-23 @ 09:13)  RR: 19 (07-14-23 @ 09:13)  SpO2: 97% (07-14-23 @ 09:13)  Wt(kg): --    07-12 @ 07:01  -  07-13 @ 07:00  --------------------------------------------------------  IN: 410 mL / OUT: 300 mL / NET: 110 mL    07-13 @ 07:01  -  07-14 @ 07:00  --------------------------------------------------------  IN: 0 mL / OUT: 430 mL / NET: -430 mL    07-14 @ 07:01  -  07-14 @ 13:01  --------------------------------------------------------  IN: 180 mL / OUT: 0 mL / NET: 180 mL      LABS:  07-14    147<H>  |  112<H>  |  73<H>  ----------------------------<  213<H>  2.8<LL>   |  16<L>  |  8.40<H>    Ca    8.4      14 Jul 2023 07:57  Phos  5.4     07-14  Mg     2.0     07-14    TPro  6.5  /  Alb  2.6<L>  /  TBili  0.2  /  DBili      /  AST  17  /  ALT  12  /  AlkPhos  58  07-14                          8.3    24.81 )-----------( 344      ( 14 Jul 2023 07:57 )             26.3       Urine Studies:  Creatinine Trend: 8.40<--, 7.70<--, 7.31<--, 7.15<--, See Note<--, 7.58<--  Urinalysis Basic - ( 14 Jul 2023 07:57 )    Color:  / Appearance:  / SG:  / pH:   Gluc: 213 mg/dL / Ketone:   / Bili:  / Urobili:    Blood:  / Protein:  / Nitrite:    Leuk Esterase:  / RBC:  / WBC    Sq Epi:  / Non Sq Epi:  / Bacteria:         RADIOLOGY & ADDITIONAL STUDIES:

## 2023-07-14 NOTE — PROGRESS NOTE ADULT - ASSESSMENT
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 Royal) who presented to [facility] on 7/5 from nursing home for hyperglycemia who was found to have infected likely diabetic toe wound with gas-producing organism. CT revealed gas tracking along achilles sheath and vascular surgery was consulted for surgical evaluation. 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 reactive , no clear focus of infection identified   - elevated WBC prior to initiation of anbx , hold vanc and zosyn per ID recs   - if patient has fever with a new T max recommend repeating blood cx   - F/u ID recommendations   - UA, chest x ray without evidence of infection   - check Covid , RVP   - Viral hep Panel and HIV negative       # Vascular dementia  # Hx of CVA with residual L sided weakness   - Continue ASA and statin     #HTN/HLD  - Continue hydralazine,  nifedipine and  atorvastatin      #CAD/CHF:   - continue home ASA and Plavix  - Echo done on 10/22 - LVH present with EF 50% with mild TR    #Tachycardia  - Pt was tachycardic to 116; Pt denied having chest pain or palpation possible secondary to infection as pt w/ worsening WBC to 23 from 19      # Insulin Dependent Diabetes Mellitus   - Pureed DM diet  - mISS + monitor FSG  - Start Lantus 8 units at Bed time      # CKD stage V:   -Pt w/  baseline Cr 7-8  - Avoid Nephrotoxic agent s   - Renal following who recommended starting  NaHCO3 tabs if bicarb drops <22   - Per renal there is no indications for acute HD at this point.     # BPH  -continue doxazosin (home flomax held bc patient having difficulty swallowing pills)    # Metabolic Acidosis due to CKD stage V , oral bicarbonate     # Hypokalemia - IV replacement     # Acute blood loss Anemia Complicating Anemia of Chronic disease , transfuse if HGB < 7gm /dl     #Dispo  -Pt is for  [facility] once medically stable            57M with PMHx of DM2, 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 Royal) who presented to [facility] on 7/5 from nursing home for hyperglycemia who was found to have infected likely diabetic toe wound with gas-producing organism. CT revealed gas tracking along achilles sheath and vascular surgery was consulted for surgical evaluation. 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 Cope ( ID ) suspecting bacterial infection as a cause given fever once antibiotics were discontinued , He recommended CT scan of the Chest Abd, Pelvis , Lower extremity to r/u abscess   - Low concern for malignancy or rheumatological condition as the etiology of fever and leukocytosis.   - Elevated Free Light chains, but Kappa lambda ratio WNL ? related to CKD/ESRD , f/u Hem Onc recommendations       # Vascular dementia  # Hx of CVA with residual L sided weakness   - Continue ASA and statin     #HTN/HLD  - Continue hydralazine,  nifedipine and  atorvastatin      #CAD/CHF:   - continue home ASA and Plavix  - Echo done on 10/22 - LVH present with EF 50% with mild TR    #Tachycardia  - Pt was tachycardic to 116; Pt denied having chest pain or palpation possible secondary to infection as pt w/ worsening WBC to 23 from 19      # Insulin Dependent Diabetes Mellitus   - Pureed DM diet  - mISS + monitor FSG  - Start Lantus 8 units at Bed time      # CKD stage V:   -Pt w/  baseline Cr 7-8  - Avoid Nephrotoxic agent s   - Renal following who recommended starting  NaHCO3 tabs if bicarb drops <22   - Discussed with Nephrology attending ( Dr. 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 Royal) who presented to [facility] on 7/5 from nursing home for hyperglycemia who was found to have infected likely diabetic toe wound with gas-producing organism. CT revealed gas tracking along achilles sheath and vascular surgery was consulted for surgical evaluation. 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 Cope ( ID ) suspecting bacterial infection as a cause given fever once antibiotics were discontinued , He recommended CT scan of the Chest Abd, Pelvis , Lower extremity to r/u abscess   - Low concern for malignancy or rheumatological condition as the etiology of fever and leukocytosis.   - Elevated Free Light chains, but Kappa lambda ratio WNL ? related to CKD/ESRD , f/u Hem Onc recommendations       # Vascular dementia  # Hx of CVA with residual L sided weakness   - Continue ASA and statin     #HTN/HLD  - Continue hydralazine,  nifedipine and  atorvastatin      #CAD/CHF:   - continue home ASA and Plavix  - Echo done on 10/22 - LVH present with EF 50% with mild TR    #Tachycardia  - Pt was tachycardic to 116; Pt denied having chest pain or palpation possible secondary to infection as pt w/ worsening WBC to 24 thousand       # Insulin Dependent Diabetes Mellitus   - Pureed DM diet  - mISS + monitor FSG  - Start Lantus 8 units at Bed time      # CKD stage V:   -Pt w/  baseline Cr 7-8  - Avoid Nephrotoxic agent s   - Renal following who recommended starting  NaHCO3 tabs if bicarb drops <22   - Discussed with Nephrology attending ( Dr. Garcia ) about CT with IV contrast as the patient may need HD after IV contrast  , Consent for HD if necessary  obtained per Dr. Garcia     # BPH  -continue doxazosin (home flomax held bc patient having difficulty swallowing pills)    # Metabolic Acidosis due to CKD stage V , oral bicarbonate     # Hypokalemia - IV replacement     # Acute blood loss Anemia Complicating Anemia of Chronic disease , transfuse if HGB < 7gm /dl

## 2023-07-14 NOTE — PROGRESS NOTE ADULT - SUBJECTIVE AND OBJECTIVE BOX
INFECTIOUS DISEASE PROGRESS NOTE    EDEN DACOSTA is a 57y Male patient    Overnight/Interval Events:     ROS:  CONSTITUTIONAL:  Negative fever or chills, feels well, good appetite  EYES:  Negative  blurry vision or double vision  CARDIOVASCULAR:  Negative for chest pain or palpitations  RESPIRATORY:  Negative for cough, wheezing, or SOB   GASTROINTESTINAL:  Negative for nausea, vomiting, diarrhea, constipation, or abdominal pain  GENITOURINARY:  Negative frequency, urgency or dysuria  NEUROLOGIC:  No headache, confusion, dizziness, lightheadedness    Allergies    No Known Allergies    Intolerances        ANTIBIOTICS/RELEVANT:  antimicrobials    immunologic:    OTHER:  acetaminophen     Tablet .. 650 milliGRAM(s) Oral every 4 hours PRN  aspirin  chewable 81 milliGRAM(s) Oral daily  atorvastatin 40 milliGRAM(s) Oral at bedtime  clopidogrel Tablet 75 milliGRAM(s) Oral daily  dextrose 5%. 1000 milliLiter(s) IV Continuous <Continuous>  dextrose 5%. 1000 milliLiter(s) IV Continuous <Continuous>  dextrose 50% Injectable 25 Gram(s) IV Push once  dextrose 50% Injectable 12.5 Gram(s) IV Push once  dextrose 50% Injectable 25 Gram(s) IV Push once  dextrose Oral Gel 15 Gram(s) Oral once PRN  doxazosin 4 milliGRAM(s) Oral at bedtime  glucagon  Injectable 1 milliGRAM(s) IntraMuscular once  heparin   Injectable 5000 Unit(s) SubCutaneous every 8 hours  hydrALAZINE 25 milliGRAM(s) Oral three times a day  insulin lispro (ADMELOG) corrective regimen sliding scale   SubCutaneous Before meals and at bedtime  modafinil 100 milliGRAM(s) Oral daily  NIFEdipine IR 30 milliGRAM(s) Oral two times a day  ondansetron Injectable 4 milliGRAM(s) IV Push every 4 hours PRN  oxycodone    5 mG/acetaminophen 325 mG 1 Tablet(s) Oral every 6 hours PRN  senna 2 Tablet(s) Oral at bedtime  sodium bicarbonate 650 milliGRAM(s) Oral two times a day  sterile water 1000 milliLiter(s) IV Continuous <Continuous>      Objective:  Vital Signs Last 24 Hrs  T(C): 36.8 (14 Jul 2023 09:07), Max: 38.3 (14 Jul 2023 05:30)  T(F): 98.2 (14 Jul 2023 09:07), Max: 101 (14 Jul 2023 05:30)  HR: 113 (14 Jul 2023 09:13) (112 - 124)  BP: 154/72 (14 Jul 2023 09:13) (103/50 - 154/72)  BP(mean): 72 (14 Jul 2023 05:30) (72 - 95)  RR: 19 (14 Jul 2023 09:13) (17 - 19)  SpO2: 97% (14 Jul 2023 09:13) (97% - 99%)    Parameters below as of 14 Jul 2023 09:13  Patient On (Oxygen Delivery Method): room air          PHYSICAL EXAM:  Constitutional: resting comfortably in bed; NAD  HEENT: PERRL, EOMI, no nasal discharge; no oral lesions; no sinus tenderness on percussion  Neck: supple; no JVD or lymphadenopathy  Respiratory: CTA B/L; no W/R/R, no retractions  Cardiac: +S1/S2; RRR; no M/R/G  Gastrointestinal: soft, NT/ND; no rebound or guarding; +BSx4  Extremities: WWP, no clubbing or cyanosis; no peripheral edema  Dermatologic: skin warm, dry and intact; no rashes, wounds, or scars  Neurologic: AAOx3; no focal deficits        LABS:                        8.3    24.81 )-----------( 344      ( 14 Jul 2023 07:57 )             26.3     07-14    147<H>  |  112<H>  |  73<H>  ----------------------------<  213<H>  2.8<LL>   |  16<L>  |  8.40<H>    Ca    8.4      14 Jul 2023 07:57  Phos  5.4     07-14  Mg     2.0     07-14    TPro  6.5  /  Alb  2.6<L>  /  TBili  0.2  /  DBili  x   /  AST  17  /  ALT  12  /  AlkPhos  58  07-14      Urinalysis Basic - ( 14 Jul 2023 07:57 )    Color: x / Appearance: x / SG: x / pH: x  Gluc: 213 mg/dL / Ketone: x  / Bili: x / Urobili: x   Blood: x / Protein: x / Nitrite: x   Leuk Esterase: x / RBC: x / WBC x   Sq Epi: x / Non Sq Epi: x / Bacteria: x          MICROBIOLOGY:          RADIOLOGY & ADDITIONAL STUDIES: INFECTIOUS DISEASE PROGRESS NOTE    EDEN DACOSTA is a 57y Male patient    Overnight/Interval Events: Temp to 101 overnight. Patient seen and examined at bedside. Reports feeling well today, no complaints at this time.     ROS:  CONSTITUTIONAL:  Negative fever or chills, feels well, good appetite  EYES:  Negative  blurry vision or double vision  CARDIOVASCULAR:  Negative for chest pain or palpitations  RESPIRATORY:  Negative for cough, wheezing, or SOB   GASTROINTESTINAL:  Negative for nausea, vomiting, diarrhea, constipation, or abdominal pain  GENITOURINARY:  Negative frequency, urgency or dysuria  NEUROLOGIC:  No headache, confusion, dizziness, lightheadedness    Allergies    No Known Allergies    Intolerances        ANTIBIOTICS/RELEVANT:  antimicrobials    immunologic:    OTHER:  acetaminophen     Tablet .. 650 milliGRAM(s) Oral every 4 hours PRN  aspirin  chewable 81 milliGRAM(s) Oral daily  atorvastatin 40 milliGRAM(s) Oral at bedtime  clopidogrel Tablet 75 milliGRAM(s) Oral daily  dextrose 5%. 1000 milliLiter(s) IV Continuous <Continuous>  dextrose 5%. 1000 milliLiter(s) IV Continuous <Continuous>  dextrose 50% Injectable 25 Gram(s) IV Push once  dextrose 50% Injectable 12.5 Gram(s) IV Push once  dextrose 50% Injectable 25 Gram(s) IV Push once  dextrose Oral Gel 15 Gram(s) Oral once PRN  doxazosin 4 milliGRAM(s) Oral at bedtime  glucagon  Injectable 1 milliGRAM(s) IntraMuscular once  heparin   Injectable 5000 Unit(s) SubCutaneous every 8 hours  hydrALAZINE 25 milliGRAM(s) Oral three times a day  insulin lispro (ADMELOG) corrective regimen sliding scale   SubCutaneous Before meals and at bedtime  modafinil 100 milliGRAM(s) Oral daily  NIFEdipine IR 30 milliGRAM(s) Oral two times a day  ondansetron Injectable 4 milliGRAM(s) IV Push every 4 hours PRN  oxycodone    5 mG/acetaminophen 325 mG 1 Tablet(s) Oral every 6 hours PRN  senna 2 Tablet(s) Oral at bedtime  sodium bicarbonate 650 milliGRAM(s) Oral two times a day  sterile water 1000 milliLiter(s) IV Continuous <Continuous>      Objective:  Vital Signs Last 24 Hrs  T(C): 36.8 (14 Jul 2023 09:07), Max: 38.3 (14 Jul 2023 05:30)  T(F): 98.2 (14 Jul 2023 09:07), Max: 101 (14 Jul 2023 05:30)  HR: 113 (14 Jul 2023 09:13) (112 - 124)  BP: 154/72 (14 Jul 2023 09:13) (103/50 - 154/72)  BP(mean): 72 (14 Jul 2023 05:30) (72 - 95)  RR: 19 (14 Jul 2023 09:13) (17 - 19)  SpO2: 97% (14 Jul 2023 09:13) (97% - 99%)    Parameters below as of 14 Jul 2023 09:13  Patient On (Oxygen Delivery Method): room air          PHYSICAL EXAM:  Constitutional: resting comfortably in bed; NAD  HEENT: PERRL, EOMI, no nasal discharge; no oral lesions; no sinus tenderness on percussion  Neck: supple; no JVD or lymphadenopathy  Respiratory: CTA B/L; no W/R/R, no retractions  Cardiac: +S1/S2; RRR; no M/R/G  Gastrointestinal: soft, NT/ND; no rebound or guarding; +BSx4  Extremities: WWP, no clubbing or cyanosis; s/p R BKA; dressing c/d/i without evidence of drainage  Dermatologic: skin warm, dry and intact; no rashes, wounds, or scars  Neurologic: AAOx2-3; no focal deficits      LABS:                        8.3    24.81 )-----------( 344      ( 14 Jul 2023 07:57 )             26.3     07-14    147<H>  |  112<H>  |  73<H>  ----------------------------<  213<H>  2.8<LL>   |  16<L>  |  8.40<H>    Ca    8.4      14 Jul 2023 07:57  Phos  5.4     07-14  Mg     2.0     07-14    TPro  6.5  /  Alb  2.6<L>  /  TBili  0.2  /  DBili  x   /  AST  17  /  ALT  12  /  AlkPhos  58  07-14      Urinalysis Basic - ( 14 Jul 2023 07:57 )    Color: x / Appearance: x / SG: x / pH: x  Gluc: 213 mg/dL / Ketone: x  / Bili: x / Urobili: x   Blood: x / Protein: x / Nitrite: x   Leuk Esterase: x / RBC: x / WBC x   Sq Epi: x / Non Sq Epi: x / Bacteria: x          MICROBIOLOGY:          RADIOLOGY & ADDITIONAL STUDIES:

## 2023-07-14 NOTE — CONSULT NOTE ADULT - SUBJECTIVE AND OBJECTIVE BOX
Cardiology Consult      HPI:  HPI: 57M with PMH of DM2, CVA x 2 (one ischemic one embolic, residual left side weakness), HTN, HLD, and CKD V (not on HD yet), BPH, BIBEMS from nursing home for hyperglycemia to 363.  On arrival, patient found to have SIRS (tachycardia, fever, leukocytosis) and was felt to be septic w/o identified infectious source. Also noted to have anion gap but likely attributable to uremia as well as hyperglycemia, but of note not in DKA. Upon investigation, patient's right heel ulcer was noted to have foul smell so podiatry was consulted and cross-sectional imaging obtained revealing gas tracking along achilles sheath. Vascular surgery was consulted for surgical evaluation.     Patient endorses pain of the right heel. He states the wound has been present for about a month. Patient denies fever, chills, chest pain, shortness of breath, vomiting, abdominal pain.     In the ED:  Initial vital signs: T: 100.8 F, HR: 120, BP: 117/73, R:16, SpO2: 96% on RA  ED course:   Labs: significant for WBC 26.04, Hgb 8.9, plt 496, glucose 260, Na+ 146, K+ 3.8, Cl- 109, HCO3- 15, AG 22, BHB 0.6, trop 126, VBG with metabolic acidosis  Imaging:  CXR: clear  EKG: sinus tachycardia, no ST elevation/depression or TWI  Medications:  2L NS, 1 dose vanc + zosyn, insulin 9 units bolus, started on insulin gtt at 9 units/hr  Consults: MICU    MICU course:  (06 Jul 2023 04:16)        Pt seen and examined at bedside, NAD, denies chest pain/pressure/discomfort. ROS s/f ?,      Review of Systems:  CONSTITUTIONAL:  No weight loss, fever, chills, weakness or fatigue.  HEENT:  Eyes:  No visual loss, blurred vision, double vision or yellow sclerae. Ears, Nose, Throat:  No hearing loss, sneezing, congestion, runny nose or sore throat.  SKIN:  No rash or itching.  CARDIOVASCULAR:  No chest pain, chest pressure or chest discomfort. No palpitations or edema.  RESPIRATORY:  No shortness of breath, cough or sputum.  GASTROINTESTINAL:  No anorexia, nausea, vomiting or diarrhea. No abdominal pain or blood.  GENITOURINARY: No Burning on urination.   NEUROLOGICAL:  see HPI  MUSCULOSKELETAL:  No muscle, back pain, joint pain or stiffness.  HEMATOLOGIC:  No anemia, bleeding or bruising.  LYMPHATICS:  No enlarged nodes. No history of splenectomy.  PSYCHIATRIC:  No history of depression or anxiety.  ENDOCRINOLOGIC:  No reports of sweating, cold or heat intolerance. No polyuria or polydipsia.  ALLERGIES:  No history of asthma, hives, eczema or rhinitis.    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, traumatic, left, sequela,        SOCIAL HISTORY:  FAMILY HISTORY:  Family history of diabetes mellitus (Mother)  Family history of diabetes mellitus (DM)      ALLERGIES: 	  No Known Allergies          MEDICATIONS:  acetaminophen     Tablet .. 650 milliGRAM(s) Oral every 4 hours PRN  aspirin  chewable 81 milliGRAM(s) Oral daily  atorvastatin 40 milliGRAM(s) Oral at bedtime  clopidogrel Tablet 75 milliGRAM(s) Oral daily  dextrose 5%. 1000 milliLiter(s) IV Continuous <Continuous>  dextrose 5%. 1000 milliLiter(s) IV Continuous <Continuous>  dextrose 50% Injectable 12.5 Gram(s) IV Push once  dextrose 50% Injectable 25 Gram(s) IV Push once  dextrose 50% Injectable 25 Gram(s) IV Push once  dextrose Oral Gel 15 Gram(s) Oral once PRN  doxazosin 4 milliGRAM(s) Oral at bedtime  glucagon  Injectable 1 milliGRAM(s) IntraMuscular once  heparin   Injectable 5000 Unit(s) SubCutaneous every 8 hours  hydrALAZINE 25 milliGRAM(s) Oral three times a day  insulin glargine Injectable (LANTUS) 16 Unit(s) SubCutaneous at bedtime  insulin lispro (ADMELOG) corrective regimen sliding scale   SubCutaneous Before meals and at bedtime  modafinil 100 milliGRAM(s) Oral daily  NIFEdipine IR 30 milliGRAM(s) Oral two times a day  ondansetron Injectable 4 milliGRAM(s) IV Push every 4 hours PRN  oxycodone    5 mG/acetaminophen 325 mG 1 Tablet(s) Oral every 6 hours PRN  senna 2 Tablet(s) Oral at bedtime  sodium bicarbonate 650 milliGRAM(s) Oral two times a day  sterile water 1000 milliLiter(s) IV Continuous <Continuous>      T(C): 36.8 (07-14-23 @ 15:44), Max: 38.3 (07-14-23 @ 05:30)  HR: 107 (07-14-23 @ 13:29) (107 - 124)  BP: 144/67 (07-14-23 @ 13:29) (103/50 - 154/72)  RR: 19 (07-14-23 @ 13:29) (17 - 19)  SpO2: 97% (07-14-23 @ 13:29) (97% - 99%)    07-13-23 @ 07:01  -  07-14-23 @ 07:00  --------------------------------------------------------  IN: 0 mL / OUT: 430 mL / NET: -430 mL    07-14-23 @ 07:01  -  07-14-23 @ 16:00  --------------------------------------------------------  IN: 180 mL / OUT: 0 mL / NET: 180 mL        PHYSICAL EXAM:    Constitutional: resting comfortably in bed; NAD  HEENT: NC/AT, PERRL, EOMI, anicteric sclera, no nasal discharge; uvula midline, no oropharyngeal erythema or exudates; MMM  Neck: supple; no thyromegaly, JVP ? cm H20, JVD +/-  Respiratory: CTA B/L; no W/R/R, no retractions  Cardiac: +S1/S2; RRR; no M/R/G; PMI non-displaced  Gastrointestinal: soft, NT/ND; no rebound or guarding; +BSx4  Extremities: WWP, no clubbing or cyanosis; no peripheral edema  Musculoskeletal: NROM x4; no joint swelling, tenderness or erythema  Vascular: 2+ radial, DP/PT pulses B/L  Dermatologic: skin warm, dry and intact; no rashes, wounds, or scars  Lymphatic: no submandibular or cervical LAD  Neurologic: AAOx3; CNII-XII grossly intact; no focal deficits        I&O's Summary    13 Jul 2023 07:01  -  14 Jul 2023 07:00  --------------------------------------------------------  IN: 0 mL / OUT: 430 mL / NET: -430 mL    14 Jul 2023 07:01  -  14 Jul 2023 16:00  --------------------------------------------------------  IN: 180 mL / OUT: 0 mL / NET: 180 mL        LABS:	 	                        8.3    24.81 )-----------( 344      ( 14 Jul 2023 07:57 )             26.3     07-14    147<H>  |  112<H>  |  73<H>  ----------------------------<  213<H>  2.8<LL>   |  16<L>  |  8.40<H>    Ca    8.4      14 Jul 2023 07:57  Phos  5.4     07-14  Mg     2.0     07-14    TPro  6.5  /  Alb  2.6<L>  /  TBili  0.2  /  DBili  x   /  AST  17  /  ALT  12  /  AlkPhos  58  07-14        proBNP:   Lipid Profile:   HgA1c:   TSH:     TELEMETRY: 	    ECG:  	  RADIOLOGY:   ECHO:  STRESS:  CATH:   Cardiology Consult      HPI:  HPI: 57M with PMH of DM2, CVA x 2 (one ischemic one embolic, residual left side weakness), HTN, HLD, and CKD V (not on HD yet), BPH, BIBEMS from nursing home for hyperglycemia to 363.  On arrival, patient found to have SIRS (tachycardia, fever, leukocytosis) and was felt to be septic w/o identified infectious source. Also noted to have anion gap but likely attributable to uremia as well as hyperglycemia, but of note not in DKA. Upon investigation, patient's right heel ulcer was noted to have foul smell so podiatry was consulted and cross-sectional imaging obtained revealing gas tracking along achilles sheath. Vascular surgery was consulted for surgical evaluation.     Patient endorses pain of the right heel. He states the wound has been present for about a month. Patient denies fever, chills, chest pain, shortness of breath, vomiting, abdominal pain.     In the ED:  Initial vital signs: T: 100.8 F, HR: 120, BP: 117/73, R:16, SpO2: 96% on RA  ED course:   Labs: significant for WBC 26.04, Hgb 8.9, plt 496, glucose 260, Na+ 146, K+ 3.8, Cl- 109, HCO3- 15, AG 22, BHB 0.6, trop 126, VBG with metabolic acidosis  Imaging:  CXR: clear  EKG: sinus tachycardia, no ST elevation/depression or TWI  Medications:  2L NS, 1 dose vanc + zosyn, insulin 9 units bolus, started on insulin gtt at 9 units/hr  Consults: MICU    MICU course:  (06 Jul 2023 04:16)        Pt seen and examined at bedside, NAD, denies chest pain/pressure/discomfort. ROS negative      Review of Systems:  CONSTITUTIONAL:  No weight loss, fever, chills, weakness or fatigue.  HEENT:  Eyes:  No visual loss, blurred vision, double vision or yellow sclerae. Ears, Nose, Throat:  No hearing loss, sneezing, congestion, runny nose or sore throat.  SKIN:  No rash or itching.  CARDIOVASCULAR:  No chest pain, chest pressure or chest discomfort. No palpitations or edema.  RESPIRATORY:  No shortness of breath, cough or sputum.  GASTROINTESTINAL:  No anorexia, nausea, vomiting or diarrhea. No abdominal pain or blood.  GENITOURINARY: No Burning on urination.   NEUROLOGICAL:  see HPI  MUSCULOSKELETAL:  No muscle, back pain, joint pain or stiffness.  HEMATOLOGIC:  No anemia, bleeding or bruising.  LYMPHATICS:  No enlarged nodes. No history of splenectomy.  PSYCHIATRIC:  No history of depression or anxiety.  ENDOCRINOLOGIC:  No reports of sweating, cold or heat intolerance. No polyuria or polydipsia.  ALLERGIES:  No history of asthma, hives, eczema or rhinitis.    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, traumatic, left, sequela,        SOCIAL HISTORY:  FAMILY HISTORY:  Family history of diabetes mellitus (Mother)  Family history of diabetes mellitus (DM)      ALLERGIES: 	  No Known Allergies          MEDICATIONS:  acetaminophen     Tablet .. 650 milliGRAM(s) Oral every 4 hours PRN  aspirin  chewable 81 milliGRAM(s) Oral daily  atorvastatin 40 milliGRAM(s) Oral at bedtime  clopidogrel Tablet 75 milliGRAM(s) Oral daily  dextrose 5%. 1000 milliLiter(s) IV Continuous <Continuous>  dextrose 5%. 1000 milliLiter(s) IV Continuous <Continuous>  dextrose 50% Injectable 12.5 Gram(s) IV Push once  dextrose 50% Injectable 25 Gram(s) IV Push once  dextrose 50% Injectable 25 Gram(s) IV Push once  dextrose Oral Gel 15 Gram(s) Oral once PRN  doxazosin 4 milliGRAM(s) Oral at bedtime  glucagon  Injectable 1 milliGRAM(s) IntraMuscular once  heparin   Injectable 5000 Unit(s) SubCutaneous every 8 hours  hydrALAZINE 25 milliGRAM(s) Oral three times a day  insulin glargine Injectable (LANTUS) 16 Unit(s) SubCutaneous at bedtime  insulin lispro (ADMELOG) corrective regimen sliding scale   SubCutaneous Before meals and at bedtime  modafinil 100 milliGRAM(s) Oral daily  NIFEdipine IR 30 milliGRAM(s) Oral two times a day  ondansetron Injectable 4 milliGRAM(s) IV Push every 4 hours PRN  oxycodone    5 mG/acetaminophen 325 mG 1 Tablet(s) Oral every 6 hours PRN  senna 2 Tablet(s) Oral at bedtime  sodium bicarbonate 650 milliGRAM(s) Oral two times a day  sterile water 1000 milliLiter(s) IV Continuous <Continuous>      T(C): 36.8 (07-14-23 @ 15:44), Max: 38.3 (07-14-23 @ 05:30)  HR: 107 (07-14-23 @ 13:29) (107 - 124)  BP: 144/67 (07-14-23 @ 13:29) (103/50 - 154/72)  RR: 19 (07-14-23 @ 13:29) (17 - 19)  SpO2: 97% (07-14-23 @ 13:29) (97% - 99%)    07-13-23 @ 07:01  -  07-14-23 @ 07:00  --------------------------------------------------------  IN: 0 mL / OUT: 430 mL / NET: -430 mL    07-14-23 @ 07:01  -  07-14-23 @ 16:00  --------------------------------------------------------  IN: 180 mL / OUT: 0 mL / NET: 180 mL        PHYSICAL EXAM:    Constitutional: resting comfortably in bed; NAD  HEENT: NC/AT, PERRL, EOMI, anicteric sclera, no nasal discharge; uvula midline, no oropharyngeal erythema or exudates; MMM  Neck: supple; no thyromegaly, JVP <8cm H20, JVD-  Respiratory: CTA B/L; no W/R/R, no retractions  Cardiac: +S1/S2; RRR; no M/R/G; PMI non-displaced  Gastrointestinal: soft, NT/ND; no rebound or guarding; +BSx4  Extremities: R.BKA (stump C/D/I), WWP, no clubbing or cyanosis; no peripheral edema  Musculoskeletal: NROM x3; no joint swelling, tenderness or erythema  Vascular: 2+ radial, DP/PT pulses B/L  Dermatologic: skin warm, dry and intact; no rashes, wounds, or scars  Lymphatic: no submandibular or cervical LAD  Neurologic: AAOx3; CNII-XII grossly intact; no focal deficits        I&O's Summary    13 Jul 2023 07:01  -  14 Jul 2023 07:00  --------------------------------------------------------  IN: 0 mL / OUT: 430 mL / NET: -430 mL    14 Jul 2023 07:01  -  14 Jul 2023 16:00  --------------------------------------------------------  IN: 180 mL / OUT: 0 mL / NET: 180 mL        LABS:	 	                        8.3    24.81 )-----------( 344      ( 14 Jul 2023 07:57 )             26.3     07-14    147<H>  |  112<H>  |  73<H>  ----------------------------<  213<H>  2.8<LL>   |  16<L>  |  8.40<H>    Ca    8.4      14 Jul 2023 07:57  Phos  5.4     07-14  Mg     2.0     07-14    TPro  6.5  /  Alb  2.6<L>  /  TBili  0.2  /  DBili  x   /  AST  17  /  ALT  12  /  AlkPhos  58  07-14

## 2023-07-14 NOTE — CONSULT NOTE ADULT - ATTENDING COMMENTS
Clay Shetty [id number]  This is a 58 y/o male N[facility] with IDDM, CKD5 (plans to initiate HD), HTN, chronic anemia, CVA and bed bound.  He was sent to the ED for glu of 363.  In the ED T100.8F, /min, /70 mmHg.  He was dehydrated, appeared ill, has muscle wasting, poor dentition, WBC 26, hgb 8.6 g/dL, bicarb 15, AG 22, trop 126, , lact 1.2.  he was given IVF bolus, started on insulin gtt, given broad spectrum antibiotics.  -High AG metabolic acidosis   -likely DKA  -IDDM  -acute on chronic renal failure  -leukocytosis, fever, tachycardia =>SIRS without sepsis  -elevated troponin  -chronic anemia  -malnutrition  -chronic CVA  >c/w aspirin and Plavix  >f/u trop, c/w fluid resuscitation  >NPO  >Ok with empiric antibiotics; de escalate as the results become available  >Ok with DKA protocol  >[phosphate, magnesium]  >Q4hr chemistry  >f/u with nephrology  critical care time 55 mins as this patient requires close attention, numerous re evaluations and lab reviews.
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 Royal) who presented to [facility] on 7/5 from nursing home for hyperglycemia who, found to have infected R diabetic toe wound with gas-producing organism.  s/p guillotine R BKA (7/8) and completion R BKA 7/11 with hospital course complicated by persistent fevers of unclear etiology and persistent sinus tach for which Cardiology is being consulted     REVIEW OF STUDIES  - ECG (7/14/23) : Sinus Tach at a rate of 114; LVH  - TTE (7/14/23) : EF 50-55% with global Hypokinesis    # Sinus Tach  # CVA  # DM  # HTN/CKD  # Electrolyte disturbances      1) Sinus Tachycardia  - Likely secondary to infectious process. Rectal Temp this am of 101. Currently no clear source of infection. Followed by ID which is recommending CT with contrast of the Chest and abdomen for further evaluation  - Echo without signs of Culture negative endocarditis without vegetations seens  - Low suspicion for DVT/PE as patient has been on uninterrupted Heparin Prophylaxis  - Sinus tachycardia likely compensatory mechanism to ongoing infectious process and metabolic disturbances in setting of renal failure; Normally would not blunt it with Beta Blockade therapy. However, given borderline EF and global Hypokinesis in patient with known ASCVD (Stroke), will initiate Toprol 25 mg daily with strict holding parameters    2) CVA  -Patient with Hx of CVA x 2 last in 2019 with residual left sided weakness   - MRI brain significant for acute left lacunar infarcts in corona radiata and macrina, in addition to chronic bilateral basal ganglia infarcts, microangiopathic disease.  - Continue with Lipitor 80 mg daily, ASA 81 mg Daily Plavix 75 mg     3) DM  - A1C of 7.8  - Continue with lantus    4) HTN in patient with CKD V  - Continue with Procardia 30 mg BID, Hydralazine 25 mg TID  - Patient will likely required Dialyis, especially if Contrast studied needed to further evaluate source of fever  - LUE fistula present and renal following    5) Electrolyte disturbances  - Keep K >4 and MAG>2     Cardiology will continue to follow, please call with any questions
I evaluated the patient with the hematology fellow, Dr Bravo.  Briefly, the patietn is a 57 year old man admitted with sepsis attributed to an infected leg ulcer.  He is now s/p BKA.  He has been on antibiotics; zosyn was stopped yesterday.  He has been on vancomycin and given that he has stage V CKD, he may still be on vancomyin, as level yesterday was 21.  Hematology/oncology is consulted for leukocytosis.  WBC coutn 24 today.  diff showing neutrophilia without immature cells on the differential.  WBC count has been persistently elevated this admission although did decline to 11 on 7/9/23, before heading back up.  Looking in [facility], the last CBC on file prior to this hospitalization was 10/2022 and the WBC count then was normal.    Blood smear review shows toxic granulation of the PMN's which supports an infectious/inflammatory process.      We do not suspect a primary hematologic disorder.  Instead we suspect the patient has an uncontrolled infection or inflammatory process.   Will defer to ID & internal medicine to guide workup for that.    Anemia noted.  He has CKD stage V.  Agree w/ iron studies, b12/folate as above.  If vitamin stores are replete, initiation of an BERNARDINO such as retacrit would be indicated to improve erythropoiesis and limit exposure to transfusions.  We do not see any paraprotein eval on file, so given that he has advanced CKD w/ anemia, would also be reasonable to r/o myeloma with an SPEP and free light chain studies.

## 2023-07-14 NOTE — PROGRESS NOTE ADULT - SUBJECTIVE AND OBJECTIVE BOX
O/N: DARNELL CARUSO, cards aware, to see in am                                [ x ]CKD 5  [  ]Other  -------------------------------------------------------------------  [x  ]Diabetes  [  ] Diabetic PVD Ulcer  [  ] Neuropathic ulcer to DM  [x  ] Diabetes with Nephropathy  [  ] Osteomyelitis due to diabetes  [  ] Hyperglycemia   [  ]hypoglycemia   --------------------------------------------------------------------  [  ]Malnutrition: See Nutrition Note  [  ]Cachexia  [  ]Other:   [  ]Supplement Ordered:  [  ]Morbid Obesity (BMI >=40]  ---------------------------------------------------------------------  [x ] Sepsis/severe sepsis/septic shock  [ ] Noninfectious SIRS  [ ] UTI  [ ] Pneumonia  -----------------------------------------------------------------------  [ ] Acidosis/alkalosis  [ ] Fluid overload  [ ] Hypokalemia  [ ] Hyperkalemia  [ ] Hypomagnesemia  [ ] Hypophosphatemia  [ ] Hyperphosphatemia  ------------------------------------------------------------------------  [ ] Acute blood loss anemia  [ ] Post op blood loss anemia  [ ] Iron deficiency anemia  [ ] Anemia due to chronic disease  [ ] Hypercoagulable state  [ ] Thrombocytopenia  ----------------------------------------------------------------------  [x ] Cerebral infarction  [ ] Transient ischemia attack  [ ] Encephalopathy - Toxic or Metabolic        A/P: 57M with PMHx of DM2, 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 Royal) who presented to [facility] on 7/[facility] from nursing home for hyperglycemia who was found to have infected likely diabetic toe wound with gas-producing organism. 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 CT revealed gas tracking along achilles sheath and vascular surgery was consulted for surgical evaluation. 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) and completion R BKA 7/11      Vascular/PAD:  - L foot necrotizing fasciitis s/p guillotine R BKA (7/8) and completion R BKA 7/11  - continue Asa and Plavix  - pain control  - continue vanc and zosyn, vanc by level    Neuro  - A&Ox2 at baseline, possible early onset dementia  - Hx of CVA with residual L sided weakness     HTN/HLD:  - continue hydralazine and nifedipine IR  - continue home atorvastatin  - Most recent echo 10/22 - LVH present with EF 50% with mild TR    CAD/CHF:   - continue home ASA and Plavix    DM:  - Appreciate medicine recs for DM management  - Pureed DM diet  - mISS + monitor FSG  - Consider resuming home Lantus 20 U nightly, currently held    CKD stage V:   - baseline Cr 7-8  - Appreciate renal recs  - Strict I&Os  - Avoid Nephrotoxic drugs  - Start NaHCO3 tabs if bicarb drops <22     Hypernatremia  - appreciate renal recs  - f/u daily BMP    BPH  -continue doxazosin (home flomax held bc patient having difficulty swallowing pills)    Anemia:   - Trend daily labs  - Maintain active T/S for OR    ID:  - Appreciate ID recs  - OR Cx growing Morganella, E. coli, Proteus, Strep. MRSA negative  - Continue vanc and zosyn, vanc by level  - Continue on Zosyn at this time for L foot necrotizing fasciitis s/p L BKA 7/8 with improving leukocytosis,  Clindamycin (7/6 - 7/7), Vanc (7/6 - 7/8, resumed 7/11), Zosyn (7/6 -)    Diet: minced and moist    Activity: OOB as tolerated    DVTPPx: SQH/SCDs    Dispo: PT/OT eval recommended MIKHAIL   O/N: Febrile overnight w/gradully climbing temps(99.5->100.2-->101 this AM. Persistently tachycardic.   S: Patient has no acute complaints but is AOx2 and is not reliable historian.    ROS:   Denies Headache, blurred vision, Chest Pain, SOB, Abdominal pain, nausea or vomiting     Allergies    No Known Allergies    Intolerances        O:    ICU Vital Signs Last 24 Hrs  T(C): 37.1 (14 Jul 2023 05:09), Max: 37.9 (13 Jul 2023 22:09)  T(F): 98.8 (14 Jul 2023 05:09), Max: 100.2 (13 Jul 2023 22:09)  HR: 124 (13 Jul 2023 23:43) (112 - 124)  BP: 132/60 (13 Jul 2023 23:43) (132/60 - 142/67)  BP(mean): 87 (13 Jul 2023 23:43) (87 - 95)  ABP: --  ABP(mean): --  RR: 18 (13 Jul 2023 23:43) (17 - 18)  SpO2: 97% (13 Jul 2023 23:43) (97% - 99%)    O2 Parameters below as of 13 Jul 2023 23:43  Patient On (Oxygen Delivery Method): room air      Physical Exam:  General: alert and awake, NAD  Pulmonary: no respiratory distress  Cardiovascular: Tachycardic, sinus rhythm  Abdominal: soft  Extremities: R BKA dressing with some dry bloody drainage                                8.6    23.28 )-----------( 322      ( 13 Jul 2023 05:30 )             26.4   07-13    144  |  108  |  72<H>  ----------------------------<  165<H>  3.1<L>   |  18<L>  |  7.70<H>    Ca    7.8<L>      13 Jul 2023 05:30  Phos  5.7     07-13  Mg     1.9     07-13            [ x ]CKD 5  [  ]Other  -------------------------------------------------------------------  [x  ]Diabetes  [  ] Diabetic PVD Ulcer  [  ] Neuropathic ulcer to DM  [x  ] Diabetes with Nephropathy  [  ] Osteomyelitis due to diabetes  [  ] Hyperglycemia   [  ]hypoglycemia   --------------------------------------------------------------------  [  ]Malnutrition: See Nutrition Note  [  ]Cachexia  [  ]Other:   [  ]Supplement Ordered:  [  ]Morbid Obesity (BMI >=40]  ---------------------------------------------------------------------  [x ] Sepsis/severe sepsis/septic shock  [ ] Noninfectious SIRS  [ ] UTI  [ ] Pneumonia  -----------------------------------------------------------------------  [ ] Acidosis/alkalosis  [ ] Fluid overload  [ ] Hypokalemia  [ ] Hyperkalemia  [ ] Hypomagnesemia  [ ] Hypophosphatemia  [ ] Hyperphosphatemia  ------------------------------------------------------------------------  [ ] Acute blood loss anemia  [ ] Post op blood loss anemia  [ ] Iron deficiency anemia  [ ] Anemia due to chronic disease  [ ] Hypercoagulable state  [ ] Thrombocytopenia  ----------------------------------------------------------------------  [x ] Cerebral infarction  [ ] Transient ischemia attack  [ ] Encephalopathy - Toxic or Metabolic        A/P: 57M with PMHx of DM2, 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 Royal) who presented to [facility] on 7/5 from nursing home for hyperglycemia who was found to have infected likely diabetic toe wound with gas-producing organism. 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 CT revealed gas tracking along achilles sheath and vascular surgery was consulted for surgical evaluation. 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      Vascular/PAD:  - L foot necrotizing fasciitis s/p guillotine R BKA (7/8) and completion R BKA 7/11  - continue Asa and Plavix  - pain control      Fever:  -Consult Cards and heme-onc for infectious/hematalogic work-up of WBC  -F/U Viral panels  -Consult ID for further reccs    Neuro  - A&Ox2 at baseline, possible early onset dementia  - Hx of CVA with residual L sided weakness     HTN/HLD:  - continue hydralazine and nifedipine IR  - continue home atorvastatin  - Most recent echo 10/22 - LVH present with EF 50% with mild TR    CAD/CHF:   - continue home ASA and Plavix    DM:  - Appreciate medicine recs for DM management  - Pureed DM diet  - mISS + monitor FSG  - Consider resuming home Lantus 20 U nightly, currently held    CKD stage V:   - baseline Cr 7-8  - Appreciate renal recs  - Strict I&Os  - Avoid Nephrotoxic drugs  - Start NaHCO3 tabs if bicarb drops <22     Hypernatremia  - appreciate renal recs  - f/u daily BMP    BPH  -continue doxazosin (home flomax held bc patient having difficulty swallowing pills)    Anemia:   - Trend daily labs  - Maintain active T/S for OR    ID:  - Appreciate ID recs  - OR Cx growing Morganella, E. coli, Proteus, Strep. MRSA negative  - Continue vanc and zosyn, vanc by level  - Continue on Zosyn at this time for L foot necrotizing fasciitis s/p L BKA 7/8 with improving leukocytosis,  Clindamycin (7/6 - 7/7), Vanc (7/6 - 7/8, resumed 7/11), Zosyn (7/6 -)    Diet: minced and moist    Activity: OOB as tolerated    DVTPPx: SQH/SCDs    Dispo: PT/OT eval recommended MIKHAIL

## 2023-07-14 NOTE — PROGRESS NOTE ADULT - ASSESSMENT
Casey Shetty is a 58 yo M w/ PMH of CKD V w/ AVF not currently on HD, DM2, HTN, CVA who presents from NH with starvation ketoacidosis and gas gangrene. Acidosis resolved. 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  Currently on IV Abx (Zosyn).   Tachycardic this morning with regular rhythm in the monitor, normotensive, EKG from 7/5 showing sinus tachy, patient is on Hydralazine (?rebound tachy).     A focused ultrasound of the:   Limited Vascular(IVC)  Indication: Volume assessment    Acquisition:   The Study was technically limited for the following reasons: None.   Findings: Decreased diameter of IVC, IVC collapsing during inspiratory cycle.   Interpretation: Decreased diameter of IVC, IVC collapsing during inspiratory cycle. Hypernatremic, with decreased UOP. Findings suggestive of hypovolemic state  The following follow-up studies are indicated: PRN pocus for volume assessment.   Archival System: Images seved to N drive and representative images attached.     #Hypernatremia  Na 149->147->150->144->147 today.    Continue free water, pt with UOP of 430 ml in last 24h.    BMP daily  Encourage PO intake and hydration, pte need assistance with feeding.   Strict I&O.   Started on K/HCO3 IVF    #CKD stage V  BUN and creatinine are elevated at baseline, no signs of uremia at this point  c/w puckett as pt with urinary retention 7/8.   Strict I&O  Daily weights  Avoid Nephrotoxic drugs.   Renally dosed medications.   Cont.  NaHCO3 tabs.   No indications for acute HD at this point.   Potassium noted 2.8, will replenish with 40 meq PO, 20 meq IV, and recheck BMP in PM  Mg of 2.0 noted.   Nephrology team will cont. f/u

## 2023-07-14 NOTE — CONSULT NOTE ADULT - ASSESSMENT
57M with PMH of DM2, CVA x 2 (one ischemic one embolic, residual left side weakness), HTN, HLD, and CKD V (not on HD yet), BPH, BIBEMS from nursing home for hyperglycemia to 363.  On arrival, patient found to have SIRS (tachycardia, fever, leukocytosis) and was felt to be septic w/o identified infectious source.  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) and completion R BKA 7/11 with no further infectious signs or symptoms, found to have persistent leukocytosis. Blood cultures negative, and ID recommended discontinuing Vancomycin and Zosyn which were started 7/5/23-7/13/23, and wants to observe off antibiotics. Hematology consulted for further evaluation of the leukocytosis.     #Leukocytosis  WBC 24.81 (elevated on admission, dropped to 11 around the BKA, and has progressively uptrended)  Febrile on admission, has not had another fever until today 101F after antibiotics were stopped   57M with PMH of DM2, CVA x 2 (one ischemic one embolic, residual left side weakness), HTN, HLD, and CKD V (not on HD yet), BPH, BIBEMS from nursing home for hyperglycemia to 363.  On arrival, patient found to have SIRS (tachycardia, fever, leukocytosis) and was felt to be septic w/o identified infectious source.  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) and completion R BKA 7/11 with no further infectious signs or symptoms, found to have persistent leukocytosis. Blood cultures negative, and ID recommended discontinuing Vancomycin and Zosyn which were started 7/5/23-7/13/23, and wants to observe off antibiotics. Hematology consulted for further evaluation of the leukocytosis.     #Leukocytosis  WBC 24.81 (elevated on admission, dropped to 11 around the BKA, and has progressively uptrended)  Febrile on admission, has not had another fever until today 101F after antibiotics were stopped.   Peripheral smear reviewed 7/14/23 showed abundant neutrophils with toxic granulation indicative of inflammatory/infectious process; smear also showed echinoctyes suggestive of metabolic derangements. No abnormal percentages or immature subsets noted on differential. No immature cells observed on peripheral smear. Suspect reactive process, or occult infection. HIV negative. Hepatitis negative. ESR and CRP also elevated  -Agree with ID in pursuing infectious workup given new fever: cultures, gallium scan?  -consider peripheral flow cytometry    #anemia  Hb currently 8.3, per HIE, has been anemic since 2015, although his baseline then was greater than 10.   MCV 92.3  He has significant renal dysfunction also, CKD V but not on HD, Cr 8.4.  His anemia is likely multifactorial given his chronic kidney disease. Had noted iron deficiency in 2022, although he doesn't have any recent iron studies. We would also need to rule out other nutrient deficiencies  T.bili within normal limits and given that he is around his recent baseline hemoglobin, with no evidence of hemolysis on the smear, low concern for hemolytic process, however would need LDH, haptoglobin and retic count to confirm.  Anemia and renal dysfunction are elements of the crab criteria, although he has no hypercalcemia.   -iron studies (ferritin, iron with total binding capacity)  -B12, folate  -hemolysis labs (haptoglobin, LDH, reticulocyte count)  -MM labs: SPEP, UPEP, immunofixation, free light chains    Recommendations to be discussed with Dr. Russell   57M with PMH of DM2, CVA x 2 (one ischemic one embolic, residual left side weakness), HTN, HLD, and CKD V (not on HD yet), BPH, BIBEMS from nursing home for hyperglycemia to 363.  On arrival, patient found to have SIRS (tachycardia, fever, leukocytosis) and was felt to be septic w/o identified infectious source.  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) and completion R BKA 7/11 with no further infectious signs or symptoms, found to have persistent leukocytosis. Blood cultures negative, and ID recommended discontinuing Vancomycin and Zosyn which were started 7/5/23-7/13/23, and wants to observe off antibiotics. Hematology consulted for further evaluation of the leukocytosis.     #Leukocytosis  WBC 24.81 (elevated on admission, dropped to 11 around the BKA, and has progressively uptrended)  Febrile on admission, has not had another fever until today 101F after antibiotics were stopped.   Peripheral smear reviewed 7/14/23 showed abundant neutrophils with toxic granulation indicative of inflammatory/infectious process; smear also showed echinoctyes suggestive of metabolic derangements. No abnormal percentages or immature subsets noted on differential. No immature cells observed on peripheral smear. Suspect reactive process, or occult infection. HIV negative. Hepatitis negative. ESR and CRP also elevated  -Agree with ID in pursuing infectious workup given new fever: cultures, gallium scan?  -consider peripheral flow cytometry    #anemia  Hb currently 8.3, per HIE, has been anemic since 2015, although his baseline then was greater than 10.   MCV 92.3  He has significant renal dysfunction also, CKD V but not on HD, Cr 8.4.  His anemia is likely multifactorial given his chronic kidney disease. Had noted iron deficiency in 2022, although he doesn't have any recent iron studies. We would also need to rule out other nutrient deficiencies  T.bili within normal limits and given that he is around his recent baseline hemoglobin, with no evidence of hemolysis on the smear, low concern for hemolytic process, however would need LDH, haptoglobin and retic count to confirm.  Anemia and renal dysfunction are elements of the crab criteria, although he has no hypercalcemia.   -iron studies (ferritin, iron with total binding capacity)  -B12, folate  -MM labs: SPEP, UPEP, immunofixation, free light chains    Recommendations to be discussed with Dr. Russell

## 2023-07-14 NOTE — CONSULT NOTE ADULT - TIME BILLING
DM, DKA 2/2 R foot gas gangrene now s/p R guillotine BKA with presumed source control. f/u bcx 7/5. Empiric antibiotic recommendations as outlined above.
Please see above for the details.
as above

## 2023-07-14 NOTE — CONSULT NOTE ADULT - ASSESSMENT
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 Royal) who presented to [facility] on 7/5 from nursing home for hyperglycemia who was found to have infected likely diabetic toe wound with gas-producing organism. CT revealed gas tracking along achilles sheath and vascular surgery was consulted for surgical evaluation. 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 Cardiology consulted for persistent tachycardia.      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 Royal) who presented to [facility] on 7/5 from nursing home for hyperglycemia who was found to have infected likely diabetic toe wound with gas-producing organism. CT revealed gas tracking along achilles sheath and vascular surgery was consulted for surgical evaluation. 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 Cardiology consulted for persistent sinus tachycardia likely related to underlying infx (?SSTI) pending further w/u.      Review of Studies  ECG: Sinus Tach w/LVH and LAE  TTE: LV low/normal fx w/EF 50-55, Normal RV    Recommendations:    #Sinus Tachycardia- Euvolemic on exam, etiology of sinus tach likely 2/2 underlying fever and possible occult infx pending further diagnostic eval w/CT chest/abd/pelvis w/IV contrast, less likely PE given pt has been on DVT ppx  -Agree w/ID consult and infectious w/u (appreciated)  -Please begin Toprol 25mg XL     #CVA  -ASA 81mg qd  -Plavix 75mg qd  -atorvastatin 80mg qd    #DM2  -atorvastatin 80mg qd      Case reviewed w/attending cardiologist  Plan discussed w/primary team  We will continue to follow, thank you for the consultation    Sy Hinds (PGY5)  Cardiovascular Disease Fellow  Consult Pager: [phone]           57M with PMHx of DM2, 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 Royal) who presented to [facility] on 7/5 from nursing home for hyperglycemia who was found to have infected likely diabetic toe wound with gas-producing organism. CT revealed gas tracking along achilles sheath and vascular surgery was consulted for surgical evaluation. 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 Cardiology consulted for persistent sinus tachycardia likely related to underlying infx (?SSTI) pending further w/u.      Review of Studies  ECG: Sinus Tach w/LVH and LAE  TTE: LV low/normal fx w/EF 50-55, Normal RV    Recommendations:    #Sinus Tachycardia- Euvolemic on exam, etiology of sinus tach likely 2/2 underlying fever and possible occult infx pending further diagnostic eval w/CT chest/abd/pelvis w/IV contrast, less likely PE given pt has been on DVT ppx  -Agree w/ID consult and infectious w/u (appreciated)  -Please replete potassium (3.2)  -Maintain K>4meq/L and Mg>2  -Please begin Toprol 25mg XL     #CVA  -ASA 81mg qd  -Plavix 75mg qd  -atorvastatin 80mg qd    #DM2  -atorvastatin 80mg qd      Case reviewed w/attending cardiologist  Plan discussed w/primary team  We will continue to follow, thank you for the consultation    Sy Hinds (PGY5)  Cardiovascular Disease Fellow  Consult Pager: [phone]

## 2023-07-14 NOTE — PROGRESS NOTE ADULT - ASSESSMENT
56 yo male with CKD V, DM, p/w DKA 2/2 R foot gas gangrene s/p R foot amputation 7/6 with presumed source control, s/p R BKA closure 7/11. ID reconsulted for leukocytosis, specifically neutrophilia. No infectious symptoms or signs on exam (RLE stump ?crepitus and small R knee effusion likely postsurgical). ?Reactive leukocytosis. Advise repeat CRP (was 176 on 7/6); consider hematology evaluation for review of peripheral smear. f/u bcx 7/12--bcx also negative on admission so unlikely he would have endocarditis or occult metastatic infection. Advise d/c vancomycin and Zosyn (continued since admission 7/5) and observe off antibiotics (current WBC elevation occurred on antibiotics).     Recommendations: 58 yo male with CKD V, DM, p/w DKA 2/2 R foot gas gangrene s/p R foot amputation 7/6 with presumed source control, s/p R BKA closure 7/11. ID reconsulted for leukocytosis, specifically neutrophilia. No infectious symptoms or signs on exam (RLE stump ?crepitus and small R knee effusion likely postsurgical). ?Reactive leukocytosis. Repeat .8 on 7/13 (was 176 on 7/6). Bcx also negative on admission so less likely he would have endocarditis or occult metastatic infection. Advised d/c vancomycin and Zosyn (continued since admission 7/5) and observe off antibiotics (current WBC elevation occurred on antibiotics).     Recommendations:  1. Please obtain CT chest/abd/pelvis ideally with IV contrast to ascertain possible source of infection.  2. Please obtain TTE.  3. F/u blood cultures 7/12 -- NGTD  4. Continue to monitor off antibiotics for now given no clear source and patient denying any positive ROS.  5. Consider hematology evaluation for review of peripheral smear.    ID Team 1 will continue to follow.  56 yo male with CKD V, DM, p/w DKA 2/2 R foot gas gangrene s/p R foot amputation 7/6 with presumed source control, s/p R BKA closure 7/11. ID reconsulted for leukocytosis, specifically neutrophilia. No infectious symptoms or signs on exam (RLE stump ?crepitus and small R knee effusion likely postsurgical). ?Reactive leukocytosis. Repeat .8 on 7/13 (was 176 on 7/6). Bcx also negative on admission so less likely he would have endocarditis or occult metastatic infection. Advised d/c vancomycin and Zosyn (continued since admission 7/5) and observe off antibiotics (current WBC elevation occurred on antibiotics).     Recommendations:  1. Please obtain CT chest/abd/pelvis ideally with IV contrast to ascertain possible source of infection.  2. Please obtain TTE.  3. F/u blood cultures 7/12 -- NGTD  4. Continue to monitor off antibiotics for now given no clear source and patient denying any positive ROS.  5. Please repeat blood cultures if patient becomes febrile.  6. Consider hematology evaluation for review of peripheral smear.    ID Team 1 will continue to follow.

## 2023-07-15 LAB
ANION GAP SERPL CALC-SCNC: 15 MMOL/L — SIGNIFICANT CHANGE UP (ref 5–17)
ANION GAP SERPL CALC-SCNC: 15 MMOL/L — SIGNIFICANT CHANGE UP (ref 5–17)
BLD GP AB SCN SERPL QL: NEGATIVE — SIGNIFICANT CHANGE UP
BUN SERPL-MCNC: 68 MG/DL — HIGH (ref 7–23)
BUN SERPL-MCNC: 74 MG/DL — HIGH (ref 7–23)
C DIFF GDH STL QL: NEGATIVE — SIGNIFICANT CHANGE UP
C DIFF GDH STL QL: SIGNIFICANT CHANGE UP
CALCIUM SERPL-MCNC: 8 MG/DL — LOW (ref 8.4–10.5)
CALCIUM SERPL-MCNC: 8.3 MG/DL — LOW (ref 8.4–10.5)
CHLORIDE SERPL-SCNC: 105 MMOL/L — SIGNIFICANT CHANGE UP (ref 96–108)
CHLORIDE SERPL-SCNC: 106 MMOL/L — SIGNIFICANT CHANGE UP (ref 96–108)
CO2 SERPL-SCNC: 18 MMOL/L — LOW (ref 22–31)
CO2 SERPL-SCNC: 20 MMOL/L — LOW (ref 22–31)
CREAT SERPL-MCNC: 7.69 MG/DL — HIGH (ref 0.5–1.3)
CREAT SERPL-MCNC: 8 MG/DL — HIGH (ref 0.5–1.3)
EGFR: 7 ML/MIN/1.73M2 — LOW
EGFR: 8 ML/MIN/1.73M2 — LOW
FERRITIN SERPL-MCNC: 747 NG/ML — HIGH (ref 30–400)
GLUCOSE BLDC GLUCOMTR-MCNC: 142 MG/DL — HIGH (ref 70–99)
GLUCOSE BLDC GLUCOMTR-MCNC: 172 MG/DL — HIGH (ref 70–99)
GLUCOSE BLDC GLUCOMTR-MCNC: 201 MG/DL — HIGH (ref 70–99)
GLUCOSE BLDC GLUCOMTR-MCNC: 215 MG/DL — HIGH (ref 70–99)
GLUCOSE BLDC GLUCOMTR-MCNC: 229 MG/DL — HIGH (ref 70–99)
GLUCOSE SERPL-MCNC: 184 MG/DL — HIGH (ref 70–99)
GLUCOSE SERPL-MCNC: 217 MG/DL — HIGH (ref 70–99)
HAPTOGLOB SERPL-MCNC: 497 MG/DL — HIGH (ref 34–200)
HCT VFR BLD CALC: 27.9 % — LOW (ref 39–50)
HGB BLD-MCNC: 8.8 G/DL — LOW (ref 13–17)
IRON SATN MFR SERPL: 19 % — SIGNIFICANT CHANGE UP (ref 16–55)
IRON SATN MFR SERPL: 24 UG/DL — LOW (ref 45–165)
LACTATE SERPL-SCNC: 0.7 MMOL/L — SIGNIFICANT CHANGE UP (ref 0.5–2)
LDH SERPL L TO P-CCNC: 210 U/L — SIGNIFICANT CHANGE UP (ref 50–242)
MAGNESIUM SERPL-MCNC: 1.8 MG/DL — SIGNIFICANT CHANGE UP (ref 1.6–2.6)
MCHC RBC-ENTMCNC: 28.7 PG — SIGNIFICANT CHANGE UP (ref 27–34)
MCHC RBC-ENTMCNC: 31.5 GM/DL — LOW (ref 32–36)
MCV RBC AUTO: 90.9 FL — SIGNIFICANT CHANGE UP (ref 80–100)
NRBC # BLD: 0 /100 WBCS — SIGNIFICANT CHANGE UP (ref 0–0)
PHOSPHATE SERPL-MCNC: 4.7 MG/DL — HIGH (ref 2.5–4.5)
PLATELET # BLD AUTO: 341 K/UL — SIGNIFICANT CHANGE UP (ref 150–400)
POTASSIUM SERPL-MCNC: 2.7 MMOL/L — CRITICAL LOW (ref 3.5–5.3)
POTASSIUM SERPL-MCNC: 3.2 MMOL/L — LOW (ref 3.5–5.3)
POTASSIUM SERPL-SCNC: 2.7 MMOL/L — CRITICAL LOW (ref 3.5–5.3)
POTASSIUM SERPL-SCNC: 3.2 MMOL/L — LOW (ref 3.5–5.3)
PROT SERPL-MCNC: 6 G/DL — SIGNIFICANT CHANGE UP (ref 6–8.3)
PROT SERPL-MCNC: 6 G/DL — SIGNIFICANT CHANGE UP (ref 6–8.3)
RBC # BLD: 2.98 M/UL — LOW (ref 4.2–5.8)
RBC # BLD: 3.07 M/UL — LOW (ref 4.2–5.8)
RBC # FLD: 16 % — HIGH (ref 10.3–14.5)
RETICS #: 36.4 K/UL — SIGNIFICANT CHANGE UP (ref 25–125)
RETICS/RBC NFR: 1.2 % — SIGNIFICANT CHANGE UP (ref 0.5–2.5)
RH IG SCN BLD-IMP: POSITIVE — SIGNIFICANT CHANGE UP
SODIUM SERPL-SCNC: 138 MMOL/L — SIGNIFICANT CHANGE UP (ref 135–145)
SODIUM SERPL-SCNC: 141 MMOL/L — SIGNIFICANT CHANGE UP (ref 135–145)
TIBC SERPL-MCNC: 129 UG/DL — LOW (ref 220–430)
UIBC SERPL-MCNC: 105 UG/DL — LOW (ref 110–370)
WBC # BLD: 24.65 K/UL — HIGH (ref 3.8–10.5)
WBC # FLD AUTO: 24.65 K/UL — HIGH (ref 3.8–10.5)

## 2023-07-15 PROCEDURE — 73700 CT LOWER EXTREMITY W/O DYE: CPT | Mod: 26,RT

## 2023-07-15 PROCEDURE — 70450 CT HEAD/BRAIN W/O DYE: CPT | Mod: 26

## 2023-07-15 PROCEDURE — 93010 ELECTROCARDIOGRAM REPORT: CPT

## 2023-07-15 PROCEDURE — 99233 SBSQ HOSP IP/OBS HIGH 50: CPT

## 2023-07-15 PROCEDURE — 74176 CT ABD & PELVIS W/O CONTRAST: CPT | Mod: 26

## 2023-07-15 PROCEDURE — 99232 SBSQ HOSP IP/OBS MODERATE 35: CPT

## 2023-07-15 PROCEDURE — 71250 CT THORAX DX C-: CPT | Mod: 26

## 2023-07-15 RX ORDER — POTASSIUM CHLORIDE 20 MEQ
40 PACKET (EA) ORAL ONCE
Refills: 0 | Status: DISCONTINUED | OUTPATIENT
Start: 2023-07-15 | End: 2023-07-15

## 2023-07-15 RX ORDER — POTASSIUM CHLORIDE 20 MEQ
10 PACKET (EA) ORAL
Refills: 0 | Status: COMPLETED | OUTPATIENT
Start: 2023-07-15 | End: 2023-07-15

## 2023-07-15 RX ORDER — SODIUM CHLORIDE 9 MG/ML
1000 INJECTION, SOLUTION INTRAVENOUS
Refills: 0 | Status: DISCONTINUED | OUTPATIENT
Start: 2023-07-15 | End: 2023-07-16

## 2023-07-15 RX ORDER — IOHEXOL 300 MG/ML
30 INJECTION, SOLUTION INTRAVENOUS ONCE
Refills: 0 | Status: COMPLETED | OUTPATIENT
Start: 2023-07-15 | End: 2023-07-15

## 2023-07-15 RX ORDER — ACETAMINOPHEN 500 MG
1000 TABLET ORAL ONCE
Refills: 0 | Status: COMPLETED | OUTPATIENT
Start: 2023-07-15 | End: 2023-07-16

## 2023-07-15 RX ORDER — POTASSIUM CHLORIDE 20 MEQ
40 PACKET (EA) ORAL ONCE
Refills: 0 | Status: COMPLETED | OUTPATIENT
Start: 2023-07-15 | End: 2023-07-15

## 2023-07-15 RX ORDER — POTASSIUM CHLORIDE 20 MEQ
10 PACKET (EA) ORAL
Refills: 0 | Status: COMPLETED | OUTPATIENT
Start: 2023-07-15 | End: 2023-07-16

## 2023-07-15 RX ADMIN — Medication 40 MILLIEQUIVALENT(S): at 17:06

## 2023-07-15 RX ADMIN — Medication 125 MILLILITER(S): at 00:58

## 2023-07-15 RX ADMIN — Medication 30 MILLIGRAM(S): at 23:32

## 2023-07-15 RX ADMIN — Medication 100 MILLIEQUIVALENT(S): at 16:13

## 2023-07-15 RX ADMIN — HEPARIN SODIUM 5000 UNIT(S): 5000 INJECTION INTRAVENOUS; SUBCUTANEOUS at 23:32

## 2023-07-15 RX ADMIN — Medication 100 MILLIEQUIVALENT(S): at 17:52

## 2023-07-15 RX ADMIN — Medication 100 MILLIEQUIVALENT(S): at 13:02

## 2023-07-15 RX ADMIN — SODIUM CHLORIDE 80 MILLILITER(S): 9 INJECTION, SOLUTION INTRAVENOUS at 21:06

## 2023-07-15 RX ADMIN — Medication 2: at 07:01

## 2023-07-15 RX ADMIN — Medication 650 MILLIGRAM(S): at 17:07

## 2023-07-15 RX ADMIN — Medication 100 MILLIEQUIVALENT(S): at 23:34

## 2023-07-15 RX ADMIN — HEPARIN SODIUM 5000 UNIT(S): 5000 INJECTION INTRAVENOUS; SUBCUTANEOUS at 07:38

## 2023-07-15 RX ADMIN — Medication 25 MILLIGRAM(S): at 23:32

## 2023-07-15 RX ADMIN — Medication 4 MILLIGRAM(S): at 23:33

## 2023-07-15 RX ADMIN — CLOPIDOGREL BISULFATE 75 MILLIGRAM(S): 75 TABLET, FILM COATED ORAL at 17:06

## 2023-07-15 RX ADMIN — MODAFINIL 100 MILLIGRAM(S): 200 TABLET ORAL at 17:19

## 2023-07-15 RX ADMIN — IOHEXOL 30 MILLILITER(S): 300 INJECTION, SOLUTION INTRAVENOUS at 14:03

## 2023-07-15 RX ADMIN — Medication 25 MILLIGRAM(S): at 07:38

## 2023-07-15 RX ADMIN — Medication 650 MILLIGRAM(S): at 07:37

## 2023-07-15 RX ADMIN — Medication 25 MILLIGRAM(S): at 17:13

## 2023-07-15 RX ADMIN — Medication 81 MILLIGRAM(S): at 17:06

## 2023-07-15 RX ADMIN — Medication 4: at 23:16

## 2023-07-15 RX ADMIN — INSULIN GLARGINE 16 UNIT(S): 100 INJECTION, SOLUTION SUBCUTANEOUS at 23:34

## 2023-07-15 RX ADMIN — Medication 30 MILLIGRAM(S): at 10:23

## 2023-07-15 RX ADMIN — ATORVASTATIN CALCIUM 80 MILLIGRAM(S): 80 TABLET, FILM COATED ORAL at 23:33

## 2023-07-15 RX ADMIN — Medication 4: at 12:32

## 2023-07-15 RX ADMIN — SENNA PLUS 2 TABLET(S): 8.6 TABLET ORAL at 23:33

## 2023-07-15 RX ADMIN — HEPARIN SODIUM 5000 UNIT(S): 5000 INJECTION INTRAVENOUS; SUBCUTANEOUS at 13:02

## 2023-07-15 NOTE — PROVIDER CONTACT NOTE (OTHER) - ACTION/TREATMENT ORDERED:
Reassess. Stroke code cancelled. EEG video monitoring placed.
glucose level rechecked one hour post insulin administration and currently 299
supervisor Faustino MARSH notified and aware.

## 2023-07-15 NOTE — PROGRESS NOTE ADULT - ASSESSMENT
Casey Shetty is a 58 yo M w/ PMH of CKD V w/ AVF not currently on HD, DM2, HTN, CVA who presents from NH with starvation ketoacidosis and gas gangrene. Acidosis resolved. 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      #CKD stage V  BUN and creatinine are elevated at baseline, no signs of uremia at this point  c/w puckett as pt with urinary retention 7/8.   Strict I&O  Daily weights  Avoid Nephrotoxic drugs.   Renally dosed medications.   Cont.  NaHCO3 tabs.   No indications for acute HD at this point.   Potassium noted 2.8, will replete  with 40 meq PO, 20 meq IV, and recheck BMP in PM  Mg of 2.0 noted.   Nephrology team will cont.

## 2023-07-15 NOTE — PROGRESS NOTE ADULT - ASSESSMENT
57M with PMHx of T2DM, CVA (with residual LUE weakness), dementia, HTN, and CKDV (AV fistula created on 5/23) presented form NH on 7/5 for hyperglycemia and found to have likely right foot infection with gas seen on CT which was tracking along achilles sheath. Patient s/p right BKA (7/8) with completion on 7/11. Hospital course complicated by persistent luekocytosis    # Right foot necrotizing fasciitis s/p guillotine R BKA (7/8) and completion R BKA 7/11  - Continue ASA and Plavix for PAD  - pain control per primary team   - Care per surgery    # Leukocytosis    - No clear source identified at this time  - Heme consult appreciated: toxic granulations noted on smear (indicating infectious/inflammatory process). In addition, seems unlikely to have a hyperproliferative process develop while here  - Monitor fever curve and WBC  - Monitoring off antibiotics  - UA & CXR w/o signs of infection  - ID consulted and recommend TTE and CT C/A/P & RLE w/ IV Contrast (per renal if potential benefit if great then would proceed, as uncontrolled infection would worsen his renal function anyway)  - Surgery has low suspicion for stump infection  - If has diarrhea would send C. Diff and GI PCR  - Viral hep Panel and HIV negative   - Low concern for malignancy or rheumatological condition as the etiology of fever and leukocytosis.     # Vascular Dementia  # Hx of CVA with residual L sided weakness   - Continue ASA and statin     #HTN/HLD  - Continue hydralazine, nifedipine and atorvastatin    #CAD/CHF:   - continue home ASA and Plavix  - Echo done on 10/22 - LVH present with EF 50% with mild TR    #Tachycardia  - Pt was tachycardic to 116; Pt denied having chest pain or palpation possible secondary to infection as pt w/ worsening WBC to 24 thousand   - Cardio consult appreciated: sionus and likely due to underlying issue    # Insulin Dependent Diabetes Mellitus   - Pureed DM diet  - mISS + monitor FSG  - Lantus 16 units, continue tmonitor    # CKD stage V  -Pt w/  baseline Cr 7-8  - Avoid Nephrotoxic agents   - Renal following who recommended starting NaHCO3 tabs if bicarb drops <22   - Discussed with Nephrology attending ( Dr. Garcia ) about CT with IV contrast as the patient may need HD after IV contrast, consent for HD if necessary obtained per Dr. Garcia     # BPH  - continue doxazosin (home flomax held bc patient having difficulty swallowing pills)    #Tremors  - Pending CT head  - EEG w/ generalized slowing  - Likely metabolic, don't think further inpatient work up will be fruitful

## 2023-07-15 NOTE — PROVIDER CONTACT NOTE (OTHER) - RECOMMENDATIONS
as per MD Padilla pt needs to be isolated and stool should be collected for Cdiff for clinical criteria (see above).
Per ordered sliding scale given 8units insulin (refer to medication documentation)
Stroke code called, set up for CT scan.

## 2023-07-15 NOTE — PROGRESS NOTE ADULT - SUBJECTIVE AND OBJECTIVE BOX
INTERVAL HPI/OVERNIGHT EVENTS:  Coverage for Dr. Cope.    CONSTITUTIONAL:  No fever, chills, night sweats  EYES:  No photophobia or visual changes  CARDIOVASCULAR:  No chest pain  RESPIRATORY:  No cough, wheezing, or SOB   GASTROINTESTINAL:  No nausea, vomiting, diarrhea, constipation, or abdominal pain  GENITOURINARY:  No frequency, urgency, dysuria or hematuria  NEUROLOGIC:  No headache, lightheadedness      ANTIBIOTICS/RELEVANT:          Vital Signs Last 24 Hrs  T(C): 36.7 (15 Jul 2023 16:30), Max: 37.6 (15 Jul 2023 00:02)  T(F): 98 (15 Jul 2023 16:30), Max: 99.6 (15 Jul 2023 00:02)  HR: 96 (15 Jul 2023 13:50) (94 - 118)  BP: 154/70 (15 Jul 2023 13:50) (137/64 - 163/75)  BP(mean): 108 (15 Jul 2023 06:34) (92 - 108)  RR: 19 (15 Jul 2023 13:50) (17 - 19)  SpO2: 96% (15 Jul 2023 13:50) (94% - 98%)    Parameters below as of 15 Jul 2023 13:50  Patient On (Oxygen Delivery Method): room air        PHYSICAL EXAM:  Constitutional:  Mildly lethargic, responding appropriately  Eyes:  Sclerae anicteric, conjunctivae clear, PERRL  Ear/Nose/Throat:  No nasal exudate or sinus tenderness;  No buccal mucosal lesions, no pharyngeal erythema or exudate	  Neck:  Supple, no adenopathy  Respiratory:  Clear bilaterally  Cardiovascular:  RRR, S1S2, no murmur appreciated  Gastrointestinal:  Symmetric, normoactive BS, soft, NT, no masses, guarding or rebound.  No HSM  Extremities:  No edema.  LUE AVF without overlying erythema or tenderness      LABS:                        8.8    24.65 )-----------( 341      ( 15 Jul 2023 11:33 )             27.9         07-15    141  |  106  |  74<H>  ----------------------------<  184<H>  2.7<LL>   |  20<L>  |  8.00<H>    Ca    8.0<L>      15 Jul 2023 11:33  Phos  4.7     07-15  Mg     1.8     07-15    TPro  6.5  /  Alb  2.6<L>  /  TBili  0.2  /  DBili  x   /  AST  17  /  ALT  12  /  AlkPhos  58  07-14      Urinalysis Basic - ( 15 Jul 2023 11:33 )    Color: x / Appearance: x / SG: x / pH: x  Gluc: 184 mg/dL / Ketone: x  / Bili: x / Urobili: x   Blood: x / Protein: x / Nitrite: x   Leuk Esterase: x / RBC: x / WBC x   Sq Epi: x / Non Sq Epi: x / Bacteria: x        MICROBIOLOGY:        RADIOLOGY & ADDITIONAL STUDIES: INTERVAL HPI/OVERNIGHT EVENTS:  Coverage for Dr. Cope.    CONSTITUTIONAL:  No fever, chills, night sweats  EYES:  No photophobia or visual changes  CARDIOVASCULAR:  No chest pain  RESPIRATORY:  No cough, wheezing, or SOB   GASTROINTESTINAL:  No nausea, vomiting, diarrhea, constipation, or abdominal pain  GENITOURINARY:  No frequency, urgency, dysuria or hematuria  NEUROLOGIC:  No headache, lightheadedness      ANTIBIOTICS/RELEVANT:          Vital Signs Last 24 Hrs  T(C): 36.7 (15 Jul 2023 16:30), Max: 37.6 (15 Jul 2023 00:02)  T(F): 98 (15 Jul 2023 16:30), Max: 99.6 (15 Jul 2023 00:02)  HR: 96 (15 Jul 2023 13:50) (94 - 118)  BP: 154/70 (15 Jul 2023 13:50) (137/64 - 163/75)  BP(mean): 108 (15 Jul 2023 06:34) (92 - 108)  RR: 19 (15 Jul 2023 13:50) (17 - 19)  SpO2: 96% (15 Jul 2023 13:50) (94% - 98%)    Parameters below as of 15 Jul 2023 13:50  Patient On (Oxygen Delivery Method): room air        PHYSICAL EXAM:  Constitutional:  Mildly lethargic, responding appropriately  Eyes:  Sclerae anicteric, conjunctivae clear, PERRL  Ear/Nose/Throat:  No nasal exudate or sinus tenderness;  No buccal mucosal lesions, no pharyngeal erythema or exudate	  Neck:  Supple, no adenopathy  Respiratory:  Clear bilaterally  Cardiovascular:  RRR, S1S2, no murmur appreciated  Gastrointestinal:  Symmetric, normoactive BS, soft, NT, no masses, guarding or rebound.  No HSM  Extremities:  No edema.  LUE AVF without overlying erythema or tenderness.  s/p R BKA, stump with ace wrap      LABS:                        8.8    24.65 )-----------( 341      ( 15 Jul 2023 11:33 )             27.9         07-15    141  |  106  |  74<H>  ----------------------------<  184<H>  2.7<LL>   |  20<L>  |  8.00<H>    Ca    8.0<L>      15 Jul 2023 11:33  Phos  4.7     07-15  Mg     1.8     07-15    TPro  6.5  /  Alb  2.6<L>  /  TBili  0.2  /  DBili  x   /  AST  17  /  ALT  12  /  AlkPhos  58  07-14      Urinalysis Basic - ( 15 Jul 2023 11:33 )    Color: x / Appearance: x / SG: x / pH: x  Gluc: 184 mg/dL / Ketone: x  / Bili: x / Urobili: x   Blood: x / Protein: x / Nitrite: x   Leuk Esterase: x / RBC: x / WBC x   Sq Epi: x / Non Sq Epi: x / Bacteria: x        MICROBIOLOGY:        RADIOLOGY & ADDITIONAL STUDIES:

## 2023-07-15 NOTE — PROGRESS NOTE ADULT - ASSESSMENT
58 yo male with CKD V (impending HD, already has LUE AVF), DM, p/w DKA 2/2 R foot gas gangrene s/p R foot amputation 7/6 with presumed source control, s/p R BKA closure 7/11. ID reconsulted 7/13 for leukocytosis now up to 25 (cris 11 earlier this admission), specifically neutrophilia following extended vancomycin/Zosyn. Febrile 7/14 for the first time this admission, a few hours after stopping Zosyn. Afebrile today.  Continues to deny any localizing symptoms; no obvious infectious source on exam. Neutrophilia continues to worsen. Bcx 7/12 ngtd. While he was not bacteremic on presentation, reasonable to pursue w/u to assess for occult/metastatic infection i/s/o recent R foot gas gangrene. Would do CT C/A/P/LE (ideally with IV contrast; dialysis is inevitable and imminent); bcx negative and no audible murmur, TTE unremarkable 7/14. CRP improved but still significantly elevated (170-->130). Would f/u blood cultures from 7/12 and 7/14. For now, pending above w/u and without obvious source favor continued present observation off antibiotics (neutrophilia progressed while he was receiving vancomycin and Zosyn).   Recommendations discussed with Dr. Dunaway - will follow with you - team 1.  I will cover through 7/16, Dr Cope will resume care 7/17.

## 2023-07-15 NOTE — PROGRESS NOTE ADULT - SUBJECTIVE AND OBJECTIVE BOX
O/N: Hgb 6/20 received 1unit awaiting cbc for 2nd unit, KUB->rec CT, CT-H TBD                                [ x ]CKD 5  [  ]Other  -------------------------------------------------------------------  [x  ]Diabetes  [  ] Diabetic PVD Ulcer  [  ] Neuropathic ulcer to DM  [x  ] Diabetes with Nephropathy  [  ] Osteomyelitis due to diabetes  [  ] Hyperglycemia   [  ]hypoglycemia   --------------------------------------------------------------------  [  ]Malnutrition: See Nutrition Note  [  ]Cachexia  [  ]Other:   [  ]Supplement Ordered:  [  ]Morbid Obesity (BMI >=40]  ---------------------------------------------------------------------  [x ] Sepsis/severe sepsis/septic shock  [ ] Noninfectious SIRS  [ ] UTI  [ ] Pneumonia  -----------------------------------------------------------------------  [ ] Acidosis/alkalosis  [ ] Fluid overload  [ ] Hypokalemia  [ ] Hyperkalemia  [ ] Hypomagnesemia  [ ] Hypophosphatemia  [ ] Hyperphosphatemia  ------------------------------------------------------------------------  [ ] Acute blood loss anemia  [ ] Post op blood loss anemia  [ ] Iron deficiency anemia  [ ] Anemia due to chronic disease  [ ] Hypercoagulable state  [ ] Thrombocytopenia  ----------------------------------------------------------------------  [x ] Cerebral infarction  [ ] Transient ischemia attack  [ ] Encephalopathy - Toxic or Metabolic        A/P: 57M with PMHx of DM2, 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 Royal) who presented to [facility] on 7/5 from nursing home for hyperglycemia who was found to have infected likely diabetic toe wound with gas-producing organism. 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 CT revealed gas tracking along achilles sheath and vascular surgery was consulted for surgical evaluation. 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      Vascular/PAD:  - L foot necrotizing fasciitis s/p guillotine R BKA (7/8) and completion R BKA 7/11  - continue Asa and Plavix  - pain control      Fever:  -Consult Cards and heme-onc for infectious/hematalogic work-up of WBC  -F/U Viral panels  -Consult ID for further reccs    Neuro  - A&Ox2 at baseline, possible early onset dementia  - Hx of CVA with residual L sided weakness     HTN/HLD:  - continue hydralazine and nifedipine IR  - continue home atorvastatin  - Most recent echo 10/22 - LVH present with EF 50% with mild TR    CAD/CHF:   - continue home ASA and Plavix    DM:  - Appreciate medicine recs for DM management  - Pureed DM diet  - mISS + monitor FSG  - Consider resuming home Lantus 20 U nightly, currently held    CKD stage V:   - baseline Cr 7-8  - Appreciate renal recs  - Strict I&Os  - Avoid Nephrotoxic drugs  - Start NaHCO3 tabs if bicarb drops <22     Hypernatremia  - appreciate renal recs  - f/u daily BMP    BPH  -continue doxazosin (home flomax held bc patient having difficulty swallowing pills)    Anemia:   - Trend daily labs  - Maintain active T/S for OR    ID:  - Appreciate ID recs  - OR Cx growing Morganella, E. coli, Proteus, Strep. MRSA negative  - Continue vanc and zosyn, vanc by level  - Continue on Zosyn at this time for L foot necrotizing fasciitis s/p L BKA 7/8 with improving leukocytosis,  Clindamycin (7/6 - 7/7), Vanc (7/6 - 7/8, resumed 7/11), Zosyn (7/6 -)    Diet: minced and moist    Activity: OOB as tolerated    DVTPPx: SQH/SCDs    Dispo: PT/OT eval recommended MIKHAIL O/N: Hgb 6/20 received 1unit awaiting cbc for 2nd unit, KUB->rec CT, CT-H TBD          Subjective: pt seen and examined. no complaints.    ROS:   Denies Headache, blurred vision, Chest Pain, SOB, Abdominal pain, nausea or vomiting     Social   aspirin  chewable 81  clopidogrel Tablet 75  doxazosin 4  heparin   Injectable 5000  hydrALAZINE 25  metoprolol succinate ER 25  NIFEdipine IR 30      Allergies    No Known Allergies    Intolerances        Vital Signs Last 24 Hrs  T(C): 36.4 (15 Jul 2023 09:01), Max: 37.6 (15 Jul 2023 00:02)  T(F): 97.5 (15 Jul 2023 09:01), Max: 99.6 (15 Jul 2023 00:02)  HR: 98 (15 Jul 2023 07:14) (94 - 118)  BP: 154/72 (15 Jul 2023 07:14) (137/64 - 163/75)  BP(mean): 108 (15 Jul 2023 06:34) (92 - 108)  RR: 18 (15 Jul 2023 07:14) (17 - 19)  SpO2: 97% (15 Jul 2023 07:14) (96% - 98%)    Parameters below as of 15 Jul 2023 07:14  Patient On (Oxygen Delivery Method): room air      I&O's Summary    14 Jul 2023 07:01  -  15 Jul 2023 07:00  --------------------------------------------------------  IN: 180 mL / OUT: 0 mL / NET: 180 mL    15 Jul 2023 07:01  -  15 Jul 2023 09:58  --------------------------------------------------------  IN: 180 mL / OUT: 0 mL / NET: 180 mL        Physical Exam:  General: NAD  Pulmonary: no respiratory distress  Cardiovascular: tachy  Abdominal: soft, nontender, distended  Extremities: s/p R BKA, dressing c/d/i    LABS:                        6.6    20.66 )-----------( 269      ( 14 Jul 2023 22:42 )             20.5     07-14    132<L>  |  91<L>  |  62<H>  ----------------------------<  260<H>  4.3   |  30  |  6.48<H>    Ca    6.3<LL>      14 Jul 2023 22:42  Phos  4.0     07-14  Mg     1.5     07-14    TPro  6.5  /  Alb  2.6<L>  /  TBili  0.2  /  DBili  x   /  AST  17  /  ALT  12  /  AlkPhos  58  07-14            [ x ]CKD 5  [  ]Other  -------------------------------------------------------------------  [x  ]Diabetes  [  ] Diabetic PVD Ulcer  [  ] Neuropathic ulcer to DM  [x  ] Diabetes with Nephropathy  [  ] Osteomyelitis due to diabetes  [  ] Hyperglycemia   [  ]hypoglycemia   --------------------------------------------------------------------  [  ]Malnutrition: See Nutrition Note  [  ]Cachexia  [  ]Other:   [  ]Supplement Ordered:  [  ]Morbid Obesity (BMI >=40]  ---------------------------------------------------------------------  [x ] Sepsis/severe sepsis/septic shock  [ ] Noninfectious SIRS  [ ] UTI  [ ] Pneumonia  -----------------------------------------------------------------------  [ ] Acidosis/alkalosis  [ ] Fluid overload  [ ] Hypokalemia  [ ] Hyperkalemia  [ ] Hypomagnesemia  [ ] Hypophosphatemia  [ ] Hyperphosphatemia  ------------------------------------------------------------------------  [ ] Acute blood loss anemia  [ ] Post op blood loss anemia  [ ] Iron deficiency anemia  [ ] Anemia due to chronic disease  [ ] Hypercoagulable state  [ ] Thrombocytopenia  ----------------------------------------------------------------------  [x ] Cerebral infarction  [ ] Transient ischemia attack  [ ] Encephalopathy - Toxic or Metabolic        A/P: 57M with PMHx of DM2, 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 Royal) who presented to [facility] on 7/[facility] from nursing home for hyperglycemia who was found to have infected likely diabetic toe wound with gas-producing organism. 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 CT revealed gas tracking along achilles sheath and vascular surgery was consulted for surgical evaluation. 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      Vascular/PAD:  - L foot necrotizing fasciitis s/p guillotine R BKA (7/8) and completion R BKA 7/11  - continue Asa and Plavix  - pain control    Fever:  -Consult Cards and heme-onc for infectious/hematalogic work-up of WBC  -F/U Viral panels  -Appreciate cards, heme/onc, and ID recs  -AXR with dilated loops of bowel  -f/u CT abd/pelvis  -f/u c.diff PCR    Neuro  - A&Ox2 at baseline, possible early onset dementia  - Hx of CVA with residual L sided weakness     HTN/HLD:  - continue hydralazine and nifedipine IR  - continue home atorvastatin  - Most recent echo 10/22 - LVH present with EF 50% with mild TR    CAD/CHF:   - continue home ASA and Plavix    DM:  - Appreciate medicine recs for DM management  - Pureed DM diet  - mISS + monitor FSG  - Consider resuming home Lantus 20 U nightly, currently held    CKD stage V:   - baseline Cr 7-8  - Appreciate renal recs  - Strict I&Os  - Avoid Nephrotoxic drugs  - Start NaHCO3 tabs if bicarb drops <22     Hypernatremia  - appreciate renal recs  - f/u daily BMP    BPH  -continue doxazosin (home flomax held bc patient having difficulty swallowing pills)    Anemia:   - Trend daily labs    ID:  - Appreciate ID recs  - OR Cx growing Morganella, E. coli, Proteus, Strep. MRSA negative  - Continue on Zosyn at this time for L foot necrotizing fasciitis s/p L BKA 7/8 with improving leukocytosis,  Clindamycin (7/6 - 7/7), Vanc (7/6 - 7/8, resumed 7/11-7/13), Zosyn (7/6 -7/13)    Diet: minced and moist    Activity: OOB as tolerated    DVTPPx: SQH/SCDs    Dispo: PT/OT eval recommended MIKHAIL O/N: Hgb 6/20 received 1unit awaiting cbc for 2nd unit, KUB->rec CT, CT-H TBD          Subjective: pt seen and examined. no complaints.    ROS:   Denies Headache, blurred vision, Chest Pain, SOB, Abdominal pain, nausea or vomiting     Social   aspirin  chewable 81  clopidogrel Tablet 75  doxazosin 4  heparin   Injectable 5000  hydrALAZINE 25  metoprolol succinate ER 25  NIFEdipine IR 30      Allergies    No Known Allergies    Intolerances        Vital Signs Last 24 Hrs  T(C): 36.4 (15 Jul 2023 09:01), Max: 37.6 (15 Jul 2023 00:02)  T(F): 97.5 (15 Jul 2023 09:01), Max: 99.6 (15 Jul 2023 00:02)  HR: 98 (15 Jul 2023 07:14) (94 - 118)  BP: 154/72 (15 Jul 2023 07:14) (137/64 - 163/75)  BP(mean): 108 (15 Jul 2023 06:34) (92 - 108)  RR: 18 (15 Jul 2023 07:14) (17 - 19)  SpO2: 97% (15 Jul 2023 07:14) (96% - 98%)    Parameters below as of 15 Jul 2023 07:14  Patient On (Oxygen Delivery Method): room air      I&O's Summary    14 Jul 2023 07:01  -  15 Jul 2023 07:00  --------------------------------------------------------  IN: 180 mL / OUT: 0 mL / NET: 180 mL    15 Jul 2023 07:01  -  15 Jul 2023 09:58  --------------------------------------------------------  IN: 180 mL / OUT: 0 mL / NET: 180 mL        Physical Exam:  General: NAD  Pulmonary: no respiratory distress  Cardiovascular: tachy  Abdominal: soft, nontender, distended  Extremities: s/p R BKA, dressing c/d/i    LABS:                        6.6    20.66 )-----------( 269      ( 14 Jul 2023 22:42 )             20.5     07-14    132<L>  |  91<L>  |  62<H>  ----------------------------<  260<H>  4.3   |  30  |  6.48<H>    Ca    6.3<LL>      14 Jul 2023 22:42  Phos  4.0     07-14  Mg     1.5     07-14    TPro  6.5  /  Alb  2.6<L>  /  TBili  0.2  /  DBili  x   /  AST  17  /  ALT  12  /  AlkPhos  58  07-14            [ x ]CKD 5  [  ]Other  -------------------------------------------------------------------  [x  ]Diabetes  [  ] Diabetic PVD Ulcer  [  ] Neuropathic ulcer to DM  [x  ] Diabetes with Nephropathy  [  ] Osteomyelitis due to diabetes  [  ] Hyperglycemia   [  ]hypoglycemia   --------------------------------------------------------------------  [  ]Malnutrition: See Nutrition Note  [  ]Cachexia  [  ]Other:   [  ]Supplement Ordered:  [  ]Morbid Obesity (BMI >=40]  ---------------------------------------------------------------------  [x ] Sepsis/severe sepsis/septic shock  [ ] Noninfectious SIRS  [ ] UTI  [ ] Pneumonia  -----------------------------------------------------------------------  [ ] Acidosis/alkalosis  [ ] Fluid overload  [ ] Hypokalemia  [ ] Hyperkalemia  [ ] Hypomagnesemia  [ ] Hypophosphatemia  [ ] Hyperphosphatemia  ------------------------------------------------------------------------  [ ] Acute blood loss anemia  [ ] Post op blood loss anemia  [ ] Iron deficiency anemia  [ ] Anemia due to chronic disease  [ ] Hypercoagulable state  [ ] Thrombocytopenia  ----------------------------------------------------------------------  [x ] Cerebral infarction  [ ] Transient ischemia attack  [ ] Encephalopathy - Toxic or Metabolic        A/P: 57M with PMHx of DM2, 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 Royal) who presented to [facility] on 7/5 from nursing home for hyperglycemia who was found to have infected likely diabetic toe wound with gas-producing organism. 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 CT revealed gas tracking along achilles sheath and vascular surgery was consulted for surgical evaluation. 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      Vascular/PAD:  - L foot necrotizing fasciitis s/p guillotine R BKA (7/8) and completion R BKA 7/11  - continue Asa and Plavix  - pain control    Fever:  -Consult Cards and heme-onc for infectious/hematalogic work-up of WBC  -F/U Viral panels  -Appreciate cards, heme/onc, and ID recs  -AXR with dilated loops of bowel  -f/u CT chest/abd/pelvis  -f/u c.diff PCR    Neuro  - A&Ox2 at baseline, possible early onset dementia  - Hx of CVA with residual L sided weakness     HTN/HLD:  - continue hydralazine and nifedipine IR  - continue home atorvastatin  - Most recent echo 10/22 - LVH present with EF 50% with mild TR    CAD/CHF:   - continue home ASA and Plavix    DM:  - Appreciate medicine recs for DM management  - Pureed DM diet  - mISS + monitor FSG  - Consider resuming home Lantus 20 U nightly, currently held    CKD stage V:   - baseline Cr 7-8  - Appreciate renal recs  - Strict I&Os  - Avoid Nephrotoxic drugs    Hypernatremia  - appreciate renal recs  - f/u daily BMP    BPH  -continue doxazosin (home flomax held bc patient having difficulty swallowing pills)    Anemia:   - Trend daily labs    ID:  - Appreciate ID recs  - OR Cx growing Morganella, E. coli, Proteus, Strep. MRSA negative  - Continue on Zosyn at this time for L foot necrotizing fasciitis s/p L BKA 7/8 with improving leukocytosis,  Clindamycin (7/6 - 7/7), Vanc (7/6 - 7/8, resumed 7/11-7/13), Zosyn (7/6 -7/13)    Diet: minced and moist    Activity: OOB as tolerated    DVTPPx: SQH/SCDs    Dispo: PT/OT eval recommended MIKHAIL O/N: Hgb 6/20 received 1unit awaiting cbc for 2nd unit, KUB->rec CT, CT-H TBD          Subjective: pt seen and examined. no complaints.    ROS:   Denies Headache, blurred vision, Chest Pain, SOB, Abdominal pain, nausea or vomiting     Social   aspirin  chewable 81  clopidogrel Tablet 75  doxazosin 4  heparin   Injectable 5000  hydrALAZINE 25  metoprolol succinate ER 25  NIFEdipine IR 30      Allergies    No Known Allergies    Intolerances        Vital Signs Last 24 Hrs  T(C): 36.4 (15 Jul 2023 09:01), Max: 37.6 (15 Jul 2023 00:02)  T(F): 97.5 (15 Jul 2023 09:01), Max: 99.6 (15 Jul 2023 00:02)  HR: 98 (15 Jul 2023 07:14) (94 - 118)  BP: 154/72 (15 Jul 2023 07:14) (137/64 - 163/75)  BP(mean): 108 (15 Jul 2023 06:34) (92 - 108)  RR: 18 (15 Jul 2023 07:14) (17 - 19)  SpO2: 97% (15 Jul 2023 07:14) (96% - 98%)    Parameters below as of 15 Jul 2023 07:14  Patient On (Oxygen Delivery Method): room air      I&O's Summary    14 Jul 2023 07:01  -  15 Jul 2023 07:00  --------------------------------------------------------  IN: 180 mL / OUT: 0 mL / NET: 180 mL    15 Jul 2023 07:01  -  15 Jul 2023 09:58  --------------------------------------------------------  IN: 180 mL / OUT: 0 mL / NET: 180 mL        Physical Exam:  General: NAD  Pulmonary: no respiratory distress  Cardiovascular: tachy  Abdominal: soft, nontender, distended  Extremities: s/p R BKA, dressing c/d/i    LABS:                        6.6    20.66 )-----------( 269      ( 14 Jul 2023 22:42 )             20.5     07-14    132<L>  |  91<L>  |  62<H>  ----------------------------<  260<H>  4.3   |  30  |  6.48<H>    Ca    6.3<LL>      14 Jul 2023 22:42  Phos  4.0     07-14  Mg     1.5     07-14    TPro  6.5  /  Alb  2.6<L>  /  TBili  0.2  /  DBili  x   /  AST  17  /  ALT  12  /  AlkPhos  58  07-14            [ x ]CKD 5  [  ]Other  -------------------------------------------------------------------  [x  ]Diabetes  [  ] Diabetic PVD Ulcer  [  ] Neuropathic ulcer to DM  [x  ] Diabetes with Nephropathy  [  ] Osteomyelitis due to diabetes  [  ] Hyperglycemia   [  ]hypoglycemia   --------------------------------------------------------------------  [  ]Malnutrition: See Nutrition Note  [  ]Cachexia  [  ]Other:   [  ]Supplement Ordered:  [  ]Morbid Obesity (BMI >=40]  ---------------------------------------------------------------------  [x ] Sepsis/severe sepsis/septic shock  [ ] Noninfectious SIRS  [ ] UTI  [ ] Pneumonia  -----------------------------------------------------------------------  [ ] Acidosis/alkalosis  [ ] Fluid overload  [ ] Hypokalemia  [ ] Hyperkalemia  [ ] Hypomagnesemia  [ ] Hypophosphatemia  [ ] Hyperphosphatemia  ------------------------------------------------------------------------  [ ] Acute blood loss anemia  [ ] Post op blood loss anemia  [ ] Iron deficiency anemia  [ ] Anemia due to chronic disease  [ ] Hypercoagulable state  [ ] Thrombocytopenia  ----------------------------------------------------------------------  [x ] Cerebral infarction  [ ] Transient ischemia attack  [ ] Encephalopathy - Toxic or Metabolic        A/P: 57M with PMHx of DM2, 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 Royal) who presented to [facility] on 7/5 from nursing home for hyperglycemia who was found to have infected likely diabetic toe wound with gas-producing organism. 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 CT revealed gas tracking along achilles sheath and vascular surgery was consulted for surgical evaluation. 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      Vascular/PAD:  - L foot necrotizing fasciitis s/p guillotine R BKA (7/8) and completion R BKA 7/11  - continue Asa and Plavix  - pain control    Fever:  -Consult Cards and heme-onc for infectious/hematalogic work-up of WBC  -F/U Viral panels  -Appreciate cards, heme/onc, and ID recs  -AXR with dilated loops of bowel  -f/u CT chest/abd/pelvis  -f/u c.diff PCR    Neuro  - A&Ox2 at baseline, possible early onset dementia  - Hx of CVA with residual L sided weakness     HTN/HLD:  - continue hydralazine and nifedipine IR  - continue home atorvastatin  - Most recent echo 10/22 - LVH present with EF 50% with mild TR    CAD/CHF:   - continue home ASA and Plavix    DM:  - Appreciate medicine recs for DM management  - Pureed DM diet  - mISS + monitor FSG  - Consider resuming home Lantus 20 U nightly, currently held    CKD stage V:   - baseline Cr 7-8  - Appreciate renal recs  - Strict I&Os  - Avoid Nephrotoxic drugs    Hypernatremia  - appreciate renal recs  - f/u daily BMP    BPH  -continue doxazosin (home flomax held bc patient having difficulty swallowing pills)    Anemia:   - Trend daily labs    ID:  - Appreciate ID recs  - Monitor off antibiotics per ID  - OR Cx growing Morganella, E. coli, Proteus, Strep. MRSA negative  - s/p Clindamycin (7/6 - 7/7), Vanc (7/6 - 7/8, resumed 7/11-7/13), Zosyn (7/6 -7/13)    Diet: minced and moist    Activity: OOB as tolerated    DVTPPx: SQH/SCDs    Dispo: PT/OT eval recommended MIKHAIL

## 2023-07-15 NOTE — PROGRESS NOTE ADULT - SUBJECTIVE AND OBJECTIVE BOX
Patient is a 57y old  Male who presents with a chief complaint of DKA (15 Jul 2023 08:23)      SUBJECTIVE / OVERNIGHT EVENTS: Patient seen and examined at bedside. No acute events overnight. Patient with minimal complaints, but limited by cognitive impairment. It seems he has been afebrile for past 24 hours.    Discussed with surgery chief resident who is requesting transfer to medicine. Possible tremors, but not observed by me. Pending CTH and EEG on this admission with generalized slowing. I suspect if there is tremors it's metabolically mediated given CKD5 and uremia. This does not require inpatient work up. Labs seem slightly off this morning and are being repeated. Patient being given 1 unit pRBC. Abd soft NTND, but noted with bowel distention on XR. Pending CT w/o contrast (concern for worsening renal function with contrast).     MEDICATIONS  (STANDING):  aspirin  chewable 81 milliGRAM(s) Oral daily  atorvastatin 80 milliGRAM(s) Oral at bedtime  clopidogrel Tablet 75 milliGRAM(s) Oral daily  dextrose 5%. 1000 milliLiter(s) (50 mL/Hr) IV Continuous <Continuous>  dextrose 5%. 1000 milliLiter(s) (100 mL/Hr) IV Continuous <Continuous>  dextrose 50% Injectable 12.5 Gram(s) IV Push once  dextrose 50% Injectable 25 Gram(s) IV Push once  dextrose 50% Injectable 25 Gram(s) IV Push once  doxazosin 4 milliGRAM(s) Oral at bedtime  glucagon  Injectable 1 milliGRAM(s) IntraMuscular once  heparin   Injectable 5000 Unit(s) SubCutaneous every 8 hours  hydrALAZINE 25 milliGRAM(s) Oral three times a day  insulin glargine Injectable (LANTUS) 16 Unit(s) SubCutaneous at bedtime  insulin lispro (ADMELOG) corrective regimen sliding scale   SubCutaneous Before meals and at bedtime  iohexol 300 mG (iodine)/mL Oral Solution 30 milliLiter(s) Oral once  metoprolol succinate ER 25 milliGRAM(s) Oral daily  modafinil 100 milliGRAM(s) Oral daily  NIFEdipine IR 30 milliGRAM(s) Oral two times a day  senna 2 Tablet(s) Oral at bedtime  sodium bicarbonate 650 milliGRAM(s) Oral two times a day  sterile water 1000 milliLiter(s) (125 mL/Hr) IV Continuous <Continuous>    MEDICATIONS  (PRN):  acetaminophen     Tablet .. 650 milliGRAM(s) Oral every 4 hours PRN Mild Pain (1 - 3)  dextrose Oral Gel 15 Gram(s) Oral once PRN Blood Glucose LESS THAN 70 milliGRAM(s)/deciliter  ondansetron Injectable 4 milliGRAM(s) IV Push every 4 hours PRN Nausea and/or Vomiting  oxycodone    5 mG/acetaminophen 325 mG 1 Tablet(s) Oral every 6 hours PRN Severe Pain (7 - 10)      CAPILLARY BLOOD GLUCOSE      POCT Blood Glucose.: 172 mg/dL (15 Jul 2023 06:33)  POCT Blood Glucose.: 323 mg/dL (14 Jul 2023 21:13)  POCT Blood Glucose.: 308 mg/dL (14 Jul 2023 16:56)  POCT Blood Glucose.: 316 mg/dL (14 Jul 2023 12:09)    I&O's Summary    14 Jul 2023 07:01  -  15 Jul 2023 07:00  --------------------------------------------------------  IN: 180 mL / OUT: 0 mL / NET: 180 mL    15 Jul 2023 07:01  -  15 Jul 2023 11:17  --------------------------------------------------------  IN: 180 mL / OUT: 250 mL / NET: -70 mL        PHYSICAL EXAM:  Vital Signs Last 24 Hrs  T(C): 36.4 (15 Jul 2023 09:01), Max: 37.6 (15 Jul 2023 00:02)  T(F): 97.5 (15 Jul 2023 09:01), Max: 99.6 (15 Jul 2023 00:02)  HR: 95 (15 Jul 2023 10:00) (94 - 118)  BP: 146/56 (15 Jul 2023 10:00) (137/64 - 163/75)  BP(mean): 108 (15 Jul 2023 06:34) (92 - 108)  RR: 19 (15 Jul 2023 10:00) (17 - 19)  SpO2: 94% (15 Jul 2023 10:00) (94% - 98%)    Parameters below as of 15 Jul 2023 10:00  Patient On (Oxygen Delivery Method): room air        GEN: male in NAD, appears comfortable, no diaphoresis  EYES: No scleral injection, PERRL, EOMI  ENTM: neck supple & symmetric without tracheal deviation, moist membranes, no gross hearing impairment, thyroid gland not enlarged  CV: +S1/S2, no m/r/g, no abdominal bruit, no LE edema  RESP: breathing comfortably, no respiratory accessory muscle use, CTAB, no w/r/r  GI: normoactive BS, soft, NTND, no rebounding/guarding, no palpable masses    LABS:                        6.6    20.66 )-----------( 269      ( 14 Jul 2023 22:42 )             20.5     07-14    132<L>  |  91<L>  |  62<H>  ----------------------------<  260<H>  4.3   |  30  |  6.48<H>    Ca    6.3<LL>      14 Jul 2023 22:42  Phos  4.0     07-14  Mg     1.5     07-14    TPro  6.5  /  Alb  2.6<L>  /  TBili  0.2  /  DBili  x   /  AST  17  /  ALT  12  /  AlkPhos  58  07-14          Urinalysis Basic - ( 14 Jul 2023 22:42 )    Color: x / Appearance: x / SG: x / pH: x  Gluc: 260 mg/dL / Ketone: x  / Bili: x / Urobili: x   Blood: x / Protein: x / Nitrite: x   Leuk Esterase: x / RBC: x / WBC x   Sq Epi: x / Non Sq Epi: x / Bacteria: x        Culture - Blood (collected 14 Jul 2023 17:13)  Source: .Blood Blood-Venous  Preliminary Report (15 Jul 2023 07:00):    No growth at 12 hours    Urinalysis with Rflx Culture (collected 13 Jul 2023 09:12)        RADIOLOGY & ADDITIONAL TESTS:  Results Reviewed:   Imaging Personally Reviewed:  Electrocardiogram Personally Reviewed:    COORDINATION OF CARE:  Care Discussed with Consultants/Other Providers [Y/N]:  Prior or Outpatient Records Reviewed [Y/N]:

## 2023-07-15 NOTE — PROGRESS NOTE ADULT - SUBJECTIVE AND OBJECTIVE BOX
Patient is a 57y Male seen and evaluated at bedside. No acute distress, awaiting repeat labs this AM, likey error       Meds:    acetaminophen     Tablet .. 650 every 4 hours PRN  aspirin  chewable 81 daily  atorvastatin 80 at bedtime  clopidogrel Tablet 75 daily  dextrose 5%. 1000 <Continuous>  dextrose 5%. 1000 <Continuous>  dextrose 50% Injectable 25 once  dextrose 50% Injectable 12.5 once  dextrose 50% Injectable 25 once  dextrose Oral Gel 15 once PRN  doxazosin 4 at bedtime  glucagon  Injectable 1 once  heparin   Injectable 5000 every 8 hours  hydrALAZINE 25 three times a day  insulin glargine Injectable (LANTUS) 16 at bedtime  insulin lispro (ADMELOG) corrective regimen sliding scale  Before meals and at bedtime  metoprolol succinate ER 25 daily  modafinil 100 daily  NIFEdipine IR 30 two times a day  ondansetron Injectable 4 every 4 hours PRN  oxycodone    5 mG/acetaminophen 325 mG 1 every 6 hours PRN  potassium chloride    Tablet ER 40 once  potassium chloride  10 mEq/100 mL IVPB 10 every 1 hour  senna 2 at bedtime  sodium bicarbonate 650 two times a day      T(C): , Max: 37.6 (07-15-23 @ 00:02)  T(F): , Max: 99.6 (07-15-23 @ 00:02)  HR: 95 (07-15-23 @ 10:00)  BP: 146/56 (07-15-23 @ 10:00)  BP(mean): 108 (07-15-23 @ 06:34)  RR: 19 (07-15-23 @ 10:00)  SpO2: 94% (07-15-23 @ 10:00)  Wt(kg): --    07-14 @ 07:01  -  07-15 @ 07:00  --------------------------------------------------------  IN: 180 mL / OUT: 0 mL / NET: 180 mL    07-15 @ 07:01  -  07-15 @ 15:10  --------------------------------------------------------  IN: 180 mL / OUT: 600 mL / NET: -420 mL          Review of Systems:  ROS negative except as per HPI      PHYSICAL EXAM:  GENERAL: NAD, lying in bed, cachectic   HEART: Tachycardic, regular rhythm   LUNGS:  Clear to auscultation eleazar  ABD: Soft, nontender, nondistended, +BS  Ext: RLU with BKA, covered with band/gauze.   Nerv:  A&Ox2, left hemiparesis.    Vascular Access: AVF in LUE, with palpable thrill.       LABS:                        8.8    24.65 )-----------( 341      ( 15 Jul 2023 11:33 )             27.9     07-15    141  |  106  |  74<H>  ----------------------------<  184<H>  2.7<LL>   |  20<L>  |  8.00<H>    Ca    8.0<L>      15 Jul 2023 11:33  Phos  4.7     07-15  Mg     1.8     07-15    TPro  6.5  /  Alb  2.6<L>  /  TBili  0.2  /  DBili  x   /  AST  17  /  ALT  12  /  AlkPhos  58  07-14        Urinalysis Basic - ( 15 Jul 2023 11:33 )    Color: x / Appearance: x / SG: x / pH: x  Gluc: 184 mg/dL / Ketone: x  / Bili: x / Urobili: x   Blood: x / Protein: x / Nitrite: x   Leuk Esterase: x / RBC: x / WBC x   Sq Epi: x / Non Sq Epi: x / Bacteria: x            RADIOLOGY & ADDITIONAL STUDIES:

## 2023-07-15 NOTE — PROGRESS NOTE ADULT - NSPROGADDITIONALINFOA_GEN_ALL_CORE
Discussed with chief surgical resident and full time hospitalist here.    Not accepted onto medical service yet. Pending full infectious work up.

## 2023-07-16 ENCOUNTER — FORM ENCOUNTER (OUTPATIENT)
Age: 58
End: 2023-07-16

## 2023-07-16 LAB
ADV 40+41 DNA STL QL NAA+NON-PROBE: SIGNIFICANT CHANGE UP
ANION GAP SERPL CALC-SCNC: 13 MMOL/L — SIGNIFICANT CHANGE UP (ref 5–17)
ASTROVIRUS: SIGNIFICANT CHANGE UP
BUN SERPL-MCNC: 69 MG/DL — HIGH (ref 7–23)
C CAYETANENSIS DNA STL QL NAA+NON-PROBE: SIGNIFICANT CHANGE UP
CALCIUM SERPL-MCNC: 8 MG/DL — LOW (ref 8.4–10.5)
CAMPYLOBACTER DNA SPEC NAA+PROBE: SIGNIFICANT CHANGE UP
CHLORIDE SERPL-SCNC: 107 MMOL/L — SIGNIFICANT CHANGE UP (ref 96–108)
CO2 SERPL-SCNC: 18 MMOL/L — LOW (ref 22–31)
CREAT SERPL-MCNC: 7.6 MG/DL — HIGH (ref 0.5–1.3)
CRYPTOSP DNA STL QL NAA+PROBE: SIGNIFICANT CHANGE UP
E COLI SXT SPEC: SIGNIFICANT CHANGE UP
E HISTOLYT DNA STL QL NAA+NON-PROBE: SIGNIFICANT CHANGE UP
EC EAEA GENE STL QL NAA+PROBE: SIGNIFICANT CHANGE UP
EGFR: 8 ML/MIN/1.73M2 — LOW
EPEC DNA STL QL NAA+PROBE: SIGNIFICANT CHANGE UP
ETEC DNA STL QL NAA+PROBE: SIGNIFICANT CHANGE UP
FOLATE SERPL-MCNC: 12.2 NG/ML — SIGNIFICANT CHANGE UP
G LAMBLIA DNA STL QL NAA+NON-PROBE: SIGNIFICANT CHANGE UP
GI PCR PANEL: SIGNIFICANT CHANGE UP
GLUCOSE BLDC GLUCOMTR-MCNC: 105 MG/DL — HIGH (ref 70–99)
GLUCOSE BLDC GLUCOMTR-MCNC: 173 MG/DL — HIGH (ref 70–99)
GLUCOSE BLDC GLUCOMTR-MCNC: 74 MG/DL — SIGNIFICANT CHANGE UP (ref 70–99)
GLUCOSE BLDC GLUCOMTR-MCNC: 86 MG/DL — SIGNIFICANT CHANGE UP (ref 70–99)
GLUCOSE SERPL-MCNC: 68 MG/DL — LOW (ref 70–99)
HCT VFR BLD CALC: 25.7 % — LOW (ref 39–50)
HGB BLD-MCNC: 8.3 G/DL — LOW (ref 13–17)
MAGNESIUM SERPL-MCNC: 1.8 MG/DL — SIGNIFICANT CHANGE UP (ref 1.6–2.6)
MCHC RBC-ENTMCNC: 29.2 PG — SIGNIFICANT CHANGE UP (ref 27–34)
MCHC RBC-ENTMCNC: 32.3 GM/DL — SIGNIFICANT CHANGE UP (ref 32–36)
MCV RBC AUTO: 90.5 FL — SIGNIFICANT CHANGE UP (ref 80–100)
NOROVIRUS GI+II RNA STL QL NAA+NON-PROBE: SIGNIFICANT CHANGE UP
NRBC # BLD: 0 /100 WBCS — SIGNIFICANT CHANGE UP (ref 0–0)
P SHIGELLOIDES DNA STL QL NAA+NON-PROBE: SIGNIFICANT CHANGE UP
PHOSPHATE SERPL-MCNC: 4.4 MG/DL — SIGNIFICANT CHANGE UP (ref 2.5–4.5)
PLATELET # BLD AUTO: 327 K/UL — SIGNIFICANT CHANGE UP (ref 150–400)
POTASSIUM SERPL-MCNC: 3.5 MMOL/L — SIGNIFICANT CHANGE UP (ref 3.5–5.3)
POTASSIUM SERPL-SCNC: 3.5 MMOL/L — SIGNIFICANT CHANGE UP (ref 3.5–5.3)
RAPID RVP RESULT: SIGNIFICANT CHANGE UP
RBC # BLD: 2.84 M/UL — LOW (ref 4.2–5.8)
RBC # FLD: 16.6 % — HIGH (ref 10.3–14.5)
RV RNA STL NAA+PROBE: SIGNIFICANT CHANGE UP
SALMONELLA DNA STL QL NAA+PROBE: SIGNIFICANT CHANGE UP
SAPOVIRUS (GENOGROUPS I, II, IV, AND V): SIGNIFICANT CHANGE UP
SARS-COV-2 RNA SPEC QL NAA+PROBE: SIGNIFICANT CHANGE UP
SHIGELLA DNA SPEC QL NAA+PROBE: SIGNIFICANT CHANGE UP
SODIUM SERPL-SCNC: 138 MMOL/L — SIGNIFICANT CHANGE UP (ref 135–145)
VIBRIO CHOL TOXIN CTXA STL QL NAA+PROBE: SIGNIFICANT CHANGE UP
VIBRIO CHOL TOXIN CTXA STL QL NAA+PROBE: SIGNIFICANT CHANGE UP
VIT B12 SERPL-MCNC: 852 PG/ML — SIGNIFICANT CHANGE UP (ref 232–1245)
WBC # BLD: 20.24 K/UL — HIGH (ref 3.8–10.5)
WBC # FLD AUTO: 20.24 K/UL — HIGH (ref 3.8–10.5)
Y ENTEROCOL DNA STL QL NAA+NON-PROBE: SIGNIFICANT CHANGE UP

## 2023-07-16 PROCEDURE — 74018 RADEX ABDOMEN 1 VIEW: CPT | Mod: 26

## 2023-07-16 PROCEDURE — 99233 SBSQ HOSP IP/OBS HIGH 50: CPT

## 2023-07-16 RX ORDER — POTASSIUM CHLORIDE 20 MEQ
20 PACKET (EA) ORAL ONCE
Refills: 0 | Status: COMPLETED | OUTPATIENT
Start: 2023-07-16 | End: 2023-07-16

## 2023-07-16 RX ORDER — POTASSIUM CHLORIDE 20 MEQ
10 PACKET (EA) ORAL
Refills: 0 | Status: COMPLETED | OUTPATIENT
Start: 2023-07-16 | End: 2023-07-16

## 2023-07-16 RX ADMIN — Medication 30 MILLIGRAM(S): at 05:46

## 2023-07-16 RX ADMIN — MODAFINIL 100 MILLIGRAM(S): 200 TABLET ORAL at 13:56

## 2023-07-16 RX ADMIN — Medication 650 MILLIGRAM(S): at 05:46

## 2023-07-16 RX ADMIN — Medication 81 MILLIGRAM(S): at 13:17

## 2023-07-16 RX ADMIN — Medication 100 MILLIEQUIVALENT(S): at 04:57

## 2023-07-16 RX ADMIN — Medication 25 MILLIGRAM(S): at 13:16

## 2023-07-16 RX ADMIN — HEPARIN SODIUM 5000 UNIT(S): 5000 INJECTION INTRAVENOUS; SUBCUTANEOUS at 22:39

## 2023-07-16 RX ADMIN — INSULIN GLARGINE 16 UNIT(S): 100 INJECTION, SOLUTION SUBCUTANEOUS at 22:38

## 2023-07-16 RX ADMIN — Medication 25 MILLIGRAM(S): at 05:46

## 2023-07-16 RX ADMIN — SENNA PLUS 2 TABLET(S): 8.6 TABLET ORAL at 22:40

## 2023-07-16 RX ADMIN — CLOPIDOGREL BISULFATE 75 MILLIGRAM(S): 75 TABLET, FILM COATED ORAL at 13:16

## 2023-07-16 RX ADMIN — Medication 2: at 22:38

## 2023-07-16 RX ADMIN — SODIUM CHLORIDE 80 MILLILITER(S): 9 INJECTION, SOLUTION INTRAVENOUS at 14:33

## 2023-07-16 RX ADMIN — Medication 4 MILLIGRAM(S): at 22:40

## 2023-07-16 RX ADMIN — ATORVASTATIN CALCIUM 80 MILLIGRAM(S): 80 TABLET, FILM COATED ORAL at 22:40

## 2023-07-16 RX ADMIN — Medication 25 MILLIGRAM(S): at 22:40

## 2023-07-16 RX ADMIN — HEPARIN SODIUM 5000 UNIT(S): 5000 INJECTION INTRAVENOUS; SUBCUTANEOUS at 05:45

## 2023-07-16 RX ADMIN — Medication 100 MILLIEQUIVALENT(S): at 01:45

## 2023-07-16 RX ADMIN — Medication 100 MILLIEQUIVALENT(S): at 06:03

## 2023-07-16 RX ADMIN — Medication 100 MILLIEQUIVALENT(S): at 03:42

## 2023-07-16 RX ADMIN — HEPARIN SODIUM 5000 UNIT(S): 5000 INJECTION INTRAVENOUS; SUBCUTANEOUS at 13:16

## 2023-07-16 RX ADMIN — Medication 650 MILLIGRAM(S): at 18:03

## 2023-07-16 RX ADMIN — Medication 100 MILLIEQUIVALENT(S): at 00:39

## 2023-07-16 RX ADMIN — Medication 30 MILLIGRAM(S): at 18:29

## 2023-07-16 RX ADMIN — Medication 1000 MILLIGRAM(S): at 04:01

## 2023-07-16 RX ADMIN — Medication 400 MILLIGRAM(S): at 03:07

## 2023-07-16 RX ADMIN — Medication 50 MILLIEQUIVALENT(S): at 10:09

## 2023-07-16 RX ADMIN — Medication 25 MILLIGRAM(S): at 05:45

## 2023-07-16 NOTE — PROGRESS NOTE ADULT - SUBJECTIVE AND OBJECTIVE BOX
O/N: CT abd/pelvis with colon distention (lbo vs ileus), made NPO and started IVF. Gen surg consulted, lactate 0.7, K 3.2. Rectal temp 100.7, tylenol IV given, covid/rvp sent. GI PCR negative.          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 Royal) who presented to [facility] on 7/5 from nursing home for hyperglycemia who was found to have infected likely diabetic toe wound with gas-producing organism. 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 CT revealed gas tracking along achilles sheath and vascular surgery was consulted for surgical evaluation. 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      Vascular/PAD:  - L foot necrotizing fasciitis s/p guillotine R BKA (7/8) and completion R BKA 7/11  - continue Asa and Plavix  - pain control    Fever/Leukocytosis:  -Appreciate cards, heme/onc, and ID recs  -f/u general surgery recs  -AXR with dilated loops of bowel  -f/u CT abd/pelvis 7/15 final read  -f/u CT chest 7/15 final read  -c.diff and GI PCR 7/15 negative  -f/u COVID and RVP panel    Neuro  - A&Ox2 at baseline, possible early onset dementia  - Hx of CVA with residual L sided weakness   - CT 7/15: no acute pathology, mild parenchymal volume loss and mild chronic microvascular ischemic changes    HTN/HLD:  - continue hydralazine and nifedipine IR  - continue home atorvastatin  - Most recent echo 10/22 - LVH present with EF 50% with mild TR    CAD/CHF:   - continue home ASA and Plavix    DM:  - Appreciate medicine recs for DM management  - Pureed DM diet when ok to adv diet  - mISS + monitor FSG  - Consider resuming home Lantus 20 U nightly, currently held    CKD stage V:   - baseline Cr 7-8  - Appreciate renal recs  - Strict I&Os  - Avoid Nephrotoxic drugs    Hypernatremia  - appreciate renal recs  - f/u daily BMP    BPH  -continue doxazosin (home flomax held bc patient having difficulty swallowing pills)    Anemia:   - Trend daily labs    ID:  - Appreciate ID recs  - Monitor off antibiotics  - OR Cx growing Morganella, E. coli, Proteus, Strep. MRSA negative  - s/p Clindamycin (7/6 - 7/7), Vanc (7/6 - 7/8, resumed 7/11-7/13), Zosyn (7/6 -7/13)    Diet: NPO w/ IVF    Activity: OOB as tolerated    DVTPPx: SQH/SCDs    Dispo: PT/OT eval recommended MIKHAIL O/N: CT abd/pelvis with colon distention (lbo vs ileus), made NPO and started IVF. Gen surg consulted, lactate 0.7, K 3.2. Rectal temp 100.7, tylenol IV given, covid/rvp sent. GI PCR negative.          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 Royal) who presented to [facility] on 7/5 from nursing home for hyperglycemia who was found to have infected likely diabetic toe wound with gas-producing organism. 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 CT revealed gas tracking along achilles sheath and vascular surgery was consulted for surgical evaluation. 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      Vascular/PAD:  - L foot necrotizing fasciitis s/p guillotine R BKA (7/8) and completion R BKA 7/11  - continue Asa and Plavix  - pain control    Fever/Leukocytosis:  -Appreciate cards, heme/onc, and ID recs  -f/u general surgery recs  -AXR with dilated loops of bowel, f/u rpt 7/16 am  -f/u CT abd/pelvis 7/15 final read  -f/u CT chest 7/15 final read  -c.diff and GI PCR 7/15 negative  -COVID and RVP panel 7/16 negative    LBO vs Ileus  -NPO + IVF  -replete electrolytes, discontinue opioids  -repeat abd xray 7/16 am  -f/u CT abd/pelvis 7/15 final read  -appreciate gen surg recs    Neuro  - A&Ox2 at baseline, possible early onset dementia  - Hx of CVA with residual L sided weakness   - CT 7/15: no acute pathology, mild parenchymal volume loss and mild chronic microvascular ischemic changes    HTN/HLD:  - continue hydralazine and nifedipine IR  - continue home atorvastatin  - Most recent echo 10/22 - LVH present with EF 50% with mild TR    CAD/CHF:   - continue home ASA and Plavix    DM:  - Appreciate medicine recs for DM management  - Pureed DM diet when ok to adv diet  - mISS + monitor FSG  - Consider resuming home Lantus 20 U nightly, currently held    CKD stage V:   - baseline Cr 7-8  - Appreciate renal recs  - Strict I&Os  - Avoid Nephrotoxic drugs    Hypernatremia  - appreciate renal recs  - f/u daily BMP    BPH  -continue doxazosin (home flomax held bc patient having difficulty swallowing pills)    Anemia:   - Trend daily labs    ID:  - Appreciate ID recs  - Monitor off antibiotics  - OR Cx growing Morganella, E. coli, Proteus, Strep. MRSA negative  - s/p Clindamycin (7/6 - 7/7), Vanc (7/6 - 7/8, resumed 7/11-7/13), Zosyn (7/6 -7/13)    Diet: NPO w/ IVF    Activity: OOB as tolerated    DVTPPx: SQH/SCDs    Dispo: PT/OT eval recommended MIKHAIL O/N: CT abd/pelvis with colon distention (lbo vs ileus), made NPO and started IVF. Gen surg consulted rec rpt abd xray in am, lactate 0.7, K 3.2 repleted. Rectal temp 100.7, tylenol IV given, Covid/RVP neg, GI PCR negative.        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 Royal) who presented to [facility] on 7/5 from nursing home for hyperglycemia who was found to have infected likely diabetic toe wound with gas-producing organism. 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 CT revealed gas tracking along achilles sheath and vascular surgery was consulted for surgical evaluation. 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      Vascular/PAD:  - L foot necrotizing fasciitis s/p guillotine R BKA (7/8) and completion R BKA 7/11  - continue Asa and Plavix  - pain control    Fever/Leukocytosis:  -Appreciate cards, heme/onc, and ID recs  -f/u general surgery recs  -AXR with dilated loops of bowel, f/u rpt 7/16 am  -f/u CT abd/pelvis 7/15 final read  -f/u CT chest 7/15 final read  -c.diff and GI PCR 7/15 negative  -COVID and RVP panel 7/16 negative    LBO vs Ileus  -NPO + IVF  -replete electrolytes, discontinue opioids  -repeat abd xray 7/16 am  -f/u CT abd/pelvis 7/15 final read  -appreciate gen surg recs    Neuro  - A&Ox2 at baseline, possible early onset dementia  - Hx of CVA with residual L sided weakness   - [facility] 7/15: no acute pathology, mild parenchymal volume loss and mild chronic microvascular ischemic changes    HTN/HLD:  - continue hydralazine and nifedipine IR  - continue home atorvastatin  - Most recent echo 10/22 - LVH present with EF 50% with mild TR    CAD/CHF:   - continue home ASA and Plavix    DM:  - Appreciate medicine recs for DM management  - Pureed DM diet when ok to adv diet  - mISS + monitor FSG  - Consider resuming home Lantus 20 U nightly, currently held    CKD stage V:   - baseline Cr 7-8  - Appreciate renal recs  - Strict I&Os  - Avoid Nephrotoxic drugs    Hypernatremia  - appreciate renal recs  - f/u daily BMP    BPH  -continue doxazosin (home flomax held bc patient having difficulty swallowing pills)    Anemia:   - Trend daily labs    ID:  - Appreciate ID recs  - Monitor off antibiotics  - OR Cx growing Morganella, E. coli, Proteus, Strep. MRSA negative  - s/p Clindamycin (7/6 - 7/7), Vanc (7/6 - 7/8, resumed 7/11-7/13), Zosyn (7/6 -7/13)    Diet: NPO w/ IVF    Activity: OOB as tolerated    DVTPPx: SQH/SCDs    Dispo: PT/OT eval recommended MIKHAIL O/N: CT abd/pelvis with colon distention (lbo vs ileus), made NPO and started IVF. Gen surg consulted rec rpt abd xray in am, lactate 0.7, K 3.2 repleted. Rectal temp 100.7, tylenol IV given, Covid/RVP neg, GI PCR negative.    Subjective: PT was seen this morning with chief, laying comfortable in bed. When asked if he had belly pain he shook his head to say no. He nodded when asked if he felt like he was distended.     ROS:   Denies Headache, blurred vision, Chest Pain, SOB, Abdominal pain, nausea or vomiting     Social   aspirin  chewable 81  clopidogrel Tablet 75  doxazosin 4  heparin   Injectable 5000  hydrALAZINE 25  metoprolol succinate ER 25  NIFEdipine IR 30      Allergies    No Known Allergies    Intolerances        Vital Signs Last 24 Hrs  T(C): 36.5 (16 Jul 2023 08:56), Max: 38.2 (15 Jul 2023 22:30)  T(F): 97.7 (16 Jul 2023 08:56), Max: 100.7 (15 Jul 2023 22:30)  HR: 99 (16 Jul 2023 05:33) (93 - 99)  BP: 137/64 (16 Jul 2023 05:33) (137/64 - 157/77)  BP(mean): --  RR: 18 (16 Jul 2023 05:33) (18 - 19)  SpO2: 99% (16 Jul 2023 05:33) (94% - 99%)    Parameters below as of 16 Jul 2023 05:33  Patient On (Oxygen Delivery Method): room air      I&O's Summary    15 Jul 2023 07:01  -  16 Jul 2023 07:00  --------------------------------------------------------  IN: 1080 mL / OUT: 675 mL / NET: 405 mL    16 Jul 2023 07:01  -  16 Jul 2023 09:01  --------------------------------------------------------  IN: 0 mL / OUT: 0 mL / NET: 0 mL        Physical Exam:  General: NAD  Pulmonary: no respiratory distress  Cardiovascular: tachy  Abdominal: soft, nontender, distended  Extremities: s/p R BKA, dressing c/d/i      LABS:                        8.3    20.24 )-----------( 327      ( 16 Jul 2023 05:30 )             25.7     07-16    138  |  107  |  69<H>  ----------------------------<  68<L>  3.5   |  18<L>  |  7.60<H>    Ca    8.0<L>      16 Jul 2023 05:30  Phos  4.4     07-16  Mg     1.8     07-16        A/P: 57M with PMHx of DM2, 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 Royal) who presented to [facility] on 7/5 from nursing home for hyperglycemia who was found to have infected likely diabetic toe wound with gas-producing organism. 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 CT revealed gas tracking along achilles sheath and vascular surgery was consulted for surgical evaluation. 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      Vascular/PAD:  - L foot necrotizing fasciitis s/p guillotine R BKA (7/8) and completion R BKA 7/11  - continue Asa and Plavix  - pain control    Fever/Leukocytosis:  -Appreciate cards, heme/onc, and ID recs  -f/u general surgery recs  -AXR with dilated loops of bowel, f/u rpt 7/16 am  -f/u CT abd/pelvis 7/15 final read  -f/u CT chest 7/15 final read  -c.diff and GI PCR 7/15 negative  -COVID and RVP panel 7/16 negative    LBO vs Ileus  -NPO + IVF  -replete electrolytes, discontinue opioids  -repeat abd xray 7/16 am  -f/u CT abd/pelvis 7/15 final read  -appreciate gen surg recs    Neuro  - A&Ox2 at baseline, possible early onset dementia  - Hx of CVA with residual L sided weakness   - [facility] 7/15: no acute pathology, mild parenchymal volume loss and mild chronic microvascular ischemic changes    HTN/HLD:  - continue hydralazine and nifedipine IR  - continue home atorvastatin  - Most recent echo 10/22 - LVH present with EF 50% with mild TR    CAD/CHF:   - continue home ASA and Plavix    DM:  - Appreciate medicine recs for DM management  - Pureed DM diet when ok to adv diet  - mISS + monitor FSG  - Consider resuming home Lantus 20 U nightly, currently held    CKD stage V:   - baseline Cr 7-8  - Appreciate renal recs  - Strict I&Os  - Avoid Nephrotoxic drugs    Hypernatremia  - appreciate renal recs  - f/u daily BMP    BPH  -continue doxazosin (home flomax held bc patient having difficulty swallowing pills)    Anemia:   - Trend daily labs    ID:  - Appreciate ID recs  - Monitor off antibiotics  - OR Cx growing Morganella, E. coli, Proteus, Strep. MRSA negative  - s/p Clindamycin (7/6 - 7/7), Vanc (7/6 - 7/8, resumed 7/11-7/13), Zosyn (7/6 -7/13)    Diet: NPO w/ IVF    Activity: OOB as tolerated    DVTPPx: SQH/SCDs    Dispo: PT/OT eval recommended MIKHAIL

## 2023-07-16 NOTE — CONSULT NOTE ADULT - CONSULT REQUESTED DATE/TIME
06-Jul-2023 14:45
07-Jul-2023 14:25
06-Jul-2023 11:53
16-Jul-2023 03:41
09-Jul-2023 10:43
14-Jul-2023 10:16
05-Jul-2023 21:35
06-Jul-2023 13:39
14-Jul-2023 18:24

## 2023-07-16 NOTE — CONSULT NOTE ADULT - SUBJECTIVE AND OBJECTIVE BOX
Surgery Consult    Consulting Surgical Team:   [ ] Team 1 - Colorectal/Surgical Oncology ([phone])    [ ] Team 2 - MIS/Bariatric Surgery ([phone])     [ x ] Team 4 - Acute Care Surgery ([phone])     [ ] Team 5 - General Surgery/Breast/Pediatric Surgery ([phone])    Consulting Attending: Dr. Ramirez    HPI:  HPI: 57M with PMH of DM2, CVA x 2 (one ischemic one embolic, residual left side weakness), HTN, HLD, and CKD V (not on HD yet), BPH, BIBEMS from nursing home for hyperglycemia to 363.  On arrival, patient found to have SIRS (tachycardia, fever, leukocytosis) and was felt to be septic w/o identified infectious source. Also noted to have anion gap but likely attributable to uremia as well as hyperglycemia, but of note not in DKA. Upon investigation, patient's right heel ulcer was noted to have foul smell so podiatry was consulted and cross-sectional imaging obtained revealing gas tracking along achilles sheath. Vascular surgery was consulted for surgical evaluation.     Patient endorses pain of the right heel. He states the wound has been present for about a month. Patient denies fever, chills, chest pain, shortness of breath, vomiting, abdominal pain.     In the ED:  Initial vital signs: T: 100.8 F, HR: 120, BP: 117/73, R:16, SpO2: 96% on RA  ED course:   Labs: significant for WBC 26.04, Hgb 8.9, plt 496, glucose 260, Na+ 146, K+ 3.8, Cl- 109, HCO3- 15, AG 22, BHB 0.6, trop 126, VBG with metabolic acidosis  Imaging:  CXR: clear  EKG: sinus tachycardia, no ST elevation/depression or TWI  Medications:  2L NS, 1 dose vanc + zosyn, insulin 9 units bolus, started on insulin gtt at 9 units/hr  Consults: MICU    MICU course:  (06 Jul 2023 04:16)    GENERAL SURGERY ADDENDUM  57M with PMH of DM2, CVA, HTN, HLD, and CKD presented with gas producing RLE heel infection requiring BKA for source control and RTOR for closure. Patient with persistent leukocytosis following procedure. Was maintained on Zosyn targeted to OR cultures stopped yesterday when he spiked temp of 101. CTAP PO contrast obtained today demonstrated dilated large bowel to 7.5 with large stool burden, possible ileus vs LBO. Additional imaging of surgical site without readily identifiable cause for leukocytosis.  General Surgery consulted for dilated colon and leukocytosis On interview patient denies abdominal complaint, or pain, nausea or emesis. Passing flatus. Bowel movement during exam with semi soft stool. Denies abdominal surgical history or colonoscopy in past.       PAST MEDICAL HISTORY:  Type 2 diabetes mellitus    Personal history of other diseases of circulatory system    Cerebral artery occlusion with cerebral infarction    Hyperlipidemia    Hypertension    Stage 4 chronic kidney disease    H/O diabetic retinopathy        PAST SURGICAL HISTORY:  Late effect of traumatic amputation        MEDICATIONS:      ALLERGIES:  No Known Allergies      SOCHX:   Social History:  Tobacco use: former smoker  EtOH use: denies  Illicit drug use: denies    Living situation: Pt from [facility]. He is bedbound. (06 Jul 2023 04:16)      FAMHX:   FAMILY HISTORY:  Family history of diabetes mellitus (Mother)  Family history of diabetes mellitus (DM)          Vitals:  Vital Signs Last 24 Hrs  T(C): 38.2 (15 Jul 2023 22:30), Max: 38.2 (15 Jul 2023 22:30)  T(F): 100.7 (15 Jul 2023 22:30), Max: 100.7 (15 Jul 2023 22:30)  HR: 99 (16 Jul 2023 00:17) (93 - 99)  BP: 156/90 (16 Jul 2023 00:17) (137/64 - 163/75)  BP(mean): 108 (15 Jul 2023 06:34) (92 - 108)  RR: 18 (16 Jul 2023 00:17) (17 - 19)  SpO2: 99% (16 Jul 2023 00:17) (94% - 99%)    Parameters below as of 16 Jul 2023 00:17  Patient On (Oxygen Delivery Method): room air          PHYSICAL EXAM:  Physical Exam  General: NAD, resting comfortably in bed, AOx1  Pulm: Nonlabored breathing, no respiratory distress  Cardiac RRR  Abd: soft, mildly distended, non tender, rectal with soft stool in vault no blood or masses. Scrotum with minimal excoriation and maceration no crepitus. Stage 1 sacral ulcer   Extrem: WWP, no edema, RLE site cdi, small blister  Neuro: A/O x 1, CNs II-XII grossly intact, no focal deficits    I&o's:  I&O's Summary    14 Jul 2023 07:01  -  15 Jul 2023 07:00  --------------------------------------------------------  IN: 180 mL / OUT: 0 mL / NET: 180 mL    15 Jul 2023 07:01  -  16 Jul 2023 03:41  --------------------------------------------------------  IN: 880 mL / OUT: 675 mL / NET: 205 mL        LABS:                        8.8    24.65 )-----------( 341      ( 15 Jul 2023 11:33 )             27.9     07-15    138  |  105  |  68<H>  ----------------------------<  217<H>  3.2<L>   |  18<L>  |  7.69<H>    Ca    8.3<L>      15 Jul 2023 22:40  Phos  4.7     07-15  Mg     1.8     07-15    TPro  6.5  /  Alb  2.6<L>  /  TBili  0.2  /  DBili  x   /  AST  17  /  ALT  12  /  AlkPhos  58  07-14    Lactate: Lactate, Blood: 0.7 mmol/L (07-15 @ 22:40)               Urinalysis Basic - ( 15 Jul 2023 22:40 )    Color: x / Appearance: x / SG: x / pH: x  Gluc: 217 mg/dL / Ketone: x  / Bili: x / Urobili: x   Blood: x / Protein: x / Nitrite: x   Leuk Esterase: x / RBC: x / WBC x   Sq Epi: x / Non Sq Epi: x / Bacteria: x        MICRO:     IMAGING:

## 2023-07-16 NOTE — PROGRESS NOTE ADULT - SUBJECTIVE AND OBJECTIVE BOX
Patient is a 57y old  Male who presents with a chief complaint of DKA (16 Jul 2023 09:01)      SUBJECTIVE / OVERNIGHT EVENTS: Patient seen and examined at bedside. Had low grade fever of 100.7 F. Denies n/v. Mild distention. No pain in right BKA site.    MEDICATIONS  (STANDING):  aspirin  chewable 81 milliGRAM(s) Oral daily  atorvastatin 80 milliGRAM(s) Oral at bedtime  clopidogrel Tablet 75 milliGRAM(s) Oral daily  dextrose 5%. 1000 milliLiter(s) (50 mL/Hr) IV Continuous <Continuous>  dextrose 5%. 1000 milliLiter(s) (80 mL/Hr) IV Continuous <Continuous>  dextrose 5%. 1000 milliLiter(s) (100 mL/Hr) IV Continuous <Continuous>  dextrose 50% Injectable 25 Gram(s) IV Push once  dextrose 50% Injectable 12.5 Gram(s) IV Push once  dextrose 50% Injectable 25 Gram(s) IV Push once  doxazosin 4 milliGRAM(s) Oral at bedtime  glucagon  Injectable 1 milliGRAM(s) IntraMuscular once  heparin   Injectable 5000 Unit(s) SubCutaneous every 8 hours  hydrALAZINE 25 milliGRAM(s) Oral three times a day  insulin glargine Injectable (LANTUS) 16 Unit(s) SubCutaneous at bedtime  insulin lispro (ADMELOG) corrective regimen sliding scale   SubCutaneous Before meals and at bedtime  metoprolol succinate ER 25 milliGRAM(s) Oral daily  modafinil 100 milliGRAM(s) Oral daily  NIFEdipine IR 30 milliGRAM(s) Oral two times a day  senna 2 Tablet(s) Oral at bedtime  sodium bicarbonate 650 milliGRAM(s) Oral two times a day    MEDICATIONS  (PRN):  acetaminophen     Tablet .. 650 milliGRAM(s) Oral every 4 hours PRN Mild Pain (1 - 3)  dextrose Oral Gel 15 Gram(s) Oral once PRN Blood Glucose LESS THAN 70 milliGRAM(s)/deciliter  ondansetron Injectable 4 milliGRAM(s) IV Push every 4 hours PRN Nausea and/or Vomiting      CAPILLARY BLOOD GLUCOSE      POCT Blood Glucose.: 86 mg/dL (16 Jul 2023 11:13)  POCT Blood Glucose.: 74 mg/dL (16 Jul 2023 06:42)  POCT Blood Glucose.: 229 mg/dL (15 Jul 2023 23:07)  POCT Blood Glucose.: 215 mg/dL (15 Jul 2023 21:41)  POCT Blood Glucose.: 142 mg/dL (15 Jul 2023 17:27)    I&O's Summary    15 Jul 2023 07:01  -  16 Jul 2023 07:00  --------------------------------------------------------  IN: 1080 mL / OUT: 675 mL / NET: 405 mL    16 Jul 2023 07:01  -  16 Jul 2023 12:25  --------------------------------------------------------  IN: 0 mL / OUT: 0 mL / NET: 0 mL        PHYSICAL EXAM:  Vital Signs Last 24 Hrs  T(C): 36.5 (16 Jul 2023 08:56), Max: 38.2 (15 Jul 2023 22:30)  T(F): 97.7 (16 Jul 2023 08:56), Max: 100.7 (15 Jul 2023 22:30)  HR: 94 (16 Jul 2023 08:51) (93 - 99)  BP: 128/61 (16 Jul 2023 08:51) (128/61 - 157/77)  BP(mean): --  RR: 18 (16 Jul 2023 08:51) (18 - 19)  SpO2: 97% (16 Jul 2023 08:51) (94% - 99%)    Parameters below as of 16 Jul 2023 08:51  Patient On (Oxygen Delivery Method): room air        GEN: male in NAD, appears comfortable, no diaphoresis  EYES: No scleral injection, PERRL, EOMI  ENTM: neck supple & symmetric without tracheal deviation, moist membranes, no gross hearing impairment, thyroid gland not enlarged  CV: +S1/S2, no m/r/g, no abdominal bruit, no LE edema  RESP: breathing comfortably, no respiratory accessory muscle use, CTAB, no w/r/r  GI: normoactive BS, soft, NTND, no rebounding/guarding, no palpable masses    LABS:                        8.3    20.24 )-----------( 327      ( 16 Jul 2023 05:30 )             25.7     07-16    138  |  107  |  69<H>  ----------------------------<  68<L>  3.5   |  18<L>  |  7.60<H>    Ca    8.0<L>      16 Jul 2023 05:30  Phos  4.4     07-16  Mg     1.8     07-16            Urinalysis Basic - ( 16 Jul 2023 05:30 )    Color: x / Appearance: x / SG: x / pH: x  Gluc: 68 mg/dL / Ketone: x  / Bili: x / Urobili: x   Blood: x / Protein: x / Nitrite: x   Leuk Esterase: x / RBC: x / WBC x   Sq Epi: x / Non Sq Epi: x / Bacteria: x        Culture - Blood (collected 14 Jul 2023 17:13)  Source: .Blood Blood-Venous  Preliminary Report (15 Jul 2023 19:00):    No growth at 1 day.        RADIOLOGY & ADDITIONAL TESTS:  Results Reviewed:   Imaging Personally Reviewed:  Electrocardiogram Personally Reviewed:    COORDINATION OF CARE:  Care Discussed with Consultants/Other Providers [Y/N]:  Prior or Outpatient Records Reviewed [Y/N]:

## 2023-07-16 NOTE — PROGRESS NOTE ADULT - SUBJECTIVE AND OBJECTIVE BOX
Patient is a 57y Male seen and evaluated at bedside. No acute distress, does not offer any complaints. SCr stable, lytes stable.       Meds:    acetaminophen     Tablet .. 650 every 4 hours PRN  aspirin  chewable 81 daily  atorvastatin 80 at bedtime  clopidogrel Tablet 75 daily  dextrose 5%. 1000 <Continuous>  dextrose 5%. 1000 <Continuous>  dextrose 5%. 1000 <Continuous>  dextrose 50% Injectable 12.5 once  dextrose 50% Injectable 25 once  dextrose 50% Injectable 25 once  dextrose Oral Gel 15 once PRN  doxazosin 4 at bedtime  glucagon  Injectable 1 once  heparin   Injectable 5000 every 8 hours  hydrALAZINE 25 three times a day  insulin glargine Injectable (LANTUS) 16 at bedtime  insulin lispro (ADMELOG) corrective regimen sliding scale  Before meals and at bedtime  metoprolol succinate ER 25 daily  modafinil 100 daily  NIFEdipine IR 30 two times a day  ondansetron Injectable 4 every 4 hours PRN  potassium chloride  20 mEq/100 mL IVPB 20 once  senna 2 at bedtime  sodium bicarbonate 650 two times a day      T(C): , Max: 38.2 (07-15-23 @ 22:30)  T(F): , Max: 100.7 (07-15-23 @ 22:30)  HR: 99 (07-16-23 @ 05:33)  BP: 137/64 (07-16-23 @ 05:33)  BP(mean): --  RR: 18 (07-16-23 @ 05:33)  SpO2: 99% (07-16-23 @ 05:33)  Wt(kg): --    07-15 @ 07:01  -  07-16 @ 07:00  --------------------------------------------------------  IN: 1080 mL / OUT: 675 mL / NET: 405 mL    07-16 @ 07:01  -  07-16 @ 09:01  --------------------------------------------------------  IN: 0 mL / OUT: 0 mL / NET: 0 mL          Review of Systems:  ROS negative except as per HPI      PHYSICAL EXAM:  GENERAL: NAD, lying in bed, cachectic   HEART: Tachycardic, regular rhythm   LUNGS:  Clear to auscultation eleazar  ABD: Soft, nontender, nondistended, +BS  Ext: RLU with BKA, covered with band/gauze.   Nerv:  A&Ox2, left hemiparesis.    Vascular Access: AVF in LUE, with palpable thrill.     LABS:                        8.3    20.24 )-----------( 327      ( 16 Jul 2023 05:30 )             25.7     07-16    138  |  107  |  69<H>  ----------------------------<  68<L>  3.5   |  18<L>  |  7.60<H>    Ca    8.0<L>      16 Jul 2023 05:30  Phos  4.4     07-16  Mg     1.8     07-16          Urinalysis Basic - ( 16 Jul 2023 05:30 )    Color: x / Appearance: x / SG: x / pH: x  Gluc: 68 mg/dL / Ketone: x  / Bili: x / Urobili: x   Blood: x / Protein: x / Nitrite: x   Leuk Esterase: x / RBC: x / WBC x   Sq Epi: x / Non Sq Epi: x / Bacteria: x            RADIOLOGY & ADDITIONAL STUDIES:

## 2023-07-16 NOTE — CONSULT NOTE ADULT - PROVIDER SPECIALTY LIST ADULT
Infectious Disease
MICU
Heme/Onc
SICU
Vascular Surgery
Cardiology
Podiatry
Surgery
Internal Medicine

## 2023-07-16 NOTE — CONSULT NOTE ADULT - CONSULT REASON
CLARKE
comanagement
tachycardia
R foot wound
Gas gangrene
leukocytosis
s/p BKA
Gas gangrene on foot xray
r/o LBO

## 2023-07-16 NOTE — PROGRESS NOTE ADULT - ASSESSMENT
56 yo M w/ PMH of CKD V w/ AVF not currently on HD, DM2, HTN, CVA who presents from NH with starvation ketoacidosis and gas gangrene, pt with no signs or symptoms or uremia, continue to manage CKD V       # stable CKD stage V  Lytes and volume status acceptable   No signs or symptoms or Uremia     Plan:   Encourage PO intake  Discontinue IVF and maintain net even FB   Renally dosed medications based on eGFR <10  Avoid Nephrotoxic drugs, and CT with IV contrast if possible   Will need IV iron, but given active infection will hold off for now   Continue with Sodium bicarb tabs 650 TID   Renal diet   No indications for iHD at this time

## 2023-07-16 NOTE — CONSULT NOTE ADULT - ASSESSMENT
57M with PMH of DM2, CVA, HTN, HLD, and CKD presented with gas producing RLE heel infection now s/p BKA and closure. Course complicated by persistent leukocytosis and fever of unknown origin. General Surgery consulted for evaluation of CT finding of dilated colon. On interview without abdominal pain or complaint, nausea emesis. Passing flatus with bowel movements. Now febrile, non tachycardic and normotensive. Abdomen softly distended, without rebound or guarding. Labs demonstrate WBC of 24k from 20k yesterday, Hgb 8.8 s/p 1U PRBC, Persistent hypokalemia: 2.7 this AM, worsening Cr, normal lactate. CT prelim read dilated large bowel without transition point. Differential includes  vs less likely LBO given reassuring exam and vitals, active bowel function. Persistent hypokalemia is contributer to . Source of leucocytosis not readily apparent: urine, TTE, CT chest negative, small hematoma at operative site not thought to be clinically abscess. Unlikely intraabdominal source however scan performed without IV contrast.     - please obtain AM AXR to eval contrast passage  - f/u CT final read  - Serial exams  - Aggressive repletion, Mg>2, Phos>3, K>4  - Surgery Team 4C will continue to follow. Please page Team 4 with questions/clinical changes. [phone]

## 2023-07-17 ENCOUNTER — TRANSCRIPTION ENCOUNTER (OUTPATIENT)
Age: 58
End: 2023-07-17

## 2023-07-17 DIAGNOSIS — E11.9 TYPE 2 DIABETES MELLITUS WITHOUT COMPLICATIONS: ICD-10-CM

## 2023-07-17 DIAGNOSIS — A41.9 SEPSIS, UNSPECIFIED ORGANISM: ICD-10-CM

## 2023-07-17 LAB
ANION GAP SERPL CALC-SCNC: 15 MMOL/L — SIGNIFICANT CHANGE UP (ref 5–17)
BUN SERPL-MCNC: 66 MG/DL — HIGH (ref 7–23)
CALCIUM SERPL-MCNC: 8.4 MG/DL — SIGNIFICANT CHANGE UP (ref 8.4–10.5)
CHLORIDE SERPL-SCNC: 108 MMOL/L — SIGNIFICANT CHANGE UP (ref 96–108)
CO2 SERPL-SCNC: 17 MMOL/L — LOW (ref 22–31)
CREAT SERPL-MCNC: 7.36 MG/DL — HIGH (ref 0.5–1.3)
CULTURE RESULTS: SIGNIFICANT CHANGE UP
CULTURE RESULTS: SIGNIFICANT CHANGE UP
EGFR: 8 ML/MIN/1.73M2 — LOW
GLUCOSE BLDC GLUCOMTR-MCNC: 116 MG/DL — HIGH (ref 70–99)
GLUCOSE BLDC GLUCOMTR-MCNC: 121 MG/DL — HIGH (ref 70–99)
GLUCOSE BLDC GLUCOMTR-MCNC: 124 MG/DL — HIGH (ref 70–99)
GLUCOSE BLDC GLUCOMTR-MCNC: 227 MG/DL — HIGH (ref 70–99)
GLUCOSE BLDC GLUCOMTR-MCNC: 43 MG/DL — CRITICAL LOW (ref 70–99)
GLUCOSE BLDC GLUCOMTR-MCNC: 43 MG/DL — CRITICAL LOW (ref 70–99)
GLUCOSE BLDC GLUCOMTR-MCNC: 44 MG/DL — CRITICAL LOW (ref 70–99)
GLUCOSE SERPL-MCNC: 41 MG/DL — CRITICAL LOW (ref 70–99)
HCT VFR BLD CALC: 28.9 % — LOW (ref 39–50)
HGB BLD-MCNC: 9.3 G/DL — LOW (ref 13–17)
MAGNESIUM SERPL-MCNC: 1.7 MG/DL — SIGNIFICANT CHANGE UP (ref 1.6–2.6)
MCHC RBC-ENTMCNC: 29.3 PG — SIGNIFICANT CHANGE UP (ref 27–34)
MCHC RBC-ENTMCNC: 32.2 GM/DL — SIGNIFICANT CHANGE UP (ref 32–36)
MCV RBC AUTO: 91.2 FL — SIGNIFICANT CHANGE UP (ref 80–100)
NRBC # BLD: 0 /100 WBCS — SIGNIFICANT CHANGE UP (ref 0–0)
PHOSPHATE SERPL-MCNC: 4.6 MG/DL — HIGH (ref 2.5–4.5)
PLATELET # BLD AUTO: 383 K/UL — SIGNIFICANT CHANGE UP (ref 150–400)
POTASSIUM SERPL-MCNC: 3.3 MMOL/L — LOW (ref 3.5–5.3)
POTASSIUM SERPL-SCNC: 3.3 MMOL/L — LOW (ref 3.5–5.3)
RBC # BLD: 3.17 M/UL — LOW (ref 4.2–5.8)
RBC # FLD: 15.9 % — HIGH (ref 10.3–14.5)
SODIUM SERPL-SCNC: 140 MMOL/L — SIGNIFICANT CHANGE UP (ref 135–145)
SPECIMEN SOURCE: SIGNIFICANT CHANGE UP
SPECIMEN SOURCE: SIGNIFICANT CHANGE UP
SURGICAL PATHOLOGY STUDY: SIGNIFICANT CHANGE UP
WBC # BLD: 16.04 K/UL — HIGH (ref 3.8–10.5)
WBC # FLD AUTO: 16.04 K/UL — HIGH (ref 3.8–10.5)

## 2023-07-17 PROCEDURE — 71045 X-RAY EXAM CHEST 1 VIEW: CPT | Mod: 26

## 2023-07-17 PROCEDURE — 99233 SBSQ HOSP IP/OBS HIGH 50: CPT | Mod: GC

## 2023-07-17 PROCEDURE — 74018 RADEX ABDOMEN 1 VIEW: CPT | Mod: 26

## 2023-07-17 PROCEDURE — 99232 SBSQ HOSP IP/OBS MODERATE 35: CPT

## 2023-07-17 RX ORDER — DEXTROSE 50 % IN WATER 50 %
15 SYRINGE (ML) INTRAVENOUS ONCE
Refills: 0 | Status: COMPLETED | OUTPATIENT
Start: 2023-07-17 | End: 2023-07-17

## 2023-07-17 RX ORDER — MAGNESIUM SULFATE 500 MG/ML
2 VIAL (ML) INJECTION ONCE
Refills: 0 | Status: COMPLETED | OUTPATIENT
Start: 2023-07-17 | End: 2023-07-17

## 2023-07-17 RX ORDER — DEXTROSE 50 % IN WATER 50 %
25 SYRINGE (ML) INTRAVENOUS ONCE
Refills: 0 | Status: COMPLETED | OUTPATIENT
Start: 2023-07-17 | End: 2023-07-17

## 2023-07-17 RX ORDER — POTASSIUM CHLORIDE 20 MEQ
20 PACKET (EA) ORAL ONCE
Refills: 0 | Status: COMPLETED | OUTPATIENT
Start: 2023-07-17 | End: 2023-07-17

## 2023-07-17 RX ORDER — SODIUM BICARBONATE 1 MEQ/ML
1300 SYRINGE (ML) INTRAVENOUS THREE TIMES A DAY
Refills: 0 | Status: DISCONTINUED | OUTPATIENT
Start: 2023-07-17 | End: 2023-07-18

## 2023-07-17 RX ORDER — INSULIN GLARGINE 100 [IU]/ML
8 INJECTION, SOLUTION SUBCUTANEOUS AT BEDTIME
Refills: 0 | Status: DISCONTINUED | OUTPATIENT
Start: 2023-07-17 | End: 2023-07-18

## 2023-07-17 RX ORDER — ACETAMINOPHEN 500 MG
2 TABLET ORAL
Qty: 0 | Refills: 0 | DISCHARGE
Start: 2023-07-17

## 2023-07-17 RX ORDER — POLYETHYLENE GLYCOL 3350 17 G/17G
17 POWDER, FOR SOLUTION ORAL
Refills: 0 | Status: DISCONTINUED | OUTPATIENT
Start: 2023-07-17 | End: 2023-07-17

## 2023-07-17 RX ORDER — POTASSIUM CHLORIDE 20 MEQ
40 PACKET (EA) ORAL ONCE
Refills: 0 | Status: COMPLETED | OUTPATIENT
Start: 2023-07-17 | End: 2023-07-17

## 2023-07-17 RX ADMIN — Medication 40 MILLIEQUIVALENT(S): at 11:21

## 2023-07-17 RX ADMIN — Medication 30 MILLIGRAM(S): at 17:27

## 2023-07-17 RX ADMIN — Medication 25 MILLIGRAM(S): at 21:59

## 2023-07-17 RX ADMIN — POLYETHYLENE GLYCOL 3350 17 GRAM(S): 17 POWDER, FOR SOLUTION ORAL at 11:20

## 2023-07-17 RX ADMIN — MODAFINIL 100 MILLIGRAM(S): 200 TABLET ORAL at 11:30

## 2023-07-17 RX ADMIN — ATORVASTATIN CALCIUM 80 MILLIGRAM(S): 80 TABLET, FILM COATED ORAL at 21:59

## 2023-07-17 RX ADMIN — Medication 1300 MILLIGRAM(S): at 21:59

## 2023-07-17 RX ADMIN — SENNA PLUS 2 TABLET(S): 8.6 TABLET ORAL at 21:59

## 2023-07-17 RX ADMIN — Medication 50 MILLIEQUIVALENT(S): at 09:16

## 2023-07-17 RX ADMIN — HEPARIN SODIUM 5000 UNIT(S): 5000 INJECTION INTRAVENOUS; SUBCUTANEOUS at 21:59

## 2023-07-17 RX ADMIN — CLOPIDOGREL BISULFATE 75 MILLIGRAM(S): 75 TABLET, FILM COATED ORAL at 11:20

## 2023-07-17 RX ADMIN — Medication 81 MILLIGRAM(S): at 11:20

## 2023-07-17 RX ADMIN — INSULIN GLARGINE 8 UNIT(S): 100 INJECTION, SOLUTION SUBCUTANEOUS at 22:12

## 2023-07-17 RX ADMIN — Medication 1300 MILLIGRAM(S): at 15:58

## 2023-07-17 RX ADMIN — Medication 4: at 22:12

## 2023-07-17 RX ADMIN — HEPARIN SODIUM 5000 UNIT(S): 5000 INJECTION INTRAVENOUS; SUBCUTANEOUS at 14:39

## 2023-07-17 RX ADMIN — Medication 25 GRAM(S): at 15:58

## 2023-07-17 RX ADMIN — Medication 25 GRAM(S): at 11:20

## 2023-07-17 RX ADMIN — Medication 25 MILLIGRAM(S): at 14:39

## 2023-07-17 RX ADMIN — Medication 650 MILLIGRAM(S): at 09:07

## 2023-07-17 RX ADMIN — HEPARIN SODIUM 5000 UNIT(S): 5000 INJECTION INTRAVENOUS; SUBCUTANEOUS at 09:08

## 2023-07-17 RX ADMIN — Medication 4 MILLIGRAM(S): at 21:59

## 2023-07-17 RX ADMIN — Medication 25 GRAM(S): at 07:51

## 2023-07-17 RX ADMIN — Medication 15 GRAM(S): at 07:09

## 2023-07-17 NOTE — DISCHARGE NOTE PROVIDER - NSDCCPTREATMENT_GEN_ALL_CORE_FT
PRINCIPAL PROCEDURE  Procedure: Amputation below knee  Findings and Treatment: guillotine and closure

## 2023-07-17 NOTE — DISCHARGE NOTE PROVIDER - NSDCMRMEDTOKEN_GEN_ALL_CORE_FT
acetaminophen 325 mg oral tablet: 2 tab(s) orally every 4 hours As needed Mild Pain (1 - 3)  aspirin 81 mg oral tablet, chewable: 1 tab(s) orally once a day  atorvastatin 80 mg oral tablet: 1 tab(s) orally once a day (at bedtime)  modafinil 100 mg oral tablet: 1 tab(s) orally once a day (in the morning)  Plavix 75 mg oral tablet: 1 tab(s) orally once a day until 11/8/2022  Procardia XL 60 mg oral tablet, extended release: 1 tab(s) orally every 24 hours  tamsulosin 0.4 mg oral capsule: 1 cap(s) orally once a day (at bedtime)  Tradjenta 5 mg oral tablet: 1 orally once a day   acetaminophen 325 mg oral tablet: 2 tab(s) orally every 4 hours As needed Mild Pain (1 - 3)  aspirin 81 mg oral tablet, chewable: 1 tab(s) orally once a day  atorvastatin 80 mg oral tablet: 1 tab(s) orally once a day (at bedtime)  hydrALAZINE 25 mg oral tablet: 1 tab(s) orally 3 times a day  modafinil 100 mg oral tablet: 1 tab(s) orally once a day  Plavix 75 mg oral tablet: 1 tab(s) orally once a day until 11/8/2022  Procardia XL 60 mg oral tablet, extended release: 1 tab(s) orally every 24 hours  sodium bicarbonate 650 mg oral tablet: 2 tab(s) orally 3 times a day  tamsulosin 0.4 mg oral capsule: 1 cap(s) orally once a day (at bedtime)  Tradjenta 5 mg oral tablet: 1 orally once a day   acetaminophen 325 mg oral tablet: 2 tab(s) orally every 4 hours As needed Mild Pain (1 - 3)  aspirin 81 mg oral tablet, chewable: 1 tab(s) orally once a day  atorvastatin 80 mg oral tablet: 1 tab(s) orally once a day (at bedtime)  hydrALAZINE 25 mg oral tablet: 1 tab(s) orally 3 times a day  Lantus 100 units/mL subcutaneous solution: 6 unit(s) subcutaneous once a day (at bedtime)  modafinil 100 mg oral tablet: 1 tab(s) orally once a day  Plavix 75 mg oral tablet: 1 tab(s) orally once a day until 11/8/2022  Procardia XL 60 mg oral tablet, extended release: 1 tab(s) orally every 24 hours  sodium bicarbonate 650 mg oral tablet: 2 tab(s) orally 3 times a day  tamsulosin 0.4 mg oral capsule: 1 cap(s) orally once a day (at bedtime)

## 2023-07-17 NOTE — DISCHARGE NOTE PROVIDER - NSDCFUSCHEDAPPT_GEN_ALL_CORE_FT
Casandra San  [facility]  NEUROLOGY [address]  Scheduled Appointment: 08/28/2023     [facility]  HEARTVASC [address]  Scheduled Appointment: 08/18/2023    Casandra San  [facility]  NEUROLOGY [address]  Scheduled Appointment: 08/28/2023     Caleb Royal  [facility]  VASCULAR [address]  Scheduled Appointment: 08/07/2023    [facility]  HEARTVASC [address]  Scheduled Appointment: 08/18/2023    Casandra San  [facility]  NEUROLOGY [address]  Scheduled Appointment: 08/28/2023

## 2023-07-17 NOTE — PROGRESS NOTE ADULT - SUBJECTIVE AND OBJECTIVE BOX
Patient is a 57y old  Male who presents with a chief complaint of Right foot ulcer  (16 Jul 2023 09:01)      SUBJECTIVE / OVERNIGHT EVENTS:  Hypoglycemic this morning , patient reports no symptoms , he was eating breakfast at the time of my encounter, blood sugars improved to 124. afebrile     MEDICATIONS  (STANDING):  aspirin  chewable 81 milliGRAM(s) Oral daily  atorvastatin 80 milliGRAM(s) Oral at bedtime  clopidogrel Tablet 75 milliGRAM(s) Oral daily  doxazosin 4 milliGRAM(s) Oral at bedtime  glucagon  Injectable 1 milliGRAM(s) IntraMuscular once  heparin   Injectable 5000 Unit(s) SubCutaneous every 8 hours  hydrALAZINE 25 milliGRAM(s) Oral three times a day  insulin glargine Injectable (LANTUS) 8 Unit(s) SubCutaneous at bedtime  insulin lispro (ADMELOG) corrective regimen sliding scale   SubCutaneous Before meals and at bedtime  magnesium sulfate  IVPB 2 Gram(s) IV Intermittent once  metoprolol succinate ER 25 milliGRAM(s) Oral daily  modafinil 100 milliGRAM(s) Oral daily  NIFEdipine IR 30 milliGRAM(s) Oral two times a day  polyethylene glycol 3350 17 Gram(s) Oral two times a day  potassium chloride   Powder 40 milliEquivalent(s) Oral once  senna 2 Tablet(s) Oral at bedtime  sodium bicarbonate 650 milliGRAM(s) Oral two times a day    MEDICATIONS  (PRN):  acetaminophen     Tablet .. 650 milliGRAM(s) Oral every 4 hours PRN Mild Pain (1 - 3)  dextrose Oral Gel 15 Gram(s) Oral once PRN Blood Glucose LESS THAN 70 milliGRAM(s)/deciliter  ondansetron Injectable 4 milliGRAM(s) IV Push every 4 hours PRN Nausea and/or Vomiting        PHYSICAL EXAM:  Vital Signs Last 24 Hrs  T(C): 36.6 (17 Jul 2023 10:25), Max: 37.3 (16 Jul 2023 18:41)  T(F): 97.8 (17 Jul 2023 10:25), Max: 99.1 (16 Jul 2023 18:41)  HR: 102 (17 Jul 2023 08:42) (99 - 106)  BP: 161/84 (17 Jul 2023 08:42) (127/58 - 164/81)  BP(mean): 83 (17 Jul 2023 00:44) (83 - 104)  RR: 17 (17 Jul 2023 08:42) (16 - 18)  SpO2: 100% (17 Jul 2023 08:42) (96% - 100%)    Parameters below as of 17 Jul 2023 08:42  Patient On (Oxygen Delivery Method): room air            GEN: male in NAD, appears comfortable, no diaphoresis  EYES: No scleral injection, PERRL, EOMI  ENTM: neck supple & symmetric without tracheal deviation, moist membranes, no gross hearing impairment, thyroid gland not enlarged  CV: +S1/S2, no m/r/g, no abdominal bruit, no LE edema  RESP: breathing comfortably, no respiratory accessory muscle use, CTAB, no w/r/r  GI:  mildly distended , firm, non tender , bowel sounds present     LABS:                                   9.3    16.04 )-----------( 383      ( 17 Jul 2023 05:30 )             28.9     07-17    140  |  108  |  66<H>  ----------------------------<  41<LL>  3.3<L>   |  17<L>  |  7.36<H>    Ca    8.4      17 Jul 2023 05:30  Phos  4.6     07-17  Mg     1.7     07-17      CAPILLARY BLOOD GLUCOSE      POCT Blood Glucose.: 124 mg/dL (17 Jul 2023 08:12)  POCT Blood Glucose.: 43 mg/dL (17 Jul 2023 07:47)  POCT Blood Glucose.: 43 mg/dL (17 Jul 2023 07:31)  POCT Blood Glucose.: 44 mg/dL (17 Jul 2023 06:44)  POCT Blood Glucose.: 173 mg/dL (16 Jul 2023 21:58)  POCT Blood Glucose.: 105 mg/dL (16 Jul 2023 17:17)  POCT Blood Glucose.: 86 mg/dL (16 Jul 2023 11:13)        COORDINATION OF CARE:  Care Discussed with Consultants/Other Providers  : vascular surgery , ID and Nephrology

## 2023-07-17 NOTE — PROGRESS NOTE ADULT - ASSESSMENT
57M with PMH of DM2, CVA, HTN, HLD, and CKD presented with gas producing RLE heel infection now s/p BKA and closure complicated by persistent leukocytosis and fever of unknown origin. General Surgery consulted for evaluation of CT finding of dilated colon. Final read now dilated cecum to 10cm however no transition point, consistent with colonic pseudoobstruction. Patient tolerating PO, passing gas and having bowel function overnight. VSS. On exam, moderately distended and tympanitic. Labs w/ improved WBC, chemistry notable for K 3.3. AXR reviewed.    - Aggressive repletion, Mg>2, Phos>3, K>4  - Can administer enema today, no contraindication as patient is not obstructed  - No role for disimpaction or decompression as patient is currently having bowel function  - Excellent care per primary  - Surgery Team 4C will sign off at this time, please reconsult with questions or if clinically indicated

## 2023-07-17 NOTE — DISCHARGE NOTE PROVIDER - HOSPITAL COURSE
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 Royal) who presented to [facility] on 7/5 from nursing home for hyperglycemia who was found to have infected likely diabetic toe wound with gas-producing organism. 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 CT revealed gas tracking along achilles sheath and vascular surgery was consulted for surgical evaluation. 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 Patient's stay was complicated by persistent leukocytosis and febrile episodes which required comprehensive fever work-up. Initial w/u with CXR, BCX and UA was negative so patient was pan-scanned per ID and hosp reccs. Pan-scan was unremarkable except for some mild colonic distention for which general surgery/GI are following for disimpaction. Patient's WBC today continues to downtrend to 16 today. Patient is stable and ready for discharge. 57M with PMHx of DM2, CVA x 2 w/ residual L sided weakness, early onset dementia, HTN, HLD, CKD V (creatinine 7, not on HD), BPH and PSHx of L toe amputation and AV fistula creation (5/23 - Dr. Royal) who presented to [facility] on 7/5 from nursing home for hyperglycemia found to have starvation ketoacidosis and R heel ulcer with gas-producing organism. 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 CT revealed gas tracking along achilles sheath and vascular surgery was consulted for surgical evaluation. 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 Post op course notable for persistent leukocytosis for which hematology and ID were following which required comprehensive fever work-up including unremarkable CXR, BCX and UA which prompted pan-scan (CT C/A/P) per ID and hospitalist recommendations. Pan-scan was unremarkable except for some mild colonic distention for which general surgery/GI were following for disimpaction which was not required, constipation resolved with miralax (7/17). Patient's WBC today continues to downtrend to 14, remains afebrile. Inpatient cardiology evaluation recommended hydralazine for BP control and inpatient renal evaluation recommended sodium bicarbonate with plan for outpatient follow up. Patient is stable and ready for discharge. 57M with PMHx of DM2, CVA x 2 w/ residual L sided weakness, early onset dementia, HTN, HLD, CKD V (creatinine 7-8, not on HD), BPH and PSHx of L toe amputation and AV fistula creation (5/23 - Dr. Royal) who presented to [facility] on 7/5 from nursing home for hyperglycemia found to have starvation ketoacidosis and R heel ulcer with gas-producing organism. 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 CT revealed gas tracking along achilles sheath and vascular surgery was consulted for surgical evaluation. 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 Post op monitored in ICU 7/6-7/8 stroke code called for brief episode of being disoriented (back to baseline within 5 minutes) so stroke code was cancelled, however vEEG completed which revealed no seizures, given blood transfusion and transferred to surgical floor with course notable for persistent leukocytosis for which hematology and ID were following which required comprehensive fever work-up including unremarkable CXR, BCX and UA which prompted pan-scan (CT C/A/P) per ID and hospitalist recommendations. Pan-scan was unremarkable except for some mild colonic distention for which general surgery/GI were following for disimpaction which was not required, constipation resolved with miralax (7/17) and tolerating diet. Patient's WBC today continues to downtrend to 14, remains afebrile. Inpatient cardiology evaluation recommended hydralazine for BP control, inpatient renal evaluation recommended sodium bicarbonate with plan for outpatient follow up and inpatient hospitalist recommended discontinuing Tradjenta (contraindicated if EGFR <30) and to continue lantus at [facility] at decreased dose 6U (had hypoglycemia with elevated doses). Patient is stable and ready for discharge.           57M with PMHx of DM2, CVA x 2 w/ residual L sided weakness, early onset dementia, HTN, HLD, CKD V (creatinine 7-8, not on HD), BPH and PSHx of L toe amputation and AV fistula creation (5/23 - Dr. Royal) who presented to [facility] on 7/5 from nursing home for hyperglycemia found to have starvation ketoacidosis and R heel ulcer with gas-producing organism. 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 CT revealed gas tracking along achilles sheath and vascular surgery was consulted for surgical evaluation. 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 Post op monitored in ICU 7/6-7/8 stroke code called for brief episode of being disoriented (back to baseline within 5 minutes) so stroke code was cancelled, however vEEG completed which revealed no seizures, given blood transfusion and transferred to surgical floor with course notable for persistent leukocytosis for which hematology and ID were following which required comprehensive fever work-up including unremarkable CXR, BCX and UA which prompted pan-scan (CT C/A/P) per ID and hospitalist recommendations. Pan-scan was unremarkable except for some mild colonic distention for which general surgery/GI were following for disimpaction which was not required, constipation resolved with miralax (7/17) and tolerating diet. Patient's WBC today continues to downtrend to 14, remains afebrile. Inpatient cardiology evaluation recommended hydralazine for BP control, inpatient renal evaluation recommended sodium bicarbonate with plan for outpatient follow up and inpatient hospitalist recommended discontinuing Tradjenta (contraindicated if EGFR <30) and to continue lantus at [facility] at decreased dose 6U (had hypoglycemia with elevated doses). Patient is mildly hypokalemic and was advised to eat potassium rich food / regular diet, he will follow up with his outptient nephrology. Patient is stable and ready for discharge.

## 2023-07-17 NOTE — DISCHARGE NOTE PROVIDER - PROVIDER TOKENS
PROVIDER:[TOKEN:[[id number]]] PROVIDER:[TOKEN:[[id number]],SCHEDULEDAPPT:[08/07/2023],SCHEDULEDAPPTTIME:[11:30 AM]],PROVIDER:[TOKEN:[[id number]],SCHEDULEDAPPT:[07/20/2023],SCHEDULEDAPPTTIME:[10:30 AM]]

## 2023-07-17 NOTE — PROGRESS NOTE ADULT - SUBJECTIVE AND OBJECTIVE BOX
SUBJECTIVE: Patient seen and examined at bedside. Denies abdominal pain, able to kevin PO without n/v. +F/+BM.      aspirin  chewable 81 milliGRAM(s) Oral daily  clopidogrel Tablet 75 milliGRAM(s) Oral daily  doxazosin 4 milliGRAM(s) Oral at bedtime  heparin   Injectable 5000 Unit(s) SubCutaneous every 8 hours  hydrALAZINE 25 milliGRAM(s) Oral three times a day  metoprolol succinate ER 25 milliGRAM(s) Oral daily  NIFEdipine IR 30 milliGRAM(s) Oral two times a day      Vital Signs Last 24 Hrs  T(C): 36.6 (17 Jul 2023 10:25), Max: 37.3 (16 Jul 2023 18:41)  T(F): 97.8 (17 Jul 2023 10:25), Max: 99.1 (16 Jul 2023 18:41)  HR: 102 (17 Jul 2023 08:42) (99 - 106)  BP: 161/84 (17 Jul 2023 08:42) (127/58 - 164/81)  BP(mean): 83 (17 Jul 2023 00:44) (83 - 104)  RR: 17 (17 Jul 2023 08:42) (16 - 18)  SpO2: 100% (17 Jul 2023 08:42) (96% - 100%)    Parameters below as of 17 Jul 2023 08:42  Patient On (Oxygen Delivery Method): room air      I&O's Detail    16 Jul 2023 07:01  -  17 Jul 2023 07:00  --------------------------------------------------------  IN:    dextrose 5%: 640 mL    IV PiggyBack: 100 mL  Total IN: 740 mL    OUT:    Incontinent per Condom Catheter (mL): 50 mL    Oral Fluid: 0 mL    Voided (mL): 350 mL  Total OUT: 400 mL    Total NET: 340 mL      17 Jul 2023 07:01  -  17 Jul 2023 11:15  --------------------------------------------------------  IN:    Oral Fluid: 100 mL  Total IN: 100 mL    OUT:    Incontinent per Condom Catheter (mL): 0 mL  Total OUT: 0 mL    Total NET: 100 mL          General: NAD, resting comfortably in bed  C/V: NSR  Pulm: Nonlabored breathing, no respiratory distress  Abd: soft, nontender, moderately distended and tympanitic.  Extrem: WWP, no edema, SCDs in place      LABS:                        9.3    16.04 )-----------( 383      ( 17 Jul 2023 05:30 )             28.9     07-17    140  |  108  |  66<H>  ----------------------------<  41<LL>  3.3<L>   |  17<L>  |  7.36<H>    Ca    8.4      17 Jul 2023 05:30  Phos  4.6     07-17  Mg     1.7     07-17        Urinalysis Basic - ( 17 Jul 2023 05:30 )    Color: x / Appearance: x / SG: x / pH: x  Gluc: 41 mg/dL / Ketone: x  / Bili: x / Urobili: x   Blood: x / Protein: x / Nitrite: x   Leuk Esterase: x / RBC: x / WBC x   Sq Epi: x / Non Sq Epi: x / Bacteria: x        RADIOLOGY & ADDITIONAL STUDIES:

## 2023-07-17 NOTE — PROVIDER CONTACT NOTE (HYPOGLYCEMIA EVENT) - NS PROVIDER CONTACT BACKGROUND-HYPO
Age: 57y    Gender: Male    POCT Blood Glucose:  124 mg/dL (07-17-23 @ 08:12)  43 mg/dL (07-17-23 @ 07:47)  43 mg/dL (07-17-23 @ 07:31)  44 mg/dL (07-17-23 @ 06:44)  173 mg/dL (07-16-23 @ 21:58)  105 mg/dL (07-16-23 @ 17:17)  86 mg/dL (07-16-23 @ 11:13)      eMAR:  atorvastatin   80 milliGRAM(s) Oral (07-16-23 @ 22:40)    dextrose 50% Injectable   25 Gram(s) IV Push (07-17-23 @ 07:51)    dextrose Oral Gel   15 Gram(s) Oral (07-17-23 @ 07:09)    insulin glargine Injectable (LANTUS)   16 Unit(s) SubCutaneous (07-16-23 @ 22:38)    insulin lispro (ADMELOG) corrective regimen sliding scale   2 Unit(s) SubCutaneous (07-16-23 @ 22:38)

## 2023-07-17 NOTE — PROGRESS NOTE ADULT - ASSESSMENT
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 Royal) who presented to [facility] on 7/5 from nursing home for hyperglycemia who was found to have infected likely diabetic toe wound with gas-producing organism. CT revealed gas tracking along achilles sheath and vascular surgery was consulted for surgical evaluation. 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 Cardiology consulted for persistent sinus tachycardia likely related to underlying infx (?SSTI) pending further w/u.      Review of Studies  ECG: Sinus Tach w/LVH and LAE  TTE: LV low/normal fx w/EF 50-55, Normal RV    Recommendations:    #Sinus Tachycardia- Euvolemic on exam, etiology of sinus tach likely 2/2 underlying fever and possible occult infx pending further diagnostic eval w/CT chest/abd/pelvis w/IV contrast, less likely PE given pt has been on DVT ppx  -Agree w/ID consult and infectious w/u (appreciated)  -Please replete potassium (3.2)  -Maintain K>4meq/L and Mg>2  -Please begin Toprol 25mg XL     #CVA  -ASA 81mg qd  -Plavix 75mg qd  -atorvastatin 80mg qd    #DM2  -atorvastatin 80mg qd      Pending discussion w/cardiology attending  Pending discussion w/primary team  We will continue to follow, thank you for the consultation      Sy Hinds ([id number])  Cardiovascular Disease Fellow  Consult Pager: [phone]           57M with PMHx of DM2, 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 Royal) who presented to [facility] on 7/5 from nursing home for hyperglycemia who was found to have infected likely diabetic toe wound with gas-producing organism. CT revealed gas tracking along achilles sheath and vascular surgery was consulted for surgical evaluation. 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 Cardiology consulted for persistent sinus tachycardia likely related to underlying infx (?SSTI) pending further w/u.      Recommendations:    #Sinus Tachycardia- Euvolemic on exam, etiology of sinus tach likely 2/2 underlying fever and possible occult infx pending further diagnostic eval w/CT chest/abd/pelvis w/IV contrast, less likely PE given pt has been on DVT ppx  -Agree w/ID consult and infectious w/u (appreciated)  -Maintain K>4meq/L and Mg>2  -Can be d/c on Toprol 25mg XL   -Please have the patient [facility] within 4 [facility]    #CVA  -ASA 81mg qd  -Plavix 75mg qd  -atorvastatin 80mg qd    #DM2  -atorvastatin 80mg qd      Case reviewed w/attending cardiologist  Plan discussed w/primary team  We will continue to follow, thank you for the consultation    Sy Hinds ([id number])  Cardiovascular Disease Fellow  Consult Pager: [phone]           57M with PMHx of DM2, 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 Royal) who presented to [facility] on 7/5 from nursing home for hyperglycemia who was found to have infected likely diabetic toe wound with gas-producing organism. CT revealed gas tracking along achilles sheath and vascular surgery was consulted for surgical evaluation. 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 Cardiology consulted for persistent sinus tachycardia likely related to underlying infx (?SSTI) pending further w/u.      Recommendations:    #Sinus Tachycardia- Euvolemic on exam, etiology of sinus tach likely 2/2 underlying fever and possible occult infx pending further diagnostic eval w/CT chest/abd/pelvis w/IV contrast, less likely PE given pt has been on DVT ppx  -Agree w/ID consult and infectious w/u (appreciated)  -Maintain K>4meq/L and Mg>2  -Can be d/c on Toprol 25mg XL   -Please have the patient followup within 4 weeks [facility]    #CVA  -ASA 81mg qd  -Plavix 75mg qd  -atorvastatin 80mg qd    #DM2  -atorvastatin 80mg qd      Case reviewed w/attending cardiologist  Plan discussed w/primary team  We will be signing off, thank you for the consultation    Sy Hinds ([id number])  Cardiovascular Disease Fellow  Consult Pager: [phone]

## 2023-07-17 NOTE — PROGRESS NOTE ADULT - SUBJECTIVE AND OBJECTIVE BOX
INFECTIOUS DISEASE PROGRESS NOTE    EDEN DACOSTA is a 57y Male patient    Overnight/Interval Events:     ROS:  CONSTITUTIONAL:  Negative fever or chills, feels well, good appetite  EYES:  Negative  blurry vision or double vision  CARDIOVASCULAR:  Negative for chest pain or palpitations  RESPIRATORY:  Negative for cough, wheezing, or SOB   GASTROINTESTINAL:  Negative for nausea, vomiting, diarrhea, constipation, or abdominal pain  GENITOURINARY:  Negative frequency, urgency or dysuria  NEUROLOGIC:  No headache, confusion, dizziness, lightheadedness    Allergies    No Known Allergies    Intolerances        ANTIBIOTICS/RELEVANT:  antimicrobials    immunologic:    OTHER:  acetaminophen     Tablet .. 650 milliGRAM(s) Oral every 4 hours PRN  aspirin  chewable 81 milliGRAM(s) Oral daily  atorvastatin 80 milliGRAM(s) Oral at bedtime  clopidogrel Tablet 75 milliGRAM(s) Oral daily  dextrose 5%. 1000 milliLiter(s) IV Continuous <Continuous>  dextrose 5%. 1000 milliLiter(s) IV Continuous <Continuous>  dextrose 50% Injectable 25 Gram(s) IV Push once  dextrose 50% Injectable 25 Gram(s) IV Push once  dextrose 50% Injectable 12.5 Gram(s) IV Push once  dextrose Oral Gel 15 Gram(s) Oral once PRN  doxazosin 4 milliGRAM(s) Oral at bedtime  glucagon  Injectable 1 milliGRAM(s) IntraMuscular once  heparin   Injectable 5000 Unit(s) SubCutaneous every 8 hours  hydrALAZINE 25 milliGRAM(s) Oral three times a day  insulin glargine Injectable (LANTUS) 16 Unit(s) SubCutaneous at bedtime  insulin lispro (ADMELOG) corrective regimen sliding scale   SubCutaneous Before meals and at bedtime  magnesium sulfate  IVPB 2 Gram(s) IV Intermittent once  metoprolol succinate ER 25 milliGRAM(s) Oral daily  modafinil 100 milliGRAM(s) Oral daily  NIFEdipine IR 30 milliGRAM(s) Oral two times a day  ondansetron Injectable 4 milliGRAM(s) IV Push every 4 hours PRN  potassium chloride  20 mEq/100 mL IVPB 20 milliEquivalent(s) IV Intermittent once  senna 2 Tablet(s) Oral at bedtime  sodium bicarbonate 650 milliGRAM(s) Oral two times a day      Objective:  Vital Signs Last 24 Hrs  T(C): 36.5 (17 Jul 2023 05:56), Max: 37.3 (16 Jul 2023 18:41)  T(F): 97.7 (17 Jul 2023 05:56), Max: 99.1 (16 Jul 2023 18:41)  HR: 102 (17 Jul 2023 08:42) (99 - 106)  BP: 161/84 (17 Jul 2023 08:42) (127/58 - 164/81)  BP(mean): 83 (17 Jul 2023 00:44) (83 - 104)  RR: 17 (17 Jul 2023 08:42) (16 - 18)  SpO2: 100% (17 Jul 2023 08:42) (96% - 100%)    Parameters below as of 17 Jul 2023 08:42  Patient On (Oxygen Delivery Method): room air          PHYSICAL EXAM:  Constitutional: resting comfortably in bed; NAD  HEENT: PERRL, EOMI, no nasal discharge; no oral lesions; no sinus tenderness on percussion  Neck: supple; no JVD or lymphadenopathy  Respiratory: CTA B/L; no W/R/R, no retractions  Cardiac: +S1/S2; RRR; no M/R/G  Gastrointestinal: soft, NT/ND; no rebound or guarding; +BSx4  Extremities: WWP, no clubbing or cyanosis; no peripheral edema  Dermatologic: skin warm, dry and intact; no rashes, wounds, or scars  Neurologic: AAOx3; no focal deficits        LABS:                        9.3    16.04 )-----------( 383      ( 17 Jul 2023 05:30 )             28.9     07-17    140  |  108  |  66<H>  ----------------------------<  41<LL>  3.3<L>   |  17<L>  |  7.36<H>    Ca    8.4      17 Jul 2023 05:30  Phos  4.6     07-17  Mg     1.7     07-17        Urinalysis Basic - ( 17 Jul 2023 05:30 )    Color: x / Appearance: x / SG: x / pH: x  Gluc: 41 mg/dL / Ketone: x  / Bili: x / Urobili: x   Blood: x / Protein: x / Nitrite: x   Leuk Esterase: x / RBC: x / WBC x   Sq Epi: x / Non Sq Epi: x / Bacteria: x          MICROBIOLOGY:          RADIOLOGY & ADDITIONAL STUDIES: INFECTIOUS DISEASE PROGRESS NOTE    EDEN DACOSTA is a 57y Male patient    Overnight/Interval Events: ZULY, afebrile overnight. Patient seen and examined at bedside this morning, no complaints, feels well.    ROS:  CONSTITUTIONAL:  Negative fever or chills, feels well, good appetite  EYES:  Negative  blurry vision or double vision  CARDIOVASCULAR:  Negative for chest pain or palpitations  RESPIRATORY:  Negative for cough, wheezing, or SOB   GASTROINTESTINAL:  Negative for nausea, vomiting, diarrhea, constipation, or abdominal pain  GENITOURINARY:  Negative frequency, urgency or dysuria  NEUROLOGIC:  No headache, confusion, dizziness, lightheadedness    Allergies    No Known Allergies    Intolerances        ANTIBIOTICS/RELEVANT:  antimicrobials    immunologic:    OTHER:  acetaminophen     Tablet .. 650 milliGRAM(s) Oral every 4 hours PRN  aspirin  chewable 81 milliGRAM(s) Oral daily  atorvastatin 80 milliGRAM(s) Oral at bedtime  clopidogrel Tablet 75 milliGRAM(s) Oral daily  dextrose 5%. 1000 milliLiter(s) IV Continuous <Continuous>  dextrose 5%. 1000 milliLiter(s) IV Continuous <Continuous>  dextrose 50% Injectable 25 Gram(s) IV Push once  dextrose 50% Injectable 25 Gram(s) IV Push once  dextrose 50% Injectable 12.5 Gram(s) IV Push once  dextrose Oral Gel 15 Gram(s) Oral once PRN  doxazosin 4 milliGRAM(s) Oral at bedtime  glucagon  Injectable 1 milliGRAM(s) IntraMuscular once  heparin   Injectable 5000 Unit(s) SubCutaneous every 8 hours  hydrALAZINE 25 milliGRAM(s) Oral three times a day  insulin glargine Injectable (LANTUS) 16 Unit(s) SubCutaneous at bedtime  insulin lispro (ADMELOG) corrective regimen sliding scale   SubCutaneous Before meals and at bedtime  magnesium sulfate  IVPB 2 Gram(s) IV Intermittent once  metoprolol succinate ER 25 milliGRAM(s) Oral daily  modafinil 100 milliGRAM(s) Oral daily  NIFEdipine IR 30 milliGRAM(s) Oral two times a day  ondansetron Injectable 4 milliGRAM(s) IV Push every 4 hours PRN  potassium chloride  20 mEq/100 mL IVPB 20 milliEquivalent(s) IV Intermittent once  senna 2 Tablet(s) Oral at bedtime  sodium bicarbonate 650 milliGRAM(s) Oral two times a day      Objective:  Vital Signs Last 24 Hrs  T(C): 36.5 (17 Jul 2023 05:56), Max: 37.3 (16 Jul 2023 18:41)  T(F): 97.7 (17 Jul 2023 05:56), Max: 99.1 (16 Jul 2023 18:41)  HR: 102 (17 Jul 2023 08:42) (99 - 106)  BP: 161/84 (17 Jul 2023 08:42) (127/58 - 164/81)  BP(mean): 83 (17 Jul 2023 00:44) (83 - 104)  RR: 17 (17 Jul 2023 08:42) (16 - 18)  SpO2: 100% (17 Jul 2023 08:42) (96% - 100%)    Parameters below as of 17 Jul 2023 08:42  Patient On (Oxygen Delivery Method): room air          PHYSICAL EXAM:  Constitutional: resting comfortably in bed; NAD  HEENT: PERRL, EOMI, no nasal discharge; no oral lesions; no sinus tenderness on percussion  Neck: supple; no JVD or lymphadenopathy  Respiratory: CTA B/L; no W/R/R, no retractions  Cardiac: +S1/S2; RRR; no M/R/G  Gastrointestinal: soft, NT/ND; no rebound or guarding; +BSx4  Extremities: WWP, no clubbing or cyanosis; s/p R BKA; dressing c/d/i without evidence of drainage; blister noted at bandage site  Dermatologic: skin warm, dry and intact; no rashes, wounds, or scars  Neurologic: AAOx2-3; no focal deficits          LABS:                        9.3    16.04 )-----------( 383      ( 17 Jul 2023 05:30 )             28.9     07-17    140  |  108  |  66<H>  ----------------------------<  41<LL>  3.3<L>   |  17<L>  |  7.36<H>    Ca    8.4      17 Jul 2023 05:30  Phos  4.6     07-17  Mg     1.7     07-17        Urinalysis Basic - ( 17 Jul 2023 05:30 )    Color: x / Appearance: x / SG: x / pH: x  Gluc: 41 mg/dL / Ketone: x  / Bili: x / Urobili: x   Blood: x / Protein: x / Nitrite: x   Leuk Esterase: x / RBC: x / WBC x   Sq Epi: x / Non Sq Epi: x / Bacteria: x          MICROBIOLOGY:          RADIOLOGY & ADDITIONAL STUDIES:

## 2023-07-17 NOTE — DISCHARGE NOTE PROVIDER - DISCHARGE DIET
-Keep your wound dressing clean, dry, and intact.    -Remove your compression wrap if you have severe pain, severe swelling, numbness, color change, or temperature change in your toes. If you need to remove your compression wrap, do so by unrolling it. Do not cut the compression wrap off to prevent cutting yourself on accident.    -Should you experience any significant changes in your wound(s), such as infection (redness, swelling, localized heat, increased pain, fever > 101 F, chills) or have any questions regarding your home care instructions, please contact the wound center at [phone]. If after hours, contact your primary care physician or go to the hospital emergency room.      Pureed Diet Regular Diet - No restrictions/Pureed Diet

## 2023-07-17 NOTE — CHART NOTE - NSCHARTNOTEFT_GEN_A_CORE
Admitting Diagnosis:   Patient is a 57y old  Male who presents with a chief complaint of DKA (2023 13:43)      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, traumatic, left, sequela,      Current Nutrition Order:  Puree Diet, CONSCHO/DASH TLC      PO Intake: Good (%) [  ]  Fair (50-75%) [  x-50% ] Poor (<25%) [   ]    GI Issues:   Pt denies N/V at this time, could not comment on last BM   Senna at bedtime, Zofran ordered   CDiff (-) 7/15   LBM per flow sheets , noted plan to administer enema today    Pain: none per flow sheets     Skin Integrity:  Adolfo 11  1+BL foot Edema   R heel ulcer- does not state if pressure related  Sacrum pressure ulcer, Stage II  L heel Suspected deep tissue injury        Labs:       140  |  108  |  66<H>  ----------------------------<  41<LL>  3.3<L>   |  17<L>  |  7.36<H>    Ca    8.4      2023 05:30  Phos  4.6       Mg     1.7           CAPILLARY BLOOD GLUCOSE      POCT Blood Glucose.: 116 mg/dL (2023 12:23)  POCT Blood Glucose.: 124 mg/dL (2023 08:12)  POCT Blood Glucose.: 43 mg/dL (2023 07:47)  POCT Blood Glucose.: 43 mg/dL (2023 07:31)  POCT Blood Glucose.: 44 mg/dL (2023 06:44)  POCT Blood Glucose.: 173 mg/dL (2023 21:58)  POCT Blood Glucose.: 105 mg/dL (2023 17:17)      Medications:  MEDICATIONS  (STANDING):  aspirin  chewable 81 milliGRAM(s) Oral daily  atorvastatin 80 milliGRAM(s) Oral at bedtime  clopidogrel Tablet 75 milliGRAM(s) Oral daily  dextrose 5%. 1000 milliLiter(s) (50 mL/Hr) IV Continuous <Continuous>  dextrose 5%. 1000 milliLiter(s) (100 mL/Hr) IV Continuous <Continuous>  dextrose 50% Injectable 12.5 Gram(s) IV Push once  dextrose 50% Injectable 25 Gram(s) IV Push once  dextrose 50% Injectable 25 Gram(s) IV Push once  doxazosin 4 milliGRAM(s) Oral at bedtime  glucagon  Injectable 1 milliGRAM(s) IntraMuscular once  heparin   Injectable 5000 Unit(s) SubCutaneous every 8 hours  hydrALAZINE 25 milliGRAM(s) Oral three times a day  insulin glargine Injectable (LANTUS) 8 Unit(s) SubCutaneous at bedtime  insulin lispro (ADMELOG) corrective regimen sliding scale   SubCutaneous Before meals and at bedtime  metoprolol succinate ER 25 milliGRAM(s) Oral daily  modafinil 100 milliGRAM(s) Oral daily  NIFEdipine IR 30 milliGRAM(s) Oral two times a day  senna 2 Tablet(s) Oral at bedtime  sodium bicarbonate 650 milliGRAM(s) Oral two times a day    MEDICATIONS  (PRN):  acetaminophen     Tablet .. 650 milliGRAM(s) Oral every 4 hours PRN Mild Pain (1 - 3)  dextrose Oral Gel 15 Gram(s) Oral once PRN Blood Glucose LESS THAN 70 milliGRAM(s)/deciliter  ondansetron Injectable 4 milliGRAM(s) IV Push every 4 hours PRN Nausea and/or Vomiting            5'10''  pounds +-10%  Wt 192 pounds BMI 27.7 %SCH393.6  ABW s/p BKA: 156 pounds     Weight Change:    145 pounds   147 pounds    148.3 pounds   RD obtained bedscale wt today  154 pounds    **PCM  -- Unable to DX at this time. However can assume at risk to some extent.  -- Unclear hx d/t mentation. During RD initial visit pt Reported he gained "a few pounds" recently because he started "eating more often at a seafood place." Unable to quantify wt gain. Today during RD visit pt reports he ha been loing wt d/t "not eating the right foods." Again unable to  Unable to quantify wt changes exactly.  -- Pt also unclear on %PO PTA: reports both good / poor PO.  -- Pt is noted with Noted moderate wasting to clavicles and mild to temples.     Estimated energy needs:   ABW S/p BKA for EER D/t Varying wts  Adjust for age, pressure ulcer, Renal-not yet on HD, Risk for Malnutrition, S/p OR  30-35kcal/kg 2100-2485kcal/day  1.2-1.4gm/k-99gm prot/day   **Rec Lower end needs   **Fluids per team     Subjective: 57M with PMH of DM2, CVA x 2 (one ischemic one embolic, residual left side weakness), HTN, HLD, and CKD V (not on HD yet) and BPH admitted to the MICU for DKA as well as acute on chronic renal failure. Pt found to have infected likely diabetic toe wound with gas-producing organism. Now is S/p Amputation below knee . Pt initially ready for downgrade from MICU , however at ~9pm stroke code was called when pt was not behaving normally, not responding and not oriented, but came back to baseline within 5 minutes so stroke code cancelled. vEEG showed no epileptiform activity. On , AM hgb dropped, now s/p 1u pRBC. Anion gap now resolved. Pt is stable for discharge from ICU to [facility]. Renal cont to follow however No indications for acute HD at this point. Completion BKA performed , complicated by persistent leukocytosis and fever of unknown origin. General Surgery consulted for evaluation of CT finding of dilated colon. Final read now dilated cecum to 10cm however no transition point, consistent with colonic pseudoobstruction; Surgery Team 4C will sign off at this time. CT RLE (w/o contrast) 7/15 showing post-op changes and possible hematoma. COVID/RVP (-) .     Visited pt at bedside this AM on 5UR. On assessment pt is awake, disoriented at times. Able to answer limited RD questions today. S/p Swallow Eval 7/10 with recs for minced and moist solids/thin liquids, VFSS/MBS. Pt now ordered for Puree Diet, CONSCHO/DASH TLC. Reports consuming 50% of meals at this time- RN reports pt has been tolerating meals at present. During time of last RD visit, Pt asking for PO supplements/reports use PTA (ensure). Had been ordered however appears order to have fallen off s/p use of NPO, does not appear to have been ordered when NPO d/c.   Labs: POCT this AM 43 43 44 (+Night time lantus ordered, decreased on ), K 3.3, Phos 4.6, BUN/Cr 66/7.36, Na WDL (low prior), Mg WDL.   Please view full recs below-d/w team.     Prior PES: Increased Nutrient Needs related to hypermetabolic state As evidenced by risk for malnutrition, s/p OR  >>ongoing   GOAL: Consistently meets > 75% EER    Recommendations:  1. Recommend Regular diet, Nepro x1-2/day (Pending %PO) for 425kcal/20gm prot per 1 can, PO cons per SLP Recs.   - If PO intake Is good/BG/POCT are elevated: add Consistent Carbohydrate to diet.  2. CLOSELY Monitor PO%. CLOSELY Monitor diet tolerance.  - Should pt be noted with s/s of issues swallowing, recommend NPO/SLP.   3. Pain/GI per team. Monitor Skin, Wt, GOC.   4. Neprovite.   5. Labs: monitor BMP, CBC, glucose, lytes - Replete PRN, trend renal indices, LFTs, POCT.  - Consider need for phosphorus binder.   6. RD to remain available for additional nutrition interventions as needed.     Education: NA @ this time.     Risk Level: High [  X ] Moderate [   ] Low [   ].

## 2023-07-17 NOTE — PROGRESS NOTE ADULT - TIME BILLING
Evaluation for neutrophilia
Continued improvement in leukocytosis off antibiotics. Large stool burden resulting in ?stercoral colitis may have been driving WBC elevation--defer to vascular surgery/hospitalist matheus to address.     Please reconsult with ?
Febrile this AM for the first time this admission, a few hours after stopping Zosyn. Continues to deny any localizing symptoms; no obvious infectious source on exam. Neutrophilia continues to worsen. Bcx 7/12 ngtd. While he was not bacteremic on presentation, reasonable to pursue w/u to assess for occult/metastatic infection i/s/o recent R foot gas gangrene. CT C/A/P/LE (ideally with IV contrast) suggested to primary team; bcx negative and no audible murmur, however can pursue TTE to assess for culture-negative endocarditis. CRP improved but still significantly elevated. Would repeat bcx with recurrent fever. For now, pending above w/u and without obvious source favor continued present observation off antibiotics (neutrophilia progressed while he was receiving vancomycin and Zosyn).    Dr. Adams to cover this evening thru Sunday    ID Team 1
Management of gas gangrene
s/p R foot amputation 7/6 with presumed source control. ?pending more proximal amputation--to clarify with primary/surgery team. Additional clarification pending re: OR tissue cultures from R foot 7/6--if these are from the now amputated foot, then are likely irrelevant however if from proximal margin then would reflect residual infection. While awaiting clarification, advise continue Zosyn 4.5g IV q12h, redose vancomycin 1.25g X 1 today and repeat trough 23 hours later, d/c clindamycin.

## 2023-07-17 NOTE — PROGRESS NOTE ADULT - SUBJECTIVE AND OBJECTIVE BOX
{\rtf1\vvqtjm02729\ansi\doisxxv5300\ftnbj\uc1\deff0  {\fonttbl{\f0 \fnil Segoe UI;}{\f1 \fnil \fcharset0 Segoe UI;}{\f2 \fnil Times New Murtaza;}}  {\colortbl ;\xjh592\jueam946\edjo957 ;\red0\green0\blue0 ;\red0\green0\ezqa694 ;\red0\green0\blue0 ;}  {\stylesheet{\f0\fs20 Normal;}{\cs1 Default Paragraph Font;}{\cs2\f0\fs16 Line Number;}{\cs3\f2\fs24\ul\cf3 Hyperlink;}}  {\*\revtbl{Unknown;}}  \gmoqbk55116\kelcfq20684\lyiiw4377\dhniv3835\rcgih5919\dhmnr1666\crqdfff780\qwysnud100\nogrowautofit\giwkyf721\formshade\nofeaturethrottle1\dntblnsbdb\fet4\aendnotes\aftnnrlc\pgbrdrhead\pgbrdrfoot  \sectd\ugedtc85151\regonh35501\guttersxn0\ooczvtyl4865\wqnyctvz1721\ahthpqnr8124\nsseevkf4724\oniwcdy068\hlnjlih987\sbkpage\pgncont\pgndec  \plain\plain\f0\fs24\ql\plain\f0\fs24\plain\f0\fs20\mxze2336\hich\f0\dbch\f0\loch\f0\fs20 O/N:ZULY, VSS\par  \par  \par  \par  \par  \par  \par  \par  \par  \par  \par  \par  \par  \par  \par  \plain\f1\fs16\snaq6361\hich\f1\dbch\f1\loch\f1\cf2\fs16 A/P: 57M with PMHx of DM2, 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 Royal) who presented to   [facility] on 7/5 from nursing home for hyperglycemia who was found to have infected likely diabetic toe wound with gas-producing organism. On presentation patient was tachycardic, febrile with leukocytosis c/f sepsis likely secondary to gas gangrene of R LE   without osteomyelitis. CT revealed gas tracking along achilles sheath and vascular surgery was consulted for surgical evaluation. 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) and completion R BKA 7/11.\par  \par  \par  Vascular/PAD:\par  - L foot necrotizing fasciitis s/p guillotine R BKA (7/8) and completion R BKA 7/11\par  - continue Asa and Plavix\par  - pain control\par  \par  Fever/Leukocytosis:\par  -Appreciate cards, heme/onc, and ID recs\par  -f/u general surgery recs\par  -AXR with dilated loops of bowel, f/u rpt 7/16 am\par  -f/u CT abd/pelvis 7/15 final read\par  -f/u CT chest 7/15 final read\par  -c.diff and GI PCR 7/15 negative\par  -COVID and RVP panel 7/16 negative\par  \par  LBO vs Ileus\par  -NPO + IVF\par  -replete electrolytes, discontinue opioids\par  -repeat abd xray 7/16 am\par  -f/u CT abd/pelvis 7/15 final read\par  -appreciate gen surg recs\par  \par  Neuro\par  - A&Ox2 at baseline, possible early onset dementia\par  - Hx of CVA with residual L sided weakness \par  - CTH 7/15: no acute pathology, mild parenchymal volume loss and mild chronic microvascular ischemic changes\par  \par  HTN/HLD:\par  - continue hydralazine and nifedipine IR\par  - continue home atorvastatin\par  - Most recent echo 10/22 - LVH present with EF 50% with mild TR\par  \par  CAD/CHF: \par  - continue home ASA and Plavix\par  \par  DM:\par  - Appreciate medicine recs for DM management\par  - Pureed DM diet when ok to adv diet\par  - mISS + monitor FSG\par  - Consider resuming home Lantus 20 U nightly, currently held\par  \par  CKD stage V: \par  - baseline Cr 7-8\par  - Appreciate renal recs\par  - Strict I&Os\par  - Avoid Nephrotoxic drugs\par  \par  Hypernatremia\par  - appreciate renal recs\par  - f/u daily BMP\par  \par  BPH\par  -continue doxazosin (home flomax held bc patient having difficulty swallowing pills)\par  \par  Anemia: \par  - Trend daily labs\par  \par  ID:\par  - Appreciate ID recs\par  - Monitor off antibiotics\par  - OR Cx growing Morganella, E. coli, Proteus, Strep. MRSA negative\par  - s/p Clindamycin (7/6 - 7/7), Vanc (7/6 - 7/8, resumed 7/11-7/13), Zosyn (7/6 -7/13)\par  \par  Diet: NPO w/ IVF\par  \par  Activity: OOB as tolerated\par  \par  DVTPPx: SQH/SCDs\par  \par  Dispo: PT/OT eval recommended MIKHAIL\par  \par  \plain\f1\fs16\rjzw3893\hich\f1\dbch\f1\loch\f1\cf2\fs16\strike\plain\f1\fs16\oddz7064\hich\f1\dbch\f1\loch\f1\cf2\fs16\plain\f0\fs20\exwd8671\hich\f0\dbch\f0\loch\f0\fs20\par  }   O/N:ZULY, VSS  Subjective:  Patient comfortable at bedside. Dressing changed today.    ROS:   Denies Headache, blurred vision, Chest Pain, SOB, Abdominal pain, nausea or vomiting     Social   aspirin  chewable 81  clopidogrel Tablet 75  doxazosin 4  heparin   Injectable 5000  hydrALAZINE 25  metoprolol succinate ER 25  NIFEdipine IR 30      Allergies    No Known Allergies    Intolerances        Vital Signs Last 24 Hrs  T(C): 36.5 (17 Jul 2023 05:56), Max: 37.3 (16 Jul 2023 18:41)  T(F): 97.7 (17 Jul 2023 05:56), Max: 99.1 (16 Jul 2023 18:41)  HR: 99 (17 Jul 2023 00:44) (94 - 106)  BP: 127/58 (17 Jul 2023 00:44) (127/58 - 164/81)  BP(mean): 83 (17 Jul 2023 00:44) (83 - 104)  RR: 16 (17 Jul 2023 00:44) (16 - 18)  SpO2: 98% (17 Jul 2023 00:44) (96% - 98%)    Parameters below as of 17 Jul 2023 00:44  Patient On (Oxygen Delivery Method): room air      I&O's Summary    15 Jul 2023 07:01  -  16 Jul 2023 07:00  --------------------------------------------------------  IN: 1080 mL / OUT: 675 mL / NET: 405 mL    16 Jul 2023 07:01  -  17 Jul 2023 06:54  --------------------------------------------------------  IN: 740 mL / OUT: 400 mL / NET: 340 mL      Physical Exam:  General: NAD  Pulmonary: no respiratory distress  Cardiovascular: tachy  Abdominal: soft, nontender, distended  Extremities: s/p R BKA, dressing c/d/i      LABS:                        8.3    20.24 )-----------( 327      ( 16 Jul 2023 05:30 )             25.7     07-16    138  |  107  |  69<H>  ----------------------------<  68<L>  3.5   |  18<L>  |  7.60<H>    Ca    8.0<L>      16 Jul 2023 05:30  Phos  4.4     07-16  Mg     1.8     07-16      A/P: 57M with PMHx of DM2, 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 Royal) who presented to [facility] on 7/5 from nursing home for hyperglycemia who was found to have infected likely diabetic toe wound with gas-producing organism. 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 CT revealed gas tracking along achilles sheath and vascular surgery was consulted for surgical evaluation. 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      Vascular/PAD:  - L foot necrotizing fasciitis s/p guillotine R BKA (7/8) and completion R BKA 7/11  - continue Asa and Plavix  - pain control    Fever/Leukocytosis:  -Appreciate cards, heme/onc, and ID recs  -f/u general surgery recs  -c.diff and GI PCR 7/15 negative  -COVID and RVP panel 7/16 negative  - F/U WBC this AM    LBO vs Ileus  -NPO + IVF  -replete electrolytes, discontinue opioids  -repeat abd xray 7/16 am  -f/u CT abd/pelvis 7/15 final read  -appreciate gen surg recs for disimpaction    Neuro  - A&Ox2 at baseline, possible early onset dementia  - Hx of CVA with residual L sided weakness   - [facility] 7/15: no acute pathology, mild parenchymal volume loss and mild chronic microvascular ischemic changes    HTN/HLD:  - continue hydralazine and nifedipine IR  - continue home atorvastatin  - Most recent echo 10/22 - LVH present with EF 50% with mild TR    CAD/CHF:   - continue home ASA and Plavix    DM:  - Appreciate medicine recs for DM management  - Pureed DM diet when ok to adv diet  - mISS + monitor FSG  - Consider resuming home Lantus 20 U nightly, currently held    CKD stage V:   - baseline Cr 7-8  - Appreciate renal recs  - Strict I&Os  - Avoid Nephrotoxic drugs    Hypernatremia  - appreciate renal recs  - f/u daily BMP    BPH  -continue doxazosin (home flomax held bc patient having difficulty swallowing pills)    Anemia:   - Trend daily labs    ID:  - Appreciate ID recs  - Monitor off antibiotics  - OR Cx growing Morganella, E. coli, Proteus, Strep. MRSA negative  - s/p Clindamycin (7/6 - 7/7), Vanc (7/6 - 7/8, resumed 7/11-7/13), Zosyn (7/6 -7/13)    Diet: NPO w/ IVF    Activity: OOB as tolerated    DVTPPx: SQH/SCDs    Dispo: PT/OT eval recommended MIKHAIL

## 2023-07-17 NOTE — PROGRESS NOTE ADULT - ASSESSMENT
58 yo male with CKD V, DM, p/w DKA 2/2 R foot gas gangrene s/p R foot amputation 7/6 with presumed source control, s/p R BKA closure 7/11. ID reconsulted for leukocytosis, specifically neutrophilia. No infectious symptoms or signs on exam (RLE stump ?crepitus and small R knee effusion likely postsurgical). ?Reactive leukocytosis. Repeat .8 on 7/13 (was 176 on 7/6). Bcx also negative on admission so less likely he would have endocarditis or occult metastatic infection. Advised d/c vancomycin and Zosyn (continued since admission 7/5) and observe off antibiotics (current WBC elevation occurred on antibiotics). CT C/A/P without obvious source of infection. TTE unremarkable.     Recommendations:  1. No indication for antibiotics at this time.  2. Elevated white count likely iso large stool burden as noted on CT; consider manual disimpaction and aggressive bowel regimen.    ID Team 1 will sign off.

## 2023-07-17 NOTE — DISCHARGE NOTE PROVIDER - NSDCFUADDINST_GEN_ALL_CORE_FT
FOLLOW UP: Dr. Royal in 1 week. Your appointment has been made for _______. Call the office at  with any questions  Follow up with Cardiology within 4 weeks please call office at [phone] to schedule an appointment   Follow up with Nephrology (Renal) Dr Garcia this week, please call office at [phone] to confirm follow up appointment details     WOUND CARE: You may shower; soap and water over incision sites. Do not scrub. Pat dry when done.   ACTIVITY: Ambulate as tolerated, but no heavy lifting (>10lbs) or strenuous exercise. Call the office if you experience increasing pain, redness, swelling or drainage from incision sites/wounds, or temperature >101.4F.   NEW MEDICATIONS: sodium bicarbonate, hydralazine   ADDITIONAL FOLLOW UP AFTER DISCHARGE: nephrology, cardiology   DISCHARGE DESTINATION: [facility]    FOLLOW UP: Dr. Royal in 1 week. Your appointment has been made for _______. Call the office at  with any questions  Follow up with Cardiology within 4 weeks please call office at [phone] to schedule an appointment   Follow up with Nephrology (Renal) Dr Garcia this week, please call office at [phone] to confirm follow up appointment details     WOUND CARE: Clean surgical incision with Chlorhexidine, kerlex daily and ace wrap.   ACTIVITY: Ambulate as tolerated, but no heavy lifting (>10lbs) or strenuous exercise. Call the office if you experience increasing pain, redness, swelling or drainage from incision sites/wounds, or temperature >101.4F.   NEW MEDICATIONS: sodium bicarbonate, hydralazine, lantus   ADDITIONAL FOLLOW UP AFTER DISCHARGE: nephrology, cardiology   DISCHARGE DESTINATION: [facility]    FOLLOW UP: Dr. Royal in 1 week. Your appointment has been made for 8/7/2023 at 11:30AM. Call the office at  with any questions  Follow up with Cardiology within 4 weeks please call office at [phone] to schedule an appointment   Follow up with Nephrology (Renal) Dr Garcia this week, please call office at [phone] to confirm follow up appointment details     WOUND CARE: Clean surgical incision with Chlorhexidine, kerlex daily and ace wrap.   ACTIVITY: Ambulate as tolerated, but no heavy lifting (>10lbs) or strenuous exercise. Call the office if you experience increasing pain, redness, swelling or drainage from incision sites/wounds, or temperature >101.4F.   NEW MEDICATIONS: sodium bicarbonate, hydralazine, lantus   ADDITIONAL FOLLOW UP AFTER DISCHARGE: nephrology, cardiology   DISCHARGE DESTINATION: [facility]    FOLLOW UP: Dr. Royal in 1 week. Your appointment has been made for 8/7/2023 at 11:30AM. Call the office at  with any questions  Follow up with Cardiology within 4 weeks please call office at [phone] to schedule an appointment   Follow up with Nephrology (Renal) Dr Garcia this week on 7/20/23 at 10:30 AM, please call office at [phone] with any questions.     WOUND CARE: Clean surgical incision with Chlorhexidine, kerlex daily and ace wrap.   ACTIVITY: Ambulate as tolerated, but no heavy lifting (>10lbs) or strenuous exercise. Call the office if you experience increasing pain, redness, swelling or drainage from incision sites/wounds, or temperature >101.4F.   NEW MEDICATIONS: sodium bicarbonate, hydralazine, lantus   ADDITIONAL FOLLOW UP AFTER DISCHARGE: nephrology, cardiology   DISCHARGE DESTINATION: [facility]

## 2023-07-17 NOTE — PROGRESS NOTE ADULT - ASSESSMENT
57M with PMHx of T2DM, CVA (with residual LUE weakness), dementia, HTN, and CKDV (AV fistula created on 5/23) presented form NH on 7/5 for hyperglycemia and found to have likely right foot infection with gas seen on CT which was tracking along achilles sheath. Patient s/p right BKA (7/8) with completion on 7/11. Hospital course complicated by persistent leukocytosis and low grade fever    # Right foot necrotizing fasciitis s/p guillotine R BKA (7/8) and completion R BKA 7/11  - Continue ASA and Plavix for PAD  - pain control per primary team   - CT RLE (w/o contrast) 7/15 showing post-op changes and possible hematoma    # Leukocytosis    - No clear source identified at this time  - Heme consult appreciated: toxic granulations noted on smear (indicating infectious/inflammatory process). In addition, seems unlikely to have a hyperproliferative process develop while here  - Afebrile for past 48 hours , WBC improving while off antibiotics   - UA & CXR w/o signs of infection  - CT Chest w/o PNA and CT A&P showing ileus  - ID consulted and following  -  Vascular Surgery has low suspicion for stump infection  - C. Diff and GI PCR neg  - Viral hep Panel and HIV negative   - Low concern for malignancy or rheumatological condition as the etiology of fever and leukocytosis    # Ileus vs. LBO  - CT & XR showing dilate bowel Loops   - Patient denies n/v  - Plan per surgery  - Senna + miralax     # Vascular Dementia  # Hx of CVA with residual L sided weakness   - Continue ASA and statin     #HTN/HLD  - Continue hydralazine, nifedipine and atorvastatin    #CAD/CHF:   - continue home ASA and Plavix  - Echo done on 10/22 - LVH present with EF 50% with mild TR    #Tachycardia  -  Pt denied having chest pain or palpation possible secondary to infection as pt w/ worsening WBC to 24 thousand   - Cardio consult appreciated: sinus and likely due to underlying issue    # Insulin Dependent Diabetes Mellitus   - Pureed DM diet  - mISS + monitor FSG  - was hypoglycemic 0n 7/17 , Decrease Lantus to 8 units at bedtime and continue to monitor     # CKD stage V  -Pt w/  baseline Cr 7-8  - Avoid Nephrotoxic agents   - Renal following who recommended starting NaHCO3 tabs    # BPH  - continue doxazosin (home flomax held bc patient having difficulty swallowing pills)    #Tremors  - CT head without acute pathology  - EEG w/ generalized slowing  - Likely metabolic, don't think further inpatient work up will be fruitful

## 2023-07-17 NOTE — DISCHARGE NOTE PROVIDER - CARE PROVIDER_API CALL
Caleb Royal  Vascular Surgery  [address]  [place] [address]  Phone: [phone]  Fax: [phone]  Follow Up Time:    Caleb Royal  Vascular Surgery  [address]  [place] [address]  Phone: [phone]  Fax: [phone]  Scheduled Appointment: 08/07/2023 11:30 AM    Van Garcia  Nephrology  [address]  [place] [address]  Phone: [phone]  Fax: [phone]  Scheduled Appointment: 07/20/2023 10:30 AM

## 2023-07-17 NOTE — DISCHARGE NOTE PROVIDER - NSDCACTIVITY_GEN_ALL_CORE
No restrictions/Follow Instructions Provided by your Surgical Team No heavy lifting/straining/Follow Instructions Provided by your Surgical Team

## 2023-07-17 NOTE — PROGRESS NOTE ADULT - SUBJECTIVE AND OBJECTIVE BOX
**Incomplete Note**    Cardiology Consult    O/N:  Interval History/HPI: Pt seen and examined at bedside, NAD, denies chest pain/discomfort/pressure. ROS unremarkable   Telemetry:      Review of Systems:  CONSTITUTIONAL:  No weight loss, fever, chills, weakness or fatigue.  HEENT:  Eyes:  No visual loss, blurred vision, double vision or yellow sclerae. Ears, Nose, Throat:  No hearing loss, sneezing, congestion, runny nose or sore throat.  SKIN:  No rash or itching.  CARDIOVASCULAR:  No chest pain, chest pressure or chest discomfort. No palpitations or edema.  RESPIRATORY:  No shortness of breath, cough or sputum.  GASTROINTESTINAL:  No anorexia, nausea, vomiting or diarrhea. No abdominal pain or blood.  GENITOURINARY: No Burning on urination.   NEUROLOGICAL:  see HPI  MUSCULOSKELETAL:  No muscle, back pain, joint pain or stiffness.  HEMATOLOGIC:  No anemia, bleeding or bruising.  LYMPHATICS:  No enlarged nodes. No history of splenectomy.  PSYCHIATRIC:  No history of depression or anxiety.  ENDOCRINOLOGIC:  No reports of sweating, cold or heat intolerance. No polyuria or polydipsia.  ALLERGIES:  No history of asthma, hives, eczema or rhinitis.    OBJECTIVE  T(C): 36.5 (07-17-23 @ 05:56), Max: 37.3 (07-16-23 @ 18:41)  HR: 102 (07-17-23 @ 08:42) (99 - 106)  BP: 161/84 (07-17-23 @ 08:42) (127/58 - 164/81)  RR: 17 (07-17-23 @ 08:42) (16 - 18)  SpO2: 100% (07-17-23 @ 08:42) (96% - 100%)    07-16-23 @ 07:01  -  07-17-23 @ 07:00  --------------------------------------------------------  IN: 740 mL / OUT: 400 mL / NET: 340 mL    07-17-23 @ 07:01  -  07-17-23 @ 09:35  --------------------------------------------------------  IN: 0 mL / OUT: 0 mL / NET: 0 mL        PHYSICAL EXAM:    Constitutional: resting comfortably in bed; NAD  HEENT: NC/AT, PERRL, EOMI, anicteric sclera, no nasal discharge; uvula midline, no oropharyngeal erythema or exudates; MMM  Neck: supple; no thyromegaly, JVP ? cm H20, JVD +/-  Respiratory: CTA B/L; no W/R/R, no retractions  Cardiac: +S1/S2; RRR; no M/R/G; PMI non-displaced  Gastrointestinal: soft, NT/ND; no rebound or guarding; +BSx4  Extremities: WWP, no clubbing or cyanosis; no peripheral edema  Musculoskeletal: NROM x4; no joint swelling, tenderness or erythema  Vascular: 2+ radial, DP/PT pulses B/L  Dermatologic: skin warm, dry and intact; no rashes, wounds, or scars  Lymphatic: no submandibular or cervical LAD  Neurologic: AAOx3; CNII-XII grossly intact; no focal deficits    LABS:                        9.3    16.04 )-----------( 383      ( 17 Jul 2023 05:30 )             28.9     07-17    140  |  108  |  66<H>  ----------------------------<  41<LL>  3.3<L>   |  17<L>  |  7.36<H>    Ca    8.4      17 Jul 2023 05:30  Phos  4.6     07-17  Mg     1.7     07-17        Urinalysis Basic - ( 17 Jul 2023 05:30 )    Color: x / Appearance: x / SG: x / pH: x  Gluc: 41 mg/dL / Ketone: x  / Bili: x / Urobili: x   Blood: x / Protein: x / Nitrite: x   Leuk Esterase: x / RBC: x / WBC x   Sq Epi: x / Non Sq Epi: x / Bacteria: x        RADIOLOGY & ADDITIONAL TESTS:  Reviewed .    MEDICATIONS  (STANDING):  aspirin  chewable 81 milliGRAM(s) Oral daily  atorvastatin 80 milliGRAM(s) Oral at bedtime  clopidogrel Tablet 75 milliGRAM(s) Oral daily  dextrose 5%. 1000 milliLiter(s) (50 mL/Hr) IV Continuous <Continuous>  dextrose 5%. 1000 milliLiter(s) (100 mL/Hr) IV Continuous <Continuous>  dextrose 50% Injectable 12.5 Gram(s) IV Push once  dextrose 50% Injectable 25 Gram(s) IV Push once  dextrose 50% Injectable 25 Gram(s) IV Push once  doxazosin 4 milliGRAM(s) Oral at bedtime  glucagon  Injectable 1 milliGRAM(s) IntraMuscular once  heparin   Injectable 5000 Unit(s) SubCutaneous every 8 hours  hydrALAZINE 25 milliGRAM(s) Oral three times a day  insulin glargine Injectable (LANTUS) 16 Unit(s) SubCutaneous at bedtime  insulin lispro (ADMELOG) corrective regimen sliding scale   SubCutaneous Before meals and at bedtime  magnesium sulfate  IVPB 2 Gram(s) IV Intermittent once  metoprolol succinate ER 25 milliGRAM(s) Oral daily  modafinil 100 milliGRAM(s) Oral daily  NIFEdipine IR 30 milliGRAM(s) Oral two times a day  senna 2 Tablet(s) Oral at bedtime  sodium bicarbonate 650 milliGRAM(s) Oral two times a day    MEDICATIONS  (PRN):  acetaminophen     Tablet .. 650 milliGRAM(s) Oral every 4 hours PRN Mild Pain (1 - 3)  dextrose Oral Gel 15 Gram(s) Oral once PRN Blood Glucose LESS THAN 70 milliGRAM(s)/deciliter  ondansetron Injectable 4 milliGRAM(s) IV Push every 4 hours PRN Nausea and/or Vomiting     Cardiology Consult    O/N: BLANK  Interval History/HPI: Pt seen and examined at bedside, NAD, denies chest pain/discomfort/pressure. ROS unremarkable   Telemetry: sinus tachycardia (HR 100s)      Review of Systems:  CONSTITUTIONAL:  No weight loss, fever, chills, weakness or fatigue.  HEENT:  Eyes:  No visual loss, blurred vision, double vision or yellow sclerae. Ears, Nose, Throat:  No hearing loss, sneezing, congestion, runny nose or sore throat.  SKIN:  No rash or itching.  CARDIOVASCULAR:  No chest pain, chest pressure or chest discomfort. No palpitations or edema.  RESPIRATORY:  No shortness of breath, cough or sputum.  GASTROINTESTINAL:  No anorexia, nausea, vomiting or diarrhea. No abdominal pain or blood.  GENITOURINARY: No Burning on urination.   NEUROLOGICAL:  see HPI  MUSCULOSKELETAL:  No muscle, back pain, joint pain or stiffness.  HEMATOLOGIC:  No anemia, bleeding or bruising.  LYMPHATICS:  No enlarged nodes. No history of splenectomy.  PSYCHIATRIC:  No history of depression or anxiety.  ENDOCRINOLOGIC:  No reports of sweating, cold or heat intolerance. No polyuria or polydipsia.  ALLERGIES:  No history of asthma, hives, eczema or rhinitis.    OBJECTIVE  T(C): 36.5 (07-17-23 @ 05:56), Max: 37.3 (07-16-23 @ 18:41)  HR: 102 (07-17-23 @ 08:42) (99 - 106)  BP: 161/84 (07-17-23 @ 08:42) (127/58 - 164/81)  RR: 17 (07-17-23 @ 08:42) (16 - 18)  SpO2: 100% (07-17-23 @ 08:42) (96% - 100%)    07-16-23 @ 07:01  -  07-17-23 @ 07:00  --------------------------------------------------------  IN: 740 mL / OUT: 400 mL / NET: 340 mL    07-17-23 @ 07:01  -  07-17-23 @ 09:35  --------------------------------------------------------  IN: 0 mL / OUT: 0 mL / NET: 0 mL        PHYSICAL EXAM:    Constitutional: resting comfortably in bed; NAD  HEENT: NC/AT, PERRL, EOMI, anicteric sclera, no nasal discharge; uvula midline, no oropharyngeal erythema or exudates; MMM  Neck: supple; no thyromegaly, JVP <8cm H20, JVD-  Respiratory: CTA B/L; no W/R/R, no retractions  Cardiac: +S1/S2; RRR; no M/R/G; PMI non-displaced  Gastrointestinal: soft, NT/ND; no rebound or guarding; +BSx4  Extremities: R.BKA (stump C/D/I), WWP, no clubbing or cyanosis; no peripheral edema  Musculoskeletal: NROM x3; no joint swelling, tenderness or erythema  Vascular: 2+ radial, DP/PT pulses B/L  Dermatologic: skin warm, dry and intact; no rashes, wounds, or scars  Lymphatic: no submandibular or cervical LAD  Neurologic: AAOx3; CNII-XII grossly intact; no focal deficits    LABS:                        9.3    16.04 )-----------( 383      ( 17 Jul 2023 05:30 )             28.9     07-17    140  |  108  |  66<H>  ----------------------------<  41<LL>  3.3<L>   |  17<L>  |  7.36<H>    Ca    8.4      17 Jul 2023 05:30  Phos  4.6     07-17  Mg     1.7     07-17        Urinalysis Basic - ( 17 Jul 2023 05:30 )    Color: x / Appearance: x / SG: x / pH: x  Gluc: 41 mg/dL / Ketone: x  / Bili: x / Urobili: x   Blood: x / Protein: x / Nitrite: x   Leuk Esterase: x / RBC: x / WBC x   Sq Epi: x / Non Sq Epi: x / Bacteria: x        RADIOLOGY & ADDITIONAL TESTS:  Reviewed .    MEDICATIONS  (STANDING):  aspirin  chewable 81 milliGRAM(s) Oral daily  atorvastatin 80 milliGRAM(s) Oral at bedtime  clopidogrel Tablet 75 milliGRAM(s) Oral daily  dextrose 5%. 1000 milliLiter(s) (50 mL/Hr) IV Continuous <Continuous>  dextrose 5%. 1000 milliLiter(s) (100 mL/Hr) IV Continuous <Continuous>  dextrose 50% Injectable 12.5 Gram(s) IV Push once  dextrose 50% Injectable 25 Gram(s) IV Push once  dextrose 50% Injectable 25 Gram(s) IV Push once  doxazosin 4 milliGRAM(s) Oral at bedtime  glucagon  Injectable 1 milliGRAM(s) IntraMuscular once  heparin   Injectable 5000 Unit(s) SubCutaneous every 8 hours  hydrALAZINE 25 milliGRAM(s) Oral three times a day  insulin glargine Injectable (LANTUS) 16 Unit(s) SubCutaneous at bedtime  insulin lispro (ADMELOG) corrective regimen sliding scale   SubCutaneous Before meals and at bedtime  magnesium sulfate  IVPB 2 Gram(s) IV Intermittent once  metoprolol succinate ER 25 milliGRAM(s) Oral daily  modafinil 100 milliGRAM(s) Oral daily  NIFEdipine IR 30 milliGRAM(s) Oral two times a day  senna 2 Tablet(s) Oral at bedtime  sodium bicarbonate 650 milliGRAM(s) Oral two times a day    MEDICATIONS  (PRN):  acetaminophen     Tablet .. 650 milliGRAM(s) Oral every 4 hours PRN Mild Pain (1 - 3)  dextrose Oral Gel 15 Gram(s) Oral once PRN Blood Glucose LESS THAN 70 milliGRAM(s)/deciliter  ondansetron Injectable 4 milliGRAM(s) IV Push every 4 hours PRN Nausea and/or Vomiting

## 2023-07-17 NOTE — PROGRESS NOTE ADULT - SUBJECTIVE AND OBJECTIVE BOX
Patient is a 57y Male seen and evaluated at bedside.       Meds:    acetaminophen     Tablet .. 650 every 4 hours PRN  aspirin  chewable 81 daily  atorvastatin 80 at bedtime  clopidogrel Tablet 75 daily  dextrose 5%. 1000 <Continuous>  dextrose 5%. 1000 <Continuous>  dextrose 50% Injectable 25 once  dextrose 50% Injectable 12.5 once  dextrose 50% Injectable 25 once  dextrose Oral Gel 15 once PRN  doxazosin 4 at bedtime  glucagon  Injectable 1 once  heparin   Injectable 5000 every 8 hours  hydrALAZINE 25 three times a day  insulin glargine Injectable (LANTUS) 8 at bedtime  insulin lispro (ADMELOG) corrective regimen sliding scale  Before meals and at bedtime  metoprolol succinate ER 25 daily  modafinil 100 daily  NIFEdipine IR 30 two times a day  ondansetron Injectable 4 every 4 hours PRN  polyethylene glycol 3350 17 two times a day  senna 2 at bedtime  sodium bicarbonate 650 two times a day      T(C): , Max: 37.3 (07-16-23 @ 18:41)  T(F): , Max: 99.1 (07-16-23 @ 18:41)  HR: 109 (07-17-23 @ 12:39)  BP: 148/85 (07-17-23 @ 12:39)  BP(mean): 83 (07-17-23 @ 00:44)  RR: 18 (07-17-23 @ 12:39)  SpO2: 97% (07-17-23 @ 12:39)  Wt(kg): --    07-16 @ 07:01  -  07-17 @ 07:00  --------------------------------------------------------  IN: 740 mL / OUT: 400 mL / NET: 340 mL    07-17 @ 07:01  -  07-17 @ 13:43  --------------------------------------------------------  IN: 100 mL / OUT: 400 mL / NET: -300 mL          Review of Systems:  ROS negative except as per HPI      PHYSICAL EXAM:  GENERAL: NAD, lying in bed, cachectic   HEART: S1, S2  LUNGS: Clear to auscultation eleazar  ABD: Firm, nontender, nondistended, +BS  Ext: Rt BKA, covered with band/gauze  Nerv:  A&Ox2, left hemiparesis  Vascular Access: AVF in LUE, with palpable thrill        LABS:                        9.3    16.04 )-----------( 383      ( 17 Jul 2023 05:30 )             28.9     07-17    140  |  108  |  66<H>  ----------------------------<  41<LL>  3.3<L>   |  17<L>  |  7.36<H>    Ca    8.4      17 Jul 2023 05:30  Phos  4.6     07-17  Mg     1.7     07-17          Urinalysis Basic - ( 17 Jul 2023 05:30 )    Color: x / Appearance: x / SG: x / pH: x  Gluc: 41 mg/dL / Ketone: x  / Bili: x / Urobili: x   Blood: x / Protein: x / Nitrite: x   Leuk Esterase: x / RBC: x / WBC x   Sq Epi: x / Non Sq Epi: x / Bacteria: x            RADIOLOGY & ADDITIONAL STUDIES:

## 2023-07-17 NOTE — PROGRESS NOTE ADULT - ASSESSMENT
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 Pt with no signs or symptoms or uremia, and has not needed initiation of HD    #CKD stage V  No signs or symptoms or Uremia   Lytes and volume status acceptable    Plan:   No indications to start HD at this time   Encourage PO intake  Renally dosed medications based on eGFR <10  Avoid Nephrotoxic drugs, and CT with IV contrast if possible   Will need IV iron, but given active infection will hold off for now   Increase sodium bicarb to 1300 mg TID   Replete Mg to keep >2  Renal diet

## 2023-07-18 ENCOUNTER — TRANSCRIPTION ENCOUNTER (OUTPATIENT)
Age: 58
End: 2023-07-18

## 2023-07-18 VITALS — TEMPERATURE: 98 F

## 2023-07-18 LAB
% ALBUMIN: 42.2 % — SIGNIFICANT CHANGE UP
% ALPHA 1: 10.3 % — SIGNIFICANT CHANGE UP
% ALPHA 2: 17.3 % — SIGNIFICANT CHANGE UP
% BETA: 15.7 % — SIGNIFICANT CHANGE UP
% GAMMA: 14.5 % — SIGNIFICANT CHANGE UP
ALBUMIN SERPL ELPH-MCNC: 2.5 G/DL — LOW (ref 3.6–5.5)
ALBUMIN/GLOB SERPL ELPH: 0.7 RATIO — SIGNIFICANT CHANGE UP
ALPHA1 GLOB SERPL ELPH-MCNC: 0.6 G/DL — HIGH (ref 0.1–0.4)
ALPHA2 GLOB SERPL ELPH-MCNC: 1 G/DL — SIGNIFICANT CHANGE UP (ref 0.5–1)
ANION GAP SERPL CALC-SCNC: 16 MMOL/L — SIGNIFICANT CHANGE UP (ref 5–17)
B-GLOBULIN SERPL ELPH-MCNC: 0.9 G/DL — SIGNIFICANT CHANGE UP (ref 0.5–1)
BUN SERPL-MCNC: 65 MG/DL — HIGH (ref 7–23)
CALCIUM SERPL-MCNC: 8.3 MG/DL — LOW (ref 8.4–10.5)
CHLORIDE SERPL-SCNC: 109 MMOL/L — HIGH (ref 96–108)
CO2 SERPL-SCNC: 17 MMOL/L — LOW (ref 22–31)
CREAT SERPL-MCNC: 7.08 MG/DL — HIGH (ref 0.5–1.3)
EGFR: 8 ML/MIN/1.73M2 — LOW
GAMMA GLOBULIN: 0.9 G/DL — SIGNIFICANT CHANGE UP (ref 0.6–1.6)
GLUCOSE BLDC GLUCOMTR-MCNC: 114 MG/DL — HIGH (ref 70–99)
GLUCOSE BLDC GLUCOMTR-MCNC: 146 MG/DL — HIGH (ref 70–99)
GLUCOSE BLDC GLUCOMTR-MCNC: 156 MG/DL — HIGH (ref 70–99)
GLUCOSE BLDC GLUCOMTR-MCNC: 58 MG/DL — LOW (ref 70–99)
GLUCOSE BLDC GLUCOMTR-MCNC: 59 MG/DL — LOW (ref 70–99)
GLUCOSE SERPL-MCNC: 175 MG/DL — HIGH (ref 70–99)
HCT VFR BLD CALC: 25.6 % — LOW (ref 39–50)
HGB BLD-MCNC: 8 G/DL — LOW (ref 13–17)
INTERPRETATION SERPL IFE-IMP: SIGNIFICANT CHANGE UP
MAGNESIUM SERPL-MCNC: 2.4 MG/DL — SIGNIFICANT CHANGE UP (ref 1.6–2.6)
MCHC RBC-ENTMCNC: 28.6 PG — SIGNIFICANT CHANGE UP (ref 27–34)
MCHC RBC-ENTMCNC: 31.3 GM/DL — LOW (ref 32–36)
MCV RBC AUTO: 91.4 FL — SIGNIFICANT CHANGE UP (ref 80–100)
NRBC # BLD: 0 /100 WBCS — SIGNIFICANT CHANGE UP (ref 0–0)
PHOSPHATE SERPL-MCNC: 4.3 MG/DL — SIGNIFICANT CHANGE UP (ref 2.5–4.5)
PLATELET # BLD AUTO: 323 K/UL — SIGNIFICANT CHANGE UP (ref 150–400)
POTASSIUM SERPL-MCNC: 3.8 MMOL/L — SIGNIFICANT CHANGE UP (ref 3.5–5.3)
POTASSIUM SERPL-SCNC: 3.8 MMOL/L — SIGNIFICANT CHANGE UP (ref 3.5–5.3)
PROT PATTERN SERPL ELPH-IMP: SIGNIFICANT CHANGE UP
RBC # BLD: 2.8 M/UL — LOW (ref 4.2–5.8)
RBC # FLD: 15.7 % — HIGH (ref 10.3–14.5)
SODIUM SERPL-SCNC: 142 MMOL/L — SIGNIFICANT CHANGE UP (ref 135–145)
WBC # BLD: 14.07 K/UL — HIGH (ref 3.8–10.5)
WBC # FLD AUTO: 14.07 K/UL — HIGH (ref 3.8–10.5)

## 2023-07-18 PROCEDURE — 87015 SPECIMEN INFECT AGNT CONCNTJ: CPT

## 2023-07-18 PROCEDURE — 86704 HEP B CORE ANTIBODY TOTAL: CPT

## 2023-07-18 PROCEDURE — 87324 CLOSTRIDIUM AG IA: CPT

## 2023-07-18 PROCEDURE — 83540 ASSAY OF IRON: CPT

## 2023-07-18 PROCEDURE — 86900 BLOOD TYPING SEROLOGIC ABO: CPT

## 2023-07-18 PROCEDURE — 80202 ASSAY OF VANCOMYCIN: CPT

## 2023-07-18 PROCEDURE — 87449 NOS EACH ORGANISM AG IA: CPT

## 2023-07-18 PROCEDURE — 95700 EEG CONT REC W/VID EEG TECH: CPT

## 2023-07-18 PROCEDURE — 71250 CT THORAX DX C-: CPT

## 2023-07-18 PROCEDURE — 76770 US EXAM ABDO BACK WALL COMP: CPT

## 2023-07-18 PROCEDURE — 36415 COLL VENOUS BLD VENIPUNCTURE: CPT

## 2023-07-18 PROCEDURE — 84165 PROTEIN E-PHORESIS SERUM: CPT

## 2023-07-18 PROCEDURE — 81001 URINALYSIS AUTO W/SCOPE: CPT

## 2023-07-18 PROCEDURE — 93971 EXTREMITY STUDY: CPT

## 2023-07-18 PROCEDURE — 97530 THERAPEUTIC ACTIVITIES: CPT

## 2023-07-18 PROCEDURE — 97161 PT EVAL LOW COMPLEX 20 MIN: CPT

## 2023-07-18 PROCEDURE — 86706 HEP B SURFACE ANTIBODY: CPT

## 2023-07-18 PROCEDURE — 83615 LACTATE (LD) (LDH) ENZYME: CPT

## 2023-07-18 PROCEDURE — 82746 ASSAY OF FOLIC ACID SERUM: CPT

## 2023-07-18 PROCEDURE — 82962 GLUCOSE BLOOD TEST: CPT

## 2023-07-18 PROCEDURE — 92610 EVALUATE SWALLOWING FUNCTION: CPT

## 2023-07-18 PROCEDURE — P9016: CPT

## 2023-07-18 PROCEDURE — 73700 CT LOWER EXTREMITY W/O DYE: CPT

## 2023-07-18 PROCEDURE — 87389 HIV-1 AG W/HIV-1&-2 AB AG IA: CPT

## 2023-07-18 PROCEDURE — 99285 EMERGENCY DEPT VISIT HI MDM: CPT | Mod: 25

## 2023-07-18 PROCEDURE — 82570 ASSAY OF URINE CREATININE: CPT

## 2023-07-18 PROCEDURE — 0225U NFCT DS DNA&RNA 21 SARSCOV2: CPT

## 2023-07-18 PROCEDURE — 85652 RBC SED RATE AUTOMATED: CPT

## 2023-07-18 PROCEDURE — 86901 BLOOD TYPING SEROLOGIC RH(D): CPT

## 2023-07-18 PROCEDURE — 86334 IMMUNOFIX E-PHORESIS SERUM: CPT

## 2023-07-18 PROCEDURE — 70450 CT HEAD/BRAIN W/O DYE: CPT

## 2023-07-18 PROCEDURE — 84295 ASSAY OF SERUM SODIUM: CPT

## 2023-07-18 PROCEDURE — 88307 TISSUE EXAM BY PATHOLOGIST: CPT

## 2023-07-18 PROCEDURE — 86850 RBC ANTIBODY SCREEN: CPT

## 2023-07-18 PROCEDURE — 87075 CULTR BACTERIA EXCEPT BLOOD: CPT

## 2023-07-18 PROCEDURE — 82728 ASSAY OF FERRITIN: CPT

## 2023-07-18 PROCEDURE — 83935 ASSAY OF URINE OSMOLALITY: CPT

## 2023-07-18 PROCEDURE — 96375 TX/PRO/DX INJ NEW DRUG ADDON: CPT

## 2023-07-18 PROCEDURE — 82607 VITAMIN B-12: CPT

## 2023-07-18 PROCEDURE — 99233 SBSQ HOSP IP/OBS HIGH 50: CPT

## 2023-07-18 PROCEDURE — 82553 CREATINE MB FRACTION: CPT

## 2023-07-18 PROCEDURE — 84132 ASSAY OF SERUM POTASSIUM: CPT

## 2023-07-18 PROCEDURE — 86705 HEP B CORE ANTIBODY IGM: CPT

## 2023-07-18 PROCEDURE — 80053 COMPREHEN METABOLIC PANEL: CPT

## 2023-07-18 PROCEDURE — 96374 THER/PROPH/DIAG INJ IV PUSH: CPT

## 2023-07-18 PROCEDURE — 85027 COMPLETE CBC AUTOMATED: CPT

## 2023-07-18 PROCEDURE — 84484 ASSAY OF TROPONIN QUANT: CPT

## 2023-07-18 PROCEDURE — 71045 X-RAY EXAM CHEST 1 VIEW: CPT

## 2023-07-18 PROCEDURE — 36430 TRANSFUSION BLD/BLD COMPNT: CPT

## 2023-07-18 PROCEDURE — 87640 STAPH A DNA AMP PROBE: CPT

## 2023-07-18 PROCEDURE — 82010 KETONE BODYS QUAN: CPT

## 2023-07-18 PROCEDURE — 85045 AUTOMATED RETICULOCYTE COUNT: CPT

## 2023-07-18 PROCEDURE — 73620 X-RAY EXAM OF FOOT: CPT

## 2023-07-18 PROCEDURE — 87116 MYCOBACTERIA CULTURE: CPT

## 2023-07-18 PROCEDURE — 85730 THROMBOPLASTIN TIME PARTIAL: CPT

## 2023-07-18 PROCEDURE — 84155 ASSAY OF PROTEIN SERUM: CPT

## 2023-07-18 PROCEDURE — 87641 MR-STAPH DNA AMP PROBE: CPT

## 2023-07-18 PROCEDURE — 84100 ASSAY OF PHOSPHORUS: CPT

## 2023-07-18 PROCEDURE — 85025 COMPLETE CBC W/AUTO DIFF WBC: CPT

## 2023-07-18 PROCEDURE — 87206 SMEAR FLUORESCENT/ACID STAI: CPT

## 2023-07-18 PROCEDURE — 87507 IADNA-DNA/RNA PROBE TQ 12-25: CPT

## 2023-07-18 PROCEDURE — 83036 HEMOGLOBIN GLYCOSYLATED A1C: CPT

## 2023-07-18 PROCEDURE — 85610 PROTHROMBIN TIME: CPT

## 2023-07-18 PROCEDURE — 87340 HEPATITIS B SURFACE AG IA: CPT

## 2023-07-18 PROCEDURE — 84300 ASSAY OF URINE SODIUM: CPT

## 2023-07-18 PROCEDURE — 87070 CULTURE OTHR SPECIMN AEROBIC: CPT

## 2023-07-18 PROCEDURE — 83550 IRON BINDING TEST: CPT

## 2023-07-18 PROCEDURE — 83010 ASSAY OF HAPTOGLOBIN QUANT: CPT

## 2023-07-18 PROCEDURE — 93306 TTE W/DOPPLER COMPLETE: CPT

## 2023-07-18 PROCEDURE — 83735 ASSAY OF MAGNESIUM: CPT

## 2023-07-18 PROCEDURE — 88311 DECALCIFY TISSUE: CPT

## 2023-07-18 PROCEDURE — 86140 C-REACTIVE PROTEIN: CPT

## 2023-07-18 PROCEDURE — 87102 FUNGUS ISOLATION CULTURE: CPT

## 2023-07-18 PROCEDURE — 86709 HEPATITIS A IGM ANTIBODY: CPT

## 2023-07-18 PROCEDURE — 86923 COMPATIBILITY TEST ELECTRIC: CPT

## 2023-07-18 PROCEDURE — 74018 RADEX ABDOMEN 1 VIEW: CPT

## 2023-07-18 PROCEDURE — 82330 ASSAY OF CALCIUM: CPT

## 2023-07-18 PROCEDURE — 83521 IG LIGHT CHAINS FREE EACH: CPT

## 2023-07-18 PROCEDURE — 86803 HEPATITIS C AB TEST: CPT

## 2023-07-18 PROCEDURE — 83605 ASSAY OF LACTIC ACID: CPT

## 2023-07-18 PROCEDURE — 82550 ASSAY OF CK (CPK): CPT

## 2023-07-18 PROCEDURE — 82803 BLOOD GASES ANY COMBINATION: CPT

## 2023-07-18 PROCEDURE — 87040 BLOOD CULTURE FOR BACTERIA: CPT

## 2023-07-18 PROCEDURE — 95708 EEG WO VID EA 12-26HR UNMNTR: CPT

## 2023-07-18 PROCEDURE — 80048 BASIC METABOLIC PNL TOTAL CA: CPT

## 2023-07-18 PROCEDURE — 93005 ELECTROCARDIOGRAM TRACING: CPT

## 2023-07-18 PROCEDURE — 74176 CT ABD & PELVIS W/O CONTRAST: CPT

## 2023-07-18 PROCEDURE — 87186 SC STD MICRODIL/AGAR DIL: CPT

## 2023-07-18 PROCEDURE — 99232 SBSQ HOSP IP/OBS MODERATE 35: CPT

## 2023-07-18 RX ORDER — LINAGLIPTIN 5 MG/1
1 TABLET, FILM COATED ORAL
Refills: 0 | DISCHARGE

## 2023-07-18 RX ORDER — SODIUM BICARBONATE 1 MEQ/ML
2 SYRINGE (ML) INTRAVENOUS
Qty: 0 | Refills: 0 | DISCHARGE
Start: 2023-07-18

## 2023-07-18 RX ORDER — DEXTROSE 50 % IN WATER 50 %
12.5 SYRINGE (ML) INTRAVENOUS ONCE
Refills: 0 | Status: COMPLETED | OUTPATIENT
Start: 2023-07-18 | End: 2023-07-18

## 2023-07-18 RX ORDER — HYDRALAZINE HCL 50 MG
1 TABLET ORAL
Qty: 0 | Refills: 0 | DISCHARGE
Start: 2023-07-18

## 2023-07-18 RX ORDER — MODAFINIL 200 MG/1
1 TABLET ORAL
Qty: 0 | Refills: 0 | DISCHARGE

## 2023-07-18 RX ORDER — INSULIN GLARGINE 100 [IU]/ML
6 INJECTION, SOLUTION SUBCUTANEOUS
Qty: 0 | Refills: 0 | DISCHARGE
Start: 2023-07-18

## 2023-07-18 RX ORDER — MODAFINIL 200 MG/1
1 TABLET ORAL
Qty: 0 | Refills: 0 | DISCHARGE
Start: 2023-07-18

## 2023-07-18 RX ADMIN — Medication 12.5 GRAM(S): at 06:31

## 2023-07-18 RX ADMIN — Medication 1300 MILLIGRAM(S): at 05:20

## 2023-07-18 RX ADMIN — HEPARIN SODIUM 5000 UNIT(S): 5000 INJECTION INTRAVENOUS; SUBCUTANEOUS at 13:03

## 2023-07-18 RX ADMIN — Medication 2: at 12:32

## 2023-07-18 RX ADMIN — MODAFINIL 100 MILLIGRAM(S): 200 TABLET ORAL at 13:03

## 2023-07-18 RX ADMIN — Medication 25 MILLIGRAM(S): at 13:03

## 2023-07-18 RX ADMIN — Medication 1300 MILLIGRAM(S): at 13:04

## 2023-07-18 RX ADMIN — Medication 25 MILLIGRAM(S): at 05:20

## 2023-07-18 RX ADMIN — HEPARIN SODIUM 5000 UNIT(S): 5000 INJECTION INTRAVENOUS; SUBCUTANEOUS at 05:20

## 2023-07-18 RX ADMIN — Medication 30 MILLIGRAM(S): at 05:20

## 2023-07-18 RX ADMIN — Medication 81 MILLIGRAM(S): at 13:04

## 2023-07-18 RX ADMIN — CLOPIDOGREL BISULFATE 75 MILLIGRAM(S): 75 TABLET, FILM COATED ORAL at 13:03

## 2023-07-18 NOTE — PROGRESS NOTE ADULT - SUBJECTIVE AND OBJECTIVE BOX
Patient is a 57y old  Male who presents with a chief complaint of Right foot ulcer  (16 Jul 2023 09:01)      SUBJECTIVE / OVERNIGHT EVENTS:    Low blood sugars on 7/17  , patient reports no symptoms . Today it was 58 at 6 am ,  he was eating breakfast at the time of my encounter, blood sugars improved to 175    MEDICATIONS  (STANDING):  aspirin  chewable 81 milliGRAM(s) Oral daily  atorvastatin 80 milliGRAM(s) Oral at bedtime  clopidogrel Tablet 75 milliGRAM(s) Oral daily  doxazosin 4 milliGRAM(s) Oral at bedtime  glucagon  Injectable 1 milliGRAM(s) IntraMuscular once  heparin   Injectable 5000 Unit(s) SubCutaneous every 8 hours  hydrALAZINE 25 milliGRAM(s) Oral three times a day  insulin glargine Injectable (LANTUS) 8 Unit(s) SubCutaneous at bedtime  insulin lispro (ADMELOG) corrective regimen sliding scale   SubCutaneous Before meals and at bedtime  magnesium sulfate  IVPB 2 Gram(s) IV Intermittent once  metoprolol succinate ER 25 milliGRAM(s) Oral daily  modafinil 100 milliGRAM(s) Oral daily  NIFEdipine IR 30 milliGRAM(s) Oral two times a day  polyethylene glycol 3350 17 Gram(s) Oral two times a day  potassium chloride   Powder 40 milliEquivalent(s) Oral once  senna 2 Tablet(s) Oral at bedtime  sodium bicarbonate 650 milliGRAM(s) Oral two times a day    MEDICATIONS  (PRN):  acetaminophen     Tablet .. 650 milliGRAM(s) Oral every 4 hours PRN Mild Pain (1 - 3)  dextrose Oral Gel 15 Gram(s) Oral once PRN Blood Glucose LESS THAN 70 milliGRAM(s)/deciliter  ondansetron Injectable 4 milliGRAM(s) IV Push every 4 hours PRN Nausea and/or Vomiting        PHYSICAL EXAM:  Vital Signs Last 24 Hrs  T(C): 36.1 (18 Jul 2023 09:24), Max: 37.3 (17 Jul 2023 17:53)  T(F): 97 (18 Jul 2023 09:24), Max: 99.2 (17 Jul 2023 17:53)  HR: 101 (18 Jul 2023 08:45) (101 - 118)  BP: 120/61 (18 Jul 2023 08:45) (120/61 - 175/84)  BP(mean): 106 (18 Jul 2023 04:51) (95 - 106)  RR: 18 (18 Jul 2023 08:45) (15 - 18)  SpO2: 98% (18 Jul 2023 08:45) (97% - 98%)    Parameters below as of 18 Jul 2023 08:45  Patient On (Oxygen Delivery Method): room air                GEN: male in NAD, appears comfortable, no diaphoresis  EYES: No scleral injection, PERRL, EOMI  ENTM: neck supple & symmetric without tracheal deviation, moist membranes, no gross hearing impairment, thyroid gland not enlarged  CV: +S1/S2, no m/r/g, no abdominal bruit, no LE edema  RESP: breathing comfortably, no respiratory accessory muscle use, CTAB, no w/r/r  GI:  mildly distended , firm, non tender , bowel sounds present     LABS:                                   8.0    14.07 )-----------( 323      ( 18 Jul 2023 06:43 )             25.6     07-18    142  |  109<H>  |  65<H>  ----------------------------<  175<H>  3.8   |  17<L>  |  7.08<H>    Ca    8.3<L>      18 Jul 2023 06:43  Phos  4.3     07-18  Mg     2.4     07-18          COORDINATION OF CARE:  Care Discussed with Consultants/Other Providers  : vascular surgery , ID and Nephrology

## 2023-07-18 NOTE — DISCHARGE NOTE NURSING/CASE MANAGEMENT/SOCIAL WORK - PATIENT PORTAL LINK FT
You can access the FollowMyHealth Patient Portal offered by [facility] by registering at the following website: [email or web address]. By joining [facility]’s FollowMyHealth portal, you will also be able to view your health information using other applications (apps) compatible with our system.

## 2023-07-18 NOTE — PROGRESS NOTE ADULT - ASSESSMENT
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 Pt with no signs or symptoms or uremia, and has not needed initiation of HD    #CKD stage V  No signs or symptoms or Uremia   Lytes and volume status acceptable    Plan:   No indications to start HD at this time   Encourage PO intake  Renally dosed medications based on eGFR <10  Avoid Nephrotoxic drugs, and CT with IV contrast if possible   Will need IV iron, but given active infection will hold off for now   Increase sodium bicarb to 1300 mg TID   Replete Mg to keep >2  Renal diet    56 yo M w/ CKD V w/ AVF not currently on HD, DM2, HTN, who was admitted 7/5 from NH with starvation ketoacidosis and Rt foot infection w/ 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 Pt with no signs or symptoms or uremia, and has not needed initiation of HD    #CKD stage V  No signs or symptoms or Uremia   Lytes and volume status acceptable    Plan:   No indications to start HD at this time   Encourage PO intake  Renally dosed medications based on eGFR <10  Avoid Nephrotoxic drugs  c/w sodium bicarb 1300 mg TID  Noted pt has been hypokalemic this admission. Switch to regular diet here and on discharge. Pt reports normal appetite. Pt encouraged to consume potassium containing foods after discharge. Repeat BMP this week as outpatient to monitor K    #Anemia  due to CKD  Iron sat 19%. Will need IV iron (200mg IV x 5 doses) once active infection clears  Once iron is repleted, start on 10K units of Epo Qweekly    #BMD-CKD  Ca 8.3  Phos 4.3. Goal 3-5.5  Check PTH, Vit D

## 2023-07-18 NOTE — PROGRESS NOTE ADULT - ASSESSMENT
57M with PMHx of T2DM, CVA (with residual LUE weakness), dementia, HTN, and CKDV (AV fistula created on 5/23) presented form NH on 7/5 for hyperglycemia and found to have likely right foot infection with gas seen on CT which was tracking along achilles sheath. Patient s/p right BKA (7/8) with completion on 7/11. Hospital course complicated by persistent leukocytosis and low grade fever    # Right foot necrotizing fasciitis s/p guillotine R BKA (7/8) and completion R BKA 7/11  - Continue ASA and Plavix for PAD  - pain control per primary team   - CT RLE (w/o contrast) 7/15 showing post-op changes and possible hematoma    # Leukocytosis    - No clear source identified at this time  - Heme consult reviewed  - Afebrile for past 72 hours , WBC improving while off antibiotics   - UA & CXR w/o signs of infection  - CT Chest w/o PNA and CT A&P showing ileus  - ID consulted and following  - Vascular Surgery has low suspicion for stump infection  - C. Diff and GI PCR neg  - Viral hep Panel and HIV negative   - Low concern for malignancy or rheumatological condition as the etiology of fever and leukocytosis    # Ileus vs. LBO  - CT & XR showing dilate bowel Loops   - Patient denies n/v  - Plan per surgery  - Senna + Miralax     # Vascular Dementia  # Hx of CVA with residual L sided weakness   - Continue ASA and statin     #HTN/HLD  - Continue hydralazine, nifedipine and atorvastatin    #CAD/CHF:   - continue home ASA and Plavix  - Echo done on 10/22 - LVH present with EF 50% with mild TR    #Tachycardia  -  Pt denied having chest pain or palpation possible secondary to infection as pt w/ worsening WBC to 24 thousand   - Cardio consult appreciated: sinus and likely due to underlying issue    # Insulin Dependent Diabetes Mellitus   - Pureed DM diet  - mISS + monitor FSG  - was hypoglycemic 0n 7/17 ,he was on 16 units , decreased to 8 units on 7/17 at night , however on 7/18 his am blood sugars were 58 , will follow blood sugars today , may need to decrease further to 6 units at bed time.     # CKD stage V  -Pt w/  baseline Cr 7-8  - Avoid Nephrotoxic agents   - Renal following   - Oral Sodium Bicarbonate     # BPH  - continue doxazosin (home flomax held bc patient having difficulty swallowing pills)    #Tremors  - CT head without acute pathology  - EEG w/ generalized slowing  - Further work up outpatient     # DVT prophylaxis with Heparin

## 2023-07-18 NOTE — DISCHARGE NOTE NURSING/CASE MANAGEMENT/SOCIAL WORK - NSDCVIVACCINE_GEN_ALL_CORE_FT
influenza, injectable, quadrivalent, preservative free; 19-Feb-2019 10:20; Sandy Naranjo (RN); [employer]; [id number] (Exp. Date: 30-Jun-2019); IntraMuscular; Deltoid Left.; 0.5 milliLiter(s); VIS (VIS Published: 07-Aug-2015, VIS Presented: 19-Feb-2019);

## 2023-07-18 NOTE — PROGRESS NOTE ADULT - SUBJECTIVE AND OBJECTIVE BOX
Patient is a 57y Male seen and evaluated at bedside.       Meds:    acetaminophen     Tablet .. 650 every 4 hours PRN  aspirin  chewable 81 daily  atorvastatin 80 at bedtime  clopidogrel Tablet 75 daily  dextrose 5%. 1000 <Continuous>  dextrose 5%. 1000 <Continuous>  dextrose 50% Injectable 25 once  dextrose 50% Injectable 12.5 once  dextrose 50% Injectable 25 once  dextrose Oral Gel 15 once PRN  doxazosin 4 at bedtime  glucagon  Injectable 1 once  heparin   Injectable 5000 every 8 hours  hydrALAZINE 25 three times a day  insulin glargine Injectable (LANTUS) 8 at bedtime  insulin lispro (ADMELOG) corrective regimen sliding scale  Before meals and at bedtime  metoprolol succinate ER 25 daily  modafinil 100 daily  NIFEdipine IR 30 two times a day  ondansetron Injectable 4 every 4 hours PRN  senna 2 at bedtime  sodium bicarbonate 1300 three times a day      T(C): , Max: 37.3 (07-17-23 @ 17:53)  T(F): , Max: 99.2 (07-17-23 @ 17:53)  HR: 102 (07-18-23 @ 04:51)  BP: 152/76 (07-18-23 @ 04:51)  BP(mean): 106 (07-18-23 @ 04:51)  RR: 16 (07-18-23 @ 04:51)  SpO2: 97% (07-18-23 @ 04:51)  Wt(kg): --    07-17 @ 07:01  -  07-18 @ 07:00  --------------------------------------------------------  IN: 100 mL / OUT: 950 mL / NET: -850 mL          Review of Systems:  ROS negative except as per HPI      PHYSICAL EXAM:  GENERAL: NAD  NECK: supple, No JVD  CHEST/LUNG: Clear to auscultation bilaterally  HEART: normal S1S2, RRR  ABDOMEN: Soft, Nontender, +BS, No flank tenderness bilateral  EXTREMITIES: No clubbing, cyanosis, or edema   NEUROLOGY: AAO x3, no focal neurological deficit  ACCESS: good thrill and bruit appreciated      LABS:                        8.0    14.07 )-----------( 323      ( 18 Jul 2023 06:43 )             25.6     07-18    142  |  109<H>  |  65<H>  ----------------------------<  175<H>  3.8   |  17<L>  |  7.08<H>    Ca    8.3<L>      18 Jul 2023 06:43  Phos  4.3     07-18  Mg     2.4     07-18          Urinalysis Basic - ( 18 Jul 2023 06:43 )    Color: x / Appearance: x / SG: x / pH: x  Gluc: 175 mg/dL / Ketone: x  / Bili: x / Urobili: x   Blood: x / Protein: x / Nitrite: x   Leuk Esterase: x / RBC: x / WBC x   Sq Epi: x / Non Sq Epi: x / Bacteria: x            RADIOLOGY & ADDITIONAL STUDIES:           Patient is a 57y Male seen and evaluated at bedside.       Meds:    acetaminophen     Tablet .. 650 every 4 hours PRN  aspirin  chewable 81 daily  atorvastatin 80 at bedtime  clopidogrel Tablet 75 daily  dextrose 5%. 1000 <Continuous>  dextrose 5%. 1000 <Continuous>  dextrose 50% Injectable 25 once  dextrose 50% Injectable 12.5 once  dextrose 50% Injectable 25 once  dextrose Oral Gel 15 once PRN  doxazosin 4 at bedtime  glucagon  Injectable 1 once  heparin   Injectable 5000 every 8 hours  hydrALAZINE 25 three times a day  insulin glargine Injectable (LANTUS) 8 at bedtime  insulin lispro (ADMELOG) corrective regimen sliding scale  Before meals and at bedtime  metoprolol succinate ER 25 daily  modafinil 100 daily  NIFEdipine IR 30 two times a day  ondansetron Injectable 4 every 4 hours PRN  senna 2 at bedtime  sodium bicarbonate 1300 three times a day      T(C): , Max: 37.3 (07-17-23 @ 17:53)  T(F): , Max: 99.2 (07-17-23 @ 17:53)  HR: 102 (07-18-23 @ 04:51)  BP: 152/76 (07-18-23 @ 04:51)  BP(mean): 106 (07-18-23 @ 04:51)  RR: 16 (07-18-23 @ 04:51)  SpO2: 97% (07-18-23 @ 04:51)  Wt(kg): --    07-17 @ 07:01  -  07-18 @ 07:00  --------------------------------------------------------  IN: 100 mL / OUT: 950 mL / NET: -850 mL          Review of Systems:  ROS negative except as per HPI      PHYSICAL EXAM:  GENERAL: NAD, lying in bed, cachectic   HEART: S1, S2  LUNGS: Clear to auscultation eleazar  ABD: Firm, nontender, nondistended, +BS  Ext: Rt BKA, covered with band/gauze  Nerv:  A&Ox2, left hemiparesis  Vascular Access: AVF in LUE, with palpable thrill      LABS:                        8.0    14.07 )-----------( 323      ( 18 Jul 2023 06:43 )             25.6     07-18    142  |  109<H>  |  65<H>  ----------------------------<  175<H>  3.8   |  17<L>  |  7.08<H>    Ca    8.3<L>      18 Jul 2023 06:43  Phos  4.3     07-18  Mg     2.4     07-18          Urinalysis Basic - ( 18 Jul 2023 06:43 )    Color: x / Appearance: x / SG: x / pH: x  Gluc: 175 mg/dL / Ketone: x  / Bili: x / Urobili: x   Blood: x / Protein: x / Nitrite: x   Leuk Esterase: x / RBC: x / WBC x   Sq Epi: x / Non Sq Epi: x / Bacteria: x            RADIOLOGY & ADDITIONAL STUDIES:

## 2023-07-18 NOTE — PROGRESS NOTE ADULT - ATTENDING SUPERVISION STATEMENT
Fellow
Resident
Resident
done

## 2023-07-18 NOTE — PROGRESS NOTE ADULT - ATTENDING COMMENTS
Case reviewed and d/w Dr. Orozco and team on renal rounds.   Agree with A/P above.  Renal service following with you.
I agree with the fellow's findings and plans as written above with the following additions/amendments:    Seen and examined at bedside multiple times during the day. In AM pocus as above, attempted to replete fluids however continued to be tachycardic in evening, likely occult infection perhaps abscess likely. Discussed with patient and family at bedside with uncle on phone, hesitant to do CT with contrast but discussed lieklihood that unless patient improves sepsis may affect kidneys as well, so possible to need dialysis either way. WIll monitor closely. Further recs as above
CKD 5  Case was d/w Dr. Orozco on renal rounds and throughout the day.  Interim hospital course and pt's treatment reviewed.  Agree with details of fellow's note above.  Renal service following.
I agree with the fellow's findings and plans as written above with the following additions/amendments:    Seen and examined at bedside, doing well, no acute indication for HD, monitoring closely. Continues to be tachycardic, unclear if infection controlled. Further recs as above
I agree with the fellow's findings and plans as written above with the following additions/amendments:    Seen and examined at bedside, feels well, tolerating PO, no acute distress. Electrolytes stable, no acute indication for HD, continuing to monitor. Further recs as above
I agree with the fellow's findings and plans as written above with the following additions/amendments:    Seen and examined at bedside, tolerating PO intake. Doing well. No pain. Stopping D5W, switching to sterile water due to hyperglycemia. Continue to monitor for acute indications for HD, none today. Further recs as above
events noted; chart reviewed  56 yo man with CKD V, has AVF  h/o DM2, HTN, who was admitted 7/5 from NH with starvation ketoacidosis and Rt foot infection w/ 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   offers good appetite, no N or V; no metallic taste  Lytes and volume status acceptable    Will need IV iron, but given active infection will hold off for now   Increase sodium bicarb to 2 tabs TID
56 yo man with CKD 5, has mature AVF  h/o DM2, HTN, admitted 7/5 from [facility] with starvation ketoacidosis and Rt foot infection w/ 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   upon further questioning today, appears to have some reduced appetite- uremia or infection/surgery related- to monitor  no N or V; no metallic taste  Lytes and volume status acceptable    c/w bicarb tabs with goal of sCO2 > 20
I agree with the fellow's findings and plans as written above with the following additions/amendments:    Seen and examined at bedside. No acute indication for HD. Discussed will monitor closely, unfortunately infected, will monitor further. Patient agreeable if needed but hoping to hold off. Further recs as above. Agree with continued fluids. Further recs as above
I agree with the fellow's findings and plans as written above with the following additions/amendments:    Seen and examined at bedside. Tachycardic, does not feel that well but no specific complaints. Awaiting blood and sepsis workup. Further recs as above
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 Royal) who presented to [facility] on 7/5 from nursing home for hyperglycemia who, found to have infected R diabetic toe wound with gas-producing organism.  s/p guillotine R BKA (7/8) and completion R BKA 7/11 with hospital course complicated by persistent fevers of unclear etiology and persistent sinus tach for which Cardiology is being consulted     REVIEW OF STUDIES  - ECG (7/14/23) : Sinus Tach at a rate of 114; LVH  - TTE (7/14/23) : EF 50-55% with global Hypokinesis    # Sinus Tach  # CVA  # DM  # HTN/CKD  # Electrolyte disturbances      1) Sinus Tachycardia  - Likely secondary to infectious process though no clear etiology at this time. Concern for Stercoral colitis given Stool burden seen on non CON CT  - Echo without signs of Culture negative endocarditis without vegetations seens  - Low suspicion for DVT/PE as patient has been on uninterrupted Heparin Prophylaxis  - Sinus tachycardia likely compensatory mechanism to ongoing infectious process and metabolic disturbances in setting of renal failure; Normally would not blunt it with Beta Blockade therapy. However, given borderline EF and global Hypokinesis in patient with known ASCVD (Stroke), can continue Toprol 25 mg daily with strict holding parameters (Hold for SBP<100 and HR<60)    2) CVA  -Patient with Hx of CVA x 2 last in 2019 with residual left sided weakness   - MRI brain significant for acute left lacunar infarcts in corona radiata and macrina, in addition to chronic bilateral basal ganglia infarcts, microangiopathic disease.  - Continue with Lipitor 80 mg daily, ASA 81 mg Daily Plavix 75 mg     3) DM  - A1C of 7.8  - Continue with lantus. Noted episodes of Hypoglycemia. Recommend close monitoring of FS    4) HTN in patient with CKD V  - Continue with Procardia 30 mg BID, Hydralazine 25 mg TID  - Patient will likely required Dialysis  - LUE fistula present and renal following    5) Electrolyte disturbances  - Keep K >4 and MAG>2       Please reconsult Cardiology as needed
I:   Stable creatinine. HTN borderline controlled.   A: Stable ALEXIS. Acidosis.   P: No indications for dialysis. Follow SCr. Continue bicarb and BP meds.
DM2, HTN, CVA, PAD, diabetic foot ulcer, CKD 5 admitted with hyperglycemia, worsening renal failure, sepsis with infected foot ulcer  physical as above  foot xray shows gas extending up the leg  vascular to perform amputation  vanco/zosyn/clinda for now pending cultures  bicarbonate infusion  sugars better on insulin  decision making of high complexity
DM2, HTN, h/o CVA, PAD, CKD5 now s/p BKA for sepsis with foot and leg infection/gangrene;   physical as above  EEG for episode AMS now resolved  continue vanco, zosyn, clindamycin with polymicrobial infection  restart nifedipine XL  follow sugars, adjusting insulin  transfer to vascular service

## 2023-07-18 NOTE — PROVIDER CONTACT NOTE (HYPOGLYCEMIA EVENT) - NS PROVIDER CONTACT BACKGROUND-HYPO
Age: 57y    Gender: Male    POCT Blood Glucose:  59 mg/dL (07-18-23 @ 06:34)  58 mg/dL (07-18-23 @ 06:19)  227 mg/dL (07-17-23 @ 22:05)  121 mg/dL (07-17-23 @ 17:17)  116 mg/dL (07-17-23 @ 12:23)  124 mg/dL (07-17-23 @ 08:12)  43 mg/dL (07-17-23 @ 07:47)  43 mg/dL (07-17-23 @ 07:31)      eMAR:  atorvastatin   80 milliGRAM(s) Oral (07-17-23 @ 21:59)    dextrose 50% Injectable   12.5 Gram(s) IV Push (07-18-23 @ 06:31)    dextrose 50% Injectable   25 Gram(s) IV Push (07-17-23 @ 07:51)    dextrose Oral Gel   15 Gram(s) Oral (07-17-23 @ 07:09)    insulin glargine Injectable (LANTUS)   8 Unit(s) SubCutaneous (07-17-23 @ 22:12)    insulin lispro (ADMELOG) corrective regimen sliding scale   4 Unit(s) SubCutaneous (07-17-23 @ 22:12)

## 2023-07-18 NOTE — DISCHARGE NOTE NURSING/CASE MANAGEMENT/SOCIAL WORK - NSDCPEFALRISK_GEN_ALL_CORE
For information on Fall & Injury Prevention, visit: [email or web address] OR  [email or web address] OR  https://www.cdc.gov/steadi/patient.html

## 2023-07-18 NOTE — PROGRESS NOTE ADULT - PROVIDER SPECIALTY LIST ADULT
Hospitalist
Infectious Disease
Infectious Disease
Internal Medicine
Internal Medicine
Nephrology
Nephrology
Vascular Surgery
Cardiology
Infectious Disease
Infectious Disease
Internal Medicine
MICU
Nephrology
Vascular Surgery
Internal Medicine
Internal Medicine
MICU
Nephrology
SICU
Vascular Surgery
Hospitalist
Infectious Disease
Infectious Disease
Nephrology
Surgery

## 2023-07-18 NOTE — PROGRESS NOTE ADULT - SUBJECTIVE AND OBJECTIVE BOX
O/N: ZULY, VSS                   A/P: 57M with PMHx of DM2, 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 Royal) who presented to [facility] on 7/5 from nursing home for hyperglycemia who was found to have infected likely diabetic toe wound with gas-producing organism. 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 CT revealed gas tracking along achilles sheath and vascular surgery was consulted for surgical evaluation. 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      Vascular/PAD:  - L foot necrotizing fasciitis s/p guillotine R BKA (7/8) and completion R BKA 7/11  - continue Asa and Plavix  - pain control    Fever/Leukocytosis:  -Appreciate cards, heme/onc, and ID recs  -f/u general surgery recs  -c.diff and GI PCR 7/15 negative  -COVID and RVP panel 7/16 negative  - F/U WBC this AM    LBO vs Ileus  -NPO + IVF  -replete electrolytes, discontinue opioids  -repeat abd xray 7/16 am  -f/u CT abd/pelvis 7/15 final read  -appreciate gen surg recs for disimpaction    Neuro  - A&Ox2 at baseline, possible early onset dementia  - Hx of CVA with residual L sided weakness   - CT 7/15: no acute pathology, mild parenchymal volume loss and mild chronic microvascular ischemic changes    HTN/HLD:  - continue hydralazine and nifedipine IR  - continue home atorvastatin  - Most recent echo 10/22 - LVH present with EF 50% with mild TR    CAD/CHF:   - continue home ASA and Plavix    DM:  - Appreciate medicine recs for DM management  - Pureed DM diet when ok to adv diet  - mISS + monitor FSG  - Consider resuming home Lantus 20 U nightly, currently held    CKD stage V:   - baseline Cr 7-8  - Appreciate renal recs  - Strict I&Os  - Avoid Nephrotoxic drugs    Hypernatremia  - appreciate renal recs  - f/u daily BMP    BPH  -continue doxazosin (home flomax held bc patient having difficulty swallowing pills)    Anemia:   - Trend daily labs    ID:  - Appreciate ID recs  - Monitor off antibiotics  - OR Cx growing Morganella, E. coli, Proteus, Strep. MRSA negative  - s/p Clindamycin (7/6 - 7/7), Vanc (7/6 - 7/8, resumed 7/11-7/13), Zosyn (7/6 -7/13)    Diet: NPO w/ IVF    Activity: OOB as tolerated    DVTPPx: SQH/SCDs    Dispo: PT/OT eval recommended MIKHAIL   O/N: ZULY, VSS   Subjective: Patient R KA dressing changed at bedside. Patient denied pain or discomfort.      ROS:   Denies Headache, blurred vision, Chest Pain, SOB, Abdominal pain, nausea or vomiting     Social   aspirin  chewable 81  clopidogrel Tablet 75  doxazosin 4  heparin   Injectable 5000  hydrALAZINE 25  metoprolol succinate ER 25  NIFEdipine IR 30      Allergies    No Known Allergies    Intolerances        Vital Signs Last 24 Hrs  T(C): 36.7 (18 Jul 2023 05:26), Max: 37.3 (17 Jul 2023 17:53)  T(F): 98 (18 Jul 2023 05:26), Max: 99.2 (17 Jul 2023 17:53)  HR: 102 (18 Jul 2023 04:51) (102 - 118)  BP: 152/76 (18 Jul 2023 04:51) (138/66 - 175/84)  BP(mean): 106 (18 Jul 2023 04:51) (95 - 106)  RR: 16 (18 Jul 2023 04:51) (15 - 18)  SpO2: 97% (18 Jul 2023 04:51) (97% - 100%)    Parameters below as of 18 Jul 2023 04:51  Patient On (Oxygen Delivery Method): room air      I&O's Summary    16 Jul 2023 07:01  -  17 Jul 2023 07:00  --------------------------------------------------------  IN: 740 mL / OUT: 400 mL / NET: 340 mL    17 Jul 2023 07:01  -  18 Jul 2023 06:45  --------------------------------------------------------  IN: 100 mL / OUT: 950 mL / NET: -850 mL        vPhysical Exam:  General: NAD  Pulmonary: no respiratory distress  Cardiovascular: tachy  Abdominal: soft, nontender, distended  Extremities: s/p R BKA, dressing c/d/i in knee mobilizer.        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 Royal) who presented to [facility] on 7/5 from nursing home for hyperglycemia who was found to have infected likely diabetic toe wound with gas-producing organism. 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 CT revealed gas tracking along achilles sheath and vascular surgery was consulted for surgical evaluation. 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      Vascular/PAD:  - L foot necrotizing fasciitis s/p guillotine R BKA (7/8) and completion R BKA 7/11  - continue Asa and Plavix  - pain control    Fever/Leukocytosis:  -ID believes leukocytosis source is non-infectious and has S/O  - F/U WBC this AM    LBO vs Ileus  -NPO + IVF  -replete electrolytes, discontinue opioids  -repeat abd xray 7/16 am  -f/u CT abd/pelvis 7/15 final read  -Patient put on BR and has had multiple BM yesterday 7/17    Neuro  - A&Ox2 at baseline, possible early onset dementia  - Hx of CVA with residual L sided weakness   - CTH 7/15: no acute pathology, mild parenchymal volume loss and mild chronic microvascular ischemic changes    HTN/HLD:  - continue hydralazine and nifedipine IR  - continue home atorvastatin  - Most recent echo 10/22 - LVH present with EF 50% with mild TR    CAD/CHF:   - continue home ASA and Plavix    DM:  - Appreciate medicine recs for DM management  - Pureed DM diet when ok to adv diet  - mISS + monitor FSG  - Consider resuming home Lantus 20 U nightly, currently held    CKD stage V:   - baseline Cr 7-8  - Appreciate renal recs  - Strict I&Os  - Avoid Nephrotoxic drugs  - F/U Dr. Garcia after d/c    Hypernatremia  - appreciate renal recs  - f/u daily BMP    BPH  -continue doxazosin (home flomax held bc patient having difficulty swallowing pills)    Anemia:   - Trend daily labs    ID:  - Appreciate ID recs  - Monitor off antibiotics  - OR Cx growing Morganella, E. coli, Proteus, Strep. MRSA negative  - s/p Clindamycin (7/6 - 7/7), Vanc (7/6 - 7/8, resumed 7/11-7/13), Zosyn (7/6 -7/13)  -No abx on d/c per ID reccs     Diet: NPO w/ IVF    Activity: OOB as tolerated    DVTPPx: SQH/SCDs    Dispo: PT/OT eval recommended MIKHAIL. D/C today pending wbc.

## 2023-07-19 LAB
CULTURE RESULTS: SIGNIFICANT CHANGE UP
SPECIMEN SOURCE: SIGNIFICANT CHANGE UP

## 2023-07-20 ENCOUNTER — APPOINTMENT (OUTPATIENT)
Dept: NEPHROLOGY | Facility: CLINIC | Age: 58
End: 2023-07-20
Payer: MEDICAID

## 2023-07-20 VITALS
TEMPERATURE: 98 F | HEART RATE: 120 BPM | SYSTOLIC BLOOD PRESSURE: 146 MMHG | DIASTOLIC BLOOD PRESSURE: 79 MMHG | OXYGEN SATURATION: 96 % | RESPIRATION RATE: 14 BRPM

## 2023-07-20 PROCEDURE — 99215 OFFICE O/P EST HI 40 MIN: CPT

## 2023-07-21 DIAGNOSIS — A48.0 GAS GANGRENE: ICD-10-CM

## 2023-07-21 DIAGNOSIS — N18.5 CHRONIC KIDNEY DISEASE, STAGE 5: ICD-10-CM

## 2023-07-21 DIAGNOSIS — E78.5 HYPERLIPIDEMIA, UNSPECIFIED: ICD-10-CM

## 2023-07-21 DIAGNOSIS — E11.52 TYPE 2 DIABETES MELLITUS WITH DIABETIC PERIPHERAL ANGIOPATHY WITH GANGRENE: ICD-10-CM

## 2023-07-21 DIAGNOSIS — N40.0 BENIGN PROSTATIC HYPERPLASIA WITHOUT LOWER URINARY TRACT SYMPTOMS: ICD-10-CM

## 2023-07-21 DIAGNOSIS — D62 ACUTE POSTHEMORRHAGIC ANEMIA: ICD-10-CM

## 2023-07-21 DIAGNOSIS — E83.52 HYPERCALCEMIA: ICD-10-CM

## 2023-07-21 DIAGNOSIS — D63.1 ANEMIA IN CHRONIC KIDNEY DISEASE: ICD-10-CM

## 2023-07-21 DIAGNOSIS — R65.20 SEVERE SEPSIS WITHOUT SEPTIC SHOCK: ICD-10-CM

## 2023-07-21 DIAGNOSIS — I25.10 ATHEROSCLEROTIC HEART DISEASE OF NATIVE CORONARY ARTERY WITHOUT ANGINA PECTORIS: ICD-10-CM

## 2023-07-21 DIAGNOSIS — R25.1 TREMOR, UNSPECIFIED: ICD-10-CM

## 2023-07-21 DIAGNOSIS — R00.0 TACHYCARDIA, UNSPECIFIED: ICD-10-CM

## 2023-07-21 DIAGNOSIS — E11.22 TYPE 2 DIABETES MELLITUS WITH DIABETIC CHRONIC KIDNEY DISEASE: ICD-10-CM

## 2023-07-21 DIAGNOSIS — G92.8 OTHER TOXIC ENCEPHALOPATHY: ICD-10-CM

## 2023-07-21 DIAGNOSIS — E11.621 TYPE 2 DIABETES MELLITUS WITH FOOT ULCER: ICD-10-CM

## 2023-07-21 DIAGNOSIS — N17.9 ACUTE KIDNEY FAILURE, UNSPECIFIED: ICD-10-CM

## 2023-07-21 DIAGNOSIS — A41.9 SEPSIS, UNSPECIFIED ORGANISM: ICD-10-CM

## 2023-07-21 DIAGNOSIS — Z41.8 ENCOUNTER FOR OTHER PROCEDURES FOR PURPOSES OTHER THAN REMEDYING HEALTH STATE: ICD-10-CM

## 2023-07-21 DIAGNOSIS — E87.8 OTHER DISORDERS OF ELECTROLYTE AND FLUID BALANCE, NOT ELSEWHERE CLASSIFIED: ICD-10-CM

## 2023-07-21 DIAGNOSIS — F01.50 VASCULAR DEMENTIA WITHOUT BEHAVIORAL DISTURBANCE: ICD-10-CM

## 2023-07-21 DIAGNOSIS — I50.9 HEART FAILURE, UNSPECIFIED: ICD-10-CM

## 2023-07-21 DIAGNOSIS — E11.10 TYPE 2 DIABETES MELLITUS WITH KETOACIDOSIS WITHOUT COMA: ICD-10-CM

## 2023-07-21 DIAGNOSIS — L97.419 NON-PRESSURE CHRONIC ULCER OF RIGHT HEEL AND MIDFOOT WITH UNSPECIFIED SEVERITY: ICD-10-CM

## 2023-07-21 DIAGNOSIS — I12.0 HYPERTENSIVE CHRONIC KIDNEY DISEASE WITH STAGE 5 CHRONIC KIDNEY DISEASE OR END STAGE RENAL DISEASE: ICD-10-CM

## 2023-07-21 DIAGNOSIS — E11.65 TYPE 2 DIABETES MELLITUS WITH HYPERGLYCEMIA: ICD-10-CM

## 2023-07-21 DIAGNOSIS — E86.0 DEHYDRATION: ICD-10-CM

## 2023-07-21 DIAGNOSIS — Z79.4 LONG TERM (CURRENT) USE OF INSULIN: ICD-10-CM

## 2023-07-21 DIAGNOSIS — E87.6 HYPOKALEMIA: ICD-10-CM

## 2023-07-21 DIAGNOSIS — I69.354 HEMIPLEGIA AND HEMIPARESIS FOLLOWING CEREBRAL INFARCTION AFFECTING LEFT NON-DOMINANT SIDE: ICD-10-CM

## 2023-07-24 NOTE — ASSESSMENT
[FreeTextEntry1] : 57M with pmhx of CKD Stage 5, HTN, IDDM1 w/ retinopathy, stroke 2/2019 w/ residual L sided weakness presenting for CKD follow up.\par \par #CKD Stage V\par Pt presenting as hospital follow up, with CKD V. Previously had seen Dr. Grace in [id number]. Discussed with patient given modalities not a good candidate for home dialysis therapies. Pt in agreement will need to start hemodialysis in the near future with in-center. Discussed with patient what to look for in terms of requiring acute HD including but not limited to. SOB, loss of appetite, fatigue, increased swelling or weight loss.\par -Advised to limit sodium intake and monitor potassium intake\par \par Access - growing, diameter not adequate yet\par \par #HTN\par On nifedipine 60mg Qday - BPs low today, would decrease to 30\par -Continue with current meds\par \par #IDDM\par -Last A1C 6.4; insulin stopped as A1C within range, but this might be the case because insulin may be lasting longer in his system due to poor kidney function\par -Continue with orals for now\par \par #Hx of stroke\par -F/u with Neuro\par -Remains on Provigil 100mg Qday\par \par RTC in 1 month to monitor for uremic symptoms\par

## 2023-07-24 NOTE — PHYSICAL EXAM
[General Appearance - Alert] : alert [General Appearance - In No Acute Distress] : in no acute distress [Sclera] : the sclera and conjunctiva were normal [PERRL With Normal Accommodation] : pupils were equal in size, round, and reactive to light [Outer Ear] : the ears and nose were normal in appearance [Extraocular Movements] : extraocular movements were intact [Oropharynx] : the oropharynx was normal [Neck Appearance] : the appearance of the neck was normal [Neck Cervical Mass (___cm)] : no neck mass was observed [Jugular Venous Distention Increased] : there was no jugular-venous distention [Respiration, Rhythm And Depth] : normal respiratory rhythm and effort [Exaggerated Use Of Accessory Muscles For Inspiration] : no accessory muscle use [Auscultation Breath Sounds / Voice Sounds] : lungs were clear to auscultation bilaterally [Heart Rate And Rhythm] : heart rate was normal and rhythm regular [Heart Sounds] : normal S1 and S2 [Heart Sounds Gallop] : no gallops [Murmurs] : no murmurs [Heart Sounds Pericardial Friction Rub] : no pericardial rub [Edema] : there was no peripheral edema [Veins - Varicosity Changes] : there were no varicosital changes [Bowel Sounds] : normal bowel sounds [Abdomen Soft] : soft [Abdomen Tenderness] : non-tender [Abdomen Mass (___ Cm)] : no abdominal mass palpated [No CVA Tenderness] : no ~M costovertebral angle tenderness [No Spinal Tenderness] : no spinal tenderness [FreeTextEntry1] : In wheelchair, rhythmic spasms bilateral upper extremities [Skin Color & Pigmentation] : normal skin color and pigmentation [Skin Turgor] : normal skin turgor [] : no rash [Affect] : the affect was normal [Mood] : the mood was normal

## 2023-07-24 NOTE — HISTORY OF PRESENT ILLNESS
[FreeTextEntry1] : 57M with pmhx of CKD Stage 5 s/p left brachiocephalic AVF on 4/25/23, HTN, IDDM1 w/ retinopathy, stroke 2/2019 w/ residual L sided weakness presenting for CKD follow up.\par \par L AVF stable, not able to be cannulated yet\par \par Somewhat decreased mental status today not as sharp as usual, AOx2, in line with patient normal waxing and waning. Denies any symptoms, no pain. No signs of infection on exam. Will monitor for now. Labs with rehab. \par \par Denies uremic symptoms such as nausea, vomiting, fatigue, anorexia, weight loss, muscle cramps, pruritus, dysgeusia, or changes in mental status

## 2023-08-05 LAB
CULTURE RESULTS: SIGNIFICANT CHANGE UP
SPECIMEN SOURCE: SIGNIFICANT CHANGE UP

## 2023-08-18 ENCOUNTER — APPOINTMENT (OUTPATIENT)
Dept: HEART AND VASCULAR | Facility: CLINIC | Age: 58
End: 2023-08-18

## 2023-08-22 ENCOUNTER — APPOINTMENT (OUTPATIENT)
Dept: NEUROLOGY | Facility: CLINIC | Age: 58
End: 2023-08-22
Payer: MEDICAID

## 2023-08-22 VITALS
DIASTOLIC BLOOD PRESSURE: 90 MMHG | TEMPERATURE: 97.6 F | HEART RATE: 97 BPM | SYSTOLIC BLOOD PRESSURE: 172 MMHG | OXYGEN SATURATION: 96 % | HEIGHT: 70 IN

## 2023-08-22 DIAGNOSIS — I63.9 CEREBRAL INFARCTION, UNSPECIFIED: ICD-10-CM

## 2023-08-22 PROCEDURE — 99203 OFFICE O/P NEW LOW 30 MIN: CPT

## 2023-08-22 NOTE — HISTORY OF PRESENT ILLNESS
[FreeTextEntry1] : Patient is a 57 year old male with PMH CKD, HTN, HLD, DM, prior CVA presenting as a new patient for stroke follow up. - In February 2019 he suffered an acute infarct in the left corona radiata and left macrina seen on MRI Brain resulting in right leg weakness. He was discharged on Aspirin and statin. 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 Previous hypercoags only positive for hexagonal phase. TTE without evidence of valvular disease and no right to left shunt. KATHY: No LA/RA/SEAN/RAA thrombus seen, there is minimal non-mobile plaque seen in the visualized portion of the descending aorta. There is minimal non-mobile plaque seen in the visualized portion of the aortic arch. ILR placed during this hospitalization to assess for arrhythmia. At that time he was put on Aspirin 81/Plavix 75mg and Atorvastatin 80mg.  Recent labs: A1C 7.8 (07/2023), LDL 55 (11/2022) CT Head (07/2023): No acute intracranial hemorrhage, hydrocephalus or extra-axial fluid collection. Mild parenchymal volume loss and mild chronic microvascular ischemic changes. No CT evidence for acute transcortical infarction. Please note that MR imaging is a more sensitive imaging modality for detection of acute infarction and may be obtained as clinically warranted. TTE (07/2023): 1. Left ventricular systolic function is low-normal with a calculated ejection fraction of 50-55% with mild global hypokinesis.  2. Normal right ventricular size and systolic function.  3. No significant valvular disease.  4. No evidence of pulmonary hypertension.  5. No pericardial effusion.  6. Patient was tachycardic during the study.  7. Compared to the previous TTE performed on 10/18/2022, there have been no significant interval changes.  He continues to have residual left sided weakness and is wheelchair bound. Since discharge, patient is doing well and denies any new stroke like symptoms. He's currently on Aspirin 81mg, Plavix 75mg and Atorvastatin 80mg. BP today: 172/90  He was recently in the hospital in July for a below the knee amputation. He is currently at [facility]. He states that he has Speech, Occupational, and Physical therapy there and states that he will be starting again next week.  Diet: Fruits, vegetables, chicken, turkey, and bowl of cereal for breakfast. Exercise: Was doing PT and OT but stopped recently

## 2023-08-22 NOTE — PHYSICAL EXAM
[FreeTextEntry1] : The patient is alert and oriented x3, follows commands, and is able to participate fully in the history taking. Speech is normal with no evidence of dysarthria. Memory is intact: Immediate recall 3 out of 3, short-term 3 out of 3, remote memory intact. Cranial nerves II through XII intact. Motor exam: Left Upper extremity 4/5, Left Lower 5/5, Right Upper extremity 5/5, Right lower extremity 5/5 but also has right below knee amputation. No abnormal movements noted. Sensory exam: Intact to light touch and pinprick. Romberg negative. Coordination and vestibular exam: Finger to nose intact, no evidence of ataxia or nystagmus. Gait: Wheelchair bound with Right below knee amputation.

## 2023-08-23 LAB
CULTURE RESULTS: SIGNIFICANT CHANGE UP
SPECIMEN SOURCE: SIGNIFICANT CHANGE UP

## 2023-08-24 DIAGNOSIS — E55.9 VITAMIN D DEFICIENCY, UNSPECIFIED: ICD-10-CM

## 2023-08-24 DIAGNOSIS — N18.4 CHRONIC KIDNEY DISEASE, STAGE 4 (SEVERE): ICD-10-CM

## 2023-08-31 ENCOUNTER — APPOINTMENT (OUTPATIENT)
Dept: NEPHROLOGY | Facility: CLINIC | Age: 58
End: 2023-08-31
Payer: MEDICAID

## 2023-08-31 VITALS — DIASTOLIC BLOOD PRESSURE: 89 MMHG | RESPIRATION RATE: 14 BRPM | HEART RATE: 94 BPM | SYSTOLIC BLOOD PRESSURE: 156 MMHG

## 2023-08-31 PROCEDURE — 99215 OFFICE O/P EST HI 40 MIN: CPT

## 2023-09-06 LAB
25(OH)D3 SERPL-MCNC: 39.7 NG/ML
ALBUMIN SERPL ELPH-MCNC: 3.9 G/DL
ALP BLD-CCNC: 100 U/L
ALT SERPL-CCNC: 22 U/L
ANION GAP SERPL CALC-SCNC: 16 MMOL/L
AST SERPL-CCNC: 19 U/L
BILIRUB SERPL-MCNC: 0.2 MG/DL
BUN SERPL-MCNC: 65 MG/DL
CALCIUM SERPL-MCNC: 9.4 MG/DL
CALCIUM SERPL-MCNC: 9.4 MG/DL
CHLORIDE SERPL-SCNC: 101 MMOL/L
CO2 SERPL-SCNC: 25 MMOL/L
CREAT SERPL-MCNC: 5.23 MG/DL
CYSTATIN C SERPL-MCNC: 4.12 MG/L
EGFR: 12 ML/MIN/1.73M2
FERRITIN SERPL-MCNC: 263 NG/ML
GFR/BSA.PRED SERPLBLD CYS-BASED-ARV: 12 ML/MIN/1.73M2
GLUCOSE SERPL-MCNC: 172 MG/DL
HBV SURFACE AB SER QL: NONREACTIVE
HBV SURFACE AG SER QL: NONREACTIVE
IRON SATN MFR SERPL: 24 %
IRON SERPL-MCNC: 53 UG/DL
M TB IFN-G BLD-IMP: NEGATIVE
PARATHYROID HORMONE INTACT: 61 PG/ML
PHOSPHATE SERPL-MCNC: 4.6 MG/DL
POTASSIUM SERPL-SCNC: 4.2 MMOL/L
PROT SERPL-MCNC: 7.2 G/DL
QUANTIFERON TB PLUS MITOGEN MINUS NIL: 1.43 IU/ML
QUANTIFERON TB PLUS NIL: 0.01 IU/ML
QUANTIFERON TB PLUS TB1 MINUS NIL: 0 IU/ML
QUANTIFERON TB PLUS TB2 MINUS NIL: 0 IU/ML
SODIUM SERPL-SCNC: 141 MMOL/L
TIBC SERPL-MCNC: 221 UG/DL
UIBC SERPL-MCNC: 168 UG/DL

## 2023-09-06 NOTE — HISTORY OF PRESENT ILLNESS
[FreeTextEntry1] : 58M with pmhx of CKD Stage 5 s/p left brachiocephalic AVF on 4/25/23, HTN, IDDM1 w/ retinopathy, stroke 2/2019 w/ residual L sided weakness presenting for CKD follow up.    L AVF stable, not able to be cannulated yet on my assessment  AOx2 but answering questions appropriately, more energetic than last seen, eating well and without issue, joking  Denies uremic symptoms such as nausea, vomiting, fatigue, anorexia, weight loss, muscle cramps, pruritus, dysgeusia, or changes in mental status

## 2023-09-06 NOTE — ASSESSMENT
[FreeTextEntry1] : 58M with pmhx of CKD Stage 5 s/p left brachiocephalic AVF on 4/25/23, HTN, IDDM1 w/ retinopathy, stroke 2/2019 w/ residual L sided weakness presenting for CKD follow up  #CKD Stage V Pt presenting as hospital follow up, with CKD V. Previously had seen Dr. Grace in [phone]. Discussed with patient given modalities not a good candidate for home dialysis therapies. Pt in agreement will need to start hemodialysis in the near future with in-center. Discussed with patient what to look for in terms of requiring acute HD including but not limited to. SOB, loss of appetite, fatigue, increased swelling or weight loss. -Advised to limit sodium intake and monitor potassium intake  Access - growing, diameter not adequate yet, left message for NH to have vascular re-examine  #HTN On nifedipine 30mg, stable BP -Continue with current meds    #IDDM -Last A1C 6.4; insulin stopped as A1C within range, but this might be the case because insulin may be lasting longer in his system due to poor kidney function -Continue with orals for now  #Hx of stroke -F/u with Neuro -Remains on Provigil 100mg Qday  RTC in 1 month to monitor for uremic symptoms .

## 2023-09-06 NOTE — PHYSICAL EXAM
[General Appearance - Alert] : alert [General Appearance - In No Acute Distress] : in no acute distress [Sclera] : the sclera and conjunctiva were normal [PERRL With Normal Accommodation] : pupils were equal in size, round, and reactive to light [Extraocular Movements] : extraocular movements were intact [Outer Ear] : the ears and nose were normal in appearance [Oropharynx] : the oropharynx was normal [Neck Appearance] : the appearance of the neck was normal [Neck Cervical Mass (___cm)] : no neck mass was observed [Jugular Venous Distention Increased] : there was no jugular-venous distention [Respiration, Rhythm And Depth] : normal respiratory rhythm and effort [Exaggerated Use Of Accessory Muscles For Inspiration] : no accessory muscle use [Auscultation Breath Sounds / Voice Sounds] : lungs were clear to auscultation bilaterally [Heart Rate And Rhythm] : heart rate was normal and rhythm regular [Heart Sounds] : normal S1 and S2 [Heart Sounds Gallop] : no gallops [Murmurs] : no murmurs [Heart Sounds Pericardial Friction Rub] : no pericardial rub [Edema] : there was no peripheral edema [Veins - Varicosity Changes] : there were no varicosital changes [Bowel Sounds] : normal bowel sounds [Abdomen Soft] : soft [Abdomen Tenderness] : non-tender [Abdomen Mass (___ Cm)] : no abdominal mass palpated [No CVA Tenderness] : no ~M costovertebral angle tenderness [No Spinal Tenderness] : no spinal tenderness [___ (cm) Fistula] : [unfilled] (cm) fistula [Bruit] : a bruit was present [Thrill] : a thrill was present [Benign] : appears benign [Skin Color & Pigmentation] : normal skin color and pigmentation [Skin Turgor] : normal skin turgor [] : no rash [FreeTextEntry1] : AOx2, answers questions appropriately [Affect] : the affect was normal [Mood] : the mood was normal

## 2023-09-18 ENCOUNTER — APPOINTMENT (OUTPATIENT)
Dept: VASCULAR SURGERY | Facility: CLINIC | Age: 58
End: 2023-09-18

## 2023-10-23 ENCOUNTER — APPOINTMENT (OUTPATIENT)
Dept: VASCULAR SURGERY | Facility: CLINIC | Age: 58
End: 2023-10-23
Payer: MEDICAID

## 2023-10-23 VITALS — WEIGHT: 136 LBS | BODY MASS INDEX: 19.47 KG/M2 | HEIGHT: 70 IN

## 2023-10-23 PROCEDURE — 93926 LOWER EXTREMITY STUDY: CPT

## 2023-10-23 PROCEDURE — 99214 OFFICE O/P EST MOD 30 MIN: CPT

## 2023-10-24 VITALS
HEART RATE: 125 BPM | DIASTOLIC BLOOD PRESSURE: 77 MMHG | SYSTOLIC BLOOD PRESSURE: 127 MMHG | HEIGHT: 72 IN | WEIGHT: 139.99 LBS | TEMPERATURE: 98 F | OXYGEN SATURATION: 97 % | RESPIRATION RATE: 18 BRPM

## 2023-10-24 LAB
ALBUMIN SERPL ELPH-MCNC: 3.3 G/DL — SIGNIFICANT CHANGE UP (ref 3.3–5)
ALBUMIN SERPL ELPH-MCNC: 3.3 G/DL — SIGNIFICANT CHANGE UP (ref 3.3–5)
ALP SERPL-CCNC: 82 U/L — SIGNIFICANT CHANGE UP (ref 40–120)
ALP SERPL-CCNC: 82 U/L — SIGNIFICANT CHANGE UP (ref 40–120)
ALT FLD-CCNC: 13 U/L — SIGNIFICANT CHANGE UP (ref 10–45)
ALT FLD-CCNC: 13 U/L — SIGNIFICANT CHANGE UP (ref 10–45)
AMMONIA BLD-MCNC: 45 UMOL/L — SIGNIFICANT CHANGE UP (ref 11–55)
AMMONIA BLD-MCNC: 45 UMOL/L — SIGNIFICANT CHANGE UP (ref 11–55)
ANION GAP SERPL CALC-SCNC: 13 MMOL/L — SIGNIFICANT CHANGE UP (ref 5–17)
ANION GAP SERPL CALC-SCNC: 13 MMOL/L — SIGNIFICANT CHANGE UP (ref 5–17)
ANION GAP SERPL CALC-SCNC: 14 MMOL/L — SIGNIFICANT CHANGE UP (ref 5–17)
ANION GAP SERPL CALC-SCNC: 14 MMOL/L — SIGNIFICANT CHANGE UP (ref 5–17)
APPEARANCE UR: CLEAR — SIGNIFICANT CHANGE UP
APPEARANCE UR: CLEAR — SIGNIFICANT CHANGE UP
APTT BLD: 26.5 SEC — SIGNIFICANT CHANGE UP (ref 24.5–35.6)
APTT BLD: 26.5 SEC — SIGNIFICANT CHANGE UP (ref 24.5–35.6)
AST SERPL-CCNC: 14 U/L — SIGNIFICANT CHANGE UP (ref 10–40)
AST SERPL-CCNC: 14 U/L — SIGNIFICANT CHANGE UP (ref 10–40)
BACTERIA # UR AUTO: NEGATIVE /HPF — SIGNIFICANT CHANGE UP
BACTERIA # UR AUTO: NEGATIVE /HPF — SIGNIFICANT CHANGE UP
BASE EXCESS BLDV CALC-SCNC: -8.8 MMOL/L — LOW (ref -2–3)
BASE EXCESS BLDV CALC-SCNC: -8.8 MMOL/L — LOW (ref -2–3)
BASOPHILS # BLD AUTO: 0.05 K/UL — SIGNIFICANT CHANGE UP (ref 0–0.2)
BASOPHILS # BLD AUTO: 0.05 K/UL — SIGNIFICANT CHANGE UP (ref 0–0.2)
BASOPHILS NFR BLD AUTO: 0.3 % — SIGNIFICANT CHANGE UP (ref 0–2)
BASOPHILS NFR BLD AUTO: 0.3 % — SIGNIFICANT CHANGE UP (ref 0–2)
BILIRUB SERPL-MCNC: <0.2 MG/DL — SIGNIFICANT CHANGE UP (ref 0.2–1.2)
BILIRUB SERPL-MCNC: <0.2 MG/DL — SIGNIFICANT CHANGE UP (ref 0.2–1.2)
BILIRUB UR-MCNC: NEGATIVE — SIGNIFICANT CHANGE UP
BILIRUB UR-MCNC: NEGATIVE — SIGNIFICANT CHANGE UP
BUN SERPL-MCNC: 121 MG/DL — HIGH (ref 7–23)
BUN SERPL-MCNC: 121 MG/DL — HIGH (ref 7–23)
BUN SERPL-MCNC: 124 MG/DL — HIGH (ref 7–23)
BUN SERPL-MCNC: 124 MG/DL — HIGH (ref 7–23)
C DIFF GDH STL QL: SIGNIFICANT CHANGE UP
CA-I SERPL-SCNC: 1.34 MMOL/L — HIGH (ref 1.15–1.33)
CA-I SERPL-SCNC: 1.34 MMOL/L — HIGH (ref 1.15–1.33)
CALCIUM SERPL-MCNC: 8.8 MG/DL — SIGNIFICANT CHANGE UP (ref 8.4–10.5)
CALCIUM SERPL-MCNC: 8.8 MG/DL — SIGNIFICANT CHANGE UP (ref 8.4–10.5)
CALCIUM SERPL-MCNC: 9.2 MG/DL — SIGNIFICANT CHANGE UP (ref 8.4–10.5)
CALCIUM SERPL-MCNC: 9.2 MG/DL — SIGNIFICANT CHANGE UP (ref 8.4–10.5)
CAST: 1 /LPF — SIGNIFICANT CHANGE UP (ref 0–4)
CAST: 1 /LPF — SIGNIFICANT CHANGE UP (ref 0–4)
CHLORIDE SERPL-SCNC: 122 MMOL/L — HIGH (ref 96–108)
CHLORIDE SERPL-SCNC: 122 MMOL/L — HIGH (ref 96–108)
CHLORIDE SERPL-SCNC: 123 MMOL/L — HIGH (ref 96–108)
CHLORIDE SERPL-SCNC: 123 MMOL/L — HIGH (ref 96–108)
CO2 BLDV-SCNC: 19.3 MMOL/L — LOW (ref 22–26)
CO2 BLDV-SCNC: 19.3 MMOL/L — LOW (ref 22–26)
CO2 SERPL-SCNC: 16 MMOL/L — LOW (ref 22–31)
CO2 SERPL-SCNC: 16 MMOL/L — LOW (ref 22–31)
CO2 SERPL-SCNC: 17 MMOL/L — LOW (ref 22–31)
CO2 SERPL-SCNC: 17 MMOL/L — LOW (ref 22–31)
COLOR SPEC: YELLOW — SIGNIFICANT CHANGE UP
COLOR SPEC: YELLOW — SIGNIFICANT CHANGE UP
CREAT SERPL-MCNC: 6.68 MG/DL — HIGH (ref 0.5–1.3)
CREAT SERPL-MCNC: 6.68 MG/DL — HIGH (ref 0.5–1.3)
CREAT SERPL-MCNC: 7.09 MG/DL — HIGH (ref 0.5–1.3)
CREAT SERPL-MCNC: 7.09 MG/DL — HIGH (ref 0.5–1.3)
DIFF PNL FLD: NEGATIVE — SIGNIFICANT CHANGE UP
DIFF PNL FLD: NEGATIVE — SIGNIFICANT CHANGE UP
EGFR: 8 ML/MIN/1.73M2 — LOW
EGFR: 8 ML/MIN/1.73M2 — LOW
EGFR: 9 ML/MIN/1.73M2 — LOW
EGFR: 9 ML/MIN/1.73M2 — LOW
EOSINOPHIL # BLD AUTO: 0.45 K/UL — SIGNIFICANT CHANGE UP (ref 0–0.5)
EOSINOPHIL # BLD AUTO: 0.45 K/UL — SIGNIFICANT CHANGE UP (ref 0–0.5)
EOSINOPHIL NFR BLD AUTO: 2.3 % — SIGNIFICANT CHANGE UP (ref 0–6)
EOSINOPHIL NFR BLD AUTO: 2.3 % — SIGNIFICANT CHANGE UP (ref 0–6)
FLUAV AG NPH QL: SIGNIFICANT CHANGE UP
FLUAV AG NPH QL: SIGNIFICANT CHANGE UP
FLUBV AG NPH QL: SIGNIFICANT CHANGE UP
FLUBV AG NPH QL: SIGNIFICANT CHANGE UP
GAS PNL BLDV: 152 MMOL/L — HIGH (ref 136–145)
GAS PNL BLDV: 152 MMOL/L — HIGH (ref 136–145)
GAS PNL BLDV: SIGNIFICANT CHANGE UP
GAS PNL BLDV: SIGNIFICANT CHANGE UP
GLUCOSE SERPL-MCNC: 106 MG/DL — HIGH (ref 70–99)
GLUCOSE SERPL-MCNC: 106 MG/DL — HIGH (ref 70–99)
GLUCOSE SERPL-MCNC: 148 MG/DL — HIGH (ref 70–99)
GLUCOSE SERPL-MCNC: 148 MG/DL — HIGH (ref 70–99)
GLUCOSE UR QL: NEGATIVE MG/DL — SIGNIFICANT CHANGE UP
GLUCOSE UR QL: NEGATIVE MG/DL — SIGNIFICANT CHANGE UP
HCO3 BLDV-SCNC: 18 MMOL/L — LOW (ref 22–29)
HCO3 BLDV-SCNC: 18 MMOL/L — LOW (ref 22–29)
HCT VFR BLD CALC: 38.3 % — LOW (ref 39–50)
HCT VFR BLD CALC: 38.3 % — LOW (ref 39–50)
HGB BLD-MCNC: 11.5 G/DL — LOW (ref 13–17)
HGB BLD-MCNC: 11.5 G/DL — LOW (ref 13–17)
IMM GRANULOCYTES NFR BLD AUTO: 0.7 % — SIGNIFICANT CHANGE UP (ref 0–0.9)
IMM GRANULOCYTES NFR BLD AUTO: 0.7 % — SIGNIFICANT CHANGE UP (ref 0–0.9)
INR BLD: 0.99 — SIGNIFICANT CHANGE UP (ref 0.85–1.18)
INR BLD: 0.99 — SIGNIFICANT CHANGE UP (ref 0.85–1.18)
KETONES UR-MCNC: NEGATIVE MG/DL — SIGNIFICANT CHANGE UP
KETONES UR-MCNC: NEGATIVE MG/DL — SIGNIFICANT CHANGE UP
LACTATE SERPL-SCNC: 1.2 MMOL/L — SIGNIFICANT CHANGE UP (ref 0.5–2)
LACTATE SERPL-SCNC: 1.2 MMOL/L — SIGNIFICANT CHANGE UP (ref 0.5–2)
LEUKOCYTE ESTERASE UR-ACNC: NEGATIVE — SIGNIFICANT CHANGE UP
LEUKOCYTE ESTERASE UR-ACNC: NEGATIVE — SIGNIFICANT CHANGE UP
LYMPHOCYTES # BLD AUTO: 1.25 K/UL — SIGNIFICANT CHANGE UP (ref 1–3.3)
LYMPHOCYTES # BLD AUTO: 1.25 K/UL — SIGNIFICANT CHANGE UP (ref 1–3.3)
LYMPHOCYTES # BLD AUTO: 6.4 % — LOW (ref 13–44)
LYMPHOCYTES # BLD AUTO: 6.4 % — LOW (ref 13–44)
MCHC RBC-ENTMCNC: 28.6 PG — SIGNIFICANT CHANGE UP (ref 27–34)
MCHC RBC-ENTMCNC: 28.6 PG — SIGNIFICANT CHANGE UP (ref 27–34)
MCHC RBC-ENTMCNC: 30 GM/DL — LOW (ref 32–36)
MCHC RBC-ENTMCNC: 30 GM/DL — LOW (ref 32–36)
MCV RBC AUTO: 95.3 FL — SIGNIFICANT CHANGE UP (ref 80–100)
MCV RBC AUTO: 95.3 FL — SIGNIFICANT CHANGE UP (ref 80–100)
MONOCYTES # BLD AUTO: 1.29 K/UL — HIGH (ref 0–0.9)
MONOCYTES # BLD AUTO: 1.29 K/UL — HIGH (ref 0–0.9)
MONOCYTES NFR BLD AUTO: 6.6 % — SIGNIFICANT CHANGE UP (ref 2–14)
MONOCYTES NFR BLD AUTO: 6.6 % — SIGNIFICANT CHANGE UP (ref 2–14)
NEUTROPHILS # BLD AUTO: 16.25 K/UL — HIGH (ref 1.8–7.4)
NEUTROPHILS # BLD AUTO: 16.25 K/UL — HIGH (ref 1.8–7.4)
NEUTROPHILS NFR BLD AUTO: 83.7 % — HIGH (ref 43–77)
NEUTROPHILS NFR BLD AUTO: 83.7 % — HIGH (ref 43–77)
NITRITE UR-MCNC: NEGATIVE — SIGNIFICANT CHANGE UP
NITRITE UR-MCNC: NEGATIVE — SIGNIFICANT CHANGE UP
NRBC # BLD: 0 /100 WBCS — SIGNIFICANT CHANGE UP (ref 0–0)
NRBC # BLD: 0 /100 WBCS — SIGNIFICANT CHANGE UP (ref 0–0)
PCO2 BLDV: 41 MMHG — LOW (ref 42–55)
PCO2 BLDV: 41 MMHG — LOW (ref 42–55)
PH BLDV: 7.25 — LOW (ref 7.32–7.43)
PH BLDV: 7.25 — LOW (ref 7.32–7.43)
PH UR: 5 — SIGNIFICANT CHANGE UP (ref 5–8)
PH UR: 5 — SIGNIFICANT CHANGE UP (ref 5–8)
PLATELET # BLD AUTO: 371 K/UL — SIGNIFICANT CHANGE UP (ref 150–400)
PLATELET # BLD AUTO: 371 K/UL — SIGNIFICANT CHANGE UP (ref 150–400)
PO2 BLDV: 58 MMHG — HIGH (ref 25–45)
PO2 BLDV: 58 MMHG — HIGH (ref 25–45)
POTASSIUM BLDV-SCNC: 4.5 MMOL/L — SIGNIFICANT CHANGE UP (ref 3.5–5.1)
POTASSIUM BLDV-SCNC: 4.5 MMOL/L — SIGNIFICANT CHANGE UP (ref 3.5–5.1)
POTASSIUM SERPL-MCNC: 4.3 MMOL/L — SIGNIFICANT CHANGE UP (ref 3.5–5.3)
POTASSIUM SERPL-MCNC: 4.3 MMOL/L — SIGNIFICANT CHANGE UP (ref 3.5–5.3)
POTASSIUM SERPL-MCNC: 4.4 MMOL/L — SIGNIFICANT CHANGE UP (ref 3.5–5.3)
POTASSIUM SERPL-MCNC: 4.4 MMOL/L — SIGNIFICANT CHANGE UP (ref 3.5–5.3)
POTASSIUM SERPL-SCNC: 4.3 MMOL/L — SIGNIFICANT CHANGE UP (ref 3.5–5.3)
POTASSIUM SERPL-SCNC: 4.3 MMOL/L — SIGNIFICANT CHANGE UP (ref 3.5–5.3)
POTASSIUM SERPL-SCNC: 4.4 MMOL/L — SIGNIFICANT CHANGE UP (ref 3.5–5.3)
POTASSIUM SERPL-SCNC: 4.4 MMOL/L — SIGNIFICANT CHANGE UP (ref 3.5–5.3)
PROT SERPL-MCNC: 6.9 G/DL — SIGNIFICANT CHANGE UP (ref 6–8.3)
PROT SERPL-MCNC: 6.9 G/DL — SIGNIFICANT CHANGE UP (ref 6–8.3)
PROT UR-MCNC: 100 MG/DL
PROT UR-MCNC: 100 MG/DL
PROTHROM AB SERPL-ACNC: 11.3 SEC — SIGNIFICANT CHANGE UP (ref 9.5–13)
PROTHROM AB SERPL-ACNC: 11.3 SEC — SIGNIFICANT CHANGE UP (ref 9.5–13)
RBC # BLD: 4.02 M/UL — LOW (ref 4.2–5.8)
RBC # BLD: 4.02 M/UL — LOW (ref 4.2–5.8)
RBC # FLD: 16 % — HIGH (ref 10.3–14.5)
RBC # FLD: 16 % — HIGH (ref 10.3–14.5)
RBC CASTS # UR COMP ASSIST: 1 /HPF — SIGNIFICANT CHANGE UP (ref 0–4)
RBC CASTS # UR COMP ASSIST: 1 /HPF — SIGNIFICANT CHANGE UP (ref 0–4)
RSV RNA NPH QL NAA+NON-PROBE: SIGNIFICANT CHANGE UP
RSV RNA NPH QL NAA+NON-PROBE: SIGNIFICANT CHANGE UP
SAO2 % BLDV: 88.5 % — HIGH (ref 67–88)
SAO2 % BLDV: 88.5 % — HIGH (ref 67–88)
SARS-COV-2 RNA SPEC QL NAA+PROBE: SIGNIFICANT CHANGE UP
SARS-COV-2 RNA SPEC QL NAA+PROBE: SIGNIFICANT CHANGE UP
SODIUM SERPL-SCNC: 151 MMOL/L — HIGH (ref 135–145)
SODIUM SERPL-SCNC: 151 MMOL/L — HIGH (ref 135–145)
SODIUM SERPL-SCNC: 154 MMOL/L — HIGH (ref 135–145)
SODIUM SERPL-SCNC: 154 MMOL/L — HIGH (ref 135–145)
SP GR SPEC: 1.01 — SIGNIFICANT CHANGE UP (ref 1–1.03)
SP GR SPEC: 1.01 — SIGNIFICANT CHANGE UP (ref 1–1.03)
SQUAMOUS # UR AUTO: 1 /HPF — SIGNIFICANT CHANGE UP (ref 0–5)
SQUAMOUS # UR AUTO: 1 /HPF — SIGNIFICANT CHANGE UP (ref 0–5)
UROBILINOGEN FLD QL: 0.2 MG/DL — SIGNIFICANT CHANGE UP (ref 0.2–1)
UROBILINOGEN FLD QL: 0.2 MG/DL — SIGNIFICANT CHANGE UP (ref 0.2–1)
WBC # BLD: 19.43 K/UL — HIGH (ref 3.8–10.5)
WBC # BLD: 19.43 K/UL — HIGH (ref 3.8–10.5)
WBC # FLD AUTO: 19.43 K/UL — HIGH (ref 3.8–10.5)
WBC # FLD AUTO: 19.43 K/UL — HIGH (ref 3.8–10.5)
WBC UR QL: 1 /HPF — SIGNIFICANT CHANGE UP (ref 0–5)
WBC UR QL: 1 /HPF — SIGNIFICANT CHANGE UP (ref 0–5)

## 2023-10-24 PROCEDURE — 71045 X-RAY EXAM CHEST 1 VIEW: CPT | Mod: 26

## 2023-10-24 PROCEDURE — 99291 CRITICAL CARE FIRST HOUR: CPT

## 2023-10-24 PROCEDURE — 93010 ELECTROCARDIOGRAM REPORT: CPT

## 2023-10-24 RX ORDER — SODIUM CHLORIDE 9 MG/ML
1000 INJECTION INTRAMUSCULAR; INTRAVENOUS; SUBCUTANEOUS ONCE
Refills: 0 | Status: COMPLETED | OUTPATIENT
Start: 2023-10-24 | End: 2023-10-24

## 2023-10-24 RX ORDER — VANCOMYCIN HCL 1 G
1000 VIAL (EA) INTRAVENOUS ONCE
Refills: 0 | Status: COMPLETED | OUTPATIENT
Start: 2023-10-24 | End: 2023-10-24

## 2023-10-24 RX ORDER — SODIUM CHLORIDE 9 MG/ML
1000 INJECTION, SOLUTION INTRAVENOUS ONCE
Refills: 0 | Status: COMPLETED | OUTPATIENT
Start: 2023-10-24 | End: 2023-10-24

## 2023-10-24 RX ORDER — ACETAMINOPHEN 500 MG
1000 TABLET ORAL ONCE
Refills: 0 | Status: COMPLETED | OUTPATIENT
Start: 2023-10-24 | End: 2023-10-24

## 2023-10-24 RX ORDER — PIPERACILLIN AND TAZOBACTAM 4; .5 G/20ML; G/20ML
3.38 INJECTION, POWDER, LYOPHILIZED, FOR SOLUTION INTRAVENOUS ONCE
Refills: 0 | Status: COMPLETED | OUTPATIENT
Start: 2023-10-24 | End: 2023-10-24

## 2023-10-24 RX ADMIN — SODIUM CHLORIDE 1000 MILLILITER(S): 9 INJECTION INTRAMUSCULAR; INTRAVENOUS; SUBCUTANEOUS at 21:01

## 2023-10-24 RX ADMIN — SODIUM CHLORIDE 1000 MILLILITER(S): 9 INJECTION, SOLUTION INTRAVENOUS at 21:45

## 2023-10-24 RX ADMIN — PIPERACILLIN AND TAZOBACTAM 25 GRAM(S): 4; .5 INJECTION, POWDER, LYOPHILIZED, FOR SOLUTION INTRAVENOUS at 22:09

## 2023-10-24 RX ADMIN — PIPERACILLIN AND TAZOBACTAM 200 GRAM(S): 4; .5 INJECTION, POWDER, LYOPHILIZED, FOR SOLUTION INTRAVENOUS at 19:00

## 2023-10-24 RX ADMIN — Medication 400 MILLIGRAM(S): at 19:00

## 2023-10-24 RX ADMIN — Medication 250 MILLIGRAM(S): at 19:00

## 2023-10-24 NOTE — ED PROVIDER NOTE - OBJECTIVE STATEMENT
58M with PMHx of DM2, CVA x 2 w/ residual L sided weakness, early onset dementia, HTN, HLD, CKD V (creatinine 7-8, not on HD), BPH and PSHx of L toe amputation, R BKA and AV fistula creation (5/23 - Dr. Royal) currently being treated for c. diff with oral vancomycin presenting from NH for AMS for 1 day. patient states he feels weak. denies pain.

## 2023-10-24 NOTE — ED PROVIDER NOTE - PROGRESS NOTE DETAILS
julio now stronger appearing, states he is feeling alright julianne- pt was signed out to me at 10pm. He was evaluated by ICU who recommended pt be admitted to medical floors, said no indication for tele or ICU. Pt VS improved, BP stable, afebrile but still tachycardic to low 110s.  CT head and CT AP were done. CTH unremarkable, CTAP consistent w/ colitis. Will admit to medicine. Trop was 89 likely in setting of ESRD, will add on 2nd trop as well, medicine team to follow up

## 2023-10-24 NOTE — CONSULT NOTE ADULT - ASSESSMENT
- s/p 1L NS      - please give additional 1L LR now  - add on troponin, lipase  - urinalysis with UCx      dispo pending above   - s/p 1L NS      - please give additional 1L LR now  - add on troponin, lipase  - urinalysis with UCx      Case discussed with night intensivist, at this time patient with no needs for telemetry or MICU.    - s/p 1L NS      - please give additional 1L LR now  - add on troponin, lipase  - urinalysis with UCx  - BP improved after fluids, downtrending Cr  - HD stable for floors    Case discussed with night intensivist, at this time patient with no needs for telemetry or MICU.  58M with PMHx of DM2, CVA x 2 w/ residual L sided weakness, early onset dementia, HTN, HLD, CKD V (creatinine 7-8, not on HD), BPH and PSHx of L toe amputation, R BKA and AV fistula creation (5/23 - Dr. Royal) currently being treated for c. diff with oral vancomycin presenting from NH for AMS for 1 day. ICU consulted for sepsis.    #sepsis  #c. diff infection  3/4 SIRS criteria (fever, HR, WBC), with unknown source at this time. Lactate wnl. s/p 1L NS and vanc/zosyn in ED.  AOx3, appropriately conversant. Hypovolemic on exam, EKG with sinus tachycardia.  Stage II sacral decubitus ulcer. CXR without infiltrate. Pending urine.  On PO vancomycin at NH. c. diff PCR+ on admission, abdominal exam benign  - pending CTH/abd/pel in ED  - s/p vanc/zosyn in ED  - add on troponin, lipase  - please collect urinalysis with UCx  - f/u bcx  - additional 1L LR given        - BP improved after fluids, downtrending Cr        - HD stable for floors  - would continue PO vancomycin  - contact precautions    Case discussed with night intensivist, at this time patient with no needs for telemetry or MICU. 58M with PMHx of DM2, CVA x 2 w/ residual L sided weakness, early onset dementia, HTN, HLD, CKD V (creatinine 7-8, not on HD), BPH and PSHx of L toe amputation, R BKA and AV fistula creation (5/23 - Dr. Royal) currently being treated for c. diff with oral vancomycin presenting from NH for AMS for 1 day. ICU consulted for sepsis.    #sepsis  #c. diff infection  3/4 SIRS criteria (fever, HR, WBC), with unknown source at this time. Lactate wnl. s/p 1L NS and vanc/zosyn in ED.  AOx3, appropriately conversant. Hypovolemic on exam, EKG with sinus tachycardia.  Stage II sacral decubitus ulcer. CXR without infiltrate. Pending urine.  On PO vancomycin at NH. c. diff PCR+ on admission, abdominal exam benign  - pending CTH/abd/pel in ED  - s/p vanc/zosyn in ED  - add on troponin, lipase  - please collect urinalysis with UCx  - f/u bcx  - additional 1L LR given        - BP improved after fluids, downtrending Cr        - HD stable for floors  - would continue PO vancomycin  - start IV flagyl  - contact precautions  - would care consult in AM for sacral decubitus ulcer    Case discussed with night intensivist, at this time patient with no needs for telemetry or MICU.

## 2023-10-24 NOTE — ED ADULT NURSE NOTE - NSFALLHARMRISKINTERV_ED_ALL_ED
Assistance OOB with selected safe patient handling equipment if applicable/Assistance with ambulation/Communicate risk of Fall with Harm to all staff, patient, and family/Monitor gait and stability/Monitor for mental status changes and reorient to person, place, and time, as needed/Provide visual cue: red socks, yellow wristband, yellow gown, etc/Reinforce activity limits and safety measures with patient and family/Toileting schedule using arm’s reach rule for commode and bathroom/Use of alarms - bed, stretcher, chair and/or video monitoring/Bed in lowest position, wheels locked, appropriate side rails in place/Call bell, personal items and telephone in reach/Instruct patient to call for assistance before getting out of bed/chair/stretcher/Non-slip footwear applied when patient is off stretcher/[place] to call system/Physically safe environment - no spills, clutter or unnecessary equipment/Purposeful Proactive Rounding/Room/bathroom lighting operational, light cord in reach

## 2023-10-24 NOTE — ED ADULT NURSE NOTE - ED STAT RN HANDOFF DETAILS
Covering for RN Ally: Patient stable for transfer. No acute distress noted. Safety precautions maintained. DASA form completed, score of 0.

## 2023-10-24 NOTE — ED ADULT NURSE NOTE - OBJECTIVE STATEMENT
Pt. a&ox2 (to person and place), extensive pmhx of HTN, CVA with residual deficit, Right BKA, bedbound, from NH sent in for AMS since this am, staff noticed pt. to be "more lethargic, less verbal, and more confused than usual". Pt. arrives awake, alert, oriented to self and place, baseline unclear, pt speech is limited d/t CVAs, difficult for pt. to communicate or respond to interview questions. Was able to deny pain att. Pt. also currently being tx with PO Vanc for C.Diff. Pt. made UG to MD Tapia ; rectal temp performed d/t pt. arriving to ED tachycardic and warm to touch. Large bore PIVs placed, on ccm, pulseox, and BP. No obvious or active wounds on body assessment or sacrum. EKG done. Also reported by NH that Cr. / kidney function was elevated @ 13.

## 2023-10-24 NOTE — CONSULT NOTE ADULT - SUBJECTIVE AND OBJECTIVE BOX
******INCOMPLETE******    Patient is a 58y old  Male who presents with a chief complaint of   OVERNIGHT EVENTS/INTERVAL HPI:    REVIEW OF SYSTEMS:  All other review of systems is negative unless indicated above.    OBJECTIVE:  T(C): 38.1 (10-24-23 @ 18:46), Max: 38.1 (10-24-23 @ 18:46)  HR: 125 (10-24-23 @ 19:17) (125 - 130)  BP: 104/71 (10-24-23 @ 19:17) (97/71 - 127/77)  RR: 18 (10-24-23 @ 19:12) (17 - 18)  SpO2: 97% (10-24-23 @ 19:12) (96% - 97%)  Daily Height in cm: 182.88 (24 Oct 2023 18:02)    Daily     Physical Exam:  General: in no acute distress  Eyes: EOMI intact bilaterally. Anicteric sclerae, moist conjunctivae  HENT: Moist mucous membranes  Neck: Trachea midline, supple  Lungs: CTA B/L. No wheezes, rales, or rhonchi  Cardiovascular: RRR. No murmurs, rubs, or gallops  Abdomen: Soft, non-tender non-distended; No rebound or guarding  Extremities: WWP, No clubbing, cyanosis or edema  MSK: No midline bony tenderness. No CVA tenderness bilaterally  Neurological: Alert and oriented x3  Skin: Warm and dry. No obvious rash     Medications:  MEDICATIONS  (STANDING):  lactated ringers Bolus 1000 milliLiter(s) IV Bolus once  piperacillin/tazobactam IVPB.. 3.375 Gram(s) IV Intermittent once    MEDICATIONS  (PRN):      Labs:                        11.5   19.43 )-----------( 371      ( 24 Oct 2023 18:31 )             38.3     10-24    154<H>  |  123<H>  |  124<H>  ----------------------------<  106<H>  4.4   |  17<L>  |  7.09<H>    Ca    9.2      24 Oct 2023 18:31    TPro  6.9  /  Alb  3.3  /  TBili  <0.2  /  DBili  x   /  AST  14  /  ALT  13  /  AlkPhos  82  10-24    PT/INR - ( 24 Oct 2023 18:31 )   PT: 11.3 sec;   INR: 0.99          PTT - ( 24 Oct 2023 18:31 )  PTT:26.5 sec  Urinalysis Basic - ( 24 Oct 2023 18:31 )    Color: x / Appearance: x / SG: x / pH: x  Gluc: 106 mg/dL / Ketone: x  / Bili: x / Urobili: x   Blood: x / Protein: x / Nitrite: x   Leuk Esterase: x / RBC: x / WBC x   Sq Epi: x / Non Sq Epi: x / Bacteria: x      SARS-CoV-2: NotDetec (15 Jul 2023 22:53)  SARS-CoV-2: NotDetec (05 Jul 2023 17:47)      Radiology: Reviewed ******INCOMPLETE******    Patient is a 58y old  Male who presents with a chief complaint of     OVERNIGHT EVENTS/INTERVAL HPI:    REVIEW OF SYSTEMS:  All other review of systems is negative unless indicated above.    OBJECTIVE:  T(C): 38.1 (10-24-23 @ 18:46), Max: 38.1 (10-24-23 @ 18:46)  HR: 125 (10-24-23 @ 19:17) (125 - 130)  BP: 104/71 (10-24-23 @ 19:17) (97/71 - 127/77)  RR: 18 (10-24-23 @ 19:12) (17 - 18)  SpO2: 97% (10-24-23 @ 19:12) (96% - 97%)  Daily Height in cm: 182.88 (24 Oct 2023 18:02)    Daily     Physical Exam:  General: in no acute distress  Eyes: EOMI intact bilaterally. Anicteric sclerae, moist conjunctivae  HENT: Moist mucous membranes  Neck: Trachea midline, supple  Lungs: CTA B/L. No wheezes, rales, or rhonchi  Cardiovascular: RRR. No murmurs, rubs, or gallops  Abdomen: Soft, non-tender non-distended; No rebound or guarding  Extremities: WWP, No clubbing, cyanosis or edema  MSK: No midline bony tenderness. No CVA tenderness bilaterally  Neurological: Alert and oriented x3  Skin: Warm and dry. No obvious rash     Medications:  MEDICATIONS  (STANDING):  lactated ringers Bolus 1000 milliLiter(s) IV Bolus once  piperacillin/tazobactam IVPB.. 3.375 Gram(s) IV Intermittent once    MEDICATIONS  (PRN):      Labs:                        11.5   19.43 )-----------( 371      ( 24 Oct 2023 18:31 )             38.3     10-24    154<H>  |  123<H>  |  124<H>  ----------------------------<  106<H>  4.4   |  17<L>  |  7.09<H>    Ca    9.2      24 Oct 2023 18:31    TPro  6.9  /  Alb  3.3  /  TBili  <0.2  /  DBili  x   /  AST  14  /  ALT  13  /  AlkPhos  82  10-24    PT/INR - ( 24 Oct 2023 18:31 )   PT: 11.3 sec;   INR: 0.99          PTT - ( 24 Oct 2023 18:31 )  PTT:26.5 sec  Urinalysis Basic - ( 24 Oct 2023 18:31 )    Color: x / Appearance: x / SG: x / pH: x  Gluc: 106 mg/dL / Ketone: x  / Bili: x / Urobili: x   Blood: x / Protein: x / Nitrite: x   Leuk Esterase: x / RBC: x / WBC x   Sq Epi: x / Non Sq Epi: x / Bacteria: x      SARS-CoV-2: NotDetec (15 Jul 2023 22:53)  SARS-CoV-2: NotDetec (05 Jul 2023 17:47)      Radiology: Reviewed Patient is a 58y old  Male who presents with a chief complaint of     HPI: 58M with PMHx of DM2, CVA x 2 w/ residual L sided weakness, early onset dementia, HTN, HLD, CKD V (creatinine 7-8, not on HD), BPH and PSHx of L toe amputation, R BKA and AV fistula creation (5/23 - Dr. Royal) currently being treated for c. diff with oral vancomycin presenting from NH for AMS for 1 day. patient states he feels weak. denies pain.    SUBJECTIVE:     REVIEW OF SYSTEMS:  All other review of systems is negative unless indicated above.    OBJECTIVE:  T(C): 38.1 (10-24-23 @ 18:46), Max: 38.1 (10-24-23 @ 18:46)  HR: 125 (10-24-23 @ 19:17) (125 - 130)  BP: 104/71 (10-24-23 @ 19:17) (97/71 - 127/77)  RR: 18 (10-24-23 @ 19:12) (17 - 18)  SpO2: 97% (10-24-23 @ 19:12) (96% - 97%)  Daily Height in cm: 182.88 (24 Oct 2023 18:02)    Daily     Physical Exam:  General: in no acute distress, fatigued appearing. appropriately conversant.  Eyes: EOMI intact bilaterally. Anicteric sclerae, moist conjunctivae  HENT: Dry mucous membranes  Neck: Trachea midline, supple  Lungs: CTA B/L. No wheezes, rales, or rhonchi  Cardiovascular: Tachycardic. Regular rhythm. No murmurs, rubs, or gallops  Abdomen: Soft, non-tender non-distended; No rebound or guarding  Extremities: WWP, No clubbing, cyanosis or edema. s/p R BKA, stump clean/intact. LLE 2+ pedal pulse  Neurological: Alert and oriented x3  Skin: Warm and dry. No obvious rash    Medications:  MEDICATIONS  (STANDING):  lactated ringers Bolus 1000 milliLiter(s) IV Bolus once  piperacillin/tazobactam IVPB.. 3.375 Gram(s) IV Intermittent once    MEDICATIONS  (PRN):      Labs:                        11.5   19.43 )-----------( 371      ( 24 Oct 2023 18:31 )             38.3     10-24    154<H>  |  123<H>  |  124<H>  ----------------------------<  106<H>  4.4   |  17<L>  |  7.09<H>    Ca    9.2      24 Oct 2023 18:31    TPro  6.9  /  Alb  3.3  /  TBili  <0.2  /  DBili  x   /  AST  14  /  ALT  13  /  AlkPhos  82  10-24    PT/INR - ( 24 Oct 2023 18:31 )   PT: 11.3 sec;   INR: 0.99      PTT - ( 24 Oct 2023 18:31 )  PTT:26.5 sec    Urinalysis Basic - ( 24 Oct 2023 18:31 )    Color: x / Appearance: x / SG: x / pH: x  Gluc: 106 mg/dL / Ketone: x  / Bili: x / Urobili: x   Blood: x / Protein: x / Nitrite: x   Leuk Esterase: x / RBC: x / WBC x   Sq Epi: x / Non Sq Epi: x / Bacteria: x    SARS-CoV-2: NotDetec (15 Jul 2023 22:53)  SARS-CoV-2: NotDetec (05 Jul 2023 17:47)    Radiology: Reviewed

## 2023-10-24 NOTE — ED ADULT NURSE NOTE - NSICDXFAMILYHX_GEN_ALL_CORE_FT
FAMILY HISTORY:  Mother  Still living? Unknown  Family history of diabetes mellitus, Age at diagnosis: Age Unknown     Bi-Rhombic Flap Text: The defect edges were debeveled with a #15 scalpel blade.  Given the location of the defect and the proximity to free margins a bi-rhombic flap was deemed most appropriate.  Using a sterile surgical marker, an appropriate rhombic flap was drawn incorporating the defect. The area thus outlined was incised deep to adipose tissue with a #15 scalpel blade.  The skin margins were undermined to an appropriate distance in all directions utilizing iris scissors.

## 2023-10-24 NOTE — ED PROVIDER NOTE - PHYSICAL EXAMINATION
General: Awake, lethargic, oriented  Skin: Skin in warm, dry. stage 1 sacral decub  HENMT: head normocephalic and atraumatic; bilateral external ears without swelling. no nasal discharge. moist oral mucosa. supple neck, trachea midline  EYES: Conjunctiva clear. nonicteric sclera. EOM intact, Eyelids are normal in appearance without swelling or lesions.  Cardiac: s1, s2, tachcyardic  Respiratory: breathing comfortably on room air. no audible wheezing or stridor, coughing  Abdominal: nondistended, soft, nontender  MSK: r BKA, clean stump, lef ttoe amptation  Neurological: The patient is awake, alert and oriented to person, place, and time with low volume and hard to decipher speech, 4/5 sternght on right extremities, no movement of left extremities  Psychiatric: Appropriate mood and affect. Good judgement and insight

## 2023-10-24 NOTE — ED PROVIDER NOTE - CLINICAL SUMMARY MEDICAL DECISION MAKING FREE TEXT BOX
treated for sepsis, unclear source. abdomen soft, nontender. hypernatremic, non anion gap metabolic acidosis, leukocytosis. broadly covered. IVF initially not ordered as BP within normal limits and patient with severe CKD, mild dip in BP, IVF ordered with imrpvoement in BP and HR, d/w icu for eval.

## 2023-10-25 ENCOUNTER — INPATIENT (INPATIENT)
Facility: HOSPITAL | Age: 58
LOS: 2 days | Discharge: HOME | DRG: 871 | End: 2023-10-28
Attending: INTERNAL MEDICINE | Admitting: STUDENT IN AN ORGANIZED HEALTH CARE EDUCATION/TRAINING PROGRAM
Payer: MEDICAID

## 2023-10-25 DIAGNOSIS — Z86.79 PERSONAL HISTORY OF OTHER DISEASES OF THE CIRCULATORY SYSTEM: ICD-10-CM

## 2023-10-25 DIAGNOSIS — E11.9 TYPE 2 DIABETES MELLITUS WITHOUT COMPLICATIONS: ICD-10-CM

## 2023-10-25 DIAGNOSIS — I73.9 PERIPHERAL VASCULAR DISEASE, UNSPECIFIED: ICD-10-CM

## 2023-10-25 DIAGNOSIS — I10 ESSENTIAL (PRIMARY) HYPERTENSION: ICD-10-CM

## 2023-10-25 DIAGNOSIS — E87.0 HYPEROSMOLALITY AND HYPERNATREMIA: ICD-10-CM

## 2023-10-25 DIAGNOSIS — N18.5 CHRONIC KIDNEY DISEASE, STAGE 5: ICD-10-CM

## 2023-10-25 DIAGNOSIS — A41.9 SEPSIS, UNSPECIFIED ORGANISM: ICD-10-CM

## 2023-10-25 DIAGNOSIS — Z86.73 PERSONAL HISTORY OF TRANSIENT ISCHEMIC ATTACK (TIA), AND CEREBRAL INFARCTION WITHOUT RESIDUAL DEFICITS: ICD-10-CM

## 2023-10-25 DIAGNOSIS — R79.89 OTHER SPECIFIED ABNORMAL FINDINGS OF BLOOD CHEMISTRY: ICD-10-CM

## 2023-10-25 DIAGNOSIS — R00.0 TACHYCARDIA, UNSPECIFIED: ICD-10-CM

## 2023-10-25 DIAGNOSIS — A04.72 ENTEROCOLITIS DUE TO CLOSTRIDIUM DIFFICILE, NOT SPECIFIED AS RECURRENT: ICD-10-CM

## 2023-10-25 DIAGNOSIS — N18.4 CHRONIC KIDNEY DISEASE, STAGE 4 (SEVERE): ICD-10-CM

## 2023-10-25 DIAGNOSIS — Z29.9 ENCOUNTER FOR PROPHYLACTIC MEASURES, UNSPECIFIED: ICD-10-CM

## 2023-10-25 LAB
A1C WITH ESTIMATED AVERAGE GLUCOSE RESULT: 6.4 % — HIGH (ref 4–5.6)
A1C WITH ESTIMATED AVERAGE GLUCOSE RESULT: 6.4 % — HIGH (ref 4–5.6)
ACANTHOCYTES BLD QL SMEAR: SLIGHT — SIGNIFICANT CHANGE UP
ACANTHOCYTES BLD QL SMEAR: SLIGHT — SIGNIFICANT CHANGE UP
ALBUMIN SERPL ELPH-MCNC: 2.9 G/DL — LOW (ref 3.3–5)
ALBUMIN SERPL ELPH-MCNC: 2.9 G/DL — LOW (ref 3.3–5)
ALBUMIN SERPL ELPH-MCNC: 3 G/DL — LOW (ref 3.3–5)
ALBUMIN SERPL ELPH-MCNC: 3 G/DL — LOW (ref 3.3–5)
ALBUMIN SERPL ELPH-MCNC: 3.2 G/DL — LOW (ref 3.3–5)
ALBUMIN SERPL ELPH-MCNC: 3.2 G/DL — LOW (ref 3.3–5)
ALP SERPL-CCNC: 79 U/L — SIGNIFICANT CHANGE UP (ref 40–120)
ALP SERPL-CCNC: 79 U/L — SIGNIFICANT CHANGE UP (ref 40–120)
ALP SERPL-CCNC: 81 U/L — SIGNIFICANT CHANGE UP (ref 40–120)
ALP SERPL-CCNC: 81 U/L — SIGNIFICANT CHANGE UP (ref 40–120)
ALP SERPL-CCNC: 84 U/L — SIGNIFICANT CHANGE UP (ref 40–120)
ALP SERPL-CCNC: 84 U/L — SIGNIFICANT CHANGE UP (ref 40–120)
ALT FLD-CCNC: 12 U/L — SIGNIFICANT CHANGE UP (ref 10–45)
ALT FLD-CCNC: 14 U/L — SIGNIFICANT CHANGE UP (ref 10–45)
ALT FLD-CCNC: 14 U/L — SIGNIFICANT CHANGE UP (ref 10–45)
ANION GAP SERPL CALC-SCNC: 16 MMOL/L — SIGNIFICANT CHANGE UP (ref 5–17)
ANISOCYTOSIS BLD QL: SIGNIFICANT CHANGE UP
ANISOCYTOSIS BLD QL: SIGNIFICANT CHANGE UP
ANISOCYTOSIS BLD QL: SLIGHT — SIGNIFICANT CHANGE UP
ANISOCYTOSIS BLD QL: SLIGHT — SIGNIFICANT CHANGE UP
AST SERPL-CCNC: 11 U/L — SIGNIFICANT CHANGE UP (ref 10–40)
AST SERPL-CCNC: 11 U/L — SIGNIFICANT CHANGE UP (ref 10–40)
AST SERPL-CCNC: 12 U/L — SIGNIFICANT CHANGE UP (ref 10–40)
AST SERPL-CCNC: 12 U/L — SIGNIFICANT CHANGE UP (ref 10–40)
AST SERPL-CCNC: 18 U/L — SIGNIFICANT CHANGE UP (ref 10–40)
AST SERPL-CCNC: 18 U/L — SIGNIFICANT CHANGE UP (ref 10–40)
BASOPHILS # BLD AUTO: 0 K/UL — SIGNIFICANT CHANGE UP (ref 0–0.2)
BASOPHILS # BLD AUTO: 0.07 K/UL — SIGNIFICANT CHANGE UP (ref 0–0.2)
BASOPHILS # BLD AUTO: 0.07 K/UL — SIGNIFICANT CHANGE UP (ref 0–0.2)
BASOPHILS NFR BLD AUTO: 0 % — SIGNIFICANT CHANGE UP (ref 0–2)
BASOPHILS NFR BLD AUTO: 0.4 % — SIGNIFICANT CHANGE UP (ref 0–2)
BASOPHILS NFR BLD AUTO: 0.4 % — SIGNIFICANT CHANGE UP (ref 0–2)
BILIRUB SERPL-MCNC: 0.2 MG/DL — SIGNIFICANT CHANGE UP (ref 0.2–1.2)
BILIRUB SERPL-MCNC: <0.2 MG/DL — SIGNIFICANT CHANGE UP (ref 0.2–1.2)
BILIRUB SERPL-MCNC: <0.2 MG/DL — SIGNIFICANT CHANGE UP (ref 0.2–1.2)
BUN SERPL-MCNC: 107 MG/DL — HIGH (ref 7–23)
BUN SERPL-MCNC: 107 MG/DL — HIGH (ref 7–23)
BUN SERPL-MCNC: 108 MG/DL — HIGH (ref 7–23)
BURR CELLS BLD QL SMEAR: PRESENT — SIGNIFICANT CHANGE UP
BURR CELLS BLD QL SMEAR: PRESENT — SIGNIFICANT CHANGE UP
CALCIUM SERPL-MCNC: 8.8 MG/DL — SIGNIFICANT CHANGE UP (ref 8.4–10.5)
CALCIUM SERPL-MCNC: 8.8 MG/DL — SIGNIFICANT CHANGE UP (ref 8.4–10.5)
CALCIUM SERPL-MCNC: 8.9 MG/DL — SIGNIFICANT CHANGE UP (ref 8.4–10.5)
CHLORIDE SERPL-SCNC: 120 MMOL/L — HIGH (ref 96–108)
CHLORIDE SERPL-SCNC: 122 MMOL/L — HIGH (ref 96–108)
CHLORIDE SERPL-SCNC: 122 MMOL/L — HIGH (ref 96–108)
CK MB CFR SERPL CALC: 2 NG/ML — SIGNIFICANT CHANGE UP (ref 0–6.7)
CK MB CFR SERPL CALC: 2 NG/ML — SIGNIFICANT CHANGE UP (ref 0–6.7)
CK SERPL-CCNC: 70 U/L — SIGNIFICANT CHANGE UP (ref 30–200)
CK SERPL-CCNC: 70 U/L — SIGNIFICANT CHANGE UP (ref 30–200)
CO2 SERPL-SCNC: 15 MMOL/L — LOW (ref 22–31)
CREAT SERPL-MCNC: 6.6 MG/DL — HIGH (ref 0.5–1.3)
CREAT SERPL-MCNC: 6.6 MG/DL — HIGH (ref 0.5–1.3)
CREAT SERPL-MCNC: 6.65 MG/DL — HIGH (ref 0.5–1.3)
CREAT SERPL-MCNC: 6.65 MG/DL — HIGH (ref 0.5–1.3)
CREAT SERPL-MCNC: 6.77 MG/DL — HIGH (ref 0.5–1.3)
CREAT SERPL-MCNC: 6.77 MG/DL — HIGH (ref 0.5–1.3)
EGFR: 9 ML/MIN/1.73M2 — LOW
EOSINOPHIL # BLD AUTO: 0.17 K/UL — SIGNIFICANT CHANGE UP (ref 0–0.5)
EOSINOPHIL # BLD AUTO: 0.17 K/UL — SIGNIFICANT CHANGE UP (ref 0–0.5)
EOSINOPHIL # BLD AUTO: 0.27 K/UL — SIGNIFICANT CHANGE UP (ref 0–0.5)
EOSINOPHIL # BLD AUTO: 0.27 K/UL — SIGNIFICANT CHANGE UP (ref 0–0.5)
EOSINOPHIL # BLD AUTO: 0.34 K/UL — SIGNIFICANT CHANGE UP (ref 0–0.5)
EOSINOPHIL # BLD AUTO: 0.34 K/UL — SIGNIFICANT CHANGE UP (ref 0–0.5)
EOSINOPHIL NFR BLD AUTO: 0.9 % — SIGNIFICANT CHANGE UP (ref 0–6)
EOSINOPHIL NFR BLD AUTO: 0.9 % — SIGNIFICANT CHANGE UP (ref 0–6)
EOSINOPHIL NFR BLD AUTO: 1.7 % — SIGNIFICANT CHANGE UP (ref 0–6)
EOSINOPHIL NFR BLD AUTO: 1.7 % — SIGNIFICANT CHANGE UP (ref 0–6)
EOSINOPHIL NFR BLD AUTO: 2 % — SIGNIFICANT CHANGE UP (ref 0–6)
EOSINOPHIL NFR BLD AUTO: 2 % — SIGNIFICANT CHANGE UP (ref 0–6)
ESTIMATED AVERAGE GLUCOSE: 137 MG/DL — HIGH (ref 68–114)
ESTIMATED AVERAGE GLUCOSE: 137 MG/DL — HIGH (ref 68–114)
GIANT PLATELETS BLD QL SMEAR: PRESENT — SIGNIFICANT CHANGE UP
GIANT PLATELETS BLD QL SMEAR: PRESENT — SIGNIFICANT CHANGE UP
GLUCOSE BLDC GLUCOMTR-MCNC: 111 MG/DL — HIGH (ref 70–99)
GLUCOSE BLDC GLUCOMTR-MCNC: 111 MG/DL — HIGH (ref 70–99)
GLUCOSE BLDC GLUCOMTR-MCNC: 141 MG/DL — HIGH (ref 70–99)
GLUCOSE BLDC GLUCOMTR-MCNC: 141 MG/DL — HIGH (ref 70–99)
GLUCOSE BLDC GLUCOMTR-MCNC: 157 MG/DL — HIGH (ref 70–99)
GLUCOSE BLDC GLUCOMTR-MCNC: 157 MG/DL — HIGH (ref 70–99)
GLUCOSE BLDC GLUCOMTR-MCNC: 163 MG/DL — HIGH (ref 70–99)
GLUCOSE BLDC GLUCOMTR-MCNC: 163 MG/DL — HIGH (ref 70–99)
GLUCOSE BLDC GLUCOMTR-MCNC: 195 MG/DL — HIGH (ref 70–99)
GLUCOSE BLDC GLUCOMTR-MCNC: 195 MG/DL — HIGH (ref 70–99)
GLUCOSE SERPL-MCNC: 107 MG/DL — HIGH (ref 70–99)
GLUCOSE SERPL-MCNC: 107 MG/DL — HIGH (ref 70–99)
GLUCOSE SERPL-MCNC: 160 MG/DL — HIGH (ref 70–99)
GLUCOSE SERPL-MCNC: 160 MG/DL — HIGH (ref 70–99)
GLUCOSE SERPL-MCNC: 178 MG/DL — HIGH (ref 70–99)
GLUCOSE SERPL-MCNC: 178 MG/DL — HIGH (ref 70–99)
HCT VFR BLD CALC: 33.4 % — LOW (ref 39–50)
HCT VFR BLD CALC: 33.4 % — LOW (ref 39–50)
HCT VFR BLD CALC: 33.5 % — LOW (ref 39–50)
HCT VFR BLD CALC: 33.5 % — LOW (ref 39–50)
HCT VFR BLD CALC: 35.9 % — LOW (ref 39–50)
HCT VFR BLD CALC: 35.9 % — LOW (ref 39–50)
HGB BLD-MCNC: 10.6 G/DL — LOW (ref 13–17)
HGB BLD-MCNC: 10.6 G/DL — LOW (ref 13–17)
HGB BLD-MCNC: 9.8 G/DL — LOW (ref 13–17)
HGB BLD-MCNC: 9.8 G/DL — LOW (ref 13–17)
HGB BLD-MCNC: 9.9 G/DL — LOW (ref 13–17)
HGB BLD-MCNC: 9.9 G/DL — LOW (ref 13–17)
IMM GRANULOCYTES NFR BLD AUTO: 0.9 % — SIGNIFICANT CHANGE UP (ref 0–0.9)
IMM GRANULOCYTES NFR BLD AUTO: 0.9 % — SIGNIFICANT CHANGE UP (ref 0–0.9)
LACTATE SERPL-SCNC: 0.8 MMOL/L — SIGNIFICANT CHANGE UP (ref 0.5–2)
LACTATE SERPL-SCNC: 0.8 MMOL/L — SIGNIFICANT CHANGE UP (ref 0.5–2)
LYMPHOCYTES # BLD AUTO: 0.68 K/UL — LOW (ref 1–3.3)
LYMPHOCYTES # BLD AUTO: 0.68 K/UL — LOW (ref 1–3.3)
LYMPHOCYTES # BLD AUTO: 1.09 K/UL — SIGNIFICANT CHANGE UP (ref 1–3.3)
LYMPHOCYTES # BLD AUTO: 1.09 K/UL — SIGNIFICANT CHANGE UP (ref 1–3.3)
LYMPHOCYTES # BLD AUTO: 1.11 K/UL — SIGNIFICANT CHANGE UP (ref 1–3.3)
LYMPHOCYTES # BLD AUTO: 1.11 K/UL — SIGNIFICANT CHANGE UP (ref 1–3.3)
LYMPHOCYTES # BLD AUTO: 3.5 % — LOW (ref 13–44)
LYMPHOCYTES # BLD AUTO: 3.5 % — LOW (ref 13–44)
LYMPHOCYTES # BLD AUTO: 6.4 % — LOW (ref 13–44)
LYMPHOCYTES # BLD AUTO: 6.4 % — LOW (ref 13–44)
LYMPHOCYTES # BLD AUTO: 7 % — LOW (ref 13–44)
LYMPHOCYTES # BLD AUTO: 7 % — LOW (ref 13–44)
MACROCYTES BLD QL: SLIGHT — SIGNIFICANT CHANGE UP
MACROCYTES BLD QL: SLIGHT — SIGNIFICANT CHANGE UP
MAGNESIUM SERPL-MCNC: 2.1 MG/DL — SIGNIFICANT CHANGE UP (ref 1.6–2.6)
MAGNESIUM SERPL-MCNC: 2.1 MG/DL — SIGNIFICANT CHANGE UP (ref 1.6–2.6)
MAGNESIUM SERPL-MCNC: 2.2 MG/DL — SIGNIFICANT CHANGE UP (ref 1.6–2.6)
MAGNESIUM SERPL-MCNC: 2.2 MG/DL — SIGNIFICANT CHANGE UP (ref 1.6–2.6)
MANUAL SMEAR VERIFICATION: SIGNIFICANT CHANGE UP
MCHC RBC-ENTMCNC: 28.7 PG — SIGNIFICANT CHANGE UP (ref 27–34)
MCHC RBC-ENTMCNC: 28.7 PG — SIGNIFICANT CHANGE UP (ref 27–34)
MCHC RBC-ENTMCNC: 28.9 PG — SIGNIFICANT CHANGE UP (ref 27–34)
MCHC RBC-ENTMCNC: 28.9 PG — SIGNIFICANT CHANGE UP (ref 27–34)
MCHC RBC-ENTMCNC: 29 PG — SIGNIFICANT CHANGE UP (ref 27–34)
MCHC RBC-ENTMCNC: 29 PG — SIGNIFICANT CHANGE UP (ref 27–34)
MCHC RBC-ENTMCNC: 29.3 GM/DL — LOW (ref 32–36)
MCHC RBC-ENTMCNC: 29.3 GM/DL — LOW (ref 32–36)
MCHC RBC-ENTMCNC: 29.5 GM/DL — LOW (ref 32–36)
MCHC RBC-ENTMCNC: 29.5 GM/DL — LOW (ref 32–36)
MCHC RBC-ENTMCNC: 29.6 GM/DL — LOW (ref 32–36)
MCHC RBC-ENTMCNC: 29.6 GM/DL — LOW (ref 32–36)
MCV RBC AUTO: 97.3 FL — SIGNIFICANT CHANGE UP (ref 80–100)
MCV RBC AUTO: 97.3 FL — SIGNIFICANT CHANGE UP (ref 80–100)
MCV RBC AUTO: 97.7 FL — SIGNIFICANT CHANGE UP (ref 80–100)
MCV RBC AUTO: 97.7 FL — SIGNIFICANT CHANGE UP (ref 80–100)
MCV RBC AUTO: 99.1 FL — SIGNIFICANT CHANGE UP (ref 80–100)
MCV RBC AUTO: 99.1 FL — SIGNIFICANT CHANGE UP (ref 80–100)
MICROCYTES BLD QL: SIGNIFICANT CHANGE UP
MICROCYTES BLD QL: SIGNIFICANT CHANGE UP
MONOCYTES # BLD AUTO: 0.27 K/UL — SIGNIFICANT CHANGE UP (ref 0–0.9)
MONOCYTES # BLD AUTO: 0.27 K/UL — SIGNIFICANT CHANGE UP (ref 0–0.9)
MONOCYTES # BLD AUTO: 0.85 K/UL — SIGNIFICANT CHANGE UP (ref 0–0.9)
MONOCYTES # BLD AUTO: 0.85 K/UL — SIGNIFICANT CHANGE UP (ref 0–0.9)
MONOCYTES # BLD AUTO: 1.14 K/UL — HIGH (ref 0–0.9)
MONOCYTES # BLD AUTO: 1.14 K/UL — HIGH (ref 0–0.9)
MONOCYTES NFR BLD AUTO: 1.7 % — LOW (ref 2–14)
MONOCYTES NFR BLD AUTO: 1.7 % — LOW (ref 2–14)
MONOCYTES NFR BLD AUTO: 4.4 % — SIGNIFICANT CHANGE UP (ref 2–14)
MONOCYTES NFR BLD AUTO: 4.4 % — SIGNIFICANT CHANGE UP (ref 2–14)
MONOCYTES NFR BLD AUTO: 6.7 % — SIGNIFICANT CHANGE UP (ref 2–14)
MONOCYTES NFR BLD AUTO: 6.7 % — SIGNIFICANT CHANGE UP (ref 2–14)
NEUTROPHILS # BLD AUTO: 14.17 K/UL — HIGH (ref 1.8–7.4)
NEUTROPHILS # BLD AUTO: 14.17 K/UL — HIGH (ref 1.8–7.4)
NEUTROPHILS # BLD AUTO: 14.25 K/UL — HIGH (ref 1.8–7.4)
NEUTROPHILS # BLD AUTO: 14.25 K/UL — HIGH (ref 1.8–7.4)
NEUTROPHILS # BLD AUTO: 17.62 K/UL — HIGH (ref 1.8–7.4)
NEUTROPHILS # BLD AUTO: 17.62 K/UL — HIGH (ref 1.8–7.4)
NEUTROPHILS NFR BLD AUTO: 81.8 % — HIGH (ref 43–77)
NEUTROPHILS NFR BLD AUTO: 81.8 % — HIGH (ref 43–77)
NEUTROPHILS NFR BLD AUTO: 83.6 % — HIGH (ref 43–77)
NEUTROPHILS NFR BLD AUTO: 83.6 % — HIGH (ref 43–77)
NEUTROPHILS NFR BLD AUTO: 90.3 % — HIGH (ref 43–77)
NEUTROPHILS NFR BLD AUTO: 90.3 % — HIGH (ref 43–77)
NEUTS BAND # BLD: 0.9 % — SIGNIFICANT CHANGE UP (ref 0–8)
NEUTS BAND # BLD: 0.9 % — SIGNIFICANT CHANGE UP (ref 0–8)
NEUTS BAND # BLD: 7.8 % — SIGNIFICANT CHANGE UP (ref 0–8)
NEUTS BAND # BLD: 7.8 % — SIGNIFICANT CHANGE UP (ref 0–8)
NRBC # BLD: 0 /100 WBCS — SIGNIFICANT CHANGE UP (ref 0–0)
NRBC # BLD: 0 /100 WBCS — SIGNIFICANT CHANGE UP (ref 0–0)
OVALOCYTES BLD QL SMEAR: SLIGHT — SIGNIFICANT CHANGE UP
OVALOCYTES BLD QL SMEAR: SLIGHT — SIGNIFICANT CHANGE UP
PHOSPHATE SERPL-MCNC: 3.8 MG/DL — SIGNIFICANT CHANGE UP (ref 2.5–4.5)
PHOSPHATE SERPL-MCNC: 3.8 MG/DL — SIGNIFICANT CHANGE UP (ref 2.5–4.5)
PHOSPHATE SERPL-MCNC: 4.1 MG/DL — SIGNIFICANT CHANGE UP (ref 2.5–4.5)
PHOSPHATE SERPL-MCNC: 4.1 MG/DL — SIGNIFICANT CHANGE UP (ref 2.5–4.5)
PLAT MORPH BLD: ABNORMAL
PLAT MORPH BLD: ABNORMAL
PLAT MORPH BLD: NORMAL — SIGNIFICANT CHANGE UP
PLAT MORPH BLD: NORMAL — SIGNIFICANT CHANGE UP
PLATELET # BLD AUTO: 344 K/UL — SIGNIFICANT CHANGE UP (ref 150–400)
PLATELET # BLD AUTO: 344 K/UL — SIGNIFICANT CHANGE UP (ref 150–400)
PLATELET # BLD AUTO: 352 K/UL — SIGNIFICANT CHANGE UP (ref 150–400)
PLATELET # BLD AUTO: 352 K/UL — SIGNIFICANT CHANGE UP (ref 150–400)
PLATELET # BLD AUTO: 353 K/UL — SIGNIFICANT CHANGE UP (ref 150–400)
PLATELET # BLD AUTO: 353 K/UL — SIGNIFICANT CHANGE UP (ref 150–400)
POIKILOCYTOSIS BLD QL AUTO: SIGNIFICANT CHANGE UP
POLYCHROMASIA BLD QL SMEAR: SLIGHT — SIGNIFICANT CHANGE UP
POLYCHROMASIA BLD QL SMEAR: SLIGHT — SIGNIFICANT CHANGE UP
POTASSIUM SERPL-MCNC: 3.9 MMOL/L — SIGNIFICANT CHANGE UP (ref 3.5–5.3)
POTASSIUM SERPL-MCNC: 3.9 MMOL/L — SIGNIFICANT CHANGE UP (ref 3.5–5.3)
POTASSIUM SERPL-MCNC: 4.2 MMOL/L — SIGNIFICANT CHANGE UP (ref 3.5–5.3)
POTASSIUM SERPL-MCNC: 4.2 MMOL/L — SIGNIFICANT CHANGE UP (ref 3.5–5.3)
POTASSIUM SERPL-MCNC: 4.4 MMOL/L — SIGNIFICANT CHANGE UP (ref 3.5–5.3)
POTASSIUM SERPL-MCNC: 4.4 MMOL/L — SIGNIFICANT CHANGE UP (ref 3.5–5.3)
POTASSIUM SERPL-SCNC: 3.9 MMOL/L — SIGNIFICANT CHANGE UP (ref 3.5–5.3)
POTASSIUM SERPL-SCNC: 3.9 MMOL/L — SIGNIFICANT CHANGE UP (ref 3.5–5.3)
POTASSIUM SERPL-SCNC: 4.2 MMOL/L — SIGNIFICANT CHANGE UP (ref 3.5–5.3)
POTASSIUM SERPL-SCNC: 4.2 MMOL/L — SIGNIFICANT CHANGE UP (ref 3.5–5.3)
POTASSIUM SERPL-SCNC: 4.4 MMOL/L — SIGNIFICANT CHANGE UP (ref 3.5–5.3)
POTASSIUM SERPL-SCNC: 4.4 MMOL/L — SIGNIFICANT CHANGE UP (ref 3.5–5.3)
PROT SERPL-MCNC: 6.5 G/DL — SIGNIFICANT CHANGE UP (ref 6–8.3)
PROT SERPL-MCNC: 6.5 G/DL — SIGNIFICANT CHANGE UP (ref 6–8.3)
PROT SERPL-MCNC: 6.6 G/DL — SIGNIFICANT CHANGE UP (ref 6–8.3)
PROT SERPL-MCNC: 6.6 G/DL — SIGNIFICANT CHANGE UP (ref 6–8.3)
PROT SERPL-MCNC: 6.8 G/DL — SIGNIFICANT CHANGE UP (ref 6–8.3)
PROT SERPL-MCNC: 6.8 G/DL — SIGNIFICANT CHANGE UP (ref 6–8.3)
RBC # BLD: 3.38 M/UL — LOW (ref 4.2–5.8)
RBC # BLD: 3.38 M/UL — LOW (ref 4.2–5.8)
RBC # BLD: 3.42 M/UL — LOW (ref 4.2–5.8)
RBC # BLD: 3.42 M/UL — LOW (ref 4.2–5.8)
RBC # BLD: 3.69 M/UL — LOW (ref 4.2–5.8)
RBC # BLD: 3.69 M/UL — LOW (ref 4.2–5.8)
RBC # FLD: 16.1 % — HIGH (ref 10.3–14.5)
RBC # FLD: 16.1 % — HIGH (ref 10.3–14.5)
RBC # FLD: 16.2 % — HIGH (ref 10.3–14.5)
RBC # FLD: 16.2 % — HIGH (ref 10.3–14.5)
RBC # FLD: 16.4 % — HIGH (ref 10.3–14.5)
RBC # FLD: 16.4 % — HIGH (ref 10.3–14.5)
RBC BLD AUTO: ABNORMAL
SCHISTOCYTES BLD QL AUTO: SLIGHT — SIGNIFICANT CHANGE UP
SCHISTOCYTES BLD QL AUTO: SLIGHT — SIGNIFICANT CHANGE UP
SMUDGE CELLS # BLD: PRESENT — SIGNIFICANT CHANGE UP
SMUDGE CELLS # BLD: PRESENT — SIGNIFICANT CHANGE UP
SODIUM SERPL-SCNC: 151 MMOL/L — HIGH (ref 135–145)
SODIUM SERPL-SCNC: 153 MMOL/L — HIGH (ref 135–145)
SODIUM SERPL-SCNC: 153 MMOL/L — HIGH (ref 135–145)
SPHEROCYTES BLD QL SMEAR: SLIGHT — SIGNIFICANT CHANGE UP
TROPONIN T, HIGH SENSITIVITY RESULT: 87 NG/L — CRITICAL HIGH (ref 0–51)
TROPONIN T, HIGH SENSITIVITY RESULT: 87 NG/L — CRITICAL HIGH (ref 0–51)
WBC # BLD: 15.9 K/UL — HIGH (ref 3.8–10.5)
WBC # BLD: 15.9 K/UL — HIGH (ref 3.8–10.5)
WBC # BLD: 16.97 K/UL — HIGH (ref 3.8–10.5)
WBC # BLD: 16.97 K/UL — HIGH (ref 3.8–10.5)
WBC # BLD: 19.32 K/UL — HIGH (ref 3.8–10.5)
WBC # BLD: 19.32 K/UL — HIGH (ref 3.8–10.5)
WBC # FLD AUTO: 15.9 K/UL — HIGH (ref 3.8–10.5)
WBC # FLD AUTO: 15.9 K/UL — HIGH (ref 3.8–10.5)
WBC # FLD AUTO: 16.97 K/UL — HIGH (ref 3.8–10.5)
WBC # FLD AUTO: 16.97 K/UL — HIGH (ref 3.8–10.5)
WBC # FLD AUTO: 19.32 K/UL — HIGH (ref 3.8–10.5)
WBC # FLD AUTO: 19.32 K/UL — HIGH (ref 3.8–10.5)

## 2023-10-25 PROCEDURE — 99252 IP/OBS CONSLTJ NEW/EST SF 35: CPT

## 2023-10-25 PROCEDURE — 74176 CT ABD & PELVIS W/O CONTRAST: CPT | Mod: 26,MA

## 2023-10-25 PROCEDURE — 99232 SBSQ HOSP IP/OBS MODERATE 35: CPT | Mod: GC

## 2023-10-25 PROCEDURE — 70450 CT HEAD/BRAIN W/O DYE: CPT | Mod: 26,MA

## 2023-10-25 PROCEDURE — 99253 IP/OBS CNSLTJ NEW/EST LOW 45: CPT

## 2023-10-25 RX ORDER — FERROUS FUMARATE 350(115)MG
1 TABLET ORAL
Refills: 0 | DISCHARGE

## 2023-10-25 RX ORDER — SODIUM CHLORIDE 9 MG/ML
1000 INJECTION, SOLUTION INTRAVENOUS
Refills: 0 | Status: DISCONTINUED | OUTPATIENT
Start: 2023-10-25 | End: 2023-10-26

## 2023-10-25 RX ORDER — TAMSULOSIN HYDROCHLORIDE 0.4 MG/1
0.4 CAPSULE ORAL AT BEDTIME
Refills: 0 | Status: DISCONTINUED | OUTPATIENT
Start: 2023-10-25 | End: 2023-10-28

## 2023-10-25 RX ORDER — GLUCAGON INJECTION, SOLUTION 0.5 MG/.1ML
1 INJECTION, SOLUTION SUBCUTANEOUS ONCE
Refills: 0 | Status: DISCONTINUED | OUTPATIENT
Start: 2023-10-25 | End: 2023-10-28

## 2023-10-25 RX ORDER — SODIUM CHLORIDE 9 MG/ML
1000 INJECTION, SOLUTION INTRAVENOUS
Refills: 0 | Status: DISCONTINUED | OUTPATIENT
Start: 2023-10-25 | End: 2023-10-28

## 2023-10-25 RX ORDER — ACETAMINOPHEN 500 MG
650 TABLET ORAL EVERY 6 HOURS
Refills: 0 | Status: DISCONTINUED | OUTPATIENT
Start: 2023-10-25 | End: 2023-10-28

## 2023-10-25 RX ORDER — HUMAN INSULIN 100 [IU]/ML
5 INJECTION, SUSPENSION SUBCUTANEOUS
Refills: 0 | DISCHARGE

## 2023-10-25 RX ORDER — POLYETHYLENE GLYCOL 3350 17 G/17G
17 POWDER, FOR SOLUTION ORAL
Refills: 0 | DISCHARGE

## 2023-10-25 RX ORDER — DEXTROSE 50 % IN WATER 50 %
25 SYRINGE (ML) INTRAVENOUS ONCE
Refills: 0 | Status: DISCONTINUED | OUTPATIENT
Start: 2023-10-25 | End: 2023-10-28

## 2023-10-25 RX ORDER — LANOLIN ALCOHOL/MO/W.PET/CERES
1 CREAM (GRAM) TOPICAL
Refills: 0 | DISCHARGE

## 2023-10-25 RX ORDER — ASPIRIN/CALCIUM CARB/MAGNESIUM 324 MG
81 TABLET ORAL DAILY
Refills: 0 | Status: DISCONTINUED | OUTPATIENT
Start: 2023-10-25 | End: 2023-10-28

## 2023-10-25 RX ORDER — SODIUM CHLORIDE 9 MG/ML
1000 INJECTION, SOLUTION INTRAVENOUS
Refills: 0 | Status: DISCONTINUED | OUTPATIENT
Start: 2023-10-25 | End: 2023-10-25

## 2023-10-25 RX ORDER — CLOPIDOGREL BISULFATE 75 MG/1
75 TABLET, FILM COATED ORAL DAILY
Refills: 0 | Status: DISCONTINUED | OUTPATIENT
Start: 2023-10-25 | End: 2023-10-28

## 2023-10-25 RX ORDER — INSULIN LISPRO 100/ML
VIAL (ML) SUBCUTANEOUS
Refills: 0 | Status: DISCONTINUED | OUTPATIENT
Start: 2023-10-25 | End: 2023-10-28

## 2023-10-25 RX ORDER — INSULIN GLARGINE 100 [IU]/ML
14 INJECTION, SOLUTION SUBCUTANEOUS
Refills: 0 | DISCHARGE

## 2023-10-25 RX ORDER — ATORVASTATIN CALCIUM 80 MG/1
80 TABLET, FILM COATED ORAL AT BEDTIME
Refills: 0 | Status: DISCONTINUED | OUTPATIENT
Start: 2023-10-25 | End: 2023-10-28

## 2023-10-25 RX ORDER — DEXTROSE 50 % IN WATER 50 %
12.5 SYRINGE (ML) INTRAVENOUS ONCE
Refills: 0 | Status: DISCONTINUED | OUTPATIENT
Start: 2023-10-25 | End: 2023-10-28

## 2023-10-25 RX ORDER — INFLUENZA VIRUS VACCINE 15; 15; 15; 15 UG/.5ML; UG/.5ML; UG/.5ML; UG/.5ML
0.5 SUSPENSION INTRAMUSCULAR ONCE
Refills: 0 | Status: DISCONTINUED | OUTPATIENT
Start: 2023-10-25 | End: 2023-10-28

## 2023-10-25 RX ORDER — METRONIDAZOLE 500 MG
500 TABLET ORAL EVERY 8 HOURS
Refills: 0 | Status: DISCONTINUED | OUTPATIENT
Start: 2023-10-25 | End: 2023-10-28

## 2023-10-25 RX ORDER — INSULIN ASPART 100 [IU]/ML
2 INJECTION, SOLUTION SUBCUTANEOUS
Refills: 0 | DISCHARGE

## 2023-10-25 RX ORDER — DEXTROSE 50 % IN WATER 50 %
15 SYRINGE (ML) INTRAVENOUS ONCE
Refills: 0 | Status: DISCONTINUED | OUTPATIENT
Start: 2023-10-25 | End: 2023-10-28

## 2023-10-25 RX ORDER — VANCOMYCIN HCL 1 G
500 VIAL (EA) INTRAVENOUS EVERY 6 HOURS
Refills: 0 | Status: DISCONTINUED | OUTPATIENT
Start: 2023-10-25 | End: 2023-10-28

## 2023-10-25 RX ORDER — TIMOLOL 0.5 %
1 DROPS OPHTHALMIC (EYE)
Refills: 0 | DISCHARGE

## 2023-10-25 RX ORDER — INSULIN LISPRO 100/ML
1 VIAL (ML) SUBCUTANEOUS ONCE
Refills: 0 | Status: COMPLETED | OUTPATIENT
Start: 2023-10-25 | End: 2023-10-25

## 2023-10-25 RX ORDER — HEPARIN SODIUM 5000 [USP'U]/ML
5000 INJECTION INTRAVENOUS; SUBCUTANEOUS EVERY 8 HOURS
Refills: 0 | Status: DISCONTINUED | OUTPATIENT
Start: 2023-10-25 | End: 2023-10-28

## 2023-10-25 RX ADMIN — Medication 81 MILLIGRAM(S): at 11:58

## 2023-10-25 RX ADMIN — Medication 125 MILLIGRAM(S): at 23:25

## 2023-10-25 RX ADMIN — Medication 1 UNIT(S): at 09:30

## 2023-10-25 RX ADMIN — Medication 500 MILLIGRAM(S): at 18:44

## 2023-10-25 RX ADMIN — SODIUM CHLORIDE 80 MILLILITER(S): 9 INJECTION, SOLUTION INTRAVENOUS at 05:39

## 2023-10-25 RX ADMIN — Medication 500 MILLIGRAM(S): at 04:28

## 2023-10-25 RX ADMIN — ATORVASTATIN CALCIUM 80 MILLIGRAM(S): 80 TABLET, FILM COATED ORAL at 23:25

## 2023-10-25 RX ADMIN — SODIUM CHLORIDE 100 MILLILITER(S): 9 INJECTION, SOLUTION INTRAVENOUS at 19:16

## 2023-10-25 RX ADMIN — HEPARIN SODIUM 5000 UNIT(S): 5000 INJECTION INTRAVENOUS; SUBCUTANEOUS at 12:00

## 2023-10-25 RX ADMIN — Medication 1: at 14:00

## 2023-10-25 RX ADMIN — Medication 100 MILLIGRAM(S): at 23:24

## 2023-10-25 RX ADMIN — Medication 100 MILLIGRAM(S): at 05:21

## 2023-10-25 RX ADMIN — SODIUM CHLORIDE 80 MILLILITER(S): 9 INJECTION, SOLUTION INTRAVENOUS at 04:28

## 2023-10-25 RX ADMIN — CLOPIDOGREL BISULFATE 75 MILLIGRAM(S): 75 TABLET, FILM COATED ORAL at 11:59

## 2023-10-25 RX ADMIN — Medication 500 MILLIGRAM(S): at 11:59

## 2023-10-25 RX ADMIN — Medication 100 MILLIGRAM(S): at 13:41

## 2023-10-25 RX ADMIN — HEPARIN SODIUM 5000 UNIT(S): 5000 INJECTION INTRAVENOUS; SUBCUTANEOUS at 13:41

## 2023-10-25 RX ADMIN — TAMSULOSIN HYDROCHLORIDE 0.4 MILLIGRAM(S): 0.4 CAPSULE ORAL at 23:25

## 2023-10-25 RX ADMIN — Medication 1000 MILLIGRAM(S): at 01:46

## 2023-10-25 NOTE — H&P ADULT - PROBLEM SELECTOR PLAN 5
BUN/Cr At baseline according to last admission. Patient with mature AVF for dialysis initiation but per last nephro note was not initiated or indicated.  - continue to trend BUN/CR  - Nephro consult if indications for dialysis met  - Replete electroyltes as necessary  - Renal Diet  - Avoid nehprotoxic meds  - Renally dosed medications based on eGFR <10 Likely iso type 2 demand ischemia iso sepsis, tachycardia. EKG showing sinus tachycardia, no ST elevation. Patient not complaining of chest pain.   - Trend Trops

## 2023-10-25 NOTE — DIETITIAN INITIAL EVALUATION ADULT - PROBLEM SELECTOR PLAN 9
h/o Right foot necrotizing fasciitis s/p guillotine R BKA (7/8) and completion R BKA 7/11  - c/w aspirin, plavix for PAD

## 2023-10-25 NOTE — DIETITIAN INITIAL EVALUATION ADULT - PROBLEM SELECTOR PLAN 1
Patient meeting 3/4 SIRS criteria on admission.   s/p 2L in ED., Vanc, Zosyn, ofirmiev. Source possibly GI translocation of C. diff. s/p 1 dose vancomycin outpatient.  - c/w vancomycin PO 500mg [id number] x 10 days total (10/24-11/2)  - Pt hypovolemic would continue fluids 100cc/hour x 8 hours of NS  - f/u BCx, UCx   - Trend Leukocytosis  - Wound care consult in AM for sacral wound

## 2023-10-25 NOTE — H&P ADULT - ASSESSMENT
57M with PMHx of DM2, CVA x 2 w/ residual L sided weakness, early onset dementia, HTN, HLD, CKD V (creatinine 7-8, not on HD), BPH and PSHx of L toe amputation and AV fistula creation (5/23 - Dr. Royal), recent admission in July for starvation ketosis and gas gangrene s/p rBKA, discharged to nursing home, presenting with weakness and dirrhea x 1 day from nursing home with C. diff treatment initiated, found to meet sepsis criteria on admission, admitted for managment.

## 2023-10-25 NOTE — DIETITIAN INITIAL EVALUATION ADULT - PROBLEM SELECTOR PLAN 3
BUN/Cr At baseline according to last admission. Patient with mature AVF for dialysis initiation but per last nephro note was not initiated or indicated. Patient with uremia to 121, mental status A&O x 1-2, with NAGMA.   - continue to trend BUN/CR  - Nephro consult in AM  - Trend uremia  - Replete electroyltes as necessary  - Renal Diet  - Avoid nephrotoxic meds  - Renally dosed medications based on eGFR <10

## 2023-10-25 NOTE — H&P ADULT - ATTENDING COMMENTS
#Sepsis: likely 2/2 Cdiff. Pending final CT chest /AP read- prelim read w/ pan colitis. Abd is soft, non tender. Lactate wnl. s/p sepsis fluids. c/w Maintenace fluids, very hypovolemic on exam. F/up blood cx, urine cx. s/p vanc/zosyn in ED. Cdiff positive, started on vanc PO yesterday- c/w vanc PO and flagyl, c/w serial abd exams.  Low threshold for ICU reconsult.   #Cdiff: c/w PO vanc. F/up CTAP final read    #Hypernatremia:  c/w ½ NS, trend BMP- consider D5 if not improved. avoid over correction    #CKD stage 5: w/ worsening uremia, +Hypovolemic on exam. Currently AOx2, slow to responses but appears to be at baseline; c/w maintence fluids, monitor urine output. trend bmp, Renal consult in am

## 2023-10-25 NOTE — DIETITIAN INITIAL EVALUATION ADULT - PROBLEM SELECTOR PLAN 2
Patient meeting severe criteria of C. diff disease, WBC >15K, Cr >1.5mg/dL but elevated at baseline. IMaging w/ infectious colitis.  - c/w vancomycin as above(10/24-11/2)  - consider starting flagyl  mg every 8 hours  - Maintain hydration, PO and IV  - Stool count  - Serial abdominal exams

## 2023-10-25 NOTE — H&P ADULT - NSHPPHYSICALEXAM_GEN_ALL_CORE
PHYSICAL EXAM:  Constitutional: NAD, comfortable in bed, contracted figure  HEENT: NC/AT, PERRLA, EOMI, no conjunctival pallor or scleral icterus, DMM, poor dentiion, poor skin turgor  Neck: Supple, no JVD  Respiratory: CTA B/L. No w/r/r.   Cardiovascular: RRR, normal S1 and S2, no m/r/g.   Gastrointestinal: soft NTND, no guarding or rebound tenderness, no palpable masses, hyperactive bowel sounds  Extremities: wwp;. R leg bka clean stump.   Vascular: Pulses equal and strong throughout.   Neurological: AAOx1 (Self), no CN deficits, strength and sensation intact throughout.   Skin: Small sacral clean based ulcer, no drainage.

## 2023-10-25 NOTE — DIETITIAN INITIAL EVALUATION ADULT - ADD RECOMMEND
1. Recommend Consistent Carbohydrate diet, add Nepro 1x/day (425 kcal, 19 g protein, per serving)   2. Encourage and monitor PO intake  >> Consistently meet >75% of estimated needs during admission   3. Monitor labs, wt trends, GI function, and skin integrity   4. Encourage pt to meet nutritional needs and honor food preferences as able   5. RD to remain available for additional nutrition interventions and diet edu as needed  1. Recommend Consistent Carbohydrate diet, add Banatrol 2x/day and Nepro 1x/day (425 kcal, 19 g protein, per serving)   2. Encourage and monitor PO intake  >> Consistently meet >75% of estimated needs during admission   3. Monitor labs, wt trends, GI function, and skin integrity   4. Encourage pt to meet nutritional needs and honor food preferences as able   5. RD to remain available for additional nutrition interventions and diet edu as needed  1. Recommend Low Sodium, Consistent Carbohydrate diet  >>Add Banatrol 2x/day and Nepro 1x/day (425 kcal, 19g protein per serving)   2. Encourage and monitor PO intake  >> Consistently meet >75% of estimated needs during admission   3. Monitor labs, wt trends, GI function, and skin integrity   4. Encourage pt to meet nutritional needs and honor food preferences as able   5. RD to remain available for additional nutrition interventions and diet edu as needed  1. Recommend Consistent Carbohydrate diet  >>Add Banatrol 2x/day and Nepro 1x/day (425 kcal, 19g protein per serving)   2. Encourage and monitor PO intake  >> Consistently meet >75% of estimated needs during admission   3. Monitor labs, wt trends, GI function, and skin integrity   4. Encourage pt to meet nutritional needs and honor food preferences as able   5. RD to remain available for additional nutrition interventions and diet edu as needed  1. Recommend Consistent Carbohydrate diet with appropriate texture/consistency per SLP   >>Add Banatrol 2x/day and Nepro 1x/day (425 kcal, 19g protein per serving)   2. Encourage and monitor PO intake  >> Consistently meet >75% of estimated needs during admission   3. Monitor labs, wt trends, GI function, and skin integrity   4. Encourage pt to meet nutritional needs and honor food preferences as able   5. RD to remain available for additional nutrition interventions and diet edu as needed

## 2023-10-25 NOTE — CONSULT NOTE ADULT - ASSESSMENT
Thank you for the opportunity to participate in the care of your patient.  57M with PMHx of DM2, CVA x 2 w/ residual L sided weakness, early onset dementia, HTN, HLD, CKD V (creatinine 7-8, not on HD, AV fistula 5/23), BPH with recent admission for starvation ketosis and gas gangrene s/p rBKA, presenting with weakness and diarrhea x 1 day from nursing home. Found to be C.diff positive, admitted for sepsis 2/2 C.diff colitis. Renal consulted for CKD Stage V.     Scr: 6.65   BUN: 108    Imp: CKD Stage V  No uremic symptoms. Lytes and volume status acceptable.     Recommendations:  - start continuous D5W + 1 Amp of Bicarb @100cc/hr  - No indications to start HD at this time   - Encourage PO intake  - Renally dosed medications based on eGFR <10  - Avoid Nephrotoxic drugs          Thank you for the opportunity to participate in the care of your patient.  57M with PMHx of DM2, CVA x 2 w/ residual L sided weakness, early onset dementia, HTN, HLD, CKD V (creatinine 7-8, not on HD, AV fistula 5/23), BPH with recent admission for starvation ketosis and gas gangrene s/p rBKA, presenting with weakness and diarrhea x 1 day from nursing home. Found to be C.diff positive, admitted for sepsis 2/2 C.diff colitis. Renal consulted for CKD Stage V.     Scr: 6.65   BUN: 108    Imp: CKD Stage V  No uremic symptoms. clinically seems dry and free water depleted with na 153, and metabolic acidosisi    Recommendations:  - start continuous D5W + 1 Amp of Bicarb @100cc/hr  - No indications to start HD at this time   - Encourage PO intake  - Renally dosed medications based on eGFR <10  - Avoid Nephrotoxic drugs          Thank you for the opportunity to participate in the care of your patient.

## 2023-10-25 NOTE — DIETITIAN INITIAL EVALUATION ADULT - PROBLEM SELECTOR PLAN 8
Baseline A&O x 1 on admission (Self). Residual l. sided weakness but difficult to assess as contracted, however able to follow commands.  - c/w Aspirin, statin.

## 2023-10-25 NOTE — PROGRESS NOTE ADULT - PROBLEM SELECTOR PLAN 7
iSS  FSG Pt with recent admission for starvation ketosis. Hx of diabetes.     - c/w miSS  - monitor FS  - consistent carb diet

## 2023-10-25 NOTE — H&P ADULT - PROBLEM SELECTOR PLAN 11
F: s/p 2L  E: Replete as necessary K>4 Mg>2  N: Dash-REnal diet   DVT Prophylaxis: Heparin subq  GI prophylaxis: None   CODE STATUS: FULL  DISPO: [facility]

## 2023-10-25 NOTE — PROGRESS NOTE ADULT - PROBLEM SELECTOR PLAN 9
h/o Right foot necrotizing fasciitis s/p guillotine R BKA (7/8) and completion R BKA 7/11  - c/w aspirin, plavix for PAD h/o Right foot necrotizing fasciitis s/p guillotine R BKA (7/8) and completion R BKA 7/11    - c/w aspirin, plavix for PAD

## 2023-10-25 NOTE — H&P ADULT - PROBLEM SELECTOR PLAN 2
Patient meeting severe criteria of C. diff disease, WBC >15K, Cr >1.5mg/dL but elevated at baseline. IMaging w/ infectious colitis.  - c/w vancomycin as above(10/24-11/2)  - Maintain hydration, PO and IV  - STool count Patient meeting severe criteria of C. diff disease, WBC >15K, Cr >1.5mg/dL but elevated at baseline. IMaging w/ infectious colitis.  - c/w vancomycin as above(10/24-11/2)  - consider starting flagyl  mg every 8 hours  - Maintain hydration, PO and IV  - Stool count Patient meeting severe criteria of C. diff disease, WBC >15K, Cr >1.5mg/dL but elevated at baseline. IMaging w/ infectious colitis.  - c/w vancomycin as above(10/24-11/2)  - consider starting flagyl  mg every 8 hours  - Maintain hydration, PO and IV  - Stool count  - Serial abdominal exams

## 2023-10-25 NOTE — DIETITIAN INITIAL EVALUATION ADULT - PROBLEM SELECTOR PLAN 6
Per prior discharge paperwork, discharged with hydralazine, nifedipine, and atorvastatin  - c/w atorvastatin 80mg qd  - hold nifedipine 60 and hydralazine 25mg iso sepsis and hypotension, restart as appropriate.    #Sinus Tachycardia  Patient admitted with HR of 130s, EKG showing sinus tachycardia; this was present on prior admission although at lower levels; seen by cardiology who thought it was due to underlying disease recommended discharging on Toprol X25mg which was not seen on d/c paperwork. Likely iso underlying sepsis, disease.  - Collateral from nursing home should be obtained  - Hold toprol iso sepsis restart as appropriate.

## 2023-10-25 NOTE — H&P ADULT - HISTORY OF PRESENT ILLNESS
57M with PMHx of DM2, CVA x 2 w/ residual L sided weakness, early onset dementia, HTN, HLD, CKD V (creatinine 7-8, not on HD), BPH and PSHx of L toe amputation and AV fistula creation (5/23 - Dr. Royal), recent admission in July for starvation ketosis and gas gangrene s/p rBKA, discharged to nursing home, presenting with weakness and dirrhea x 1 day from nursing home, s/p 1 dose of vancomycin. Patient met 3/4 SIRS crtieria on admission (fever, HR, leukocytosis), and was given a liter NS and vancomycin and zosyn in the ED.    ED Course  T101 ->113 /63->97/71->126/70  RR18 Sat 99% on RA  Labs Significant for: WBC 19, Na 154->151 BUN/Cr 121/6.68 (prior admission 65/7.08), Trop 89 NAGMA.       Imaging:   CTAP: Diffuse wall thickening of the rectum, sigmoid, and distal descending   colon, most likely secondary to infectiouscolitis.    New wall thickening involving the right and anterior bladder wall.   Findings could be related to infection. Other etiologies are not   excluded. Correlate clinically.    CT Head: No evidence of acute intracranial hemorrhage, mass effect, or   hydrocephalus.

## 2023-10-25 NOTE — H&P ADULT - PROBLEM SELECTOR PLAN 9
Baseline A&O x 1 on admission (Self). Residual l. sided weakness but difficult to assess as contracted, however able to follow commands.  - c/w Aspirin, statin. h/o Right foot necrotizing fasciitis s/p guillotine R BKA (7/8) and completion R BKA 7/11  - c/w aspirin, plavix for PAD

## 2023-10-25 NOTE — PROGRESS NOTE ADULT - PROBLEM SELECTOR PLAN 2
Patient meeting severe criteria of C. diff disease, WBC >15K, Cr >1.5mg/dL but elevated at baseline. Imaging w/ infectious colitis.  - C/w vancomycin as above(10/24-11/2)  - Continue flagyl  mg every 8 hours  - Maintain hydration, PO and IV  - Start clear liquid diet  - Stool count  - Serial abdominal exams Patient meeting severe criteria of C. diff disease, WBC >15K, Cr >1.5mg/dL but elevated at baseline. CT A/P consistent w/ infectious colitis.  - C/w vancomycin as above(10/24-11/2)  - Continue flagyl  mg every 8 hours  - Maintain hydration, PO and IV  - Start clear liquid diet  - Stool count  - Serial abdominal exams Patient meeting severe criteria of C. diff disease, WBC >15K, Cr >1.5mg/dL but elevated at baseline. CT A/P consistent w/ infectious colitis.    - C/w vancomycin as above(10/24-11/2)  - Continue flagyl  mg every 8 hours  - Maintain hydration, PO and IV  - Start clear liquid diet  - Stool count  - Serial abdominal exams

## 2023-10-25 NOTE — H&P ADULT - PROBLEM SELECTOR PLAN 7
iSS  FSG Per prior discharge paperwork, discharged with hydralazine, nifedipine, and atorvastatin  - c/w atorvastatin 80mg qd  - hold nifedipine 60 and hydralazine 25mg iso sepsis and hypotension, restart as appropriate.

## 2023-10-25 NOTE — H&P ADULT - PROBLEM SELECTOR PLAN 3
Hypernatremia  Total FWD 2.5L. s/p 2L (1LR, 1NS) in ED with improvement from 154 to 151. Likely iso hypovolemia, poor PO intake, dirrhea. EKG w/o acute changes. Mentating at baseline appropriately.    - c/w 1L LR @80ccs/hr x 8 hours as to not worsen NAGMA  - Goal Na 148 in AM. Patient admitted with HR of 130s, EKG showing sinus tachycardia; this was present on prior admission although at lower levels; seen by cardiology who thought it was due to underlying disease recommended discharging on Toprol X25mg which was not seen on d/c paperwork. Likely iso underlying sepsis, disease.  - Collateral from nursing home should be obtained  - Trend trops as below  - Cardiology consult in AM BUN/Cr At baseline according to last admission. Patient with mature AVF for dialysis initiation but per last nephro note was not initiated or indicated. Patient with uremia to 121, mental status A&O x 1-2, with NAGMA.   - continue to trend BUN/CR  - Nephro consult in AM  - Trend uremia  - Replete electroyltes as necessary  - Renal Diet  - Avoid nephrotoxic meds  - Renally dosed medications based on eGFR <10

## 2023-10-25 NOTE — H&P ADULT - NSHPLABSRESULTS_GEN_ALL_CORE
LABS:                         11.5   19.43 )-----------( 371      ( 24 Oct 2023 18:31 )             38.3     10-24    151<H>  |  122<H>  |  121<H>  ----------------------------<  148<H>  4.3   |  16<L>  |  6.68<H>    Ca    8.8      24 Oct 2023 21:28    TPro  6.9  /  Alb  3.3  /  TBili  <0.2  /  DBili  x   /  AST  14  /  ALT  13  /  AlkPhos  82  10-24    PT/INR - ( 24 Oct 2023 18:31 )   PT: 11.3 sec;   INR: 0.99          PTT - ( 24 Oct 2023 18:31 )  PTT:26.5 sec  Urinalysis Basic - ( 24 Oct 2023 23:14 )    Color: Yellow / Appearance: Clear / S.014 / pH: x  Gluc: x / Ketone: Negative mg/dL  / Bili: Negative / Urobili: 0.2 mg/dL   Blood: x / Protein: 100 mg/dL / Nitrite: Negative   Leuk Esterase: Negative / RBC: 1 /HPF / WBC 1 /HPF   Sq Epi: x / Non Sq Epi: 1 /HPF / Bacteria: Negative /HPF            Lactate, Blood: 1.2 mmol/L (10-24 @ 18:31)      RADIOLOGY, EKG & ADDITIONAL TESTS:

## 2023-10-25 NOTE — PROGRESS NOTE ADULT - ASSESSMENT
57M with PMHx of DM2, CVA x 2 w/ residual L sided weakness, early onset dementia, HTN, HLD, CKD V (creatinine 7-8, not on HD), BPH and PSHx of L toe amputation and AV fistula creation (5/23 - Dr. Royal), recent admission in July for starvation ketosis and gas gangrene s/p rBKA, discharged to nursing home, presenting with weakness and diarrhea x 1 day from nursing home with C. diff treatment initiated, found to meet sepsis criteria on admission, admitted for management  57M with PMHx of DM2, CVA x 2 w/ residual L sided weakness, early onset dementia, HTN, HLD, CKD V (creatinine 7-8, not on HD), BPH and PSHx of L toe amputation and AV fistula creation (5/23 - Dr. Royal), Recent admission in July for starvation ketosis and gas gangrene s/p rBKA, Discharged to nursing home, Presenting with weakness and diarrhea x 1 day from nursing home with C. diff treatment initiated, Found to meet sepsis criteria on admission, admitted for management  57M with PMHx of DM2, CVA x 2 w/ residual L sided weakness, early onset dementia, HTN, HLD, CKD V (creatinine 7-8, not on HD, AV fistula 5/23), BPH with recent admission for starvation ketosis and gas gangrene s/p rBKA, presenting with weakness and diarrhea x 1 day from nursing home. Found to be C.diff positive, admitted for sepsis 2/2 C.diff colitis.

## 2023-10-25 NOTE — DIETITIAN INITIAL EVALUATION ADULT - PROBLEM SELECTOR PLAN 5
Likely iso type 2 demand ischemia iso sepsis, tachycardia. EKG showing sinus tachycardia, no ST elevation. Patient not complaining of chest pain.   - Trend Trops

## 2023-10-25 NOTE — PROGRESS NOTE ADULT - PROBLEM SELECTOR PLAN 8
Baseline A&O x 1 on admission (Self). Residual l. sided weakness but difficult to assess as contracted, however able to follow commands.  - c/w Aspirin, statin. Baseline A&O x 1 on admission (Self). Residual L sided weakness but difficult to assess as contracted, however able to follow commands.  - c/w Aspirin, statin. Baseline A&O x 1 on admission (Self). Residual L sided weakness but difficult to assess as contracted, however able to follow commands.    - c/w Aspirin, statin.

## 2023-10-25 NOTE — PATIENT PROFILE ADULT - FALL HARM RISK - HARM RISK INTERVENTIONS

## 2023-10-25 NOTE — H&P ADULT - PROBLEM SELECTOR PLAN 1
Patient meeting 3/4 SIRS criteria on admission.   s/p 2L in ED., Vanc, Zosyn, ofirmiev. Source possibly GI translocation of C. diff. s/p 1 dose vancomycin outpatient.  - c/w vancomycin PO 500mg t1mgrde x 10 days total (10/24-11/2)  - Pt hypovolemic would continue fluids 100cc/hour x 8 hours of NS  - f/u BCx, UCx   - Trend Leukocytosis Patient meeting 3/4 SIRS criteria on admission.   s/p 2L in ED., Vanc, Zosyn, ofirmiev. Source possibly GI translocation of C. diff. s/p 1 dose vancomycin outpatient.  - c/w vancomycin PO 500mg [id number] x 10 days total (10/24-11/2)  - Pt hypovolemic would continue fluids 100cc/hour x 8 hours of NS  - f/u BCx, UCx   - Trend Leukocytosis  - Wound care consult in AM for sacral wound

## 2023-10-25 NOTE — DIETITIAN INITIAL EVALUATION ADULT - PERTINENT MEDS FT
MEDICATIONS  (STANDING):  aspirin  chewable 81 milliGRAM(s) Oral daily  atorvastatin 80 milliGRAM(s) Oral at bedtime  clopidogrel Tablet 75 milliGRAM(s) Oral daily  dextrose 5% + sodium chloride 0.45%. 1000 milliLiter(s) (80 mL/Hr) IV Continuous <Continuous>  dextrose 5%. 1000 milliLiter(s) (100 mL/Hr) IV Continuous <Continuous>  dextrose 5%. 1000 milliLiter(s) (50 mL/Hr) IV Continuous <Continuous>  dextrose 50% Injectable 12.5 Gram(s) IV Push once  dextrose 50% Injectable 25 Gram(s) IV Push once  dextrose 50% Injectable 25 Gram(s) IV Push once  glucagon  Injectable 1 milliGRAM(s) IntraMuscular once  heparin   Injectable 5000 Unit(s) SubCutaneous every 8 hours  influenza   Vaccine 0.5 milliLiter(s) IntraMuscular once  insulin lispro (ADMELOG) corrective regimen sliding scale   SubCutaneous Before meals and at bedtime  metroNIDAZOLE  IVPB 500 milliGRAM(s) IV Intermittent every 8 hours  tamsulosin 0.4 milliGRAM(s) Oral at bedtime  vancomycin    Solution 500 milliGRAM(s) Oral every 6 hours    MEDICATIONS  (PRN):  acetaminophen     Tablet .. 650 milliGRAM(s) Oral every 6 hours PRN Temp greater or equal to 38C (100.4F), Mild Pain (1 - 3)  dextrose Oral Gel 15 Gram(s) Oral once PRN Blood Glucose LESS THAN 70 milliGRAM(s)/deciliter

## 2023-10-25 NOTE — PROGRESS NOTE ADULT - PROBLEM SELECTOR PLAN 3
BUN/Cr At baseline according to last admission. Patient with mature AVF for dialysis initiation but per last nephro note was not initiated or indicated. Patient with uremia to 121, mental status A&O x 1-2, with NAGMA.   - continue to trend BUN/CR  - Nephro consulted, recs appreciated  - Trend uremia  - Replete electrolytes as necessary  - Renal Diet  - Avoid nephrotoxic meds  - Renally dosed medications based on eGFR <10 BUN/Cr At baseline according to last admission. Patient with mature AVF for dialysis initiation but per last nephro note was not initiated or indicated. Patient with uremia to 108 (previously 121), mental status A&O x 1-2, with NAGMA.   - continue to trend BUN/CR  - Nephro consulted, recs appreciated  - Trend uremia  - Replete electrolytes as necessary  - Renal Diet  - Avoid nephrotoxic meds  - Renally dosed medications based on eGFR <10 BUN/Cr At baseline according to last admission. Patient with mature AVF for dialysis initiation but per last nephro note was not initiated or indicated. Patient with uremia to 108, improved from 121, mental status A&O x 1-2, with NAGMA.   - continue to trend BUN/Cr  - Nephro consulted, recs appreciated   - Trend uremia  - Replete electrolytes as necessary  - Avoid nephrotoxic meds  - Renally dosed medications based on eGFR <10. BUN/Cr At baseline according to last admission. Patient with mature AVF for dialysis initiation but per last nephro note was not initiated or indicated. Patient with uremia to 108, improved from 121, mental status A&O x 1-2 (baseline). Pt with acidosis, recently discharged with sodium bicarb tid.     - continue to trend BUN/Cr, uremia  - Nephro consulted, recs appreciated   - Replete electrolytes as necessary  - Avoid nephrotoxic meds  - Renally dosed medications based on eGFR <10.

## 2023-10-25 NOTE — PROGRESS NOTE ADULT - PROBLEM SELECTOR PLAN 4
Total FWD 2.5L. s/p 2L (1LR, 1NS) in ED with improvement from 154 to 151. Likely iso hypovolemia, poor PO intake, dirrhea. EKG w/o acute changes. Mentating at baseline appropriately.    - c/w 1L D5NS@80ccs/hr x 8 hours as to not worsen NAGMA  - Goal Na 148 Total FWD 2.5L. s/p 2L (1LR, 1NS) in ED with improvement from 154 to 151. Likely iso hypovolemia, poor PO intake, dirrhea. EKG w/o acute changes. Mentating at baseline appropriately.    - c/w 1 L Dextrose 5% + NS 0.045% 1 L infused at 80 mL/hr x 10 hr   - Goal Na 148 Total FWD 2.5L. s/p 2L (1LR, 1NS) in ED with improvement from 154 to 151. Likely iso hypovolemia, poor PO intake, diarrhea. EKG w/o acute changes. Mentating at baseline appropriately.    - c/w 1 L Dextrose 5% + NS 0.045% 1 L infused at 80 mL/hr x 10 hr   - Goal Na 148 Total FWD 1.3L. Most recent serum Na 153. Likely iso hypovolemia, poor PO intake, diarrhea. EKG w/o acute changes. Mentating at baseline appropriately.    - c/w 1 L Dextrose 5% + NS 0.045% 1 L infused at 80 mL/hr x 10 hr   - Goal Na 148 Pt presenting with hypernatremia to 150s. Likely iso hypovolemia, poor PO intake, diarrhea. EKG w/o acute changes. Mentating at baseline appropriately.     - monitor CMP q6h  - c/w 1 L Dextrose 5% + NS 0.045% 1 L infused at 80 mL/hr x 10 hr (pt dry)  - Goal Na correction 8-10 in 24 hrs

## 2023-10-25 NOTE — CONSULT NOTE ADULT - ATTENDING COMMENTS
CKD stable   significant free water depletion and met acidosis in setting of diarrhea / cdif  IVF as above
58 M with CVA x 2 w/ residual L sided weakness, early onset dementia, HTN, DM2, CKD V (creatinine 7-8, not on HD), BPH, HLD, L toe amputation, R BKA and AV fistula creation (5/23 - Dr. Royal) on treatment for C. diff with oral vancomycin presents from NH for AMS, hypotension, and tachycardia. BP improved with iv fluids. Physical exam as above. No bowel distention or obstruction on CT abd.   1. Sepsis  2. C diff colitis  3. DM  4. CKD V  5. Sacral decubitus  - vanco po/Flagyl iv  - iv fluids as needed to match losses from diarrhea  - wound care to evaluate the

## 2023-10-25 NOTE — PROGRESS NOTE ADULT - PROBLEM SELECTOR PLAN 6
Per prior discharge paperwork, discharged with hydralazine, nifedipine, and atorvastatin  - c/w atorvastatin 80mg qd  - hold nifedipine 60 and hydralazine 25mg iso sepsis and hypotension, restart as appropriate.    #Sinus Tachycardia  Patient admitted with HR of 130s, EKG showing sinus tachycardia; this was present on prior admission although at lower levels; seen by cardiology who thought it was due to underlying disease recommended discharging on Toprol X25mg which was not seen on d/c paperwork. Likely iso underlying sepsis, disease.  - Collateral from nursing home should be obtained  - Hold toprol iso sepsis restart as appropriate. Per prior discharge paperwork, discharged with hydralazine, nifedipine, and atorvastatin  - c/w atorvastatin 80mg qd  - hold nifedipine 60 and hydralazine 25mg iso sepsis and hypotension, restart as appropriate.    #Sinus Tachycardia  Patient admitted with HR of 130s, EKG showing sinus tachycardia; this was present on prior admission although at lower levels; seen by cardiology who thought it was due to underlying disease recommended discharging on Toprol X25mg which was not seen on d/c paperwork. Likely iso underlying sepsis, disease.    - Collateral from nursing home should be obtained  - Hold toprol iso sepsis restart as appropriate.

## 2023-10-25 NOTE — CONSULT NOTE ADULT - SUBJECTIVE AND OBJECTIVE BOX
HPI: 57M with PMHx of DM2, CVA x 2 w/ residual L sided weakness, early onset dementia, HTN, HLD, CKD V (creatinine 7-8, not on HD), BPH and PSHx of L toe amputation and AV fistula creation (5/23 - Dr. Royal), recent admission in July for starvation ketosis and gas gangrene s/p rBKA, discharged to nursing home, presenting with weakness and dirrhea x 1 day from nursing home, s/p 1 dose of vancomycin. Patient met 3/4 SIRS crtieria on admission (fever, HR, leukocytosis), and was given a liter NS and vancomycin and zosyn in the ED.    ED Course  T101 ->113 /63->97/71->126/70  RR18 Sat 99% on RA  Labs Significant for: WBC 19, Na 154->151 BUN/Cr 121/6.68 (prior admission 65/7.08), Trop 89 NAGMA.       Imaging:   CTAP: Diffuse wall thickening of the rectum, sigmoid, and distal descending   colon, most likely secondary to infectiouscolitis.    New wall thickening involving the right and anterior bladder wall.   Findings could be related to infection. Other etiologies are not   excluded. Correlate clinically.    CT Head: No evidence of acute intracranial hemorrhage, mass effect, or   hydrocephalus.    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, traumatic, left, sequela,            Allergies:  No Known Allergies      Home Medications:   acetaminophen     Tablet .. 650 milliGRAM(s) Oral every 6 hours PRN  aspirin  chewable 81 milliGRAM(s) Oral daily  atorvastatin 80 milliGRAM(s) Oral at bedtime  clopidogrel Tablet 75 milliGRAM(s) Oral daily  dextrose 5% + sodium chloride 0.45%. 1000 milliLiter(s) IV Continuous <Continuous>  dextrose 5%. 1000 milliLiter(s) IV Continuous <Continuous>  dextrose 5%. 1000 milliLiter(s) IV Continuous <Continuous>  dextrose 50% Injectable 12.5 Gram(s) IV Push once  dextrose 50% Injectable 25 Gram(s) IV Push once  dextrose 50% Injectable 25 Gram(s) IV Push once  dextrose Oral Gel 15 Gram(s) Oral once PRN  glucagon  Injectable 1 milliGRAM(s) IntraMuscular once  heparin   Injectable 5000 Unit(s) SubCutaneous every 8 hours  influenza   Vaccine 0.5 milliLiter(s) IntraMuscular once  insulin lispro (ADMELOG) corrective regimen sliding scale   SubCutaneous Before meals and at bedtime  metroNIDAZOLE  IVPB 500 milliGRAM(s) IV Intermittent every 8 hours  tamsulosin 0.4 milliGRAM(s) Oral at bedtime  vancomycin    Solution 500 milliGRAM(s) Oral every 6 hours      Hospital Medications:   MEDICATIONS  (STANDING):  aspirin  chewable 81 milliGRAM(s) Oral daily  atorvastatin 80 milliGRAM(s) Oral at bedtime  clopidogrel Tablet 75 milliGRAM(s) Oral daily  dextrose 5% + sodium chloride 0.45%. 1000 milliLiter(s) (80 mL/Hr) IV Continuous <Continuous>  dextrose 5%. 1000 milliLiter(s) (100 mL/Hr) IV Continuous <Continuous>  dextrose 5%. 1000 milliLiter(s) (50 mL/Hr) IV Continuous <Continuous>  dextrose 50% Injectable 12.5 Gram(s) IV Push once  dextrose 50% Injectable 25 Gram(s) IV Push once  dextrose 50% Injectable 25 Gram(s) IV Push once  glucagon  Injectable 1 milliGRAM(s) IntraMuscular once  heparin   Injectable 5000 Unit(s) SubCutaneous every 8 hours  influenza   Vaccine 0.5 milliLiter(s) IntraMuscular once  insulin lispro (ADMELOG) corrective regimen sliding scale   SubCutaneous Before meals and at bedtime  metroNIDAZOLE  IVPB 500 milliGRAM(s) IV Intermittent every 8 hours  tamsulosin 0.4 milliGRAM(s) Oral at bedtime  vancomycin    Solution 500 milliGRAM(s) Oral every 6 hours      SOCIAL HISTORY:  Denies ETOh, Smoking,     Family History:  FAMILY HISTORY:  Family history of diabetes mellitus (Mother)  Family history of diabetes mellitus (DM)          VITALS:  T(F): 97.7 (10-25-23 @ 12:46), Max: 100.6 (10-24-23 @ 18:46)  HR: 107 (10-25-23 @ 12:46)  BP: 152/88 (10-25-23 @ 12:46)  RR: 16 (10-25-23 @ 12:46)  SpO2: 98% (10-25-23 @ 12:46)  Wt(kg): --    Height (cm): 182.9 (10-24 @ 18:02), 182.9 (10-24 @ 17:37)  Weight (kg): 63.5 (10-24 @ 18:02), 63.5 (10-24 @ 17:37)  BMI (kg/m2): 19 (10-24 @ 18:02), 19 (10-24 @ 17:37)  BSA (m2): 1.83 (10-24 @ 18:02), 1.83 (10-24 @ 17:37)  CAPILLARY BLOOD GLUCOSE      POCT Blood Glucose.: 157 mg/dL (25 Oct 2023 13:40)  POCT Blood Glucose.: 163 mg/dL (25 Oct 2023 12:35)  POCT Blood Glucose.: 195 mg/dL (25 Oct 2023 09:08)  POCT Blood Glucose.: 99 mg/dL (24 Oct 2023 18:28)      Review of Systems:  CONSTITUTIONAL: No fever or chills.  RESPIRATORY: No shortness of breath, cough  CARDIOVASCULAR: No Chest pain, shortness of breath, palpitations  GASTROINTESTINAL: No abdominal pain, nausea, vomiting, diarrhea  GENITOURINARY: No urinary frequency, gross hematuria, dysuria  NEUROLOGICAL: No headache, weakness  SKIN: No rash or skin lesion  MUSCULOSKELETAL: No swelling  Psych: Denies suicidal or homicidal ideation    PHYSICAL EXAM:  GENERAL: Alert, awake, oriented x3   HEENT: ADRIANO, EOMI, neck supple, no JVP  CHEST/LUNG: Bilateral clear breath sounds  HEART: Regular rate and rhythm, no murmur, no gallops, no rub   ABDOMEN: Soft, nontender, non distended  : No flank or supra pubic tenderness.  EXTREMITIES: no pedal edema  Neurology: AAOx3, no focal neurological deficit  SKIN: No rash or skin lesion     LABS:  10-25    153<H>  |  122<H>  |  108<H>  ----------------------------<  178<H>  4.4   |  15<L>  |  6.65<H>    Ca    8.8      25 Oct 2023 05:30  Phos  3.8     10-25  Mg     2.2     10-25    TPro  6.8  /  Alb  3.2<L>  /  TBili  <0.2  /  DBili      /  AST  18  /  ALT  14  /  AlkPhos  81  10-25    Creatinine Trend: 6.65 <--, 6.68 <--, 7.09 <--                        10.6   19.32 )-----------( 353      ( 25 Oct 2023 05:30 )             35.9     Urine Studies:  Urinalysis Basic - ( 25 Oct 2023 05:30 )    Color:  / Appearance:  / SG:  / pH:   Gluc: 178 mg/dL / Ketone:   / Bili:  / Urobili:    Blood:  / Protein:  / Nitrite:    Leuk Esterase:  / RBC:  / WBC    Sq Epi:  / Non Sq Epi:  / Bacteria:  HPI: 57M with PMHx of DM2, CVA x 2 w/ residual L sided weakness, early onset dementia, HTN, HLD, CKD V (creatinine 7-8, not on HD), BPH and PSHx of L toe amputation and AV fistula creation (5/23 - Dr. Royal), recent admission in July for starvation ketosis and gas gangrene s/p rBKA, discharged to nursing home, presenting with weakness and dirrhea x 1 day from nursing home, s/p 1 dose of vancomycin. Patient met 3/4 SIRS crtieria on admission (fever, HR, leukocytosis), and was given a liter NS and vancomycin and zosyn in the ED.    ED Course  T101 ->113 /63->97/71->126/70  RR18 Sat 99% on RA  Labs Significant for: WBC 19, Na 154->151 BUN/Cr 121/6.68 (prior admission 65/7.08), Trop 89 NAGMA.       Imaging:   CTAP: Diffuse wall thickening of the rectum, sigmoid, and distal descending   colon, most likely secondary to infectiouscolitis.    New wall thickening involving the right and anterior bladder wall.   Findings could be related to infection. Other etiologies are not   excluded. Correlate clinically.    CT Head: No evidence of acute intracranial hemorrhage, mass effect, or   hydrocephalus.    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, traumatic, left, sequela,            Allergies:  No Known Allergies      Home Medications:   acetaminophen     Tablet .. 650 milliGRAM(s) Oral every 6 hours PRN  aspirin  chewable 81 milliGRAM(s) Oral daily  atorvastatin 80 milliGRAM(s) Oral at bedtime  clopidogrel Tablet 75 milliGRAM(s) Oral daily  dextrose 5% + sodium chloride 0.45%. 1000 milliLiter(s) IV Continuous <Continuous>  dextrose 5%. 1000 milliLiter(s) IV Continuous <Continuous>  dextrose 5%. 1000 milliLiter(s) IV Continuous <Continuous>  dextrose 50% Injectable 12.5 Gram(s) IV Push once  dextrose 50% Injectable 25 Gram(s) IV Push once  dextrose 50% Injectable 25 Gram(s) IV Push once  dextrose Oral Gel 15 Gram(s) Oral once PRN  glucagon  Injectable 1 milliGRAM(s) IntraMuscular once  heparin   Injectable 5000 Unit(s) SubCutaneous every 8 hours  influenza   Vaccine 0.5 milliLiter(s) IntraMuscular once  insulin lispro (ADMELOG) corrective regimen sliding scale   SubCutaneous Before meals and at bedtime  metroNIDAZOLE  IVPB 500 milliGRAM(s) IV Intermittent every 8 hours  tamsulosin 0.4 milliGRAM(s) Oral at bedtime  vancomycin    Solution 500 milliGRAM(s) Oral every 6 hours      Hospital Medications:   MEDICATIONS  (STANDING):  aspirin  chewable 81 milliGRAM(s) Oral daily  atorvastatin 80 milliGRAM(s) Oral at bedtime  clopidogrel Tablet 75 milliGRAM(s) Oral daily  dextrose 5% + sodium chloride 0.45%. 1000 milliLiter(s) (80 mL/Hr) IV Continuous <Continuous>  dextrose 5%. 1000 milliLiter(s) (100 mL/Hr) IV Continuous <Continuous>  dextrose 5%. 1000 milliLiter(s) (50 mL/Hr) IV Continuous <Continuous>  dextrose 50% Injectable 12.5 Gram(s) IV Push once  dextrose 50% Injectable 25 Gram(s) IV Push once  dextrose 50% Injectable 25 Gram(s) IV Push once  glucagon  Injectable 1 milliGRAM(s) IntraMuscular once  heparin   Injectable 5000 Unit(s) SubCutaneous every 8 hours  influenza   Vaccine 0.5 milliLiter(s) IntraMuscular once  insulin lispro (ADMELOG) corrective regimen sliding scale   SubCutaneous Before meals and at bedtime  metroNIDAZOLE  IVPB 500 milliGRAM(s) IV Intermittent every 8 hours  tamsulosin 0.4 milliGRAM(s) Oral at bedtime  vancomycin    Solution 500 milliGRAM(s) Oral every 6 hours      SOCIAL HISTORY:  Denies ETOh, Smoking,     Family History:  FAMILY HISTORY:  Family history of diabetes mellitus (Mother)  Family history of diabetes mellitus (DM)          VITALS:  T(F): 97.7 (10-25-23 @ 12:46), Max: 100.6 (10-24-23 @ 18:46)  HR: 107 (10-25-23 @ 12:46)  BP: 152/88 (10-25-23 @ 12:46)  RR: 16 (10-25-23 @ 12:46)  SpO2: 98% (10-25-23 @ 12:46)  Wt(kg): --    Height (cm): 182.9 (10-24 @ 18:02), 182.9 (10-24 @ 17:37)  Weight (kg): 63.5 (10-24 @ 18:02), 63.5 (10-24 @ 17:37)  BMI (kg/m2): 19 (10-24 @ 18:02), 19 (10-24 @ 17:37)  BSA (m2): 1.83 (10-24 @ 18:02), 1.83 (10-24 @ 17:37)  CAPILLARY BLOOD GLUCOSE      POCT Blood Glucose.: 157 mg/dL (25 Oct 2023 13:40)  POCT Blood Glucose.: 163 mg/dL (25 Oct 2023 12:35)  POCT Blood Glucose.: 195 mg/dL (25 Oct 2023 09:08)  POCT Blood Glucose.: 99 mg/dL (24 Oct 2023 18:28)      Review of Systems:  CONSTITUTIONAL: No fever or chills.  RESPIRATORY: No shortness of breath, cough  CARDIOVASCULAR: No Chest pain, shortness of breath, palpitations  GASTROINTESTINAL: No abdominal pain, nausea, vomiting, diarrhea  GENITOURINARY: No urinary frequency, gross hematuria, dysuria    PHYSICAL EXAM  GENERAL: NAD, lying in bed comfortably, facial asymmetry  HEART: Regular rate and rhythm, no murmurs, rubs, or gallops  LUNGS: Unlabored respirations.  Clear to auscultation bilaterally, no crackles, wheezing, or rhonchi  EXTREMITIES: Extremities: R arm contracted, wwp;. R leg bka clean stump.       LABS:  10-25    153<H>  |  122<H>  |  108<H>  ----------------------------<  178<H>  4.4   |  15<L>  |  6.65<H>    Ca    8.8      25 Oct 2023 05:30  Phos  3.8     10-25  Mg     2.2     10-25    TPro  6.8  /  Alb  3.2<L>  /  TBili  <0.2  /  DBili      /  AST  18  /  ALT  14  /  AlkPhos  81  10-25    Creatinine Trend: 6.65 <--, 6.68 <--, 7.09 <--                        10.6   19.32 )-----------( 353      ( 25 Oct 2023 05:30 )             35.9     Urine Studies:  Urinalysis Basic - ( 25 Oct 2023 05:30 )    Color:  / Appearance:  / SG:  / pH:   Gluc: 178 mg/dL / Ketone:   / Bili:  / Urobili:    Blood:  / Protein:  / Nitrite:    Leuk Esterase:  / RBC:  / WBC    Sq Epi:  / Non Sq Epi:  / Bacteria:

## 2023-10-25 NOTE — H&P ADULT - PROBLEM SELECTOR PLAN 8
iSS  FSG Baseline A&O x 1 on admission (Self). Residual l. sided weakness but difficult to assess as contracted, however able to follow commands.  - c/w Aspirin, statin.

## 2023-10-25 NOTE — PROGRESS NOTE ADULT - PROBLEM SELECTOR PLAN 5
Likely iso type 2 demand ischemia iso sepsis, tachycardia. EKG showing sinus tachycardia, no ST elevation. Patient not complaining of chest pain.   - Trend Trops Likely iso type 2 demand ischemia iso sepsis, tachycardia. EKG showing sinus tachycardia, no ST elevation. Patient not complaining of chest pain. Trops lateral.     - monitor for cardiac sx

## 2023-10-25 NOTE — H&P ADULT - NSICDXPASTSURGICALHX_GEN_ALL_CORE_FT
Patient:   LEON MATAMOROS            MRN: [id number]            FIN: [id number]               Age:   41 years     Sex:  FEMALE     :  76   Associated Diagnoses:   None   Author:   DICK PENDLETON      H&P UPDATE::    I HAVE REVEIWED THE HISTORY & PHYSICAL (H&P COMPLETED IN THE LAST 30 DAYS) AND EXAMINED THE PATIENT:    ___x__   NO CHANGES HAVE OCCURRED IN THE PATIENT'S CONDITION SINCE THE H&P WAS COMPLETED       _____ THE FOLLOWING ARE THE CHANGES IN THE PATIENT'S CONDITION (SPECIFIED BELOW):                   Electronically Signed On 2018 17:15  __________________________________________________   DICK PENDLETON    
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

## 2023-10-25 NOTE — H&P ADULT - PROBLEM SELECTOR PLAN 10
h/o Right foot necrotizing fasciitis s/p guillotine R BKA (7/8) and completion R BKA 7/11  - c/w aspirin, plavix for PAD F: s/p 2L  E: Replete as necessary K>4 Mg>2  N: Dash-REnal diet   DVT Prophylaxis: Heparin subq  GI prophylaxis: None   CODE STATUS: FULL  DISPO: [facility]

## 2023-10-25 NOTE — DIETITIAN INITIAL EVALUATION ADULT - PROBLEM SELECTOR PLAN 10
F: s/p 2L  E: Replete as necessary K>4 Mg>2  N: Dash-REnal diet   DVT Prophylaxis: Heparin subq  GI prophylaxis: None   CODE STATUS: FULL  DISPO: [facility]

## 2023-10-25 NOTE — PROGRESS NOTE ADULT - PROBLEM SELECTOR PLAN 1
Patient meeting 3/4 SIRS criteria on admission.   s/p 2L in ED., Vanc, Zosyn, ofirmiev. Source possibly GI translocation of C. diff. s/p 1 dose vancomycin outpatient.  C. diff PCR & GDH: positive   CT A/P: Diffuse wall thickening of the rectum, sigmoid, and distal descending colon, most likely 2/2 infectious colitis  CXR: negative  Blood culture x2: no growth at 12 hrs  Urine culture: no growth to date  UA: negative leukocyte esterase and nitrites  Lactate 10/25: 0.8 (wnl)    - C/w vancomycin PO 500mg [id number] x 10 days total (10/24-11/2)  - Continue fluids 100cc/hour x 8 hours of NS  - F/u BCx, UCx   - Trend Leukocytosis  - Wound care consult for sacral wound Patient meeting 3/4 SIRS criteria on admission.   s/p 2L in ED., Garrett, Lisa, ofirmiev. Source possibly GI translocation of C. diff. s/p 1 dose vancomycin outpatient.  C. diff PCR & GDH: positive   CT A/P: Diffuse wall thickening of the rectum, sigmoid, and distal descending colon, most likely 2/2 infectious colitis  CXR: negative  Blood culture x2: no growth at 12 hrs  Urine culture: no growth to date  UA: negative leukocyte esterase and nitrites  Lactate 10/25: 0.8 (wnl)    - C/w vancomycin PO 500mg [id number] x 10 days total (10/24-11/2)  - Continue flagyl  mg every 8 hours  - C/w 1 L Dextrose 5% + NS 0.045% 1 L infused at 80 mL/hr x 10 hr   - F/u BCx, UCx   - Trend Leukocytosis  - Wound care consulted for sacral wound, recs appreciated #Sepsis 2/2 C.diff colitis  Patient meeting 3/4 SIRS criteria on admission (lactate neg). Found to be C.diff positive w/ CT abd demonstrating infectious colitis. CXR neg, RVP neg, UA neg. Admitted for sepsis 2/2 to C.diff colitis.    - C/w vancomycin PO 500mg [id number] x 10 days total (10/24-11/2)  - Continue flagyl  mg every 8 hours  - C/w 1 L Dextrose 5% + NS 0.045% 1 L infused at 80 mL/hr x 10 hr   - BCx: NGTD

## 2023-10-25 NOTE — DIETITIAN INITIAL EVALUATION ADULT - PROBLEM SELECTOR PLAN 4
Total FWD 2.5L. s/p 2L (1LR, 1NS) in ED with improvement from 154 to 151. Likely iso hypovolemia, poor PO intake, dirrhea. EKG w/o acute changes. Mentating at baseline appropriately.    - c/w 1L D5NS@80ccs/hr x 8 hours as to not worsen NAGMA  - Goal Na 148 in AM.

## 2023-10-25 NOTE — DIETITIAN INITIAL EVALUATION ADULT - OTHER CALCULATIONS
Based on Standards of Care patient within % IBW (84#) thus actual body weight used for all calculations (140#). Needs adjusted for age, pressure ulcer, CKD, clinical course. Fluids per team.   Based on Standards of Care patient within % adjusted IBW for BKA (85%) thus actual body weight used for all calculations (140#). Needs adjusted for age, pressure ulcer, CKD, clinical course. Fluids per team.

## 2023-10-25 NOTE — H&P ADULT - PROBLEM SELECTOR PLAN 6
Per prior discharge paperwork, discharged with hydralazine, nifedipine, and atorvastatin  - c/w atorvastatin BUN/Cr At baseline according to last admission. Patient with mature AVF for dialysis initiation but per last nephro note was not initiated or indicated.  - continue to trend BUN/CR  - Nephro consult if indications for dialysis met  - Replete electroyltes as necessary  - Renal Diet  - Avoid nehprotoxic meds  - Renally dosed medications based on eGFR <10 Per prior discharge paperwork, discharged with hydralazine, nifedipine, and atorvastatin  - c/w atorvastatin 80mg qd  - hold nifedipine 60 and hydralazine 25mg iso sepsis and hypotension, restart as appropriate.    #Sinus Tachycardia  Patient admitted with HR of 130s, EKG showing sinus tachycardia; this was present on prior admission although at lower levels; seen by cardiology who thought it was due to underlying disease recommended discharging on Toprol X25mg which was not seen on d/c paperwork. Likely iso underlying sepsis, disease.  - Collateral from nursing home should be obtained  - Hold toprol iso sepsis restart as appropriate.

## 2023-10-25 NOTE — PROGRESS NOTE ADULT - SUBJECTIVE AND OBJECTIVE BOX
OVERNIGHT EVENTS:  SUBJECTIVE: Patient was seen and examined at bedside.      VITAL SIGNS:  T(F): 98.2 (10-25-23 @ 06:30)  HR: 110 (10-25-23 @ 06:30)  BP: 153/78 (10-25-23 @ 06:30)  RR: 16 (10-25-23 @ 06:30)  SpO2: 98% (10-25-23 @ 06:30)  Wt(kg): --    PHYSICAL EXAM  GENERAL: NAD, lying in bed comfortably  HEAD:  Atraumatic, normocephalic  EYES: EOMI, PERRLA, conjunctiva and sclera clear  ENT: Moist mucous membranes  NECK: Supple, no JVD  HEART: Regular rate and rhythm, no murmurs, rubs, or gallops  LUNGS: Unlabored respirations.  Clear to auscultation bilaterally, no crackles, wheezing, or rhonchi  ABDOMEN: Soft, nontender, nondistended, +BS  EXTREMITIES: 2+ peripheral pulses bilaterally. No clubbing, cyanosis, or edema  NERVOUS SYSTEM:  A&Ox3, no focal deficits   SKIN: No rashes or lesions    MEDICATIONS  (STANDING):  aspirin  chewable 81 milliGRAM(s) Oral daily  atorvastatin 80 milliGRAM(s) Oral at bedtime  clopidogrel Tablet 75 milliGRAM(s) Oral daily  dextrose 5% + sodium chloride 0.45%. 1000 milliLiter(s) (80 mL/Hr) IV Continuous <Continuous>  dextrose 5%. 1000 milliLiter(s) (100 mL/Hr) IV Continuous <Continuous>  dextrose 5%. 1000 milliLiter(s) (50 mL/Hr) IV Continuous <Continuous>  dextrose 50% Injectable 12.5 Gram(s) IV Push once  dextrose 50% Injectable 25 Gram(s) IV Push once  dextrose 50% Injectable 25 Gram(s) IV Push once  glucagon  Injectable 1 milliGRAM(s) IntraMuscular once  insulin lispro (ADMELOG) corrective regimen sliding scale   SubCutaneous three times a day before meals  metroNIDAZOLE  IVPB 500 milliGRAM(s) IV Intermittent every 8 hours  tamsulosin 0.4 milliGRAM(s) Oral at bedtime  vancomycin    Solution 500 milliGRAM(s) Oral every 6 hours    MEDICATIONS  (PRN):  acetaminophen     Tablet .. 650 milliGRAM(s) Oral every 6 hours PRN Temp greater or equal to 38C (100.4F), Mild Pain (1 - 3)  dextrose Oral Gel 15 Gram(s) Oral once PRN Blood Glucose LESS THAN 70 milliGRAM(s)/deciliter      Allergies    No Known Allergies    Intolerances        LABS:                        10.6   19.32 )-----------( 353      ( 25 Oct 2023 05:30 )             35.9     10-25    153<H>  |  122<H>  |  108<H>  ----------------------------<  178<H>  4.4   |  15<L>  |  6.65<H>    Ca    8.8      25 Oct 2023 05:30  Phos  3.8     10-25  Mg     2.2     10-25    TPro  6.8  /  Alb  3.2<L>  /  TBili  <0.2  /  DBili  x   /  AST  18  /  ALT  14  /  AlkPhos  81  10-25    PT/INR - ( 24 Oct 2023 18:31 )   PT: 11.3 sec;   INR: 0.99          PTT - ( 24 Oct 2023 18:31 )  PTT:26.5 sec  Urinalysis Basic - ( 25 Oct 2023 05:30 )    Color: x / Appearance: x / SG: x / pH: x  Gluc: 178 mg/dL / Ketone: x  / Bili: x / Urobili: x   Blood: x / Protein: x / Nitrite: x   Leuk Esterase: x / RBC: x / WBC x   Sq Epi: x / Non Sq Epi: x / Bacteria: x          MICROBIOLOGY      RADIOLOGY & ADDITIONAL TESTS:  Reviewed OVERNIGHT EVENTS: GABBY  SUBJECTIVE: Patient was seen and examined at bedside.      VITAL SIGNS:  T(F): 98.2 (10-25-23 @ 06:30)  HR: 110 (10-25-23 @ 06:30)  BP: 153/78 (10-25-23 @ 06:30)  RR: 16 (10-25-23 @ 06:30)  SpO2: 98% (10-25-23 @ 06:30)  Wt(kg): --    PHYSICAL EXAM  GENERAL: NAD, lying in bed comfortably  HEAD:  Atraumatic, normocephalic  EYES: EOMI, conjunctiva and sclera clear  ENT: Poor dentition, Moist mucous membranes  NECK: Supple, no JVD  HEART: Regular rate and rhythm, no murmurs, rubs, or gallops  LUNGS: Unlabored respirations.  Clear to auscultation bilaterally, no crackles, wheezing, or rhonchi  ABDOMEN: Soft, nontender, nondistended, +BS, tympanic   EXTREMITIES: Extremities: wwp;. R leg bka clean stump.  NERVOUS SYSTEM:  A&Ox1 (self), no focal deficits   SKIN: Small sacral clean based ulcer, no drainage    MEDICATIONS  (STANDING):  aspirin  chewable 81 milliGRAM(s) Oral daily  atorvastatin 80 milliGRAM(s) Oral at bedtime  clopidogrel Tablet 75 milliGRAM(s) Oral daily  dextrose 5% + sodium chloride 0.45%. 1000 milliLiter(s) (80 mL/Hr) IV Continuous <Continuous>  dextrose 5%. 1000 milliLiter(s) (100 mL/Hr) IV Continuous <Continuous>  dextrose 5%. 1000 milliLiter(s) (50 mL/Hr) IV Continuous <Continuous>  dextrose 50% Injectable 12.5 Gram(s) IV Push once  dextrose 50% Injectable 25 Gram(s) IV Push once  dextrose 50% Injectable 25 Gram(s) IV Push once  glucagon  Injectable 1 milliGRAM(s) IntraMuscular once  insulin lispro (ADMELOG) corrective regimen sliding scale   SubCutaneous three times a day before meals  metroNIDAZOLE  IVPB 500 milliGRAM(s) IV Intermittent every 8 hours  tamsulosin 0.4 milliGRAM(s) Oral at bedtime  vancomycin    Solution 500 milliGRAM(s) Oral every 6 hours    MEDICATIONS  (PRN):  acetaminophen     Tablet .. 650 milliGRAM(s) Oral every 6 hours PRN Temp greater or equal to 38C (100.4F), Mild Pain (1 - 3)  dextrose Oral Gel 15 Gram(s) Oral once PRN Blood Glucose LESS THAN 70 milliGRAM(s)/deciliter      Allergies    No Known Allergies    Intolerances        LABS:                        10.6   19.32 )-----------( 353      ( 25 Oct 2023 05:30 )             35.9     10-25    153<H>  |  122<H>  |  108<H>  ----------------------------<  178<H>  4.4   |  15<L>  |  6.65<H>    Ca    8.8      25 Oct 2023 05:30  Phos  3.8     10-25  Mg     2.2     10-25    TPro  6.8  /  Alb  3.2<L>  /  TBili  <0.2  /  DBili  x   /  AST  18  /  ALT  14  /  AlkPhos  81  10-25    PT/INR - ( 24 Oct 2023 18:31 )   PT: 11.3 sec;   INR: 0.99          PTT - ( 24 Oct 2023 18:31 )  PTT:26.5 sec  Urinalysis Basic - ( 25 Oct 2023 05:30 )    Color: x / Appearance: x / SG: x / pH: x  Gluc: 178 mg/dL / Ketone: x  / Bili: x / Urobili: x   Blood: x / Protein: x / Nitrite: x   Leuk Esterase: x / RBC: x / WBC x   Sq Epi: x / Non Sq Epi: x / Bacteria: x          MICROBIOLOGY      RADIOLOGY & ADDITIONAL TESTS:  Reviewed OVERNIGHT EVENTS: ZULY  SUBJECTIVE: Patient was seen and examined at bedside. Pt having breakfast, feeling better than admission. Could not say if has been having diarrhea but denies abd pain or fever/chills. Denies CP, SOB, N/V, or dysuria.     VITAL SIGNS:  T(F): 98.2 (10-25-23 @ 06:30)  HR: 110 (10-25-23 @ 06:30)  BP: 153/78 (10-25-23 @ 06:30)  RR: 16 (10-25-23 @ 06:30)  SpO2: 98% (10-25-23 @ 06:30)  Wt(kg): --    PHYSICAL EXAM  GENERAL: NAD, lying in bed comfortably, facial asymmetry  HEAD:  Atraumatic, normocephalic  EYES: EOMI, conjunctiva and sclera clear  ENT: Poor dentition, Moist mucous membranes  HEART: Regular rate and rhythm, no murmurs, rubs, or gallops  LUNGS: Unlabored respirations.  Clear to auscultation bilaterally, no crackles, wheezing, or rhonchi  ABDOMEN: Soft, nontender, nondistended, +BS, tympanic   EXTREMITIES: Extremities: R arm contracted, wwp;. R leg bka clean stump.  NERVOUS SYSTEM:  A&Ox1-2 (self, hospital), no focal deficits   SKIN: Small sacral clean based ulcer, no drainage    MEDICATIONS  (STANDING):  aspirin  chewable 81 milliGRAM(s) Oral daily  atorvastatin 80 milliGRAM(s) Oral at bedtime  clopidogrel Tablet 75 milliGRAM(s) Oral daily  dextrose 5% + sodium chloride 0.45%. 1000 milliLiter(s) (80 mL/Hr) IV Continuous <Continuous>  dextrose 5%. 1000 milliLiter(s) (100 mL/Hr) IV Continuous <Continuous>  dextrose 5%. 1000 milliLiter(s) (50 mL/Hr) IV Continuous <Continuous>  dextrose 50% Injectable 12.5 Gram(s) IV Push once  dextrose 50% Injectable 25 Gram(s) IV Push once  dextrose 50% Injectable 25 Gram(s) IV Push once  glucagon  Injectable 1 milliGRAM(s) IntraMuscular once  insulin lispro (ADMELOG) corrective regimen sliding scale   SubCutaneous three times a day before meals  metroNIDAZOLE  IVPB 500 milliGRAM(s) IV Intermittent every 8 hours  tamsulosin 0.4 milliGRAM(s) Oral at bedtime  vancomycin    Solution 500 milliGRAM(s) Oral every 6 hours    MEDICATIONS  (PRN):  acetaminophen     Tablet .. 650 milliGRAM(s) Oral every 6 hours PRN Temp greater or equal to 38C (100.4F), Mild Pain (1 - 3)  dextrose Oral Gel 15 Gram(s) Oral once PRN Blood Glucose LESS THAN 70 milliGRAM(s)/deciliter      Allergies    No Known Allergies    Intolerances        LABS:                        10.6   19.32 )-----------( 353      ( 25 Oct 2023 05:30 )             35.9     10-25    153<H>  |  122<H>  |  108<H>  ----------------------------<  178<H>  4.4   |  15<L>  |  6.65<H>    Ca    8.8      25 Oct 2023 05:30  Phos  3.8     10-25  Mg     2.2     10-25    TPro  6.8  /  Alb  3.2<L>  /  TBili  <0.2  /  DBili  x   /  AST  18  /  ALT  14  /  AlkPhos  81  10-25    PT/INR - ( 24 Oct 2023 18:31 )   PT: 11.3 sec;   INR: 0.99          PTT - ( 24 Oct 2023 18:31 )  PTT:26.5 sec  Urinalysis Basic - ( 25 Oct 2023 05:30 )    Color: x / Appearance: x / SG: x / pH: x  Gluc: 178 mg/dL / Ketone: x  / Bili: x / Urobili: x   Blood: x / Protein: x / Nitrite: x   Leuk Esterase: x / RBC: x / WBC x   Sq Epi: x / Non Sq Epi: x / Bacteria: x          MICROBIOLOGY      RADIOLOGY & ADDITIONAL TESTS:  Reviewed

## 2023-10-25 NOTE — H&P ADULT - PROBLEM SELECTOR PLAN 4
Likely iso type 2 demand ischemia. EKG showing sinus tachycardia, no ST elevation. Patient not complaining of chest pain.   - Trend Trops Hypernatremia  Total FWD 2.5L. s/p 2L (1LR, 1NS) in ED with improvement from 154 to 151. Likely iso hypovolemia, poor PO intake, dirrhea. EKG w/o acute changes. Mentating at baseline appropriately.    - c/w 1L LR @80ccs/hr x 8 hours as to not worsen NAGMA  - Goal Na 148 in AM. Total FWD 2.5L. s/p 2L (1LR, 1NS) in ED with improvement from 154 to 151. Likely iso hypovolemia, poor PO intake, dirrhea. EKG w/o acute changes. Mentating at baseline appropriately.    - c/w 1L D5NS@80ccs/hr x 8 hours as to not worsen NAGMA  - Goal Na 148 in AM.

## 2023-10-25 NOTE — DIETITIAN INITIAL EVALUATION ADULT - OTHER INFO
57M with PMHx of DM2, CVA x 2 w/ residual L sided weakness, early onset dementia, HTN, HLD, CKD V (creatinine 7-8, not on HD), BPH and PSHx of L toe amputation and AV fistula creation (5/23 - Dr. Royal), recent admission in July for starvation ketosis and gas gangrene s/p rBKA, discharged to nursing home, presenting with weakness and dirrhea x 1 day from nursing home, s/p 1 dose of vancomycin. Patient met 3/4 SIRS crtieria on admission (fever, HR, leukocytosis), and was given a liter NS and vancomycin and zosyn in the ED.    Patient seen at bedside for pressure injury assessment. Confirms NKFA. No difficulty chewing/swallowing reported. Reports good appetite, patient eating breakfast at time of assessment which included oatmeal, eggs, Ukrainian toast. Now ordered for Consistent Carbohydrate Clear Liquid diet. PTA, patient endorses good appetite and PO intake at nursing home. Endorses recent weight gain due to increased PO intake, however unable to quantify or provide time frame. Dosing weight: 140#, BMI 19. Noted with sacral wound, no edema documented. Encouraged adequate PO intake with emphasis on protein for wound healing. Patient expressed understanding. Patient states he regularly drinks oral nutrition supplement at nursing home, amenable to add in-house. Noted with diarrhea per EMR- recommend to add Banatrol 2x/day. Labs: Na 153, BUN/Cr elevated, GFR 9, K and Phos WDL, glucose trending  x 24 hours, HgbA1c 6.4%. Meds: abx, insulin. Observed with moderate muscle wasting to temples and clavicles. Based on ASPEN guidelines, patient does not meet criteria for malnutrition at this time. Food preferences reviewed and documented in [facility]: fish, chicken beef, no pork. See nutrition recommendations below.  57M with PMHx of DM2, CVA x 2 w/ residual L sided weakness, early onset dementia, HTN, HLD, CKD V (creatinine 7-8, not on HD), BPH and PSHx of L toe amputation and AV fistula creation (5/23 - Dr. Royal), recent admission in July for starvation ketosis and gas gangrene s/p rBKA, discharged to nursing home, presenting with weakness and dirrhea x 1 day from nursing home, s/p 1 dose of vancomycin. Patient met 3/4 SIRS crtieria on admission (fever, HR, leukocytosis), and was given a liter NS and vancomycin and zosyn in the ED.    Patient seen at bedside for pressure injury assessment, nutrition interview somewhat limited due to patient's mental status. Confirms NKFA. Reports good appetite, patient eating breakfast at time of assessment which included oatmeal, eggs, German toast. Now ordered for Consistent Carbohydrate Clear Liquid diet. PTA, patient endorses good appetite and PO intake at nursing home. Endorses recent weight gain due to increased PO intake, however unable to quantify or provide time frame. Dosing weight: 140#, BMI 19. Noted with sacral wound, no edema documented. Encouraged adequate PO intake with emphasis on protein for wound healing. Patient expressed understanding. Patient states he regularly drinks oral nutrition supplement at nursing home, amenable to add in-house. Noted with diarrhea per EMR- recommend to add Banatrol 2x/day. Labs: Na 153, BUN/Cr elevated, GFR 9, K and Phos WDL, glucose trending  x 24 hours, HgbA1c 6.4%. Meds: abx, insulin. Observed with moderate muscle wasting to temples and clavicles. Based on ASPEN guidelines, patient does not meet criteria for malnutrition at this time. Food preferences reviewed and documented in [facility]: fish, chicken beef, no pork. See nutrition recommendations below.

## 2023-10-26 ENCOUNTER — TRANSCRIPTION ENCOUNTER (OUTPATIENT)
Age: 58
End: 2023-10-26

## 2023-10-26 LAB
ALBUMIN SERPL ELPH-MCNC: 2.7 G/DL — LOW (ref 3.3–5)
ALBUMIN SERPL ELPH-MCNC: 2.7 G/DL — LOW (ref 3.3–5)
ALBUMIN SERPL ELPH-MCNC: 2.9 G/DL — LOW (ref 3.3–5)
ALBUMIN SERPL ELPH-MCNC: 2.9 G/DL — LOW (ref 3.3–5)
ALP SERPL-CCNC: 76 U/L — SIGNIFICANT CHANGE UP (ref 40–120)
ALP SERPL-CCNC: 76 U/L — SIGNIFICANT CHANGE UP (ref 40–120)
ALP SERPL-CCNC: 78 U/L — SIGNIFICANT CHANGE UP (ref 40–120)
ALP SERPL-CCNC: 78 U/L — SIGNIFICANT CHANGE UP (ref 40–120)
ALT FLD-CCNC: 9 U/L — LOW (ref 10–45)
ANION GAP SERPL CALC-SCNC: 12 MMOL/L — SIGNIFICANT CHANGE UP (ref 5–17)
ANION GAP SERPL CALC-SCNC: 12 MMOL/L — SIGNIFICANT CHANGE UP (ref 5–17)
ANION GAP SERPL CALC-SCNC: 15 MMOL/L — SIGNIFICANT CHANGE UP (ref 5–17)
ANION GAP SERPL CALC-SCNC: 15 MMOL/L — SIGNIFICANT CHANGE UP (ref 5–17)
AST SERPL-CCNC: 10 U/L — SIGNIFICANT CHANGE UP (ref 10–40)
AST SERPL-CCNC: 10 U/L — SIGNIFICANT CHANGE UP (ref 10–40)
AST SERPL-CCNC: 11 U/L — SIGNIFICANT CHANGE UP (ref 10–40)
AST SERPL-CCNC: 11 U/L — SIGNIFICANT CHANGE UP (ref 10–40)
BASOPHILS # BLD AUTO: 0.06 K/UL — SIGNIFICANT CHANGE UP (ref 0–0.2)
BASOPHILS # BLD AUTO: 0.06 K/UL — SIGNIFICANT CHANGE UP (ref 0–0.2)
BASOPHILS # BLD AUTO: 0.07 K/UL — SIGNIFICANT CHANGE UP (ref 0–0.2)
BASOPHILS # BLD AUTO: 0.07 K/UL — SIGNIFICANT CHANGE UP (ref 0–0.2)
BASOPHILS NFR BLD AUTO: 0.4 % — SIGNIFICANT CHANGE UP (ref 0–2)
BASOPHILS NFR BLD AUTO: 0.4 % — SIGNIFICANT CHANGE UP (ref 0–2)
BASOPHILS NFR BLD AUTO: 0.5 % — SIGNIFICANT CHANGE UP (ref 0–2)
BASOPHILS NFR BLD AUTO: 0.5 % — SIGNIFICANT CHANGE UP (ref 0–2)
BILIRUB SERPL-MCNC: 0.2 MG/DL — SIGNIFICANT CHANGE UP (ref 0.2–1.2)
BLD GP AB SCN SERPL QL: NEGATIVE — SIGNIFICANT CHANGE UP
BLD GP AB SCN SERPL QL: NEGATIVE — SIGNIFICANT CHANGE UP
BUN SERPL-MCNC: 103 MG/DL — HIGH (ref 7–23)
BUN SERPL-MCNC: 103 MG/DL — HIGH (ref 7–23)
BUN SERPL-MCNC: 96 MG/DL — HIGH (ref 7–23)
BUN SERPL-MCNC: 96 MG/DL — HIGH (ref 7–23)
CALCIUM SERPL-MCNC: 8.5 MG/DL — SIGNIFICANT CHANGE UP (ref 8.4–10.5)
CALCIUM SERPL-MCNC: 8.5 MG/DL — SIGNIFICANT CHANGE UP (ref 8.4–10.5)
CALCIUM SERPL-MCNC: 8.7 MG/DL — SIGNIFICANT CHANGE UP (ref 8.4–10.5)
CALCIUM SERPL-MCNC: 8.7 MG/DL — SIGNIFICANT CHANGE UP (ref 8.4–10.5)
CHLORIDE SERPL-SCNC: 117 MMOL/L — HIGH (ref 96–108)
CHLORIDE SERPL-SCNC: 117 MMOL/L — HIGH (ref 96–108)
CHLORIDE SERPL-SCNC: 120 MMOL/L — HIGH (ref 96–108)
CHLORIDE SERPL-SCNC: 120 MMOL/L — HIGH (ref 96–108)
CO2 SERPL-SCNC: 17 MMOL/L — LOW (ref 22–31)
CO2 SERPL-SCNC: 17 MMOL/L — LOW (ref 22–31)
CO2 SERPL-SCNC: 18 MMOL/L — LOW (ref 22–31)
CO2 SERPL-SCNC: 18 MMOL/L — LOW (ref 22–31)
CREAT SERPL-MCNC: 6.26 MG/DL — HIGH (ref 0.5–1.3)
CREAT SERPL-MCNC: 6.26 MG/DL — HIGH (ref 0.5–1.3)
CREAT SERPL-MCNC: 6.3 MG/DL — HIGH (ref 0.5–1.3)
CREAT SERPL-MCNC: 6.3 MG/DL — HIGH (ref 0.5–1.3)
CULTURE RESULTS: NO GROWTH — SIGNIFICANT CHANGE UP
CULTURE RESULTS: NO GROWTH — SIGNIFICANT CHANGE UP
EGFR: 10 ML/MIN/1.73M2 — LOW
EOSINOPHIL # BLD AUTO: 0.58 K/UL — HIGH (ref 0–0.5)
EOSINOPHIL # BLD AUTO: 0.58 K/UL — HIGH (ref 0–0.5)
EOSINOPHIL # BLD AUTO: 0.68 K/UL — HIGH (ref 0–0.5)
EOSINOPHIL # BLD AUTO: 0.68 K/UL — HIGH (ref 0–0.5)
EOSINOPHIL NFR BLD AUTO: 4.3 % — SIGNIFICANT CHANGE UP (ref 0–6)
EOSINOPHIL NFR BLD AUTO: 4.3 % — SIGNIFICANT CHANGE UP (ref 0–6)
EOSINOPHIL NFR BLD AUTO: 4.6 % — SIGNIFICANT CHANGE UP (ref 0–6)
EOSINOPHIL NFR BLD AUTO: 4.6 % — SIGNIFICANT CHANGE UP (ref 0–6)
GLUCOSE BLDC GLUCOMTR-MCNC: 114 MG/DL — HIGH (ref 70–99)
GLUCOSE BLDC GLUCOMTR-MCNC: 114 MG/DL — HIGH (ref 70–99)
GLUCOSE BLDC GLUCOMTR-MCNC: 128 MG/DL — HIGH (ref 70–99)
GLUCOSE BLDC GLUCOMTR-MCNC: 128 MG/DL — HIGH (ref 70–99)
GLUCOSE BLDC GLUCOMTR-MCNC: 91 MG/DL — SIGNIFICANT CHANGE UP (ref 70–99)
GLUCOSE BLDC GLUCOMTR-MCNC: 91 MG/DL — SIGNIFICANT CHANGE UP (ref 70–99)
GLUCOSE BLDC GLUCOMTR-MCNC: 98 MG/DL — SIGNIFICANT CHANGE UP (ref 70–99)
GLUCOSE BLDC GLUCOMTR-MCNC: 98 MG/DL — SIGNIFICANT CHANGE UP (ref 70–99)
GLUCOSE SERPL-MCNC: 104 MG/DL — HIGH (ref 70–99)
GLUCOSE SERPL-MCNC: 104 MG/DL — HIGH (ref 70–99)
GLUCOSE SERPL-MCNC: 108 MG/DL — HIGH (ref 70–99)
GLUCOSE SERPL-MCNC: 108 MG/DL — HIGH (ref 70–99)
HCT VFR BLD CALC: 32.7 % — LOW (ref 39–50)
HCT VFR BLD CALC: 32.7 % — LOW (ref 39–50)
HCT VFR BLD CALC: 33 % — LOW (ref 39–50)
HCT VFR BLD CALC: 33 % — LOW (ref 39–50)
HGB BLD-MCNC: 9.7 G/DL — LOW (ref 13–17)
HGB BLD-MCNC: 9.7 G/DL — LOW (ref 13–17)
HGB BLD-MCNC: 9.8 G/DL — LOW (ref 13–17)
HGB BLD-MCNC: 9.8 G/DL — LOW (ref 13–17)
IMM GRANULOCYTES NFR BLD AUTO: 1.1 % — HIGH (ref 0–0.9)
IMM GRANULOCYTES NFR BLD AUTO: 1.1 % — HIGH (ref 0–0.9)
IMM GRANULOCYTES NFR BLD AUTO: 1.2 % — HIGH (ref 0–0.9)
IMM GRANULOCYTES NFR BLD AUTO: 1.2 % — HIGH (ref 0–0.9)
LYMPHOCYTES # BLD AUTO: 1.38 K/UL — SIGNIFICANT CHANGE UP (ref 1–3.3)
LYMPHOCYTES # BLD AUTO: 1.38 K/UL — SIGNIFICANT CHANGE UP (ref 1–3.3)
LYMPHOCYTES # BLD AUTO: 1.49 K/UL — SIGNIFICANT CHANGE UP (ref 1–3.3)
LYMPHOCYTES # BLD AUTO: 1.49 K/UL — SIGNIFICANT CHANGE UP (ref 1–3.3)
LYMPHOCYTES # BLD AUTO: 11 % — LOW (ref 13–44)
LYMPHOCYTES # BLD AUTO: 11 % — LOW (ref 13–44)
LYMPHOCYTES # BLD AUTO: 9.4 % — LOW (ref 13–44)
LYMPHOCYTES # BLD AUTO: 9.4 % — LOW (ref 13–44)
MAGNESIUM SERPL-MCNC: 2 MG/DL — SIGNIFICANT CHANGE UP (ref 1.6–2.6)
MCHC RBC-ENTMCNC: 28.6 PG — SIGNIFICANT CHANGE UP (ref 27–34)
MCHC RBC-ENTMCNC: 28.6 PG — SIGNIFICANT CHANGE UP (ref 27–34)
MCHC RBC-ENTMCNC: 28.9 PG — SIGNIFICANT CHANGE UP (ref 27–34)
MCHC RBC-ENTMCNC: 28.9 PG — SIGNIFICANT CHANGE UP (ref 27–34)
MCHC RBC-ENTMCNC: 29.7 GM/DL — LOW (ref 32–36)
MCV RBC AUTO: 96.5 FL — SIGNIFICANT CHANGE UP (ref 80–100)
MCV RBC AUTO: 96.5 FL — SIGNIFICANT CHANGE UP (ref 80–100)
MCV RBC AUTO: 97.3 FL — SIGNIFICANT CHANGE UP (ref 80–100)
MCV RBC AUTO: 97.3 FL — SIGNIFICANT CHANGE UP (ref 80–100)
MONOCYTES # BLD AUTO: 0.84 K/UL — SIGNIFICANT CHANGE UP (ref 0–0.9)
MONOCYTES # BLD AUTO: 0.84 K/UL — SIGNIFICANT CHANGE UP (ref 0–0.9)
MONOCYTES # BLD AUTO: 0.99 K/UL — HIGH (ref 0–0.9)
MONOCYTES # BLD AUTO: 0.99 K/UL — HIGH (ref 0–0.9)
MONOCYTES NFR BLD AUTO: 6.2 % — SIGNIFICANT CHANGE UP (ref 2–14)
MONOCYTES NFR BLD AUTO: 6.2 % — SIGNIFICANT CHANGE UP (ref 2–14)
MONOCYTES NFR BLD AUTO: 6.8 % — SIGNIFICANT CHANGE UP (ref 2–14)
MONOCYTES NFR BLD AUTO: 6.8 % — SIGNIFICANT CHANGE UP (ref 2–14)
NEUTROPHILS # BLD AUTO: 10.39 K/UL — HIGH (ref 1.8–7.4)
NEUTROPHILS # BLD AUTO: 10.39 K/UL — HIGH (ref 1.8–7.4)
NEUTROPHILS # BLD AUTO: 11.35 K/UL — HIGH (ref 1.8–7.4)
NEUTROPHILS # BLD AUTO: 11.35 K/UL — HIGH (ref 1.8–7.4)
NEUTROPHILS NFR BLD AUTO: 77 % — SIGNIFICANT CHANGE UP (ref 43–77)
NEUTROPHILS NFR BLD AUTO: 77 % — SIGNIFICANT CHANGE UP (ref 43–77)
NEUTROPHILS NFR BLD AUTO: 77.5 % — HIGH (ref 43–77)
NEUTROPHILS NFR BLD AUTO: 77.5 % — HIGH (ref 43–77)
NRBC # BLD: 0 /100 WBCS — SIGNIFICANT CHANGE UP (ref 0–0)
PHOSPHATE SERPL-MCNC: 4.1 MG/DL — SIGNIFICANT CHANGE UP (ref 2.5–4.5)
PHOSPHATE SERPL-MCNC: 4.1 MG/DL — SIGNIFICANT CHANGE UP (ref 2.5–4.5)
PHOSPHATE SERPL-MCNC: 4.5 MG/DL — SIGNIFICANT CHANGE UP (ref 2.5–4.5)
PHOSPHATE SERPL-MCNC: 4.5 MG/DL — SIGNIFICANT CHANGE UP (ref 2.5–4.5)
PLATELET # BLD AUTO: 344 K/UL — SIGNIFICANT CHANGE UP (ref 150–400)
PLATELET # BLD AUTO: 344 K/UL — SIGNIFICANT CHANGE UP (ref 150–400)
PLATELET # BLD AUTO: 357 K/UL — SIGNIFICANT CHANGE UP (ref 150–400)
PLATELET # BLD AUTO: 357 K/UL — SIGNIFICANT CHANGE UP (ref 150–400)
POTASSIUM SERPL-MCNC: 3.7 MMOL/L — SIGNIFICANT CHANGE UP (ref 3.5–5.3)
POTASSIUM SERPL-MCNC: 3.7 MMOL/L — SIGNIFICANT CHANGE UP (ref 3.5–5.3)
POTASSIUM SERPL-MCNC: 3.9 MMOL/L — SIGNIFICANT CHANGE UP (ref 3.5–5.3)
POTASSIUM SERPL-MCNC: 3.9 MMOL/L — SIGNIFICANT CHANGE UP (ref 3.5–5.3)
POTASSIUM SERPL-SCNC: 3.7 MMOL/L — SIGNIFICANT CHANGE UP (ref 3.5–5.3)
POTASSIUM SERPL-SCNC: 3.7 MMOL/L — SIGNIFICANT CHANGE UP (ref 3.5–5.3)
POTASSIUM SERPL-SCNC: 3.9 MMOL/L — SIGNIFICANT CHANGE UP (ref 3.5–5.3)
POTASSIUM SERPL-SCNC: 3.9 MMOL/L — SIGNIFICANT CHANGE UP (ref 3.5–5.3)
PROT SERPL-MCNC: 6 G/DL — SIGNIFICANT CHANGE UP (ref 6–8.3)
PROT SERPL-MCNC: 6 G/DL — SIGNIFICANT CHANGE UP (ref 6–8.3)
PROT SERPL-MCNC: 6.3 G/DL — SIGNIFICANT CHANGE UP (ref 6–8.3)
PROT SERPL-MCNC: 6.3 G/DL — SIGNIFICANT CHANGE UP (ref 6–8.3)
RBC # BLD: 3.39 M/UL — LOW (ref 4.2–5.8)
RBC # FLD: 16.1 % — HIGH (ref 10.3–14.5)
RBC # FLD: 16.1 % — HIGH (ref 10.3–14.5)
RBC # FLD: 16.2 % — HIGH (ref 10.3–14.5)
RBC # FLD: 16.2 % — HIGH (ref 10.3–14.5)
RH IG SCN BLD-IMP: POSITIVE — SIGNIFICANT CHANGE UP
RH IG SCN BLD-IMP: POSITIVE — SIGNIFICANT CHANGE UP
SODIUM SERPL-SCNC: 149 MMOL/L — HIGH (ref 135–145)
SODIUM SERPL-SCNC: 149 MMOL/L — HIGH (ref 135–145)
SODIUM SERPL-SCNC: 150 MMOL/L — HIGH (ref 135–145)
SODIUM SERPL-SCNC: 150 MMOL/L — HIGH (ref 135–145)
SPECIMEN SOURCE: SIGNIFICANT CHANGE UP
SPECIMEN SOURCE: SIGNIFICANT CHANGE UP
WBC # BLD: 13.51 K/UL — HIGH (ref 3.8–10.5)
WBC # BLD: 13.51 K/UL — HIGH (ref 3.8–10.5)
WBC # BLD: 14.64 K/UL — HIGH (ref 3.8–10.5)
WBC # BLD: 14.64 K/UL — HIGH (ref 3.8–10.5)
WBC # FLD AUTO: 13.51 K/UL — HIGH (ref 3.8–10.5)
WBC # FLD AUTO: 13.51 K/UL — HIGH (ref 3.8–10.5)
WBC # FLD AUTO: 14.64 K/UL — HIGH (ref 3.8–10.5)
WBC # FLD AUTO: 14.64 K/UL — HIGH (ref 3.8–10.5)

## 2023-10-26 PROCEDURE — 99232 SBSQ HOSP IP/OBS MODERATE 35: CPT

## 2023-10-26 PROCEDURE — 99233 SBSQ HOSP IP/OBS HIGH 50: CPT | Mod: GC

## 2023-10-26 PROCEDURE — 74018 RADEX ABDOMEN 1 VIEW: CPT | Mod: 26

## 2023-10-26 RX ORDER — MODAFINIL 200 MG/1
1 TABLET ORAL
Refills: 0 | DISCHARGE

## 2023-10-26 RX ORDER — HYDRALAZINE HCL 50 MG
1 TABLET ORAL
Refills: 0 | DISCHARGE

## 2023-10-26 RX ORDER — SODIUM CHLORIDE 9 MG/ML
1000 INJECTION, SOLUTION INTRAVENOUS
Refills: 0 | Status: DISCONTINUED | OUTPATIENT
Start: 2023-10-26 | End: 2023-10-28

## 2023-10-26 RX ORDER — LINAGLIPTIN 5 MG/1
1 TABLET, FILM COATED ORAL
Refills: 0 | DISCHARGE

## 2023-10-26 RX ORDER — NIFEDIPINE 30 MG
1 TABLET, EXTENDED RELEASE 24 HR ORAL
Refills: 0 | DISCHARGE

## 2023-10-26 RX ADMIN — Medication 81 MILLIGRAM(S): at 13:41

## 2023-10-26 RX ADMIN — HEPARIN SODIUM 5000 UNIT(S): 5000 INJECTION INTRAVENOUS; SUBCUTANEOUS at 23:01

## 2023-10-26 RX ADMIN — Medication 500 MILLIGRAM(S): at 13:41

## 2023-10-26 RX ADMIN — TAMSULOSIN HYDROCHLORIDE 0.4 MILLIGRAM(S): 0.4 CAPSULE ORAL at 23:01

## 2023-10-26 RX ADMIN — HEPARIN SODIUM 5000 UNIT(S): 5000 INJECTION INTRAVENOUS; SUBCUTANEOUS at 13:42

## 2023-10-26 RX ADMIN — Medication 100 MILLIGRAM(S): at 13:41

## 2023-10-26 RX ADMIN — Medication 100 MILLIGRAM(S): at 23:02

## 2023-10-26 RX ADMIN — Medication 500 MILLIGRAM(S): at 19:17

## 2023-10-26 RX ADMIN — Medication 500 MILLIGRAM(S): at 23:02

## 2023-10-26 RX ADMIN — SODIUM CHLORIDE 125 MILLILITER(S): 9 INJECTION, SOLUTION INTRAVENOUS at 19:11

## 2023-10-26 RX ADMIN — ATORVASTATIN CALCIUM 80 MILLIGRAM(S): 80 TABLET, FILM COATED ORAL at 23:01

## 2023-10-26 RX ADMIN — HEPARIN SODIUM 5000 UNIT(S): 5000 INJECTION INTRAVENOUS; SUBCUTANEOUS at 06:01

## 2023-10-26 RX ADMIN — CLOPIDOGREL BISULFATE 75 MILLIGRAM(S): 75 TABLET, FILM COATED ORAL at 13:41

## 2023-10-26 RX ADMIN — Medication 500 MILLIGRAM(S): at 06:01

## 2023-10-26 RX ADMIN — Medication 100 MILLIGRAM(S): at 06:02

## 2023-10-26 NOTE — PROGRESS NOTE ADULT - PROBLEM SELECTOR PLAN 4
Pt presented with hypernatremia to 150s, most recently 149. Likely iso hypovolemia, poor PO intake, diarrhea. EKG w/o acute changes. Mentating at baseline appropriately.     - monitor CMP q6h  - c/w 1 L Dextrose 5% + NS 0.045% 1 L infused at 80 mL/hr x 10 hr (pt dry)  - Goal Na correction 8-10 in 24 hrs Pt presented with hypernatremia to 150s, most recently 149. Likely iso hypovolemia, poor PO intake, diarrhea. EKG w/o acute changes. Mentating at baseline appropriately. Nephro following, correcting with D5W.     - monitor CMP q6h  - C/w D5W + 1 amp Bicarb @ 100 cc/hr, as per nephro recs  - Goal Na correction 8-10 in 24 hrs

## 2023-10-26 NOTE — ADVANCED PRACTICE NURSE CONSULT - REASON FOR CONSULT
pressure injuries, present on admission     pressure injuries, present on admission    Per chart review, 57M with PMHx of DM2, CVA x 2 w/ residual L sided weakness, early onset dementia, HTN, HLD, CKD V (creatinine 7-8, not on HD, AV fistula 5/23), BPH with recent admission for starvation ketosis and gas gangrene s/p rBKA, presented with weakness and diarrhea x 1 day from nursing home. Found to be C.diff positive, Admitted for sepsis 2/2 C.diff colitis

## 2023-10-26 NOTE — SWALLOW BEDSIDE ASSESSMENT ADULT - NS SPL SWALLOW CLINIC TRIAL FT
Adequate bolus acceptance. Reduced bolus containment with anterior spillage with thins via cup sip. Bolus processing with reduced, effortful and prolonged, worse with hard vs soft solids. Mild oral residue with reg solids. Suspect diminished and effortful laryngeal elevation on palpation, potentially indicative of pharyngeal dysfunction. Wet/gurgly vocal quality at rest cleared with initial trial of thin via tsp; voice was clear throughout PO trials. Pt coughed x2, following thin via tsp x1 and thin via cup x1, suggestive of aspiration.

## 2023-10-26 NOTE — PROGRESS NOTE ADULT - ASSESSMENT
57M with PMHx of DM2, CVA x 2 w/ residual L sided weakness, early onset dementia, HTN, HLD, CKD V (creatinine 7-8, not on HD, AV fistula 5/23), BPH with recent admission for starvation ketosis and gas gangrene s/p rBKA, presenting with weakness and diarrhea x 1 day from nursing home. Found to be C.diff positive, admitted for sepsis 2/2 C.diff colitis.   57M with PMHx of DM2, CVA x 2 w/ residual L sided weakness, early onset dementia, HTN, HLD, CKD V (creatinine 7-8, not on HD, AV fistula 5/23), BPH with recent admission for starvation ketosis and gas gangrene s/p rBKA, presented with weakness and diarrhea x 1 day from nursing home. Found to be C.diff positive, Admitted for sepsis 2/2 C.diff colitis.   57M with PMHx of DM2, CVA x 2 w/ residual L sided weakness, early onset dementia, HTN, HLD, CKD V (creatinine 7-8, not on HD, AV fistula 5/23), BPH with recent admission for starvation ketosis and gas gangrene s/p rBKA, presented with weakness and diarrhea x 1 day from nursing home. Found to be C.diff positive, Admitted for sepsis 2/2 C.diff colitis. Clinically improving.

## 2023-10-26 NOTE — SWALLOW BEDSIDE ASSESSMENT ADULT - SWALLOW EVAL: DIAGNOSIS
Pt presented with mild oral and suspected pharyngeal dysphagia, on-going/chronic, in setting of hx CVA x2 and dementia. Pt is at risk for aspiration and its related sequelae via oral diet. Can continue oral diet per MD given clear chest xray, with aspiration precautions given clinical presentation and 1:1 dependency for feeding. Recs for instrumental swallow assessment via MBSS. Pt presented with mild oral and suspected pharyngeal dysphagia, on-going/chronic, in setting of hx CVA x2 and dementia. Pt is at risk for aspiration and its related sequelae via oral diet. Recs for instrumental swallow assessment via MBSS. Can continue oral diet pending MBSS per MD given clear chest xray and on-going dysphagia, with aspiration precautions given clinical presentation and 1:1 dependency for feeding. Pt presented with mild oral and suspected pharyngeal dysphagia, on-going/chronic, in setting of hx CVA x2 and dementia. Pt is at risk for aspiration and its related sequelae via oral diet given reduced mobility, dependency on others for feeding, and mentation. Recs for instrumental swallow assessment via MBSS. Pt appears safe to continue oral diet pending MBSS per MD given clear chest xray and on-going dysphagia, with aspiration precautions.

## 2023-10-26 NOTE — DISCHARGE NOTE PROVIDER - CARE PROVIDER_API CALL
HUGH ROTH  [address] [place] [address]  Phone: [phone]  Fax: [phone]  Follow Up Time:    Trent Smith  [address] [place] [address]  Phone: [phone]  Fax: (   )    -  Follow Up Time:    Trent Smith  [address] [place] [address]  Phone: [phone]  Fax: (   )    -  Follow Up Time: 2 weeks

## 2023-10-26 NOTE — DISCHARGE NOTE PROVIDER - NSDCCPCAREPLAN_GEN_ALL_CORE_FT
PRINCIPAL DISCHARGE DIAGNOSIS  Diagnosis: C. difficile colitis  Assessment and Plan of Treatment: You were found to have C. Diff diarrhea. Inflammation of the colon caused by the bacteria Clostridium difficile. Clostridium difficile colitis results from disruption of normal healthy bacteria in the colon, often from antibiotics. C. difficile can also be transmitted from person to person by spores. It can cause severe damage to the colon and even be fatal. Symptoms include diarrhea, belly pain, and fever.Treatment includes antibiotics. Even when treated with antibiotics, the infection may come back. During your hospital stay, you were treated with oral vancomycin and IV flagyl. On discharge:  - Please continue to take        PRINCIPAL DISCHARGE DIAGNOSIS  Diagnosis: C. difficile colitis  Assessment and Plan of Treatment: You were found to have C. Diff diarrhea. Inflammation of the colon caused by the bacteria Clostridium difficile. Clostridium difficile colitis results from disruption of normal healthy bacteria in the colon, often from antibiotics. C. difficile can also be transmitted from person to person by spores. It can cause severe damage to the colon and even be fatal. Symptoms include diarrhea, belly pain, and fever.Treatment includes antibiotics. Even when treated with antibiotics, the infection may come back. During your hospital stay, you were treated with oral vancomycin and IV flagyl. On discharge:  - Please continue to take oral vancomycin **dose** x10 days (through 11/02)  - Please continue to take oral flagyl **dose** x10 days (through 11/02)  If you diarrhea worsens, please reach out to a doctor. Please follow up within 2 weeks with a primary care doctor.      SECONDARY DISCHARGE DIAGNOSES  Diagnosis: Dysphagia  Assessment and Plan of Treatment: You were found to have trouble swallowing while in the hospital. You were assessed by the speech and swallowing specialists. They assessed that you should be on a soft and bite sized diet with thin liquids given by spoon. Please inform your nursing home about change in diet.    Diagnosis: Metabolic acidosis  Assessment and Plan of Treatment: You were found to have a higher than usual amount of acid in your blood. This is due to your chronic kidney disease. In order to keep your acid levels within normal range, please start taking sodium bicarbonate 650 mg three times a day. In addition, please follow-up with a Nephrologist.    Diagnosis: Hemodialysis access, fistula mature  Assessment and Plan of Treatment: You have a fistula that was placed earlier in the year for potential initiation of dialysis. During your time in the hospital, your fistula was assessed by the vascular team and they suggested that you may need additional imaging of your fistula. Please follow-up with your vascular surgeon, you have an appointment.    Diagnosis: Tachycardia  Assessment and Plan of Treatment: **Mention if starting toprol     PRINCIPAL DISCHARGE DIAGNOSIS  Diagnosis: C. difficile colitis  Assessment and Plan of Treatment: You were found to have C. Diff diarrhea. Inflammation of the colon caused by the bacteria Clostridium difficile. Clostridium difficile colitis results from disruption of normal healthy bacteria in the colon, often from antibiotics. C. difficile can also be transmitted from person to person by spores. It can cause severe damage to the colon and even be fatal. Symptoms include diarrhea, belly pain, and fever.Treatment includes antibiotics. Even when treated with antibiotics, the infection may come back. During your hospital stay, you were treated with oral vancomycin and IV flagyl. On discharge:  - Please continue to take oral vancomycin 500mg every 6 hours x10 days (through 11/02)  - Please continue to take oral flagyl 500mg every 8 hours x10 days (through 11/02)  If you diarrhea worsens, please reach out to a doctor. Please follow up within 2 weeks with a primary care doctor.      SECONDARY DISCHARGE DIAGNOSES  Diagnosis: Dysphagia  Assessment and Plan of Treatment: You were found to have trouble swallowing while in the hospital. You were assessed by the speech and swallowing specialists. They assessed that you should be on a soft and bite sized diet with thin liquids given by spoon. Please inform your nursing home about change in diet.    Diagnosis: Metabolic acidosis  Assessment and Plan of Treatment: You were found to have a higher than usual amount of acid in your blood. This is due to your chronic kidney disease. In order to keep your acid levels within normal range, please start taking sodium bicarbonate 650 mg three times a day. In addition, please follow-up with a Nephrologist.    Diagnosis: Hemodialysis access, fistula mature  Assessment and Plan of Treatment: You have a fistula that was placed earlier in the year for potential initiation of dialysis. During your time in the hospital, your fistula was assessed by the vascular team and they suggested that you may need additional imaging of your fistula. Please follow-up with your vascular surgeon, you have an appointment.    Diagnosis: Tachycardia  Assessment and Plan of Treatment: **Mention if starting toprol     PRINCIPAL DISCHARGE DIAGNOSIS  Diagnosis: C. difficile colitis  Assessment and Plan of Treatment: You were found to have C. Diff diarrhea. Inflammation of the colon caused by the bacteria Clostridium difficile. Clostridium difficile colitis results from disruption of normal healthy bacteria in the colon, often from antibiotics. C. difficile can also be transmitted from person to person by spores. It can cause severe damage to the colon and even be fatal. Symptoms include diarrhea, belly pain, and fever.Treatment includes antibiotics. Even when treated with antibiotics, the infection may come back. During your hospital stay, you were treated with oral vancomycin and IV flagyl. On discharge:  - Please continue to take oral vancomycin 500mg every 6 hours x10 days (through 11/02)  - Please continue to take oral flagyl 500mg every 8 hours x10 days (through 11/02)  If you diarrhea worsens, please reach out to a doctor. Please follow up within 2 weeks with a primary care doctor.      SECONDARY DISCHARGE DIAGNOSES  Diagnosis: Dysphagia  Assessment and Plan of Treatment: You were found to have trouble swallowing while in the hospital. You were assessed by the speech and swallowing specialists. They assessed that you should be on a soft and bite sized diet with thin liquids given by spoon. Please inform your nursing home about change in diet.    Diagnosis: Metabolic acidosis  Assessment and Plan of Treatment: You were found to have a higher than usual amount of acid in your blood. This is due to your chronic kidney disease. In order to keep your acid levels within normal range, please start taking sodium bicarbonate 650 mg three times a day. In addition, please follow-up with a Nephrologist.    Diagnosis: Hemodialysis access, fistula mature  Assessment and Plan of Treatment: You have a fistula that was placed earlier in the year for potential initiation of dialysis. During your time in the hospital, your fistula was assessed by the vascular team and they suggested that you may need additional imaging of your fistula. Please follow-up with your vascular surgeon, you have an appointment.    Diagnosis: Tachycardia  Assessment and Plan of Treatment: You were found on this admission and previous admissions to have tachycardia. Please follow-up with your primary care doctor to look into possibly starting a beta blocker.

## 2023-10-26 NOTE — PROGRESS NOTE ADULT - PROBLEM SELECTOR PLAN 3
BUN/Cr At baseline according to last admission. Patient with mature AVF for dialysis initiation but per last nephro note was not initiated or indicated. Patient with uremia to 103, trending down, mental status A&O x 1-2 (baseline). Pt with acidosis, recently discharged with sodium bicarb tid.     - continue to trend BUN/Cr, uremia  - Nephro consulted, recs appreciated   - Replete electrolytes as necessary  - Avoid nephrotoxic meds  - Renally dosed medications based on eGFR <10. BUN/Cr At baseline according to last admission. Patient with mature AVF for dialysis initiation but per last nephro note was not initiated or indicated. Patient with uremia to 103, trending down, mental status A&O x 1-2 (baseline). Pt with acidosis, recently discharged with sodium bicarb tid. Per neprho consult, no indication to start HD at this time.    - continue to trend BUN/Cr, uremia  - C/w D5W + 1 amp Bicarb @ 1000 cc/hr, as per nephro recs  - Replete electrolytes as necessary  - Avoid nephrotoxic meds  - Renally dosed medications based on eGFR <10. BUN/Cr At baseline according to last admission. Patient with mature AVF, no indication to start HD at this time per neprho consult. Patient with uremia to 103, trending down, mental status A&O x 1-2 (baseline). Pt with acidosis, recently discharged with sodium bicarb tid.     - continue to trend BUN/Cr, uremia  - C/w D5W + 1 amp Bicarb @ 1000 cc/hr, as per nephro recs  - Replete electrolytes as necessary  - Avoid nephrotoxic meds  - Renally dosed medications based on eGFR <10. BUN/Cr At baseline according to last admission. Patient with mature AVF, no indication to start HD at this time per neprho consult. Patient with uremia to 103, trending down, mental status A&O x 1-2 (baseline). NAGMA improving.    - continue to trend BUN/Cr, uremia  - C/w D5W + 1 amp Bicarb @ 1000 cc/hr, as per nephro recs  - Replete electrolytes as necessary  - Avoid nephrotoxic meds  - Renally dosed medications based on eGFR <10. BUN/Cr At baseline according to last admission. Patient with mature AVF, no indication to start HD at this time per neprho consult. Patient with uremia to 103, trending down, mental status A&O x 1-2 (baseline). Pt with acidosis likely 2/2 renal failure, nephro following, getting fluids    - continue to trend BUN/Cr, uremia  - C/w D5W + 1 amp Bicarb @ 100 cc/hr, as per nephro recs  - Replete electrolytes as necessary  - Avoid nephrotoxic meds  - Renally dosed medications based on eGFR <10.

## 2023-10-26 NOTE — SWALLOW BEDSIDE ASSESSMENT ADULT - SLP PERTINENT HISTORY OF CURRENT PROBLEM
57M with PMHx of DM2, CVA x 2 w/ residual L sided weakness, early onset dementia, HTN, HLD, CKD V (creatinine 7-8, not on HD, AV fistula 5/23), BPH with recent admission for starvation ketosis and gas gangrene s/p rBKA, presented with weakness and diarrhea x 1 day from nursing home. Found to be C.diff positive, Admitted for sepsis 2/2 C.diff colitis.

## 2023-10-26 NOTE — DISCHARGE NOTE PROVIDER - NSDCMRMEDTOKEN_GEN_ALL_CORE_FT
acetaminophen 325 mg oral tablet: 2 tab(s) orally every 4 hours As needed Mild Pain (1 - 3)  aspirin 81 mg oral capsule: 1 cap(s) orally once a day  atorvastatin 80 mg oral tablet: 1 tab(s) orally once a day (at bedtime)  finasteride 5 mg oral tablet: 1 tab(s) orally once a day  insulin lispro 100 units/mL injectable solution: 5 unit(s) injectable 3 times a day  Lantus Solostar Pen 100 units/mL subcutaneous solution: 15 unit(s) subcutaneous once a day (at bedtime)  NIFEdipine 90 mg oral tablet, extended release: 1 tab(s) orally once a day  Plavix 75 mg oral tablet: 1 tab(s) orally once a day  sodium bicarbonate 650 mg oral tablet: 1 tab(s) orally 3 times a day  tamsulosin 0.4 mg oral capsule: 1 cap(s) orally once a day (at bedtime)  Tradjenta 5 mg oral tablet: 1 tab(s) orally once a day   acetaminophen 325 mg oral tablet: 2 tab(s) orally every 4 hours As needed Mild Pain (1 - 3)  aspirin 81 mg oral capsule: 1 cap(s) orally once a day  atorvastatin 80 mg oral tablet: 1 tab(s) orally once a day (at bedtime)  finasteride 5 mg oral tablet: 1 tab(s) orally once a day  insulin lispro 100 units/mL injectable solution: 5 unit(s) injectable 3 times a day  Lantus Solostar Pen 100 units/mL subcutaneous solution: 15 unit(s) subcutaneous once a day (at bedtime)  metroNIDAZOLE 500 mg oral tablet: 1 tab(s) orally every 8 hours  NIFEdipine 90 mg oral tablet, extended release: 1 tab(s) orally once a day  Plavix 75 mg oral tablet: 1 tab(s) orally once a day  sodium bicarbonate 650 mg oral tablet: 1 tab(s) orally 3 times a day  tamsulosin 0.4 mg oral capsule: 1 cap(s) orally once a day (at bedtime)  Tradjenta 5 mg oral tablet: 1 tab(s) orally once a day  vancomycin 250 mg oral capsule: 2 tab(s) orally every 6 hours To end 11/2

## 2023-10-26 NOTE — PROGRESS NOTE ADULT - ASSESSMENT
**INCOMPLETE     57M with PMHx of DM2, CVA x 2 w/ residual L sided weakness, early onset dementia, HTN, HLD, CKD V (creatinine 7-8, not on HD, AV fistula 5/23), BPH with recent admission for starvation ketosis and gas gangrene s/p rBKA, presenting with weakness and diarrhea x 1 day from nursing home. Found to be C.diff positive, admitted for sepsis 2/2 C.diff colitis. Renal consulted for CKD Stage V.     Scr: 6.65 --> 6.30  BUN: 108 --> 103  Na 153 --> 149     Imp: CKD Stage V   No uremic symptoms.   Patient clinically seems dry and free water depleted. Na was 153 with metabolic acidosis, started patient on continuous D5W + 1amp of Bicarb.   Na improved to 149    Recommendations:  - continuous D5W + 1 Amp of Bicarb @100cc/hr  - No indications to start HD at this time   - Encourage PO intake  - Renally dosed medications based on eGFR <10  - Avoid Nephrotoxic drugs      Thank you for the opportunity to participate in the care of your patient.  57M with PMHx of DM2, CVA x 2 w/ residual L sided weakness, early onset dementia, HTN, HLD, CKD V (creatinine 7-8, not on HD, AV fistula 5/23), BPH with recent admission for starvation ketosis and gas gangrene s/p rBKA, presenting with weakness and diarrhea x 1 day from nursing home. Found to be C.diff positive, admitted for sepsis 2/2 C.diff colitis. Renal consulted for CKD Stage V.     Scr: 6.65 --> 6.30  BUN: 108 --> 103  Na 153 --> 149     Imp: CKD Stage V   No uremic symptoms.   Patient clinically seems dry and free water depleted. However, appears improved from yesterday. Na was 153 with metabolic acidosis, started patient on continuous D5W + 1amp of Bicarb. Na improved to 149. Free water deficit 2.7L     Recommendations:  - continuous D5W + 1 Amp of Bicarb @100cc/hr  - No indications to start HD at this time   - consider Vascular consult to assess AV fistula   - Encourage PO intake  - Renally dosed medications based on eGFR <10  - Avoid Nephrotoxic drugs      Thank you for the opportunity to participate in the care of your patient.

## 2023-10-26 NOTE — PROGRESS NOTE ADULT - PROBLEM SELECTOR PLAN 7
Pt with recent admission for starvation ketosis. Hx of diabetes.     - c/w miSS  - monitor FS  - consistent carb diet Pt with recent admission for starvation ketosis. Hx of diabetes. Home med:     - c/w miSS  - monitor FS  - consistent carb diet Pt with recent admission for starvation ketosis. Hx of diabetes. Home med: lantus 15 u bedtime, lispro 5u, mISS    - c/w miSS  - monitor FS  - consistent carb diet

## 2023-10-26 NOTE — DISCHARGE NOTE PROVIDER - ATTENDING DISCHARGE PHYSICAL EXAMINATION:
Patient with resolution of the diarrhea, tolerating diet, Leucocytosis and hypernatremia improving. Continue on Vanc/Metronidazole to finish 10 days course. Evaluated by SLP, sp barium.   Will need follow-up with Vascular as outpatient  General: AO, NAD, lying in bed, no labored breathing on RA  HEENT: AT/NC, no facial asymmetry  Lungs: no crackles, no wheezes  Heart: RRR  Abdomen: soft, non-tender, no guarding, + BS  Extremities:  warm, no edema, no tenderness, no calf tenderness

## 2023-10-26 NOTE — PROGRESS NOTE ADULT - PROBLEM SELECTOR PLAN 6
Per prior discharge paperwork, discharged with hydralazine, nifedipine, and atorvastatin  - c/w atorvastatin 80mg qd  - hold nifedipine 60 and hydralazine 25mg iso sepsis and hypotension, restart as appropriate.    #Sinus Tachycardia  Patient admitted with HR of 130s, EKG showing sinus tachycardia; this was present on prior admission although at lower levels; seen by cardiology who thought it was due to underlying disease recommended discharging on Toprol X25mg which was not seen on d/c paperwork. Likely iso underlying sepsis, disease.    - Collateral from nursing home should be obtained  - Hold toprol iso sepsis restart as appropriate. Home meds: nifedipine 90 mg q24h  - holding home meds i/s/o sepsis will resume as appropriate    #Sinus Tachycardia  Patient admitted with HR of 130s, EKG showing sinus tachycardia; this was present on prior admission although at lower levels; seen by cardiology who thought it was due to underlying disease recommended discharging on Toprol X25mg which was not seen on d/c paperwork. Likely iso underlying sepsis, disease. Pt not taking metoprolol per nursing home records.    - Hold toprol iso sepsis restart as appropriate.

## 2023-10-26 NOTE — ADVANCED PRACTICE NURSE CONSULT - ASSESSMENT
Pt awake, alert. 1 person assist for turning. Incontinent of bowel and bladder. Condom catheter in place.    RLE BKA well healed  Left lateral heel, stage 3 pressure injury: 2.5x1.5cm with 25% yellow slough, 75% clean red granulation tissue. Small serous drainage. No signs of infection. Foot warm, dry, palapble DP. No edema. 4th and 5th toe amputation sites well healed.    Left dorsal foot, deep tissue pressure injury: 1x2cm area of discoloration, likely related to strap of heel boot. 50% maroon intact, 50% dry crust.   Sacral, stage 2 pressure injury: 1x1cm with 100% clean red tissue. Faint discoloration surrounding, appears chronic. Wound likely also has a moisture component d/t frequent stooling.  Pt awake, alert. 1 person assist for turning. Incontinent of bowel and bladder. Condom catheter in place.    RLE BKA well healed  Left lateral heel, stage 3 pressure injury: 2.5x1.5cm with 25% yellow slough, 75% clean red granulation tissue. Small serous drainage. No signs of infection. Foot warm, dry, palpable DP. No edema. 4th and 5th toe amputation sites well healed.    Left dorsal foot, deep tissue pressure injury: 1x2cm area of discoloration, likely related to strap of heel boot. 50% maroon intact, 50% dry crust.   Sacral, stage 2 pressure injury: 1x1cm with 100% clean red tissue. Faint discoloration surrounding, appears chronic. Wound likely also has a moisture component d/t frequent stooling.

## 2023-10-26 NOTE — SWALLOW BEDSIDE ASSESSMENT ADULT - COMMENTS
Pt is known to this service from prior admission. Clinical swallow eval on 7/10/23 revealed: "Clinical signs of oropharyngeal dysphagia, likely chronic 2/2 prior CVAs and dementia dx. Pt is at an increased risk for aspiration and its negative sequela given reduced mobility, decreased safety awareness, and dependency on others for feeding assistance. Would benefit from MBS to further assess swallow anatomy and physiology. Given presumed chronicity of dysphagia, currently stable respiratory status, and clear lungs, pt does appear safe to continue oral diet prior to instrumental swallow study." MBSS was not conducted at that time. Pt reported he was eating "normal" foods and drinks at nursing home, prior to admission.    No acute pathology on chest xray 10/24.    Pt currently on CLD due to diarrhea; per MD, ok to trial solids for this exam.

## 2023-10-26 NOTE — DISCHARGE NOTE PROVIDER - NSDCCPTREATMENT_GEN_ALL_CORE_FT
PRINCIPAL PROCEDURE  Procedure: CT abdomen  Findings and Treatment: COMPARISON: CT abdomen and pelvis 7/15/2023  CONTRAST/COMPLICATIONS:  IV Contrast: No  Oral Contrast: No  Complications: No  PROCEDURE:  CT of the Abdomen and Pelvis was performed.  Sagittal and coronal reformats were performed.  FINDINGS:  LOWER CHEST: Within normal limits.  LIVER: Within normal limits.  BILE DUCTS: Normal caliber.  GALLBLADDER: Within normal limits.  SPLEEN: Within normal limits.  PANCREAS: Within normal limits.  ADRENALS: Within normal limits.  KIDNEYS/URETERS: A couple of left simple renal cyst. Bilateral renal   vascular calcifications. No hydronephrosis.  BLADDER: Asymmetric right anterolateral wall thickening.  REPRODUCTIVE ORGANS: Prostate is enlarged.  BOWEL: There is diffuse wall thickening of the rectum, sigmoid, and   distal descending colon, compatible with colitis. No bowel obstruction.   Appendix is not visualized. No evidence of inflammation in the pericecal   region.  PERITONEUM: No ascites. No pneumoperitoneum.  VESSELS: Limited evaluation without intravenous contrast. Atherosclerotic   changes.  RETROPERITONEUM/LYMPH NODES: No lymphadenopathy.  ABDOMINAL WALL: Bilateral symmetric gynecomastia  BONES: Degenerative changes.  IMPRESSION:  Diffuse wall thickening of the rectum, sigmoid, and distal descending   colon, most likely secondary to infectiouscolitis.  New wall thickening involving the right and anterior bladder wall.   Findings could be related to infection. Other etiologies are not   excluded. Correlate clinically.  --- End of Report ---

## 2023-10-26 NOTE — DISCHARGE NOTE PROVIDER - NSDCFUSCHEDAPPT_GEN_ALL_CORE_FT
Caleb Royal  [facility] PreAdmits  Scheduled Appointment: 10/27/2023    Jaspal Uribe  [facility] Physician Count includes the [facility]  ENDOCRIN [address]  Scheduled Appointment: 11/08/2023    Van Garcia  [facility] Physician Count includes the [facility]  NEPHRO [address]  Scheduled Appointment: 11/09/2023    Cassandra Cowart  [facility] Physician Count includes the [facility]  HEARTVASC [address]  Scheduled Appointment: 11/27/2023     Jaspal Uribe  [facility] Physician [facility]  ENDOCRIN [address]  Scheduled Appointment: 11/08/2023    Van Garcia  [facility]  NEPHRO [address]  Scheduled Appointment: 11/09/2023    Cassandra Cowart  [facility]  HEARTVASC [address]  Scheduled Appointment: 11/27/2023

## 2023-10-26 NOTE — SWALLOW BEDSIDE ASSESSMENT ADULT - SLP GENERAL OBSERVATIONS
Pt received awake, alert. Pt oriented to self, not to place or year. Pt followed some 1-step directives, not reliable.

## 2023-10-26 NOTE — PROGRESS NOTE ADULT - PROBLEM SELECTOR PLAN 8
Baseline A&O x 1 on admission (Self). Residual L sided weakness but difficult to assess as contracted, however able to follow commands.    - c/w Aspirin, statin. Baseline A&O x 1 on admission (Self). Residual L sided weakness but difficult to assess as contracted, however able to follow commands.  Home meds: aspirin 81 mg q24h, atorvastatin 80 mg q24h    - home meds

## 2023-10-26 NOTE — PROGRESS NOTE ADULT - PROBLEM SELECTOR PLAN 2
Patient meeting severe criteria of C. diff disease, WBC >15K, Cr >1.5mg/dL but elevated at baseline. CT A/P consistent w/ infectious colitis.     - C/w vancomycin as above(10/24-11/2)  - Continue flagyl  mg every 8 hours  - Maintain hydration, PO and IV  - Abdominal XR ordered for questionable mild abdominal firmness on exam  - Consider restarting renal diet pending abdominal XR findings  - Stool count  - Serial abdominal exams Patient meeting severe criteria of C. diff disease, WBC >15K, Cr >1.5mg/dL but elevated at baseline. CT A/P consistent w/ infectious colitis. Abdominal XR neg.    - C/w vancomycin as above(10/24-11/2)  - Continue flagyl  mg every 8 hours  - Maintain hydration, PO and IV  - Consider restarting renal diet following speech and swallow eval  - Stool count  - Serial abdominal exams

## 2023-10-26 NOTE — PROGRESS NOTE ADULT - PROBLEM SELECTOR PLAN 1
#Sepsis 2/2 C.diff colitis  Patient meeting 3/4 SIRS criteria on admission (lactate neg). Found to be C.diff positive w/ CT abd demonstrating infectious colitis. CXR neg, RVP neg, UA neg. Admitted for sepsis 2/2 to C.diff colitis.    - C/w vancomycin PO 500mg [id number] x 10 days total (10/24-11/2)  - Continue flagyl  mg every 8 hours  - C/w 1 L Dextrose 5% + NS 0.045% 1 L infused at 80 mL/hr x 10 hr   - BCx: NGTD #Sepsis 2/2 C.diff colitis  Patient meeting 3/4 SIRS criteria on admission (lactate neg). Found to be C.diff positive w/ CT abd demonstrating infectious colitis. CXR neg, RVP neg, UA neg. Admitted for sepsis 2/2 to C.diff colitis.    - C/w vancomycin PO 500mg [id number] x 10 days total (10/24-11/2)  - Continue flagyl  mg every 8 hours  - C/w D5W + 1 amp Bicarb @ 1000 cc/hr, as per nephro recs  - BCx: NGTD #Sepsis 2/2 C.diff colitis  Patient meeting 3/4 SIRS criteria on admission (lactate neg). Found to be C.diff positive w/ CT abd demonstrating infectious colitis. CXR neg, RVP neg, UA neg. Admitted for sepsis 2/2 to C.diff colitis.    - C/w vancomycin PO 500mg [id number] x 10 days total (10/24-11/2)  - Continue flagyl  mg every 8 hours  - BCx: NGTD

## 2023-10-26 NOTE — PROGRESS NOTE ADULT - SUBJECTIVE AND OBJECTIVE BOX
OVERNIGHT EVENTS:  SUBJECTIVE: Patient was seen and examined at bedside.      VITAL SIGNS:  T(F): 97.2 (10-26-23 @ 06:00)  HR: 96 (10-26-23 @ 06:00)  BP: 153/89 (10-26-23 @ 06:00)  RR: 17 (10-26-23 @ 06:00)  SpO2: 97% (10-26-23 @ 06:00)  Wt(kg): --    PHYSICAL EXAM  GENERAL: NAD, lying in bed comfortably  HEAD:  Atraumatic, normocephalic  EYES: EOMI, PERRLA, conjunctiva and sclera clear  ENT: Moist mucous membranes  NECK: Supple, no JVD  HEART: Regular rate and rhythm, no murmurs, rubs, or gallops  LUNGS: Unlabored respirations.  Clear to auscultation bilaterally, no crackles, wheezing, or rhonchi  ABDOMEN: Soft, nontender, nondistended, +BS  EXTREMITIES: 2+ peripheral pulses bilaterally. No clubbing, cyanosis, or edema  NERVOUS SYSTEM:  A&Ox3, no focal deficits   SKIN: No rashes or lesions    MEDICATIONS  (STANDING):  aspirin  chewable 81 milliGRAM(s) Oral daily  atorvastatin 80 milliGRAM(s) Oral at bedtime  clopidogrel Tablet 75 milliGRAM(s) Oral daily  dextrose 5% 1000 milliLiter(s) (100 mL/Hr) IV Continuous <Continuous>  dextrose 5%. 1000 milliLiter(s) (100 mL/Hr) IV Continuous <Continuous>  dextrose 5%. 1000 milliLiter(s) (50 mL/Hr) IV Continuous <Continuous>  dextrose 50% Injectable 25 Gram(s) IV Push once  dextrose 50% Injectable 25 Gram(s) IV Push once  dextrose 50% Injectable 12.5 Gram(s) IV Push once  glucagon  Injectable 1 milliGRAM(s) IntraMuscular once  heparin   Injectable 5000 Unit(s) SubCutaneous every 8 hours  influenza   Vaccine 0.5 milliLiter(s) IntraMuscular once  insulin lispro (ADMELOG) corrective regimen sliding scale   SubCutaneous Before meals and at bedtime  metroNIDAZOLE  IVPB 500 milliGRAM(s) IV Intermittent every 8 hours  tamsulosin 0.4 milliGRAM(s) Oral at bedtime  vancomycin    Solution 500 milliGRAM(s) Oral every 6 hours    MEDICATIONS  (PRN):  acetaminophen     Tablet .. 650 milliGRAM(s) Oral every 6 hours PRN Temp greater or equal to 38C (100.4F), Mild Pain (1 - 3)  dextrose Oral Gel 15 Gram(s) Oral once PRN Blood Glucose LESS THAN 70 milliGRAM(s)/deciliter      Allergies    No Known Allergies    Intolerances        LABS:                        9.9    15.90 )-----------( 352      ( 25 Oct 2023 20:20 )             33.4     10-25    151<H>  |  120<H>  |  107<H>  ----------------------------<  107<H>  4.2   |  15<L>  |  6.60<H>    Ca    8.9      25 Oct 2023 20:20  Phos  4.1     10-25  Mg     2.1     10-25    TPro  6.6  /  Alb  3.0<L>  /  TBili  0.2  /  DBili  x   /  AST  11  /  ALT  12  /  AlkPhos  79  10-25    PT/INR - ( 24 Oct 2023 18:31 )   PT: 11.3 sec;   INR: 0.99          PTT - ( 24 Oct 2023 18:31 )  PTT:26.5 sec  Urinalysis Basic - ( 25 Oct 2023 20:20 )    Color: x / Appearance: x / SG: x / pH: x  Gluc: 107 mg/dL / Ketone: x  / Bili: x / Urobili: x   Blood: x / Protein: x / Nitrite: x   Leuk Esterase: x / RBC: x / WBC x   Sq Epi: x / Non Sq Epi: x / Bacteria: x          MICROBIOLOGY      RADIOLOGY & ADDITIONAL TESTS:  Reviewed OVERNIGHT EVENTS: Nurse confirmed that patient had diarrhea overnight at approximately 11 PM.  SUBJECTIVE: Patient was seen and examined at bedside. Patient feeling well this morning, asking if he can have a meal. Denies abdominal pain, vomiting, chest pain, SOB.      VITAL SIGNS:  T(F): 97.2 (10-26-23 @ 06:00)  HR: 96 (10-26-23 @ 06:00)  BP: 153/89 (10-26-23 @ 06:00)  RR: 17 (10-26-23 @ 06:00)  SpO2: 97% (10-26-23 @ 06:00)  Wt(kg): --    PHYSICAL EXAM  GENERAL: NAD, facial asymmetry  HEAD:  Atraumatic, normocephalic  EYES: EOMI, conjunctiva and sclera clear  ENT: Poor dentition moist mucous membranes  NECK: Supple, no JVD  HEART: Regular rate and rhythm, no murmurs, rubs, or gallops  LUNGS: Unlabored respirations.  Clear to auscultation bilaterally, no crackles, wheezing, or rhonchi  ABDOMEN: Mild firmness, nontender, nondistended, +BS  EXTREMITIES: R arm contracted, wwp;. R leg bka clean stump.  NERVOUS SYSTEM:  A&Ox1-2 (hospital-self), no focal deficits   SKIN: Small sacral clean based ulcer, no drainage    MEDICATIONS  (STANDING):  aspirin  chewable 81 milliGRAM(s) Oral daily  atorvastatin 80 milliGRAM(s) Oral at bedtime  clopidogrel Tablet 75 milliGRAM(s) Oral daily  dextrose 5% 1000 milliLiter(s) (100 mL/Hr) IV Continuous <Continuous>  dextrose 5%. 1000 milliLiter(s) (100 mL/Hr) IV Continuous <Continuous>  dextrose 5%. 1000 milliLiter(s) (50 mL/Hr) IV Continuous <Continuous>  dextrose 50% Injectable 25 Gram(s) IV Push once  dextrose 50% Injectable 25 Gram(s) IV Push once  dextrose 50% Injectable 12.5 Gram(s) IV Push once  glucagon  Injectable 1 milliGRAM(s) IntraMuscular once  heparin   Injectable 5000 Unit(s) SubCutaneous every 8 hours  influenza   Vaccine 0.5 milliLiter(s) IntraMuscular once  insulin lispro (ADMELOG) corrective regimen sliding scale   SubCutaneous Before meals and at bedtime  metroNIDAZOLE  IVPB 500 milliGRAM(s) IV Intermittent every 8 hours  tamsulosin 0.4 milliGRAM(s) Oral at bedtime  vancomycin    Solution 500 milliGRAM(s) Oral every 6 hours    MEDICATIONS  (PRN):  acetaminophen     Tablet .. 650 milliGRAM(s) Oral every 6 hours PRN Temp greater or equal to 38C (100.4F), Mild Pain (1 - 3)  dextrose Oral Gel 15 Gram(s) Oral once PRN Blood Glucose LESS THAN 70 milliGRAM(s)/deciliter      Allergies    No Known Allergies    Intolerances        LABS:                        9.9    15.90 )-----------( 352      ( 25 Oct 2023 20:20 )             33.4     10-25    151<H>  |  120<H>  |  107<H>  ----------------------------<  107<H>  4.2   |  15<L>  |  6.60<H>    Ca    8.9      25 Oct 2023 20:20  Phos  4.1     10-25  Mg     2.1     10-25    TPro  6.6  /  Alb  3.0<L>  /  TBili  0.2  /  DBili  x   /  AST  11  /  ALT  12  /  AlkPhos  79  10-25    PT/INR - ( 24 Oct 2023 18:31 )   PT: 11.3 sec;   INR: 0.99          PTT - ( 24 Oct 2023 18:31 )  PTT:26.5 sec  Urinalysis Basic - ( 25 Oct 2023 20:20 )    Color: x / Appearance: x / SG: x / pH: x  Gluc: 107 mg/dL / Ketone: x  / Bili: x / Urobili: x   Blood: x / Protein: x / Nitrite: x   Leuk Esterase: x / RBC: x / WBC x   Sq Epi: x / Non Sq Epi: x / Bacteria: x          MICROBIOLOGY      RADIOLOGY & ADDITIONAL TESTS:  Reviewed OVERNIGHT EVENTS: Nurse confirmed patient had diarrhea overnight at approximately 11 PM.  SUBJECTIVE: Patient was seen and examined at bedside. Patient feeling well this morning, asking if he can have a meal. Denies abdominal pain, vomiting, chest pain, SOB.      VITAL SIGNS:  T(F): 97.2 (10-26-23 @ 06:00)  HR: 96 (10-26-23 @ 06:00)  BP: 153/89 (10-26-23 @ 06:00)  RR: 17 (10-26-23 @ 06:00)  SpO2: 97% (10-26-23 @ 06:00)  Wt(kg): --    PHYSICAL EXAM  GENERAL: NAD, facial asymmetry  HEAD:  Atraumatic, normocephalic  EYES: EOMI, conjunctiva and sclera clear  ENT: Poor dentition moist mucous membranes  NECK: Supple, no JVD  HEART: Regular rate and rhythm, no murmurs, rubs, or gallops  LUNGS: Unlabored respirations.  Clear to auscultation bilaterally, no crackles, wheezing, or rhonchi  ABDOMEN: Mild firmness, nontender, nondistended, +BS  EXTREMITIES: R arm contracted, wwp;. R leg bka clean stump.  NERVOUS SYSTEM:  A&Ox1-2 (hospital-self), no focal deficits   SKIN: Small sacral clean based ulcer, no drainage    MEDICATIONS  (STANDING):  aspirin  chewable 81 milliGRAM(s) Oral daily  atorvastatin 80 milliGRAM(s) Oral at bedtime  clopidogrel Tablet 75 milliGRAM(s) Oral daily  dextrose 5% 1000 milliLiter(s) (100 mL/Hr) IV Continuous <Continuous>  dextrose 5%. 1000 milliLiter(s) (100 mL/Hr) IV Continuous <Continuous>  dextrose 5%. 1000 milliLiter(s) (50 mL/Hr) IV Continuous <Continuous>  dextrose 50% Injectable 25 Gram(s) IV Push once  dextrose 50% Injectable 25 Gram(s) IV Push once  dextrose 50% Injectable 12.5 Gram(s) IV Push once  glucagon  Injectable 1 milliGRAM(s) IntraMuscular once  heparin   Injectable 5000 Unit(s) SubCutaneous every 8 hours  influenza   Vaccine 0.5 milliLiter(s) IntraMuscular once  insulin lispro (ADMELOG) corrective regimen sliding scale   SubCutaneous Before meals and at bedtime  metroNIDAZOLE  IVPB 500 milliGRAM(s) IV Intermittent every 8 hours  tamsulosin 0.4 milliGRAM(s) Oral at bedtime  vancomycin    Solution 500 milliGRAM(s) Oral every 6 hours    MEDICATIONS  (PRN):  acetaminophen     Tablet .. 650 milliGRAM(s) Oral every 6 hours PRN Temp greater or equal to 38C (100.4F), Mild Pain (1 - 3)  dextrose Oral Gel 15 Gram(s) Oral once PRN Blood Glucose LESS THAN 70 milliGRAM(s)/deciliter      Allergies    No Known Allergies    Intolerances        LABS:                        9.9    15.90 )-----------( 352      ( 25 Oct 2023 20:20 )             33.4     10-25    151<H>  |  120<H>  |  107<H>  ----------------------------<  107<H>  4.2   |  15<L>  |  6.60<H>    Ca    8.9      25 Oct 2023 20:20  Phos  4.1     10-25  Mg     2.1     10-25    TPro  6.6  /  Alb  3.0<L>  /  TBili  0.2  /  DBili  x   /  AST  11  /  ALT  12  /  AlkPhos  79  10-25    PT/INR - ( 24 Oct 2023 18:31 )   PT: 11.3 sec;   INR: 0.99          PTT - ( 24 Oct 2023 18:31 )  PTT:26.5 sec  Urinalysis Basic - ( 25 Oct 2023 20:20 )    Color: x / Appearance: x / SG: x / pH: x  Gluc: 107 mg/dL / Ketone: x  / Bili: x / Urobili: x   Blood: x / Protein: x / Nitrite: x   Leuk Esterase: x / RBC: x / WBC x   Sq Epi: x / Non Sq Epi: x / Bacteria: x          MICROBIOLOGY      RADIOLOGY & ADDITIONAL TESTS:  Reviewed OVERNIGHT EVENTS: Nurse confirmed patient had diarrhea overnight at approximately 11 PM.  SUBJECTIVE: Patient was seen and examined at bedside. Patient feeling well this morning, asking if he can have a meal. Denies abdominal pain, vomiting, chest pain, SOB.      VITAL SIGNS:  T(F): 97.2 (10-26-23 @ 06:00)  HR: 96 (10-26-23 @ 06:00)  BP: 153/89 (10-26-23 @ 06:00)  RR: 17 (10-26-23 @ 06:00)  SpO2: 97% (10-26-23 @ 06:00)  Wt(kg): --    PHYSICAL EXAM  GENERAL: NAD, R facial droop  HEAD:  Atraumatic, normocephalic  EYES: EOMI, conjunctiva and sclera clear  ENT: Poor dentition moist mucous membranes  HEART: Regular rate and rhythm, no murmurs, rubs, or gallops  LUNGS: Unlabored respirations.  Clear to auscultation bilaterally, no crackles, wheezing, or rhonchi  ABDOMEN: soft, nontender, nondistended, +BS  EXTREMITIES: R arm contracted, wwp;. R leg bka clean stump.  NERVOUS SYSTEM:  A&Ox1-2 (hospital-self), no focal deficits   SKIN: Small sacral clean based ulcer, no drainage    MEDICATIONS  (STANDING):  aspirin  chewable 81 milliGRAM(s) Oral daily  atorvastatin 80 milliGRAM(s) Oral at bedtime  clopidogrel Tablet 75 milliGRAM(s) Oral daily  dextrose 5% 1000 milliLiter(s) (100 mL/Hr) IV Continuous <Continuous>  dextrose 5%. 1000 milliLiter(s) (100 mL/Hr) IV Continuous <Continuous>  dextrose 5%. 1000 milliLiter(s) (50 mL/Hr) IV Continuous <Continuous>  dextrose 50% Injectable 25 Gram(s) IV Push once  dextrose 50% Injectable 25 Gram(s) IV Push once  dextrose 50% Injectable 12.5 Gram(s) IV Push once  glucagon  Injectable 1 milliGRAM(s) IntraMuscular once  heparin   Injectable 5000 Unit(s) SubCutaneous every 8 hours  influenza   Vaccine 0.5 milliLiter(s) IntraMuscular once  insulin lispro (ADMELOG) corrective regimen sliding scale   SubCutaneous Before meals and at bedtime  metroNIDAZOLE  IVPB 500 milliGRAM(s) IV Intermittent every 8 hours  tamsulosin 0.4 milliGRAM(s) Oral at bedtime  vancomycin    Solution 500 milliGRAM(s) Oral every 6 hours    MEDICATIONS  (PRN):  acetaminophen     Tablet .. 650 milliGRAM(s) Oral every 6 hours PRN Temp greater or equal to 38C (100.4F), Mild Pain (1 - 3)  dextrose Oral Gel 15 Gram(s) Oral once PRN Blood Glucose LESS THAN 70 milliGRAM(s)/deciliter      Allergies    No Known Allergies    Intolerances        LABS:                        9.9    15.90 )-----------( 352      ( 25 Oct 2023 20:20 )             33.4     10-25    151<H>  |  120<H>  |  107<H>  ----------------------------<  107<H>  4.2   |  15<L>  |  6.60<H>    Ca    8.9      25 Oct 2023 20:20  Phos  4.1     10-25  Mg     2.1     10-25    TPro  6.6  /  Alb  3.0<L>  /  TBili  0.2  /  DBili  x   /  AST  11  /  ALT  12  /  AlkPhos  79  10-25    PT/INR - ( 24 Oct 2023 18:31 )   PT: 11.3 sec;   INR: 0.99          PTT - ( 24 Oct 2023 18:31 )  PTT:26.5 sec  Urinalysis Basic - ( 25 Oct 2023 20:20 )    Color: x / Appearance: x / SG: x / pH: x  Gluc: 107 mg/dL / Ketone: x  / Bili: x / Urobili: x   Blood: x / Protein: x / Nitrite: x   Leuk Esterase: x / RBC: x / WBC x   Sq Epi: x / Non Sq Epi: x / Bacteria: x          MICROBIOLOGY      RADIOLOGY & ADDITIONAL TESTS:  Reviewed

## 2023-10-26 NOTE — PROGRESS NOTE ADULT - SUBJECTIVE AND OBJECTIVE BOX
HPI: 57M with PMHx of DM2, CVA x 2 w/ residual L sided weakness, early onset dementia, HTN, HLD, CKD V (creatinine 7-8, not on HD), BPH and PSHx of L toe amputation and AV fistula creation (5/23 - Dr. Royal), recent admission in July for starvation ketosis and gas gangrene s/p rBKA, discharged to nursing home, presenting with weakness and dirrhea x 1 day from nursing home, s/p 1 dose of vancomycin. Patient met 3/4 SIRS crtieria on admission (fever, HR, leukocytosis), and was given a liter NS and vancomycin and zosyn in the ED.    ED Course  T101 ->113 /63->97/71->126/70  RR18 Sat 99% on RA  Labs Significant for: WBC 19, Na 154->151 BUN/Cr 121/6.68 (prior admission 65/7.08), Trop 89 NAGMA.       Imaging:   CTAP: Diffuse wall thickening of the rectum, sigmoid, and distal descending   colon, most likely secondary to infectiouscolitis.    New wall thickening involving the right and anterior bladder wall.   Findings could be related to infection. Other etiologies are not   excluded. Correlate clinically.    CT Head: No evidence of acute intracranial hemorrhage, mass effect, or   hydrocephalus.    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, traumatic, left, sequela,            Allergies:  No Known Allergies      Home Medications:   acetaminophen     Tablet .. 650 milliGRAM(s) Oral every 6 hours PRN  aspirin  chewable 81 milliGRAM(s) Oral daily  atorvastatin 80 milliGRAM(s) Oral at bedtime  clopidogrel Tablet 75 milliGRAM(s) Oral daily  dextrose 5% 1000 milliLiter(s) IV Continuous <Continuous>  dextrose 5%. 1000 milliLiter(s) IV Continuous <Continuous>  dextrose 5%. 1000 milliLiter(s) IV Continuous <Continuous>  dextrose 50% Injectable 12.5 Gram(s) IV Push once  dextrose 50% Injectable 25 Gram(s) IV Push once  dextrose 50% Injectable 25 Gram(s) IV Push once  dextrose Oral Gel 15 Gram(s) Oral once PRN  glucagon  Injectable 1 milliGRAM(s) IntraMuscular once  heparin   Injectable 5000 Unit(s) SubCutaneous every 8 hours  influenza   Vaccine 0.5 milliLiter(s) IntraMuscular once  insulin lispro (ADMELOG) corrective regimen sliding scale   SubCutaneous Before meals and at bedtime  metroNIDAZOLE  IVPB 500 milliGRAM(s) IV Intermittent every 8 hours  tamsulosin 0.4 milliGRAM(s) Oral at bedtime  vancomycin    Solution 500 milliGRAM(s) Oral every 6 hours      Hospital Medications:   MEDICATIONS  (STANDING):  aspirin  chewable 81 milliGRAM(s) Oral daily  atorvastatin 80 milliGRAM(s) Oral at bedtime  clopidogrel Tablet 75 milliGRAM(s) Oral daily  dextrose 5% 1000 milliLiter(s) (100 mL/Hr) IV Continuous <Continuous>  dextrose 5%. 1000 milliLiter(s) (100 mL/Hr) IV Continuous <Continuous>  dextrose 5%. 1000 milliLiter(s) (50 mL/Hr) IV Continuous <Continuous>  dextrose 50% Injectable 25 Gram(s) IV Push once  dextrose 50% Injectable 12.5 Gram(s) IV Push once  dextrose 50% Injectable 25 Gram(s) IV Push once  glucagon  Injectable 1 milliGRAM(s) IntraMuscular once  heparin   Injectable 5000 Unit(s) SubCutaneous every 8 hours  influenza   Vaccine 0.5 milliLiter(s) IntraMuscular once  insulin lispro (ADMELOG) corrective regimen sliding scale   SubCutaneous Before meals and at bedtime  metroNIDAZOLE  IVPB 500 milliGRAM(s) IV Intermittent every 8 hours  tamsulosin 0.4 milliGRAM(s) Oral at bedtime  vancomycin    Solution 500 milliGRAM(s) Oral every 6 hours      SOCIAL HISTORY:  Denies ETOh, Smoking,     Family History:  FAMILY HISTORY:  Family history of diabetes mellitus (Mother)  Family history of diabetes mellitus (DM)          VITALS:  T(F): 97.4 (10-26-23 @ 12:48), Max: 98.5 (10-25-23 @ 21:20)  HR: 94 (10-26-23 @ 12:48)  BP: 152/84 (10-26-23 @ 12:48)  RR: 18 (10-26-23 @ 12:48)  SpO2: 97% (10-26-23 @ 12:48)  Wt(kg): --    10-25 @ 07:01  -  10-26 @ 07:00  --------------------------------------------------------  IN: 400 mL / OUT: 150 mL / NET: 250 mL        CAPILLARY BLOOD GLUCOSE      POCT Blood Glucose.: 91 mg/dL (26 Oct 2023 12:58)  POCT Blood Glucose.: 114 mg/dL (26 Oct 2023 08:58)  POCT Blood Glucose.: 141 mg/dL (25 Oct 2023 22:42)  POCT Blood Glucose.: 111 mg/dL (25 Oct 2023 17:55)      Review of Systems:  CONSTITUTIONAL: No fever or chills.  RESPIRATORY: No shortness of breath, cough  CARDIOVASCULAR: No Chest pain, shortness of breath, palpitations  GASTROINTESTINAL: No abdominal pain, nausea, vomiting, diarrhea  GENITOURINARY: No urinary frequency, gross hematuria, dysuria    PHYSICAL EXAM  GENERAL: NAD, lying in bed comfortably, AAOx3 (person, place, year)  HEART: Regular rate and rhythm, no murmurs, rubs, or gallops  LUNGS: Unlabored respirations.  Clear to auscultation bilaterally, no crackles, wheezing, or rhonchi  EXTREMITIES: Extremities: R arm contracted; R leg bka clean stump; LUE with AV fistula- bruit and palpable thrill appreciated over fistula site but it doesn't appear to be well-developed throughout the arm     LABS:  10-26    149<H>  |  117<H>  |  103<H>  ----------------------------<  104<H>  3.7   |  17<L>  |  6.30<H>    Ca    8.7      26 Oct 2023 08:52  Phos  4.1     10-26  Mg     2.0     10-26    TPro  6.3  /  Alb  2.9<L>  /  TBili  0.2  /  DBili      /  AST  10  /  ALT  9<L>  /  AlkPhos  76  10-26    Creatinine Trend: 6.30 <--, 6.60 <--, 6.77 <--, 6.65 <--, 6.68 <--, 7.09 <--                        9.7    14.64 )-----------( 357      ( 26 Oct 2023 08:52 )             32.7     Urine Studies:  Urinalysis Basic - ( 26 Oct 2023 08:52 )    Color:  / Appearance:  / SG:  / pH:   Gluc: 104 mg/dL / Ketone:   / Bili:  / Urobili:    Blood:  / Protein:  / Nitrite:    Leuk Esterase:  / RBC:  / WBC    Sq Epi:  / Non Sq Epi:  / Bacteria:  HPI: 57M with PMHx of DM2, CVA x 2 w/ residual L sided weakness, early onset dementia, HTN, HLD, CKD V (creatinine 7-8, not on HD), BPH and PSHx of L toe amputation and AV fistula creation (5/23 - Dr. Royal), recent admission in July for starvation ketosis and gas gangrene s/p rBKA, discharged to nursing home, presenting with weakness and dirrhea x 1 day from nursing home, s/p 1 dose of vancomycin. Patient met 3/4 SIRS criteria on admission (fever, HR, leukocytosis), and was given a liter NS and vancomycin and zosyn in the ED.    ED Course  T101 ->113 /63->97/71->126/70  RR18 Sat 99% on RA  Labs Significant for: WBC 19, Na 154->151 BUN/Cr 121/6.68 (prior admission 65/7.08), Trop 89 NAGMA.       Imaging:   CTAP: Diffuse wall thickening of the rectum, sigmoid, and distal descending   colon, most likely secondary to infectiouscolitis.    New wall thickening involving the right and anterior bladder wall.   Findings could be related to infection. Other etiologies are not   excluded. Correlate clinically.    CT Head: No evidence of acute intracranial hemorrhage, mass effect, or   hydrocephalus.    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, traumatic, left, sequela,            Allergies:  No Known Allergies      Home Medications:   acetaminophen     Tablet .. 650 milliGRAM(s) Oral every 6 hours PRN  aspirin  chewable 81 milliGRAM(s) Oral daily  atorvastatin 80 milliGRAM(s) Oral at bedtime  clopidogrel Tablet 75 milliGRAM(s) Oral daily  dextrose 5% 1000 milliLiter(s) IV Continuous <Continuous>  dextrose 5%. 1000 milliLiter(s) IV Continuous <Continuous>  dextrose 5%. 1000 milliLiter(s) IV Continuous <Continuous>  dextrose 50% Injectable 12.5 Gram(s) IV Push once  dextrose 50% Injectable 25 Gram(s) IV Push once  dextrose 50% Injectable 25 Gram(s) IV Push once  dextrose Oral Gel 15 Gram(s) Oral once PRN  glucagon  Injectable 1 milliGRAM(s) IntraMuscular once  heparin   Injectable 5000 Unit(s) SubCutaneous every 8 hours  influenza   Vaccine 0.5 milliLiter(s) IntraMuscular once  insulin lispro (ADMELOG) corrective regimen sliding scale   SubCutaneous Before meals and at bedtime  metroNIDAZOLE  IVPB 500 milliGRAM(s) IV Intermittent every 8 hours  tamsulosin 0.4 milliGRAM(s) Oral at bedtime  vancomycin    Solution 500 milliGRAM(s) Oral every 6 hours      Hospital Medications:   MEDICATIONS  (STANDING):  aspirin  chewable 81 milliGRAM(s) Oral daily  atorvastatin 80 milliGRAM(s) Oral at bedtime  clopidogrel Tablet 75 milliGRAM(s) Oral daily  dextrose 5% 1000 milliLiter(s) (100 mL/Hr) IV Continuous <Continuous>  dextrose 5%. 1000 milliLiter(s) (100 mL/Hr) IV Continuous <Continuous>  dextrose 5%. 1000 milliLiter(s) (50 mL/Hr) IV Continuous <Continuous>  dextrose 50% Injectable 25 Gram(s) IV Push once  dextrose 50% Injectable 12.5 Gram(s) IV Push once  dextrose 50% Injectable 25 Gram(s) IV Push once  glucagon  Injectable 1 milliGRAM(s) IntraMuscular once  heparin   Injectable 5000 Unit(s) SubCutaneous every 8 hours  influenza   Vaccine 0.5 milliLiter(s) IntraMuscular once  insulin lispro (ADMELOG) corrective regimen sliding scale   SubCutaneous Before meals and at bedtime  metroNIDAZOLE  IVPB 500 milliGRAM(s) IV Intermittent every 8 hours  tamsulosin 0.4 milliGRAM(s) Oral at bedtime  vancomycin    Solution 500 milliGRAM(s) Oral every 6 hours      SOCIAL HISTORY:  Denies ETOh, Smoking,     Family History:  FAMILY HISTORY:  Family history of diabetes mellitus (Mother)  Family history of diabetes mellitus (DM)          VITALS:  T(F): 97.4 (10-26-23 @ 12:48), Max: 98.5 (10-25-23 @ 21:20)  HR: 94 (10-26-23 @ 12:48)  BP: 152/84 (10-26-23 @ 12:48)  RR: 18 (10-26-23 @ 12:48)  SpO2: 97% (10-26-23 @ 12:48)  Wt(kg): --    10-25 @ 07:01  -  10-26 @ 07:00  --------------------------------------------------------  IN: 400 mL / OUT: 150 mL / NET: 250 mL        CAPILLARY BLOOD GLUCOSE      POCT Blood Glucose.: 91 mg/dL (26 Oct 2023 12:58)  POCT Blood Glucose.: 114 mg/dL (26 Oct 2023 08:58)  POCT Blood Glucose.: 141 mg/dL (25 Oct 2023 22:42)  POCT Blood Glucose.: 111 mg/dL (25 Oct 2023 17:55)      Review of Systems:  CONSTITUTIONAL: No fever or chills.  RESPIRATORY: No shortness of breath, cough  CARDIOVASCULAR: No Chest pain, shortness of breath, palpitations  GASTROINTESTINAL: No abdominal pain, nausea, vomiting, diarrhea  GENITOURINARY: No urinary frequency, gross hematuria, dysuria    PHYSICAL EXAM  GENERAL: NAD, lying in bed comfortably, AAOx3 (person, place, year)  HEART: Regular rate and rhythm, no murmurs, rubs, or gallops  LUNGS: Unlabored respirations.  Clear to auscultation bilaterally, no crackles, wheezing, or rhonchi  EXTREMITIES: Extremities: R arm contracted; R leg bka clean stump; LUE with AV fistula- bruit and palpable thrill appreciated over fistula site but it doesn't appear to be well-developed throughout the arm     LABS:  10-26    149<H>  |  117<H>  |  103<H>  ----------------------------<  104<H>  3.7   |  17<L>  |  6.30<H>    Ca    8.7      26 Oct 2023 08:52  Phos  4.1     10-26  Mg     2.0     10-26    TPro  6.3  /  Alb  2.9<L>  /  TBili  0.2  /  DBili      /  AST  10  /  ALT  9<L>  /  AlkPhos  76  10-26    Creatinine Trend: 6.30 <--, 6.60 <--, 6.77 <--, 6.65 <--, 6.68 <--, 7.09 <--                        9.7    14.64 )-----------( 357      ( 26 Oct 2023 08:52 )             32.7     Urine Studies:  Urinalysis Basic - ( 26 Oct 2023 08:52 )    Color:  / Appearance:  / SG:  / pH:   Gluc: 104 mg/dL / Ketone:   / Bili:  / Urobili:    Blood:  / Protein:  / Nitrite:    Leuk Esterase:  / RBC:  / WBC    Sq Epi:  / Non Sq Epi:  / Bacteria:  HPI: 57M with PMHx of DM2, CVA x 2 w/ residual L sided weakness, early onset dementia, HTN, HLD, CKD V (creatinine 7-8, not on HD), BPH and PSHx of L toe amputation and AV fistula creation (5/23 - Dr. Royal), recent admission in July for starvation ketosis and gas gangrene s/p rBKA, discharged to nursing home, presenting with weakness and dirrhea x 1 day from nursing home, s/p 1 dose of vancomycin. Patient met 3/4 SIRS criteria on admission (fever, HR, leukocytosis), and was given a liter NS and vancomycin and zosyn in the ED.    ED Course  T101 ->113 /63->97/71->126/70  RR18 Sat 99% on RA  Labs Significant for: WBC 19, Na 154->151 BUN/Cr 121/6.68 (prior admission 65/7.08), Trop 89 NAGMA.       Imaging:   CTAP: Diffuse wall thickening of the rectum, sigmoid, and distal descending   colon, most likely secondary to infectiouscolitis.    New wall thickening involving the right and anterior bladder wall.   Findings could be related to infection. Other etiologies are not   excluded. Correlate clinically.    CT Head: No evidence of acute intracranial hemorrhage, mass effect, or   hydrocephalus.    feeling better , more alert and interactive today , alert and oriented, reports eating better     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, traumatic, left, sequela,            Allergies:  No Known Allergies      Home Medications:   acetaminophen     Tablet .. 650 milliGRAM(s) Oral every 6 hours PRN  aspirin  chewable 81 milliGRAM(s) Oral daily  atorvastatin 80 milliGRAM(s) Oral at bedtime  clopidogrel Tablet 75 milliGRAM(s) Oral daily  dextrose 5% 1000 milliLiter(s) IV Continuous <Continuous>  dextrose 5%. 1000 milliLiter(s) IV Continuous <Continuous>  dextrose 5%. 1000 milliLiter(s) IV Continuous <Continuous>  dextrose 50% Injectable 12.5 Gram(s) IV Push once  dextrose 50% Injectable 25 Gram(s) IV Push once  dextrose 50% Injectable 25 Gram(s) IV Push once  dextrose Oral Gel 15 Gram(s) Oral once PRN  glucagon  Injectable 1 milliGRAM(s) IntraMuscular once  heparin   Injectable 5000 Unit(s) SubCutaneous every 8 hours  influenza   Vaccine 0.5 milliLiter(s) IntraMuscular once  insulin lispro (ADMELOG) corrective regimen sliding scale   SubCutaneous Before meals and at bedtime  metroNIDAZOLE  IVPB 500 milliGRAM(s) IV Intermittent every 8 hours  tamsulosin 0.4 milliGRAM(s) Oral at bedtime  vancomycin    Solution 500 milliGRAM(s) Oral every 6 hours      Hospital Medications:   MEDICATIONS  (STANDING):  aspirin  chewable 81 milliGRAM(s) Oral daily  atorvastatin 80 milliGRAM(s) Oral at bedtime  clopidogrel Tablet 75 milliGRAM(s) Oral daily  dextrose 5% 1000 milliLiter(s) (100 mL/Hr) IV Continuous <Continuous>  dextrose 5%. 1000 milliLiter(s) (100 mL/Hr) IV Continuous <Continuous>  dextrose 5%. 1000 milliLiter(s) (50 mL/Hr) IV Continuous <Continuous>  dextrose 50% Injectable 25 Gram(s) IV Push once  dextrose 50% Injectable 12.5 Gram(s) IV Push once  dextrose 50% Injectable 25 Gram(s) IV Push once  glucagon  Injectable 1 milliGRAM(s) IntraMuscular once  heparin   Injectable 5000 Unit(s) SubCutaneous every 8 hours  influenza   Vaccine 0.5 milliLiter(s) IntraMuscular once  insulin lispro (ADMELOG) corrective regimen sliding scale   SubCutaneous Before meals and at bedtime  metroNIDAZOLE  IVPB 500 milliGRAM(s) IV Intermittent every 8 hours  tamsulosin 0.4 milliGRAM(s) Oral at bedtime  vancomycin    Solution 500 milliGRAM(s) Oral every 6 hours      SOCIAL HISTORY:  Denies ETOh, Smoking,     Family History:  FAMILY HISTORY:  Family history of diabetes mellitus (Mother)  Family history of diabetes mellitus (DM)          VITALS:  T(F): 97.4 (10-26-23 @ 12:48), Max: 98.5 (10-25-23 @ 21:20)  HR: 94 (10-26-23 @ 12:48)  BP: 152/84 (10-26-23 @ 12:48)  RR: 18 (10-26-23 @ 12:48)  SpO2: 97% (10-26-23 @ 12:48)  Wt(kg): --    10-25 @ 07:01  -  10-26 @ 07:00  --------------------------------------------------------  IN: 400 mL / OUT: 150 mL / NET: 250 mL        CAPILLARY BLOOD GLUCOSE      POCT Blood Glucose.: 91 mg/dL (26 Oct 2023 12:58)  POCT Blood Glucose.: 114 mg/dL (26 Oct 2023 08:58)  POCT Blood Glucose.: 141 mg/dL (25 Oct 2023 22:42)  POCT Blood Glucose.: 111 mg/dL (25 Oct 2023 17:55)      Review of Systems:  CONSTITUTIONAL: No fever or chills.  RESPIRATORY: No shortness of breath, cough  CARDIOVASCULAR: No Chest pain, shortness of breath, palpitations  GASTROINTESTINAL: No abdominal pain, nausea, vomiting, diarrhea  GENITOURINARY: No urinary frequency, gross hematuria, dysuria    PHYSICAL EXAM  GENERAL: NAD, lying in bed comfortably, AAOx3 (person, place, year)  HEART: Regular rate and rhythm, no murmurs, rubs, or gallops  LUNGS: Unlabored respirations.  Clear to auscultation bilaterally, no crackles, wheezing, or rhonchi  EXTREMITIES: Extremities: R arm contracted; R leg bka clean stump; LUE with AV fistula- bruit and palpable thrill appreciated over fistula site but it doesn't appear to be well-developed throughout the arm     LABS:  10-26    149<H>  |  117<H>  |  103<H>  ----------------------------<  104<H>  3.7   |  17<L>  |  6.30<H>    Ca    8.7      26 Oct 2023 08:52  Phos  4.1     10-26  Mg     2.0     10-26    TPro  6.3  /  Alb  2.9<L>  /  TBili  0.2  /  DBili      /  AST  10  /  ALT  9<L>  /  AlkPhos  76  10-26    Creatinine Trend: 6.30 <--, 6.60 <--, 6.77 <--, 6.65 <--, 6.68 <--, 7.09 <--                        9.7    14.64 )-----------( 357      ( 26 Oct 2023 08:52 )             32.7     Urine Studies:  Urinalysis Basic - ( 26 Oct 2023 08:52 )    Color:  / Appearance:  / SG:  / pH:   Gluc: 104 mg/dL / Ketone:   / Bili:  / Urobili:    Blood:  / Protein:  / Nitrite:    Leuk Esterase:  / RBC:  / WBC    Sq Epi:  / Non Sq Epi:  / Bacteria:

## 2023-10-26 NOTE — CONSULT NOTE ADULT - SUBJECTIVE AND OBJECTIVE BOX
Vascular Attending:  Dr. Royal        HPI:  57M with PMHx of DM2, CVA x 2 w/ residual L sided weakness, early onset dementia, HTN, HLD, CKD V (creatinine 7-8, not on HD), BPH and PSHx of L toe amputation and AV fistula creation (5/23 - Dr. Royal), recent admission in July for starvation ketosis and gas gangrene s/p rBKA, discharged to nursing home, presenting with weakness and dirrhea x 1 day from nursing home, s/p 1 dose of vancomycin. Patient met 3/4 SIRS crtieria on admission (fever, HR, leukocytosis), and was given a liter NS and vancomycin and zosyn in the ED.    ED Course  T101 ->113 /63->97/71->126/70  RR18 Sat 99% on RA  Labs Significant for: WBC 19, Na 154->151 BUN/Cr 121/6.68 (prior admission 65/7.08), Trop 89 NAGMA.       Imaging:   CTAP: Diffuse wall thickening of the rectum, sigmoid, and distal descending   colon, most likely secondary to infectiouscolitis.    New wall thickening involving the right and anterior bladder wall.   Findings could be related to infection. Other etiologies are not   excluded. Correlate clinically.    CT Head: No evidence of acute intracranial hemorrhage, mass effect, or   hydrocephalus.   (25 Oct 2023 03:13)          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, traumatic, left, sequela,          MEDICATIONS  (STANDING):  aspirin  chewable 81 milliGRAM(s) Oral daily  atorvastatin 80 milliGRAM(s) Oral at bedtime  clopidogrel Tablet 75 milliGRAM(s) Oral daily  dextrose 5% 1000 milliLiter(s) (125 mL/Hr) IV Continuous <Continuous>  dextrose 5%. 1000 milliLiter(s) (100 mL/Hr) IV Continuous <Continuous>  dextrose 5%. 1000 milliLiter(s) (50 mL/Hr) IV Continuous <Continuous>  dextrose 50% Injectable 25 Gram(s) IV Push once  dextrose 50% Injectable 12.5 Gram(s) IV Push once  dextrose 50% Injectable 25 Gram(s) IV Push once  glucagon  Injectable 1 milliGRAM(s) IntraMuscular once  heparin   Injectable 5000 Unit(s) SubCutaneous every 8 hours  influenza   Vaccine 0.5 milliLiter(s) IntraMuscular once  insulin lispro (ADMELOG) corrective regimen sliding scale   SubCutaneous Before meals and at bedtime  metroNIDAZOLE  IVPB 500 milliGRAM(s) IV Intermittent every 8 hours  tamsulosin 0.4 milliGRAM(s) Oral at bedtime  vancomycin    Solution 500 milliGRAM(s) Oral every 6 hours    MEDICATIONS  (PRN):  acetaminophen     Tablet .. 650 milliGRAM(s) Oral every 6 hours PRN Temp greater or equal to 38C (100.4F), Mild Pain (1 - 3)  dextrose Oral Gel 15 Gram(s) Oral once PRN Blood Glucose LESS THAN 70 milliGRAM(s)/deciliter      Allergies    No Known Allergies    Intolerances        FAMILY HISTORY:  Family history of diabetes mellitus (Mother)  Family history of diabetes mellitus (DM)        Vital Signs Last 24 Hrs  T(C): 36.3 (26 Oct 2023 12:48), Max: 36.9 (25 Oct 2023 21:20)  T(F): 97.4 (26 Oct 2023 12:48), Max: 98.5 (25 Oct 2023 21:20)  HR: 94 (26 Oct 2023 12:48) (94 - 110)  BP: 152/84 (26 Oct 2023 12:48) (130/77 - 153/89)  BP(mean): 107 (26 Oct 2023 12:48) (107 - 110)  RR: 18 (26 Oct 2023 12:48) (16 - 18)  SpO2: 97% (26 Oct 2023 12:48) (97% - 97%)  Patient On (Oxygen Delivery Method): room air      PHYSICAL EXAM    CONSTITUTIONAL: NAD  EYES: PERRLA and symmetric, EOMI, No conjunctival or scleral injection, non-icteric  RESP: No respiratory distress  CV: RRR  GI: Soft, NT, ND  LYMPH: No cervical LAD or tenderness; no axillary LAD or tenderness; no inguinal LAD or tenderness  EXTREMITIES: RLE sp BKA, LLE sp 4th & 5th digit amputation, warm, L heel ulcer  PULSES: L- palpable DP, triphasic peroneal  NEURO: CN II-XII intact; no focal motor or sensory deficits  PSYCH: Appropriate insight/judgment; A+O x 3          LABS:                        9.8    13.51 )-----------( 344      ( 26 Oct 2023 15:01 )             33.0     10-26    150<H>  |  120<H>  |  96<H>  ----------------------------<  108<H>  3.9   |  18<L>  |  6.26<H>    Ca    8.5      26 Oct 2023 15:01  Phos  4.5     10-26  Mg     2.0     10-26    TPro  6.0  /  Alb  2.7<L>  /  TBili  0.2  /  DBili  x   /  AST  11  /  ALT  9<L>  /  AlkPhos  78  10-26    PT/INR - ( 24 Oct 2023 18:31 )   PT: 11.3 sec;   INR: 0.99          PTT - ( 24 Oct 2023 18:31 )  PTT:26.5 sec  Urinalysis Basic - ( 26 Oct 2023 15:01 )    Color: x / Appearance: x / SG: x / pH: x  Gluc: 108 mg/dL / Ketone: x  / Bili: x / Urobili: x   Blood: x / Protein: x / Nitrite: x   Leuk Esterase: x / RBC: x / WBC x   Sq Epi: x / Non Sq Epi: x / Bacteria: x        Assessment & Plan  57M with PMHx of DM2, CVA x 2 w/ residual L sided weakness, early onset dementia, HTN, HLD, CKD V (not on HD), BPH and PSHx of L toe amputation and AV fistula creation 5/23/23, 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 Patient was seeing Dr. Royal outpatient for a 2x2x0.5 cm pressure ulcer on the left heel. Arterial duplex in the office showed patent popliteal artery with AT as the only patent run off artery in calf. He was scheduled for left LE angiogram/possible PTA but has since been admitted to [facility] for sepsis 2/2 C. diff colitis. On exam patient has a left palpable DP pulse and triphasic peroneal pulse.     Plan is to defer vascular intervention until medical problems have resolved  Keep heel offloaded and dressing changed daily  Team 3c to continue to follow

## 2023-10-26 NOTE — DISCHARGE NOTE PROVIDER - NSDCACTIVITY_GEN_ALL_CORE
Continue Regimen: Tacrolimus 0.1% ointment 1-2 times a day for up to 1 week, prn flares
Discontinue Regimen: Tacrolimus ointment
Detail Level: Detailed
Detail Level: Simple
No restrictions

## 2023-10-26 NOTE — DISCHARGE NOTE PROVIDER - PROVIDER TOKENS
PROVIDER:[TOKEN:[[id number]]] FREE:[LAST:[Luis],FIRST:[Trent],PHONE:[phone]],FAX:[(   )    -],ADDRESS:[[address] [place] [address]]] FREE:[LAST:[Luis],FIRST:[Trent],PHONE:[phone]],FAX:[(   )    -],ADDRESS:[[address] [place] [address]],FOLLOWUP:[2 weeks]]

## 2023-10-26 NOTE — SWALLOW BEDSIDE ASSESSMENT ADULT - SWALLOW EVAL: RECOMMENDED FEEDING/EATING TECHNIQUES
GYNECOLOGY VISIT, NEW PATIENT    Chief Complaint   Patient presents with   • Infertility     Pt states she has been trying to get pregnant for 6 months     38 year old  female with PMH significant for bilateral PE not on anticoagulation, HTN, CHF, morbid obesity BMI 57, HLD, Hepatitis B carrier, schizophrenia, tobacco abuse presenting to discuss desire for pregnancy. She has been seen multiple times in the ED recently for suicidal ideations.  The patient also provided inconsistent histories to the MA and to myself regarding medications.    HPI: The patient reports that she made this appointment herself, she did not have a referral and has not discussed her desire for pregnancy with her PCP Dr. Small or her psychiatrist, however chart review reveals most care is provided in the ED. She reports that she desires pregnancy but is not certain if her partner really does. She has 2 other kids with this current partner. She reports that they have been trying for 6 months but when asked about frequency of intercourse she reports that this is only once a month and denies any attempts of timing intercourse around her ovulation. She has never tried an ovulation predictor kit and she is not taking prenatal vitamins in preparation. She does not use condoms ever.     I tried to review the patient's medical history and medications. She reports that she does not take any anticoagulants for her history of PE. She reports that she is taking her blood pressure medication but told the MA that she was not taking it. She tells me that she is on the risperidone and trazadone for her psychiatric issues but told the MA she was not taking them.    Patient has not had a breast and pelvic examination in \"awhile.\" She would like to have this done today.    GYNECOLOGIC HISTORY:  Menarche: 11 yo  Menstrual Cycles: Cycle occurs every month, 28 days, last 3 days, normal flow  LMP: 18  Contraception: none, never been on any contraception  in the past  History of abnormal pap smears: denies  Last pap smear: , cotesting negative   History of breast biopsy or abnormal mammogram: denies  Sexually active: yes  Number of current partners: 1  Male/female/both/neither: male   History of STI: denies  History of gynecological problems: denies    OBSTETRIC HISTORY:  Obstetric History       T3      L5     SAB1   TAB0   Ectopic0   Molar0   Multiple1   Live Births3        ROS:   Constitutional: Denies fever, chills, recent change in weight, fatigue  HEENT: Denies headache, dizziness, blurry vision, changes in hearing, recent URI symptoms  Heart: Denies chest pain, palpitations  Lungs: Denies SOB, dyspnea, wheezing  Abd: Denies abdominal pain, nausea, vomiting, diarrhea, constipation  : Denies dysuria, hematuria, increased frequency or urgency  Vulvovaginal: Positive symptoms listed in HPI, otherwise negative  MSK: Denies joint pain or swelling, muscle weakness  Skin: Denies easy bruising, new onset rashes  Ext: Denies swelling, numbness, tingling  Psych: Denies anxiety. +depression, schizophrenia    MEDICATIONS:  Current Outpatient Prescriptions   Medication Sig Dispense Refill   • ARIPiprazole Lauroxil ER (ARISTADA) 882 MG/3.2ML Prefilled Syringe Inject 1 Syringe into the muscle every 28 days. Indications: schizoaffective disorder 3.2 mL 2   • cloNIDine (CATAPRES) 0.1 MG tablet Take 1 tablet by mouth 2 times daily. 60 tablet 0   • risperiDONE (RISPERDAL) 1 MG tablet Take 1 tablet by mouth every 12 hours. 60 tablet 0   • acetaminophen (TYLENOL) 500 MG tablet Take 1 tablet by mouth every 6 hours as needed for Pain. 30 tablet 0   • trazodone (DESYREL) 150 MG tablet Take 1 tablet by mouth nightly. Indications: Trouble Sleeping 30 tablet 2   • amLODIPine (NORVASC) 10 MG tablet Take 1 tablet by mouth daily. Indications: High Blood Pressure Disorder 30 tablet 2   • metoPROLOL (LOPRESSOR) 50 MG tablet Take 1 tablet by mouth every 12 hours.  Indications: High Blood Pressure Disorder 60 tablet 2   • atorvastatin (LIPITOR) 80 MG tablet Take 1 tablet by mouth nightly. Indications: hyperlipidemia 30 tablet 0   • ferrous sulfate 325 (65 FE) MG tablet Take 1 tablet by mouth daily (with breakfast). Indications: Anemia From Inadequate Iron in the Body 30 tablet 0     No current facility-administered medications for this visit.        ALLERGIES:  ALLERGIES:  No Known Allergies    PMH:  Past Medical History:   Diagnosis Date   • Acute bronchitis    • Anxiety    • Arthritis    • Blood clot associated with vein wall inflammation 2014   • Carotid stenosis 7/11/2013    Repeat US in 3-6 Months   • Confused    • Congestive cardiac failure (CMS/HCC)    • Depression    • Essential (primary) hypertension    • Hepatitis B     pt admits, had denied in past   • Hepatitis C     patient admits this   • Hypertrophic obstructive cardiomyopathy(425.11)    • Pneumonia    • Schizophrenia (CMS/HCC)    • STD (sexually transmitted disease)     Hepatitis B  Dx'd 2000       PSH:  Past Surgical History:   Procedure Laterality Date   • CARDIAC CATHERIZATION  7/12/2013    negative findings   • SERVICE TO GASTROENTEROLOGY      navel surgery as child       FAMILY HX:  Family History   Problem Relation Age of Onset   • Hypertension Mother    • Hypertension Father    • Heart disease Father    • Hypertension Sister    • Cancer, Breast Neg Hx    • Cancer, Endometrial Neg Hx        SOCIAL HX:  Social History     Social History   • Marital status: Single     Spouse name: N/A   • Number of children: N/A   • Years of education: N/A     Social History Main Topics   • Smoking status: Current Some Day Smoker     Packs/day: 0.10     Types: Cigarettes     Last attempt to quit: 1/10/2016   • Smokeless tobacco: Never Used      Comment: 2 cigerettes/day   • Alcohol use No   • Drug use: No   • Sexual activity: Not Currently     Partners: Male     Birth control/ protection: None     Other Topics Concern   •  None     Social History Narrative    Lives with her niece, no pets. Javy is significant other, father of two other children       PHYSICAL EXAM:  Visit Vitals  BP (!) 136/100   Ht 5' 4\" (1.626 m)   Wt (!) 152.8 kg   LMP 2018   BMI 57.81 kg/m²   General: NAD, alert, oriented   Head: Normocephalic, atraumatic  Neck: Trachea midline, thyroid not enlarged, no masses or nodules noted. No cervical or supraclavicular lymphadenopathy. +acanthosis nigricans  Breast: Performed supine. No masses, nodules, or lumps. No skin changes, erythema. No nipple discharge or inversion.  Heart: RRR, no S3/S4 or murmurs  Lungs: CTAB, no increased work, no rales/rhonchi/wheezing  Abd: Soft, nontender, +bowel sounds in all four quadrants. No rebound or guarding. No hernias or hepatosplenomegaly. No abdominal masses noted.  Ext: No edema, calves nontender. Grossly normal strength and sensation.  Skin: Warm, dry, no rashes or bruising noted.    PELVIC EXAM:   Urethra: Normal in appearance, no prolapse or caruncles  Bladder: No tenderness or fullness  Vulva: grossly unremarkable, no lesions or masses noted and no erythema or discharge  Vagina:normal size & caliber and no lesions or bleeding  Cervix: Normal in appearance and no lesions or masses  Uterus: firm, non-tender, normal size, normal shape, AV  Adnexa: unremarkable, non-tender, no fullness noted and no masses noted  Rectum: Normal external appearance, no lesions    The following imaging reports and/or labs were reviewed by myself:  Urine pregnancy: negative   Pap smear: cotesting negative 2014    ASSESSMENT:  38 year old  female presenting to discuss pregnancy  Medicare breast/pelvic examination  Morbid obesity, BMI 57  Chronic hypertension, uncontrolled, medication non-compliance  CHF  History of bilateral PE in   Tobacco use  Disorganized schizophrenia with auditory hallucinations  All care in emergency department, likely non-compliance with follow up with  outpatient providers    PLAN:  Breast/pelvic examination  -Clinical breast examination completed and self breast awareness discussed  -Mammograms starting at age 41 yo  -Discussed current ASCCP guidelines for cervical cancer screening and screening for HPV. Due for screening 9/2019  -Reviewed the IOM recommendations for calcium and vitamin D intake based on age:   -19 to 49 yo: calcium 1000 mg/day, vitamin D 600 international units/day  -Reviewed safe sexual practices    Desires pregnancy, no diagnosis of infertility  -Extensive counseling was provided today regarding pregnancy including diagnosis of infertility, role of age, maternal risk factors and increased risks with pregnancy, and fetal risks with maternal medical conditions  -First of all we discussed that the diagnosis of infertility is not made until a year of attempting, however we discussed that her frequency of intercourse being once a month does not really qualify as an appropriate attempt. We discussed that intercourse needs to be every 2-3 days around the time of ovulation and her regular monthly cycles likely indicate that she ovulates. Additionally she has had two other children with her current partner so we know that previously there were not barriers to pregnancy  -We discussed that age plays a large role in fertility and that fertility decreases as age increases. We discussed age related ovarian decline and the baseline fertility rate of only 20% per cycle which decreases with age  -Recommend the patient work on stabilizing chronic medical conditions as listed below before considering pregnancy attempts given the high maternal and fetal risks associated with pregnancy for her. Patient desires to continue with unprotected intercourse at this time  -Recommend that the patient have a preconception visit with a maternal fetal medicine specialist before attempting pregnancy to fully understand the fetal and maternal risks after our discussion today.  She would be a high risk patient if she became pregnant regardless. Ideally her medications would be transitioned to appropriate selections for pregnancy and any new medications initiated prior to conceiving. Also suggest her partner come to the preconception visit with her as she expressed uncertainty about his desire to conceive another child  -Recommended at a minimum the patient start a prenatal vitamin when engaging in unprotected intercourse and follow up with her PCP and psychiatrist. Order placed for a prenatal vitamin  -Will hold on placing preconception labs (prenatal panel) until the patient meets with M as likely additional labs may be desired  -Recent CBC, CMP completed  -Fasting glucose 82 on 11/15/17    Chronic hypertension, elevated blood pressure  -After questioning, did not take medication today which was also the case in the ED on . Patient instructed to take her medication daily again given the diastolic of 100. Will take when she gets home.  -Current medication appropriate with minimal risk to a pregnancy    History of bilateral PE in   -Currently not on anticoagulation, discussed that she would be placed on anticoagulation in pregnancy and recommend she discuss with PCP whether she likely needs to be on this daily.   -Reviewed risks of increased coagulation in pregnancy    Morbid obesity, BMI 57  -Reviewed the risks of obesity in pregnancy including early miscarriage, DM II and gestational diabetes, gestational hypertension and preeclampsia, medically indicated  delivery, postterm pregnancy, PRATIMA, macrosomia. We also discussed the increased risk of  section, increased anesthesia risks, increased surgical risks, risk of postpartum VTE/infection. Also reviewed risks to child including asthma, obesity, and possible neurodevelopmental outcomes.    CHF  -Last ECHO in 2016 with EF 60%, needs to re-establish care with cardiologist, seen only inpatient and in the ED    Tobacco  use  -Effects of smoking on fetus and pregnancy were discussed with patient including  delivery, low birth weight, growth restriction, placental abruption, poor maternal wound healing, increased post partum pulmonary complications, increased  and maternal morbidity, increased rates of SIDS, and possible association with childhood cancers. The importance of smoking cessation was discussed, as stopping can reduce  mortality by 10%.     Disorganized schizophrenia with hallucinations  -Patient unclear on medications actually taking, discussed that she needs to discuss her desire for pregnancy with her psychiatrist to determine which medications will be best  -We briefly discussed the risks of medications in pregnancy but was uncertain which medications the patient was actually receiving  -Chart review after reveals she was given aristada on 18 at the [facility] which is the one medication she told me she was not taking  -See Care Everywhere BIA for details    DISPOSITION: Return to clinic for annual well woman examination or sooner as needed. Start PNV, preconception counseling with MFM. Needs follow up with PCP to start working on stabilization of medical conditions, will forward note to Dr. Small who patient identifies as PCP however patient also sees KARLEE Chavez. All care in the ED with no consistent PCP care that I can see in chart review    45 minutes was spent with the patient, and greater than 50% was time spent counseling the patient regarding pregnancy, infertility, maternal/fetal risks of chronic medical conditions.    Jessica A Behrens, DO  2018         allow for swallow between intakes/alternate food with liquid/check mouth frequently for oral residue/pocketing/maintain upright posture during/after eating for 30 mins/no straws/oral hygiene/position upright (90 degrees)/small sips/bites

## 2023-10-26 NOTE — DISCHARGE NOTE PROVIDER - HOSPITAL COURSE
EDEN DACOSTA is a 58y Male with a past medical history of DM2, CVA x 2 w/ residual L sided weakness, early onset dementia, HTN, HLD, CKD V (creatinine 7-8, not on HD), BPH and PSHx of L toe amputation and AV fistula creation (5/23 - Dr. Royal), recent admission in July for starvation ketosis and gas gangrene s/p rBKA presenting with weakness and diarrhea x 1 day, found to have C. diff. Admitted for sepsis 2/2 C.diff colitis. Started on PO vancomycin and IV flagyl q8h with clinical improvement. Stable for discharge back to nursing home.     Problem List/Main Diagnoses (system-based):   #C. difficile colitis.   Patient meeting severe criteria of C. diff disease, WBC >15K, Cr >1.5mg/dL but elevated at baseline. CT A/P consistent w/ infectious colitis. Abdominal XR neg. Managed on PO vancomycin 500 mg with plan for 10 day course (10/24-11/2) and IV flagyl 500 mg q8h. Transitioned to PO abx on discharge.   - C/w vancomycin 125 mg q6h x 10 days (10/24-11/2)  - monitor stool output    #Sepsis 2/2 C.diff colitis - RESOLVED  Patient meeting 3/4 SIRS criteria on admission (lactate neg). Found to be C.diff positive w/ CT abd demonstrating infectious colitis. CXR neg, RVP neg, UA neg. Admitted for sepsis 2/2 to C.diff colitis. BCx: NGTD  - management as above    #Stage 5 chronic kidney disease not on chronic dialysis.   BUN/Cr At baseline according to last admission (baseline Cr ~7). Patient with mature AVF, no indication to start HD at this time per Nephro consult. Pt with acidosis likely 2/2 renal failure, nephro following, received bicarb via fluids and transitioned to sodium bicarb TID.   - c/w sodium bicarb 650 mg TID    #Dysphagia  Pt with history of CVA w/ residual L sided weakness and R facial droop. Cleared on previous     Patient was discharged to: (home/[facility]/acute rehab/hospice, etc. and w/ home health/home PT/RN/home O2)    New medications:   Changes to old medications:  Medications that were stopped:    Items to follow up as outpatient:    Physical exam at the time of discharge:       LABS & STUDIES:  SARS-CoV-2: NotDetec (15 Jul 2023 22:53)  SARS-CoV-2: NotDetec (05 Jul 2023 17:47)   EDEN DACOSTA is a 58y Male with a past medical history of DM2, CVA x 2 w/ residual L sided weakness, early onset dementia, HTN, HLD, CKD V (creatinine 7-8, not on HD), BPH and PSHx of L toe amputation and AV fistula creation (5/23 - Dr. Royal), recent admission in July for starvation ketosis and gas gangrene s/p rBKA presenting with weakness and diarrhea x 1 day, found to have C. diff. Admitted for sepsis 2/2 C.diff colitis. Started on PO vancomycin and IV flagyl q8h with clinical improvement. Stable for discharge back to nursing home.     Problem List/Main Diagnoses (system-based):   #C. difficile colitis.   Patient meeting severe criteria of C. diff disease, WBC >15K, Cr >1.5mg/dL but elevated at baseline. CT A/P consistent w/ infectious colitis. Abdominal XR neg. Managed on PO vancomycin 500 mg with plan for 10 day course (10/24-11/2) and IV flagyl 500 mg q8h. Transitioned to PO abx on discharge.   - C/w vancomycin 125 mg q6h x 10 days (10/24-11/2)  - monitor stool output    #Sepsis 2/2 C.diff colitis - RESOLVED  Patient meeting 3/4 SIRS criteria on admission (lactate neg). Found to be C.diff positive w/ CT abd demonstrating infectious colitis. CXR neg, RVP neg, UA neg. Admitted for sepsis 2/2 to C.diff colitis. BCx: NGTD  - management as above    #Stage 5 chronic kidney disease not on chronic dialysis.   BUN/Cr At baseline according to last admission (baseline Cr ~7). Patient with mature AVF, no indication to start HD at this time per Nephro consult. Pt with acidosis likely 2/2 renal failure, nephro following, received bicarb via fluids and transitioned to sodium bicarb TID.   - c/w sodium bicarb 650 mg TID    #Dysphagia  Pt with history of CVA w/ residual L sided weakness and R facial droop. Cleared on previous admissions for minced and moists. Coughing with water on this admission. Assessed by SS, recommending modified barium swallow.  - f/u SS    #Hypernatremia  Pt presented with hypernatremia to 150s. Likely iso hypovolemia, poor PO intake, diarrhea and CKD5. EKG w/o acute changes. Mentating at baseline appropriately. Nephro following, correcting with D5W. Na on discharge _____.   - f/u with PCP    #Elevated troponin - RESOLVED  Pt presented with trops in 80s (100s on prior admission for sepsis). Likely i/s/o type 2 demand ischemia iso sepsis, tachycardia. EKG showing sinus tachycardia, no ST elevation. Patient not complaining of chest pain. Trops lateral.   - f/u outpatient    #Hypertension  Home med: nifedipine 90 mg q24h. Held home BP meds i/s/o sepsis.  - resume home meds    #Sinus Tachycardia  Patient admitted with HR of 130s, EKG showing sinus tachycardia; this was present on prior admission although at lower levels; seen by cardiology who thought it was due to underlying disease recommended discharging on Toprol X25mg which was not seen on d/c paperwork. Likely iso underlying sepsis, disease. Pt not taking metoprolol per nursing home records.  - start toprol XL 25 mg for tachycardia  - f/u outpatient with PCP    #Diabetes  Pt with recent admission for starvation ketosis. Hx of diabetes. Home med: lantus 15 u bedtime, lispro 5u, mISS  - c/w home meds    #History of CVA (cerebrovascular accident)  Baseline A&O x 1 on admission (Self). Residual R facial droop, L sided weakness but difficult to assess as contracted, however able to follow commands.  Home meds: aspirin 81 mg q24h, atorvastatin 80 mg q24h  - c/w home meds    #PAD (peripheral artery disease)  Pt with h/o Right foot necrotizing fasciitis s/p guillotine R BKA (7/8) and completion R BKA 7/11.  - c/w aspirin, plavix for PAD    Patient was discharged to: nursing home    New medications: **antibiotics **  Changes to old medications: started toprol XL 25 mg  Medications that were stopped: none    Items to follow up as outpatient: CKD5, hypernatremia, C. diff, L foot angiogram    Physical exam at the time of discharge:   GENERAL: NAD, R facial droop  HEAD:  Atraumatic, normocephalic  EYES: EOMI, conjunctiva and sclera clear  ENT: Poor dentition moist mucous membranes  HEART: Regular rate and rhythm, no murmurs, rubs, or gallops  LUNGS: Unlabored respirations.  Clear to auscultation bilaterally, no crackles, wheezing, or rhonchi  ABDOMEN: soft, nontender, nondistended, +BS  EXTREMITIES: R arm contracted, wwp;. R leg bka clean stump.  NERVOUS SYSTEM:  A&Ox1-2 (hospital-self), no focal deficits   SKIN: Small sacral clean based ulcer, no drainage    LABS & STUDIES:  SARS-CoV-2: NotDetec (15 Jul 2023 22:53)  SARS-CoV-2: NotDetec (05 Jul 2023 17:47)   EDEN DACOSTA is a 58y Male with a past medical history of DM2, CVA x 2 w/ residual L sided weakness, early onset dementia, HTN, HLD, CKD V (creatinine 7-8, not on HD), BPH and PSHx of L toe amputation and AV fistula creation (5/23 - Dr. Royal), recent admission in July for starvation ketosis and gas gangrene s/p rBKA presenting with weakness and diarrhea x 1 day, found to have C. diff. Admitted for sepsis 2/2 C.diff colitis. Started on PO vancomycin and IV flagyl q8h with clinical improvement. Stable for discharge back to nursing home.     Problem List/Main Diagnoses (system-based):   #C. difficile colitis.   Patient meeting severe criteria of C. diff disease, WBC >15K, Cr >1.5mg/dL but elevated at baseline. CT A/P consistent w/ infectious colitis. Abdominal XR neg. Managed on PO vancomycin 500 mg with plan for 10 day course (10/24-11/2) and IV flagyl 500 mg q8h. Transitioned to PO abx on discharge.   - C/w vancomycin 125 mg q6h x 10 days (10/24-11/2)  - monitor stool output    #Sepsis 2/2 C.diff colitis - RESOLVED  Patient meeting 3/4 SIRS criteria on admission (lactate neg). Found to be C.diff positive w/ CT abd demonstrating infectious colitis. CXR neg, RVP neg, UA neg. Admitted for sepsis 2/2 to C.diff colitis. BCx: NGTD  - management as above    #Stage 5 chronic kidney disease not on chronic dialysis.   BUN/Cr At baseline according to last admission (baseline Cr ~7). Patient with mature AVF, no indication to start HD at this time per Nephro consult. Pt with acidosis likely 2/2 renal failure, nephro following, received bicarb via fluids and transitioned to sodium bicarb TID.   - c/w sodium bicarb 650 mg TID    #Dysphagia  Pt with history of CVA w/ residual L sided weakness and R facial droop. Cleared on previous admissions for minced and moists. Nursing home diet: Renal/Diabetic diet, chopped consistency, thin liquids. Coughing with water on this admission. Assessed by SS s/p modified barium swallow revealing aspiration risk and weak cough strength.   - SS recs: c/w chopped consistency, thin liquids by teaspoon, not cup.  -SS planning to initiated breather therapy to improve cough strength.       #Hypernatremia  Pt presented with hypernatremia to 150s. Likely iso hypovolemia, poor PO intake, diarrhea and CKD5. EKG w/o acute changes. Mentating at baseline appropriately. Nephro following, correcting with D5W. Na on discharge _____.   - f/u with PCP    #Elevated troponin - RESOLVED  Pt presented with trops in 80s (100s on prior admission for sepsis). Likely i/s/o type 2 demand ischemia iso sepsis, tachycardia. EKG showing sinus tachycardia, no ST elevation. Patient not complaining of chest pain. Trops lateral.   - f/u outpatient    #Hypertension  Home med: nifedipine 90 mg q24h. Held home BP meds i/s/o sepsis.  - resume home meds    #Sinus Tachycardia  Patient admitted with HR of 130s, EKG showing sinus tachycardia; this was present on prior admission although at lower levels; seen by cardiology who thought it was due to underlying disease recommended discharging on Toprol X25mg which was not seen on d/c paperwork. Likely iso underlying sepsis, disease. Pt not taking metoprolol per nursing home records.  - start toprol XL 25 mg for tachycardia  - f/u outpatient with PCP    #Diabetes  Pt with recent admission for starvation ketosis. Hx of diabetes. Home med: lantus 15 u bedtime, lispro 5u, mISS  - c/w home meds    #History of CVA (cerebrovascular accident)  Baseline A&O x 1 on admission (Self). Residual R facial droop, L sided weakness but difficult to assess as contracted, however able to follow commands.  Home meds: aspirin 81 mg q24h, atorvastatin 80 mg q24h  - c/w home meds    #PAD (peripheral artery disease)  Pt with h/o Right foot necrotizing fasciitis s/p guillotine R BKA (7/8) and completion R BKA 7/11.  - c/w aspirin, plavix for PAD    Patient was discharged to: nursing home    New medications: **antibiotics **  Changes to old medications: started toprol XL 25 mg  Medications that were stopped: none    Items to follow up as outpatient: CKD5, hypernatremia, C. diff, L foot angiogram    Physical exam at the time of discharge:   GENERAL: NAD, R facial droop  HEAD:  Atraumatic, normocephalic  EYES: EOMI, conjunctiva and sclera clear  ENT: Poor dentition moist mucous membranes  HEART: Regular rate and rhythm, no murmurs, rubs, or gallops  LUNGS: Unlabored respirations.  Clear to auscultation bilaterally, no crackles, wheezing, or rhonchi  ABDOMEN: soft, nontender, nondistended, +BS  EXTREMITIES: R arm contracted, wwp;. R leg bka clean stump.  NERVOUS SYSTEM:  A&Ox1-2 (hospital-self), no focal deficits   SKIN: Small sacral clean based ulcer, no drainage    LABS & STUDIES:  SARS-CoV-2: NotDetec (15 Jul 2023 22:53)  SARS-CoV-2: NotDetec (05 Jul 2023 17:47)   EDEN DACOSTA is a 58y Male with a past medical history of DM2, CVA x 2 w/ residual L sided weakness, early onset dementia, HTN, HLD, CKD V (creatinine 7-8, not on HD), BPH and PSHx of L toe amputation and AV fistula creation (5/23 - Dr. Royal), recent admission in July for starvation ketosis and gas gangrene s/p rBKA presenting with weakness and diarrhea x 1 day, found to have C. diff. Admitted for sepsis 2/2 C.diff colitis. Started on PO vancomycin and IV flagyl q8h with clinical improvement. Stable for discharge back to nursing home.     Problem List/Main Diagnoses (system-based):   #C. difficile colitis.   Patient meeting severe criteria of C. diff disease, WBC >15K, Cr >1.5mg/dL but elevated at baseline. CT A/P consistent w/ infectious colitis. Abdominal XR neg. Managed on PO vancomycin 500 mg with plan for 10 day course (10/24-11/2) and IV flagyl 500 mg q8h. Transitioned to PO abx on discharge.   - C/w vancomycin 125 mg q6h x 10 days (10/24-11/2)  - monitor stool output    #Sepsis 2/2 C.diff colitis - RESOLVED  Patient meeting 3/4 SIRS criteria on admission (lactate neg). Found to be C.diff positive w/ CT abd demonstrating infectious colitis. CXR neg, RVP neg, UA neg. Admitted for sepsis 2/2 to C.diff colitis. BCx: NGTD  - management as above    #Stage 5 chronic kidney disease not on chronic dialysis.   BUN/Cr At baseline according to last admission (baseline Cr ~7). Patient with mature AVF, no indication to start HD at this time per Nephro consult. Pt with acidosis likely 2/2 renal failure, nephro following, received bicarb via fluids and transitioned to sodium bicarb TID.   - c/w sodium bicarb 650 mg TID    #Dysphagia  Pt with history of CVA w/ residual L sided weakness and R facial droop. Cleared on previous admissions for minced and moists. Nursing home diet: Renal/Diabetic diet, chopped consistency, thin liquids. Coughing with water on this admission. Assessed by SS s/p modified barium swallow revealing aspiration risk and weak cough strength.   - SS recs: c/w chopped consistency, thin liquids via SPOON, not cup.  -SS planning to initiated breather therapy to improve cough strength.       #Hypernatremia  Pt presented with hypernatremia to 150s. Likely iso hypovolemia, poor PO intake, diarrhea and CKD5. EKG w/o acute changes. Mentating at baseline appropriately. Nephro following, correcting with D5W. Na on discharge _____.   - f/u with PCP    #Elevated troponin - RESOLVED  Pt presented with trops in 80s (100s on prior admission for sepsis). Likely i/s/o type 2 demand ischemia iso sepsis, tachycardia. EKG showing sinus tachycardia, no ST elevation. Patient not complaining of chest pain. Trops lateral.   - f/u outpatient    #Hypertension  Home med: nifedipine 90 mg q24h. Held home BP meds i/s/o sepsis.  - resume home meds    #Sinus Tachycardia  Patient admitted with HR of 130s, EKG showing sinus tachycardia; this was present on prior admission although at lower levels; seen by cardiology who thought it was due to underlying disease recommended discharging on Toprol X25mg which was not seen on d/c paperwork. Likely iso underlying sepsis, disease. Pt not taking metoprolol per nursing home records.  - start toprol XL 25 mg for tachycardia  - f/u outpatient with PCP    #Diabetes  Pt with recent admission for starvation ketosis. Hx of diabetes. Home med: lantus 15 u bedtime, lispro 5u, mISS  - c/w home meds    #History of CVA (cerebrovascular accident)  Baseline A&O x 1 on admission (Self). Residual R facial droop, L sided weakness but difficult to assess as contracted, however able to follow commands.  Home meds: aspirin 81 mg q24h, atorvastatin 80 mg q24h  - c/w home meds    #PAD (peripheral artery disease)  Pt with h/o Right foot necrotizing fasciitis s/p guillotine R BKA (7/8) and completion R BKA 7/11.  - c/w aspirin, plavix for PAD    Patient was discharged to: nursing home    New medications: **antibiotics **  Changes to old medications: started toprol XL 25 mg  Medications that were stopped: none    Items to follow up as outpatient: CKD5, hypernatremia, C. diff, L foot angiogram    Physical exam at the time of discharge:   GENERAL: NAD, R facial droop  HEAD:  Atraumatic, normocephalic  EYES: EOMI, conjunctiva and sclera clear  ENT: Poor dentition moist mucous membranes  HEART: Regular rate and rhythm, no murmurs, rubs, or gallops  LUNGS: Unlabored respirations.  Clear to auscultation bilaterally, no crackles, wheezing, or rhonchi  ABDOMEN: soft, nontender, nondistended, +BS  EXTREMITIES: R arm contracted, wwp;. R leg bka clean stump.  NERVOUS SYSTEM:  A&Ox1-2 (hospital-self), no focal deficits   SKIN: Small sacral clean based ulcer, no drainage    LABS & STUDIES:  SARS-CoV-2: NotDetec (15 Jul 2023 22:53)  SARS-CoV-2: NotDetec (05 Jul 2023 17:47)   EDEN DACOSTA is a 58y Male with a past medical history of DM2, CVA x 2 w/ residual L sided weakness, early onset dementia, HTN, HLD, CKD V (creatinine 7-8, not on HD), BPH and PSHx of L toe amputation and AV fistula creation (5/23 - Dr. Royal), recent admission in July for starvation ketosis and gas gangrene s/p rBKA presenting with weakness and diarrhea x 1 day, found to have C. diff. Admitted for sepsis 2/2 C.diff colitis. Started on PO vancomycin and IV flagyl q8h with clinical improvement. Stable for discharge back to nursing home.     Problem List/Main Diagnoses (system-based):   #C. difficile colitis.   Patient meeting severe criteria of C. diff disease, WBC >15K, Cr >1.5mg/dL but elevated at baseline. CT A/P consistent w/ infectious colitis. Abdominal XR neg. Managed on PO vancomycin 500 mg and flagyl 500 mg q8h with plan for 10 day course (10/24-11/2). Transitioned to PO abx on discharge.   - C/w vancomycin **dose??** x 10 days (10/24-11/2)  - c/w flagyl 500 mg q8h PO x 10 days (10/24-11/2)  - monitor stool output    #Sepsis 2/2 C.diff colitis - RESOLVED  Patient meeting 3/4 SIRS criteria on admission (lactate neg). Found to be C.diff positive w/ CT abd demonstrating infectious colitis. CXR neg, RVP neg, UA neg. Admitted for sepsis 2/2 to C.diff colitis. BCx: NGTD  - management as above    #Stage 5 chronic kidney disease not on chronic dialysis.   BUN/Cr At baseline according to last admission (baseline Cr ~7). Patient with mature AVF, no indication to start HD at this time per Nephro consult. Pt with acidosis likely 2/2 renal failure, nephro following, received bicarb via fluids and transitioned to sodium bicarb TID.   - c/w sodium bicarb 650 mg TID    #Dysphagia  Pt with history of CVA w/ residual L sided weakness and R facial droop. Nursing home diet: Renal/Diabetic diet, chopped consistency, thin liquids. Coughing with water on this admission. Assessed by SS s/p modified barium swallow revealing aspiration risk and weak cough strength. Started on small and bite sized w/ thin liquids by spoon. SS planning to initiated breather therapy to improve cough strength.   - continue recommended diet on discharge    #Hypernatremia  Pt presented with hypernatremia to 150s. Likely iso hypovolemia, poor PO intake, diarrhea and CKD5. EKG w/o acute changes. Mentating at baseline appropriately. Nephro following, correcting with D5W. Na on discharge _____.   - f/u with PCP    #Elevated troponin - RESOLVED  Pt presented with trops in 80s (100s on prior admission for sepsis). Likely i/s/o type 2 demand ischemia iso sepsis, tachycardia. EKG showing sinus tachycardia, no ST elevation. Patient not complaining of chest pain. Trops lateral.   - f/u outpatient    #Hypertension  Home med: nifedipine 90 mg q24h. Held home BP meds i/s/o sepsis.  - resume home meds    #Sinus Tachycardia  Patient admitted with HR of 130s, EKG showing sinus tachycardia; this was present on prior admission although at lower levels; seen by cardiology who thought it was due to underlying disease recommended discharging on Toprol X25mg which was not seen on d/c paperwork. Likely iso underlying sepsis, disease. Pt not taking metoprolol per nursing home records.  - **start toprol XL 25 mg for tachycardia???**  - f/u outpatient with PCP    #Diabetes  Pt with recent admission for starvation ketosis. Hx of diabetes. Home med: lantus 15 u bedtime, lispro 5u, mISS  - c/w home meds    #History of CVA (cerebrovascular accident)  Baseline A&O x 1 on admission (Self). Residual R facial droop, L sided weakness but difficult to assess as contracted, however able to follow commands.  Home meds: aspirin 81 mg q24h, atorvastatin 80 mg q24h  - c/w home meds    #PAD (peripheral artery disease)  Pt with h/o Right foot necrotizing fasciitis s/p guillotine R BKA (7/8) and completion R BKA 7/11.  - c/w aspirin, plavix for PAD    Patient was discharged to: nursing home    New medications: vancomycin 500 mg q12h PO, flagyl 500 mg q8h PO (through 11/02), sodium bicarbonate 650 mg TID  Changes to old medications: started toprol XL 25 mg??  Medications that were stopped: none    Items to follow up as outpatient: CKD5, hypernatremia, C. diff, L foot angiogram    Physical exam at the time of discharge:   GENERAL: NAD, R facial droop  HEAD:  Atraumatic, normocephalic  EYES: EOMI, conjunctiva and sclera clear  ENT: Poor dentition moist mucous membranes  HEART: Regular rate and rhythm, no murmurs, rubs, or gallops  LUNGS: Unlabored respirations.  Clear to auscultation bilaterally, no crackles, wheezing, or rhonchi  ABDOMEN: soft, nontender, nondistended, +BS  EXTREMITIES: R arm contracted, wwp;. R leg bka clean stump.  NERVOUS SYSTEM:  A&Ox1-2 (hospital-self), no focal deficits   SKIN: Small sacral clean based ulcer, no drainage    LABS & STUDIES:  SARS-CoV-2: NotDetec (15 Jul 2023 22:53)  SARS-CoV-2: NotDetec (05 Jul 2023 17:47)   EDEN DACOSTA is a 58y Male with a past medical history of DM2, CVA x 2 w/ residual L sided weakness, early onset dementia, HTN, HLD, CKD V (creatinine 7-8, not on HD), BPH and PSHx of L toe amputation and AV fistula creation (5/23 - Dr. Royal), recent admission in July for starvation ketosis and gas gangrene s/p rBKA presenting with weakness and diarrhea x 1 day, found to have C. diff. Admitted for sepsis 2/2 C.diff colitis. Started on PO vancomycin and IV flagyl q8h with clinical improvement. Stable for discharge back to nursing home.     Problem List/Main Diagnoses (system-based):   #C. difficile colitis.   Patient meeting severe criteria of C. diff disease, WBC >15K, Cr >1.5mg/dL but elevated at baseline. CT A/P consistent w/ infectious colitis. Abdominal XR neg. Managed on PO vancomycin 500 mg and flagyl 500 mg q8h with plan for 10 day course (10/24-11/2). Transitioned to PO abx on discharge.   - C/w vancomycin 500mg PO q6h x 10 days (10/24-11/2)  - c/w flagyl 500 mg q8h PO x 10 days (10/24-11/2)  - monitor stool output    #Sepsis 2/2 C.diff colitis - RESOLVED  Patient meeting 3/4 SIRS criteria on admission (lactate neg). Found to be C.diff positive w/ CT abd demonstrating infectious colitis. CXR neg, RVP neg, UA neg. Admitted for sepsis 2/2 to C.diff colitis. BCx: NGTD  - management as above    #Stage 5 chronic kidney disease not on chronic dialysis.   BUN/Cr At baseline according to last admission (baseline Cr ~7). Patient with mature AVF, no indication to start HD at this time per Nephro consult. Pt with acidosis likely 2/2 renal failure, nephro following, received bicarb via fluids and transitioned to sodium bicarb TID.   - c/w sodium bicarb 650 mg TID    #Dysphagia  Pt with history of CVA w/ residual L sided weakness and R facial droop. Nursing home diet: Renal/Diabetic diet, chopped consistency, thin liquids. Coughing with water on this admission. Assessed by SS s/p modified barium swallow revealing aspiration risk and weak cough strength. Started on small and bite sized w/ thin liquids by spoon. SS planning to initiated breather therapy to improve cough strength.   - continue recommended diet on discharge    #Hypernatremia  Pt presented with hypernatremia to 150s. Likely iso hypovolemia, poor PO intake, diarrhea and CKD5. EKG w/o acute changes. Mentating at baseline appropriately. Nephro following, correcting with D5W. Na on discharge 147  - f/u with PCP    #Elevated troponin - RESOLVED  Pt presented with trops in 80s (100s on prior admission for sepsis). Likely i/s/o type 2 demand ischemia iso sepsis, tachycardia. EKG showing sinus tachycardia, no ST elevation. Patient not complaining of chest pain. Trops lateral.   - f/u outpatient    #Hypertension  Home med: nifedipine 90 mg q24h. Held home BP meds i/s/o sepsis.  - resume home meds    #Sinus Tachycardia  Patient admitted with HR of 130s, EKG showing sinus tachycardia; this was present on prior admission although at lower levels; seen by cardiology who thought it was due to underlying disease recommended discharging on Toprol X25mg which was not seen on d/c paperwork. Likely iso underlying sepsis, disease. Pt not taking metoprolol per nursing home records.  - defer initiation of Toprol as HR 90s on discharge  - f/u outpatient with PCP    #Diabetes  Pt with recent admission for starvation ketosis. Hx of diabetes. Home med: lantus 15 u bedtime, lispro 5u, mISS  - c/w home meds    #History of CVA (cerebrovascular accident)  Baseline A&O x 1 on admission (Self). Residual R facial droop, L sided weakness but difficult to assess as contracted, however able to follow commands.  Home meds: aspirin 81 mg q24h, atorvastatin 80 mg q24h  - c/w home meds    #PAD (peripheral artery disease)  Pt with h/o Right foot necrotizing fasciitis s/p guillotine R BKA (7/8) and completion R BKA 7/11.  - c/w aspirin, plavix for PAD    Patient was discharged to: nursing home    New medications: vancomycin 500 mg q6h PO, flagyl 500 mg q8h PO (through 11/02), sodium bicarbonate 650 mg TID  Changes to old medications: started toprol XL 25 mg??  Medications that were stopped: none    Items to follow up as outpatient: CKD5, hypernatremia, C. diff, L foot angiogram    Physical exam at the time of discharge:   GENERAL: NAD, R facial droop  HEAD:  Atraumatic, normocephalic  EYES: EOMI, conjunctiva and sclera clear  ENT: Poor dentition moist mucous membranes  HEART: Regular rate and rhythm, no murmurs, rubs, or gallops  LUNGS: Unlabored respirations.  Clear to auscultation bilaterally, no crackles, wheezing, or rhonchi  ABDOMEN: soft, nontender, nondistended, +BS  EXTREMITIES: R arm contracted, wwp;. R leg bka clean stump.  NERVOUS SYSTEM:  A&Ox1-2 (hospital-self), no focal deficits   SKIN: Small sacral clean based ulcer, no drainage    LABS & STUDIES:  SARS-CoV-2: NotDetec (15 Jul 2023 22:53)  SARS-CoV-2: NotDetec (05 Jul 2023 17:47)   EDEN DACOSTA is a 58y Male with a past medical history of DM2, CVA x 2 w/ residual L sided weakness, early onset dementia, HTN, HLD, CKD V (creatinine 7-8, not on HD), BPH and PSHx of L toe amputation and AV fistula creation (5/23 - Dr. Royal), recent admission in July for starvation ketosis and gas gangrene s/p rBKA presenting with weakness and diarrhea x 1 day, found to have C. diff. Admitted for sepsis 2/2 C.diff colitis. Started on PO vancomycin and IV flagyl q8h with clinical improvement. Stable for discharge back to nursing home.     Problem List/Main Diagnoses (system-based):   #C. difficile colitis.   Patient meeting severe criteria of C. diff disease, WBC >15K, Cr >1.5mg/dL but elevated at baseline. CT A/P consistent w/ infectious colitis. Abdominal XR neg. Managed on PO vancomycin 500 mg and flagyl 500 mg q8h with plan for 10 day course (10/24-11/2). Transitioned to PO abx on discharge.   - C/w vancomycin 500mg PO q6h x 10 days (10/24-11/2)  - c/w flagyl 500 mg q8h PO x 10 days (10/24-11/2)  - monitor stool output    #Sepsis 2/2 C.diff colitis - RESOLVED  Patient meeting 3/4 SIRS criteria on admission (lactate neg). Found to be C.diff positive w/ CT abd demonstrating infectious colitis. CXR neg, RVP neg, UA neg. Admitted for sepsis 2/2 to C.diff colitis. BCx: NGTD  - management as above    #Stage 5 chronic kidney disease not on chronic dialysis.   BUN/Cr At baseline according to last admission (baseline Cr ~7). Patient with mature AVF, no indication to start HD at this time per Nephro consult. Pt with acidosis likely 2/2 renal failure, nephro following, received bicarb via fluids and transitioned to sodium bicarb TID.   - c/w sodium bicarb 650 mg TID    #Dysphagia  Pt with history of CVA w/ residual L sided weakness and R facial droop. Nursing home diet: Renal/Diabetic diet, chopped consistency, thin liquids. Coughing with water on this admission. Assessed by SS s/p modified barium swallow revealing aspiration risk and weak cough strength. Started on small and bite sized w/ thin liquids by spoon. SS planning to initiated breather therapy to improve cough strength.   - continue recommended diet on discharge    #Hypernatremia  Pt presented with hypernatremia to 150s. Likely iso hypovolemia, poor PO intake, diarrhea and CKD5. EKG w/o acute changes. Mentating at baseline appropriately. Nephro following, correcting with D5W. Na on discharge 147  - f/u with PCP    #Elevated troponin - RESOLVED  Pt presented with trops in 80s (100s on prior admission for sepsis). Likely i/s/o type 2 demand ischemia iso sepsis, tachycardia. EKG showing sinus tachycardia, no ST elevation. Patient not complaining of chest pain. Trops lateral.   - f/u outpatient    #Hypertension  Home med: nifedipine 90 mg q24h. Held home BP meds i/s/o sepsis.  - resume home meds    #Sinus Tachycardia  Patient admitted with HR of 130s, EKG showing sinus tachycardia; this was present on prior admission although at lower levels; seen by cardiology who thought it was due to underlying disease recommended discharging on Toprol X25mg which was not seen on d/c paperwork. Likely iso underlying sepsis, disease. Pt not taking metoprolol per nursing home records.  - defer initiation of Toprol as HR 90s on discharge  - f/u outpatient with PCP    #Diabetes  Pt with recent admission for starvation ketosis. Hx of diabetes. Home med: lantus 15 u bedtime, lispro 5u, mISS  - c/w home meds    #History of CVA (cerebrovascular accident)  Baseline A&O x 1 on admission (Self). Residual R facial droop, L sided weakness but difficult to assess as contracted, however able to follow commands.  Home meds: aspirin 81 mg q24h, atorvastatin 80 mg q24h  - c/w home meds    #PAD (peripheral artery disease)  Pt with h/o Right foot necrotizing fasciitis s/p guillotine R BKA (7/8) and completion R BKA 7/11.  - c/w aspirin, plavix for PAD    Patient was discharged to: nursing home    New medications: vancomycin 500 mg q6h PO, flagyl 500 mg q8h PO (through 11/02), sodium bicarbonate 650 mg TID  Changes to old medications: none  Medications that were stopped: none    Items to follow up as outpatient: CKD5, hypernatremia, C. diff, L foot angiogram    Physical exam at the time of discharge:   GENERAL: NAD, R facial droop  HEAD:  Atraumatic, normocephalic  EYES: EOMI, conjunctiva and sclera clear  ENT: Poor dentition moist mucous membranes  HEART: Regular rate and rhythm, no murmurs, rubs, or gallops  LUNGS: Unlabored respirations.  Clear to auscultation bilaterally, no crackles, wheezing, or rhonchi  ABDOMEN: soft, nontender, nondistended, +BS  EXTREMITIES: R arm contracted, wwp;. R leg bka clean stump.  NERVOUS SYSTEM:  A&Ox1-2 (hospital-self), no focal deficits   SKIN: Small sacral clean based ulcer, no drainage    LABS & STUDIES:  SARS-CoV-2: NotDetec (15 Jul 2023 22:53)  SARS-CoV-2: NotDetec (05 Jul 2023 17:47)   EDEN DACOSTA is a 58y Male with a past medical history of DM2, CVA x 2 w/ residual L sided weakness, early onset dementia, HTN, HLD, CKD V (creatinine 7-8, not on HD), BPH and PSHx of L toe amputation and AV fistula creation (5/23 - Dr. Royal), recent admission in July for starvation ketosis and gas gangrene s/p rBKA presenting with weakness and diarrhea x 1 day, found to have C. diff. Admitted for sepsis 2/2 C.diff colitis. Started on PO vancomycin and IV flagyl q8h with clinical improvement. Stable for discharge back to nursing home.     Problem List/Main Diagnoses (system-based):   #C. difficile colitis.   Patient meeting severe criteria of C. diff disease, WBC >15K, Cr >1.5mg/dL but elevated at baseline. CT A/P consistent w/ infectious colitis. Abdominal XR neg. Managed on PO vancomycin 500 mg and flagyl 500 mg q8h with plan for 10 day course (10/24-11/2). Transitioned to PO abx on discharge.   - C/w vancomycin 500mg PO q6h x 10 days (10/24-11/2)  - c/w flagyl 500 mg q8h PO x 10 days (10/24-11/2)  - monitor stool output    #Sepsis 2/2 C.diff colitis - RESOLVED  Patient meeting 3/4 SIRS criteria on admission (lactate neg). Found to be C.diff positive w/ CT abd demonstrating infectious colitis. CXR neg, RVP neg, UA neg. Admitted for sepsis 2/2 to C.diff colitis. BCx: NGTD  - management as above    #Stage 5 chronic kidney disease not on chronic dialysis.   BUN/Cr At baseline according to last admission (baseline Cr ~7). Patient with mature AVF, no indication to start HD at this time per Nephro consult. Pt with acidosis likely 2/2 renal failure, nephro following, received bicarb via fluids and transitioned to sodium bicarb TID.   - c/w sodium bicarb 650 mg TID    #Dysphagia  Pt with history of CVA w/ residual L sided weakness and R facial droop. Nursing home diet: Renal/Diabetic diet, chopped consistency, thin liquids. Coughing with water on this admission. Assessed by SS s/p modified barium swallow revealing aspiration risk and weak cough strength. Started on small and bite sized w/ thin liquids by spoon. SS planning to initiated breather therapy to improve cough strength.   - continue recommended diet on discharge    #Hypernatremia  Pt presented with hypernatremia to 150s. Likely iso hypovolemia, poor PO intake, diarrhea and CKD5. EKG w/o acute changes. Mentating at baseline appropriately. Nephro following, correcting with D5W. Na on discharge 147  - f/u with PCP    #Elevated troponin - RESOLVED  Pt presented with trops in 80s (100s on prior admission for sepsis). Likely i/s/o type 2 demand ischemia iso sepsis, tachycardia. EKG showing sinus tachycardia, no ST elevation. Patient not complaining of chest pain. Trops lateral.   - f/u outpatient    #Hypertension  Home med: nifedipine 90 mg q24h. Held home BP meds i/s/o sepsis.  - resume home meds    #Sinus Tachycardia  Patient admitted with HR of 130s, EKG showing sinus tachycardia; this was present on prior admission although at lower levels; seen by cardiology who thought it was due to underlying disease recommended discharging on Toprol X25mg which was not seen on d/c paperwork. Likely iso underlying sepsis, disease. Pt not taking metoprolol per nursing home records.  - defer initiation of Toprol as HR 90s on discharge  - f/u outpatient with PCP    #Diabetes  Pt with recent admission for starvation ketosis. Hx of diabetes. Home med: lantus 15 u bedtime, lispro 5u, mISS  - c/w home meds    #History of CVA (cerebrovascular accident)  Baseline A&O x 1 on admission (Self). Residual R facial droop, L sided weakness but difficult to assess as contracted, however able to follow commands.  Home meds: aspirin 81 mg q24h, atorvastatin 80 mg q24h  - c/w home meds    #PAD (peripheral artery disease)  Pt with h/o Right foot necrotizing fasciitis s/p guillotine R BKA (7/8) and completion R BKA 7/11.  - c/w aspirin, plavix for PAD    Patient was discharged to: nursing home    New medications: vancomycin 500 mg q6h PO, flagyl 500 mg q8h PO (through 11/02), sodium bicarbonate 650 mg TID  Changes to old medications: none  Medications that were stopped: none    Items to follow up as outpatient: CKD5, hypernatremia, C. diff, L foot angiogram    Physical exam at the time of discharge:   GENERAL: NAD, R facial droop  HEAD:  Atraumatic, normocephalic  EYES: EOMI, conjunctiva and sclera clear  ENT: Poor dentition moist mucous membranes  HEART: Regular rate and rhythm, no murmurs, rubs, or gallops  LUNGS: Unlabored respirations.  Clear to auscultation bilaterally, no crackles, wheezing, or rhonchi  ABDOMEN: soft, nontender, nondistended, +BS  EXTREMITIES: R arm contracted, wwp;. R leg bka clean stump.  NERVOUS SYSTEM:  A&Ox1-2 (hospital-self), no focal deficits   SKIN: Small sacral clean based ulcer, no drainage    LABS & STUDIES:  SARS-CoV-2: NotDetec (15 Jul 2023 22:53)  SARS-CoV-2: NotDetec (05 Jul 2023 17:47)    Addendum for documentation clarification: patient with demand ischemia.

## 2023-10-26 NOTE — ADVANCED PRACTICE NURSE CONSULT - RECOMMEDATIONS
Left heel and left dorsal foot: cleanse with normal saline, apply Triad to wound bed, cover with foam dressing. Change every other day and prn soiled.  Sacrum: cleanse with normal saline, apply triad. Leave open to air d/t frequent stooling.  Reapply daily and prn cleansing.     Recommend continue use of [employer] KANIKA mattress for pressure redistribution and microclimate management  Continue frequent repositioning for offloading with air fluidized repositioner and/or pillows  Offload heel at all times with pillows or heel boot  Support nutrition and hydration    Discussed with primary RN and Dr     Please reconsult if the wound deteriorates or new concerns develop. Left heel and left dorsal foot: cleanse with normal saline, apply Triad to wound bed, cover with foam dressing. Change every other day and prn soiled.  Sacrum: cleanse with normal saline, apply triad. Leave open to air d/t frequent stooling.  Reapply daily and prn cleansing.     Recommend continue use of [facility] KANIKA mattress for pressure redistribution and microclimate management  Continue frequent repositioning for offloading with air fluidized repositioner and/or pillows  Offload heel at all times with pillows or heel boot  Support nutrition and hydration    Discussed with primary RN    Please reconsult if the wound deteriorates or new concerns develop. Left heel and left dorsal foot: cleanse with normal saline, apply Triad to wound bed, cover with foam dressing. Change every other day and prn soiled.  Sacrum: cleanse with normal saline, apply triad. Leave open to air d/t frequent stooling.  Reapply daily and prn cleansing.     Recommend continue use of "[employer]Care KANIKA mattress for pressure redistribution and microclimate management  Continue frequent repositioning for offloading with air fluidized repositioner and/or pillows  Offload heel at all times with pillows or heel boot  Support nutrition and hydration    Discussed with primary RN and medical team    Please reconsult if the wound deteriorates or new concerns develop.

## 2023-10-26 NOTE — PROGRESS NOTE ADULT - PROBLEM SELECTOR PLAN 10
F: D5 1/2 NS @ 80cc/hr  E: Replete as necessary K>4 Mg>2  N: Clear liquid diet   DVT Prophylaxis: Heparin subq  GI prophylaxis: None   CODE STATUS: FULL CODE  DISPO: pending clinical improvement F: D5W 1am HCO3- @ 100cc/hr  E: Replete as necessary K>4 Mg>2  N: Clear liquid diet   DVT Prophylaxis: Heparin subq  GI prophylaxis: None   CODE STATUS: FULL CODE  DISPO: nursing home

## 2023-10-26 NOTE — PROGRESS NOTE ADULT - PROBLEM SELECTOR PLAN 5
Likely iso type 2 demand ischemia iso sepsis, tachycardia. EKG showing sinus tachycardia, no ST elevation. Patient not complaining of chest pain. Trops lateral.     - monitor for cardiac sx

## 2023-10-27 DIAGNOSIS — R13.10 DYSPHAGIA, UNSPECIFIED: ICD-10-CM

## 2023-10-27 LAB
ALBUMIN SERPL ELPH-MCNC: 2.9 G/DL — LOW (ref 3.3–5)
ALBUMIN SERPL ELPH-MCNC: 2.9 G/DL — LOW (ref 3.3–5)
ALP SERPL-CCNC: 79 U/L — SIGNIFICANT CHANGE UP (ref 40–120)
ALP SERPL-CCNC: 79 U/L — SIGNIFICANT CHANGE UP (ref 40–120)
ALT FLD-CCNC: 10 U/L — SIGNIFICANT CHANGE UP (ref 10–45)
ALT FLD-CCNC: 10 U/L — SIGNIFICANT CHANGE UP (ref 10–45)
ANION GAP SERPL CALC-SCNC: 14 MMOL/L — SIGNIFICANT CHANGE UP (ref 5–17)
ANION GAP SERPL CALC-SCNC: 14 MMOL/L — SIGNIFICANT CHANGE UP (ref 5–17)
AST SERPL-CCNC: 13 U/L — SIGNIFICANT CHANGE UP (ref 10–40)
AST SERPL-CCNC: 13 U/L — SIGNIFICANT CHANGE UP (ref 10–40)
BASOPHILS # BLD AUTO: 0.04 K/UL — SIGNIFICANT CHANGE UP (ref 0–0.2)
BASOPHILS # BLD AUTO: 0.04 K/UL — SIGNIFICANT CHANGE UP (ref 0–0.2)
BASOPHILS NFR BLD AUTO: 0.4 % — SIGNIFICANT CHANGE UP (ref 0–2)
BASOPHILS NFR BLD AUTO: 0.4 % — SIGNIFICANT CHANGE UP (ref 0–2)
BILIRUB SERPL-MCNC: 0.2 MG/DL — SIGNIFICANT CHANGE UP (ref 0.2–1.2)
BILIRUB SERPL-MCNC: 0.2 MG/DL — SIGNIFICANT CHANGE UP (ref 0.2–1.2)
BUN SERPL-MCNC: 95 MG/DL — HIGH (ref 7–23)
BUN SERPL-MCNC: 95 MG/DL — HIGH (ref 7–23)
CALCIUM SERPL-MCNC: 8.8 MG/DL — SIGNIFICANT CHANGE UP (ref 8.4–10.5)
CALCIUM SERPL-MCNC: 8.8 MG/DL — SIGNIFICANT CHANGE UP (ref 8.4–10.5)
CHLORIDE SERPL-SCNC: 115 MMOL/L — HIGH (ref 96–108)
CHLORIDE SERPL-SCNC: 115 MMOL/L — HIGH (ref 96–108)
CO2 SERPL-SCNC: 18 MMOL/L — LOW (ref 22–31)
CO2 SERPL-SCNC: 18 MMOL/L — LOW (ref 22–31)
CREAT SERPL-MCNC: 5.97 MG/DL — HIGH (ref 0.5–1.3)
CREAT SERPL-MCNC: 5.97 MG/DL — HIGH (ref 0.5–1.3)
EGFR: 10 ML/MIN/1.73M2 — LOW
EGFR: 10 ML/MIN/1.73M2 — LOW
EOSINOPHIL # BLD AUTO: 0.68 K/UL — HIGH (ref 0–0.5)
EOSINOPHIL # BLD AUTO: 0.68 K/UL — HIGH (ref 0–0.5)
EOSINOPHIL NFR BLD AUTO: 6.1 % — HIGH (ref 0–6)
EOSINOPHIL NFR BLD AUTO: 6.1 % — HIGH (ref 0–6)
GLUCOSE BLDC GLUCOMTR-MCNC: 101 MG/DL — HIGH (ref 70–99)
GLUCOSE BLDC GLUCOMTR-MCNC: 101 MG/DL — HIGH (ref 70–99)
GLUCOSE BLDC GLUCOMTR-MCNC: 107 MG/DL — HIGH (ref 70–99)
GLUCOSE BLDC GLUCOMTR-MCNC: 107 MG/DL — HIGH (ref 70–99)
GLUCOSE BLDC GLUCOMTR-MCNC: 109 MG/DL — HIGH (ref 70–99)
GLUCOSE BLDC GLUCOMTR-MCNC: 109 MG/DL — HIGH (ref 70–99)
GLUCOSE BLDC GLUCOMTR-MCNC: 124 MG/DL — HIGH (ref 70–99)
GLUCOSE BLDC GLUCOMTR-MCNC: 124 MG/DL — HIGH (ref 70–99)
GLUCOSE SERPL-MCNC: 66 MG/DL — LOW (ref 70–99)
GLUCOSE SERPL-MCNC: 66 MG/DL — LOW (ref 70–99)
HCT VFR BLD CALC: 33.5 % — LOW (ref 39–50)
HCT VFR BLD CALC: 33.5 % — LOW (ref 39–50)
HGB BLD-MCNC: 9.8 G/DL — LOW (ref 13–17)
HGB BLD-MCNC: 9.8 G/DL — LOW (ref 13–17)
IMM GRANULOCYTES NFR BLD AUTO: 1.6 % — HIGH (ref 0–0.9)
IMM GRANULOCYTES NFR BLD AUTO: 1.6 % — HIGH (ref 0–0.9)
LYMPHOCYTES # BLD AUTO: 1.43 K/UL — SIGNIFICANT CHANGE UP (ref 1–3.3)
LYMPHOCYTES # BLD AUTO: 1.43 K/UL — SIGNIFICANT CHANGE UP (ref 1–3.3)
LYMPHOCYTES # BLD AUTO: 12.9 % — LOW (ref 13–44)
LYMPHOCYTES # BLD AUTO: 12.9 % — LOW (ref 13–44)
MAGNESIUM SERPL-MCNC: 2 MG/DL — SIGNIFICANT CHANGE UP (ref 1.6–2.6)
MAGNESIUM SERPL-MCNC: 2 MG/DL — SIGNIFICANT CHANGE UP (ref 1.6–2.6)
MCHC RBC-ENTMCNC: 28.5 PG — SIGNIFICANT CHANGE UP (ref 27–34)
MCHC RBC-ENTMCNC: 28.5 PG — SIGNIFICANT CHANGE UP (ref 27–34)
MCHC RBC-ENTMCNC: 29.3 GM/DL — LOW (ref 32–36)
MCHC RBC-ENTMCNC: 29.3 GM/DL — LOW (ref 32–36)
MCV RBC AUTO: 97.4 FL — SIGNIFICANT CHANGE UP (ref 80–100)
MCV RBC AUTO: 97.4 FL — SIGNIFICANT CHANGE UP (ref 80–100)
MONOCYTES # BLD AUTO: 0.69 K/UL — SIGNIFICANT CHANGE UP (ref 0–0.9)
MONOCYTES # BLD AUTO: 0.69 K/UL — SIGNIFICANT CHANGE UP (ref 0–0.9)
MONOCYTES NFR BLD AUTO: 6.2 % — SIGNIFICANT CHANGE UP (ref 2–14)
MONOCYTES NFR BLD AUTO: 6.2 % — SIGNIFICANT CHANGE UP (ref 2–14)
NEUTROPHILS # BLD AUTO: 8.09 K/UL — HIGH (ref 1.8–7.4)
NEUTROPHILS # BLD AUTO: 8.09 K/UL — HIGH (ref 1.8–7.4)
NEUTROPHILS NFR BLD AUTO: 72.8 % — SIGNIFICANT CHANGE UP (ref 43–77)
NEUTROPHILS NFR BLD AUTO: 72.8 % — SIGNIFICANT CHANGE UP (ref 43–77)
NRBC # BLD: 0 /100 WBCS — SIGNIFICANT CHANGE UP (ref 0–0)
NRBC # BLD: 0 /100 WBCS — SIGNIFICANT CHANGE UP (ref 0–0)
PHOSPHATE SERPL-MCNC: 4.3 MG/DL — SIGNIFICANT CHANGE UP (ref 2.5–4.5)
PHOSPHATE SERPL-MCNC: 4.3 MG/DL — SIGNIFICANT CHANGE UP (ref 2.5–4.5)
PLATELET # BLD AUTO: 376 K/UL — SIGNIFICANT CHANGE UP (ref 150–400)
PLATELET # BLD AUTO: 376 K/UL — SIGNIFICANT CHANGE UP (ref 150–400)
POTASSIUM SERPL-MCNC: 3.9 MMOL/L — SIGNIFICANT CHANGE UP (ref 3.5–5.3)
POTASSIUM SERPL-MCNC: 3.9 MMOL/L — SIGNIFICANT CHANGE UP (ref 3.5–5.3)
POTASSIUM SERPL-SCNC: 3.9 MMOL/L — SIGNIFICANT CHANGE UP (ref 3.5–5.3)
POTASSIUM SERPL-SCNC: 3.9 MMOL/L — SIGNIFICANT CHANGE UP (ref 3.5–5.3)
PROT SERPL-MCNC: 6.3 G/DL — SIGNIFICANT CHANGE UP (ref 6–8.3)
PROT SERPL-MCNC: 6.3 G/DL — SIGNIFICANT CHANGE UP (ref 6–8.3)
RBC # BLD: 3.44 M/UL — LOW (ref 4.2–5.8)
RBC # BLD: 3.44 M/UL — LOW (ref 4.2–5.8)
RBC # FLD: 15.9 % — HIGH (ref 10.3–14.5)
RBC # FLD: 15.9 % — HIGH (ref 10.3–14.5)
SODIUM SERPL-SCNC: 147 MMOL/L — HIGH (ref 135–145)
SODIUM SERPL-SCNC: 147 MMOL/L — HIGH (ref 135–145)
WBC # BLD: 11.11 K/UL — HIGH (ref 3.8–10.5)
WBC # BLD: 11.11 K/UL — HIGH (ref 3.8–10.5)
WBC # FLD AUTO: 11.11 K/UL — HIGH (ref 3.8–10.5)
WBC # FLD AUTO: 11.11 K/UL — HIGH (ref 3.8–10.5)

## 2023-10-27 PROCEDURE — 99232 SBSQ HOSP IP/OBS MODERATE 35: CPT

## 2023-10-27 PROCEDURE — 74230 X-RAY XM SWLNG FUNCJ C+: CPT | Mod: 26

## 2023-10-27 PROCEDURE — 99239 HOSP IP/OBS DSCHRG MGMT >30: CPT | Mod: GC

## 2023-10-27 PROCEDURE — 93990 DOPPLER FLOW TESTING: CPT | Mod: 26

## 2023-10-27 RX ADMIN — TAMSULOSIN HYDROCHLORIDE 0.4 MILLIGRAM(S): 0.4 CAPSULE ORAL at 22:07

## 2023-10-27 RX ADMIN — HEPARIN SODIUM 5000 UNIT(S): 5000 INJECTION INTRAVENOUS; SUBCUTANEOUS at 13:11

## 2023-10-27 RX ADMIN — Medication 100 MILLIGRAM(S): at 05:46

## 2023-10-27 RX ADMIN — Medication 100 MILLIGRAM(S): at 22:06

## 2023-10-27 RX ADMIN — Medication 500 MILLIGRAM(S): at 05:47

## 2023-10-27 RX ADMIN — ATORVASTATIN CALCIUM 80 MILLIGRAM(S): 80 TABLET, FILM COATED ORAL at 22:05

## 2023-10-27 RX ADMIN — Medication 100 MILLIGRAM(S): at 13:11

## 2023-10-27 RX ADMIN — Medication 500 MILLIGRAM(S): at 13:00

## 2023-10-27 RX ADMIN — Medication 81 MILLIGRAM(S): at 12:59

## 2023-10-27 RX ADMIN — HEPARIN SODIUM 5000 UNIT(S): 5000 INJECTION INTRAVENOUS; SUBCUTANEOUS at 05:47

## 2023-10-27 RX ADMIN — CLOPIDOGREL BISULFATE 75 MILLIGRAM(S): 75 TABLET, FILM COATED ORAL at 13:00

## 2023-10-27 RX ADMIN — HEPARIN SODIUM 5000 UNIT(S): 5000 INJECTION INTRAVENOUS; SUBCUTANEOUS at 22:06

## 2023-10-27 RX ADMIN — Medication 500 MILLIGRAM(S): at 19:25

## 2023-10-27 NOTE — PROGRESS NOTE ADULT - PROBLEM SELECTOR PLAN 8
Baseline A&O x 1 on admission (Self). Residual L sided weakness but difficult to assess as contracted, however able to follow commands.  Home meds: aspirin 81 mg q24h, atorvastatin 80 mg q24h    - home meds Pt with recent admission for starvation ketosis. Hx of diabetes. Home med: lantus 15 u bedtime, lispro 5u, mISS    - c/w miSS  - monitor FS  - consistent carb diet

## 2023-10-27 NOTE — PROGRESS NOTE ADULT - PROBLEM SELECTOR PLAN 11
F: D5W 1am HCO3- @ 125cc/hr  E: Replete as necessary K>4 Mg>2  N: soft and bite sized, thin liquids via spoon  DVT Prophylaxis: Heparin subq  GI prophylaxis: None   CODE STATUS: FULL CODE  DISPO: nursing home

## 2023-10-27 NOTE — PROGRESS NOTE ADULT - PROBLEM SELECTOR PLAN 7
Pt with recent admission for starvation ketosis. Hx of diabetes. Home med: lantus 15 u bedtime, lispro 5u, mISS    - c/w miSS  - monitor FS  - consistent carb diet Home meds: nifedipine 90 mg q24h  - holding home meds i/s/o sepsis will resume as appropriate    #Sinus Tachycardia  Patient admitted with HR of 130s, EKG showing sinus tachycardia; this was present on prior admission although at lower levels; seen by cardiology who thought it was due to underlying disease recommended discharging on Toprol X25mg which was not seen on d/c paperwork. Likely iso underlying sepsis, disease. Pt not taking metoprolol per nursing home records.    - Hold toprol iso sepsis restart as appropriate.

## 2023-10-27 NOTE — SWALLOW VFSS/MBS ASSESSMENT ADULT - DIAGNOSTIC IMPRESSIONS
Pt presented with moderate oropharyngeal dysphagia, likely on-going/chronic, in setting of CVA x2 and dementia, primarily characterized by reduced bolus transport, delayed pharyngeal swallow initiation, and reduced coordination of airway protection. This resulted in shawn aspiration with thin liquids via cup, and variable transient and deep penetration with thin via tsp and mildly thick liquids, likely ultimately resulting in aspiration. Pt was not reliably sensate to aspiration; cued cough was very weak and ineffective in clearing airway. Suspect swallow presentation was further confounded by poor positioning given L unilateral weakness and forward leaning posture during swallow.    Suspect pt is a low threshold for aspiration and its related sequelae given dysphagia, poor mobility, and dependency on others for feeding. However, chest x-ray is currently clear, with stable respiratory status on room air, and lungs were clear on prior admission in July 2023, with no hx of PNA despite suspected on-going dysphagia. Recommending pt continue soft & bite-sized diet with thin liquids via SPOON ONLY with aspiration precautions and careful hand feeding and swallow safety strategies. Also important for adequate, regular oral hygiene to reduce risk of aspiration-related sequelae, i.e. PNA. Will also initiate respiratory muscle strength training (RMST) targeting cough strength to improve airway protection.

## 2023-10-27 NOTE — PROGRESS NOTE ADULT - ASSESSMENT
57M with PMHx of DM2, CVA x 2 w/ residual L sided weakness, early onset dementia, HTN, HLD, CKD V (creatinine 7-8, not on HD, AV fistula 5/23), BPH with recent admission for starvation ketosis and gas gangrene s/p rBKA, presenting with weakness and diarrhea x 1 day from nursing home. Found to be C.diff positive, admitted for sepsis 2/2 C.diff colitis. Renal consulted for CKD Stage V.     Scr: 6.65 --> 5.97   BUN: 108 --> 95  Na 153 --> 147     Imp: CKD Stage V   No uremic symptoms.   Patient clinically seems dry and free water depleted. However, appears improved from yesterday. Na was 153 with metabolic acidosis, started patient on continuous D5W + 1amp of Bicarb. Na improved to 147 today. Free water deficit 1.9L     Recommendations:  - continuous D5W + 1 Amp of Bicarb @125cc/hr  - No indications to start HD at this time   - Encourage PO intake, consider nutrition consult   - Renally dosed medications based on eGFR <10  - Avoid Nephrotoxic drugs      Thank you for the opportunity to participate in the care of your patient.

## 2023-10-27 NOTE — PROGRESS NOTE ADULT - PROBLEM SELECTOR PLAN 1
#Sepsis 2/2 C.diff colitis  Patient meeting 3/4 SIRS criteria on admission (lactate neg). Found to be C.diff positive w/ CT abd demonstrating infectious colitis. CXR neg, RVP neg, UA neg. Admitted for sepsis 2/2 to C.diff colitis. Clinically improving.  - C/w vancomycin PO 500mg [id number] x 10 days total (10/24-11/2)  - Continue flagyl  mg every 8 hours  - BCx: NGTD #Sepsis 2/2 C.diff colitis  Patient meeting 3/4 SIRS criteria on admission (lactate neg). Found to be C.diff positive w/ CT abd demonstrating infectious colitis. CXR neg, RVP neg, UA neg. Admitted for sepsis 2/2 to C.diff colitis. Clinically improving, WBC trending down.  - C/w vancomycin PO 500mg [id number] x 10 days total (10/24-11/2)  - Continue flagyl  mg every 8 hours  - BCx: NGTD #Sepsis 2/2 C.diff colitis - RESOLVED  Patient meeting 3/4 SIRS criteria on admission (lactate neg). Found to be C.diff positive w/ CT abd demonstrating infectious colitis. CXR neg, RVP neg, UA neg. Admitted for sepsis 2/2 to C.diff colitis. Clinically improving, WBC trending down. Now   - C/w vancomycin PO 500mg [id number] x 10 days total (10/24-11/2)  - Continue flagyl  mg every 8 hours  - BCx: NGTD Patient meeting severe criteria of C. diff disease, WBC >15K, Cr >1.5mg/dL but elevated at baseline. CT A/P consistent w/ infectious colitis. Abdominal XR neg. BCx: NGTD    - C/w vancomycin PO 500mg [id number] x 10 days total (10/24-11/2)  - Continue flagyl  mg every 8 hours  - Maintain hydration, PO and IV  -SS recs s/p mod barium swallow: chopped consistency, thin liquids via spoon  - Stool count  - Serial abdominal exams

## 2023-10-27 NOTE — PROGRESS NOTE ADULT - PROBLEM SELECTOR PLAN 4
Pt presented with hypernatremia to 150s, most recently 149. Likely iso hypovolemia, poor PO intake, diarrhea. EKG w/o acute changes. Mentating at baseline appropriately. Nephro following, correcting with D5W.     - monitor CMP q6h  - C/w D5W + 1 amp Bicarb @ 100 cc/hr, as per nephro recs  - Goal Na correction 8-10 in 24 hrs Pt presented with hypernatremia to 150s, most recently 147. Likely iso hypovolemia, poor PO intake, diarrhea. EKG w/o acute changes. Mentating at baseline appropriately. Nephro following, correcting with D5W.     - monitor CMP q6h  - C/w D5W + 1 amp Bicarb @ 100 cc/hr, as per nephro recs  - Goal Na correction 8-10 in 24 hrs Likely iso hypovolemia, poor PO intake, diarrhea. Na improved to 147 today. Free water deficit 1.9 L. Mentating at baseline appropriately. Nephro following, correcting with D5W.     - monitor CMP q6h  - C/w D5W + 1 amp Bicarb @ 125 cc/hr, as per nephro recs Patient with uremia to 95, trending down. Mental status A&O x 1-2 (baseline). Nephro following, getting fluids. Patient with mature AVF, no indication to start HD at this time per neprho consult.     - continue to trend BUN/Cr, uremia  - C/w D5W + 1 amp Bicarb @ 125 cc/hr, as per nephro recs  - Replete electrolytes as necessary  - Avoid nephrotoxic meds  - Renally dosed medications based on eGFR <10.

## 2023-10-27 NOTE — SWALLOW VFSS/MBS ASSESSMENT ADULT - ORAL PHASE COMMENTS
Reduced A-P transport with lingual pumping and delayed oral transit time, worse with chewable vs puree solids and liquids.

## 2023-10-27 NOTE — PROGRESS NOTE ADULT - PROBLEM SELECTOR PLAN 9
h/o Right foot necrotizing fasciitis s/p guillotine R BKA (7/8) and completion R BKA 7/11    - c/w aspirin, plavix for PAD Baseline A&O x 1 on admission (Self). Residual L sided weakness but difficult to assess as contracted, however able to follow commands.  Home meds: aspirin 81 mg q24h, atorvastatin 80 mg q24h    - home meds

## 2023-10-27 NOTE — SWALLOW VFSS/MBS ASSESSMENT ADULT - COMMENTS
Pt is a resident at [facility]. Diet at nursing [place] prior to admission was chopped/thin liquids.

## 2023-10-27 NOTE — PROGRESS NOTE ADULT - PROBLEM SELECTOR PLAN 3
BUN/Cr At baseline according to last admission. Patient with mature AVF, no indication to start HD at this time per neprho consult. Patient with uremia to 103, trending down, mental status A&O x 1-2 (baseline). Pt with acidosis likely 2/2 renal failure, nephro following, getting fluids    - continue to trend BUN/Cr, uremia  - C/w D5W + 1 amp Bicarb @ 100 cc/hr, as per nephro recs  - Replete electrolytes as necessary  - Avoid nephrotoxic meds  - Renally dosed medications based on eGFR <10. BUN/Cr At baseline according to last admission. Patient with mature AVF, no indication to start HD at this time per neprho consult. Patient with uremia to 95, trending down, mental status A&O x 1-2 (baseline). Nephro following, getting fluids    - continue to trend BUN/Cr, uremia  - C/w D5W + 1 amp Bicarb @ 100 cc/hr, as per nephro recs  - Replete electrolytes as necessary  - Avoid nephrotoxic meds  - Renally dosed medications based on eGFR <10. Patient with uremia to 95, trending down. Mental status A&O x 1-2 (baseline). Nephro following, getting fluids. Patient with mature AVF, no indication to start HD at this time per neprho consult.     - continue to trend BUN/Cr, uremia  - C/w D5W + 1 amp Bicarb @ 125 cc/hr, as per nephro recs  - Replete electrolytes as necessary  - Avoid nephrotoxic meds  - Renally dosed medications based on eGFR <10. Pt with dysphagia, coughing with water. Assessed by SS, s/p modified barium swallow. Determined to have low threshold for aspiration. Clear for soft and bite sized diet/ thin liquid via spoon, crush medication when feasible.   - f/u SS recs Pt with dysphagia, coughing with water. Assessed by SS, s/p modified barium swallow. Determined to have low threshold for aspiration. Clear for soft and bite sized diet/ thin liquid via spoon, crush medication when feasible.   - f/u SS recs  - f/u nutrition recs

## 2023-10-27 NOTE — PROGRESS NOTE ADULT - PROBLEM SELECTOR PLAN 6
Home meds: nifedipine 90 mg q24h  - holding home meds i/s/o sepsis will resume as appropriate    #Sinus Tachycardia  Patient admitted with HR of 130s, EKG showing sinus tachycardia; this was present on prior admission although at lower levels; seen by cardiology who thought it was due to underlying disease recommended discharging on Toprol X25mg which was not seen on d/c paperwork. Likely iso underlying sepsis, disease. Pt not taking metoprolol per nursing home records.    - Hold toprol iso sepsis restart as appropriate. Likely iso type 2 demand ischemia iso sepsis, tachycardia. EKG showing sinus tachycardia, no ST elevation. Patient not complaining of chest pain. Trops lateral.     - monitor for cardiac sx

## 2023-10-27 NOTE — PROGRESS NOTE ADULT - PROBLEM SELECTOR PLAN 2
Patient meeting severe criteria of C. diff disease, WBC >15K, Cr >1.5mg/dL but elevated at baseline. CT A/P consistent w/ infectious colitis. Abdominal XR neg.    - C/w vancomycin as above(10/24-11/2)  - Continue flagyl  mg every 8 hours  - Maintain hydration, PO and IV  - Consider restarting renal diet following speech and swallow eval  - Stool count  - Serial abdominal exams Patient meeting severe criteria of C. diff disease, WBC >15K, Cr >1.5mg/dL but elevated at baseline. CT A/P consistent w/ infectious colitis. Abdominal XR neg.    - C/w vancomycin as above(10/24-11/2)  - Continue flagyl  mg every 8 hours  - Maintain hydration, PO and IV  -SS recs s/p mod barium swallow: chopped consistency, thin liquids via spoon  - Stool count  - Serial abdominal exams #Sepsis 2/2 C.diff colitis - RESOLVED  Patient meeting 3/4 SIRS criteria on admission (lactate neg). Found to be C.diff positive w/ CT abd demonstrating infectious colitis. CXR neg, RVP neg, UA neg. Admitted for sepsis 2/2 to C.diff colitis. Clinically improving, WBC trending down.   - management as above

## 2023-10-27 NOTE — PROGRESS NOTE ADULT - PROBLEM SELECTOR PLAN 5
Likely iso type 2 demand ischemia iso sepsis, tachycardia. EKG showing sinus tachycardia, no ST elevation. Patient not complaining of chest pain. Trops lateral.     - monitor for cardiac sx Likely iso hypovolemia, poor PO intake, diarrhea. Na improved to 147 today. Free water deficit 1.9 L. Mentating at baseline appropriately. Nephro following, correcting with D5W.     - monitor CMP q6h  - C/w D5W + 1 amp Bicarb @ 125 cc/hr, as per nephro recs

## 2023-10-27 NOTE — PROGRESS NOTE ADULT - ASSESSMENT
57M with PMHx of DM2, CVA x 2 w/ residual L sided weakness, early onset dementia, HTN, HLD, CKD V (creatinine 7-8, not on HD, AV fistula 5/23), BPH with recent admission for starvation ketosis and gas gangrene s/p rBKA, presented with weakness and diarrhea x 1 day from nursing home. Found to be C.diff positive, Admitted for sepsis 2/2 C.diff colitis. Clinically improving.

## 2023-10-27 NOTE — PROGRESS NOTE ADULT - PROBLEM SELECTOR PLAN 10
F: D5W 1am HCO3- @ 100cc/hr  E: Replete as necessary K>4 Mg>2  N: Clear liquid diet   DVT Prophylaxis: Heparin subq  GI prophylaxis: None   CODE STATUS: FULL CODE  DISPO: nursing home F: D5W 1am HCO3- @ 100cc/hr  E: Replete as necessary K>4 Mg>2  N: Chopped consistency, thin liquids via spoon  DVT Prophylaxis: Heparin subq  GI prophylaxis: None   CODE STATUS: FULL CODE  DISPO: nursing home F: D5W 1am HCO3- @ 125cc/hr  E: Replete as necessary K>4 Mg>2  N: Chopped consistency, thin liquids via spoon  DVT Prophylaxis: Heparin subq  GI prophylaxis: None   CODE STATUS: FULL CODE  DISPO: nursing home h/o Right foot necrotizing fasciitis s/p guillotine R BKA (7/8) and completion R BKA 7/11    - c/w aspirin, plavix for PAD

## 2023-10-27 NOTE — SWALLOW VFSS/MBS ASSESSMENT ADULT - PHARYNGEAL PHASE COMMENTS
Pharyngeal swallow was moderately delayed, with bolus head collecting in the hypopharynx and at the laryngeal vestibule opening. Reduced coordination of swallow initiation and airway protection resulted in variable transient to deep penetration with thin and mildly thick liquids during the swallow. Arvin aspiration occurred x1 with thin liquids, during the swallow. As trials progressed, a column of aspirate was noted in the trachea and remained throughout the exam, suspect due to eventual aspiration of penetrated liquids. Pt was not reliably sensate to aspiration; when he was, cough strength was very weak and not effective in clearing airway. Reducing bolus size was beneficial in avoiding aspiration during the swallow; it did not prevent penetration. Epiglottic retroflexion was complete. Laryngeal elevation was adequate, anterior hyoid excursion was mildly reduced, resulting in variable trace residue in the hypopharynx after the swallow with liquids. Base of tongue retraction to posterior pharyngeal wall was mildly reduced, resulting in mild residue in valleculae and trace at base of tongue with soft solid, trace residue at base of tongue and valleculae with liquids and puree. Soft solid residue diminished with use of liquid wash.

## 2023-10-27 NOTE — PROGRESS NOTE ADULT - SUBJECTIVE AND OBJECTIVE BOX
OVERNIGHT EVENTS:  SUBJECTIVE: Patient was seen and examined at bedside.      VITAL SIGNS:  T(F): 97.6 (10-27-23 @ 06:40)  HR: 100 (10-27-23 @ 06:40)  BP: 167/87 (10-27-23 @ 06:40)  RR: 18 (10-27-23 @ 06:40)  SpO2: 99% (10-27-23 @ 06:40)  Wt(kg): --    PHYSICAL EXAM  GENERAL: NAD, lying in bed comfortably  HEAD:  Atraumatic, normocephalic  EYES: EOMI, PERRLA, conjunctiva and sclera clear  ENT: Moist mucous membranes  NECK: Supple, no JVD  HEART: Regular rate and rhythm, no murmurs, rubs, or gallops  LUNGS: Unlabored respirations.  Clear to auscultation bilaterally, no crackles, wheezing, or rhonchi  ABDOMEN: Soft, nontender, nondistended, +BS  EXTREMITIES: 2+ peripheral pulses bilaterally. No clubbing, cyanosis, or edema  NERVOUS SYSTEM:  A&Ox3, no focal deficits   SKIN: No rashes or lesions    MEDICATIONS  (STANDING):  aspirin  chewable 81 milliGRAM(s) Oral daily  atorvastatin 80 milliGRAM(s) Oral at bedtime  clopidogrel Tablet 75 milliGRAM(s) Oral daily  dextrose 5% 1000 milliLiter(s) (125 mL/Hr) IV Continuous <Continuous>  dextrose 5%. 1000 milliLiter(s) (100 mL/Hr) IV Continuous <Continuous>  dextrose 5%. 1000 milliLiter(s) (50 mL/Hr) IV Continuous <Continuous>  dextrose 50% Injectable 25 Gram(s) IV Push once  dextrose 50% Injectable 12.5 Gram(s) IV Push once  dextrose 50% Injectable 25 Gram(s) IV Push once  glucagon  Injectable 1 milliGRAM(s) IntraMuscular once  heparin   Injectable 5000 Unit(s) SubCutaneous every 8 hours  influenza   Vaccine 0.5 milliLiter(s) IntraMuscular once  insulin lispro (ADMELOG) corrective regimen sliding scale   SubCutaneous Before meals and at bedtime  metroNIDAZOLE  IVPB 500 milliGRAM(s) IV Intermittent every 8 hours  tamsulosin 0.4 milliGRAM(s) Oral at bedtime  vancomycin    Solution 500 milliGRAM(s) Oral every 6 hours    MEDICATIONS  (PRN):  acetaminophen     Tablet .. 650 milliGRAM(s) Oral every 6 hours PRN Temp greater or equal to 38C (100.4F), Mild Pain (1 - 3)  dextrose Oral Gel 15 Gram(s) Oral once PRN Blood Glucose LESS THAN 70 milliGRAM(s)/deciliter      Allergies    No Known Allergies    Intolerances        LABS:                        9.8    11.11 )-----------( 376      ( 27 Oct 2023 05:30 )             33.5     10-27    147<H>  |  115<H>  |  95<H>  ----------------------------<  66<L>  3.9   |  18<L>  |  5.97<H>    Ca    8.8      27 Oct 2023 05:30  Phos  4.3     10-27  Mg     2.0     10-27    TPro  6.3  /  Alb  2.9<L>  /  TBili  0.2  /  DBili  x   /  AST  13  /  ALT  10  /  AlkPhos  79  10-27      Urinalysis Basic - ( 27 Oct 2023 05:30 )    Color: x / Appearance: x / SG: x / pH: x  Gluc: 66 mg/dL / Ketone: x  / Bili: x / Urobili: x   Blood: x / Protein: x / Nitrite: x   Leuk Esterase: x / RBC: x / WBC x   Sq Epi: x / Non Sq Epi: x / Bacteria: x          MICROBIOLOGY      RADIOLOGY & ADDITIONAL TESTS:  Reviewed OVERNIGHT EVENTS: Nurse confirmed patient had one episode of diarrhea overnight.   SUBJECTIVE: Patient was seen and examined at bedside.  Denies CP, SOB, abdominal pain, vomiting.    VITAL SIGNS:  T(F): 97.6 (10-27-23 @ 06:40)  HR: 100 (10-27-23 @ 06:40)  BP: 167/87 (10-27-23 @ 06:40)  RR: 18 (10-27-23 @ 06:40)  SpO2: 99% (10-27-23 @ 06:40)  Wt(kg): --    PHYSICAL EXAM  GENERAL: NAD, right sided facial asymmetry, lying in bed comfortably  HEAD:  Atraumatic, normocephalic  EYES: EOMI, conjunctiva and sclera clear  ENT: Poor dentition, Moist mucous membranes  NECK: Supple, no JVD  HEART: Regular rate and rhythm, no murmurs, rubs, or gallops  LUNGS: Unlabored respirations.  Clear to auscultation bilaterally, no crackles, wheezing, or rhonchi  ABDOMEN: Soft, nontender, nondistended, +BS  EXTREMITIES: R arm contracted, wwp;. R leg bka clean stump.  NERVOUS SYSTEM:  A&Ox3, no focal deficits   SKIN: No rashes or lesions    MEDICATIONS  (STANDING):  aspirin  chewable 81 milliGRAM(s) Oral daily  atorvastatin 80 milliGRAM(s) Oral at bedtime  clopidogrel Tablet 75 milliGRAM(s) Oral daily  dextrose 5% 1000 milliLiter(s) (125 mL/Hr) IV Continuous <Continuous>  dextrose 5%. 1000 milliLiter(s) (100 mL/Hr) IV Continuous <Continuous>  dextrose 5%. 1000 milliLiter(s) (50 mL/Hr) IV Continuous <Continuous>  dextrose 50% Injectable 25 Gram(s) IV Push once  dextrose 50% Injectable 12.5 Gram(s) IV Push once  dextrose 50% Injectable 25 Gram(s) IV Push once  glucagon  Injectable 1 milliGRAM(s) IntraMuscular once  heparin   Injectable 5000 Unit(s) SubCutaneous every 8 hours  influenza   Vaccine 0.5 milliLiter(s) IntraMuscular once  insulin lispro (ADMELOG) corrective regimen sliding scale   SubCutaneous Before meals and at bedtime  metroNIDAZOLE  IVPB 500 milliGRAM(s) IV Intermittent every 8 hours  tamsulosin 0.4 milliGRAM(s) Oral at bedtime  vancomycin    Solution 500 milliGRAM(s) Oral every 6 hours    MEDICATIONS  (PRN):  acetaminophen     Tablet .. 650 milliGRAM(s) Oral every 6 hours PRN Temp greater or equal to 38C (100.4F), Mild Pain (1 - 3)  dextrose Oral Gel 15 Gram(s) Oral once PRN Blood Glucose LESS THAN 70 milliGRAM(s)/deciliter      Allergies    No Known Allergies    Intolerances        LABS:                        9.8    11.11 )-----------( 376      ( 27 Oct 2023 05:30 )             33.5     10-27    147<H>  |  115<H>  |  95<H>  ----------------------------<  66<L>  3.9   |  18<L>  |  5.97<H>    Ca    8.8      27 Oct 2023 05:30  Phos  4.3     10-27  Mg     2.0     10-27    TPro  6.3  /  Alb  2.9<L>  /  TBili  0.2  /  DBili  x   /  AST  13  /  ALT  10  /  AlkPhos  79  10-27      Urinalysis Basic - ( 27 Oct 2023 05:30 )    Color: x / Appearance: x / SG: x / pH: x  Gluc: 66 mg/dL / Ketone: x  / Bili: x / Urobili: x   Blood: x / Protein: x / Nitrite: x   Leuk Esterase: x / RBC: x / WBC x   Sq Epi: x / Non Sq Epi: x / Bacteria: x          MICROBIOLOGY      RADIOLOGY & ADDITIONAL TESTS:  Reviewed OVERNIGHT EVENTS: Nurse confirmed patient had one episode of diarrhea overnight.   SUBJECTIVE: Patient was seen and examined at bedside.  Denies CP, SOB, abdominal pain, vomiting.    VITAL SIGNS:  T(F): 97.6 (10-27-23 @ 06:40)  HR: 100 (10-27-23 @ 06:40)  BP: 167/87 (10-27-23 @ 06:40)  RR: 18 (10-27-23 @ 06:40)  SpO2: 99% (10-27-23 @ 06:40)  Wt(kg): --    PHYSICAL EXAM  GENERAL: NAD, right sided facial asymmetry, lying in bed comfortably  HEAD:  Atraumatic, normocephalic  EYES: EOMI, conjunctiva and sclera clear  ENT: Poor dentition, Moist mucous membranes  NECK: Supple, no JVD  HEART: Regular rate and rhythm, no murmurs, rubs, or gallops  LUNGS: Unlabored respirations.  Clear to auscultation bilaterally, no crackles, wheezing, or rhonchi  ABDOMEN: Soft, nontender, nondistended, +BS  EXTREMITIES: R arm contracted, wwp;. R leg bka clean stump.  NERVOUS SYSTEM:  A&Ox1-2 (hospital-self), no focal deficits   SKIN: No rashes or lesions    MEDICATIONS  (STANDING):  aspirin  chewable 81 milliGRAM(s) Oral daily  atorvastatin 80 milliGRAM(s) Oral at bedtime  clopidogrel Tablet 75 milliGRAM(s) Oral daily  dextrose 5% 1000 milliLiter(s) (125 mL/Hr) IV Continuous <Continuous>  dextrose 5%. 1000 milliLiter(s) (100 mL/Hr) IV Continuous <Continuous>  dextrose 5%. 1000 milliLiter(s) (50 mL/Hr) IV Continuous <Continuous>  dextrose 50% Injectable 25 Gram(s) IV Push once  dextrose 50% Injectable 12.5 Gram(s) IV Push once  dextrose 50% Injectable 25 Gram(s) IV Push once  glucagon  Injectable 1 milliGRAM(s) IntraMuscular once  heparin   Injectable 5000 Unit(s) SubCutaneous every 8 hours  influenza   Vaccine 0.5 milliLiter(s) IntraMuscular once  insulin lispro (ADMELOG) corrective regimen sliding scale   SubCutaneous Before meals and at bedtime  metroNIDAZOLE  IVPB 500 milliGRAM(s) IV Intermittent every 8 hours  tamsulosin 0.4 milliGRAM(s) Oral at bedtime  vancomycin    Solution 500 milliGRAM(s) Oral every 6 hours    MEDICATIONS  (PRN):  acetaminophen     Tablet .. 650 milliGRAM(s) Oral every 6 hours PRN Temp greater or equal to 38C (100.4F), Mild Pain (1 - 3)  dextrose Oral Gel 15 Gram(s) Oral once PRN Blood Glucose LESS THAN 70 milliGRAM(s)/deciliter      Allergies    No Known Allergies    Intolerances        LABS:                        9.8    11.11 )-----------( 376      ( 27 Oct 2023 05:30 )             33.5     10-27    147<H>  |  115<H>  |  95<H>  ----------------------------<  66<L>  3.9   |  18<L>  |  5.97<H>    Ca    8.8      27 Oct 2023 05:30  Phos  4.3     10-27  Mg     2.0     10-27    TPro  6.3  /  Alb  2.9<L>  /  TBili  0.2  /  DBili  x   /  AST  13  /  ALT  10  /  AlkPhos  79  10-27      Urinalysis Basic - ( 27 Oct 2023 05:30 )    Color: x / Appearance: x / SG: x / pH: x  Gluc: 66 mg/dL / Ketone: x  / Bili: x / Urobili: x   Blood: x / Protein: x / Nitrite: x   Leuk Esterase: x / RBC: x / WBC x   Sq Epi: x / Non Sq Epi: x / Bacteria: x          MICROBIOLOGY      RADIOLOGY & ADDITIONAL TESTS:  Reviewed OVERNIGHT EVENTS: Nurse confirmed patient had one episode of diarrhea overnight.   SUBJECTIVE: Patient was seen and examined at bedside. Pt feeling well, wants to eat. Denies CP, SOB, abdominal pain, vomiting.    VITAL SIGNS:  T(F): 97.6 (10-27-23 @ 06:40)  HR: 100 (10-27-23 @ 06:40)  BP: 167/87 (10-27-23 @ 06:40)  RR: 18 (10-27-23 @ 06:40)  SpO2: 99% (10-27-23 @ 06:40)  Wt(kg): --    PHYSICAL EXAM  GENERAL: NAD, right sided facial asymmetry, lying in bed comfortably  HEAD:  Atraumatic, normocephalic  EYES: EOMI, conjunctiva and sclera clear  ENT: Poor dentition, Moist mucous membranes  NECK: Supple, no JVD  HEART: Regular rate and rhythm, no murmurs, rubs, or gallops  LUNGS: Unlabored respirations.  Clear to auscultation bilaterally, no crackles, wheezing, or rhonchi  ABDOMEN: Soft, nontender, nondistended, +BS  EXTREMITIES: arms contracted, wwp;. R leg bka clean stump.  NERVOUS SYSTEM:  A&Ox1-2 (hospital-self), no focal deficits   SKIN: No rashes or lesions    MEDICATIONS  (STANDING):  aspirin  chewable 81 milliGRAM(s) Oral daily  atorvastatin 80 milliGRAM(s) Oral at bedtime  clopidogrel Tablet 75 milliGRAM(s) Oral daily  dextrose 5% 1000 milliLiter(s) (125 mL/Hr) IV Continuous <Continuous>  dextrose 5%. 1000 milliLiter(s) (100 mL/Hr) IV Continuous <Continuous>  dextrose 5%. 1000 milliLiter(s) (50 mL/Hr) IV Continuous <Continuous>  dextrose 50% Injectable 25 Gram(s) IV Push once  dextrose 50% Injectable 12.5 Gram(s) IV Push once  dextrose 50% Injectable 25 Gram(s) IV Push once  glucagon  Injectable 1 milliGRAM(s) IntraMuscular once  heparin   Injectable 5000 Unit(s) SubCutaneous every 8 hours  influenza   Vaccine 0.5 milliLiter(s) IntraMuscular once  insulin lispro (ADMELOG) corrective regimen sliding scale   SubCutaneous Before meals and at bedtime  metroNIDAZOLE  IVPB 500 milliGRAM(s) IV Intermittent every 8 hours  tamsulosin 0.4 milliGRAM(s) Oral at bedtime  vancomycin    Solution 500 milliGRAM(s) Oral every 6 hours    MEDICATIONS  (PRN):  acetaminophen     Tablet .. 650 milliGRAM(s) Oral every 6 hours PRN Temp greater or equal to 38C (100.4F), Mild Pain (1 - 3)  dextrose Oral Gel 15 Gram(s) Oral once PRN Blood Glucose LESS THAN 70 milliGRAM(s)/deciliter      Allergies    No Known Allergies    Intolerances        LABS:                        9.8    11.11 )-----------( 376      ( 27 Oct 2023 05:30 )             33.5     10-27    147<H>  |  115<H>  |  95<H>  ----------------------------<  66<L>  3.9   |  18<L>  |  5.97<H>    Ca    8.8      27 Oct 2023 05:30  Phos  4.3     10-27  Mg     2.0     10-27    TPro  6.3  /  Alb  2.9<L>  /  TBili  0.2  /  DBili  x   /  AST  13  /  ALT  10  /  AlkPhos  79  10-27      Urinalysis Basic - ( 27 Oct 2023 05:30 )    Color: x / Appearance: x / SG: x / pH: x  Gluc: 66 mg/dL / Ketone: x  / Bili: x / Urobili: x   Blood: x / Protein: x / Nitrite: x   Leuk Esterase: x / RBC: x / WBC x   Sq Epi: x / Non Sq Epi: x / Bacteria: x          MICROBIOLOGY      RADIOLOGY & ADDITIONAL TESTS:  Reviewed

## 2023-10-27 NOTE — PROGRESS NOTE ADULT - SUBJECTIVE AND OBJECTIVE BOX
Subjective: Patient seen and examined at bedside. He denies shortness of breath, n/v, states his diarrhea is improving. He states he does not drink a lot of water.       Imaging:   CTAP: Diffuse wall thickening of the rectum, sigmoid, and distal descending   colon, most likely secondary to infectiouscolitis.    New wall thickening involving the right and anterior bladder wall.   Findings could be related to infection. Other etiologies are not   excluded. Correlate clinically.    CT Head: No evidence of acute intracranial hemorrhage, mass effect, or   hydrocephalus.    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, traumatic, left, sequela,            Allergies:  No Known Allergies      Home Medications:   acetaminophen     Tablet .. 650 milliGRAM(s) Oral every 6 hours PRN  aspirin  chewable 81 milliGRAM(s) Oral daily  atorvastatin 80 milliGRAM(s) Oral at bedtime  clopidogrel Tablet 75 milliGRAM(s) Oral daily  dextrose 5% 1000 milliLiter(s) IV Continuous <Continuous>  dextrose 5%. 1000 milliLiter(s) IV Continuous <Continuous>  dextrose 5%. 1000 milliLiter(s) IV Continuous <Continuous>  dextrose 50% Injectable 25 Gram(s) IV Push once  dextrose 50% Injectable 25 Gram(s) IV Push once  dextrose 50% Injectable 12.5 Gram(s) IV Push once  dextrose Oral Gel 15 Gram(s) Oral once PRN  glucagon  Injectable 1 milliGRAM(s) IntraMuscular once  heparin   Injectable 5000 Unit(s) SubCutaneous every 8 hours  influenza   Vaccine 0.5 milliLiter(s) IntraMuscular once  insulin lispro (ADMELOG) corrective regimen sliding scale   SubCutaneous Before meals and at bedtime  metroNIDAZOLE  IVPB 500 milliGRAM(s) IV Intermittent every 8 hours  tamsulosin 0.4 milliGRAM(s) Oral at bedtime  vancomycin    Solution 500 milliGRAM(s) Oral every 6 hours      Hospital Medications:   MEDICATIONS  (STANDING):  aspirin  chewable 81 milliGRAM(s) Oral daily  atorvastatin 80 milliGRAM(s) Oral at bedtime  clopidogrel Tablet 75 milliGRAM(s) Oral daily  dextrose 5% 1000 milliLiter(s) (125 mL/Hr) IV Continuous <Continuous>  dextrose 5%. 1000 milliLiter(s) (100 mL/Hr) IV Continuous <Continuous>  dextrose 5%. 1000 milliLiter(s) (50 mL/Hr) IV Continuous <Continuous>  dextrose 50% Injectable 25 Gram(s) IV Push once  dextrose 50% Injectable 25 Gram(s) IV Push once  dextrose 50% Injectable 12.5 Gram(s) IV Push once  glucagon  Injectable 1 milliGRAM(s) IntraMuscular once  heparin   Injectable 5000 Unit(s) SubCutaneous every 8 hours  influenza   Vaccine 0.5 milliLiter(s) IntraMuscular once  insulin lispro (ADMELOG) corrective regimen sliding scale   SubCutaneous Before meals and at bedtime  metroNIDAZOLE  IVPB 500 milliGRAM(s) IV Intermittent every 8 hours  tamsulosin 0.4 milliGRAM(s) Oral at bedtime  vancomycin    Solution 500 milliGRAM(s) Oral every 6 hours      SOCIAL HISTORY:  Denies ETOh, Smoking,     Family History:  FAMILY HISTORY:  Family history of diabetes mellitus (Mother)  Family history of diabetes mellitus (DM)          VITALS:  T(F): 97.6 (10-27-23 @ 06:40), Max: 97.9 (10-26-23 @ 21:00)  HR: 100 (10-27-23 @ 06:40)  BP: 167/87 (10-27-23 @ 06:40)  RR: 18 (10-27-23 @ 06:40)  SpO2: 99% (10-27-23 @ 06:40)  Wt(kg): --      CAPILLARY BLOOD GLUCOSE      POCT Blood Glucose.: 101 mg/dL (27 Oct 2023 10:15)  POCT Blood Glucose.: 128 mg/dL (26 Oct 2023 22:12)  POCT Blood Glucose.: 98 mg/dL (26 Oct 2023 18:12)  POCT Blood Glucose.: 91 mg/dL (26 Oct 2023 12:58)      Review of Systems:  CONSTITUTIONAL: No fever or chills.  RESPIRATORY: No shortness of breath, cough  CARDIOVASCULAR: No Chest pain, shortness of breath, palpitations  GASTROINTESTINAL: No abdominal pain, nausea, vomiting, diarrhea  GENITOURINARY: No urinary frequency, gross hematuria, dysuria    PHYSICAL EXAM  GENERAL: NAD, lying in bed comfortably, AAOx3 (person, place, year)  HEART: Regular rate and rhythm, no murmurs, rubs, or gallops  LUNGS: Unlabored respirations.  Clear to auscultation bilaterally, no crackles, wheezing, or rhonchi  EXTREMITIES: Extremities: R arm contracted; R leg bka clean stump; LUE with AV fistula- bruit and palpable thrill appreciated over fistula site but it doesn't appear to be well-developed throughout the arm     LABS:  10-27    147<H>  |  115<H>  |  95<H>  ----------------------------<  66<L>  3.9   |  18<L>  |  5.97<H>    Ca    8.8      27 Oct 2023 05:30  Phos  4.3     10-27  Mg     2.0     10-27    TPro  6.3  /  Alb  2.9<L>  /  TBili  0.2  /  DBili      /  AST  13  /  ALT  10  /  AlkPhos  79  10-27    Creatinine Trend: 5.97 <--, 6.26 <--, 6.30 <--, 6.60 <--, 6.77 <--, 6.65 <--, 6.68 <--, 7.09 <--                        9.8    11.11 )-----------( 376      ( 27 Oct 2023 05:30 )             33.5     Urine Studies:  Urinalysis Basic - ( 27 Oct 2023 05:30 )    Color:  / Appearance:  / SG:  / pH:   Gluc: 66 mg/dL / Ketone:   / Bili:  / Urobili:    Blood:  / Protein:  / Nitrite:    Leuk Esterase:  / RBC:  / WBC    Sq Epi:  / Non Sq Epi:  / Bacteria:  Subjective: Patient seen and examined at bedside. He denies shortness of breath, n/v, states his diarrhea is improving. he feels better overall  more alert and awake and interactive      Imaging:   CTAP: Diffuse wall thickening of the rectum, sigmoid, and distal descending   colon, most likely secondary to infectiouscolitis.    New wall thickening involving the right and anterior bladder wall.   Findings could be related to infection. Other etiologies are not   excluded. Correlate clinically.    CT Head: No evidence of acute intracranial hemorrhage, mass effect, or   hydrocephalus.    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, traumatic, left, sequela,            Allergies:  No Known Allergies      Home Medications:   acetaminophen     Tablet .. 650 milliGRAM(s) Oral every 6 hours PRN  aspirin  chewable 81 milliGRAM(s) Oral daily  atorvastatin 80 milliGRAM(s) Oral at bedtime  clopidogrel Tablet 75 milliGRAM(s) Oral daily  dextrose 5% 1000 milliLiter(s) IV Continuous <Continuous>  dextrose 5%. 1000 milliLiter(s) IV Continuous <Continuous>  dextrose 5%. 1000 milliLiter(s) IV Continuous <Continuous>  dextrose 50% Injectable 25 Gram(s) IV Push once  dextrose 50% Injectable 25 Gram(s) IV Push once  dextrose 50% Injectable 12.5 Gram(s) IV Push once  dextrose Oral Gel 15 Gram(s) Oral once PRN  glucagon  Injectable 1 milliGRAM(s) IntraMuscular once  heparin   Injectable 5000 Unit(s) SubCutaneous every 8 hours  influenza   Vaccine 0.5 milliLiter(s) IntraMuscular once  insulin lispro (ADMELOG) corrective regimen sliding scale   SubCutaneous Before meals and at bedtime  metroNIDAZOLE  IVPB 500 milliGRAM(s) IV Intermittent every 8 hours  tamsulosin 0.4 milliGRAM(s) Oral at bedtime  vancomycin    Solution 500 milliGRAM(s) Oral every 6 hours      Hospital Medications:   MEDICATIONS  (STANDING):  aspirin  chewable 81 milliGRAM(s) Oral daily  atorvastatin 80 milliGRAM(s) Oral at bedtime  clopidogrel Tablet 75 milliGRAM(s) Oral daily  dextrose 5% 1000 milliLiter(s) (125 mL/Hr) IV Continuous <Continuous>  dextrose 5%. 1000 milliLiter(s) (100 mL/Hr) IV Continuous <Continuous>  dextrose 5%. 1000 milliLiter(s) (50 mL/Hr) IV Continuous <Continuous>  dextrose 50% Injectable 25 Gram(s) IV Push once  dextrose 50% Injectable 25 Gram(s) IV Push once  dextrose 50% Injectable 12.5 Gram(s) IV Push once  glucagon  Injectable 1 milliGRAM(s) IntraMuscular once  heparin   Injectable 5000 Unit(s) SubCutaneous every 8 hours  influenza   Vaccine 0.5 milliLiter(s) IntraMuscular once  insulin lispro (ADMELOG) corrective regimen sliding scale   SubCutaneous Before meals and at bedtime  metroNIDAZOLE  IVPB 500 milliGRAM(s) IV Intermittent every 8 hours  tamsulosin 0.4 milliGRAM(s) Oral at bedtime  vancomycin    Solution 500 milliGRAM(s) Oral every 6 hours      SOCIAL HISTORY:  Denies ETOh, Smoking,     Family History:  FAMILY HISTORY:  Family history of diabetes mellitus (Mother)  Family history of diabetes mellitus (DM)          VITALS:  T(F): 97.6 (10-27-23 @ 06:40), Max: 97.9 (10-26-23 @ 21:00)  HR: 100 (10-27-23 @ 06:40)  BP: 167/87 (10-27-23 @ 06:40)  RR: 18 (10-27-23 @ 06:40)  SpO2: 99% (10-27-23 @ 06:40)  Wt(kg): --      CAPILLARY BLOOD GLUCOSE      POCT Blood Glucose.: 101 mg/dL (27 Oct 2023 10:15)  POCT Blood Glucose.: 128 mg/dL (26 Oct 2023 22:12)  POCT Blood Glucose.: 98 mg/dL (26 Oct 2023 18:12)  POCT Blood Glucose.: 91 mg/dL (26 Oct 2023 12:58)      Review of Systems:  CONSTITUTIONAL: No fever or chills.  RESPIRATORY: No shortness of breath, cough  CARDIOVASCULAR: No Chest pain, shortness of breath, palpitations  GASTROINTESTINAL: No abdominal pain, nausea, vomiting, diarrhea  GENITOURINARY: No urinary frequency, gross hematuria, dysuria    PHYSICAL EXAM  GENERAL: NAD, lying in bed comfortably, AAOx3 (person, place, year)  HEART: Regular rate and rhythm, no murmurs, rubs, or gallops  LUNGS: Unlabored respirations.  Clear to auscultation bilaterally, no crackles, wheezing, or rhonchi  EXTREMITIES: Extremities: R arm contracted; R leg bka clean stump; LUE with AV fistula- bruit and palpable thrill appreciated over fistula site but it doesn't appear to be well-developed throughout the arm     LABS:  10-27    147<H>  |  115<H>  |  95<H>  ----------------------------<  66<L>  3.9   |  18<L>  |  5.97<H>    Ca    8.8      27 Oct 2023 05:30  Phos  4.3     10-27  Mg     2.0     10-27    TPro  6.3  /  Alb  2.9<L>  /  TBili  0.2  /  DBili      /  AST  13  /  ALT  10  /  AlkPhos  79  10-27    Creatinine Trend: 5.97 <--, 6.26 <--, 6.30 <--, 6.60 <--, 6.77 <--, 6.65 <--, 6.68 <--, 7.09 <--                        9.8    11.11 )-----------( 376      ( 27 Oct 2023 05:30 )             33.5     Urine Studies:  Urinalysis Basic - ( 27 Oct 2023 05:30 )    Color:  / Appearance:  / SG:  / pH:   Gluc: 66 mg/dL / Ketone:   / Bili:  / Urobili:    Blood:  / Protein:  / Nitrite:    Leuk Esterase:  / RBC:  / WBC    Sq Epi:  / Non Sq Epi:  / Bacteria:

## 2023-10-28 ENCOUNTER — TRANSCRIPTION ENCOUNTER (OUTPATIENT)
Age: 58
End: 2023-10-28

## 2023-10-28 VITALS
DIASTOLIC BLOOD PRESSURE: 102 MMHG | SYSTOLIC BLOOD PRESSURE: 164 MMHG | TEMPERATURE: 98 F | OXYGEN SATURATION: 97 % | HEART RATE: 99 BPM | RESPIRATION RATE: 20 BRPM

## 2023-10-28 LAB
GLUCOSE BLDC GLUCOMTR-MCNC: 77 MG/DL — SIGNIFICANT CHANGE UP (ref 70–99)
GLUCOSE BLDC GLUCOMTR-MCNC: 77 MG/DL — SIGNIFICANT CHANGE UP (ref 70–99)
GLUCOSE BLDC GLUCOMTR-MCNC: 80 MG/DL — SIGNIFICANT CHANGE UP (ref 70–99)
GLUCOSE BLDC GLUCOMTR-MCNC: 80 MG/DL — SIGNIFICANT CHANGE UP (ref 70–99)

## 2023-10-28 PROCEDURE — 99232 SBSQ HOSP IP/OBS MODERATE 35: CPT | Mod: GC

## 2023-10-28 PROCEDURE — 70450 CT HEAD/BRAIN W/O DYE: CPT | Mod: MA

## 2023-10-28 PROCEDURE — 87324 CLOSTRIDIUM AG IA: CPT

## 2023-10-28 PROCEDURE — 80048 BASIC METABOLIC PNL TOTAL CA: CPT

## 2023-10-28 PROCEDURE — 82553 CREATINE MB FRACTION: CPT

## 2023-10-28 PROCEDURE — 83690 ASSAY OF LIPASE: CPT

## 2023-10-28 PROCEDURE — 84295 ASSAY OF SERUM SODIUM: CPT

## 2023-10-28 PROCEDURE — 87449 NOS EACH ORGANISM AG IA: CPT

## 2023-10-28 PROCEDURE — 86850 RBC ANTIBODY SCREEN: CPT

## 2023-10-28 PROCEDURE — 85610 PROTHROMBIN TIME: CPT

## 2023-10-28 PROCEDURE — 86901 BLOOD TYPING SEROLOGIC RH(D): CPT

## 2023-10-28 PROCEDURE — 92610 EVALUATE SWALLOWING FUNCTION: CPT

## 2023-10-28 PROCEDURE — 85025 COMPLETE CBC W/AUTO DIFF WBC: CPT

## 2023-10-28 PROCEDURE — 80053 COMPREHEN METABOLIC PANEL: CPT

## 2023-10-28 PROCEDURE — 82962 GLUCOSE BLOOD TEST: CPT

## 2023-10-28 PROCEDURE — 96375 TX/PRO/DX INJ NEW DRUG ADDON: CPT

## 2023-10-28 PROCEDURE — 83605 ASSAY OF LACTIC ACID: CPT

## 2023-10-28 PROCEDURE — 87086 URINE CULTURE/COLONY COUNT: CPT

## 2023-10-28 PROCEDURE — 84100 ASSAY OF PHOSPHORUS: CPT

## 2023-10-28 PROCEDURE — 82330 ASSAY OF CALCIUM: CPT

## 2023-10-28 PROCEDURE — 81001 URINALYSIS AUTO W/SCOPE: CPT

## 2023-10-28 PROCEDURE — 99291 CRITICAL CARE FIRST HOUR: CPT

## 2023-10-28 PROCEDURE — 87493 C DIFF AMPLIFIED PROBE: CPT

## 2023-10-28 PROCEDURE — 74230 X-RAY XM SWLNG FUNCJ C+: CPT

## 2023-10-28 PROCEDURE — 36415 COLL VENOUS BLD VENIPUNCTURE: CPT

## 2023-10-28 PROCEDURE — 96374 THER/PROPH/DIAG INJ IV PUSH: CPT

## 2023-10-28 PROCEDURE — 85730 THROMBOPLASTIN TIME PARTIAL: CPT

## 2023-10-28 PROCEDURE — 92611 MOTION FLUOROSCOPY/SWALLOW: CPT

## 2023-10-28 PROCEDURE — 82550 ASSAY OF CK (CPK): CPT

## 2023-10-28 PROCEDURE — 96376 TX/PRO/DX INJ SAME DRUG ADON: CPT

## 2023-10-28 PROCEDURE — 93990 DOPPLER FLOW TESTING: CPT

## 2023-10-28 PROCEDURE — 93005 ELECTROCARDIOGRAM TRACING: CPT

## 2023-10-28 PROCEDURE — 82803 BLOOD GASES ANY COMBINATION: CPT

## 2023-10-28 PROCEDURE — 84484 ASSAY OF TROPONIN QUANT: CPT

## 2023-10-28 PROCEDURE — 74176 CT ABD & PELVIS W/O CONTRAST: CPT | Mod: MA

## 2023-10-28 PROCEDURE — 84132 ASSAY OF SERUM POTASSIUM: CPT

## 2023-10-28 PROCEDURE — 83036 HEMOGLOBIN GLYCOSYLATED A1C: CPT

## 2023-10-28 PROCEDURE — 87040 BLOOD CULTURE FOR BACTERIA: CPT

## 2023-10-28 PROCEDURE — 71045 X-RAY EXAM CHEST 1 VIEW: CPT

## 2023-10-28 PROCEDURE — 83735 ASSAY OF MAGNESIUM: CPT

## 2023-10-28 PROCEDURE — 74018 RADEX ABDOMEN 1 VIEW: CPT

## 2023-10-28 PROCEDURE — 86900 BLOOD TYPING SEROLOGIC ABO: CPT

## 2023-10-28 PROCEDURE — 82140 ASSAY OF AMMONIA: CPT

## 2023-10-28 PROCEDURE — 87637 SARSCOV2&INF A&B&RSV AMP PRB: CPT

## 2023-10-28 RX ORDER — METRONIDAZOLE 500 MG
1 TABLET ORAL
Qty: 0 | Refills: 0 | DISCHARGE
Start: 2023-10-28

## 2023-10-28 RX ORDER — VANCOMYCIN HCL 1 G
2 VIAL (EA) INTRAVENOUS
Qty: 0 | Refills: 0 | DISCHARGE
Start: 2023-10-28 | End: 2023-11-02

## 2023-10-28 RX ORDER — METRONIDAZOLE 500 MG
500 TABLET ORAL EVERY 8 HOURS
Refills: 0 | Status: DISCONTINUED | OUTPATIENT
Start: 2023-10-28 | End: 2023-10-28

## 2023-10-28 RX ADMIN — HEPARIN SODIUM 5000 UNIT(S): 5000 INJECTION INTRAVENOUS; SUBCUTANEOUS at 14:07

## 2023-10-28 RX ADMIN — ATORVASTATIN CALCIUM 80 MILLIGRAM(S): 80 TABLET, FILM COATED ORAL at 23:36

## 2023-10-28 RX ADMIN — Medication 81 MILLIGRAM(S): at 11:52

## 2023-10-28 RX ADMIN — Medication 500 MILLIGRAM(S): at 23:36

## 2023-10-28 RX ADMIN — Medication 500 MILLIGRAM(S): at 11:51

## 2023-10-28 RX ADMIN — CLOPIDOGREL BISULFATE 75 MILLIGRAM(S): 75 TABLET, FILM COATED ORAL at 11:52

## 2023-10-28 RX ADMIN — Medication 500 MILLIGRAM(S): at 01:36

## 2023-10-28 RX ADMIN — TAMSULOSIN HYDROCHLORIDE 0.4 MILLIGRAM(S): 0.4 CAPSULE ORAL at 23:36

## 2023-10-28 RX ADMIN — Medication 500 MILLIGRAM(S): at 17:46

## 2023-10-28 RX ADMIN — Medication 500 MILLIGRAM(S): at 11:52

## 2023-10-28 NOTE — PROGRESS NOTE ADULT - ASSESSMENT
57M with PMHx of DM2, CVA x 2 w/ residual L sided weakness, early onset dementia, HTN, HLD, CKD V (creatinine 7-8, not on HD, AV fistula 5/23), BPH with recent admission for starvation ketosis and gas gangrene s/p rBKA, presenting with weakness and diarrhea x 1 day from nursing home. Found to be C.diff positive, admitted for sepsis 2/2 C.diff colitis. Renal consulted for CKD Stage V.     Scr: 6.65 --> 5.97 10/27  BUN: 108 --> 95 10/27  Na 153 --> 147 10/27    Imp: CKD Stage V   No uremic symptoms.   Patient clinically seems dry and free water depleted. However, appears improved from yesterday. Na was 153 with metabolic acidosis, started patient on continuous D5W + 1amp of Bicarb. Na improved to 147 today. Free water deficit 1.9L     Recommendations:  - On D5W + 1 Amp of Bicarb. Decrease to 80 ml/hr  - No indications to start HD at this time   - Encourage PO intake, consider nutrition consult   - Renally dosed medications based on eGFR <10  - Avoid Nephrotoxic drugs      Thank you for the opportunity to participate in the care of your patient.

## 2023-10-28 NOTE — PROGRESS NOTE ADULT - ATTENDING COMMENTS
56yo, PMHx as above with advanced CKD a/w C. diff colitis/sepsis.  Interim chart was reviewed and case d/w Dr. Cordova on renal rounds.   Given metabolic acidosis and hypernatremia, pt was started on D5W + 1amp of Bicarb.  Labs improved somewhat. Agreed with decrease in fluid rate, and further recommendations given above.
lytes improved and clinically improved  cont IVF  would assess if taking adequate PO   I  asked vascular to look at AVF that is not maturing well
CKD stable  lytes improving with IVF  clinically seems better and oriented and appropriate --no complaints   no need for HD yet  LUE AVF seems poorly mature- please ask vascular to assess to see if needs angio
Patient feeling fine, denies any complaints. Diarrhea improved, one BM overnight. No other complaints or events reported.  General:  HEENT:  Lungs:  Heart:  Abdomen:  Extremities:    Labs reviewed    Patient with improved leucocytosis, no more diarrhea. Continue on Vancomycin/Metronidazole for total 10 days.   Pending barium swallow, encourage PO intake. Na improving on IVF. cam discuss with Nephrology initiation of PO Bicarb.  Repeat CHEM in afternoon, if improved can DC to [facility]  Vascular follow-up as outpatient  DVT ppx
Patient reports feeling fine, denies any complaints. No abdominal pain, no N.V, diarrhea improved. No other complaints or events reported    Labs, imaging reviewed    Leucocytosis improving, diarrhea improving. Continue on Vancomycin and Metronidazole. Advance diet  Na and Bicarb improving, appreciate Nephrology  DVT ppx
Patient reports feeling better, denies abdominal pain, no N.V, reports 2 BMs this morning. No other complaints or events reported.    General: AOX2, NAD, lying in bed, no labored breathing on RA  HEENT: AT/NC. mp facial asymmetry  Lungs: poor inspiratin, no crackles  Abdomen: soft, non-distended, no tenderness, no guarding, + BS    Labs, imaging reviewed    Sepsis  Severe C diff colitis  CKD V  Hypernatremia  Elevated troponin   Decubitus ulcer    Continue on Vancomycin PO/Metronidazole IV, serial abdominal exams, BMs count. If decrease in BM or change in abdominal exam, will get surgery evaluation  Monitor creatinine, hold nephrotoxics. Gentle IV fluids, monitor UOP  CHEM q6h  Probably type II, monitor  Wound acre consult   SLP evaluation  DVT ppx

## 2023-10-28 NOTE — PROGRESS NOTE ADULT - SUBJECTIVE AND OBJECTIVE BOX
Patient is a 58y Male seen and evaluated at bedside.       Meds:    acetaminophen     Tablet .. 650 every 6 hours PRN  aspirin  chewable 81 daily  atorvastatin 80 at bedtime  clopidogrel Tablet 75 daily  dextrose 5% 1000 <Continuous>  dextrose 5%. 1000 <Continuous>  dextrose 5%. 1000 <Continuous>  dextrose 50% Injectable 12.5 once  dextrose 50% Injectable 25 once  dextrose 50% Injectable 25 once  dextrose Oral Gel 15 once PRN  glucagon  Injectable 1 once  heparin   Injectable 5000 every 8 hours  influenza   Vaccine 0.5 once  insulin lispro (ADMELOG) corrective regimen sliding scale  Before meals and at bedtime  metroNIDAZOLE    Tablet 500 every 8 hours  tamsulosin 0.4 at bedtime  vancomycin    Solution 500 every 6 hours      T(C): , Max: 37.2 (10-27-23 @ 21:55)  T(F): , Max: 98.9 (10-27-23 @ 21:55)  HR: 100 (10-28-23 @ 14:22)  BP: 162/78 (10-28-23 @ 14:22)  BP(mean): 106 (10-27-23 @ 21:55)  RR: 18 (10-28-23 @ 14:22)  SpO2: 95% (10-28-23 @ 14:22)  Wt(kg): --    10-27 @ 07:01  -  10-28 @ 07:00  --------------------------------------------------------  IN: 0 mL / OUT: 600 mL / NET: -600 mL          Review of Systems:  ROS negative except as per HPI      PHYSICAL EXAM:  GENERAL: NAD, lying in bed comfortably, AAOx1-2 (place, person)  HEART: Regular rate and rhythm, no murmurs, rubs, or gallops  LUNGS: Unlabored respirations.  Clear to auscultation bilaterally, no crackles, wheezing, or rhonchi  EXTREMITIES: Extremities: R arm contracted; R leg bka clean stump; LUE with AV fistula- bruit and palpable thrill appreciated over fistula site but it doesn't appear to be well-developed throughout the arm       LABS:                        9.8    11.11 )-----------( 376      ( 27 Oct 2023 05:30 )             33.5     10-27    147<H>  |  115<H>  |  95<H>  ----------------------------<  66<L>  3.9   |  18<L>  |  5.97<H>    Ca    8.8      27 Oct 2023 05:30  Phos  4.3     10-27  Mg     2.0     10-27    TPro  6.3  /  Alb  2.9<L>  /  TBili  0.2  /  DBili  x   /  AST  13  /  ALT  10  /  AlkPhos  79  10-27        Urinalysis Basic - ( 27 Oct 2023 05:30 )    Color: x / Appearance: x / SG: x / pH: x  Gluc: 66 mg/dL / Ketone: x  / Bili: x / Urobili: x   Blood: x / Protein: x / Nitrite: x   Leuk Esterase: x / RBC: x / WBC x   Sq Epi: x / Non Sq Epi: x / Bacteria: x            RADIOLOGY & ADDITIONAL STUDIES:

## 2023-10-28 NOTE — DISCHARGE NOTE NURSING/CASE MANAGEMENT/SOCIAL WORK - PATIENT PORTAL LINK FT
You can access the FollowMyHealth Patient Portal offered by [facility] by registering at the following website: [email or web address]. By joining [facility]’s FollowMyHealth portal, you will also be able to view your health information using other applications (apps) compatible with our system.

## 2023-10-28 NOTE — DISCHARGE NOTE NURSING/CASE MANAGEMENT/SOCIAL WORK - NSDCVIVACCINE_GEN_ALL_CORE_FT
influenza, injectable, quadrivalent, preservative free; 19-Feb-2019 10:20; Sandy Naranjo (RN); [employer]; [id number] (Exp. Date: 30-Jun-2019); IntraMuscular; Deltoid Left.; 0.5 milliLiter(s); VIS (VIS Published: 07-Aug-2015, VIS Presented: 19-Feb-2019);

## 2023-10-28 NOTE — CHART NOTE - NSCHARTNOTEFT_GEN_A_CORE
Resident notified by RN that pt has lost all his IV access at 6am. RN has attempted reinsertion of peripheral IV but with no success.     Resident approached pt bedside with ultrasound machine. Pt refused further attempts of IV placement.     Resident notified pt that he has standing medication (Flagyl) that needs peripheral IV access and missing that medication may lead to worsening of his current disease. Pt was also notified that there is an oral version of Flagyl that may not provide sufficient coverage. Pt is AOx1 and was not amendable.

## 2023-10-28 NOTE — CHART NOTE - NSCHARTNOTESELECT_GEN_ALL_CORE
Patient arrived via EMS from [facility] ED. Report said patient had been oppositional, agitated but patient was calm and cooperative when admitted. Skin check was performed without any incident. Patient was shown to room, oriented to unit without incident. Patient was cooperative but became very agitated and was shown to his room and given snacks and a drink. Patient has shown aggression to his family and family said he was ," walking around with a fishing pool, talking to fish that were not there. Pt. Belongings were searched and body was assessed and WNL. Will continue to monitor for safety. Event Note

## 2023-10-29 LAB
CULTURE RESULTS: SIGNIFICANT CHANGE UP
SPECIMEN SOURCE: SIGNIFICANT CHANGE UP

## 2023-11-01 NOTE — ED ADULT NURSE NOTE - CAS TRG GENERAL AIRWAY, MLM
Post-Care Instructions: I reviewed with the patient in detail post-care instructions. Patient is to wear sunprotection, and avoid picking at any of the treated lesions. Pt may apply Vaseline to crusted or scabbing areas. Duration Of Freeze Thaw-Cycle (Seconds): 2 Render Note In Bullet Format When Appropriate: No Show Applicator Variable?: Yes Consent: The patient's consent was obtained including but not limited to risks of crusting, scabbing, blistering, scarring, darker or lighter pigmentary change, recurrence, incomplete removal and infection. Detail Level: Detailed Patent

## 2023-11-04 DIAGNOSIS — I69.954 HEMIPLEGIA AND HEMIPARESIS FOLLOWING UNSPECIFIED CEREBROVASCULAR DISEASE AFFECTING LEFT NON-DOMINANT SIDE: ICD-10-CM

## 2023-11-04 DIAGNOSIS — E86.1 HYPOVOLEMIA: ICD-10-CM

## 2023-11-04 DIAGNOSIS — R13.10 DYSPHAGIA, UNSPECIFIED: ICD-10-CM

## 2023-11-04 DIAGNOSIS — L89.152 PRESSURE ULCER OF SACRAL REGION, STAGE 2: ICD-10-CM

## 2023-11-04 DIAGNOSIS — L89.623 PRESSURE ULCER OF LEFT HEEL, STAGE 3: ICD-10-CM

## 2023-11-04 DIAGNOSIS — A41.89 OTHER SPECIFIED SEPSIS: ICD-10-CM

## 2023-11-04 DIAGNOSIS — F03.90 UNSPECIFIED DEMENTIA WITHOUT BEHAVIORAL DISTURBANCE: ICD-10-CM

## 2023-11-04 DIAGNOSIS — Z79.82 LONG TERM (CURRENT) USE OF ASPIRIN: ICD-10-CM

## 2023-11-04 DIAGNOSIS — A04.72 ENTEROCOLITIS DUE TO CLOSTRIDIUM DIFFICILE, NOT SPECIFIED AS RECURRENT: ICD-10-CM

## 2023-11-04 DIAGNOSIS — N18.5 CHRONIC KIDNEY DISEASE, STAGE 5: ICD-10-CM

## 2023-11-04 DIAGNOSIS — E11.51 TYPE 2 DIABETES MELLITUS WITH DIABETIC PERIPHERAL ANGIOPATHY WITHOUT GANGRENE: ICD-10-CM

## 2023-11-04 DIAGNOSIS — Z79.02 LONG TERM (CURRENT) USE OF ANTITHROMBOTICS/ANTIPLATELETS: ICD-10-CM

## 2023-11-04 DIAGNOSIS — I24.89 OTHER FORMS OF ACUTE ISCHEMIC HEART DISEASE: ICD-10-CM

## 2023-11-04 DIAGNOSIS — E87.20 ACIDOSIS, UNSPECIFIED: ICD-10-CM

## 2023-11-04 DIAGNOSIS — E11.22 TYPE 2 DIABETES MELLITUS WITH DIABETIC CHRONIC KIDNEY DISEASE: ICD-10-CM

## 2023-11-04 DIAGNOSIS — Z79.4 LONG TERM (CURRENT) USE OF INSULIN: ICD-10-CM

## 2023-11-04 DIAGNOSIS — I12.0 HYPERTENSIVE CHRONIC KIDNEY DISEASE WITH STAGE 5 CHRONIC KIDNEY DISEASE OR END STAGE RENAL DISEASE: ICD-10-CM

## 2023-11-04 DIAGNOSIS — Z89.511 ACQUIRED ABSENCE OF RIGHT LEG BELOW KNEE: ICD-10-CM

## 2023-11-04 DIAGNOSIS — I69.392 FACIAL WEAKNESS FOLLOWING CEREBRAL INFARCTION: ICD-10-CM

## 2023-11-04 DIAGNOSIS — E78.5 HYPERLIPIDEMIA, UNSPECIFIED: ICD-10-CM

## 2023-11-04 DIAGNOSIS — E87.0 HYPEROSMOLALITY AND HYPERNATREMIA: ICD-10-CM

## 2023-11-08 ENCOUNTER — APPOINTMENT (OUTPATIENT)
Dept: ENDOCRINOLOGY | Facility: CLINIC | Age: 58
End: 2023-11-08

## 2023-11-09 ENCOUNTER — APPOINTMENT (OUTPATIENT)
Dept: NEPHROLOGY | Facility: CLINIC | Age: 58
End: 2023-11-09

## 2023-11-27 ENCOUNTER — APPOINTMENT (OUTPATIENT)
Dept: HEART AND VASCULAR | Facility: CLINIC | Age: 58
End: 2023-11-27
Payer: MEDICAID

## 2023-11-27 VITALS — HEART RATE: 105 BPM | SYSTOLIC BLOOD PRESSURE: 176 MMHG | HEIGHT: 70 IN | DIASTOLIC BLOOD PRESSURE: 116 MMHG

## 2023-11-27 PROCEDURE — 93285 PRGRMG DEV EVAL SCRMS IP: CPT

## 2023-11-27 RX ORDER — NIFEDIPINE 60 MG/1
60 TABLET, FILM COATED, EXTENDED RELEASE ORAL
Refills: 0 | Status: DISCONTINUED | COMMUNITY
End: 2023-11-27

## 2023-11-27 RX ORDER — NIFEDIPINE 10 MG/1
10 CAPSULE ORAL
Refills: 0 | Status: ACTIVE | COMMUNITY

## 2023-11-27 RX ORDER — FINASTERIDE 5 MG/1
5 TABLET, FILM COATED ORAL
Refills: 0 | Status: ACTIVE | COMMUNITY

## 2023-11-27 RX ORDER — INSULIN LISPRO 100 [IU]/ML
INJECTION, SOLUTION INTRAVENOUS; SUBCUTANEOUS
Refills: 0 | Status: ACTIVE | COMMUNITY

## 2023-11-27 RX ORDER — TAMSULOSIN HYDROCHLORIDE 0.4 MG/1
0.4 CAPSULE ORAL
Refills: 0 | Status: ACTIVE | COMMUNITY

## 2023-11-27 RX ORDER — CARVEDILOL 3.12 MG/1
3.12 TABLET, FILM COATED ORAL
Refills: 0 | Status: ACTIVE | COMMUNITY

## 2023-12-14 VITALS
DIASTOLIC BLOOD PRESSURE: 60 MMHG | SYSTOLIC BLOOD PRESSURE: 99 MMHG | HEART RATE: 102 BPM | TEMPERATURE: 99 F | RESPIRATION RATE: 18 BRPM | HEIGHT: 72 IN | OXYGEN SATURATION: 95 % | WEIGHT: 130.07 LBS

## 2023-12-14 PROCEDURE — 99285 EMERGENCY DEPT VISIT HI MDM: CPT

## 2023-12-15 ENCOUNTER — INPATIENT (INPATIENT)
Facility: HOSPITAL | Age: 58
LOS: 5 days | Discharge: NURSING, REHAB OR LONG-TERM CARE | DRG: 871 | End: 2023-12-21
Attending: INTERNAL MEDICINE | Admitting: STUDENT IN AN ORGANIZED HEALTH CARE EDUCATION/TRAINING PROGRAM
Payer: MEDICAID

## 2023-12-15 DIAGNOSIS — E11.9 TYPE 2 DIABETES MELLITUS WITHOUT COMPLICATIONS: ICD-10-CM

## 2023-12-15 DIAGNOSIS — N18.6 END STAGE RENAL DISEASE: ICD-10-CM

## 2023-12-15 DIAGNOSIS — I10 ESSENTIAL (PRIMARY) HYPERTENSION: ICD-10-CM

## 2023-12-15 DIAGNOSIS — F03.90 UNSPECIFIED DEMENTIA WITHOUT BEHAVIORAL DISTURBANCE: ICD-10-CM

## 2023-12-15 DIAGNOSIS — N40.0 BENIGN PROSTATIC HYPERPLASIA WITHOUT LOWER URINARY TRACT SYMPTOMS: ICD-10-CM

## 2023-12-15 DIAGNOSIS — K59.00 CONSTIPATION, UNSPECIFIED: ICD-10-CM

## 2023-12-15 DIAGNOSIS — R65.10 SYSTEMIC INFLAMMATORY RESPONSE SYNDROME (SIRS) OF NON-INFECTIOUS ORIGIN WITHOUT ACUTE ORGAN DYSFUNCTION: ICD-10-CM

## 2023-12-15 DIAGNOSIS — E78.5 HYPERLIPIDEMIA, UNSPECIFIED: ICD-10-CM

## 2023-12-15 DIAGNOSIS — N17.9 ACUTE KIDNEY FAILURE, UNSPECIFIED: ICD-10-CM

## 2023-12-15 DIAGNOSIS — Z29.9 ENCOUNTER FOR PROPHYLACTIC MEASURES, UNSPECIFIED: ICD-10-CM

## 2023-12-15 DIAGNOSIS — Z86.73 PERSONAL HISTORY OF TRANSIENT ISCHEMIC ATTACK (TIA), AND CEREBRAL INFARCTION WITHOUT RESIDUAL DEFICITS: ICD-10-CM

## 2023-12-15 LAB
A1C WITH ESTIMATED AVERAGE GLUCOSE RESULT: 6.1 % — HIGH (ref 4–5.6)
A1C WITH ESTIMATED AVERAGE GLUCOSE RESULT: 6.1 % — HIGH (ref 4–5.6)
ACANTHOCYTES BLD QL SMEAR: SLIGHT — SIGNIFICANT CHANGE UP
ACANTHOCYTES BLD QL SMEAR: SLIGHT — SIGNIFICANT CHANGE UP
ALBUMIN SERPL ELPH-MCNC: 2.9 G/DL — LOW (ref 3.3–5)
ALBUMIN SERPL ELPH-MCNC: 2.9 G/DL — LOW (ref 3.3–5)
ALP SERPL-CCNC: 81 U/L — SIGNIFICANT CHANGE UP (ref 40–120)
ALP SERPL-CCNC: 81 U/L — SIGNIFICANT CHANGE UP (ref 40–120)
ALT FLD-CCNC: 14 U/L — SIGNIFICANT CHANGE UP (ref 10–45)
ALT FLD-CCNC: 14 U/L — SIGNIFICANT CHANGE UP (ref 10–45)
ANION GAP SERPL CALC-SCNC: 15 MMOL/L — SIGNIFICANT CHANGE UP (ref 5–17)
ANISOCYTOSIS BLD QL: SLIGHT — SIGNIFICANT CHANGE UP
APPEARANCE UR: ABNORMAL
APPEARANCE UR: ABNORMAL
AST SERPL-CCNC: 21 U/L — SIGNIFICANT CHANGE UP (ref 10–40)
AST SERPL-CCNC: 21 U/L — SIGNIFICANT CHANGE UP (ref 10–40)
BACTERIA # UR AUTO: ABNORMAL /HPF
BACTERIA # UR AUTO: ABNORMAL /HPF
BASOPHILS # BLD AUTO: 0 K/UL — SIGNIFICANT CHANGE UP (ref 0–0.2)
BASOPHILS # BLD AUTO: 0 K/UL — SIGNIFICANT CHANGE UP (ref 0–0.2)
BASOPHILS # BLD AUTO: 0.31 K/UL — HIGH (ref 0–0.2)
BASOPHILS # BLD AUTO: 0.31 K/UL — HIGH (ref 0–0.2)
BASOPHILS NFR BLD AUTO: 0 % — SIGNIFICANT CHANGE UP (ref 0–2)
BASOPHILS NFR BLD AUTO: 0 % — SIGNIFICANT CHANGE UP (ref 0–2)
BASOPHILS NFR BLD AUTO: 0.9 % — SIGNIFICANT CHANGE UP (ref 0–2)
BASOPHILS NFR BLD AUTO: 0.9 % — SIGNIFICANT CHANGE UP (ref 0–2)
BILIRUB SERPL-MCNC: 0.2 MG/DL — SIGNIFICANT CHANGE UP (ref 0.2–1.2)
BILIRUB SERPL-MCNC: 0.2 MG/DL — SIGNIFICANT CHANGE UP (ref 0.2–1.2)
BILIRUB UR-MCNC: NEGATIVE — SIGNIFICANT CHANGE UP
BILIRUB UR-MCNC: NEGATIVE — SIGNIFICANT CHANGE UP
BUN SERPL-MCNC: 144 MG/DL — HIGH (ref 7–23)
BUN SERPL-MCNC: 144 MG/DL — HIGH (ref 7–23)
BUN SERPL-MCNC: 147 MG/DL — HIGH (ref 7–23)
BUN SERPL-MCNC: 147 MG/DL — HIGH (ref 7–23)
BURR CELLS BLD QL SMEAR: PRESENT — SIGNIFICANT CHANGE UP
CALCIUM SERPL-MCNC: 8.8 MG/DL — SIGNIFICANT CHANGE UP (ref 8.4–10.5)
CALCIUM SERPL-MCNC: 8.8 MG/DL — SIGNIFICANT CHANGE UP (ref 8.4–10.5)
CALCIUM SERPL-MCNC: 8.9 MG/DL — SIGNIFICANT CHANGE UP (ref 8.4–10.5)
CALCIUM SERPL-MCNC: 8.9 MG/DL — SIGNIFICANT CHANGE UP (ref 8.4–10.5)
CAST: 0 /LPF — SIGNIFICANT CHANGE UP (ref 0–4)
CAST: 0 /LPF — SIGNIFICANT CHANGE UP (ref 0–4)
CHLORIDE SERPL-SCNC: 110 MMOL/L — HIGH (ref 96–108)
CHLORIDE SERPL-SCNC: 110 MMOL/L — HIGH (ref 96–108)
CHLORIDE SERPL-SCNC: 111 MMOL/L — HIGH (ref 96–108)
CHLORIDE SERPL-SCNC: 111 MMOL/L — HIGH (ref 96–108)
CO2 SERPL-SCNC: 19 MMOL/L — LOW (ref 22–31)
CO2 SERPL-SCNC: 19 MMOL/L — LOW (ref 22–31)
CO2 SERPL-SCNC: 21 MMOL/L — LOW (ref 22–31)
CO2 SERPL-SCNC: 21 MMOL/L — LOW (ref 22–31)
COLOR SPEC: YELLOW — SIGNIFICANT CHANGE UP
COLOR SPEC: YELLOW — SIGNIFICANT CHANGE UP
CREAT ?TM UR-MCNC: 65 MG/DL — SIGNIFICANT CHANGE UP
CREAT ?TM UR-MCNC: 65 MG/DL — SIGNIFICANT CHANGE UP
CREAT SERPL-MCNC: 6.16 MG/DL — HIGH (ref 0.5–1.3)
CREAT SERPL-MCNC: 6.16 MG/DL — HIGH (ref 0.5–1.3)
CREAT SERPL-MCNC: 6.38 MG/DL — HIGH (ref 0.5–1.3)
CREAT SERPL-MCNC: 6.38 MG/DL — HIGH (ref 0.5–1.3)
DACRYOCYTES BLD QL SMEAR: SLIGHT — SIGNIFICANT CHANGE UP
DACRYOCYTES BLD QL SMEAR: SLIGHT — SIGNIFICANT CHANGE UP
DIFF PNL FLD: NEGATIVE — SIGNIFICANT CHANGE UP
DIFF PNL FLD: NEGATIVE — SIGNIFICANT CHANGE UP
EGFR: 10 ML/MIN/1.73M2 — LOW
EGFR: 10 ML/MIN/1.73M2 — LOW
EGFR: 9 ML/MIN/1.73M2 — LOW
EGFR: 9 ML/MIN/1.73M2 — LOW
EOSINOPHIL # BLD AUTO: 0 K/UL — SIGNIFICANT CHANGE UP (ref 0–0.5)
EOSINOPHIL NFR BLD AUTO: 0 % — SIGNIFICANT CHANGE UP (ref 0–6)
ESTIMATED AVERAGE GLUCOSE: 128 MG/DL — HIGH (ref 68–114)
ESTIMATED AVERAGE GLUCOSE: 128 MG/DL — HIGH (ref 68–114)
FERRITIN SERPL-MCNC: 587 NG/ML — HIGH (ref 30–400)
FERRITIN SERPL-MCNC: 587 NG/ML — HIGH (ref 30–400)
FOLATE SERPL-MCNC: 15.3 NG/ML — SIGNIFICANT CHANGE UP
FOLATE SERPL-MCNC: 15.3 NG/ML — SIGNIFICANT CHANGE UP
GIANT PLATELETS BLD QL SMEAR: PRESENT — SIGNIFICANT CHANGE UP
GIANT PLATELETS BLD QL SMEAR: PRESENT — SIGNIFICANT CHANGE UP
GLUCOSE BLDC GLUCOMTR-MCNC: 123 MG/DL — HIGH (ref 70–99)
GLUCOSE BLDC GLUCOMTR-MCNC: 123 MG/DL — HIGH (ref 70–99)
GLUCOSE BLDC GLUCOMTR-MCNC: 137 MG/DL — HIGH (ref 70–99)
GLUCOSE BLDC GLUCOMTR-MCNC: 137 MG/DL — HIGH (ref 70–99)
GLUCOSE BLDC GLUCOMTR-MCNC: 148 MG/DL — HIGH (ref 70–99)
GLUCOSE BLDC GLUCOMTR-MCNC: 148 MG/DL — HIGH (ref 70–99)
GLUCOSE BLDC GLUCOMTR-MCNC: 231 MG/DL — HIGH (ref 70–99)
GLUCOSE BLDC GLUCOMTR-MCNC: 231 MG/DL — HIGH (ref 70–99)
GLUCOSE BLDC GLUCOMTR-MCNC: 34 MG/DL — CRITICAL LOW (ref 70–99)
GLUCOSE BLDC GLUCOMTR-MCNC: 34 MG/DL — CRITICAL LOW (ref 70–99)
GLUCOSE BLDC GLUCOMTR-MCNC: 35 MG/DL — CRITICAL LOW (ref 70–99)
GLUCOSE BLDC GLUCOMTR-MCNC: 35 MG/DL — CRITICAL LOW (ref 70–99)
GLUCOSE BLDC GLUCOMTR-MCNC: 36 MG/DL — CRITICAL LOW (ref 70–99)
GLUCOSE BLDC GLUCOMTR-MCNC: 36 MG/DL — CRITICAL LOW (ref 70–99)
GLUCOSE SERPL-MCNC: 159 MG/DL — HIGH (ref 70–99)
GLUCOSE SERPL-MCNC: 159 MG/DL — HIGH (ref 70–99)
GLUCOSE SERPL-MCNC: 187 MG/DL — HIGH (ref 70–99)
GLUCOSE SERPL-MCNC: 187 MG/DL — HIGH (ref 70–99)
GLUCOSE UR QL: NEGATIVE MG/DL — SIGNIFICANT CHANGE UP
GLUCOSE UR QL: NEGATIVE MG/DL — SIGNIFICANT CHANGE UP
HBV SURFACE AB SER-ACNC: SIGNIFICANT CHANGE UP
HBV SURFACE AB SER-ACNC: SIGNIFICANT CHANGE UP
HBV SURFACE AG SER-ACNC: SIGNIFICANT CHANGE UP
HBV SURFACE AG SER-ACNC: SIGNIFICANT CHANGE UP
HCT VFR BLD CALC: 28.3 % — LOW (ref 39–50)
HCT VFR BLD CALC: 28.3 % — LOW (ref 39–50)
HCT VFR BLD CALC: 29.8 % — LOW (ref 39–50)
HCT VFR BLD CALC: 29.8 % — LOW (ref 39–50)
HCV AB S/CO SERPL IA: 0.05 S/CO — SIGNIFICANT CHANGE UP
HCV AB S/CO SERPL IA: 0.05 S/CO — SIGNIFICANT CHANGE UP
HCV AB SERPL-IMP: SIGNIFICANT CHANGE UP
HCV AB SERPL-IMP: SIGNIFICANT CHANGE UP
HGB BLD-MCNC: 8.5 G/DL — LOW (ref 13–17)
HGB BLD-MCNC: 8.5 G/DL — LOW (ref 13–17)
HGB BLD-MCNC: 9.2 G/DL — LOW (ref 13–17)
HGB BLD-MCNC: 9.2 G/DL — LOW (ref 13–17)
HYPOCHROMIA BLD QL: SLIGHT — SIGNIFICANT CHANGE UP
HYPOCHROMIA BLD QL: SLIGHT — SIGNIFICANT CHANGE UP
IRON SATN MFR SERPL: 11 % — LOW (ref 16–55)
IRON SATN MFR SERPL: 11 % — LOW (ref 16–55)
IRON SATN MFR SERPL: 12 UG/DL — LOW (ref 45–165)
IRON SATN MFR SERPL: 12 UG/DL — LOW (ref 45–165)
KETONES UR-MCNC: NEGATIVE MG/DL — SIGNIFICANT CHANGE UP
KETONES UR-MCNC: NEGATIVE MG/DL — SIGNIFICANT CHANGE UP
LACTATE SERPL-SCNC: 1.9 MMOL/L — SIGNIFICANT CHANGE UP (ref 0.5–2)
LACTATE SERPL-SCNC: 1.9 MMOL/L — SIGNIFICANT CHANGE UP (ref 0.5–2)
LEUKOCYTE ESTERASE UR-ACNC: ABNORMAL
LEUKOCYTE ESTERASE UR-ACNC: ABNORMAL
LYMPHOCYTES # BLD AUTO: 0.64 K/UL — LOW (ref 1–3.3)
LYMPHOCYTES # BLD AUTO: 0.64 K/UL — LOW (ref 1–3.3)
LYMPHOCYTES # BLD AUTO: 1.7 % — LOW (ref 13–44)
LYMPHOCYTES # BLD AUTO: 1.7 % — LOW (ref 13–44)
LYMPHOCYTES # BLD AUTO: 1.81 K/UL — SIGNIFICANT CHANGE UP (ref 1–3.3)
LYMPHOCYTES # BLD AUTO: 1.81 K/UL — SIGNIFICANT CHANGE UP (ref 1–3.3)
LYMPHOCYTES # BLD AUTO: 5.2 % — LOW (ref 13–44)
LYMPHOCYTES # BLD AUTO: 5.2 % — LOW (ref 13–44)
MACROCYTES BLD QL: SLIGHT — SIGNIFICANT CHANGE UP
MAGNESIUM SERPL-MCNC: 2 MG/DL — SIGNIFICANT CHANGE UP (ref 1.6–2.6)
MANUAL SMEAR VERIFICATION: SIGNIFICANT CHANGE UP
MCHC RBC-ENTMCNC: 27.2 PG — SIGNIFICANT CHANGE UP (ref 27–34)
MCHC RBC-ENTMCNC: 27.2 PG — SIGNIFICANT CHANGE UP (ref 27–34)
MCHC RBC-ENTMCNC: 27.7 PG — SIGNIFICANT CHANGE UP (ref 27–34)
MCHC RBC-ENTMCNC: 27.7 PG — SIGNIFICANT CHANGE UP (ref 27–34)
MCHC RBC-ENTMCNC: 30 GM/DL — LOW (ref 32–36)
MCHC RBC-ENTMCNC: 30 GM/DL — LOW (ref 32–36)
MCHC RBC-ENTMCNC: 30.9 GM/DL — LOW (ref 32–36)
MCHC RBC-ENTMCNC: 30.9 GM/DL — LOW (ref 32–36)
MCV RBC AUTO: 89.8 FL — SIGNIFICANT CHANGE UP (ref 80–100)
MCV RBC AUTO: 89.8 FL — SIGNIFICANT CHANGE UP (ref 80–100)
MCV RBC AUTO: 90.7 FL — SIGNIFICANT CHANGE UP (ref 80–100)
MCV RBC AUTO: 90.7 FL — SIGNIFICANT CHANGE UP (ref 80–100)
MICROCYTES BLD QL: SLIGHT — SIGNIFICANT CHANGE UP
MONOCYTES # BLD AUTO: 0 K/UL — SIGNIFICANT CHANGE UP (ref 0–0.9)
MONOCYTES # BLD AUTO: 0 K/UL — SIGNIFICANT CHANGE UP (ref 0–0.9)
MONOCYTES # BLD AUTO: 1.85 K/UL — HIGH (ref 0–0.9)
MONOCYTES # BLD AUTO: 1.85 K/UL — HIGH (ref 0–0.9)
MONOCYTES NFR BLD AUTO: 0 % — LOW (ref 2–14)
MONOCYTES NFR BLD AUTO: 0 % — LOW (ref 2–14)
MONOCYTES NFR BLD AUTO: 5.3 % — SIGNIFICANT CHANGE UP (ref 2–14)
MONOCYTES NFR BLD AUTO: 5.3 % — SIGNIFICANT CHANGE UP (ref 2–14)
MYELOCYTES NFR BLD: 0.9 % — HIGH (ref 0–0)
MYELOCYTES NFR BLD: 0.9 % — HIGH (ref 0–0)
NEUTROPHILS # BLD AUTO: 30.56 K/UL — HIGH (ref 1.8–7.4)
NEUTROPHILS # BLD AUTO: 30.56 K/UL — HIGH (ref 1.8–7.4)
NEUTROPHILS # BLD AUTO: 36.78 K/UL — HIGH (ref 1.8–7.4)
NEUTROPHILS # BLD AUTO: 36.78 K/UL — HIGH (ref 1.8–7.4)
NEUTROPHILS NFR BLD AUTO: 86 % — HIGH (ref 43–77)
NEUTROPHILS NFR BLD AUTO: 86 % — HIGH (ref 43–77)
NEUTROPHILS NFR BLD AUTO: 98.3 % — HIGH (ref 43–77)
NEUTROPHILS NFR BLD AUTO: 98.3 % — HIGH (ref 43–77)
NEUTS BAND # BLD: 1.7 % — SIGNIFICANT CHANGE UP (ref 0–8)
NEUTS BAND # BLD: 1.7 % — SIGNIFICANT CHANGE UP (ref 0–8)
NITRITE UR-MCNC: NEGATIVE — SIGNIFICANT CHANGE UP
NITRITE UR-MCNC: NEGATIVE — SIGNIFICANT CHANGE UP
NRBC # BLD: 0 /100 WBCS — SIGNIFICANT CHANGE UP (ref 0–0)
NRBC # BLD: 0 /100 WBCS — SIGNIFICANT CHANGE UP (ref 0–0)
OSMOLALITY UR: 378 MOSM/KG — SIGNIFICANT CHANGE UP (ref 300–900)
OSMOLALITY UR: 378 MOSM/KG — SIGNIFICANT CHANGE UP (ref 300–900)
OVALOCYTES BLD QL SMEAR: SLIGHT — SIGNIFICANT CHANGE UP
PH UR: 5.5 — SIGNIFICANT CHANGE UP (ref 5–8)
PH UR: 5.5 — SIGNIFICANT CHANGE UP (ref 5–8)
PHOSPHATE SERPL-MCNC: 4 MG/DL — SIGNIFICANT CHANGE UP (ref 2.5–4.5)
PHOSPHATE SERPL-MCNC: 4 MG/DL — SIGNIFICANT CHANGE UP (ref 2.5–4.5)
PLAT MORPH BLD: ABNORMAL
PLATELET # BLD AUTO: 375 K/UL — SIGNIFICANT CHANGE UP (ref 150–400)
PLATELET # BLD AUTO: 375 K/UL — SIGNIFICANT CHANGE UP (ref 150–400)
PLATELET # BLD AUTO: 385 K/UL — SIGNIFICANT CHANGE UP (ref 150–400)
PLATELET # BLD AUTO: 385 K/UL — SIGNIFICANT CHANGE UP (ref 150–400)
POIKILOCYTOSIS BLD QL AUTO: SIGNIFICANT CHANGE UP
POLYCHROMASIA BLD QL SMEAR: SLIGHT — SIGNIFICANT CHANGE UP
POLYCHROMASIA BLD QL SMEAR: SLIGHT — SIGNIFICANT CHANGE UP
POTASSIUM SERPL-MCNC: 4.5 MMOL/L — SIGNIFICANT CHANGE UP (ref 3.5–5.3)
POTASSIUM SERPL-SCNC: 4.5 MMOL/L — SIGNIFICANT CHANGE UP (ref 3.5–5.3)
POTASSIUM UR-SCNC: 18 MMOL/L — SIGNIFICANT CHANGE UP
POTASSIUM UR-SCNC: 18 MMOL/L — SIGNIFICANT CHANGE UP
PROCALCITONIN SERPL-MCNC: 1.96 NG/ML — HIGH (ref 0.02–0.1)
PROCALCITONIN SERPL-MCNC: 1.96 NG/ML — HIGH (ref 0.02–0.1)
PROT ?TM UR-MCNC: 81 MG/DL — HIGH (ref 0–12)
PROT ?TM UR-MCNC: 81 MG/DL — HIGH (ref 0–12)
PROT SERPL-MCNC: 6.2 G/DL — SIGNIFICANT CHANGE UP (ref 6–8.3)
PROT SERPL-MCNC: 6.2 G/DL — SIGNIFICANT CHANGE UP (ref 6–8.3)
PROT UR-MCNC: 100 MG/DL
PROT UR-MCNC: 100 MG/DL
PROT/CREAT UR-RTO: 1.2 RATIO — HIGH (ref 0–0.2)
PROT/CREAT UR-RTO: 1.2 RATIO — HIGH (ref 0–0.2)
RBC # BLD: 3.12 M/UL — LOW (ref 4.2–5.8)
RBC # BLD: 3.12 M/UL — LOW (ref 4.2–5.8)
RBC # BLD: 3.32 M/UL — LOW (ref 4.2–5.8)
RBC # BLD: 3.32 M/UL — LOW (ref 4.2–5.8)
RBC # FLD: 16.4 % — HIGH (ref 10.3–14.5)
RBC # FLD: 16.4 % — HIGH (ref 10.3–14.5)
RBC # FLD: 16.5 % — HIGH (ref 10.3–14.5)
RBC # FLD: 16.5 % — HIGH (ref 10.3–14.5)
RBC BLD AUTO: ABNORMAL
RBC CASTS # UR COMP ASSIST: 0 /HPF — SIGNIFICANT CHANGE UP (ref 0–4)
RBC CASTS # UR COMP ASSIST: 0 /HPF — SIGNIFICANT CHANGE UP (ref 0–4)
SCHISTOCYTES BLD QL AUTO: SLIGHT — SIGNIFICANT CHANGE UP
SODIUM SERPL-SCNC: 145 MMOL/L — SIGNIFICANT CHANGE UP (ref 135–145)
SODIUM SERPL-SCNC: 145 MMOL/L — SIGNIFICANT CHANGE UP (ref 135–145)
SODIUM SERPL-SCNC: 146 MMOL/L — HIGH (ref 135–145)
SODIUM SERPL-SCNC: 146 MMOL/L — HIGH (ref 135–145)
SODIUM UR-SCNC: 28 MMOL/L — SIGNIFICANT CHANGE UP
SODIUM UR-SCNC: 28 MMOL/L — SIGNIFICANT CHANGE UP
SP GR SPEC: 1.02 — SIGNIFICANT CHANGE UP (ref 1–1.03)
SP GR SPEC: 1.02 — SIGNIFICANT CHANGE UP (ref 1–1.03)
SPHEROCYTES BLD QL SMEAR: SLIGHT — SIGNIFICANT CHANGE UP
SQUAMOUS # UR AUTO: 1 /HPF — SIGNIFICANT CHANGE UP (ref 0–5)
SQUAMOUS # UR AUTO: 1 /HPF — SIGNIFICANT CHANGE UP (ref 0–5)
TIBC SERPL-MCNC: 111 UG/DL — LOW (ref 220–430)
TIBC SERPL-MCNC: 111 UG/DL — LOW (ref 220–430)
TRANSFERRIN SERPL-MCNC: 85 MG/DL — LOW (ref 200–360)
TRANSFERRIN SERPL-MCNC: 85 MG/DL — LOW (ref 200–360)
UIBC SERPL-MCNC: 99 UG/DL — LOW (ref 110–370)
UIBC SERPL-MCNC: 99 UG/DL — LOW (ref 110–370)
UROBILINOGEN FLD QL: 0.2 MG/DL — SIGNIFICANT CHANGE UP (ref 0.2–1)
UROBILINOGEN FLD QL: 0.2 MG/DL — SIGNIFICANT CHANGE UP (ref 0.2–1)
UUN UR-MCNC: 778 MG/DL — SIGNIFICANT CHANGE UP
UUN UR-MCNC: 778 MG/DL — SIGNIFICANT CHANGE UP
VIT B12 SERPL-MCNC: 1187 PG/ML — SIGNIFICANT CHANGE UP (ref 232–1245)
VIT B12 SERPL-MCNC: 1187 PG/ML — SIGNIFICANT CHANGE UP (ref 232–1245)
WBC # BLD: 34.85 K/UL — HIGH (ref 3.8–10.5)
WBC # BLD: 34.85 K/UL — HIGH (ref 3.8–10.5)
WBC # BLD: 37.42 K/UL — HIGH (ref 3.8–10.5)
WBC # BLD: 37.42 K/UL — HIGH (ref 3.8–10.5)
WBC # FLD AUTO: 34.85 K/UL — HIGH (ref 3.8–10.5)
WBC # FLD AUTO: 34.85 K/UL — HIGH (ref 3.8–10.5)
WBC # FLD AUTO: 37.42 K/UL — HIGH (ref 3.8–10.5)
WBC # FLD AUTO: 37.42 K/UL — HIGH (ref 3.8–10.5)
WBC UR QL: 1 /HPF — SIGNIFICANT CHANGE UP (ref 0–5)
WBC UR QL: 1 /HPF — SIGNIFICANT CHANGE UP (ref 0–5)

## 2023-12-15 PROCEDURE — 99223 1ST HOSP IP/OBS HIGH 75: CPT

## 2023-12-15 PROCEDURE — 99254 IP/OBS CNSLTJ NEW/EST MOD 60: CPT | Mod: GC

## 2023-12-15 PROCEDURE — 93010 ELECTROCARDIOGRAM REPORT: CPT

## 2023-12-15 PROCEDURE — 74018 RADEX ABDOMEN 1 VIEW: CPT | Mod: 26

## 2023-12-15 PROCEDURE — 99223 1ST HOSP IP/OBS HIGH 75: CPT | Mod: GC

## 2023-12-15 PROCEDURE — 99255 IP/OBS CONSLTJ NEW/EST HI 80: CPT

## 2023-12-15 PROCEDURE — 71045 X-RAY EXAM CHEST 1 VIEW: CPT | Mod: 26

## 2023-12-15 RX ORDER — SODIUM CHLORIDE 9 MG/ML
1000 INJECTION INTRAMUSCULAR; INTRAVENOUS; SUBCUTANEOUS ONCE
Refills: 0 | Status: DISCONTINUED | OUTPATIENT
Start: 2023-12-15 | End: 2023-12-15

## 2023-12-15 RX ORDER — PIPERACILLIN AND TAZOBACTAM 4; .5 G/20ML; G/20ML
3.38 INJECTION, POWDER, LYOPHILIZED, FOR SOLUTION INTRAVENOUS ONCE
Refills: 0 | Status: COMPLETED | OUTPATIENT
Start: 2023-12-15 | End: 2023-12-15

## 2023-12-15 RX ORDER — ASPIRIN/CALCIUM CARB/MAGNESIUM 324 MG
81 TABLET ORAL EVERY 24 HOURS
Refills: 0 | Status: DISCONTINUED | OUTPATIENT
Start: 2023-12-15 | End: 2023-12-21

## 2023-12-15 RX ORDER — DEXTROSE 50 % IN WATER 50 %
15 SYRINGE (ML) INTRAVENOUS ONCE
Refills: 0 | Status: DISCONTINUED | OUTPATIENT
Start: 2023-12-15 | End: 2023-12-21

## 2023-12-15 RX ORDER — INFLUENZA VIRUS VACCINE 15; 15; 15; 15 UG/.5ML; UG/.5ML; UG/.5ML; UG/.5ML
0.5 SUSPENSION INTRAMUSCULAR ONCE
Refills: 0 | Status: DISCONTINUED | OUTPATIENT
Start: 2023-12-15 | End: 2023-12-21

## 2023-12-15 RX ORDER — ATORVASTATIN CALCIUM 80 MG/1
80 TABLET, FILM COATED ORAL AT BEDTIME
Refills: 0 | Status: DISCONTINUED | OUTPATIENT
Start: 2023-12-15 | End: 2023-12-21

## 2023-12-15 RX ORDER — HEPARIN SODIUM 5000 [USP'U]/ML
5000 INJECTION INTRAVENOUS; SUBCUTANEOUS EVERY 8 HOURS
Refills: 0 | Status: DISCONTINUED | OUTPATIENT
Start: 2023-12-15 | End: 2023-12-18

## 2023-12-15 RX ORDER — DEXTROSE 50 % IN WATER 50 %
25 SYRINGE (ML) INTRAVENOUS ONCE
Refills: 0 | Status: DISCONTINUED | OUTPATIENT
Start: 2023-12-15 | End: 2023-12-21

## 2023-12-15 RX ORDER — SENNA PLUS 8.6 MG/1
2 TABLET ORAL AT BEDTIME
Refills: 0 | Status: DISCONTINUED | OUTPATIENT
Start: 2023-12-15 | End: 2023-12-18

## 2023-12-15 RX ORDER — INSULIN LISPRO 100/ML
VIAL (ML) SUBCUTANEOUS
Refills: 0 | Status: DISCONTINUED | OUTPATIENT
Start: 2023-12-15 | End: 2023-12-21

## 2023-12-15 RX ORDER — DEXTROSE 50 % IN WATER 50 %
12.5 SYRINGE (ML) INTRAVENOUS ONCE
Refills: 0 | Status: DISCONTINUED | OUTPATIENT
Start: 2023-12-15 | End: 2023-12-21

## 2023-12-15 RX ORDER — VANCOMYCIN HCL 1 G
1000 VIAL (EA) INTRAVENOUS ONCE
Refills: 0 | Status: COMPLETED | OUTPATIENT
Start: 2023-12-15 | End: 2023-12-15

## 2023-12-15 RX ORDER — INSULIN GLARGINE 100 [IU]/ML
15 INJECTION, SOLUTION SUBCUTANEOUS AT BEDTIME
Refills: 0 | Status: DISCONTINUED | OUTPATIENT
Start: 2023-12-15 | End: 2023-12-17

## 2023-12-15 RX ORDER — SODIUM CHLORIDE 9 MG/ML
1000 INJECTION, SOLUTION INTRAVENOUS
Refills: 0 | Status: DISCONTINUED | OUTPATIENT
Start: 2023-12-15 | End: 2023-12-21

## 2023-12-15 RX ORDER — INSULIN LISPRO 100/ML
5 VIAL (ML) SUBCUTANEOUS
Refills: 0 | Status: DISCONTINUED | OUTPATIENT
Start: 2023-12-15 | End: 2023-12-17

## 2023-12-15 RX ORDER — GLUCAGON INJECTION, SOLUTION 0.5 MG/.1ML
1 INJECTION, SOLUTION SUBCUTANEOUS ONCE
Refills: 0 | Status: DISCONTINUED | OUTPATIENT
Start: 2023-12-15 | End: 2023-12-21

## 2023-12-15 RX ORDER — POLYETHYLENE GLYCOL 3350 17 G/17G
17 POWDER, FOR SOLUTION ORAL
Refills: 0 | Status: DISCONTINUED | OUTPATIENT
Start: 2023-12-15 | End: 2023-12-18

## 2023-12-15 RX ORDER — DEXTROSE 50 % IN WATER 50 %
25 SYRINGE (ML) INTRAVENOUS ONCE
Refills: 0 | Status: COMPLETED | OUTPATIENT
Start: 2023-12-15 | End: 2023-12-15

## 2023-12-15 RX ADMIN — PIPERACILLIN AND TAZOBACTAM 3.38 GRAM(S): 4; .5 INJECTION, POWDER, LYOPHILIZED, FOR SOLUTION INTRAVENOUS at 01:50

## 2023-12-15 RX ADMIN — HEPARIN SODIUM 5000 UNIT(S): 5000 INJECTION INTRAVENOUS; SUBCUTANEOUS at 06:24

## 2023-12-15 RX ADMIN — Medication 250 MILLIGRAM(S): at 02:06

## 2023-12-15 RX ADMIN — Medication 4: at 22:22

## 2023-12-15 RX ADMIN — HEPARIN SODIUM 5000 UNIT(S): 5000 INJECTION INTRAVENOUS; SUBCUTANEOUS at 22:09

## 2023-12-15 RX ADMIN — INSULIN GLARGINE 15 UNIT(S): 100 INJECTION, SOLUTION SUBCUTANEOUS at 22:23

## 2023-12-15 RX ADMIN — Medication 5 UNIT(S): at 10:00

## 2023-12-15 RX ADMIN — POLYETHYLENE GLYCOL 3350 17 GRAM(S): 17 POWDER, FOR SOLUTION ORAL at 06:24

## 2023-12-15 RX ADMIN — PIPERACILLIN AND TAZOBACTAM 200 GRAM(S): 4; .5 INJECTION, POWDER, LYOPHILIZED, FOR SOLUTION INTRAVENOUS at 01:19

## 2023-12-15 RX ADMIN — HEPARIN SODIUM 5000 UNIT(S): 5000 INJECTION INTRAVENOUS; SUBCUTANEOUS at 14:15

## 2023-12-15 RX ADMIN — Medication 25 GRAM(S): at 14:27

## 2023-12-15 RX ADMIN — ATORVASTATIN CALCIUM 80 MILLIGRAM(S): 80 TABLET, FILM COATED ORAL at 22:09

## 2023-12-15 RX ADMIN — Medication 81 MILLIGRAM(S): at 10:33

## 2023-12-15 NOTE — H&P ADULT - PROBLEM SELECTOR PLAN 9
F: none  E: caution given CKD likely progressing to ESRD  N: renal-restricted and consistent carb diet, small and bite-sized  GI ppx: none  DVT ppx: heparin 5000u subq q8h  Code status: full code  Dispo: [facility] --> nursing home F: none  E: caution given CKD likely progressing to ESRD  N: renal-restricted and consistent carb diet, small and bite-sized  GI ppx: none  DVT ppx: heparin 5000u subq q8h  Code status: full code  Dispo: [facility] --> nursing home Known, per med rec from last admission takes Nifedipine 90 mg qd at home.  - holding home med i/s/o relative hypotension

## 2023-12-15 NOTE — ED ADULT NURSE NOTE - NURSING GU BLADDER
Hospitalist Progress Note    NAME: Chio Ayon   :  1944   MRN:  [id number]     Subjective:   Daily Progress Note: 2022 10:38 AM      Chief complaint: LE cellulitis  Patient seen and examined, chart was reviewed. Patient waiting to have IV access established by PICC team  No other acute issues reported to me by patient or staff at this time.     Current Facility-Administered Medications   Medication Dose Route Frequency    vancomycin (VANCOCIN) 750 mg in 0.9% sodium chloride 250 mL (VIAL-MATE)  750 mg IntraVENous Q12H    amLODIPine (NORVASC) tablet 10 mg  10 mg Oral DAILY    aspirin tablet 325 mg  325 mg Oral DAILY    captopriL (CAPOTEN) tablet 25 mg  25 mg Oral BID    carvediloL (COREG) tablet 12.5 mg  12.5 mg Oral BID    cloNIDine HCL (CATAPRES) tablet 0.1 mg  0.1 mg Oral BID    furosemide (LASIX) tablet 40 mg  40 mg Oral DAILY    insulin NPH (NOVOLIN N, HUMULIN N) injection 30 Units  30 Units SubCUTAneous BID    latanoprost (XALATAN) 0.005 % ophthalmic solution 1 Drop  1 Drop Both Eyes QHS    pravastatin (PRAVACHOL) tablet 80 mg  80 mg Oral DAILY    rivaroxaban (XARELTO) tablet 20 mg  20 mg Oral DAILY    sodium chloride (NS) flush 5-40 mL  5-40 mL IntraVENous Q8H    sodium chloride (NS) flush 5-40 mL  5-40 mL IntraVENous PRN    acetaminophen (TYLENOL) tablet 650 mg  650 mg Oral Q6H PRN    Or    acetaminophen (TYLENOL) suppository 650 mg  650 mg Rectal Q6H PRN    polyethylene glycol (MIRALAX) packet 17 g  17 g Oral DAILY PRN    ondansetron (ZOFRAN ODT) tablet 4 mg  4 mg Oral Q8H PRN    Or    ondansetron (ZOFRAN) injection 4 mg  4 mg IntraVENous Q6H PRN    insulin lispro (HUMALOG) injection   SubCUTAneous AC&HS    glucose chewable tablet 16 g  4 Tablet Oral PRN    glucagon (GLUCAGEN) injection 1 mg  1 mg IntraMUSCular PRN    dextrose 10% infusion 0-250 mL  0-250 mL IntraVENous PRN    aztreonam (AZACTAM) 1 g in 0.9% sodium chloride (MBP/ADV) 100 mL MBP  1 g IntraVENous Q8H          Objective: Visit Vitals  BP (!) 140/51   Pulse 68   Temp 98 °F (36.7 °C)   Resp 16   Ht 5' 1\" (1.549 m)   Wt 99.8 kg (220 lb)   SpO2 97%   BMI 41.57 kg/m²      O2 Device: None (Room air)    Temp (24hrs), Av °F (36.7 °C), Min:97.5 °F (36.4 °C), Max:98.5 °F (36.9 °C)        PHYSICAL EXAM:  Gen WDWN  Neck supple  CVS RRR  Reps symmetric expansion  Abdominal soft  Extremities with edema  Skin with venous dermatitis bilateral LE  Neuro alert  Psych flat affect      Data Review    Recent Results (from the past 24 hour(s))   CBC WITH AUTOMATED DIFF    Collection Time: 22  2:16 PM   Result Value Ref Range    WBC 5.4 3.6 - 11.0 K/uL    RBC 4.00 3.80 - 5.20 M/uL    HGB 11.5 11.5 - 16.0 g/dL    HCT 35.1 35.0 - 47.0 %    MCV 87.8 80.0 - 99.0 FL    MCH 28.8 26.0 - 34.0 PG    MCHC 32.8 30.0 - 36.5 g/dL    RDW 13.4 11.5 - 14.5 %    PLATELET 895 416 - 627 K/uL    MPV 10.0 8.9 - 12.9 FL    NRBC 0.0 0  WBC    ABSOLUTE NRBC 0.00 0.00 - 0.01 K/uL    NEUTROPHILS 68 32 - 75 %    LYMPHOCYTES 22 12 - 49 %    MONOCYTES 8 5 - 13 %    EOSINOPHILS 2 0 - 7 %    BASOPHILS 0 0 - 1 %    IMMATURE GRANULOCYTES 0 0.0 - 0.5 %    ABS. NEUTROPHILS 3.7 1.8 - 8.0 K/UL    ABS. LYMPHOCYTES 1.2 0.8 - 3.5 K/UL    ABS. MONOCYTES 0.4 0.0 - 1.0 K/UL    ABS. EOSINOPHILS 0.1 0.0 - 0.4 K/UL    ABS. BASOPHILS 0.0 0.0 - 0.1 K/UL    ABS. IMM.  GRANS. 0.0 0.00 - 0.04 K/UL    DF AUTOMATED     URINALYSIS W/ REFLEX CULTURE    Collection Time: 22  6:46 PM    Specimen: Urine   Result Value Ref Range    Color YELLOW/STRAW      Appearance CLOUDY (A) CLEAR      Specific gravity 1.009      pH (UA) 5.5 5.0 - 8.0      Protein Negative NEG mg/dL    Glucose Negative NEG mg/dL    Ketone Negative NEG mg/dL    Bilirubin Negative NEG      Blood MODERATE (A) NEG      Urobilinogen 0.2 0.2 - 1.0 EU/dL    Nitrites Negative NEG      Leukocyte Esterase LARGE (A) NEG      WBC >100 (H) 0 - 4 /hpf    RBC 20-50 0 - 5 /hpf    Epithelial cells FEW FEW /lpf    Bacteria 4+ (A) NEG /hpf    UA:UC IF INDICATED URINE CULTURE ORDERED (A) CNI      Yeast PRESENT (A) NEG     METABOLIC PANEL, COMPREHENSIVE    Collection Time: 07/27/22  8:49 PM   Result Value Ref Range    Sodium 131 (L) 136 - 145 mmol/L    Potassium 4.6 3.5 - 5.1 mmol/L    Chloride 98 97 - 108 mmol/L    CO2 27 21 - 32 mmol/L    Anion gap 6 5 - 15 mmol/L    Glucose 168 (H) 65 - 100 mg/dL    BUN 13 6 - 20 MG/DL    Creatinine 0.73 0.55 - 1.02 MG/DL    BUN/Creatinine ratio 18 12 - 20      GFR est AA >60 >60 ml/min/1.73m2    GFR est non-AA >60 >60 ml/min/1.73m2    Calcium 9.5 8.5 - 10.1 MG/DL    Bilirubin, total 0.4 0.2 - 1.0 MG/DL    ALT (SGPT) 23 12 - 78 U/L    AST (SGOT) 58 (H) 15 - 37 U/L    Alk. phosphatase 93 45 - 117 U/L    Protein, total 9.3 (H) 6.4 - 8.2 g/dL    Albumin 2.3 (L) 3.5 - 5.0 g/dL    Globulin 7.0 (H) 2.0 - 4.0 g/dL    A-G Ratio 0.3 (L) 1.1 - 2.2     SAMPLES BEING HELD    Collection Time: 07/27/22  8:49 PM   Result Value Ref Range    SAMPLES BEING HELD LAV     COMMENT        Add-on orders for these samples will be processed based on acceptable specimen integrity and analyte stability, which may vary by analyte.    GLUCOSE, POC    Collection Time: 07/27/22  9:06 PM   Result Value Ref Range    Glucose (POC) 187 (H) 65 - 117 mg/dL    Performed by Diandra CARVAJAL    METABOLIC PANEL, BASIC    Collection Time: 07/28/22  4:44 AM   Result Value Ref Range    Sodium 135 (L) 136 - 145 mmol/L    Potassium 3.6 3.5 - 5.1 mmol/L    Chloride 101 97 - 108 mmol/L    CO2 31 21 - 32 mmol/L    Anion gap 3 (L) 5 - 15 mmol/L    Glucose 69 65 - 100 mg/dL    BUN 12 6 - 20 MG/DL    Creatinine 0.67 0.55 - 1.02 MG/DL    BUN/Creatinine ratio 18 12 - 20      GFR est AA >60 >60 ml/min/1.73m2    GFR est non-AA >60 >60 ml/min/1.73m2    Calcium 8.9 8.5 - 10.1 MG/DL   CBC W/O DIFF    Collection Time: 07/28/22  4:44 AM   Result Value Ref Range    WBC 4.9 3.6 - 11.0 K/uL    RBC 3.38 (L) 3.80 - 5.20 M/uL    HGB 9.9 (L) 11.5 - 16.0 g/dL    HCT 29.6 (L) 35.0 - 47.0 %    MCV 87.6 80.0 - 99.0 FL    MCH 29.3 26.0 - 34.0 PG    MCHC 33.4 30.0 - 36.5 g/dL    RDW 13.3 11.5 - 14.5 %    PLATELET 277 326 - 538 K/uL    MPV 9.7 8.9 - 12.9 FL    NRBC 0.0 0  WBC    ABSOLUTE NRBC 0.00 0.00 - 0.01 K/uL   C REACTIVE PROTEIN, QT    Collection Time: 07/28/22  4:44 AM   Result Value Ref Range    C-Reactive protein 2.88 (H) 0.00 - 0.60 mg/dL   D DIMER    Collection Time: 07/28/22  4:44 AM   Result Value Ref Range    D-dimer 0.57 0.00 - 0.65 mg/L FEU   GLUCOSE, POC    Collection Time: 07/28/22  6:39 AM   Result Value Ref Range    Glucose (POC) 102 65 - 117 mg/dL    Performed by Alan Giordano (MARTHA RN)    GLUCOSE, POC    Collection Time: 07/28/22  8:07 AM   Result Value Ref Range    Glucose (POC) 107 65 - 117 mg/dL    Performed by Pamela Calles PCT      No results found for this visit on 07/27/22. All Micro Results       Procedure Component Value Units Date/Time    RESPIRATORY VIRUS PANEL W/COVID-19, PCR [[id number]]     Order Status: Sent Specimen: NASOPHARYNGEAL SWAB     CULTURE, URINE [[id number]] Collected: 07/27/22 1846    Order Status: No result Updated: 07/27/22 2240                 Assessment/Plan:     Jasson LE  wounds with cellulitis POA  -chronic wounds with fu  at wound care center  -  empiric antibiotics including vancomycin and aztreonam  -allergic to cephalosporins. - check crp/procal  - get Podiatry eval    COVID-19 + test at home asymptomatic  DMT2- - ISS monitor BG.   Hypertension- monitor on PTA home meds  Hypothyroidism- cont PTA home meds  Atrial fibrillation on anticoagulation with Xarelto  CAD/CVA- asa/bb/statins  Chr DHF-compensated on lasix       NOK son at [phone]    Code Status: Full code  Surrogate Decision Maker:  Jeff  DVT Prophylaxis: Xarelto  Dispo: TBD        Signed By: Marisol Zapien MD     July 28, 2022 non-distended/non-tender

## 2023-12-15 NOTE — H&P ADULT - PROBLEM SELECTOR PLAN 2
BUN/Cr 144/6.38 on admission, last admission 10/2023 had BUN/Cr 90s/6s. No s/s uremia including no asterixis, confusion different from baseline, myoclonus/tremor. Had AVF created 5/2023 in anticipation of possibly needing HD.  - avoid nephrotoxic medications  - renal consult in AM for possible HD initiation BUN/Cr 144/6.38 on admission, last admission 10/2023 had BUN/Cr 90s/6s. No s/s uremia including no asterixis, confusion different from baseline. Has baseline tremor that is still present. Had LUE AVF created 5/2023 in anticipation of possibly needing HD.  - avoid nephrotoxic medications  - renal consult in AM for possible HD initiation

## 2023-12-15 NOTE — H&P ADULT - HISTORY OF PRESENT ILLNESS
57yo M T2DM, CVA x2 w/ residual L-sided weakness, early onset dementia, HTN, HLD, DVT, CKD V (Cr 7-8, not on HD), BPH, PSH L toe amputation and AVF creation (5/2023), R BKA, currently receiving daily IM CTX per NH paperwork, found to be hypotensive at nursing home with sBP 86. Pt currently has no active complaints.    ED course:  VS: T 98.7 F oral, , BP 99/60, RR 18, SpO2 95% on RA  Labs significant for: WBCs 34.85, Hgb 9.2, Na 146, Cl 110, bicarb 21, BUN/Cr 144/6.38, albumin 2.9, UA cloudy w/ many bacteria, trace leuk esterase  EKG: sinus tachycardia , QTc 415  Imaging:    CXR: no overt consoldiations, minimal large bowel distension (provider read)  Interventions: Zosyn 3.375 g IVPB, Vancomycin 1 g  Consults: none 59yo M T2DM, CVA x2 w/ residual L-sided weakness, early onset dementia, HTN, HLD, DVT, CKD V (Cr 7-8, not on HD), BPH, PSH L toe amputation and AVF creation (5/2023), R BKTIMMY, currently receiving daily IM CTX per NH paperwork, found to be hypotensive at nursing home with sBP 86. He reports constipation, states he has not had a bowel movement in 2 days when he typically has one every day. Denies CP, SOB, abdominal pain, changes to urinary habits.    ED course:  VS: T 98.7 F oral, , BP 99/60, RR 18, SpO2 95% on RA  Labs significant for: WBCs 34.85 w/ 86% neutrophils, Hgb 9.2, Na 146, Cl 110, bicarb 21, BUN/Cr 144/6.38, albumin 2.9, UA cloudy w/ many bacteria, trace leuk esterase  EKG: sinus tachycardia , QTc 415  Imaging:    CXR: no overt consolidations, minimal large bowel distension (provider read)  Interventions: Zosyn 3.375 g IVPB, Vancomycin 1 g  Consults: none 57yo M T2DM, CVA x2 w/ residual L-sided weakness, early onset dementia, HTN, HLD, DVT, CKD V (Cr 7-8, not on HD), BPH, PSH L toe amputation and AVF creation (5/2023), R BKTIMMY, currently receiving daily IM CTX per NH paperwork, found to be hypotensive at nursing home with sBP 86. He reports constipation, states he has not had a bowel movement in 2 days when he typically has one every day. Denies CP, SOB, abdominal pain, changes to urinary habits.    ED course:  VS: T 98.7 F oral, , BP 99/60, RR 18, SpO2 95% on RA  Labs significant for: WBCs 34.85 w/ 86% neutrophils, Hgb 9.2, Na 146, Cl 110, bicarb 21, BUN/Cr 144/6.38, albumin 2.9, UA cloudy w/ many bacteria, trace leuk esterase  EKG: sinus tachycardia , QTc 415  Imaging:    CXR: no overt consolidations, minimal large bowel distension (provider read)  Interventions: Zosyn 3.375 g IVPB, Vancomycin 1 g  Consults: none

## 2023-12-15 NOTE — CONSULT NOTE ADULT - PROBLEM SELECTOR RECOMMENDATION 9
.  In the setting of leukocytosis and renal failure  -address reversible etiologies, infectious work-up ongoing  -frequent reorientation, supportive care

## 2023-12-15 NOTE — DIETITIAN INITIAL EVALUATION ADULT - NS FNS DIET ORDER
Diet, Renal Restrictions:   For patients receiving Renal Replacement - No Protein Restr, No Conc K, No Conc Phos, Low Sodium  Consistent Carbohydrate {Evening Snack} (CSTCHOSN)  Soft and Bite Sized (SOFTBTSZ) (12-15-23 @ 03:08)

## 2023-12-15 NOTE — ED ADULT NURSE NOTE - HOW PATIENT ADDRESSED, PROFILE
[de-identified] : no palpable lymphadenopathy [Normal] : mucosa is normal [Midline] : trachea located in midline position [de-identified] : firm mass encompassing rt tonsil no ulceration bulging rt soft palate mild trismus Casey

## 2023-12-15 NOTE — ED PROVIDER NOTE - OBJECTIVE STATEMENT
58M hx DM2, CVA x 2 w/ residual L sided weakness, early onset dementia, HTN, HLD, CKD V (creatinine 7-8, not on HD), BPH and PSHx of L toe amputation and AV fistula creation (5/23 - Dr. Royal), s/p R BKA. DVT, currently on daily IM ceftriaxone (per NH paperwork). sent in from NH for eval of hypotension. SBP 86 in NH. patient states he feels well and has no complaints.

## 2023-12-15 NOTE — PATIENT PROFILE ADULT - FALL HARM RISK - HARM RISK INTERVENTIONS
Assistance with ambulation/Assistance OOB with selected safe patient handling equipment/Communicate Risk of Fall with Harm to all staff/Discuss with provider need for PT consult/Monitor gait and stability/Provide patient with walking aids - walker, cane, crutches/Reinforce activity limits and safety measures with patient and family/Tailored Fall Risk Interventions/Use of alarms - bed, chair and/or voice tab/Visual Cue: Yellow wristband and red socks/Bed in lowest position, wheels locked, appropriate side rails in place/Call bell, personal items and telephone in reach/Instruct patient to call for assistance before getting out of bed or chair/Non-slip footwear when patient is out of bed/[place] to call system/Physically safe environment - no spills, clutter or unnecessary equipment/Purposeful Proactive Rounding/Room/bathroom lighting operational, light cord in reach Assistance with ambulation/Assistance OOB with selected safe patient handling equipment/Communicate Risk of Fall with Harm to all staff/Discuss with provider need for PT consult/Monitor gait and stability/Provide patient with walking aids - walker, cane, crutches/Reinforce activity limits and safety measures with patient and family/Tailored Fall Risk Interventions/Use of alarms - bed, chair and/or voice tab/Visual Cue: Yellow wristband and red socks/Bed in lowest position, wheels locked, appropriate side rails in place/Call bell, personal items and telephone in reach/Instruct patient to call for assistance before getting out of bed or chair/Non-slip footwear when patient is out of bed/[place] to call system/Physically safe environment - no spills, clutter or unnecessary equipment/Purposeful Proactive Rounding/Room/bathroom lighting operational, light cord in reach

## 2023-12-15 NOTE — H&P ADULT - NSHPLABSRESULTS_GEN_ALL_CORE
LABS:                         9.2    34.85 )-----------( 375      ( 15 Dec 2023 00:30 )             29.8     12-15    146<H>  |  110<H>  |  144<H>  ----------------------------<  187<H>  4.5   |  21<L>  |  6.38<H>    Ca    8.9      15 Dec 2023 00:30  Mg     2.0     12-15    TPro  6.2  /  Alb  2.9<L>  /  TBili  0.2  /  DBili  x   /  AST  21  /  ALT  14  /  AlkPhos  81  12-15      Urinalysis Basic - ( 15 Dec 2023 00:30 )    Color: x / Appearance: x / SG: x / pH: x  Gluc: 187 mg/dL / Ketone: x  / Bili: x / Urobili: x   Blood: x / Protein: x / Nitrite: x   Leuk Esterase: x / RBC: x / WBC x   Sq Epi: x / Non Sq Epi: x / Bacteria: x            Lactate, Blood: 1.9 mmol/L (12-15 @ 00:30)      RADIOLOGY, EKG & ADDITIONAL TESTS:

## 2023-12-15 NOTE — H&P ADULT - PROBLEM SELECTOR PLAN 10
F: none  E: caution given CKD likely progressing to ESRD  N: renal-restricted and consistent carb diet, small and bite-sized  GI ppx: none  DVT ppx: heparin 5000u subq q8h  Code status: full code  Dispo: [facility] --> nursing home F: none  E: caution given CKD likely progressing to ESRD  N: renal-restricted and consistent carb diet, small and bite-sized  GI ppx: none  DVT ppx: heparin 5000u subq q8h  Code status: full code  Dispo: [facility] --> nursing home

## 2023-12-15 NOTE — H&P ADULT - PROBLEM SELECTOR PLAN 6
History of stroke with residual R-sided facial droop, also has L-sided weakness but difficult to assess as pt is contracted. Is able to follow commands. Takes ASA 81 mg qd and Atorvastatin 80 mg qhs at home.  - continue home meds Known, early-onset. A&O x1 (self) at baseline. Currently AOx1-2 (self and able to participate in ROS),  - no interventions

## 2023-12-15 NOTE — DIETITIAN INITIAL EVALUATION ADULT - ADD RECOMMEND
1. Recommend to liberalize diet to soft and bite sized, renal to allow for more menu options. Recommend to add Nepro 1 x/day (425 kcal, 19 g protein, per serving).  2. Encourage pt to meet nutritional needs as able   3. Monitor labs: electrolytes, cmp, fingersticks, renal indicators  4. Monitor weights   5. Pain and bowel regimen per team   6. Will continue to assess/honor food preferences as able

## 2023-12-15 NOTE — DIETITIAN NUTRITION RISK NOTIFICATION - ADDITIONAL COMMENTS/DIETITIAN RECOMMENDATIONS
Recommend to liberalize diet to soft and bite sized, renal to allow for more menu options. Recommend to add Nepro 1 x/day (425 kcal, 19 g protein, per serving).

## 2023-12-15 NOTE — DIETITIAN INITIAL EVALUATION ADULT - PROBLEM SELECTOR PLAN 9
Known, per med rec from last admission takes Nifedipine 90 mg qd at home.  - holding home med i/s/o relative hypotension

## 2023-12-15 NOTE — H&P ADULT - PROBLEM SELECTOR PLAN 7
Known, takes Tamsulosin 0.4 mg qhs and Finasteride 5 mg qd.  - continue home meds History of stroke with residual R-sided facial droop, also has L-sided weakness but difficult to assess as pt is contracted. Is able to follow commands. Takes ASA 81 mg qd and Atorvastatin 80 mg qhs at home.  - continue home meds

## 2023-12-15 NOTE — ED PROVIDER NOTE - PHYSICAL EXAMINATION
General: Awake, alert. cachectic  Skin:  Appropriate color for ethnicity. intact and warm.   HENMT: head normocephalic and atraumatic  EYES: Conjunctiva clear. nonicteric sclera  Cardiac: well perfused, s1, s2, tachycardic  Respiratory: breathing comfortably on room air, ctab  Abdominal: nondistended, soft, nontender  rectal: no blood or stool   MSK:  R BKA  Neurological: The patient is fatigued appearing, but awake, alert and oriented with normal speech. CN 2-12 grossly intact. significnat weakness of left sided extremities. also rpesnet is weakness, but less so of right upper extremity General: Awake, alert. cachectic  Skin:  Appropriate color for ethnicity. intact and warm. no visible decubiti  HENMT: head normocephalic and atraumatic  EYES: Conjunctiva clear. nonicteric sclera  Cardiac: well perfused, s1, s2, tachycardic  Respiratory: breathing comfortably on room air, ctab  Abdominal: nondistended, soft, nontender  rectal: no blood or stool   MSK:  R BKA, LUE with palpable fistule. no erythema  Neurological: The patient is fatigued appearing, but awake, alert and oriented with normal speech. CN 2-12 grossly intact. significnat weakness of left sided extremities. also rpesnet is weakness, but less so of right upper extremity

## 2023-12-15 NOTE — ED ADULT NURSE NOTE - NS ED PATIENT SAFETY CONCERN
Procedure To Be Performed At Next Visit: Excision Size Of Lesion In Cm (Optional): 0 Introduction Text (Please End With A Colon): The following procedure was deferred: Detail Level: Detailed No

## 2023-12-15 NOTE — H&P ADULT - NSHPPHYSICALEXAM_GEN_ALL_CORE
T(C): 36.7 (12-15-23 @ 03:41), Max: 37.2 (12-14-23 @ 23:54)  HR: 100 (12-15-23 @ 03:41) (100 - 103)  BP: 126/64 (12-15-23 @ 03:41) (99/60 - 126/64)  RR: 18 (12-15-23 @ 03:41) (16 - 18)  SpO2: 99% (12-15-23 @ 03:41) (95% - 99%)    General: elderly-appearing adult male lying in bed breathing comfortably on RA and in NAD  HEENT: head NC/AT, L R sided facial droop, pale conjunctiva, EOMI, MMM  Neck: supple  Cardiac: tachycardic rate regular rhythm, normal S1/S2, no murmurs appreciated  Respiratory: lungs CTAB  Abdomen: hyperactive bowel sounds, firm but not distended nontender to palpation  Extremities: WWP, s/p R BKA, AVF on LUE w/ palpable and audible thrill, no peripheral edema  Vascular: 2+ radial pulses b/l, 2+ popliteal pulse RLE, 2+ PT pulse and 1+ DP pulse LLE  Neuro: A&Ox1-2 (self, able to answer questions and complete ROS), moving extremities spontaneously, 2/5 strength in b/l UEs, tremor in b/l UEs with arm extension  Skin: dry, intact, no visible jaundice

## 2023-12-15 NOTE — PROGRESS NOTE ADULT - SUBJECTIVE AND OBJECTIVE BOX
OVERNIGHT EVENTS:  ZULY    SUBJECTIVE / INTERVAL HPI: Patient seen and examined at bedside.     ROS: negative unless otherwise stated above.    VITAL SIGNS:  Vital Signs Last 24 Hrs  T(C): 37.1 (15 Dec 2023 05:21), Max: 37.2 (14 Dec 2023 23:54)  T(F): 98.7 (15 Dec 2023 05:21), Max: 98.9 (14 Dec 2023 23:54)  HR: 109 (15 Dec 2023 05:21) (100 - 109)  BP: 119/60 (15 Dec 2023 05:21) (99/60 - 126/64)  BP(mean): --  RR: 18 (15 Dec 2023 05:21) (16 - 18)  SpO2: 98% (15 Dec 2023 05:21) (95% - 99%)    Parameters below as of 15 Dec 2023 05:21  Patient On (Oxygen Delivery Method): room air        PHYSICAL EXAM:    General: In no apparent distress  HEENT: NC/AT; PERRL, anicteric sclera; MMM  Neck: supple  Cardiovascular: +S1/S2; RRR  Respiratory: CTA B/L; no W/R/R  Gastrointestinal: soft, NT/ND; +BSx4  Extremities: WWP; no edema, clubbing or cyanosis  Vascular: 2+ radial, DP/PT pulses B/L  Neurological: AAOx3; no focal deficits    MEDICATIONS:  MEDICATIONS  (STANDING):  aspirin enteric coated 81 milliGRAM(s) Oral every 24 hours  atorvastatin 80 milliGRAM(s) Oral at bedtime  dextrose 5%. 1000 milliLiter(s) (50 mL/Hr) IV Continuous <Continuous>  dextrose 5%. 1000 milliLiter(s) (100 mL/Hr) IV Continuous <Continuous>  dextrose 50% Injectable 12.5 Gram(s) IV Push once  dextrose 50% Injectable 25 Gram(s) IV Push once  dextrose 50% Injectable 25 Gram(s) IV Push once  glucagon  Injectable 1 milliGRAM(s) IntraMuscular once  heparin   Injectable 5000 Unit(s) SubCutaneous every 8 hours  influenza   Vaccine 0.5 milliLiter(s) IntraMuscular once  insulin glargine Injectable (LANTUS) 15 Unit(s) SubCutaneous at bedtime  insulin lispro (ADMELOG) corrective regimen sliding scale   SubCutaneous Before meals and at bedtime  insulin lispro Injectable (ADMELOG) 5 Unit(s) SubCutaneous three times a day before meals  polyethylene glycol 3350 17 Gram(s) Oral two times a day  senna 2 Tablet(s) Oral at bedtime    MEDICATIONS  (PRN):  dextrose Oral Gel 15 Gram(s) Oral once PRN Blood Glucose LESS THAN 70 milliGRAM(s)/deciliter      ALLERGIES:  Allergies    No Known Allergies    Intolerances        LABS:                        8.5    37.42 )-----------( 385      ( 15 Dec 2023 05:30 )             28.3     12-15    145  |  111<H>  |  147<H>  ----------------------------<  159<H>  4.5   |  19<L>  |  6.16<H>    Ca    8.8      15 Dec 2023 05:30  Phos  4.0     12-15  Mg     2.0     12-15    TPro  6.2  /  Alb  2.9<L>  /  TBili  0.2  /  DBili  x   /  AST  21  /  ALT  14  /  AlkPhos  81  12-15      Urinalysis Basic - ( 15 Dec 2023 05:30 )    Color: x / Appearance: x / SG: x / pH: x  Gluc: 159 mg/dL / Ketone: x  / Bili: x / Urobili: x   Blood: x / Protein: x / Nitrite: x   Leuk Esterase: x / RBC: x / WBC x   Sq Epi: x / Non Sq Epi: x / Bacteria: x      CAPILLARY BLOOD GLUCOSE      POCT Blood Glucose.: 137 mg/dL (15 Dec 2023 09:38)      RADIOLOGY & ADDITIONAL TESTS: Reviewed.

## 2023-12-15 NOTE — DIETITIAN INITIAL EVALUATION ADULT - PROBLEM SELECTOR PLAN 10
F: none  E: caution given CKD likely progressing to ESRD  N: renal-restricted and consistent carb diet, small and bite-sized  GI ppx: none  DVT ppx: heparin 5000u subq q8h  Code status: full code  Dispo: [facility] --> nursing home F: none  E: caution given CKD likely progressing to ESRD  N: renal-restricted and consistent carb diet, small and bite-sized  GI ppx: none  DVT ppx: heparin 5000u subq q8h  Code status: full code  Dispo: [facility] --> nursing home

## 2023-12-15 NOTE — ED ADULT NURSE NOTE - CAS EDP DISCH DISPOSITION ADMI
50 [facility] / [facility] 13 [facility]

## 2023-12-15 NOTE — PROGRESS NOTE ADULT - PROBLEM SELECTOR PLAN 1
Met 2/4 SIRS criteria on admission w/ , WBCs 34.85 w/ neutrophilic predominance without overt source of infection. qSOFA 1 (sBP <100). CXR without visualized consolidation but with dilated loops of bowel. UA w/ asymptomatic bacteriuria. S/p Vancomycin 1 g and Zozyn 3.375 g in the ED.  - will monitor off abx for now given no confirmed source of infection  - follow up BCx  - follow up UCx

## 2023-12-15 NOTE — PROGRESS NOTE ADULT - PROBLEM SELECTOR PLAN 5
Hgb 9.2 w/ normocytic MCV on admission (baseline Hgb 9s). Suspect anemia 2/2 CKD though will check for other common causes of both microcytic and macrocytic anemia.  - follow up iron panel, b12, folate  - maintain active type and screen and 2 large-bore IVs  - transfuse for Hgb <7 or active bleeding

## 2023-12-15 NOTE — PROGRESS NOTE ADULT - PROBLEM SELECTOR PLAN 7
History of stroke with residual R-sided facial droop, also has L-sided weakness but difficult to assess as pt is contracted. Is able to follow commands. Takes ASA 81 mg qd and Atorvastatin 80 mg qhs at home.  - continue home meds

## 2023-12-15 NOTE — CONSULT NOTE ADULT - PROBLEM SELECTOR RECOMMENDATION 3
.  CKD 5 with progression  -HD planning and discussions completed as outpatient, already has fistula  -if the decision was made to not pursue HD, then patient would be eligible for home hospice

## 2023-12-15 NOTE — CONSULT NOTE ADULT - ASSESSMENT
58M with CKD 5, HTN sent in by NH for hypotension, admitted for possible sepsis and CKD progression. Nephrology consulted for possible HD.    #CKD Stage V with progression  Cr around his baseline, but BUN elevated to 140s. Also pt with some degree of altered mental status  K 4.5, bicarb 19  UA w/ proteinuria (chronic), no RBCs    Recommendations:  - Will start HD tonight  - Renally dosed medications based on eGFR <10  - Avoid Nephrotoxic drugs  - BMP daily  - Renal diet  - SW Assistance with outpatient HD placement  - Next HD tomorrow 12/16    #Anemia  Hb not at goal  Check iron profile    Access  LUE AVF. ?possibly used last admission at [facility]    #BMD-CKD  Ca 8.8  Phos 4.0  Check PTH, Vit D   58M with CKD 5, HTN sent in by NH for hypotension, admitted for possible sepsis and CKD progression. Nephrology consulted for possible HD.    #CKD Stage V with progression  Cr around his baseline, but BUN elevated to 140s. Also pt with some degree of altered mental status  K 4.5, bicarb 19  UA w/ proteinuria (chronic), no RBCs    Recommendations:  - Will start HD tonight  - Renally dosed medications based on eGFR <10  - Avoid Nephrotoxic drugs  - BMP daily  - Renal diet  - SW Assistance with outpatient HD placement  - Next HD tomorrow 12/16    #Anemia  Hb not at goal  Check iron profile    Access  LUE AVF. ?possibly used last admission at [facility]    #BMD-CKD  Ca 8.8  Phos 4.0  Check PTH, Vit D

## 2023-12-15 NOTE — H&P ADULT - PROBLEM SELECTOR PLAN 3
Known, uses Lantus 15u qhs and Lispro 5u TID premeal at home. A1c 6.4% (10/25/2023).  - continue home regimen  - follow up A1c in AM Pt reports he typically has a bowel movement every day but has not had one in ~2 days. Reported abdominal pain earlier in the day at his nursing home but is not currently complaining of abdominal pain. Abdomen is firm but not distended and not tender to palpation. CXR with dilated loops of bowel. Pt has recent history of C. diff.  - follow up XR abdomen  - miralax BID  - senna qhs

## 2023-12-15 NOTE — ED ADULT NURSE NOTE - NSFALLHARMRISKINTERV_ED_ALL_ED
Assistance OOB with selected safe patient handling equipment if applicable/Assistance with ambulation/Communicate risk of Fall with Harm to all staff, patient, and family/Monitor gait and stability/Provide visual cue: red socks, yellow wristband, yellow gown, etc/Reinforce activity limits and safety measures with patient and family/Bed in lowest position, wheels locked, appropriate side rails in place/Call bell, personal items and telephone in reach/Instruct patient to call for assistance before getting out of bed/chair/stretcher/Non-slip footwear applied when patient is off stretcher/[place] to call system/Physically safe environment - no spills, clutter or unnecessary equipment/Purposeful Proactive Rounding/Room/bathroom lighting operational, light cord in reach Assistance OOB with selected safe patient handling equipment if applicable/Assistance with ambulation/Communicate risk of Fall with Harm to all staff, patient, and family/Monitor gait and stability/Provide visual cue: red socks, yellow wristband, yellow gown, etc/Reinforce activity limits and safety measures with patient and family/Bed in lowest position, wheels locked, appropriate side rails in place/Call bell, personal items and telephone in reach/Instruct patient to call for assistance before getting out of bed/chair/stretcher/Non-slip footwear applied when patient is off stretcher/[place] to call system/Physically safe environment - no spills, clutter or unnecessary equipment/Purposeful Proactive Rounding/Room/bathroom lighting operational, light cord in reach

## 2023-12-15 NOTE — PROGRESS NOTE ADULT - PROBLEM SELECTOR PLAN 4
Known, uses Lantus 15u qhs and Lispro 5u TID premeal, and takes Tradjenta 5 mg qd at home. A1c 6.4% (10/25/2023).  - continue basal/bolus regimen and mISS inpatient  - hold tradjenta inpatient  - follow up A1c in AM

## 2023-12-15 NOTE — DIETITIAN NUTRITION RISK NOTIFICATION - TREATMENT: THE FOLLOWING DIET HAS BEEN RECOMMENDED
Diet, Renal Restrictions:   For patients receiving Renal Replacement - No Protein Restr, No Conc K, No Conc Phos, Low Sodium  Consistent Carbohydrate {Evening Snack} (CSTCHOSN)  Soft and Bite Sized (SOFTBTSZ) (12-15-23 @ 03:08) [Active]

## 2023-12-15 NOTE — CONSULT NOTE ADULT - ASSESSMENT
59yo M T2DM, CVA x2 w/ residual L-sided weakness, early onset dementia, HTN, HLD, DVT, CKD V (Cr 7-8, not on HD), BPH, PSH L toe amputation and AVF creation (5/2023), R jd BKA and closure admitted to medicine with concerns of sepsis and urgent need for dialysis. Vascular surgery consulted for AVF revision after failed HD attempt.    No acute vascular surgery indicated at this time  Please obtain LUE duplex to assess AVF  Plan for possible fistulogram Monday vs. Tuesday if medically cleared  If urgent or emergent need for dialysis, temporary dialysis catheter to be placed per nephrology  Vascular surgery will continue to follow 57yo M T2DM, CVA x2 w/ residual L-sided weakness, early onset dementia, HTN, HLD, DVT, CKD V (Cr 7-8, not on HD), BPH, PSH L toe amputation and AVF creation (5/2023), R jd BKA and closure admitted to medicine with concerns of sepsis and urgent need for dialysis. Vascular surgery consulted for AVF revision after failed HD attempt.    No acute vascular surgery indicated at this time  Please obtain LUE duplex to assess AVF  Plan for possible fistulogram Monday vs. Tuesday if medically cleared  If urgent or emergent need for dialysis, temporary dialysis catheter to be placed per nephrology  Vascular surgery will continue to follow

## 2023-12-15 NOTE — DIETITIAN INITIAL EVALUATION ADULT - PROBLEM SELECTOR PLAN 6
Known, early-onset. A&O x1 (self) at baseline. Currently AOx1-2 (self and able to participate in ROS),  - no interventions

## 2023-12-15 NOTE — CONSULT NOTE ADULT - ATTENDING COMMENTS
I agree with the fellow's findings and plans as written above with the following additions/amendments:    Seen and examined at bedside, well known to me as an outpatient. I had received a call from [place] about a month ago where they had informed me that they would likely start HD, but does not appear to have continued - last evidence of access well healed so likely not used in some time. Will attempt to start HD to treat uremia. Patient agreeable. Discused with primary team, further recs as above    Addendum - attempted to start HD but access unable to handle due to access pressures, will consult vascular I agree with the fellow's findings and plans as written above with the following additions/amendments:    Seen and examined at bedside, well known to me as an outpatient. I had received a call from [place] about a month ago where they had informed me that they would likely start HD, but does not appear to have continued - last evidence of access well healed so likely not used in some time. Will attempt to start HD to treat uremia. Patient agreeable. Discused with primary team, further recs as above    Addendum - attempted to start HD but access unable to handle due to access pressures, will consult vascular

## 2023-12-15 NOTE — ED ADULT NURSE NOTE - CHPI ED NUR SYMPTOMS NEG
[id number]  Phone:  [phone]     no chills/no fever

## 2023-12-15 NOTE — PROGRESS NOTE ADULT - ASSESSMENT
59yo M T2DM, CVA x2 w/ residual L-sided weakness, early onset dementia, HTN, HLD, DVT, CKD V (Cr ~6, not on HD), BPH, PSH L toe amputation, R BKA, AVF creation (5/2023) BIBEMS from nursing home for hypotension, found to meet SIRS criteria without source of infection and has elevated BUN, admitted for VS derangement, possible HD initiation and further workup.

## 2023-12-15 NOTE — PROGRESS NOTE ADULT - PROBLEM SELECTOR PLAN 3
Pt reports he typically has a bowel movement every day but has not had one in ~2 days. Reported abdominal pain earlier in the day at his nursing home but is not currently complaining of abdominal pain. Abdomen is firm but not distended and not tender to palpation. CXR with dilated loops of bowel. Pt has recent history of C. diff.  - follow up XR abdomen  - miralax BID  - senna qhs

## 2023-12-15 NOTE — H&P ADULT - PROBLEM SELECTOR PLAN 5
Known, early-onset. A&O x1 (self) at baseline. Currently ________.  - no interventions Hgb 9.2 w/ normocytic MCV on admission (baseline Hgb 9s). Suspect anemia 2/2 CKD though will check for other common causes of both microcytic and macrocytic anemia.  - follow up iron panel, b12, folate  - maintain active type and screen and 2 large-bore IVs  - transfuse for Hgb <7 or active bleeding

## 2023-12-15 NOTE — CONSULT NOTE ADULT - SUBJECTIVE AND OBJECTIVE BOX
Vascular Surgery Consult      HPI:  59yo M T2DM, CVA x2 w/ residual L-sided weakness, early onset dementia, HTN, HLD, DVT, CKD V (Cr 7-8, not on HD), BPH, PSH L toe amputation and AVF creation (5/2023), R AIDE, currently receiving daily IM CTX per NH paperwork, found to be hypotensive at nursing home with sBP 86. He reports constipation, states he has not had a bowel movement in 2 days when he typically has one every day. Denies CP, SOB, abdominal pain, changes to urinary habits.    ED course:  VS: T 98.7 F oral, , BP 99/60, RR 18, SpO2 95% on RA  Labs significant for: WBCs 34.85 w/ 86% neutrophils, Hgb 9.2, Na 146, Cl 110, bicarb 21, BUN/Cr 144/6.38, albumin 2.9, UA cloudy w/ many bacteria, trace leuk esterase  EKG: sinus tachycardia , QTc 415  Imaging:    CXR: no overt consolidations, minimal large bowel distension (provider read)  Interventions: Zosyn 3.375 g IVPB, Vancomycin 1 g  Consults: none (15 Dec 2023 03:15)    VASCULAR   59yo M T2DM, CVA x2 w/ residual L-sided weakness, early onset dementia, HTN, HLD, DVT, CKD V (Cr 7-8, not on HD), BPH, PSH L toe amputation and AVF creation (5/2023), RUTHIE NOBLE and closure admitted to medicine service with tachycardia, hypotension despite continuous IM abx. Altered on exam and unable to participate in subjective. Given uremia, emergent HD attempted with insufficient flow.     PAST MEDICAL HISTORY:  Type 2 diabetes mellitus    Personal history of other diseases of circulatory system    Cerebral artery occlusion with cerebral infarction    Hyperlipidemia    Hypertension    Stage 4 chronic kidney disease    H/O diabetic retinopathy        PAST SURGICAL HISTORY:  Late effect of traumatic amputation        MEDICATIONS:      ALLERGIES:  No Known Allergies      SOCHX:   Social History:  lives in nursing home (15 Dec 2023 03:15)      FAMHX:   FAMILY HISTORY:  Family history of diabetes mellitus (Mother)  Family history of diabetes mellitus (DM)          Vitals:  Vital Signs Last 24 Hrs  T(C): 36.4 (15 Dec 2023 15:26), Max: 37.2 (14 Dec 2023 23:54)  T(F): 97.5 (15 Dec 2023 15:26), Max: 98.9 (14 Dec 2023 23:54)  HR: 113 (15 Dec 2023 15:26) (100 - 113)  BP: 127/67 (15 Dec 2023 15:26) (99/60 - 127/67)  BP(mean): --  RR: 18 (15 Dec 2023 15:26) (16 - 18)  SpO2: 96% (15 Dec 2023 15:26) (95% - 99%)    Parameters below as of 15 Dec 2023 15:26  Patient On (Oxygen Delivery Method): room air          PHYSICAL EXAM:  Physical Exam  General: NAD  Pulm: Nonlabored breathing, no respiratory distress  Abd: soft, non distended,   Extrem: RIGHT BKA well healed.   Neuro: A/O x 3, CNs II-XII grossly intact, no focal deficits, normal sensation  Pulses: palpable distal pulses     I&o's:  I&O's Summary      LABS:                        8.5    37.42 )-----------( 385      ( 15 Dec 2023 05:30 )             28.3     12-15    145  |  111<H>  |  147<H>  ----------------------------<  159<H>  4.5   |  19<L>  |  6.16<H>    Ca    8.8      15 Dec 2023 05:30  Phos  4.0     12-15  Mg     2.0     12-15    TPro  6.2  /  Alb  2.9<L>  /  TBili  0.2  /  DBili  x   /  AST  21  /  ALT  14  /  AlkPhos  81  12-15    Lactate: Lactate, Blood: 1.9 mmol/L (12-15 @ 00:30)               Urinalysis Basic - ( 15 Dec 2023 05:30 )    Color: x / Appearance: x / SG: x / pH: x  Gluc: 159 mg/dL / Ketone: x  / Bili: x / Urobili: x   Blood: x / Protein: x / Nitrite: x   Leuk Esterase: x / RBC: x / WBC x   Sq Epi: x / Non Sq Epi: x / Bacteria: x        MICRO:     IMAGING:   Vascular Surgery Consult      HPI:  59yo M T2DM, CVA x2 w/ residual L-sided weakness, early onset dementia, HTN, HLD, DVT, CKD V (Cr 7-8, not on HD), BPH, PSH L toe amputation and AVF creation (5/2023), R AIDE, currently receiving daily IM CTX per NH paperwork, found to be hypotensive at nursing home with sBP 86. He reports constipation, states he has not had a bowel movement in 2 days when he typically has one every day. Denies CP, SOB, abdominal pain, changes to urinary habits.    ED course:  VS: T 98.7 F oral, , BP 99/60, RR 18, SpO2 95% on RA  Labs significant for: WBCs 34.85 w/ 86% neutrophils, Hgb 9.2, Na 146, Cl 110, bicarb 21, BUN/Cr 144/6.38, albumin 2.9, UA cloudy w/ many bacteria, trace leuk esterase  EKG: sinus tachycardia , QTc 415  Imaging:    CXR: no overt consolidations, minimal large bowel distension (provider read)  Interventions: Zosyn 3.375 g IVPB, Vancomycin 1 g  Consults: none (15 Dec 2023 03:15)    VASCULAR   59yo M T2DM, CVA x2 w/ residual L-sided weakness, early onset dementia, HTN, HLD, DVT, CKD V (Cr 7-8, not on HD), BPH, PSH L toe amputation and AVF creation (5/2023), RUTHIE NOBLE and closure admitted to medicine service with tachycardia despite continuous IM abx. Altered on exam and unable to participate in subjective. Given uremia, emergent HD attempted with insufficient flow.     PAST MEDICAL HISTORY:  Type 2 diabetes mellitus    Personal history of other diseases of circulatory system    Cerebral artery occlusion with cerebral infarction    Hyperlipidemia    Hypertension    Stage 4 chronic kidney disease    H/O diabetic retinopathy        PAST SURGICAL HISTORY:  Late effect of traumatic amputation        MEDICATIONS:      ALLERGIES:  No Known Allergies      SOCHX:   Social History:  lives in nursing home (15 Dec 2023 03:15)      FAMHX:   FAMILY HISTORY:  Family history of diabetes mellitus (Mother)  Family history of diabetes mellitus (DM)          Vitals:  Vital Signs Last 24 Hrs  T(C): 36.4 (15 Dec 2023 15:26), Max: 37.2 (14 Dec 2023 23:54)  T(F): 97.5 (15 Dec 2023 15:26), Max: 98.9 (14 Dec 2023 23:54)  HR: 113 (15 Dec 2023 15:26) (100 - 113)  BP: 127/67 (15 Dec 2023 15:26) (99/60 - 127/67)  BP(mean): --  RR: 18 (15 Dec 2023 15:26) (16 - 18)  SpO2: 96% (15 Dec 2023 15:26) (95% - 99%)    Parameters below as of 15 Dec 2023 15:26  Patient On (Oxygen Delivery Method): room air          PHYSICAL EXAM:  Physical Exam  General: NAD  Pulm: Nonlabored breathing, no respiratory distress  Abd: soft, non distended,   Extrem: RIGHT BKA well healed. LUE AVF with palpable thrill and pulsatile, progressively less pulsatile proximally.      LABS:                        8.5    37.42 )-----------( 385      ( 15 Dec 2023 05:30 )             28.3     12-15    145  |  111<H>  |  147<H>  ----------------------------<  159<H>  4.5   |  19<L>  |  6.16<H>    Ca    8.8      15 Dec 2023 05:30  Phos  4.0     12-15  Mg     2.0     12-15    TPro  6.2  /  Alb  2.9<L>  /  TBili  0.2  /  DBili  x   /  AST  21  /  ALT  14  /  AlkPhos  81  12-15    Lactate: Lactate, Blood: 1.9 mmol/L (12-15 @ 00:30)               Urinalysis Basic - ( 15 Dec 2023 05:30 )    Color: x / Appearance: x / SG: x / pH: x  Gluc: 159 mg/dL / Ketone: x  / Bili: x / Urobili: x   Blood: x / Protein: x / Nitrite: x   Leuk Esterase: x / RBC: x / WBC x   Sq Epi: x / Non Sq Epi: x / Bacteria: x     Vascular Surgery Consult      HPI:  57yo M T2DM, CVA x2 w/ residual L-sided weakness, early onset dementia, HTN, HLD, DVT, CKD V (Cr 7-8, not on HD), BPH, PSH L toe amputation and AVF creation (5/2023), R AIDE, currently receiving daily IM CTX per NH paperwork, found to be hypotensive at nursing home with sBP 86. He reports constipation, states he has not had a bowel movement in 2 days when he typically has one every day. Denies CP, SOB, abdominal pain, changes to urinary habits.    ED course:  VS: T 98.7 F oral, , BP 99/60, RR 18, SpO2 95% on RA  Labs significant for: WBCs 34.85 w/ 86% neutrophils, Hgb 9.2, Na 146, Cl 110, bicarb 21, BUN/Cr 144/6.38, albumin 2.9, UA cloudy w/ many bacteria, trace leuk esterase  EKG: sinus tachycardia , QTc 415  Imaging:    CXR: no overt consolidations, minimal large bowel distension (provider read)  Interventions: Zosyn 3.375 g IVPB, Vancomycin 1 g  Consults: none (15 Dec 2023 03:15)    VASCULAR   57yo M T2DM, CVA x2 w/ residual L-sided weakness, early onset dementia, HTN, HLD, DVT, CKD V (Cr 7-8, not on HD), BPH, PSH L toe amputation and AVF creation (5/2023), RUTHIE NOBLE and closure admitted to medicine service with tachycardia despite continuous IM abx. Altered on exam and unable to participate in subjective. Given uremia, emergent HD attempted with insufficient flow.     PAST MEDICAL HISTORY:  Type 2 diabetes mellitus    Personal history of other diseases of circulatory system    Cerebral artery occlusion with cerebral infarction    Hyperlipidemia    Hypertension    Stage 4 chronic kidney disease    H/O diabetic retinopathy        PAST SURGICAL HISTORY:  Late effect of traumatic amputation        MEDICATIONS:      ALLERGIES:  No Known Allergies      SOCHX:   Social History:  lives in nursing home (15 Dec 2023 03:15)      FAMHX:   FAMILY HISTORY:  Family history of diabetes mellitus (Mother)  Family history of diabetes mellitus (DM)          Vitals:  Vital Signs Last 24 Hrs  T(C): 36.4 (15 Dec 2023 15:26), Max: 37.2 (14 Dec 2023 23:54)  T(F): 97.5 (15 Dec 2023 15:26), Max: 98.9 (14 Dec 2023 23:54)  HR: 113 (15 Dec 2023 15:26) (100 - 113)  BP: 127/67 (15 Dec 2023 15:26) (99/60 - 127/67)  BP(mean): --  RR: 18 (15 Dec 2023 15:26) (16 - 18)  SpO2: 96% (15 Dec 2023 15:26) (95% - 99%)    Parameters below as of 15 Dec 2023 15:26  Patient On (Oxygen Delivery Method): room air          PHYSICAL EXAM:  Physical Exam  General: NAD  Pulm: Nonlabored breathing, no respiratory distress  Abd: soft, non distended,   Extrem: RIGHT BKA well healed. LUE AVF with palpable thrill and pulsatile, progressively less pulsatile proximally.      LABS:                        8.5    37.42 )-----------( 385      ( 15 Dec 2023 05:30 )             28.3     12-15    145  |  111<H>  |  147<H>  ----------------------------<  159<H>  4.5   |  19<L>  |  6.16<H>    Ca    8.8      15 Dec 2023 05:30  Phos  4.0     12-15  Mg     2.0     12-15    TPro  6.2  /  Alb  2.9<L>  /  TBili  0.2  /  DBili  x   /  AST  21  /  ALT  14  /  AlkPhos  81  12-15    Lactate: Lactate, Blood: 1.9 mmol/L (12-15 @ 00:30)               Urinalysis Basic - ( 15 Dec 2023 05:30 )    Color: x / Appearance: x / SG: x / pH: x  Gluc: 159 mg/dL / Ketone: x  / Bili: x / Urobili: x   Blood: x / Protein: x / Nitrite: x   Leuk Esterase: x / RBC: x / WBC x   Sq Epi: x / Non Sq Epi: x / Bacteria: x

## 2023-12-15 NOTE — H&P ADULT - PROBLEM SELECTOR PLAN 4
Hgb 9.2 w/ normocytic MCV on admission (baseline Hgb 9s). Suspect anemia 2/2 CKD though will check for other common causes of both microcytic and macrocytic anemia.  - follow up iron panel, b12, folate  - maintain active type and screen and 2 large-bore IVs  - transfuse for Hgb <7 or active bleeding Known, uses Lantus 15u qhs and Lispro 5u TID premeal, and takes Tradjenta 5 mg qd at home. A1c 6.4% (10/25/2023).  - continue basal/bolus regimen and mISS inpatient  - hold tradjenta inpatient  - follow up A1c in AM

## 2023-12-15 NOTE — DIETITIAN INITIAL EVALUATION ADULT - NSFNSPHYEXAMSKINFT_GEN_A_CORE
Pressure Injury 1: Left:, heel, Stage II  Pressure Injury 2: Left:, heel, Suspected deep tissue injury  Pressure Injury 3: none, none  Pressure Injury 4: none, none  Pressure Injury 5: none, none  Pressure Injury 6: none, none  Pressure Injury 7: none, none  Pressure Injury 8: none, none  Pressure Injury 9: none, none  Pressure Injury 10: none, none  Pressure Injury 11: none, none, Pressure Injury 1: Left:, heel, Stage II  Pressure Injury 2: Left:, heel, Suspected deep tissue injury  Pressure Injury 3: none, none  Pressure Injury 4: none, none  Pressure Injury 5: none, none  Pressure Injury 6: none, none  Pressure Injury 7: none, none  Pressure Injury 8: none, none  Pressure Injury 9: none, none  Pressure Injury 10: none, none  Pressure Injury 11: none, none

## 2023-12-15 NOTE — ED PROVIDER NOTE - CLINICAL SUMMARY MEDICAL DECISION MAKING FREE TEXT BOX
tachycardic, mildly hypotensive. IVF and broad spectrum ABx ordered as leukocytosis present meeting sirs criteria. unclear source at this time. will not order weight odalys fluids as patient with advanced CKD. will reasess after 1 l IVF and consider need for additional fluid resuscitation.

## 2023-12-15 NOTE — CONSULT NOTE ADULT - SUBJECTIVE AND OBJECTIVE BOX
[facility] Geriatrics and Palliative Care  Kodi Javed, Palliative Care Attending  Contact Info: Call [phone] (HEAL Line) or message on Microsoft Teams (Kodi Javed)    HPI:  59yo M T2DM, CVA x2 w/ residual L-sided weakness, early onset dementia, HTN, HLD, DVT, CKD V (Cr 7-8, not on HD), BPH, PSH L toe amputation and AVF creation (5/2023), R BKA, currently receiving daily IM CTX per NH paperwork, found to be hypotensive at nursing home with sBP 86. He reports constipation, states he has not had a bowel movement in 2 days when he typically has one every day. Denies CP, SOB, abdominal pain, changes to urinary habits.    ED course:  VS: T 98.7 F oral, , BP 99/60, RR 18, SpO2 95% on RA  Labs significant for: WBCs 34.85 w/ 86% neutrophils, Hgb 9.2, Na 146, Cl 110, bicarb 21, BUN/Cr 144/6.38, albumin 2.9, UA cloudy w/ many bacteria, trace leuk esterase  EKG: sinus tachycardia , QTc 415  Imaging:    CXR: no overt consolidations, minimal large bowel distension (provider read)  Interventions: Zosyn 3.375 g IVPB, Vancomycin 1 g  Consults: none (15 Dec 2023 03:15)    Patient seen and examined at bedside. Appears overtly comfortable. Denies any significant complaint. Comprehensive symptom assessment and GOC exploration as noted below. Further collateral obtained from primary team, chart, and family.    PERTINENT PM/SXH:   Type 2 diabetes mellitus    Personal history of other diseases of circulatory system    Cerebral artery occlusion with cerebral infarction    Hyperlipidemia    Hypertension    Stage 4 chronic kidney disease    H/O diabetic retinopathy      Late effect of traumatic amputation      FAMILY HISTORY:  Family history of diabetes mellitus (Mother)  Family history of diabetes mellitus (DM)      No history of  in first degree relatives  Unable to obtain due to encephalopathy    ITEMS NOT CHECKED ARE NOT PRESENT  SOCIAL HISTORY:   Significant other/partner:  []  Children:  []  Substance hx:  []   Tobacco hx:  []   Alcohol hx: []   Home Opioid hx:  [] I-Stop Reference No:  - no active Rx's / see chart note  Living Situation: []Home  []Long term care  []Rehab []Other  Rastafarian/Spiritual practice: ; Role of organized Rastafarian [] important [] some [] unable to assess  Coping: [] well [] with difficulty [] poor coping [] unable to assess  Support system: [] strong [] adequate [] inadequate    ADVANCE DIRECTIVES:    []MOLST  []Living Will  DECISION MAKER(s):  [] Health Care Proxy(s)  [] Surrogate(s)  [] Guardian           Name(s)/Phone Number(s):     BASELINE (I)ADLs (prior to admission):  [place]: []Total  [] Moderate []Dependent    ALLERGIES:  No Known Allergies    MEDICATIONS  (STANDING):  aspirin enteric coated 81 milliGRAM(s) Oral every 24 hours  atorvastatin 80 milliGRAM(s) Oral at bedtime  dextrose 5%. 1000 milliLiter(s) (50 mL/Hr) IV Continuous <Continuous>  dextrose 5%. 1000 milliLiter(s) (100 mL/Hr) IV Continuous <Continuous>  dextrose 50% Injectable 25 Gram(s) IV Push once  dextrose 50% Injectable 25 Gram(s) IV Push once  dextrose 50% Injectable 12.5 Gram(s) IV Push once  glucagon  Injectable 1 milliGRAM(s) IntraMuscular once  heparin   Injectable 5000 Unit(s) SubCutaneous every 8 hours  influenza   Vaccine 0.5 milliLiter(s) IntraMuscular once  insulin glargine Injectable (LANTUS) 15 Unit(s) SubCutaneous at bedtime  insulin lispro (ADMELOG) corrective regimen sliding scale   SubCutaneous Before meals and at bedtime  insulin lispro Injectable (ADMELOG) 5 Unit(s) SubCutaneous three times a day before meals  polyethylene glycol 3350 17 Gram(s) Oral two times a day  senna 2 Tablet(s) Oral at bedtime    MEDICATIONS  (PRN):  dextrose Oral Gel 15 Gram(s) Oral once PRN Blood Glucose LESS THAN 70 milliGRAM(s)/deciliter    Analgesic Use (Scheduled and PRNs) for past 24 hours:      PRESENT SYMPTOMS: []Unable to obtain due to poor mentation/encephalopathy  Source if other than patient:  []Family   []Team     Pain: [] yes [] no  QOL impact -   Location -                    Aggravating Factors -  Quality -  Radiation -  Timing -  Severity (0-10 scale) -   Minimal Acceptable Level (0-10 scale) -    CPOT Score:  (Critical Care Pain Assessment)    PAIN AD Score:  (Nonverbal Pain Assessment)    Dyspnea:                           []Mild  []Moderate []Severe  Anxiety:                             []Mild []Moderate []Severe  Fatigue:                             []Mild []Moderate []Severe  Nausea:                             []Mild []Moderate []Severe  Loss of Appetite:              []Mild []Moderate []Severe  Constipation:                    []Mild []Moderate []Severe    Other Symptoms:  [x]All other review of systems negative     Palliative Performance Status Version 2:  %  (Functional Assessment Tool)    GENERAL:  [] NAD []Alert []Lethargic  []Cachexia  []Unarousable  []Verbal  []Non-Verbal  BEHAVIORAL:   []Anxiety  []Delirium []Agitation []Cooperative []Oriented x  HEENT:  []Normal  [] Moist Mucous Membranes []Dry mouth   []ET Tube/Trach  []Oral lesions  PULMONARY:   []Clear []Tachypnea  []Audible excessive secretions  []Normal Work of Breathing []Labored Breathing  []Rhonchi []Crackles []Wheezing  CARDIOVASCULAR:    []Regular Rate []Regular Rhythm []Irregular []Tachy  []Alex  GASTROINTESTINAL:  []Soft  []Distended   []+BS  []Non tender []Tender  []PEG []OGT/ NGT  Last BM:  GENITOURINARY:  []Normal [] Incontinent   []Oliguria/Anuria   []Borja  MUSCULOSKELETAL:   []Normal Extremities  []Weakness  []Bed/Wheelchair bound []Edema  NEUROLOGIC:   []No focal deficits  []Cognitive impairment  []Dysphagia []Dysarthria []Paresis []Encephalopathic  SKIN:   []Normal   []Pressure ulcer(s)  []Rash    CRITICAL CARE:  [ ]Shock Present  [ ]Septic [ ]Cardiogenic [ ]Neurologic [ ]Hypovolemic  [ ] Vasopressors [ ]Inotropes   [ ]Respiratory failure present [ ]Mechanical Ventilation [ ]Non-invasive ventilatory support [ ]High-Flow  [ ]Acute  [ ]Chronic [ ]Hypoxic  [ ]Hypercarbic   [ ]Other organ failure    Vital Signs Last 24 Hrs  T(C): 36.4 (15 Dec 2023 15:26), Max: 37.2 (14 Dec 2023 23:54)  T(F): 97.5 (15 Dec 2023 15:26), Max: 98.9 (14 Dec 2023 23:54)  HR: 113 (15 Dec 2023 15:26) (100 - 113)  BP: 127/67 (15 Dec 2023 15:26) (99/60 - 127/67)  BP(mean): --  RR: 18 (15 Dec 2023 15:26) (16 - 18)  SpO2: 96% (15 Dec 2023 15:26) (95% - 99%)    Parameters below as of 15 Dec 2023 15:26  Patient On (Oxygen Delivery Method): room air         LABS: Personally reviewed and interpreted                        8.5    37.42 )-----------( 385      ( 15 Dec 2023 05:30 )             28.3   12-15    145  |  111<H>  |  147<H>  ----------------------------<  159<H>  4.5   |  19<L>  |  6.16<H>    Ca    8.8      15 Dec 2023 05:30  Phos  4.0     12-15  Mg     2.0     12-15    TPro  6.2  /  Alb  2.9<L>  /  TBili  0.2  /  DBili  x   /  AST  21  /  ALT  14  /  AlkPhos  81  12-15    RADIOLOGY & ADDITIONAL STUDIES: Personally reviewed and interpreted    REFERRALS:  [x]Social Work/Case management []PT/OT []Chaplaincy  []Hospice  []Patient/Family Support []Massage Therapy []Music Therapy []Holistic RN []Ethics    DISCUSSION OF CASE: Family - to provide updates and emotional support; Primary Team/RN - to discuss plan of care [facility] Geriatrics and Palliative Care  Kodi Javed, Palliative Care Attending  Contact Info: Call [phone] (HEAL Line) or message on Microsoft Teams (Kodi Javed)    HPI:  59yo M T2DM, CVA x2 w/ residual L-sided weakness, early onset dementia, HTN, HLD, DVT, CKD V (Cr 7-8, not on HD), BPH, PSH L toe amputation and AVF creation (5/2023), R BKA, currently receiving daily IM CTX per NH paperwork, found to be hypotensive at nursing home with sBP 86. He reports constipation, states he has not had a bowel movement in 2 days when he typically has one every day. Denies CP, SOB, abdominal pain, changes to urinary habits.    ED course:  VS: T 98.7 F oral, , BP 99/60, RR 18, SpO2 95% on RA  Labs significant for: WBCs 34.85 w/ 86% neutrophils, Hgb 9.2, Na 146, Cl 110, bicarb 21, BUN/Cr 144/6.38, albumin 2.9, UA cloudy w/ many bacteria, trace leuk esterase  EKG: sinus tachycardia , QTc 415  Imaging:    CXR: no overt consolidations, minimal large bowel distension (provider read)  Interventions: Zosyn 3.375 g IVPB, Vancomycin 1 g  Consults: none (15 Dec 2023 03:15)    Patient seen and examined at bedside. Appears overtly comfortable. Denies any significant complaint. Comprehensive symptom assessment and GOC exploration as noted below. Further collateral obtained from primary team, chart, and family.    PERTINENT PM/SXH:   Type 2 diabetes mellitus    Personal history of other diseases of circulatory system    Cerebral artery occlusion with cerebral infarction    Hyperlipidemia    Hypertension    Stage 4 chronic kidney disease    H/O diabetic retinopathy      Late effect of traumatic amputation      FAMILY HISTORY:  Family history of diabetes mellitus (Mother)  Family history of diabetes mellitus (DM)      No history of  in first degree relatives  Unable to obtain due to encephalopathy    ITEMS NOT CHECKED ARE NOT PRESENT  SOCIAL HISTORY:   Significant other/partner:  []  Children:  []  Substance hx:  []   Tobacco hx:  []   Alcohol hx: []   Home Opioid hx:  [] I-Stop Reference No:  - no active Rx's / see chart note  Living Situation: []Home  []Long term care  []Rehab []Other  Taoist/Spiritual practice: ; Role of organized Jehovah's witness [] important [] some [] unable to assess  Coping: [] well [] with difficulty [] poor coping [] unable to assess  Support system: [] strong [] adequate [] inadequate    ADVANCE DIRECTIVES:    []MOLST  []Living Will  DECISION MAKER(s):  [] Health Care Proxy(s)  [] Surrogate(s)  [] Guardian           Name(s)/Phone Number(s):     BASELINE (I)ADLs (prior to admission):  [place]: []Total  [] Moderate []Dependent    ALLERGIES:  No Known Allergies    MEDICATIONS  (STANDING):  aspirin enteric coated 81 milliGRAM(s) Oral every 24 hours  atorvastatin 80 milliGRAM(s) Oral at bedtime  dextrose 5%. 1000 milliLiter(s) (50 mL/Hr) IV Continuous <Continuous>  dextrose 5%. 1000 milliLiter(s) (100 mL/Hr) IV Continuous <Continuous>  dextrose 50% Injectable 25 Gram(s) IV Push once  dextrose 50% Injectable 25 Gram(s) IV Push once  dextrose 50% Injectable 12.5 Gram(s) IV Push once  glucagon  Injectable 1 milliGRAM(s) IntraMuscular once  heparin   Injectable 5000 Unit(s) SubCutaneous every 8 hours  influenza   Vaccine 0.5 milliLiter(s) IntraMuscular once  insulin glargine Injectable (LANTUS) 15 Unit(s) SubCutaneous at bedtime  insulin lispro (ADMELOG) corrective regimen sliding scale   SubCutaneous Before meals and at bedtime  insulin lispro Injectable (ADMELOG) 5 Unit(s) SubCutaneous three times a day before meals  polyethylene glycol 3350 17 Gram(s) Oral two times a day  senna 2 Tablet(s) Oral at bedtime    MEDICATIONS  (PRN):  dextrose Oral Gel 15 Gram(s) Oral once PRN Blood Glucose LESS THAN 70 milliGRAM(s)/deciliter    Analgesic Use (Scheduled and PRNs) for past 24 hours:      PRESENT SYMPTOMS: []Unable to obtain due to poor mentation/encephalopathy  Source if other than patient:  []Family   []Team     Pain: [] yes [] no  QOL impact -   Location -                    Aggravating Factors -  Quality -  Radiation -  Timing -  Severity (0-10 scale) -   Minimal Acceptable Level (0-10 scale) -    CPOT Score:  (Critical Care Pain Assessment)    PAIN AD Score:  (Nonverbal Pain Assessment)    Dyspnea:                           []Mild  []Moderate []Severe  Anxiety:                             []Mild []Moderate []Severe  Fatigue:                             []Mild []Moderate []Severe  Nausea:                             []Mild []Moderate []Severe  Loss of Appetite:              []Mild []Moderate []Severe  Constipation:                    []Mild []Moderate []Severe    Other Symptoms:  [x]All other review of systems negative     Palliative Performance Status Version 2:  %  (Functional Assessment Tool)    GENERAL:  [] NAD []Alert []Lethargic  []Cachexia  []Unarousable  []Verbal  []Non-Verbal  BEHAVIORAL:   []Anxiety  []Delirium []Agitation []Cooperative []Oriented x  HEENT:  []Normal  [] Moist Mucous Membranes []Dry mouth   []ET Tube/Trach  []Oral lesions  PULMONARY:   []Clear []Tachypnea  []Audible excessive secretions  []Normal Work of Breathing []Labored Breathing  []Rhonchi []Crackles []Wheezing  CARDIOVASCULAR:    []Regular Rate []Regular Rhythm []Irregular []Tachy  []Alex  GASTROINTESTINAL:  []Soft  []Distended   []+BS  []Non tender []Tender  []PEG []OGT/ NGT  Last BM:  GENITOURINARY:  []Normal [] Incontinent   []Oliguria/Anuria   []Borja  MUSCULOSKELETAL:   []Normal Extremities  []Weakness  []Bed/Wheelchair bound []Edema  NEUROLOGIC:   []No focal deficits  []Cognitive impairment  []Dysphagia []Dysarthria []Paresis []Encephalopathic  SKIN:   []Normal   []Pressure ulcer(s)  []Rash    CRITICAL CARE:  [ ]Shock Present  [ ]Septic [ ]Cardiogenic [ ]Neurologic [ ]Hypovolemic  [ ] Vasopressors [ ]Inotropes   [ ]Respiratory failure present [ ]Mechanical Ventilation [ ]Non-invasive ventilatory support [ ]High-Flow  [ ]Acute  [ ]Chronic [ ]Hypoxic  [ ]Hypercarbic   [ ]Other organ failure    Vital Signs Last 24 Hrs  T(C): 36.4 (15 Dec 2023 15:26), Max: 37.2 (14 Dec 2023 23:54)  T(F): 97.5 (15 Dec 2023 15:26), Max: 98.9 (14 Dec 2023 23:54)  HR: 113 (15 Dec 2023 15:26) (100 - 113)  BP: 127/67 (15 Dec 2023 15:26) (99/60 - 127/67)  BP(mean): --  RR: 18 (15 Dec 2023 15:26) (16 - 18)  SpO2: 96% (15 Dec 2023 15:26) (95% - 99%)    Parameters below as of 15 Dec 2023 15:26  Patient On (Oxygen Delivery Method): room air         LABS: Personally reviewed and interpreted                        8.5    37.42 )-----------( 385      ( 15 Dec 2023 05:30 )             28.3   12-15    145  |  111<H>  |  147<H>  ----------------------------<  159<H>  4.5   |  19<L>  |  6.16<H>    Ca    8.8      15 Dec 2023 05:30  Phos  4.0     12-15  Mg     2.0     12-15    TPro  6.2  /  Alb  2.9<L>  /  TBili  0.2  /  DBili  x   /  AST  21  /  ALT  14  /  AlkPhos  81  12-15    RADIOLOGY & ADDITIONAL STUDIES: Personally reviewed and interpreted    REFERRALS:  [x]Social Work/Case management []PT/OT []Chaplaincy  []Hospice  []Patient/Family Support []Massage Therapy []Music Therapy []Holistic RN []Ethics    DISCUSSION OF CASE: Family - to provide updates and emotional support; Primary Team/RN - to discuss plan of care [facility] Geriatrics and Palliative Care  Kodi Javed, Palliative Care Attending  Contact Info: Call [phone] (HEAL Line) or message on Microsoft Teams (Kodi Javed)    HPI:  59yo M T2DM, CVA x2 w/ residual L-sided weakness, early onset dementia, HTN, HLD, DVT, CKD V (Cr 7-8, not on HD), BPH, PSH L toe amputation and AVF creation (5/2023), R BKA, currently receiving daily IM CTX per NH paperwork, found to be hypotensive at nursing home with sBP 86. He reports constipation, states he has not had a bowel movement in 2 days when he typically has one every day. Denies CP, SOB, abdominal pain, changes to urinary habits.    ED course:  VS: T 98.7 F oral, , BP 99/60, RR 18, SpO2 95% on RA  Labs significant for: WBCs 34.85 w/ 86% neutrophils, Hgb 9.2, Na 146, Cl 110, bicarb 21, BUN/Cr 144/6.38, albumin 2.9, UA cloudy w/ many bacteria, trace leuk esterase  EKG: sinus tachycardia , QTc 415  Imaging:    CXR: no overt consolidations, minimal large bowel distension (provider read)  Interventions: Zosyn 3.375 g IVPB, Vancomycin 1 g  Consults: none (15 Dec 2023 03:15)    Patient seen and examined at bedside. Appears overtly comfortable. Denies any significant complaint. Comprehensive symptom assessment and GOC exploration as noted below. Further collateral obtained from primary team, chart, and family.    PERTINENT PM/SXH:   Type 2 diabetes mellitus  Personal history of other diseases of circulatory system  Cerebral artery occlusion with cerebral infarction  Hyperlipidemia  Hypertension  Stage 4 chronic kidney disease  H/O diabetic retinopathy  Late effect of traumatic amputation    FAMILY HISTORY:  Family history of diabetes mellitus (Mother)  Family history of diabetes mellitus (DM)    ITEMS NOT CHECKED ARE NOT PRESENT  SOCIAL HISTORY:   Significant other/partner:  []  Children:  []  Substance hx:  []   Tobacco hx:  []   Alcohol hx: []   Home Opioid hx:  [] I-Stop Reference No:  - no active Rx's  Living Situation: []Home  [x]Long term care  []Rehab []Other  Oriental orthodox/Spiritual practice: ; Role of organized Baptism [] important [] some [x] unable to assess  Coping: [x] well [] with difficulty [] poor coping [] unable to assess  Support system: [] strong [x] adequate [] inadequate    ADVANCE DIRECTIVES:    []MOLST  []Living Will  DECISION MAKER(s):  [] Health Care Proxy(s)  [x] Surrogate(s)  [] Guardian           Name(s)/Phone Number(s): Nephew    BASELINE (I)ADLs (prior to admission):  [place]: []Total  [] Moderate [x]Dependent    ALLERGIES:  No Known Allergies    MEDICATIONS  (STANDING):  aspirin enteric coated 81 milliGRAM(s) Oral every 24 hours  atorvastatin 80 milliGRAM(s) Oral at bedtime  dextrose 5%. 1000 milliLiter(s) (50 mL/Hr) IV Continuous <Continuous>  dextrose 5%. 1000 milliLiter(s) (100 mL/Hr) IV Continuous <Continuous>  dextrose 50% Injectable 25 Gram(s) IV Push once  dextrose 50% Injectable 25 Gram(s) IV Push once  dextrose 50% Injectable 12.5 Gram(s) IV Push once  glucagon  Injectable 1 milliGRAM(s) IntraMuscular once  heparin   Injectable 5000 Unit(s) SubCutaneous every 8 hours  influenza   Vaccine 0.5 milliLiter(s) IntraMuscular once  insulin glargine Injectable (LANTUS) 15 Unit(s) SubCutaneous at bedtime  insulin lispro (ADMELOG) corrective regimen sliding scale   SubCutaneous Before meals and at bedtime  insulin lispro Injectable (ADMELOG) 5 Unit(s) SubCutaneous three times a day before meals  polyethylene glycol 3350 17 Gram(s) Oral two times a day  senna 2 Tablet(s) Oral at bedtime    MEDICATIONS  (PRN):  dextrose Oral Gel 15 Gram(s) Oral once PRN Blood Glucose LESS THAN 70 milliGRAM(s)/deciliter    Analgesic Use (Scheduled and PRNs) for past 24 hours:  - none    PRESENT SYMPTOMS: []Unable to obtain due to poor mentation/encephalopathy  Source if other than patient:  []Family   []Team     Pain: [] yes [x] no  QOL impact -   Location -                    Aggravating Factors -  Quality -  Radiation -  Timing -  Severity (0-10 scale) -   Minimal Acceptable Level (0-10 scale) -    PAIN AD Score: 0  (Nonverbal Pain Assessment)    Dyspnea:                           []Mild  []Moderate []Severe  Anxiety:                             []Mild []Moderate []Severe  Fatigue:                             []Mild []Moderate []Severe  Nausea:                             []Mild []Moderate []Severe  Loss of Appetite:              []Mild []Moderate []Severe  Constipation:                    []Mild []Moderate []Severe    Other Symptoms:  [x]All other review of systems negative     Palliative Performance Status Version 2:  40%  (Functional Assessment Tool)    GENERAL:  [x] NAD []Alert [x]Lethargic  []Cachexia  []Unarousable  [x]Verbal  []Non-Verbal  BEHAVIORAL:   []Anxiety  [x]Delirium []Agitation [x]Cooperative [x]Oriented x3  HEENT:  [x]Normal  [x] Moist Mucous Membranes []Dry mouth   []ET Tube/Trach  []Oral lesions  PULMONARY:   [x]Clear []Tachypnea  []Audible excessive secretions  [x]Normal Work of Breathing []Labored Breathing  []Rhonchi []Crackles []Wheezing  CARDIOVASCULAR:    [x]Regular Rate [x]Regular Rhythm []Irregular []Tachy  []Alex  GASTROINTESTINAL:  [x]Soft  []Distended   [x]+BS  [x]Non tender []Tender  []PEG []OGT/ NGT  Last BM:  GENITOURINARY:  []Normal [] Incontinent   [x]Oliguria/Anuria   []Borja  MUSCULOSKELETAL:   []Normal Extremities  [x]Weakness  [x]Bed/Wheelchair bound []Edema  NEUROLOGIC:   []No focal deficits  []Cognitive impairment  []Dysphagia []Dysarthria []Paresis [x]Encephalopathic  SKIN:   [x]Normal   []Pressure ulcer(s)  []Rash    CRITICAL CARE:  [ ]Shock Present  [ ]Septic [ ]Cardiogenic [ ]Neurologic [ ]Hypovolemic  [ ] Vasopressors [ ]Inotropes   [ ]Respiratory failure present [ ]Mechanical Ventilation [ ]Non-invasive ventilatory support [ ]High-Flow  [ ]Acute  [ ]Chronic [ ]Hypoxic  [ ]Hypercarbic   [x]Other organ failure: renal    Vital Signs Last 24 Hrs  T(C): 36.4 (15 Dec 2023 15:26), Max: 37.2 (14 Dec 2023 23:54)  T(F): 97.5 (15 Dec 2023 15:26), Max: 98.9 (14 Dec 2023 23:54)  HR: 113 (15 Dec 2023 15:26) (100 - 113)  BP: 127/67 (15 Dec 2023 15:26) (99/60 - 127/67)  BP(mean): --  RR: 18 (15 Dec 2023 15:26) (16 - 18)  SpO2: 96% (15 Dec 2023 15:26) (95% - 99%)    Parameters below as of 15 Dec 2023 15:26  Patient On (Oxygen Delivery Method): room air    LABS: Personally reviewed and interpreted                   8.5    37.42 )-----------( 385      ( 15 Dec 2023 05:30 )             28.3   12-15    145  |  111<H>  |  147<H>  ----------------------------<  159<H>  4.5   |  19<L>  |  6.16<H>    Ca    8.8      15 Dec 2023 05:30  Phos  4.0     12-15  Mg     2.0     12-15  TPro  6.2  /  Alb  2.9<L>  /  TBili  0.2  /  DBili  x   /  AST  21  /  ALT  14  /  AlkPhos  81  12-15    RADIOLOGY & ADDITIONAL STUDIES: Personally reviewed and interpreted  < from: Xray Chest 1 View- PORTABLE-Urgent (Xray Chest 1 View- PORTABLE-Urgent .) (12.15.23 @ 01:31) >  The lungs are clear.  There are no pleural effusions.  The cardiomediastinal silhouette, bones and soft tissues are unremarkable.    REFERRALS:  [x]Social Work/Case management [x]PT/OT []Chaplaincy  []Hospice  []Patient/Family Support []Massage Therapy []Music Therapy []Holistic RN []Ethics    DISCUSSION OF CASE: Primary Team/RN - to discuss plan of care [facility] Geriatrics and Palliative Care  Kodi Javed, Palliative Care Attending  Contact Info: Call [phone] (HEAL Line) or message on Microsoft Teams (Kodi Javed)    HPI:  57yo M T2DM, CVA x2 w/ residual L-sided weakness, early onset dementia, HTN, HLD, DVT, CKD V (Cr 7-8, not on HD), BPH, PSH L toe amputation and AVF creation (5/2023), R BKA, currently receiving daily IM CTX per NH paperwork, found to be hypotensive at nursing home with sBP 86. He reports constipation, states he has not had a bowel movement in 2 days when he typically has one every day. Denies CP, SOB, abdominal pain, changes to urinary habits.    ED course:  VS: T 98.7 F oral, , BP 99/60, RR 18, SpO2 95% on RA  Labs significant for: WBCs 34.85 w/ 86% neutrophils, Hgb 9.2, Na 146, Cl 110, bicarb 21, BUN/Cr 144/6.38, albumin 2.9, UA cloudy w/ many bacteria, trace leuk esterase  EKG: sinus tachycardia , QTc 415  Imaging:    CXR: no overt consolidations, minimal large bowel distension (provider read)  Interventions: Zosyn 3.375 g IVPB, Vancomycin 1 g  Consults: none (15 Dec 2023 03:15)    Patient seen and examined at bedside. Appears overtly comfortable. Denies any significant complaint. Comprehensive symptom assessment and GOC exploration as noted below. Further collateral obtained from primary team, chart, and family.    PERTINENT PM/SXH:   Type 2 diabetes mellitus  Personal history of other diseases of circulatory system  Cerebral artery occlusion with cerebral infarction  Hyperlipidemia  Hypertension  Stage 4 chronic kidney disease  H/O diabetic retinopathy  Late effect of traumatic amputation    FAMILY HISTORY:  Family history of diabetes mellitus (Mother)  Family history of diabetes mellitus (DM)    ITEMS NOT CHECKED ARE NOT PRESENT  SOCIAL HISTORY:   Significant other/partner:  []  Children:  []  Substance hx:  []   Tobacco hx:  []   Alcohol hx: []   Home Opioid hx:  [] I-Stop Reference No:  - no active Rx's  Living Situation: []Home  [x]Long term care  []Rehab []Other  Baptist/Spiritual practice: ; Role of organized Yarsanism [] important [] some [x] unable to assess  Coping: [x] well [] with difficulty [] poor coping [] unable to assess  Support system: [] strong [x] adequate [] inadequate    ADVANCE DIRECTIVES:    []MOLST  []Living Will  DECISION MAKER(s):  [] Health Care Proxy(s)  [x] Surrogate(s)  [] Guardian           Name(s)/Phone Number(s): Nephew    BASELINE (I)ADLs (prior to admission):  [place]: []Total  [] Moderate [x]Dependent    ALLERGIES:  No Known Allergies    MEDICATIONS  (STANDING):  aspirin enteric coated 81 milliGRAM(s) Oral every 24 hours  atorvastatin 80 milliGRAM(s) Oral at bedtime  dextrose 5%. 1000 milliLiter(s) (50 mL/Hr) IV Continuous <Continuous>  dextrose 5%. 1000 milliLiter(s) (100 mL/Hr) IV Continuous <Continuous>  dextrose 50% Injectable 25 Gram(s) IV Push once  dextrose 50% Injectable 25 Gram(s) IV Push once  dextrose 50% Injectable 12.5 Gram(s) IV Push once  glucagon  Injectable 1 milliGRAM(s) IntraMuscular once  heparin   Injectable 5000 Unit(s) SubCutaneous every 8 hours  influenza   Vaccine 0.5 milliLiter(s) IntraMuscular once  insulin glargine Injectable (LANTUS) 15 Unit(s) SubCutaneous at bedtime  insulin lispro (ADMELOG) corrective regimen sliding scale   SubCutaneous Before meals and at bedtime  insulin lispro Injectable (ADMELOG) 5 Unit(s) SubCutaneous three times a day before meals  polyethylene glycol 3350 17 Gram(s) Oral two times a day  senna 2 Tablet(s) Oral at bedtime    MEDICATIONS  (PRN):  dextrose Oral Gel 15 Gram(s) Oral once PRN Blood Glucose LESS THAN 70 milliGRAM(s)/deciliter    Analgesic Use (Scheduled and PRNs) for past 24 hours:  - none    PRESENT SYMPTOMS: []Unable to obtain due to poor mentation/encephalopathy  Source if other than patient:  []Family   []Team     Pain: [] yes [x] no  QOL impact -   Location -                    Aggravating Factors -  Quality -  Radiation -  Timing -  Severity (0-10 scale) -   Minimal Acceptable Level (0-10 scale) -    PAIN AD Score: 0  (Nonverbal Pain Assessment)    Dyspnea:                           []Mild  []Moderate []Severe  Anxiety:                             []Mild []Moderate []Severe  Fatigue:                             []Mild []Moderate []Severe  Nausea:                             []Mild []Moderate []Severe  Loss of Appetite:              []Mild []Moderate []Severe  Constipation:                    []Mild []Moderate []Severe    Other Symptoms:  [x]All other review of systems negative     Palliative Performance Status Version 2:  40%  (Functional Assessment Tool)    GENERAL:  [x] NAD []Alert [x]Lethargic  []Cachexia  []Unarousable  [x]Verbal  []Non-Verbal  BEHAVIORAL:   []Anxiety  [x]Delirium []Agitation [x]Cooperative [x]Oriented x3  HEENT:  [x]Normal  [x] Moist Mucous Membranes []Dry mouth   []ET Tube/Trach  []Oral lesions  PULMONARY:   [x]Clear []Tachypnea  []Audible excessive secretions  [x]Normal Work of Breathing []Labored Breathing  []Rhonchi []Crackles []Wheezing  CARDIOVASCULAR:    [x]Regular Rate [x]Regular Rhythm []Irregular []Tachy  []Alex  GASTROINTESTINAL:  [x]Soft  []Distended   [x]+BS  [x]Non tender []Tender  []PEG []OGT/ NGT  Last BM:  GENITOURINARY:  []Normal [] Incontinent   [x]Oliguria/Anuria   []Borja  MUSCULOSKELETAL:   []Normal Extremities  [x]Weakness  [x]Bed/Wheelchair bound []Edema  NEUROLOGIC:   []No focal deficits  []Cognitive impairment  []Dysphagia []Dysarthria []Paresis [x]Encephalopathic  SKIN:   [x]Normal   []Pressure ulcer(s)  []Rash    CRITICAL CARE:  [ ]Shock Present  [ ]Septic [ ]Cardiogenic [ ]Neurologic [ ]Hypovolemic  [ ] Vasopressors [ ]Inotropes   [ ]Respiratory failure present [ ]Mechanical Ventilation [ ]Non-invasive ventilatory support [ ]High-Flow  [ ]Acute  [ ]Chronic [ ]Hypoxic  [ ]Hypercarbic   [x]Other organ failure: renal    Vital Signs Last 24 Hrs  T(C): 36.4 (15 Dec 2023 15:26), Max: 37.2 (14 Dec 2023 23:54)  T(F): 97.5 (15 Dec 2023 15:26), Max: 98.9 (14 Dec 2023 23:54)  HR: 113 (15 Dec 2023 15:26) (100 - 113)  BP: 127/67 (15 Dec 2023 15:26) (99/60 - 127/67)  BP(mean): --  RR: 18 (15 Dec 2023 15:26) (16 - 18)  SpO2: 96% (15 Dec 2023 15:26) (95% - 99%)    Parameters below as of 15 Dec 2023 15:26  Patient On (Oxygen Delivery Method): room air    LABS: Personally reviewed and interpreted                   8.5    37.42 )-----------( 385      ( 15 Dec 2023 05:30 )             28.3   12-15    145  |  111<H>  |  147<H>  ----------------------------<  159<H>  4.5   |  19<L>  |  6.16<H>    Ca    8.8      15 Dec 2023 05:30  Phos  4.0     12-15  Mg     2.0     12-15  TPro  6.2  /  Alb  2.9<L>  /  TBili  0.2  /  DBili  x   /  AST  21  /  ALT  14  /  AlkPhos  81  12-15    RADIOLOGY & ADDITIONAL STUDIES: Personally reviewed and interpreted  < from: Xray Chest 1 View- PORTABLE-Urgent (Xray Chest 1 View- PORTABLE-Urgent .) (12.15.23 @ 01:31) >  The lungs are clear.  There are no pleural effusions.  The cardiomediastinal silhouette, bones and soft tissues are unremarkable.    REFERRALS:  [x]Social Work/Case management [x]PT/OT []Chaplaincy  []Hospice  []Patient/Family Support []Massage Therapy []Music Therapy []Holistic RN []Ethics    DISCUSSION OF CASE: Primary Team/RN - to discuss plan of care

## 2023-12-15 NOTE — H&P ADULT - ATTENDING COMMENTS
Patient was seen and examined at bedside on 12/15/2023 at 1230 pm. Patient reports feeling "okay". Has no acute complaints. ROS is otherwise negative. Vitals, labwork and pertinent imaging reviewed. Exam - NAD, AAO x 2, PERRLA, EOMI, MMM, supple neck, chest - decreased BS b/l,, CV - rrr, s1s2, no m/r/g, abd - soft, NTND, + BS, ext - wwp, psych - normal affect, pt appears cachectic and malnourished    Plan:  -Unclear etiology of encephalopathy - collateral obtained - at baseline patient is AAO x 4; check CTH, d/w Renal  -Consider CT C/A/P  -Unclear etiology of leukocytosis/SIRS - f/u cultures (BCX/UCX), 2/2 worsening uremia?  -If WBC continues to be elevated without a clear cause - will consult Heme  -Nutrition consult  -Rectal temp  -PVR q6

## 2023-12-15 NOTE — CONSULT NOTE ADULT - PROBLEM SELECTOR RECOMMENDATION 5
.  Complex medical decision making / symptom management in the setting of advanced illness.    Will continue to follow for ongoing GOC discussion / titration of palliative regimen. Emotional support provided, questions answered.  Active Psychosocial Referrals:  [x]Social Work/Case management [x]PT/OT []Chaplaincy []Hospice  []Patient/Family Support []Holistic RN []Massage Therapy []Music Therapy []Ethics  Coping: [x] well [] with difficulty [] poor coping [] unable to assess  Support system: [] strong [x] adequate [] inadequate    For new or uncontrolled symptoms, please call Palliative Care at [phone]). The service is available 24/7 (including nights & weekends) to provide symptom management recommendations over the phone as appropriate .  Complex medical decision making / symptom management in the setting of advanced illness.    Will continue to follow for ongoing GOC discussion / titration of palliative regimen. Emotional support provided, questions answered.  Active Psychosocial Referrals:  [x]Social Work/Case management [x]PT/OT []Chaplaincy []Hospice  []Patient/Family Support []Holistic RN []Massage Therapy []Music Therapy []Ethics  Coping: [x] well [] with difficulty [] poor coping [] unable to assess  Support system: [] strong [x] adequate [] inadequate    For new or uncontrolled symptoms, please call Palliative Care at [phone]). The service is available 24/7 (including nights & weekends) to provide symptom management recommendations over the phone as appropriate

## 2023-12-15 NOTE — CONSULT NOTE ADULT - PROBLEM SELECTOR RECOMMENDATION 4
.  Patient is Full Code  -nephew has been assisting with medical care  -will complete HCP form when appropriate if hasn't been done already

## 2023-12-15 NOTE — CONSULT NOTE ADULT - ASSESSMENT
57yo M with PMH of T2DM, CVA, HTN, CKD 5, and R BKA p/w hypotension and found to have leukocytosis. Palliative consulted for complex medical decision making in the setting of advanced illness.    ·	collateral obtained from Nephrology; multiple discussions are had regarding initiation of dialysis  ·	patient is reportedly not at baseline mental status, ongoing infectious work-up given leukocytosis

## 2023-12-15 NOTE — CONSULT NOTE ADULT - PROBLEM SELECTOR RECOMMENDATION 2
.  PPSV = 40%, requires assistance for most ADLs  -PT Eval  -c/w supportive care, OOB, encourage movement

## 2023-12-15 NOTE — PROGRESS NOTE ADULT - PROBLEM SELECTOR PLAN 2
BUN/Cr 144/6.38 on admission, last admission 10/2023 had BUN/Cr 90s/6s. No s/s uremia including no asterixis, confusion different from baseline. Has baseline tremor that is still present. Had LUE AVF created 5/2023 in anticipation of possibly needing HD.  - avoid nephrotoxic medications  - renal consult in AM for possible HD initiation

## 2023-12-15 NOTE — H&P ADULT - PROBLEM SELECTOR PLAN 1
Met 2/4 SIRS criteria on admission w/ , WBCs 34.85 without overt source of infection. qSOFA 1 (sBP <100). CXR without visualized consolidation, UA w/ asymptomatic bacteriuria. S/p Vancomycin 1 g and Zozyn 3.375 g in the ED.  - follow up BCx  - follow up UCx Met 2/4 SIRS criteria on admission w/ , WBCs 34.85 w/ neutrophilic predominance without overt source of infection. qSOFA 1 (sBP <100). CXR without visualized consolidation but with dilated loops of bowel. UA w/ asymptomatic bacteriuria. S/p Vancomycin 1 g and Zozyn 3.375 g in the ED.  - will monitor off abx for now given no confirmed source of infection  - follow up BCx  - follow up UCx

## 2023-12-15 NOTE — DIETITIAN INITIAL EVALUATION ADULT - OTHER CALCULATIONS
Based on Standards of Care pt below % IBW (78%) thus ideal body weight used for all calculations adjusted for BKA. Needs adjusted for CKD, pressure ulcer, and malnutrition.

## 2023-12-15 NOTE — DIETITIAN INITIAL EVALUATION ADULT - OTHER INFO
57yo M T2DM, CVA x2 w/ residual L-sided weakness, early onset dementia, HTN, HLD, DVT, CKD V (Cr 7-8, not on HD), BPH, PSH L toe amputation and AVF creation (5/2023), R BKTIMMY, currently receiving daily IM CTX per NH paperwork, found to be hypotensive at nursing home with sBP 86. He reports constipation, states he has not had a bowel movement in 2 days when he typically has one every day.    Patient seen at bedside for nutrition assessment. Current diet order: renal, consistent carbohydrates, soft and bite sized. No known food allergies. No difficulty chewing/swallowing reported. Reports good appetite, consumed 75% of breakfast tray which included mcnair, eggs, and toast. Reports he was eating well PTA. Provided encouragement for PO intake. Patient agreeable to oral nutrition supplement. Recommend nepro 1x/day. Recommend to liberalize diet to renal soft and bite sized to allow for more menu options, patient with hypoglycemia after lunch today. Dosing weight: 130 pounds, BMI 18.6 adjusted for BKA, reports he has lost some weight recently but is unsure of how much or his UBW. Stage 2 pressure ulcer to L heel, DTI to L heel. No edema documented. Denies N/V/D/C. Hypoglycemia today, , Cr 6.16, GFR 10, HgbA1c 6.1%. Renal consulted to assess if patient a candidate for HD. Meds: insulin, bowel regimen. Observed with mild temple wasting, mild buccal wasting, moderate triceps wasting, severe clavicle wasting. Based on ASPEN guidelines, pt meets criteria for moderate malnutrition. No cultural, ethnic, Taoism food preferences reported. See nutrition recommendations below.  57yo M T2DM, CVA x2 w/ residual L-sided weakness, early onset dementia, HTN, HLD, DVT, CKD V (Cr 7-8, not on HD), BPH, PSH L toe amputation and AVF creation (5/2023), R BKTIMMY, currently receiving daily IM CTX per NH paperwork, found to be hypotensive at nursing home with sBP 86. He reports constipation, states he has not had a bowel movement in 2 days when he typically has one every day.    Patient seen at bedside for nutrition assessment. Current diet order: renal, consistent carbohydrates, soft and bite sized. No known food allergies. No difficulty chewing/swallowing reported. Reports good appetite, consumed 75% of breakfast tray which included mcnair, eggs, and toast. Reports he was eating well PTA. Provided encouragement for PO intake. Patient agreeable to oral nutrition supplement. Recommend nepro 1x/day. Recommend to liberalize diet to renal soft and bite sized to allow for more menu options, patient with hypoglycemia after lunch today. Dosing weight: 130 pounds, BMI 18.6 adjusted for BKA, reports he has lost some weight recently but is unsure of how much or his UBW. Stage 2 pressure ulcer to L heel, DTI to L heel. No edema documented. Denies N/V/D/C. Hypoglycemia today, , Cr 6.16, GFR 10, HgbA1c 6.1%. Renal consulted to assess if patient a candidate for HD. Meds: insulin, bowel regimen. Observed with mild temple wasting, mild buccal wasting, moderate triceps wasting, severe clavicle wasting. Based on ASPEN guidelines, pt meets criteria for moderate malnutrition. No cultural, ethnic, Baptism food preferences reported. See nutrition recommendations below.

## 2023-12-15 NOTE — PROGRESS NOTE ADULT - PROBLEM SELECTOR PLAN 10
F: none  E: caution given CKD likely progressing to ESRD  N: renal-restricted and consistent carb diet, small and bite-sized  GI ppx: none  DVT ppx: heparin 5000u subq q8h  Code status: full code  Dispo: [facility] --> nursing home F: none  E: caution given CKD likely progressing to ESRD  N: renal-restricted and consistent carb diet, small and bite-sized  GI ppx: none  DVT ppx: heparin 5000u subq q8h  Code status: full code  Dispo: [facility] --> nursing home

## 2023-12-15 NOTE — H&P ADULT - ASSESSMENT
57yo M T2DM, CVA x2 w/ residual L-sided weakness, early onset dementia, HTN, HLD, DVT, CKD V (Cr ~6, not on HD), BPH, PSH L toe amputation, R BKA, AVF creation (5/2023) BIBEMS from nursing home for hypotension, found to meet SIRS criteria without source of infection and has elevated BUN, admitted for VS derangement, possible HD initiation and further workup.

## 2023-12-15 NOTE — CONSULT NOTE ADULT - SUBJECTIVE AND OBJECTIVE BOX
HPI:  59yo M T2DM, CVA x2 w/ residual L-sided weakness, early onset dementia, HTN, HLD, DVT, CKD V (Cr 7-8, not on HD), BPH, PSH L toe amputation and AVF creation (5/2023), R AIDE, currently receiving daily IM CTX per NH paperwork, found to be hypotensive at nursing home with sBP 86. He reports constipation, states he has not had a bowel movement in 2 days when he typically has one every day. Denies CP, SOB, abdominal pain, changes to urinary habits.    ED course:  VS: T 98.7 F oral, , BP 99/60, RR 18, SpO2 95% on RA  Labs significant for: WBCs 34.85 w/ 86% neutrophils, Hgb 9.2, Na 146, Cl 110, bicarb 21, BUN/Cr 144/6.38, albumin 2.9, UA cloudy w/ many bacteria, trace leuk esterase  EKG: sinus tachycardia , QTc 415  Imaging:    CXR: no overt consolidations, minimal large bowel distension (provider read)  Interventions: Zosyn 3.375 g IVPB, Vancomycin 1 g  Consults: none (15 Dec 2023 03:15)      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, traumatic, left, sequela,            Allergies:  No Known Allergies      Home Medications:   acetaminophen 325 mg oral tablet: 2 tab(s) orally every 4 hours As needed Mild Pain (1 - 3)  aspirin 81 mg oral capsule: 1 cap(s) orally once a day  atorvastatin 80 mg oral tablet: 1 tab(s) orally once a day (at bedtime)  finasteride 5 mg oral tablet: 1 tab(s) orally once a day  insulin lispro 100 units/mL injectable solution: 5 unit(s) injectable 3 times a day  Lantus Solostar Pen 100 units/mL subcutaneous solution: 15 unit(s) subcutaneous once a day (at bedtime)  metroNIDAZOLE 500 mg oral tablet: 1 tab(s) orally every 8 hours  NIFEdipine 90 mg oral tablet, extended release: 1 tab(s) orally once a day  Plavix 75 mg oral tablet: 1 tab(s) orally once a day  sodium bicarbonate 650 mg oral tablet: 1 tab(s) orally 3 times a day  tamsulosin 0.4 mg oral capsule: 1 cap(s) orally once a day (at bedtime)  Tradjenta 5 mg oral tablet: 1 tab(s) orally once a day  vancomycin 250 mg oral capsule: 2 tab(s) orally every 6 hours To end 11/2      Hospital Medications:   MEDICATIONS  (STANDING):  aspirin enteric coated 81 milliGRAM(s) Oral every 24 hours  atorvastatin 80 milliGRAM(s) Oral at bedtime  dextrose 5%. 1000 milliLiter(s) (50 mL/Hr) IV Continuous <Continuous>  dextrose 5%. 1000 milliLiter(s) (100 mL/Hr) IV Continuous <Continuous>  dextrose 50% Injectable 25 Gram(s) IV Push once  dextrose 50% Injectable 25 Gram(s) IV Push once  dextrose 50% Injectable 12.5 Gram(s) IV Push once  glucagon  Injectable 1 milliGRAM(s) IntraMuscular once  heparin   Injectable 5000 Unit(s) SubCutaneous every 8 hours  influenza   Vaccine 0.5 milliLiter(s) IntraMuscular once  insulin glargine Injectable (LANTUS) 15 Unit(s) SubCutaneous at bedtime  insulin lispro (ADMELOG) corrective regimen sliding scale   SubCutaneous Before meals and at bedtime  insulin lispro Injectable (ADMELOG) 5 Unit(s) SubCutaneous three times a day before meals  polyethylene glycol 3350 17 Gram(s) Oral two times a day  senna 2 Tablet(s) Oral at bedtime      SOCIAL HISTORY:  Denies ETOh, Smoking    Family History:  FAMILY HISTORY:  Family history of diabetes mellitus (Mother)  Family history of diabetes mellitus (DM)          VITALS:  T(F): 98.7 (12-15-23 @ 05:21), Max: 98.9 (12-14-23 @ 23:54)  HR: 109 (12-15-23 @ 05:21)  BP: 119/60 (12-15-23 @ 05:21)  RR: 18 (12-15-23 @ 05:21)  SpO2: 98% (12-15-23 @ 05:21)  Wt(kg): --    Height (cm): 182.9 (12-14 @ 22:56)  Weight (kg): 59 (12-14 @ 22:56)  BMI (kg/m2): 17.6 (12-14 @ 22:56)  BSA (m2): 1.77 (12-14 @ 22:56)  CAPILLARY BLOOD GLUCOSE      POCT Blood Glucose.: 148 mg/dL (15 Dec 2023 14:45)  POCT Blood Glucose.: 35 mg/dL (15 Dec 2023 14:24)  POCT Blood Glucose.: 34 mg/dL (15 Dec 2023 13:52)  POCT Blood Glucose.: 36 mg/dL (15 Dec 2023 13:51)  POCT Blood Glucose.: 137 mg/dL (15 Dec 2023 09:38)      Review of Systems:  Negative except as mentioned in HPI    PHYSICAL EXAM:  GENERAL: Alert, awake, oriented x3   CHEST/LUNG: Bilateral clear breath sounds  HEART: Regular rate and rhythm, no murmur, no gallops, no rub   ABDOMEN: Soft, nontender, non distended  : No flank or supra pubic tenderness.  EXTREMITIES: no pedal edema  Neurology: AAOx3, no focal neurological deficit      LABS:  12-15    145  |  111<H>  |  147<H>  ----------------------------<  159<H>  4.5   |  19<L>  |  6.16<H>    Ca    8.8      15 Dec 2023 05:30  Phos  4.0     12-15  Mg     2.0     12-15    TPro  6.2  /  Alb  2.9<L>  /  TBili  0.2  /  DBili      /  AST  21  /  ALT  14  /  AlkPhos  81  12-15    Creatinine Trend: 6.16 <--, 6.38 <--                        8.5    37.42 )-----------( 385      ( 15 Dec 2023 05:30 )             28.3     Urine Studies:  Urinalysis Basic - ( 15 Dec 2023 05:30 )    Color:  / Appearance:  / SG:  / pH:   Gluc: 159 mg/dL / Ketone:   / Bili:  / Urobili:    Blood:  / Protein:  / Nitrite:    Leuk Esterase:  / RBC:  / WBC    Sq Epi:  / Non Sq Epi:  / Bacteria:                    HPI:  57yo M T2DM, CVA x2 w/ residual L-sided weakness, early onset dementia, HTN, HLD, DVT, CKD V (Cr 7-8, not on HD), BPH, PSH L toe amputation and AVF creation (5/2023), R AIDE, currently receiving daily IM CTX per NH paperwork, found to be hypotensive at nursing home with sBP 86. He reports constipation, states he has not had a bowel movement in 2 days when he typically has one every day. Denies CP, SOB, abdominal pain, changes to urinary habits.    ED course:  VS: T 98.7 F oral, , BP 99/60, RR 18, SpO2 95% on RA  Labs significant for: WBCs 34.85 w/ 86% neutrophils, Hgb 9.2, Na 146, Cl 110, bicarb 21, BUN/Cr 144/6.38, albumin 2.9, UA cloudy w/ many bacteria, trace leuk esterase  EKG: sinus tachycardia , QTc 415  Imaging:    CXR: no overt consolidations, minimal large bowel distension (provider read)  Interventions: Zosyn 3.375 g IVPB, Vancomycin 1 g  Consults: none (15 Dec 2023 03:15)      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, traumatic, left, sequela,            Allergies:  No Known Allergies      Home Medications:   acetaminophen 325 mg oral tablet: 2 tab(s) orally every 4 hours As needed Mild Pain (1 - 3)  aspirin 81 mg oral capsule: 1 cap(s) orally once a day  atorvastatin 80 mg oral tablet: 1 tab(s) orally once a day (at bedtime)  finasteride 5 mg oral tablet: 1 tab(s) orally once a day  insulin lispro 100 units/mL injectable solution: 5 unit(s) injectable 3 times a day  Lantus Solostar Pen 100 units/mL subcutaneous solution: 15 unit(s) subcutaneous once a day (at bedtime)  metroNIDAZOLE 500 mg oral tablet: 1 tab(s) orally every 8 hours  NIFEdipine 90 mg oral tablet, extended release: 1 tab(s) orally once a day  Plavix 75 mg oral tablet: 1 tab(s) orally once a day  sodium bicarbonate 650 mg oral tablet: 1 tab(s) orally 3 times a day  tamsulosin 0.4 mg oral capsule: 1 cap(s) orally once a day (at bedtime)  Tradjenta 5 mg oral tablet: 1 tab(s) orally once a day  vancomycin 250 mg oral capsule: 2 tab(s) orally every 6 hours To end 11/2      Hospital Medications:   MEDICATIONS  (STANDING):  aspirin enteric coated 81 milliGRAM(s) Oral every 24 hours  atorvastatin 80 milliGRAM(s) Oral at bedtime  dextrose 5%. 1000 milliLiter(s) (50 mL/Hr) IV Continuous <Continuous>  dextrose 5%. 1000 milliLiter(s) (100 mL/Hr) IV Continuous <Continuous>  dextrose 50% Injectable 25 Gram(s) IV Push once  dextrose 50% Injectable 25 Gram(s) IV Push once  dextrose 50% Injectable 12.5 Gram(s) IV Push once  glucagon  Injectable 1 milliGRAM(s) IntraMuscular once  heparin   Injectable 5000 Unit(s) SubCutaneous every 8 hours  influenza   Vaccine 0.5 milliLiter(s) IntraMuscular once  insulin glargine Injectable (LANTUS) 15 Unit(s) SubCutaneous at bedtime  insulin lispro (ADMELOG) corrective regimen sliding scale   SubCutaneous Before meals and at bedtime  insulin lispro Injectable (ADMELOG) 5 Unit(s) SubCutaneous three times a day before meals  polyethylene glycol 3350 17 Gram(s) Oral two times a day  senna 2 Tablet(s) Oral at bedtime      SOCIAL HISTORY:  Denies ETOh, Smoking    Family History:  FAMILY HISTORY:  Family history of diabetes mellitus (Mother)  Family history of diabetes mellitus (DM)          VITALS:  T(F): 98.7 (12-15-23 @ 05:21), Max: 98.9 (12-14-23 @ 23:54)  HR: 109 (12-15-23 @ 05:21)  BP: 119/60 (12-15-23 @ 05:21)  RR: 18 (12-15-23 @ 05:21)  SpO2: 98% (12-15-23 @ 05:21)  Wt(kg): --    Height (cm): 182.9 (12-14 @ 22:56)  Weight (kg): 59 (12-14 @ 22:56)  BMI (kg/m2): 17.6 (12-14 @ 22:56)  BSA (m2): 1.77 (12-14 @ 22:56)  CAPILLARY BLOOD GLUCOSE      POCT Blood Glucose.: 148 mg/dL (15 Dec 2023 14:45)  POCT Blood Glucose.: 35 mg/dL (15 Dec 2023 14:24)  POCT Blood Glucose.: 34 mg/dL (15 Dec 2023 13:52)  POCT Blood Glucose.: 36 mg/dL (15 Dec 2023 13:51)  POCT Blood Glucose.: 137 mg/dL (15 Dec 2023 09:38)      Review of Systems:  Negative except as mentioned in HPI    PHYSICAL EXAM:  GENERAL: Alert, awake, NAD  CHEST/LUNG: Bilateral clear breath sounds  HEART: Regular rate and rhythm, no murmur, no gallops, no rub   ABDOMEN: Soft, nontender, non distended  : No flank or supra pubic tenderness  EXTREMITIES: no edema, R leg bka  Neurology: AAOx2  Access: LUE AVF w/ thrill and bruit      LABS:  12-15    145  |  111<H>  |  147<H>  ----------------------------<  159<H>  4.5   |  19<L>  |  6.16<H>    Ca    8.8      15 Dec 2023 05:30  Phos  4.0     12-15  Mg     2.0     12-15    TPro  6.2  /  Alb  2.9<L>  /  TBili  0.2  /  DBili      /  AST  21  /  ALT  14  /  AlkPhos  81  12-15    Creatinine Trend: 6.16 <--, 6.38 <--                        8.5    37.42 )-----------( 385      ( 15 Dec 2023 05:30 )             28.3     Urine Studies:  Urinalysis Basic - ( 15 Dec 2023 05:30 )    Color:  / Appearance:  / SG:  / pH:   Gluc: 159 mg/dL / Ketone:   / Bili:  / Urobili:    Blood:  / Protein:  / Nitrite:    Leuk Esterase:  / RBC:  / WBC    Sq Epi:  / Non Sq Epi:  / Bacteria:                    HPI:  59yo M T2DM, CVA x2 w/ residual L-sided weakness, early onset dementia, HTN, HLD, DVT, CKD V (Cr 7-8, not on HD), BPH, PSH L toe amputation and AVF creation (5/2023), R AIDE, currently receiving daily IM CTX per NH paperwork, found to be hypotensive at nursing home with sBP 86. He reports constipation, states he has not had a bowel movement in 2 days when he typically has one every day. Denies CP, SOB, abdominal pain, changes to urinary habits.    ED course:  VS: T 98.7 F oral, , BP 99/60, RR 18, SpO2 95% on RA  Labs significant for: WBCs 34.85 w/ 86% neutrophils, Hgb 9.2, Na 146, Cl 110, bicarb 21, BUN/Cr 144/6.38, albumin 2.9, UA cloudy w/ many bacteria, trace leuk esterase  EKG: sinus tachycardia , QTc 415  Imaging:    CXR: no overt consolidations, minimal large bowel distension (provider read)  Interventions: Zosyn 3.375 g IVPB, Vancomycin 1 g  Consults: none (15 Dec 2023 03:15)    Patient seen and examined at bedside. Admits to Nausea, anorexia, not drinking much. Mental status not at baseline. Will attempt dialysis, patient agreeable    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, traumatic, left, sequela,            Allergies:  No Known Allergies      Home Medications:   acetaminophen 325 mg oral tablet: 2 tab(s) orally every 4 hours As needed Mild Pain (1 - 3)  aspirin 81 mg oral capsule: 1 cap(s) orally once a day  atorvastatin 80 mg oral tablet: 1 tab(s) orally once a day (at bedtime)  finasteride 5 mg oral tablet: 1 tab(s) orally once a day  insulin lispro 100 units/mL injectable solution: 5 unit(s) injectable 3 times a day  Lantus Solostar Pen 100 units/mL subcutaneous solution: 15 unit(s) subcutaneous once a day (at bedtime)  metroNIDAZOLE 500 mg oral tablet: 1 tab(s) orally every 8 hours  NIFEdipine 90 mg oral tablet, extended release: 1 tab(s) orally once a day  Plavix 75 mg oral tablet: 1 tab(s) orally once a day  sodium bicarbonate 650 mg oral tablet: 1 tab(s) orally 3 times a day  tamsulosin 0.4 mg oral capsule: 1 cap(s) orally once a day (at bedtime)  Tradjenta 5 mg oral tablet: 1 tab(s) orally once a day  vancomycin 250 mg oral capsule: 2 tab(s) orally every 6 hours To end 11/2      Hospital Medications:   MEDICATIONS  (STANDING):  aspirin enteric coated 81 milliGRAM(s) Oral every 24 hours  atorvastatin 80 milliGRAM(s) Oral at bedtime  dextrose 5%. 1000 milliLiter(s) (50 mL/Hr) IV Continuous <Continuous>  dextrose 5%. 1000 milliLiter(s) (100 mL/Hr) IV Continuous <Continuous>  dextrose 50% Injectable 25 Gram(s) IV Push once  dextrose 50% Injectable 25 Gram(s) IV Push once  dextrose 50% Injectable 12.5 Gram(s) IV Push once  glucagon  Injectable 1 milliGRAM(s) IntraMuscular once  heparin   Injectable 5000 Unit(s) SubCutaneous every 8 hours  influenza   Vaccine 0.5 milliLiter(s) IntraMuscular once  insulin glargine Injectable (LANTUS) 15 Unit(s) SubCutaneous at bedtime  insulin lispro (ADMELOG) corrective regimen sliding scale   SubCutaneous Before meals and at bedtime  insulin lispro Injectable (ADMELOG) 5 Unit(s) SubCutaneous three times a day before meals  polyethylene glycol 3350 17 Gram(s) Oral two times a day  senna 2 Tablet(s) Oral at bedtime      SOCIAL HISTORY:  Denies ETOh, Smoking    Family History:  FAMILY HISTORY:  Family history of diabetes mellitus (Mother)  Family history of diabetes mellitus (DM)          VITALS:  T(F): 98.7 (12-15-23 @ 05:21), Max: 98.9 (12-14-23 @ 23:54)  HR: 109 (12-15-23 @ 05:21)  BP: 119/60 (12-15-23 @ 05:21)  RR: 18 (12-15-23 @ 05:21)  SpO2: 98% (12-15-23 @ 05:21)  Wt(kg): --    Height (cm): 182.9 (12-14 @ 22:56)  Weight (kg): 59 (12-14 @ 22:56)  BMI (kg/m2): 17.6 (12-14 @ 22:56)  BSA (m2): 1.77 (12-14 @ 22:56)  CAPILLARY BLOOD GLUCOSE      POCT Blood Glucose.: 148 mg/dL (15 Dec 2023 14:45)  POCT Blood Glucose.: 35 mg/dL (15 Dec 2023 14:24)  POCT Blood Glucose.: 34 mg/dL (15 Dec 2023 13:52)  POCT Blood Glucose.: 36 mg/dL (15 Dec 2023 13:51)  POCT Blood Glucose.: 137 mg/dL (15 Dec 2023 09:38)      Review of Systems:  Negative except as mentioned in HPI    PHYSICAL EXAM:  GENERAL: Alert, awake, NAD  HEENT: Dry crusted mucuous membranes, atraumatic  CHEST/LUNG: Bilateral clear breath sounds  HEART: Regular rate and rhythm, no murmur, no gallops, no rub   ABDOMEN: Soft, nontender, non distended  : No flank or supra pubic tenderness  EXTREMITIES: no edema, R leg bka  Neurology: AAOx2, awakens but lethargic  Access: LUE AVF w/ thrill and bruit      LABS:  12-15    145  |  111<H>  |  147<H>  ----------------------------<  159<H>  4.5   |  19<L>  |  6.16<H>    Ca    8.8      15 Dec 2023 05:30  Phos  4.0     12-15  Mg     2.0     12-15    TPro  6.2  /  Alb  2.9<L>  /  TBili  0.2  /  DBili      /  AST  21  /  ALT  14  /  AlkPhos  81  12-15    Creatinine Trend: 6.16 <--, 6.38 <--                        8.5    37.42 )-----------( 385      ( 15 Dec 2023 05:30 )             28.3     Urine Studies:  Urinalysis Basic - ( 15 Dec 2023 05:30 )    Color:  / Appearance:  / SG:  / pH:   Gluc: 159 mg/dL / Ketone:   / Bili:  / Urobili:    Blood:  / Protein:  / Nitrite:    Leuk Esterase:  / RBC:  / WBC    Sq Epi:  / Non Sq Epi:  / Bacteria:                    HPI:  57yo M T2DM, CVA x2 w/ residual L-sided weakness, early onset dementia, HTN, HLD, DVT, CKD V (Cr 7-8, not on HD), BPH, PSH L toe amputation and AVF creation (5/2023), R AIDE, currently receiving daily IM CTX per NH paperwork, found to be hypotensive at nursing home with sBP 86. He reports constipation, states he has not had a bowel movement in 2 days when he typically has one every day. Denies CP, SOB, abdominal pain, changes to urinary habits.    ED course:  VS: T 98.7 F oral, , BP 99/60, RR 18, SpO2 95% on RA  Labs significant for: WBCs 34.85 w/ 86% neutrophils, Hgb 9.2, Na 146, Cl 110, bicarb 21, BUN/Cr 144/6.38, albumin 2.9, UA cloudy w/ many bacteria, trace leuk esterase  EKG: sinus tachycardia , QTc 415  Imaging:    CXR: no overt consolidations, minimal large bowel distension (provider read)  Interventions: Zosyn 3.375 g IVPB, Vancomycin 1 g  Consults: none (15 Dec 2023 03:15)    Patient seen and examined at bedside. Admits to Nausea, anorexia, not drinking much. Mental status not at baseline. Will attempt dialysis, patient agreeable    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, traumatic, left, sequela,            Allergies:  No Known Allergies      Home Medications:   acetaminophen 325 mg oral tablet: 2 tab(s) orally every 4 hours As needed Mild Pain (1 - 3)  aspirin 81 mg oral capsule: 1 cap(s) orally once a day  atorvastatin 80 mg oral tablet: 1 tab(s) orally once a day (at bedtime)  finasteride 5 mg oral tablet: 1 tab(s) orally once a day  insulin lispro 100 units/mL injectable solution: 5 unit(s) injectable 3 times a day  Lantus Solostar Pen 100 units/mL subcutaneous solution: 15 unit(s) subcutaneous once a day (at bedtime)  metroNIDAZOLE 500 mg oral tablet: 1 tab(s) orally every 8 hours  NIFEdipine 90 mg oral tablet, extended release: 1 tab(s) orally once a day  Plavix 75 mg oral tablet: 1 tab(s) orally once a day  sodium bicarbonate 650 mg oral tablet: 1 tab(s) orally 3 times a day  tamsulosin 0.4 mg oral capsule: 1 cap(s) orally once a day (at bedtime)  Tradjenta 5 mg oral tablet: 1 tab(s) orally once a day  vancomycin 250 mg oral capsule: 2 tab(s) orally every 6 hours To end 11/2      Hospital Medications:   MEDICATIONS  (STANDING):  aspirin enteric coated 81 milliGRAM(s) Oral every 24 hours  atorvastatin 80 milliGRAM(s) Oral at bedtime  dextrose 5%. 1000 milliLiter(s) (50 mL/Hr) IV Continuous <Continuous>  dextrose 5%. 1000 milliLiter(s) (100 mL/Hr) IV Continuous <Continuous>  dextrose 50% Injectable 25 Gram(s) IV Push once  dextrose 50% Injectable 25 Gram(s) IV Push once  dextrose 50% Injectable 12.5 Gram(s) IV Push once  glucagon  Injectable 1 milliGRAM(s) IntraMuscular once  heparin   Injectable 5000 Unit(s) SubCutaneous every 8 hours  influenza   Vaccine 0.5 milliLiter(s) IntraMuscular once  insulin glargine Injectable (LANTUS) 15 Unit(s) SubCutaneous at bedtime  insulin lispro (ADMELOG) corrective regimen sliding scale   SubCutaneous Before meals and at bedtime  insulin lispro Injectable (ADMELOG) 5 Unit(s) SubCutaneous three times a day before meals  polyethylene glycol 3350 17 Gram(s) Oral two times a day  senna 2 Tablet(s) Oral at bedtime      SOCIAL HISTORY:  Denies ETOh, Smoking    Family History:  FAMILY HISTORY:  Family history of diabetes mellitus (Mother)  Family history of diabetes mellitus (DM)          VITALS:  T(F): 98.7 (12-15-23 @ 05:21), Max: 98.9 (12-14-23 @ 23:54)  HR: 109 (12-15-23 @ 05:21)  BP: 119/60 (12-15-23 @ 05:21)  RR: 18 (12-15-23 @ 05:21)  SpO2: 98% (12-15-23 @ 05:21)  Wt(kg): --    Height (cm): 182.9 (12-14 @ 22:56)  Weight (kg): 59 (12-14 @ 22:56)  BMI (kg/m2): 17.6 (12-14 @ 22:56)  BSA (m2): 1.77 (12-14 @ 22:56)  CAPILLARY BLOOD GLUCOSE      POCT Blood Glucose.: 148 mg/dL (15 Dec 2023 14:45)  POCT Blood Glucose.: 35 mg/dL (15 Dec 2023 14:24)  POCT Blood Glucose.: 34 mg/dL (15 Dec 2023 13:52)  POCT Blood Glucose.: 36 mg/dL (15 Dec 2023 13:51)  POCT Blood Glucose.: 137 mg/dL (15 Dec 2023 09:38)      Review of Systems:  Negative except as mentioned in HPI    PHYSICAL EXAM:  GENERAL: Alert, awake, NAD  HEENT: Dry crusted mucuous membranes, atraumatic  CHEST/LUNG: Bilateral clear breath sounds  HEART: Regular rate and rhythm, no murmur, no gallops, no rub   ABDOMEN: Soft, nontender, non distended  : No flank or supra pubic tenderness  EXTREMITIES: no edema, R leg bka  Neurology: AAOx2, awakens but lethargic  Access: LUE AVF w/ thrill and bruit      LABS:  12-15    145  |  111<H>  |  147<H>  ----------------------------<  159<H>  4.5   |  19<L>  |  6.16<H>    Ca    8.8      15 Dec 2023 05:30  Phos  4.0     12-15  Mg     2.0     12-15    TPro  6.2  /  Alb  2.9<L>  /  TBili  0.2  /  DBili      /  AST  21  /  ALT  14  /  AlkPhos  81  12-15    Creatinine Trend: 6.16 <--, 6.38 <--                        8.5    37.42 )-----------( 385      ( 15 Dec 2023 05:30 )             28.3     Urine Studies:  Urinalysis Basic - ( 15 Dec 2023 05:30 )    Color:  / Appearance:  / SG:  / pH:   Gluc: 159 mg/dL / Ketone:   / Bili:  / Urobili:    Blood:  / Protein:  / Nitrite:    Leuk Esterase:  / RBC:  / WBC    Sq Epi:  / Non Sq Epi:  / Bacteria:

## 2023-12-15 NOTE — DIETITIAN INITIAL EVALUATION ADULT - PERTINENT MEDS FT
MEDICATIONS  (STANDING):  aspirin enteric coated 81 milliGRAM(s) Oral every 24 hours  atorvastatin 80 milliGRAM(s) Oral at bedtime  dextrose 5%. 1000 milliLiter(s) (50 mL/Hr) IV Continuous <Continuous>  dextrose 5%. 1000 milliLiter(s) (100 mL/Hr) IV Continuous <Continuous>  dextrose 50% Injectable 25 Gram(s) IV Push once  dextrose 50% Injectable 12.5 Gram(s) IV Push once  dextrose 50% Injectable 25 Gram(s) IV Push once  glucagon  Injectable 1 milliGRAM(s) IntraMuscular once  heparin   Injectable 5000 Unit(s) SubCutaneous every 8 hours  influenza   Vaccine 0.5 milliLiter(s) IntraMuscular once  insulin glargine Injectable (LANTUS) 15 Unit(s) SubCutaneous at bedtime  insulin lispro (ADMELOG) corrective regimen sliding scale   SubCutaneous Before meals and at bedtime  insulin lispro Injectable (ADMELOG) 5 Unit(s) SubCutaneous three times a day before meals  polyethylene glycol 3350 17 Gram(s) Oral two times a day  senna 2 Tablet(s) Oral at bedtime    MEDICATIONS  (PRN):  dextrose Oral Gel 15 Gram(s) Oral once PRN Blood Glucose LESS THAN 70 milliGRAM(s)/deciliter

## 2023-12-15 NOTE — DIETITIAN INITIAL EVALUATION ADULT - PERTINENT LABORATORY DATA
12-15    145  |  111<H>  |  147<H>  ----------------------------<  159<H>  4.5   |  19<L>  |  6.16<H>    Ca    8.8      15 Dec 2023 05:30  Phos  4.0     12-15  Mg     2.0     12-15    TPro  6.2  /  Alb  2.9<L>  /  TBili  0.2  /  DBili  x   /  AST  21  /  ALT  14  /  AlkPhos  81  12-15  POCT Blood Glucose.: 34 mg/dL (12-15-23 @ 13:52)  A1C with Estimated Average Glucose Result: 6.1 % (12-15-23 @ 05:30)  A1C with Estimated Average Glucose Result: 6.4 % (10-25-23 @ 05:30)  A1C with Estimated Average Glucose Result: 7.8 % (07-06-23 @ 05:05)

## 2023-12-16 DIAGNOSIS — Z91.89 OTHER SPECIFIED PERSONAL RISK FACTORS, NOT ELSEWHERE CLASSIFIED: ICD-10-CM

## 2023-12-16 LAB
ANION GAP SERPL CALC-SCNC: 15 MMOL/L — SIGNIFICANT CHANGE UP (ref 5–17)
ANION GAP SERPL CALC-SCNC: 15 MMOL/L — SIGNIFICANT CHANGE UP (ref 5–17)
BUN SERPL-MCNC: 145 MG/DL — HIGH (ref 7–23)
BUN SERPL-MCNC: 145 MG/DL — HIGH (ref 7–23)
CALCIUM SERPL-MCNC: 8.1 MG/DL — LOW (ref 8.4–10.5)
CALCIUM SERPL-MCNC: 8.1 MG/DL — LOW (ref 8.4–10.5)
CHLORIDE SERPL-SCNC: 104 MMOL/L — SIGNIFICANT CHANGE UP (ref 96–108)
CHLORIDE SERPL-SCNC: 104 MMOL/L — SIGNIFICANT CHANGE UP (ref 96–108)
CO2 SERPL-SCNC: 17 MMOL/L — LOW (ref 22–31)
CO2 SERPL-SCNC: 17 MMOL/L — LOW (ref 22–31)
CREAT SERPL-MCNC: 5.81 MG/DL — HIGH (ref 0.5–1.3)
CREAT SERPL-MCNC: 5.81 MG/DL — HIGH (ref 0.5–1.3)
CULTURE RESULTS: NO GROWTH — SIGNIFICANT CHANGE UP
CULTURE RESULTS: NO GROWTH — SIGNIFICANT CHANGE UP
EGFR: 11 ML/MIN/1.73M2 — LOW
EGFR: 11 ML/MIN/1.73M2 — LOW
GLUCOSE BLDC GLUCOMTR-MCNC: 120 MG/DL — HIGH (ref 70–99)
GLUCOSE BLDC GLUCOMTR-MCNC: 120 MG/DL — HIGH (ref 70–99)
GLUCOSE BLDC GLUCOMTR-MCNC: 132 MG/DL — HIGH (ref 70–99)
GLUCOSE BLDC GLUCOMTR-MCNC: 132 MG/DL — HIGH (ref 70–99)
GLUCOSE BLDC GLUCOMTR-MCNC: 147 MG/DL — HIGH (ref 70–99)
GLUCOSE BLDC GLUCOMTR-MCNC: 147 MG/DL — HIGH (ref 70–99)
GLUCOSE BLDC GLUCOMTR-MCNC: 25 MG/DL — CRITICAL LOW (ref 70–99)
GLUCOSE BLDC GLUCOMTR-MCNC: 25 MG/DL — CRITICAL LOW (ref 70–99)
GLUCOSE BLDC GLUCOMTR-MCNC: 65 MG/DL — LOW (ref 70–99)
GLUCOSE BLDC GLUCOMTR-MCNC: 65 MG/DL — LOW (ref 70–99)
GLUCOSE BLDC GLUCOMTR-MCNC: 98 MG/DL — SIGNIFICANT CHANGE UP (ref 70–99)
GLUCOSE BLDC GLUCOMTR-MCNC: 98 MG/DL — SIGNIFICANT CHANGE UP (ref 70–99)
GLUCOSE SERPL-MCNC: 340 MG/DL — HIGH (ref 70–99)
GLUCOSE SERPL-MCNC: 340 MG/DL — HIGH (ref 70–99)
HCT VFR BLD CALC: 28.5 % — LOW (ref 39–50)
HCT VFR BLD CALC: 28.5 % — LOW (ref 39–50)
HGB BLD-MCNC: 8.7 G/DL — LOW (ref 13–17)
HGB BLD-MCNC: 8.7 G/DL — LOW (ref 13–17)
MAGNESIUM SERPL-MCNC: 1.9 MG/DL — SIGNIFICANT CHANGE UP (ref 1.6–2.6)
MAGNESIUM SERPL-MCNC: 1.9 MG/DL — SIGNIFICANT CHANGE UP (ref 1.6–2.6)
MCHC RBC-ENTMCNC: 27.8 PG — SIGNIFICANT CHANGE UP (ref 27–34)
MCHC RBC-ENTMCNC: 27.8 PG — SIGNIFICANT CHANGE UP (ref 27–34)
MCHC RBC-ENTMCNC: 30.5 GM/DL — LOW (ref 32–36)
MCHC RBC-ENTMCNC: 30.5 GM/DL — LOW (ref 32–36)
MCV RBC AUTO: 91.1 FL — SIGNIFICANT CHANGE UP (ref 80–100)
MCV RBC AUTO: 91.1 FL — SIGNIFICANT CHANGE UP (ref 80–100)
NRBC # BLD: 0 /100 WBCS — SIGNIFICANT CHANGE UP (ref 0–0)
NRBC # BLD: 0 /100 WBCS — SIGNIFICANT CHANGE UP (ref 0–0)
PHOSPHATE SERPL-MCNC: 3.6 MG/DL — SIGNIFICANT CHANGE UP (ref 2.5–4.5)
PHOSPHATE SERPL-MCNC: 3.6 MG/DL — SIGNIFICANT CHANGE UP (ref 2.5–4.5)
PLATELET # BLD AUTO: 365 K/UL — SIGNIFICANT CHANGE UP (ref 150–400)
PLATELET # BLD AUTO: 365 K/UL — SIGNIFICANT CHANGE UP (ref 150–400)
POTASSIUM SERPL-MCNC: 4.6 MMOL/L — SIGNIFICANT CHANGE UP (ref 3.5–5.3)
POTASSIUM SERPL-MCNC: 4.6 MMOL/L — SIGNIFICANT CHANGE UP (ref 3.5–5.3)
POTASSIUM SERPL-SCNC: 4.6 MMOL/L — SIGNIFICANT CHANGE UP (ref 3.5–5.3)
POTASSIUM SERPL-SCNC: 4.6 MMOL/L — SIGNIFICANT CHANGE UP (ref 3.5–5.3)
RBC # BLD: 3.13 M/UL — LOW (ref 4.2–5.8)
RBC # BLD: 3.13 M/UL — LOW (ref 4.2–5.8)
RBC # FLD: 16.6 % — HIGH (ref 10.3–14.5)
RBC # FLD: 16.6 % — HIGH (ref 10.3–14.5)
SODIUM SERPL-SCNC: 136 MMOL/L — SIGNIFICANT CHANGE UP (ref 135–145)
SODIUM SERPL-SCNC: 136 MMOL/L — SIGNIFICANT CHANGE UP (ref 135–145)
SPECIMEN SOURCE: SIGNIFICANT CHANGE UP
SPECIMEN SOURCE: SIGNIFICANT CHANGE UP
WBC # BLD: 35.14 K/UL — HIGH (ref 3.8–10.5)
WBC # BLD: 35.14 K/UL — HIGH (ref 3.8–10.5)
WBC # FLD AUTO: 35.14 K/UL — HIGH (ref 3.8–10.5)
WBC # FLD AUTO: 35.14 K/UL — HIGH (ref 3.8–10.5)

## 2023-12-16 PROCEDURE — 99232 SBSQ HOSP IP/OBS MODERATE 35: CPT

## 2023-12-16 PROCEDURE — 93971 EXTREMITY STUDY: CPT | Mod: 26,LT

## 2023-12-16 PROCEDURE — 99233 SBSQ HOSP IP/OBS HIGH 50: CPT | Mod: GC

## 2023-12-16 RX ORDER — SODIUM CHLORIDE 9 MG/ML
1000 INJECTION, SOLUTION INTRAVENOUS
Refills: 0 | Status: DISCONTINUED | OUTPATIENT
Start: 2023-12-16 | End: 2023-12-16

## 2023-12-16 RX ORDER — DEXTROSE 50 % IN WATER 50 %
25 SYRINGE (ML) INTRAVENOUS ONCE
Refills: 0 | Status: DISCONTINUED | OUTPATIENT
Start: 2023-12-16 | End: 2023-12-21

## 2023-12-16 RX ORDER — IOHEXOL 300 MG/ML
30 INJECTION, SOLUTION INTRAVENOUS ONCE
Refills: 0 | Status: COMPLETED | OUTPATIENT
Start: 2023-12-16 | End: 2023-12-16

## 2023-12-16 RX ORDER — SODIUM CHLORIDE 9 MG/ML
1000 INJECTION, SOLUTION INTRAVENOUS
Refills: 0 | Status: DISCONTINUED | OUTPATIENT
Start: 2023-12-16 | End: 2023-12-17

## 2023-12-16 RX ADMIN — HEPARIN SODIUM 5000 UNIT(S): 5000 INJECTION INTRAVENOUS; SUBCUTANEOUS at 13:25

## 2023-12-16 RX ADMIN — HEPARIN SODIUM 5000 UNIT(S): 5000 INJECTION INTRAVENOUS; SUBCUTANEOUS at 22:52

## 2023-12-16 RX ADMIN — Medication 81 MILLIGRAM(S): at 11:10

## 2023-12-16 RX ADMIN — SODIUM CHLORIDE 100 MILLILITER(S): 9 INJECTION, SOLUTION INTRAVENOUS at 10:53

## 2023-12-16 RX ADMIN — ATORVASTATIN CALCIUM 80 MILLIGRAM(S): 80 TABLET, FILM COATED ORAL at 22:52

## 2023-12-16 RX ADMIN — IOHEXOL 30 MILLILITER(S): 300 INJECTION, SOLUTION INTRAVENOUS at 16:20

## 2023-12-16 RX ADMIN — INSULIN GLARGINE 15 UNIT(S): 100 INJECTION, SOLUTION SUBCUTANEOUS at 22:51

## 2023-12-16 RX ADMIN — SODIUM CHLORIDE 100 MILLILITER(S): 9 INJECTION, SOLUTION INTRAVENOUS at 19:20

## 2023-12-16 RX ADMIN — HEPARIN SODIUM 5000 UNIT(S): 5000 INJECTION INTRAVENOUS; SUBCUTANEOUS at 06:17

## 2023-12-16 NOTE — PROGRESS NOTE ADULT - ATTENDING COMMENTS
Patient was seen and examined at bedside on 12/16/2023 at 1030 am. Patient reports feeling "okay". Has no acute complaints. ROS is otherwise negative. Vitals, labwork and pertinent imaging reviewed. Exam - NAD, AAO x 2, PERRLA, EOMI, MMM, supple neck, chest - decreased BS b/l,, CV - rrr, s1s2, no m/r/g, abd - soft, NTND, + BS, ext - wwp, psych - normal affect, pt appears cachectic and malnourished    Plan:  -Unclear etiology of encephalopathy - collateral obtained - at baseline patient is AAO x 4; check CTH, d/w Renal  -Check CT C/A/P  -Patient continues to be hypoglycemic   -Unclear etiology of leukocytosis/SIRS - f/u cultures (BCX/UCX), 2/2 worsening uremia?  -If WBC continues to be elevated without a clear cause - will consult Heme  -Nutrition consult  -Rectal temp  -PVR q6 Patient was seen and examined at bedside on 12/16/2023 at 1030 am. Patient reports feeling "okay". Has no acute complaints. ROS is otherwise negative. Vitals, labwork and pertinent imaging reviewed. Exam - NAD, AAO x 2, PERRLA, EOMI, MMM, supple neck, chest - decreased BS b/l,, CV - rrr, s1s2, no m/r/g, abd - soft, NTND, + BS, ext - wwp, psych - normal affect, pt appears cachectic and malnourished    Plan:  -Unclear etiology of encephalopathy - collateral obtained - at baseline patient is AAO x 4; check CTH, d/w Renal  -Check CT C/A/P  -Patient continues to be hypoglycemic unclear etiology - in setting of end stage renal disease? vs sepsis  -Unclear etiology of leukocytosis/SIRS - f/u cultures (BCX/UCX), 2/2 worsening uremia?  -If WBC continues to be elevated without a clear cause - will consult Heme  -Nutrition consult  -Rectal temp  -PVR q6 Patient was seen and examined at bedside on 12/16/2023 at 1030 am. Patient reports feeling "okay". Has no acute complaints. ROS is otherwise negative. Vitals, labwork and pertinent imaging reviewed. Exam - NAD, AAO x 2, PERRLA, EOMI, MMM, supple neck, chest - decreased BS b/l,, CV - rrr, s1s2, no m/r/g, abd - soft, NTND, + BS, ext - wwp, psych - normal affect, pt appears cachectic and malnourished    Plan:  -Unclear etiology of encephalopathy - collateral obtained - at baseline patient is AAO x 4; check CTH, d/w Renal  -Check CT C/A/P  -Patient continues to be hypoglycemic unclear etiology - in setting of end stage renal disease? vs sepsis  -Unclear etiology of leukocytosis/SIRS - f/u cultures (BCX/UCX), C- diff, 2/2 worsening uremia?  -If WBC continues to be elevated without a clear cause - will consult Heme  -Nutrition consult  -Rectal temp  -PVR q6 Patient was seen and examined at bedside on 12/16/2023 at 1030 am. Patient reports feeling "okay". Has no acute complaints. ROS is otherwise negative. Vitals, labwork and pertinent imaging reviewed. Exam - NAD, AAO x 2, PERRLA, EOMI, MMM, supple neck, chest - decreased BS b/l,, CV - rrr, s1s2, no m/r/g, abd - soft, NTND, + BS, ext - wwp, psych - normal affect, pt appears cachectic and malnourished    Plan:  -Unclear etiology of encephalopathy - collateral obtained - at baseline patient is AAO x 4; check CTH, d/w Renal  -Check CT C/A/P  -Patient continues to be hypoglycemic unclear etiology - in setting of end stage renal disease? vs sepsis, hold insulin  -Unclear etiology of leukocytosis/SIRS - f/u cultures (BCX/UCX), C- diff, 2/2 worsening uremia?  -If WBC continues to be elevated without a clear cause - will consult Heme  -Nutrition consult  -Rectal temp  -PVR q6

## 2023-12-16 NOTE — CONSULT NOTE ADULT - SUBJECTIVE AND OBJECTIVE BOX
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 Stroke code was called this AM for AMS. Currently on ASA and statin for maximum stroke prevention. Per primary team, patient AAOx1-2 at baseline with L sided weakness and dysarthria. Seen and examined at bedside at time of code. Patient appeared to be altered, not following commands or answering questions appropriately. BTT intact b/l, gaze was midline. Noted to have a R facial droop which is baseline from his prior strokes. Motor exam difficult to perform given increased tone throughout and patient not following commands. Withdrew to pain in b/l LEs, grimaced to pain in b/l UEs. FS checked, noted to be 25. 1 AMP of D50 given. Repeat assessment performed and patient appeared to be back to baseline per primary team. Was AAOx1 (self) and was able to follow midline (open/close eyes, stick out tongue and smile) and some simple commands (show provider 2 fingers). BTT intact b/l, EOMs intact however did have some difficulty burying to the R with R gaze. Continued to have increased tone throughout however b/l UEs at least 3/5 without drift. LLE was 2/5 - able to flex and extend at the knee spontaneously however was unable to lift antigravity. RLE with BKA, strength 1/5 - was able to flex at the hip when painful stimuli applied. Withdrew to pain in b/l LEs, grimaced to pain in b/l UEs. Not a TNK candidate as patients LKW > 4.5 hrs, not a thrombectomy candidate given MRS of 5. Could proceed with CTH given patient's prior history of CVAs (unable to get vessel imaging given pts hx of CKD) however primary team believes patient is now back to baseline and would like to defer for now.    Prior stroke hx reviewed in [facility]IE. Last seen in the outpatient clinic on 08/2023 for follow up. In February 2019 he suffered an acute infarct in the left corona radiata and left macrina seen on MRI Brain resulting in right leg weakness. He was discharged on Aspirin and statin. In Oct 2022, he was admitted for slurred speech and weakness over the past several days and found to have embolic stroke. Previous hypercoags only positive for hexagonal phase. TTE without evidence of valvular disease and no right to left shunt. KATHY: No LA/RA/SEAN/RAA thrombus seen, there is minimal non-mobile plaque seen in the visualized portion of the descending aorta. There is minimal non-mobile plaque seen in the visualized portion of the aortic arch. ILR placed during this hospitalization to assess for arrhythmia. At that time he was put on Aspirin 81/Plavix 75mg and Atorvastatin 80mg. Was recently seen by EP on 11/30/2023 and had his ILR interrogated which showed no acute arrhthymias.    T(C): 36.8 (12-16-23 @ 06:24), Max: 37.3 (12-15-23 @ 18:05)  HR: 114 (12-16-23 @ 06:24) (113 - 119)  BP: 123/67 (12-16-23 @ 06:24) (102/54 - 127/67)  RR: 18 (12-16-23 @ 06:24) (18 - 20)  SpO2: 98% (12-16-23 @ 06:24) (96% - 99%)    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, traumatic, left, sequela,          FAMILY HISTORY:  Family history of diabetes mellitus (Mother)  Family history of diabetes mellitus (DM)        SOCIAL HISTORY: Unable to obtain 2/2 to AMS    ROS: Unable to obtain 2/2 to AMS    MEDICATIONS  (STANDING):  aspirin enteric coated 81 milliGRAM(s) Oral every 24 hours  atorvastatin 80 milliGRAM(s) Oral at bedtime  dextrose 5%. 1000 milliLiter(s) (50 mL/Hr) IV Continuous <Continuous>  dextrose 5%. 1000 milliLiter(s) (100 mL/Hr) IV Continuous <Continuous>  dextrose 50% Injectable 25 Gram(s) IV Push once  dextrose 50% Injectable 25 Gram(s) IV Push once  dextrose 50% Injectable 25 Gram(s) IV Push once  dextrose 50% Injectable 12.5 Gram(s) IV Push once  glucagon  Injectable 1 milliGRAM(s) IntraMuscular once  heparin   Injectable 5000 Unit(s) SubCutaneous every 8 hours  influenza   Vaccine 0.5 milliLiter(s) IntraMuscular once  insulin glargine Injectable (LANTUS) 15 Unit(s) SubCutaneous at bedtime  insulin lispro (ADMELOG) corrective regimen sliding scale   SubCutaneous Before meals and at bedtime  insulin lispro Injectable (ADMELOG) 5 Unit(s) SubCutaneous three times a day before meals  polyethylene glycol 3350 17 Gram(s) Oral two times a day  senna 2 Tablet(s) Oral at bedtime    MEDICATIONS  (PRN):  dextrose Oral Gel 15 Gram(s) Oral once PRN Blood Glucose LESS THAN 70 milliGRAM(s)/deciliter    Allergies    No Known Allergies    Intolerances      Vital Signs Last 24 Hrs  T(C): 36.8 (16 Dec 2023 06:24), Max: 37.3 (15 Dec 2023 18:05)  T(F): 98.2 (16 Dec 2023 06:24), Max: 99.1 (15 Dec 2023 18:05)  HR: 114 (16 Dec 2023 06:24) (113 - 119)  BP: 123/67 (16 Dec 2023 06:24) (102/54 - 127/67)  BP(mean): --  RR: 18 (16 Dec 2023 06:24) (18 - 20)  SpO2: 98% (16 Dec 2023 06:24) (96% - 99%)    Parameters below as of 16 Dec 2023 06:24  Patient On (Oxygen Delivery Method): room air        Physical exam:  Constitutional: Appears to be in no apparent distress, cachetic appearing  Eyes: Anicteric sclerae, moist conjunctivae, see below for CNs  Extremities: No edema    Neurologic assessment @ time of code:  -Mental status: Awake however not responding to questions appropriately Unable to assess speech. Not following commands.  -Cranial nerves:   II: BTT intact b/l  III, IV, VI: Gaze was midline, crossing spontaneously. R pupil ovoid, nonreactive. L pupil 2-3 mm and ? sluggishly reactive.   V: Unable to assess 2/2 to AMS  VII: R facial droop (baseline)  XII: Unable to assess as patient not following commands  Motor: Diminished bulk throughout, increased tone throughout. Difficult to assess individualized muscle groups given increased tone and AMS  Sensation: Withdrew to pain in b/l LEs, grimaced to pain in b/l UEs.  Coordination: Unable to test    NIHSS: 22    Neurologic assessment s/p AMP of D50:  -Mental status: Awake, alert and oriented to self only. Unable to correctly identify place and time. Intact naming and comprehension when asked about a pen and its utility, moderate to severe dysarthria (baseline per primary team). Was able to follow midline commands (open/close eyes, stick out tongue and smile) and some simple commands (show provider 2 fingers). Diminished attention, required reorientation by provider frequently.   -Cranial nerves:   II: Difficult to assess individualized quadrants given diminished attention however BTT intact b/l  III, IV, VI: Extraocular movements intact however did have some difficulty with burying on R gaze. R pupil ovoid, nonreactive. L pupil 2-3 mm and ? sluggishly reactive  VII: R facial droop   XII: Tongue protrudes midline.  Motor: Diminished bulk throughout, increased tone throughout. B/l UEs at least 3/5 - able to hold AG > 10 seconds without a drift. LLE 2/5 - spontaneously flexing and extending at the hip/knee however no movement antigravity witnessed. RLE with BKA, strength 1/5 - hip flexion when noxious stimuli applied.   Sensation: Withdrew to pain in b/l LEs, grimaced to pain in b/l UEs.  Coordination: Unable to test    Repeat NIHSS: 9    Fingerstick Blood Glucose: CAPILLARY BLOOD GLUCOSE  25 (16 Dec 2023 09:14)      POCT Blood Glucose.: 147 mg/dL (16 Dec 2023 09:08)    LABS:                        8.5    37.42 )-----------( 385      ( 15 Dec 2023 05:30 )             28.3     12-15    145  |  111<H>  |  147<H>  ----------------------------<  159<H>  4.5   |  19<L>  |  6.16<H>    Ca    8.8      15 Dec 2023 05:30  Phos  4.0     12-15  Mg     2.0     12-15    TPro  6.2  /  Alb  2.9<L>  /  TBili  0.2  /  DBili  x   /  AST  21  /  ALT  14  /  AlkPhos  81  12-15          Urinalysis Basic - ( 15 Dec 2023 05:30 )    Color: x / Appearance: x / SG: x / pH: x  Gluc: 159 mg/dL / Ketone: x  / Bili: x / Urobili: x   Blood: x / Protein: x / Nitrite: x   Leuk Esterase: x / RBC: x / WBC x   Sq Epi: x / Non Sq Epi: x / Bacteria: x        RADIOLOGY & ADDITIONAL STUDIES: Deferred

## 2023-12-16 NOTE — PROGRESS NOTE ADULT - PROBLEM SELECTOR PLAN 3
Unclear etiology, possible in the setting of sepsis 2/2 unknown infectious process vs. end stage renal disease  Found to be hypoglycemic to 25 this AM. Became hypoglycemic to 30s yesterday.  Sugars are labile.    -Continue to monitor FSG Q6 and before meals  -1L D5 100cc/hr started   -Low threshold for ICU consult for increased hypoglycemia monitoring

## 2023-12-16 NOTE — PROGRESS NOTE ADULT - PROBLEM SELECTOR PLAN 7
Hgb 9.2 w/ normocytic MCV on admission (baseline Hgb 9s). Suspect anemia 2/2 CKD though will check for other common causes of both microcytic and macrocytic anemia.  - iron low, TIBC low, %sat low consistent with likely ACD in the setting of CKD  - b12, folate wnl    PLAN:  - maintain active type and screen and 2 large-bore IVs  - transfuse for Hgb <7 or active bleeding

## 2023-12-16 NOTE — CONSULT NOTE ADULT - ASSESSMENT
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 Stroke code was called this AM for AMS, found to have FS of 25. Returned back to baseline mental status s/p 1 AMP of D50. Not a TNK candidate as patients LKW > 4.5 hrs, not a thrombectomy candidate given MRS of 5. Could proceed with CTH given patient's prior history of CVAs (unable to get vessel imaging given pts hx of CKD) however primary team believes patient is now back to baseline and would like to defer for now. Stroke to sign off, please call the stroke phone with any questions or concerns.     Discussed with Neurology Attending, Dr. 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 Stroke code was called this AM for AMS, found to have FS of 25. Returned back to baseline mental status s/p 1 AMP of D50. Not a TNK candidate as patients LKW > 4.5 hrs, not a thrombectomy candidate given MRS of 5. Could proceed with CTH given patient's prior history of CVAs (unable to get vessel imaging given pts hx of CKD) however primary team believes patient is now back to baseline and would like to defer for now.     Plan:   - Would continue ASA and statin for maximum stroke prevention  - Stroke to sign off, please call the stroke phone with any questions or concerns.     Discussed with Neurology Attending, Dr. Bailey

## 2023-12-16 NOTE — PROGRESS NOTE ADULT - PROBLEM SELECTOR PLAN 12
F: none  E: caution given CKD likely progressing to ESRD  N: renal-restricted and consistent carb diet, small and bite-sized  GI ppx: none  DVT ppx: heparin 5000u subq q8h  Code status: full code  Dispo: [facility] --> nursing home F: none  E: caution given CKD likely progressing to ESRD  N: renal-restricted and consistent carb diet, small and bite-sized  GI ppx: none  DVT ppx: heparin 5000u subq q8h  Code status: full code  Dispo: [facility] --> nursing home

## 2023-12-16 NOTE — PROGRESS NOTE ADULT - PROBLEM SELECTOR PLAN 1
Met 2/4 SIRS criteria on admission w/ , WBCs 34.85 w/ neutrophilic predominance without overt source of infection. qSOFA 1 (sBP <100). CXR without visualized consolidation but with dilated loops of bowel. UA w/ asymptomatic bacteriuria. S/p Vancomycin 1 g and Zozyn 3.375 g in the ED.  -Rectal temp: 98    PLAN:  - will monitor off abx for now given no confirmed source of infection  - follow up BCx  - follow up UCx  -CT C/A/P w/ contrast to assess inflammation/possible source of infection  - f/u Bladder scans Q6

## 2023-12-16 NOTE — PROGRESS NOTE ADULT - PROBLEM SELECTOR PLAN 2
Elevated to 30,000s.  Unclear etiology. Possibly infectious vs malignancy  No clear infectious etiology at this time although meets SIRS criteria.    -Continue to monitor and f/u infectious w/u  -Consider consulting heme/onc if continues to uptrend.

## 2023-12-16 NOTE — PROGRESS NOTE ADULT - PROBLEM SELECTOR PLAN 4
BUN/Cr 144/6.38 on admission, last admission 10/2023 had BUN/Cr 90s/6s. No s/s uremia including no asterixis, confusion different from baseline. Has baseline tremor that is still present. Had LUE AVF created 5/2023 in anticipation of possibly needing HD.  - avoid nephrotoxic medications  - renal consulted, per recs no HD at this time given pending fistula maturation  - Fistulogram by vascular surgery on Monday 12/18

## 2023-12-16 NOTE — PROGRESS NOTE ADULT - PROBLEM SELECTOR PLAN 5
Pt reports he typically has a bowel movement every day but has not had one in ~2 days. Reported abdominal pain earlier in the day at his nursing home but is not currently complaining of abdominal pain. Abdomen is firm but not distended and not tender to palpation. CXR with dilated loops of bowel. Pt has recent history of C. diff.  - XR abdomen: Dilated air-filled left colon up to 9 cm, right and transverse colon are filled with air and fecal matter measuring up to 7.5 cm  - miralax BID  - senna qhs

## 2023-12-16 NOTE — PROGRESS NOTE ADULT - SUBJECTIVE AND OBJECTIVE BOX
Patient is a 58y old  Male who presents with a chief complaint of hypotension at nursing home (16 Dec 2023 15:19)      INTERVAL HPI/OVERNIGHT EVENTS: ZULY    SUBJECTIVE: Patient seen and examined at bedside. Found to be minimally responsive to name and sternal rub. Called stroke code, soon found to have FS. After administering 1 amp D5, glucose improved. Patient then found to be more responsive, AxOx1-2, closer to baseline. Unable to fully assess ROS given baseline dementia, however good response to commands and maintains eye contact.      Vital Signs Last 24 Hrs  T(C): 36.4 (16 Dec 2023 20:52), Max: 36.8 (16 Dec 2023 06:24)  T(F): 97.5 (16 Dec 2023 20:52), Max: 98.2 (16 Dec 2023 06:24)  HR: 115 (16 Dec 2023 20:52) (106 - 115)  BP: 128/74 (16 Dec 2023 20:52) (114/79 - 128/74)  BP(mean): 86 (16 Dec 2023 08:48) (86 - 86)  RR: 18 (16 Dec 2023 20:52) (17 - 18)  SpO2: 96% (16 Dec 2023 20:52) (96% - 98%)    Parameters below as of 16 Dec 2023 20:52  Patient On (Oxygen Delivery Method): room air        12-15-23 @ 07:  -  23 @ 07:00  --------------------------------------------------------  IN: 600 mL / OUT: 0 mL / NET: 600 mL    23 @ 07:01  -  23 @ 23:46  --------------------------------------------------------  IN: 0 mL / OUT: 800 mL / NET: -800 mL        PHYSICAL EXAM:  General: NAD. Resting comfortably in bed.  HEENT: head NC/AT, L R sided facial droop, pale conjunctiva, EOMI, +Dry MM.  Neck: supple  Cardiac: tachycardic rate regular rhythm, normal S1/S2, no murmurs appreciated  Respiratory: lungs CTAB  Abdomen: bowel sounds present, NTND  Extremities: WWP, s/p R BKA, AVF on LUE w/ palpable and audible thrill, no peripheral edema  Vascular: 2+ radial pulses b/l, 2+ DP pulses.  Neuro: A&Ox1-2, moving extremities spontaneously, 3/5 B/L UE and LE.  Skin: dry, intact, no visible jaundice    Consultant(s) Notes Reviewed:  [x ] YES  [ ] NO  Care Discussed with Consultants/Other Providers [ x] YES  [ ] NO    LABS:                        8.7    35.14 )-----------( 365      ( 16 Dec 2023 12:32 )             28.5     12-16    136  |  104  |  145<H>  ----------------------------<  340<H>  4.6   |  17<L>  |  5.81<H>    Ca    8.1<L>      16 Dec 2023 12:32  Phos  3.6     12-16  Mg     1.9     12-16    TPro  6.2  /  Alb  2.9<L>  /  TBili  0.2  /  DBili  x   /  AST  21  /  ALT  14  /  AlkPhos  81  12-15        RADIOLOGY & ADDITIONAL TESTS:    Imaging Personally Reviewed:  [ ] YES  [ ] NO  aspirin enteric coated 81 milliGRAM(s) Oral every 24 hours  atorvastatin 80 milliGRAM(s) Oral at bedtime  dextrose 5%. 1000 milliLiter(s) IV Continuous <Continuous>  dextrose 5%. 1000 milliLiter(s) IV Continuous <Continuous>  dextrose 5%. 1000 milliLiter(s) IV Continuous <Continuous>  dextrose 50% Injectable 25 Gram(s) IV Push once  dextrose 50% Injectable 25 Gram(s) IV Push once  dextrose 50% Injectable 25 Gram(s) IV Push once  dextrose 50% Injectable 12.5 Gram(s) IV Push once  dextrose Oral Gel 15 Gram(s) Oral once PRN  glucagon  Injectable 1 milliGRAM(s) IntraMuscular once  heparin   Injectable 5000 Unit(s) SubCutaneous every 8 hours  influenza   Vaccine 0.5 milliLiter(s) IntraMuscular once  insulin glargine Injectable (LANTUS) 15 Unit(s) SubCutaneous at bedtime  insulin lispro (ADMELOG) corrective regimen sliding scale   SubCutaneous Before meals and at bedtime  insulin lispro Injectable (ADMELOG) 5 Unit(s) SubCutaneous three times a day before meals  polyethylene glycol 3350 17 Gram(s) Oral two times a day  senna 2 Tablet(s) Oral at bedtime      HEALTH ISSUES - PROBLEM Dx:  Systemic inflammatory response syndrome (SIRS)    Acute kidney injury superimposed on CKD    Type 2 diabetes mellitus    Anemia in end-stage renal disease    History of stroke    HTN (hypertension)    Dementia    BPH (benign prostatic hyperplasia)    Prophylactic measure    Constipation

## 2023-12-16 NOTE — PROGRESS NOTE ADULT - SUBJECTIVE AND OBJECTIVE BOX
Patient seen and examined at bedside. AVF was not able to be used yesterday for HD    s/p hypoglycemia this am and on D10  he is awake and alert and comfortable- denying complaints     aspirin enteric coated 81 milliGRAM(s) every 24 hours  atorvastatin 80 milliGRAM(s) at bedtime  dextrose 5%. 1000 milliLiter(s) <Continuous>  dextrose 5%. 1000 milliLiter(s) <Continuous>  dextrose 5%. 1000 milliLiter(s) <Continuous>  dextrose 50% Injectable 25 Gram(s) once  dextrose 50% Injectable 25 Gram(s) once  dextrose 50% Injectable 25 Gram(s) once  dextrose 50% Injectable 12.5 Gram(s) once  dextrose Oral Gel 15 Gram(s) once PRN  glucagon  Injectable 1 milliGRAM(s) once  heparin   Injectable 5000 Unit(s) every 8 hours  influenza   Vaccine 0.5 milliLiter(s) once  insulin glargine Injectable (LANTUS) 15 Unit(s) at bedtime  insulin lispro (ADMELOG) corrective regimen sliding scale   Before meals and at bedtime  insulin lispro Injectable (ADMELOG) 5 Unit(s) three times a day before meals  polyethylene glycol 3350 17 Gram(s) two times a day  senna 2 Tablet(s) at bedtime      Allergies    No Known Allergies    Intolerances        T(C): , Max: 37.3 (12-15-23 @ 18:05)  T(F): , Max: 99.1 (12-15-23 @ 18:05)  HR: 106 (12-16-23 @ 08:48)  BP: 119/70 (12-16-23 @ 08:48)  BP(mean): 86 (12-16-23 @ 08:48)  RR: 18 (12-16-23 @ 08:48)  SpO2: 96% (12-16-23 @ 08:48)  Wt(kg): --    12-15 @ 07:01  -  12-16 @ 07:00  --------------------------------------------------------  IN:    Other (mL): 600 mL  Total IN: 600 mL    OUT:  Total OUT: 0 mL    Total NET: 600 mL      12-16 @ 07:01  -  12-16 @ 15:20  --------------------------------------------------------  IN:  Total IN: 0 mL    OUT:    Intermittent Catheterization - Urethral (mL): 800 mL  Total OUT: 800 mL    Total NET: -800 mL              PHYSICAL EXAM:  Constitutional: .  No acute distress, alert and awake   ENMT: Moist mucous membrane.  No cyanosis.  Neck: No JVD.  Respiratory: Clear to auscultation ant  Cardiovascular: S1, S2.  Regular rate and rhythm.    Gastrointestinal: soft, non-tender, non-distended  Extremities: Warm.  No lower extremity edema.    Skin: Warm. Dry.    neuro alert and answers aporopriately- A+O x 2 (said year was 2013)     ACCESS:       LABS:                        8.7    35.14 )-----------( 365      ( 16 Dec 2023 12:32 )             28.5     12-16    136  |  104  |  145<H>  ----------------------------<  340<H>  4.6   |  17<L>  |  5.81<H>    Ca    8.1<L>      16 Dec 2023 12:32  Phos  3.6     12-16  Mg     1.9     12-16    TPro  6.2  /  Alb  2.9<L>  /  TBili  0.2  /  DBili  x   /  AST  21  /  ALT  14  /  AlkPhos  81  12-15        Urinalysis Basic - ( 16 Dec 2023 12:32 )    Color: x / Appearance: x / SG: x / pH: x  Gluc: 340 mg/dL / Ketone: x  / Bili: x / Urobili: x   Blood: x / Protein: x / Nitrite: x   Leuk Esterase: x / RBC: x / WBC x   Sq Epi: x / Non Sq Epi: x / Bacteria: x      Sodium, Random Urine: 28 mmol/L (12-15 @ 18:04)  Creatinine, Random Urine: 65 mg/dL (12-15 @ 18:04)        RADIOLOGY & ADDITIONAL STUDIES:

## 2023-12-16 NOTE — PROGRESS NOTE ADULT - ASSESSMENT
CKD 5  plan for HD this admission -- AVF was not useable yesterday and to get AVF fistulogram monday - if unable to use still will need permcath  pt does not appear grossly uremic and his MS appears the same from when I saw him in October last admission-- he does not appear to need emergency HD via temporary line right now   will cont to follow

## 2023-12-17 DIAGNOSIS — G93.41 METABOLIC ENCEPHALOPATHY: ICD-10-CM

## 2023-12-17 DIAGNOSIS — R53.81 OTHER MALAISE: ICD-10-CM

## 2023-12-17 DIAGNOSIS — D72.829 ELEVATED WHITE BLOOD CELL COUNT, UNSPECIFIED: ICD-10-CM

## 2023-12-17 DIAGNOSIS — N18.6 END STAGE RENAL DISEASE: ICD-10-CM

## 2023-12-17 DIAGNOSIS — Z51.5 ENCOUNTER FOR PALLIATIVE CARE: ICD-10-CM

## 2023-12-17 DIAGNOSIS — A49.8 OTHER BACTERIAL INFECTIONS OF UNSPECIFIED SITE: ICD-10-CM

## 2023-12-17 DIAGNOSIS — Z71.89 OTHER SPECIFIED COUNSELING: ICD-10-CM

## 2023-12-17 DIAGNOSIS — E11.649 TYPE 2 DIABETES MELLITUS WITH HYPOGLYCEMIA WITHOUT COMA: ICD-10-CM

## 2023-12-17 DIAGNOSIS — A41.9 SEPSIS, UNSPECIFIED ORGANISM: ICD-10-CM

## 2023-12-17 LAB
ALBUMIN SERPL ELPH-MCNC: 1.9 G/DL — LOW (ref 3.3–5)
ALBUMIN SERPL ELPH-MCNC: 1.9 G/DL — LOW (ref 3.3–5)
ALP SERPL-CCNC: 94 U/L — SIGNIFICANT CHANGE UP (ref 40–120)
ALP SERPL-CCNC: 94 U/L — SIGNIFICANT CHANGE UP (ref 40–120)
ALT FLD-CCNC: 15 U/L — SIGNIFICANT CHANGE UP (ref 10–45)
ALT FLD-CCNC: 15 U/L — SIGNIFICANT CHANGE UP (ref 10–45)
ANION GAP SERPL CALC-SCNC: 16 MMOL/L — SIGNIFICANT CHANGE UP (ref 5–17)
ANION GAP SERPL CALC-SCNC: 16 MMOL/L — SIGNIFICANT CHANGE UP (ref 5–17)
ANISOCYTOSIS BLD QL: SLIGHT — SIGNIFICANT CHANGE UP
ANISOCYTOSIS BLD QL: SLIGHT — SIGNIFICANT CHANGE UP
AST SERPL-CCNC: 26 U/L — SIGNIFICANT CHANGE UP (ref 10–40)
AST SERPL-CCNC: 26 U/L — SIGNIFICANT CHANGE UP (ref 10–40)
BASOPHILS # BLD AUTO: 0 K/UL — SIGNIFICANT CHANGE UP (ref 0–0.2)
BASOPHILS # BLD AUTO: 0 K/UL — SIGNIFICANT CHANGE UP (ref 0–0.2)
BASOPHILS NFR BLD AUTO: 0 % — SIGNIFICANT CHANGE UP (ref 0–2)
BASOPHILS NFR BLD AUTO: 0 % — SIGNIFICANT CHANGE UP (ref 0–2)
BILIRUB SERPL-MCNC: 0.2 MG/DL — SIGNIFICANT CHANGE UP (ref 0.2–1.2)
BILIRUB SERPL-MCNC: 0.2 MG/DL — SIGNIFICANT CHANGE UP (ref 0.2–1.2)
BUN SERPL-MCNC: 144 MG/DL — HIGH (ref 7–23)
BUN SERPL-MCNC: 144 MG/DL — HIGH (ref 7–23)
BURR CELLS BLD QL SMEAR: PRESENT — SIGNIFICANT CHANGE UP
BURR CELLS BLD QL SMEAR: PRESENT — SIGNIFICANT CHANGE UP
C DIFF BY PCR RESULT: POSITIVE
C DIFF BY PCR RESULT: POSITIVE
C DIFF GDH STL QL: SIGNIFICANT CHANGE UP
CALCIUM SERPL-MCNC: 8.3 MG/DL — LOW (ref 8.4–10.5)
CALCIUM SERPL-MCNC: 8.3 MG/DL — LOW (ref 8.4–10.5)
CHLORIDE SERPL-SCNC: 107 MMOL/L — SIGNIFICANT CHANGE UP (ref 96–108)
CHLORIDE SERPL-SCNC: 107 MMOL/L — SIGNIFICANT CHANGE UP (ref 96–108)
CO2 SERPL-SCNC: 15 MMOL/L — LOW (ref 22–31)
CO2 SERPL-SCNC: 15 MMOL/L — LOW (ref 22–31)
CREAT SERPL-MCNC: 6.14 MG/DL — HIGH (ref 0.5–1.3)
CREAT SERPL-MCNC: 6.14 MG/DL — HIGH (ref 0.5–1.3)
EGFR: 10 ML/MIN/1.73M2 — LOW
EGFR: 10 ML/MIN/1.73M2 — LOW
EOSINOPHIL # BLD AUTO: 0.3 K/UL — SIGNIFICANT CHANGE UP (ref 0–0.5)
EOSINOPHIL # BLD AUTO: 0.3 K/UL — SIGNIFICANT CHANGE UP (ref 0–0.5)
EOSINOPHIL NFR BLD AUTO: 0.9 % — SIGNIFICANT CHANGE UP (ref 0–6)
EOSINOPHIL NFR BLD AUTO: 0.9 % — SIGNIFICANT CHANGE UP (ref 0–6)
GLUCOSE BLDC GLUCOMTR-MCNC: 106 MG/DL — HIGH (ref 70–99)
GLUCOSE BLDC GLUCOMTR-MCNC: 106 MG/DL — HIGH (ref 70–99)
GLUCOSE BLDC GLUCOMTR-MCNC: 117 MG/DL — HIGH (ref 70–99)
GLUCOSE BLDC GLUCOMTR-MCNC: 117 MG/DL — HIGH (ref 70–99)
GLUCOSE BLDC GLUCOMTR-MCNC: 137 MG/DL — HIGH (ref 70–99)
GLUCOSE BLDC GLUCOMTR-MCNC: 137 MG/DL — HIGH (ref 70–99)
GLUCOSE BLDC GLUCOMTR-MCNC: 149 MG/DL — HIGH (ref 70–99)
GLUCOSE BLDC GLUCOMTR-MCNC: 149 MG/DL — HIGH (ref 70–99)
GLUCOSE BLDC GLUCOMTR-MCNC: 158 MG/DL — HIGH (ref 70–99)
GLUCOSE BLDC GLUCOMTR-MCNC: 158 MG/DL — HIGH (ref 70–99)
GLUCOSE BLDC GLUCOMTR-MCNC: 162 MG/DL — HIGH (ref 70–99)
GLUCOSE BLDC GLUCOMTR-MCNC: 162 MG/DL — HIGH (ref 70–99)
GLUCOSE SERPL-MCNC: 102 MG/DL — HIGH (ref 70–99)
GLUCOSE SERPL-MCNC: 102 MG/DL — HIGH (ref 70–99)
HCT VFR BLD CALC: 28.1 % — LOW (ref 39–50)
HCT VFR BLD CALC: 28.1 % — LOW (ref 39–50)
HGB BLD-MCNC: 8.2 G/DL — LOW (ref 13–17)
HGB BLD-MCNC: 8.2 G/DL — LOW (ref 13–17)
HYPOCHROMIA BLD QL: SLIGHT — SIGNIFICANT CHANGE UP
HYPOCHROMIA BLD QL: SLIGHT — SIGNIFICANT CHANGE UP
LACTATE SERPL-SCNC: 1.1 MMOL/L — SIGNIFICANT CHANGE UP (ref 0.5–2)
LACTATE SERPL-SCNC: 1.1 MMOL/L — SIGNIFICANT CHANGE UP (ref 0.5–2)
LYMPHOCYTES # BLD AUTO: 0.91 K/UL — LOW (ref 1–3.3)
LYMPHOCYTES # BLD AUTO: 0.91 K/UL — LOW (ref 1–3.3)
LYMPHOCYTES # BLD AUTO: 2.7 % — LOW (ref 13–44)
LYMPHOCYTES # BLD AUTO: 2.7 % — LOW (ref 13–44)
MAGNESIUM SERPL-MCNC: 2 MG/DL — SIGNIFICANT CHANGE UP (ref 1.6–2.6)
MAGNESIUM SERPL-MCNC: 2 MG/DL — SIGNIFICANT CHANGE UP (ref 1.6–2.6)
MANUAL SMEAR VERIFICATION: SIGNIFICANT CHANGE UP
MANUAL SMEAR VERIFICATION: SIGNIFICANT CHANGE UP
MCHC RBC-ENTMCNC: 27.7 PG — SIGNIFICANT CHANGE UP (ref 27–34)
MCHC RBC-ENTMCNC: 27.7 PG — SIGNIFICANT CHANGE UP (ref 27–34)
MCHC RBC-ENTMCNC: 29.2 GM/DL — LOW (ref 32–36)
MCHC RBC-ENTMCNC: 29.2 GM/DL — LOW (ref 32–36)
MCV RBC AUTO: 94.9 FL — SIGNIFICANT CHANGE UP (ref 80–100)
MCV RBC AUTO: 94.9 FL — SIGNIFICANT CHANGE UP (ref 80–100)
MICROCYTES BLD QL: SLIGHT — SIGNIFICANT CHANGE UP
MICROCYTES BLD QL: SLIGHT — SIGNIFICANT CHANGE UP
MONOCYTES # BLD AUTO: 1.81 K/UL — HIGH (ref 0–0.9)
MONOCYTES # BLD AUTO: 1.81 K/UL — HIGH (ref 0–0.9)
MONOCYTES NFR BLD AUTO: 5.4 % — SIGNIFICANT CHANGE UP (ref 2–14)
MONOCYTES NFR BLD AUTO: 5.4 % — SIGNIFICANT CHANGE UP (ref 2–14)
NEUTROPHILS # BLD AUTO: 30.51 K/UL — HIGH (ref 1.8–7.4)
NEUTROPHILS # BLD AUTO: 30.51 K/UL — HIGH (ref 1.8–7.4)
NEUTROPHILS NFR BLD AUTO: 88.3 % — HIGH (ref 43–77)
NEUTROPHILS NFR BLD AUTO: 88.3 % — HIGH (ref 43–77)
NEUTS BAND # BLD: 2.7 % — SIGNIFICANT CHANGE UP (ref 0–8)
NEUTS BAND # BLD: 2.7 % — SIGNIFICANT CHANGE UP (ref 0–8)
OVALOCYTES BLD QL SMEAR: SLIGHT — SIGNIFICANT CHANGE UP
OVALOCYTES BLD QL SMEAR: SLIGHT — SIGNIFICANT CHANGE UP
PHOSPHATE SERPL-MCNC: 4.2 MG/DL — SIGNIFICANT CHANGE UP (ref 2.5–4.5)
PHOSPHATE SERPL-MCNC: 4.2 MG/DL — SIGNIFICANT CHANGE UP (ref 2.5–4.5)
PLAT MORPH BLD: ABNORMAL
PLAT MORPH BLD: ABNORMAL
PLATELET # BLD AUTO: 403 K/UL — HIGH (ref 150–400)
PLATELET # BLD AUTO: 403 K/UL — HIGH (ref 150–400)
POIKILOCYTOSIS BLD QL AUTO: SIGNIFICANT CHANGE UP
POIKILOCYTOSIS BLD QL AUTO: SIGNIFICANT CHANGE UP
POTASSIUM SERPL-MCNC: 4.3 MMOL/L — SIGNIFICANT CHANGE UP (ref 3.5–5.3)
POTASSIUM SERPL-MCNC: 4.3 MMOL/L — SIGNIFICANT CHANGE UP (ref 3.5–5.3)
POTASSIUM SERPL-SCNC: 4.3 MMOL/L — SIGNIFICANT CHANGE UP (ref 3.5–5.3)
POTASSIUM SERPL-SCNC: 4.3 MMOL/L — SIGNIFICANT CHANGE UP (ref 3.5–5.3)
PROT SERPL-MCNC: 5.9 G/DL — LOW (ref 6–8.3)
PROT SERPL-MCNC: 5.9 G/DL — LOW (ref 6–8.3)
RBC # BLD: 2.96 M/UL — LOW (ref 4.2–5.8)
RBC # BLD: 2.96 M/UL — LOW (ref 4.2–5.8)
RBC # FLD: 16.6 % — HIGH (ref 10.3–14.5)
RBC # FLD: 16.6 % — HIGH (ref 10.3–14.5)
RBC BLD AUTO: ABNORMAL
RBC BLD AUTO: ABNORMAL
SCHISTOCYTES BLD QL AUTO: SIGNIFICANT CHANGE UP
SCHISTOCYTES BLD QL AUTO: SIGNIFICANT CHANGE UP
SODIUM SERPL-SCNC: 138 MMOL/L — SIGNIFICANT CHANGE UP (ref 135–145)
SODIUM SERPL-SCNC: 138 MMOL/L — SIGNIFICANT CHANGE UP (ref 135–145)
WBC # BLD: 33.53 K/UL — HIGH (ref 3.8–10.5)
WBC # BLD: 33.53 K/UL — HIGH (ref 3.8–10.5)
WBC # FLD AUTO: 33.53 K/UL — HIGH (ref 3.8–10.5)
WBC # FLD AUTO: 33.53 K/UL — HIGH (ref 3.8–10.5)

## 2023-12-17 PROCEDURE — 99233 SBSQ HOSP IP/OBS HIGH 50: CPT | Mod: GC

## 2023-12-17 PROCEDURE — 74177 CT ABD & PELVIS W/CONTRAST: CPT | Mod: 26

## 2023-12-17 PROCEDURE — 99232 SBSQ HOSP IP/OBS MODERATE 35: CPT

## 2023-12-17 PROCEDURE — 70450 CT HEAD/BRAIN W/O DYE: CPT | Mod: 26

## 2023-12-17 PROCEDURE — 71260 CT THORAX DX C+: CPT | Mod: 26

## 2023-12-17 RX ORDER — VANCOMYCIN HCL 1 G
125 VIAL (EA) INTRAVENOUS EVERY 6 HOURS
Refills: 0 | Status: DISCONTINUED | OUTPATIENT
Start: 2023-12-17 | End: 2023-12-21

## 2023-12-17 RX ORDER — METRONIDAZOLE 500 MG
500 TABLET ORAL EVERY 8 HOURS
Refills: 0 | Status: DISCONTINUED | OUTPATIENT
Start: 2023-12-17 | End: 2023-12-20

## 2023-12-17 RX ORDER — SODIUM CHLORIDE 9 MG/ML
1000 INJECTION, SOLUTION INTRAVENOUS
Refills: 0 | Status: DISCONTINUED | OUTPATIENT
Start: 2023-12-17 | End: 2023-12-19

## 2023-12-17 RX ADMIN — ATORVASTATIN CALCIUM 80 MILLIGRAM(S): 80 TABLET, FILM COATED ORAL at 21:39

## 2023-12-17 RX ADMIN — SENNA PLUS 2 TABLET(S): 8.6 TABLET ORAL at 21:39

## 2023-12-17 RX ADMIN — HEPARIN SODIUM 5000 UNIT(S): 5000 INJECTION INTRAVENOUS; SUBCUTANEOUS at 07:15

## 2023-12-17 RX ADMIN — Medication 125 MILLIGRAM(S): at 12:12

## 2023-12-17 RX ADMIN — HEPARIN SODIUM 5000 UNIT(S): 5000 INJECTION INTRAVENOUS; SUBCUTANEOUS at 12:12

## 2023-12-17 RX ADMIN — HEPARIN SODIUM 5000 UNIT(S): 5000 INJECTION INTRAVENOUS; SUBCUTANEOUS at 21:40

## 2023-12-17 RX ADMIN — Medication 100 MILLIGRAM(S): at 19:12

## 2023-12-17 RX ADMIN — SODIUM CHLORIDE 100 MILLILITER(S): 9 INJECTION, SOLUTION INTRAVENOUS at 16:51

## 2023-12-17 RX ADMIN — Medication 81 MILLIGRAM(S): at 12:13

## 2023-12-17 RX ADMIN — Medication 125 MILLIGRAM(S): at 19:12

## 2023-12-17 RX ADMIN — Medication 2: at 22:44

## 2023-12-17 RX ADMIN — Medication 125 MILLIGRAM(S): at 23:22

## 2023-12-17 RX ADMIN — Medication 100 MILLIGRAM(S): at 12:11

## 2023-12-17 NOTE — SWALLOW BEDSIDE ASSESSMENT ADULT - SWALLOW EVAL: DIAGNOSIS
Moderate oral and suspect at least mild pharyngeal dysphagia (known pharyngeal dysphagia per MBSS 10/2023 however suspect mild improvement). Pt tolerated PO trials with reduced oral efficiency, however no shawn clinical indicators of airway protection deficits. Of note, pt with hx of inconsistent sensory response to aspiration. Case d/w MD. Given breathing comfortably on RA, clean chest imaging, and clinical presentation, recommend minced/moist solids and thin liquids. Pt presents at heightened risk for aspiration given chronic dysphagia and dependence on others for feeding and oral care, therefore maintain aspiration precautions.

## 2023-12-17 NOTE — SWALLOW BEDSIDE ASSESSMENT ADULT - PHARYNGEAL PHASE
Laryngeal movement palpated. Audible swallows with liquids via straw. No cough, throat clear, or change in vocal quality with PO.

## 2023-12-17 NOTE — CONSULT NOTE ADULT - SUBJECTIVE AND OBJECTIVE BOX
SURGERY CONSULT  ==============================================================================================================  HPI: 58y Male  HPI:  59yo M T2DM, CVA x2 w/ residual L-sided weakness, early onset dementia, HTN, HLD, DVT, CKD V (Cr 7-8, not on HD), BPH, PSH L toe amputation and AVF creation (5/2023), R BKTIMMY, currently receiving daily IM CTX per NH paperwork, found to be hypotensive at nursing home with sBP 86. He reports constipation, states he has not had a bowel movement in 2 days when he typically has one every day. Denies CP, SOB, abdominal pain, changes to urinary habits.    ED course:  VS: T 98.7 F oral, , BP 99/60, RR 18, SpO2 95% on RA  Labs significant for: WBCs 34.85 w/ 86% neutrophils, Hgb 9.2, Na 146, Cl 110, bicarb 21, BUN/Cr 144/6.38, albumin 2.9, UA cloudy w/ many bacteria, trace leuk esterase  EKG: sinus tachycardia , QTc 415  Imaging:    CXR: no overt consolidations, minimal large bowel distension (provider read)  Interventions: Zosyn 3.375 g IVPB, Vancomycin 1 g  Consults: none (15 Dec 2023 03:15)    SURGERY ADDENDUM  58M T2DM, CVA x2 w/ residual L-sided weakness, early onset dementia, HTN, HLD, DVT, CKD V (Cr 7-8, not on HD), BPH, PSH L toe amputation and L BC AVF creation (5/2023) and remote R BKTIMMY who presented to [facility] from nursing home for hypotension and constipation on 12/15. WBC noted to be elevated to 31K and given +SIRS crtieria, patient given vanc and zosyn in the ED. Hospital course c/b AMS requiring stroke code that was negative and resolved with 1 amp dextrose and new onset diarrhea, found to have +C diff colitis. Patient never required pressors throughout stay although noted to be persistently tachycardic.  Surgery consulted for evaluation of dialated sigmoid on CTAP c/f toxic megacolon i/s/o C diff colitis. Patient denies abdominal pain at this time. Denies history of abdominal surgeries. States he is still passing flatus and having liquid, nonbloody BMs. Denies nausea, emesis or abdominal distention. States he has never had a colonoscopy and denies family or personal hx of CRC, IBD, IBS.      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, traumatic, left, sequela,        Home Meds: Home Medications:  acetaminophen 325 mg oral tablet: 2 tab(s) orally every 4 hours As needed Mild Pain (1 - 3) (25 Oct 2023 19:47)  aspirin 81 mg oral capsule: 1 cap(s) orally once a day (26 Oct 2023 11:04)  atorvastatin 80 mg oral tablet: 1 tab(s) orally once a day (at bedtime) (25 Oct 2023 19:47)  finasteride 5 mg oral tablet: 1 tab(s) orally once a day (26 Oct 2023 11:06)  insulin lispro 100 units/mL injectable solution: 5 unit(s) injectable 3 times a day (26 Oct 2023 11:06)  Lantus Solostar Pen 100 units/mL subcutaneous solution: 15 unit(s) subcutaneous once a day (at bedtime) (26 Oct 2023 11:04)  metroNIDAZOLE 500 mg oral tablet: 1 tab(s) orally every 8 hours (28 Oct 2023 10:14)  NIFEdipine 90 mg oral tablet, extended release: 1 tab(s) orally once a day (26 Oct 2023 11:08)  Plavix 75 mg oral tablet: 1 tab(s) orally once a day (25 Oct 2023 19:54)  sodium bicarbonate 650 mg oral tablet: 1 tab(s) orally 3 times a day (25 Oct 2023 19:53)  tamsulosin 0.4 mg oral capsule: 1 cap(s) orally once a day (at bedtime) (25 Oct 2023 19:53)  Tradjenta 5 mg oral tablet: 1 tab(s) orally once a day (26 Oct 2023 11:07)  vancomycin 250 mg oral capsule: 2 tab(s) orally every 6 hours To end 11/2 (28 Oct 2023 10:14)    Allergies:     No Known Allergies      CURRENT MEDICATIONS:   --------------------------------------------------------------------------------------  Neurologic Medications    Respiratory Medications    Cardiovascular Medications    Gastrointestinal Medications  dextrose 5%. 1000 milliLiter(s) IV Continuous <Continuous>  dextrose 5%. 1000 milliLiter(s) IV Continuous <Continuous>  dextrose 5%. 1000 milliLiter(s) IV Continuous <Continuous>  polyethylene glycol 3350 17 Gram(s) Oral two times a day  senna 2 Tablet(s) Oral at bedtime    Genitourinary Medications    Hematologic/Oncologic Medications  aspirin enteric coated 81 milliGRAM(s) Oral every 24 hours  heparin   Injectable 5000 Unit(s) SubCutaneous every 8 hours  influenza   Vaccine 0.5 milliLiter(s) IntraMuscular once    Antimicrobial/Immunologic Medications  metroNIDAZOLE  IVPB 500 milliGRAM(s) IV Intermittent every 8 hours  vancomycin    Solution 125 milliGRAM(s) Oral every 6 hours    Endocrine/Metabolic Medications  atorvastatin 80 milliGRAM(s) Oral at bedtime  dextrose 50% Injectable 25 Gram(s) IV Push once  dextrose 50% Injectable 25 Gram(s) IV Push once  dextrose 50% Injectable 25 Gram(s) IV Push once  dextrose 50% Injectable 12.5 Gram(s) IV Push once  dextrose Oral Gel 15 Gram(s) Oral once PRN Blood Glucose LESS THAN 70 milliGRAM(s)/deciliter  glucagon  Injectable 1 milliGRAM(s) IntraMuscular once  insulin lispro (ADMELOG) corrective regimen sliding scale   SubCutaneous Before meals and at bedtime    Topical/Other Medications    --------------------------------------------------------------------------------------    VITAL SIGNS, INS/OUTS (last 24 hours):  --------------------------------------------------------------------------------------  ICU Vital Signs Last 24 Hrs  T(C): 36.3 (17 Dec 2023 10:00), Max: 36.7 (16 Dec 2023 13:17)  T(F): 97.4 (17 Dec 2023 10:00), Max: 98.1 (16 Dec 2023 16:05)  HR: 105 (17 Dec 2023 10:00) (105 - 115)  BP: 108/73 (17 Dec 2023 10:00) (108/73 - 128/74)  BP(mean): 84 (17 Dec 2023 10:00) (84 - 84)  RR: 18 (17 Dec 2023 10:00) (17 - 18)  SpO2: 98% (17 Dec 2023 10:00) (96% - 99%)    O2 Parameters below as of 17 Dec 2023 10:00  Patient On (Oxygen Delivery Method): room air          I&O's Summary    16 Dec 2023 07:01  -  17 Dec 2023 07:00  --------------------------------------------------------  IN: 0 mL / OUT: 1120 mL / NET: -1120 mL      --------------------------------------------------------------------------------------    EXAM:  General: Resting comfortably in bed, NAD  HEENT: ATNC  Pulmonary: Nonlabored breathing, no respiratory distress or accessory muscle noted  Cardiovascular: Tachycardic - sinus rhythm   Abdomen: Soft, nondistended, nontender. Tight abdominal musculature noted - likely contracted  Extremities RUTHIE FRAIRE noted. LUE AVF with palpable thrill     LABS  --------------------------------------------------------------------------------------  Labs:  CAPILLARY BLOOD GLUCOSE      POCT Blood Glucose.: 106 mg/dL (17 Dec 2023 09:13)  POCT Blood Glucose.: 117 mg/dL (17 Dec 2023 06:36)  POCT Blood Glucose.: 137 mg/dL (17 Dec 2023 00:39)  POCT Blood Glucose.: 132 mg/dL (16 Dec 2023 22:39)  POCT Blood Glucose.: 120 mg/dL (16 Dec 2023 18:42)  POCT Blood Glucose.: 98 mg/dL (16 Dec 2023 13:11)                          8.2    33.53 )-----------( 403      ( 17 Dec 2023 05:30 )             28.1       Auto Neutrophil %: 88.3 % (12-17-23 @ 05:30)  Band Neutrophils %: 2.7 % (12-17-23 @ 05:30)    12-17    138  |  107  |  144<H>  ----------------------------<  102<H>  4.3   |  15<L>  |  6.14<H>      Calcium: 8.3 mg/dL (12-17-23 @ 05:30)      LFTs:             5.9  | 0.2  | 26       ------------------[94      ( 17 Dec 2023 05:30 )  1.9  | x    | 15          Lipase:x      Amylase:x         Lactate, Blood: 1.1 mmol/L (12-17-23 @ 12:27)  Lactate, Blood: 1.9 mmol/L (12-15-23 @ 00:30)      Coags:            Urinalysis Basic - ( 17 Dec 2023 05:30 )    Color: x / Appearance: x / SG: x / pH: x  Gluc: 102 mg/dL / Ketone: x  / Bili: x / Urobili: x   Blood: x / Protein: x / Nitrite: x   Leuk Esterase: x / RBC: x / WBC x   Sq Epi: x / Non Sq Epi: x / Bacteria: x        Culture - Blood (collected 15 Dec 2023 00:01)  Source: .Blood Blood-Peripheral  Preliminary Report (17 Dec 2023 02:00):    No growth at 2 days.    Culture - Urine (collected 15 Dec 2023 00:01)  Source: Clean Catch Clean Catch (Midstream)  Final Report (16 Dec 2023 08:10):    No growth    Culture - Blood (collected 15 Dec 2023 00:01)  Source: .Blood Blood-Peripheral  Preliminary Report (17 Dec 2023 03:00):    No growth at 2 days.        --------------------------------------------------------------------------------------    OTHER LABS    IMAGING RESULTS  ACC: [id number] EXAM:  CT ABDOMEN AND PELVIS OC IC   ORDERED BY: RODOLFO QUIJANO     PROCEDURE DATE:  12/17/2023          INTERPRETATION:  CLINICAL INFORMATION: Leukocytosis.    COMPARISON: CT abdomen pelvis October 25, 2023. CT abdomen pelvis July   15, 2023    CONTRAST/COMPLICATIONS:  IV Contrast: Isovue 370  90 cc administered   10 cc discarded  Oral Contrast: Omnipaque 300  Complications: None reported at time of study completion    PROCEDURE:  CT of the Chest, Abdomen and Pelvis was performed.  Sagittal and coronal reformats were performed.    FINDINGS:  CHEST:  LUNGS AND LARGE AIRWAYS: Patent central airways. No airspace   consolidation or pulmonary nodules.  PLEURA: No pleural effusion.  VESSELS: Within normal limits.  HEART: Heart size is normal. No pericardial effusion.  MEDIASTINUM AND DIAN: No lymphadenopathy.  CHEST WALL AND LOWER NECK: Bilateral gynecomastia.    ABDOMEN AND PELVIS:  LIVER: Within normal limits.  BILE DUCTS: Normal caliber.  GALLBLADDER: Within normal limits.  SPLEEN: Within normal limits.  PANCREAS: Within normal limits.  ADRENALS: Within normal limits.  KIDNEYS/URETERS: Atrophic bilaterally compatible with chronic kidney   disease. No hydronephrosis. Renal hilar vascular calcifications.   Bilateral renal cysts.    BLADDER: Within normal limits.  REPRODUCTIVE ORGANS: Prostate is enlarged.    BOWEL: Diffuse severe mural edema throughout rectum and redundant loops   of sigmoid colon with trilaminar wall enhancement, sigmoid dilated up to   10 cm. Diffuse perirectal edema. No pneumatosis intestinalis. Upstream   left, transverse, and right colon demonstrate mild mural thickening and   not significantly distended. Administered oral contrast transited to   level of hepatic flexure. No bowel obstruction. Appendix is not   visualized. No evidence of inflammation in the pericecal region.  PERITONEUM: No ascites. No abscess. No pneumoperitoneum.  VESSELS: Mild atherosclerotic changes.  RETROPERITONEUM/LYMPH NODES: No lymphadenopathy.  ABDOMINAL WALL: Anasarca.  BONES: No acute fractures or suspicious lesion..    IMPRESSION:  Suspect infectious pancolitis, very severe at rectosigmoid, differential   possibilities include C. difficile.        --- End of Report ---           SURGERY CONSULT  ==============================================================================================================  HPI: 58y Male  HPI:  59yo M T2DM, CVA x2 w/ residual L-sided weakness, early onset dementia, HTN, HLD, DVT, CKD V (Cr 7-8, not on HD), BPH, PSH L toe amputation and AVF creation (5/2023), R BKTIMMY, currently receiving daily IM CTX per NH paperwork, found to be hypotensive at nursing home with sBP 86. He reports constipation, states he has not had a bowel movement in 2 days when he typically has one every day. Denies CP, SOB, abdominal pain, changes to urinary habits.    ED course:  VS: T 98.7 F oral, , BP 99/60, RR 18, SpO2 95% on RA  Labs significant for: WBCs 34.85 w/ 86% neutrophils, Hgb 9.2, Na 146, Cl 110, bicarb 21, BUN/Cr 144/6.38, albumin 2.9, UA cloudy w/ many bacteria, trace leuk esterase  EKG: sinus tachycardia , QTc 415  Imaging:    CXR: no overt consolidations, minimal large bowel distension (provider read)  Interventions: Zosyn 3.375 g IVPB, Vancomycin 1 g  Consults: none (15 Dec 2023 03:15)    SURGERY ADDENDUM  58M T2DM, CVA x2 w/ residual L-sided weakness, early onset dementia, HTN, HLD, DVT, CKD V (Cr 7-8, not on HD), BPH, PSH L toe amputation and L BC AVF creation (5/2023) and remote R BKTIMMY who presented to [facility] from nursing home for hypotension and constipation on 12/15. WBC noted to be elevated to 31K and given +SIRS crtieria, patient given vanc and zosyn in the ED. Hospital course c/b AMS requiring stroke code that was negative and resolved with 1 amp dextrose and new onset diarrhea, found to have +C diff colitis. Patient never required pressors throughout stay although noted to be persistently tachycardic.  Surgery consulted for evaluation of dialated sigmoid on CTAP c/f toxic megacolon i/s/o C diff colitis. Patient denies abdominal pain at this time. Denies history of abdominal surgeries. States he is still passing flatus and having liquid, nonbloody BMs. Denies nausea, emesis or abdominal distention. States he has never had a colonoscopy and denies family or personal hx of CRC, IBD, IBS.      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, traumatic, left, sequela,        Home Meds: Home Medications:  acetaminophen 325 mg oral tablet: 2 tab(s) orally every 4 hours As needed Mild Pain (1 - 3) (25 Oct 2023 19:47)  aspirin 81 mg oral capsule: 1 cap(s) orally once a day (26 Oct 2023 11:04)  atorvastatin 80 mg oral tablet: 1 tab(s) orally once a day (at bedtime) (25 Oct 2023 19:47)  finasteride 5 mg oral tablet: 1 tab(s) orally once a day (26 Oct 2023 11:06)  insulin lispro 100 units/mL injectable solution: 5 unit(s) injectable 3 times a day (26 Oct 2023 11:06)  Lantus Solostar Pen 100 units/mL subcutaneous solution: 15 unit(s) subcutaneous once a day (at bedtime) (26 Oct 2023 11:04)  metroNIDAZOLE 500 mg oral tablet: 1 tab(s) orally every 8 hours (28 Oct 2023 10:14)  NIFEdipine 90 mg oral tablet, extended release: 1 tab(s) orally once a day (26 Oct 2023 11:08)  Plavix 75 mg oral tablet: 1 tab(s) orally once a day (25 Oct 2023 19:54)  sodium bicarbonate 650 mg oral tablet: 1 tab(s) orally 3 times a day (25 Oct 2023 19:53)  tamsulosin 0.4 mg oral capsule: 1 cap(s) orally once a day (at bedtime) (25 Oct 2023 19:53)  Tradjenta 5 mg oral tablet: 1 tab(s) orally once a day (26 Oct 2023 11:07)  vancomycin 250 mg oral capsule: 2 tab(s) orally every 6 hours To end 11/2 (28 Oct 2023 10:14)    Allergies:     No Known Allergies      CURRENT MEDICATIONS:   --------------------------------------------------------------------------------------  Neurologic Medications    Respiratory Medications    Cardiovascular Medications    Gastrointestinal Medications  dextrose 5%. 1000 milliLiter(s) IV Continuous <Continuous>  dextrose 5%. 1000 milliLiter(s) IV Continuous <Continuous>  dextrose 5%. 1000 milliLiter(s) IV Continuous <Continuous>  polyethylene glycol 3350 17 Gram(s) Oral two times a day  senna 2 Tablet(s) Oral at bedtime    Genitourinary Medications    Hematologic/Oncologic Medications  aspirin enteric coated 81 milliGRAM(s) Oral every 24 hours  heparin   Injectable 5000 Unit(s) SubCutaneous every 8 hours  influenza   Vaccine 0.5 milliLiter(s) IntraMuscular once    Antimicrobial/Immunologic Medications  metroNIDAZOLE  IVPB 500 milliGRAM(s) IV Intermittent every 8 hours  vancomycin    Solution 125 milliGRAM(s) Oral every 6 hours    Endocrine/Metabolic Medications  atorvastatin 80 milliGRAM(s) Oral at bedtime  dextrose 50% Injectable 25 Gram(s) IV Push once  dextrose 50% Injectable 25 Gram(s) IV Push once  dextrose 50% Injectable 25 Gram(s) IV Push once  dextrose 50% Injectable 12.5 Gram(s) IV Push once  dextrose Oral Gel 15 Gram(s) Oral once PRN Blood Glucose LESS THAN 70 milliGRAM(s)/deciliter  glucagon  Injectable 1 milliGRAM(s) IntraMuscular once  insulin lispro (ADMELOG) corrective regimen sliding scale   SubCutaneous Before meals and at bedtime    Topical/Other Medications    --------------------------------------------------------------------------------------    VITAL SIGNS, INS/OUTS (last 24 hours):  --------------------------------------------------------------------------------------  ICU Vital Signs Last 24 Hrs  T(C): 36.3 (17 Dec 2023 10:00), Max: 36.7 (16 Dec 2023 13:17)  T(F): 97.4 (17 Dec 2023 10:00), Max: 98.1 (16 Dec 2023 16:05)  HR: 105 (17 Dec 2023 10:00) (105 - 115)  BP: 108/73 (17 Dec 2023 10:00) (108/73 - 128/74)  BP(mean): 84 (17 Dec 2023 10:00) (84 - 84)  RR: 18 (17 Dec 2023 10:00) (17 - 18)  SpO2: 98% (17 Dec 2023 10:00) (96% - 99%)    O2 Parameters below as of 17 Dec 2023 10:00  Patient On (Oxygen Delivery Method): room air          I&O's Summary    16 Dec 2023 07:01  -  17 Dec 2023 07:00  --------------------------------------------------------  IN: 0 mL / OUT: 1120 mL / NET: -1120 mL      --------------------------------------------------------------------------------------    EXAM:  General: Resting comfortably in bed, NAD  HEENT: ATNC  Pulmonary: Nonlabored breathing, no respiratory distress or accessory muscle noted  Cardiovascular: Tachycardic - sinus rhythm   Abdomen: Soft, nondistended, nontender. Tight abdominal musculature noted - likely contracted  Extremities RUTHIE FRAIRE noted. LUE AVF with palpable thrill     LABS  --------------------------------------------------------------------------------------  Labs:  CAPILLARY BLOOD GLUCOSE      POCT Blood Glucose.: 106 mg/dL (17 Dec 2023 09:13)  POCT Blood Glucose.: 117 mg/dL (17 Dec 2023 06:36)  POCT Blood Glucose.: 137 mg/dL (17 Dec 2023 00:39)  POCT Blood Glucose.: 132 mg/dL (16 Dec 2023 22:39)  POCT Blood Glucose.: 120 mg/dL (16 Dec 2023 18:42)  POCT Blood Glucose.: 98 mg/dL (16 Dec 2023 13:11)                          8.2    33.53 )-----------( 403      ( 17 Dec 2023 05:30 )             28.1       Auto Neutrophil %: 88.3 % (12-17-23 @ 05:30)  Band Neutrophils %: 2.7 % (12-17-23 @ 05:30)    12-17    138  |  107  |  144<H>  ----------------------------<  102<H>  4.3   |  15<L>  |  6.14<H>      Calcium: 8.3 mg/dL (12-17-23 @ 05:30)      LFTs:             5.9  | 0.2  | 26       ------------------[94      ( 17 Dec 2023 05:30 )  1.9  | x    | 15          Lipase:x      Amylase:x         Lactate, Blood: 1.1 mmol/L (12-17-23 @ 12:27)  Lactate, Blood: 1.9 mmol/L (12-15-23 @ 00:30)      Coags:            Urinalysis Basic - ( 17 Dec 2023 05:30 )    Color: x / Appearance: x / SG: x / pH: x  Gluc: 102 mg/dL / Ketone: x  / Bili: x / Urobili: x   Blood: x / Protein: x / Nitrite: x   Leuk Esterase: x / RBC: x / WBC x   Sq Epi: x / Non Sq Epi: x / Bacteria: x        Culture - Blood (collected 15 Dec 2023 00:01)  Source: .Blood Blood-Peripheral  Preliminary Report (17 Dec 2023 02:00):    No growth at 2 days.    Culture - Urine (collected 15 Dec 2023 00:01)  Source: Clean Catch Clean Catch (Midstream)  Final Report (16 Dec 2023 08:10):    No growth    Culture - Blood (collected 15 Dec 2023 00:01)  Source: .Blood Blood-Peripheral  Preliminary Report (17 Dec 2023 03:00):    No growth at 2 days.        --------------------------------------------------------------------------------------    OTHER LABS    IMAGING RESULTS  ACC: [id number] EXAM:  CT ABDOMEN AND PELVIS OC IC   ORDERED BY: RODOLFO QUIJANO     PROCEDURE DATE:  12/17/2023          INTERPRETATION:  CLINICAL INFORMATION: Leukocytosis.    COMPARISON: CT abdomen pelvis October 25, 2023. CT abdomen pelvis July   15, 2023    CONTRAST/COMPLICATIONS:  IV Contrast: Isovue 370  90 cc administered   10 cc discarded  Oral Contrast: Omnipaque 300  Complications: None reported at time of study completion    PROCEDURE:  CT of the Chest, Abdomen and Pelvis was performed.  Sagittal and coronal reformats were performed.    FINDINGS:  CHEST:  LUNGS AND LARGE AIRWAYS: Patent central airways. No airspace   consolidation or pulmonary nodules.  PLEURA: No pleural effusion.  VESSELS: Within normal limits.  HEART: Heart size is normal. No pericardial effusion.  MEDIASTINUM AND DIAN: No lymphadenopathy.  CHEST WALL AND LOWER NECK: Bilateral gynecomastia.    ABDOMEN AND PELVIS:  LIVER: Within normal limits.  BILE DUCTS: Normal caliber.  GALLBLADDER: Within normal limits.  SPLEEN: Within normal limits.  PANCREAS: Within normal limits.  ADRENALS: Within normal limits.  KIDNEYS/URETERS: Atrophic bilaterally compatible with chronic kidney   disease. No hydronephrosis. Renal hilar vascular calcifications.   Bilateral renal cysts.    BLADDER: Within normal limits.  REPRODUCTIVE ORGANS: Prostate is enlarged.    BOWEL: Diffuse severe mural edema throughout rectum and redundant loops   of sigmoid colon with trilaminar wall enhancement, sigmoid dilated up to   10 cm. Diffuse perirectal edema. No pneumatosis intestinalis. Upstream   left, transverse, and right colon demonstrate mild mural thickening and   not significantly distended. Administered oral contrast transited to   level of hepatic flexure. No bowel obstruction. Appendix is not   visualized. No evidence of inflammation in the pericecal region.  PERITONEUM: No ascites. No abscess. No pneumoperitoneum.  VESSELS: Mild atherosclerotic changes.  RETROPERITONEUM/LYMPH NODES: No lymphadenopathy.  ABDOMINAL WALL: Anasarca.  BONES: No acute fractures or suspicious lesion..    IMPRESSION:  Suspect infectious pancolitis, very severe at rectosigmoid, differential   possibilities include C. difficile.        --- End of Report ---

## 2023-12-17 NOTE — PROGRESS NOTE ADULT - SUBJECTIVE AND OBJECTIVE BOX
Patient seen and examined at bedside.   CT findings noted-- started on rx for cdif colitis  denies diarrhea or abd pain    aspirin enteric coated 81 milliGRAM(s) every 24 hours  atorvastatin 80 milliGRAM(s) at bedtime  dextrose 5% 1000 milliLiter(s) <Continuous>  dextrose 5%. 1000 milliLiter(s) <Continuous>  dextrose 5%. 1000 milliLiter(s) <Continuous>  dextrose 50% Injectable 25 Gram(s) once  dextrose 50% Injectable 25 Gram(s) once  dextrose 50% Injectable 25 Gram(s) once  dextrose 50% Injectable 12.5 Gram(s) once  dextrose Oral Gel 15 Gram(s) once PRN  glucagon  Injectable 1 milliGRAM(s) once  heparin   Injectable 5000 Unit(s) every 8 hours  influenza   Vaccine 0.5 milliLiter(s) once  insulin lispro (ADMELOG) corrective regimen sliding scale   Before meals and at bedtime  metroNIDAZOLE  IVPB 500 milliGRAM(s) every 8 hours  polyethylene glycol 3350 17 Gram(s) two times a day  senna 2 Tablet(s) at bedtime  vancomycin    Solution 125 milliGRAM(s) every 6 hours      Allergies    No Known Allergies    Intolerances        T(C): , Max: 36.7 (12-16-23 @ 16:05)  T(F): , Max: 98.1 (12-16-23 @ 16:05)  HR: 105 (12-17-23 @ 10:00)  BP: 108/73 (12-17-23 @ 10:00)  BP(mean): 84 (12-17-23 @ 10:00)  RR: 18 (12-17-23 @ 10:00)  SpO2: 98% (12-17-23 @ 10:00)  Wt(kg): --    12-16 @ 07:01  -  12-17 @ 07:00  --------------------------------------------------------  IN:  Total IN: 0 mL    OUT:    Intermittent Catheterization - Urethral (mL): 1120 mL  Total OUT: 1120 mL    Total NET: -1120 mL              PHYSICAL EXAM:  Constitutional: alert though less verbal today - No acute distress. denies complaints   ENMT: Moist mucous membrane.  No cyanosis.  Neck:  No JVD.  Back: No CVA tenderness  Respiratory: Clear to auscultation   Cardiovascular: S1, S2.  Regular rate and rhythm.    Gastrointestinal: soft, non-tender, non-distended  Extremities: Warm.  No lower extremity edema.    Skin: Warm. Dry.    neuro --alert and awake though less verbal   ACCESS:       LABS:                        8.2    33.53 )-----------( 403      ( 17 Dec 2023 05:30 )             28.1     12-17    138  |  107  |  144<H>  ----------------------------<  102<H>  4.3   |  15<L>  |  6.14<H>    Ca    8.3<L>      17 Dec 2023 05:30  Phos  4.2     12-17  Mg     2.0     12-17    TPro  5.9<L>  /  Alb  1.9<L>  /  TBili  0.2  /  DBili  x   /  AST  26  /  ALT  15  /  AlkPhos  94  12-17        Urinalysis Basic - ( 17 Dec 2023 05:30 )    Color: x / Appearance: x / SG: x / pH: x  Gluc: 102 mg/dL / Ketone: x  / Bili: x / Urobili: x   Blood: x / Protein: x / Nitrite: x   Leuk Esterase: x / RBC: x / WBC x   Sq Epi: x / Non Sq Epi: x / Bacteria: x      Sodium, Random Urine: 28 mmol/L (12-15 @ 18:04)  Creatinine, Random Urine: 65 mg/dL (12-15 @ 18:04)        RADIOLOGY & ADDITIONAL STUDIES:

## 2023-12-17 NOTE — PROGRESS NOTE ADULT - PROBLEM SELECTOR PLAN 2
C/w PO vancomycin and start IV Flagyl given CT findings C/w PO vancomycin and start IV Flagyl given CT findings of pancolitis and dilated sigmoid colon

## 2023-12-17 NOTE — SWALLOW BEDSIDE ASSESSMENT ADULT - CONSISTENCIES ADMINISTERED
Thin liquids via cup x2, straw x 3 consecutive sips, puree x4 tsp, soft and bite size x1/thin liquid/pureed/soft & bite-sized

## 2023-12-17 NOTE — PROGRESS NOTE ADULT - ASSESSMENT
CKD5   leukocytosis likely due to cdif colitis  worsened BUN may be due to this as well-- agree with IVF and would change to D5W with 75meq HCO3/L at 80 cc/hour for met acidosis and better volume repletion as could be on dry side   plan for AVF angio tomorrow-- to decide on HD via AVF vs permacath-- no urgent need for HD today

## 2023-12-17 NOTE — SWALLOW BEDSIDE ASSESSMENT ADULT - SLP GENERAL OBSERVATIONS
Pt appreciated awake and alert in bed on RA. Pt leaning to R side, repositioned prior to PO trials. Delayed responses at times, however able to follow commands and make needs known with cues.

## 2023-12-17 NOTE — PROGRESS NOTE ADULT - ASSESSMENT
59yo M T2DM, CVA x2 w/ residual L-sided weakness, early onset dementia, HTN, HLD, DVT, CKD V (Cr 7-8, not on HD), BPH, PSH L toe amputation and AVF creation (5/2023), R jd BKA and closure admitted to medicine with concerns of sepsis and urgent need for dialysis. Vascular surgery consulted for AVF revision after failed HD attempt.      -No acute vascular surgery indicated at this time  -Plan for fistulogram once medically optimized and WBC stabilized   -If urgent or emergent need for dialysis, temporary dialysis catheter to be placed per nephrology  -Vascular surgery will continue to follow   57yo M T2DM, CVA x2 w/ residual L-sided weakness, early onset dementia, HTN, HLD, DVT, CKD V (Cr 7-8, not on HD), BPH, PSH L toe amputation and AVF creation (5/2023), R jd BKA and closure admitted to medicine with concerns of sepsis and urgent need for dialysis. Vascular surgery consulted for AVF revision after failed HD attempt.      -No acute vascular surgery indicated at this time  -Plan for fistulogram once medically optimized and WBC stabilized   -If urgent or emergent need for dialysis, temporary dialysis catheter to be placed per nephrology  -Vascular surgery will continue to follow   57yo M T2DM, CVA x2 w/ residual L-sided weakness, early onset dementia, HTN, HLD, DVT, CKD V (Cr 7-8, not on HD), BPH, PSH L toe amputation and AVF creation (5/2023), R jd BKA and closure admitted to medicine with concerns of sepsis and urgent need for dialysis. Vascular surgery consulted for AVF revision after failed HD attempt.      -No acute vascular surgery indicated at this time  -Plan for potential fistulogram pending medically optimization and WBC stabilization  -If urgent or emergent need for dialysis, temporary dialysis catheter to be placed per nephrology  -Vascular surgery will continue to follow   57yo M T2DM, CVA x2 w/ residual L-sided weakness, early onset dementia, HTN, HLD, DVT, CKD V (Cr 7-8, not on HD), BPH, PSH L toe amputation and AVF creation (5/2023), R jd BKA and closure admitted to medicine with concerns of sepsis and urgent need for dialysis. Vascular surgery consulted for AVF revision after failed HD attempt.      -No acute vascular surgery indicated at this time  -Plan for potential fistulogram pending medical optimization and WBC stabilization  -If urgent or emergent need for dialysis, temporary dialysis catheter to be placed per nephrology  -Vascular surgery will continue to follow

## 2023-12-17 NOTE — PROGRESS NOTE ADULT - SUBJECTIVE AND OBJECTIVE BOX
S: Patient does not have any complaints    O: Examined in bed resting comfortably     ROS: Denies headache, blurred vision, chest pain, SOB, abdominal pain, nausea or vomiting.         metroNIDAZOLE  IVPB 500  vancomycin    Solution 125  aspirin enteric coated 81  heparin   Injectable 5000  metroNIDAZOLE  IVPB 500  vancomycin    Solution 125      Allergies    No Known Allergies    Intolerances        Vital Signs Last 24 Hrs  T(C): 36.3 (17 Dec 2023 10:00), Max: 36.7 (16 Dec 2023 16:05)  T(F): 97.4 (17 Dec 2023 10:00), Max: 98.1 (16 Dec 2023 16:05)  HR: 105 (17 Dec 2023 10:00) (105 - 115)  BP: 108/73 (17 Dec 2023 10:00) (108/73 - 128/74)  BP(mean): 84 (17 Dec 2023 10:00) (84 - 84)  RR: 18 (17 Dec 2023 10:00) (17 - 18)  SpO2: 98% (17 Dec 2023 10:00) (96% - 99%)    Parameters below as of 17 Dec 2023 10:00  Patient On (Oxygen Delivery Method): room air      I&O's Summary    16 Dec 2023 07:01  -  17 Dec 2023 07:00  --------------------------------------------------------  IN: 0 mL / OUT: 1120 mL / NET: -1120 mL        PHYSICAL EXAM:  Physical Exam  General: NAD  Pulm: Nonlabored breathing, no respiratory distress  Abd: soft, non distended,   Extrem: RIGHT BKA well healed. LUE AVF with palpable thrill and pulsatile, progressively less pulsatile proximally.      LABS:                        8.2    33.53 )-----------( 403      ( 17 Dec 2023 05:30 )             28.1     12-17    138  |  107  |  144<H>  ----------------------------<  102<H>  4.3   |  15<L>  |  6.14<H>    Ca    8.3<L>      17 Dec 2023 05:30  Phos  4.2     12-17  Mg     2.0     12-17    TPro  5.9<L>  /  Alb  1.9<L>  /  TBili  0.2  /  DBili  x   /  AST  26  /  ALT  15  /  AlkPhos  94  12-17        Radiology and Additional Studies:

## 2023-12-17 NOTE — PROGRESS NOTE ADULT - PROBLEM SELECTOR PLAN 1
POA,  HR > 90, WBC > 12, w/ Cr. elevated from baseline in setting of infection (below)  -f/u cultures

## 2023-12-17 NOTE — PROGRESS NOTE ADULT - SUBJECTIVE AND OBJECTIVE BOX
Patient is a 58y old  Male who presents with a chief complaint of hypotension at nursing home (16 Dec 2023 15:19)      INTERVAL HPI/OVERNIGHT EVENTS: ZULY    SUBJECTIVE: Patient seen and examined at bedside. Patient has no acute complaints - watching [employer] basketball. Denies SOB, CP. ROS is otherwise negative,         Vital Signs Last 24 Hrs  T(C): 36.6 (17 Dec 2023 06:02), Max: 36.7 (16 Dec 2023 13:17)  T(F): 97.9 (17 Dec 2023 06:02), Max: 98.1 (16 Dec 2023 16:05)  HR: 105 (17 Dec 2023 06:02) (105 - 115)  BP: 118/71 (17 Dec 2023 06:02) (114/79 - 128/74)  BP(mean): --  RR: 18 (17 Dec 2023 06:02) (17 - 18)  SpO2: 99% (17 Dec 2023 06:02) (96% - 99%)    Parameters below as of 17 Dec 2023 06:02  Patient On (Oxygen Delivery Method): room air              PHYSICAL EXAM:  General: NAD. Resting comfortably in bed.  HEENT: head NC/AT, L R sided facial droop, pale conjunctiva, EOMI, +Dry MM.  Neck: supple  Cardiac: tachycardic rate regular rhythm, normal S1/S2, no murmurs appreciated  Respiratory: lungs CTAB  Abdomen: distended, + BS  Extremities: WWP, s/p R BKA, AVF on LUE w/ palpable and audible thrill, no peripheral edema  Vascular: 2+ radial pulses b/l, 2+ DP pulses.  Neuro: A&Ox1-2, moving extremities spontaneously, 3/5 B/L UE and LE.  Skin: dry, intact, no visible jaundice  Patient appears cachectic and malnourished    Consultant(s) Notes Reviewed:  [x ] YES  [ ] NO  Care Discussed with Consultants/Other Providers [ x] YES  [ ] NO    LABS:                                   8.2    33.53 )-----------( 403      ( 17 Dec 2023 05:30 )             28.1   12-17    138  |  107  |  144<H>  ----------------------------<  102<H>  4.3   |  15<L>  |  6.14<H>    Ca    8.3<L>      17 Dec 2023 05:30  Phos  4.2     12-17  Mg     2.0     12-17    TPro  5.9<L>  /  Alb  1.9<L>  /  TBili  0.2  /  DBili  x   /  AST  26  /  ALT  15  /  AlkPhos  94  12-17            RADIOLOGY & ADDITIONAL TESTS:    Imaging Personally Reviewed:  [ ] YES  [ ] NO  aspirin enteric coated 81 milliGRAM(s) Oral every 24 hours  atorvastatin 80 milliGRAM(s) Oral at bedtime  dextrose 5%. 1000 milliLiter(s) IV Continuous <Continuous>  dextrose 5%. 1000 milliLiter(s) IV Continuous <Continuous>  dextrose 5%. 1000 milliLiter(s) IV Continuous <Continuous>  dextrose 50% Injectable 25 Gram(s) IV Push once  dextrose 50% Injectable 25 Gram(s) IV Push once  dextrose 50% Injectable 25 Gram(s) IV Push once  dextrose 50% Injectable 12.5 Gram(s) IV Push once  dextrose Oral Gel 15 Gram(s) Oral once PRN  glucagon  Injectable 1 milliGRAM(s) IntraMuscular once  heparin   Injectable 5000 Unit(s) SubCutaneous every 8 hours  influenza   Vaccine 0.5 milliLiter(s) IntraMuscular once  insulin glargine Injectable (LANTUS) 15 Unit(s) SubCutaneous at bedtime  insulin lispro (ADMELOG) corrective regimen sliding scale   SubCutaneous Before meals and at bedtime  insulin lispro Injectable (ADMELOG) 5 Unit(s) SubCutaneous three times a day before meals  polyethylene glycol 3350 17 Gram(s) Oral two times a day  senna 2 Tablet(s) Oral at bedtime      HEALTH ISSUES - PROBLEM Dx:  Systemic inflammatory response syndrome (SIRS)    Acute kidney injury superimposed on CKD    Type 2 diabetes mellitus    Anemia in end-stage renal disease    History of stroke    HTN (hypertension)    Dementia    BPH (benign prostatic hyperplasia)    Prophylactic measure    Constipation           Patient is a 58y old  Male who presents with a chief complaint of hypotension at nursing home (16 Dec 2023 15:19)      INTERVAL HPI/OVERNIGHT EVENTS: ZULY    SUBJECTIVE: Patient seen and examined at bedside. Patient has no acute complaints - watching [employer] basketball. Denies SOB, CP. ROS is otherwise negative,         Vital Signs Last 24 Hrs  T(C): 36.6 (17 Dec 2023 06:02), Max: 36.7 (16 Dec 2023 13:17)  T(F): 97.9 (17 Dec 2023 06:02), Max: 98.1 (16 Dec 2023 16:05)  HR: 105 (17 Dec 2023 06:02) (105 - 115)  BP: 118/71 (17 Dec 2023 06:02) (114/79 - 128/74)  BP(mean): --  RR: 18 (17 Dec 2023 06:02) (17 - 18)  SpO2: 99% (17 Dec 2023 06:02) (96% - 99%)    Parameters below as of 17 Dec 2023 06:02  Patient On (Oxygen Delivery Method): room air              PHYSICAL EXAM:  General: NAD. Resting comfortably in bed.  HEENT: head NC/AT, L R sided facial droop, pale conjunctiva, EOMI, +Dry MM.  Neck: supple  Cardiac: tachycardic rate regular rhythm, normal S1/S2, no murmurs appreciated  Respiratory: lungs CTAB  Abdomen: distended, + BS  Extremities: WWP, s/p R BKA, AVF on LUE w/ palpable and audible thrill, no peripheral edema  Vascular: 2+ radial pulses b/l, 2+ DP pulses.  Neuro: A&Ox1-2, moving extremities spontaneously, 3/5 B/L UE and LE.  Skin: dry, intact, no visible jaundice  Patient appears cachectic and malnourished    Consultant(s) Notes Reviewed:  [x ] YES  [ ] NO  Care Discussed with Consultants/Other Providers [ x] YES  [ ] NO    LABS:                                   8.2    33.53 )-----------( 403      ( 17 Dec 2023 05:30 )             28.1   12-17    138  |  107  |  144<H>  ----------------------------<  102<H>  4.3   |  15<L>  |  6.14<H>    Ca    8.3<L>      17 Dec 2023 05:30  Phos  4.2     12-17  Mg     2.0     12-17    TPro  5.9<L>  /  Alb  1.9<L>  /  TBili  0.2  /  DBili  x   /  AST  26  /  ALT  15  /  AlkPhos  94  12-17            RADIOLOGY & ADDITIONAL TESTS:    Imaging Personally Reviewed:  [ ] YES  [ ] NO  aspirin enteric coated 81 milliGRAM(s) Oral every 24 hours  atorvastatin 80 milliGRAM(s) Oral at bedtime  dextrose 5%. 1000 milliLiter(s) IV Continuous <Continuous>  dextrose 5%. 1000 milliLiter(s) IV Continuous <Continuous>  dextrose 5%. 1000 milliLiter(s) IV Continuous <Continuous>  dextrose 50% Injectable 25 Gram(s) IV Push once  dextrose 50% Injectable 25 Gram(s) IV Push once  dextrose 50% Injectable 25 Gram(s) IV Push once  dextrose 50% Injectable 12.5 Gram(s) IV Push once  dextrose Oral Gel 15 Gram(s) Oral once PRN  glucagon  Injectable 1 milliGRAM(s) IntraMuscular once  heparin   Injectable 5000 Unit(s) SubCutaneous every 8 hours  influenza   Vaccine 0.5 milliLiter(s) IntraMuscular once  insulin glargine Injectable (LANTUS) 15 Unit(s) SubCutaneous at bedtime  insulin lispro (ADMELOG) corrective regimen sliding scale   SubCutaneous Before meals and at bedtime  insulin lispro Injectable (ADMELOG) 5 Unit(s) SubCutaneous three times a day before meals  polyethylene glycol 3350 17 Gram(s) Oral two times a day  senna 2 Tablet(s) Oral at bedtime      HEALTH ISSUES - PROBLEM Dx:  Systemic inflammatory response syndrome (SIRS)    Acute kidney injury superimposed on CKD    Type 2 diabetes mellitus    Anemia in end-stage renal disease    History of stroke    HTN (hypertension)    Dementia    BPH (benign prostatic hyperplasia)    Prophylactic measure    Constipation

## 2023-12-17 NOTE — CONSULT NOTE ADULT - CONSULT REASON
AVF
AMS
r/o toxic megacolon i/s/o C diff colitis
CKD/ALEXIS
Pain/Symptom Management and Complex Medical Decision Making/GOC in the setting of Encephalopathy

## 2023-12-17 NOTE — PROGRESS NOTE ADULT - PROBLEM SELECTOR PLAN 3
Given hypoglycemia will hold Lantus 15u qhs and Lispro 5u TID premeal,    A1c 6.4% (10/25/2023).  - continue basal/bolus regimen and mISS inpatient  - hold tradjenta inpatient  - HbA1c: 6.1 Given hypoglycemia will hold Lantus 15u qhs and Lispro 5u TID premeal,    A1c 6.4% (10/25/2023).  - continue mISS inpatient  - hold tradjenta inpatient  - HbA1c: 6.1

## 2023-12-17 NOTE — SWALLOW BEDSIDE ASSESSMENT ADULT - NS SPL SWALLOW CLINIC TRIAL FT
PCA reported coughing during meal and difficulty with thick puree. Recommended add moisture (gravy, sauce, soup, etc). Provided education re aspiration and safe feeding precautions: slow pace, provide liquids to assist with oral clearance and AP transport, cues to chew/swallow, etc).

## 2023-12-17 NOTE — SWALLOW BEDSIDE ASSESSMENT ADULT - SWALLOW EVAL: RECOMMENDED FEEDING/EATING TECHNIQUES
allow for swallow between intakes/alternate food with liquid/no straws/oral hygiene/position upright (90 degrees)/small sips/bites

## 2023-12-17 NOTE — CHART NOTE - NSCHARTNOTEFT_GEN_A_CORE
Patient seen and examined bedside.    Reports feeling well. Denies abdominal pain, nausea, vomiting. Reports that he last passed gas this morning and had 3 liquid BMs today    Vital Signs Last 24 Hrs  T(C): 36.4 (17 Dec 2023 21:09), Max: 36.6 (17 Dec 2023 06:02)  T(F): 97.5 (17 Dec 2023 21:09), Max: 97.9 (17 Dec 2023 06:02)  HR: 98 (17 Dec 2023 21:09) (94 - 105)  BP: 128/82 (17 Dec 2023 21:09) (108/73 - 135/82)  BP(mean): 84 (17 Dec 2023 10:00) (84 - 84)  RR: 18 (17 Dec 2023 21:09) (17 - 18)  SpO2: 97% (17 Dec 2023 21:09) (97% - 99%)    Parameters below as of 17 Dec 2023 21:09  Patient On (Oxygen Delivery Method): room air        Abdomen soft, nondistended, nontender.      Will continue to monitor.

## 2023-12-17 NOTE — PROGRESS NOTE ADULT - PROBLEM SELECTOR PLAN 7
Known, early-onset. A&O x1 (self) at baseline. Currently AOx1-2 (self and able to participate in ROS),  - no interventions Known, early-onset. A&O x 4  at baseline. Currently AOx1-2 (self and able to participate in ROS),  - no interventions

## 2023-12-17 NOTE — SWALLOW BEDSIDE ASSESSMENT ADULT - SLP PERTINENT HISTORY OF CURRENT PROBLEM
58M T2DM, CVA x2 w/ residual L-sided weakness, early onset dementia, HTN, HLD, DVT, CKD V (Cr 7-8, not on HD), BPH, PSH L toe amputation and L BC AVF creation (5/2023) and remote R BKA who presented to [facility] from nursing home for hypotension and constipation on 12/15. Patient with elevated WBC into 30K on admission with new onset diarrhea, now + for C diff colitis. 58M T2DM, CVA x2 w/ residual L-sided weakness, early onset dementia, HTN, HLD, DVT, CKD V (Cr 7-8, not on HD), BPH, PSH L toe amputation and L BC AVF creation (5/2023) and remote R BKA who presented to [facility] from nursing home for hypotension and constipation on 12/15. Patient with elevated WBC into 30K on admission with new onset diarrhea, now + for C diff colitis.

## 2023-12-17 NOTE — CONSULT NOTE ADULT - ASSESSMENT
ASSESSMENT/ PLAN:  58M T2DM, CVA x2 w/ residual L-sided weakness, early onset dementia, HTN, HLD, DVT, CKD V (Cr 7-8, not on HD), BPH, PSH L toe amputation and L BC AVF creation (5/2023) and remote R BKA who presented to [facility] from nursing home for hypotension and constipation on 12/15. Patient with elevated WBC into 30K on admission with new onset diarrhea, now + for C diff colitis.  Surgery consulted for evaluation of toxic megacolon given CTAP with dilated sigmoid.  Dilation likely 2/2 to severe inflammation from colitis. Patient without acute abdominal complaints and is not distended. Although tachycardic, no HD instability noted. Would recommend continued medical management.    Recommendations:  - No acute surgical intervention  - Agree with PO vancomycin for C diff colitis.   - Surgery Team 4C will continue follow for serial exams. Please call with any quesitons/concerns.   Attending aware and agrees with plan ASSESSMENT/ PLAN:  58M T2DM, CVA x2 w/ residual L-sided weakness, early onset dementia, HTN, HLD, DVT, CKD V (Cr 7-8, not on HD), BPH, PSH L toe amputation and L BC AVF creation (5/2023) and remote R BKA who presented to [facility] from nursing home for hypotension and constipation on 12/15. Patient with elevated WBC into 30K on admission with new onset diarrhea, now + for C diff colitis.  Surgery consulted for evaluation of toxic megacolon given CTAP with dilated sigmoid.  Dilation likely 2/2 to severe inflammation from colitis. Patient without acute abdominal complaints and is not distended. Although tachycardic, no HD instability noted. Would recommend continued medical management.    Recommendations:  - No acute surgical intervention  - Agree with PO vancomycin for C diff colitis.   - Surgery Team 4C will continue follow for serial exams. Please call with any quesitons/concerns.   Attending aware and agrees with plan

## 2023-12-17 NOTE — CONSULT NOTE ADULT - REASON FOR ADMISSION
hypotension at nursing home

## 2023-12-17 NOTE — SWALLOW BEDSIDE ASSESSMENT ADULT - COMMENTS
Pt known to this [facility] from previous admissions. MBSS completed 10/27/2023 which revealed 'Pt presented with moderate oropharyngeal dysphagia, likely on-going/chronic, in setting of CVA x2 and dementia, primarily characterized by reduced bolus transport, delayed pharyngeal swallow initiation, and reduced coordination of airway protection. This resulted in shawn aspiration with thin liquids via cup, and variable transient and deep penetration with thin via tsp and mildly thick liquids, likely ultimately resulting in aspiration. Pt was not reliably sensate to aspiration; cued cough was very weak and ineffective in clearing airway. Suspect swallow presentation was further confounded by poor positioning given L unilateral weakness and forward leaning posture during swallow. ". Recommend soft and bite size solids with thin liquids via tsp. Pt known to this [facility] from previous admissions. MBSS completed 10/27/2023 which revealed 'Pt presented with moderate oropharyngeal dysphagia, likely on-going/chronic, in setting of CVA x2 and dementia, primarily characterized by reduced bolus transport, delayed pharyngeal swallow initiation, and reduced coordination of airway protection. This resulted in shawn aspiration with thin liquids via cup, and variable transient and deep penetration with thin via tsp and mildly thick liquids, likely ultimately resulting in aspiration. Pt was not reliably sensate to aspiration; cued cough was very weak and ineffective in clearing airway. Suspect swallow presentation was further confounded by poor positioning given L unilateral weakness and forward leaning posture during swallow. ". Recommend soft and bite size solids with thin liquids via tsp.

## 2023-12-18 LAB
ACANTHOCYTES BLD QL SMEAR: SLIGHT — SIGNIFICANT CHANGE UP
ACANTHOCYTES BLD QL SMEAR: SLIGHT — SIGNIFICANT CHANGE UP
ANION GAP SERPL CALC-SCNC: 15 MMOL/L — SIGNIFICANT CHANGE UP (ref 5–17)
ANISOCYTOSIS BLD QL: SIGNIFICANT CHANGE UP
ANISOCYTOSIS BLD QL: SIGNIFICANT CHANGE UP
BASOPHILS # BLD AUTO: 0 K/UL — SIGNIFICANT CHANGE UP (ref 0–0.2)
BASOPHILS # BLD AUTO: 0 K/UL — SIGNIFICANT CHANGE UP (ref 0–0.2)
BASOPHILS # BLD AUTO: 0.22 K/UL — HIGH (ref 0–0.2)
BASOPHILS # BLD AUTO: 0.22 K/UL — HIGH (ref 0–0.2)
BASOPHILS NFR BLD AUTO: 0 % — SIGNIFICANT CHANGE UP (ref 0–2)
BASOPHILS NFR BLD AUTO: 0 % — SIGNIFICANT CHANGE UP (ref 0–2)
BASOPHILS NFR BLD AUTO: 0.9 % — SIGNIFICANT CHANGE UP (ref 0–2)
BASOPHILS NFR BLD AUTO: 0.9 % — SIGNIFICANT CHANGE UP (ref 0–2)
BLD GP AB SCN SERPL QL: NEGATIVE — SIGNIFICANT CHANGE UP
BLD GP AB SCN SERPL QL: NEGATIVE — SIGNIFICANT CHANGE UP
BUN SERPL-MCNC: 128 MG/DL — HIGH (ref 7–23)
BUN SERPL-MCNC: 128 MG/DL — HIGH (ref 7–23)
BUN SERPL-MCNC: 87 MG/DL — HIGH (ref 7–23)
BUN SERPL-MCNC: 87 MG/DL — HIGH (ref 7–23)
BURR CELLS BLD QL SMEAR: PRESENT — SIGNIFICANT CHANGE UP
CALCIUM SERPL-MCNC: 4.6 MG/DL — CRITICAL LOW (ref 8.4–10.5)
CALCIUM SERPL-MCNC: 4.6 MG/DL — CRITICAL LOW (ref 8.4–10.5)
CALCIUM SERPL-MCNC: 8 MG/DL — LOW (ref 8.4–10.5)
CALCIUM SERPL-MCNC: 8 MG/DL — LOW (ref 8.4–10.5)
CHLORIDE SERPL-SCNC: 102 MMOL/L — SIGNIFICANT CHANGE UP (ref 96–108)
CHLORIDE SERPL-SCNC: 102 MMOL/L — SIGNIFICANT CHANGE UP (ref 96–108)
CHLORIDE SERPL-SCNC: 110 MMOL/L — HIGH (ref 96–108)
CHLORIDE SERPL-SCNC: 110 MMOL/L — HIGH (ref 96–108)
CO2 SERPL-SCNC: 12 MMOL/L — LOW (ref 22–31)
CO2 SERPL-SCNC: 12 MMOL/L — LOW (ref 22–31)
CO2 SERPL-SCNC: 18 MMOL/L — LOW (ref 22–31)
CO2 SERPL-SCNC: 18 MMOL/L — LOW (ref 22–31)
CREAT SERPL-MCNC: 3.64 MG/DL — HIGH (ref 0.5–1.3)
CREAT SERPL-MCNC: 3.64 MG/DL — HIGH (ref 0.5–1.3)
CREAT SERPL-MCNC: 5.85 MG/DL — HIGH (ref 0.5–1.3)
CREAT SERPL-MCNC: 5.85 MG/DL — HIGH (ref 0.5–1.3)
EGFR: 10 ML/MIN/1.73M2 — LOW
EGFR: 10 ML/MIN/1.73M2 — LOW
EGFR: 19 ML/MIN/1.73M2 — LOW
EGFR: 19 ML/MIN/1.73M2 — LOW
EOSINOPHIL # BLD AUTO: 0 K/UL — SIGNIFICANT CHANGE UP (ref 0–0.5)
EOSINOPHIL # BLD AUTO: 0 K/UL — SIGNIFICANT CHANGE UP (ref 0–0.5)
EOSINOPHIL # BLD AUTO: 0.22 K/UL — SIGNIFICANT CHANGE UP (ref 0–0.5)
EOSINOPHIL # BLD AUTO: 0.22 K/UL — SIGNIFICANT CHANGE UP (ref 0–0.5)
EOSINOPHIL NFR BLD AUTO: 0 % — SIGNIFICANT CHANGE UP (ref 0–6)
EOSINOPHIL NFR BLD AUTO: 0 % — SIGNIFICANT CHANGE UP (ref 0–6)
EOSINOPHIL NFR BLD AUTO: 0.9 % — SIGNIFICANT CHANGE UP (ref 0–6)
EOSINOPHIL NFR BLD AUTO: 0.9 % — SIGNIFICANT CHANGE UP (ref 0–6)
GIANT PLATELETS BLD QL SMEAR: PRESENT — SIGNIFICANT CHANGE UP
GLUCOSE BLDC GLUCOMTR-MCNC: 104 MG/DL — HIGH (ref 70–99)
GLUCOSE BLDC GLUCOMTR-MCNC: 104 MG/DL — HIGH (ref 70–99)
GLUCOSE BLDC GLUCOMTR-MCNC: 112 MG/DL — HIGH (ref 70–99)
GLUCOSE BLDC GLUCOMTR-MCNC: 112 MG/DL — HIGH (ref 70–99)
GLUCOSE BLDC GLUCOMTR-MCNC: 120 MG/DL — HIGH (ref 70–99)
GLUCOSE BLDC GLUCOMTR-MCNC: 120 MG/DL — HIGH (ref 70–99)
GLUCOSE BLDC GLUCOMTR-MCNC: 148 MG/DL — HIGH (ref 70–99)
GLUCOSE BLDC GLUCOMTR-MCNC: 148 MG/DL — HIGH (ref 70–99)
GLUCOSE SERPL-MCNC: 73 MG/DL — SIGNIFICANT CHANGE UP (ref 70–99)
GLUCOSE SERPL-MCNC: 73 MG/DL — SIGNIFICANT CHANGE UP (ref 70–99)
GLUCOSE SERPL-MCNC: 99 MG/DL — SIGNIFICANT CHANGE UP (ref 70–99)
GLUCOSE SERPL-MCNC: 99 MG/DL — SIGNIFICANT CHANGE UP (ref 70–99)
HCT VFR BLD CALC: 17.6 % — CRITICAL LOW (ref 39–50)
HCT VFR BLD CALC: 17.6 % — CRITICAL LOW (ref 39–50)
HCT VFR BLD CALC: 25.4 % — LOW (ref 39–50)
HCT VFR BLD CALC: 25.4 % — LOW (ref 39–50)
HGB BLD-MCNC: 5.3 G/DL — CRITICAL LOW (ref 13–17)
HGB BLD-MCNC: 5.3 G/DL — CRITICAL LOW (ref 13–17)
HGB BLD-MCNC: 8.2 G/DL — LOW (ref 13–17)
HGB BLD-MCNC: 8.2 G/DL — LOW (ref 13–17)
LYMPHOCYTES # BLD AUTO: 0.22 K/UL — LOW (ref 1–3.3)
LYMPHOCYTES # BLD AUTO: 0.22 K/UL — LOW (ref 1–3.3)
LYMPHOCYTES # BLD AUTO: 0.28 K/UL — LOW (ref 1–3.3)
LYMPHOCYTES # BLD AUTO: 0.28 K/UL — LOW (ref 1–3.3)
LYMPHOCYTES # BLD AUTO: 0.9 % — LOW (ref 13–44)
LYMPHOCYTES # BLD AUTO: 0.9 % — LOW (ref 13–44)
LYMPHOCYTES # BLD AUTO: 1.8 % — LOW (ref 13–44)
LYMPHOCYTES # BLD AUTO: 1.8 % — LOW (ref 13–44)
MAGNESIUM SERPL-MCNC: 1.2 MG/DL — LOW (ref 1.6–2.6)
MAGNESIUM SERPL-MCNC: 1.2 MG/DL — LOW (ref 1.6–2.6)
MAGNESIUM SERPL-MCNC: 2 MG/DL — SIGNIFICANT CHANGE UP (ref 1.6–2.6)
MAGNESIUM SERPL-MCNC: 2 MG/DL — SIGNIFICANT CHANGE UP (ref 1.6–2.6)
MANUAL SMEAR VERIFICATION: SIGNIFICANT CHANGE UP
MCHC RBC-ENTMCNC: 27.3 PG — SIGNIFICANT CHANGE UP (ref 27–34)
MCHC RBC-ENTMCNC: 27.3 PG — SIGNIFICANT CHANGE UP (ref 27–34)
MCHC RBC-ENTMCNC: 28.6 PG — SIGNIFICANT CHANGE UP (ref 27–34)
MCHC RBC-ENTMCNC: 28.6 PG — SIGNIFICANT CHANGE UP (ref 27–34)
MCHC RBC-ENTMCNC: 30.1 GM/DL — LOW (ref 32–36)
MCHC RBC-ENTMCNC: 30.1 GM/DL — LOW (ref 32–36)
MCHC RBC-ENTMCNC: 32.3 GM/DL — SIGNIFICANT CHANGE UP (ref 32–36)
MCHC RBC-ENTMCNC: 32.3 GM/DL — SIGNIFICANT CHANGE UP (ref 32–36)
MCV RBC AUTO: 88.5 FL — SIGNIFICANT CHANGE UP (ref 80–100)
MCV RBC AUTO: 88.5 FL — SIGNIFICANT CHANGE UP (ref 80–100)
MCV RBC AUTO: 90.7 FL — SIGNIFICANT CHANGE UP (ref 80–100)
MCV RBC AUTO: 90.7 FL — SIGNIFICANT CHANGE UP (ref 80–100)
MICROCYTES BLD QL: SIGNIFICANT CHANGE UP
MICROCYTES BLD QL: SIGNIFICANT CHANGE UP
MONOCYTES # BLD AUTO: 0 K/UL — SIGNIFICANT CHANGE UP (ref 0–0.9)
MONOCYTES # BLD AUTO: 0 K/UL — SIGNIFICANT CHANGE UP (ref 0–0.9)
MONOCYTES # BLD AUTO: 1.29 K/UL — HIGH (ref 0–0.9)
MONOCYTES # BLD AUTO: 1.29 K/UL — HIGH (ref 0–0.9)
MONOCYTES NFR BLD AUTO: 0 % — LOW (ref 2–14)
MONOCYTES NFR BLD AUTO: 0 % — LOW (ref 2–14)
MONOCYTES NFR BLD AUTO: 5.3 % — SIGNIFICANT CHANGE UP (ref 2–14)
MONOCYTES NFR BLD AUTO: 5.3 % — SIGNIFICANT CHANGE UP (ref 2–14)
MYELOCYTES NFR BLD: 0.9 % — HIGH (ref 0–0)
MYELOCYTES NFR BLD: 0.9 % — HIGH (ref 0–0)
NEUTROPHILS # BLD AUTO: 15.4 K/UL — HIGH (ref 1.8–7.4)
NEUTROPHILS # BLD AUTO: 15.4 K/UL — HIGH (ref 1.8–7.4)
NEUTROPHILS # BLD AUTO: 22.46 K/UL — HIGH (ref 1.8–7.4)
NEUTROPHILS # BLD AUTO: 22.46 K/UL — HIGH (ref 1.8–7.4)
NEUTROPHILS NFR BLD AUTO: 92 % — HIGH (ref 43–77)
NEUTROPHILS NFR BLD AUTO: 92 % — HIGH (ref 43–77)
NEUTROPHILS NFR BLD AUTO: 97.3 % — HIGH (ref 43–77)
NEUTROPHILS NFR BLD AUTO: 97.3 % — HIGH (ref 43–77)
OVALOCYTES BLD QL SMEAR: SLIGHT — SIGNIFICANT CHANGE UP
PHOSPHATE SERPL-MCNC: 4.9 MG/DL — HIGH (ref 2.5–4.5)
PHOSPHATE SERPL-MCNC: 4.9 MG/DL — HIGH (ref 2.5–4.5)
PHOSPHATE SERPL-MCNC: 8.3 MG/DL — HIGH (ref 2.5–4.5)
PHOSPHATE SERPL-MCNC: 8.3 MG/DL — HIGH (ref 2.5–4.5)
PLAT MORPH BLD: ABNORMAL
PLAT MORPH BLD: ABNORMAL
PLAT MORPH BLD: NORMAL — SIGNIFICANT CHANGE UP
PLAT MORPH BLD: NORMAL — SIGNIFICANT CHANGE UP
PLATELET # BLD AUTO: 239 K/UL — SIGNIFICANT CHANGE UP (ref 150–400)
PLATELET # BLD AUTO: 239 K/UL — SIGNIFICANT CHANGE UP (ref 150–400)
PLATELET # BLD AUTO: 372 K/UL — SIGNIFICANT CHANGE UP (ref 150–400)
PLATELET # BLD AUTO: 372 K/UL — SIGNIFICANT CHANGE UP (ref 150–400)
POIKILOCYTOSIS BLD QL AUTO: SIGNIFICANT CHANGE UP
POIKILOCYTOSIS BLD QL AUTO: SIGNIFICANT CHANGE UP
POIKILOCYTOSIS BLD QL AUTO: SLIGHT — SIGNIFICANT CHANGE UP
POIKILOCYTOSIS BLD QL AUTO: SLIGHT — SIGNIFICANT CHANGE UP
POLYCHROMASIA BLD QL SMEAR: SLIGHT — SIGNIFICANT CHANGE UP
POLYCHROMASIA BLD QL SMEAR: SLIGHT — SIGNIFICANT CHANGE UP
POTASSIUM SERPL-MCNC: 2.6 MMOL/L — CRITICAL LOW (ref 3.5–5.3)
POTASSIUM SERPL-MCNC: 2.6 MMOL/L — CRITICAL LOW (ref 3.5–5.3)
POTASSIUM SERPL-MCNC: 3.9 MMOL/L — SIGNIFICANT CHANGE UP (ref 3.5–5.3)
POTASSIUM SERPL-MCNC: 3.9 MMOL/L — SIGNIFICANT CHANGE UP (ref 3.5–5.3)
POTASSIUM SERPL-SCNC: 2.6 MMOL/L — CRITICAL LOW (ref 3.5–5.3)
POTASSIUM SERPL-SCNC: 2.6 MMOL/L — CRITICAL LOW (ref 3.5–5.3)
POTASSIUM SERPL-SCNC: 3.9 MMOL/L — SIGNIFICANT CHANGE UP (ref 3.5–5.3)
POTASSIUM SERPL-SCNC: 3.9 MMOL/L — SIGNIFICANT CHANGE UP (ref 3.5–5.3)
RBC # BLD: 1.94 M/UL — LOW (ref 4.2–5.8)
RBC # BLD: 1.94 M/UL — LOW (ref 4.2–5.8)
RBC # BLD: 2.87 M/UL — LOW (ref 4.2–5.8)
RBC # BLD: 2.87 M/UL — LOW (ref 4.2–5.8)
RBC # FLD: 16.3 % — HIGH (ref 10.3–14.5)
RBC # FLD: 16.3 % — HIGH (ref 10.3–14.5)
RBC # FLD: 16.4 % — HIGH (ref 10.3–14.5)
RBC # FLD: 16.4 % — HIGH (ref 10.3–14.5)
RBC BLD AUTO: ABNORMAL
RH IG SCN BLD-IMP: POSITIVE — SIGNIFICANT CHANGE UP
RH IG SCN BLD-IMP: POSITIVE — SIGNIFICANT CHANGE UP
SCHISTOCYTES BLD QL AUTO: SLIGHT — SIGNIFICANT CHANGE UP
SCHISTOCYTES BLD QL AUTO: SLIGHT — SIGNIFICANT CHANGE UP
SMUDGE CELLS # BLD: PRESENT — SIGNIFICANT CHANGE UP
SODIUM SERPL-SCNC: 135 MMOL/L — SIGNIFICANT CHANGE UP (ref 135–145)
SODIUM SERPL-SCNC: 135 MMOL/L — SIGNIFICANT CHANGE UP (ref 135–145)
SODIUM SERPL-SCNC: 137 MMOL/L — SIGNIFICANT CHANGE UP (ref 135–145)
SODIUM SERPL-SCNC: 137 MMOL/L — SIGNIFICANT CHANGE UP (ref 135–145)
WBC # BLD: 15.83 K/UL — HIGH (ref 3.8–10.5)
WBC # BLD: 15.83 K/UL — HIGH (ref 3.8–10.5)
WBC # BLD: 24.41 K/UL — HIGH (ref 3.8–10.5)
WBC # BLD: 24.41 K/UL — HIGH (ref 3.8–10.5)
WBC # FLD AUTO: 15.83 K/UL — HIGH (ref 3.8–10.5)
WBC # FLD AUTO: 15.83 K/UL — HIGH (ref 3.8–10.5)
WBC # FLD AUTO: 24.41 K/UL — HIGH (ref 3.8–10.5)
WBC # FLD AUTO: 24.41 K/UL — HIGH (ref 3.8–10.5)

## 2023-12-18 PROCEDURE — 99233 SBSQ HOSP IP/OBS HIGH 50: CPT

## 2023-12-18 PROCEDURE — 99231 SBSQ HOSP IP/OBS SF/LOW 25: CPT

## 2023-12-18 PROCEDURE — 99233 SBSQ HOSP IP/OBS HIGH 50: CPT | Mod: GC

## 2023-12-18 PROCEDURE — 99232 SBSQ HOSP IP/OBS MODERATE 35: CPT | Mod: GC

## 2023-12-18 RX ORDER — TUBERCULIN PURIFIED PROTEIN DERIVATIVE 5 [IU]/.1ML
5 INJECTION, SOLUTION INTRADERMAL ONCE
Refills: 0 | Status: DISCONTINUED | OUTPATIENT
Start: 2023-12-18 | End: 2023-12-21

## 2023-12-18 RX ADMIN — Medication 125 MILLIGRAM(S): at 05:57

## 2023-12-18 RX ADMIN — Medication 81 MILLIGRAM(S): at 09:05

## 2023-12-18 RX ADMIN — SODIUM CHLORIDE 100 MILLILITER(S): 9 INJECTION, SOLUTION INTRAVENOUS at 17:36

## 2023-12-18 RX ADMIN — Medication 125 MILLIGRAM(S): at 17:37

## 2023-12-18 RX ADMIN — Medication 125 MILLIGRAM(S): at 11:21

## 2023-12-18 RX ADMIN — HEPARIN SODIUM 5000 UNIT(S): 5000 INJECTION INTRAVENOUS; SUBCUTANEOUS at 05:57

## 2023-12-18 RX ADMIN — Medication 100 MILLIGRAM(S): at 18:48

## 2023-12-18 RX ADMIN — POLYETHYLENE GLYCOL 3350 17 GRAM(S): 17 POWDER, FOR SOLUTION ORAL at 17:37

## 2023-12-18 RX ADMIN — Medication 100 MILLIGRAM(S): at 03:28

## 2023-12-18 RX ADMIN — SODIUM CHLORIDE 100 MILLILITER(S): 9 INJECTION, SOLUTION INTRAVENOUS at 11:20

## 2023-12-18 RX ADMIN — ATORVASTATIN CALCIUM 80 MILLIGRAM(S): 80 TABLET, FILM COATED ORAL at 22:08

## 2023-12-18 RX ADMIN — Medication 100 MILLIGRAM(S): at 11:21

## 2023-12-18 RX ADMIN — POLYETHYLENE GLYCOL 3350 17 GRAM(S): 17 POWDER, FOR SOLUTION ORAL at 05:58

## 2023-12-18 NOTE — PROGRESS NOTE ADULT - ATTENDING COMMENTS
Pt is 59 yo man with early onset dementia, strokes, rigidity, CKD5, managed for severe C. Diff colitis. Noted to have very deranged lab values on today's labs.   ----repeat CBC and BMP stat, have T&S available   ----for C. Diff colitis pt is on PO Vanc and IV Flagyl  ----for CKD5 pt has AVF that needs to be evaluated with fistulogram. Evaluation deferred due to ongoing sepsis. Renal team to decide if pt requires urgent HD then temporary Shiley will need to be placed

## 2023-12-18 NOTE — PROGRESS NOTE ADULT - SUBJECTIVE AND OBJECTIVE BOX
SUBJECTIVE: Patient seen and examined at bedside. Denies abdominal pain, tolerating PO without n/v. Denies cp, sob.    aspirin enteric coated 81 milliGRAM(s) Oral every 24 hours  heparin   Injectable 5000 Unit(s) SubCutaneous every 8 hours  metroNIDAZOLE  IVPB 500 milliGRAM(s) IV Intermittent every 8 hours  vancomycin    Solution 125 milliGRAM(s) Oral every 6 hours      Vital Signs Last 24 Hrs  T(C): 36.6 (18 Dec 2023 06:05), Max: 36.6 (18 Dec 2023 06:05)  T(F): 97.9 (18 Dec 2023 06:05), Max: 97.9 (18 Dec 2023 06:05)  HR: 92 (18 Dec 2023 06:05) (92 - 105)  BP: 131/79 (18 Dec 2023 06:05) (108/73 - 135/82)  BP(mean): 84 (17 Dec 2023 10:00) (84 - 84)  RR: 18 (18 Dec 2023 06:05) (17 - 18)  SpO2: 97% (18 Dec 2023 06:05) (97% - 98%)    Parameters below as of 18 Dec 2023 06:05  Patient On (Oxygen Delivery Method): room air      I&O's Detail    17 Dec 2023 07:01  -  18 Dec 2023 07:00  --------------------------------------------------------  IN:    dextrose 5% w/ Additives: 1200 mL    IV PiggyBack: 100 mL  Total IN: 1300 mL    OUT:  Total OUT: 0 mL    Total NET: 1300 mL          General: NAD, resting comfortably in bed  C/V: NSR  Pulm: Nonlabored breathing, no respiratory distress  Abd: soft, moderately distended, nontender.  Extrem: RIGHT BKA well healed. LUE AVF with palpable thrill and pulsatile, progressively less pulsatile proximally.      LABS:                        8.2    33.53 )-----------( 403      ( 17 Dec 2023 05:30 )             28.1     12-17    138  |  107  |  144<H>  ----------------------------<  102<H>  4.3   |  15<L>  |  6.14<H>    Ca    8.3<L>      17 Dec 2023 05:30  Phos  4.2     12-17  Mg     2.0     12-17    TPro  5.9<L>  /  Alb  1.9<L>  /  TBili  0.2  /  DBili  x   /  AST  26  /  ALT  15  /  AlkPhos  94  12-17      Urinalysis Basic - ( 17 Dec 2023 05:30 )    Color: x / Appearance: x / SG: x / pH: x  Gluc: 102 mg/dL / Ketone: x  / Bili: x / Urobili: x   Blood: x / Protein: x / Nitrite: x   Leuk Esterase: x / RBC: x / WBC x   Sq Epi: x / Non Sq Epi: x / Bacteria: x        RADIOLOGY & ADDITIONAL STUDIES:

## 2023-12-18 NOTE — CHART NOTE - NSCHARTNOTEFT_GEN_A_CORE
2 sets of labs were drawn by Resident team today. First set was drawn ~12pm and labeled in results section under "12:25PM" with significantly abnormal results on both the CBC and CMP--given how abnormal they were it is suspected that they were diluted by IVF that were running in the same arm the the blood was drawn.     After being alerted of these abnormal labs, Resident team bernard another set of labs ~1:15PM but the result of that lab draw are labeled as "11:42AM" in the results section.    Please note that the 11:42AM labs are the CORRECT and ACCURATE labs for this patient for today. 2 sets of labs were drawn by Resident team today. First set was drawn venous ~12pm and labeled in results section under "12:25PM" with significantly abnormal results on both the CBC and CMP--given how abnormal they were it is suspected that they were diluted by IVF that were running in the same arm that the blood was drawn.     After being alerted of these abnormal labs, Resident team bernard another set of labs via arterial stick ~1:15PM but the result of that lab draw are labeled as "11:42AM" in the results section.    Please note that the 11:42AM labs are the CORRECT and ACCURATE labs for this patient for today.

## 2023-12-18 NOTE — PROGRESS NOTE ADULT - PROBLEM SELECTOR PLAN 7
Known, early-onset. A&O x 4  at baseline. Currently AOx1-2 (self and able to participate in ROS),  - no interventions

## 2023-12-18 NOTE — PROGRESS NOTE ADULT - SUBJECTIVE AND OBJECTIVE BOX
SUBJECTIVE:  Patient seen and examined at bedside. Denies abdominal pain, tolerating PO (soft minced) without n/v. Denies cp, sob.    MEDICATIONS  (STANDING):  aspirin enteric coated 81 milliGRAM(s) Oral every 24 hours  atorvastatin 80 milliGRAM(s) Oral at bedtime  dextrose 5% 1000 milliLiter(s) (100 mL/Hr) IV Continuous <Continuous>  dextrose 5%. 1000 milliLiter(s) (50 mL/Hr) IV Continuous <Continuous>  dextrose 5%. 1000 milliLiter(s) (100 mL/Hr) IV Continuous <Continuous>  dextrose 50% Injectable 25 Gram(s) IV Push once  dextrose 50% Injectable 25 Gram(s) IV Push once  dextrose 50% Injectable 25 Gram(s) IV Push once  dextrose 50% Injectable 12.5 Gram(s) IV Push once  glucagon  Injectable 1 milliGRAM(s) IntraMuscular once  influenza   Vaccine 0.5 milliLiter(s) IntraMuscular once  insulin lispro (ADMELOG) corrective regimen sliding scale   SubCutaneous Before meals and at bedtime  metroNIDAZOLE  IVPB 500 milliGRAM(s) IV Intermittent every 8 hours  polyethylene glycol 3350 17 Gram(s) Oral two times a day  senna 2 Tablet(s) Oral at bedtime  vancomycin    Solution 125 milliGRAM(s) Oral every 6 hours    MEDICATIONS  (PRN):  dextrose Oral Gel 15 Gram(s) Oral once PRN Blood Glucose LESS THAN 70 milliGRAM(s)/deciliter      Vital Signs Last 24 Hrs  T(C): 35.1 (18 Dec 2023 13:01), Max: 36.6 (18 Dec 2023 06:05)  T(F): 95.1 (18 Dec 2023 13:01), Max: 97.9 (18 Dec 2023 06:05)  HR: 90 (18 Dec 2023 13:01) (90 - 98)  BP: 152/85 (18 Dec 2023 13:01) (128/82 - 152/85)  BP(mean): --  RR: 18 (18 Dec 2023 13:01) (17 - 18)  SpO2: 99% (18 Dec 2023 13:01) (97% - 99%)    Parameters below as of 18 Dec 2023 13:01  Patient On (Oxygen Delivery Method): room air        Physical Exam:  General: NAD, resting comfortably in bed  C/V: NSR  Pulm: Nonlabored breathing, no respiratory distress  Abd: soft, moderately distended, nontender.  Extrem: RIGHT BKA well healed. LUE AVF with palpable thrill and pulsatile, progressively less pulsatile proximally.    I&O's Summary    17 Dec 2023 07:01  -  18 Dec 2023 07:00  --------------------------------------------------------  IN: 1300 mL / OUT: 0 mL / NET: 1300 mL        LABS:                        5.3    15.83 )-----------( 239      ( 18 Dec 2023 12:25 )             17.6     12-18    137  |  110<H>  |  87<H>  ----------------------------<  73  2.6<LL>   |  12<L>  |  3.64<H>    Ca    4.6<LL>      18 Dec 2023 12:25  Phos  8.3     12-18  Mg     1.2     12-18    TPro  5.9<L>  /  Alb  1.9<L>  /  TBili  0.2  /  DBili  x   /  AST  26  /  ALT  15  /  AlkPhos  94  12-17      Urinalysis Basic - ( 18 Dec 2023 12:25 )    Color: x / Appearance: x / SG: x / pH: x  Gluc: 73 mg/dL / Ketone: x  / Bili: x / Urobili: x   Blood: x / Protein: x / Nitrite: x   Leuk Esterase: x / RBC: x / WBC x   Sq Epi: x / Non Sq Epi: x / Bacteria: x      CAPILLARY BLOOD GLUCOSE      POCT Blood Glucose.: 120 mg/dL (18 Dec 2023 13:09)  POCT Blood Glucose.: 112 mg/dL (18 Dec 2023 12:16)  POCT Blood Glucose.: 104 mg/dL (18 Dec 2023 06:03)  POCT Blood Glucose.: 149 mg/dL (17 Dec 2023 23:50)  POCT Blood Glucose.: 162 mg/dL (17 Dec 2023 22:18)  POCT Blood Glucose.: 158 mg/dL (17 Dec 2023 17:55)    LIVER FUNCTIONS - ( 17 Dec 2023 05:30 )  Alb: 1.9 g/dL / Pro: 5.9 g/dL / ALK PHOS: 94 U/L / ALT: 15 U/L / AST: 26 U/L / GGT: x             RADIOLOGY & ADDITIONAL STUDIES:

## 2023-12-18 NOTE — PROGRESS NOTE ADULT - ATTENDING COMMENTS
events noted, chart reviewed  59 yo man with CKD 5, HTN admitted 3 days ago from SNF with hypotension.  Found to have acute rise in BUN/creat, metabolic acidosis and leukocytosis  Now found to have C diff  Pt with LUE AVF that could not be cannulated, so HD deferred and pt given IVFs  today with rising WBC- agree with surgical plan to defer AVF interrogation  bun/creat mild down trend CO2 up 18 from 15  concern for bacteremia/sepsis still, so will defer placement of tempHD catheter  No SOB, CP, N, V  c/w IVFs  discussed with med team events noted, chart reviewed  57 yo man with CKD 5, HTN admitted 3 days ago from SNF with hypotension.  Found to have acute rise in BUN/creat, metabolic acidosis and leukocytosis  Now found to have C diff  Pt with LUE AVF that could not be cannulated, so HD deferred and pt given IVFs  today with rising WBC- agree with surgical plan to defer AVF interrogation  bun/creat mild down trend CO2 up 18 from 15  concern for bacteremia/sepsis still, so will defer placement of tempHD catheter  No SOB, CP, N, V  c/w IVFs  discussed with med team

## 2023-12-18 NOTE — PROGRESS NOTE ADULT - ASSESSMENT
58M with CKD 5, HTN sent in by NH for hypotension, admitted for possible sepsis and CKD progression. Nephrology consulted for possible HD.    #CKD Stage V with concern for progression  Cr around his baseline, but BUN elevated to 140s on admission with some changes in mental status. BUN and creatinine now improving with improvement in mentation.  Attempted HD 12/15 but unable to dialyze due to increased arterial pressures. US with distal stenosis and possible thrombosis.  UA w/ proteinuria (chronic), no RBCs    Recommendations:  - Awaiting fistulogram of LUE AVF prior to initiation of HD, however patient septic with c diff and unable to undergo procedure at this time. BUN and creatinine slightly improving but if remain elevated will need dialysis catheter placed for access until Vascular intervention.  - Renally dosed medications based on eGFR <10  - BMP daily  - Renal diet    #Anemia  Hb not at goal  tsat 11%, however iron contraindicated in setting of sepsis  Will consider epo w/ HD once initiated    #Access  LUE AVF with stenosis, thrombosis. Planned for fistulogram once medically improved. Will need temporary access for HD in the interim.    #BMD-CKD  Ca 8.0  Phos 4.9  Check PTH, Vit D 58M with CKD 5, HTN sent in by NH for hypotension, admitted for possible sepsis and CKD progression. Nephrology consulted for possible HD.    #CKD Stage V with concern for progression vs pre-renal azotemia in setting of infectious diarrhea and volume depletion  Cr around his baseline, but BUN elevated to 140s on admission with some changes in mental status. BUN and creatinine now improving with improvement in mentation after fluid resuscitation.  Attempted HD 12/15 but unable to dialyze due to increased arterial pressures. US with distal stenosis and possible thrombosis.  UA w/ proteinuria (chronic), no RBCs    Recommendations:  - Awaiting fistulogram of LUE AVF due to stenosis and thrombosis, however patient septic with c diff and unable to undergo procedure at this time. BUN and creatinine slightly improving consistent with pre-renal azotemia, so can continue to monitor patient for now off of HD. Will continue to assess daily. If HD necessary prior to fistula will likely require HD line.  - Continue bicarb drip for now, monitor volume status  - Renally dosed medications based on eGFR <10  - BMP daily  - Renal diet    #Anemia  Hb not at goal  tsat 11%, however iron contraindicated in setting of sepsis  Will consider epo w/ HD once initiated    #Access  LUE AVF with stenosis, thrombosis. Planned for fistulogram once medically improved. Will need temporary access for HD in the interim.    #BMD-CKD  Ca 8.0  Phos 4.9  Check PTH, Vit D

## 2023-12-18 NOTE — PROGRESS NOTE ADULT - SUBJECTIVE AND OBJECTIVE BOX
Nephrology progress note    Seen at bedside. Appears more awake and alert today. No acute complaints. Borja in place, making urine although volume not recorded. Having diarrhea per primary team.     Allergies:  No Known Allergies    Hospital Medications:   MEDICATIONS  (STANDING):  aspirin enteric coated 81 milliGRAM(s) Oral every 24 hours  atorvastatin 80 milliGRAM(s) Oral at bedtime  dextrose 5% 1000 milliLiter(s) (100 mL/Hr) IV Continuous <Continuous>  dextrose 5%. 1000 milliLiter(s) (50 mL/Hr) IV Continuous <Continuous>  dextrose 5%. 1000 milliLiter(s) (100 mL/Hr) IV Continuous <Continuous>  dextrose 50% Injectable 25 Gram(s) IV Push once  dextrose 50% Injectable 25 Gram(s) IV Push once  dextrose 50% Injectable 25 Gram(s) IV Push once  dextrose 50% Injectable 12.5 Gram(s) IV Push once  glucagon  Injectable 1 milliGRAM(s) IntraMuscular once  influenza   Vaccine 0.5 milliLiter(s) IntraMuscular once  insulin lispro (ADMELOG) corrective regimen sliding scale   SubCutaneous Before meals and at bedtime  metroNIDAZOLE  IVPB 500 milliGRAM(s) IV Intermittent every 8 hours  polyethylene glycol 3350 17 Gram(s) Oral two times a day  PPD  5 Tuberculin Unit(s) Injectable 5 Unit(s) IntraDermal once  senna 2 Tablet(s) Oral at bedtime  vancomycin    Solution 125 milliGRAM(s) Oral every 6 hours    REVIEW OF SYSTEMS:  All other review of systems is negative unless indicated above.    VITALS:  T(F): 97.4 (12-18-23 @ 17:22), Max: 97.9 (12-18-23 @ 06:05)  HR: 91 (12-18-23 @ 17:22)  BP: 152/82 (12-18-23 @ 17:22)  RR: 18 (12-18-23 @ 17:22)  SpO2: 100% (12-18-23 @ 17:22)  Wt(kg): --    12-16 @ 07:01  -  12-17 @ 07:00  --------------------------------------------------------  IN: 0 mL / OUT: 1120 mL / NET: -1120 mL    12-17 @ 07:01  -  12-18 @ 07:00  --------------------------------------------------------  IN: 1300 mL / OUT: 0 mL / NET: 1300 mL    12-18 @ 07:01  -  12-18 @ 17:25  --------------------------------------------------------  IN: 700 mL / OUT: 0 mL / NET: 700 mL        PHYSICAL EXAM:  GEN: NAD, lying in bed  HEENT: NCAT  Respiratory: CTAB bilaterally  Cardiovascular: Regular rate  Gastrointestinal: soft, non-tender, non-distended  Extremities: No significant peripheral edema  Skin: warm, dry, no rashes on exposed skin  Neuro: More awake and alert, answering questions    LABS:  12-18    137  |  110<H>  |  87<H>  ----------------------------<  73  2.6<LL>   |  12<L>  |  3.64<H>    Ca    4.6<LL>      18 Dec 2023 12:25  Phos  8.3     12-18  Mg     1.2     12-18    TPro  5.9<L>  /  Alb  1.9<L>  /  TBili  0.2  /  DBili      /  AST  26  /  ALT  15  /  AlkPhos  94  12-17                          5.3    15.83 )-----------( 239      ( 18 Dec 2023 12:25 )             17.6       Urine Studies:  Creatinine Trend: 3.64<--, 5.85<--, 6.14<--, 5.81<--, 6.16<--, 6.38<--  Urinalysis Basic - ( 18 Dec 2023 12:25 )    Color:  / Appearance:  / SG:  / pH:   Gluc: 73 mg/dL / Ketone:   / Bili:  / Urobili:    Blood:  / Protein:  / Nitrite:    Leuk Esterase:  / RBC:  / WBC    Sq Epi:  / Non Sq Epi:  / Bacteria:       Sodium, Random Urine: 28 mmol/L (12-15 @ 18:04)  Creatinine, Random Urine: 65 mg/dL (12-15 @ 18:04)  Protein/Creatinine Ratio Calculation: 1.2 Ratio (12-15 @ 18:04)  Osmolality, Random Urine: 378 mosm/kg (12-15 @ 18:04)  Potassium, Random Urine: 18 mmol/L (12-15 @ 18:04)    RADIOLOGY & ADDITIONAL STUDIES:  Reviewed

## 2023-12-18 NOTE — PROGRESS NOTE ADULT - ASSESSMENT
57yo M T2DM, CVA x2 w/ residual L-sided weakness, early onset dementia, HTN, HLD, DVT, CKD V (Cr 7-8, not on HD), BPH, PSH L toe amputation and AVF creation (5/2023), R jd BKA and closure admitted to medicine with concerns of sepsis and urgent need for dialysis. Vascular surgery consulted for AVF revision after failed HD attempt.    No acute vascular surgery indicated at this time  Plan for possible fistulogram when medically cleared  Dialysis per nephrology  Vascular surgery will continue to follow

## 2023-12-18 NOTE — PROGRESS NOTE ADULT - ASSESSMENT
ASSESSMENT/ PLAN:  58M T2DM, CVA x2 w/ residual L-sided weakness, early onset dementia, HTN, HLD, DVT, CKD V (Cr 7-8, not on HD), BPH, PSH L toe amputation and L BC AVF creation (5/2023) and remote R BKA who presented to [facility] from nursing home for hypotension and constipation on 12/15. Patient with elevated WBC into 30K on admission with new onset diarrhea, now + for C diff colitis.  Surgery consulted for evaluation of toxic megacolon given CTAP with dilated sigmoid.  Dilation likely 2/2 to severe inflammation from colitis. Patient without acute abdominal complaints and is not distended. Although tachycardic, no HD instability noted. Would recommend continued medical management.    Recommendations:  - No acute surgical intervention  - consider bowel rest if increased bowel movements   - Agree with PO vancomycin for C diff colitis.   - Surgery Team 4C will continue follow for serial exams. Please call with any quesitons/concerns.   Attending aware and agrees with plan ASSESSMENT/ PLAN:  58M T2DM, CVA x2 w/ residual L-sided weakness, early onset dementia, HTN, HLD, DVT, CKD V (Cr 7-8, not on HD), BPH, PSH L toe amputation and L BC AVF creation (5/2023) and remote R BKA who presented to [facility] from nursing home for hypotension and constipation on 12/15. Patient with elevated WBC into 30K on admission with new onset diarrhea, now + for C diff colitis.  Surgery consulted for evaluation of toxic megacolon given CTAP with dilated sigmoid.  Dilation likely 2/2 to severe inflammation from colitis. Patient without acute abdominal complaints and is not distended. Although tachycardic, no HD instability noted. Would recommend continued medical management.    Recommendations:  - No acute surgical intervention  - consider bowel rest if increased bowel movements   - Agree with PO vancomycin for C diff colitis.   - Surgery Team 4C will continue follow for serial exams. Please call with any quesitons/concerns.   Attending aware and agrees with plan

## 2023-12-18 NOTE — PROGRESS NOTE ADULT - PROBLEM SELECTOR PLAN 3
Given hypoglycemia will hold Lantus 15u qhs and Lispro 5u TID premeal,    A1c 6.4% (10/25/2023).  - continue mISS inpatient  - hold tradjenta inpatient  - HbA1c: 6.1

## 2023-12-18 NOTE — PROGRESS NOTE ADULT - ATTENDING COMMENTS
Pt seen, agree. C Diff. pt poor historian due to baseline dementia. Recommend step up to higher level of care in light of both difficulty obtaining accurate labs and also due to patient factors as he is not able to express pain or report events reliably.

## 2023-12-18 NOTE — PROGRESS NOTE ADULT - SUBJECTIVE AND OBJECTIVE BOX
OVERNIGHT EVENTS:    SUBJECTIVE / INTERVAL HPI: Patient seen and examined at bedside.     ROS: negative unless otherwise stated above.    VITAL SIGNS:  Vital Signs Last 24 Hrs  T(C): 36.6 (18 Dec 2023 06:05), Max: 36.6 (18 Dec 2023 06:05)  T(F): 97.9 (18 Dec 2023 06:05), Max: 97.9 (18 Dec 2023 06:05)  HR: 92 (18 Dec 2023 06:05) (92 - 105)  BP: 131/79 (18 Dec 2023 06:05) (108/73 - 135/82)  BP(mean): 84 (17 Dec 2023 10:00) (84 - 84)  RR: 18 (18 Dec 2023 06:05) (17 - 18)  SpO2: 97% (18 Dec 2023 06:05) (97% - 98%)    Parameters below as of 18 Dec 2023 06:05  Patient On (Oxygen Delivery Method): room air        PHYSICAL EXAM:    General: In no apparent distress  HEENT: NC/AT; PERRL, anicteric sclera; MMM  Neck: supple  Cardiovascular: +S1/S2; RRR  Respiratory: CTA B/L; no W/R/R  Gastrointestinal: soft, NT/ND; +BSx4  Extremities: WWP; no edema, clubbing or cyanosis  Vascular: 2+ radial, DP/PT pulses B/L  Neurological: AAOx3; no focal deficits    MEDICATIONS:  MEDICATIONS  (STANDING):  aspirin enteric coated 81 milliGRAM(s) Oral every 24 hours  atorvastatin 80 milliGRAM(s) Oral at bedtime  dextrose 5% 1000 milliLiter(s) (100 mL/Hr) IV Continuous <Continuous>  dextrose 5%. 1000 milliLiter(s) (50 mL/Hr) IV Continuous <Continuous>  dextrose 5%. 1000 milliLiter(s) (100 mL/Hr) IV Continuous <Continuous>  dextrose 50% Injectable 25 Gram(s) IV Push once  dextrose 50% Injectable 25 Gram(s) IV Push once  dextrose 50% Injectable 25 Gram(s) IV Push once  dextrose 50% Injectable 12.5 Gram(s) IV Push once  glucagon  Injectable 1 milliGRAM(s) IntraMuscular once  heparin   Injectable 5000 Unit(s) SubCutaneous every 8 hours  influenza   Vaccine 0.5 milliLiter(s) IntraMuscular once  insulin lispro (ADMELOG) corrective regimen sliding scale   SubCutaneous Before meals and at bedtime  metroNIDAZOLE  IVPB 500 milliGRAM(s) IV Intermittent every 8 hours  polyethylene glycol 3350 17 Gram(s) Oral two times a day  senna 2 Tablet(s) Oral at bedtime  vancomycin    Solution 125 milliGRAM(s) Oral every 6 hours    MEDICATIONS  (PRN):  dextrose Oral Gel 15 Gram(s) Oral once PRN Blood Glucose LESS THAN 70 milliGRAM(s)/deciliter      ALLERGIES:  Allergies    No Known Allergies    Intolerances        LABS:                        8.2    33.53 )-----------( 403      ( 17 Dec 2023 05:30 )             28.1     12-17    138  |  107  |  144<H>  ----------------------------<  102<H>  4.3   |  15<L>  |  6.14<H>    Ca    8.3<L>      17 Dec 2023 05:30  Phos  4.2     12-17  Mg     2.0     12-17    TPro  5.9<L>  /  Alb  1.9<L>  /  TBili  0.2  /  DBili  x   /  AST  26  /  ALT  15  /  AlkPhos  94  12-17      Urinalysis Basic - ( 17 Dec 2023 05:30 )    Color: x / Appearance: x / SG: x / pH: x  Gluc: 102 mg/dL / Ketone: x  / Bili: x / Urobili: x   Blood: x / Protein: x / Nitrite: x   Leuk Esterase: x / RBC: x / WBC x   Sq Epi: x / Non Sq Epi: x / Bacteria: x      CAPILLARY BLOOD GLUCOSE  25 (16 Dec 2023 09:14)      POCT Blood Glucose.: 104 mg/dL (18 Dec 2023 06:03)      RADIOLOGY & ADDITIONAL TESTS: Reviewed. OVERNIGHT EVENTS: ZULY    SUBJECTIVE / INTERVAL HPI: Patient seen and examined at bedside. The patient was encountered lying comfortably in bed at baseline AAOx2, in no acute distress. He endorsed continued diarrhea, but denied, n/v. He also denied chest pain, SOB, palpitations, lightheadedness, and dizziness.     ROS: negative unless otherwise stated above.    VITAL SIGNS:  Vital Signs Last 24 Hrs  T(C): 36.6 (18 Dec 2023 06:05), Max: 36.6 (18 Dec 2023 06:05)  T(F): 97.9 (18 Dec 2023 06:05), Max: 97.9 (18 Dec 2023 06:05)  HR: 92 (18 Dec 2023 06:05) (92 - 105)  BP: 131/79 (18 Dec 2023 06:05) (108/73 - 135/82)  BP(mean): 84 (17 Dec 2023 10:00) (84 - 84)  RR: 18 (18 Dec 2023 06:05) (17 - 18)  SpO2: 97% (18 Dec 2023 06:05) (97% - 98%)    Parameters below as of 18 Dec 2023 06:05  Patient On (Oxygen Delivery Method): room air        PHYSICAL EXAM:    General: In no apparent distress  HEENT: NC/AT; PERRL, anicteric sclera; MMM  Neck: supple  Cardiovascular: +S1/S2; RRR  Respiratory: CTA B/L; no W/R/R  Gastrointestinal: soft, NT/ND; +BSx4  Extremities: WWP; no edema, clubbing or cyanosis  Vascular: 2+ radial, DP/PT pulses B/L  Neurological: AAOx2; no focal deficits    MEDICATIONS:  MEDICATIONS  (STANDING):  aspirin enteric coated 81 milliGRAM(s) Oral every 24 hours  atorvastatin 80 milliGRAM(s) Oral at bedtime  dextrose 5% 1000 milliLiter(s) (100 mL/Hr) IV Continuous <Continuous>  dextrose 5%. 1000 milliLiter(s) (50 mL/Hr) IV Continuous <Continuous>  dextrose 5%. 1000 milliLiter(s) (100 mL/Hr) IV Continuous <Continuous>  dextrose 50% Injectable 25 Gram(s) IV Push once  dextrose 50% Injectable 25 Gram(s) IV Push once  dextrose 50% Injectable 25 Gram(s) IV Push once  dextrose 50% Injectable 12.5 Gram(s) IV Push once  glucagon  Injectable 1 milliGRAM(s) IntraMuscular once  heparin   Injectable 5000 Unit(s) SubCutaneous every 8 hours  influenza   Vaccine 0.5 milliLiter(s) IntraMuscular once  insulin lispro (ADMELOG) corrective regimen sliding scale   SubCutaneous Before meals and at bedtime  metroNIDAZOLE  IVPB 500 milliGRAM(s) IV Intermittent every 8 hours  polyethylene glycol 3350 17 Gram(s) Oral two times a day  senna 2 Tablet(s) Oral at bedtime  vancomycin    Solution 125 milliGRAM(s) Oral every 6 hours    MEDICATIONS  (PRN):  dextrose Oral Gel 15 Gram(s) Oral once PRN Blood Glucose LESS THAN 70 milliGRAM(s)/deciliter      ALLERGIES:  Allergies    No Known Allergies    Intolerances        LABS:                        8.2    33.53 )-----------( 403      ( 17 Dec 2023 05:30 )             28.1     12-17    138  |  107  |  144<H>  ----------------------------<  102<H>  4.3   |  15<L>  |  6.14<H>    Ca    8.3<L>      17 Dec 2023 05:30  Phos  4.2     12-17  Mg     2.0     12-17    TPro  5.9<L>  /  Alb  1.9<L>  /  TBili  0.2  /  DBili  x   /  AST  26  /  ALT  15  /  AlkPhos  94  12-17      Urinalysis Basic - ( 17 Dec 2023 05:30 )    Color: x / Appearance: x / SG: x / pH: x  Gluc: 102 mg/dL / Ketone: x  / Bili: x / Urobili: x   Blood: x / Protein: x / Nitrite: x   Leuk Esterase: x / RBC: x / WBC x   Sq Epi: x / Non Sq Epi: x / Bacteria: x      CAPILLARY BLOOD GLUCOSE  25 (16 Dec 2023 09:14)      POCT Blood Glucose.: 104 mg/dL (18 Dec 2023 06:03)      RADIOLOGY & ADDITIONAL TESTS: Reviewed.

## 2023-12-18 NOTE — PROGRESS NOTE ADULT - ASSESSMENT
59yo M T2DM, CVA x2 w/ residual L-sided weakness, early onset dementia, HTN, HLD, DVT, CKD V (Cr ~6, not on HD), BPH, PSH L toe amputation, R BKA, AVF creation (5/2023) BIBEMS from nursing home for hypotension, found to meet SIRS criteria without source of infection and has elevated BUN, admitted for VS derangement, possible HD initiation and further workup. 57yo M T2DM, CVA x2 w/ residual L-sided weakness, early onset dementia, HTN, HLD, DVT, CKD V (Cr ~6, not on HD), BPH, PSH L toe amputation, R BKA, AVF creation (5/2023) BIBEMS from nursing home for hypotension, found to meet SIRS criteria without source of infection and has elevated BUN, admitted for VS derangement, possible HD initiation and further workup.

## 2023-12-19 LAB
ALBUMIN SERPL ELPH-MCNC: 2.3 G/DL — LOW (ref 3.3–5)
ALBUMIN SERPL ELPH-MCNC: 2.3 G/DL — LOW (ref 3.3–5)
ALP SERPL-CCNC: 84 U/L — SIGNIFICANT CHANGE UP (ref 40–120)
ALP SERPL-CCNC: 84 U/L — SIGNIFICANT CHANGE UP (ref 40–120)
ALT FLD-CCNC: 19 U/L — SIGNIFICANT CHANGE UP (ref 10–45)
ALT FLD-CCNC: 19 U/L — SIGNIFICANT CHANGE UP (ref 10–45)
ANION GAP SERPL CALC-SCNC: 14 MMOL/L — SIGNIFICANT CHANGE UP (ref 5–17)
AST SERPL-CCNC: 24 U/L — SIGNIFICANT CHANGE UP (ref 10–40)
AST SERPL-CCNC: 24 U/L — SIGNIFICANT CHANGE UP (ref 10–40)
BASOPHILS # BLD AUTO: 0.04 K/UL — SIGNIFICANT CHANGE UP (ref 0–0.2)
BASOPHILS # BLD AUTO: 0.04 K/UL — SIGNIFICANT CHANGE UP (ref 0–0.2)
BASOPHILS NFR BLD AUTO: 0.2 % — SIGNIFICANT CHANGE UP (ref 0–2)
BASOPHILS NFR BLD AUTO: 0.2 % — SIGNIFICANT CHANGE UP (ref 0–2)
BILIRUB SERPL-MCNC: 0.2 MG/DL — SIGNIFICANT CHANGE UP (ref 0.2–1.2)
BILIRUB SERPL-MCNC: 0.2 MG/DL — SIGNIFICANT CHANGE UP (ref 0.2–1.2)
BUN SERPL-MCNC: 116 MG/DL — HIGH (ref 7–23)
BUN SERPL-MCNC: 116 MG/DL — HIGH (ref 7–23)
BUN SERPL-MCNC: 124 MG/DL — HIGH (ref 7–23)
BUN SERPL-MCNC: 124 MG/DL — HIGH (ref 7–23)
CALCIUM SERPL-MCNC: 8 MG/DL — LOW (ref 8.4–10.5)
CALCIUM SERPL-MCNC: 8 MG/DL — LOW (ref 8.4–10.5)
CALCIUM SERPL-MCNC: 8.2 MG/DL — LOW (ref 8.4–10.5)
CALCIUM SERPL-MCNC: 8.2 MG/DL — LOW (ref 8.4–10.5)
CHLORIDE SERPL-SCNC: 102 MMOL/L — SIGNIFICANT CHANGE UP (ref 96–108)
CO2 SERPL-SCNC: 18 MMOL/L — LOW (ref 22–31)
CO2 SERPL-SCNC: 18 MMOL/L — LOW (ref 22–31)
CO2 SERPL-SCNC: 20 MMOL/L — LOW (ref 22–31)
CO2 SERPL-SCNC: 20 MMOL/L — LOW (ref 22–31)
CREAT SERPL-MCNC: 5.83 MG/DL — HIGH (ref 0.5–1.3)
CREAT SERPL-MCNC: 5.83 MG/DL — HIGH (ref 0.5–1.3)
CREAT SERPL-MCNC: 5.88 MG/DL — HIGH (ref 0.5–1.3)
CREAT SERPL-MCNC: 5.88 MG/DL — HIGH (ref 0.5–1.3)
EGFR: 10 ML/MIN/1.73M2 — LOW
EGFR: 10 ML/MIN/1.73M2 — LOW
EGFR: 11 ML/MIN/1.73M2 — LOW
EGFR: 11 ML/MIN/1.73M2 — LOW
EOSINOPHIL # BLD AUTO: 0.25 K/UL — SIGNIFICANT CHANGE UP (ref 0–0.5)
EOSINOPHIL # BLD AUTO: 0.25 K/UL — SIGNIFICANT CHANGE UP (ref 0–0.5)
EOSINOPHIL NFR BLD AUTO: 1.3 % — SIGNIFICANT CHANGE UP (ref 0–6)
EOSINOPHIL NFR BLD AUTO: 1.3 % — SIGNIFICANT CHANGE UP (ref 0–6)
GLUCOSE BLDC GLUCOMTR-MCNC: 119 MG/DL — HIGH (ref 70–99)
GLUCOSE BLDC GLUCOMTR-MCNC: 119 MG/DL — HIGH (ref 70–99)
GLUCOSE BLDC GLUCOMTR-MCNC: 126 MG/DL — HIGH (ref 70–99)
GLUCOSE BLDC GLUCOMTR-MCNC: 126 MG/DL — HIGH (ref 70–99)
GLUCOSE BLDC GLUCOMTR-MCNC: 129 MG/DL — HIGH (ref 70–99)
GLUCOSE BLDC GLUCOMTR-MCNC: 129 MG/DL — HIGH (ref 70–99)
GLUCOSE BLDC GLUCOMTR-MCNC: 132 MG/DL — HIGH (ref 70–99)
GLUCOSE BLDC GLUCOMTR-MCNC: 132 MG/DL — HIGH (ref 70–99)
GLUCOSE BLDC GLUCOMTR-MCNC: 160 MG/DL — HIGH (ref 70–99)
GLUCOSE BLDC GLUCOMTR-MCNC: 160 MG/DL — HIGH (ref 70–99)
GLUCOSE BLDC GLUCOMTR-MCNC: 182 MG/DL — HIGH (ref 70–99)
GLUCOSE BLDC GLUCOMTR-MCNC: 182 MG/DL — HIGH (ref 70–99)
GLUCOSE SERPL-MCNC: 121 MG/DL — HIGH (ref 70–99)
GLUCOSE SERPL-MCNC: 121 MG/DL — HIGH (ref 70–99)
GLUCOSE SERPL-MCNC: 141 MG/DL — HIGH (ref 70–99)
GLUCOSE SERPL-MCNC: 141 MG/DL — HIGH (ref 70–99)
HBV SURFACE AB SER-ACNC: SIGNIFICANT CHANGE UP
HBV SURFACE AB SER-ACNC: SIGNIFICANT CHANGE UP
HCT VFR BLD CALC: 25 % — LOW (ref 39–50)
HCT VFR BLD CALC: 25 % — LOW (ref 39–50)
HCT VFR BLD CALC: 25.1 % — LOW (ref 39–50)
HCT VFR BLD CALC: 25.1 % — LOW (ref 39–50)
HGB BLD-MCNC: 7.9 G/DL — LOW (ref 13–17)
HGB BLD-MCNC: 7.9 G/DL — LOW (ref 13–17)
HGB BLD-MCNC: 8.1 G/DL — LOW (ref 13–17)
HGB BLD-MCNC: 8.1 G/DL — LOW (ref 13–17)
IMM GRANULOCYTES NFR BLD AUTO: 1.9 % — HIGH (ref 0–0.9)
IMM GRANULOCYTES NFR BLD AUTO: 1.9 % — HIGH (ref 0–0.9)
LYMPHOCYTES # BLD AUTO: 0.95 K/UL — LOW (ref 1–3.3)
LYMPHOCYTES # BLD AUTO: 0.95 K/UL — LOW (ref 1–3.3)
LYMPHOCYTES # BLD AUTO: 4.9 % — LOW (ref 13–44)
LYMPHOCYTES # BLD AUTO: 4.9 % — LOW (ref 13–44)
MAGNESIUM SERPL-MCNC: 2 MG/DL — SIGNIFICANT CHANGE UP (ref 1.6–2.6)
MAGNESIUM SERPL-MCNC: 2 MG/DL — SIGNIFICANT CHANGE UP (ref 1.6–2.6)
MCHC RBC-ENTMCNC: 27.3 PG — SIGNIFICANT CHANGE UP (ref 27–34)
MCHC RBC-ENTMCNC: 27.3 PG — SIGNIFICANT CHANGE UP (ref 27–34)
MCHC RBC-ENTMCNC: 28.2 PG — SIGNIFICANT CHANGE UP (ref 27–34)
MCHC RBC-ENTMCNC: 28.2 PG — SIGNIFICANT CHANGE UP (ref 27–34)
MCHC RBC-ENTMCNC: 31.6 GM/DL — LOW (ref 32–36)
MCHC RBC-ENTMCNC: 31.6 GM/DL — LOW (ref 32–36)
MCHC RBC-ENTMCNC: 32.3 GM/DL — SIGNIFICANT CHANGE UP (ref 32–36)
MCHC RBC-ENTMCNC: 32.3 GM/DL — SIGNIFICANT CHANGE UP (ref 32–36)
MCV RBC AUTO: 86.5 FL — SIGNIFICANT CHANGE UP (ref 80–100)
MCV RBC AUTO: 86.5 FL — SIGNIFICANT CHANGE UP (ref 80–100)
MCV RBC AUTO: 87.5 FL — SIGNIFICANT CHANGE UP (ref 80–100)
MCV RBC AUTO: 87.5 FL — SIGNIFICANT CHANGE UP (ref 80–100)
MONOCYTES # BLD AUTO: 0.99 K/UL — HIGH (ref 0–0.9)
MONOCYTES # BLD AUTO: 0.99 K/UL — HIGH (ref 0–0.9)
MONOCYTES NFR BLD AUTO: 5.1 % — SIGNIFICANT CHANGE UP (ref 2–14)
MONOCYTES NFR BLD AUTO: 5.1 % — SIGNIFICANT CHANGE UP (ref 2–14)
NEUTROPHILS # BLD AUTO: 16.89 K/UL — HIGH (ref 1.8–7.4)
NEUTROPHILS # BLD AUTO: 16.89 K/UL — HIGH (ref 1.8–7.4)
NEUTROPHILS NFR BLD AUTO: 86.6 % — HIGH (ref 43–77)
NEUTROPHILS NFR BLD AUTO: 86.6 % — HIGH (ref 43–77)
NRBC # BLD: 0 /100 WBCS — SIGNIFICANT CHANGE UP (ref 0–0)
PHOSPHATE SERPL-MCNC: 5.4 MG/DL — HIGH (ref 2.5–4.5)
PHOSPHATE SERPL-MCNC: 5.4 MG/DL — HIGH (ref 2.5–4.5)
PLATELET # BLD AUTO: 368 K/UL — SIGNIFICANT CHANGE UP (ref 150–400)
PLATELET # BLD AUTO: 368 K/UL — SIGNIFICANT CHANGE UP (ref 150–400)
PLATELET # BLD AUTO: 401 K/UL — HIGH (ref 150–400)
PLATELET # BLD AUTO: 401 K/UL — HIGH (ref 150–400)
POTASSIUM SERPL-MCNC: 3.5 MMOL/L — SIGNIFICANT CHANGE UP (ref 3.5–5.3)
POTASSIUM SERPL-MCNC: 3.5 MMOL/L — SIGNIFICANT CHANGE UP (ref 3.5–5.3)
POTASSIUM SERPL-MCNC: 3.7 MMOL/L — SIGNIFICANT CHANGE UP (ref 3.5–5.3)
POTASSIUM SERPL-MCNC: 3.7 MMOL/L — SIGNIFICANT CHANGE UP (ref 3.5–5.3)
POTASSIUM SERPL-SCNC: 3.5 MMOL/L — SIGNIFICANT CHANGE UP (ref 3.5–5.3)
POTASSIUM SERPL-SCNC: 3.5 MMOL/L — SIGNIFICANT CHANGE UP (ref 3.5–5.3)
POTASSIUM SERPL-SCNC: 3.7 MMOL/L — SIGNIFICANT CHANGE UP (ref 3.5–5.3)
POTASSIUM SERPL-SCNC: 3.7 MMOL/L — SIGNIFICANT CHANGE UP (ref 3.5–5.3)
PROT SERPL-MCNC: 5.6 G/DL — LOW (ref 6–8.3)
PROT SERPL-MCNC: 5.6 G/DL — LOW (ref 6–8.3)
RBC # BLD: 2.87 M/UL — LOW (ref 4.2–5.8)
RBC # BLD: 2.87 M/UL — LOW (ref 4.2–5.8)
RBC # BLD: 2.89 M/UL — LOW (ref 4.2–5.8)
RBC # BLD: 2.89 M/UL — LOW (ref 4.2–5.8)
RBC # FLD: 16 % — HIGH (ref 10.3–14.5)
SODIUM SERPL-SCNC: 134 MMOL/L — LOW (ref 135–145)
SODIUM SERPL-SCNC: 134 MMOL/L — LOW (ref 135–145)
SODIUM SERPL-SCNC: 136 MMOL/L — SIGNIFICANT CHANGE UP (ref 135–145)
SODIUM SERPL-SCNC: 136 MMOL/L — SIGNIFICANT CHANGE UP (ref 135–145)
WBC # BLD: 14.83 K/UL — HIGH (ref 3.8–10.5)
WBC # BLD: 14.83 K/UL — HIGH (ref 3.8–10.5)
WBC # BLD: 19.49 K/UL — HIGH (ref 3.8–10.5)
WBC # BLD: 19.49 K/UL — HIGH (ref 3.8–10.5)
WBC # FLD AUTO: 14.83 K/UL — HIGH (ref 3.8–10.5)
WBC # FLD AUTO: 14.83 K/UL — HIGH (ref 3.8–10.5)
WBC # FLD AUTO: 19.49 K/UL — HIGH (ref 3.8–10.5)
WBC # FLD AUTO: 19.49 K/UL — HIGH (ref 3.8–10.5)

## 2023-12-19 PROCEDURE — 99233 SBSQ HOSP IP/OBS HIGH 50: CPT

## 2023-12-19 PROCEDURE — 99232 SBSQ HOSP IP/OBS MODERATE 35: CPT | Mod: GC

## 2023-12-19 PROCEDURE — 99231 SBSQ HOSP IP/OBS SF/LOW 25: CPT

## 2023-12-19 RX ORDER — FINASTERIDE 5 MG/1
5 TABLET, FILM COATED ORAL DAILY
Refills: 0 | Status: DISCONTINUED | OUTPATIENT
Start: 2023-12-19 | End: 2023-12-21

## 2023-12-19 RX ORDER — HEPARIN SODIUM 5000 [USP'U]/ML
5000 INJECTION INTRAVENOUS; SUBCUTANEOUS EVERY 8 HOURS
Refills: 0 | Status: DISCONTINUED | OUTPATIENT
Start: 2023-12-19 | End: 2023-12-21

## 2023-12-19 RX ORDER — TAMSULOSIN HYDROCHLORIDE 0.4 MG/1
0.4 CAPSULE ORAL AT BEDTIME
Refills: 0 | Status: DISCONTINUED | OUTPATIENT
Start: 2023-12-19 | End: 2023-12-21

## 2023-12-19 RX ORDER — SODIUM CHLORIDE 9 MG/ML
1000 INJECTION, SOLUTION INTRAVENOUS
Refills: 0 | Status: DISCONTINUED | OUTPATIENT
Start: 2023-12-19 | End: 2023-12-21

## 2023-12-19 RX ADMIN — Medication 100 MILLIGRAM(S): at 21:59

## 2023-12-19 RX ADMIN — Medication 0: at 14:44

## 2023-12-19 RX ADMIN — ATORVASTATIN CALCIUM 80 MILLIGRAM(S): 80 TABLET, FILM COATED ORAL at 22:00

## 2023-12-19 RX ADMIN — FINASTERIDE 5 MILLIGRAM(S): 5 TABLET, FILM COATED ORAL at 15:02

## 2023-12-19 RX ADMIN — Medication 125 MILLIGRAM(S): at 21:59

## 2023-12-19 RX ADMIN — Medication 125 MILLIGRAM(S): at 01:51

## 2023-12-19 RX ADMIN — TAMSULOSIN HYDROCHLORIDE 0.4 MILLIGRAM(S): 0.4 CAPSULE ORAL at 21:55

## 2023-12-19 RX ADMIN — HEPARIN SODIUM 5000 UNIT(S): 5000 INJECTION INTRAVENOUS; SUBCUTANEOUS at 21:59

## 2023-12-19 RX ADMIN — Medication 100 MILLIGRAM(S): at 15:04

## 2023-12-19 RX ADMIN — Medication 81 MILLIGRAM(S): at 10:56

## 2023-12-19 RX ADMIN — Medication 125 MILLIGRAM(S): at 14:46

## 2023-12-19 RX ADMIN — SODIUM CHLORIDE 120 MILLILITER(S): 9 INJECTION, SOLUTION INTRAVENOUS at 21:55

## 2023-12-19 RX ADMIN — Medication 125 MILLIGRAM(S): at 06:28

## 2023-12-19 RX ADMIN — Medication 100 MILLIGRAM(S): at 06:27

## 2023-12-19 NOTE — PROGRESS NOTE ADULT - SUBJECTIVE AND OBJECTIVE BOX
Nephrology progress note    Seen at bedside. In no distress. Awake and alert, responds to questions appropriately. Borja in place draining clear urine.    Allergies:  No Known Allergies    Hospital Medications:   MEDICATIONS  (STANDING):  aspirin enteric coated 81 milliGRAM(s) Oral every 24 hours  atorvastatin 80 milliGRAM(s) Oral at bedtime  dextrose 5% 1000 milliLiter(s) (100 mL/Hr) IV Continuous <Continuous>  dextrose 5%. 1000 milliLiter(s) (50 mL/Hr) IV Continuous <Continuous>  dextrose 5%. 1000 milliLiter(s) (100 mL/Hr) IV Continuous <Continuous>  dextrose 50% Injectable 25 Gram(s) IV Push once  dextrose 50% Injectable 12.5 Gram(s) IV Push once  dextrose 50% Injectable 25 Gram(s) IV Push once  dextrose 50% Injectable 25 Gram(s) IV Push once  finasteride 5 milliGRAM(s) Oral daily  glucagon  Injectable 1 milliGRAM(s) IntraMuscular once  influenza   Vaccine 0.5 milliLiter(s) IntraMuscular once  insulin lispro (ADMELOG) corrective regimen sliding scale   SubCutaneous Before meals and at bedtime  metroNIDAZOLE  IVPB 500 milliGRAM(s) IV Intermittent every 8 hours  PPD  5 Tuberculin Unit(s) Injectable 5 Unit(s) IntraDermal once  tamsulosin 0.4 milliGRAM(s) Oral at bedtime  vancomycin    Solution 125 milliGRAM(s) Oral every 6 hours    REVIEW OF SYSTEMS:  All other review of systems is negative unless indicated above.    VITALS:  T(F): 97.4 (12-19-23 @ 06:41), Max: 97.5 (12-18-23 @ 20:52)  HR: 95 (12-19-23 @ 06:41)  BP: 150/81 (12-19-23 @ 06:41)  RR: 18 (12-19-23 @ 06:41)  SpO2: 97% (12-19-23 @ 06:41)  Wt(kg): --    12-17 @ 07:01  -  12-18 @ 07:00  --------------------------------------------------------  IN: 1300 mL / OUT: 0 mL / NET: 1300 mL    12-18 @ 07:01  -  12-19 @ 07:00  --------------------------------------------------------  IN: 900 mL / OUT: 1300 mL / NET: -400 mL        PHYSICAL EXAM:  GEN: NAD, lying in bed  HEENT: NCAT  Respiratory: No respiratory distress  Cardiovascular: Regular rate  Gastrointestinal: soft, non-tender, non-distended  : Borja in place  Extremities: No significant peripheral edema  Skin: warm, dry, no rashes on exposed skin  Neuro: Awake and alert, answering questions. UE rigidity noted  Access: LUE AVF w/ thrill and bruit, more prominent s/p IVF    LABS:  12-18    134<L>  |  102  |  116<H>  ----------------------------<  121<H>  3.7   |  18<L>  |  5.88<H>    Ca    8.2<L>      18 Dec 2023 23:22  Phos  8.3     12-18  Mg     1.2     12-18    TPro  5.6<L>  /  Alb  2.3<L>  /  TBili  0.2  /  DBili      /  AST  24  /  ALT  19  /  AlkPhos  84  12-18                          8.1    19.49 )-----------( 401      ( 18 Dec 2023 23:22 )             25.1       Urine Studies:  Creatinine Trend: 5.88<--, 3.64<--, 5.85<--, 6.14<--, 5.81<--, 6.16<--  Urinalysis Basic - ( 18 Dec 2023 23:22 )    Color:  / Appearance:  / SG:  / pH:   Gluc: 121 mg/dL / Ketone:   / Bili:  / Urobili:    Blood:  / Protein:  / Nitrite:    Leuk Esterase:  / RBC:  / WBC    Sq Epi:  / Non Sq Epi:  / Bacteria:       Sodium, Random Urine: 28 mmol/L (12-15 @ 18:04)  Creatinine, Random Urine: 65 mg/dL (12-15 @ 18:04)  Protein/Creatinine Ratio Calculation: 1.2 Ratio (12-15 @ 18:04)  Osmolality, Random Urine: 378 mosm/kg (12-15 @ 18:04)  Potassium, Random Urine: 18 mmol/L (12-15 @ 18:04)    RADIOLOGY & ADDITIONAL STUDIES:  Reviewed

## 2023-12-19 NOTE — PROGRESS NOTE ADULT - SUBJECTIVE AND OBJECTIVE BOX
SUBJECTIVE:    ZULY- denies abdominal pain- 1 soft malodorous bowel movement overnight  Tolerating soft minced diet  Clinical improvement on PO vanco     MEDICATIONS  (STANDING):  aspirin enteric coated 81 milliGRAM(s) Oral every 24 hours  atorvastatin 80 milliGRAM(s) Oral at bedtime  dextrose 5% 1000 milliLiter(s) (100 mL/Hr) IV Continuous <Continuous>  dextrose 5%. 1000 milliLiter(s) (50 mL/Hr) IV Continuous <Continuous>  dextrose 5%. 1000 milliLiter(s) (100 mL/Hr) IV Continuous <Continuous>  dextrose 50% Injectable 25 Gram(s) IV Push once  dextrose 50% Injectable 25 Gram(s) IV Push once  dextrose 50% Injectable 25 Gram(s) IV Push once  dextrose 50% Injectable 12.5 Gram(s) IV Push once  glucagon  Injectable 1 milliGRAM(s) IntraMuscular once  influenza   Vaccine 0.5 milliLiter(s) IntraMuscular once  insulin lispro (ADMELOG) corrective regimen sliding scale   SubCutaneous Before meals and at bedtime  metroNIDAZOLE  IVPB 500 milliGRAM(s) IV Intermittent every 8 hours  PPD  5 Tuberculin Unit(s) Injectable 5 Unit(s) IntraDermal once  vancomycin    Solution 125 milliGRAM(s) Oral every 6 hours    MEDICATIONS  (PRN):  dextrose Oral Gel 15 Gram(s) Oral once PRN Blood Glucose LESS THAN 70 milliGRAM(s)/deciliter      Vital Signs Last 24 Hrs  T(C): 36.3 (19 Dec 2023 06:41), Max: 36.4 (18 Dec 2023 20:52)  T(F): 97.4 (19 Dec 2023 06:41), Max: 97.5 (18 Dec 2023 20:52)  HR: 95 (19 Dec 2023 06:41) (90 - 96)  BP: 150/81 (19 Dec 2023 06:41) (150/81 - 153/85)  BP(mean): --  RR: 18 (19 Dec 2023 06:41) (18 - 18)  SpO2: 97% (19 Dec 2023 06:41) (97% - 100%)    Parameters below as of 19 Dec 2023 06:41  Patient On (Oxygen Delivery Method): room air        Physical Exam:  General: NAD, resting comfortably in bed  Pulmonary: Nonlabored breathing, no respiratory distress  Cardiovascular: NSR  Abdominal: soft, NT/ND  Extremities: WWP, normal strength  Neuro: A/O x 3, CNs II-XII grossly intact, no focal deficits    I&O's Summary    18 Dec 2023 07:01  -  19 Dec 2023 07:00  --------------------------------------------------------  IN: 900 mL / OUT: 1300 mL / NET: -400 mL        LABS:                        8.1    19.49 )-----------( 401      ( 18 Dec 2023 23:22 )             25.1     12-18    134<L>  |  102  |  116<H>  ----------------------------<  121<H>  3.7   |  18<L>  |  5.88<H>    Ca    8.2<L>      18 Dec 2023 23:22  Phos  8.3     12-18  Mg     1.2     12-18    TPro  5.6<L>  /  Alb  2.3<L>  /  TBili  0.2  /  DBili  x   /  AST  24  /  ALT  19  /  AlkPhos  84  12-18      Urinalysis Basic - ( 18 Dec 2023 23:22 )    Color: x / Appearance: x / SG: x / pH: x  Gluc: 121 mg/dL / Ketone: x  / Bili: x / Urobili: x   Blood: x / Protein: x / Nitrite: x   Leuk Esterase: x / RBC: x / WBC x   Sq Epi: x / Non Sq Epi: x / Bacteria: x      CAPILLARY BLOOD GLUCOSE      POCT Blood Glucose.: 126 mg/dL (19 Dec 2023 07:34)  POCT Blood Glucose.: 119 mg/dL (19 Dec 2023 01:00)  POCT Blood Glucose.: 148 mg/dL (18 Dec 2023 18:49)  POCT Blood Glucose.: 120 mg/dL (18 Dec 2023 13:09)  POCT Blood Glucose.: 112 mg/dL (18 Dec 2023 12:16)    LIVER FUNCTIONS - ( 18 Dec 2023 23:22 )  Alb: 2.3 g/dL / Pro: 5.6 g/dL / ALK PHOS: 84 U/L / ALT: 19 U/L / AST: 24 U/L / GGT: x             RADIOLOGY & ADDITIONAL STUDIES:

## 2023-12-19 NOTE — PROGRESS NOTE ADULT - PROBLEM SELECTOR PLAN 1
POA,  HR > 90, WBC > 12, w/ Cr. elevated from baseline in setting of infection (below)  -f/u cultures POA,  HR > 90, WBC > 12, w/ Cr. elevated from baseline in setting of infection (below)  -f/u cultures - NGTD

## 2023-12-19 NOTE — PROGRESS NOTE ADULT - SUBJECTIVE AND OBJECTIVE BOX
SUBJECTIVE: Patient seen and examined at bedside.    aspirin enteric coated 81 milliGRAM(s) Oral every 24 hours  metroNIDAZOLE  IVPB 500 milliGRAM(s) IV Intermittent every 8 hours  vancomycin    Solution 125 milliGRAM(s) Oral every 6 hours      Vital Signs Last 24 Hrs  T(C): 36.3 (19 Dec 2023 06:41), Max: 36.4 (18 Dec 2023 20:52)  T(F): 97.4 (19 Dec 2023 06:41), Max: 97.5 (18 Dec 2023 20:52)  HR: 95 (19 Dec 2023 06:41) (90 - 96)  BP: 150/81 (19 Dec 2023 06:41) (150/81 - 153/85)  BP(mean): --  RR: 18 (19 Dec 2023 06:41) (18 - 18)  SpO2: 97% (19 Dec 2023 06:41) (97% - 100%)    Parameters below as of 19 Dec 2023 06:41  Patient On (Oxygen Delivery Method): room air      I&O's Detail    18 Dec 2023 07:01  -  19 Dec 2023 07:00  --------------------------------------------------------  IN:    dextrose 5% w/ Additives: 900 mL  Total IN: 900 mL    OUT:    Indwelling Catheter - Urethral (mL): 1300 mL  Total OUT: 1300 mL    Total NET: -400 mL      19 Dec 2023 07:01  -  19 Dec 2023 12:40  --------------------------------------------------------  IN:    Oral Fluid: 236 mL  Total IN: 236 mL    OUT:    Indwelling Catheter - Urethral (mL): 650 mL  Total OUT: 650 mL    Total NET: -414 mL          General: NAD, resting comfortably in bed  C/V: NSR  Pulm: Nonlabored breathing, no respiratory distress  Abd: soft, moderately distended, nontender.  Extrem: RIGHT BKA well healed. LUE AVF with palpable thrill and pulsatile, progressively less pulsatile proximally.      LABS:                        8.1    19.49 )-----------( 401      ( 18 Dec 2023 23:22 )             25.1     12-18    134<L>  |  102  |  116<H>  ----------------------------<  121<H>  3.7   |  18<L>  |  5.88<H>    Ca    8.2<L>      18 Dec 2023 23:22  Phos  8.3     12-18  Mg     1.2     12-18    TPro  5.6<L>  /  Alb  2.3<L>  /  TBili  0.2  /  DBili  x   /  AST  24  /  ALT  19  /  AlkPhos  84  12-18      Urinalysis Basic - ( 18 Dec 2023 23:22 )    Color: x / Appearance: x / SG: x / pH: x  Gluc: 121 mg/dL / Ketone: x  / Bili: x / Urobili: x   Blood: x / Protein: x / Nitrite: x   Leuk Esterase: x / RBC: x / WBC x   Sq Epi: x / Non Sq Epi: x / Bacteria: x        RADIOLOGY & ADDITIONAL STUDIES:

## 2023-12-19 NOTE — PROGRESS NOTE ADULT - ASSESSMENT
ASSESSMENT/ PLAN:  58M T2DM, CVA x2 w/ residual L-sided weakness, early onset dementia, HTN, HLD, DVT, CKD V (Cr 7-8, not on HD), BPH, PSH L toe amputation and L BC AVF creation (5/2023) and remote R BKA who presented to [facility] from nursing home for hypotension and constipation on 12/15. Patient with elevated WBC into 30K on admission with new onset diarrhea, now + for C diff colitis.  Surgery consulted for evaluation of toxic megacolon given CTAP with dilated sigmoid.  Dilation likely 2/2 to severe inflammation from colitis. Patient without acute abdominal complaints and is not distended. Although tachycardic, no HD instability noted. Would recommend continued medical management, over all improvements    Recommendations:  - No acute surgical intervention  - consider bowel rest if increased bowel movements   - Agree with PO vancomycin for C diff colitis.   - Surgery Team 4C will continue sign off. Please call with any quesitons/concerns.   Attending aware and agrees with plan   ASSESSMENT/ PLAN:  58M T2DM, CVA x2 w/ residual L-sided weakness, early onset dementia, HTN, HLD, DVT, CKD V (Cr 7-8, not on HD), BPH, PSH L toe amputation and L BC AVF creation (5/2023) and remote R BKA who presented to [facility] from nursing home for hypotension and constipation on 12/15. Patient with elevated WBC into 30K on admission with new onset diarrhea, now + for C diff colitis.  Surgery consulted for evaluation of toxic megacolon given CTAP with dilated sigmoid.  Dilation likely 2/2 to severe inflammation from colitis. Patient without acute abdominal complaints and is not distended. Although tachycardic, no HD instability noted. Would recommend continued medical management, over all improvements    Recommendations:  - No acute surgical intervention  - consider bowel rest if increased bowel movements   - Agree with PO vancomycin for C diff colitis.   - Surgery Team 4C will continue sign off. Please call with any quesitons/concerns.   Attending aware and agrees with plan

## 2023-12-19 NOTE — PROGRESS NOTE ADULT - ATTENDING COMMENTS
Patient was seen and examined at bedside. Case discuss with resident. Pt w.o any complaints this morning. Pt w/o bowel movement.     OBJECTIVE:  Vital Signs Last 24 Hrs  T(C): 36.3 (19 Dec 2023 06:41), Max: 36.4 (18 Dec 2023 20:52)  T(F): 97.4 (19 Dec 2023 06:41), Max: 97.5 (18 Dec 2023 20:52)  HR: 95 (19 Dec 2023 06:41) (90 - 96)  BP: 150/81 (19 Dec 2023 06:41) (150/81 - 153/85)  RR: 18 (19 Dec 2023 06:41) (18 - 18)  SpO2: 97% (19 Dec 2023 06:41) (97% - 100%)    PE: As  per resident note documented above    LABS:                        8.1    19.49 )-----------( 401      ( 18 Dec 2023 23:22 )             25.1     12-18    134<L>  |  102  |  116<H>  ----------------------------<  121<H>  3.7   |  18<L>  |  5.88<H>    Ca    8.2<L>      18 Dec 2023 23:22  Phos  8.3     12-18  Mg     1.2     12-18    TPro  5.6<L>  /  Alb  2.3<L>  /  TBili  0.2  /  DBili  x   /  AST  24  /  ALT  19  /  AlkPhos  84  12-18    LIVER FUNCTIONS - ( 18 Dec 2023 23:22 )  Alb: 2.3 g/dL / Pro: 5.6 g/dL / ALK PHOS: 84 U/L / ALT: 19 U/L / AST: 24 U/L / GGT: x           CAPILLARY BLOOD GLUCOSE  POCT Blood Glucose.: 182 mg/dL (19 Dec 2023 11:46)  POCT Blood Glucose.: 126 mg/dL (19 Dec 2023 07:34)  POCT Blood Glucose.: 119 mg/dL (19 Dec 2023 01:00)  POCT Blood Glucose.: 148 mg/dL (18 Dec 2023 18:49)  POCT Blood Glucose.: 120 mg/dL (18 Dec 2023 13:09)  POCT Blood Glucose.: 112 mg/dL (18 Dec 2023 12:16)    aspirin enteric coated 81 milliGRAM(s) Oral every 24 hours  atorvastatin 80 milliGRAM(s) Oral at bedtime  finasteride 5 milliGRAM(s) Oral daily  glucagon  Injectable 1 milliGRAM(s) IntraMuscular once  influenza   Vaccine 0.5 milliLiter(s) IntraMuscular once  insulin lispro (ADMELOG) corrective regimen sliding scale   SubCutaneous Before meals and at bedtime  metroNIDAZOLE  IVPB 500 milliGRAM(s) IV Intermittent every 8 hours  PPD  5 Tuberculin Unit(s) Injectable 5 Unit(s) IntraDermal once  tamsulosin 0.4 milliGRAM(s) Oral at bedtime  vancomycin    Solution 125 milliGRAM(s) Oral every 6 hours    A/p: 57yo M T2DM, CVA x2 w/ residual L-sided weakness, early onset dementia, HTN, HLD, DVT, CKD V (Cr ~6, not on HD), BPH, PSH L toe amputation, R BKA, AVF creation (5/2023) BIBEMS from nursing home for hypotension, found  to have C diff.    #C diff  - Will continue contact precautions   - Will continue Vancomycin and Flagyl     #Type II Diabetes  - Will continue ISS and monitor FS; currently WNL     #Hx of CVA x2 w/  residual L-sided weakness  # early onset dementia  -Will continue ASA and statin     #HTN/HLD  -Continue statin   -Pt with elevated BP w/ SBP in 150's   - Will continue to monitor BP and start antihypertensive if required    #CKD Stage V not on HD  - Renal following; Per Renal team we will continue to monitor the pt off HD   -Will continue bicarb drip for now and f/u repeat BMP  -Pt for  fistulogram of LUE AVF due to stenosis and thrombosis, however patient septic with c diff and unable to undergo procedure at this time.  -Spoke to Vascular team about scheduling the fistulogram as outpatient  given the pt's infection; Will f/u with Vascular team     #BPH  -Continue Tamusolin     #DISPO   -Pt to return to [place] once medically stable Patient was seen and examined at bedside. Case discuss with resident. Pt w.o any complaints this morning. Pt w/o bowel movement.     OBJECTIVE:  Vital Signs Last 24 Hrs  T(C): 36.3 (19 Dec 2023 06:41), Max: 36.4 (18 Dec 2023 20:52)  T(F): 97.4 (19 Dec 2023 06:41), Max: 97.5 (18 Dec 2023 20:52)  HR: 95 (19 Dec 2023 06:41) (90 - 96)  BP: 150/81 (19 Dec 2023 06:41) (150/81 - 153/85)  RR: 18 (19 Dec 2023 06:41) (18 - 18)  SpO2: 97% (19 Dec 2023 06:41) (97% - 100%)    PE: As  per resident note documented above    LABS:                        8.1    19.49 )-----------( 401      ( 18 Dec 2023 23:22 )             25.1     12-18    134<L>  |  102  |  116<H>  ----------------------------<  121<H>  3.7   |  18<L>  |  5.88<H>    Ca    8.2<L>      18 Dec 2023 23:22  Phos  8.3     12-18  Mg     1.2     12-18    TPro  5.6<L>  /  Alb  2.3<L>  /  TBili  0.2  /  DBili  x   /  AST  24  /  ALT  19  /  AlkPhos  84  12-18    LIVER FUNCTIONS - ( 18 Dec 2023 23:22 )  Alb: 2.3 g/dL / Pro: 5.6 g/dL / ALK PHOS: 84 U/L / ALT: 19 U/L / AST: 24 U/L / GGT: x           CAPILLARY BLOOD GLUCOSE  POCT Blood Glucose.: 182 mg/dL (19 Dec 2023 11:46)  POCT Blood Glucose.: 126 mg/dL (19 Dec 2023 07:34)  POCT Blood Glucose.: 119 mg/dL (19 Dec 2023 01:00)  POCT Blood Glucose.: 148 mg/dL (18 Dec 2023 18:49)  POCT Blood Glucose.: 120 mg/dL (18 Dec 2023 13:09)  POCT Blood Glucose.: 112 mg/dL (18 Dec 2023 12:16)    aspirin enteric coated 81 milliGRAM(s) Oral every 24 hours  atorvastatin 80 milliGRAM(s) Oral at bedtime  finasteride 5 milliGRAM(s) Oral daily  glucagon  Injectable 1 milliGRAM(s) IntraMuscular once  influenza   Vaccine 0.5 milliLiter(s) IntraMuscular once  insulin lispro (ADMELOG) corrective regimen sliding scale   SubCutaneous Before meals and at bedtime  metroNIDAZOLE  IVPB 500 milliGRAM(s) IV Intermittent every 8 hours  PPD  5 Tuberculin Unit(s) Injectable 5 Unit(s) IntraDermal once  tamsulosin 0.4 milliGRAM(s) Oral at bedtime  vancomycin    Solution 125 milliGRAM(s) Oral every 6 hours    A/p: 59yo M T2DM, CVA x2 w/ residual L-sided weakness, early onset dementia, HTN, HLD, DVT, CKD V (Cr ~6, not on HD), BPH, PSH L toe amputation, R BKA, AVF creation (5/2023) BIBEMS from nursing home for hypotension, found  to have C diff.    #C diff  - Will continue contact precautions   - Will continue Vancomycin and Flagyl     #Type II Diabetes  - Will continue ISS and monitor FS; currently WNL     #Hx of CVA x2 w/  residual L-sided weakness  # early onset dementia  -Will continue ASA and statin     #HTN/HLD  -Continue statin   -Pt with elevated BP w/ SBP in 150's   - Will continue to monitor BP and start antihypertensive if required    #CKD Stage V not on HD  - Renal following; Per Renal team we will continue to monitor the pt off HD   -Will continue bicarb drip for now and f/u repeat BMP  -Pt for  fistulogram of LUE AVF due to stenosis and thrombosis, however patient septic with c diff and unable to undergo procedure at this time.  -Spoke to Vascular team about scheduling the fistulogram as outpatient  given the pt's infection; Will f/u with Vascular team     #BPH  -Continue Tamusolin     #DISPO   -Pt to return to [place] once medically stable Patient was seen and examined at bedside. Case discuss with resident. Pt w.o any complaints this morning. Pt w/o bowel movement.     OBJECTIVE:  Vital Signs Last 24 Hrs  T(C): 36.3 (19 Dec 2023 06:41), Max: 36.4 (18 Dec 2023 20:52)  T(F): 97.4 (19 Dec 2023 06:41), Max: 97.5 (18 Dec 2023 20:52)  HR: 95 (19 Dec 2023 06:41) (90 - 96)  BP: 150/81 (19 Dec 2023 06:41) (150/81 - 153/85)  RR: 18 (19 Dec 2023 06:41) (18 - 18)  SpO2: 97% (19 Dec 2023 06:41) (97% - 100%)    PE: As  per resident note documented above    LABS:                        8.1    19.49 )-----------( 401      ( 18 Dec 2023 23:22 )             25.1     12-18    134<L>  |  102  |  116<H>  ----------------------------<  121<H>  3.7   |  18<L>  |  5.88<H>    Ca    8.2<L>      18 Dec 2023 23:22  Phos  8.3     12-18  Mg     1.2     12-18    TPro  5.6<L>  /  Alb  2.3<L>  /  TBili  0.2  /  DBili  x   /  AST  24  /  ALT  19  /  AlkPhos  84  12-18    LIVER FUNCTIONS - ( 18 Dec 2023 23:22 )  Alb: 2.3 g/dL / Pro: 5.6 g/dL / ALK PHOS: 84 U/L / ALT: 19 U/L / AST: 24 U/L / GGT: x           CAPILLARY BLOOD GLUCOSE  POCT Blood Glucose.: 182 mg/dL (19 Dec 2023 11:46)  POCT Blood Glucose.: 126 mg/dL (19 Dec 2023 07:34)  POCT Blood Glucose.: 119 mg/dL (19 Dec 2023 01:00)  POCT Blood Glucose.: 148 mg/dL (18 Dec 2023 18:49)  POCT Blood Glucose.: 120 mg/dL (18 Dec 2023 13:09)  POCT Blood Glucose.: 112 mg/dL (18 Dec 2023 12:16)    aspirin enteric coated 81 milliGRAM(s) Oral every 24 hours  atorvastatin 80 milliGRAM(s) Oral at bedtime  finasteride 5 milliGRAM(s) Oral daily  glucagon  Injectable 1 milliGRAM(s) IntraMuscular once  influenza   Vaccine 0.5 milliLiter(s) IntraMuscular once  insulin lispro (ADMELOG) corrective regimen sliding scale   SubCutaneous Before meals and at bedtime  metroNIDAZOLE  IVPB 500 milliGRAM(s) IV Intermittent every 8 hours  PPD  5 Tuberculin Unit(s) Injectable 5 Unit(s) IntraDermal once  tamsulosin 0.4 milliGRAM(s) Oral at bedtime  vancomycin    Solution 125 milliGRAM(s) Oral every 6 hours    A/p: 57yo M T2DM, CVA x2 w/ residual L-sided weakness, early onset dementia, HTN, HLD, DVT, CKD V (Cr ~6, not on HD), BPH, PSH L toe amputation, R BKA, AVF creation (5/2023) BIBEMS from nursing home for hypotension, found  to have C diff.    #C diff  - Will continue contact precautions   - Will continue Vancomycin and Flagyl     #Type II Diabetes  - Will continue ISS and monitor FS; currently WNL     #Hx of CVA x2 w/  residual L-sided weakness  # Early onset dementia  -Will continue ASA and statin     #HTN/HLD  -Continue statin   -Pt with elevated BP w/ SBP in 150's   - Will continue to monitor BP and start antihypertensive if required    #CKD Stage V not on HD  - Renal following; Per Renal team we will continue to monitor the pt off HD   -Will continue bicarb drip for now and f/u repeat BMP  -Pt for  fistulogram of LUE AVF due to stenosis and thrombosis, however patient septic with c diff and unable to undergo procedure at this time.  -Spoke to Vascular team about scheduling the fistulogram as outpatient  given the pt's infection; Will f/u with Vascular team     #BPH  -Continue Tamusolin     #DISPO   -Pt to return to [place] once medically stable

## 2023-12-19 NOTE — PROGRESS NOTE ADULT - ASSESSMENT
58M with CKD 5, HTN sent in by NH for hypotension, admitted for possible sepsis and CKD progression. Nephrology consulted for possible HD.    #CKD Stage V with concern for progression vs pre-renal azotemia in setting of infectious diarrhea and volume depletion  Cr around his baseline, but BUN elevated to 140s on admission with some changes in mental status. BUN and creatinine now improving with improvement in mentation after fluid resuscitation.  Attempted HD 12/15 but unable to dialyze due to increased arterial pressures. US with distal stenosis and possible thrombosis.  UA w/ proteinuria (chronic), no RBCs    Recommendations:  - Awaiting fistulogram of LUE AVF due to stenosis and thrombosis, however patient septic with c diff and unable to undergo procedure at this time. BUN and creatinine slightly improving consistent with pre-renal azotemia, so can continue to monitor patient for now off of HD. Will continue to assess daily. If HD necessary prior to fistula will likely require HD line.  - Continue bicarb drip for now, monitor volume and respiratory status. Follow up on AM bicarb.  - BMP daily  - Renal diet    #Anemia  Hb not at goal  tsat 11%, however iron contraindicated in setting of sepsis  Will consider epo w/ HD once initiated    #Access  LUE AVF with stenosis, thrombosis. Planned for fistulogram once medically improved.    #BMD-CKD  Ca 8.0  Phos 4.9  Check PTH, Vit D

## 2023-12-19 NOTE — PROGRESS NOTE ADULT - SUBJECTIVE AND OBJECTIVE BOX
OVERNIGHT EVENTS:    SUBJECTIVE / INTERVAL HPI: Patient seen and examined at bedside.     ROS: negative unless otherwise stated above.    VITAL SIGNS:  Vital Signs Last 24 Hrs  T(C): 36.4 (18 Dec 2023 20:52), Max: 36.4 (18 Dec 2023 20:52)  T(F): 97.5 (18 Dec 2023 20:52), Max: 97.5 (18 Dec 2023 20:52)  HR: 96 (18 Dec 2023 20:52) (90 - 96)  BP: 153/85 (18 Dec 2023 20:52) (152/82 - 153/85)  BP(mean): --  RR: 18 (18 Dec 2023 20:52) (18 - 18)  SpO2: 97% (18 Dec 2023 20:52) (97% - 100%)    Parameters below as of 18 Dec 2023 20:52  Patient On (Oxygen Delivery Method): room air        PHYSICAL EXAM:    General: In no apparent distress  HEENT: NC/AT; PERRL, anicteric sclera; MMM  Neck: supple  Cardiovascular: +S1/S2; RRR  Respiratory: CTA B/L; no W/R/R  Gastrointestinal: soft, NT/ND; +BSx4  Extremities: WWP; no edema, clubbing or cyanosis  Vascular: 2+ radial, DP/PT pulses B/L  Neurological: AAOx3; no focal deficits    MEDICATIONS:  MEDICATIONS  (STANDING):  aspirin enteric coated 81 milliGRAM(s) Oral every 24 hours  atorvastatin 80 milliGRAM(s) Oral at bedtime  dextrose 5% 1000 milliLiter(s) (100 mL/Hr) IV Continuous <Continuous>  dextrose 5%. 1000 milliLiter(s) (50 mL/Hr) IV Continuous <Continuous>  dextrose 5%. 1000 milliLiter(s) (100 mL/Hr) IV Continuous <Continuous>  dextrose 50% Injectable 25 Gram(s) IV Push once  dextrose 50% Injectable 25 Gram(s) IV Push once  dextrose 50% Injectable 25 Gram(s) IV Push once  dextrose 50% Injectable 12.5 Gram(s) IV Push once  glucagon  Injectable 1 milliGRAM(s) IntraMuscular once  influenza   Vaccine 0.5 milliLiter(s) IntraMuscular once  insulin lispro (ADMELOG) corrective regimen sliding scale   SubCutaneous Before meals and at bedtime  metroNIDAZOLE  IVPB 500 milliGRAM(s) IV Intermittent every 8 hours  PPD  5 Tuberculin Unit(s) Injectable 5 Unit(s) IntraDermal once  vancomycin    Solution 125 milliGRAM(s) Oral every 6 hours    MEDICATIONS  (PRN):  dextrose Oral Gel 15 Gram(s) Oral once PRN Blood Glucose LESS THAN 70 milliGRAM(s)/deciliter      ALLERGIES:  Allergies    No Known Allergies    Intolerances        LABS:                        8.1    19.49 )-----------( 401      ( 18 Dec 2023 23:22 )             25.1     12-18    134<L>  |  102  |  116<H>  ----------------------------<  121<H>  3.7   |  18<L>  |  5.88<H>    Ca    8.2<L>      18 Dec 2023 23:22  Phos  8.3     12-18  Mg     1.2     12-18    TPro  5.6<L>  /  Alb  2.3<L>  /  TBili  0.2  /  DBili  x   /  AST  24  /  ALT  19  /  AlkPhos  84  12-18      Urinalysis Basic - ( 18 Dec 2023 23:22 )    Color: x / Appearance: x / SG: x / pH: x  Gluc: 121 mg/dL / Ketone: x  / Bili: x / Urobili: x   Blood: x / Protein: x / Nitrite: x   Leuk Esterase: x / RBC: x / WBC x   Sq Epi: x / Non Sq Epi: x / Bacteria: x      CAPILLARY BLOOD GLUCOSE      POCT Blood Glucose.: 119 mg/dL (19 Dec 2023 01:00)      RADIOLOGY & ADDITIONAL TESTS: Reviewed. OVERNIGHT EVENTS: ZULY    SUBJECTIVE / INTERVAL HPI: Patient seen and examined at bedside. The patient was encountered at baseline AAOx2, in no acute distress. He was more animated today than at prior encounters, with a decrease in the flat effect seen previously. He denied chest pain, SOB, palpitations, n/v. He also denied dizziness and lightheadedness.    ROS: negative unless otherwise stated above.    VITAL SIGNS:  Vital Signs Last 24 Hrs  T(C): 36.4 (18 Dec 2023 20:52), Max: 36.4 (18 Dec 2023 20:52)  T(F): 97.5 (18 Dec 2023 20:52), Max: 97.5 (18 Dec 2023 20:52)  HR: 96 (18 Dec 2023 20:52) (90 - 96)  BP: 153/85 (18 Dec 2023 20:52) (152/82 - 153/85)  BP(mean): --  RR: 18 (18 Dec 2023 20:52) (18 - 18)  SpO2: 97% (18 Dec 2023 20:52) (97% - 100%)    Parameters below as of 18 Dec 2023 20:52  Patient On (Oxygen Delivery Method): room air        PHYSICAL EXAM:    General: In no apparent distress  HEENT: NC/AT; PERRL, anicteric sclera; MMM  Neck: supple  Cardiovascular: +S1/S2; RRR  Respiratory: CTA B/L; no W/R/R  Gastrointestinal: soft, NT/ND; +BSx4  Extremities: WWP; no edema, clubbing or cyanosis  Vascular: 2+ radial, DP/PT pulses B/L  Neurological: AAOx3; no focal deficits    MEDICATIONS:  MEDICATIONS  (STANDING):  aspirin enteric coated 81 milliGRAM(s) Oral every 24 hours  atorvastatin 80 milliGRAM(s) Oral at bedtime  dextrose 5% 1000 milliLiter(s) (100 mL/Hr) IV Continuous <Continuous>  dextrose 5%. 1000 milliLiter(s) (50 mL/Hr) IV Continuous <Continuous>  dextrose 5%. 1000 milliLiter(s) (100 mL/Hr) IV Continuous <Continuous>  dextrose 50% Injectable 25 Gram(s) IV Push once  dextrose 50% Injectable 25 Gram(s) IV Push once  dextrose 50% Injectable 25 Gram(s) IV Push once  dextrose 50% Injectable 12.5 Gram(s) IV Push once  glucagon  Injectable 1 milliGRAM(s) IntraMuscular once  influenza   Vaccine 0.5 milliLiter(s) IntraMuscular once  insulin lispro (ADMELOG) corrective regimen sliding scale   SubCutaneous Before meals and at bedtime  metroNIDAZOLE  IVPB 500 milliGRAM(s) IV Intermittent every 8 hours  PPD  5 Tuberculin Unit(s) Injectable 5 Unit(s) IntraDermal once  vancomycin    Solution 125 milliGRAM(s) Oral every 6 hours    MEDICATIONS  (PRN):  dextrose Oral Gel 15 Gram(s) Oral once PRN Blood Glucose LESS THAN 70 milliGRAM(s)/deciliter      ALLERGIES:  Allergies    No Known Allergies    Intolerances        LABS:                        8.1    19.49 )-----------( 401      ( 18 Dec 2023 23:22 )             25.1     12-18    134<L>  |  102  |  116<H>  ----------------------------<  121<H>  3.7   |  18<L>  |  5.88<H>    Ca    8.2<L>      18 Dec 2023 23:22  Phos  8.3     12-18  Mg     1.2     12-18    TPro  5.6<L>  /  Alb  2.3<L>  /  TBili  0.2  /  DBili  x   /  AST  24  /  ALT  19  /  AlkPhos  84  12-18      Urinalysis Basic - ( 18 Dec 2023 23:22 )    Color: x / Appearance: x / SG: x / pH: x  Gluc: 121 mg/dL / Ketone: x  / Bili: x / Urobili: x   Blood: x / Protein: x / Nitrite: x   Leuk Esterase: x / RBC: x / WBC x   Sq Epi: x / Non Sq Epi: x / Bacteria: x      CAPILLARY BLOOD GLUCOSE      POCT Blood Glucose.: 119 mg/dL (19 Dec 2023 01:00)      RADIOLOGY & ADDITIONAL TESTS: Reviewed.

## 2023-12-19 NOTE — PROGRESS NOTE ADULT - ATTENDING COMMENTS
Pt seen, agree, Improving c diff. we will sign off. please reconsult if there is a change in clinical course.

## 2023-12-19 NOTE — PROGRESS NOTE ADULT - PROBLEM SELECTOR PLAN 5
Pt reports he typically has a bowel movement every day but has not had one in ~2 days. Reported abdominal pain earlier in the day at his nursing home but is not currently complaining of abdominal pain. Abdomen is firm but not distended and not tender to palpation. CXR with dilated loops of bowel. Pt has recent history of C. diff.  - XR abdomen: Dilated air-filled left colon up to 9 cm, right and transverse colon are filled with air and fecal matter measuring up to 7.5 cm  - miralax BID  - senna qhs Pt reports he typically has a bowel movement every day but has not had one in ~2 days. Reported abdominal pain earlier in the day at his nursing home but is not currently complaining of abdominal pain. Abdomen is firm but not distended and not tender to palpation. CXR with dilated loops of bowel. Pt has recent history of C. diff.  - XR abdomen: Dilated air-filled left colon up to 9 cm, right and transverse colon are filled with air and fecal matter measuring up to 7.5 cm  - RESOLVED; c/w c. diff mgt as above.

## 2023-12-19 NOTE — PROGRESS NOTE ADULT - PROBLEM SELECTOR PLAN 4
Planned for TDC placement and HD tomorrow vs Tuesday Planned for TDC placement and HD when medically optimized.

## 2023-12-19 NOTE — PROGRESS NOTE ADULT - ATTENDING COMMENTS
more alert and interactive on rounds today  offers that he is more hungry  than prior  57 yo man with CKD 5, HTN admitted 4 days ago from SNF with hypotension.  w/u revealed C diff- still with frequent loose BM  BUN up trend today- IVF have been dcd'  Would resume IVF - and continue until oral back to baseline and GI losses resolved- clinically picture more c/w extrerenal source of azotemia since bump in BUN much greater than creat  No plan for initiation of HD at this time.  AVF  + bruit- and looks and feels more full today- c/w with prior intravascular volume contraction

## 2023-12-19 NOTE — PROGRESS NOTE ADULT - ASSESSMENT
57yo M T2DM, CVA x2 w/ residual L-sided weakness, early onset dementia, HTN, HLD, DVT, CKD V (Cr 7-8, not on HD), BPH, PSH L toe amputation and AVF creation (5/2023), R jd BKA and closure admitted to medicine with concerns of sepsis and urgent need for dialysis. Vascular surgery consulted for AVF revision after failed HD attempt.    No acute vascular surgery indicated at this time  Plan for fistulogram outpatient  Patient can follow up with Dr. Caleb Royal in 1-2 weeks, [address] [place] [address]  Patient needs medical clearance for procedure prior to discharge  Dialysis per nephrology  Vascular surgery will continue to follow   57yo M T2DM, CVA x2 w/ residual L-sided weakness, early onset dementia, HTN, HLD, DVT, CKD V (Cr 7-8, not on HD), BPH, PSH L toe amputation and AVF creation (5/2023), R jd BKA and closure admitted to medicine with concerns of sepsis and urgent need for dialysis. Vascular surgery consulted for AVF revision after failed HD attempt.    No acute vascular surgery indicated at this time  Plan for fistulogram outpatient  Patient can follow up with Dr. Caleb Royal in 1-2 weeks, [address] [place] [address]  Patient needs medical clearance for procedure prior to discharge  Dialysis per nephrology  Vascular surgery will continue to follow   57yo M T2DM, CVA x2 w/ residual L-sided weakness, early onset dementia, HTN, HLD, DVT, CKD V (Cr 7-8, not on HD), BPH, PSH L toe amputation and AVF creation (5/2023), R jd BKA and closure admitted to medicine with concerns of sepsis and urgent need for dialysis. Vascular surgery consulted for AVF revision after failed HD attempt.    No acute vascular surgery indicated at this time  Plan for fistulogram outpatient  Patient can follow up with Dr. Caleb Royal in 1-2 weeks, [address] [place] [address], [phone]  Patient needs medical clearance for procedure prior to discharge  Dialysis per nephrology   57yo M T2DM, CVA x2 w/ residual L-sided weakness, early onset dementia, HTN, HLD, DVT, CKD V (Cr 7-8, not on HD), BPH, PSH L toe amputation and AVF creation (5/2023), R jd BKA and closure admitted to medicine with concerns of sepsis and urgent need for dialysis. Vascular surgery consulted for AVF revision after failed HD attempt.    No acute vascular surgery indicated at this time  Plan for fistulogram outpatient  Patient can follow up with Dr. Caleb Royal in 1-2 weeks, [address] [place] [address], [phone]  Patient needs medical clearance for procedure prior to discharge  Dialysis per nephrology   59yo M T2DM, CVA x2 w/ residual L-sided weakness, early onset dementia, HTN, HLD, DVT, CKD V (Cr 7-8, not on HD), BPH, PSH L toe amputation and AVF creation (5/2023), R jd BKA and closure admitted to medicine with concerns of sepsis and urgent need for dialysis. Vascular surgery consulted for AVF revision after failed HD attempt.    No acute vascular surgery indicated at this time  Plan for fistulogram outpatient - E AVF SHOULD NOT BE ACCESSED IN THE INTERIM  Patient can follow up with Dr. Caleb Royal in 1-2 weeks, [address] [place] [address], [phone]  Patient needs medical clearance for procedure prior to discharge  Dialysis per nephrology   59yo M T2DM, CVA x2 w/ residual L-sided weakness, early onset dementia, HTN, HLD, DVT, CKD V (Cr 7-8, not on HD), BPH, PSH L toe amputation and AVF creation (5/2023), R jd BKA and closure admitted to medicine with concerns of sepsis and urgent need for dialysis. Vascular surgery consulted for AVF revision after failed HD attempt.    No acute vascular surgery indicated at this time  Plan for fistulogram outpatient - E AVF SHOULD NOT BE ACCESSED IN THE INTERIM  Patient can follow up with Dr. Caleb Royal in 1-2 weeks, [address] [place] [address], [phone]  Patient needs medical clearance for procedure prior to discharge  Dialysis per nephrology

## 2023-12-20 ENCOUNTER — TRANSCRIPTION ENCOUNTER (OUTPATIENT)
Age: 58
End: 2023-12-20

## 2023-12-20 LAB
ANION GAP SERPL CALC-SCNC: 15 MMOL/L — SIGNIFICANT CHANGE UP (ref 5–17)
ANION GAP SERPL CALC-SCNC: 15 MMOL/L — SIGNIFICANT CHANGE UP (ref 5–17)
BLD GP AB SCN SERPL QL: NEGATIVE — SIGNIFICANT CHANGE UP
BLD GP AB SCN SERPL QL: NEGATIVE — SIGNIFICANT CHANGE UP
BUN SERPL-MCNC: 116 MG/DL — HIGH (ref 7–23)
BUN SERPL-MCNC: 116 MG/DL — HIGH (ref 7–23)
CALCIUM SERPL-MCNC: 7.8 MG/DL — LOW (ref 8.4–10.5)
CALCIUM SERPL-MCNC: 7.8 MG/DL — LOW (ref 8.4–10.5)
CHLORIDE SERPL-SCNC: 103 MMOL/L — SIGNIFICANT CHANGE UP (ref 96–108)
CHLORIDE SERPL-SCNC: 103 MMOL/L — SIGNIFICANT CHANGE UP (ref 96–108)
CO2 SERPL-SCNC: 20 MMOL/L — LOW (ref 22–31)
CO2 SERPL-SCNC: 20 MMOL/L — LOW (ref 22–31)
CREAT SERPL-MCNC: 5.92 MG/DL — HIGH (ref 0.5–1.3)
CREAT SERPL-MCNC: 5.92 MG/DL — HIGH (ref 0.5–1.3)
CULTURE RESULTS: SIGNIFICANT CHANGE UP
EGFR: 10 ML/MIN/1.73M2 — LOW
EGFR: 10 ML/MIN/1.73M2 — LOW
GLUCOSE BLDC GLUCOMTR-MCNC: 111 MG/DL — HIGH (ref 70–99)
GLUCOSE BLDC GLUCOMTR-MCNC: 111 MG/DL — HIGH (ref 70–99)
GLUCOSE BLDC GLUCOMTR-MCNC: 120 MG/DL — HIGH (ref 70–99)
GLUCOSE BLDC GLUCOMTR-MCNC: 120 MG/DL — HIGH (ref 70–99)
GLUCOSE BLDC GLUCOMTR-MCNC: 136 MG/DL — HIGH (ref 70–99)
GLUCOSE BLDC GLUCOMTR-MCNC: 136 MG/DL — HIGH (ref 70–99)
GLUCOSE BLDC GLUCOMTR-MCNC: 90 MG/DL — SIGNIFICANT CHANGE UP (ref 70–99)
GLUCOSE BLDC GLUCOMTR-MCNC: 90 MG/DL — SIGNIFICANT CHANGE UP (ref 70–99)
GLUCOSE SERPL-MCNC: 88 MG/DL — SIGNIFICANT CHANGE UP (ref 70–99)
GLUCOSE SERPL-MCNC: 88 MG/DL — SIGNIFICANT CHANGE UP (ref 70–99)
HCT VFR BLD CALC: 24.3 % — LOW (ref 39–50)
HCT VFR BLD CALC: 24.3 % — LOW (ref 39–50)
HGB BLD-MCNC: 7.7 G/DL — LOW (ref 13–17)
HGB BLD-MCNC: 7.7 G/DL — LOW (ref 13–17)
MCHC RBC-ENTMCNC: 27.6 PG — SIGNIFICANT CHANGE UP (ref 27–34)
MCHC RBC-ENTMCNC: 27.6 PG — SIGNIFICANT CHANGE UP (ref 27–34)
MCHC RBC-ENTMCNC: 31.7 GM/DL — LOW (ref 32–36)
MCHC RBC-ENTMCNC: 31.7 GM/DL — LOW (ref 32–36)
MCV RBC AUTO: 87.1 FL — SIGNIFICANT CHANGE UP (ref 80–100)
MCV RBC AUTO: 87.1 FL — SIGNIFICANT CHANGE UP (ref 80–100)
NRBC # BLD: 0 /100 WBCS — SIGNIFICANT CHANGE UP (ref 0–0)
NRBC # BLD: 0 /100 WBCS — SIGNIFICANT CHANGE UP (ref 0–0)
PLATELET # BLD AUTO: 368 K/UL — SIGNIFICANT CHANGE UP (ref 150–400)
PLATELET # BLD AUTO: 368 K/UL — SIGNIFICANT CHANGE UP (ref 150–400)
POTASSIUM SERPL-MCNC: 3.1 MMOL/L — LOW (ref 3.5–5.3)
POTASSIUM SERPL-MCNC: 3.1 MMOL/L — LOW (ref 3.5–5.3)
POTASSIUM SERPL-SCNC: 3.1 MMOL/L — LOW (ref 3.5–5.3)
POTASSIUM SERPL-SCNC: 3.1 MMOL/L — LOW (ref 3.5–5.3)
RBC # BLD: 2.79 M/UL — LOW (ref 4.2–5.8)
RBC # BLD: 2.79 M/UL — LOW (ref 4.2–5.8)
RBC # FLD: 15.9 % — HIGH (ref 10.3–14.5)
RBC # FLD: 15.9 % — HIGH (ref 10.3–14.5)
RH IG SCN BLD-IMP: POSITIVE — SIGNIFICANT CHANGE UP
RH IG SCN BLD-IMP: POSITIVE — SIGNIFICANT CHANGE UP
SODIUM SERPL-SCNC: 138 MMOL/L — SIGNIFICANT CHANGE UP (ref 135–145)
SODIUM SERPL-SCNC: 138 MMOL/L — SIGNIFICANT CHANGE UP (ref 135–145)
SPECIMEN SOURCE: SIGNIFICANT CHANGE UP
WBC # BLD: 13.12 K/UL — HIGH (ref 3.8–10.5)
WBC # BLD: 13.12 K/UL — HIGH (ref 3.8–10.5)
WBC # FLD AUTO: 13.12 K/UL — HIGH (ref 3.8–10.5)
WBC # FLD AUTO: 13.12 K/UL — HIGH (ref 3.8–10.5)

## 2023-12-20 PROCEDURE — 99233 SBSQ HOSP IP/OBS HIGH 50: CPT

## 2023-12-20 PROCEDURE — 99233 SBSQ HOSP IP/OBS HIGH 50: CPT | Mod: GC

## 2023-12-20 RX ORDER — VANCOMYCIN HCL 1 G
1 VIAL (EA) INTRAVENOUS
Qty: 24 | Refills: 0
Start: 2023-12-20 | End: 2023-12-25

## 2023-12-20 RX ORDER — METRONIDAZOLE 500 MG
500 TABLET ORAL EVERY 8 HOURS
Refills: 0 | Status: DISCONTINUED | OUTPATIENT
Start: 2023-12-20 | End: 2023-12-21

## 2023-12-20 RX ADMIN — Medication 125 MILLIGRAM(S): at 22:03

## 2023-12-20 RX ADMIN — FINASTERIDE 5 MILLIGRAM(S): 5 TABLET, FILM COATED ORAL at 13:06

## 2023-12-20 RX ADMIN — Medication 125 MILLIGRAM(S): at 16:03

## 2023-12-20 RX ADMIN — Medication 125 MILLIGRAM(S): at 10:20

## 2023-12-20 RX ADMIN — HEPARIN SODIUM 5000 UNIT(S): 5000 INJECTION INTRAVENOUS; SUBCUTANEOUS at 13:08

## 2023-12-20 RX ADMIN — ATORVASTATIN CALCIUM 80 MILLIGRAM(S): 80 TABLET, FILM COATED ORAL at 22:02

## 2023-12-20 RX ADMIN — Medication 500 MILLIGRAM(S): at 13:05

## 2023-12-20 RX ADMIN — Medication 81 MILLIGRAM(S): at 10:20

## 2023-12-20 RX ADMIN — Medication 100 MILLIGRAM(S): at 05:58

## 2023-12-20 NOTE — PROGRESS NOTE ADULT - PROBLEM SELECTOR PLAN 9
Known, takes Tamsulosin 0.4 mg qhs and Finasteride 5 mg qd.  - continue home meds
Known, takes Tamsulosin 0.4 mg qhs and Finasteride 5 mg qd.  - continue home meds
Known, per med rec from last admission takes Nifedipine 90 mg qd at home.  - holding home med i/s/o relative hypotension
Known, takes Tamsulosin 0.4 mg qhs and Finasteride 5 mg qd.  - continue home meds
History of stroke with residual R-sided facial droop, also has L-sided weakness but difficult to assess as pt is contracted. Is able to follow commands. Takes ASA 81 mg qd and Atorvastatin 80 mg qhs at home.  - continue home meds
Known, takes Tamsulosin 0.4 mg qhs and Finasteride 5 mg qd.  - continue home meds

## 2023-12-20 NOTE — DISCHARGE NOTE PROVIDER - HOSPITAL COURSE
#Discharge: do not delete    59yo M T2DM, CVA x2 w/ residual L-sided weakness, early onset dementia, HTN, HLD, DVT, CKD V (Cr ~6, not on HD), BPH, PSH L toe amputation, R BKA, AVF creation (5/2023) BIBEMS from nursing home for hypotension, found to meet SIRS criteria without source of infection and has elevated BUN, admitted for VS derangement, possible HD initiation and further workup.    Hospital course (by problem):   #Severe sepsis.   POA, HR > 90, WBC > 12, w/ Cr. elevated from baseline in setting of infection (below)  -f/u cultures - NGTD.    #Clostridioides difficile infection.   C/w PO vancomycin and start IV Flagyl given CT findings of pancolitis and dilated sigmoid colon.    #Type II diabetes mellitus with hypoglycemia.   Given hypoglycemia will hold Lantus 15u qhs and Lispro 5u TID premeal, A1c 6.4% (10/25/2023).  - continue mISS inpatient  - hold tradjenta inpatient  - HbA1c: 6.1.    ESRD (end stage renal disease).   ·  Plan: Planned for TDC placement and HD when medically optimized.    #Constipation.   Pt reports he typically has a bowel movement every day but has not had one in ~2 days. Reported abdominal pain earlier in the day at his nursing home but is not currently complaining of abdominal pain. Abdomen is firm but not distended and not tender to palpation. CXR with dilated loops of bowel. Pt has recent history of C. diff.  - XR abdomen: Dilated air-filled left colon up to 9 cm, right and transverse colon are filled with air and fecal matter measuring up to 7.5 cm  - RESOLVED; c/w c. diff mgt as above.    #Anemia in end-stage renal disease.   Hgb 9.2 w/ normocytic MCV on admission (baseline Hgb 9s). Suspect anemia 2/2 CKD though will check for other common causes of both microcytic and macrocytic anemia.  - maintain active type and screen and 2 large-bore IVs  - transfuse for Hgb <7 or active bleeding.    #Dementia.   ·  Plan: Known, early-onset. A&O x 4  at baseline. Currently AOx1-2 (self and able to participate in ROS),  - no interventions.    #History of stroke.   ·  Plan: History of stroke with residual R-sided facial droop, also has L-sided weakness but difficult to assess as pt is contracted. Is able to follow commands. Takes ASA 81 mg qd and Atorvastatin 80 mg qhs at home.  - continue home meds.    #BPH (benign prostatic hyperplasia).   ·  Plan: Known, takes Tamsulosin 0.4 mg qhs and Finasteride 5 mg qd.  - continue home meds.    Patient was discharged to: Nursing home    New medications: Vancomycin 125mg PO q6 for 6 more days; Metronidazole 500mg PO q8 for 6 more days.  Changes to old medications: None  Medications that were stopped: None    Items to follow up as outpatient: [facility] 12/21; F/u Nephrology outpatient on 01/05/2023    Physical exam at the time of discharge:  General: In no apparent distress  HEENT: NC/AT; PERRL, anicteric sclera; MMM  Neck: supple  Cardiovascular: +S1/S2; RRR  Respiratory: CTA B/L; no W/R/R  Gastrointestinal: soft, NT/ND; +BSx4  Extremities: WWP; no edema, clubbing or cyanosis  Vascular: 2+ radial, DP/PT pulses B/L  Neurological: AAOx3; no focal deficits     #Discharge: do not delete    57yo M T2DM, CVA x2 w/ residual L-sided weakness, early onset dementia, HTN, HLD, DVT, CKD V (Cr ~6, not on HD), BPH, PSH L toe amputation, R BKA, AVF creation (5/2023) BIBEMS from nursing home for hypotension, found to meet SIRS criteria without source of infection and has elevated BUN, admitted for VS derangement, possible HD initiation and further workup.    Hospital course (by problem):   #Severe sepsis.   POA, HR > 90, WBC > 12, w/ Cr. elevated from baseline in setting of infection (below)  -f/u cultures - NGTD.    #Clostridioides difficile infection.   C/w PO vancomycin and start IV Flagyl given CT findings of pancolitis and dilated sigmoid colon.    #Type II diabetes mellitus with hypoglycemia.   Given hypoglycemia will hold Lantus 15u qhs and Lispro 5u TID premeal, A1c 6.4% (10/25/2023).  - continue mISS inpatient  - hold tradjenta inpatient  - HbA1c: 6.1.    ESRD (end stage renal disease).   ·  Plan: Planned for TDC placement and HD when medically optimized.    #Constipation.   Pt reports he typically has a bowel movement every day but has not had one in ~2 days. Reported abdominal pain earlier in the day at his nursing home but is not currently complaining of abdominal pain. Abdomen is firm but not distended and not tender to palpation. CXR with dilated loops of bowel. Pt has recent history of C. diff.  - XR abdomen: Dilated air-filled left colon up to 9 cm, right and transverse colon are filled with air and fecal matter measuring up to 7.5 cm  - RESOLVED; c/w c. diff mgt as above.    #Anemia in end-stage renal disease.   Hgb 9.2 w/ normocytic MCV on admission (baseline Hgb 9s). Suspect anemia 2/2 CKD though will check for other common causes of both microcytic and macrocytic anemia.  - maintain active type and screen and 2 large-bore IVs  - transfuse for Hgb <7 or active bleeding.    #Dementia.   ·  Plan: Known, early-onset. A&O x 4  at baseline. Currently AOx1-2 (self and able to participate in ROS),  - no interventions.    #History of stroke.   ·  Plan: History of stroke with residual R-sided facial droop, also has L-sided weakness but difficult to assess as pt is contracted. Is able to follow commands. Takes ASA 81 mg qd and Atorvastatin 80 mg qhs at home.  - continue home meds.    #BPH (benign prostatic hyperplasia).   ·  Plan: Known, takes Tamsulosin 0.4 mg qhs and Finasteride 5 mg qd.  - continue home meds.    Patient was discharged to: Nursing home    New medications: Vancomycin 125mg PO q6 for 6 more days; Metronidazole 500mg PO q8 for 6 more days.  Changes to old medications: None  Medications that were stopped: None    Items to follow up as outpatient: [facility] 12/21; F/u Nephrology outpatient on 01/05/2023    Physical exam at the time of discharge:  General: In no apparent distress  HEENT: NC/AT; PERRL, anicteric sclera; MMM  Neck: supple  Cardiovascular: +S1/S2; RRR  Respiratory: CTA B/L; no W/R/R  Gastrointestinal: soft, NT/ND; +BSx4  Extremities: WWP; no edema, clubbing or cyanosis  Vascular: 2+ radial, DP/PT pulses B/L  Neurological: AAOx3; no focal deficits     #Discharge: do not delete    59yo M T2DM, CVA x2 w/ residual L-sided weakness, early onset dementia, HTN, HLD, DVT, CKD V (Cr ~6, not on HD), BPH, PSH L toe amputation, R BKA, AVF creation (5/2023) BIBEMS from nursing home for hypotension, found to meet SIRS criteria without source of infection and has elevated BUN, admitted for VS derangement, possible HD initiation and further workup.    Hospital course (by problem):   #Severe sepsis.   POA, HR > 90, WBC > 12, w/ Cr. elevated from baseline in setting of infection (below)  -f/u cultures - NGTD.    #Clostridioides difficile infection.   C/w PO vancomycin and start IV Flagyl given CT findings of pancolitis and dilated sigmoid colon.    #Type II diabetes mellitus with hypoglycemia.   Given hypoglycemia will hold Lantus 15u qhs and Lispro 5u TID premeal, A1c 6.4% (10/25/2023).  - continue mISS inpatient  - hold tradjenta inpatient  - HbA1c: 6.1.    ESRD (end stage renal disease).   ·  Plan: Planned for TDC placement and HD when medically optimized.  - bicarb 1300 TID as per Renal    #Constipation.   Pt reports he typically has a bowel movement every day but has not had one in ~2 days. Reported abdominal pain earlier in the day at his nursing home but is not currently complaining of abdominal pain. Abdomen is firm but not distended and not tender to palpation. CXR with dilated loops of bowel. Pt has recent history of C. diff.  - XR abdomen: Dilated air-filled left colon up to 9 cm, right and transverse colon are filled with air and fecal matter measuring up to 7.5 cm  - RESOLVED; c/w c. diff mgt as above.    #Anemia in end-stage renal disease.   Hgb 9.2 w/ normocytic MCV on admission (baseline Hgb 9s). Suspect anemia 2/2 CKD though will check for other common causes of both microcytic and macrocytic anemia.  - maintain active type and screen and 2 large-bore IVs  - transfuse for Hgb <7 or active bleeding.    #Dementia.   ·  Plan: Known, early-onset. A&O x 4  at baseline. Currently AOx1-2 (self and able to participate in ROS),  - no interventions.    #History of stroke.   ·  Plan: History of stroke with residual R-sided facial droop, also has L-sided weakness but difficult to assess as pt is contracted. Is able to follow commands. Takes ASA 81 mg qd and Atorvastatin 80 mg qhs at home.  - continue home meds.    #BPH (benign prostatic hyperplasia).   ·  Plan: Known, takes Tamsulosin 0.4 mg qhs and Finasteride 5 mg qd.  - continue home meds.    Patient was discharged to: Nursing home    New medications: Vancomycin 125mg PO q6 for 6 more days; Metronidazole 500mg PO q8 for 6 more days.  Changes to old medications: None  Medications that were stopped: None    Items to follow up as outpatient: [facility] 12/21; F/u Nephrology outpatient on 01/05/2023    Physical exam at the time of discharge:  General: In no apparent distress  HEENT: NC/AT; PERRL, anicteric sclera; MMM  Neck: supple  Cardiovascular: +S1/S2; RRR  Respiratory: CTA B/L; no W/R/R  Gastrointestinal: soft, NT/ND; +BSx4  Extremities: WWP; no edema, clubbing or cyanosis  Vascular: 2+ radial, DP/PT pulses B/L  Neurological: AAOx3; no focal deficits     #Discharge: do not delete    57yo M T2DM, CVA x2 w/ residual L-sided weakness, early onset dementia, HTN, HLD, DVT, CKD V (Cr ~6, not on HD), BPH, PSH L toe amputation, R BKA, AVF creation (5/2023) BIBEMS from nursing home for hypotension, found to meet SIRS criteria without source of infection and has elevated BUN, admitted for VS derangement, possible HD initiation and further workup.    Hospital course (by problem):   #Severe sepsis.   POA, HR > 90, WBC > 12, w/ Cr. elevated from baseline in setting of infection (below)  -f/u cultures - NGTD.    #Clostridioides difficile infection.   C/w PO vancomycin and start IV Flagyl given CT findings of pancolitis and dilated sigmoid colon.    #Type II diabetes mellitus with hypoglycemia.   Given hypoglycemia will hold Lantus 15u qhs and Lispro 5u TID premeal, A1c 6.4% (10/25/2023).  - continue mISS inpatient  - hold tradjenta inpatient  - HbA1c: 6.1.    ESRD (end stage renal disease).   ·  Plan: Planned for TDC placement and HD when medically optimized.  - bicarb 1300 TID as per Renal    #Constipation.   Pt reports he typically has a bowel movement every day but has not had one in ~2 days. Reported abdominal pain earlier in the day at his nursing home but is not currently complaining of abdominal pain. Abdomen is firm but not distended and not tender to palpation. CXR with dilated loops of bowel. Pt has recent history of C. diff.  - XR abdomen: Dilated air-filled left colon up to 9 cm, right and transverse colon are filled with air and fecal matter measuring up to 7.5 cm  - RESOLVED; c/w c. diff mgt as above.    #Anemia in end-stage renal disease.   Hgb 9.2 w/ normocytic MCV on admission (baseline Hgb 9s). Suspect anemia 2/2 CKD though will check for other common causes of both microcytic and macrocytic anemia.  - maintain active type and screen and 2 large-bore IVs  - transfuse for Hgb <7 or active bleeding.    #Dementia.   ·  Plan: Known, early-onset. A&O x 4  at baseline. Currently AOx1-2 (self and able to participate in ROS),  - no interventions.    #History of stroke.   ·  Plan: History of stroke with residual R-sided facial droop, also has L-sided weakness but difficult to assess as pt is contracted. Is able to follow commands. Takes ASA 81 mg qd and Atorvastatin 80 mg qhs at home.  - continue home meds.    #BPH (benign prostatic hyperplasia).   ·  Plan: Known, takes Tamsulosin 0.4 mg qhs and Finasteride 5 mg qd.  - continue home meds.    Patient was discharged to: Nursing home    New medications: Vancomycin 125mg PO q6 for 6 more days; Metronidazole 500mg PO q8 for 6 more days.  Changes to old medications: None  Medications that were stopped: None    Items to follow up as outpatient: [facility] 12/21; F/u Nephrology outpatient on 01/05/2023    Physical exam at the time of discharge:  General: In no apparent distress  HEENT: NC/AT; PERRL, anicteric sclera; MMM  Neck: supple  Cardiovascular: +S1/S2; RRR  Respiratory: CTA B/L; no W/R/R  Gastrointestinal: soft, NT/ND; +BSx4  Extremities: WWP; no edema, clubbing or cyanosis  Vascular: 2+ radial, DP/PT pulses B/L  Neurological: AAOx3; no focal deficits

## 2023-12-20 NOTE — PROGRESS NOTE ADULT - PROBLEM SELECTOR PROBLEM 1
Severe sepsis
Systemic inflammatory response syndrome (SIRS)
Severe sepsis
Severe sepsis
Systemic inflammatory response syndrome (SIRS)
Severe sepsis

## 2023-12-20 NOTE — PROGRESS NOTE ADULT - PROBLEM SELECTOR PLAN 6
Hgb 9.2 w/ normocytic MCV on admission (baseline Hgb 9s). Suspect anemia 2/2 CKD though will check for other common causes of both microcytic and macrocytic anemia.  - iron low, TIBC low, %sat low consistent with likely ACD in the setting of CKD  - b12, folate wnl    PLAN:  - maintain active type and screen and 2 large-bore IVs  - transfuse for Hgb <7 or active bleeding
Known, early-onset. A&O x1 (self) at baseline. Currently AOx1-2 (self and able to participate in ROS),  - no interventions
Hgb 9.2 w/ normocytic MCV on admission (baseline Hgb 9s). Suspect anemia 2/2 CKD though will check for other common causes of both microcytic and macrocytic anemia.  - iron low, TIBC low, %sat low consistent with likely ACD in the setting of CKD  - b12, folate wnl    PLAN:  - maintain active type and screen and 2 large-bore IVs  - transfuse for Hgb <7 or active bleeding
Known, uses Lantus 15u qhs and Lispro 5u TID premeal, and takes Tradjenta 5 mg qd at home. A1c 6.4% (10/25/2023).  - continue basal/bolus regimen and mISS inpatient  - hold tradjenta inpatient  - HbA1c: 6.1

## 2023-12-20 NOTE — PROGRESS NOTE ADULT - PROBLEM/PLAN-6
DISPLAY PLAN FREE TEXT
No
DISPLAY PLAN FREE TEXT

## 2023-12-20 NOTE — DISCHARGE NOTE PROVIDER - NSDCCPCAREPLAN_GEN_ALL_CORE_FT
PRINCIPAL DISCHARGE DIAGNOSIS  Diagnosis: Clostridioides difficile infection  Assessment and Plan of Treatment: You were diagnosed with clostridioides difficile infection. Clostridioides difficile ([id number]-[id number]-w-oi-nate dif-uh-SEEL) is a bacterium that causes an infection of the colon, the longest part of the large intestine. Symptoms can range from diarrhea to life-threatening damage to the colon. The bacterium is often called C. difficile or C. diff.   In the hospital, you were treated with two antibiotics, Metronidazole IV and Vancomycin orally. Your symptoms improved, and your condition stablized. You will continue taking these medications for six more days post-discharge (a total of 10 days, which includes days already taking them in the hospital) when you return home. Please continue taking these medications as prescribed for the full course, and do not stop taking them early even if you start to feel better.  PLEASE RETURN TO THE HOSPITAL IMMEDIATELY IF: You experience chest pain, shortness of breath, palpitations, bloody vomit or blood in the stool.      SECONDARY DISCHARGE DIAGNOSES  Diagnosis: Anemia in end-stage renal disease  Assessment and Plan of Treatment: You were diagnosed with anemia in end stage renal disease. Your complete blood count consistently showed a low number of red blood cells, low hemoglobin, and low hematocrit, likely due to your kidneys not functioning properly.   PLEASE RETURN TO THE HOSPITAL IMMEDIATELY IF: You experience lightheadedness, dizziness, nausea, tingling, or palpitations.     PRINCIPAL DISCHARGE DIAGNOSIS  Diagnosis: Clostridioides difficile infection  Assessment and Plan of Treatment: You were diagnosed with clostridioides difficile infection. Clostridioides difficile ([id number]-r-oi-nate dif-uh-SEEL) is a bacterium that causes an infection of the colon, the longest part of the large intestine. Symptoms can range from diarrhea to life-threatening damage to the colon. The bacterium is often called C. difficile or C. diff.   In the hospital, you were treated with two antibiotics, Metronidazole IV and Vancomycin orally. Your symptoms improved, and your condition stablized. You will continue taking these medications for six more days post-discharge (a total of 10 days, which includes days already taking them in the hospital) when you return home. Please continue taking these medications as prescribed for the full course, and do not stop taking them early even if you start to feel better.  PLEASE RETURN TO THE HOSPITAL IMMEDIATELY IF: You experience chest pain, shortness of breath, palpitations, bloody vomit or blood in the stool.      SECONDARY DISCHARGE DIAGNOSES  Diagnosis: Anemia in end-stage renal disease  Assessment and Plan of Treatment: You were diagnosed with anemia in end stage renal disease. Your complete blood count consistently showed a low number of red blood cells, low hemoglobin, and low hematocrit, likely due to your kidneys not functioning properly.   PLEASE RETURN TO THE HOSPITAL IMMEDIATELY IF: You experience lightheadedness, dizziness, nausea, tingling, or palpitations.

## 2023-12-20 NOTE — DISCHARGE NOTE PROVIDER - NSDCFUSCHEDAPPT_GEN_ALL_CORE_FT
Van Garcia  [facility] Physician [facility]  [address] [facility] [address] [facility] [address]  Scheduled Appointment: 01/05/2024     Van Garcia  [facility] Physician [facility]  [address] [facility] [address]  Scheduled Appointment: 01/05/2024

## 2023-12-20 NOTE — PROGRESS NOTE ADULT - SUBJECTIVE AND OBJECTIVE BOX
OVERNIGHT EVENTS:    SUBJECTIVE / INTERVAL HPI: Patient seen and examined at bedside.     ROS: negative unless otherwise stated above.    VITAL SIGNS:  Vital Signs Last 24 Hrs  T(C): 36.2 (20 Dec 2023 06:17), Max: 36.3 (19 Dec 2023 16:36)  T(F): 97.1 (20 Dec 2023 06:17), Max: 97.4 (19 Dec 2023 16:36)  HR: 92 (20 Dec 2023 06:17) (92 - 95)  BP: 142/84 (20 Dec 2023 06:17) (142/84 - 174/79)  BP(mean): --  RR: 18 (20 Dec 2023 06:17) (17 - 18)  SpO2: 98% (20 Dec 2023 06:17) (97% - 98%)    Parameters below as of 19 Dec 2023 21:08  Patient On (Oxygen Delivery Method): room air        PHYSICAL EXAM:    General: In no apparent distress  HEENT: NC/AT; PERRL, anicteric sclera; MMM  Neck: supple  Cardiovascular: +S1/S2; RRR  Respiratory: CTA B/L; no W/R/R  Gastrointestinal: soft, NT/ND; +BSx4  Extremities: WWP; no edema, clubbing or cyanosis  Vascular: 2+ radial, DP/PT pulses B/L  Neurological: AAOx3; no focal deficits    MEDICATIONS:  MEDICATIONS  (STANDING):  aspirin enteric coated 81 milliGRAM(s) Oral every 24 hours  atorvastatin 80 milliGRAM(s) Oral at bedtime  dextrose 5% 1000 milliLiter(s) (120 mL/Hr) IV Continuous <Continuous>  dextrose 5%. 1000 milliLiter(s) (50 mL/Hr) IV Continuous <Continuous>  dextrose 5%. 1000 milliLiter(s) (100 mL/Hr) IV Continuous <Continuous>  dextrose 50% Injectable 25 Gram(s) IV Push once  dextrose 50% Injectable 25 Gram(s) IV Push once  dextrose 50% Injectable 12.5 Gram(s) IV Push once  dextrose 50% Injectable 25 Gram(s) IV Push once  finasteride 5 milliGRAM(s) Oral daily  glucagon  Injectable 1 milliGRAM(s) IntraMuscular once  heparin   Injectable 5000 Unit(s) SubCutaneous every 8 hours  influenza   Vaccine 0.5 milliLiter(s) IntraMuscular once  insulin lispro (ADMELOG) corrective regimen sliding scale   SubCutaneous Before meals and at bedtime  metroNIDAZOLE  IVPB 500 milliGRAM(s) IV Intermittent every 8 hours  PPD  5 Tuberculin Unit(s) Injectable 5 Unit(s) IntraDermal once  tamsulosin 0.4 milliGRAM(s) Oral at bedtime  vancomycin    Solution 125 milliGRAM(s) Oral every 6 hours    MEDICATIONS  (PRN):  dextrose Oral Gel 15 Gram(s) Oral once PRN Blood Glucose LESS THAN 70 milliGRAM(s)/deciliter      ALLERGIES:  Allergies    No Known Allergies    Intolerances        LABS:                        7.9    14.83 )-----------( 368      ( 19 Dec 2023 11:51 )             25.0     12-19    136  |  102  |  124<H>  ----------------------------<  141<H>  3.5   |  20<L>  |  5.83<H>    Ca    8.0<L>      19 Dec 2023 11:51  Phos  5.4     12-19  Mg     2.0     12-19    TPro  5.6<L>  /  Alb  2.3<L>  /  TBili  0.2  /  DBili  x   /  AST  24  /  ALT  19  /  AlkPhos  84  12-18      Urinalysis Basic - ( 19 Dec 2023 11:51 )    Color: x / Appearance: x / SG: x / pH: x  Gluc: 141 mg/dL / Ketone: x  / Bili: x / Urobili: x   Blood: x / Protein: x / Nitrite: x   Leuk Esterase: x / RBC: x / WBC x   Sq Epi: x / Non Sq Epi: x / Bacteria: x      CAPILLARY BLOOD GLUCOSE      POCT Blood Glucose.: 132 mg/dL (19 Dec 2023 21:48)      RADIOLOGY & ADDITIONAL TESTS: Reviewed. OVERNIGHT EVENTS: The patient lost IV access early in the AM 12/20, and refused to have IV     SUBJECTIVE / INTERVAL HPI: Patient seen and examined at bedside.     ROS: negative unless otherwise stated above.    VITAL SIGNS:  Vital Signs Last 24 Hrs  T(C): 36.2 (20 Dec 2023 06:17), Max: 36.3 (19 Dec 2023 16:36)  T(F): 97.1 (20 Dec 2023 06:17), Max: 97.4 (19 Dec 2023 16:36)  HR: 92 (20 Dec 2023 06:17) (92 - 95)  BP: 142/84 (20 Dec 2023 06:17) (142/84 - 174/79)  BP(mean): --  RR: 18 (20 Dec 2023 06:17) (17 - 18)  SpO2: 98% (20 Dec 2023 06:17) (97% - 98%)    Parameters below as of 19 Dec 2023 21:08  Patient On (Oxygen Delivery Method): room air        PHYSICAL EXAM:    General: In no apparent distress  HEENT: NC/AT; PERRL, anicteric sclera; MMM  Neck: supple  Cardiovascular: +S1/S2; RRR  Respiratory: CTA B/L; no W/R/R  Gastrointestinal: soft, NT/ND; +BSx4  Extremities: WWP; no edema, clubbing or cyanosis  Vascular: 2+ radial, DP/PT pulses B/L  Neurological: AAOx3; no focal deficits    MEDICATIONS:  MEDICATIONS  (STANDING):  aspirin enteric coated 81 milliGRAM(s) Oral every 24 hours  atorvastatin 80 milliGRAM(s) Oral at bedtime  dextrose 5% 1000 milliLiter(s) (120 mL/Hr) IV Continuous <Continuous>  dextrose 5%. 1000 milliLiter(s) (50 mL/Hr) IV Continuous <Continuous>  dextrose 5%. 1000 milliLiter(s) (100 mL/Hr) IV Continuous <Continuous>  dextrose 50% Injectable 25 Gram(s) IV Push once  dextrose 50% Injectable 25 Gram(s) IV Push once  dextrose 50% Injectable 12.5 Gram(s) IV Push once  dextrose 50% Injectable 25 Gram(s) IV Push once  finasteride 5 milliGRAM(s) Oral daily  glucagon  Injectable 1 milliGRAM(s) IntraMuscular once  heparin   Injectable 5000 Unit(s) SubCutaneous every 8 hours  influenza   Vaccine 0.5 milliLiter(s) IntraMuscular once  insulin lispro (ADMELOG) corrective regimen sliding scale   SubCutaneous Before meals and at bedtime  metroNIDAZOLE  IVPB 500 milliGRAM(s) IV Intermittent every 8 hours  PPD  5 Tuberculin Unit(s) Injectable 5 Unit(s) IntraDermal once  tamsulosin 0.4 milliGRAM(s) Oral at bedtime  vancomycin    Solution 125 milliGRAM(s) Oral every 6 hours    MEDICATIONS  (PRN):  dextrose Oral Gel 15 Gram(s) Oral once PRN Blood Glucose LESS THAN 70 milliGRAM(s)/deciliter      ALLERGIES:  Allergies    No Known Allergies    Intolerances        LABS:                        7.9    14.83 )-----------( 368      ( 19 Dec 2023 11:51 )             25.0     12-19    136  |  102  |  124<H>  ----------------------------<  141<H>  3.5   |  20<L>  |  5.83<H>    Ca    8.0<L>      19 Dec 2023 11:51  Phos  5.4     12-19  Mg     2.0     12-19    TPro  5.6<L>  /  Alb  2.3<L>  /  TBili  0.2  /  DBili  x   /  AST  24  /  ALT  19  /  AlkPhos  84  12-18      Urinalysis Basic - ( 19 Dec 2023 11:51 )    Color: x / Appearance: x / SG: x / pH: x  Gluc: 141 mg/dL / Ketone: x  / Bili: x / Urobili: x   Blood: x / Protein: x / Nitrite: x   Leuk Esterase: x / RBC: x / WBC x   Sq Epi: x / Non Sq Epi: x / Bacteria: x      CAPILLARY BLOOD GLUCOSE      POCT Blood Glucose.: 132 mg/dL (19 Dec 2023 21:48)      RADIOLOGY & ADDITIONAL TESTS: Reviewed. OVERNIGHT EVENTS: The patient lost IV access early in the AM 12/20, and refused to have IV replaced.    SUBJECTIVE / INTERVAL HPI: Patient seen and examined at bedside. The patient was encountered lying in bed, AAOx2 (baseline). He stated that he was ready to go home and did not want any more blood draws or examinations, but he did consent to this interview and exam    ROS: negative unless otherwise stated above.    VITAL SIGNS:  Vital Signs Last 24 Hrs  T(C): 36.2 (20 Dec 2023 06:17), Max: 36.3 (19 Dec 2023 16:36)  T(F): 97.1 (20 Dec 2023 06:17), Max: 97.4 (19 Dec 2023 16:36)  HR: 92 (20 Dec 2023 06:17) (92 - 95)  BP: 142/84 (20 Dec 2023 06:17) (142/84 - 174/79)  BP(mean): --  RR: 18 (20 Dec 2023 06:17) (17 - 18)  SpO2: 98% (20 Dec 2023 06:17) (97% - 98%)    Parameters below as of 19 Dec 2023 21:08  Patient On (Oxygen Delivery Method): room air        PHYSICAL EXAM:    General: In no apparent distress  HEENT: NC/AT; PERRL, anicteric sclera; MMM  Neck: supple  Cardiovascular: +S1/S2; RRR  Respiratory: CTA B/L; no W/R/R  Gastrointestinal: soft, NT/ND; +BSx4  Extremities: WWP; no edema, clubbing or cyanosis  Vascular: 2+ radial, DP/PT pulses B/L  Neurological: AAOx3; no focal deficits    MEDICATIONS:  MEDICATIONS  (STANDING):  aspirin enteric coated 81 milliGRAM(s) Oral every 24 hours  atorvastatin 80 milliGRAM(s) Oral at bedtime  dextrose 5% 1000 milliLiter(s) (120 mL/Hr) IV Continuous <Continuous>  dextrose 5%. 1000 milliLiter(s) (50 mL/Hr) IV Continuous <Continuous>  dextrose 5%. 1000 milliLiter(s) (100 mL/Hr) IV Continuous <Continuous>  dextrose 50% Injectable 25 Gram(s) IV Push once  dextrose 50% Injectable 25 Gram(s) IV Push once  dextrose 50% Injectable 12.5 Gram(s) IV Push once  dextrose 50% Injectable 25 Gram(s) IV Push once  finasteride 5 milliGRAM(s) Oral daily  glucagon  Injectable 1 milliGRAM(s) IntraMuscular once  heparin   Injectable 5000 Unit(s) SubCutaneous every 8 hours  influenza   Vaccine 0.5 milliLiter(s) IntraMuscular once  insulin lispro (ADMELOG) corrective regimen sliding scale   SubCutaneous Before meals and at bedtime  metroNIDAZOLE  IVPB 500 milliGRAM(s) IV Intermittent every 8 hours  PPD  5 Tuberculin Unit(s) Injectable 5 Unit(s) IntraDermal once  tamsulosin 0.4 milliGRAM(s) Oral at bedtime  vancomycin    Solution 125 milliGRAM(s) Oral every 6 hours    MEDICATIONS  (PRN):  dextrose Oral Gel 15 Gram(s) Oral once PRN Blood Glucose LESS THAN 70 milliGRAM(s)/deciliter      ALLERGIES:  Allergies    No Known Allergies    Intolerances        LABS:                        7.9    14.83 )-----------( 368      ( 19 Dec 2023 11:51 )             25.0     12-19    136  |  102  |  124<H>  ----------------------------<  141<H>  3.5   |  20<L>  |  5.83<H>    Ca    8.0<L>      19 Dec 2023 11:51  Phos  5.4     12-19  Mg     2.0     12-19    TPro  5.6<L>  /  Alb  2.3<L>  /  TBili  0.2  /  DBili  x   /  AST  24  /  ALT  19  /  AlkPhos  84  12-18      Urinalysis Basic - ( 19 Dec 2023 11:51 )    Color: x / Appearance: x / SG: x / pH: x  Gluc: 141 mg/dL / Ketone: x  / Bili: x / Urobili: x   Blood: x / Protein: x / Nitrite: x   Leuk Esterase: x / RBC: x / WBC x   Sq Epi: x / Non Sq Epi: x / Bacteria: x      CAPILLARY BLOOD GLUCOSE      POCT Blood Glucose.: 132 mg/dL (19 Dec 2023 21:48)      RADIOLOGY & ADDITIONAL TESTS: Reviewed.

## 2023-12-20 NOTE — PROGRESS NOTE ADULT - ATTENDING COMMENTS
diarrhea resolved- soft formed BMs  able to maintain oral intake  59 yo man with CKD 5, HTN admitted 5 days ago from SNF with hypotension, + C diff  BUN downtrending after IVF, now off  nephrologically stable for dc back to SNF  No plan for initiation of HD at this time.  AVF  + bruit and thrill- okay to have outpatient AVFistulogram per vasc team in next week or two diarrhea resolved- soft formed BMs  able to maintain oral intake  57 yo man with CKD 5, HTN admitted 5 days ago from SNF with hypotension, + C diff  BUN downtrending after IVF, now off  nephrologically stable for dc back to SNF  No plan for initiation of HD at this time.  AVF  + bruit and thrill- okay to have outpatient AVFistulogram per vasc team in next week or two

## 2023-12-20 NOTE — PROGRESS NOTE ADULT - ASSESSMENT
58M with CKD 5, HTN sent in by NH for hypotension, admitted for possible sepsis and CKD progression. Nephrology consulted for possible HD.    #CKD Stage V with concern for progression vs pre-renal azotemia in setting of infectious diarrhea and volume depletion  Cr around his baseline, but BUN elevated to 140s on admission with some changes in mental status. BUN and creatinine now improving with improvement in mentation after fluid resuscitation.  Attempted HD 12/15 but unable to dialyze due to increased arterial pressures. US with distal stenosis and possible thrombosis.  UA w/ proteinuria (chronic), no RBCs    Recommendations:  - Awaiting fistulogram of LUE AVF due to stenosis and thrombosis, however patient septic with c diff and unable to undergo procedure at this time. BUN and creatinine slightly improving consistent with pre-renal azotemia, so can continue to monitor patient for now off of HD  - Diarrhea resolved since 11/19 PM. Off of IV fluids since 12/20 AM  - BMP daily  - Renal diet  - Since diarrhea resolved and BUN slight improvement, pt not uremic on exam, ok for discharge from renal standpoint but must ensure outpatient close follow up with nephrology to assess indications for HD initiation    #Anemia  Hb not at goal  tsat 11%, however iron contraindicated in setting of sepsis  Will consider epo w/ HD once initiated    #Access  LUE AVF with stenosis, thrombosis. Planned for fistulogram once medically improved.    #BMD-CKD  Ca 7.8  Phos 5.4  Check PTH, Vit D

## 2023-12-20 NOTE — PROGRESS NOTE ADULT - PROBLEM SELECTOR PROBLEM 9
BPH (benign prostatic hyperplasia)
BPH (benign prostatic hyperplasia)
HTN (hypertension)
History of stroke
BPH (benign prostatic hyperplasia)
BPH (benign prostatic hyperplasia)

## 2023-12-20 NOTE — PROGRESS NOTE ADULT - PROBLEM SELECTOR PROBLEM 2
Leukocytosis
Clostridioides difficile infection
Acute kidney injury superimposed on CKD

## 2023-12-20 NOTE — PROGRESS NOTE ADULT - SUBJECTIVE AND OBJECTIVE BOX
Patient is a 58y Male seen and evaluated at bedside.       Meds:    aspirin enteric coated 81 every 24 hours  atorvastatin 80 at bedtime  dextrose 5% 1000 <Continuous>  dextrose 5%. 1000 <Continuous>  dextrose 5%. 1000 <Continuous>  dextrose 50% Injectable 25 once  dextrose 50% Injectable 12.5 once  dextrose 50% Injectable 25 once  dextrose 50% Injectable 25 once  dextrose Oral Gel 15 once PRN  finasteride 5 daily  glucagon  Injectable 1 once  heparin   Injectable 5000 every 8 hours  influenza   Vaccine 0.5 once  insulin lispro (ADMELOG) corrective regimen sliding scale  Before meals and at bedtime  metroNIDAZOLE    Tablet 500 every 8 hours  PPD  5 Tuberculin Unit(s) Injectable 5 once  tamsulosin 0.4 at bedtime  vancomycin    Solution 125 every 6 hours      T(C): , Max: 36.3 (12-19-23 @ 16:36)  T(F): , Max: 97.4 (12-19-23 @ 16:36)  HR: 91 (12-20-23 @ 15:15)  BP: 161/88 (12-20-23 @ 15:15)  BP(mean): --  RR: 18 (12-20-23 @ 15:15)  SpO2: 98% (12-20-23 @ 15:15)  Wt(kg): --    12-19 @ 07:01  -  12-20 @ 07:00  --------------------------------------------------------  IN: 336 mL / OUT: 975 mL / NET: -639 mL    12-20 @ 07:01  -  12-20 @ 16:02  --------------------------------------------------------  IN: 0 mL / OUT: 1000 mL / NET: -1000 mL          Review of Systems:  ROS negative except as per HPI      PHYSICAL EXAM:  GEN: NAD, lying in bed  HEENT: NCAT  Respiratory: No respiratory distress  Cardiovascular: Regular rate  Gastrointestinal: soft, non-tender, non-distended  : Borja in place  Extremities: No significant peripheral edema  Skin: warm, dry, no rashes on exposed skin  Neuro: Awake and alert, answering questions. UE rigidity noted  Access: LUE AVF w/ thrill and bruit, more prominent s/p IVF      LABS:                        7.7    13.12 )-----------( 368      ( 20 Dec 2023 10:16 )             24.3     12-20    138  |  103  |  116<H>  ----------------------------<  88  3.1<L>   |  20<L>  |  5.92<H>    Ca    7.8<L>      20 Dec 2023 10:16  Phos  5.4     12-19  Mg     2.0     12-19    TPro  5.6<L>  /  Alb  2.3<L>  /  TBili  0.2  /  DBili  x   /  AST  24  /  ALT  19  /  AlkPhos  84  12-18    Hepatitis B Surface Antibody: Nonreact (12-19 @ 23:09)      Urinalysis Basic - ( 20 Dec 2023 10:16 )    Color: x / Appearance: x / SG: x / pH: x  Gluc: 88 mg/dL / Ketone: x  / Bili: x / Urobili: x   Blood: x / Protein: x / Nitrite: x   Leuk Esterase: x / RBC: x / WBC x   Sq Epi: x / Non Sq Epi: x / Bacteria: x            RADIOLOGY & ADDITIONAL STUDIES:

## 2023-12-20 NOTE — DISCHARGE NOTE PROVIDER - PROVIDER TOKENS
PROVIDER:[TOKEN:[[id number]],SCHEDULEDAPPT:[01/05/2024]] PROVIDER:[TOKEN:[[id number]],SCHEDULEDAPPT:[01/05/2024]]

## 2023-12-20 NOTE — PROGRESS NOTE ADULT - PROBLEM SELECTOR PROBLEM 4
ESRD (end stage renal disease)
ESRD (end stage renal disease)
Type 2 diabetes mellitus
ESRD (end stage renal disease)
Acute kidney injury superimposed on CKD
ESRD (end stage renal disease)

## 2023-12-20 NOTE — PROGRESS NOTE ADULT - PROBLEM SELECTOR PLAN 8
Known, takes Tamsulosin 0.4 mg qhs and Finasteride 5 mg qd.  - continue home meds
History of stroke with residual R-sided facial droop, also has L-sided weakness but difficult to assess as pt is contracted. Is able to follow commands. Takes ASA 81 mg qd and Atorvastatin 80 mg qhs at home.  - continue home meds
Known, early-onset. A&O x1 (self) at baseline. Currently AOx1-2 (self and able to participate in ROS),  - no interventions

## 2023-12-20 NOTE — PROGRESS NOTE ADULT - PROBLEM SELECTOR PROBLEM 6
Anemia in end-stage renal disease
Type 2 diabetes mellitus
Anemia in end-stage renal disease
Anemia in end-stage renal disease
Dementia
Anemia in end-stage renal disease

## 2023-12-20 NOTE — PROGRESS NOTE ADULT - ATTENDING COMMENTS
Patient was seen and examined at bedside. Case discuss with resident. Pt without any complaints this morning. Pt denied  having diarrhea.     T(C): 36.1 (20 Dec 2023 10:34), Max: 36.3 (19 Dec 2023 16:36)  T(F): 96.9 (20 Dec 2023 10:34), Max: 97.4 (19 Dec 2023 16:36)  HR: 82 (20 Dec 2023 10:34) (82 - 95)  BP: 155/81 (20 Dec 2023 10:34) (142/84 - 174/79)  RR: 18 (20 Dec 2023 10:34) (17 - 18)  SpO2: 98% (20 Dec 2023 10:34) (97% - 98%)    PE: As  per resident note documented above             7.9    14.83 )-----------( 368      ( 19 Dec 2023 11:51 )             25.0   136  |  102  |  124<H>  ----------------------------<  141<H>  3.5   |  20<L>  |  5.83<H>    Ca    8.0<L>      19 Dec 2023 11:51  Phos  5.4     12-19  Mg     2.0     12-19    CAPILLARY BLOOD GLUCOSE  POCT Blood Glucose.: 111 mg/dL (20 Dec 2023 09:54)  POCT Blood Glucose.: 132 mg/dL (19 Dec 2023 21:48)  POCT Blood Glucose.: 160 mg/dL (19 Dec 2023 17:39)  POCT Blood Glucose.: 129 mg/dL (19 Dec 2023 14:07)    7/14/2023 TTE: 1. Left ventricular systolic function is low-normal with a calculated   ejection fraction of 50-55% with mild global hypokinesis.   2. Normal right ventricular size and systolic function.   3. No significant valvular disease.   4. No evidence of pulmonary hypertension.   5. No pericardial effusion.   6. Patient was tachycardic during the study.   7. Compared to the previous TTE performed on 10/18/2022, there have been   no significant interval changes.    aspirin enteric coated 81 milliGRAM(s) Oral every 24 hours  atorvastatin 80 milliGRAM(s) Oral at bedtime  finasteride 5 milliGRAM(s) Oral daily  glucagon  Injectable 1 milliGRAM(s) IntraMuscular once  heparin   Injectable 5000 Unit(s) SubCutaneous every 8 hours  influenza   Vaccine 0.5 milliLiter(s) IntraMuscular once  insulin lispro (ADMELOG) corrective regimen sliding scale   SubCutaneous Before meals and at bedtime  metroNIDAZOLE    Tablet 500 milliGRAM(s) Oral every 8 hours  PPD  5 Tuberculin Unit(s) Injectable 5 Unit(s) IntraDermal once  tamsulosin 0.4 milliGRAM(s) Oral at bedtime  vancomycin    Solution 125 milliGRAM(s) Oral every 6 hours    A/p: 59yo M T2DM, CVA x2 w/ residual L-sided weakness, early onset dementia, HTN, HLD, DVT, CKD V (Cr ~6, not on HD), BPH, PSH L toe amputation, R BKA, AVF creation (5/2023) BIBEMS from nursing home for hypotension, found  to have C diff.    #C diff  -Will continue to monitor WBC   - Will continue contact precautions   - Will continue Vancomycin and Flagyl Day #4 of abx     #Type II Diabetes  - Will continue ISS and monitor FS; currently WNL     #Hx of CVA x2 w/  residual L-sided weakness  # Early onset dementia  -Will continue ASA and statin     #HTN/HLD  -Pt was on Nifedipine XL 90mg PO daily   -Continue statin   -Pt with elevated BP w/ SBP in 150's   - Will continue to monitor BP and start antihypertensive if required     #CKD Stage V not on HD  - Renal following; Per Renal team we will continue to monitor the pt off HD   -Will continue bicarb drip for now and f/u repeat BMP  -Pt for fistulogram of LUE AVF due to stenosis and thrombosis, however patient septic with c diff and unable to undergo procedure at this time.  -Spoke to Vascular team about scheduling the fistulogram as outpatient  given the pt's infection    # Pre-operative evaluation   METS <4, Pt s/p r BKA   EKG: ******  RCRI Class III Risk  Using the THAKUR isabela-operative risk index, patient has a 7.2% % risk of myocardial infarction or cardiac arrest, intraoperatively or up to 30 days post-op    Assuming fistulogram, patient is at high risk for an intermediate risk procedure.   Patient may proceed to surgery without further cardiac evaluation if surgery is indicated.   Please notify co-management hospitalist if patient's clinical status changes.     #BPH  -Continue Tamusolin     #DISPO   -Pt to return to [place] once medically stable. Patient was seen and examined at bedside. Case discuss with resident. Pt without any complaints this morning. Pt denied  having diarrhea.     T(C): 36.1 (20 Dec 2023 10:34), Max: 36.3 (19 Dec 2023 16:36)  T(F): 96.9 (20 Dec 2023 10:34), Max: 97.4 (19 Dec 2023 16:36)  HR: 82 (20 Dec 2023 10:34) (82 - 95)  BP: 155/81 (20 Dec 2023 10:34) (142/84 - 174/79)  RR: 18 (20 Dec 2023 10:34) (17 - 18)  SpO2: 98% (20 Dec 2023 10:34) (97% - 98%)    PE: As  per resident note documented above             7.9    14.83 )-----------( 368      ( 19 Dec 2023 11:51 )             25.0   136  |  102  |  124<H>  ----------------------------<  141<H>  3.5   |  20<L>  |  5.83<H>    Ca    8.0<L>      19 Dec 2023 11:51  Phos  5.4     12-19  Mg     2.0     12-19    CAPILLARY BLOOD GLUCOSE  POCT Blood Glucose.: 111 mg/dL (20 Dec 2023 09:54)  POCT Blood Glucose.: 132 mg/dL (19 Dec 2023 21:48)  POCT Blood Glucose.: 160 mg/dL (19 Dec 2023 17:39)  POCT Blood Glucose.: 129 mg/dL (19 Dec 2023 14:07)    7/14/2023 TTE: 1. Left ventricular systolic function is low-normal with a calculated   ejection fraction of 50-55% with mild global hypokinesis.   2. Normal right ventricular size and systolic function.   3. No significant valvular disease.   4. No evidence of pulmonary hypertension.   5. No pericardial effusion.   6. Patient was tachycardic during the study.   7. Compared to the previous TTE performed on 10/18/2022, there have been   no significant interval changes.    aspirin enteric coated 81 milliGRAM(s) Oral every 24 hours  atorvastatin 80 milliGRAM(s) Oral at bedtime  finasteride 5 milliGRAM(s) Oral daily  glucagon  Injectable 1 milliGRAM(s) IntraMuscular once  heparin   Injectable 5000 Unit(s) SubCutaneous every 8 hours  influenza   Vaccine 0.5 milliLiter(s) IntraMuscular once  insulin lispro (ADMELOG) corrective regimen sliding scale   SubCutaneous Before meals and at bedtime  metroNIDAZOLE    Tablet 500 milliGRAM(s) Oral every 8 hours  PPD  5 Tuberculin Unit(s) Injectable 5 Unit(s) IntraDermal once  tamsulosin 0.4 milliGRAM(s) Oral at bedtime  vancomycin    Solution 125 milliGRAM(s) Oral every 6 hours    A/p: 59yo M T2DM, CVA x2 w/ residual L-sided weakness, early onset dementia, HTN, HLD, DVT, CKD V (Cr ~6, not on HD), BPH, PSH L toe amputation, R BKA, AVF creation (5/2023) BIBEMS from nursing home for hypotension, found  to have C diff.    #C diff  -Will continue to monitor WBC   - Will continue contact precautions   - Will continue Vancomycin and Flagyl Day #4 of abx     #Type II Diabetes  - Will continue ISS and monitor FS; currently WNL     #Hx of CVA x2 w/  residual L-sided weakness  # Early onset dementia  -Will continue ASA and statin     #HTN/HLD  -Pt was on Nifedipine XL 90mg PO daily   -Continue statin   -Pt with elevated BP w/ SBP in 150's   - Will continue to monitor BP and start antihypertensive if required     #CKD Stage V not on HD  - Renal following; Per Renal team we will continue to monitor the pt off HD   -Will continue bicarb drip for now and f/u repeat BMP  -Pt for fistulogram of LUE AVF due to stenosis and thrombosis, however patient septic with c diff and unable to undergo procedure at this time.  -Spoke to Vascular team about scheduling the fistulogram as outpatient  given the pt's infection    # Pre-operative evaluation   METS <4, Pt s/p r BKA   EKG: sInus tach @105bpm   RCRI Class III Risk  Using the THAKUR isabela-operative risk index, patient has a 7.2% % risk of myocardial infarction or cardiac arrest, intraoperatively or up to 30 days post-op  Assuming fistulogram, patient is at high risk for an intermediate risk procedure.   -Would recommend a repeat EKG prior to fistulogram and if EKG is WNL then the patient may proceed to surgery without further cardiac evaluation if surgery is indicated.   Please notify co-management hospitalist if patient's clinical status changes.     #BPH  -Continue Tamusolin     #DISPO   -Pt to return to [place] once medically stable. Patient was seen and examined at bedside. Case discuss with resident. Pt without any complaints this morning. Pt denied  having diarrhea.     T(C): 36.1 (20 Dec 2023 10:34), Max: 36.3 (19 Dec 2023 16:36)  T(F): 96.9 (20 Dec 2023 10:34), Max: 97.4 (19 Dec 2023 16:36)  HR: 82 (20 Dec 2023 10:34) (82 - 95)  BP: 155/81 (20 Dec 2023 10:34) (142/84 - 174/79)  RR: 18 (20 Dec 2023 10:34) (17 - 18)  SpO2: 98% (20 Dec 2023 10:34) (97% - 98%)    PE: As  per resident note documented above             7.9    14.83 )-----------( 368      ( 19 Dec 2023 11:51 )             25.0   136  |  102  |  124<H>  ----------------------------<  141<H>  3.5   |  20<L>  |  5.83<H>    Ca    8.0<L>      19 Dec 2023 11:51  Phos  5.4     12-19  Mg     2.0     12-19    CAPILLARY BLOOD GLUCOSE  POCT Blood Glucose.: 111 mg/dL (20 Dec 2023 09:54)  POCT Blood Glucose.: 132 mg/dL (19 Dec 2023 21:48)  POCT Blood Glucose.: 160 mg/dL (19 Dec 2023 17:39)  POCT Blood Glucose.: 129 mg/dL (19 Dec 2023 14:07)    7/14/2023 TTE: 1. Left ventricular systolic function is low-normal with a calculated   ejection fraction of 50-55% with mild global hypokinesis.   2. Normal right ventricular size and systolic function.   3. No significant valvular disease.   4. No evidence of pulmonary hypertension.   5. No pericardial effusion.   6. Patient was tachycardic during the study.   7. Compared to the previous TTE performed on 10/18/2022, there have been   no significant interval changes.    aspirin enteric coated 81 milliGRAM(s) Oral every 24 hours  atorvastatin 80 milliGRAM(s) Oral at bedtime  finasteride 5 milliGRAM(s) Oral daily  glucagon  Injectable 1 milliGRAM(s) IntraMuscular once  heparin   Injectable 5000 Unit(s) SubCutaneous every 8 hours  influenza   Vaccine 0.5 milliLiter(s) IntraMuscular once  insulin lispro (ADMELOG) corrective regimen sliding scale   SubCutaneous Before meals and at bedtime  metroNIDAZOLE    Tablet 500 milliGRAM(s) Oral every 8 hours  PPD  5 Tuberculin Unit(s) Injectable 5 Unit(s) IntraDermal once  tamsulosin 0.4 milliGRAM(s) Oral at bedtime  vancomycin    Solution 125 milliGRAM(s) Oral every 6 hours    A/p: 57yo M T2DM, CVA x2 w/ residual L-sided weakness, early onset dementia, HTN, HLD, DVT, CKD V (Cr ~6, not on HD), BPH, PSH L toe amputation, R BKA, AVF creation (5/2023) BIBEMS from nursing home for hypotension, found  to have C diff.    #C diff  -Will continue to monitor WBC   - Will continue contact precautions   - Will continue Vancomycin and Flagyl Day #4 of abx     #Type II Diabetes  - Will continue ISS and monitor FS; currently WNL     #Hx of CVA x2 w/  residual L-sided weakness  # Early onset dementia  -Will continue ASA and statin     #HTN/HLD  -Pt was on Nifedipine XL 90mg PO daily   -Continue statin   -Pt with elevated BP w/ SBP in 150's   - Will continue to monitor BP and start antihypertensive if required     #CKD Stage V not on HD  - Renal following; Per Renal team we will continue to monitor the pt off HD   -Will continue bicarb drip for now and f/u repeat BMP  -Pt for fistulogram of LUE AVF due to stenosis and thrombosis, however patient septic with c diff and unable to undergo procedure at this time.  -Spoke to Vascular team about scheduling the fistulogram as outpatient  given the pt's infection    # Pre-operative evaluation   METS <4, Pt s/p r BKA   EKG: sInus tach @105bpm   RCRI Class III Risk  Using the THAKUR isabela-operative risk index, patient has a 7.2% % risk of myocardial infarction or cardiac arrest, intraoperatively or up to 30 days post-op  Assuming fistulogram, patient is at high risk for an intermediate risk procedure.   -Would recommend a repeat EKG prior to fistulogram and if EKG is WNL then the patient may proceed to surgery without further cardiac evaluation if surgery is indicated.   Please notify co-management hospitalist if patient's clinical status changes.     #BPH  -Continue Tamusolin     #DISPO   -Pt to return to [facility] once medically stable.

## 2023-12-20 NOTE — DISCHARGE NOTE NURSING/CASE MANAGEMENT/SOCIAL WORK - NSDCPEFALRISK_GEN_ALL_CORE
For information on Fall & Injury Prevention, visit: [email or web address] OR  [email or web address] OR  https://www.cdc.gov/steadi/patient.html For information on Fall & Injury Prevention, visit: [email or web address] OR  [email or web address] OR  https://www.cdc.gov/steadi/patient.html

## 2023-12-20 NOTE — PROGRESS NOTE ADULT - PROBLEM SELECTOR PLAN 1
POA,  HR > 90, WBC > 12, w/ Cr. elevated from baseline in setting of infection (below)  -f/u cultures - NGTD

## 2023-12-20 NOTE — DISCHARGE NOTE PROVIDER - NSDCMRMEDTOKEN_GEN_ALL_CORE_FT
acetaminophen 325 mg oral tablet: 2 tab(s) orally every 4 hours As needed Mild Pain (1 - 3)  aspirin 81 mg oral capsule: 1 cap(s) orally once a day  atorvastatin 80 mg oral tablet: 1 tab(s) orally once a day (at bedtime)  finasteride 5 mg oral tablet: 1 tab(s) orally once a day  insulin lispro 100 units/mL injectable solution: 5 unit(s) injectable 3 times a day  Lantus Solostar Pen 100 units/mL subcutaneous solution: 15 unit(s) subcutaneous once a day (at bedtime)  metroNIDAZOLE 500 mg oral tablet: 1 tab(s) orally every 8 hours  NIFEdipine 90 mg oral tablet, extended release: 1 tab(s) orally once a day  Plavix 75 mg oral tablet: 1 tab(s) orally once a day  sodium bicarbonate 650 mg oral tablet: 1 tab(s) orally 3 times a day  tamsulosin 0.4 mg oral capsule: 1 cap(s) orally once a day (at bedtime)  Tradjenta 5 mg oral tablet: 1 tab(s) orally once a day  vancomycin 125 mg oral capsule: 1 cap(s) orally every 6 hours   acetaminophen 325 mg oral tablet: 2 tab(s) orally every 4 hours As needed Mild Pain (1 - 3)  aspirin 81 mg oral capsule: 1 cap(s) orally once a day  atorvastatin 80 mg oral tablet: 1 tab(s) orally once a day (at bedtime)  finasteride 5 mg oral tablet: 1 tab(s) orally once a day  insulin lispro 100 units/mL injectable solution: 5 unit(s) injectable 3 times a day  Lantus Solostar Pen 100 units/mL subcutaneous solution: 15 unit(s) subcutaneous once a day (at bedtime)  metroNIDAZOLE 500 mg oral tablet: 1 tab(s) orally every 8 hours  NIFEdipine 90 mg oral tablet, extended release: 1 tab(s) orally once a day  Plavix 75 mg oral tablet: 1 tab(s) orally once a day  sodium bicarbonate 650 mg oral tablet: 2 tab(s) orally 3 times a day  tamsulosin 0.4 mg oral capsule: 1 cap(s) orally once a day (at bedtime)  Tradjenta 5 mg oral tablet: 1 tab(s) orally once a day  vancomycin 125 mg oral capsule: 1 cap(s) orally every 6 hours

## 2023-12-20 NOTE — PROGRESS NOTE ADULT - PROBLEM SELECTOR PLAN 5
Pt reports he typically has a bowel movement every day but has not had one in ~2 days. Reported abdominal pain earlier in the day at his nursing home but is not currently complaining of abdominal pain. Abdomen is firm but not distended and not tender to palpation. CXR with dilated loops of bowel. Pt has recent history of C. diff.  - XR abdomen: Dilated air-filled left colon up to 9 cm, right and transverse colon are filled with air and fecal matter measuring up to 7.5 cm  - RESOLVED; c/w c. diff mgt as above.

## 2023-12-20 NOTE — PROGRESS NOTE ADULT - NS ATTEST RISK PROBLEM GEN_ALL_CORE FT
#C diff  #Type II Diabetes  #Hx of CVA x2 w/  residual L-sided weakness  # Early onset dementia  #HTN/HLD  #CKD Stage V not on HD  #BPH
#C diff  #Type II Diabetes  #Hx of CVA x2 w/  residual L-sided weakness  # Early onset dementia

## 2023-12-20 NOTE — DISCHARGE NOTE NURSING/CASE MANAGEMENT/SOCIAL WORK - PATIENT PORTAL LINK FT
You can access the FollowMyHealth Patient Portal offered by [facility] by registering at the following website: [email or web address]. By joining [facility]’s FollowMyHealth portal, you will also be able to view your health information using other applications (apps) compatible with our system. You can access the FollowMyHealth Patient Portal offered by [facility] by registering at the following website: [email or web address]. By joining [facility]’s FollowMyHealth portal, you will also be able to view your health information using other applications (apps) compatible with our system.

## 2023-12-20 NOTE — DISCHARGE NOTE PROVIDER - CARE PROVIDER_API CALL
Van Garcia  Nephrology  [address]  [place] [address]  Phone: [phone]  Fax: [phone]  Scheduled Appointment: 01/05/2024   Van Garcia  Nephrology  [address]  [place] [address]  Phone: [phone]  Fax: [phone]  Scheduled Appointment: 01/05/2024

## 2023-12-20 NOTE — PROGRESS NOTE ADULT - ASSESSMENT
57yo M T2DM, CVA x2 w/ residual L-sided weakness, early onset dementia, HTN, HLD, DVT, CKD V (Cr 7-8, not on HD), BPH, PSH L toe amputation and AVF creation (5/2023), R jd BKA and closure admitted to medicine with concerns of sepsis and urgent need for dialysis. Vascular surgery consulted for AVF revision after failed HD attempt.    No acute vascular surgery indicated at this time  Plan for fistulogram outpatient - E AVF SHOULD NOT BE ACCESSED IN THE INTERIM  Patient can follow up with Dr. Caleb Royal in 1-2 weeks, [address] [place] [address], [phone]  Patient needs medical clearance for procedure prior to discharge  Dialysis per nephrology 59yo M T2DM, CVA x2 w/ residual L-sided weakness, early onset dementia, HTN, HLD, DVT, CKD V (Cr 7-8, not on HD), BPH, PSH L toe amputation and AVF creation (5/2023), R jd BKA and closure admitted to medicine with concerns of sepsis and urgent need for dialysis. Vascular surgery consulted for AVF revision after failed HD attempt.    No acute vascular surgery indicated at this time  Plan for fistulogram outpatient - E AVF SHOULD NOT BE ACCESSED IN THE INTERIM  Patient can follow up with Dr. Caleb Royal in 1-2 weeks, [address] [place] [address], [phone]  Patient needs medical clearance for procedure prior to discharge  Dialysis per nephrology

## 2023-12-20 NOTE — PROGRESS NOTE ADULT - PROBLEM SELECTOR PROBLEM 3
At risk for injury related to hypoglycemia
Constipation
Type II diabetes mellitus with hypoglycemia

## 2023-12-20 NOTE — PROGRESS NOTE ADULT - SUBJECTIVE AND OBJECTIVE BOX
SUBJECTIVE: Patient seen and examined at bedside. Denies abdominal pain, able to kevin PO without n/v. Denies cp, sob, dizziness, weakness.    aspirin enteric coated 81 milliGRAM(s) Oral every 24 hours  heparin   Injectable 5000 Unit(s) SubCutaneous every 8 hours  metroNIDAZOLE    Tablet 500 milliGRAM(s) Oral every 8 hours  vancomycin    Solution 125 milliGRAM(s) Oral every 6 hours      Vital Signs Last 24 Hrs  T(C): 36.1 (20 Dec 2023 10:34), Max: 36.3 (19 Dec 2023 16:36)  T(F): 96.9 (20 Dec 2023 10:34), Max: 97.4 (19 Dec 2023 16:36)  HR: 82 (20 Dec 2023 10:34) (82 - 95)  BP: 155/81 (20 Dec 2023 10:34) (142/84 - 174/79)  BP(mean): --  RR: 18 (20 Dec 2023 10:34) (17 - 18)  SpO2: 98% (20 Dec 2023 10:34) (97% - 98%)    Parameters below as of 20 Dec 2023 10:34  Patient On (Oxygen Delivery Method): room air      I&O's Detail    19 Dec 2023 07:01  -  20 Dec 2023 07:00  --------------------------------------------------------  IN:    Oral Fluid: 336 mL  Total IN: 336 mL    OUT:    Indwelling Catheter - Urethral (mL): 975 mL  Total OUT: 975 mL    Total NET: -639 mL      20 Dec 2023 07:01  -  20 Dec 2023 15:43  --------------------------------------------------------  IN:  Total IN: 0 mL    OUT:    Indwelling Catheter - Urethral (mL): 1000 mL  Total OUT: 1000 mL    Total NET: -1000 mL          General: NAD, resting comfortably in bed  C/V: NSR  Pulm: Nonlabored breathing, no respiratory distress  Abd: soft, moderately distended, nontender.  Extrem: RIGHT BKA well healed. LUE AVF with palpable thrill and pulsatile, progressively less pulsatile proximally.      LABS:                        7.7    13.12 )-----------( 368      ( 20 Dec 2023 10:16 )             24.3     12-20    138  |  103  |  116<H>  ----------------------------<  88  3.1<L>   |  20<L>  |  5.92<H>    Ca    7.8<L>      20 Dec 2023 10:16  Phos  5.4     12-19  Mg     2.0     12-19    TPro  5.6<L>  /  Alb  2.3<L>  /  TBili  0.2  /  DBili  x   /  AST  24  /  ALT  19  /  AlkPhos  84  12-18      Urinalysis Basic - ( 20 Dec 2023 10:16 )    Color: x / Appearance: x / SG: x / pH: x  Gluc: 88 mg/dL / Ketone: x  / Bili: x / Urobili: x   Blood: x / Protein: x / Nitrite: x   Leuk Esterase: x / RBC: x / WBC x   Sq Epi: x / Non Sq Epi: x / Bacteria: x        RADIOLOGY & ADDITIONAL STUDIES:

## 2023-12-20 NOTE — DISCHARGE NOTE NURSING/CASE MANAGEMENT/SOCIAL WORK - NSDCVIVACCINE_GEN_ALL_CORE_FT
influenza, injectable, quadrivalent, preservative free; 19-Feb-2019 10:20; Sandy Naranjo (RN); [employer]; [id number] (Exp. Date: 30-Jun-2019); IntraMuscular; Deltoid Left.; 0.5 milliLiter(s); VIS (VIS Published: 07-Aug-2015, VIS Presented: 19-Feb-2019);    influenza, injectable, quadrivalent, preservative free; 19-Feb-2019 10:20; Sandy Naranjo (RN); [employer]; [id number] (Exp. Date: 30-Jun-2019); IntraMuscular; Deltoid Left.; 0.5 milliLiter(s); VIS (VIS Published: 07-Aug-2015, VIS Presented: 19-Feb-2019);

## 2023-12-20 NOTE — PROGRESS NOTE ADULT - PROBLEM SELECTOR PROBLEM 8
History of stroke
BPH (benign prostatic hyperplasia)
Dementia

## 2023-12-21 VITALS — DIASTOLIC BLOOD PRESSURE: 79 MMHG | SYSTOLIC BLOOD PRESSURE: 153 MMHG | HEART RATE: 99 BPM

## 2023-12-21 LAB
ANION GAP SERPL CALC-SCNC: 18 MMOL/L — HIGH (ref 5–17)
ANION GAP SERPL CALC-SCNC: 18 MMOL/L — HIGH (ref 5–17)
ANISOCYTOSIS BLD QL: SLIGHT — SIGNIFICANT CHANGE UP
ANISOCYTOSIS BLD QL: SLIGHT — SIGNIFICANT CHANGE UP
BASOPHILS # BLD AUTO: 0 K/UL — SIGNIFICANT CHANGE UP (ref 0–0.2)
BASOPHILS # BLD AUTO: 0 K/UL — SIGNIFICANT CHANGE UP (ref 0–0.2)
BASOPHILS NFR BLD AUTO: 0 % — SIGNIFICANT CHANGE UP (ref 0–2)
BASOPHILS NFR BLD AUTO: 0 % — SIGNIFICANT CHANGE UP (ref 0–2)
BUN SERPL-MCNC: 110 MG/DL — HIGH (ref 7–23)
BUN SERPL-MCNC: 110 MG/DL — HIGH (ref 7–23)
BURR CELLS BLD QL SMEAR: PRESENT — SIGNIFICANT CHANGE UP
BURR CELLS BLD QL SMEAR: PRESENT — SIGNIFICANT CHANGE UP
CALCIUM SERPL-MCNC: 8 MG/DL — LOW (ref 8.4–10.5)
CALCIUM SERPL-MCNC: 8 MG/DL — LOW (ref 8.4–10.5)
CHLORIDE SERPL-SCNC: 104 MMOL/L — SIGNIFICANT CHANGE UP (ref 96–108)
CHLORIDE SERPL-SCNC: 104 MMOL/L — SIGNIFICANT CHANGE UP (ref 96–108)
CO2 SERPL-SCNC: 15 MMOL/L — LOW (ref 22–31)
CO2 SERPL-SCNC: 15 MMOL/L — LOW (ref 22–31)
CREAT SERPL-MCNC: 5.84 MG/DL — HIGH (ref 0.5–1.3)
CREAT SERPL-MCNC: 5.84 MG/DL — HIGH (ref 0.5–1.3)
EGFR: 10 ML/MIN/1.73M2 — LOW
EGFR: 10 ML/MIN/1.73M2 — LOW
EOSINOPHIL # BLD AUTO: 0 K/UL — SIGNIFICANT CHANGE UP (ref 0–0.5)
EOSINOPHIL # BLD AUTO: 0 K/UL — SIGNIFICANT CHANGE UP (ref 0–0.5)
EOSINOPHIL NFR BLD AUTO: 0 % — SIGNIFICANT CHANGE UP (ref 0–6)
EOSINOPHIL NFR BLD AUTO: 0 % — SIGNIFICANT CHANGE UP (ref 0–6)
GLUCOSE BLDC GLUCOMTR-MCNC: 62 MG/DL — LOW (ref 70–99)
GLUCOSE BLDC GLUCOMTR-MCNC: 62 MG/DL — LOW (ref 70–99)
GLUCOSE BLDC GLUCOMTR-MCNC: 81 MG/DL — SIGNIFICANT CHANGE UP (ref 70–99)
GLUCOSE BLDC GLUCOMTR-MCNC: 81 MG/DL — SIGNIFICANT CHANGE UP (ref 70–99)
GLUCOSE SERPL-MCNC: 55 MG/DL — LOW (ref 70–99)
GLUCOSE SERPL-MCNC: 55 MG/DL — LOW (ref 70–99)
HAV IGM SER-ACNC: SIGNIFICANT CHANGE UP
HAV IGM SER-ACNC: SIGNIFICANT CHANGE UP
HCT VFR BLD CALC: 33.5 % — LOW (ref 39–50)
HCT VFR BLD CALC: 33.5 % — LOW (ref 39–50)
HGB BLD-MCNC: 9.5 G/DL — LOW (ref 13–17)
HGB BLD-MCNC: 9.5 G/DL — LOW (ref 13–17)
HYPOCHROMIA BLD QL: SIGNIFICANT CHANGE UP
HYPOCHROMIA BLD QL: SIGNIFICANT CHANGE UP
LYMPHOCYTES # BLD AUTO: 1.06 K/UL — SIGNIFICANT CHANGE UP (ref 1–3.3)
LYMPHOCYTES # BLD AUTO: 1.06 K/UL — SIGNIFICANT CHANGE UP (ref 1–3.3)
LYMPHOCYTES # BLD AUTO: 7.9 % — LOW (ref 13–44)
LYMPHOCYTES # BLD AUTO: 7.9 % — LOW (ref 13–44)
MAGNESIUM SERPL-MCNC: 1.9 MG/DL — SIGNIFICANT CHANGE UP (ref 1.6–2.6)
MAGNESIUM SERPL-MCNC: 1.9 MG/DL — SIGNIFICANT CHANGE UP (ref 1.6–2.6)
MANUAL SMEAR VERIFICATION: SIGNIFICANT CHANGE UP
MANUAL SMEAR VERIFICATION: SIGNIFICANT CHANGE UP
MCHC RBC-ENTMCNC: 27.3 PG — SIGNIFICANT CHANGE UP (ref 27–34)
MCHC RBC-ENTMCNC: 27.3 PG — SIGNIFICANT CHANGE UP (ref 27–34)
MCHC RBC-ENTMCNC: 28.4 GM/DL — LOW (ref 32–36)
MCHC RBC-ENTMCNC: 28.4 GM/DL — LOW (ref 32–36)
MCV RBC AUTO: 96.3 FL — SIGNIFICANT CHANGE UP (ref 80–100)
MCV RBC AUTO: 96.3 FL — SIGNIFICANT CHANGE UP (ref 80–100)
MICROCYTES BLD QL: SLIGHT — SIGNIFICANT CHANGE UP
MICROCYTES BLD QL: SLIGHT — SIGNIFICANT CHANGE UP
MONOCYTES # BLD AUTO: 0.59 K/UL — SIGNIFICANT CHANGE UP (ref 0–0.9)
MONOCYTES # BLD AUTO: 0.59 K/UL — SIGNIFICANT CHANGE UP (ref 0–0.9)
MONOCYTES NFR BLD AUTO: 4.4 % — SIGNIFICANT CHANGE UP (ref 2–14)
MONOCYTES NFR BLD AUTO: 4.4 % — SIGNIFICANT CHANGE UP (ref 2–14)
NEUTROPHILS # BLD AUTO: 11.77 K/UL — HIGH (ref 1.8–7.4)
NEUTROPHILS # BLD AUTO: 11.77 K/UL — HIGH (ref 1.8–7.4)
NEUTROPHILS NFR BLD AUTO: 86.8 % — HIGH (ref 43–77)
NEUTROPHILS NFR BLD AUTO: 86.8 % — HIGH (ref 43–77)
NEUTS BAND # BLD: 0.9 % — SIGNIFICANT CHANGE UP (ref 0–8)
NEUTS BAND # BLD: 0.9 % — SIGNIFICANT CHANGE UP (ref 0–8)
OVALOCYTES BLD QL SMEAR: SLIGHT — SIGNIFICANT CHANGE UP
OVALOCYTES BLD QL SMEAR: SLIGHT — SIGNIFICANT CHANGE UP
PHOSPHATE SERPL-MCNC: 5 MG/DL — HIGH (ref 2.5–4.5)
PHOSPHATE SERPL-MCNC: 5 MG/DL — HIGH (ref 2.5–4.5)
PLAT MORPH BLD: NORMAL — SIGNIFICANT CHANGE UP
PLAT MORPH BLD: NORMAL — SIGNIFICANT CHANGE UP
PLATELET # BLD AUTO: 389 K/UL — SIGNIFICANT CHANGE UP (ref 150–400)
PLATELET # BLD AUTO: 389 K/UL — SIGNIFICANT CHANGE UP (ref 150–400)
POIKILOCYTOSIS BLD QL AUTO: SIGNIFICANT CHANGE UP
POIKILOCYTOSIS BLD QL AUTO: SIGNIFICANT CHANGE UP
POLYCHROMASIA BLD QL SMEAR: SLIGHT — SIGNIFICANT CHANGE UP
POLYCHROMASIA BLD QL SMEAR: SLIGHT — SIGNIFICANT CHANGE UP
POTASSIUM SERPL-MCNC: 3.6 MMOL/L — SIGNIFICANT CHANGE UP (ref 3.5–5.3)
POTASSIUM SERPL-MCNC: 3.6 MMOL/L — SIGNIFICANT CHANGE UP (ref 3.5–5.3)
POTASSIUM SERPL-SCNC: 3.6 MMOL/L — SIGNIFICANT CHANGE UP (ref 3.5–5.3)
POTASSIUM SERPL-SCNC: 3.6 MMOL/L — SIGNIFICANT CHANGE UP (ref 3.5–5.3)
RBC # BLD: 3.48 M/UL — LOW (ref 4.2–5.8)
RBC # BLD: 3.48 M/UL — LOW (ref 4.2–5.8)
RBC # FLD: 16 % — HIGH (ref 10.3–14.5)
RBC # FLD: 16 % — HIGH (ref 10.3–14.5)
RBC BLD AUTO: ABNORMAL
RBC BLD AUTO: ABNORMAL
SCHISTOCYTES BLD QL AUTO: SLIGHT — SIGNIFICANT CHANGE UP
SCHISTOCYTES BLD QL AUTO: SLIGHT — SIGNIFICANT CHANGE UP
SODIUM SERPL-SCNC: 137 MMOL/L — SIGNIFICANT CHANGE UP (ref 135–145)
SODIUM SERPL-SCNC: 137 MMOL/L — SIGNIFICANT CHANGE UP (ref 135–145)
WBC # BLD: 13.42 K/UL — HIGH (ref 3.8–10.5)
WBC # BLD: 13.42 K/UL — HIGH (ref 3.8–10.5)
WBC # FLD AUTO: 13.42 K/UL — HIGH (ref 3.8–10.5)
WBC # FLD AUTO: 13.42 K/UL — HIGH (ref 3.8–10.5)

## 2023-12-21 PROCEDURE — 83735 ASSAY OF MAGNESIUM: CPT

## 2023-12-21 PROCEDURE — 86480 TB TEST CELL IMMUN MEASURE: CPT

## 2023-12-21 PROCEDURE — 99285 EMERGENCY DEPT VISIT HI MDM: CPT

## 2023-12-21 PROCEDURE — 83036 HEMOGLOBIN GLYCOSYLATED A1C: CPT

## 2023-12-21 PROCEDURE — 90935 HEMODIALYSIS ONE EVALUATION: CPT

## 2023-12-21 PROCEDURE — 70450 CT HEAD/BRAIN W/O DYE: CPT

## 2023-12-21 PROCEDURE — 83935 ASSAY OF URINE OSMOLALITY: CPT

## 2023-12-21 PROCEDURE — 85025 COMPLETE CBC W/AUTO DIFF WBC: CPT

## 2023-12-21 PROCEDURE — 86923 COMPATIBILITY TEST ELECTRIC: CPT

## 2023-12-21 PROCEDURE — 87340 HEPATITIS B SURFACE AG IA: CPT

## 2023-12-21 PROCEDURE — 86900 BLOOD TYPING SEROLOGIC ABO: CPT

## 2023-12-21 PROCEDURE — 82746 ASSAY OF FOLIC ACID SERUM: CPT

## 2023-12-21 PROCEDURE — 87086 URINE CULTURE/COLONY COUNT: CPT

## 2023-12-21 PROCEDURE — 83550 IRON BINDING TEST: CPT

## 2023-12-21 PROCEDURE — 86901 BLOOD TYPING SEROLOGIC RH(D): CPT

## 2023-12-21 PROCEDURE — 82570 ASSAY OF URINE CREATININE: CPT

## 2023-12-21 PROCEDURE — 84540 ASSAY OF URINE/UREA-N: CPT

## 2023-12-21 PROCEDURE — 82962 GLUCOSE BLOOD TEST: CPT

## 2023-12-21 PROCEDURE — 86803 HEPATITIS C AB TEST: CPT

## 2023-12-21 PROCEDURE — 87493 C DIFF AMPLIFIED PROBE: CPT

## 2023-12-21 PROCEDURE — 86850 RBC ANTIBODY SCREEN: CPT

## 2023-12-21 PROCEDURE — 92610 EVALUATE SWALLOWING FUNCTION: CPT

## 2023-12-21 PROCEDURE — 83540 ASSAY OF IRON: CPT

## 2023-12-21 PROCEDURE — 82607 VITAMIN B-12: CPT

## 2023-12-21 PROCEDURE — 84133 ASSAY OF URINE POTASSIUM: CPT

## 2023-12-21 PROCEDURE — 71260 CT THORAX DX C+: CPT

## 2023-12-21 PROCEDURE — 80048 BASIC METABOLIC PNL TOTAL CA: CPT

## 2023-12-21 PROCEDURE — 84156 ASSAY OF PROTEIN URINE: CPT

## 2023-12-21 PROCEDURE — 84100 ASSAY OF PHOSPHORUS: CPT

## 2023-12-21 PROCEDURE — 82728 ASSAY OF FERRITIN: CPT

## 2023-12-21 PROCEDURE — 87040 BLOOD CULTURE FOR BACTERIA: CPT

## 2023-12-21 PROCEDURE — 84300 ASSAY OF URINE SODIUM: CPT

## 2023-12-21 PROCEDURE — 87449 NOS EACH ORGANISM AG IA: CPT

## 2023-12-21 PROCEDURE — 84466 ASSAY OF TRANSFERRIN: CPT

## 2023-12-21 PROCEDURE — 93005 ELECTROCARDIOGRAM TRACING: CPT

## 2023-12-21 PROCEDURE — 86709 HEPATITIS A IGM ANTIBODY: CPT

## 2023-12-21 PROCEDURE — 81001 URINALYSIS AUTO W/SCOPE: CPT

## 2023-12-21 PROCEDURE — 96365 THER/PROPH/DIAG IV INF INIT: CPT

## 2023-12-21 PROCEDURE — 74018 RADEX ABDOMEN 1 VIEW: CPT

## 2023-12-21 PROCEDURE — 85027 COMPLETE CBC AUTOMATED: CPT

## 2023-12-21 PROCEDURE — 83605 ASSAY OF LACTIC ACID: CPT

## 2023-12-21 PROCEDURE — 80053 COMPREHEN METABOLIC PANEL: CPT

## 2023-12-21 PROCEDURE — 71045 X-RAY EXAM CHEST 1 VIEW: CPT

## 2023-12-21 PROCEDURE — 86706 HEP B SURFACE ANTIBODY: CPT

## 2023-12-21 PROCEDURE — 87324 CLOSTRIDIUM AG IA: CPT

## 2023-12-21 PROCEDURE — 99232 SBSQ HOSP IP/OBS MODERATE 35: CPT | Mod: GC

## 2023-12-21 PROCEDURE — 84145 PROCALCITONIN (PCT): CPT

## 2023-12-21 PROCEDURE — 93971 EXTREMITY STUDY: CPT

## 2023-12-21 PROCEDURE — 99232 SBSQ HOSP IP/OBS MODERATE 35: CPT

## 2023-12-21 PROCEDURE — 74177 CT ABD & PELVIS W/CONTRAST: CPT

## 2023-12-21 PROCEDURE — 36415 COLL VENOUS BLD VENIPUNCTURE: CPT

## 2023-12-21 RX ORDER — SODIUM BICARBONATE 1 MEQ/ML
1300 SYRINGE (ML) INTRAVENOUS THREE TIMES A DAY
Refills: 0 | Status: DISCONTINUED | OUTPATIENT
Start: 2023-12-21 | End: 2023-12-21

## 2023-12-21 RX ADMIN — Medication 500 MILLIGRAM(S): at 05:18

## 2023-12-21 RX ADMIN — Medication 1300 MILLIGRAM(S): at 11:57

## 2023-12-21 RX ADMIN — Medication 125 MILLIGRAM(S): at 11:56

## 2023-12-21 RX ADMIN — Medication 125 MILLIGRAM(S): at 05:17

## 2023-12-21 RX ADMIN — Medication 81 MILLIGRAM(S): at 09:48

## 2023-12-21 RX ADMIN — HEPARIN SODIUM 5000 UNIT(S): 5000 INJECTION INTRAVENOUS; SUBCUTANEOUS at 05:17

## 2023-12-21 RX ADMIN — FINASTERIDE 5 MILLIGRAM(S): 5 TABLET, FILM COATED ORAL at 11:57

## 2023-12-21 NOTE — PROGRESS NOTE ADULT - ASSESSMENT
58M with CKD 5, HTN sent in by NH for hypotension, admitted for possible sepsis and CKD progression. Nephrology consulted for possible HD.    #CKD Stage V with concern for progression vs pre-renal azotemia in setting of infectious diarrhea and volume depletion  Cr around his baseline, but BUN elevated to 140s on admission with some changes in mental status. BUN and creatinine now improving with improvement in mentation after fluid resuscitation.  Attempted HD 12/15 but unable to dialyze due to increased arterial pressures. US with distal stenosis and possible thrombosis.  UA w/ proteinuria (chronic), no RBCs    Recommendations:  - Diarrhea resolved since 11/19 PM. BUN improved. Nephrologically stable to be discharged, but will start PO bicarb 1300 mg TID and discharge on it  - Awaiting fistulogram of LUE AVF due to stenosis and thrombosis, however patient septic with c diff and unable to undergo procedure at this time. BUN and creatinine slightly improving consistent with pre-renal azotemia, so can continue to monitor patient for now off of HD  - BMP daily  - Renal diet  - Pls ensure outpatient close follow up with nephrology to assess indications for HD initiation    #Anemia  Hb not at goal  tsat 11%, however iron contraindicated in setting of sepsis  Will consider epo w/ HD once initiated    #Access  LUE AVF with stenosis, thrombosis. Planned for fistulogram once medically improved.    #BMD-CKD  Ca 8.0  Phos 5.0

## 2023-12-21 NOTE — PROGRESS NOTE ADULT - ATTENDING COMMENTS
transfer to [facility] delayed for transport reasons  reports soft formed BMs  maintaining oral intake  59 yo man with CKD 5, HTN admitted 6 days ago from SNF with hypotension, + C diff  BUN/creat stable range  nephrologically stable for dc back to SNF  No plan for initiation of HD at this time.  AVF  + bruit and thrill- okay to have outpatient AVFistulogram per vasc team in next week or two  for metabolic acidosis from CKD need to add oral bicarb as noted above: 1300 mg TID transfer to [facility] delayed for transport reasons  reports soft formed BMs  maintaining oral intake  57 yo man with CKD 5, HTN admitted 6 days ago from SNF with hypotension, + C diff  BUN/creat stable range  nephrologically stable for dc back to SNF  No plan for initiation of HD at this time.  AVF  + bruit and thrill- okay to have outpatient AVFistulogram per vasc team in next week or two  for metabolic acidosis from CKD need to add oral bicarb as noted above: 1300 mg TID

## 2023-12-21 NOTE — CHART NOTE - NSCHARTNOTEFT_GEN_A_CORE
Pt's discharge home was held up yesterday. However pt is all set up for discharge today. Will follow up with renal team for CKD5. Will need to complete course of antibiotics for C.Diff colitis.

## 2023-12-21 NOTE — PROGRESS NOTE ADULT - PROVIDER SPECIALTY LIST ADULT
Internal Medicine
Nephrology
Surgery
Nephrology
Nephrology
Vascular Surgery
Nephrology
Surgery
Vascular Surgery
Hospitalist
Internal Medicine

## 2023-12-21 NOTE — PROGRESS NOTE ADULT - REASON FOR ADMISSION
hypotension at nursing home

## 2023-12-21 NOTE — PROGRESS NOTE ADULT - SUBJECTIVE AND OBJECTIVE BOX
Patient is a 58y Male seen and evaluated at bedside.       Meds:    aspirin enteric coated 81 every 24 hours  atorvastatin 80 at bedtime  dextrose 5% 1000 <Continuous>  dextrose 5%. 1000 <Continuous>  dextrose 5%. 1000 <Continuous>  dextrose 50% Injectable 25 once  dextrose 50% Injectable 12.5 once  dextrose 50% Injectable 25 once  dextrose 50% Injectable 25 once  dextrose Oral Gel 15 once PRN  finasteride 5 daily  glucagon  Injectable 1 once  heparin   Injectable 5000 every 8 hours  influenza   Vaccine 0.5 once  insulin lispro (ADMELOG) corrective regimen sliding scale  Before meals and at bedtime  metroNIDAZOLE    Tablet 500 every 8 hours  PPD  5 Tuberculin Unit(s) Injectable 5 once  sodium bicarbonate 1300 three times a day  tamsulosin 0.4 at bedtime  vancomycin    Solution 125 every 6 hours      T(C): , Max: 36.5 (12-21-23 @ 06:11)  T(F): , Max: 97.7 (12-21-23 @ 06:11)  HR: 99 (12-21-23 @ 10:04)  BP: 153/79 (12-21-23 @ 10:04)  BP(mean): --  RR: 18 (12-21-23 @ 06:11)  SpO2: 98% (12-21-23 @ 06:11)  Wt(kg): --    12-20 @ 07:01  -  12-21 @ 07:00  --------------------------------------------------------  IN: 0 mL / OUT: 1000 mL / NET: -1000 mL          Review of Systems:  ROS negative except as per HPI      PHYSICAL EXAM:  GEN: NAD, lying in bed  HEENT: NCAT  Respiratory: No respiratory distress  Cardiovascular: Regular rate  Gastrointestinal: soft, non-tender, non-distended  : Borja in place  Extremities: No significant peripheral edema  Skin: warm, dry, no rashes on exposed skin  Neuro: Awake and alert, answering questions. UE rigidity noted  Access: LUE AVF w/ thrill and bruit, more prominent s/p IVF      LABS:                        9.5    13.42 )-----------( 389      ( 21 Dec 2023 05:30 )             33.5     12-21    137  |  104  |  110<H>  ----------------------------<  55<L>  3.6   |  15<L>  |  5.84<H>    Ca    8.0<L>      21 Dec 2023 05:30  Phos  5.0     12-21  Mg     1.9     12-21          Urinalysis Basic - ( 21 Dec 2023 05:30 )    Color: x / Appearance: x / SG: x / pH: x  Gluc: 55 mg/dL / Ketone: x  / Bili: x / Urobili: x   Blood: x / Protein: x / Nitrite: x   Leuk Esterase: x / RBC: x / WBC x   Sq Epi: x / Non Sq Epi: x / Bacteria: x            RADIOLOGY & ADDITIONAL STUDIES:

## 2023-12-23 LAB
GAMMA INTERFERON BACKGROUND BLD IA-ACNC: 0.03 IU/ML — SIGNIFICANT CHANGE UP
GAMMA INTERFERON BACKGROUND BLD IA-ACNC: 0.03 IU/ML — SIGNIFICANT CHANGE UP
M TB IFN-G BLD-IMP: ABNORMAL
M TB IFN-G BLD-IMP: ABNORMAL
M TB IFN-G CD4+ BCKGRND COR BLD-ACNC: -0.01 IU/ML — SIGNIFICANT CHANGE UP
M TB IFN-G CD4+ BCKGRND COR BLD-ACNC: -0.01 IU/ML — SIGNIFICANT CHANGE UP
M TB IFN-G CD4+CD8+ BCKGRND COR BLD-ACNC: -0.01 IU/ML — SIGNIFICANT CHANGE UP
M TB IFN-G CD4+CD8+ BCKGRND COR BLD-ACNC: -0.01 IU/ML — SIGNIFICANT CHANGE UP
QUANT TB PLUS MITOGEN MINUS NIL: 0.07 IU/ML — SIGNIFICANT CHANGE UP
QUANT TB PLUS MITOGEN MINUS NIL: 0.07 IU/ML — SIGNIFICANT CHANGE UP

## 2023-12-27 DIAGNOSIS — D63.1 ANEMIA IN CHRONIC KIDNEY DISEASE: ICD-10-CM

## 2023-12-27 DIAGNOSIS — E44.0 MODERATE PROTEIN-CALORIE MALNUTRITION: ICD-10-CM

## 2023-12-27 DIAGNOSIS — A41.9 SEPSIS, UNSPECIFIED ORGANISM: ICD-10-CM

## 2023-12-27 DIAGNOSIS — F03.90 UNSPECIFIED DEMENTIA WITHOUT BEHAVIORAL DISTURBANCE: ICD-10-CM

## 2023-12-27 DIAGNOSIS — E11.319 TYPE 2 DIABETES MELLITUS WITH UNSPECIFIED DIABETIC RETINOPATHY WITHOUT MACULAR EDEMA: ICD-10-CM

## 2023-12-27 DIAGNOSIS — E87.20 ACIDOSIS, UNSPECIFIED: ICD-10-CM

## 2023-12-27 DIAGNOSIS — I69.328 OTHER SPEECH AND LANGUAGE DEFICITS FOLLOWING CEREBRAL INFARCTION: ICD-10-CM

## 2023-12-27 DIAGNOSIS — Z79.4 LONG TERM (CURRENT) USE OF INSULIN: ICD-10-CM

## 2023-12-27 DIAGNOSIS — R65.20 SEVERE SEPSIS WITHOUT SEPTIC SHOCK: ICD-10-CM

## 2023-12-27 DIAGNOSIS — G93.41 METABOLIC ENCEPHALOPATHY: ICD-10-CM

## 2023-12-27 DIAGNOSIS — N17.9 ACUTE KIDNEY FAILURE, UNSPECIFIED: ICD-10-CM

## 2023-12-27 DIAGNOSIS — A04.72 ENTEROCOLITIS DUE TO CLOSTRIDIUM DIFFICILE, NOT SPECIFIED AS RECURRENT: ICD-10-CM

## 2023-12-27 DIAGNOSIS — E11.22 TYPE 2 DIABETES MELLITUS WITH DIABETIC CHRONIC KIDNEY DISEASE: ICD-10-CM

## 2023-12-27 DIAGNOSIS — E11.649 TYPE 2 DIABETES MELLITUS WITH HYPOGLYCEMIA WITHOUT COMA: ICD-10-CM

## 2023-12-27 DIAGNOSIS — N18.6 END STAGE RENAL DISEASE: ICD-10-CM

## 2023-12-27 DIAGNOSIS — Z79.02 LONG TERM (CURRENT) USE OF ANTITHROMBOTICS/ANTIPLATELETS: ICD-10-CM

## 2023-12-27 DIAGNOSIS — I69.354 HEMIPLEGIA AND HEMIPARESIS FOLLOWING CEREBRAL INFARCTION AFFECTING LEFT NON-DOMINANT SIDE: ICD-10-CM

## 2023-12-27 DIAGNOSIS — I13.11 HYPERTENSIVE HEART AND CHRONIC KIDNEY DISEASE WITHOUT HEART FAILURE, WITH STAGE 5 CHRONIC KIDNEY DISEASE, OR END STAGE RENAL DISEASE: ICD-10-CM

## 2024-01-05 ENCOUNTER — APPOINTMENT (OUTPATIENT)
Dept: NEPHROLOGY | Facility: CLINIC | Age: 59
End: 2024-01-05
Payer: MEDICAID

## 2024-01-05 VITALS — RESPIRATION RATE: 12 BRPM | HEART RATE: 108 BPM | SYSTOLIC BLOOD PRESSURE: 118 MMHG | DIASTOLIC BLOOD PRESSURE: 78 MMHG

## 2024-01-05 DIAGNOSIS — E61.1 IRON DEFICIENCY: ICD-10-CM

## 2024-01-05 DIAGNOSIS — E87.5 HYPERKALEMIA: ICD-10-CM

## 2024-01-05 DIAGNOSIS — N25.81 SECONDARY HYPERPARATHYROIDISM OF RENAL ORIGIN: ICD-10-CM

## 2024-01-05 DIAGNOSIS — Z99.3 DEPENDENCE ON WHEELCHAIR: ICD-10-CM

## 2024-01-05 DIAGNOSIS — I10 ESSENTIAL (PRIMARY) HYPERTENSION: ICD-10-CM

## 2024-01-05 DIAGNOSIS — N18.5 CHRONIC KIDNEY DISEASE, STAGE 5: ICD-10-CM

## 2024-01-05 DIAGNOSIS — E11.9 TYPE 2 DIABETES MELLITUS W/OUT COMPLICATIONS: ICD-10-CM

## 2024-01-05 PROCEDURE — 99215 OFFICE O/P EST HI 40 MIN: CPT

## 2024-01-05 PROCEDURE — G2211 COMPLEX E/M VISIT ADD ON: CPT

## 2024-01-09 PROBLEM — Z99.3 WHEELCHAIR DEPENDENT: Status: ACTIVE | Noted: 2022-11-09

## 2024-01-09 PROBLEM — E61.1 IRON DEFICIENCY: Status: ACTIVE | Noted: 2019-07-29

## 2024-01-09 PROBLEM — E11.9 TYPE 2 DIABETES MELLITUS: Status: ACTIVE | Noted: 2022-11-09

## 2024-01-09 PROBLEM — N18.5 STAGE 5 CHRONIC KIDNEY DISEASE: Status: ACTIVE | Noted: 2022-11-10

## 2024-01-09 PROBLEM — N25.81 SECONDARY HYPERPARATHYROIDISM: Status: ACTIVE | Noted: 2019-07-29

## 2024-01-09 PROBLEM — I10 ESSENTIAL HYPERTENSION: Status: ACTIVE | Noted: 2019-06-04

## 2024-01-09 PROBLEM — E87.5 HYPERKALEMIA: Status: ACTIVE | Noted: 2019-07-23

## 2024-01-09 NOTE — PHYSICAL EXAM
[General Appearance - Alert] : alert [General Appearance - In No Acute Distress] : in no acute distress [Sclera] : the sclera and conjunctiva were normal [PERRL With Normal Accommodation] : pupils were equal in size, round, and reactive to light [Extraocular Movements] : extraocular movements were intact [Outer Ear] : the ears and nose were normal in appearance [Oropharynx] : the oropharynx was normal [Neck Appearance] : the appearance of the neck was normal [Neck Cervical Mass (___cm)] : no neck mass was observed [Jugular Venous Distention Increased] : there was no jugular-venous distention [Respiration, Rhythm And Depth] : normal respiratory rhythm and effort [Exaggerated Use Of Accessory Muscles For Inspiration] : no accessory muscle use [Auscultation Breath Sounds / Voice Sounds] : lungs were clear to auscultation bilaterally [Heart Rate And Rhythm] : heart rate was normal and rhythm regular [Heart Sounds] : normal S1 and S2 [Heart Sounds Gallop] : no gallops [Murmurs] : no murmurs [Heart Sounds Pericardial Friction Rub] : no pericardial rub [Edema] : there was no peripheral edema [Veins - Varicosity Changes] : there were no varicosital changes [Bowel Sounds] : normal bowel sounds [Abdomen Soft] : soft [Abdomen Tenderness] : non-tender [Abdomen Mass (___ Cm)] : no abdominal mass palpated [No CVA Tenderness] : no ~M costovertebral angle tenderness [No Spinal Tenderness] : no spinal tenderness [Involuntary Movements] : no involuntary movements were seen [Skin Color & Pigmentation] : normal skin color and pigmentation [Skin Turgor] : normal skin turgor [] : no rash [FreeTextEntry1] : AOx3, baseline minimal movement [Affect] : the affect was normal [Mood] : the mood was normal

## 2024-01-09 NOTE — HISTORY OF PRESENT ILLNESS
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

## 2024-01-09 NOTE — ASSESSMENT
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

## 2024-03-12 NOTE — PATIENT PROFILE ADULT - LOCATION #1
Quality 130: Documentation Of Current Medications In The Medical Record: Current Medications Documented Detail Level: Detailed Quality 265: Biopsy Follow-Up: Biopsy results reviewed, communicated, tracked, and documented Quality 226: Preventive Care And Screening: Tobacco Use: Screening And Cessation Intervention: Patient screened for tobacco use and is an ex/non-smoker Quality 431: Preventive Care And Screening: Unhealthy Alcohol Use - Screening: Patient not identified as an unhealthy alcohol user when screened for unhealthy alcohol use using a systematic screening method sacrum

## 2024-03-25 ENCOUNTER — APPOINTMENT (OUTPATIENT)
Dept: VASCULAR SURGERY | Facility: CLINIC | Age: 59
End: 2024-03-25
Payer: MEDICAID

## 2024-03-25 VITALS
DIASTOLIC BLOOD PRESSURE: 56 MMHG | WEIGHT: 136 LBS | BODY MASS INDEX: 19.47 KG/M2 | HEART RATE: 94 BPM | HEIGHT: 70 IN | SYSTOLIC BLOOD PRESSURE: 97 MMHG

## 2024-03-25 DIAGNOSIS — N18.6 END STAGE RENAL DISEASE: ICD-10-CM

## 2024-03-25 DIAGNOSIS — Z99.2 END STAGE RENAL DISEASE: ICD-10-CM

## 2024-03-25 PROCEDURE — 99215 OFFICE O/P EST HI 40 MIN: CPT

## 2024-03-26 PROBLEM — N18.6 ESRD (END STAGE RENAL DISEASE) ON DIALYSIS: Status: ACTIVE | Noted: 2024-03-26

## 2024-03-29 NOTE — HISTORY OF PRESENT ILLNESS
[FreeTextEntry1] : 58yoM with PMHx of DM2, CVA x 2 w/ residual L sided weakness, early onset dementia, HTN, HLD, CKD V (not on HD), BPH and PSHx of L toe amputation and AV fistula creation 5/23/23, now 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 Today he returns for a routine f/u visit. He is feeling well, denies R BKA stump pain, fever, chills. Patient with previous left heel ulcer that has healed, he denies pain, issues with his left AVF.

## 2024-03-29 NOTE — ASSESSMENT
[Arterial/Venous Disease] : arterial/venous disease [FreeTextEntry1] : 58yoM with PMHx of DM2, CVA x 2 w/ residual L sided weakness, early onset dementia, HTN, HLD, CKD V (not on HD), BPH and PSHx of L toe amputation and AV fistula creation 5/23/23, now 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 Today he returns for a routine f/u visit. Patient presents no complaints at this time, denies issues with dialysis. On exam, well healed right BKA stump, L heel with healed ulcer, no new skin breakdown, foot is warm. LUE with well healed incision over brachial fossa, good thrill appreciated, palpable radial pulse. We discussed the findings, explained that no vascular intervention is necessary at this time. He may f/u as needed.

## 2024-03-29 NOTE — PHYSICAL EXAM
[2+] : right 2+ [Alert] : alert [Calm] : calm [de-identified] : WN/WD, NAD, on a wheelchair [FreeTextEntry1] : LUE with well healed incision over brachial fossa, good thrill appreciated, palpable radial pulse [de-identified] : +FROM, on a wheelchair RLE contracted at the knee joint [de-identified] : R BKA with well healed stump incision, no signs of infection; L heel with healed ulcer

## 2024-03-29 NOTE — ADDENDUM
[FreeTextEntry1] : I, Dr. Caleb Royal, personally performed the evaluation and management (E/M) services for this established patient who presents today with (an) existing condition(s).  That E/M includes conducting the examination, assessing all conditions, and (re)establishing/reinforcing a plan of care.  Today, my ACP, Tawanna BLACK, was here to observe my evaluation and management services for this condition to be followed going forward.  I spent a total of 42 minutes in this encounter.

## 2024-04-03 ENCOUNTER — INPATIENT (INPATIENT)
Facility: HOSPITAL | Age: 59
LOS: 16 days | Discharge: NURSING, REHAB OR LONG-TERM CARE | DRG: 853 | End: 2024-04-20
Attending: STUDENT IN AN ORGANIZED HEALTH CARE EDUCATION/TRAINING PROGRAM | Admitting: STUDENT IN AN ORGANIZED HEALTH CARE EDUCATION/TRAINING PROGRAM
Payer: MEDICAID

## 2024-04-03 VITALS
RESPIRATION RATE: 18 BRPM | TEMPERATURE: 98 F | OXYGEN SATURATION: 94 % | DIASTOLIC BLOOD PRESSURE: 62 MMHG | HEART RATE: 98 BPM | SYSTOLIC BLOOD PRESSURE: 107 MMHG

## 2024-04-03 DIAGNOSIS — Z98.890 OTHER SPECIFIED POSTPROCEDURAL STATES: Chronic | ICD-10-CM

## 2024-04-03 DIAGNOSIS — Z89.511 ACQUIRED ABSENCE OF RIGHT LEG BELOW KNEE: Chronic | ICD-10-CM

## 2024-04-03 LAB
ANION GAP SERPL CALC-SCNC: 15 MMOL/L — SIGNIFICANT CHANGE UP (ref 5–17)
ANISOCYTOSIS BLD QL: SLIGHT — SIGNIFICANT CHANGE UP
APTT BLD: 29.4 SEC — SIGNIFICANT CHANGE UP (ref 24.5–35.6)
BASOPHILS # BLD AUTO: 0 K/UL — SIGNIFICANT CHANGE UP (ref 0–0.2)
BASOPHILS NFR BLD AUTO: 0 % — SIGNIFICANT CHANGE UP (ref 0–2)
BLD GP AB SCN SERPL QL: NEGATIVE — SIGNIFICANT CHANGE UP
BUN SERPL-MCNC: 103 MG/DL — HIGH (ref 7–23)
BURR CELLS BLD QL SMEAR: PRESENT — SIGNIFICANT CHANGE UP
CALCIUM SERPL-MCNC: 9.8 MG/DL — SIGNIFICANT CHANGE UP (ref 8.4–10.5)
CHLORIDE SERPL-SCNC: 102 MMOL/L — SIGNIFICANT CHANGE UP (ref 96–108)
CO2 SERPL-SCNC: 26 MMOL/L — SIGNIFICANT CHANGE UP (ref 22–31)
CREAT SERPL-MCNC: 7.8 MG/DL — HIGH (ref 0.5–1.3)
EGFR: 7 ML/MIN/1.73M2 — LOW
EOSINOPHIL # BLD AUTO: 0.07 K/UL — SIGNIFICANT CHANGE UP (ref 0–0.5)
EOSINOPHIL NFR BLD AUTO: 0.9 % — SIGNIFICANT CHANGE UP (ref 0–6)
GLUCOSE SERPL-MCNC: 192 MG/DL — HIGH (ref 70–99)
HCT VFR BLD CALC: 22.8 % — LOW (ref 39–50)
HGB BLD-MCNC: 7.1 G/DL — LOW (ref 13–17)
INR BLD: 1.02 — SIGNIFICANT CHANGE UP (ref 0.85–1.18)
LYMPHOCYTES # BLD AUTO: 0.43 K/UL — LOW (ref 1–3.3)
LYMPHOCYTES # BLD AUTO: 5.4 % — LOW (ref 13–44)
MANUAL SMEAR VERIFICATION: SIGNIFICANT CHANGE UP
MCHC RBC-ENTMCNC: 28.2 PG — SIGNIFICANT CHANGE UP (ref 27–34)
MCHC RBC-ENTMCNC: 31.1 GM/DL — LOW (ref 32–36)
MCV RBC AUTO: 90.5 FL — SIGNIFICANT CHANGE UP (ref 80–100)
MICROCYTES BLD QL: SLIGHT — SIGNIFICANT CHANGE UP
MONOCYTES # BLD AUTO: 0.57 K/UL — SIGNIFICANT CHANGE UP (ref 0–0.9)
MONOCYTES NFR BLD AUTO: 7.2 % — SIGNIFICANT CHANGE UP (ref 2–14)
MYELOCYTES NFR BLD: 0.9 % — HIGH (ref 0–0)
NEUTROPHILS # BLD AUTO: 6.82 K/UL — SIGNIFICANT CHANGE UP (ref 1.8–7.4)
NEUTROPHILS NFR BLD AUTO: 85.6 % — HIGH (ref 43–77)
OVALOCYTES BLD QL SMEAR: SLIGHT — SIGNIFICANT CHANGE UP
PLAT MORPH BLD: ABNORMAL
PLATELET # BLD AUTO: 293 K/UL — SIGNIFICANT CHANGE UP (ref 150–400)
POIKILOCYTOSIS BLD QL AUTO: SLIGHT — SIGNIFICANT CHANGE UP
POLYCHROMASIA BLD QL SMEAR: SLIGHT — SIGNIFICANT CHANGE UP
POTASSIUM SERPL-MCNC: 4.6 MMOL/L — SIGNIFICANT CHANGE UP (ref 3.5–5.3)
POTASSIUM SERPL-SCNC: 4.6 MMOL/L — SIGNIFICANT CHANGE UP (ref 3.5–5.3)
PROTHROM AB SERPL-ACNC: 11.6 SEC — SIGNIFICANT CHANGE UP (ref 9.5–13)
RBC # BLD: 2.52 M/UL — LOW (ref 4.2–5.8)
RBC # FLD: 16.7 % — HIGH (ref 10.3–14.5)
RBC BLD AUTO: ABNORMAL
RH IG SCN BLD-IMP: POSITIVE — SIGNIFICANT CHANGE UP
SCHISTOCYTES BLD QL AUTO: SLIGHT — SIGNIFICANT CHANGE UP
SMUDGE CELLS # BLD: PRESENT — SIGNIFICANT CHANGE UP
SODIUM SERPL-SCNC: 143 MMOL/L — SIGNIFICANT CHANGE UP (ref 135–145)
WBC # BLD: 7.97 K/UL — SIGNIFICANT CHANGE UP (ref 3.8–10.5)
WBC # FLD AUTO: 7.97 K/UL — SIGNIFICANT CHANGE UP (ref 3.8–10.5)

## 2024-04-03 PROCEDURE — 99285 EMERGENCY DEPT VISIT HI MDM: CPT

## 2024-04-03 PROCEDURE — 71045 X-RAY EXAM CHEST 1 VIEW: CPT | Mod: 26

## 2024-04-03 RX ORDER — CARVEDILOL PHOSPHATE 80 MG/1
3.12 CAPSULE, EXTENDED RELEASE ORAL ONCE
Refills: 0 | Status: COMPLETED | OUTPATIENT
Start: 2024-04-03 | End: 2024-04-03

## 2024-04-03 RX ORDER — LABETALOL HCL 100 MG
20 TABLET ORAL ONCE
Refills: 0 | Status: COMPLETED | OUTPATIENT
Start: 2024-04-03 | End: 2024-04-03

## 2024-04-03 RX ORDER — HYDRALAZINE HCL 50 MG
50 TABLET ORAL ONCE
Refills: 0 | Status: COMPLETED | OUTPATIENT
Start: 2024-04-03 | End: 2024-04-03

## 2024-04-03 RX ADMIN — Medication 50 MILLIGRAM(S): at 21:00

## 2024-04-03 RX ADMIN — Medication 20 MILLIGRAM(S): at 22:21

## 2024-04-03 RX ADMIN — CARVEDILOL PHOSPHATE 3.12 MILLIGRAM(S): 80 CAPSULE, EXTENDED RELEASE ORAL at 21:00

## 2024-04-03 RX ADMIN — Medication 20 MILLIGRAM(S): at 23:24

## 2024-04-03 NOTE — ED ADULT NURSE NOTE - NSFALLRISKINTERV_ED_ALL_ED

## 2024-04-03 NOTE — ED PROVIDER NOTE - NSICDXPASTMEDICALHX_GEN_ALL_CORE_FT
PAST MEDICAL HISTORY:  Cerebral artery occlusion with cerebral infarction Cerebral vascular accident    ESRD (end stage renal disease)     H/O diabetic retinopathy     Hyperlipidemia     Hypertension     Type 2 diabetes mellitus      PAST MEDICAL HISTORY:  Cerebral artery occlusion with cerebral infarction     ESRD (end stage renal disease)     H/O diabetic retinopathy     Hyperlipidemia     Hypertension     Type 2 diabetes mellitus

## 2024-04-03 NOTE — ED PROVIDER NOTE - CLINICAL SUMMARY MEDICAL DECISION MAKING FREE TEXT BOX
58M PMHx HTN, HLD, T2DM, CVA x2 w residual L sided weakness, BPH, ESRD sp L AVF creation 5/23 (not on dialysis per nursing home), sp R BKA 7/23 presents to ED after labwork showed Hg of 6.7. All VS wnl, pt denies HA, dizziness, lightheadedness, CP, SOB, abdominal pain. Repeat bloodwork showed Hg 7.1, Cr 7.80 . Nephro consulted to eval need for dialysis. 58M PMHx HTN, HLD, T2DM, CVA x2 w residual L sided weakness, BPH, ESRD sp L AVF creation 5/23 (not on dialysis per nursing home), sp R BKA 7/23 presents to ED after labwork showed Hg of 6.7. All VS wnl, pt denies HA, dizziness, lightheadedness, CP, SOB, abdominal pain. Repeat bloodwork showed Hg 7.1, Cr 7.80 . Nephro consulted to eval need for dialysis. Since pt ESRD, no hyperkalemia, signs of uremia, signs of fluid overload no need for dialysis. Pt to receive 1u prbc. 58M PMHx HTN, HLD, T2DM, CVA x2 w residual L sided weakness, BPH, ESRD sp L AVF creation 5/23 (on dialysis TIW, unknown days), sp R BKA 7/23 presents to ED after labwork showed Hg of 6.7. All VS wnl, pt denies HA, dizziness, lightheadedness, CP, SOB, abdominal pain. Repeat bloodwork showed Hg 7.1, Cr 7.80 . Nephro consulted to eval need for dialysis. Since pt ESRD, no hyperkalemia, signs of uremia, signs of fluid overload no need for dialysis. Pt to receive 1u prbc. 58M PMHx HTN, HLD, T2DM, CVA x2 w residual L sided weakness, BPH, ESRD sp L AVF creation 5/23 (on dialysis TIW, unknown days), sp R BKA 7/23 presents to ED after labwork showed Hg of 6.7. All VS wnl, pt denies HA, dizziness, lightheadedness, CP, SOB, abdominal pain. Repeat bloodwork showed Hg 7.1, Cr 7.80 . Nephro consulted to eval need for dialysis. Since pt ESRD, no hyperkalemia, signs of uremia, signs of fluid overload no need for dialysis. Pt to receive 1u prbc & be discharged back to rehab.

## 2024-04-03 NOTE — ED ADULT NURSE REASSESSMENT NOTE - NS ED NURSE REASSESS COMMENT FT1
Patient to receive blood transfusion. Labs reviewed. Patient consent in chart. Patient educated on possible signs of blood transfusion and informed to notify staff if patient were to develop any severe respiratory distress, flank pain, or any other major concerns. Patient to receive VS per policy and procedure. Blood product checked with second RN. Will continue to monitor with frequent VS and for possible transfusion reactions.

## 2024-04-03 NOTE — ED ADULT TRIAGE NOTE - CHIEF COMPLAINT QUOTE
Pt BIBA from [facility] A&Ox2 at baseline for low hgb and RBC count. Hx of dialysis and right BTK amputation.

## 2024-04-03 NOTE — ED PROVIDER NOTE - HIV OFFER
Previously Negative (within the last year) Graft Donor Site Epidermal Sutures (Optional): 5-0 Surgipro

## 2024-04-03 NOTE — ED PROVIDER NOTE - PATIENT PORTAL LINK FT
You can access the FollowMyHealth Patient Portal offered by [facility] by registering at the following website: [email or web address]. By joining [facility]’s FollowMyHealth portal, you will also be able to view your health information using other applications (apps) compatible with our system.

## 2024-04-03 NOTE — ED PROVIDER NOTE - NSICDXPASTSURGICALHX_GEN_ALL_CORE_FT
PAST SURGICAL HISTORY:  S/P arteriovenous (AV) fistula creation     S/P below knee amputation, right

## 2024-04-03 NOTE — ED PROVIDER NOTE - PLAN OF CARE
Assessment: 58M PMHx HTN, HLD, T2DM, CVA x2 w residual L sided weakness, BPH, ESRD sp L AVF creation 5/23 (on dialysis TIW, unknown days), sp R BKA 7/23 presents to ED after labwork showed Hg of 6.7.     Plan: standard labwork, EKG, nephology cs, 1 u prbc, DC back to rehab

## 2024-04-03 NOTE — ED PROVIDER NOTE - CARE PLAN
1 Principal Discharge DX:	Anemia  Assessment and plan of treatment:	Assessment: 58M PMHx HTN, HLD, T2DM, CVA x2 w residual L sided weakness, BPH, ESRD sp L AVF creation 5/23 (on dialysis TIW, unknown days), sp R BKA 7/23 presents to ED after labwork showed Hg of 6.7.     Plan: standard labwork, EKG, nephology cs, 1 u prbc, DC back to rehab   Principal Discharge DX:	Anemia  Assessment and plan of treatment:	Assessment: 58M PMHx HTN, HLD, T2DM, CVA x2 w residual L sided weakness, BPH, ESRD sp L AVF creation 5/23 (on dialysis TIW, unknown days), sp R BKA 7/23 presents to ED after labwork showed Hg of 6.7.     Plan: standard labwork, EKG, nephology cs, 1 u prbc, DC back to rehab  Secondary Diagnosis:	Fever

## 2024-04-03 NOTE — ED PROVIDER NOTE - PROGRESS NOTE DETAILS
on ems arrival patient hypertensive and tachcyardic. per home med rec review patient definitively missed afternoonj carvedilol dose. potentially missed AM hydrlaazine and nifedipine dose. meds ordered. patient well appearing pt with fever. will send blood cultures, will give tylenol, vanc/zosyn. pt with soft abd, no guarding, no evidence of cellulitis. possible blood transfusion reaction? remnants of transfusion bag and blood sample sent to blood bank. will admit to medicine.

## 2024-04-03 NOTE — ED PROVIDER NOTE - PHYSICAL EXAMINATION
T(C): 36.7 (04-03-24 @ 13:22), Max: 36.7 (04-03-24 @ 13:22)  HR: 98 (04-03-24 @ 13:22) (98 - 98)  BP: 107/62 (04-03-24 @ 13:22) (107/62 - 107/62)  RR: 18 (04-03-24 @ 13:22) (18 - 18)  SpO2: 94% (04-03-24 @ 13:22) (94% - 94%)    CONSTITUTIONAL: Well groomed, no apparent distress  EYES: PERRLA and symmetric, EOMI, No conjunctival or scleral injection, non-icteric  ENMT: Oral mucosa with moist membranes.   NECK: Supple, symmetric and without tracheal deviation   RESP: No respiratory distress, no use of accessory muscles  CV: RRR, no peripheral edema  GI: Soft, NTND  NEURO: CN II-XII intact; no focal or motor deficits  EXTREMITIES: WWP, SCDs in place, L brachial AVF w good thrill, sp R BKA  PSYCH: Appropriate insight/judgment; A+O x 3, mood and affect appropriate, recent/remote memory intact

## 2024-04-03 NOTE — ED PROVIDER NOTE - ATTENDING CONTRIBUTION TO CARE
Attending Statement: I have personally performed a face to face diagnostic evaluation on this patient. I have reviewed the ACP note and agree with the history, exam and plan of care, except as noted.     Attending Contribution to Care:  58M PMHx HTN, HLD, T2DM, CVA x2  who p/w residual L sided weakness, BPH, ESRD sp L AVF creation 5/23 (on dialysis TIW, unknown days), sp R BKA 7/23 presents to ED after labwork showed Hg of 6.7. All VS wnl, pt denies HA, dizziness, lightheadedness, CP, SOB, abdominal pain. Repeat blood work showed Hg 7.1, Cr 7.80 . Nephro consulted to eval need for emergent dialysis. Since pt ESRD, no hyperkalemia, signs of uremia, signs of fluid overload no need for dialysis. Pt to receive 1u prbc & be discharged back to rehab.

## 2024-04-03 NOTE — ED PROVIDER NOTE - OBJECTIVE STATEMENT
58M PMHx HTN, HLD, T2DM, CVA x2 w residual L sided weakness, BPH, ESRD sp L AVF creation 5/23 (not on dialysis per nursing home), sp R BKA 7/23 presents to ED after labwork showed Hg of 6.7. All VS wnl, pt denies HA, dizziness, lightheadedness, CP, SOB, abdominal pain. 58M PMHx HTN, HLD, T2DM, CVA x2 w residual L sided weakness, BPH, ESRD sp L AVF creation 5/23 (on dialysis TIW, unknown days), sp R BKA 7/23 presents to ED after labwork showed Hg of 6.7. All VS wnl, pt denies HA, dizziness, lightheadedness, CP, SOB, abdominal pain.

## 2024-04-03 NOTE — ED ADULT NURSE NOTE - OBJECTIVE STATEMENT
58y M [facility] from [facility] presents to ED c/o abnormal labs. Per triage RN pt sent here for low hgb/rbc count. Pt states "Im here to get fixed and get better." Denies fatigue, SOB, decreased PO intake, presence of pain, N/V/D, other acute sx at this time. Unknown pmHx, sx presentation, pt unable to answer all questions on assessment. R BKA. Pt AAOx2 at baseline. Disoriented to time. Speaking in full and clear sentences, NAD at this time.

## 2024-04-03 NOTE — CHART NOTE - NSCHARTNOTEFT_GEN_A_CORE
Patient notes read and appreciated.  Patient presented to ED from [facility] ([address] [place] [address], [phone]).  Patient required a blood transfusion.  Collateral contact made with Larisa, nurse manager at the facility.  Larisa confirmed that patient is able to return to the facility.  Patient is a resident on the 5th floor.  Larisa aware that patient will return to facility when medically ready.  ED  made aware of patient's transportation needs and will arrange transportation when patient is DC ready.  SW to remain available as needed

## 2024-04-03 NOTE — ED PROVIDER NOTE - NSFOLLOWUPINSTRUCTIONS_ED_ALL_ED_FT
Please follow up with your PCP and nephrologist at your earliest convenience.     Anemia    Anemia is a condition in which the concentration of red blood cells or hemoglobin in the blood is below normal. Hemoglobin is a substance in red blood cells that carries oxygen to the tissues of the body. Anemia results in not enough oxygen reaching these tissues which can cause symptoms such as weakness, dizziness/lightheadedness, shortness of breath, chest pain, paleness, or nausea. The cause of your anemia may or may not be determined immediately. You received a blood transfusion for low hemoglobin level. Usually reactions to transfusions occur immediately but monitor yourself for any fevers, rash, or shortness of breath.    SEEK IMMEDIATE MEDICAL CARE IF YOU HAVE ANY OF THE FOLLOWING SYMPTOMS: extreme weakness/chest pain/shortness of breath, black or bloody stools, vomiting blood, fainting, fever, or any signs of dehydration. You were seen for anemia. Your hgb was 7.1  You were given 1U of blood  The renal doctor did not recommend dialysis at this time.     Please follow up with your PCP and nephrologist at your earliest convenience.     Anemia    Anemia is a condition in which the concentration of red blood cells or hemoglobin in the blood is below normal. Hemoglobin is a substance in red blood cells that carries oxygen to the tissues of the body. Anemia results in not enough oxygen reaching these tissues which can cause symptoms such as weakness, dizziness/lightheadedness, shortness of breath, chest pain, paleness, or nausea. The cause of your anemia may or may not be determined immediately. You received a blood transfusion for low hemoglobin level. Usually reactions to transfusions occur immediately but monitor yourself for any fevers, rash, or shortness of breath.    SEEK IMMEDIATE MEDICAL CARE IF YOU HAVE ANY OF THE FOLLOWING SYMPTOMS: extreme weakness/chest pain/shortness of breath, black or bloody stools, vomiting blood, fainting, fever, or any signs of dehydration.

## 2024-04-04 ENCOUNTER — TRANSCRIPTION ENCOUNTER (OUTPATIENT)
Age: 59
End: 2024-04-04

## 2024-04-04 DIAGNOSIS — R65.10 SYSTEMIC INFLAMMATORY RESPONSE SYNDROME (SIRS) OF NON-INFECTIOUS ORIGIN WITHOUT ACUTE ORGAN DYSFUNCTION: ICD-10-CM

## 2024-04-04 DIAGNOSIS — F03.90 UNSPECIFIED DEMENTIA WITHOUT BEHAVIORAL DISTURBANCE: ICD-10-CM

## 2024-04-04 DIAGNOSIS — A41.9 SEPSIS, UNSPECIFIED ORGANISM: ICD-10-CM

## 2024-04-04 DIAGNOSIS — Z29.9 ENCOUNTER FOR PROPHYLACTIC MEASURES, UNSPECIFIED: ICD-10-CM

## 2024-04-04 DIAGNOSIS — I16.0 HYPERTENSIVE URGENCY: ICD-10-CM

## 2024-04-04 DIAGNOSIS — D64.9 ANEMIA, UNSPECIFIED: ICD-10-CM

## 2024-04-04 DIAGNOSIS — Z86.73 PERSONAL HISTORY OF TRANSIENT ISCHEMIC ATTACK (TIA), AND CEREBRAL INFARCTION WITHOUT RESIDUAL DEFICITS: ICD-10-CM

## 2024-04-04 DIAGNOSIS — Z00.00 ENCOUNTER FOR GENERAL ADULT MEDICAL EXAMINATION WITHOUT ABNORMAL FINDINGS: ICD-10-CM

## 2024-04-04 DIAGNOSIS — N40.0 BENIGN PROSTATIC HYPERPLASIA WITHOUT LOWER URINARY TRACT SYMPTOMS: ICD-10-CM

## 2024-04-04 DIAGNOSIS — N18.6 END STAGE RENAL DISEASE: ICD-10-CM

## 2024-04-04 DIAGNOSIS — G93.41 METABOLIC ENCEPHALOPATHY: ICD-10-CM

## 2024-04-04 DIAGNOSIS — E11.9 TYPE 2 DIABETES MELLITUS WITHOUT COMPLICATIONS: ICD-10-CM

## 2024-04-04 DIAGNOSIS — E78.5 HYPERLIPIDEMIA, UNSPECIFIED: ICD-10-CM

## 2024-04-04 DIAGNOSIS — I10 ESSENTIAL (PRIMARY) HYPERTENSION: ICD-10-CM

## 2024-04-04 DIAGNOSIS — Z87.438 PERSONAL HISTORY OF OTHER DISEASES OF MALE GENITAL ORGANS: ICD-10-CM

## 2024-04-04 LAB
A1C WITH ESTIMATED AVERAGE GLUCOSE RESULT: 5.5 % — SIGNIFICANT CHANGE UP (ref 4–5.6)
ADD ON TEST-SPECIMEN IN LAB: SIGNIFICANT CHANGE UP
ALBUMIN SERPL ELPH-MCNC: 3.4 G/DL — SIGNIFICANT CHANGE UP (ref 3.3–5)
ALP SERPL-CCNC: 77 U/L — SIGNIFICANT CHANGE UP (ref 40–120)
ALT FLD-CCNC: 62 U/L — HIGH (ref 10–45)
ANION GAP SERPL CALC-SCNC: 17 MMOL/L — SIGNIFICANT CHANGE UP (ref 5–17)
AST SERPL-CCNC: 36 U/L — SIGNIFICANT CHANGE UP (ref 10–40)
BASOPHILS # BLD AUTO: 0.02 K/UL — SIGNIFICANT CHANGE UP (ref 0–0.2)
BASOPHILS # BLD AUTO: 0.03 K/UL — SIGNIFICANT CHANGE UP (ref 0–0.2)
BASOPHILS # BLD AUTO: 0.04 K/UL — SIGNIFICANT CHANGE UP (ref 0–0.2)
BASOPHILS NFR BLD AUTO: 0.2 % — SIGNIFICANT CHANGE UP (ref 0–2)
BASOPHILS NFR BLD AUTO: 0.3 % — SIGNIFICANT CHANGE UP (ref 0–2)
BASOPHILS NFR BLD AUTO: 0.3 % — SIGNIFICANT CHANGE UP (ref 0–2)
BILIRUB SERPL-MCNC: 0.5 MG/DL — SIGNIFICANT CHANGE UP (ref 0.2–1.2)
BLD GP AB SCN SERPL QL: NEGATIVE — SIGNIFICANT CHANGE UP
BUN SERPL-MCNC: 103 MG/DL — HIGH (ref 7–23)
CALCIUM SERPL-MCNC: 9.7 MG/DL — SIGNIFICANT CHANGE UP (ref 8.4–10.5)
CHLORIDE SERPL-SCNC: 102 MMOL/L — SIGNIFICANT CHANGE UP (ref 96–108)
CO2 SERPL-SCNC: 23 MMOL/L — SIGNIFICANT CHANGE UP (ref 22–31)
CREAT SERPL-MCNC: 7.98 MG/DL — HIGH (ref 0.5–1.3)
EGFR: 7 ML/MIN/1.73M2 — LOW
EOSINOPHIL # BLD AUTO: 0.02 K/UL — SIGNIFICANT CHANGE UP (ref 0–0.5)
EOSINOPHIL # BLD AUTO: 0.05 K/UL — SIGNIFICANT CHANGE UP (ref 0–0.5)
EOSINOPHIL # BLD AUTO: 0.09 K/UL — SIGNIFICANT CHANGE UP (ref 0–0.5)
EOSINOPHIL NFR BLD AUTO: 0.2 % — SIGNIFICANT CHANGE UP (ref 0–6)
EOSINOPHIL NFR BLD AUTO: 0.5 % — SIGNIFICANT CHANGE UP (ref 0–6)
EOSINOPHIL NFR BLD AUTO: 0.7 % — SIGNIFICANT CHANGE UP (ref 0–6)
ESTIMATED AVERAGE GLUCOSE: 111 MG/DL — SIGNIFICANT CHANGE UP (ref 68–114)
FERRITIN SERPL-MCNC: 597 NG/ML — HIGH (ref 30–400)
FOLATE SERPL-MCNC: >20 NG/ML — SIGNIFICANT CHANGE UP
GLUCOSE BLDC GLUCOMTR-MCNC: 141 MG/DL — HIGH (ref 70–99)
GLUCOSE BLDC GLUCOMTR-MCNC: 148 MG/DL — HIGH (ref 70–99)
GLUCOSE BLDC GLUCOMTR-MCNC: 164 MG/DL — HIGH (ref 70–99)
GLUCOSE BLDC GLUCOMTR-MCNC: 167 MG/DL — HIGH (ref 70–99)
GLUCOSE SERPL-MCNC: 147 MG/DL — HIGH (ref 70–99)
HAPTOGLOB SERPL-MCNC: 336 MG/DL — HIGH (ref 34–200)
HAPTOGLOB SERPL-MCNC: SIGNIFICANT CHANGE UP (ref 34–200)
HAV IGM SER-ACNC: SIGNIFICANT CHANGE UP
HBV CORE AB SER-ACNC: SIGNIFICANT CHANGE UP
HBV CORE IGM SER-ACNC: SIGNIFICANT CHANGE UP
HBV SURFACE AB SER-ACNC: SIGNIFICANT CHANGE UP
HBV SURFACE AG SER-ACNC: SIGNIFICANT CHANGE UP
HCT VFR BLD CALC: 23 % — LOW (ref 39–50)
HCT VFR BLD CALC: 23.1 % — LOW (ref 39–50)
HCT VFR BLD CALC: 25 % — LOW (ref 39–50)
HCV AB S/CO SERPL IA: 0.04 S/CO — SIGNIFICANT CHANGE UP
HCV AB SERPL-IMP: SIGNIFICANT CHANGE UP
HGB BLD-MCNC: 7.2 G/DL — LOW (ref 13–17)
HGB BLD-MCNC: 7.5 G/DL — LOW (ref 13–17)
HGB BLD-MCNC: 8 G/DL — LOW (ref 13–17)
IMM GRANULOCYTES NFR BLD AUTO: 0.6 % — SIGNIFICANT CHANGE UP (ref 0–0.9)
IMM GRANULOCYTES NFR BLD AUTO: 1.3 % — HIGH (ref 0–0.9)
IMM GRANULOCYTES NFR BLD AUTO: 1.3 % — HIGH (ref 0–0.9)
IRON SATN MFR SERPL: 11 % — LOW (ref 16–55)
IRON SATN MFR SERPL: 21 UG/DL — LOW (ref 45–165)
LACTATE SERPL-SCNC: 0.8 MMOL/L — SIGNIFICANT CHANGE UP (ref 0.5–2)
LDH SERPL L TO P-CCNC: 251 U/L — HIGH (ref 50–242)
LDH SERPL L TO P-CCNC: SIGNIFICANT CHANGE UP (ref 50–242)
LYMPHOCYTES # BLD AUTO: 1.17 K/UL — SIGNIFICANT CHANGE UP (ref 1–3.3)
LYMPHOCYTES # BLD AUTO: 1.34 K/UL — SIGNIFICANT CHANGE UP (ref 1–3.3)
LYMPHOCYTES # BLD AUTO: 1.34 K/UL — SIGNIFICANT CHANGE UP (ref 1–3.3)
LYMPHOCYTES # BLD AUTO: 10.8 % — LOW (ref 13–44)
LYMPHOCYTES # BLD AUTO: 10.8 % — LOW (ref 13–44)
LYMPHOCYTES # BLD AUTO: 10.9 % — LOW (ref 13–44)
MAGNESIUM SERPL-MCNC: 2.4 MG/DL — SIGNIFICANT CHANGE UP (ref 1.6–2.6)
MCHC RBC-ENTMCNC: 28.1 PG — SIGNIFICANT CHANGE UP (ref 27–34)
MCHC RBC-ENTMCNC: 28.8 PG — SIGNIFICANT CHANGE UP (ref 27–34)
MCHC RBC-ENTMCNC: 29 PG — SIGNIFICANT CHANGE UP (ref 27–34)
MCHC RBC-ENTMCNC: 31.3 GM/DL — LOW (ref 32–36)
MCHC RBC-ENTMCNC: 32 GM/DL — SIGNIFICANT CHANGE UP (ref 32–36)
MCHC RBC-ENTMCNC: 32.5 GM/DL — SIGNIFICANT CHANGE UP (ref 32–36)
MCV RBC AUTO: 89.2 FL — SIGNIFICANT CHANGE UP (ref 80–100)
MCV RBC AUTO: 89.8 FL — SIGNIFICANT CHANGE UP (ref 80–100)
MCV RBC AUTO: 89.9 FL — SIGNIFICANT CHANGE UP (ref 80–100)
MONOCYTES # BLD AUTO: 1.35 K/UL — HIGH (ref 0–0.9)
MONOCYTES # BLD AUTO: 1.39 K/UL — HIGH (ref 0–0.9)
MONOCYTES # BLD AUTO: 1.69 K/UL — HIGH (ref 0–0.9)
MONOCYTES NFR BLD AUTO: 10.9 % — SIGNIFICANT CHANGE UP (ref 2–14)
MONOCYTES NFR BLD AUTO: 12.9 % — SIGNIFICANT CHANGE UP (ref 2–14)
MONOCYTES NFR BLD AUTO: 13.7 % — SIGNIFICANT CHANGE UP (ref 2–14)
NEUTROPHILS # BLD AUTO: 8.03 K/UL — HIGH (ref 1.8–7.4)
NEUTROPHILS # BLD AUTO: 9.18 K/UL — HIGH (ref 1.8–7.4)
NEUTROPHILS # BLD AUTO: 9.41 K/UL — HIGH (ref 1.8–7.4)
NEUTROPHILS NFR BLD AUTO: 74.2 % — SIGNIFICANT CHANGE UP (ref 43–77)
NEUTROPHILS NFR BLD AUTO: 74.4 % — SIGNIFICANT CHANGE UP (ref 43–77)
NEUTROPHILS NFR BLD AUTO: 76 % — SIGNIFICANT CHANGE UP (ref 43–77)
NRBC # BLD: 0 /100 WBCS — SIGNIFICANT CHANGE UP (ref 0–0)
PHOSPHATE SERPL-MCNC: 5.9 MG/DL — HIGH (ref 2.5–4.5)
PLATELET # BLD AUTO: 247 K/UL — SIGNIFICANT CHANGE UP (ref 150–400)
PLATELET # BLD AUTO: 247 K/UL — SIGNIFICANT CHANGE UP (ref 150–400)
PLATELET # BLD AUTO: 254 K/UL — SIGNIFICANT CHANGE UP (ref 150–400)
POTASSIUM SERPL-MCNC: 3.8 MMOL/L — SIGNIFICANT CHANGE UP (ref 3.5–5.3)
POTASSIUM SERPL-SCNC: 3.8 MMOL/L — SIGNIFICANT CHANGE UP (ref 3.5–5.3)
PROT SERPL-MCNC: 7 G/DL — SIGNIFICANT CHANGE UP (ref 6–8.3)
RAPID RVP RESULT: SIGNIFICANT CHANGE UP
RBC # BLD: 2.56 M/UL — LOW (ref 4.2–5.8)
RBC # BLD: 2.59 M/UL — LOW (ref 4.2–5.8)
RBC # BLD: 2.78 M/UL — LOW (ref 4.2–5.8)
RBC # FLD: 16 % — HIGH (ref 10.3–14.5)
RBC # FLD: 16.5 % — HIGH (ref 10.3–14.5)
RBC # FLD: 16.6 % — HIGH (ref 10.3–14.5)
RETICS #: 26 K/UL — SIGNIFICANT CHANGE UP (ref 25–125)
RETICS/RBC NFR: 1 % — SIGNIFICANT CHANGE UP (ref 0.5–2.5)
RH IG SCN BLD-IMP: POSITIVE — SIGNIFICANT CHANGE UP
SARS-COV-2 RNA SPEC QL NAA+PROBE: SIGNIFICANT CHANGE UP
SODIUM SERPL-SCNC: 142 MMOL/L — SIGNIFICANT CHANGE UP (ref 135–145)
TIBC SERPL-MCNC: 195 UG/DL — LOW (ref 220–430)
TRANSFERRIN SERPL-MCNC: 156 MG/DL — LOW (ref 200–360)
TRANSFUSION REACTION INTERP 1: NEGATIVE — SIGNIFICANT CHANGE UP
TRANSFUSION REACTION INTERP 2: SIGNIFICANT CHANGE UP
TSH SERPL-MCNC: 0.89 UIU/ML — SIGNIFICANT CHANGE UP (ref 0.27–4.2)
UIBC SERPL-MCNC: 174 UG/DL — SIGNIFICANT CHANGE UP (ref 110–370)
VIT B12 SERPL-MCNC: 1244 PG/ML — SIGNIFICANT CHANGE UP (ref 232–1245)
WBC # BLD: 10.81 K/UL — HIGH (ref 3.8–10.5)
WBC # BLD: 12.33 K/UL — HIGH (ref 3.8–10.5)
WBC # BLD: 12.63 K/UL — HIGH (ref 3.8–10.5)
WBC # FLD AUTO: 10.81 K/UL — HIGH (ref 3.8–10.5)
WBC # FLD AUTO: 12.33 K/UL — HIGH (ref 3.8–10.5)
WBC # FLD AUTO: 12.63 K/UL — HIGH (ref 3.8–10.5)

## 2024-04-04 PROCEDURE — 99223 1ST HOSP IP/OBS HIGH 75: CPT

## 2024-04-04 PROCEDURE — 99223 1ST HOSP IP/OBS HIGH 75: CPT | Mod: GC

## 2024-04-04 RX ORDER — SODIUM CHLORIDE 9 MG/ML
1000 INJECTION, SOLUTION INTRAVENOUS
Refills: 0 | Status: DISCONTINUED | OUTPATIENT
Start: 2024-04-04 | End: 2024-04-09

## 2024-04-04 RX ORDER — LEVOTHYROXINE SODIUM 125 MCG
1 TABLET ORAL
Refills: 0 | DISCHARGE

## 2024-04-04 RX ORDER — HYDRALAZINE HCL 50 MG
50 TABLET ORAL DAILY
Refills: 0 | Status: DISCONTINUED | OUTPATIENT
Start: 2024-04-04 | End: 2024-04-05

## 2024-04-04 RX ORDER — CARVEDILOL PHOSPHATE 80 MG/1
3.12 CAPSULE, EXTENDED RELEASE ORAL EVERY 12 HOURS
Refills: 0 | Status: DISCONTINUED | OUTPATIENT
Start: 2024-04-04 | End: 2024-04-12

## 2024-04-04 RX ORDER — PIPERACILLIN AND TAZOBACTAM 4; .5 G/20ML; G/20ML
3.38 INJECTION, POWDER, LYOPHILIZED, FOR SOLUTION INTRAVENOUS ONCE
Refills: 0 | Status: COMPLETED | OUTPATIENT
Start: 2024-04-04 | End: 2024-04-04

## 2024-04-04 RX ORDER — INSULIN GLARGINE 100 [IU]/ML
15 INJECTION, SOLUTION SUBCUTANEOUS
Refills: 0 | DISCHARGE

## 2024-04-04 RX ORDER — DEXTROSE 50 % IN WATER 50 %
12.5 SYRINGE (ML) INTRAVENOUS ONCE
Refills: 0 | Status: DISCONTINUED | OUTPATIENT
Start: 2024-04-04 | End: 2024-04-09

## 2024-04-04 RX ORDER — SODIUM BICARBONATE 1 MEQ/ML
650 SYRINGE (ML) INTRAVENOUS EVERY 8 HOURS
Refills: 0 | Status: DISCONTINUED | OUTPATIENT
Start: 2024-04-04 | End: 2024-04-05

## 2024-04-04 RX ORDER — NIFEDIPINE 30 MG
90 TABLET, EXTENDED RELEASE 24 HR ORAL AT BEDTIME
Refills: 0 | Status: DISCONTINUED | OUTPATIENT
Start: 2024-04-04 | End: 2024-04-04

## 2024-04-04 RX ORDER — VANCOMYCIN HCL 1 G
1000 VIAL (EA) INTRAVENOUS ONCE
Refills: 0 | Status: COMPLETED | OUTPATIENT
Start: 2024-04-04 | End: 2024-04-04

## 2024-04-04 RX ORDER — GLUCAGON INJECTION, SOLUTION 0.5 MG/.1ML
1 INJECTION, SOLUTION SUBCUTANEOUS ONCE
Refills: 0 | Status: DISCONTINUED | OUTPATIENT
Start: 2024-04-04 | End: 2024-04-09

## 2024-04-04 RX ORDER — CLOPIDOGREL BISULFATE 75 MG/1
75 TABLET, FILM COATED ORAL DAILY
Refills: 0 | Status: DISCONTINUED | OUTPATIENT
Start: 2024-04-04 | End: 2024-04-05

## 2024-04-04 RX ORDER — DEXTROSE 50 % IN WATER 50 %
25 SYRINGE (ML) INTRAVENOUS ONCE
Refills: 0 | Status: DISCONTINUED | OUTPATIENT
Start: 2024-04-04 | End: 2024-04-09

## 2024-04-04 RX ORDER — TUBERCULIN PURIFIED PROTEIN DERIVATIVE 5 [IU]/.1ML
5 INJECTION, SOLUTION INTRADERMAL ONCE
Refills: 0 | Status: COMPLETED | OUTPATIENT
Start: 2024-04-04 | End: 2024-04-04

## 2024-04-04 RX ORDER — LEVOTHYROXINE SODIUM 125 MCG
50 TABLET ORAL DAILY
Refills: 0 | Status: DISCONTINUED | OUTPATIENT
Start: 2024-04-04 | End: 2024-04-12

## 2024-04-04 RX ORDER — ASPIRIN/CALCIUM CARB/MAGNESIUM 324 MG
81 TABLET ORAL EVERY 24 HOURS
Refills: 0 | Status: DISCONTINUED | OUTPATIENT
Start: 2024-04-04 | End: 2024-04-12

## 2024-04-04 RX ORDER — NIFEDIPINE 30 MG
90 TABLET, EXTENDED RELEASE 24 HR ORAL DAILY
Refills: 0 | Status: DISCONTINUED | OUTPATIENT
Start: 2024-04-04 | End: 2024-04-04

## 2024-04-04 RX ORDER — ACETAMINOPHEN 500 MG
1000 TABLET ORAL ONCE
Refills: 0 | Status: COMPLETED | OUTPATIENT
Start: 2024-04-04 | End: 2024-04-04

## 2024-04-04 RX ORDER — DEXTROSE 10 % IN WATER 10 %
125 INTRAVENOUS SOLUTION INTRAVENOUS ONCE
Refills: 0 | Status: DISCONTINUED | OUTPATIENT
Start: 2024-04-04 | End: 2024-04-09

## 2024-04-04 RX ORDER — NIFEDIPINE 30 MG
1 TABLET, EXTENDED RELEASE 24 HR ORAL
Refills: 0 | DISCHARGE

## 2024-04-04 RX ORDER — ASCORBIC ACID 60 MG
1 TABLET,CHEWABLE ORAL
Refills: 0 | DISCHARGE

## 2024-04-04 RX ORDER — INSULIN LISPRO 100/ML
VIAL (ML) SUBCUTANEOUS
Refills: 0 | Status: DISCONTINUED | OUTPATIENT
Start: 2024-04-04 | End: 2024-04-08

## 2024-04-04 RX ORDER — HEPARIN SODIUM 5000 [USP'U]/ML
5000 INJECTION INTRAVENOUS; SUBCUTANEOUS EVERY 8 HOURS
Refills: 0 | Status: DISCONTINUED | OUTPATIENT
Start: 2024-04-04 | End: 2024-04-12

## 2024-04-04 RX ORDER — NIFEDIPINE 30 MG
120 TABLET, EXTENDED RELEASE 24 HR ORAL EVERY 24 HOURS
Refills: 0 | Status: DISCONTINUED | OUTPATIENT
Start: 2024-04-05 | End: 2024-04-06

## 2024-04-04 RX ORDER — NIFEDIPINE 30 MG
30 TABLET, EXTENDED RELEASE 24 HR ORAL DAILY
Refills: 0 | Status: DISCONTINUED | OUTPATIENT
Start: 2024-04-04 | End: 2024-04-04

## 2024-04-04 RX ORDER — TAMSULOSIN HYDROCHLORIDE 0.4 MG/1
0.4 CAPSULE ORAL AT BEDTIME
Refills: 0 | Status: DISCONTINUED | OUTPATIENT
Start: 2024-04-04 | End: 2024-04-12

## 2024-04-04 RX ORDER — ATORVASTATIN CALCIUM 80 MG/1
80 TABLET, FILM COATED ORAL AT BEDTIME
Refills: 0 | Status: DISCONTINUED | OUTPATIENT
Start: 2024-04-04 | End: 2024-04-12

## 2024-04-04 RX ORDER — CHLORHEXIDINE GLUCONATE 213 G/1000ML
1 SOLUTION TOPICAL EVERY 24 HOURS
Refills: 0 | Status: DISCONTINUED | OUTPATIENT
Start: 2024-04-04 | End: 2024-04-12

## 2024-04-04 RX ORDER — DEXTROSE 50 % IN WATER 50 %
15 SYRINGE (ML) INTRAVENOUS ONCE
Refills: 0 | Status: DISCONTINUED | OUTPATIENT
Start: 2024-04-04 | End: 2024-04-09

## 2024-04-04 RX ORDER — FINASTERIDE 5 MG/1
5 TABLET, FILM COATED ORAL DAILY
Refills: 0 | Status: DISCONTINUED | OUTPATIENT
Start: 2024-04-04 | End: 2024-04-12

## 2024-04-04 RX ORDER — INSULIN LISPRO 100/ML
5 VIAL (ML) SUBCUTANEOUS
Refills: 0 | DISCHARGE

## 2024-04-04 RX ADMIN — Medication 50 MICROGRAM(S): at 08:02

## 2024-04-04 RX ADMIN — TAMSULOSIN HYDROCHLORIDE 0.4 MILLIGRAM(S): 0.4 CAPSULE ORAL at 21:48

## 2024-04-04 RX ADMIN — Medication 50 MILLIGRAM(S): at 21:46

## 2024-04-04 RX ADMIN — Medication 2: at 14:09

## 2024-04-04 RX ADMIN — ATORVASTATIN CALCIUM 80 MILLIGRAM(S): 80 TABLET, FILM COATED ORAL at 21:48

## 2024-04-04 RX ADMIN — Medication 650 MILLIGRAM(S): at 10:47

## 2024-04-04 RX ADMIN — TUBERCULIN PURIFIED PROTEIN DERIVATIVE 5 UNIT(S): 5 INJECTION, SOLUTION INTRADERMAL at 19:10

## 2024-04-04 RX ADMIN — HEPARIN SODIUM 5000 UNIT(S): 5000 INJECTION INTRAVENOUS; SUBCUTANEOUS at 21:46

## 2024-04-04 RX ADMIN — Medication 650 MILLIGRAM(S): at 21:46

## 2024-04-04 RX ADMIN — Medication 81 MILLIGRAM(S): at 10:39

## 2024-04-04 RX ADMIN — CLOPIDOGREL BISULFATE 75 MILLIGRAM(S): 75 TABLET, FILM COATED ORAL at 13:08

## 2024-04-04 RX ADMIN — HEPARIN SODIUM 5000 UNIT(S): 5000 INJECTION INTRAVENOUS; SUBCUTANEOUS at 14:11

## 2024-04-04 RX ADMIN — Medication 90 MILLIGRAM(S): at 10:35

## 2024-04-04 RX ADMIN — Medication 1 TABLET(S): at 08:02

## 2024-04-04 RX ADMIN — Medication 400 MILLIGRAM(S): at 00:46

## 2024-04-04 RX ADMIN — Medication 650 MILLIGRAM(S): at 14:12

## 2024-04-04 RX ADMIN — FINASTERIDE 5 MILLIGRAM(S): 5 TABLET, FILM COATED ORAL at 13:08

## 2024-04-04 RX ADMIN — Medication 250 MILLIGRAM(S): at 02:30

## 2024-04-04 RX ADMIN — PIPERACILLIN AND TAZOBACTAM 200 GRAM(S): 4; .5 INJECTION, POWDER, LYOPHILIZED, FOR SOLUTION INTRAVENOUS at 01:35

## 2024-04-04 RX ADMIN — CARVEDILOL PHOSPHATE 3.12 MILLIGRAM(S): 80 CAPSULE, EXTENDED RELEASE ORAL at 19:12

## 2024-04-04 NOTE — DISCHARGE NOTE PROVIDER - NSDCMRMEDTOKEN_GEN_ALL_CORE_FT
acetaminophen 325 mg oral tablet: 2 tab(s) orally every 4 hours As needed Mild Pain (1 - 3)  aspirin 81 mg oral capsule: 1 cap(s) orally once a day  atorvastatin 80 mg oral tablet: 1 tab(s) orally once a day (at bedtime)  Coreg 3.125 mg oral tablet: 1 tab(s) orally 2 times a day  finasteride 5 mg oral tablet: 1 tab(s) orally once a day  hydrALAZINE 50 mg oral tablet: 1 tab(s) orally once a day  Nephro-Franklin oral tablet: 1 tab(s) orally once a day  NIFEdipine 90 mg oral tablet, extended release: 1 tab(s) orally once a day  Plavix 75 mg oral tablet: 1 tab(s) orally once a day  sodium bicarbonate 650 mg oral tablet: 2 tab(s) orally 3 times a day  Synthroid 50 mcg (0.05 mg) oral tablet: 1 tab(s) orally once a day  tamsulosin 0.4 mg oral capsule: 1 cap(s) orally once a day (at bedtime)  Tradjenta 5 mg oral tablet: 1 tab(s) orally once a day  Vitamin C 500 mg oral capsule: 1 cap(s) orally once a day   acetaminophen 325 mg oral tablet: 2 tab(s) orally every 4 hours As needed Mild Pain (1 - 3)  aspirin 81 mg oral capsule: 1 cap(s) orally once a day  atorvastatin 80 mg oral tablet: 1 tab(s) orally once a day (at bedtime)  Coreg 3.125 mg oral tablet: 1 tab(s) orally 2 times a day  finasteride 5 mg oral tablet: 1 tab(s) orally once a day  hydrALAZINE 50 mg oral tablet: 1 tab(s) orally once a day  Nephro-Franklin oral tablet: 1 tab(s) orally once a day  NIFEdipine (Eqv-Procardia XL) 30 mg oral tablet, extended release: 1 tab(s) orally once a day  NIFEdipine (Eqv-Procardia XL) 90 mg oral tablet, extended release: 1 tab(s) orally once a day  Plavix 75 mg oral tablet: 1 tab(s) orally once a day  sodium bicarbonate 650 mg oral tablet: 2 tab(s) orally 3 times a day  Synthroid 50 mcg (0.05 mg) oral tablet: 1 tab(s) orally once a day  tamsulosin 0.4 mg oral capsule: 1 cap(s) orally once a day (at bedtime)  Tradjenta 5 mg oral tablet: 1 tab(s) orally once a day  Vitamin C 500 mg oral capsule: 1 cap(s) orally once a day   acetaminophen 325 mg oral tablet: 2 tab(s) orally every 4 hours As needed Mild Pain (1 - 3)  aspirin 81 mg oral capsule: 1 cap(s) orally once a day  atorvastatin 80 mg oral tablet: 1 tab(s) orally once a day (at bedtime)  Coreg 3.125 mg oral tablet: 1 tab(s) orally 2 times a day  finasteride 5 mg oral tablet: 1 tab(s) orally once a day  hydrALAZINE 50 mg oral tablet: 1 tab(s) orally once a day  Nephro-Franklin oral tablet: 1 tab(s) orally once a day  NIFEdipine (Eqv-Procardia XL) 30 mg oral tablet, extended release: 1 tab(s) orally once a day  NIFEdipine (Eqv-Procardia XL) 90 mg oral tablet, extended release: 1 tab(s) orally once a day  Synthroid 50 mcg (0.05 mg) oral tablet: 1 tab(s) orally once a day  tamsulosin 0.4 mg oral capsule: 1 cap(s) orally once a day (at bedtime)  Tradjenta 5 mg oral tablet: 1 tab(s) orally once a day  vancomycin 125 mg oral capsule: 1 cap(s) orally every 6 hours continue to take 1 capsule every 6 hours until 4/15  Vitamin C 500 mg oral capsule: 1 cap(s) orally once a day   acetaminophen 325 mg oral tablet: 2 tab(s) orally every 4 hours As needed Mild Pain (1 - 3)  aspirin 81 mg oral delayed release tablet: 1 tab(s) orally every 24 hours  atorvastatin 80 mg oral tablet: 1 tab(s) orally once a day (at bedtime)  carvedilol 3.125 mg oral tablet: 1 tab(s) orally every 12 hours  finasteride 5 mg oral tablet: 1 tab(s) orally every 24 hours  Nephro-Franklin oral tablet: 1 tab(s) orally once a day  NIFEdipine (Eqv-Procardia XL) 30 mg oral tablet, extended release: 1 tab(s) orally once a day  NIFEdipine (Eqv-Procardia XL) 90 mg oral tablet, extended release: 1 tab(s) orally once a day  Synthroid 50 mcg (0.05 mg) oral tablet: 1 tab(s) orally once a day  tamsulosin 0.4 mg oral capsule: 1 cap(s) orally once a day (at bedtime)  Tradjenta 5 mg oral tablet: 1 tab(s) orally once a day  vancomycin 125 mg oral capsule: 1 cap(s) orally every 6 hours continue to take 1 capsule every 6 hours until 4/15  Vitamin C 500 mg oral capsule: 1 cap(s) orally once a day   acetaminophen 325 mg oral tablet: 2 tab(s) orally every 4 hours As needed Mild Pain (1 - 3)  aspirin 81 mg oral delayed release tablet: 1 tab(s) orally every 24 hours  atorvastatin 80 mg oral tablet: 1 tab(s) orally once a day (at bedtime)  carvedilol 3.125 mg oral tablet: 1 tab(s) orally every 12 hours  carvedilol 3.125 mg oral tablet: 1 tab(s) orally every 12 hours  finasteride 5 mg oral tablet: 1 tab(s) orally every 24 hours  Nephro-Franklin oral tablet: 1 tab(s) orally once a day  NIFEdipine (Eqv-Procardia XL) 30 mg oral tablet, extended release: 1 tab(s) orally once a day  NIFEdipine (Eqv-Procardia XL) 90 mg oral tablet, extended release: 1 tab(s) orally once a day  Synthroid 50 mcg (0.05 mg) oral tablet: 1 tab(s) orally once a day  tamsulosin 0.4 mg oral capsule: 1 cap(s) orally once a day (at bedtime)  Tradjenta 5 mg oral tablet: 1 tab(s) orally once a day  vancomycin 125 mg oral capsule: 1 cap(s) orally every 6 hours continue to take 1 capsule every 6 hours until 4/15  Vitamin C 500 mg oral capsule: 1 cap(s) orally once a day   acetaminophen 325 mg oral tablet: 2 tab(s) orally every 4 hours As needed Mild Pain (1 - 3)  aspirin 81 mg oral delayed release tablet: 1 tab(s) orally every 24 hours  atorvastatin 80 mg oral tablet: 1 tab(s) orally once a day (at bedtime)  carvedilol 3.125 mg oral tablet: 1 tab(s) orally every 12 hours Hold on Mornings of HD days.  finasteride 5 mg oral tablet: 1 tab(s) orally every 24 hours  Nephro-Franklin oral tablet: 1 tab(s) orally once a day  Synthroid 50 mcg (0.05 mg) oral tablet: 1 tab(s) orally once a day  tamsulosin 0.4 mg oral capsule: 1 cap(s) orally once a day (at bedtime)  Tradjenta 5 mg oral tablet: 1 tab(s) orally once a day  Vitamin C 500 mg oral capsule: 1 cap(s) orally once a day

## 2024-04-04 NOTE — DISCHARGE NOTE PROVIDER - NSDCFUSCHEDAPPT_GEN_ALL_CORE_FT
Van Garcia  [facility]  NEPHRO [address]  Scheduled Appointment: 05/10/2024    Cassandra Cowart  [facility]  HEARTVASC [address]  Scheduled Appointment: 05/20/2024

## 2024-04-04 NOTE — PROGRESS NOTE ADULT - PROBLEM SELECTOR PLAN 9
Known, takes Tamsulosin 0.4 mg qhs and Finasteride 5 mg qd.  - continue home meds - continue home lipitor 80mg

## 2024-04-04 NOTE — H&P ADULT - PROBLEM SELECTOR PLAN 10
F: cautious in ESRD   E: cautious repletion in ESRD   N: pending dysphagia  DVT: heparin subq   CODE status: unknown HCP but per paper chart review, FULL CODE is in patient's advanced directives

## 2024-04-04 NOTE — H&P ADULT - NSHPLABSRESULTS_GEN_ALL_CORE
.  LABS:                         8.0    12.63 )-----------( 254      ( 04 Apr 2024 00:29 )             25.0     04-03    143  |  102  |  103<H>  ----------------------------<  192<H>  4.6   |  26  |  7.80<H>    Ca    9.8      03 Apr 2024 13:45      PT/INR - ( 03 Apr 2024 13:45 )   PT: 11.6 sec;   INR: 1.02          PTT - ( 03 Apr 2024 13:45 )  PTT:29.4 sec  Urinalysis Basic - ( 03 Apr 2024 13:45 )    Color: x / Appearance: x / SG: x / pH: x  Gluc: 192 mg/dL / Ketone: x  / Bili: x / Urobili: x   Blood: x / Protein: x / Nitrite: x   Leuk Esterase: x / RBC: x / WBC x   Sq Epi: x / Non Sq Epi: x / Bacteria: x            Lactate, Blood: 0.8 mmol/L (04-04 @ 00:23)      RADIOLOGY, EKG & ADDITIONAL TESTS: Reviewed.

## 2024-04-04 NOTE — H&P ADULT - PROBLEM SELECTOR PLAN 9
F: cautious in ESRD   E: cautious repletion in ESRD   N: pending dysphagia  DVT: heparin subq   CODE status: unknown HCP but per paper chart review, FULL CODE is in patient's advanced directives Continue with atorvastatin 80 mg.

## 2024-04-04 NOTE — H&P ADULT - PROBLEM SELECTOR PLAN 3
Likely i/s/o missed medications. Per paper chart review from nursing home, patient is on 5+ pressure lowering agents all of which were held during his ED stay. Now s/p Coreg 3.25 mg and Labetolol 20 mg IV push X2 with systolics in 150's. Plan to resume all home meds but low threshold to DC if patient decompensates from sepsis standpoint.     Plan:  - hydralazine 50 mg qd   - coreg 3.125 mg bid   - nifedipine 90 mg ER qd

## 2024-04-04 NOTE — H&P ADULT - ATTENDING COMMENTS
58 YOM with PMH of T2DM, HTN, HLD, stroke x2 (on ischemic, on embolic, on DAPT) with residual L weakness, CKD5 s/p AVF (but not on HD), RBKA, c-diff (12/2023), BPH sent in from [facility] for Hgb 6.7 s/p transfusion of 1u pRBC in ED. Unable to return due to elevated BP and fever, admitted to medicine.     Fever - monitor off antibiotics as no localizing source of infection and HDS at present. BC pending, obtain UA. CXR/RVP negative. Denies diarrhea/abd pain. Possible transfusion-related (fever/tachycardia/leukocytosis developed post-transfusion) - vitals improved this morning.     Normocytic anemia - s/p 1u pRBC with Hgb 8.0 --> 7.2 this AM, repeat evening CBC. Suspect anemia of renal disease, obtain iron studies/hemolysis labs. Patient is on DAPT but no s/s luminal GIB, CHELLY negative.     CKD5 - has AVF but has not started iHD, consult nephro. Coordinate outpatient HD with SW, will need hep panel, quant gold, PPD.     Dispo - pending initiation of HD and coordination of outpatient HD

## 2024-04-04 NOTE — H&P ADULT - ASSESSMENT
Mr. Shetty is a 58 year old man with a PMHx of type II DM. HTN, HLD, DVT ??, ESRD (AVF created 5/23), R BKA, L toe amputation, h/o Cdiff in 12/2023, dementia, h/o CVA w/ R sided facial doop, who was brought in from his nursing home for a hemoglobin of 6.7 on outpatient labs, now s/p transfusion. Patient admitted for sepsis vs transfusion reaction, pending further workup.

## 2024-04-04 NOTE — PROGRESS NOTE ADULT - ASSESSMENT
57yo M T2DM, CVA x2 w/ residual L-sided weakness, early onset dementia, HTN, HLD, DVT, CKD V (Cr ~6, not on HD), BPH, PSH L toe amputation, R BKA, AVF creation (5/2023) BIBEMS from nursing home for hypotension, found to meet SIRS criteria without source of infection and has elevated BUN, admitted for VS derangement, possible HD initiation and further workup. 59yo M T2DM, CVA x2 w/ residual L-sided weakness, early onset dementia, HTN, HLD, DVT, CKD V (Cr ~6, not on HD), BPH, PSH L toe amputation, R BKA, AVF creation (5/2023) BIBEMS from nursing home for anemia requiring transfusion, found to meet SIRS criteria s/p transfusion ([facility] vs. sepsis) w/ elevated BUN/Cr, admitted for VS derangement w/ possible HD

## 2024-04-04 NOTE — H&P ADULT - PROBLEM SELECTOR PLAN 7
Per chart review, patient is A&Ox2 at baseline. On exam, patient A&Ox1.     Plan:   - call nursing home for collateral in AM On finasteride and flomax inoutpatient setting.     Plan:   continue home meds

## 2024-04-04 NOTE — PROGRESS NOTE ADULT - PROBLEM SELECTOR PLAN 3
Given hypoglycemia will hold Lantus 15u qhs and Lispro 5u TID premeal,    A1c 6.4% (10/25/2023).  - continue mISS inpatient  - hold tradjenta inpatient  - HbA1c: 6.1 Known early-onset dementia.  A&Ox2 at baseline. On exam, patient A&Ox1.     ?metabolic encephalopathy w/ hyperuremia.     - reassess after HD per HIE, prior Hx of HD. However no active HD according to NH staff.   Bun >100s, Cr 5.8 12/2023 -> 7.8 on admission.     - renal consulted for HD, consented by HCP Leo Cisneros [phone]  - continue home NaHCO3 650mg TID

## 2024-04-04 NOTE — PROGRESS NOTE ADULT - PROBLEM SELECTOR PLAN 10
Fluid: None  Electrolytes: replete w/ caution. HD  Nutrition: pending SLP  DVT ppx: heparin subQ  GI ppx: none for now   Code: FULL  Dispo: [facility]

## 2024-04-04 NOTE — PROGRESS NOTE ADULT - SUBJECTIVE AND OBJECTIVE BOX
Iron Total: 21 ug/dL (24 @ 10:16)  Iron - Total Binding Capacity.: 195 ug/dL (24 @ 10:16)  % Saturation, Iron: 11 % (24 @ 10:16)  Ferritin: 597 ng/mL (24 @ 10:16)    % Saturation, Iron: 11 % (24 @ 10:16)  Ferritin: 597 ng/mL (24 @ 10:16)    T(C): 36.6 (24 @ 08:52), Max: 38.9 (24 @ 02:21)  HR: 86 (24 @ 08:52) (86 - 123)  BP: 177/63 (24 @ 08:52) (136/80 - 191/75)  RR: 18 (24 @ 08:52) (16 - 20)  SpO2: 95% (24 @ 08:52) (93% - 99%)      Hemodialysis Treatment.:     Schedule: Once, Modality: Hemodialysis, Access: Arteriovenous Fistula    Dialyzer: Optiflux [id number], Time: 120 Min    Blood Flow: 200 mL/Min , Dialysate Flow: 400 mL/Min, Dialysate Temp: 36.5, Tubinmm (Adult)    Target Fluid Removal: 0 Liters    Dialysate Electrolytes (mEq/L): Potassium 3, Calcium 2.5, Sodium 138, Bicarbonate 35 (24 @ 13:31) [Active]      Pt seen on HD. Tolerating well. Using 17G needles. BP stable. Continue HD as above.

## 2024-04-04 NOTE — PROGRESS NOTE ADULT - PROBLEM SELECTOR PLAN 4
Planned for TDC placement and HD when medically optimized. - continue home meds Tamsulosin 0.4 mg qhs and Finasteride 5 mg qd. Known early-onset dementia.  A&Ox2 at baseline. On exam, patient A&Ox1.     ?metabolic encephalopathy w/ hyperuremia.     - reassess after HD

## 2024-04-04 NOTE — DISCHARGE NOTE PROVIDER - ATTENDING DISCHARGE PHYSICAL EXAMINATION:
58 YOM with PMH of T2DM, HTN, HLD, stroke x2 (ischemic, embolic) with residual L weakness, ESRD, RBKA, BPH admitted for acute on chronic anemia s/p [facility] transfusion. Started on HD this admission via LUE AVF (c/b AVF malfunction s/p venoplasty 4/12 for outflow stenosis). Hospital course c/b c-diff colitis s/p 10-day course of PO vancomycin (EOT 4/16/24) and aspiration pneumonitis/PNA (s/p 5-day course ceftriaxone EOT 4/9/24). Evaluated by SLP who advised who advised high risk aspiration. Evaluated by palliative, patient/family opting to cont comfort feeds with understand that patient remains high aspiration risk.     Leukocytosis resolved w/o intervention, patient non-toxic appearing, afebrile + HDS w/o new s/s infection.     [facility]

## 2024-04-04 NOTE — CONSULT NOTE ADULT - SUBJECTIVE AND OBJECTIVE BOX
HPI:  Mr. Shetty is a 58 year old man with a PMHx of type II DM. HTN, HLD, ESRD (AVF created 5/23), R BKA, L toe amputation, h/o Cdiff in 12/2023, dementia, h/o CVA w/ R sided facial doop, who was brought in from his nursing home for a hemoglobin of 6.7 on outpatient labs. He recieved one unit in the ED and when transport was set to take him back to his nursing home, he was denied due to hypertension. He was restarted on his home meds (Coreg 3.12, and Hydral oral 50 mg), and labetalol 20 mg IV push. He was evaluated again by ED provider and found to be septic with a fever to 101.4,  meeting 2/4 SIRS criteria so patient was admitted for further workup. Upon interview, patient is arousable and able to tell me his name. He denies pain, headache, nausea, vomitting, chest pain, SOB, abdominal symptoms.     ED course:   Intitial vitals: T: 98, HR 98, BP: 107/62, RR: 18. 94% on RA   Admission vitals: T: 101.4, HR: 108, BP: 178/78, 18 and 94% on RA   Labs: WBC: 7.97, Hb: 7.1, BMP: Cr 7.8, lactate 0.8  CXR: no consolidation   Interventions: pRBCs 1 unit, Coreg 3.125 mg, hydral 50 mg oral, labetalol 20 mg IV push X2, Vanc 1000 mg, Zosyn 3.375 mg    (04 Apr 2024 01:18)    Renal HPI:  58-year-old male with HTN, DM2, CKD 5 (AVF created 5/23) right BKA, history of CVA with right-sided facial droop, dementia was sent in by his nursing home for hemoglobin of 6.7 on outpatient labs.  He received a unit of PRBC in the ER and was ready to be transported back to his nursing home, but then on reevaluation was found to be septic with a fever 101.4, , source unclear.  He was admitted for further evaluation.  Workup was also sent for possible transfusion reaction.  Nephrology consulted for CKD 5.  Patient known to renal service from previous admissions.  Patient has followed up with Dr. Garcia as outpatient who had wanted him to initiate dialysis in the last few months, but his nursing home was unable to coordinate and patient has never started HD.  Labs noted for K3.8, , creatinine 7.9.  Given his dementia, patient unable to provide much history.    PAST MEDICAL & SURGICAL HISTORY:  Type 2 diabetes mellitus      Cerebral artery occlusion with cerebral infarction      Hyperlipidemia      Hypertension      H/O diabetic retinopathy      ESRD (end stage renal disease)      S/P below knee amputation, right      S/P arteriovenous (AV) fistula creation            Allergies:  No Known Allergies      Home Medications:   acetaminophen 325 mg oral tablet: 2 tab(s) orally every 4 hours As needed Mild Pain (1 - 3)  aspirin 81 mg oral capsule: 1 cap(s) orally once a day  atorvastatin 80 mg oral tablet: 1 tab(s) orally once a day (at bedtime)  Coreg 3.125 mg oral tablet: 1 tab(s) orally 2 times a day  finasteride 5 mg oral tablet: 1 tab(s) orally once a day  hydrALAZINE 50 mg oral tablet: 1 tab(s) orally once a day  Nephro-Franklin oral tablet: 1 tab(s) orally once a day  NIFEdipine 90 mg oral tablet, extended release: 1 tab(s) orally once a day  Plavix 75 mg oral tablet: 1 tab(s) orally once a day  sodium bicarbonate 650 mg oral tablet: 2 tab(s) orally 3 times a day  Synthroid 50 mcg (0.05 mg) oral tablet: 1 tab(s) orally once a day  tamsulosin 0.4 mg oral capsule: 1 cap(s) orally once a day (at bedtime)  Tradjenta 5 mg oral tablet: 1 tab(s) orally once a day  Vitamin C 500 mg oral capsule: 1 cap(s) orally once a day      Hospital Medications:   MEDICATIONS  (STANDING):  aspirin enteric coated 81 milliGRAM(s) Oral every 24 hours  atorvastatin 80 milliGRAM(s) Oral at bedtime  carvedilol 3.125 milliGRAM(s) Oral every 12 hours  clopidogrel Tablet 75 milliGRAM(s) Oral daily  dextrose 10% Bolus 125 milliLiter(s) IV Bolus once  dextrose 5%. 1000 milliLiter(s) (100 mL/Hr) IV Continuous <Continuous>  dextrose 5%. 1000 milliLiter(s) (50 mL/Hr) IV Continuous <Continuous>  dextrose 50% Injectable 12.5 Gram(s) IV Push once  dextrose 50% Injectable 25 Gram(s) IV Push once  finasteride 5 milliGRAM(s) Oral daily  glucagon  Injectable 1 milliGRAM(s) IntraMuscular once  heparin   Injectable 5000 Unit(s) SubCutaneous every 8 hours  hydrALAZINE 50 milliGRAM(s) Oral daily  insulin lispro (ADMELOG) corrective regimen sliding scale   SubCutaneous three times a day before meals  levothyroxine 50 MICROGram(s) Oral daily  multivitamin 1 Tablet(s) Oral every 24 hours  NIFEdipine XL 90 milliGRAM(s) Oral daily  sodium bicarbonate 650 milliGRAM(s) Oral every 8 hours  tamsulosin 0.4 milliGRAM(s) Oral at bedtime      SOCIAL HISTORY:  Denies ETOh, Smoking    Family History:  FAMILY HISTORY:  Family history of diabetes mellitus (Mother)          VITALS:  T(F): 97.9 (04-04-24 @ 08:52), Max: 102.1 (04-04-24 @ 02:21)  HR: 86 (04-04-24 @ 08:52)  BP: 177/63 (04-04-24 @ 08:52)  RR: 18 (04-04-24 @ 08:52)  SpO2: 95% (04-04-24 @ 08:52)  Wt(kg): --      CAPILLARY BLOOD GLUCOSE      POCT Blood Glucose.: 167 mg/dL (04 Apr 2024 13:39)  POCT Blood Glucose.: 141 mg/dL (04 Apr 2024 09:18)      Review of Systems:  Negative except as mentioned in HPI    PHYSICAL EXAM:  GENERAL: Alert, awake, NAD  CHEST/LUNG: Bilateral clear breath sounds  HEART: S1, S2  ABDOMEN: Soft, nontender, non distended  : No flank or supra pubic tenderness.  EXTREMITIES: no edema  Neurology: AAOx3, no focal neurological deficit      LABS:  04-04    142  |  102  |  103<H>  ----------------------------<  147<H>  3.8   |  23  |  7.98<H>    Ca    9.7      04 Apr 2024 05:30  Phos  5.9     04-04  Mg     2.4     04-04    TPro  7.0  /  Alb  3.4  /  TBili  0.5  /  DBili      /  AST  36  /  ALT  62<H>  /  AlkPhos  77  04-04    Creatinine Trend: 7.98 <--, 7.80 <--                        7.2    10.81 )-----------( 247      ( 04 Apr 2024 05:30 )             23.0     Urine Studies:  Urinalysis Basic - ( 04 Apr 2024 05:30 )    Color:  / Appearance:  / SG:  / pH:   Gluc: 147 mg/dL / Ketone:   / Bili:  / Urobili:    Blood:  / Protein:  / Nitrite:    Leuk Esterase:  / RBC:  / WBC    Sq Epi:  / Non Sq Epi:  / Bacteria:                    HPI:  Mr. Shetty is a 58 year old man with a PMHx of type II DM. HTN, HLD, ESRD (AVF created ), R BKA, L toe amputation, h/o Cdiff in 2023, dementia, h/o CVA w/ R sided facial doop, who was brought in from his nursing home for a hemoglobin of 6.7 on outpatient labs. He recieved one unit in the ED and when transport was set to take him back to his nursing home, he was denied due to hypertension. He was restarted on his home meds (Coreg 3.12, and Hydral oral 50 mg), and labetalol 20 mg IV push. He was evaluated again by ED provider and found to be septic with a fever to 101.4,  meeting 2/4 SIRS criteria so patient was admitted for further workup. Upon interview, patient is arousable and able to tell me his name. He denies pain, headache, nausea, vomitting, chest pain, SOB, abdominal symptoms.     ED course:   Intitial vitals: T: 98, HR 98, BP: 107/62, RR: 18. 94% on RA   Admission vitals: T: 101.4, HR: 108, BP: 178/78, 18 and 94% on RA   Labs: WBC: 7.97, Hb: 7.1, BMP: Cr 7.8, lactate 0.8  CXR: no consolidation   Interventions: pRBCs 1 unit, Coreg 3.125 mg, hydral 50 mg oral, labetalol 20 mg IV push X2, Vanc 1000 mg, Zosyn 3.375 mg    (2024 01:18)    Renal HPI:  58-year-old male with HTN, DM2, CKD 5 (AVF created ) right BKA, history of CVA with right-sided facial droop, dementia was sent in by his nursing home for hemoglobin of 6.7 on outpatient labs.  He received a unit of PRBC in the ER and was ready to be transported back to his nursing home, but then on reevaluation was found to be septic with a fever 101.4, , source unclear.  He was admitted for further evaluation.  Workup was also sent for possible transfusion reaction.  Nephrology consulted for CKD 5.  Patient known to renal service from previous admissions.  Patient has followed up with Dr. Garcia as outpatient who had wanted him to initiate dialysis in the last few months, but his nursing home was unable to coordinate and patient has never started HD.  Labs noted for K3.8, , creatinine 7.9.  Given his dementia, patient unable to provide much history. Denies any active symptoms at present.    PAST MEDICAL & SURGICAL HISTORY:  Type 2 diabetes mellitus      Cerebral artery occlusion with cerebral infarction      Hyperlipidemia      Hypertension      H/O diabetic retinopathy      ESRD (end stage renal disease)      S/P below knee amputation, right      S/P arteriovenous (AV) fistula creation            Allergies:  No Known Allergies      Home Medications:   acetaminophen 325 mg oral tablet: 2 tab(s) orally every 4 hours As needed Mild Pain (1 - 3)  aspirin 81 mg oral capsule: 1 cap(s) orally once a day  atorvastatin 80 mg oral tablet: 1 tab(s) orally once a day (at bedtime)  Coreg 3.125 mg oral tablet: 1 tab(s) orally 2 times a day  finasteride 5 mg oral tablet: 1 tab(s) orally once a day  hydrALAZINE 50 mg oral tablet: 1 tab(s) orally once a day  Nephro-Franklin oral tablet: 1 tab(s) orally once a day  NIFEdipine 90 mg oral tablet, extended release: 1 tab(s) orally once a day  Plavix 75 mg oral tablet: 1 tab(s) orally once a day  sodium bicarbonate 650 mg oral tablet: 2 tab(s) orally 3 times a day  Synthroid 50 mcg (0.05 mg) oral tablet: 1 tab(s) orally once a day  tamsulosin 0.4 mg oral capsule: 1 cap(s) orally once a day (at bedtime)  Tradjenta 5 mg oral tablet: 1 tab(s) orally once a day  Vitamin C 500 mg oral capsule: 1 cap(s) orally once a day      Hospital Medications:   MEDICATIONS  (STANDING):  aspirin enteric coated 81 milliGRAM(s) Oral every 24 hours  atorvastatin 80 milliGRAM(s) Oral at bedtime  carvedilol 3.125 milliGRAM(s) Oral every 12 hours  clopidogrel Tablet 75 milliGRAM(s) Oral daily  dextrose 10% Bolus 125 milliLiter(s) IV Bolus once  dextrose 5%. 1000 milliLiter(s) (100 mL/Hr) IV Continuous <Continuous>  dextrose 5%. 1000 milliLiter(s) (50 mL/Hr) IV Continuous <Continuous>  dextrose 50% Injectable 12.5 Gram(s) IV Push once  dextrose 50% Injectable 25 Gram(s) IV Push once  finasteride 5 milliGRAM(s) Oral daily  glucagon  Injectable 1 milliGRAM(s) IntraMuscular once  heparin   Injectable 5000 Unit(s) SubCutaneous every 8 hours  hydrALAZINE 50 milliGRAM(s) Oral daily  insulin lispro (ADMELOG) corrective regimen sliding scale   SubCutaneous three times a day before meals  levothyroxine 50 MICROGram(s) Oral daily  multivitamin 1 Tablet(s) Oral every 24 hours  NIFEdipine XL 90 milliGRAM(s) Oral daily  sodium bicarbonate 650 milliGRAM(s) Oral every 8 hours  tamsulosin 0.4 milliGRAM(s) Oral at bedtime      SOCIAL HISTORY:  Denies ETOh, Smoking    Family History:  FAMILY HISTORY:  Family history of diabetes mellitus (Mother)          VITALS:  T(F): 97.9 (24 @ 08:52), Max: 102.1 (24 @ 02:21)  HR: 86 (24 @ 08:52)  BP: 177/63 (24 @ 08:52)  RR: 18 (24 @ 08:52)  SpO2: 95% (24 @ 08:52)  Wt(kg): --      CAPILLARY BLOOD GLUCOSE      POCT Blood Glucose.: 167 mg/dL (2024 13:39)  POCT Blood Glucose.: 141 mg/dL (2024 09:18)      Review of Systems:  Negative except as mentioned in HPI    PHYSICAL EXAM:  GEN: NAD, lying in bed  Respiratory: No respiratory distress  Cardiovascular: Regular rate  Gastrointestinal: soft, non-tender, non-distended  Extremities: No peripheral edema  Neuro: Awake and alert, answering questions. UE rigidity noted  Access: LUE AVF w/ thrill and bruit      LABS:      142  |  102  |  103<H>  ----------------------------<  147<H>  3.8   |  23  |  7.98<H>    Ca    9.7      2024 05:30  Phos  5.9       Mg     2.4         TPro  7.0  /  Alb  3.4  /  TBili  0.5  /  DBili      /  AST  36  /  ALT  62<H>  /  AlkPhos  77      Creatinine Trend: 7.98 <--, 7.80 <--                        7.2    10.81 )-----------( 247      ( 2024 05:30 )             23.0     Urine Studies:  Urinalysis Basic - ( 2024 05:30 )    Color:  / Appearance:  / SG:  / pH:   Gluc: 147 mg/dL / Ketone:   / Bili:  / Urobili:    Blood:  / Protein:  / Nitrite:    Leuk Esterase:  / RBC:  / WBC    Sq Epi:  / Non Sq Epi:  / Bacteria:           Iron Total: 21 ug/dL (24 @ 10:16)  Iron - Total Binding Capacity.: 195 ug/dL (24 @ 10:16)  % Saturation, Iron: 11 % (24 @ 10:16)  Ferritin: 597 ng/mL (24 @ 10:16)    % Saturation, Iron: 11 % (24 @ 10:16)  Ferritin: 597 ng/mL (24 @ 10:16)    T(C): 36.6 (24 @ 08:52), Max: 38.9 (24 @ 02:21)  HR: 86 (24 @ 08:52) (86 - 123)  BP: 177/63 (24 @ 08:52) (120/57 - 191/75)  RR: 18 (24 @ 08:52) (16 - 20)  SpO2: 95% (24 @ 08:52) (93% - 99%)      Hemodialysis Treatment.:     Schedule: Once, Modality: Hemodialysis, Access: Arteriovenous Fistula    Dialyzer: Optiflux [id number], Time: 120 Min    Blood Flow: 200 mL/Min , Dialysate Flow: 400 mL/Min, Dialysate Temp: 36.5, Tubinmm (Adult)    Target Fluid Removal: 0 Liters    Dialysate Electrolytes (mEq/L): Potassium 3, Calcium 2.5, Sodium 138, Bicarbonate 35 (24 @ 13:31) [Active]

## 2024-04-04 NOTE — PROGRESS NOTE ADULT - PROBLEM SELECTOR PLAN 2
C/w PO vancomycin and start IV Flagyl given CT findings of pancolitis and dilated sigmoid colon per HIE, prior Hx of HD. However no active HD according to NH staff.   Bun >100s, Cr 5.8 12/2023 -> 7.8 on admission.     - renal consulted for HD, consented by HCP Leo Cisneros [phone] - continue home med Coreg 3.125 BID, hydralazine 50 qd, nifedipine 120mg qd       #Hypertensive Urgency - RESOLVED - continue home med Coreg 3.125 BID, hydralazine 50 qd, nifedipine 120mg qd       #Hypertensive Urgency - RESOLVED  s/p labetalol 20mg IVP x2

## 2024-04-04 NOTE — H&P ADULT - HISTORY OF PRESENT ILLNESS
Mr. Shetty is a 58 year old man with a PMHx of type II DM. HTN, HLD, DVT ??, ESRD (AVF created 5/23), R BKA, L toe amputation, h/o Cdiff in 12/2023, dementia, h/o CVA w/ R sided facial doop, who was brought in from his nursing home for a hemoglobin of 6.7 on outpatient labs. He recieved one unit in the ED and when transport was set to take him back to his nursing home, he was denied due to hypertension. He was restarted on his home meds (Coreg 3.12, and Hydral oral 50 mg), and labetalol 20 mg IV push. He was evaluated again by ED provider and found to be septic with a fever to 101.4,  meeting 2/4 SIRS criteria so patient was admitted for further workup.     ED course:   Intitial vitals: T: 98, HR 98, BP: 107/62, RR: 18. 94% on RA   Admission vitals: T: 101.4, HR: 108, BP: 178/78, 18 and 94% on RA   Labs: WBC: 7.97, Hb: 7.1, BMP: Cr 7.8, lactate 0.8  CXR: no consolidation   Interventions: pRBCs 1 unit, Coreg 3.125 mg, hydral 50 mg oral, labetalol 20 mg IV push X2, Vanc 1000 mg, Zosyn 3.375 mg    Mr. Shetty is a 58 year old man with a PMHx of type II DM. HTN, HLD, DVT ??, ESRD (AVF created 5/23), R BKA, L toe amputation, h/o Cdiff in 12/2023, dementia, h/o CVA w/ R sided facial doop, who was brought in from his nursing home for a hemoglobin of 6.7 on outpatient labs. He recieved one unit in the ED and when transport was set to take him back to his nursing home, he was denied due to hypertension. He was restarted on his home meds (Coreg 3.12, and Hydral oral 50 mg), and labetalol 20 mg IV push. He was evaluated again by ED provider and found to be septic with a fever to 101.4,  meeting 2/4 SIRS criteria so patient was admitted for further workup. Upon interview, patient is arousable and able to tell me his name. He denies pain, headache, nausea, vomitting, chest pain, SOB, abdominal symptoms.     ED course:   Intitial vitals: T: 98, HR 98, BP: 107/62, RR: 18. 94% on RA   Admission vitals: T: 101.4, HR: 108, BP: 178/78, 18 and 94% on RA   Labs: WBC: 7.97, Hb: 7.1, BMP: Cr 7.8, lactate 0.8  CXR: no consolidation   Interventions: pRBCs 1 unit, Coreg 3.125 mg, hydral 50 mg oral, labetalol 20 mg IV push X2, Vanc 1000 mg, Zosyn 3.375 mg    Mr. Shetty is a 58 year old man with a PMHx of type II DM. HTN, HLD, ESRD (AVF created 5/23), R BKA, L toe amputation, h/o Cdiff in 12/2023, dementia, h/o CVA w/ R sided facial doop, who was brought in from his nursing home for a hemoglobin of 6.7 on outpatient labs. He recieved one unit in the ED and when transport was set to take him back to his nursing home, he was denied due to hypertension. He was restarted on his home meds (Coreg 3.12, and Hydral oral 50 mg), and labetalol 20 mg IV push. He was evaluated again by ED provider and found to be septic with a fever to 101.4,  meeting 2/4 SIRS criteria so patient was admitted for further workup. Upon interview, patient is arousable and able to tell me his name. He denies pain, headache, nausea, vomitting, chest pain, SOB, abdominal symptoms.     ED course:   Intitial vitals: T: 98, HR 98, BP: 107/62, RR: 18. 94% on RA   Admission vitals: T: 101.4, HR: 108, BP: 178/78, 18 and 94% on RA   Labs: WBC: 7.97, Hb: 7.1, BMP: Cr 7.8, lactate 0.8  CXR: no consolidation   Interventions: pRBCs 1 unit, Coreg 3.125 mg, hydral 50 mg oral, labetalol 20 mg IV push X2, Vanc 1000 mg, Zosyn 3.375 mg

## 2024-04-04 NOTE — H&P ADULT - NSICDXPASTMEDICALHX_GEN_ALL_CORE_FT
PAST MEDICAL HISTORY:  Cerebral artery occlusion with cerebral infarction     ESRD (end stage renal disease)     H/O diabetic retinopathy     Hyperlipidemia     Hypertension     Type 2 diabetes mellitus

## 2024-04-04 NOTE — PROGRESS NOTE ADULT - PROBLEM SELECTOR PLAN 6
Hgb 9.2 w/ normocytic MCV on admission (baseline Hgb 9s). Suspect anemia 2/2 CKD though will check for other common causes of both microcytic and macrocytic anemia.  - iron low, TIBC low, %sat low consistent with likely ACD in the setting of CKD  - b12, folate wnl    PLAN:  - maintain active type and screen and 2 large-bore IVs  - transfuse for Hgb <7 or active bleeding . Likely 2/2 missed med. p/w Hgb 6.7 outpt. s/p 1u pRBC with good response. However repeat 7.1.   Baseline around 8 per HIE. Iron studies indicative of AoCD. Retic Index: hypopoliferative.  PE benign with no signs of overt/ apparent bleeding. CHELLY neg.     mostly likely 2/2 ESRD     - active T&S, transfuse hgb <7.   - f/u hemolysis lab

## 2024-04-04 NOTE — PATIENT PROFILE ADULT - FALL HARM RISK - RISK INTERVENTIONS

## 2024-04-04 NOTE — CONSULT NOTE ADULT - PROBLEM SELECTOR RECOMMENDATION 4
.  Acute on chronic kidney disease  -Nephrology following, planning to initiate HD  -prior conversations showing that patient was accepting of HD

## 2024-04-04 NOTE — DISCHARGE NOTE PROVIDER - DETAILS OF MALNUTRITION DIAGNOSIS/DIAGNOSES
This patient has been assessed with a concern for Malnutrition and was treated during this hospitalization for the following Nutrition diagnosis/diagnoses:     -  04/05/2024: Severe protein-calorie malnutrition   -  04/05/2024: Underweight (BMI < 19)

## 2024-04-04 NOTE — PROGRESS NOTE ADULT - SUBJECTIVE AND OBJECTIVE BOX
--------INCOMPLETE NOTE--------  OVERNIGHT EVENTS: NAEO    SUBJECTIVE  Pt was seen and examined at bedside. Appears somnolent but provided brief response to voice.       Vital Signs Last 24 Hrs  T(C): 36.6 (04 Apr 2024 08:52), Max: 38.9 (04 Apr 2024 02:21)  T(F): 97.9 (04 Apr 2024 08:52), Max: 102.1 (04 Apr 2024 02:21)  HR: 86 (04 Apr 2024 08:52) (86 - 123)  BP: 177/63 (04 Apr 2024 08:52) (120/57 - 191/75)  BP(mean): 102 (04 Apr 2024 07:47) (102 - 102)  RR: 18 (04 Apr 2024 08:52) (16 - 20)  SpO2: 95% (04 Apr 2024 08:52) (93% - 99%)    Parameters below as of 04 Apr 2024 08:52  Patient On (Oxygen Delivery Method): room air          PHYSICAL EXAM     General: NAD. appears somnolent  HEENT: NC/AT; PERRL; Neck Supple; MMM  Respiratory: CTA B/L; no wheezes/rales/rhonchi, normal WOB  Cardiovascular: Regular rhythm/ rate; no m/r/g  Gastrointestinal: Soft; NTND; + bowel sounds, negative CHELLY  : no suprapubic tenderness  Vascular: extremities WWP  MSK: R BKA. LUE and LLE appears contracted   Neurological: A&Ox1, further assessment limited due to lack of partication      LABS:                        7.2    10.81 )-----------( 247      ( 04 Apr 2024 05:30 )             23.0     04-04    142  |  102  |  103<H>  ----------------------------<  147<H>  3.8   |  23  |  7.98<H>    Ca    9.7      04 Apr 2024 05:30  Phos  5.9     04-04  Mg     2.4     04-04    TPro  7.0  /  Alb  3.4  /  TBili  0.5  /  DBili  x   /  AST  36  /  ALT  62<H>  /  AlkPhos  77  04-04    PT/INR - ( 03 Apr 2024 13:45 )   PT: 11.6 sec;   INR: 1.02          PTT - ( 03 Apr 2024 13:45 )  PTT:29.4 sec  Urinalysis Basic - ( 04 Apr 2024 05:30 )    Color: x / Appearance: x / SG: x / pH: x  Gluc: 147 mg/dL / Ketone: x  / Bili: x / Urobili: x   Blood: x / Protein: x / Nitrite: x   Leuk Esterase: x / RBC: x / WBC x   Sq Epi: x / Non Sq Epi: x / Bacteria: x      CAPILLARY BLOOD GLUCOSE      POCT Blood Glucose.: 141 mg/dL (04 Apr 2024 09:18)

## 2024-04-04 NOTE — PROGRESS NOTE ADULT - PROBLEM SELECTOR PLAN 8
History of stroke with residual R-sided facial droop, also has L-sided weakness but difficult to assess as pt is contracted. Is able to follow commands. Takes ASA 81 mg qd and Atorvastatin 80 mg qhs at home.  - continue home meds - A1c   - SLP song, CC diet   - mISS - A1c   - SLP song, CC diet   - mISS      #hypothyroidism  - continue home med synthroid 50mcg qd

## 2024-04-04 NOTE — H&P ADULT - NSHPPHYSICALEXAM_GEN_ALL_CORE
VITALS:   T(C): 38.6 (04-04-24 @ 00:23), Max: 38.6 (04-04-24 @ 00:23)  HR: 108 (04-04-24 @ 00:23) (90 - 123)  BP: 178/78 (04-04-24 @ 00:23) (107/62 - 191/75)  RR: 18 (04-04-24 @ 00:23) (16 - 18)  SpO2: 94% (04-04-24 @ 00:23) (93% - 99%)    GENERAL: NAD, lying in bed comfortably  HEAD:  Atraumatic, normocephalic  EYES: EOMI, PERRLA, conjunctiva and sclera clear  ENT: Moist mucous membranes  NECK: Supple, no JVD  HEART: Regular rate and rhythm, no murmurs, rubs, or gallops  LUNGS: Unlabored respirations.  Clear to auscultation bilaterally, no crackles, wheezing, or rhonchi  ABDOMEN: Soft, nontender, nondistended, +BS  EXTREMITIES: 2+ peripheral pulses bilaterally. No clubbing, cyanosis, or edema  NERVOUS SYSTEM:  A&Ox3, no focal deficits   SKIN: No rashes or lesions VITALS:   T(C): 38.6 (04-04-24 @ 00:23), Max: 38.6 (04-04-24 @ 00:23)  HR: 108 (04-04-24 @ 00:23) (90 - 123)  BP: 178/78 (04-04-24 @ 00:23) (107/62 - 191/75)  RR: 18 (04-04-24 @ 00:23) (16 - 18)  SpO2: 94% (04-04-24 @ 00:23) (93% - 99%)    GENERAL: NAD, looks uncomfortable, diaphoretic (febrile during interview)  HEAD:  Atraumatic  EYES: EOMI, PERRLA, conjunctiva and sclera clear  ENT: Dry mucous membranes   NECK: Supple, no JVD  HEART: Sinus tachycardia   LUNGS: Unlabored respirations.  Clear to auscultation bilaterally, no crackles, wheezing, or rhonchi  ABDOMEN: Soft, nontender, nondistended, +BS. Diaper on with formed stool.   EXTREMITIES: R BKA, Left leg with toe amputation and decreased tone. Slightly contracted, L sided weakness. Tremor on R side.   NERVOUS SYSTEM:  A&Ox1, R sided facial droop. Lethargic but arousable to voice   SKIN: No rashes or lesions VITALS:   T(C): 38.6 (04-04-24 @ 00:23), Max: 38.6 (04-04-24 @ 00:23)  HR: 108 (04-04-24 @ 00:23) (90 - 123)  BP: 178/78 (04-04-24 @ 00:23) (107/62 - 191/75)  RR: 18 (04-04-24 @ 00:23) (16 - 18)  SpO2: 94% (04-04-24 @ 00:23) (93% - 99%)    GENERAL: NAD, looks uncomfortable, diaphoretic (febrile during interview)  HEAD:  Atraumatic  EYES: EOMI, PERRLA, conjunctiva and sclera clear  ENT: Dry mucous membranes   NECK: Supple, no JVD  HEART: Sinus tachycardia   LUNGS: Unlabored respirations.  Clear to auscultation bilaterally, no crackles, wheezing, or rhonchi  ABDOMEN: Soft, nontender, nondistended, +BS. Diaper on with formed stool.   EXTREMITIES: R BKA, Left leg with toe amputation and decreased tone. Slightly contracted, L sided weakness. Tremor on R side. L sided fistula w/ thrill.  NERVOUS SYSTEM:  A&Ox1, R sided facial droop. Lethargic but arousable to voice   SKIN: No rashes or lesions

## 2024-04-04 NOTE — CONSULT NOTE ADULT - PROBLEM SELECTOR RECOMMENDATION 3
.  Progressive disease, prior CVAs  -bedbound but normal albumin and no pressure injuries  -does not meet hospice criteria for this disease

## 2024-04-04 NOTE — CONSULT NOTE ADULT - PROBLEM SELECTOR RECOMMENDATION 5
.  Patient is Full Code  -cousin (Leo Cisneros) is designated HCP  -will follow-up with surrogate decision maker regarding advance directives

## 2024-04-04 NOTE — CONSULT NOTE ADULT - PROBLEM SELECTOR RECOMMENDATION 6
.  Complex medical decision making / symptom management in the setting of advanced illness.    Will continue to follow for ongoing GOC discussion / titration of palliative regimen. Emotional support provided, questions answered.  Active Psychosocial Referrals:  [x]Social Work/Case management [x]PT/OT []Chaplaincy []Hospice  []Patient/Family Support []Holistic RN []Massage Therapy []Music Therapy []Ethics  Coping: [] well [x] with difficulty [] poor coping [] unable to assess  Support system: [] strong [x] adequate [] inadequate    For new or uncontrolled symptoms, please call Palliative Care at [phone]). The service is available 24/7 (including nights & weekends) to provide symptom management recommendations over the phone as appropriate

## 2024-04-04 NOTE — PROGRESS NOTE ADULT - PROBLEM SELECTOR PLAN 5
Pt reports he typically has a bowel movement every day but has not had one in ~2 days. Reported abdominal pain earlier in the day at his nursing home but is not currently complaining of abdominal pain. Abdomen is firm but not distended and not tender to palpation. CXR with dilated loops of bowel. Pt has recent history of C. diff.  - XR abdomen: Dilated air-filled left colon up to 9 cm, right and transverse colon are filled with air and fecal matter measuring up to 7.5 cm  - RESOLVED; c/w c. diff mgt as above. p/w Hgb 6.7 outpt. s/p 1u pRBC with good response. However repeat 7.1.   Baseline around 8 per HIE. Iron studies indicative of AoCD. Retic Index: hypopoliferative.  PE benign with no signs of overt/ apparent bleeding. CHELLY neg.     mostly likely 2/2 ESRD     - active T&S, transfuse hgb <7.   - f/u hemolysis lab - continue home meds Tamsulosin 0.4 mg qhs and Finasteride 5 mg qd.

## 2024-04-04 NOTE — PROGRESS NOTE ADULT - PROBLEM SELECTOR PLAN 7
Known, early-onset. A&O x 4  at baseline. Currently AOx1-2 (self and able to participate in ROS),  - no interventions residual L sided weakness and R sided facial droop.     - continue home med ASA 81, Plavix 75, Lipitor 80mg

## 2024-04-04 NOTE — CONSULT NOTE ADULT - SUBJECTIVE AND OBJECTIVE BOX
[facility] Pall[facility]  Kodi Javed, Palliative Care Attending  Contact Info: Call [phone] (HEAL Line) or message on Microsoft Teams (Kodi Javed)    HPI:  Mr. Shetty is a 58 year old man with a PMHx of type II DM. HTN, HLD, ESRD (AVF created 5/23), R BKA, L toe amputation, h/o Cdiff in 12/2023, dementia, h/o CVA w/ R sided facial doop, who was brought in from his nursing home for a hemoglobin of 6.7 on outpatient labs. He recieved one unit in the ED and when transport was set to take him back to his nursing home, he was denied due to hypertension. He was restarted on his home meds (Coreg 3.12, and Hydral oral 50 mg), and labetalol 20 mg IV push. He was evaluated again by ED provider and found to be septic with a fever to 101.4,  meeting 2/4 SIRS criteria so patient was admitted for further workup. Upon interview, patient is arousable and able to tell me his name. He denies pain, headache, nausea, vomitting, chest pain, SOB, abdominal symptoms.     Patient seen and examined at bedside. Appears overtly comfortable. Unable to participate in interview. Comprehensive symptom assessment and GOC exploration as noted below. Further collateral obtained from primary team, chart, and family.    PERTINENT PM/SXH:   Type 2 diabetes mellitus    Personal history of other diseases of circulatory system    Cerebral artery occlusion with cerebral infarction    Hyperlipidemia    Hypertension    Stage 4 chronic kidney disease    H/O diabetic retinopathy    ESRD (end stage renal disease)      Late effect of traumatic amputation    S/P below knee amputation, right    S/P arteriovenous (AV) fistula creation      FAMILY HISTORY:  Family history of diabetes mellitus (Mother)      No history of  in first degree relatives  Unable to obtain due to encephalopathy    ITEMS NOT CHECKED ARE NOT PRESENT  SOCIAL HISTORY:   Significant other/partner:  []  Children:  []  Substance hx:  []   Tobacco hx:  []   Alcohol hx: []   Home Opioid hx:  [] I-Stop Reference No:  - no active Rx's / see chart note  Living Situation: []Home  []Long term care  []Rehab []Other  Buddhist/Spiritual practice: ; Role of organized Yazdanism [] important [] some [] unable to assess  Coping: [] well [] with difficulty [] poor coping [] unable to assess  Support system: [] strong [] adequate [] inadequate    ADVANCE DIRECTIVES:    []MOLST  []Living Will  DECISION MAKER(s):  [] Health Care Proxy(s)  [] Surrogate(s)  [] Guardian           Name(s)/Phone Number(s):     BASELINE (I)ADLs (prior to admission):  [place]: []Total  [] Moderate []Dependent    ALLERGIES:  No Known Allergies    MEDICATIONS  (STANDING):  aspirin enteric coated 81 milliGRAM(s) Oral every 24 hours  atorvastatin 80 milliGRAM(s) Oral at bedtime  carvedilol 3.125 milliGRAM(s) Oral every 12 hours  clopidogrel Tablet 75 milliGRAM(s) Oral daily  dextrose 10% Bolus 125 milliLiter(s) IV Bolus once  dextrose 5%. 1000 milliLiter(s) (50 mL/Hr) IV Continuous <Continuous>  dextrose 5%. 1000 milliLiter(s) (100 mL/Hr) IV Continuous <Continuous>  dextrose 50% Injectable 25 Gram(s) IV Push once  dextrose 50% Injectable 12.5 Gram(s) IV Push once  finasteride 5 milliGRAM(s) Oral daily  glucagon  Injectable 1 milliGRAM(s) IntraMuscular once  heparin   Injectable 5000 Unit(s) SubCutaneous every 8 hours  hydrALAZINE 50 milliGRAM(s) Oral daily  insulin lispro (ADMELOG) corrective regimen sliding scale   SubCutaneous three times a day before meals  levothyroxine 50 MICROGram(s) Oral daily  multivitamin 1 Tablet(s) Oral every 24 hours  PPD  5 Tuberculin Unit(s) Injectable 5 Unit(s) IntraDermal once  sodium bicarbonate 650 milliGRAM(s) Oral every 8 hours  tamsulosin 0.4 milliGRAM(s) Oral at bedtime    MEDICATIONS  (PRN):  dextrose Oral Gel 15 Gram(s) Oral once PRN Blood Glucose LESS THAN 70 milliGRAM(s)/deciliter    Analgesic Use (Scheduled and PRNs) for past 24 hours:  acetaminophen   IVPB ..   400 mL/Hr IV Intermittent (04-04-24 @ 00:46)      PRESENT SYMPTOMS: []Unable to obtain due to poor mentation/encephalopathy  Source if other than patient:  []Family   []Team     Pain: [] yes [] no  QOL impact -   Location -                    Aggravating Factors -  Quality -  Radiation -  Timing -  Severity (0-10 scale) -   Minimal Acceptable Level (0-10 scale) -    CPOT Score:  (Critical Care Pain Assessment)    PAIN AD Score:  (Nonverbal Pain Assessment)    Dyspnea:                           []Mild  []Moderate []Severe  Anxiety:                             []Mild []Moderate []Severe  Fatigue:                             []Mild []Moderate []Severe  Nausea:                             []Mild []Moderate []Severe  Loss of Appetite:              []Mild []Moderate []Severe  Constipation:                    []Mild []Moderate []Severe    Other Symptoms:  [x]All other review of systems negative     Palliative Performance Status Version 2:  %  (Functional Assessment Tool)    GENERAL:  [] NAD []Alert []Lethargic  []Cachexia  []Unarousable  []Verbal  []Non-Verbal  BEHAVIORAL:   []Anxiety  []Delirium []Agitation []Cooperative []Oriented x  HEENT:  []Normal  [] Moist Mucous Membranes []Dry mouth   []ET Tube/Trach  []Oral lesions  PULMONARY:   []Clear []Tachypnea  []Audible excessive secretions  []Normal Work of Breathing []Labored Breathing  []Rhonchi []Crackles []Wheezing  CARDIOVASCULAR:    []Regular Rate []Regular Rhythm []Irregular []Tachy  []Alex  GASTROINTESTINAL:  []Soft  []Distended   []+BS  []Non tender []Tender  []PEG []OGT/ NGT  Last BM:  GENITOURINARY:  []Normal [] Incontinent   []Oliguria/Anuria   []Borja  MUSCULOSKELETAL:   []Normal Extremities  []Weakness  []Bed/Wheelchair bound []Edema  NEUROLOGIC:   []No focal deficits  []Cognitive impairment  []Dysphagia []Dysarthria []Paresis []Encephalopathic  SKIN:   []Normal   []Pressure ulcer(s)  []Rash    CRITICAL CARE:  [ ]Shock Present  [ ]Septic [ ]Cardiogenic [ ]Neurologic [ ]Hypovolemic  [ ] Vasopressors [ ]Inotropes   [ ]Respiratory failure present [ ]Mechanical Ventilation [ ]Non-invasive ventilatory support [ ]High-Flow  [ ]Acute  [ ]Chronic [ ]Hypoxic  [ ]Hypercarbic   [ ]Other organ failure    Vital Signs Last 24 Hrs  T(C): 36.6 (04 Apr 2024 08:52), Max: 38.9 (04 Apr 2024 02:21)  T(F): 97.9 (04 Apr 2024 08:52), Max: 102.1 (04 Apr 2024 02:21)  HR: 86 (04 Apr 2024 08:52) (86 - 123)  BP: 177/63 (04 Apr 2024 08:52) (136/80 - 191/75)  BP(mean): 102 (04 Apr 2024 07:47) (102 - 102)  RR: 18 (04 Apr 2024 08:52) (16 - 20)  SpO2: 95% (04 Apr 2024 08:52) (93% - 99%)    Parameters below as of 04 Apr 2024 08:52  Patient On (Oxygen Delivery Method): room air         LABS: Personally reviewed and interpreted                        7.2    10.81 )-----------( 247      ( 04 Apr 2024 05:30 )             23.0   04-04    142  |  102  |  103<H>  ----------------------------<  147<H>  3.8   |  23  |  7.98<H>    Ca    9.7      04 Apr 2024 05:30  Phos  5.9     04-04  Mg     2.4     04-04    TPro  7.0  /  Alb  3.4  /  TBili  0.5  /  DBili  x   /  AST  36  /  ALT  62<H>  /  AlkPhos  77  04-04    RADIOLOGY & ADDITIONAL STUDIES: Personally reviewed and interpreted    REFERRALS:  [x]Social Work/Case management []PT/OT []Chaplaincy  []Hospice  []Patient/Family Support []Massage Therapy []Music Therapy []Holistic RN []Ethics    DISCUSSION OF CASE: Family - to provide updates and emotional support; Primary Team/RN - to discuss plan of care [facility] and Pall[facility]  Kodi Javed, Palliative Care Attending  Contact Info: Call [phone] (HEAL Line) or message on [employer] Teams (Kodi Javed)    HPI:  Mr. Shetty is a 58 year old man with a PMHx of type II DM. HTN, HLD, ESRD (AVF created 5/23), R BKA, L toe amputation, h/o Cdiff in 12/2023, dementia, h/o CVA w/ R sided facial doop, who was brought in from his nursing home for a hemoglobin of 6.7 on outpatient labs. He recieved one unit in the ED and when transport was set to take him back to his nursing home, he was denied due to hypertension. He was restarted on his home meds (Coreg 3.12, and Hydral oral 50 mg), and labetalol 20 mg IV push. He was evaluated again by ED provider and found to be septic with a fever to 101.4,  meeting 2/4 SIRS criteria so patient was admitted for further workup. Upon interview, patient is arousable and able to tell me his name. He denies pain, headache, nausea, vomitting, chest pain, SOB, abdominal symptoms.     Patient seen and examined at bedside. Appears overtly comfortable. Unable to participate in interview. Comprehensive symptom assessment and GOC exploration as noted below. Further collateral obtained from primary team, chart, and family.    PERTINENT PM/SXH:   Type 2 diabetes mellitus  Personal history of other diseases of circulatory system  Cerebral artery occlusion with cerebral infarction  Hyperlipidemia  Hypertension  Stage 4 chronic kidney disease  H/O diabetic retinopathy  Late effect of traumatic amputation    FAMILY HISTORY:  Family history of diabetes mellitus (Mother)  Family history of diabetes mellitus (DM)    ITEMS NOT CHECKED ARE NOT PRESENT  SOCIAL HISTORY:   Significant other/partner:  []  Children:  []  Substance hx:  []   Tobacco hx:  []   Alcohol hx: []   Home Opioid hx:  [] I-Stop Reference No:  - no active Rx's  Living Situation: []Home  [x]Long term care  []Rehab []Other  Christianity/Spiritual practice: ; Role of organized Gnosticism [] important [] some [x] unable to assess  Coping: [] well [x] with difficulty [] poor coping [] unable to assess  Support system: [] strong [x] adequate [] inadequate    ADVANCE DIRECTIVES:    []MOLST  []Living Will  DECISION MAKER(s):  [x] Health Care Proxy(s)  [] Surrogate(s)  [] Guardian           Name(s)/Phone Number(s): Cousin      BASELINE (I)ADLs (prior to admission):  [place]: []Total  [] Moderate []Dependent    ALLERGIES:  No Known Allergies    MEDICATIONS  (STANDING):  aspirin enteric coated 81 milliGRAM(s) Oral every 24 hours  atorvastatin 80 milliGRAM(s) Oral at bedtime  carvedilol 3.125 milliGRAM(s) Oral every 12 hours  clopidogrel Tablet 75 milliGRAM(s) Oral daily  dextrose 10% Bolus 125 milliLiter(s) IV Bolus once  dextrose 5%. 1000 milliLiter(s) (50 mL/Hr) IV Continuous <Continuous>  dextrose 5%. 1000 milliLiter(s) (100 mL/Hr) IV Continuous <Continuous>  dextrose 50% Injectable 25 Gram(s) IV Push once  dextrose 50% Injectable 12.5 Gram(s) IV Push once  finasteride 5 milliGRAM(s) Oral daily  glucagon  Injectable 1 milliGRAM(s) IntraMuscular once  heparin   Injectable 5000 Unit(s) SubCutaneous every 8 hours  hydrALAZINE 50 milliGRAM(s) Oral daily  insulin lispro (ADMELOG) corrective regimen sliding scale   SubCutaneous three times a day before meals  levothyroxine 50 MICROGram(s) Oral daily  multivitamin 1 Tablet(s) Oral every 24 hours  PPD  5 Tuberculin Unit(s) Injectable 5 Unit(s) IntraDermal once  sodium bicarbonate 650 milliGRAM(s) Oral every 8 hours  tamsulosin 0.4 milliGRAM(s) Oral at bedtime    MEDICATIONS  (PRN):  dextrose Oral Gel 15 Gram(s) Oral once PRN Blood Glucose LESS THAN 70 milliGRAM(s)/deciliter    Analgesic Use (Scheduled and PRNs) for past 24 hours:  acetaminophen   IVPB ..   400 mL/Hr IV Intermittent (04-04-24 @ 00:46)      PRESENT SYMPTOMS: []Unable to obtain due to poor mentation/encephalopathy  Source if other than patient:  []Family   []Team     Pain: [] yes [x] no  QOL impact -   Location -                    Aggravating Factors -  Quality -  Radiation -  Timing -  Severity (0-10 scale) -   Minimal Acceptable Level (0-10 scale) -    PAIN AD Score: 0  (Nonverbal Pain Assessment)    Dyspnea:                           []Mild  []Moderate []Severe  Anxiety:                             []Mild []Moderate []Severe  Fatigue:                             []Mild []Moderate []Severe  Nausea:                             []Mild []Moderate []Severe  Loss of Appetite:              []Mild []Moderate []Severe  Constipation:                    []Mild []Moderate []Severe    Other Symptoms:  [x]All other review of systems negative     Palliative Performance Status Version 2:  30%  (Functional Assessment Tool)    GENERAL:  [x] NAD []Alert [x]Lethargic  []Cachexia  []Unarousable  [x]Verbal  []Non-Verbal  BEHAVIORAL:   []Anxiety  [x]Delirium []Agitation [x]Cooperative [x]Oriented x3  HEENT:  [x]Normal  [x] Moist Mucous Membranes []Dry mouth   []ET Tube/Trach  []Oral lesions  PULMONARY:   [x]Clear []Tachypnea  []Audible excessive secretions  [x]Normal Work of Breathing []Labored Breathing  []Rhonchi []Crackles []Wheezing  CARDIOVASCULAR:    [x]Regular Rate [x]Regular Rhythm []Irregular []Tachy  []Alex  GASTROINTESTINAL:  [x]Soft  []Distended   [x]+BS  [x]Non tender []Tender  []PEG []OGT/ NGT  Last BM:  GENITOURINARY:  []Normal [] Incontinent   [x]Oliguria/Anuria   []Borja  MUSCULOSKELETAL:   []Normal Extremities  [x]Weakness  [x]Bed/Wheelchair bound []Edema  NEUROLOGIC:   []No focal deficits  []Cognitive impairment  []Dysphagia []Dysarthria []Paresis [x]Encephalopathic  SKIN:   [x]Normal   []Pressure ulcer(s)  []Rash    Vital Signs Last 24 Hrs  T(C): 36.6 (04 Apr 2024 08:52), Max: 38.9 (04 Apr 2024 02:21)  T(F): 97.9 (04 Apr 2024 08:52), Max: 102.1 (04 Apr 2024 02:21)  HR: 86 (04 Apr 2024 08:52) (86 - 123)  BP: 177/63 (04 Apr 2024 08:52) (136/80 - 191/75)  BP(mean): 102 (04 Apr 2024 07:47) (102 - 102)  RR: 18 (04 Apr 2024 08:52) (16 - 20)  SpO2: 95% (04 Apr 2024 08:52) (93% - 99%)    Parameters below as of 04 Apr 2024 08:52  Patient On (Oxygen Delivery Method): room air    LABS: Personally reviewed and interpreted                      7.2    10.81 )-----------( 247      ( 04 Apr 2024 05:30 )             23.0   04-04    142  |  102  |  103<H>  ----------------------------<  147<H>  3.8   |  23  |  7.98<H>    Ca    9.7      04 Apr 2024 05:30  Phos  5.9     04-04  Mg     2.4     04-04  TPro  7.0  /  Alb  3.4  /  TBili  0.5  /  DBili  x   /  AST  36  /  ALT  62<H>  /  AlkPhos  77  04-04    RADIOLOGY & ADDITIONAL STUDIES: Personally reviewed and interpreted  < from: Xray Chest 1 View AP/PA (04.03.24 @ 14:51) >  Lines/devices: Cardiac monitoring device (loop recorder) overlies the left chest.  Lungs/pleura: Clear Lungs. No pneumothorax. No pleural effusion.  Cardiomediastinal silhouette: Within normal limits.  Other: Degenerative changes of the spine.  IMPRESSION: No acute pulmonary pathology.    REFERRALS:  [x]Social Work/Case management [x]PT/OT []Chaplaincy  []Hospice  []Patient/Family Support []Massage Therapy []Music Therapy []Holistic RN []Ethics    DISCUSSION OF CASE: Family - to provide updates and emotional support; Primary Team/RN - to discuss plan of care

## 2024-04-04 NOTE — DISCHARGE NOTE PROVIDER - NSDCCPCAREPLAN_GEN_ALL_CORE_FT
PRINCIPAL DISCHARGE DIAGNOSIS  Diagnosis: Systemic inflammatory response syndrome (SIRS)  Assessment and Plan of Treatment: SIRS (systemic inflammatory response syndrome) is an exaggerated defense response from your body to a harmful stressor. It causes severe inflammation throughout your body. This can lead to reversible or irreversible organ failure and even death.  Examples of stressors include, Infection, surgery, acute (sudden and severe) inflammation, Ischemia (lack of blood flow to an area of your body).  In your case, we think your transfusion might have caused the inflammatory response. To be safe, we gave you abx to cover for any possible infection.  Please follow up with your primary care doctor within 2 weeks of your discharge from the hospital. If you experience worsening of your symtoms, you should seek evaluation by your primary care doctor, or, if in more severe cases, go to your [facility] emergency room to seek urgent medical attention.      SECONDARY DISCHARGE DIAGNOSES  Diagnosis: Anemia  Assessment and Plan of Treatment: Anemia is a low level of red blood cells, which carry oxygen throughout your body. It can cause decrease concentration, fatigue and difficulty breathing, etc. Many things can cause anemia. Lack of iron is one of the most common causes. However renal disease can also cause low production of red blood cells. We gave you a unit of red blood cell to raise your blood levels.   Please follow up with your primary care doctor within 2 weeks of your discharge from the hospital. If you experience worsening of your symtoms, you should seek evaluation by your primary care doctor, or, if in more severe cases, go to your [facility] emergency room to seek urgent medical attention.    Diagnosis: Fever  Assessment and Plan of Treatment:      PRINCIPAL DISCHARGE DIAGNOSIS  Diagnosis: Severe sepsis  Assessment and Plan of Treatment: Sepsis is a serious condition in which the body responds improperly to an infection. The infection-fighting processes turn on the body, causing the organs to work poorly.  Sepsis may progress to septic shock. This is a dramatic drop in blood pressure that can damage the lungs, kidneys, liver and other organs. When the damage is severe, it can lead to death.  Due to the severe consequences, we treated you with antibiotics to control the infection. Your symptoms have improved after treatment and we will be discharging you with antibiotics also to further control the infectious source.   It is very important to continue to take the antibiotics as prescribed. We also recommend you to see your primary care physician within 1 weeks of discharge.      SECONDARY DISCHARGE DIAGNOSES  Diagnosis: Anemia  Assessment and Plan of Treatment: Anemia is a low level of red blood cells, which carry oxygen throughout your body. It can cause decrease concentration, fatigue and difficulty breathing, etc. Many things can cause anemia. Lack of iron is one of the most common causes. However renal disease can also cause low production of red blood cells. We gave you a unit of red blood cell to raise your blood levels.   Please follow up with your primary care doctor within 2 weeks of your discharge from the hospital. If you experience worsening of your symtoms, you should seek evaluation by your primary care doctor, or, if in more severe cases, go to your [facility] emergency room to seek urgent medical attention.    Diagnosis: Pneumonia, aspiration  Assessment and Plan of Treatment: Aspiration pneumonia is an inflammation of the lungs caused by breathing in foreign material, such as food, liquid, vomit, or mucus. This can happen when a person has become weakened by an illness such as a seizure or stroke that affects the ability to swallow.  Pneumonia can make it hard to breathe. This can reduce the amount of oxygen that gets into the bloodstream. This form of pneumonia is more common in people whose immune systems are weakened by disease or medicines. People who have aspiration pneumonia are usually treated with antibiotics in a hospital to prevent or treat an infection.   We spoke with your healthcare proxy and even though you were at risk of aspiration. We decide to continue with feeding you via regular means (comfort feed) for your quality of life.   If you experience worsening of your symtoms, or develop fever, please go to your closest emergency room to seek medical attention.    Diagnosis: C. difficile colitis  Assessment and Plan of Treatment: You were found to have C. Diff diarrhea. Inflammation of the colon caused by the bacteria Clostridium difficile. Clostridium difficile colitis results from disruption of normal healthy bacteria in the colon, often from antibiotics. C. difficile can also be transmitted from person to person by spores. It can cause severe damage to the colon and even be fatal. Symptoms include diarrhea, belly pain, and fever. Treatment includes antibiotics. Even when treated with antibiotics, the infection may come back.    If you experience worsening of your symtoms, or develop fever, please go to your closest emergency room to seek medical attention.

## 2024-04-04 NOTE — ED ADULT NURSE REASSESSMENT NOTE - NS ED NURSE REASSESS COMMENT FT1
Pt medicated per MAR. Labs drawn and sent. Blood products and tubing sent to the blood bank for suspected transfusion reaction.

## 2024-04-04 NOTE — H&P ADULT - PROBLEM SELECTOR PLAN 2
likely i/s/o ESRD. Normocytic. Has received transfusions in the past. Is now s/p 1 u in ED with repeat Hb 8.0. Concern for acute transfusion rxn, blood bank notified. Fever +chills w/o hypotension, bronchospasm, or respiratory distress would raise suspicion for febrile non hemolytic transfusion reaction though this is a dx of exclusion.     Plan:   - daily cbc   - blood bank notified   - f/u transfusion rxn workup

## 2024-04-04 NOTE — CONSULT NOTE ADULT - ASSESSMENT
·	will follow-up with surrogate decision maker regarding advance directives  ·	no acute symptom management needs 59yo M with PMH of Dementia, CKD5, T2DM, and CVA p/w anemia and found to have worsening renal function. Palliative consulted for complex medical decision making in the setting of advanced illness.    ·	will follow-up with surrogate decision maker regarding advance directives  ·	no acute symptom management needs

## 2024-04-04 NOTE — H&P ADULT - PROBLEM SELECTOR PLAN 8
Continue with atorvastatin 80 mg. Per chart review, patient is A&Ox2 at baseline. On exam, patient A&Ox1.     Plan:   - call nursing home for collateral in AM

## 2024-04-04 NOTE — H&P ADULT - PROBLEM SELECTOR PLAN 5
Nephro notified in the ED. no need for urgent HD, uncertain what his HD schedule is at nursing home. Electrolytes acceptable.     Plan:   - sodium bicarb 650 mg BID   - HD planning with renal in AM   - caution with fluids

## 2024-04-04 NOTE — H&P ADULT - PROBLEM SELECTOR PLAN 1
Not present on admission, however patient with 101.4 fever prior to planned discharge. Approximately 3 hours after completing transfustion. Repeat CBC with WBC 12K, tachy into low 100's despite Labetolol 20 mg IV push X2 in ED. Meets 3/4 SIRS criteria. Lactate 0.8. Patient denies infectious symptoms such as cough, abdominal pain, diarrhea. Unable to answer if he creates urine but patient with diaper and chart review from nursing home states he has both urinary and bowel incontinence. CXR without focal consolidations.     Uncertain if true infection at the time as this could also be due to acute transfusion reaction. Blood bank was notified in the ED and transfusion reaction workup was sent.     Is s/p Vanc and Zosyn in the ED.         Plan:   - f/u blood cultures   - f/u RVP   - obtain UA as able   - Tylenol for fevers   - continue w/ broad spectrum Ab for now Not present on admission, however patient with 101.4 fever prior to planned discharge. Approximately 3 hours after completing transfustion. Repeat CBC with WBC 12K, tachy into low 100's despite Labetolol 20 mg IV push X2 in ED. Meets 3/4 SIRS criteria. Lactate 0.8. Patient denies infectious symptoms such as cough, abdominal pain, diarrhea. Unable to answer if he creates urine but patient with diaper and chart review from nursing home states he has both urinary and bowel incontinence. CXR without focal consolidations.     Uncertain if true infection at the time as this could also be due to acute transfusion reaction. Blood bank was notified in the ED and transfusion reaction workup was sent.     Is s/p Vanc and Zosyn in the ED.     Plan:   - f/u blood cultures   - f/u RVP   - obtain UA as able   - Tylenol for fevers   - monitor off Ab for now with low threshold to start Ab if hemodynamically unstable Not present on presentation to ED however patient with 101.4 fever prior to planned discharge. Approximately 3 hours after completing transfustion. Repeat CBC with WBC 12K, tachy into low 100's despite Labetolol 20 mg IV push X2 in ED. Meets 3/4 SIRS criteria (T >100.9, HR >90, WBC >12, creat >2). Lactate 0.8. Patient denies infectious symptoms such as cough, abdominal pain, diarrhea. Unable to answer if he creates urine but patient with diaper and chart review from nursing home states he has both urinary and bowel incontinence. CXR without focal consolidations.     Uncertain if true infection at the time as this could also be due to acute transfusion reaction. Blood bank was notified in the ED and transfusion reaction workup was sent.     Is s/p Vanc and Zosyn in the ED.     Plan:   - f/u blood cultures   - f/u RVP   - obtain UA as able   - Tylenol for fevers   - monitor off Ab for now with low threshold to start Ab if hemodynamically unstable

## 2024-04-04 NOTE — DISCHARGE NOTE PROVIDER - CARE PROVIDER_API CALL
HUGH ROTH  [address] [place] [address]  Phone: [phone]  Fax: [phone]  Established Patient  Follow Up Time: 2 weeks

## 2024-04-04 NOTE — CONSULT NOTE ADULT - ASSESSMENT
58-year-old male with HTN, DM2, dementia, CKD 5 sent in by nursing home for Hb 6.7 s/p 1 PRBC in ER, then with fever and tachycardia, admitted for further evaluation.  Nephrology consulted for possible HD initiation.    # New start ESRD  CKD 5 with progression.  Follows up outpatient with Dr. Garcia who has been planning to start him on HD  K3.8, , creatinine 7.9, bicarb 23.  Volume status acceptable, on room air /63 this a.m.  Patient unable to consent.  Called HCP and consent obtained.  HD #1 today as above  Renal diet  DC sodium bicarb  Next HD 4/5  SW assistance for outpatient HD center placement.  Hepatitis panel and TB testing (PPD or quant TB) for HD placement    # Access  LUE AVF with thrill and bruit.  However, last admission there was concern of stenosis and/or thrombosis.  Fistulogram was deferred for outpatient  In case AVF does not work today, will need vascular surgery consult    # Anemia  Hb not at goal 7.2  Iron sat 11%  Give IV iron 200 mg X5 doses once active infection ruled out    # CKD-BMD  Calcium 9.7  Phos 5.9. Goal 3-5.5.  If remains above goal, will need Phos binder  Check PTH

## 2024-04-04 NOTE — CONSULT NOTE ADULT - TIME BILLING
Emotional Support/Supportive Care and Clarification of Potential Disease Trajectory related to CKD  Assessment of Symptom [place] and Palliative regimen  Exploration of GOC/Advanced Directives including HCP designation, code status, and hospice eligibility    Time inclusive of chart review, medication ordering, discussion with primary team, clinical documentation, and communication with family/caregiver

## 2024-04-04 NOTE — PROGRESS NOTE ADULT - PROBLEM SELECTOR PLAN 1
POA,  HR > 90, WBC > 12, w/ Cr. elevated from baseline in setting of infection (below)  -f/u cultures - NGTD 3/4 SIRS (WBC, fever and tachycardia) (<6 hours) s/p transfusion. PE benign with no apparent source of infection. RVP neg. CXR WNL. Jalen negative. WBC downtrending. Afebrile since 4/4 4am.   s/p vanc, zosyn x1.     FNHTR vs. sepsis of unknown origin    - observe off Abx, resume if decompensates  - f/u BCx, UCx

## 2024-04-04 NOTE — DISCHARGE NOTE PROVIDER - HOSPITAL COURSE
#Discharge: do not delete    Hospital Course  Patient is __ yo M/F with past medical history of _____  Presented with _____, found to have _____    Problem List/Main Diagnoses:     Medication changes:   NEW:  STOPPED:    Labs to be followed outpatient:     Exam to be followed outpatient:    Physical Exam on Discharge  Weight ______ #Discharge: do not delete    Hospital Course  58M T2DM, CVA x2 w/ residual L-sided weakness, early onset dementia, HTN, HLD, DVT, CKD V (Cr ~6, not on HD), BPH, PSH L toe amputation, R BKA, AVF creation (5/2023) BIBEMS from nursing home for anemia requiring transfusion, found to meet SIRS criteria s/p transfusion (FNHTR vs. sepsis) w/ elevated BUN/Cr, admitted for VS derangement. Initiated HD 4/4. Also found to have C. diff. c/c/b aspiration PNA vs pneumonitis. Clinically improving on HD, Abx. Pending GOC 4/8    Problem List/Main Diagnoses:     Medication changes:   NEW:  STOPPED:    Labs to be followed outpatient:     Exam to be followed outpatient:    Physical Exam on Discharge  Weight ______ #Discharge: do not delete    Hospital Course  58M T2DM, CVA x2 w/ residual L-sided weakness, early onset dementia, HTN, HLD, DVT, CKD V, BPH, PSH L toe amputation, R BKA, AVF creation (5/2023) BIBEMS from nursing home for anemia requiring transfusion, found to meet SIRS criteria s/p transfusion w/ elevated BUN/Cr, admitted for VS derangement. Initiated HD 4/4.    Also found to have C. diff. c/c/b aspiration PNA vs pneumonitis. Clinically improving on HD, Abx. Pending GOC 4/8.     ESRD, now on HD through [facility] AVF: Patient admitted with electrolyte derrangement, elevated blood pressure. Initially HD initiated but notable concern for stenosis, underwent fistulogram with vascular surgery with correction. Now tolerating iHD with nephrology TIW. No acute issues noted. Interim hypertensive urgency which has resolved.     Aspiration pneumonia: Met sepsis criteria with tachycardia and fever, CXR: Right basilar atelectasis/airspace opacity and developing right lower lobe pneumonia cannot be excluded. Started on CTX 2mg for 7 days with improved symptoms. Patient noted to be high risk for aspiration pneumonia, desires to continue oral feeds despite current risks.     Cdif: Hx of C. diff 12/2023 during last admission. Test positive for C diff with increased watery bowel movements after antibiotic administration. GI PCR negative. Completed Vancomycin 125mg PO q6 for 10 days    Anemia: Hgb 6.7 outpt. s/p 1u pRBC repeat 7.1. Baseline around 8 per HIE. Iron studies indicative of AoCD. hemolysis lab negative. Retic Index: hypopoliferative.     Early onset dementia: Continue to monitor home   BPH: Continue home meds Tamsulosin 0.4 mg qhs and Finasteride 5 mg qd.  CVA/HLD: Continue  home med ASA 81, Lipitor 80mg.  Hypothyroidism: Continue home med synthroid 50mcg qd.    Medication changes:    NEW:   STOPPED: stop Plavix. Stop Hydral and Nifedipine.    Labs to be followed outpatient:     Exam to be followed outpatient:    Physical Exam on Discharge #Discharge: do not delete    Hospital Course  58M T2DM, CVA x2 w/ residual L-sided weakness, early onset dementia, HTN, HLD, DVT, CKD V, BPH, PSH L toe amputation, R BKA, AVF creation (5/2023) BIBEMS from nursing home for anemia requiring transfusion, found to meet SIRS criteria s/p transfusion w/ elevated BUN/Cr, admitted for VS derangement. Initiated HD 4/4.    Also found to have C. diff. c/c/b aspiration PNA vs pneumonitis. Clinically improving on HD, Abx. Pending GOC 4/8.     ESRD, now on HD through [facility] AVF: Patient admitted with electrolyte derrangement, elevated blood pressure. Initially HD initiated but notable concern for stenosis, underwent fistulogram with vascular surgery with correction. Now tolerating iHD with nephrology TIW. No acute issues noted. Interim hypertensive urgency which has resolved.     Aspiration pneumonia: Met sepsis criteria with tachycardia and fever, CXR: Right basilar atelectasis/airspace opacity and developing right lower lobe pneumonia cannot be excluded. Started on CTX 2mg for 7 days with improved symptoms. Patient noted to be high risk for aspiration pneumonia, desires to continue oral feeds despite current risks.     Cdif: Hx of C. diff 12/2023 during last admission. Test positive for C diff with increased watery bowel movements after antibiotic administration. GI PCR negative. Completed Vancomycin 125mg PO q6 for 10 days.    Anemia: Hgb 6.7 outpt. s/p 1u pRBC repeat 7.1. Baseline around 8 per HIE. Iron studies indicative of AoCD. hemolysis lab negative. Retic Index: hypopoliferative. Started on IV Iron 900 mg administered.     Early onset dementia: Continue to monitor   BPH: Continue home meds Tamsulosin 0.4 mg qhs and Finasteride 5 mg qd.  CVA/HLD: Continue  home med ASA 81, Lipitor 80mg.  Hypothyroidism: Continue home med synthroid 50mcg qd.    Medication changes:    NEW:   STOPPED: stop Plavix. Stop Hydral and Nifedipine.    Labs to be followed outpatient: Follow up on routine chemistries.     Exam to be followed outpatient:     Physical Exam on Discharge  General: NAD, only respond to yes/no questions  HEENT: MMM  Respiratory: CTA B/L; normal WOB  Cardiovascular: RRR; no m/r/g  Gastrointestinal: Soft; NTND; + bowel sounds  : no suprapubic tenderness  Vascular: extremities WWP; no edema/cyanosis, LUE AVF w/ palpable thrill  MSK: R BKA, L side contracted

## 2024-04-05 DIAGNOSIS — D64.9 ANEMIA, UNSPECIFIED: ICD-10-CM

## 2024-04-05 DIAGNOSIS — J15.69 PNEUMONIA DUE TO OTHER GRAM-NEGATIVE BACTERIA: ICD-10-CM

## 2024-04-05 DIAGNOSIS — E43 UNSPECIFIED SEVERE PROTEIN-CALORIE MALNUTRITION: ICD-10-CM

## 2024-04-05 DIAGNOSIS — A41.9 SEPSIS, UNSPECIFIED ORGANISM: ICD-10-CM

## 2024-04-05 DIAGNOSIS — R19.7 DIARRHEA, UNSPECIFIED: ICD-10-CM

## 2024-04-05 LAB
ADD ON TEST-SPECIMEN IN LAB: SIGNIFICANT CHANGE UP
ALBUMIN SERPL ELPH-MCNC: 3.1 G/DL — LOW (ref 3.3–5)
ALP SERPL-CCNC: 70 U/L — SIGNIFICANT CHANGE UP (ref 40–120)
ALT FLD-CCNC: 51 U/L — HIGH (ref 10–45)
ANION GAP SERPL CALC-SCNC: 13 MMOL/L — SIGNIFICANT CHANGE UP (ref 5–17)
APPEARANCE UR: ABNORMAL
AST SERPL-CCNC: 35 U/L — SIGNIFICANT CHANGE UP (ref 10–40)
BACTERIA # UR AUTO: ABNORMAL /HPF
BASOPHILS # BLD AUTO: 0.02 K/UL — SIGNIFICANT CHANGE UP (ref 0–0.2)
BASOPHILS NFR BLD AUTO: 0.2 % — SIGNIFICANT CHANGE UP (ref 0–2)
BILIRUB SERPL-MCNC: 0.2 MG/DL — SIGNIFICANT CHANGE UP (ref 0.2–1.2)
BILIRUB UR-MCNC: NEGATIVE — SIGNIFICANT CHANGE UP
BUN SERPL-MCNC: 71 MG/DL — HIGH (ref 7–23)
C DIFF BY PCR RESULT: POSITIVE
C DIFF GDH STL QL: SIGNIFICANT CHANGE UP
C DIFF GDH STL QL: SIGNIFICANT CHANGE UP
CALCIUM SERPL-MCNC: 9 MG/DL — SIGNIFICANT CHANGE UP (ref 8.4–10.5)
CAST: 2 /LPF — SIGNIFICANT CHANGE UP (ref 0–4)
CHLORIDE SERPL-SCNC: 103 MMOL/L — SIGNIFICANT CHANGE UP (ref 96–108)
CO2 SERPL-SCNC: 24 MMOL/L — SIGNIFICANT CHANGE UP (ref 22–31)
COLOR SPEC: YELLOW — SIGNIFICANT CHANGE UP
CREAT SERPL-MCNC: 5.98 MG/DL — HIGH (ref 0.5–1.3)
DIFF PNL FLD: ABNORMAL
EGFR: 10 ML/MIN/1.73M2 — LOW
EOSINOPHIL # BLD AUTO: 0.02 K/UL — SIGNIFICANT CHANGE UP (ref 0–0.5)
EOSINOPHIL NFR BLD AUTO: 0.2 % — SIGNIFICANT CHANGE UP (ref 0–6)
GI PCR PANEL: SIGNIFICANT CHANGE UP
GLUCOSE BLDC GLUCOMTR-MCNC: 117 MG/DL — HIGH (ref 70–99)
GLUCOSE BLDC GLUCOMTR-MCNC: 121 MG/DL — HIGH (ref 70–99)
GLUCOSE BLDC GLUCOMTR-MCNC: 126 MG/DL — HIGH (ref 70–99)
GLUCOSE BLDC GLUCOMTR-MCNC: 186 MG/DL — HIGH (ref 70–99)
GLUCOSE SERPL-MCNC: 116 MG/DL — HIGH (ref 70–99)
GLUCOSE UR QL: NEGATIVE MG/DL — SIGNIFICANT CHANGE UP
HCT VFR BLD CALC: 23.1 % — LOW (ref 39–50)
HGB BLD-MCNC: 7.1 G/DL — LOW (ref 13–17)
IMM GRANULOCYTES NFR BLD AUTO: 0.5 % — SIGNIFICANT CHANGE UP (ref 0–0.9)
KETONES UR-MCNC: NEGATIVE MG/DL — SIGNIFICANT CHANGE UP
LEUKOCYTE ESTERASE UR-ACNC: ABNORMAL
LYMPHOCYTES # BLD AUTO: 1.58 K/UL — SIGNIFICANT CHANGE UP (ref 1–3.3)
LYMPHOCYTES # BLD AUTO: 12.2 % — LOW (ref 13–44)
MAGNESIUM SERPL-MCNC: 2.1 MG/DL — SIGNIFICANT CHANGE UP (ref 1.6–2.6)
MCHC RBC-ENTMCNC: 28.2 PG — SIGNIFICANT CHANGE UP (ref 27–34)
MCHC RBC-ENTMCNC: 30.7 GM/DL — LOW (ref 32–36)
MCV RBC AUTO: 91.7 FL — SIGNIFICANT CHANGE UP (ref 80–100)
MONOCYTES # BLD AUTO: 1.25 K/UL — HIGH (ref 0–0.9)
MONOCYTES NFR BLD AUTO: 9.6 % — SIGNIFICANT CHANGE UP (ref 2–14)
MRSA PCR RESULT.: NEGATIVE — SIGNIFICANT CHANGE UP
NEUTROPHILS # BLD AUTO: 10.04 K/UL — HIGH (ref 1.8–7.4)
NEUTROPHILS NFR BLD AUTO: 77.3 % — HIGH (ref 43–77)
NITRITE UR-MCNC: NEGATIVE — SIGNIFICANT CHANGE UP
NRBC # BLD: 0 /100 WBCS — SIGNIFICANT CHANGE UP (ref 0–0)
PH UR: 7 — SIGNIFICANT CHANGE UP (ref 5–8)
PHOSPHATE SERPL-MCNC: 4.4 MG/DL — SIGNIFICANT CHANGE UP (ref 2.5–4.5)
PLATELET # BLD AUTO: 244 K/UL — SIGNIFICANT CHANGE UP (ref 150–400)
POTASSIUM SERPL-MCNC: 3.2 MMOL/L — LOW (ref 3.5–5.3)
POTASSIUM SERPL-SCNC: 3.2 MMOL/L — LOW (ref 3.5–5.3)
PROCALCITONIN SERPL-MCNC: 7.72 NG/ML — HIGH (ref 0.02–0.1)
PROT SERPL-MCNC: 6.5 G/DL — SIGNIFICANT CHANGE UP (ref 6–8.3)
PROT UR-MCNC: 300 MG/DL
RBC # BLD: 2.52 M/UL — LOW (ref 4.2–5.8)
RBC # FLD: 16.1 % — HIGH (ref 10.3–14.5)
RBC CASTS # UR COMP ASSIST: 12 /HPF — HIGH (ref 0–4)
S AUREUS DNA NOSE QL NAA+PROBE: NEGATIVE — SIGNIFICANT CHANGE UP
SODIUM SERPL-SCNC: 140 MMOL/L — SIGNIFICANT CHANGE UP (ref 135–145)
SP GR SPEC: 1.01 — SIGNIFICANT CHANGE UP (ref 1–1.03)
SQUAMOUS # UR AUTO: 0 /HPF — SIGNIFICANT CHANGE UP (ref 0–5)
UROBILINOGEN FLD QL: 0.2 MG/DL — SIGNIFICANT CHANGE UP (ref 0.2–1)
WBC # BLD: 12.98 K/UL — HIGH (ref 3.8–10.5)
WBC # FLD AUTO: 12.98 K/UL — HIGH (ref 3.8–10.5)
WBC UR QL: 880 /HPF — HIGH (ref 0–5)

## 2024-04-05 PROCEDURE — 90935 HEMODIALYSIS ONE EVALUATION: CPT | Mod: GC

## 2024-04-05 PROCEDURE — 99233 SBSQ HOSP IP/OBS HIGH 50: CPT | Mod: GC

## 2024-04-05 PROCEDURE — 71045 X-RAY EXAM CHEST 1 VIEW: CPT | Mod: 26

## 2024-04-05 RX ORDER — CEFTRIAXONE 500 MG/1
2000 INJECTION, POWDER, FOR SOLUTION INTRAMUSCULAR; INTRAVENOUS EVERY 24 HOURS
Refills: 0 | Status: COMPLETED | OUTPATIENT
Start: 2024-04-06 | End: 2024-04-09

## 2024-04-05 RX ORDER — VANCOMYCIN HCL 1 G
125 VIAL (EA) INTRAVENOUS EVERY 6 HOURS
Refills: 0 | Status: DISCONTINUED | OUTPATIENT
Start: 2024-04-05 | End: 2024-04-12

## 2024-04-05 RX ORDER — POTASSIUM CHLORIDE 20 MEQ
40 PACKET (EA) ORAL ONCE
Refills: 0 | Status: COMPLETED | OUTPATIENT
Start: 2024-04-05 | End: 2024-04-05

## 2024-04-05 RX ORDER — ACETAMINOPHEN 500 MG
650 TABLET ORAL ONCE
Refills: 0 | Status: COMPLETED | OUTPATIENT
Start: 2024-04-05 | End: 2024-04-05

## 2024-04-05 RX ORDER — CEFTRIAXONE 500 MG/1
1000 INJECTION, POWDER, FOR SOLUTION INTRAMUSCULAR; INTRAVENOUS ONCE
Refills: 0 | Status: COMPLETED | OUTPATIENT
Start: 2024-04-05 | End: 2024-04-05

## 2024-04-05 RX ORDER — ERYTHROPOIETIN 10000 [IU]/ML
6000 INJECTION, SOLUTION INTRAVENOUS; SUBCUTANEOUS ONCE
Refills: 0 | Status: COMPLETED | OUTPATIENT
Start: 2024-04-05 | End: 2024-04-05

## 2024-04-05 RX ORDER — CEFTRIAXONE 500 MG/1
1000 INJECTION, POWDER, FOR SOLUTION INTRAMUSCULAR; INTRAVENOUS EVERY 24 HOURS
Refills: 0 | Status: DISCONTINUED | OUTPATIENT
Start: 2024-04-05 | End: 2024-04-05

## 2024-04-05 RX ADMIN — HEPARIN SODIUM 5000 UNIT(S): 5000 INJECTION INTRAVENOUS; SUBCUTANEOUS at 21:50

## 2024-04-05 RX ADMIN — CHLORHEXIDINE GLUCONATE 1 APPLICATION(S): 213 SOLUTION TOPICAL at 10:01

## 2024-04-05 RX ADMIN — CEFTRIAXONE 100 MILLIGRAM(S): 500 INJECTION, POWDER, FOR SOLUTION INTRAMUSCULAR; INTRAVENOUS at 21:49

## 2024-04-05 RX ADMIN — Medication 81 MILLIGRAM(S): at 10:00

## 2024-04-05 RX ADMIN — Medication 40 MILLIEQUIVALENT(S): at 10:01

## 2024-04-05 RX ADMIN — CEFTRIAXONE 100 MILLIGRAM(S): 500 INJECTION, POWDER, FOR SOLUTION INTRAMUSCULAR; INTRAVENOUS at 19:21

## 2024-04-05 RX ADMIN — Medication 120 MILLIGRAM(S): at 10:00

## 2024-04-05 RX ADMIN — ATORVASTATIN CALCIUM 80 MILLIGRAM(S): 80 TABLET, FILM COATED ORAL at 21:50

## 2024-04-05 RX ADMIN — CLOPIDOGREL BISULFATE 75 MILLIGRAM(S): 75 TABLET, FILM COATED ORAL at 15:00

## 2024-04-05 RX ADMIN — Medication 50 MICROGRAM(S): at 06:19

## 2024-04-05 RX ADMIN — Medication 1 TABLET(S): at 06:19

## 2024-04-05 RX ADMIN — CARVEDILOL PHOSPHATE 3.12 MILLIGRAM(S): 80 CAPSULE, EXTENDED RELEASE ORAL at 06:21

## 2024-04-05 RX ADMIN — Medication 50 MILLIGRAM(S): at 06:19

## 2024-04-05 RX ADMIN — CARVEDILOL PHOSPHATE 3.12 MILLIGRAM(S): 80 CAPSULE, EXTENDED RELEASE ORAL at 19:21

## 2024-04-05 RX ADMIN — ERYTHROPOIETIN 6000 UNIT(S): 10000 INJECTION, SOLUTION INTRAVENOUS; SUBCUTANEOUS at 12:38

## 2024-04-05 RX ADMIN — Medication 2: at 18:38

## 2024-04-05 RX ADMIN — FINASTERIDE 5 MILLIGRAM(S): 5 TABLET, FILM COATED ORAL at 14:59

## 2024-04-05 RX ADMIN — Medication 125 MILLIGRAM(S): at 23:17

## 2024-04-05 RX ADMIN — HEPARIN SODIUM 5000 UNIT(S): 5000 INJECTION INTRAVENOUS; SUBCUTANEOUS at 15:00

## 2024-04-05 RX ADMIN — HEPARIN SODIUM 5000 UNIT(S): 5000 INJECTION INTRAVENOUS; SUBCUTANEOUS at 06:19

## 2024-04-05 RX ADMIN — TAMSULOSIN HYDROCHLORIDE 0.4 MILLIGRAM(S): 0.4 CAPSULE ORAL at 21:51

## 2024-04-05 RX ADMIN — Medication 650 MILLIGRAM(S): at 03:09

## 2024-04-05 RX ADMIN — Medication 650 MILLIGRAM(S): at 06:19

## 2024-04-05 NOTE — PROGRESS NOTE ADULT - PROBLEM SELECTOR PLAN 10
Fluid: None  Electrolytes: replete w/ caution. HD  Nutrition: pending SLP  DVT ppx: heparin subQ  GI ppx: none for now   Code: FULL  Dispo: [facility] - continue home lipitor 80mg residual L sided weakness and R sided facial droop.     - continue home med ASA 81, Plavix 75, Lipitor 80mg

## 2024-04-05 NOTE — PROGRESS NOTE ADULT - PROBLEM SELECTOR PLAN 6
p/w Hgb 6.7 outpt. s/p 1u pRBC with good response. However repeat 7.1.   Baseline around 8 per HIE. Iron studies indicative of AoCD. Retic Index: hypopoliferative.  PE benign with no signs of overt/ apparent bleeding. CHELLY neg.     mostly likely 2/2 ESRD     - active T&S, transfuse hgb <7.   - f/u hemolysis lab p/w Hgb 6.7 outpt. s/p 1u pRBC with good response. However repeat 7.1.   Baseline around 8 per HIE. Iron studies indicative of AoCD. hemolysis lab negative. Retic Index: hypopoliferative.  PE benign with no signs of overt/ apparent bleeding. CHELLY neg.     mostly likely 2/2 ESRD     - active T&S, transfuse hgb <7. Known early-onset dementia. A&Ox2 at baseline. POA w/ AOx1 but appears back to baseline.     ?metabolic encephalopathy w/ hyperuremia.     - reassess after HD - continue home med hydralazine 50 qd, nifedipine 120mg qd   - discontinued home Coreg 3.125 BID      #Hypertensive Urgency - RESOLVED  s/p labetalol 20mg IVP x2

## 2024-04-05 NOTE — DIETITIAN INITIAL EVALUATION ADULT - PERTINENT LABORATORY DATA
04-05    140  |  103  |  71<H>  ----------------------------<  116<H>  3.2<L>   |  24  |  5.98<H>    Ca    9.0      05 Apr 2024 05:30  Phos  4.4     04-05  Mg     2.1     04-05    TPro  6.5  /  Alb  3.1<L>  /  TBili  0.2  /  DBili  x   /  AST  35  /  ALT  51<H>  /  AlkPhos  70  04-05  POCT Blood Glucose.: 121 mg/dL (04-05-24 @ 09:06)  A1C with Estimated Average Glucose Result: 5.5 % (04-04-24 @ 05:30)  A1C with Estimated Average Glucose Result: 6.1 % (12-15-23 @ 05:30)  A1C with Estimated Average Glucose Result: 6.4 % (10-25-23 @ 05:30)

## 2024-04-05 NOTE — PROGRESS NOTE ADULT - PROBLEM SELECTOR PLAN 8
- A1c   - SLP song, CC diet   - mISS      #hypothyroidism  - continue home med synthroid 50mcg qd residual L sided weakness and R sided facial droop.     - continue home med ASA 81, Plavix 75, Lipitor 80mg Known early-onset dementia. A&Ox2 at baseline. POA w/ AOx1 but appears back to baseline.     ?metabolic encephalopathy w/ hyperuremia.     - reassess after HD

## 2024-04-05 NOTE — PROGRESS NOTE ADULT - PROBLEM SELECTOR PLAN 2
- continue home med Coreg 3.125 BID, hydralazine 50 qd, nifedipine 120mg qd       #Hypertensive Urgency - RESOLVED  s/p labetalol 20mg IVP x2 - continue home med hydralazine 50 qd, nifedipine 120mg qd   - discontinued home Coreg 3.125 BID      #Hypertensive Urgency - RESOLVED  s/p labetalol 20mg IVP x2 hx of C. diff 12/2023 during last admission. >5 episodes of watery diarrhea o/n per RN    - GI PCR  - C. Diff As above

## 2024-04-05 NOTE — DIETITIAN INITIAL EVALUATION ADULT - NUTRITIONGOAL OUTCOME1
Pt to consistently meet >75% nutrient needs via most appropriate route for nutrition. Reduce signs and symptoms of protein-calorie malnutrition.

## 2024-04-05 NOTE — PROGRESS NOTE ADULT - PROBLEM SELECTOR PLAN 3
per HIE, prior Hx of HD. However no active HD according to NH staff.   Bun >100s, Cr 5.8 12/2023 -> 7.8 on admission.     - renal consulted for HD, consented by HCP Leo Cisneros [phone]  - continue home NaHCO3 650mg TID per HIE, prior Hx of HD. However no active HD according to NH staff.   Bun >100s, Cr 5.8 12/2023 -> 7.8 on admission.     - renal consulted for HD, consented by HCP Leo Cisneros [phone]  - discontinued home NaHCO3 650mg TID p/w Hgb 6.7 outpt. s/p 1u pRBC with good response. However repeat 7.1.   Baseline around 8 per HIE. Iron studies indicative of AoCD. hemolysis lab negative. Retic Index: hypopoliferative.  PE benign with no signs of overt/ apparent bleeding. CHELLY neg.     mostly likely 2/2 ESRD     - active T&S, transfuse hgb <7. Nutrition consulted

## 2024-04-05 NOTE — SWALLOW BEDSIDE ASSESSMENT ADULT - SWALLOW EVAL: DIAGNOSIS
Incomplete evaluation given pt’s increasing lethargy as trials progressed. Pt is at risk for aspiration and related sequelae given overt sign of aspiration (e.g., cough) on clinical exam, known hx of on-going dysphagia with silent aspiration, lethargy, bedbound status, dependency for feeding, and with cognitive impairments. However, pt’s lungs are clear per CXR on 04/03. Given pt’s clear lungs, can continue current diet as tolerated with aspiration precautions. SLP will f/u to complete assessment as pt is able to participate. Incomplete evaluation given pt’s increasing lethargy as trials progressed. Pt is at risk for aspiration and related sequelae given overt sign of aspiration (e.g., cough) on clinical exam, known hx of on-going dysphagia with silent aspiration, lethargy, bedbound status, dependency for feeding, and with cognitive impairments. However, pt’s lungs are clear per CXR on 04/03. Discussed with MD; consider GOC discussion. SLP will f/u to complete assessment as pt is awake, alert, and able to participate.

## 2024-04-05 NOTE — DIETITIAN NUTRITION RISK NOTIFICATION - ADDITIONAL COMMENTS/DIETITIAN RECOMMENDATIONS
Recommend NPO with SLP consult to determine safest and least restrictive diet.   >>If cleared for PO, recommend Renal diet with Nepro shake (420kcal, 19g protein) x1/day. Defer consistency to team/SLP.   >>If unable to advance to PO and nutrition support within goals of care, recommend consult nutrition services for appropriate regimen.     Recommend add Nephro-wendy pending no medical contraindications.   >>Recommend d/c multivitamin in setting of renal dysfunction/HD.

## 2024-04-05 NOTE — PROGRESS NOTE ADULT - PROBLEM SELECTOR PLAN 11
Fluid: None  Electrolytes: replete w/ caution. HD  Nutrition: pending SLP  DVT ppx: heparin subQ  GI ppx: none for now   Code: FULL  Dispo: [facility] - A1c   - SLP song, CC diet   - mISS      #hypothyroidism  - continue home med synthroid 50mcg qd

## 2024-04-05 NOTE — PROGRESS NOTE ADULT - PROBLEM SELECTOR PLAN 5
- continue home meds Tamsulosin 0.4 mg qhs and Finasteride 5 mg qd. per HIE, prior Hx of HD. However no active HD according to NH staff.   Bun >100s, Cr 5.8 12/2023 -> 7.8 on admission.     - renal consulted for HD, consented by HCP Leo Cisneros [phone]  - discontinued home NaHCO3 650mg TID p/w Hgb 6.7 outpt. s/p 1u pRBC with good response. However repeat 7.1.   Baseline around 8 per HIE. Iron studies indicative of AoCD. hemolysis lab negative. Retic Index: hypopoliferative.  PE benign with no signs of overt/ apparent bleeding. CHELLY neg.     mostly likely 2/2 ESRD     - active T&S, transfuse hgb <7.

## 2024-04-05 NOTE — PROGRESS NOTE ADULT - ATTENDING COMMENTS
I: HTN controlled. HGB 7.1.   Seen on dialysis.   A: ESRD. Anemia stable.   P: Continue dialysis. BERNARDINO at HD.

## 2024-04-05 NOTE — SWALLOW BEDSIDE ASSESSMENT ADULT - COMMENTS
Pt was assessed with MBSS on 10/27/23 on previous admission, findings revealed silent aspiration. He was diagnosed with moderate oropharyngeal dysphagia, likely on-going/chronic, and recommended a soft and bite sized / thin liquid diet via spoon. Unclear if pt was adhering to diet recommendations. Pt was a poor historian.

## 2024-04-05 NOTE — DIETITIAN INITIAL EVALUATION ADULT - ADD RECOMMEND
1. Recommend NPO with SLP consult to determine safest and least restrictive diet.   >>If cleared for PO, recommend Renal diet with Nepro shake (420kcal, 19g protein) x1/day. Defer consistency to team/SLP.   >>If unable to advance to PO and nutrition support within goals of care, recommend consult nutrition services for appropriate regimen.   2. Recommend add Nephro-wendy pending no medical contraindications.   >>Recommend d/c multivitamin in setting of renal dysfunction/HD.    3. Monitor GI tolerance, weight trends, labs, & skin integrity.  4. Defer bowel and pain regimens to team.   5. RD to remain available for diet education/intervention prn.

## 2024-04-05 NOTE — PROGRESS NOTE ADULT - SUBJECTIVE AND OBJECTIVE BOX
--------INCOMPLETE NOTE--------  OVERNIGHT EVENTS: febrile 102F. BCx collected. Gave tylenol     SUBJECTIVE  Pt was seen and examined at bedside. Awake and alert and was able to provide brief response to questions.    Patient denies any fevers/ chills, n/v, headache, acute SOB, chest pain, abdominal pain, genitourinary sx    12-point review of systems otherwise negative      Vital Signs Last 24 Hrs  T(C): 36.6 (05 Apr 2024 09:11), Max: 39.3 (05 Apr 2024 02:07)  T(F): 97.8 (05 Apr 2024 09:11), Max: 102.8 (05 Apr 2024 02:07)  HR: 90 (05 Apr 2024 09:11) (86 - 97)  BP: 137/64 (05 Apr 2024 09:11) (102/52 - 138/72)  BP(mean): --  RR: 18 (05 Apr 2024 09:11) (18 - 18)  SpO2: 95% (05 Apr 2024 09:11) (93% - 100%)    Parameters below as of 05 Apr 2024 09:11  Patient On (Oxygen Delivery Method): room air          PHYSICAL EXAM   General: NAD.   HEENT: NC/AT; PERRL; Neck Supple; MMM  Respiratory: CTA B/L; no wheezes/rales/rhonchi, normal WOB  Cardiovascular: Regular rhythm/ rate; no m/r/g  Gastrointestinal: Soft; NTND; + bowel sounds, negative CHELLY  : no suprapubic tenderness, + condom cath  Vascular: extremities WWP, LUE AVF w/ palpable thrill and no signs of erythema, drainage.   MSK: R BKA. LUE and LLE appears contracted   Neurological: A&Ox2, further assessment limited due to lack of partication      LABS:                        7.1    12.98 )-----------( 244      ( 05 Apr 2024 05:30 )             23.1     04-05    140  |  103  |  71<H>  ----------------------------<  116<H>  3.2<L>   |  24  |  5.98<H>    Ca    9.0      05 Apr 2024 05:30  Phos  4.4     04-05  Mg     2.1     04-05    TPro  6.5  /  Alb  3.1<L>  /  TBili  0.2  /  DBili  x   /  AST  35  /  ALT  51<H>  /  AlkPhos  70  04-05    PT/INR - ( 03 Apr 2024 13:45 )   PT: 11.6 sec;   INR: 1.02          PTT - ( 03 Apr 2024 13:45 )  PTT:29.4 sec  Urinalysis Basic - ( 05 Apr 2024 05:30 )    Color: x / Appearance: x / SG: x / pH: x  Gluc: 116 mg/dL / Ketone: x  / Bili: x / Urobili: x   Blood: x / Protein: x / Nitrite: x   Leuk Esterase: x / RBC: x / WBC x   Sq Epi: x / Non Sq Epi: x / Bacteria: x      CAPILLARY BLOOD GLUCOSE      POCT Blood Glucose.: 121 mg/dL (05 Apr 2024 09:06)   OVERNIGHT EVENTS: febrile 102F. BCx collected. Gave tylenol. >5 episodes of watery diarrhea o/n per RN    SUBJECTIVE  Pt was seen and examined at bedside. Awake and alert and was able to provide brief response to questions.    Patient denies any fevers/ chills, n/v, headache, acute SOB, chest pain, abdominal pain, genitourinary sx    12-point review of systems otherwise negative      Vital Signs Last 24 Hrs  T(C): 36.6 (05 Apr 2024 09:11), Max: 39.3 (05 Apr 2024 02:07)  T(F): 97.8 (05 Apr 2024 09:11), Max: 102.8 (05 Apr 2024 02:07)  HR: 90 (05 Apr 2024 09:11) (86 - 97)  BP: 137/64 (05 Apr 2024 09:11) (102/52 - 138/72)  BP(mean): --  RR: 18 (05 Apr 2024 09:11) (18 - 18)  SpO2: 95% (05 Apr 2024 09:11) (93% - 100%)    Parameters below as of 05 Apr 2024 09:11  Patient On (Oxygen Delivery Method): room air          PHYSICAL EXAM   General: NAD.   HEENT: NC/AT; PERRL; Neck Supple; MMM  Respiratory: CTA B/L; no wheezes/rales/rhonchi, normal WOB  Cardiovascular: Regular rhythm/ rate; no m/r/g  Gastrointestinal: Soft; NTND; + bowel sounds, negative CHELLY  : no suprapubic tenderness, + condom cath  Vascular: extremities WWP, LUE AVF w/ palpable thrill and no signs of erythema, drainage.   MSK: R BKA. LUE and LLE appears contracted   Neurological: A&Ox2, further assessment limited due to lack of partication      LABS:                        7.1    12.98 )-----------( 244      ( 05 Apr 2024 05:30 )             23.1     04-05    140  |  103  |  71<H>  ----------------------------<  116<H>  3.2<L>   |  24  |  5.98<H>    Ca    9.0      05 Apr 2024 05:30  Phos  4.4     04-05  Mg     2.1     04-05    TPro  6.5  /  Alb  3.1<L>  /  TBili  0.2  /  DBili  x   /  AST  35  /  ALT  51<H>  /  AlkPhos  70  04-05    PT/INR - ( 03 Apr 2024 13:45 )   PT: 11.6 sec;   INR: 1.02          PTT - ( 03 Apr 2024 13:45 )  PTT:29.4 sec  Urinalysis Basic - ( 05 Apr 2024 05:30 )    Color: x / Appearance: x / SG: x / pH: x  Gluc: 116 mg/dL / Ketone: x  / Bili: x / Urobili: x   Blood: x / Protein: x / Nitrite: x   Leuk Esterase: x / RBC: x / WBC x   Sq Epi: x / Non Sq Epi: x / Bacteria: x      CAPILLARY BLOOD GLUCOSE      POCT Blood Glucose.: 121 mg/dL (05 Apr 2024 09:06)

## 2024-04-05 NOTE — DIETITIAN INITIAL EVALUATION ADULT - PERTINENT MEDS FT
MEDICATIONS  (STANDING):  aspirin enteric coated 81 milliGRAM(s) Oral every 24 hours  atorvastatin 80 milliGRAM(s) Oral at bedtime  carvedilol 3.125 milliGRAM(s) Oral every 12 hours  chlorhexidine 2% Cloths 1 Application(s) Topical every 24 hours  clopidogrel Tablet 75 milliGRAM(s) Oral daily  dextrose 10% Bolus 125 milliLiter(s) IV Bolus once  dextrose 5%. 1000 milliLiter(s) (50 mL/Hr) IV Continuous <Continuous>  dextrose 5%. 1000 milliLiter(s) (100 mL/Hr) IV Continuous <Continuous>  dextrose 50% Injectable 25 Gram(s) IV Push once  dextrose 50% Injectable 12.5 Gram(s) IV Push once  finasteride 5 milliGRAM(s) Oral daily  glucagon  Injectable 1 milliGRAM(s) IntraMuscular once  heparin   Injectable 5000 Unit(s) SubCutaneous every 8 hours  insulin lispro (ADMELOG) corrective regimen sliding scale   SubCutaneous three times a day before meals  levothyroxine 50 MICROGram(s) Oral daily  multivitamin 1 Tablet(s) Oral every 24 hours  NIFEdipine  milliGRAM(s) Oral every 24 hours  tamsulosin 0.4 milliGRAM(s) Oral at bedtime    MEDICATIONS  (PRN):  dextrose Oral Gel 15 Gram(s) Oral once PRN Blood Glucose LESS THAN 70 milliGRAM(s)/deciliter

## 2024-04-05 NOTE — PROGRESS NOTE ADULT - PROBLEM SELECTOR PLAN 9
- continue home lipitor 80mg - A1c   - SLP song, CC diet   - mISS      #hypothyroidism  - continue home med synthroid 50mcg qd - continue home meds Tamsulosin 0.4 mg qhs and Finasteride 5 mg qd.

## 2024-04-05 NOTE — PROGRESS NOTE ADULT - PROBLEM SELECTOR PROBLEM 11
Addended by: NADREAS COLEMAN on: 4/6/2022 04:21 PM     Modules accepted: Orders     Prophylactic measure Type 2 diabetes mellitus

## 2024-04-05 NOTE — PROGRESS NOTE ADULT - SUBJECTIVE AND OBJECTIVE BOX
Patient is a 58y Male seen and evaluated at bedside during HD. Lying comfortably in bed.  Denies any symptoms.  Overnight fever 102.8, blood cultures sent, condom cath bag noted with cloudy urine, urinalysis was attempted by nurse however was unable to do.  WBC 12.9 from 12.3.       Meds:    aspirin enteric coated 81 every 24 hours  atorvastatin 80 at bedtime  carvedilol 3.125 every 12 hours  chlorhexidine 2% Cloths 1 every 24 hours  clopidogrel Tablet 75 daily  dextrose 10% Bolus 125 once  dextrose 5%. 1000 <Continuous>  dextrose 5%. 1000 <Continuous>  dextrose 50% Injectable 25 once  dextrose 50% Injectable 12.5 once  dextrose Oral Gel 15 once PRN  finasteride 5 daily  glucagon  Injectable 1 once  heparin   Injectable 5000 every 8 hours  insulin lispro (ADMELOG) corrective regimen sliding scale  three times a day before meals  levothyroxine 50 daily  multivitamin 1 every 24 hours  NIFEdipine  every 24 hours  tamsulosin 0.4 at bedtime      T(C): , Max: 39.3 (24 @ 02:07)  T(F): , Max: 102.8 (24 @ 02:07)  HR: 98 (24 @ 10:30)  BP: 137/77 (24 @ 10:30)  BP(mean): --  RR: 18 (24 @ 10:30)  SpO2: 97% (24 @ 10:30)  Wt(kg): --     @ 07:01  -   @ 07:00  --------------------------------------------------------  IN: 900 mL / OUT: 1500 mL / NET: -600 mL          Review of Systems:  ROS negative except as per HPI      PHYSICAL EXAM:  GEN: NAD, lying in bed  Respiratory: No respiratory distress  Cardiovascular: Regular rate  Gastrointestinal: soft, non-tender, non-distended  Extremities: No peripheral edema  Neuro: Awake and alert, answering questions. UE rigidity noted  Access: LUE AVF w/ thrill and bruit        LABS:                        7.1    12.98 )-----------( 244      ( 2024 05:30 )             23.1     04-05    140  |  103  |  71<H>  ----------------------------<  116<H>  3.2<L>   |  24  |  5.98<H>    Ca    9.0      2024 05:30  Phos  4.4     -  Mg     2.1     -    TPro  6.5  /  Alb  3.1<L>  /  TBili  0.2  /  DBili  x   /  AST  35  /  ALT  51<H>  /  AlkPhos  70  -    Hepatitis B Surface Antibody: Nonreact ( @ 16:26)  Hepatitis C Virus S/CO Ratio: 0.04 S/CO ( 16:26)    PT/INR - ( 2024 13:45 )   PT: 11.6 sec;   INR: 1.02          PTT - ( 2024 13:45 )  PTT:29.4 sec  Urinalysis Basic - ( 2024 05:30 )    Color: x / Appearance: x / SG: x / pH: x  Gluc: 116 mg/dL / Ketone: x  / Bili: x / Urobili: x   Blood: x / Protein: x / Nitrite: x   Leuk Esterase: x / RBC: x / WBC x   Sq Epi: x / Non Sq Epi: x / Bacteria: x            RADIOLOGY & ADDITIONAL STUDIES:      Phosphorus: 4.4 mg/dL (24 @ 05:30)  Hemoglobin: 7.1 g/dL (24 @ 05:30)  Hemoglobin: 7.5 g/dL (24 @ 16:26)  Iron Total: 21 ug/dL (24 @ 10:16)    Albumin: 3.1 g/dL (24 @ 05:30)  Hepatitis B Surface Antigen: Nonreact (24 @ 16:26)    T(C): 37.4 (24 @ 10:30), Max: 39.3 (24 @ 02:07)  HR: 98 (24 @ 10:30) (86 - 98)  BP: 137/77 (24 @ 10:30) (102/52 - 138/72)  RR: 18 (24 @ 10:30) (18 - 18)  SpO2: 97% (24 @ 10:30) (93% - 100%)      Hemodialysis Treatment.:     Schedule: Once, Modality: Hemodialysis, Access: Arteriovenous Fistula    Dialyzer: Optiflux [id number], Time: 150 Min    Blood Flow: 250 mL/Min , Dialysate Flow: 500 mL/Min, Dialysate Temp: 36.5, Tubinmm (Adult)    Target Fluid Removal: 0 Liters    Dialysate Electrolytes (mEq/L): Potassium 3, Calcium 2.5, Sodium 138, Bicarbonate 35 (24 @ 06:09) [Completed]

## 2024-04-05 NOTE — DIETITIAN INITIAL EVALUATION ADULT - OTHER CALCULATIONS
Estimated needs based on dosing wt as <120% IBW 167lb/76.1kg (77%) and per RD judgement. Needs adjusted for age, malnutrition, HD, and clinical status.   IBW adjusted for right BKA (-6%).   Defer fluids to team.

## 2024-04-05 NOTE — DIETITIAN INITIAL EVALUATION ADULT - NS FNS DIET ORDER
Diet, Pureed:   Consistent Carbohydrate {No Snacks} (CSTCHO)  DASH/TLC {Sodium & Cholesterol Restricted} (DASH)  For patients receiving Renal Replacement - No Protein Restr, No Conc K, No Conc Phos, Low Sodium (RENAL) (04-05-24 @ 07:07)

## 2024-04-05 NOTE — DIETITIAN INITIAL EVALUATION ADULT - ENTER FROM (CAL/KG)
Imiquimod Counseling:  I discussed with the patient the risks of imiquimod including but not limited to erythema, scaling, itching, weeping, crusting, and pain.  Patient understands that the inflammatory response to imiquimod is variable from person to person and was educated regarded proper titration schedule.  If flu-like symptoms develop, patient knows to discontinue the medication and contact us. 30

## 2024-04-05 NOTE — PROGRESS NOTE ADULT - ATTENDING COMMENTS
Patient was seen and examined at bedside on 4/5/2024 at 4pm with HS present. Patient reports he feels "okay". Denies CP, SOB. ROS is otherwise negative. Vitals, labwork and pertinent imaging reviewed. Physical exam - NAD, AAO x 3, PERRLA, EOMI, supple neck, chest - CTA b/l, CV - rrr, s1s2, no m/r/g, no edema, abd - soft, NTND, + BS,  - + FC, ext - wwp, R BKA, psych - calm affect, skin - no rash, no ulcers, pt appears cachectic and malnourished    Plan  -Unclear etiology of fever - possible aspiration?  -BCX - NGTD  -S/S consulted  -Please enter BMI  -UCX  -Repeat CXR  -Check C - diff/GI PCR  -Unclear if patient needs to be on DAPT, Neurology consult note from 12/2023 - c/w ASA and Statin  -Palliative care consult

## 2024-04-05 NOTE — PROGRESS NOTE ADULT - PROBLEM SELECTOR PLAN 7
residual L sided weakness and R sided facial droop.     - continue home med ASA 81, Plavix 75, Lipitor 80mg - continue home meds Tamsulosin 0.4 mg qhs and Finasteride 5 mg qd. per HIE, prior Hx of HD. However no active HD according to NH staff.   Bun >100s, Cr 5.8 12/2023 -> 7.8 on admission.     - renal consulted for HD, consented by HCP Leo Cisneros [phone]  - discontinued home NaHCO3 650mg TID

## 2024-04-05 NOTE — SWALLOW BEDSIDE ASSESSMENT ADULT - SLP PERTINENT HISTORY OF CURRENT PROBLEM
58-year-old male with HTN, DM2, dementia, CKD 5 sent in by nursing home for Hb 6.7 s/p 1 PRBC in ER, then with fever and tachycardia, admitted for further evaluation.  Nephrology consulted for possible HD initiation. HD#1 4/4. Being observed off of Abx for now for the fever.

## 2024-04-05 NOTE — SWALLOW BEDSIDE ASSESSMENT ADULT - SLP GENERAL OBSERVATIONS
Pt received lethargic and confused, Ox3. Oriented to time, given a closed choice of 2. Pt was unreliable for basic yes/no and wh- questions. Pt did not reliably follow simple directives. Poor speech intelligibility with reduced articulatory precision and low vocal volume.

## 2024-04-05 NOTE — DIETITIAN INITIAL EVALUATION ADULT - PROBLEM SELECTOR PLAN 1
Not present on presentation to ED however patient with 101.4 fever prior to planned discharge. Approximately 3 hours after completing transfustion. Repeat CBC with WBC 12K, tachy into low 100's despite Labetolol 20 mg IV push X2 in ED. Meets 3/4 SIRS criteria (T >100.9, HR >90, WBC >12, creat >2). Lactate 0.8. Patient denies infectious symptoms such as cough, abdominal pain, diarrhea. Unable to answer if he creates urine but patient with diaper and chart review from nursing home states he has both urinary and bowel incontinence. CXR without focal consolidations.     Uncertain if true infection at the time as this could also be due to acute transfusion reaction. Blood bank was notified in the ED and transfusion reaction workup was sent.     Is s/p Vanc and Zosyn in the ED.     Plan:   - f/u blood cultures   - f/u RVP   - obtain UA as able   - Tylenol for fevers   - monitor off Ab for now with low threshold to start Ab if hemodynamically unstable

## 2024-04-05 NOTE — DIETITIAN NUTRITION RISK NOTIFICATION - TREATMENT: THE FOLLOWING DIET HAS BEEN RECOMMENDED
Diet, Pureed:   Consistent Carbohydrate {No Snacks} (CSTCHO)  DASH/TLC {Sodium & Cholesterol Restricted} (DASH)  For patients receiving Renal Replacement - No Protein Restr, No Conc K, No Conc Phos, Low Sodium (RENAL) (04-05-24 @ 07:07) [Active]

## 2024-04-05 NOTE — PROGRESS NOTE ADULT - PROBLEM SELECTOR PLAN 4
Known early-onset dementia.  A&Ox2 at baseline. On exam, patient A&Ox1.     ?metabolic encephalopathy w/ hyperuremia.     - reassess after HD Known early-onset dementia. A&Ox2 at baseline. POA w/ AOx1 but appears back to baseline.     ?metabolic encephalopathy w/ hyperuremia.     - reassess after HD - continue home med hydralazine 50 qd, nifedipine 120mg qd   - discontinued home Coreg 3.125 BID      #Hypertensive Urgency - RESOLVED  s/p labetalol 20mg IVP x2 hx of C. diff 12/2023 during last admission. >5 episodes of watery diarrhea o/n per RN    - GI PCR  - C. Diff

## 2024-04-05 NOTE — DIETITIAN INITIAL EVALUATION ADULT - OTHER INFO
58M with PMH of T2DM, CVA x2 with residual L-sided weakness, early onset dementia, HTN, HLD, DVT, CKD V (Cr ~6, not on HD), BPH, PSH L toe amputation, R BKA, AVF creation (5/2023) BIBEMS from nursing home for anemia requiring transfusion, found to meet SIRS criteria status post transfusion (FNHTR vs. sepsis) with elevated BUN/Cr, admitted for VS derangement. Initiated HD 4/5.    Pt seen on 4UR for assessment. Labs and medication orders reviewed. Ordered for PO synthroid, multivitamin. On Pureed Consistent Carbohydrate DASH/TLC Renal diet. Pt intermittently confused on assessment. RD observed pt completed ~40% breakfast this AM, PCA at bedside who assisted with feeding pt reports stopping meal due to pt coughing on food, note pt with hx of swallow evals in-house (last eval 12/17/23 recommendation for minced and moist/thin liquids) - RD communicated to team. Per prior admission RD note (12/15/23) pt 6'0" 130lb, RD obtained bed scale wt 4/5 129lb indicates wt stability x4 months. Nutrition-focused physical examination significant for mild-moderate wasting. Per ASPEN guidelines, pt meets criteria for moderate chronic malnutrition - see nutrition risk notification. No known foods allergies. No Congregation/ethnic/cultural food preferences noted. No active pressure injuries or edema documented, Adolfo score 12. See nutrition recommendations. RD to remain available.

## 2024-04-05 NOTE — PROGRESS NOTE ADULT - PROBLEM SELECTOR PLAN 1
3/4 SIRS (WBC, fever and tachycardia) (<6 hours) s/p transfusion. PE benign with no apparent source of infection. RVP neg. CXR WNL. Jalen negative. WBC downtrending. Afebrile since 4/4 4am.   s/p vanc, zosyn x1.     FNHTR vs. sepsis of unknown origin    - observe off Abx, resume if decompensates  - f/u BCx, UCx 3/4 SIRS (WBC, fever and tachycardia) (<6 hours) s/p transfusion.  RVP neg. CXR WNL. Jalen negative. WBC elevated  Febrile 4/5 2am. PE benign with no apparent source of infection.  s/p vanc, zosyn x1.     FNHTR vs. sepsis of unknown origin    - observe off Abx, resume if decompensates  - f/u BCx, UCx POA, HR > 90, fever, w/ SBP drop > 40 2/2 sepsis  Infectious source likely aspiration PNA  -C/w CTX

## 2024-04-05 NOTE — PROGRESS NOTE ADULT - ASSESSMENT
57yo M T2DM, CVA x2 w/ residual L-sided weakness, early onset dementia, HTN, HLD, DVT, CKD V (Cr ~6, not on HD), BPH, PSH L toe amputation, R BKA, AVF creation (5/2023) BIBEMS from nursing home for anemia requiring transfusion, found to meet SIRS criteria s/p transfusion (FNHTR vs. sepsis) w/ elevated BUN/Cr, admitted for VS derangement w/ possible HD 57yo M T2DM, CVA x2 w/ residual L-sided weakness, early onset dementia, HTN, HLD, DVT, CKD V (Cr ~6, not on HD), BPH, PSH L toe amputation, R BKA, AVF creation (5/2023) BIBEMS from nursing home for anemia requiring transfusion, found to meet SIRS criteria s/p transfusion (FNHTR vs. sepsis) w/ elevated BUN/Cr, admitted for VS derangement. Initiated HD 4/5

## 2024-04-05 NOTE — PROGRESS NOTE ADULT - ASSESSMENT
58-year-old male with HTN, DM2, dementia, CKD 5 sent in by nursing home for Hb 6.7 s/p 1 PRBC in ER, then with fever and tachycardia, admitted for further evaluation.  Nephrology consulted for possible HD initiation. HD#1 4/4. Being observed off of Abx for now for the fever.    # New start ESRD  HD#2 today as above  Seen on HD.  Tolerating well.  BP stable.  Asymptomatic.  Continue HD as above  Renal diet  Next HD 4/6  SW assistance for outpatient HD center placement.  Hepatitis panel and TB testing (PPD or quant TB) for HD placement    # Access  LUE AVF with thrill and bruit      # Anemia  Hb not at goal 7.1  Iron sat 11%  Give IV iron 200 mg X5 doses once active infection ruled out  epo w/ HD    # CKD-BMD  Calcium 9.0  Phos 4.4. Goal 3-5.5.  If remains above goal, will need Phos binder  Check PTH

## 2024-04-05 NOTE — DIETITIAN INITIAL EVALUATION ADULT - PROBLEM SELECTOR PLAN 8
Per chart review, patient is A&Ox2 at baseline. On exam, patient A&Ox1.     Plan:   - call nursing home for collateral in AM

## 2024-04-05 NOTE — SWALLOW BEDSIDE ASSESSMENT ADULT - NS SPL SWALLOW CLINIC TRIAL FT
Limited trials provided due to increasing fatigue as evaluation progressed with poor arousal/alertness.   Anterior spillage with thin trials via cup. Bolus acceptance and containment WFL with thin trials via tsp. Laryngeal elevation present on palpation, indicating consistent pharyngeal swallow initiation. Cough x1 with last thin liquid trial via cup. Limited trials provided due to increasing fatigue as evaluation progressed with poor arousal/alertness.   Anterior spillage with thin liquid via cup. Bolus containment WFL with thin liquid via tsp. Laryngeal elevation present on palpation, indicating consistent pharyngeal swallow initiation. Cough x1 with last thin liquid trial via cup, suggestive of aspiration. Promptly after this trial, pt fell asleep and was largely unarousable.

## 2024-04-06 DIAGNOSIS — A04.72 ENTEROCOLITIS DUE TO CLOSTRIDIUM DIFFICILE, NOT SPECIFIED AS RECURRENT: ICD-10-CM

## 2024-04-06 LAB
ADD ON TEST-SPECIMEN IN LAB: SIGNIFICANT CHANGE UP
ADD ON TEST-SPECIMEN IN LAB: SIGNIFICANT CHANGE UP
ANION GAP SERPL CALC-SCNC: 13 MMOL/L — SIGNIFICANT CHANGE UP (ref 5–17)
BUN SERPL-MCNC: 54 MG/DL — HIGH (ref 7–23)
CALCIUM SERPL-MCNC: 8.5 MG/DL — SIGNIFICANT CHANGE UP (ref 8.4–10.5)
CALCIUM SERPL-MCNC: 9.4 MG/DL — SIGNIFICANT CHANGE UP (ref 8.4–10.5)
CHLORIDE SERPL-SCNC: 98 MMOL/L — SIGNIFICANT CHANGE UP (ref 96–108)
CO2 SERPL-SCNC: 26 MMOL/L — SIGNIFICANT CHANGE UP (ref 22–31)
CREAT SERPL-MCNC: 4.96 MG/DL — HIGH (ref 0.5–1.3)
EGFR: 13 ML/MIN/1.73M2 — LOW
GLUCOSE BLDC GLUCOMTR-MCNC: 107 MG/DL — HIGH (ref 70–99)
GLUCOSE BLDC GLUCOMTR-MCNC: 112 MG/DL — HIGH (ref 70–99)
GLUCOSE BLDC GLUCOMTR-MCNC: 125 MG/DL — HIGH (ref 70–99)
GLUCOSE BLDC GLUCOMTR-MCNC: 127 MG/DL — HIGH (ref 70–99)
GLUCOSE SERPL-MCNC: 100 MG/DL — HIGH (ref 70–99)
HCT VFR BLD CALC: 22.2 % — LOW (ref 39–50)
HCT VFR BLD CALC: 27.4 % — LOW (ref 39–50)
HGB BLD-MCNC: 7 G/DL — CRITICAL LOW (ref 13–17)
HGB BLD-MCNC: 8.8 G/DL — LOW (ref 13–17)
LEGIONELLA AG UR QL: NEGATIVE — SIGNIFICANT CHANGE UP
MAGNESIUM SERPL-MCNC: 2 MG/DL — SIGNIFICANT CHANGE UP (ref 1.6–2.6)
MCHC RBC-ENTMCNC: 28.8 PG — SIGNIFICANT CHANGE UP (ref 27–34)
MCHC RBC-ENTMCNC: 28.8 PG — SIGNIFICANT CHANGE UP (ref 27–34)
MCHC RBC-ENTMCNC: 31.5 GM/DL — LOW (ref 32–36)
MCHC RBC-ENTMCNC: 32.1 GM/DL — SIGNIFICANT CHANGE UP (ref 32–36)
MCV RBC AUTO: 89.5 FL — SIGNIFICANT CHANGE UP (ref 80–100)
MCV RBC AUTO: 91.4 FL — SIGNIFICANT CHANGE UP (ref 80–100)
NRBC # BLD: 0 /100 WBCS — SIGNIFICANT CHANGE UP (ref 0–0)
NRBC # BLD: 0 /100 WBCS — SIGNIFICANT CHANGE UP (ref 0–0)
PHOSPHATE SERPL-MCNC: 3.7 MG/DL — SIGNIFICANT CHANGE UP (ref 2.5–4.5)
PLATELET # BLD AUTO: 224 K/UL — SIGNIFICANT CHANGE UP (ref 150–400)
PLATELET # BLD AUTO: 236 K/UL — SIGNIFICANT CHANGE UP (ref 150–400)
POTASSIUM SERPL-MCNC: 3.7 MMOL/L — SIGNIFICANT CHANGE UP (ref 3.5–5.3)
POTASSIUM SERPL-SCNC: 3.7 MMOL/L — SIGNIFICANT CHANGE UP (ref 3.5–5.3)
PROCALCITONIN SERPL-MCNC: 8.61 NG/ML — HIGH (ref 0.02–0.1)
PTH-INTACT FLD-MCNC: 116 PG/ML — HIGH (ref 15–65)
RBC # BLD: 2.43 M/UL — LOW (ref 4.2–5.8)
RBC # BLD: 3.06 M/UL — LOW (ref 4.2–5.8)
RBC # FLD: 15.9 % — HIGH (ref 10.3–14.5)
RBC # FLD: 15.9 % — HIGH (ref 10.3–14.5)
S PNEUM AG UR QL: NEGATIVE — SIGNIFICANT CHANGE UP
SODIUM SERPL-SCNC: 137 MMOL/L — SIGNIFICANT CHANGE UP (ref 135–145)
WBC # BLD: 11.21 K/UL — HIGH (ref 3.8–10.5)
WBC # BLD: 12.94 K/UL — HIGH (ref 3.8–10.5)
WBC # FLD AUTO: 11.21 K/UL — HIGH (ref 3.8–10.5)
WBC # FLD AUTO: 12.94 K/UL — HIGH (ref 3.8–10.5)

## 2024-04-06 PROCEDURE — 71045 X-RAY EXAM CHEST 1 VIEW: CPT | Mod: 26

## 2024-04-06 PROCEDURE — 99233 SBSQ HOSP IP/OBS HIGH 50: CPT | Mod: GC

## 2024-04-06 PROCEDURE — 90937 HEMODIALYSIS REPEATED EVAL: CPT

## 2024-04-06 RX ADMIN — Medication 81 MILLIGRAM(S): at 09:57

## 2024-04-06 RX ADMIN — TAMSULOSIN HYDROCHLORIDE 0.4 MILLIGRAM(S): 0.4 CAPSULE ORAL at 22:49

## 2024-04-06 RX ADMIN — FINASTERIDE 5 MILLIGRAM(S): 5 TABLET, FILM COATED ORAL at 11:48

## 2024-04-06 RX ADMIN — Medication 50 MICROGRAM(S): at 06:22

## 2024-04-06 RX ADMIN — HEPARIN SODIUM 5000 UNIT(S): 5000 INJECTION INTRAVENOUS; SUBCUTANEOUS at 13:19

## 2024-04-06 RX ADMIN — Medication 125 MILLIGRAM(S): at 06:23

## 2024-04-06 RX ADMIN — CARVEDILOL PHOSPHATE 3.12 MILLIGRAM(S): 80 CAPSULE, EXTENDED RELEASE ORAL at 06:22

## 2024-04-06 RX ADMIN — Medication 120 MILLIGRAM(S): at 09:57

## 2024-04-06 RX ADMIN — CEFTRIAXONE 100 MILLIGRAM(S): 500 INJECTION, POWDER, FOR SOLUTION INTRAMUSCULAR; INTRAVENOUS at 22:48

## 2024-04-06 RX ADMIN — ATORVASTATIN CALCIUM 80 MILLIGRAM(S): 80 TABLET, FILM COATED ORAL at 22:49

## 2024-04-06 RX ADMIN — CHLORHEXIDINE GLUCONATE 1 APPLICATION(S): 213 SOLUTION TOPICAL at 11:49

## 2024-04-06 RX ADMIN — Medication 125 MILLIGRAM(S): at 12:44

## 2024-04-06 RX ADMIN — HEPARIN SODIUM 5000 UNIT(S): 5000 INJECTION INTRAVENOUS; SUBCUTANEOUS at 22:49

## 2024-04-06 RX ADMIN — Medication 1 TABLET(S): at 06:23

## 2024-04-06 RX ADMIN — CARVEDILOL PHOSPHATE 3.12 MILLIGRAM(S): 80 CAPSULE, EXTENDED RELEASE ORAL at 19:25

## 2024-04-06 RX ADMIN — Medication 125 MILLIGRAM(S): at 19:24

## 2024-04-06 RX ADMIN — HEPARIN SODIUM 5000 UNIT(S): 5000 INJECTION INTRAVENOUS; SUBCUTANEOUS at 06:22

## 2024-04-06 NOTE — PROGRESS NOTE ADULT - PROBLEM SELECTOR PLAN 11
A1c 5.5. Speech and Swallow evaluation unable to be completed 4/5 due to lethargy  - CC diet   - mISS  - NPO pending re-evaluation        #hypothyroidism  - continue home med synthroid 50mcg qd

## 2024-04-06 NOTE — PROGRESS NOTE ADULT - ASSESSMENT
58y M w/ hx of HTN, DM2, dementia, CKD 5 now w/ progression towards ESRD started on dialysis on 4/4. Hospital course uneventful, has been tolerating well dialysis sessions.     1. ESRD on HD - to c/w HD#3 today  Dialysis indicated for metabolic clearance and volume management  HD rx: F160, 3.0hrs, 400/500 flows, 2K bath, UF none. Via L AVF  Adjust and dose all antibiotics to ESRD  c/w strict I/O  C/w renal diet as tolerated  c/w fluid restriction <1.5L/d  Avoid trauma at the level of non dominant arm  Trend BMP for electrolyte monitoring    2. Anemia of chronic disease- ill receive BERNARDINO w/ dialysis  C/w cbc trend for additional monitoring    3. Secondary hyperparathyroidism - phosp levels within acceptable ranges.     4. HTN - hold all BP meds before dialysis    5. DM - strict glycemic control per Primary team    6. Hep B Serologies non reactive    SW for finally disposition    Discussed w/ primary team

## 2024-04-06 NOTE — PROGRESS NOTE ADULT - PROBLEM SELECTOR PLAN 3
hx of C. diff 12/2023 during last admission. Test positive for C diff. 4/5 5 episodes of watery diarrhea. 4/6 2 episodes overnight  GI PCR negative  - c/w Vancomycin 125mg PO q6 for 10 days hx of C. diff 12/2023 during last admission. Test positive for C diff. 4/5 5 episodes of watery diarrhea. 4/6 2 episodes overnight  GI PCR negative  - c/w Vancomycin 125mg PO q6 for 10 days  - stool count

## 2024-04-06 NOTE — PROGRESS NOTE ADULT - PROBLEM SELECTOR PLAN 7
per HIE, prior Hx of HD. However no active HD according to NH staff.   Bun >100s, Cr 5.8 12/2023 -> 7.8 on admission.     - renal consulted for HD, consented by HCP Leo Cisneros [phone]  - discontinued home NaHCO3 650mg TID per HIE, prior Hx of HD. However no active HD according to NH staff.   Bun >100s, Cr 5.8 12/2023 -> 7.8 on admission.     - renal consulted for HD, consented by HCP Leo Cisneros [phone]  - discontinued home NaHCO3 650mg TID  - Hgb 7.1 4/6. Transfuse 1 unit pRBC with dialysis

## 2024-04-06 NOTE — PROGRESS NOTE ADULT - SUBJECTIVE AND OBJECTIVE BOX
INTERVAL HPI/OVERNIGHT EVENTS:  As per night team, MRSA negative. c diff positive, started oral vanc, 10P , mrsa negative, 4AM , minimal urine in condom cath for strep/legionella, unable to produce sputum, urine strep and legionella collected, spiked fever 102.4 repeat bcx. Patient seen and examined at bedside. Per RN, 2 episodes of watery diarrhea overnight. Alert and awake. States he is feeling okay. No acute complaints. Patient denies fever, chills, dizziness, weakness, HA, CP, SOB, N/V/D/C    VITALS  Vital Signs Last 24 Hrs  T(C): 36.8 (06 Apr 2024 07:56), Max: 39.1 (06 Apr 2024 05:40)  T(F): 98.3 (06 Apr 2024 07:56), Max: 102.4 (06 Apr 2024 05:40)  HR: 100 (06 Apr 2024 05:40) (90 - 100)  BP: 105/64 (06 Apr 2024 05:40) (105/64 - 137/78)  BP(mean): --  RR: 18 (06 Apr 2024 05:40) (18 - 18)  SpO2: 94% (06 Apr 2024 05:40) (79% - 97%)    Parameters below as of 05 Apr 2024 21:50  Patient On (Oxygen Delivery Method): nasal cannula  O2 Flow (L/min): 3    CAPILLARY BLOOD GLUCOSE  POCT Blood Glucose.: 107 mg/dL (06 Apr 2024 08:34)  POCT Blood Glucose.: 117 mg/dL (05 Apr 2024 21:55)  POCT Blood Glucose.: 186 mg/dL (05 Apr 2024 18:05)  POCT Blood Glucose.: 126 mg/dL (05 Apr 2024 14:18)  POCT Blood Glucose.: 121 mg/dL (05 Apr 2024 09:06)      PHYSICAL EXAM  General: NAD. cachetic  HEENT: NC/AT; PERRL; Neck Supple; MMM  Respiratory: CTA B/L; no wheezes/rales/rhonchi, normal WOB  Cardiovascular: Regular rhythm/ rate; no m/r/g  Gastrointestinal: Soft; NTND; + bowel sounds, negative CHELLY  : no suprapubic tenderness, + condom cath  Vascular: extremities WWP, LUE AVF w/ palpable thrill and no signs of erythema, drainage.   MSK: R BKA. LUE and LLE appears contracted   Neurological: A&Ox3, further assessment limited due to lack of partication    MEDICATIONS  (STANDING):  aspirin enteric coated 81 milliGRAM(s) Oral every 24 hours  atorvastatin 80 milliGRAM(s) Oral at bedtime  carvedilol 3.125 milliGRAM(s) Oral every 12 hours  cefTRIAXone   IVPB 2000 milliGRAM(s) IV Intermittent every 24 hours  chlorhexidine 2% Cloths 1 Application(s) Topical every 24 hours  dextrose 10% Bolus 125 milliLiter(s) IV Bolus once  dextrose 5%. 1000 milliLiter(s) (50 mL/Hr) IV Continuous <Continuous>  dextrose 5%. 1000 milliLiter(s) (100 mL/Hr) IV Continuous <Continuous>  dextrose 50% Injectable 12.5 Gram(s) IV Push once  dextrose 50% Injectable 25 Gram(s) IV Push once  finasteride 5 milliGRAM(s) Oral daily  glucagon  Injectable 1 milliGRAM(s) IntraMuscular once  heparin   Injectable 5000 Unit(s) SubCutaneous every 8 hours  insulin lispro (ADMELOG) corrective regimen sliding scale   SubCutaneous three times a day before meals  levothyroxine 50 MICROGram(s) Oral daily  multivitamin 1 Tablet(s) Oral every 24 hours  NIFEdipine  milliGRAM(s) Oral every 24 hours  tamsulosin 0.4 milliGRAM(s) Oral at bedtime  vancomycin    Solution 125 milliGRAM(s) Oral every 6 hours    MEDICATIONS  (PRN):  dextrose Oral Gel 15 Gram(s) Oral once PRN Blood Glucose LESS THAN 70 milliGRAM(s)/deciliter      No Known Allergies      LABS                        7.1    12.98 )-----------( 244      ( 05 Apr 2024 05:30 )             23.1     04-06    137  |  98  |  54<H>  ----------------------------<  100<H>  3.7   |  26  |  4.96<H>    Ca    9.4      06 Apr 2024 05:30  Phos  3.7     04-06  Mg     2.0     04-06    TPro  6.5  /  Alb  3.1<L>  /  TBili  0.2  /  DBili  x   /  AST  35  /  ALT  51<H>  /  AlkPhos  70  04-05      Urinalysis Basic - ( 06 Apr 2024 05:30 )    Color: x / Appearance: x / SG: x / pH: x  Gluc: 100 mg/dL / Ketone: x  / Bili: x / Urobili: x   Blood: x / Protein: x / Nitrite: x   Leuk Esterase: x / RBC: x / WBC x   Sq Epi: x / Non Sq Epi: x / Bacteria: x    RADIOLOGY & ADDITIONAL TESTS: Reviewed

## 2024-04-06 NOTE — PROGRESS NOTE ADULT - PROBLEM SELECTOR PLAN 10
residual L sided weakness and R sided facial droop. Plavix 75 d/c    - continue home med ASA 81, Lipitor 80mg

## 2024-04-06 NOTE — PROGRESS NOTE ADULT - ASSESSMENT
57yo M T2DM, CVA x2 w/ residual L-sided weakness, early onset dementia, HTN, HLD, DVT, CKD V (Cr ~6, not on HD), BPH, PSH L toe amputation, R BKA, AVF creation (5/2023) BIBEMS from nursing home for anemia requiring transfusion, found to meet SIRS criteria s/p transfusion (FNHTR vs. sepsis) w/ elevated BUN/Cr, admitted for VS derangement. Initiated HD 4/5

## 2024-04-06 NOTE — PROGRESS NOTE ADULT - NS ATTEND BILL GEN_ALL_CORE
Admission date:  September 9, 2020     Discharge date:  September 10, 2020    Admission diagnosis:  End-stage osteoarthritis left knee    Discharge diagnosis:  Status post left total knee arthroplasty     Brief summary of hospitalization course:  The patient is a pleasant 65 year-old male who underwent a left total knee arthroplasty on September 9, 2020. Following the procedure the patient was stable to the recovery room and subsequently admitted to the general orthopedic floor. Vital signs were monitored daily and the patient remained stable and afebrile throughout the course of  hospitalization.  The patient's pain was well-controlled with tramadol and hydrocodone. On postoperative day #1 the patient began working with physical therapy for functional mobility skills and was able to participate in therapy on a daily basis, and demonstrated safe and independent mobility skills but the time of discharge.  Patient was given aspirin 81 mg twice daily for deep venous thrombosis prophylaxis and tolerated this well.  Ace wrap was removed and dressing was found to be clean and dry.  Overall the patient's hospitalization course was uneventful.    Discharge instructions:  Patient may be discharged to home today on postoperative day 1. .  Continue tramadol, Tylenol, hydrocodone and Lyrica as needed for pain control.  Please keep the current dressing in place and clean, dry, and intact until two-week postop visit.  Continue aspirin, 81 mg twice daily for 6 weeks for deep venous thrombosis prophylaxis.  Home exercise program is to continue for functional mobility skills, range of motion, and strengthening exercises. Please schedule a followup visit with the patient in my office 2 weeks from the time of discharge. If there are any questions or concerns regarding the patient's postoperative course please do not hesitate to contact my office.         
Attending to bill

## 2024-04-06 NOTE — PROGRESS NOTE ADULT - PROBLEM SELECTOR PLAN 1
POA, HR > 90, fever, w/ SBP drop > 40 2/2 sepsis  Infectious source likely aspiration PNA  CXR: Right basilar atelectasis/airspace opacity and developing right lower lobe pneumonia cannot be excluded. No ipsilateral pleural effusion.  UA turbid  spiked new fever 102.4 4/6. repeat blood cultures  -C/w CTX 2mg  - f/u BCx- NGTD POA, HR > 90, fever, w/ SBP drop > 40 2/2 sepsis  Infectious source likely aspiration PNA  CXR: Right basilar atelectasis/airspace opacity and developing right lower lobe pneumonia cannot be excluded. No ipsilateral pleural effusion.  UA turbid  spiked new fever 102.4 4/6. repeat blood cultures  -C/w CTX 2mg  - f/u BCx- NGTD  - f/u procal  - Repeat CXR  - f/u Bladder scan

## 2024-04-06 NOTE — PROGRESS NOTE ADULT - SUBJECTIVE AND OBJECTIVE BOX
Seen and evaluated at bedside, no overnight events reported by primary team. Due for HD#3 today    REVIEW OF SYSTEMS  --------------------------------------------------------------------------------  Gen: No fevers/chills, weakness  Skin: No rashes  Head/Eyes/Ears/Mouth: No headache; No hearing changes, mucous discharge, vision changes  Respiratory: No dyspnea, cough, wheezing  CV: No chest pain, palpitations  GI: No abdominal pain, diarrhea, constipation, nausea, vomiting, melena, hematochezia  : No increased frequency, dysuria, hematuria  MSK: No joint pain/swelling; no back pain; no edema  Neuro: No dizziness/lightheadedness, weakness, seizures, numbness  Heme: No easy bruising or bleeding    Standing Inpatient Medications  aspirin enteric coated 81 milliGRAM(s) Oral every 24 hours  atorvastatin 80 milliGRAM(s) Oral at bedtime  carvedilol 3.125 milliGRAM(s) Oral every 12 hours  cefTRIAXone   IVPB 2000 milliGRAM(s) IV Intermittent every 24 hours  chlorhexidine 2% Cloths 1 Application(s) Topical every 24 hours  dextrose 10% Bolus 125 milliLiter(s) IV Bolus once  dextrose 5%. 1000 milliLiter(s) IV Continuous <Continuous>  dextrose 5%. 1000 milliLiter(s) IV Continuous <Continuous>  dextrose 50% Injectable 25 Gram(s) IV Push once  dextrose 50% Injectable 12.5 Gram(s) IV Push once  finasteride 5 milliGRAM(s) Oral daily  glucagon  Injectable 1 milliGRAM(s) IntraMuscular once  heparin   Injectable 5000 Unit(s) SubCutaneous every 8 hours  insulin lispro (ADMELOG) corrective regimen sliding scale   SubCutaneous three times a day before meals  levothyroxine 50 MICROGram(s) Oral daily  multivitamin 1 Tablet(s) Oral every 24 hours  tamsulosin 0.4 milliGRAM(s) Oral at bedtime  vancomycin    Solution 125 milliGRAM(s) Oral every 6 hours    PRN Inpatient Medications  dextrose Oral Gel 15 Gram(s) Oral once PRN        VITALS/PHYSICAL EXAM  --------------------------------------------------------------------------------  T(C): 36.5 (04-06-24 @ 12:42), Max: 39.1 (04-06-24 @ 05:40)  HR: 97 (04-06-24 @ 12:42) (90 - 100)  BP: 112/60 (04-06-24 @ 12:42) (105/64 - 144/60)  RR: 15 (04-06-24 @ 12:42) (15 - 18)  SpO2: 85% (04-06-24 @ 12:42) (79% - 99%)  Wt(kg): --  Height (cm): 177.8 (04-05-24 @ 16:56)  Weight (kg): 61 (04-05-24 @ 16:56)  BMI (kg/m2): 19.3 (04-05-24 @ 16:56)  BSA (m2): 1.76 (04-05-24 @ 16:56)      04-05-24 @ 07:01  -  04-06-24 @ 07:00  --------------------------------------------------------  IN: 0 mL / OUT: 425 mL / NET: -425 mL    04-06-24 @ 07:01  -  04-06-24 @ 13:05  --------------------------------------------------------  IN: 0 mL / OUT: 0 mL / NET: 0 mL      Physical Exam:  	Gen: NAD, well-appearing  	HEENT: PERRL, supple neck, clear oropharynx  	Pulm: CTA B/L  	CV: RRR, S1S2; no rub  	Back: No spinal or CVA tenderness; no sacral edema  	Abd: +BS, soft, nontender/nondistended  	: No suprapubic tenderness  	UE: Warm, FROM, no clubbing, intact strength; no edema; no asterixis  	LE: Warm, FROM, no clubbing, intact strength; no edema  	Neuro: No focal deficits, intact gait  	Psych: Normal affect and mood  	Skin: Warm, without rashes  	    LABS/STUDIES  --------------------------------------------------------------------------------              7.0    12.94 >-----------<  224      [04-06-24 @ 10:59]              22.2     137  |  98  |  54  ----------------------------<  100      [04-06-24 @ 05:30]  3.7   |  26  |  4.96        Ca     9.4     [04-06-24 @ 05:30]      Mg     2.0     [04-06-24 @ 05:30]      Phos  3.7     [04-06-24 @ 05:30]    TPro  6.5  /  Alb  3.1  /  TBili  0.2  /  DBili  x   /  AST  35  /  ALT  51  /  AlkPhos  70  [04-05-24 @ 05:30]              [04-04-24 @ 16:26]    Creatinine Trend:  SCr 4.96 [04-06 @ 05:30]  SCr 5.98 [04-05 @ 05:30]  SCr 7.98 [04-04 @ 05:30]  SCr 7.80 [04-03 @ 13:45]    Urinalysis - [04-06-24 @ 05:30]      Color  / Appearance  / SG  / pH       Gluc 100 / Ketone   / Bili  / Urobili        Blood  / Protein  / Leuk Est  / Nitrite       RBC  / WBC  / Hyaline  / Gran  / Sq Epi  / Non Sq Epi  / Bacteria       Iron 21, TIBC 195, %sat 11      [04-04-24 @ 10:16]  Ferritin 597      [04-04-24 @ 10:16]  TSH 0.892      [04-04-24 @ 05:30]    HBsAb Nonreact      [04-04-24 @ 16:26]  HBsAg Nonreact      [04-04-24 @ 16:26]  HBcAb Nonreact      [04-04-24 @ 16:26]  HCV 0.04, Nonreact      [04-04-24 @ 16:26]

## 2024-04-06 NOTE — PROGRESS NOTE ADULT - PROBLEM SELECTOR PLAN 8
Known early-onset dementia. A&Ox2 at baseline. POA w/ AOx1 but appears back to baseline.   ?metabolic encephalopathy w/ hyperuremia.   - reassess after HD  - Patient AAOx3

## 2024-04-06 NOTE — PROGRESS NOTE ADULT - ATTENDING COMMENTS
Patient was seen and examined at bedside on 4/6/2024 at 5pm with RN present during HD. Patient reports he feels "okay". Denies CP, SOB. ROS is otherwise negative. Vitals, labwork and pertinent imaging reviewed. Physical exam - NAD, AAO x 3, PERRLA, EOMI, supple neck, chest - CTA b/l, CV - rrr, s1s2, no m/r/g, no edema, abd - soft, NTND, + BS,  - + FC, ext - wwp, R BKA, psych - calm affect, skin - no rash, no ulcers, pt appears cachectic and malnourished    Plan  -CXR with evolving PNA; c/w CTX  -Obtain sputum culture  -BCX - NGTD  -S/S consulted  -Nutrition consult  -UCX  -C - diff - +, started PO Vancomycin  -Given borderline low BP; stop Nifedipine   -Unclear if patient needs to be on DAPT, Neurology consult note from 12/2023 - c/w ASA and Statin  -Palliative care consult

## 2024-04-06 NOTE — PROGRESS NOTE ADULT - PROBLEM SELECTOR PLAN 6
- continue home med hydralazine 50 qd, nifedipine 120mg qd   - discontinued home Coreg 3.125 BID, hydralazine 50  qd      #Hypertensive Urgency - RESOLVED  s/p labetalol 20mg IVP x2 - continue home med hydralazine 50 qd, nifedipine 120mg qd   - continue home Coreg 3.125 BID  - hold hydralazine 50  qd and nifedipine 120mg qd      #Hypertensive Urgency - RESOLVED  s/p labetalol 20mg IVP x2

## 2024-04-06 NOTE — PROGRESS NOTE ADULT - PROBLEM SELECTOR PLAN 5
p/w Hgb 6.7 outpt. s/p 1u pRBC with good response. However repeat 7.1.   Baseline around 8 per HIE. Iron studies indicative of AoCD. hemolysis lab negative. Retic Index: hypopoliferative.  PE benign with no signs of overt/ apparent bleeding. CHELLY neg.   mostly likely 2/2 ESRD     - active T&S, transfuse hgb <7.

## 2024-04-07 LAB
ALBUMIN SERPL ELPH-MCNC: 3.1 G/DL — LOW (ref 3.3–5)
ALP SERPL-CCNC: 75 U/L — SIGNIFICANT CHANGE UP (ref 40–120)
ALT FLD-CCNC: 42 U/L — SIGNIFICANT CHANGE UP (ref 10–45)
ANION GAP SERPL CALC-SCNC: 12 MMOL/L — SIGNIFICANT CHANGE UP (ref 5–17)
AST SERPL-CCNC: 38 U/L — SIGNIFICANT CHANGE UP (ref 10–40)
BILIRUB SERPL-MCNC: 0.2 MG/DL — SIGNIFICANT CHANGE UP (ref 0.2–1.2)
BLD GP AB SCN SERPL QL: NEGATIVE — SIGNIFICANT CHANGE UP
BUN SERPL-MCNC: 32 MG/DL — HIGH (ref 7–23)
CALCIUM SERPL-MCNC: 9.3 MG/DL — SIGNIFICANT CHANGE UP (ref 8.4–10.5)
CHLORIDE SERPL-SCNC: 97 MMOL/L — SIGNIFICANT CHANGE UP (ref 96–108)
CO2 SERPL-SCNC: 28 MMOL/L — SIGNIFICANT CHANGE UP (ref 22–31)
CREAT SERPL-MCNC: 3.63 MG/DL — HIGH (ref 0.5–1.3)
CULTURE RESULTS: SIGNIFICANT CHANGE UP
EGFR: 19 ML/MIN/1.73M2 — LOW
GAMMA INTERFERON BACKGROUND BLD IA-ACNC: 0.03 IU/ML — SIGNIFICANT CHANGE UP
GLUCOSE BLDC GLUCOMTR-MCNC: 78 MG/DL — SIGNIFICANT CHANGE UP (ref 70–99)
GLUCOSE BLDC GLUCOMTR-MCNC: 83 MG/DL — SIGNIFICANT CHANGE UP (ref 70–99)
GLUCOSE BLDC GLUCOMTR-MCNC: 86 MG/DL — SIGNIFICANT CHANGE UP (ref 70–99)
GLUCOSE BLDC GLUCOMTR-MCNC: 98 MG/DL — SIGNIFICANT CHANGE UP (ref 70–99)
GLUCOSE SERPL-MCNC: 82 MG/DL — SIGNIFICANT CHANGE UP (ref 70–99)
HCT VFR BLD CALC: 26.5 % — LOW (ref 39–50)
HGB BLD-MCNC: 8.4 G/DL — LOW (ref 13–17)
M TB IFN-G BLD-IMP: ABNORMAL
M TB IFN-G CD4+ BCKGRND COR BLD-ACNC: 0 IU/ML — SIGNIFICANT CHANGE UP
M TB IFN-G CD4+CD8+ BCKGRND COR BLD-ACNC: 0 IU/ML — SIGNIFICANT CHANGE UP
MAGNESIUM SERPL-MCNC: 2 MG/DL — SIGNIFICANT CHANGE UP (ref 1.6–2.6)
MCHC RBC-ENTMCNC: 28.7 PG — SIGNIFICANT CHANGE UP (ref 27–34)
MCHC RBC-ENTMCNC: 31.7 GM/DL — LOW (ref 32–36)
MCV RBC AUTO: 90.4 FL — SIGNIFICANT CHANGE UP (ref 80–100)
NRBC # BLD: 0 /100 WBCS — SIGNIFICANT CHANGE UP (ref 0–0)
PHOSPHATE SERPL-MCNC: 3.7 MG/DL — SIGNIFICANT CHANGE UP (ref 2.5–4.5)
PLATELET # BLD AUTO: 218 K/UL — SIGNIFICANT CHANGE UP (ref 150–400)
POTASSIUM SERPL-MCNC: 3.7 MMOL/L — SIGNIFICANT CHANGE UP (ref 3.5–5.3)
POTASSIUM SERPL-SCNC: 3.7 MMOL/L — SIGNIFICANT CHANGE UP (ref 3.5–5.3)
PROT SERPL-MCNC: 6.8 G/DL — SIGNIFICANT CHANGE UP (ref 6–8.3)
QUANT TB PLUS MITOGEN MINUS NIL: 0.05 IU/ML — SIGNIFICANT CHANGE UP
RBC # BLD: 2.93 M/UL — LOW (ref 4.2–5.8)
RBC # FLD: 16 % — HIGH (ref 10.3–14.5)
RH IG SCN BLD-IMP: POSITIVE — SIGNIFICANT CHANGE UP
SODIUM SERPL-SCNC: 137 MMOL/L — SIGNIFICANT CHANGE UP (ref 135–145)
SPECIMEN SOURCE: SIGNIFICANT CHANGE UP
WBC # BLD: 8.81 K/UL — SIGNIFICANT CHANGE UP (ref 3.8–10.5)
WBC # FLD AUTO: 8.81 K/UL — SIGNIFICANT CHANGE UP (ref 3.8–10.5)

## 2024-04-07 PROCEDURE — 99232 SBSQ HOSP IP/OBS MODERATE 35: CPT | Mod: GC

## 2024-04-07 RX ADMIN — Medication 125 MILLIGRAM(S): at 19:46

## 2024-04-07 RX ADMIN — CARVEDILOL PHOSPHATE 3.12 MILLIGRAM(S): 80 CAPSULE, EXTENDED RELEASE ORAL at 16:53

## 2024-04-07 RX ADMIN — Medication 50 MICROGRAM(S): at 06:04

## 2024-04-07 RX ADMIN — Medication 125 MILLIGRAM(S): at 06:04

## 2024-04-07 RX ADMIN — CEFTRIAXONE 100 MILLIGRAM(S): 500 INJECTION, POWDER, FOR SOLUTION INTRAMUSCULAR; INTRAVENOUS at 22:19

## 2024-04-07 RX ADMIN — Medication 125 MILLIGRAM(S): at 00:16

## 2024-04-07 RX ADMIN — Medication 1 TABLET(S): at 06:04

## 2024-04-07 RX ADMIN — CARVEDILOL PHOSPHATE 3.12 MILLIGRAM(S): 80 CAPSULE, EXTENDED RELEASE ORAL at 06:04

## 2024-04-07 RX ADMIN — Medication 81 MILLIGRAM(S): at 10:39

## 2024-04-07 RX ADMIN — HEPARIN SODIUM 5000 UNIT(S): 5000 INJECTION INTRAVENOUS; SUBCUTANEOUS at 06:04

## 2024-04-07 RX ADMIN — TAMSULOSIN HYDROCHLORIDE 0.4 MILLIGRAM(S): 0.4 CAPSULE ORAL at 22:20

## 2024-04-07 RX ADMIN — Medication 125 MILLIGRAM(S): at 12:59

## 2024-04-07 RX ADMIN — HEPARIN SODIUM 5000 UNIT(S): 5000 INJECTION INTRAVENOUS; SUBCUTANEOUS at 16:54

## 2024-04-07 RX ADMIN — ATORVASTATIN CALCIUM 80 MILLIGRAM(S): 80 TABLET, FILM COATED ORAL at 22:19

## 2024-04-07 RX ADMIN — HEPARIN SODIUM 5000 UNIT(S): 5000 INJECTION INTRAVENOUS; SUBCUTANEOUS at 22:20

## 2024-04-07 RX ADMIN — FINASTERIDE 5 MILLIGRAM(S): 5 TABLET, FILM COATED ORAL at 12:59

## 2024-04-07 RX ADMIN — CHLORHEXIDINE GLUCONATE 1 APPLICATION(S): 213 SOLUTION TOPICAL at 10:39

## 2024-04-07 NOTE — PROGRESS NOTE ADULT - SUBJECTIVE AND OBJECTIVE BOX
OVERNIGHT EVENTS: Straight cath overnight - retaining ~ 400 mL    Subjective:  Alert and awake. States he is feeling okay. No acute complaints. Patient denies fever, chills, dizziness, weakness, HA, CP, SOB, N/V/D/C. Per nursing patient had no BM overnight. ROS is otherwise negative.       VITALS  Vital Signs Last 24 Hrs  T(C): 37.5 (07 Apr 2024 06:00), Max: 37.9 (06 Apr 2024 13:00)  T(F): 99.5 (07 Apr 2024 06:00), Max: 100.2 (06 Apr 2024 13:00)  HR: 84 (07 Apr 2024 06:00) (84 - 97)  BP: 138/80 (07 Apr 2024 06:00) (93/50 - 138/80)  BP(mean): --  RR: 18 (07 Apr 2024 06:00) (15 - 18)  SpO2: 95% (07 Apr 2024 06:00) (85% - 96%)    Parameters below as of 07 Apr 2024 06:00  Patient On (Oxygen Delivery Method): nasal cannula  O2 Flow (L/min): 2          CAPILLARY BLOOD GLUCOSE  POCT Blood Glucose.: 107 mg/dL (06 Apr 2024 08:34)  POCT Blood Glucose.: 117 mg/dL (05 Apr 2024 21:55)  POCT Blood Glucose.: 186 mg/dL (05 Apr 2024 18:05)  POCT Blood Glucose.: 126 mg/dL (05 Apr 2024 14:18)  POCT Blood Glucose.: 121 mg/dL (05 Apr 2024 09:06)      PHYSICAL EXAM  General: NAD. cachetic  HEENT: NC/AT; PERRL; Neck Supple; MMM  Respiratory: CTA B/L; no wheezes/rales/rhonchi, normal WOB  Cardiovascular: Regular rhythm/ rate; no m/r/g  Gastrointestinal: Soft; NTND; + bowel sounds, negative CHELLY  : no suprapubic tenderness, + condom cath  Vascular: extremities WWP, LUE AVF w/ palpable thrill and no signs of erythema, drainage.   MSK: R BKA. LUE and LLE appears contracted   Neurological: A&Ox3, further assessment limited due to lack of participation  Psych: calm affect      MEDICATIONS  (STANDING):  aspirin enteric coated 81 milliGRAM(s) Oral every 24 hours  atorvastatin 80 milliGRAM(s) Oral at bedtime  carvedilol 3.125 milliGRAM(s) Oral every 12 hours  cefTRIAXone   IVPB 2000 milliGRAM(s) IV Intermittent every 24 hours  chlorhexidine 2% Cloths 1 Application(s) Topical every 24 hours  dextrose 10% Bolus 125 milliLiter(s) IV Bolus once  dextrose 5%. 1000 milliLiter(s) (100 mL/Hr) IV Continuous <Continuous>  dextrose 5%. 1000 milliLiter(s) (50 mL/Hr) IV Continuous <Continuous>  dextrose 50% Injectable 12.5 Gram(s) IV Push once  dextrose 50% Injectable 25 Gram(s) IV Push once  finasteride 5 milliGRAM(s) Oral daily  glucagon  Injectable 1 milliGRAM(s) IntraMuscular once  heparin   Injectable 5000 Unit(s) SubCutaneous every 8 hours  insulin lispro (ADMELOG) corrective regimen sliding scale   SubCutaneous three times a day before meals  levothyroxine 50 MICROGram(s) Oral daily  multivitamin 1 Tablet(s) Oral every 24 hours  tamsulosin 0.4 milliGRAM(s) Oral at bedtime  vancomycin    Solution 125 milliGRAM(s) Oral every 6 hours    MEDICATIONS  (PRN):  dextrose Oral Gel 15 Gram(s) Oral once PRN Blood Glucose LESS THAN 70 milliGRAM(s)/deciliter          No Known Allergies                            8.4    8.81  )-----------( 218      ( 07 Apr 2024 05:30 )             26.5   04-07    137  |  97  |  32<H>  ----------------------------<  82  3.7   |  28  |  3.63<H>    Ca    9.3      07 Apr 2024 05:30  Phos  3.7     04-07  Mg     2.0     04-07    TPro  6.8  /  Alb  3.1<L>  /  TBili  0.2  /  DBili  x   /  AST  38  /  ALT  42  /  AlkPhos  75  04-07

## 2024-04-07 NOTE — PROGRESS NOTE ADULT - NSPROGADDITIONALINFOA_GEN_ALL_CORE
S/S, if fails - will need NGT  -May need to have FC placed if continues to retain  -Restart Nifedipine if BP rises

## 2024-04-07 NOTE — CHART NOTE - NSCHARTNOTEFT_GEN_A_CORE
RDN Brief Note    RDN contacted by medicine team for enteral nutrition support recommendations. Rx and labs reviewed. Pt fully assessed by RDN on 4/5; see note for full details. Pt reported unsafe for PO diet at this time and plan to establish nasogastric tube and initiate enteral nutrition support. Labs today significant for: BUN 32, Cr 3.63 -recommend standard polymeric formula at this time. Rx significant for ABX, synthroid, MVI -recommend run feeds over 18hrs to allow hold for rx such as synthroid. Noted diarrhea; on C.Diff precautions and on ABX therapy -continue to monitor. No other reports GI distress or further nutritional concerns at this time. RDN will continue to reassess, intervene, and monitor as appropriate.     Recommendations:   -Initiate enteral nutrition support via nasogastric tube    *Recommend Jevity 1.2 @20 ml/hr advancing by 10 ml q6hr until goal rate of 80 ml/hr with LPS x1/day from 1541-2402 to provide 1440 ml tube feed, 1828 calories, 95 gProt., and 1162 ml free water.   *This is 30 calories (23.7 nonprotein calories) and 1.56 gProt. per kg current body weight 61 kg.   -Maintain aspiration precautions at all times  -Weekly wts  -Monitor chemistry, GI function, and skin integrity     Lawson Hogue, MS, RDN, CDN, CNSC (ex. [id number])

## 2024-04-07 NOTE — PROGRESS NOTE ADULT - PROBLEM SELECTOR PLAN 3
hx of C. diff 12/2023 during last admission. Test positive for C diff. 4/5 5 episodes of watery diarrhea. 4/6 2 episodes overnight  GI PCR negative  - c/w Vancomycin 125mg PO q6 for 10 days  - stool count

## 2024-04-07 NOTE — PROGRESS NOTE ADULT - PROBLEM SELECTOR PLAN 7
per HIE, prior Hx of HD. However no active HD according to NH staff.   Bun >100s, Cr 5.8 12/2023 -> 7.8 on admission.     - renal consulted for HD, consented by HCP Leo Cisneros [phone]  - discontinued home NaHCO3 650mg TID  - Hgb 7.1 4/6. Transfuse 1 unit pRBC with dialysis

## 2024-04-07 NOTE — PROGRESS NOTE ADULT - PROBLEM SELECTOR PLAN 1
POA, HR > 90, fever, w/ SBP drop > 40 2/2 sepsis  Infectious source likely aspiration PNA  CXR: Right basilar atelectasis/airspace opacity and developing right lower lobe pneumonia cannot be excluded. No ipsilateral pleural effusion.  UA turbid  spiked new fever 102.4 4/6. repeat blood cultures  -C/w CTX 2mg  - f/u BCx- NGTD  - f/u procal  - Repeat CXR w/ infiltrate  - Bladder scan continues to retain

## 2024-04-07 NOTE — PROGRESS NOTE ADULT - PROBLEM SELECTOR PLAN 6
- Currently holding home med hydralazine 50 qd, nifedipine 120mg qd   - continue home Coreg 3.125 BID  - hold hydralazine 50  qd and nifedipine 120mg qd      #Hypertensive Urgency - RESOLVED  s/p labetalol 20mg IVP x2

## 2024-04-08 DIAGNOSIS — Z91.89 OTHER SPECIFIED PERSONAL RISK FACTORS, NOT ELSEWHERE CLASSIFIED: ICD-10-CM

## 2024-04-08 DIAGNOSIS — G93.41 METABOLIC ENCEPHALOPATHY: ICD-10-CM

## 2024-04-08 DIAGNOSIS — J69.0 PNEUMONITIS DUE TO INHALATION OF FOOD AND VOMIT: ICD-10-CM

## 2024-04-08 LAB
ALBUMIN SERPL ELPH-MCNC: 3.3 G/DL — SIGNIFICANT CHANGE UP (ref 3.3–5)
ALP SERPL-CCNC: 73 U/L — SIGNIFICANT CHANGE UP (ref 40–120)
ALT FLD-CCNC: 42 U/L — SIGNIFICANT CHANGE UP (ref 10–45)
ANION GAP SERPL CALC-SCNC: 15 MMOL/L — SIGNIFICANT CHANGE UP (ref 5–17)
AST SERPL-CCNC: 41 U/L — HIGH (ref 10–40)
BASOPHILS # BLD AUTO: 0.01 K/UL — SIGNIFICANT CHANGE UP (ref 0–0.2)
BASOPHILS NFR BLD AUTO: 0.1 % — SIGNIFICANT CHANGE UP (ref 0–2)
BILIRUB SERPL-MCNC: 0.2 MG/DL — SIGNIFICANT CHANGE UP (ref 0.2–1.2)
BUN SERPL-MCNC: 47 MG/DL — HIGH (ref 7–23)
CALCIUM SERPL-MCNC: 9.8 MG/DL — SIGNIFICANT CHANGE UP (ref 8.4–10.5)
CHLORIDE SERPL-SCNC: 100 MMOL/L — SIGNIFICANT CHANGE UP (ref 96–108)
CO2 SERPL-SCNC: 27 MMOL/L — SIGNIFICANT CHANGE UP (ref 22–31)
CREAT SERPL-MCNC: 4.86 MG/DL — HIGH (ref 0.5–1.3)
EGFR: 13 ML/MIN/1.73M2 — LOW
EOSINOPHIL # BLD AUTO: 0.08 K/UL — SIGNIFICANT CHANGE UP (ref 0–0.5)
EOSINOPHIL NFR BLD AUTO: 1.2 % — SIGNIFICANT CHANGE UP (ref 0–6)
GLUCOSE BLDC GLUCOMTR-MCNC: 103 MG/DL — HIGH (ref 70–99)
GLUCOSE BLDC GLUCOMTR-MCNC: 184 MG/DL — HIGH (ref 70–99)
GLUCOSE BLDC GLUCOMTR-MCNC: 66 MG/DL — LOW (ref 70–99)
GLUCOSE BLDC GLUCOMTR-MCNC: 96 MG/DL — SIGNIFICANT CHANGE UP (ref 70–99)
GLUCOSE SERPL-MCNC: 65 MG/DL — LOW (ref 70–99)
HCT VFR BLD CALC: 25.3 % — LOW (ref 39–50)
HGB BLD-MCNC: 7.9 G/DL — LOW (ref 13–17)
IMM GRANULOCYTES NFR BLD AUTO: 0.6 % — SIGNIFICANT CHANGE UP (ref 0–0.9)
LYMPHOCYTES # BLD AUTO: 1.35 K/UL — SIGNIFICANT CHANGE UP (ref 1–3.3)
LYMPHOCYTES # BLD AUTO: 19.6 % — SIGNIFICANT CHANGE UP (ref 13–44)
MAGNESIUM SERPL-MCNC: 2.2 MG/DL — SIGNIFICANT CHANGE UP (ref 1.6–2.6)
MCHC RBC-ENTMCNC: 28.4 PG — SIGNIFICANT CHANGE UP (ref 27–34)
MCHC RBC-ENTMCNC: 31.2 GM/DL — LOW (ref 32–36)
MCV RBC AUTO: 91 FL — SIGNIFICANT CHANGE UP (ref 80–100)
MONOCYTES # BLD AUTO: 0.66 K/UL — SIGNIFICANT CHANGE UP (ref 0–0.9)
MONOCYTES NFR BLD AUTO: 9.6 % — SIGNIFICANT CHANGE UP (ref 2–14)
NEUTROPHILS # BLD AUTO: 4.75 K/UL — SIGNIFICANT CHANGE UP (ref 1.8–7.4)
NEUTROPHILS NFR BLD AUTO: 68.9 % — SIGNIFICANT CHANGE UP (ref 43–77)
NRBC # BLD: 0 /100 WBCS — SIGNIFICANT CHANGE UP (ref 0–0)
PHOSPHATE SERPL-MCNC: 5.1 MG/DL — HIGH (ref 2.5–4.5)
PLATELET # BLD AUTO: 250 K/UL — SIGNIFICANT CHANGE UP (ref 150–400)
POTASSIUM SERPL-MCNC: 4.1 MMOL/L — SIGNIFICANT CHANGE UP (ref 3.5–5.3)
POTASSIUM SERPL-SCNC: 4.1 MMOL/L — SIGNIFICANT CHANGE UP (ref 3.5–5.3)
PROT SERPL-MCNC: 7.2 G/DL — SIGNIFICANT CHANGE UP (ref 6–8.3)
RBC # BLD: 2.78 M/UL — LOW (ref 4.2–5.8)
RBC # FLD: 16 % — HIGH (ref 10.3–14.5)
SODIUM SERPL-SCNC: 142 MMOL/L — SIGNIFICANT CHANGE UP (ref 135–145)
WBC # BLD: 6.89 K/UL — SIGNIFICANT CHANGE UP (ref 3.8–10.5)
WBC # FLD AUTO: 6.89 K/UL — SIGNIFICANT CHANGE UP (ref 3.8–10.5)

## 2024-04-08 PROCEDURE — 99498 ADVNCD CARE PLAN ADDL 30 MIN: CPT | Mod: 25

## 2024-04-08 PROCEDURE — 99497 ADVNCD CARE PLAN 30 MIN: CPT | Mod: 25

## 2024-04-08 PROCEDURE — 71045 X-RAY EXAM CHEST 1 VIEW: CPT | Mod: 26

## 2024-04-08 PROCEDURE — 99232 SBSQ HOSP IP/OBS MODERATE 35: CPT | Mod: GC

## 2024-04-08 PROCEDURE — 99233 SBSQ HOSP IP/OBS HIGH 50: CPT

## 2024-04-08 PROCEDURE — 71045 X-RAY EXAM CHEST 1 VIEW: CPT | Mod: 26,77

## 2024-04-08 RX ORDER — CLOPIDOGREL BISULFATE 75 MG/1
1 TABLET, FILM COATED ORAL
Refills: 0 | DISCHARGE

## 2024-04-08 RX ORDER — INSULIN LISPRO 100/ML
VIAL (ML) SUBCUTANEOUS AT BEDTIME
Refills: 0 | Status: DISCONTINUED | OUTPATIENT
Start: 2024-04-08 | End: 2024-04-09

## 2024-04-08 RX ORDER — SODIUM BICARBONATE 1 MEQ/ML
2 SYRINGE (ML) INTRAVENOUS
Qty: 0 | Refills: 0 | DISCHARGE

## 2024-04-08 RX ORDER — VANCOMYCIN HCL 1 G
1 VIAL (EA) INTRAVENOUS
Qty: 28 | Refills: 0
Start: 2024-04-08 | End: 2024-04-14

## 2024-04-08 RX ORDER — DEXTROSE 50 % IN WATER 50 %
50 SYRINGE (ML) INTRAVENOUS ONCE
Refills: 0 | Status: COMPLETED | OUTPATIENT
Start: 2024-04-08 | End: 2024-04-08

## 2024-04-08 RX ORDER — INSULIN LISPRO 100/ML
VIAL (ML) SUBCUTANEOUS
Refills: 0 | Status: DISCONTINUED | OUTPATIENT
Start: 2024-04-08 | End: 2024-04-09

## 2024-04-08 RX ORDER — LINAGLIPTIN 5 MG/1
5 TABLET, FILM COATED ORAL DAILY
Refills: 0 | Status: DISCONTINUED | OUTPATIENT
Start: 2024-04-09 | End: 2024-04-11

## 2024-04-08 RX ADMIN — Medication 1 TABLET(S): at 06:38

## 2024-04-08 RX ADMIN — HEPARIN SODIUM 5000 UNIT(S): 5000 INJECTION INTRAVENOUS; SUBCUTANEOUS at 06:39

## 2024-04-08 RX ADMIN — Medication 125 MILLIGRAM(S): at 23:51

## 2024-04-08 RX ADMIN — Medication 125 MILLIGRAM(S): at 00:26

## 2024-04-08 RX ADMIN — ATORVASTATIN CALCIUM 80 MILLIGRAM(S): 80 TABLET, FILM COATED ORAL at 22:57

## 2024-04-08 RX ADMIN — Medication 125 MILLIGRAM(S): at 14:47

## 2024-04-08 RX ADMIN — CARVEDILOL PHOSPHATE 3.12 MILLIGRAM(S): 80 CAPSULE, EXTENDED RELEASE ORAL at 06:37

## 2024-04-08 RX ADMIN — CHLORHEXIDINE GLUCONATE 1 APPLICATION(S): 213 SOLUTION TOPICAL at 14:47

## 2024-04-08 RX ADMIN — Medication 125 MILLIGRAM(S): at 06:38

## 2024-04-08 RX ADMIN — Medication 50 MICROGRAM(S): at 06:38

## 2024-04-08 RX ADMIN — Medication 125 MILLIGRAM(S): at 19:38

## 2024-04-08 RX ADMIN — Medication 81 MILLIGRAM(S): at 14:47

## 2024-04-08 RX ADMIN — HEPARIN SODIUM 5000 UNIT(S): 5000 INJECTION INTRAVENOUS; SUBCUTANEOUS at 14:48

## 2024-04-08 RX ADMIN — HEPARIN SODIUM 5000 UNIT(S): 5000 INJECTION INTRAVENOUS; SUBCUTANEOUS at 22:57

## 2024-04-08 RX ADMIN — CEFTRIAXONE 100 MILLIGRAM(S): 500 INJECTION, POWDER, FOR SOLUTION INTRAMUSCULAR; INTRAVENOUS at 22:57

## 2024-04-08 RX ADMIN — TAMSULOSIN HYDROCHLORIDE 0.4 MILLIGRAM(S): 0.4 CAPSULE ORAL at 22:57

## 2024-04-08 RX ADMIN — Medication 50 MILLILITER(S): at 11:51

## 2024-04-08 RX ADMIN — FINASTERIDE 5 MILLIGRAM(S): 5 TABLET, FILM COATED ORAL at 14:47

## 2024-04-08 NOTE — PROGRESS NOTE ADULT - PROBLEM SELECTOR PLAN 6
- Currently holding home med hydralazine 50 qd, nifedipine 120mg qd   - continue home Coreg 3.125 BID  - hold hydralazine 50  qd and nifedipine 120mg qd      #Hypertensive Urgency - RESOLVED  s/p labetalol 20mg IVP x2 p/w Hgb 6.7 outpt. s/p 1u pRBC with good response.   Baseline around 8 per HIE. Iron studies indicative of AoCD. hemolysis lab negative. Retic Index: hypopoliferative.  PE benign with no signs of overt/ apparent bleeding. CHELLY neg. Hgb stable.   mostly likely 2/2 ESRD     - active T&S, transfuse hgb <7.

## 2024-04-08 NOTE — PROGRESS NOTE ADULT - ASSESSMENT
·	s/p family meeting with patient's HCP (cousin Leo Cisneros)  ·	plan to continue HD as per patient's previously stated wishes as long as he can tolerate  ·	allow pleasure feeds; patient is at risk of pulling out PEG and family agrees he would not want to be kept NPO  ·	MOLST completed with DNR/DNI -> if patient is not tolerating HD or is decompensating, then would accept transition to hospice  ·	family would be open to changing patient's SNF if possible    In addition to the E/M visit, an advance care planning meeting was performed. Start time: 1:15PM; End time: 2:05PM; Total time: 50min. A face to face meeting to discuss advance care planning was held today regarding: EDEN DACOSTA. Primary decision maker: Patient is unable to participate in decision making; Alternate/surrogate: Cousin. Discussed advance directives including, but not limited to, healthcare proxy and code status. Decision regarding code status: DNR/DNI; Documentation completed today: MOLST [facility] note 59yo M with PMH of Dementia, CKD5, T2DM, and CVA p/w anemia and found to have worsening renal function. Palliative consulted for complex medical decision making in the setting of advanced illness.    ·	s/p family meeting with patient's HCP (cousin Leo Cisneros)  ·	plan to continue HD as per patient's previously stated wishes as long as he can tolerate  ·	allow pleasure feeds; patient is at risk of pulling out PEG and family agrees he would not want to be kept NPO  ·	MOLST completed with DNR/DNI -> if patient is not tolerating HD or is decompensating, then would accept transition to hospice  ·	family would be open to changing patient's SNF if possible    In addition to the E/M visit, an advance care planning meeting was performed. Start time: 1:15PM; End time: 2:05PM; Total time: 50min. A face to face meeting to discuss advance care planning was held today regarding: EDEN DACOSTA. Primary decision maker: Patient is unable to participate in decision making; Alternate/surrogate: Cousin. Discussed advance directives including, but not limited to, healthcare proxy and code status. Decision regarding code status: DNR/DNI; Documentation completed today: MOL[facility] note

## 2024-04-08 NOTE — PROGRESS NOTE ADULT - PROBLEM SELECTOR PLAN 1
POA, HR > 90, fever, w/ SBP drop > 40 2/2 sepsis  Infectious source likely aspiration PNA  CXR: Right basilar atelectasis/airspace opacity and developing right lower lobe pneumonia cannot be excluded. No ipsilateral pleural effusion.  UA turbid  spiked new fever 102.4 4/6. repeat blood cultures  -C/w CTX 2mg  - f/u BCx- NGTD  - f/u procal  - Repeat CXR w/ infiltrate  - Bladder scan continues to retain POA w/ 3/4 SIRS  (WBC, fever and tachycardia) w/ elevated Cr. During admission, C diff positive. UA   Febrile 102.4 4/6 w/ new CXR finding on RLL and elevated procal. VSS on Abx    CXR 4/6: Right basilar opacity      Etiology likely multifactorial: Aspiration PNA vs pneumonitis and/ or C. diff    - CTX 2mg qd, vanc 125mg PO q6h (4/5-4/15)  - BCx 4/5, 4/6 - NGTD

## 2024-04-08 NOTE — PROGRESS NOTE ADULT - PROBLEM SELECTOR PLAN 5
p/w Hgb 6.7 outpt. s/p 1u pRBC with good response. However repeat 7.1.   Baseline around 8 per HIE. Iron studies indicative of AoCD. hemolysis lab negative. Retic Index: hypopoliferative.  PE benign with no signs of overt/ apparent bleeding. CHELLY neg.   mostly likely 2/2 ESRD     - active T&S, transfuse hgb <7. Nutrition consulted

## 2024-04-08 NOTE — PROGRESS NOTE ADULT - PROBLEM SELECTOR PLAN 10
residual L sided weakness and R sided facial droop. Plavix 75 d/c    - continue home med ASA 81, Lipitor 80mg residual L sided weakness and R sided facial droop. Discontinued Plavix.     - continue home med ASA 81, Lipitor 80mg

## 2024-04-08 NOTE — PROGRESS NOTE ADULT - SUBJECTIVE AND OBJECTIVE BOX
--------INCOMPLETE NOTE--------  OVERNIGHT EVENTS: NG placed and confirmed by CXR. However pt self-removed NG tube at 7am. No diarrhea overnight.     SUBJECTIVE  Pt was seen and examined at bedside. Appears awake and alert. Conversational    Patient denies any fevers/ chills, n/v, headache, acute SOB, chest pain, abdominal pain, genitourinary sx    12-point review of systems otherwise negative      Vital Signs Last 24 Hrs  T(C): 36.9 (08 Apr 2024 05:59), Max: 37.6 (07 Apr 2024 16:53)  T(F): 98.4 (08 Apr 2024 05:59), Max: 99.7 (07 Apr 2024 16:53)  HR: 78 (08 Apr 2024 05:59) (78 - 87)  BP: 164/84 (08 Apr 2024 05:59) (159/67 - 164/84)  BP(mean): --  RR: 18 (08 Apr 2024 05:59) (18 - 18)  SpO2: 97% (08 Apr 2024 05:59) (95% - 97%)    Parameters below as of 07 Apr 2024 21:34  Patient On (Oxygen Delivery Method): nasal cannula  O2 Flow (L/min): 2        PHYSICAL EXAM     General: NAD, cachetic  HEENT: Neck Supple; MMM  Respiratory: CTA B/L; no wheezes/rales/rhonchi, normal WOB  Cardiovascular: RRR; no m/r/g  Gastrointestinal: Soft; NTND; + bowel sounds, negative CHELLY  : no suprapubic tenderness  Vascular: extremities WWP, LUE AVF w/ palpable thrill and no signs of erythema, drainage.   MSK: R BKA. LUE and LLE appears contracted   Neurological: A&Ox2-3. However medical decision making capacity limited due to inability to teach back risk and benefits of medical treatment.       LABS:                        7.9    6.89  )-----------( 250      ( 08 Apr 2024 05:30 )             25.3     04-08    142  |  100  |  47<H>  ----------------------------<  65<L>  4.1   |  27  |  4.86<H>    Ca    9.8      08 Apr 2024 05:30  Phos  5.1     04-08  Mg     2.2     04-08    TPro  7.2  /  Alb  3.3  /  TBili  0.2  /  DBili  x   /  AST  41<H>  /  ALT  42  /  AlkPhos  73  04-08      Urinalysis Basic - ( 08 Apr 2024 05:30 )    Color: x / Appearance: x / SG: x / pH: x  Gluc: 65 mg/dL / Ketone: x  / Bili: x / Urobili: x   Blood: x / Protein: x / Nitrite: x   Leuk Esterase: x / RBC: x / WBC x   Sq Epi: x / Non Sq Epi: x / Bacteria: x      CAPILLARY BLOOD GLUCOSE      POCT Blood Glucose.: 66 mg/dL (08 Apr 2024 06:31)

## 2024-04-08 NOTE — PROGRESS NOTE ADULT - PROBLEM SELECTOR PLAN 4
.  Acute on chronic kidney disease  -Nephrology following, planning to initiate HD  -prior conversations showing that patient was accepting of HD; after further discussion with HCP, will continue for now since patient is tolerating  -would need to forego further HD to pursue hospice

## 2024-04-08 NOTE — PROGRESS NOTE ADULT - ASSESSMENT
58-year-old male with HTN, DM2, dementia, CKD 5 sent in by nursing home for Hb 6.7 s/p 1 PRBC in ER, then with fever and tachycardia, admitted for further evaluation.  Nephrology consulted for possible HD initiation. HD#1 4/4.    # New start ESRD  Tolerated third HD treatment 4/6. Next HD planned for tomorrow 4/9.  Renal diet  Fluid restriction <1.2L/day  Strict I&O, daily weights  SW assistance for outpatient HD center placement.  Hepatitis panel and TB testing (PPD or quant TB) for HD placement    # Access  LUE AVF with thrill and bruit    # Anemia  Hb not at goal 7.9  Iron sat 11%  Defer iron in setting of active infection  epo w/ HD    # CKD-BMD  Calcium 9.8  Phos 5.1. At goal 3-5.5.

## 2024-04-08 NOTE — PROGRESS NOTE ADULT - PROBLEM SELECTOR PLAN 6
.  Complex medical decision making / symptom management in the setting of advanced illness.    Will continue to follow for ongoing GOC discussion / titration of palliative regimen. Emotional support provided, questions answered.  Active Psychosocial Referrals:  [x]Social Work/Case management [x]PT/OT []Chaplaincy []Hospice  []Patient/Family Support []Holistic RN []Massage Therapy []Music Therapy []Ethics  Coping: [] well [x] with difficulty [] poor coping [] unable to assess  Support system: [] strong [x] adequate [] inadequate    For new or uncontrolled symptoms, please call Palliative Care at [phone]). The service is available 24/7 (including nights & weekends) to provide symptom management recommendations over the phone as appropriate

## 2024-04-08 NOTE — PROGRESS NOTE ADULT - SUBJECTIVE AND OBJECTIVE BOX
Nephrology progress note    Seen at bedside. Resting comfortably, no distress. Tolerated last HD. No complaints today.    Allergies:  No Known Allergies    Hospital Medications:   MEDICATIONS  (STANDING):  aspirin enteric coated 81 milliGRAM(s) Oral every 24 hours  atorvastatin 80 milliGRAM(s) Oral at bedtime  carvedilol 3.125 milliGRAM(s) Oral every 12 hours  cefTRIAXone   IVPB 2000 milliGRAM(s) IV Intermittent every 24 hours  chlorhexidine 2% Cloths 1 Application(s) Topical every 24 hours  dextrose 10% Bolus 125 milliLiter(s) IV Bolus once  dextrose 5%. 1000 milliLiter(s) (100 mL/Hr) IV Continuous <Continuous>  dextrose 5%. 1000 milliLiter(s) (50 mL/Hr) IV Continuous <Continuous>  dextrose 50% Injectable 12.5 Gram(s) IV Push once  dextrose 50% Injectable 50 milliLiter(s) IV Push once  dextrose 50% Injectable 25 Gram(s) IV Push once  finasteride 5 milliGRAM(s) Oral daily  glucagon  Injectable 1 milliGRAM(s) IntraMuscular once  heparin   Injectable 5000 Unit(s) SubCutaneous every 8 hours  insulin lispro (ADMELOG) corrective regimen sliding scale   SubCutaneous three times a day before meals  levothyroxine 50 MICROGram(s) Oral daily  multivitamin 1 Tablet(s) Oral every 24 hours  tamsulosin 0.4 milliGRAM(s) Oral at bedtime  vancomycin    Solution 125 milliGRAM(s) Oral every 6 hours    REVIEW OF SYSTEMS:  All other review of systems is negative unless indicated above.    VITALS:  T(F): 98.4 (04-08-24 @ 05:59), Max: 99.7 (04-07-24 @ 16:53)  HR: 78 (04-08-24 @ 05:59)  BP: 164/84 (04-08-24 @ 05:59)  RR: 18 (04-08-24 @ 05:59)  SpO2: 97% (04-08-24 @ 05:59)  Wt(kg): --    04-06 @ 07:01  -  04-07 @ 07:00  --------------------------------------------------------  IN: 0 mL / OUT: 400 mL / NET: -400 mL        PHYSICAL EXAM:  Gen: NAD, lying in bed  HEENT: NCAT, EOMI  Pulm: CTAB  CV: RRR, no murmur  Abd: soft, non-tender, non-distended  Ext: WWP, no edema  Neuro: Awake, alert, interactive  Skin: No rashes visible  Access: LUE AVF w/ bruit and thrill    LABS:  04-08    142  |  100  |  47<H>  ----------------------------<  65<L>  4.1   |  27  |  4.86<H>    Ca    9.8      08 Apr 2024 05:30  Phos  5.1     04-08  Mg     2.2     04-08    TPro  7.2  /  Alb  3.3  /  TBili  0.2  /  DBili      /  AST  41<H>  /  ALT  42  /  AlkPhos  73  04-08                          7.9    6.89  )-----------( 250      ( 08 Apr 2024 05:30 )             25.3       Urine Studies:  Creatinine Trend: 4.86<--, 3.63<--, 4.96<--, 5.98<--, 7.98<--, 7.80<--  Urinalysis Basic - ( 08 Apr 2024 05:30 )    Color:  / Appearance:  / SG:  / pH:   Gluc: 65 mg/dL / Ketone:   / Bili:  / Urobili:    Blood:  / Protein:  / Nitrite:    Leuk Esterase:  / RBC:  / WBC    Sq Epi:  / Non Sq Epi:  / Bacteria:         RADIOLOGY & ADDITIONAL STUDIES:  reviewed

## 2024-04-08 NOTE — PROGRESS NOTE ADULT - PROBLEM SELECTOR PLAN 12
Fluid: NPO  Electrolytes: ESRD / replete PRN  Nutrition: pending SLP  DVT ppx: heparin SQ  GI ppx: none for now   Code: FULL  Dispo: [facility] pending GOC Fluid: NPO  Electrolytes: ESRD / replete PRN  Nutrition: pending SLP  DVT ppx: heparin SQ  GI ppx: none for now   Code: DNR/DNI, MEWS EXEMPT  Dispo: [facility] pending GO

## 2024-04-08 NOTE — PROGRESS NOTE ADULT - ASSESSMENT
59yo M T2DM, CVA x2 w/ residual L-sided weakness, early onset dementia, HTN, HLD, DVT, CKD V (Cr ~6, not on HD), BPH, PSH L toe amputation, R BKA, AVF creation (5/2023) BIBEMS from nursing home for anemia requiring transfusion, found to meet SIRS criteria s/p transfusion (FNHTR vs. sepsis) w/ elevated BUN/Cr, admitted for VS derangement. Initiated HD 4/5  57yo M T2DM, CVA x2 w/ residual L-sided weakness, early onset dementia, HTN, HLD, DVT, CKD V (Cr ~6, not on HD), BPH, PSH L toe amputation, R BKA, AVF creation (5/2023) BIBEMS from nursing home for anemia requiring transfusion, found to meet SIRS criteria s/p transfusion (FNHTR vs. sepsis) w/ elevated BUN/Cr, admitted for VS derangement. Initiated HD 4/5. Also found to have C. diff. c/c/b aspiration PNA vs pneumonitis. Clinically improving on Abx.  58M T2DM, CVA x2 w/ residual L-sided weakness, early onset dementia, HTN, HLD, DVT, CKD V (Cr ~6, not on HD), BPH, PSH L toe amputation, R BKA, AVF creation (5/2023) BIBEMS from nursing home for anemia requiring transfusion, found to meet SIRS criteria s/p transfusion (FNHTR vs. sepsis) w/ elevated BUN/Cr, admitted for VS derangement. Initiated HD 4/5. Also found to have C. diff. c/c/b aspiration PNA vs pneumonitis. Clinically improving on Abx. Pending GOC 4/8 58M T2DM, CVA x2 w/ residual L-sided weakness, early onset dementia, HTN, HLD, DVT, CKD V (Cr ~6, not on HD), BPH, PSH L toe amputation, R BKA, AVF creation (5/2023) BIBEMS from nursing home for anemia requiring transfusion, found to meet SIRS criteria s/p transfusion (FNHTR vs. sepsis) w/ elevated BUN/Cr, admitted for VS derangement. Initiated HD 4/4. Also found to have C. diff. c/c/b aspiration PNA vs pneumonitis. Clinically improving on Abx. Pending GOC 4/8

## 2024-04-08 NOTE — PROGRESS NOTE ADULT - PROBLEM SELECTOR PLAN 9
- continue home meds Tamsulosin 0.4 mg qhs and Finasteride 5 mg qd. Known early-onset dementia. A&Ox2-3 at baseline. POA w/ AOx1 but appears back to baseline.   ?metabolic encephalopathy w/ hyperuremia.     - reassess after HD  - Patient AAOx3 Known early-onset dementia. A&Ox2-3 at baseline. POA w/ AOx1 but appears back to baseline.     - reassess after HD  - Patient AAOx3      #Metabolic encephalopathy - RESOLVED  multifactorial: infection and/ or uremia.

## 2024-04-08 NOTE — PROGRESS NOTE ADULT - PROBLEM SELECTOR PLAN 8
Known early-onset dementia. A&Ox2 at baseline. POA w/ AOx1 but appears back to baseline.   ?metabolic encephalopathy w/ hyperuremia.   - reassess after HD  - Patient AAOx3 per HIE, prior Hx of HD. However no active HD according to NH staff.   Bun >100s, Cr 5.8 12/2023 -> 7.8 on admission.     - renal consulted for HD, consented by HCP Leo Cisneros [phone]  - discontinued home NaHCO3 650mg TID  - Hgb 7.1 4/6. Transfuse 1 unit pRBC with dialysis

## 2024-04-08 NOTE — PROGRESS NOTE ADULT - PROBLEM SELECTOR PLAN 3
hx of C. diff 12/2023 during last admission. Test positive for C diff. 4/5 5 episodes of watery diarrhea. 4/6 2 episodes overnight  GI PCR negative  - c/w Vancomycin 125mg PO q6 for 10 days  - stool count hx of C. diff 12/2023 during last admission. Test positive for C diff. 4/5 5 episodes of watery diarrhea. 4/6 2 episodes overnight  GI PCR negative    - c/w Vancomycin 125mg PO q6 for 10 days  - stool count Hx of dysphagia. NG placed 4/7 however removed by pt.     - SLP consulted  - NPO  - GOC for feed Hx of dysphagia. NG placed 4/7 however removed by pt.      - GOC 4/8: Comfort feed with understanding of aspiration risk.

## 2024-04-08 NOTE — PROGRESS NOTE ADULT - PROBLEM SELECTOR PLAN 4
Nutrition consulted Hx of C. diff 12/2023 during last admission. p/w watery diarrhea. C diff positive 4/5.  5 episodes of watery diarrhea. 4/6 2 episodes overnight  GI PCR negative    - Vancomycin 125mg PO q6 for 10 days  - stool count

## 2024-04-08 NOTE — PROGRESS NOTE ADULT - SUBJECTIVE AND OBJECTIVE BOX
[facility]  Kodi Javed, Palliative Care Attending  Contact Info: Call [phone] (HEAL Line) or message on Microsoft Teams (Kodi Javed)    SUBJECTIVE AND OBJECTIVE:  INTERVAL HPI/OVERNIGHT EVENTS: Interval events noted. See patient's PRN use for the past 24hrs noted below. Comprehensive symptom assessment and GOC exploration as noted below. Extensive time spent discussing plan of care with patient/family.    ALLERGIES:  No Known Allergies    MEDICATIONS  (STANDING):  aspirin enteric coated 81 milliGRAM(s) Oral every 24 hours  atorvastatin 80 milliGRAM(s) Oral at bedtime  carvedilol 3.125 milliGRAM(s) Oral every 12 hours  cefTRIAXone   IVPB 2000 milliGRAM(s) IV Intermittent every 24 hours  chlorhexidine 2% Cloths 1 Application(s) Topical every 24 hours  dextrose 10% Bolus 125 milliLiter(s) IV Bolus once  dextrose 5%. 1000 milliLiter(s) (50 mL/Hr) IV Continuous <Continuous>  dextrose 5%. 1000 milliLiter(s) (100 mL/Hr) IV Continuous <Continuous>  dextrose 50% Injectable 12.5 Gram(s) IV Push once  dextrose 50% Injectable 25 Gram(s) IV Push once  finasteride 5 milliGRAM(s) Oral daily  glucagon  Injectable 1 milliGRAM(s) IntraMuscular once  heparin   Injectable 5000 Unit(s) SubCutaneous every 8 hours  insulin lispro (ADMELOG) corrective regimen sliding scale   SubCutaneous at bedtime  insulin lispro (ADMELOG) corrective regimen sliding scale   SubCutaneous three times a day before meals  levothyroxine 50 MICROGram(s) Oral daily  tamsulosin 0.4 milliGRAM(s) Oral at bedtime  vancomycin    Solution 125 milliGRAM(s) Oral every 6 hours    MEDICATIONS  (PRN):  dextrose Oral Gel 15 Gram(s) Oral once PRN Blood Glucose LESS THAN 70 milliGRAM(s)/deciliter      Analgesic Use (Scheduled and PRNs) for past 24 hours:      ITEMS UNCHECKED ARE NOT PRESENT  PRESENT SYMPTOMS/REVIEW OF SYSTEMS: []Unable to obtain due to poor mentation   Source if other than patient:  []Family   []Team         Vital Signs Last 24 Hrs  T(C): 36.4 (08 Apr 2024 16:21), Max: 37.2 (07 Apr 2024 21:34)  T(F): 97.5 (08 Apr 2024 16:21), Max: 99 (07 Apr 2024 21:34)  HR: 76 (08 Apr 2024 16:21) (75 - 87)  BP: 164/77 (08 Apr 2024 16:21) (157/76 - 164/84)  BP(mean): --  RR: 18 (08 Apr 2024 16:21) (18 - 18)  SpO2: 98% (08 Apr 2024 16:21) (95% - 98%)    Parameters below as of 08 Apr 2024 10:13  Patient On (Oxygen Delivery Method): nasal cannula  O2 Flow (L/min): 2      LABS: Personally reviewed and interpreted                        7.9    6.89  )-----------( 250      ( 08 Apr 2024 05:30 )             25.3   04-08    142  |  100  |  47<H>  ----------------------------<  65<L>  4.1   |  27  |  4.86<H>    Ca    9.8      08 Apr 2024 05:30  Phos  5.1     04-08  Mg     2.2     04-08    TPro  7.2  /  Alb  3.3  /  TBili  0.2  /  DBili  x   /  AST  41<H>  /  ALT  42  /  AlkPhos  73  04-08      RADIOLOGY & ADDITIONAL STUDIES: Personally reviewed and interpreted  None new    DISCUSSION OF CASE: Family - to provide updates and emotional support; Primary Team/RN - to discuss plan of care [facility]  Kodi Javed, Palliative Care Attending  Contact Info: Call [phone] (HEAL Line) or message on Microsoft Teams (Kodi Javed)    SUBJECTIVE AND OBJECTIVE:  INTERVAL HPI/OVERNIGHT EVENTS: Interval events noted. Denies complaints but not participating in complex decision making. Denies pain or SOB. See patient's PRN use for the past 24hrs noted below. Comprehensive symptom assessment and GOC exploration as noted below. Extensive time spent discussing plan of care with patient/family.    ALLERGIES:  No Known Allergies    MEDICATIONS  (STANDING):  aspirin enteric coated 81 milliGRAM(s) Oral every 24 hours  atorvastatin 80 milliGRAM(s) Oral at bedtime  carvedilol 3.125 milliGRAM(s) Oral every 12 hours  cefTRIAXone   IVPB 2000 milliGRAM(s) IV Intermittent every 24 hours  chlorhexidine 2% Cloths 1 Application(s) Topical every 24 hours  dextrose 10% Bolus 125 milliLiter(s) IV Bolus once  dextrose 5%. 1000 milliLiter(s) (50 mL/Hr) IV Continuous <Continuous>  dextrose 5%. 1000 milliLiter(s) (100 mL/Hr) IV Continuous <Continuous>  dextrose 50% Injectable 12.5 Gram(s) IV Push once  dextrose 50% Injectable 25 Gram(s) IV Push once  finasteride 5 milliGRAM(s) Oral daily  glucagon  Injectable 1 milliGRAM(s) IntraMuscular once  heparin   Injectable 5000 Unit(s) SubCutaneous every 8 hours  insulin lispro (ADMELOG) corrective regimen sliding scale   SubCutaneous at bedtime  insulin lispro (ADMELOG) corrective regimen sliding scale   SubCutaneous three times a day before meals  levothyroxine 50 MICROGram(s) Oral daily  tamsulosin 0.4 milliGRAM(s) Oral at bedtime  vancomycin    Solution 125 milliGRAM(s) Oral every 6 hours    MEDICATIONS  (PRN):  dextrose Oral Gel 15 Gram(s) Oral once PRN Blood Glucose LESS THAN 70 milliGRAM(s)/deciliter    Analgesic Use (Scheduled and PRNs) for past 24 hours:  - none    ITEMS UNCHECKED ARE NOT PRESENT  PRESENT SYMPTOMS/REVIEW OF SYSTEMS: []Unable to obtain due to poor mentation   Source if other than patient:  []Family   []Team     Pain: [] yes [x] no  QOL impact -   Location -                    Aggravating Factors -  Quality -  Radiation -  Timing -  Severity (0-10 scale) -   Minimal Acceptable Level (0-10 scale) -    PAIN AD Score: 0  (Nonverbal Pain Assessment)    Dyspnea:                           []Mild  []Moderate []Severe  Anxiety:                             []Mild []Moderate []Severe  Fatigue:                             []Mild []Moderate []Severe  Nausea:                             []Mild []Moderate []Severe  Loss of Appetite:              []Mild []Moderate []Severe  Constipation:                    []Mild []Moderate []Severe    Other Symptoms:  [x]All other review of systems negative     Palliative Performance Status Version 2:  30%  (Functional Assessment Tool)    GENERAL:  [x] NAD []Alert [x]Lethargic  []Cachexia  []Unarousable  [x]Verbal  []Non-Verbal  BEHAVIORAL:   []Anxiety  [x]Delirium []Agitation [x]Cooperative [x]Oriented x3  HEENT:  [x]Normal  [x] Moist Mucous Membranes []Dry mouth   []ET Tube/Trach  []Oral lesions  PULMONARY:   [x]Clear []Tachypnea  []Audible excessive secretions  [x]Normal Work of Breathing []Labored Breathing  []Rhonchi []Crackles []Wheezing  CARDIOVASCULAR:    [x]Regular Rate [x]Regular Rhythm []Irregular []Tachy  []Alex  GASTROINTESTINAL:  [x]Soft  []Distended   [x]+BS  [x]Non tender []Tender  []PEG []OGT/ NGT  Last BM:  GENITOURINARY:  []Normal [] Incontinent   [x]Oliguria/Anuria   []Borja  MUSCULOSKELETAL:   []Normal Extremities  [x]Weakness  [x]Bed/Wheelchair bound []Edema  NEUROLOGIC:   []No focal deficits  []Cognitive impairment  []Dysphagia []Dysarthria []Paresis [x]Encephalopathic  SKIN:   [x]Normal   []Pressure ulcer(s)  []Rash    Vital Signs Last 24 Hrs  T(C): 36.4 (08 Apr 2024 16:21), Max: 37.2 (07 Apr 2024 21:34)  T(F): 97.5 (08 Apr 2024 16:21), Max: 99 (07 Apr 2024 21:34)  HR: 76 (08 Apr 2024 16:21) (75 - 87)  BP: 164/77 (08 Apr 2024 16:21) (157/76 - 164/84)  BP(mean): --  RR: 18 (08 Apr 2024 16:21) (18 - 18)  SpO2: 98% (08 Apr 2024 16:21) (95% - 98%)    Parameters below as of 08 Apr 2024 10:13  Patient On (Oxygen Delivery Method): nasal cannula  O2 Flow (L/min): 2    LABS: Personally reviewed and interpreted                      7.9    6.89  )-----------( 250      ( 08 Apr 2024 05:30 )             25.3   04-08    142  |  100  |  47<H>  ----------------------------<  65<L>  4.1   |  27  |  4.86<H>    Ca    9.8      08 Apr 2024 05:30  Phos  5.1     04-08  Mg     2.2     04-08    TPro  7.2  /  Alb  3.3  /  TBili  0.2  /  DBili  x   /  AST  41<H>  /  ALT  42  /  AlkPhos  73  04-08    RADIOLOGY & ADDITIONAL STUDIES: Personally reviewed and interpreted  None new    DISCUSSION OF CASE: Cousin - to provide updates and emotional support; Primary Team/RN - to discuss plan of care

## 2024-04-08 NOTE — PROGRESS NOTE ADULT - CONVERSATION/DISCUSSION
Diagnosis/Prognosis/MOLST Discussed/Treatment Options/Hospice Referral
Diagnosis/Prognosis/Treatment Options

## 2024-04-08 NOTE — PROGRESS NOTE ADULT - ATTENDING COMMENTS
Assessment, plan and impression:   58M with PMH of R BKA, CVA with residual weakness, dementia, HTN, HLD, CKD, presenting with worsening renal failure and anemia, also found to have c diff colitis and course complicated by aspiration pneumonia vs pneumonitis. Pending GOC discussion lead by palliative care regarding further care for patient, and discussion regarding feeding patient. Has worked with SLP, and still not recommended for PO intake at this time.  Still a little early to say if he will safely be able to tolerate PO intake.   HD was initiated on this hospitalization as well.     Plan  - GOC discussion lead by palliative care - ?PEG tube vs comfort feeds  - completing treatment of c diff colitis as above  - continue with dialysis for now, unless GOC changes after palliative care discussion    40 minutes spent on this encounter, including face to face with patient, care coordination and documentation.

## 2024-04-08 NOTE — PROGRESS NOTE ADULT - PROBLEM SELECTOR PLAN 11
A1c 5.5. Speech and Swallow evaluation unable to be completed 4/5 due to lethargy  - CC diet   - mISS  - NPO pending re-evaluation        #hypothyroidism  - continue home med synthroid 50mcg qd - continue home meds Tamsulosin 0.4 mg qhs and Finasteride 5 mg qd.    #T2DM  A1c 5.5. Speech and Swallow evaluation unable to be completed 4/5 due to lethargy  - CC diet   - mISS  - NPO pending re-evaluation      #hypothyroidism  - continue home med synthroid 50mcg qd

## 2024-04-08 NOTE — PROGRESS NOTE ADULT - PROBLEM SELECTOR PLAN 7
per HIE, prior Hx of HD. However no active HD according to NH staff.   Bun >100s, Cr 5.8 12/2023 -> 7.8 on admission.     - renal consulted for HD, consented by HCP Leo Cisenros [phone]  - discontinued home NaHCO3 650mg TID  - Hgb 7.1 4/6. Transfuse 1 unit pRBC with dialysis - Currently holding home med hydralazine 50 qd, nifedipine 120mg qd   - continue home Coreg 3.125 BID  - hold hydralazine 50  qd and nifedipine 120mg qd    #Hypertensive Urgency - RESOLVED  s/p labetalol 20mg IVP x2

## 2024-04-08 NOTE — PROGRESS NOTE ADULT - NS ATTEST RISK PROBLEM GEN_ALL_CORE FT
Chronic Illness With Severe Exacerbation/Progression  Decision Made To Not Resuscitate (DNR)
Severe sepsis w/ low BP

## 2024-04-09 LAB
CULTURE RESULTS: SIGNIFICANT CHANGE UP
CULTURE RESULTS: SIGNIFICANT CHANGE UP
GLUCOSE BLDC GLUCOMTR-MCNC: 104 MG/DL — HIGH (ref 70–99)
GLUCOSE BLDC GLUCOMTR-MCNC: 110 MG/DL — HIGH (ref 70–99)
GLUCOSE BLDC GLUCOMTR-MCNC: 78 MG/DL — SIGNIFICANT CHANGE UP (ref 70–99)
GLUCOSE BLDC GLUCOMTR-MCNC: 78 MG/DL — SIGNIFICANT CHANGE UP (ref 70–99)
GLUCOSE BLDC GLUCOMTR-MCNC: 95 MG/DL — SIGNIFICANT CHANGE UP (ref 70–99)
SPECIMEN SOURCE: SIGNIFICANT CHANGE UP
SPECIMEN SOURCE: SIGNIFICANT CHANGE UP

## 2024-04-09 PROCEDURE — 99232 SBSQ HOSP IP/OBS MODERATE 35: CPT | Mod: GC

## 2024-04-09 PROCEDURE — 99232 SBSQ HOSP IP/OBS MODERATE 35: CPT

## 2024-04-09 RX ORDER — ERYTHROPOIETIN 10000 [IU]/ML
6000 INJECTION, SOLUTION INTRAVENOUS; SUBCUTANEOUS ONCE
Refills: 0 | Status: COMPLETED | OUTPATIENT
Start: 2024-04-09 | End: 2024-04-11

## 2024-04-09 RX ADMIN — CARVEDILOL PHOSPHATE 3.12 MILLIGRAM(S): 80 CAPSULE, EXTENDED RELEASE ORAL at 06:47

## 2024-04-09 RX ADMIN — LINAGLIPTIN 5 MILLIGRAM(S): 5 TABLET, FILM COATED ORAL at 13:11

## 2024-04-09 RX ADMIN — Medication 50 MICROGRAM(S): at 06:47

## 2024-04-09 RX ADMIN — Medication 125 MILLIGRAM(S): at 17:50

## 2024-04-09 RX ADMIN — HEPARIN SODIUM 5000 UNIT(S): 5000 INJECTION INTRAVENOUS; SUBCUTANEOUS at 06:47

## 2024-04-09 RX ADMIN — CHLORHEXIDINE GLUCONATE 1 APPLICATION(S): 213 SOLUTION TOPICAL at 09:18

## 2024-04-09 RX ADMIN — Medication 125 MILLIGRAM(S): at 13:13

## 2024-04-09 RX ADMIN — CARVEDILOL PHOSPHATE 3.12 MILLIGRAM(S): 80 CAPSULE, EXTENDED RELEASE ORAL at 17:50

## 2024-04-09 RX ADMIN — Medication 125 MILLIGRAM(S): at 06:48

## 2024-04-09 RX ADMIN — FINASTERIDE 5 MILLIGRAM(S): 5 TABLET, FILM COATED ORAL at 13:11

## 2024-04-09 RX ADMIN — Medication 81 MILLIGRAM(S): at 09:19

## 2024-04-09 RX ADMIN — HEPARIN SODIUM 5000 UNIT(S): 5000 INJECTION INTRAVENOUS; SUBCUTANEOUS at 13:11

## 2024-04-09 NOTE — PROGRESS NOTE ADULT - SUBJECTIVE AND OBJECTIVE BOX
Nephrology progress note    Seen at bedside. No acute complaints or overnight events reported. Planned for HD today.    Allergies:  No Known Allergies    Hospital Medications:   MEDICATIONS  (STANDING):  aspirin enteric coated 81 milliGRAM(s) Oral every 24 hours  atorvastatin 80 milliGRAM(s) Oral at bedtime  carvedilol 3.125 milliGRAM(s) Oral every 12 hours  cefTRIAXone   IVPB 2000 milliGRAM(s) IV Intermittent every 24 hours  chlorhexidine 2% Cloths 1 Application(s) Topical every 24 hours  epoetin maryellen-epbx (RETACRIT) Injectable 6000 Unit(s) IV Push once  finasteride 5 milliGRAM(s) Oral daily  heparin   Injectable 5000 Unit(s) SubCutaneous every 8 hours  levothyroxine 50 MICROGram(s) Oral daily  linagliptin 5 milliGRAM(s) Oral daily  tamsulosin 0.4 milliGRAM(s) Oral at bedtime  vancomycin    Solution 125 milliGRAM(s) Oral every 6 hours    REVIEW OF SYSTEMS:  All other review of systems is negative unless indicated above.    VITALS:  T(F): 97.7 (04-09-24 @ 09:36), Max: 97.9 (04-09-24 @ 06:15)  HR: 78 (04-09-24 @ 09:36)  BP: 169/84 (04-09-24 @ 09:36)  RR: 18 (04-09-24 @ 09:36)  SpO2: 98% (04-09-24 @ 09:36)  Wt(kg): --      PHYSICAL EXAM:  Gen: NAD, lying in bed  HEENT: NCAT, EOMI  Pulm: No respiratory distress  CV: Regular rate  Abd: non-distended  Ext: no edema  Neuro: Awake, alert, interactive  Skin: No rashes visible  Access: LUE AVF w/ bruit and thrill    LABS:  04-08    142  |  100  |  47<H>  ----------------------------<  65<L>  4.1   |  27  |  4.86<H>    Ca    9.8      08 Apr 2024 05:30  Phos  5.1     04-08  Mg     2.2     04-08    TPro  7.2  /  Alb  3.3  /  TBili  0.2  /  DBili      /  AST  41<H>  /  ALT  42  /  AlkPhos  73  04-08                          7.9    6.89  )-----------( 250      ( 08 Apr 2024 05:30 )             25.3       Urine Studies:  Creatinine Trend: 4.86<--, 3.63<--, 4.96<--, 5.98<--, 7.98<--, 7.80<--  Urinalysis Basic - ( 08 Apr 2024 05:30 )    Color:  / Appearance:  / SG:  / pH:   Gluc: 65 mg/dL / Ketone:   / Bili:  / Urobili:    Blood:  / Protein:  / Nitrite:    Leuk Esterase:  / RBC:  / WBC    Sq Epi:  / Non Sq Epi:  / Bacteria:         RADIOLOGY & ADDITIONAL STUDIES:  reviewed

## 2024-04-09 NOTE — PROGRESS NOTE ADULT - PROBLEM SELECTOR PLAN 10
residual L sided weakness and R sided facial droop. Discontinued Plavix.     - continue home med ASA 81, Lipitor 80mg

## 2024-04-09 NOTE — PROGRESS NOTE ADULT - PROBLEM SELECTOR PLAN 8
per HIE, prior Hx of HD. However no active HD according to NH staff.   Bun >100s, Cr 5.8 12/2023 -> 7.8 on admission.     - renal consulted for HD, consented by HCP Leo Cisneros [phone]  - discontinued home NaHCO3 650mg TID  - Hgb 7.1 4/6. Transfuse 1 unit pRBC with dialysis - hold home med hydralazine 50 qd, nifedipine 120mg qd   - continue home Coreg 3.125 BID    #Hypertensive Urgency - RESOLVED  s/p labetalol 20mg IVP x2

## 2024-04-09 NOTE — PROGRESS NOTE ADULT - PROBLEM SELECTOR PLAN 6
p/w Hgb 6.7 outpt. s/p 1u pRBC with good response.   Baseline around 8 per HIE. Iron studies indicative of AoCD. hemolysis lab negative. Retic Index: hypopoliferative.  PE benign with no signs of overt/ apparent bleeding. CHELLY neg. Hgb stable.   mostly likely 2/2 ESRD     - active T&S, transfuse hgb <7. p/w Hgb 6.7 outpt. s/p 1u pRBC with good response. Baseline around 8 per HIE. Iron studies indicative of AoCD. hemolysis lab negative. Retic Index: hypopoliferative. PE benign with no signs of overt/ apparent bleeding. CHELLY neg. Hgb stable.   mostly likely 2/2 ESRD     - active T&S, transfuse hgb <7. RESOLVED. Known early-onset dementia. A&Ox2-3 at baseline. POA w/ AOx1 but appears back to baseline s/p HD and ABx    multifactorial: infection and/ or uremia.

## 2024-04-09 NOTE — PROGRESS NOTE ADULT - PROBLEM SELECTOR PLAN 1
POA w/ 3/4 SIRS  (WBC, fever and tachycardia) w/ elevated Cr. During admission, C diff positive. UA   Febrile 102.4 4/6 w/ new CXR finding on RLL and elevated procal. VSS on Abx    CXR 4/6: Right basilar opacity      Etiology likely multifactorial: Aspiration PNA vs pneumonitis and/ or C. diff    - CTX 2mg qd, vanc 125mg PO q6h (4/5-4/15)  - BCx 4/5, 4/6 - NGTD RESOLVED. POA w/ 3/4 SIRS (WBC, fever and tachycardia) w/ elevated Cr. During admission, C diff positive. UA   Febrile 102.4 4/6 w/ new CXR finding on RLL and elevated procal. Afebrile, VSS on Abx. WBC WNL     CXR 4/6: Right basilar opacity      Etiology likely multifactorial: Aspiration PNA vs pneumonitis and/ or C. diff    - CTX 2mg qd, vanc 125mg PO q6h (4/5-4/15)  - BCx 4/5, 4/6 - NGTD

## 2024-04-09 NOTE — PROGRESS NOTE ADULT - PROBLEM SELECTOR PLAN 9
Known early-onset dementia. A&Ox2-3 at baseline. POA w/ AOx1 but appears back to baseline. however pt lacks capacity for making medical decisions.     #Metabolic encephalopathy - RESOLVED  multifactorial: infection and/ or uremia. per HIE, prior Hx of HD. However no active HD according to NH staff.   Bun >100s, Cr 5.8 12/2023 -> 7.8 on admission.     - renal consulted for HD, consented by HCP Leo Cisneros [phone]  - discontinued home NaHCO3 650mg TID  - Hgb 7.1 4/6. Transfuse 1 unit pRBC with dialysis

## 2024-04-09 NOTE — PROGRESS NOTE ADULT - ASSESSMENT
58M T2DM, CVA x2 w/ residual L-sided weakness, early onset dementia, HTN, HLD, DVT, CKD V (Cr ~6, not on HD), BPH, PSH L toe amputation, R BKA, AVF creation (5/2023) BIBEMS from nursing home for anemia requiring transfusion, found to meet SIRS criteria s/p transfusion (FNHTR vs. sepsis) w/ elevated BUN/Cr, admitted for VS derangement. Initiated HD 4/4. Also found to have C. diff. c/c/b aspiration PNA vs pneumonitis. Clinically improving on Abx and HD .

## 2024-04-09 NOTE — PROGRESS NOTE ADULT - SUBJECTIVE AND OBJECTIVE BOX
--------INCOMPLETE NOTE--------  OVERNIGHT EVENTS: NAEO    SUBJECTIVE  Pt was seen and examined at bedside. Pt tolerated minced & moist diet with resident bedside.     Patient denies any fevers/ chills, n/v, headache, acute SOB, chest pain, abdominal pain    12-point review of systems otherwise negative      Vital Signs Last 24 Hrs  T(C): 36.5 (09 Apr 2024 09:36), Max: 36.6 (09 Apr 2024 06:15)  T(F): 97.7 (09 Apr 2024 09:36), Max: 97.9 (09 Apr 2024 06:15)  HR: 78 (09 Apr 2024 09:36) (76 - 81)  BP: 169/84 (09 Apr 2024 09:36) (160/86 - 170/80)  BP(mean): --  RR: 18 (09 Apr 2024 09:36) (18 - 18)  SpO2: 98% (09 Apr 2024 09:36) (98% - 98%)    Parameters below as of 09 Apr 2024 09:36  Patient On (Oxygen Delivery Method): nasal cannula  O2 Flow (L/min): 2        PHYSICAL EXAM   General: NAD, cachetic  HEENT: Neck Supple; MMM  Respiratory: CTA B/L; no wheezes/rales/rhonchi, normal WOB  Cardiovascular: RRR; no m/r/g  Gastrointestinal: Soft; NTND; + bowel sounds  : no suprapubic tenderness  Vascular: extremities WWP, LUE AVF w/ palpable thrill  MSK: R BKA. LUE and LLE appears contracted   Neurological: A&Ox3        LABS:                        7.9    6.89  )-----------( 250      ( 08 Apr 2024 05:30 )             25.3     04-08    142  |  100  |  47<H>  ----------------------------<  65<L>  4.1   |  27  |  4.86<H>    Ca    9.8      08 Apr 2024 05:30  Phos  5.1     04-08  Mg     2.2     04-08    TPro  7.2  /  Alb  3.3  /  TBili  0.2  /  DBili  x   /  AST  41<H>  /  ALT  42  /  AlkPhos  73  04-08      Urinalysis Basic - ( 08 Apr 2024 05:30 )    Color: x / Appearance: x / SG: x / pH: x  Gluc: 65 mg/dL / Ketone: x  / Bili: x / Urobili: x   Blood: x / Protein: x / Nitrite: x   Leuk Esterase: x / RBC: x / WBC x   Sq Epi: x / Non Sq Epi: x / Bacteria: x      CAPILLARY BLOOD GLUCOSE      POCT Blood Glucose.: 104 mg/dL (09 Apr 2024 08:51)

## 2024-04-09 NOTE — PHYSICAL THERAPY INITIAL EVALUATION ADULT - IMPAIRMENTS FOUND, PT EVAL
aerobic capacity/endurance/arousal, attention, and cognition/ergonomics and body mechanics/gait, locomotion, and balance/gross motor/muscle strength/neuromotor development and sensory integration/poor safety awareness/posture

## 2024-04-09 NOTE — PHYSICAL THERAPY INITIAL EVALUATION ADULT - RANGE OF MOTION EXAMINATION, REHAB EVAL
R BKA/bilateral upper extremity ROM was WFL (within functional limits)/bilateral lower extremity ROM was WFL (within functional limits)

## 2024-04-09 NOTE — PROGRESS NOTE ADULT - ATTENDING COMMENTS
Assessment, plan and impression:   58M with PMH of R BKA, CVA with residual weakness, dementia, HTN, HLD, CKD, presenting with worsening renal failure and anemia, also found to have c diff colitis and course complicated by aspiration pneumonia vs pneumonitis. Pending GOC discussion lead by palliative care regarding further care for patient, and discussion regarding feeding patient. Has worked with SLP, and still not recommended for PO intake at this time.  Still a little early to say if he will safely be able to tolerate PO intake.   HD was initiated on this hospitalization as well.     Plan  - GOC discussion lead by palliative care - comfort feeds  - completing treatment of c diff colitis as above  - continue with dialysis for now, unless GOC changes after palliative care discussion  - now pending new long term care placement that can take dialysis patients who cannot independently transfer from bed to chair    40 minutes spent on this encounter, including face to face with patient, care coordination and documentation.

## 2024-04-09 NOTE — PROGRESS NOTE ADULT - PROBLEM SELECTOR PLAN 7
- hold home med hydralazine 50 qd, nifedipine 120mg qd   - continue home Coreg 3.125 BID    #Hypertensive Urgency - RESOLVED  s/p labetalol 20mg IVP x2 p/w Hgb 6.7 outpt. s/p 1u pRBC with good response. Baseline around 8 per HIE. Iron studies indicative of AoCD. hemolysis lab negative. Retic Index: hypopoliferative. PE benign with no signs of overt/ apparent bleeding. CHELLY neg. Hgb stable.   mostly likely 2/2 ESRD     - active T&S, transfuse hgb <7.

## 2024-04-09 NOTE — PROGRESS NOTE ADULT - PROBLEM SELECTOR PLAN 4
Hx of C. diff 12/2023 during last admission. p/w watery diarrhea. C diff positive 4/5.  5 episodes of watery diarrhea. 4/6 2 episodes overnight  GI PCR negative    - Vancomycin 125mg PO q6 for 10 days

## 2024-04-09 NOTE — PROGRESS NOTE ADULT - PROBLEM SELECTOR PLAN 11
- continue home meds Tamsulosin 0.4 mg qhs and Finasteride 5 mg qd.    #T2DM  A1c 5.5. Speech and Swallow evaluation unable to be completed 4/5 due to lethargy  - CC diet   - mISS  - NPO pending re-evaluation      #hypothyroidism  - continue home med synthroid 50mcg qd

## 2024-04-09 NOTE — PROGRESS NOTE ADULT - PROBLEM SELECTOR PLAN 12
Fluid: PO  Electrolytes: ESRD / replete PRN  Nutrition: PO   DVT ppx: heparin SQ  GI ppx: none for now   Code: DNR/DNI, MEWS EXEMPT  Dispo: [facility]

## 2024-04-09 NOTE — PROGRESS NOTE ADULT - ASSESSMENT
58-year-old male with HTN, DM2, dementia, CKD 5 sent in by nursing home for Hb 6.7 s/p 1 PRBC in ER, then with fever and tachycardia, admitted for further evaluation. Nephrology consulted for HD initiation. HD#1 .    # New start ESRD  HD today per schedule  Renal diet  Fluid restriction <1.2L/day  Strict I&O, daily weights  SW assistance for outpatient HD center placement.  Hepatitis panel and TB testing (PPD or quant TB) for HD placement    Hemodialysis Treatment.:     Schedule: Once, Modality: Hemodialysis, Access: Arteriovenous Fistula    Dialyzer: Optiflux [id number], Time: 180 Min    Blood Flow: 400 mL/Min , Dialysate Flow: 500 mL/Min, Dialysate Temp: 36.5, Tubinmm (Adult)    Target Fluid Removal: 3 Liters    Dialysate Electrolytes (mEq/L): Potassium 3, Calcium 2.5, Sodium 138, Bicarbonate 35    # Access  LUE AVF with thrill and bruit    # Anemia  Hb not at goal 7.9  Iron sat 11%  Defer iron in setting of active infection  epo w/ HD    # CKD-BMD  Calcium 9.8  Phos 5.1. At goal 3-5.5.   58-year-old male with HTN, DM2, dementia, CKD 5 sent in by nursing home for Hb 6.7 s/p 1 PRBC in ER, then with fever and tachycardia, admitted for further evaluation. Nephrology consulted for HD initiation. HD#1 .    # New start ESRD  HD today per schedule  Renal diet  Fluid restriction <1.2L/day  Strict I&O, daily weights  SW assistance for outpatient HD center placement.  Hepatitis panel and TB testing (PPD or quant TB) for HD placement    Hemodialysis Treatment.:     Schedule: Once, Modality: Hemodialysis, Access: Arteriovenous Fistula    Dialyzer: Optiflux [id number], Time: 180 Min    Blood Flow: 400 mL/Min , Dialysate Flow: 500 mL/Min, Dialysate Temp: 36.5, Tubinmm (Adult)    Target Fluid Removal: 3 Liters    Dialysate Electrolytes (mEq/L): Potassium 3, Calcium 2.5, Sodium 138, Bicarbonate 35    # Access  LUE AVF with thrill and bruit  HD stopped early due to high access pressures and clotting of venous line, with subsequent diminished bruit/thrill of fistula distally. Recommend Vascular Surgery evaluation, discussed with medicine team.    # Anemia  Hb not at goal 7.9  Iron sat 11%  Defer iron in setting of active infection  epo w/ HD    # CKD-BMD  Calcium 9.8  Phos 5.1. At goal 3-5.5.

## 2024-04-09 NOTE — PROGRESS NOTE ADULT - PROBLEM SELECTOR PLAN 3
Hx of dysphagia. NG placed 4/7 however removed by pt.      - GOC 4/8: Comfort feed with understanding of aspiration risk.

## 2024-04-09 NOTE — PHYSICAL THERAPY INITIAL EVALUATION ADULT - PERTINENT HX OF CURRENT PROBLEM, REHAB EVAL
58M T2DM, CVA x2 w/ residual L-sided weakness, early onset dementia, HTN, HLD, DVT, CKD V (Cr ~6, not on HD), BPH, PSH L toe amputation, R BKA, AVF creation (5/2023) BIBEMS from nursing home for anemia requiring transfusion, found to meet SIRS criteria s/p transfusion (FNHTR vs. sepsis) w/ elevated BUN/Cr, admitted for VS derangement. Initiated HD 4/4. Also found to have C. diff. c/c/b aspiration PNA vs pneumonitis. Clinically improving on Abx and HD

## 2024-04-10 DIAGNOSIS — T82.590A OTHER MECHANICAL COMPLICATION OF SURGICALLY CREATED ARTERIOVENOUS FISTULA, INITIAL ENCOUNTER: ICD-10-CM

## 2024-04-10 LAB
ALBUMIN SERPL ELPH-MCNC: 3 G/DL — LOW (ref 3.3–5)
ALP SERPL-CCNC: 73 U/L — SIGNIFICANT CHANGE UP (ref 40–120)
ALT FLD-CCNC: 26 U/L — SIGNIFICANT CHANGE UP (ref 10–45)
ANION GAP SERPL CALC-SCNC: 16 MMOL/L — SIGNIFICANT CHANGE UP (ref 5–17)
AST SERPL-CCNC: 31 U/L — SIGNIFICANT CHANGE UP (ref 10–40)
BASOPHILS # BLD AUTO: 0.02 K/UL — SIGNIFICANT CHANGE UP (ref 0–0.2)
BASOPHILS NFR BLD AUTO: 0.3 % — SIGNIFICANT CHANGE UP (ref 0–2)
BILIRUB SERPL-MCNC: 0.2 MG/DL — SIGNIFICANT CHANGE UP (ref 0.2–1.2)
BUN SERPL-MCNC: 55 MG/DL — HIGH (ref 7–23)
CALCIUM SERPL-MCNC: 9.5 MG/DL — SIGNIFICANT CHANGE UP (ref 8.4–10.5)
CHLORIDE SERPL-SCNC: 103 MMOL/L — SIGNIFICANT CHANGE UP (ref 96–108)
CO2 SERPL-SCNC: 21 MMOL/L — LOW (ref 22–31)
CREAT SERPL-MCNC: 6.49 MG/DL — HIGH (ref 0.5–1.3)
CULTURE RESULTS: SIGNIFICANT CHANGE UP
EGFR: 9 ML/MIN/1.73M2 — LOW
EOSINOPHIL # BLD AUTO: 0.26 K/UL — SIGNIFICANT CHANGE UP (ref 0–0.5)
EOSINOPHIL NFR BLD AUTO: 3.9 % — SIGNIFICANT CHANGE UP (ref 0–6)
GLUCOSE BLDC GLUCOMTR-MCNC: 112 MG/DL — HIGH (ref 70–99)
GLUCOSE BLDC GLUCOMTR-MCNC: 113 MG/DL — HIGH (ref 70–99)
GLUCOSE BLDC GLUCOMTR-MCNC: 123 MG/DL — HIGH (ref 70–99)
GLUCOSE BLDC GLUCOMTR-MCNC: 157 MG/DL — HIGH (ref 70–99)
GLUCOSE BLDC GLUCOMTR-MCNC: 157 MG/DL — HIGH (ref 70–99)
GLUCOSE SERPL-MCNC: 155 MG/DL — HIGH (ref 70–99)
HCT VFR BLD CALC: 27 % — LOW (ref 39–50)
HGB BLD-MCNC: 8.3 G/DL — LOW (ref 13–17)
IMM GRANULOCYTES NFR BLD AUTO: 0.7 % — SIGNIFICANT CHANGE UP (ref 0–0.9)
LACTATE SERPL-SCNC: 1.3 MMOL/L — SIGNIFICANT CHANGE UP (ref 0.5–2)
LYMPHOCYTES # BLD AUTO: 1.31 K/UL — SIGNIFICANT CHANGE UP (ref 1–3.3)
LYMPHOCYTES # BLD AUTO: 19.5 % — SIGNIFICANT CHANGE UP (ref 13–44)
MAGNESIUM SERPL-MCNC: 2.2 MG/DL — SIGNIFICANT CHANGE UP (ref 1.6–2.6)
MCHC RBC-ENTMCNC: 28.5 PG — SIGNIFICANT CHANGE UP (ref 27–34)
MCHC RBC-ENTMCNC: 30.7 GM/DL — LOW (ref 32–36)
MCV RBC AUTO: 92.8 FL — SIGNIFICANT CHANGE UP (ref 80–100)
MONOCYTES # BLD AUTO: 0.66 K/UL — SIGNIFICANT CHANGE UP (ref 0–0.9)
MONOCYTES NFR BLD AUTO: 9.8 % — SIGNIFICANT CHANGE UP (ref 2–14)
NEUTROPHILS # BLD AUTO: 4.41 K/UL — SIGNIFICANT CHANGE UP (ref 1.8–7.4)
NEUTROPHILS NFR BLD AUTO: 65.8 % — SIGNIFICANT CHANGE UP (ref 43–77)
NRBC # BLD: 0 /100 WBCS — SIGNIFICANT CHANGE UP (ref 0–0)
PHOSPHATE SERPL-MCNC: 4.7 MG/DL — HIGH (ref 2.5–4.5)
PLATELET # BLD AUTO: 280 K/UL — SIGNIFICANT CHANGE UP (ref 150–400)
POTASSIUM SERPL-MCNC: 4.7 MMOL/L — SIGNIFICANT CHANGE UP (ref 3.5–5.3)
POTASSIUM SERPL-SCNC: 4.7 MMOL/L — SIGNIFICANT CHANGE UP (ref 3.5–5.3)
PROT SERPL-MCNC: 7.1 G/DL — SIGNIFICANT CHANGE UP (ref 6–8.3)
RBC # BLD: 2.91 M/UL — LOW (ref 4.2–5.8)
RBC # FLD: 15.1 % — HIGH (ref 10.3–14.5)
SODIUM SERPL-SCNC: 140 MMOL/L — SIGNIFICANT CHANGE UP (ref 135–145)
SPECIMEN SOURCE: SIGNIFICANT CHANGE UP
WBC # BLD: 6.71 K/UL — SIGNIFICANT CHANGE UP (ref 3.8–10.5)
WBC # FLD AUTO: 6.71 K/UL — SIGNIFICANT CHANGE UP (ref 3.8–10.5)

## 2024-04-10 PROCEDURE — 99232 SBSQ HOSP IP/OBS MODERATE 35: CPT

## 2024-04-10 PROCEDURE — 99254 IP/OBS CNSLTJ NEW/EST MOD 60: CPT | Mod: GC

## 2024-04-10 PROCEDURE — 93990 DOPPLER FLOW TESTING: CPT | Mod: 26

## 2024-04-10 RX ADMIN — Medication 81 MILLIGRAM(S): at 10:24

## 2024-04-10 RX ADMIN — HEPARIN SODIUM 5000 UNIT(S): 5000 INJECTION INTRAVENOUS; SUBCUTANEOUS at 13:52

## 2024-04-10 RX ADMIN — Medication 125 MILLIGRAM(S): at 07:50

## 2024-04-10 RX ADMIN — CARVEDILOL PHOSPHATE 3.12 MILLIGRAM(S): 80 CAPSULE, EXTENDED RELEASE ORAL at 07:16

## 2024-04-10 RX ADMIN — Medication 125 MILLIGRAM(S): at 00:06

## 2024-04-10 RX ADMIN — FINASTERIDE 5 MILLIGRAM(S): 5 TABLET, FILM COATED ORAL at 12:04

## 2024-04-10 RX ADMIN — TAMSULOSIN HYDROCHLORIDE 0.4 MILLIGRAM(S): 0.4 CAPSULE ORAL at 22:47

## 2024-04-10 RX ADMIN — Medication 50 MICROGRAM(S): at 07:16

## 2024-04-10 RX ADMIN — TAMSULOSIN HYDROCHLORIDE 0.4 MILLIGRAM(S): 0.4 CAPSULE ORAL at 00:03

## 2024-04-10 RX ADMIN — LINAGLIPTIN 5 MILLIGRAM(S): 5 TABLET, FILM COATED ORAL at 12:04

## 2024-04-10 RX ADMIN — HEPARIN SODIUM 5000 UNIT(S): 5000 INJECTION INTRAVENOUS; SUBCUTANEOUS at 07:16

## 2024-04-10 RX ADMIN — HEPARIN SODIUM 5000 UNIT(S): 5000 INJECTION INTRAVENOUS; SUBCUTANEOUS at 22:47

## 2024-04-10 RX ADMIN — CEFTRIAXONE 100 MILLIGRAM(S): 500 INJECTION, POWDER, FOR SOLUTION INTRAMUSCULAR; INTRAVENOUS at 00:05

## 2024-04-10 RX ADMIN — ATORVASTATIN CALCIUM 80 MILLIGRAM(S): 80 TABLET, FILM COATED ORAL at 00:02

## 2024-04-10 RX ADMIN — ATORVASTATIN CALCIUM 80 MILLIGRAM(S): 80 TABLET, FILM COATED ORAL at 22:47

## 2024-04-10 RX ADMIN — HEPARIN SODIUM 5000 UNIT(S): 5000 INJECTION INTRAVENOUS; SUBCUTANEOUS at 00:02

## 2024-04-10 RX ADMIN — Medication 125 MILLIGRAM(S): at 12:04

## 2024-04-10 RX ADMIN — CHLORHEXIDINE GLUCONATE 1 APPLICATION(S): 213 SOLUTION TOPICAL at 10:24

## 2024-04-10 NOTE — PROGRESS NOTE ADULT - PROBLEM SELECTOR PLAN 9
per HIE, prior Hx of HD. However no active HD according to NH staff.   Bun >100s, Cr 5.8 12/2023 -> 7.8 on admission.     - renal consulted for HD, consented by HCP Leo Cisneros [phone]  - discontinued home NaHCO3 650mg TID  - Hgb 7.1 4/6. Transfuse 1 unit pRBC with dialysis residual L sided weakness and R sided facial droop. Discontinued Plavix.     - continue home med ASA 81, Lipitor 80mg

## 2024-04-10 NOTE — CONSULT NOTE ADULT - CONSULT REASON
CKD 5
Pain/Symptom Management and Complex Medical Decision Making/GOC in the setting of ESRD
LUE AVF issue

## 2024-04-10 NOTE — PROGRESS NOTE ADULT - PROBLEM SELECTOR PLAN 6
RESOLVED. Known early-onset dementia. A&Ox2-3 at baseline. POA w/ AOx1 but appears back to baseline s/p HD and ABx    multifactorial: infection and/ or uremia. p/w Hgb 6.7 outpt. s/p 1u pRBC with good response. Baseline around 8 per HIE. Iron studies indicative of AoCD. hemolysis lab negative. Retic Index: hypopoliferative. PE benign with no signs of overt/ apparent bleeding. CHELLY neg. Hgb stable.   mostly likely 2/2 ESRD     - active T&S, transfuse hgb <7.

## 2024-04-10 NOTE — PROGRESS NOTE ADULT - SUBJECTIVE AND OBJECTIVE BOX
[facility] and Palliative Care  Kodi Javed, Palliative Care Attending  Contact Info: Call [phone] (HEAL Line) or message on Microsoft Teams (Kodi Javed)    SUBJECTIVE AND OBJECTIVE:  INTERVAL HPI/OVERNIGHT EVENTS: Interval events noted. See patient's PRN use for the past 24hrs noted below. Comprehensive symptom assessment and GOC exploration as noted below. Extensive time spent discussing plan of care with family.    ALLERGIES:  No Known Allergies    MEDICATIONS  (STANDING):  aspirin enteric coated 81 milliGRAM(s) Oral every 24 hours  atorvastatin 80 milliGRAM(s) Oral at bedtime  carvedilol 3.125 milliGRAM(s) Oral every 12 hours  chlorhexidine 2% Cloths 1 Application(s) Topical every 24 hours  epoetin maryellen-epbx (RETACRIT) Injectable 6000 Unit(s) IV Push once  finasteride 5 milliGRAM(s) Oral daily  heparin   Injectable 5000 Unit(s) SubCutaneous every 8 hours  levothyroxine 50 MICROGram(s) Oral daily  linagliptin 5 milliGRAM(s) Oral daily  tamsulosin 0.4 milliGRAM(s) Oral at bedtime  vancomycin    Solution 125 milliGRAM(s) Oral every 6 hours    MEDICATIONS  (PRN):      Analgesic Use (Scheduled and PRNs) for past 24 hours:      ITEMS UNCHECKED ARE NOT PRESENT  PRESENT SYMPTOMS/REVIEW OF SYSTEMS: []Unable to obtain due to poor mentation   Source if other than patient:  []Family   []Team         Vital Signs Last 24 Hrs  T(C): 36.9 (10 Apr 2024 17:11), Max: 36.9 (10 Apr 2024 17:11)  T(F): 98.5 (10 Apr 2024 17:11), Max: 98.5 (10 Apr 2024 17:11)  HR: 86 (10 Apr 2024 17:11) (86 - 94)  BP: 171/85 (10 Apr 2024 17:11) (167/92 - 180/92)  BP(mean): --  RR: 18 (10 Apr 2024 17:11) (18 - 18)  SpO2: 99% (10 Apr 2024 17:11) (98% - 100%)    Parameters below as of 10 Apr 2024 09:25  Patient On (Oxygen Delivery Method): nasal cannula  O2 Flow (L/min): 2      LABS: Personally reviewed and interpreted          RADIOLOGY & ADDITIONAL STUDIES: Personally reviewed and interpreted  None new    DISCUSSION OF CASE: Family - to provide updates and emotional support; Primary Team/RN - to discuss plan of care [facility] and Pall[facility]  Kodi Javed, Palliative Care Attending  Contact Info: Call [phone] (HEAL Line) or message on Microsoft Teams (Kodi Javed)    SUBJECTIVE AND OBJECTIVE:  INTERVAL HPI/OVERNIGHT EVENTS: Interval events noted. No complaints. Continues to tolerate HD and oral diet. See patient's PRN use for the past 24hrs noted below. Comprehensive symptom assessment and GOC exploration as noted below. Extensive time spent discussing plan of care with family.    ALLERGIES:  No Known Allergies    MEDICATIONS  (STANDING):  aspirin enteric coated 81 milliGRAM(s) Oral every 24 hours  atorvastatin 80 milliGRAM(s) Oral at bedtime  carvedilol 3.125 milliGRAM(s) Oral every 12 hours  chlorhexidine 2% Cloths 1 Application(s) Topical every 24 hours  epoetin maryellen-epbx (RETACRIT) Injectable 6000 Unit(s) IV Push once  finasteride 5 milliGRAM(s) Oral daily  heparin   Injectable 5000 Unit(s) SubCutaneous every 8 hours  levothyroxine 50 MICROGram(s) Oral daily  linagliptin 5 milliGRAM(s) Oral daily  tamsulosin 0.4 milliGRAM(s) Oral at bedtime  vancomycin    Solution 125 milliGRAM(s) Oral every 6 hours    MEDICATIONS  (PRN):      Analgesic Use (Scheduled and PRNs) for past 24 hours:  -none    ITEMS UNCHECKED ARE NOT PRESENT  PRESENT SYMPTOMS/REVIEW OF SYSTEMS: []Unable to obtain due to poor mentation   Source if other than patient:  []Family   []Team     Pain: [] yes [x] no  QOL impact -   Location -                    Aggravating Factors -  Quality -  Radiation -  Timing -  Severity (0-10 scale) -   Minimal Acceptable Level (0-10 scale) -    PAIN AD Score: 0  (Nonverbal Pain Assessment)    Dyspnea:                           []Mild  []Moderate []Severe  Anxiety:                             []Mild []Moderate []Severe  Fatigue:                             []Mild []Moderate []Severe  Nausea:                             []Mild []Moderate []Severe  Loss of Appetite:              []Mild []Moderate []Severe  Constipation:                    []Mild []Moderate []Severe    Other Symptoms:  [x]All other review of systems negative     Palliative Performance Status Version 2:  30%  (Functional Assessment Tool)    GENERAL:  [x] NAD []Alert [x]Lethargic  []Cachexia  []Unarousable  [x]Verbal  []Non-Verbal  BEHAVIORAL:   []Anxiety  [x]Delirium []Agitation [x]Cooperative [x]Oriented x3  HEENT:  [x]Normal  [x] Moist Mucous Membranes []Dry mouth   []ET Tube/Trach  []Oral lesions  PULMONARY:   [x]Clear []Tachypnea  []Audible excessive secretions  [x]Normal Work of Breathing []Labored Breathing  []Rhonchi []Crackles []Wheezing  CARDIOVASCULAR:    [x]Regular Rate [x]Regular Rhythm []Irregular []Tachy  []Alex  GASTROINTESTINAL:  [x]Soft  []Distended   [x]+BS  [x]Non tender []Tender  []PEG []OGT/ NGT  Last BM:  GENITOURINARY:  []Normal [] Incontinent   [x]Oliguria/Anuria   []Borja  MUSCULOSKELETAL:   []Normal Extremities  [x]Weakness  [x]Bed/Wheelchair bound []Edema  NEUROLOGIC:   []No focal deficits  []Cognitive impairment  []Dysphagia []Dysarthria []Paresis [x]Encephalopathic  SKIN:   [x]Normal   []Pressure ulcer(s)  []Rash    Vital Signs Last 24 Hrs  T(C): 36.9 (10 Apr 2024 17:11), Max: 36.9 (10 Apr 2024 17:11)  T(F): 98.5 (10 Apr 2024 17:11), Max: 98.5 (10 Apr 2024 17:11)  HR: 86 (10 Apr 2024 17:11) (86 - 94)  BP: 171/85 (10 Apr 2024 17:11) (167/92 - 180/92)  BP(mean): --  RR: 18 (10 Apr 2024 17:11) (18 - 18)  SpO2: 99% (10 Apr 2024 17:11) (98% - 100%)    Parameters below as of 10 Apr 2024 09:25  Patient On (Oxygen Delivery Method): nasal cannula  O2 Flow (L/min): 2      LABS: Personally reviewed and interpreted    RADIOLOGY & ADDITIONAL STUDIES: Personally reviewed and interpreted  None new    DISCUSSION OF CASE: Cousin - to provide updates and emotional support; Primary Team/RN - to discuss plan of careo

## 2024-04-10 NOTE — PROGRESS NOTE ADULT - ASSESSMENT
57yo M with PMH of Dementia, CKD5, T2DM, and CVA p/w anemia and found to have worsening renal function. Palliative consulted for complex medical decision making in the setting of advanced illness.    ·	Goals are established; Symptoms are managed. Palliative Care will follow peripherally

## 2024-04-10 NOTE — PROGRESS NOTE ADULT - PROBLEM SELECTOR PLAN 11
- continue home meds Tamsulosin 0.4 mg qhs and Finasteride 5 mg qd.    #T2DM  A1c 5.5. Speech and Swallow evaluation unable to be completed 4/5 due to lethargy  - CC diet   - mISS  - NPO pending re-evaluation      #hypothyroidism  - continue home med synthroid 50mcg qd RESOLVED. CXR 4/6: Right basilar opacity. Aspiration PNA vs pneumonitis. s/p ABx

## 2024-04-10 NOTE — CHART NOTE - NSCHARTNOTEFT_GEN_A_CORE
Rapid Response PGY 2/ PGY 3 Note  Patient is a 58y old  Male         admitted for   Rapid response team called because    Patient was seen and examined at the bedside     Allergies    No Known Allergies    Intolerances        PAST MEDICAL & SURGICAL HISTORY:  Type 2 diabetes mellitus      Cerebral artery occlusion with cerebral infarction      Hyperlipidemia      Hypertension      H/O diabetic retinopathy      ESRD (end stage renal disease)      S/P below knee amputation, right      S/P arteriovenous (AV) fistula creation          Vital Signs Last 24 Hrs  T(C): 36.9 (10 Apr 2024 17:11), Max: 36.9 (10 Apr 2024 17:11)  T(F): 98.5 (10 Apr 2024 17:11), Max: 98.5 (10 Apr 2024 17:11)  HR: 86 (10 Apr 2024 17:11) (86 - 94)  BP: 171/85 (10 Apr 2024 17:11) (167/92 - 180/92)  BP(mean): --  RR: 18 (10 Apr 2024 17:11) (18 - 18)  SpO2: 99% (10 Apr 2024 17:11) (98% - 100%)    Parameters below as of 10 Apr 2024 09:25  Patient On (Oxygen Delivery Method): nasal cannula  O2 Flow (L/min): 2            GENERAL: The patient is awake and alert in no apparent distress.   HEENT: Head is normocephalic and atraumatic. Extraocular muscles are intact. Mucous membranes are moist. No throat erythema/exudates no lymphadenopathy, no JVD,   NECK: Supple.  LUNGS: Clear to auscultation BL without wheezing, rales or rhonchi; respirations unlabored  HEART: Regular rate and rhythm ,+S1/+S2, no murmurs, rubs, gallops  ABDOMEN: Soft, nontender, and nondistended, no rebound, guarding rigidity, bowel sounds in all 4 quadrants  EXTREMITIES: Without any cyanosis, clubbing, rash, lesions or edema.  SKIN: No new rashes or lesions.  MSK: strength equal BL  VASCULAR: Radial and Dorsal pedal pulses palpable BL  NEUROLOGIC: Grossly intact.  PSYCH: No new changes.    04-10 @ 07:01  -  04-10 @ 18:27  --------------------------------------------------------  IN: 0 mL / OUT: 450 mL / NET: -450 mL                              MEDICATIONS  (STANDING):  aspirin enteric coated 81 milliGRAM(s) Oral every 24 hours  atorvastatin 80 milliGRAM(s) Oral at bedtime  carvedilol 3.125 milliGRAM(s) Oral every 12 hours  chlorhexidine 2% Cloths 1 Application(s) Topical every 24 hours  epoetin maryellen-epbx (RETACRIT) Injectable 6000 Unit(s) IV Push once  finasteride 5 milliGRAM(s) Oral daily  heparin   Injectable 5000 Unit(s) SubCutaneous every 8 hours  levothyroxine 50 MICROGram(s) Oral daily  linagliptin 5 milliGRAM(s) Oral daily  tamsulosin 0.4 milliGRAM(s) Oral at bedtime  vancomycin    Solution 125 milliGRAM(s) Oral every 6 hours    MEDICATIONS  (PRN):      Assessment- Rapid Response called for 58y year old Male with a past medical history of     Plan- Rapid Response PGY 2/ PGY 3 Note  58-year-old male with HTN, DM2, CKD 5 (AVF created 5/23) right BKA, history of CVA with right-sided facial droop, dementia was sent in by his nursing home for hemoglobin of 6.7 on outpatient labs.  He received a unit of PRBC in the ER and was ready to be transported back to his nursing home, but then on reevaluation was found to be septic with a fever 101.4, , source unclear.  He was admitted for further evaluation.  Workup was also sent for possible transfusion reaction.  Nephrology consulted for CKD 5.  Patient known to renal service from previous admissions.  Patient has followed up with Dr. Garcia as outpatient who had wanted him to initiate dialysis in the last few months, but his nursing home was unable to coordinate and patient has never started HD. During current admission, pt treated for aspiration PNA as likely source of infection and c. diff. He has been made DNR/DNI, MEWS exempt during this admission with plans to continue HD. Last session 4/9 PM but not completed due to access pressure being too high, raising concern for clot as fistula site.  Rapid response team called because pt noted to be lethargic after returning from imaging of LUE fistula.    Patient was seen and examined at the bedside responding to deep sternal rub. HDS, AOx1-2 aware of self and location. Pt reports that he is tired as he did not sleep last night, otherwise denies further acute complaints.     Allergies    No Known Allergies    Intolerances        PAST MEDICAL & SURGICAL HISTORY:  Type 2 diabetes mellitus      Cerebral artery occlusion with cerebral infarction      Hyperlipidemia      Hypertension      H/O diabetic retinopathy      ESRD (end stage renal disease)      S/P below knee amputation, right      S/P arteriovenous (AV) fistula creation          Vital Signs Last 24 Hrs  T(C): 36.9 (10 Apr 2024 17:11), Max: 36.9 (10 Apr 2024 17:11)  T(F): 98.5 (10 Apr 2024 17:11), Max: 98.5 (10 Apr 2024 17:11)  HR: 86 (10 Apr 2024 17:11) (86 - 94)  BP: 171/85 (10 Apr 2024 17:11) (167/92 - 180/92)  BP(mean): --  RR: 18 (10 Apr 2024 17:11) (18 - 18)  SpO2: 99% (10 Apr 2024 17:11) (98% - 100%)    Parameters below as of 10 Apr 2024 09:25  Patient On (Oxygen Delivery Method): nasal cannula  O2 Flow (L/min): 2            GENERAL: frail middle aged gentleman, Somnolent but arousable to sternal rub >> improved over course of rapid  HEENT: Head is normocephalic and atraumatic. Extraocular muscles are intact. Mucous membranes are moist. No throat erythema/exudates no lymphadenopathy, no JVD,   NECK: Supple.  LUNGS: Clear to auscultation BL without wheezing, rales or rhonchi; respirations unlabored  HEART: Regular rate and rhythm ,+S1/+S2, no murmurs, rubs, gallops  ABDOMEN: Soft, nontender, and nondistended, no rebound, guarding rigidity, bowel sounds in all 4 quadrants  EXTREMITIES: RLE AKA, no edema of LLE  SKIN: No new rashes or lesions.  VASCULAR: Radial and Dorsal pedal pulses palpable BL  NEUROLOGIC: LUE 3/5, RUE 4/5 B/L UE contracted  PSYCH: No new changes.    04-10 @ 07:01  -  04-10 @ 18:27  --------------------------------------------------------  IN: 0 mL / OUT: 450 mL / NET: -450 mL                              MEDICATIONS  (STANDING):  aspirin enteric coated 81 milliGRAM(s) Oral every 24 hours  atorvastatin 80 milliGRAM(s) Oral at bedtime  carvedilol 3.125 milliGRAM(s) Oral every 12 hours  chlorhexidine 2% Cloths 1 Application(s) Topical every 24 hours  epoetin maryellen-epbx (RETACRIT) Injectable 6000 Unit(s) IV Push once  finasteride 5 milliGRAM(s) Oral daily  heparin   Injectable 5000 Unit(s) SubCutaneous every 8 hours  levothyroxine 50 MICROGram(s) Oral daily  linagliptin 5 milliGRAM(s) Oral daily  tamsulosin 0.4 milliGRAM(s) Oral at bedtime  vancomycin    Solution 125 milliGRAM(s) Oral every 6 hours    MEDICATIONS  (PRN):      Assessment:  Rapid Response called for 58 year old Male (MEWS exempt, DNR/DNI) with a past medical history of HTN, DM2, CKD 5 (AVF created 5/23) right BKA, history of CVA with right-sided facial droop, dementia was sent in by his nursing home for hemoglobin of 6.7 on outpatient lab s/p transfusion, admitted for hypertensive urgency and sepsis 2/2 aspiration PNA, course c/b C. Diff infection, currently being treated with PO vanc. RRT called as pt lethargic on return from imaging to evaluate potential clotting of AVF. Mentation returned to baseline during evaluation, HDS -- as pt was unable to complete HD yesterday evening, etiology of AMS potentially 2/2 uremia vs e- derangements.   Plan:  - F/U labs drawn during RRT  - F/U renal regarding plans for HD  - Aspiration precautions  - Delirium precautions    Case discussed with Dr. Shawna Haile, pt stable to remain on RMF at this time. Rapid Response PGY 2/ PGY 3 Note  58-year-old male with HTN, DM2, CKD 5 (AVF created 5/23) right BKA, history of CVA with right-sided facial droop, dementia was sent in by his nursing home for hemoglobin of 6.7 on outpatient labs.  He received a unit of PRBC in the ER and was ready to be transported back to his nursing home, but then on reevaluation was found to be septic with a fever 101.4, , source unclear.  He was admitted for further evaluation.  Workup was also sent for possible transfusion reaction.  Nephrology consulted for CKD 5.  Patient known to renal service from previous admissions.  Patient has followed up with Dr. Garcia as outpatient who had wanted him to initiate dialysis in the last few months, but his nursing home was unable to coordinate and patient has never started HD. During current admission, pt treated for aspiration PNA as likely source of infection and c. diff. He has been made DNR/DNI, MEWS exempt during this admission with plans to continue HD. Last session 4/9 PM but not completed due to access pressure being too high, raising concern for clot as fistula site.  Rapid response team called because pt noted to be lethargic after returning from imaging of LUE fistula.    Patient was seen and examined at the bedside responding to deep sternal rub. HDS, AOx1-2 aware of self and location. Pt reports that he is tired as he did not sleep last night, otherwise denies further acute complaints.     Allergies    No Known Allergies    Intolerances        PAST MEDICAL & SURGICAL HISTORY:  Type 2 diabetes mellitus      Cerebral artery occlusion with cerebral infarction      Hyperlipidemia      Hypertension      H/O diabetic retinopathy      ESRD (end stage renal disease)      S/P below knee amputation, right      S/P arteriovenous (AV) fistula creation          Vital Signs Last 24 Hrs  T(C): 36.9 (10 Apr 2024 17:11), Max: 36.9 (10 Apr 2024 17:11)  T(F): 98.5 (10 Apr 2024 17:11), Max: 98.5 (10 Apr 2024 17:11)  HR: 86 (10 Apr 2024 17:11) (86 - 94)  BP: 171/85 (10 Apr 2024 17:11) (167/92 - 180/92)  BP(mean): --  RR: 18 (10 Apr 2024 17:11) (18 - 18)  SpO2: 99% (10 Apr 2024 17:11) (98% - 100%)    Parameters below as of 10 Apr 2024 09:25  Patient On (Oxygen Delivery Method): nasal cannula  O2 Flow (L/min): 2            GENERAL: frail middle aged gentleman, Somnolent but arousable to sternal rub >> improved over course of rapid  HEENT: Head is normocephalic and atraumatic. Extraocular muscles are intact. Mucous membranes are moist. No throat erythema/exudates no lymphadenopathy, no JVD,   NECK: Supple.  LUNGS: Clear to auscultation BL without wheezing, rales or rhonchi; respirations unlabored  HEART: Regular rate and rhythm ,+S1/+S2, no murmurs, rubs, gallops  ABDOMEN: Soft, nontender, and nondistended, no rebound, guarding rigidity, bowel sounds in all 4 quadrants  EXTREMITIES: RLE AKA, no edema of LLE  SKIN: No new rashes or lesions.  VASCULAR: Radial and Dorsal pedal pulses palpable BL  NEUROLOGIC: LUE 3/5, RUE 4/5 B/L UE contracted  PSYCH: No new changes.    04-10 @ 07:01  -  04-10 @ 18:27  --------------------------------------------------------  IN: 0 mL / OUT: 450 mL / NET: -450 mL                              MEDICATIONS  (STANDING):  aspirin enteric coated 81 milliGRAM(s) Oral every 24 hours  atorvastatin 80 milliGRAM(s) Oral at bedtime  carvedilol 3.125 milliGRAM(s) Oral every 12 hours  chlorhexidine 2% Cloths 1 Application(s) Topical every 24 hours  epoetin maryellen-epbx (RETACRIT) Injectable 6000 Unit(s) IV Push once  finasteride 5 milliGRAM(s) Oral daily  heparin   Injectable 5000 Unit(s) SubCutaneous every 8 hours  levothyroxine 50 MICROGram(s) Oral daily  linagliptin 5 milliGRAM(s) Oral daily  tamsulosin 0.4 milliGRAM(s) Oral at bedtime  vancomycin    Solution 125 milliGRAM(s) Oral every 6 hours    MEDICATIONS  (PRN):      Assessment:  Rapid Response called for 58 year old Male (MEWS exempt, DNR/DNI) with a past medical history of HTN, DM2, CKD 5 (AVF created 5/23) right BKA, history of CVA with right-sided facial droop, dementia was sent in by his nursing home for hemoglobin of 6.7 on outpatient lab s/p transfusion, admitted for hypertensive urgency and sepsis 2/2 aspiration PNA, course c/b C. Diff infection, currently being treated with PO vanc. RRT called as pt lethargic on return from imaging to evaluate potential clotting of AVF. Mentation returned to baseline during evaluation, HDS -- as pt was unable to complete HD yesterday evening, r/o uremia as etiology of AMS.   Plan:  - F/U labs drawn during RRT  - F/U renal regarding plans for HD  - Aspiration precautions  - Delirium precautions    Case discussed with Dr. Shawna Haile, pt stable to remain on RMF at this time.

## 2024-04-10 NOTE — PROGRESS NOTE ADULT - SUBJECTIVE AND OBJECTIVE BOX
--------INCOMPLETE NOTE--------  OVERNIGHT EVENTS: HD was ended early due to high assess pressure.     SUBJECTIVE  Pt was seen and examined at bedside. Appears comfortable with no acute concerns.     Patient denies any fevers/ chills, n/v, headache, acute SOB, chest pain, abdominal pain, genitourinary sx    12-point review of systems otherwise negative      Vital Signs Last 24 Hrs  T(C): 36.4 (10 Apr 2024 09:25), Max: 36.8 (10 Apr 2024 05:01)  T(F): 97.6 (10 Apr 2024 09:25), Max: 98.3 (10 Apr 2024 05:01)  HR: 92 (10 Apr 2024 09:25) (83 - 94)  BP: 167/92 (10 Apr 2024 09:25) (167/92 - 180/92)  BP(mean): --  RR: 18 (10 Apr 2024 09:25) (18 - 18)  SpO2: 100% (10 Apr 2024 09:25) (98% - 100%)    Parameters below as of 10 Apr 2024 09:25  Patient On (Oxygen Delivery Method): nasal cannula  O2 Flow (L/min): 2        PHYSICAL EXAM   General: NAD, cachetic  HEENT: MMM  Respiratory: CTA B/L; ormal WOB  Cardiovascular: RRR; no m/r/g  Gastrointestinal: Soft; NTND; + bowel sounds  : no suprapubic tenderness  Vascular: extremities WWP, LUE AVF w/ decreased thrill  MSK: R BKA.  Neurological: A&Ox3      LABS:              CAPILLARY BLOOD GLUCOSE      POCT Blood Glucose.: 112 mg/dL (10 Apr 2024 09:22)

## 2024-04-10 NOTE — PROGRESS NOTE ADULT - PROBLEM SELECTOR PLAN 3
Hx of dysphagia. NG placed 4/7 however removed by pt.      - GOC 4/8: Comfort feed with understanding of aspiration risk. Hx of C. diff 12/2023 during last admission. p/w watery diarrhea. C diff positive 4/5.  5 episodes of watery diarrhea. 4/6 2 episodes overnight  GI PCR negative    - Vancomycin 125mg PO q6 for 10 days

## 2024-04-10 NOTE — CONSULT NOTE ADULT - SUBJECTIVE AND OBJECTIVE BOX
Attending:  Dr. Royal    HPI:  Mr. Shetty is a 58 year old man with a PMHx of type II DM. HTN, HLD, ESRD (AVF created 5/23), R BKA, L toe amputation, h/o Cdiff in 12/2023, dementia, h/o CVA w/ R sided facial doop, who was brought in from his nursing home for a hemoglobin of 6.7 on outpatient labs. He recieved one unit in the ED and when transport was set to take him back to his nursing home, he was denied due to hypertension. He was restarted on his home meds (Coreg 3.12, and Hydral oral 50 mg), and labetalol 20 mg IV push. He was evaluated again by ED provider and found to be septic with a fever to 101.4,  meeting 2/4 SIRS criteria so patient was admitted for further workup. Upon interview, patient is arousable and able to tell me his name. He denies pain, headache, nausea, vomitting, chest pain, SOB, abdominal symptoms.     ED course:   Intitial vitals: T: 98, HR 98, BP: 107/62, RR: 18. 94% on RA   Admission vitals: T: 101.4, HR: 108, BP: 178/78, 18 and 94% on RA   Labs: WBC: 7.97, Hb: 7.1, BMP: Cr 7.8, lactate 0.8  CXR: no consolidation   Interventions: pRBCs 1 unit, Coreg 3.125 mg, hydral 50 mg oral, labetalol 20 mg IV push X2, Vanc 1000 mg, Zosyn 3.375 mg    (04 Apr 2024 01:18)      ********VASCULAR********    Patient is a 57yo M with pmhx of HTN, HLD, T2DM, CVA, dementia, BPH, ESRD on HD via LUE AVF created 5/23/23, pshx R guillotine BKA 7/6/23 and BKA closure on 7/11/23. Last seen by Dr. Royal outpatient on 3/24, at that time no issues, LUE with well healed incision, good thrill, and palpable radial pulse and well healed R BKA stump and L heel with healed ulcer and no new skin breakdown. Patient sent to [facility] when nursing home noted hgb of 6.7, received one unit in ED and was subsequently found to be septic with fever to 101.4 and  and was then admitted for further workup. During admission patient found to have right basilar opacity likely aspiration pneumonia and c.dff infection. Vascular consulted since HD had to be stopped early on 4/9 due to HD high access pressures and clotting of venous line and decreased thrill of LUE AVF.    PAST MEDICAL & SURGICAL HISTORY:  Type 2 diabetes mellitus      Cerebral artery occlusion with cerebral infarction      Hyperlipidemia      Hypertension      H/O diabetic retinopathy      ESRD (end stage renal disease)      S/P below knee amputation, right      S/P arteriovenous (AV) fistula creation          MEDICATIONS  (STANDING):  aspirin enteric coated 81 milliGRAM(s) Oral every 24 hours  atorvastatin 80 milliGRAM(s) Oral at bedtime  carvedilol 3.125 milliGRAM(s) Oral every 12 hours  chlorhexidine 2% Cloths 1 Application(s) Topical every 24 hours  epoetin maryellen-epbx (RETACRIT) Injectable 6000 Unit(s) IV Push once  finasteride 5 milliGRAM(s) Oral daily  heparin   Injectable 5000 Unit(s) SubCutaneous every 8 hours  levothyroxine 50 MICROGram(s) Oral daily  linagliptin 5 milliGRAM(s) Oral daily  tamsulosin 0.4 milliGRAM(s) Oral at bedtime  vancomycin    Solution 125 milliGRAM(s) Oral every 6 hours        Allergies  No Known Allergies                Vital Signs Last 24 Hrs  T(C): 36.8 (10 Apr 2024 05:01), Max: 36.8 (10 Apr 2024 05:01)  T(F): 98.3 (10 Apr 2024 05:01), Max: 98.3 (10 Apr 2024 05:01)  HR: 94 (10 Apr 2024 07:15) (78 - 94)  BP: 175/79 (10 Apr 2024 07:15) (169/84 - 180/92)  BP(mean): --  RR: 18 (10 Apr 2024 07:15) (18 - 18)  SpO2: 98% (10 Apr 2024 07:15) (98% - 100%)    Parameters below as of 10 Apr 2024 07:15  Patient On (Oxygen Delivery Method): nasal cannula  O2 Flow (L/min): 2      I&O's Summary      Physical Exam:  General: NAD, resting comfortably  Pulmonary: normal resp effort  Cardiovascular: NSR  Abdominal: soft, NT/ND  Extremities:   Pulses:           CAPILLARY BLOOD GLUCOSE    POCT Blood Glucose.: 113 mg/dL (10 Apr 2024 06:53)  POCT Blood Glucose.: 110 mg/dL (09 Apr 2024 21:45)  POCT Blood Glucose.: 95 mg/dL (09 Apr 2024 17:49)  POCT Blood Glucose.: 78 mg/dL (09 Apr 2024 14:06)            Assessment: Patient is a 57yo M with pmhx of HTN, HLD, T2DM, CVA, dementia, BPH, ESRD on HD via LUE AVF created 5/23/23, pshx R guillotine BKA 7/6/23 and BKA closure on 7/11/23. Last seen by Dr. Royal outpatient on 3/24, at that time no issues, LUE with well healed incision, good thrill, and palpable radial pulse and well healed R BKA stump and L heel with healed ulcer and no new skin breakdown. Patient sent to [facility] when nursing home noted hgb of 6.7, received one unit in ED and was subsequently found to be septic with fever to 101.4 and  and was then admitted for further workup. During admission patient found to have right basilar opacity likely aspiration pneumonia and c.dff infection. Vascular consulted since HD had to be stopped early on 4/9 due to HD high access pressures and clotting of venous line and decreased thrill of LUE AVF.    Recommendations:  -   - discussed with Chief on call  - call x [phone] with questions Attending:  Dr. Royal    HPI:  Mr. Shetty is a 58 year old man with a PMHx of type II DM. HTN, HLD, ESRD (AVF created 5/23), R BKA, L toe amputation, h/o Cdiff in 12/2023, dementia, h/o CVA w/ R sided facial doop, who was brought in from his nursing home for a hemoglobin of 6.7 on outpatient labs. He recieved one unit in the ED and when transport was set to take him back to his nursing home, he was denied due to hypertension. He was restarted on his home meds (Coreg 3.12, and Hydral oral 50 mg), and labetalol 20 mg IV push. He was evaluated again by ED provider and found to be septic with a fever to 101.4,  meeting 2/4 SIRS criteria so patient was admitted for further workup. Upon interview, patient is arousable and able to tell me his name. He denies pain, headache, nausea, vomitting, chest pain, SOB, abdominal symptoms.     ED course:   Intitial vitals: T: 98, HR 98, BP: 107/62, RR: 18. 94% on RA   Admission vitals: T: 101.4, HR: 108, BP: 178/78, 18 and 94% on RA   Labs: WBC: 7.97, Hb: 7.1, BMP: Cr 7.8, lactate 0.8  CXR: no consolidation   Interventions: pRBCs 1 unit, Coreg 3.125 mg, hydral 50 mg oral, labetalol 20 mg IV push X2, Vanc 1000 mg, Zosyn 3.375 mg    (04 Apr 2024 01:18)      ********VASCULAR********    Patient is a 57yo M with pmhx of HTN, HLD, T2DM, CVA, dementia, BPH, ESRD on HD via LUE AVF created 5/23/23, pshx R guillotine BKA 7/6/23 and BKA closure on 7/11/23. Last seen by Dr. Royal outpatient on 3/24, at that time no issues, LUE with well healed incision, good thrill, and palpable radial pulse and well healed R BKA stump and L heel with healed ulcer and no new skin breakdown. Patient sent to [facility] when nursing home noted hgb of 6.7, received one unit in ED and was subsequently found to be septic with fever to 101.4 and  and was then admitted for further workup. During admission patient found to have right basilar opacity likely aspiration pneumonia and c.dff infection. Vascular consulted since HD had to be stopped early on 4/9 due to HD high access pressures and clotting of venous line and decreased thrill of LUE AVF.    PAST MEDICAL & SURGICAL HISTORY:  Type 2 diabetes mellitus      Cerebral artery occlusion with cerebral infarction      Hyperlipidemia      Hypertension      H/O diabetic retinopathy      ESRD (end stage renal disease)      S/P below knee amputation, right      S/P arteriovenous (AV) fistula creation          MEDICATIONS  (STANDING):  aspirin enteric coated 81 milliGRAM(s) Oral every 24 hours  atorvastatin 80 milliGRAM(s) Oral at bedtime  carvedilol 3.125 milliGRAM(s) Oral every 12 hours  chlorhexidine 2% Cloths 1 Application(s) Topical every 24 hours  epoetin maryellen-epbx (RETACRIT) Injectable 6000 Unit(s) IV Push once  finasteride 5 milliGRAM(s) Oral daily  heparin   Injectable 5000 Unit(s) SubCutaneous every 8 hours  levothyroxine 50 MICROGram(s) Oral daily  linagliptin 5 milliGRAM(s) Oral daily  tamsulosin 0.4 milliGRAM(s) Oral at bedtime  vancomycin    Solution 125 milliGRAM(s) Oral every 6 hours        Allergies  No Known Allergies                Vital Signs Last 24 Hrs  T(C): 36.8 (10 Apr 2024 05:01), Max: 36.8 (10 Apr 2024 05:01)  T(F): 98.3 (10 Apr 2024 05:01), Max: 98.3 (10 Apr 2024 05:01)  HR: 94 (10 Apr 2024 07:15) (78 - 94)  BP: 175/79 (10 Apr 2024 07:15) (169/84 - 180/92)  BP(mean): --  RR: 18 (10 Apr 2024 07:15) (18 - 18)  SpO2: 98% (10 Apr 2024 07:15) (98% - 100%)    Parameters below as of 10 Apr 2024 07:15  Patient On (Oxygen Delivery Method): nasal cannula  O2 Flow (L/min): 2      I&O's Summary      Physical Exam:  General: NAD, resting comfortably  Pulmonary: normal resp effort  Cardiovascular: NSR  Abdominal: soft, NT/ND  Extremities: LUE AVF well healed, with diminished thrill and pulsatile thrill distally. R AKA well healed.  Pulses: palpable L radial pulse          CAPILLARY BLOOD GLUCOSE    POCT Blood Glucose.: 113 mg/dL (10 Apr 2024 06:53)  POCT Blood Glucose.: 110 mg/dL (09 Apr 2024 21:45)  POCT Blood Glucose.: 95 mg/dL (09 Apr 2024 17:49)  POCT Blood Glucose.: 78 mg/dL (09 Apr 2024 14:06)            Assessment: Patient is a 57yo M with pmhx of HTN, HLD, T2DM, CVA, dementia, BPH, ESRD on HD via LUE AVF created 5/23/23, pshx R guillotine BKA 7/6/23 and BKA closure on 7/11/23. Last seen by Dr. Royal outpatient on 3/24, at that time no issues, LUE with well healed incision, good thrill, and palpable radial pulse and well healed R BKA stump and L heel with healed ulcer and no new skin breakdown. Patient sent to [facility] when nursing home noted hgb of 6.7, received one unit in ED and was subsequently found to be septic with fever to 101.4 and  and was then admitted for further workup. During admission patient found to have right basilar opacity likely aspiration pneumonia and c.dff infection. Vascular consulted since HD had to be stopped early on 4/9 due to HD high access pressures and clotting of venous line and decreased thrill of LUE AVF. On exam, patient with LUE AVF that is well healed however diminished thrill and pulsatile thrill distally and palpable L radial pulse.     Recommendations:  - Obtain hemodialysis access US urgently to evaluate LUE AVF  - Vascular will continue to follow, may need LUE fistulogram  - discussed with Chief on call  - call x [phone] with questions

## 2024-04-10 NOTE — PROGRESS NOTE ADULT - PROBLEM SELECTOR PLAN 5
.  Patient is DNR/DNI, MOLST in chart  -cousin (Leo Chavarriarst) is designated HCP  -see GOC note from 4/8

## 2024-04-10 NOTE — CHART NOTE - NSCHARTNOTEFT_GEN_A_CORE
Admitting Diagnosis:   Patient is a 58y old  Male who presents with a chief complaint of fever (10 Apr 2024 17:40)      PAST MEDICAL & SURGICAL HISTORY:  Type 2 diabetes mellitus      Cerebral artery occlusion with cerebral infarction      Hyperlipidemia      Hypertension      H/O diabetic retinopathy      ESRD (end stage renal disease)      S/P below knee amputation, right      S/P arteriovenous (AV) fistula creation          Current Nutrition Order: Minced and moist, 1200ml fluid restriction    PO Intake: Good (%) [   ]  Fair (50-75%) [   ] Poor (<25%) [ X ]    GI Issues: No N/V/D/C    Pain: No pain noted    Skin Integrity: Adolfo score 13    Labs:   04-10    140  |  103  |  55<H>  ----------------------------<  155<H>  4.7   |  21<L>  |  6.49<H>    Ca    9.5      10 Apr 2024 18:29  Phos  4.7     04-10  Mg     2.2     04-10    TPro  7.1  /  Alb  3.0<L>  /  TBili  0.2  /  DBili  x   /  AST  31  /  ALT  26  /  AlkPhos  73  04-10    CAPILLARY BLOOD GLUCOSE      POCT Blood Glucose.: 157 mg/dL (10 Apr 2024 17:53)  POCT Blood Glucose.: 157 mg/dL (10 Apr 2024 13:01)  POCT Blood Glucose.: 112 mg/dL (10 Apr 2024 09:22)  POCT Blood Glucose.: 113 mg/dL (10 Apr 2024 06:53)  POCT Blood Glucose.: 110 mg/dL (09 Apr 2024 21:45)      Medications:  MEDICATIONS  (STANDING):  aspirin enteric coated 81 milliGRAM(s) Oral every 24 hours  atorvastatin 80 milliGRAM(s) Oral at bedtime  carvedilol 3.125 milliGRAM(s) Oral every 12 hours  chlorhexidine 2% Cloths 1 Application(s) Topical every 24 hours  epoetin maryellen-epbx (RETACRIT) Injectable 6000 Unit(s) IV Push once  finasteride 5 milliGRAM(s) Oral daily  heparin   Injectable 5000 Unit(s) SubCutaneous every 8 hours  levothyroxine 50 MICROGram(s) Oral daily  linagliptin 5 milliGRAM(s) Oral daily  tamsulosin 0.4 milliGRAM(s) Oral at bedtime  vancomycin    Solution 125 milliGRAM(s) Oral every 6 hours    MEDICATIONS  (PRN):      Admission Anthropometrics:  Height for BMI (FEET)	6 Feet  Height for BMI (INCHES)	0 Inch(s)  Height for BMI (CENTIMETERS)	182.88 Centimeter(s)  Weight for BMI (lbs)	128.7 lb  Weight for BMI (kg)	58.4 kg  Body Mass Index	17.4    Estimated energy needs:   Estimated Energy Needs From (joselyn/kg)	30  Estimated Energy Needs To (joselyn/kg)	35  Estimated Energy Needs Calculated From (joselyn/kg)	1752  Estimated Energy Needs Calculated To (joselyn/kg)	2044    Estimated Protein Needs From (g/kg)	1.3  Estimated Protein Needs To (g/kg)	1.5  Estimated Protein Needs Calculated From (g/kg)	75.92  Estimated Protein Needs Calculated To (g/kg)	87.6    Estimated needs based on dosing wt as <120% IBW 167lb/76.1kg (77%) and per RD judgement. Needs adjusted for age, malnutrition, HD, and clinical status. IBW adjusted for right BKA (-6%). Defer fluids to team.    Subjective:   58M T2DM, CVA x2 w/ residual L-sided weakness, early onset dementia, HTN, HLD, DVT, CKD V (Cr ~6, not on HD), BPH, PSH L toe amputation, R BKA, AVF creation (5/2023) BIBEMS from nursing home for anemia requiring transfusion, found to meet SIRS criteria s/p transfusion (FNHTR vs. sepsis) w/ elevated BUN/Cr, admitted for VS derangement. Initiated HD 4/4. Also found to have C. diff. c/c/b aspiration PNA vs pneumonitis. Clinically improving on Abx and HD .     Unable to complete full nutrition assessment this evening s/p rapid response. Pt lethargic after imaging. Resting in bed on 2L NC satting wnl. Pt was cleared for minced and moist diet, tolerating well however with poor PO intake. Will recommend oral nutrition supplement. RD to follow.     Previous Nutrition Diagnosis:  Malnutrition...  severe protein-calorie malnutrition  suspect prolonged inadequate intake  mild-moderate muscle and fat wasting, poor intake in-house    Active [ X ]  Resolved [   ]    Goal:  Pt to consistently meet >75% nutrient needs via most appropriate route for nutrition. Reduce signs and symptoms of protein-calorie malnutrition.    Recommendations:  -Continue minced and moist diet with 1200ml fluid restriction  --Recommend add nepro once/day  --Recommend add nephrovite  --Follow intake closely, adjust nutrition plan prn  --Maintain aspiration precautions at all times  -Weekly wts  -Monitor chemistry, GI function, and skin integrity   -RD diet edu prn    Education: Deferred    Risk Level: High [ X ] Moderate [   ] Low [   ]

## 2024-04-10 NOTE — PROGRESS NOTE ADULT - PROBLEM SELECTOR PLAN 6
.  Complex medical decision making / symptom management in the setting of advanced illness.    Emotional support provided, questions answered.  Active Psychosocial Referrals:  [x]Social Work/Case management [x]PT/OT []Chaplaincy []Hospice  []Patient/Family Support []Holistic RN []Massage Therapy []Music Therapy []Ethics  Coping: [] well [x] with difficulty [] poor coping [] unable to assess  Support system: [] strong [x] adequate [] inadequate    For new or uncontrolled symptoms, please call Palliative Care at [phone]). The service is available 24/7 (including nights & weekends) to provide symptom management recommendations over the phone as appropriate

## 2024-04-10 NOTE — PROGRESS NOTE ADULT - PROBLEM SELECTOR PLAN 7
p/w Hgb 6.7 outpt. s/p 1u pRBC with good response. Baseline around 8 per HIE. Iron studies indicative of AoCD. hemolysis lab negative. Retic Index: hypopoliferative. PE benign with no signs of overt/ apparent bleeding. CHELLY neg. Hgb stable.   mostly likely 2/2 ESRD     - active T&S, transfuse hgb <7. - hold home med hydralazine 50 qd, nifedipine 120mg qd   - continue home Coreg 3.125 BID    #Hypertensive Urgency - RESOLVED  s/p labetalol 20mg IVP x2

## 2024-04-10 NOTE — PROGRESS NOTE ADULT - PROBLEM SELECTOR PLAN 10
residual L sided weakness and R sided facial droop. Discontinued Plavix.     - continue home med ASA 81, Lipitor 80mg - continue home meds Tamsulosin 0.4 mg qhs and Finasteride 5 mg qd.    #T2DM  A1c 5.5. Speech and Swallow evaluation unable to be completed 4/5 due to lethargy  - CC diet   - mISS  - NPO pending re-evaluation      #hypothyroidism  - continue home med synthroid 50mcg qd

## 2024-04-10 NOTE — PROGRESS NOTE ADULT - ATTENDING COMMENTS
Assessment, plan and impression:   58M with PMH of R BKA, CVA with residual weakness, dementia, HTN, HLD, CKD, presenting with worsening renal failure and anemia, also found to have c diff colitis and course complicated by aspiration pneumonia vs pneumonitis. Pending GOC discussion lead by palliative care regarding further care for patient, and discussion regarding feeding patient. Has worked with SLP, and still not recommended for PO intake at this time.  Still a little early to say if he will safely be able to tolerate PO intake.   HD was initiated on this hospitalization as well.     Plan  - GOC discussion lead by palliative care - comfort feeds  - completing treatment of c diff colitis as above  - continue with dialysis for now, however issues with fistula and unable to do full dialysis session yesterday.  Appreciate input from vascular - pending dopplers of the upper extremities, and further workup pending those findings  - now pending new long term care placement that can take dialysis patients who cannot independently transfer from bed to chair    40 minutes spent on this encounter, including face to face with patient, care coordination and documentation.

## 2024-04-10 NOTE — PROGRESS NOTE ADULT - PROBLEM SELECTOR PLAN 12
Fluid: PO  Electrolytes: ESRD / replete PRN  Nutrition: PO   DVT ppx: heparin SQ  GI ppx: none for now   Code: DNR/DNI, MEWS EXEMPT  Dispo: [facility] RESOLVED. POA w/ 3/4 SIRS (WBC, fever and tachycardia) w/ elevated Cr. During admission, C diff positive. UA   Febrile 102.4 4/6 w/ new CXR finding on RLL and elevated procal. Afebrile, VSS on Abx. WBC WNL     CXR 4/6: Right basilar opacity      Etiology likely multifactorial: Aspiration PNA vs pneumonitis and/ or C. diff    - CTX 2mg qd (4/5-4/9), vanc 125mg PO q6h (4/5-4/15)  - BCx 4/5, 4/6 - NGTD

## 2024-04-10 NOTE — PROGRESS NOTE ADULT - PROBLEM SELECTOR PLAN 4
Hx of C. diff 12/2023 during last admission. p/w watery diarrhea. C diff positive 4/5.  5 episodes of watery diarrhea. 4/6 2 episodes overnight  GI PCR negative    - Vancomycin 125mg PO q6 for 10 days Nutrition consulted

## 2024-04-10 NOTE — PROGRESS NOTE ADULT - PROBLEM SELECTOR PLAN 8
- hold home med hydralazine 50 qd, nifedipine 120mg qd   - continue home Coreg 3.125 BID    #Hypertensive Urgency - RESOLVED  s/p labetalol 20mg IVP x2 per HIE, prior Hx of HD. However no active HD according to NH staff.   Bun >100s, Cr 5.8 12/2023 -> 7.8 on admission.     - renal consulted for HD, consented by HCP Leo Cisneros [phone]  - discontinued home NaHCO3 650mg TID  - Hgb 7.1 4/6. Transfuse 1 unit pRBC with dialysis

## 2024-04-10 NOTE — PROGRESS NOTE ADULT - PROBLEM SELECTOR PLAN 5
Nutrition consulted RESOLVED. Known early-onset dementia. A&Ox2-3 at baseline. POA w/ AOx1 but appears back to baseline s/p HD and ABx    multifactorial: infection and/ or uremia.

## 2024-04-10 NOTE — PROGRESS NOTE ADULT - PROBLEM SELECTOR PLAN 2
CXR 4/6: Right basilar opacity. Aspiration PNA vs pneumonitis    - as above Hx of dysphagia. NG placed 4/7 however removed by pt.      - GOC 4/8: Comfort feed with understanding of aspiration risk.

## 2024-04-10 NOTE — PROGRESS NOTE ADULT - PROBLEM SELECTOR PLAN 1
RESOLVED. POA w/ 3/4 SIRS (WBC, fever and tachycardia) w/ elevated Cr. During admission, C diff positive. UA   Febrile 102.4 4/6 w/ new CXR finding on RLL and elevated procal. Afebrile, VSS on Abx. WBC WNL     CXR 4/6: Right basilar opacity      Etiology likely multifactorial: Aspiration PNA vs pneumonitis and/ or C. diff    - CTX 2mg qd, vanc 125mg PO q6h (4/5-4/15)  - BCx 4/5, 4/6 - NGTD HD 4/10 was ended early due to high assess pressure. physical exam shows decreased thrill.     - vascular surg consulted. f/u LUE venogram

## 2024-04-11 ENCOUNTER — TRANSCRIPTION ENCOUNTER (OUTPATIENT)
Age: 59
End: 2024-04-11

## 2024-04-11 LAB
ANION GAP SERPL CALC-SCNC: 10 MMOL/L — SIGNIFICANT CHANGE UP (ref 5–17)
APTT BLD: 29.4 SEC — SIGNIFICANT CHANGE UP (ref 24.5–35.6)
APTT BLD: 30.5 SEC — SIGNIFICANT CHANGE UP (ref 24.5–35.6)
BLD GP AB SCN SERPL QL: NEGATIVE — SIGNIFICANT CHANGE UP
BUN SERPL-MCNC: 53 MG/DL — HIGH (ref 7–23)
CALCIUM SERPL-MCNC: 9.3 MG/DL — SIGNIFICANT CHANGE UP (ref 8.4–10.5)
CHLORIDE SERPL-SCNC: 105 MMOL/L — SIGNIFICANT CHANGE UP (ref 96–108)
CO2 SERPL-SCNC: 28 MMOL/L — SIGNIFICANT CHANGE UP (ref 22–31)
CREAT SERPL-MCNC: 6.69 MG/DL — HIGH (ref 0.5–1.3)
CULTURE RESULTS: SIGNIFICANT CHANGE UP
EGFR: 9 ML/MIN/1.73M2 — LOW
GLUCOSE BLDC GLUCOMTR-MCNC: 126 MG/DL — HIGH (ref 70–99)
GLUCOSE BLDC GLUCOMTR-MCNC: 129 MG/DL — HIGH (ref 70–99)
GLUCOSE SERPL-MCNC: 116 MG/DL — HIGH (ref 70–99)
HCT VFR BLD CALC: 25.4 % — LOW (ref 39–50)
HGB BLD-MCNC: 7.6 G/DL — LOW (ref 13–17)
INR BLD: 0.98 — SIGNIFICANT CHANGE UP (ref 0.85–1.18)
INR BLD: 1 — SIGNIFICANT CHANGE UP (ref 0.85–1.18)
MCHC RBC-ENTMCNC: 28.1 PG — SIGNIFICANT CHANGE UP (ref 27–34)
MCHC RBC-ENTMCNC: 29.9 GM/DL — LOW (ref 32–36)
MCV RBC AUTO: 94.1 FL — SIGNIFICANT CHANGE UP (ref 80–100)
NRBC # BLD: 0 /100 WBCS — SIGNIFICANT CHANGE UP (ref 0–0)
PLATELET # BLD AUTO: 246 K/UL — SIGNIFICANT CHANGE UP (ref 150–400)
POTASSIUM SERPL-MCNC: 4.5 MMOL/L — SIGNIFICANT CHANGE UP (ref 3.5–5.3)
POTASSIUM SERPL-SCNC: 4.5 MMOL/L — SIGNIFICANT CHANGE UP (ref 3.5–5.3)
PROTHROM AB SERPL-ACNC: 11.2 SEC — SIGNIFICANT CHANGE UP (ref 9.5–13)
PROTHROM AB SERPL-ACNC: 11.4 SEC — SIGNIFICANT CHANGE UP (ref 9.5–13)
RBC # BLD: 2.7 M/UL — LOW (ref 4.2–5.8)
RBC # FLD: 15.1 % — HIGH (ref 10.3–14.5)
RH IG SCN BLD-IMP: POSITIVE — SIGNIFICANT CHANGE UP
SODIUM SERPL-SCNC: 143 MMOL/L — SIGNIFICANT CHANGE UP (ref 135–145)
SPECIMEN SOURCE: SIGNIFICANT CHANGE UP
WBC # BLD: 5.6 K/UL — SIGNIFICANT CHANGE UP (ref 3.8–10.5)
WBC # FLD AUTO: 5.6 K/UL — SIGNIFICANT CHANGE UP (ref 3.8–10.5)

## 2024-04-11 PROCEDURE — 99232 SBSQ HOSP IP/OBS MODERATE 35: CPT

## 2024-04-11 PROCEDURE — 99231 SBSQ HOSP IP/OBS SF/LOW 25: CPT | Mod: GC

## 2024-04-11 RX ADMIN — Medication 125 MILLIGRAM(S): at 00:27

## 2024-04-11 RX ADMIN — CHLORHEXIDINE GLUCONATE 1 APPLICATION(S): 213 SOLUTION TOPICAL at 11:35

## 2024-04-11 RX ADMIN — Medication 125 MILLIGRAM(S): at 06:19

## 2024-04-11 RX ADMIN — TAMSULOSIN HYDROCHLORIDE 0.4 MILLIGRAM(S): 0.4 CAPSULE ORAL at 23:01

## 2024-04-11 RX ADMIN — HEPARIN SODIUM 5000 UNIT(S): 5000 INJECTION INTRAVENOUS; SUBCUTANEOUS at 23:01

## 2024-04-11 RX ADMIN — Medication 50 MICROGRAM(S): at 06:19

## 2024-04-11 RX ADMIN — Medication 1 TABLET(S): at 11:34

## 2024-04-11 RX ADMIN — ATORVASTATIN CALCIUM 80 MILLIGRAM(S): 80 TABLET, FILM COATED ORAL at 23:01

## 2024-04-11 RX ADMIN — ERYTHROPOIETIN 6000 UNIT(S): 10000 INJECTION, SOLUTION INTRAVENOUS; SUBCUTANEOUS at 18:08

## 2024-04-11 RX ADMIN — FINASTERIDE 5 MILLIGRAM(S): 5 TABLET, FILM COATED ORAL at 11:36

## 2024-04-11 RX ADMIN — LINAGLIPTIN 5 MILLIGRAM(S): 5 TABLET, FILM COATED ORAL at 11:36

## 2024-04-11 RX ADMIN — HEPARIN SODIUM 5000 UNIT(S): 5000 INJECTION INTRAVENOUS; SUBCUTANEOUS at 06:19

## 2024-04-11 RX ADMIN — HEPARIN SODIUM 5000 UNIT(S): 5000 INJECTION INTRAVENOUS; SUBCUTANEOUS at 15:34

## 2024-04-11 RX ADMIN — Medication 81 MILLIGRAM(S): at 11:34

## 2024-04-11 RX ADMIN — CARVEDILOL PHOSPHATE 3.12 MILLIGRAM(S): 80 CAPSULE, EXTENDED RELEASE ORAL at 06:19

## 2024-04-11 RX ADMIN — CARVEDILOL PHOSPHATE 3.12 MILLIGRAM(S): 80 CAPSULE, EXTENDED RELEASE ORAL at 17:52

## 2024-04-11 RX ADMIN — Medication 125 MILLIGRAM(S): at 11:37

## 2024-04-11 RX ADMIN — Medication 125 MILLIGRAM(S): at 17:52

## 2024-04-11 NOTE — PROGRESS NOTE ADULT - PROBLEM SELECTOR PLAN 1
HD 4/10 was ended early due to high assess pressure. physical exam shows decreased thrill.     LUE fistulagram 4/11: no stenosis / clots.     - vascular surg consulted. HD 4/9 was ended early due to high assess pressure. physical exam shows decreased thrill.     LUE fistulagram 4/10: no stenosis / clots.     - vascular surg consulted.

## 2024-04-11 NOTE — PROVIDER CONTACT NOTE (OTHER) - ACTION/TREATMENT ORDERED:
Terminate HD. MD notified. Only 25 minutes HD completed, unable to completed related to dialysis access issues.

## 2024-04-11 NOTE — PROGRESS NOTE ADULT - SUBJECTIVE AND OBJECTIVE BOX
Nephrology progress note    Seen at bedside. Resting comfortably, no acute complaints. Rapid yesterday for decreased responsiveness, patient subsequently improved. Awake and alert this morning. Planned for HD today.    Allergies:  No Known Allergies    Hospital Medications:   MEDICATIONS  (STANDING):  aspirin enteric coated 81 milliGRAM(s) Oral every 24 hours  atorvastatin 80 milliGRAM(s) Oral at bedtime  carvedilol 3.125 milliGRAM(s) Oral every 12 hours  chlorhexidine 2% Cloths 1 Application(s) Topical every 24 hours  epoetin maryellen-epbx (RETACRIT) Injectable 6000 Unit(s) IV Push once  finasteride 5 milliGRAM(s) Oral daily  heparin   Injectable 5000 Unit(s) SubCutaneous every 8 hours  levothyroxine 50 MICROGram(s) Oral daily  linagliptin 5 milliGRAM(s) Oral daily  Nephro-wendy 1 Tablet(s) Oral every 24 hours  tamsulosin 0.4 milliGRAM(s) Oral at bedtime  vancomycin    Solution 125 milliGRAM(s) Oral every 6 hours    REVIEW OF SYSTEMS:  All other review of systems is negative unless indicated above.    VITALS:  T(F): 98.4 (04-11-24 @ 10:00), Max: 98.5 (04-10-24 @ 17:11)  HR: 74 (04-11-24 @ 10:00)  BP: 115/70 (04-11-24 @ 10:00)  RR: 19 (04-11-24 @ 10:00)  SpO2: 100% (04-11-24 @ 10:00)  Wt(kg): --    04-10 @ 07:01  -  04-11 @ 07:00  --------------------------------------------------------  IN: 0 mL / OUT: 550 mL / NET: -550 mL    04-11 @ 07:01  -  04-11 @ 13:52  --------------------------------------------------------  IN: 0 mL / OUT: 100 mL / NET: -100 mL        PHYSICAL EXAM:  Gen: NAD, lying in bed  HEENT: NCAT, EOMI  Pulm: No respiratory distress  CV: Regular rate  Abd: non-distended  Ext: no edema  Neuro: Awake, alert, interactive  Skin: No rashes visible  Access: LUE AVF w/ bruit and thrill, diminished distally    LABS:  04-10    140  |  103  |  55<H>  ----------------------------<  155<H>  4.7   |  21<L>  |  6.49<H>    Ca    9.5      10 Apr 2024 18:29  Phos  4.7     04-10  Mg     2.2     04-10    TPro  7.1  /  Alb  3.0<L>  /  TBili  0.2  /  DBili      /  AST  31  /  ALT  26  /  AlkPhos  73  04-10                          8.3    6.71  )-----------( 280      ( 10 Apr 2024 18:29 )             27.0       Urine Studies:  Creatinine Trend: 6.49<--, 4.86<--, 3.63<--, 4.96<--, 5.98<--, 7.98<--  Urinalysis Basic - ( 10 Apr 2024 18:29 )    Color:  / Appearance:  / SG:  / pH:   Gluc: 155 mg/dL / Ketone:   / Bili:  / Urobili:    Blood:  / Protein:  / Nitrite:    Leuk Esterase:  / RBC:  / WBC    Sq Epi:  / Non Sq Epi:  / Bacteria:         RADIOLOGY & ADDITIONAL STUDIES:  reviewed

## 2024-04-11 NOTE — PROGRESS NOTE ADULT - PROBLEM SELECTOR PLAN 12
RESOLVED. POA w/ 3/4 SIRS (WBC, fever and tachycardia) w/ elevated Cr. During admission, C diff positive. UA   Febrile 102.4 4/6 w/ new CXR finding on RLL and elevated procal. Afebrile, VSS on Abx. WBC WNL     CXR 4/6: Right basilar opacity      Etiology likely multifactorial: Aspiration PNA vs pneumonitis and/ or C. diff    - CTX 2mg qd (4/5-4/9), vanc 125mg PO q6h (4/5-4/15)  - BCx 4/5, 4/6 - NGTD

## 2024-04-11 NOTE — PROGRESS NOTE ADULT - PROBLEM SELECTOR PLAN 8
per HIE, prior Hx of HD. However no active HD according to NH staff.   Bun >100s, Cr 5.8 12/2023 -> 7.8 on admission.     - renal consulted for HD, consented by HCP Leo Cisneros [phone]  - discontinued home NaHCO3 650mg TID  - Hgb 7.1 4/6. Transfuse 1 unit pRBC with dialysis

## 2024-04-11 NOTE — PROGRESS NOTE ADULT - ASSESSMENT
8-year-old male with HTN, DM2, dementia, CKD 5 sent in by nursing home for Hb 6.7 s/p 1 PRBC in ER, then with fever and tachycardia, admitted for further evaluation. Nephrology consulted for HD initiation. HD#1 .    # New start ESRD  Discussed with Vascular, will plan to attempt HD today per schedule given result of access duplex. If patient continues to have access dysfunction will likely need fistulogram.  Renal diet  Fluid restriction <1.2L/day  Strict I&O, daily weights  SW assistance for outpatient HD center placement.  Hepatitis panel and TB testing (PPD or quant TB) for HD placement    Hemodialysis Treatment.:     Schedule: Once, Modality: Hemodialysis, Access: Arteriovenous Fistula    Dialyzer: Optiflux [id number], Time: 180 Min    Blood Flow: 400 mL/Min , Dialysate Flow: 500 mL/Min, Dialysate Temp: 36.5, Tubinmm (Adult)    Target Fluid Removal: 2 Liters    Dialysate Electrolytes (mEq/L): Potassium 2, Calcium 2.5, Sodium 138, Bicarbonate 35    # Access  LUE AVF with diminished thrill - Vascular following, duplex US without significant finding. Will plan to attempt HD as above, further intervention to be determined based on access patency.    # Anemia  Hb not at goal 8.3  Iron sat 11%  Defer iron in setting of active infection  epo w/ HD    # CKD-BMD  Calcium 9.5  Phos 4.7. At goal 3-5.5.

## 2024-04-11 NOTE — PROVIDER CONTACT NOTE (OTHER) - BACKGROUND
CKD pt on hemodialysis with left upper arm AVF, unable to complete previous HD related to dialysis access issues.

## 2024-04-11 NOTE — PROGRESS NOTE ADULT - ATTENDING COMMENTS
Assessment, plan and impression:   58M with PMH of R BKA, CVA with residual weakness, dementia, HTN, HLD, CKD, presenting with worsening renal failure and anemia, also found to have c diff colitis and course complicated by aspiration pneumonia vs pneumonitis. Pending GOC discussion lead by palliative care regarding further care for patient, and discussion regarding feeding patient. Has worked with SLP, and still not recommended for PO intake at this time.  Still a little early to say if he will safely be able to tolerate PO intake.   HD was initiated on this hospitalization as well.     Plan  - GOC discussion lead by palliative care - comfort feeds  - completing treatment of c diff colitis as above  - dialysis today, pending further vascular workup in the event that fistula does not work well again today.  S/p dopplers of the fistula, and without stenosis or clot at this time  - now pending new long term care placement that can take dialysis patients who cannot independently transfer from bed to chair    40 minutes spent on this encounter, including face to face with patient, care coordination and documentation. Assessment, plan and impression:   58M with PMH of R BKA, CVA with residual weakness, dementia, HTN, HLD, CKD, presenting with worsening renal failure and anemia, also found to have c diff colitis and course complicated by aspiration pneumonia vs pneumonitis. Pending GOC discussion lead by palliative care regarding further care for patient, and discussion regarding feeding patient. Has worked with SLP, and still not recommended for PO intake at this time.  Still a little early to say if he will safely be able to tolerate PO intake.   HD was initiated on this hospitalization as well.     Plan  - GOC discussion lead by palliative care - comfort feeds  - completing treatment of c diff colitis as above  - dialysis today, pending further vascular workup in the event that fistula does not work well again today.  S/p dopplers of the fistula, and without stenosis or clot at this time  - now pending new long term care placement that can take dialysis patients who cannot independently transfer from bed to chair    Moderate level of medical decision making required for this case, including the presence of two stable chronic medical issues and discussion of plan with the nephrology team.

## 2024-04-11 NOTE — PROGRESS NOTE ADULT - SUBJECTIVE AND OBJECTIVE BOX
OVERNIGHT EVENTS: NAEO    SUBJECTIVE  Pt was seen and examined at bedside. Awake and alert.     Patient denies any fevers/ chills, n/v, headache, acute SOB, chest pain, abdominal pain, genitourinary sx    12-point review of systems otherwise negative      Vital Signs Last 24 Hrs  T(C): 36.3 (11 Apr 2024 05:00), Max: 36.9 (10 Apr 2024 17:11)  T(F): 97.4 (11 Apr 2024 05:00), Max: 98.5 (10 Apr 2024 17:11)  HR: 69 (11 Apr 2024 05:00) (69 - 86)  BP: 117/65 (11 Apr 2024 05:00) (117/65 - 171/91)  BP(mean): --  RR: 19 (11 Apr 2024 05:00) (18 - 19)  SpO2: 100% (11 Apr 2024 05:00) (99% - 100%)    Parameters below as of 11 Apr 2024 05:00  Patient On (Oxygen Delivery Method): nasal cannula  O2 Flow (L/min): 2        PHYSICAL EXAM   General: NAD, cachetic  HEENT: MMM  Respiratory: CTA B/L; ormal WOB  Cardiovascular: RRR; no m/r/g  Gastrointestinal: Soft; NTND; + bowel sounds  : no suprapubic tenderness  Vascular: extremities WWP, LUE AVF c/d/i w/ decreased thrill  MSK: R BKA  Neurological: A&Ox3      LABS:                        8.3    6.71  )-----------( 280      ( 10 Apr 2024 18:29 )             27.0     04-10    140  |  103  |  55<H>  ----------------------------<  155<H>  4.7   |  21<L>  |  6.49<H>    Ca    9.5      10 Apr 2024 18:29  Phos  4.7     04-10  Mg     2.2     04-10    TPro  7.1  /  Alb  3.0<L>  /  TBili  0.2  /  DBili  x   /  AST  31  /  ALT  26  /  AlkPhos  73  04-10    PT/INR - ( 11 Apr 2024 05:30 )   PT: 11.2 sec;   INR: 0.98          PTT - ( 11 Apr 2024 05:30 )  PTT:29.4 sec  Urinalysis Basic - ( 10 Apr 2024 18:29 )    Color: x / Appearance: x / SG: x / pH: x  Gluc: 155 mg/dL / Ketone: x  / Bili: x / Urobili: x   Blood: x / Protein: x / Nitrite: x   Leuk Esterase: x / RBC: x / WBC x   Sq Epi: x / Non Sq Epi: x / Bacteria: x      CAPILLARY BLOOD GLUCOSE      POCT Blood Glucose.: 129 mg/dL (11 Apr 2024 08:42)

## 2024-04-11 NOTE — PROGRESS NOTE ADULT - PROBLEM SELECTOR PLAN 6
p/w Hgb 6.7 outpt. s/p 1u pRBC with good response. Baseline around 8 per HIE. Iron studies indicative of AoCD. hemolysis lab negative. Retic Index: hypopoliferative. PE benign with no signs of overt/ apparent bleeding. CHELLY neg. Hgb stable.   mostly likely 2/2 ESRD     - active T&S, transfuse hgb <7.

## 2024-04-11 NOTE — PROGRESS NOTE ADULT - PROBLEM SELECTOR PLAN 5
RESOLVED. Known early-onset dementia. A&Ox2-3 at baseline. POA w/ AOx1 but appears back to baseline s/p HD and ABx    multifactorial: infection and/ or uremia.

## 2024-04-11 NOTE — PROGRESS NOTE ADULT - PROBLEM SELECTOR PLAN 7
- hold home med hydralazine 50 qd, nifedipine 120mg qd   - continue home Coreg 3.125 BID    #Hypertensive Urgency - RESOLVED  s/p labetalol 20mg IVP x2

## 2024-04-12 LAB
GLUCOSE BLDC GLUCOMTR-MCNC: 108 MG/DL — HIGH (ref 70–99)
GLUCOSE BLDC GLUCOMTR-MCNC: 115 MG/DL — HIGH (ref 70–99)
GLUCOSE BLDC GLUCOMTR-MCNC: 125 MG/DL — HIGH (ref 70–99)

## 2024-04-12 PROCEDURE — 99232 SBSQ HOSP IP/OBS MODERATE 35: CPT | Mod: GC

## 2024-04-12 PROCEDURE — 36905 THRMBC/NFS DIALYSIS CIRCUIT: CPT | Mod: GC

## 2024-04-12 PROCEDURE — 99232 SBSQ HOSP IP/OBS MODERATE 35: CPT | Mod: GC,25,57

## 2024-04-12 DEVICE — SHEATH INTRODUCER TERUMO PINNACLE CORONARY 5FR X 10CM X 0.038" MINI WIRE: Type: IMPLANTABLE DEVICE | Site: LEFT | Status: FUNCTIONAL

## 2024-04-12 DEVICE — CATH ANGIO GLIDECATH ANGLE 4FR X 65CM: Type: IMPLANTABLE DEVICE | Site: LEFT | Status: FUNCTIONAL

## 2024-04-12 DEVICE — BLLN MUSTANG 6X40MMX75CM: Type: IMPLANTABLE DEVICE | Site: LEFT | Status: FUNCTIONAL

## 2024-04-12 DEVICE — GUIDEWIRE GLIDEWIRE ANGLED TIP 0.035" X 180CM STIFF: Type: IMPLANTABLE DEVICE | Site: LEFT | Status: FUNCTIONAL

## 2024-04-12 DEVICE — INTRO MICROPUNC 5FRX10CM SS: Type: IMPLANTABLE DEVICE | Site: LEFT | Status: FUNCTIONAL

## 2024-04-12 RX ORDER — FINASTERIDE 5 MG/1
5 TABLET, FILM COATED ORAL DAILY
Refills: 0 | Status: DISCONTINUED | OUTPATIENT
Start: 2024-04-12 | End: 2024-04-12

## 2024-04-12 RX ORDER — CARVEDILOL PHOSPHATE 80 MG/1
3.12 CAPSULE, EXTENDED RELEASE ORAL EVERY 12 HOURS
Refills: 0 | Status: DISCONTINUED | OUTPATIENT
Start: 2024-04-12 | End: 2024-04-19

## 2024-04-12 RX ORDER — HEPARIN SODIUM 5000 [USP'U]/ML
5000 INJECTION INTRAVENOUS; SUBCUTANEOUS EVERY 8 HOURS
Refills: 0 | Status: DISCONTINUED | OUTPATIENT
Start: 2024-04-12 | End: 2024-04-12

## 2024-04-12 RX ORDER — ASPIRIN/CALCIUM CARB/MAGNESIUM 324 MG
81 TABLET ORAL EVERY 24 HOURS
Refills: 0 | Status: DISCONTINUED | OUTPATIENT
Start: 2024-04-12 | End: 2024-04-20

## 2024-04-12 RX ORDER — TAMSULOSIN HYDROCHLORIDE 0.4 MG/1
0.4 CAPSULE ORAL AT BEDTIME
Refills: 0 | Status: DISCONTINUED | OUTPATIENT
Start: 2024-04-12 | End: 2024-04-20

## 2024-04-12 RX ORDER — LEVOTHYROXINE SODIUM 125 MCG
50 TABLET ORAL DAILY
Refills: 0 | Status: DISCONTINUED | OUTPATIENT
Start: 2024-04-12 | End: 2024-04-12

## 2024-04-12 RX ORDER — LEVOTHYROXINE SODIUM 125 MCG
50 TABLET ORAL EVERY 24 HOURS
Refills: 0 | Status: DISCONTINUED | OUTPATIENT
Start: 2024-04-13 | End: 2024-04-20

## 2024-04-12 RX ORDER — VANCOMYCIN HCL 1 G
125 VIAL (EA) INTRAVENOUS EVERY 6 HOURS
Refills: 0 | Status: DISCONTINUED | OUTPATIENT
Start: 2024-04-12 | End: 2024-04-12

## 2024-04-12 RX ORDER — ATORVASTATIN CALCIUM 80 MG/1
80 TABLET, FILM COATED ORAL AT BEDTIME
Refills: 0 | Status: DISCONTINUED | OUTPATIENT
Start: 2024-04-12 | End: 2024-04-20

## 2024-04-12 RX ORDER — VANCOMYCIN HCL 1 G
125 VIAL (EA) INTRAVENOUS EVERY 6 HOURS
Refills: 0 | Status: COMPLETED | OUTPATIENT
Start: 2024-04-12 | End: 2024-04-16

## 2024-04-12 RX ORDER — FINASTERIDE 5 MG/1
5 TABLET, FILM COATED ORAL EVERY 24 HOURS
Refills: 0 | Status: DISCONTINUED | OUTPATIENT
Start: 2024-04-12 | End: 2024-04-20

## 2024-04-12 RX ORDER — CARVEDILOL PHOSPHATE 80 MG/1
3.12 CAPSULE, EXTENDED RELEASE ORAL EVERY 12 HOURS
Refills: 0 | Status: DISCONTINUED | OUTPATIENT
Start: 2024-04-12 | End: 2024-04-12

## 2024-04-12 RX ORDER — HEPARIN SODIUM 5000 [USP'U]/ML
5000 INJECTION INTRAVENOUS; SUBCUTANEOUS EVERY 8 HOURS
Refills: 0 | Status: DISCONTINUED | OUTPATIENT
Start: 2024-04-12 | End: 2024-04-20

## 2024-04-12 RX ORDER — ERYTHROPOIETIN 10000 [IU]/ML
6000 INJECTION, SOLUTION INTRAVENOUS; SUBCUTANEOUS ONCE
Refills: 0 | Status: COMPLETED | OUTPATIENT
Start: 2024-04-12 | End: 2024-04-12

## 2024-04-12 RX ORDER — ASPIRIN/CALCIUM CARB/MAGNESIUM 324 MG
81 TABLET ORAL EVERY 24 HOURS
Refills: 0 | Status: DISCONTINUED | OUTPATIENT
Start: 2024-04-12 | End: 2024-04-12

## 2024-04-12 RX ADMIN — HEPARIN SODIUM 5000 UNIT(S): 5000 INJECTION INTRAVENOUS; SUBCUTANEOUS at 22:17

## 2024-04-12 RX ADMIN — Medication 125 MILLIGRAM(S): at 00:23

## 2024-04-12 RX ADMIN — Medication 81 MILLIGRAM(S): at 16:08

## 2024-04-12 RX ADMIN — FINASTERIDE 5 MILLIGRAM(S): 5 TABLET, FILM COATED ORAL at 16:08

## 2024-04-12 RX ADMIN — HEPARIN SODIUM 5000 UNIT(S): 5000 INJECTION INTRAVENOUS; SUBCUTANEOUS at 07:03

## 2024-04-12 RX ADMIN — Medication 125 MILLIGRAM(S): at 16:08

## 2024-04-12 RX ADMIN — TAMSULOSIN HYDROCHLORIDE 0.4 MILLIGRAM(S): 0.4 CAPSULE ORAL at 22:17

## 2024-04-12 RX ADMIN — CARVEDILOL PHOSPHATE 3.12 MILLIGRAM(S): 80 CAPSULE, EXTENDED RELEASE ORAL at 16:09

## 2024-04-12 RX ADMIN — Medication 50 MICROGRAM(S): at 07:04

## 2024-04-12 RX ADMIN — CARVEDILOL PHOSPHATE 3.12 MILLIGRAM(S): 80 CAPSULE, EXTENDED RELEASE ORAL at 07:04

## 2024-04-12 RX ADMIN — ERYTHROPOIETIN 6000 UNIT(S): 10000 INJECTION, SOLUTION INTRAVENOUS; SUBCUTANEOUS at 17:44

## 2024-04-12 RX ADMIN — Medication 125 MILLIGRAM(S): at 23:55

## 2024-04-12 RX ADMIN — ATORVASTATIN CALCIUM 80 MILLIGRAM(S): 80 TABLET, FILM COATED ORAL at 22:17

## 2024-04-12 RX ADMIN — Medication 1 TABLET(S): at 16:08

## 2024-04-12 RX ADMIN — Medication 125 MILLIGRAM(S): at 07:04

## 2024-04-12 NOTE — PROGRESS NOTE ADULT - SUBJECTIVE AND OBJECTIVE BOX
Nephrology progress note        Allergies:  No Known Allergies    Hospital Medications:   MEDICATIONS  (STANDING):  aspirin enteric coated 81 milliGRAM(s) Oral every 24 hours  atorvastatin 80 milliGRAM(s) Oral at bedtime  carvedilol 3.125 milliGRAM(s) Oral every 12 hours  epoetin maryellen-epbx (RETACRIT) Injectable 6000 Unit(s) IV Push once  finasteride 5 milliGRAM(s) Oral every 24 hours  heparin   Injectable 5000 Unit(s) SubCutaneous every 8 hours  Nephro-wendy 1 Tablet(s) Oral every 24 hours  tamsulosin 0.4 milliGRAM(s) Oral at bedtime  vancomycin    Solution 125 milliGRAM(s) Oral every 6 hours    REVIEW OF SYSTEMS:  All other review of systems is negative unless indicated above.    VITALS:  T(F): 98.6 (04-12-24 @ 14:20), Max: 98.6 (04-12-24 @ 14:20)  HR: 75 (04-12-24 @ 15:20)  BP: 155/90 (04-12-24 @ 15:20)  RR: 7 (04-12-24 @ 15:20)  SpO2: 100% (04-12-24 @ 15:20)  Wt(kg): --    04-10 @ 07:01  -  04-11 @ 07:00  --------------------------------------------------------  IN: 0 mL / OUT: 550 mL / NET: -550 mL    04-11 @ 07:01  -  04-12 @ 07:00  --------------------------------------------------------  IN: 0 mL / OUT: 600 mL / NET: -600 mL    04-12 @ 07:01  -  04-12 @ 15:46  --------------------------------------------------------  IN: 0 mL / OUT: 200 mL / NET: -200 mL      Height (cm): 177.8 (04-12 @ 11:58)  Weight (kg): 61 (04-12 @ 11:58)  BMI (kg/m2): 19.3 (04-12 @ 11:58)  BSA (m2): 1.76 (04-12 @ 11:58)    PHYSICAL EXAM:      LABS:  04-11    143  |  105  |  53<H>  ----------------------------<  116<H>  4.5   |  28  |  6.69<H>    Ca    9.3      11 Apr 2024 22:41  Phos  4.7     04-10  Mg     2.2     04-10    TPro  7.1  /  Alb  3.0<L>  /  TBili  0.2  /  DBili      /  AST  31  /  ALT  26  /  AlkPhos  73  04-10                          7.6    5.60  )-----------( 246      ( 11 Apr 2024 22:41 )             25.4       Urine Studies:  Creatinine Trend: 6.69<--, 6.49<--, 4.86<--, 3.63<--, 4.96<--, 5.98<--  Urinalysis Basic - ( 11 Apr 2024 22:41 )    Color:  / Appearance:  / SG:  / pH:   Gluc: 116 mg/dL / Ketone:   / Bili:  / Urobili:    Blood:  / Protein:  / Nitrite:    Leuk Esterase:  / RBC:  / WBC    Sq Epi:  / Non Sq Epi:  / Bacteria:         RADIOLOGY & ADDITIONAL STUDIES:  reviewed Nephrology progress note    Seen on HD. Tolerating treatment, HDS. Continue HD as ordered.    Allergies:  No Known Allergies    Hospital Medications:   MEDICATIONS  (STANDING):  aspirin enteric coated 81 milliGRAM(s) Oral every 24 hours  atorvastatin 80 milliGRAM(s) Oral at bedtime  carvedilol 3.125 milliGRAM(s) Oral every 12 hours  epoetin maryellen-epbx (RETACRIT) Injectable 6000 Unit(s) IV Push once  finasteride 5 milliGRAM(s) Oral every 24 hours  heparin   Injectable 5000 Unit(s) SubCutaneous every 8 hours  Nephro-wendy 1 Tablet(s) Oral every 24 hours  tamsulosin 0.4 milliGRAM(s) Oral at bedtime  vancomycin    Solution 125 milliGRAM(s) Oral every 6 hours    REVIEW OF SYSTEMS:  All other review of systems is negative unless indicated above.    VITALS:  T(F): 98.6 (04-12-24 @ 14:20), Max: 98.6 (04-12-24 @ 14:20)  HR: 75 (04-12-24 @ 15:20)  BP: 155/90 (04-12-24 @ 15:20)  RR: 7 (04-12-24 @ 15:20)  SpO2: 100% (04-12-24 @ 15:20)  Wt(kg): --    04-10 @ 07:01  -  04-11 @ 07:00  --------------------------------------------------------  IN: 0 mL / OUT: 550 mL / NET: -550 mL    04-11 @ 07:01  -  04-12 @ 07:00  --------------------------------------------------------  IN: 0 mL / OUT: 600 mL / NET: -600 mL    04-12 @ 07:01  -  04-12 @ 15:46  --------------------------------------------------------  IN: 0 mL / OUT: 200 mL / NET: -200 mL      Height (cm): 177.8 (04-12 @ 11:58)  Weight (kg): 61 (04-12 @ 11:58)  BMI (kg/m2): 19.3 (04-12 @ 11:58)  BSA (m2): 1.76 (04-12 @ 11:58)    PHYSICAL EXAM:  Gen: NAD, lying in bed on HD  HEENT: NCAT, EOMI  Pulm: No respiratory distress  CV: Regular rate  Abd: non-distended  Ext: no edema  Neuro: Awake, alert, interactive  Skin: No rashes visible  Access: XOCHILT TRACEY accessed    LABS:  04-11    143  |  105  |  53<H>  ----------------------------<  116<H>  4.5   |  28  |  6.69<H>    Ca    9.3      11 Apr 2024 22:41  Phos  4.7     04-10  Mg     2.2     04-10    TPro  7.1  /  Alb  3.0<L>  /  TBili  0.2  /  DBili      /  AST  31  /  ALT  26  /  AlkPhos  73  04-10                          7.6    5.60  )-----------( 246      ( 11 Apr 2024 22:41 )             25.4       Urine Studies:  Creatinine Trend: 6.69<--, 6.49<--, 4.86<--, 3.63<--, 4.96<--, 5.98<--  Urinalysis Basic - ( 11 Apr 2024 22:41 )    Color:  / Appearance:  / SG:  / pH:   Gluc: 116 mg/dL / Ketone:   / Bili:  / Urobili:    Blood:  / Protein:  / Nitrite:    Leuk Esterase:  / RBC:  / WBC    Sq Epi:  / Non Sq Epi:  / Bacteria:         RADIOLOGY & ADDITIONAL STUDIES:  reviewed

## 2024-04-12 NOTE — OCCUPATIONAL THERAPY INITIAL EVALUATION ADULT - GENERAL OBSERVATIONS, REHAB EVAL
Pt received semi-supine in bed +heplock +2L O2 via NC +puckett +L CAIR boot in NAD and agreeable to OT. CIRILO Cardoso aware of session. Pt received semi-supine in bed +heplock +2L O2 via NC +puckett +L CAIR boot, +R BKA, in NAD and agreeable to OT. CIRILO Cardoso aware of session.

## 2024-04-12 NOTE — OCCUPATIONAL THERAPY INITIAL EVALUATION ADULT - MODIFIED CLINICAL TEST OF SENSORY INTEGRATION IN BALANCE TEST
Pt tolerated sitting EOB ~3 minutes with modAx2. Pt tolerated sitting EOB ~3 minutes with mod A for trunk control as pt leaning posteriorly

## 2024-04-12 NOTE — PROGRESS NOTE ADULT - ATTENDING COMMENTS
Assessment, plan and impression:   58M with PMH of R BKA, CVA with residual weakness, dementia, HTN, HLD, CKD, presenting with worsening renal failure and anemia, also found to have c diff colitis and course complicated by aspiration pneumonia vs pneumonitis. Pending GOC discussion lead by palliative care regarding further care for patient, and discussion regarding feeding patient. Has worked with SLP, and still not recommended for PO intake at this time.  Still a little early to say if he will safely be able to tolerate PO intake.   HD was initiated on this hospitalization as well.     Plan  - GOC discussion lead by palliative care - comfort feeds  - completing treatment of c diff colitis as above  - fistula did not function properly again yesterday, planning for fistulagram today with vascular.  Appreciate input from vascular and nephrology team.  - now pending new long term care placement that can take dialysis patients who cannot independently transfer from bed to chair    Moderate level of medical decision making required for this case, including the presence of two stable chronic medical issues and discussion of plan with the nephrology team.

## 2024-04-12 NOTE — PROGRESS NOTE ADULT - PROBLEM SELECTOR PLAN 3
Hx of C. diff 12/2023 during last admission. p/w watery diarrhea. C diff positive 4/5.  5 episodes of watery diarrhea. 4/6 2 episodes overnight  GI PCR negative    - Vancomycin 125mg PO q6 for 10 days Hx of C. diff 12/2023 during last admission. p/w watery diarrhea. C diff positive 4/5.  5 episodes of watery diarrhea. 4/6 2 episodes overnight  GI PCR negative    - Vancomycin 125mg PO q6 for x 10d (4/5-4/15)

## 2024-04-12 NOTE — PROGRESS NOTE ADULT - ASSESSMENT
Assessment: Patient is a 59yo M with pmhx of HTN, HLD, T2DM, CVA, dementia, BPH, ESRD on HD via LUE AVF created 5/23/23, pshx R guillotine BKA 7/6/23 and BKA closure on 7/11/23. Last seen by Dr. Royal outpatient on 3/24, at that time no issues, LUE with well healed incision, good thrill, and palpable radial pulse and well healed R BKA stump and L heel with healed ulcer and no new skin breakdown. Patient sent to [facility] when nursing home noted hgb of 6.7, received one unit in ED and was subsequently found to be septic with fever to 101.4 and  and was then admitted for further workup. During admission patient found to have right basilar opacity likely aspiration pneumonia and c.dff infection. Vascular consulted since HD had to be stopped early on 4/9 due to HD high access pressures and clotting of venous line and decreased thrill of LUE AVF. On exam, patient with LUE AVF that is well healed however diminished thrill and pulsatile thrill distally and palpable L radial pulse. 4/10: Patient had attempted HD, but cannulated 2x for arterial and venous access, easily clotted, sluggish flow at 200ml/min. unable to complete previous HD related to dialysis access issues. high arterial, high venous pressure, sluggish flow.       Recommendations:  - NPO today for fistulogram and necessary procedures to establish dialysis access. Vascular will continue to follow,   - Pre-opped for procedure today, consented and added on as well to OR.   - discussed with Chief on call  - call [phone] with questions

## 2024-04-12 NOTE — PROGRESS NOTE ADULT - PROBLEM SELECTOR PLAN 12
Patient/Caregiver provided printed discharge information. RESOLVED. POA w/ 3/4 SIRS (WBC, fever and tachycardia) w/ elevated Cr. During admission, C diff positive. UA   Febrile 102.4 4/6 w/ new CXR finding on RLL and elevated procal. Afebrile, VSS on Abx. WBC WNL     CXR 4/6: Right basilar opacity      Etiology likely multifactorial: Aspiration PNA vs pneumonitis and/ or C. diff    - CTX 2mg qd (4/5-4/9), vanc 125mg PO q6h (4/5-4/15)  - BCx 4/5, 4/6 - NGTD RESOLVED. POA w/ 3/4 SIRS (WBC, fever and tachycardia) w/ elevated Cr. During admission, C diff positive.  Febrile 102.4 4/6 w/ new CXR finding on RLL and elevated procal. Afebrile, VSS on Abx. WBC WNL   s/p CTX 2mg qd (4/5-4/9). BCx 4/5, 4/6 - NGTD    Etiology likely multifactorial: Aspiration PNA vs pneumonitis and/ or C. diff

## 2024-04-12 NOTE — PROGRESS NOTE ADULT - PROBLEM SELECTOR PLAN 8
per HIE, prior Hx of HD. However no active HD according to NH staff.   Bun >100s, Cr 5.8 12/2023 -> 7.8 on admission.     - renal consulted for HD, consented by HCP Leo Cisneros [phone]  - discontinued home NaHCO3 650mg TID  - Hgb 7.1 4/6. Transfuse 1 unit pRBC with dialysis

## 2024-04-12 NOTE — PROGRESS NOTE ADULT - ASSESSMENT
58-year-old male with HTN, DM2, dementia, CKD 5 sent in by nursing home for Hb 6.7 s/p 1 PRBC in ER, then with fever and tachycardia, admitted for further evaluation. Nephrology consulted for HD initiation. HD#1 . S/p balloon angioplasty of AVF .    # New start ESRD  Now s/p angioplasty, will attempt HD today with parameters below  Renal diet  Fluid restriction <1.2L/day  Strict I&O, daily weights  SW assistance for outpatient HD center placement.  Hepatitis panel and TB testing (PPD or quant TB) for HD placement    Hemodialysis Treatment.:     Schedule: Once, Modality: Hemodialysis, Access: Arteriovenous Fistula    Dialyzer: Optiflux [id number], Time: 180 Min    Blood Flow: 400 mL/Min , Dialysate Flow: 500 mL/Min, Dialysate Temp: 36.5, Tubinmm (Adult)    Target Fluid Removal: 1.5 Liters    Dialysate Electrolytes (mEq/L): Potassium 2, Calcium 2.5, Sodium 138, Bicarbonate 35    # Access  LUE AVF now s/p angioplasty, will attempt cannulation today    # Anemia  Hb not at goal 7.6  Iron sat 11%  Defer iron in setting of active infection  epo w/ HD    # CKD-BMD  Calcium 9.3  Phos 4.7. At goal 3-5.5.

## 2024-04-12 NOTE — OCCUPATIONAL THERAPY INITIAL EVALUATION ADULT - ADDITIONAL COMMENTS
Pt unable to accurately note PLOF. Pt claimed he lives with his mother and is able to ambulate independently with no equipment however per chart pt came from a LTC facility. Pt's PLOF is unknown per chart.

## 2024-04-12 NOTE — PROGRESS NOTE ADULT - ASSESSMENT
58M T2DM, CVA x2 w/ residual L-sided weakness, early onset dementia, HTN, HLD, DVT, CKD V (Cr ~6, not on HD), BPH, PSH L toe amputation, R BKA, AVF creation (5/2023) BIBEMS from nursing home for anemia requiring transfusion, found to meet SIRS criteria s/p transfusion (FNHTR vs. sepsis) w/ elevated BUN/Cr, admitted for VS derangement. Initiated HD 4/4. Also found to have C. diff. c/c/b aspiration PNA vs pneumonitis. Clinically improving on Abx and HD. further c/c/b AVF malfunction 4/9. Tentative fistulogram 4/12.  58M T2DM, CVA x2 w/ residual L-sided weakness, early onset dementia, HTN, HLD, DVT, CKD V (Cr ~6, not on HD), BPH, PSH L toe amputation, R BKA, AVF creation (5/2023) BIBEMS from nursing home for anemia requiring transfusion, found to meet SIRS criteria s/p transfusion (FNHTR vs. sepsis) w/ elevated BUN/Cr, admitted for VS derangement. Initiated HD 4/4. Also found to have C. diff. c/c/b aspiration PNA vs pneumonitis. Clinically improving on Abx and HD. further c/c/b AVF malfunction 4/9, fistulogram 4/12.

## 2024-04-12 NOTE — OCCUPATIONAL THERAPY INITIAL EVALUATION ADULT - PERTINENT HX OF CURRENT PROBLEM, REHAB EVAL
58M T2DM, CVA x2 w/ residual L-sided weakness, early onset dementia, HTN, HLD, DVT, CKD V (Cr ~6, not on HD), BPH, PSH L toe amputation, R BKA, AVF creation (5/2023) BIBEMS from nursing home for anemia requiring transfusion, found to meet SIRS criteria s/p transfusion (FNHTR vs. sepsis) w/ elevated BUN/Cr, admitted for VS derangement. Initiated HD 4/4. Also found to have C. diff. c/c/b aspiration PNA vs pneumonitis. Clinically improving on Abx and HD. further c/c/b AVF malfunction 4/9. Tentative fistulogram 4/12.

## 2024-04-12 NOTE — PROGRESS NOTE ADULT - PROBLEM SELECTOR PLAN 1
HD 4/9 was ended early due to high assess pressure. physical exam shows decreased thrill.     LUE fistulagram 4/10: no stenosis / clots. However HD reattempt 4/11 unsuccessful    - vascular surg following, tentative fistulogram 4/12 HD 4/9 was ended early due to high assess pressure. physical exam shows decreased thrill.     LUE fistulagram 4/10: no stenosis / clots. However HD reattempt 4/11 unsuccessful    - vascular surg following, fistulogram 4/12

## 2024-04-12 NOTE — OCCUPATIONAL THERAPY INITIAL EVALUATION ADULT - MANUAL MUSCLE TESTING RESULTS, REHAB EVAL
R shoulder 3-/5, R elbow 3/5, R wrist and hand 3/5, L shoulder 3/5 L elbow 3-/5, L wrist and hand 3-/5

## 2024-04-12 NOTE — PROGRESS NOTE ADULT - SUBJECTIVE AND OBJECTIVE BOX
Attending:  Lele    HPI:  Mr. Shetty is a 58 year old man with a PMHx of type II DM. HTN, HLD, ESRD (AVF created 5/23), R BKA, L toe amputation, h/o Cdiff in 12/2023, dementia, h/o CVA w/ R sided facial doop, who was brought in from his nursing home for a hemoglobin of 6.7 on outpatient labs. He recieved one unit in the ED and when transport was set to take him back to his nursing home, he was denied due to hypertension. He was restarted on his home meds (Coreg 3.12, and Hydral oral 50 mg), and labetalol 20 mg IV push. He was evaluated again by ED provider and found to be septic with a fever to 101.4,  meeting 2/4 SIRS criteria so patient was admitted for further workup. Upon interview, patient is arousable and able to tell me his name. He denies pain, headache, nausea, vomitting, chest pain, SOB, abdominal symptoms.     ED course:   Intitial vitals: T: 98, HR 98, BP: 107/62, RR: 18. 94% on RA   Admission vitals: T: 101.4, HR: 108, BP: 178/78, 18 and 94% on RA   Labs: WBC: 7.97, Hb: 7.1, BMP: Cr 7.8, lactate 0.8  CXR: no consolidation   Interventions: pRBCs 1 unit, Coreg 3.125 mg, hydral 50 mg oral, labetalol 20 mg IV push X2, Vanc 1000 mg, Zosyn 3.375 mg    (04 Apr 2024 01:18)    Vascular Surgery: 4/12/24    Patient had session of HD yesterday with difficulty establishing flow across fistula c/f clotted off fistula requiring fistulagram, possible stent, possible venoplasty, possible thrombectomy thrombolysis. He is doing well, has not eaten since midnight. No fevers, chills, nausea, vomiting, or diarrhea.    PAST MEDICAL & SURGICAL HISTORY:  Type 2 diabetes mellitus      Cerebral artery occlusion with cerebral infarction      Hyperlipidemia      Hypertension      H/O diabetic retinopathy      ESRD (end stage renal disease)      S/P below knee amputation, right      S/P arteriovenous (AV) fistula creation          MEDICATIONS  (STANDING):  aspirin enteric coated 81 milliGRAM(s) Oral every 24 hours  atorvastatin 80 milliGRAM(s) Oral at bedtime  carvedilol 3.125 milliGRAM(s) Oral every 12 hours  chlorhexidine 2% Cloths 1 Application(s) Topical every 24 hours  finasteride 5 milliGRAM(s) Oral daily  heparin   Injectable 5000 Unit(s) SubCutaneous every 8 hours  levothyroxine 50 MICROGram(s) Oral daily  Nephro-wendy 1 Tablet(s) Oral every 24 hours  tamsulosin 0.4 milliGRAM(s) Oral at bedtime  vancomycin    Solution 125 milliGRAM(s) Oral every 6 hours    MEDICATIONS  (PRN):      Allergies    No Known Allergies    Intolerances        SOCIAL HISTORY:    FAMILY HISTORY:  Family history of diabetes mellitus (Mother)        Vital Signs Last 24 Hrs  T(C): 36.5 (12 Apr 2024 05:36), Max: 36.9 (11 Apr 2024 10:00)  T(F): 97.7 (12 Apr 2024 05:36), Max: 98.4 (11 Apr 2024 10:00)  HR: 82 (12 Apr 2024 05:36) (74 - 88)  BP: 130/74 (12 Apr 2024 05:36) (115/70 - 148/93)  BP(mean): --  RR: 17 (12 Apr 2024 05:36) (15 - 19)  SpO2: 100% (12 Apr 2024 05:36) (95% - 100%)    Parameters below as of 11 Apr 2024 20:57  Patient On (Oxygen Delivery Method): nasal cannula  O2 Flow (L/min): 2      I&O's Summary    11 Apr 2024 07:01  -  12 Apr 2024 07:00  --------------------------------------------------------  IN: 0 mL / OUT: 600 mL / NET: -600 mL        Physical Exam:  General: NAD, resting comfortably  Pulmonary: normal resp effort  Cardiovascular: NSR  Abdominal: soft, NT/ND  Extremities: b/l extremities warm and well perfused. LUE fistula without palpable thrill, Palpable radial pulses bilaterally      LABS:                        7.6    5.60  )-----------( 246      ( 11 Apr 2024 22:41 )             25.4     04-11    143  |  105  |  53<H>  ----------------------------<  116<H>  4.5   |  28  |  6.69<H>    Ca    9.3      11 Apr 2024 22:41  Phos  4.7     04-10  Mg     2.2     04-10    TPro  7.1  /  Alb  3.0<L>  /  TBili  0.2  /  DBili  x   /  AST  31  /  ALT  26  /  AlkPhos  73  04-10    PT/INR - ( 11 Apr 2024 22:41 )   PT: 11.4 sec;   INR: 1.00          PTT - ( 11 Apr 2024 22:41 )  PTT:30.5 sec  Urinalysis Basic - ( 11 Apr 2024 22:41 )    Color: x / Appearance: x / SG: x / pH: x  Gluc: 116 mg/dL / Ketone: x  / Bili: x / Urobili: x   Blood: x / Protein: x / Nitrite: x   Leuk Esterase: x / RBC: x / WBC x   Sq Epi: x / Non Sq Epi: x / Bacteria: x      CAPILLARY BLOOD GLUCOSE      POCT Blood Glucose.: 126 mg/dL (11 Apr 2024 13:39)  POCT Blood Glucose.: 129 mg/dL (11 Apr 2024 08:42)    LIVER FUNCTIONS - ( 10 Apr 2024 18:29 )  Alb: 3.0 g/dL / Pro: 7.1 g/dL / ALK PHOS: 73 U/L / ALT: 26 U/L / AST: 31 U/L / GGT: x             Cultures:        Assessment: 58y Male    Recommendations:  -   - discussed with Chief on call  - call x [phone] with questions

## 2024-04-12 NOTE — OCCUPATIONAL THERAPY INITIAL EVALUATION ADULT - DIAGNOSIS, OT EVAL
OT IE completed. Pt presents with impaired cognition, muscle tone, decreased strength, ROM, coordination, balance, endurance, impacting ability to perform ADL and mobility. Pt admitted for sepsis, presents with impaired cognition, muscle tone, decreased strength, ROM, coordination, balance, endurance, impacting ability to perform ADL and mobility.

## 2024-04-12 NOTE — BRIEF OPERATIVE NOTE - OPERATION/FINDINGS
Patient placed under sedation and local anesthesia. LUE fistulogram performed with retrograde access. Venogram performed  showed 2 areas of stenosis in proximal outflow segment. Ballooned with 6 x 40mm [place] Balloon. Completion venogram showing better filling of outflow vein. Fistula with better thrill and less pulsatile at the end of case.

## 2024-04-13 PROCEDURE — 99232 SBSQ HOSP IP/OBS MODERATE 35: CPT

## 2024-04-13 RX ADMIN — HEPARIN SODIUM 5000 UNIT(S): 5000 INJECTION INTRAVENOUS; SUBCUTANEOUS at 22:10

## 2024-04-13 RX ADMIN — Medication 125 MILLIGRAM(S): at 05:58

## 2024-04-13 RX ADMIN — HEPARIN SODIUM 5000 UNIT(S): 5000 INJECTION INTRAVENOUS; SUBCUTANEOUS at 13:36

## 2024-04-13 RX ADMIN — HEPARIN SODIUM 5000 UNIT(S): 5000 INJECTION INTRAVENOUS; SUBCUTANEOUS at 05:59

## 2024-04-13 RX ADMIN — Medication 50 MICROGRAM(S): at 05:58

## 2024-04-13 RX ADMIN — CARVEDILOL PHOSPHATE 3.12 MILLIGRAM(S): 80 CAPSULE, EXTENDED RELEASE ORAL at 17:37

## 2024-04-13 RX ADMIN — CARVEDILOL PHOSPHATE 3.12 MILLIGRAM(S): 80 CAPSULE, EXTENDED RELEASE ORAL at 05:59

## 2024-04-13 RX ADMIN — Medication 125 MILLIGRAM(S): at 12:38

## 2024-04-13 RX ADMIN — Medication 1 TABLET(S): at 17:37

## 2024-04-13 RX ADMIN — Medication 81 MILLIGRAM(S): at 17:37

## 2024-04-13 RX ADMIN — ATORVASTATIN CALCIUM 80 MILLIGRAM(S): 80 TABLET, FILM COATED ORAL at 22:10

## 2024-04-13 RX ADMIN — FINASTERIDE 5 MILLIGRAM(S): 5 TABLET, FILM COATED ORAL at 17:37

## 2024-04-13 RX ADMIN — Medication 125 MILLIGRAM(S): at 17:37

## 2024-04-13 RX ADMIN — TAMSULOSIN HYDROCHLORIDE 0.4 MILLIGRAM(S): 0.4 CAPSULE ORAL at 22:10

## 2024-04-13 NOTE — PROGRESS NOTE ADULT - PROBLEM SELECTOR PLAN 3
Hx of C. diff 12/2023 during last admission. p/w watery diarrhea. C diff positive 4/5.  5 episodes of watery diarrhea. 4/6 2 episodes overnight  GI PCR negative    - Vancomycin 125mg PO q6 for x 10d (4/5-4/15)

## 2024-04-13 NOTE — PROGRESS NOTE ADULT - SUBJECTIVE AND OBJECTIVE BOX
Feeling well this morning.  No issues overnight.  Got dialysis yesterday.       PHYSICAL EXAM:    General: nad, sitting up in bed  HEENT: atraumatic, MMM  Cardiovascular: +S1/S2, RRR  Respiratory: CTA B/L; no W/R/R  Gastrointestinal: soft, NT/ND; +BSx4  Extremities: WWP; no edema  Neurological: alert, speaking in full sentences, no acute deficits noted  Dermatologic: no appreciable wounds or damage to the skin    VITAL SIGNS:  Vital Signs Last 24 Hrs  T(C): 36.5 (13 Apr 2024 08:57), Max: 36.7 (12 Apr 2024 16:50)  T(F): 97.7 (13 Apr 2024 08:57), Max: 98.1 (12 Apr 2024 19:50)  HR: 71 (13 Apr 2024 08:57) (71 - 87)  BP: 118/68 (13 Apr 2024 08:57) (105/50 - 152/85)  BP(mean): --  RR: 16 (13 Apr 2024 08:57) (16 - 18)  SpO2: 95% (13 Apr 2024 08:57) (95% - 100%)    Parameters below as of 13 Apr 2024 08:57  Patient On (Oxygen Delivery Method): nasal cannula  O2 Flow (L/min): 2        MEDICATIONS:  MEDICATIONS  (STANDING):  aspirin enteric coated 81 milliGRAM(s) Oral every 24 hours  atorvastatin 80 milliGRAM(s) Oral at bedtime  carvedilol 3.125 milliGRAM(s) Oral every 12 hours  finasteride 5 milliGRAM(s) Oral every 24 hours  heparin   Injectable 5000 Unit(s) SubCutaneous every 8 hours  levothyroxine 50 MICROGram(s) Oral every 24 hours  Nephro-wendy 1 Tablet(s) Oral every 24 hours  tamsulosin 0.4 milliGRAM(s) Oral at bedtime  vancomycin    Solution 125 milliGRAM(s) Oral every 6 hours    MEDICATIONS  (PRN):      ALLERGIES:  Allergies    No Known Allergies    Intolerances        LABS:                        7.6    5.60  )-----------( 246      ( 11 Apr 2024 22:41 )             25.4     04-11    143  |  105  |  53<H>  ----------------------------<  116<H>  4.5   |  28  |  6.69<H>    Ca    9.3      11 Apr 2024 22:41      PT/INR - ( 11 Apr 2024 22:41 )   PT: 11.4 sec;   INR: 1.00          PTT - ( 11 Apr 2024 22:41 )  PTT:30.5 sec  Urinalysis Basic - ( 11 Apr 2024 22:41 )    Color: x / Appearance: x / SG: x / pH: x  Gluc: 116 mg/dL / Ketone: x  / Bili: x / Urobili: x   Blood: x / Protein: x / Nitrite: x   Leuk Esterase: x / RBC: x / WBC x   Sq Epi: x / Non Sq Epi: x / Bacteria: x      CAPILLARY BLOOD GLUCOSE      POCT Blood Glucose.: 125 mg/dL (12 Apr 2024 21:58)      RADIOLOGY & ADDITIONAL TESTS: Reviewed.

## 2024-04-13 NOTE — CHART NOTE - NSCHARTNOTEFT_GEN_A_CORE
Patient is now s/p LUE fistulogram + venoplasty 4/12. AVF has palpable thrill and is less pulsatile. He was able to get HD yesterday after procedure with no issue. Vascular Surgery will sign off for now. Please feel free to reach out if any other concern arise

## 2024-04-13 NOTE — PROGRESS NOTE ADULT - PROBLEM SELECTOR PLAN 1
HD 4/9 was ended early due to high assess pressure. physical exam shows decreased thrill.     LUE fistulagram 4/10: no stenosis / clots. However HD reattempt 4/11 unsuccessful    - vascular surg following, fistulogram 4/12  - fistula functioning now

## 2024-04-13 NOTE — PROGRESS NOTE ADULT - PROBLEM SELECTOR PLAN 12
RESOLVED. POA w/ 3/4 SIRS (WBC, fever and tachycardia) w/ elevated Cr. During admission, C diff positive.  Febrile 102.4 4/6 w/ new CXR finding on RLL and elevated procal. Afebrile, VSS on Abx. WBC WNL   s/p CTX 2mg qd (4/5-4/9). BCx 4/5, 4/6 - NGTD    Etiology likely multifactorial: Aspiration PNA vs pneumonitis and/ or C. diff

## 2024-04-13 NOTE — PROGRESS NOTE ADULT - ASSESSMENT
58M T2DM, CVA x2 w/ residual L-sided weakness, early onset dementia, HTN, HLD, DVT, CKD V (Cr ~6, not on HD), BPH, PSH L toe amputation, R BKA, AVF creation (5/2023) BIBEMS from nursing home for anemia requiring transfusion, found to meet SIRS criteria s/p transfusion (FNHTR vs. sepsis) w/ elevated BUN/Cr, admitted for VS derangement. Initiated HD 4/4. Also found to have C. diff. c/c/b aspiration PNA vs pneumonitis. Clinically improving on Abx and HD. further c/c/b AVF malfunction 4/9, fistulogram 4/12.

## 2024-04-13 NOTE — PROGRESS NOTE ADULT - PROBLEM SELECTOR PLAN 8
per HIE, prior Hx of HD. However no active HD according to NH staff.   Bun >100s, Cr 5.8 12/2023 -> 7.8 on admission.     - renal consulted for HD, consented by HCP Leo Cisneros [phone]  - discontinued home NaHCO3 650mg TID  - Hgb 7.1 4/6. Transfuse 1 unit pRBC with dialysis

## 2024-04-13 NOTE — PROGRESS NOTE ADULT - TIME BILLING
Emotional Support/Supportive Care and Clarification of Potential Disease Trajectory related to ESRD  Assessment of Symptom [place] and Palliative regimen  Exploration of GOC/Advanced Directives including HCP designation, code status, and hospice eligibility    Time inclusive of chart review, medication ordering, discussion with primary team, clinical documentation, and communication with family/caregiver
face to face with patient, care coordination and documentation.
Evaluation of patient, review or charts

## 2024-04-13 NOTE — PROGRESS NOTE ADULT - PROBLEM SELECTOR PROBLEM 1
Problem: PHYSICAL THERAPY ADULT  Goal: Performs mobility at highest level of function for planned discharge setting  See evaluation for individualized goals  Description  Treatment/Interventions: Functional transfer training, LE strengthening/ROM, Therapeutic exercise, Endurance training, Bed mobility, Gait training, Spoke to nursing, OT  Equipment Recommended: Walker(at this time)       See flowsheet documentation for full assessment, interventions and recommendations  Outcome: Progressing  Note:   Prognosis: Good  Problem List: Decreased strength, Decreased endurance, Impaired balance, Decreased mobility  Assessment: The pt  has improving mobility as he was able to ambulate beyond community distance without physical assistance  He does have a slow shandra which increases his ambulatory time, but he was able to readily manuever around multiple obstacles  Will continue to follow in order to maximize his functional mobility, safety, and independence  Barriers to Discharge: None     Recommendation: Home PT, Home with family support     PT - OK to Discharge: Yes(if to rehab, no if to home)    See flowsheet documentation for full assessment  Dialysis AV fistula malfunction

## 2024-04-14 PROCEDURE — 99232 SBSQ HOSP IP/OBS MODERATE 35: CPT

## 2024-04-14 RX ADMIN — Medication 125 MILLIGRAM(S): at 13:39

## 2024-04-14 RX ADMIN — Medication 125 MILLIGRAM(S): at 01:13

## 2024-04-14 RX ADMIN — HEPARIN SODIUM 5000 UNIT(S): 5000 INJECTION INTRAVENOUS; SUBCUTANEOUS at 06:48

## 2024-04-14 RX ADMIN — CARVEDILOL PHOSPHATE 3.12 MILLIGRAM(S): 80 CAPSULE, EXTENDED RELEASE ORAL at 18:34

## 2024-04-14 RX ADMIN — TAMSULOSIN HYDROCHLORIDE 0.4 MILLIGRAM(S): 0.4 CAPSULE ORAL at 21:55

## 2024-04-14 RX ADMIN — Medication 125 MILLIGRAM(S): at 18:33

## 2024-04-14 RX ADMIN — FINASTERIDE 5 MILLIGRAM(S): 5 TABLET, FILM COATED ORAL at 18:34

## 2024-04-14 RX ADMIN — ATORVASTATIN CALCIUM 80 MILLIGRAM(S): 80 TABLET, FILM COATED ORAL at 21:55

## 2024-04-14 RX ADMIN — HEPARIN SODIUM 5000 UNIT(S): 5000 INJECTION INTRAVENOUS; SUBCUTANEOUS at 13:38

## 2024-04-14 RX ADMIN — Medication 1 TABLET(S): at 18:34

## 2024-04-14 RX ADMIN — HEPARIN SODIUM 5000 UNIT(S): 5000 INJECTION INTRAVENOUS; SUBCUTANEOUS at 21:55

## 2024-04-14 RX ADMIN — Medication 81 MILLIGRAM(S): at 18:34

## 2024-04-14 NOTE — PROGRESS NOTE ADULT - PROBLEM SELECTOR PLAN 1
RESOLVED. HD 4/9 was ended early due to high assess pressure. physical exam shows decreased thrill.   LUE fistulagram 4/10: no stenosis / clots. HD reattempt 4/11 unsuccessful. s/p fistulogram by Vasc surg 4/12.     - functioning AVF

## 2024-04-14 NOTE — PROGRESS NOTE ADULT - PROBLEM SELECTOR PLAN 5
Recent retention requiring SCs  - continue home meds Tamsulosin 0.4 mg qhs and Finasteride 5 mg qd.  - bladder scan q6h  - consider puckett risk vs. benefit    #T2DM  A1c 5.5. Speech and Swallow evaluation unable to be completed 4/5 due to lethargy  - CC diet   - mISS  - NPO pending re-evaluation      #hypothyroidism  - continue home med synthroid 50mcg qd Recent retention requiring SCs  - continue home meds Tamsulosin 0.4 mg qhs and Finasteride 5 mg qd.  - bladder scan q6h  - consider puckett risk vs. benefit    #T2DM  A1c 5.5. Speech and Swallow evaluation unable to be completed 4/5 due to lethargy  - CC diet   - mISS        #hypothyroidism  - continue home med synthroid 50mcg qd

## 2024-04-14 NOTE — PROGRESS NOTE ADULT - ATTENDING COMMENTS
Assessment, plan and impression:   58M with PMH of R BKA, CVA with residual weakness, dementia, HTN, HLD, CKD, presenting with worsening renal failure and anemia, also found to have c diff colitis and course complicated by aspiration pneumonia vs pneumonitis. Pending GOC discussion lead by palliative care regarding further care for patient, and discussion regarding feeding patient. Has worked with SLP, and still not recommended for PO intake at this time.  Still a little early to say if he will safely be able to tolerate PO intake.   HD was initiated on this hospitalization as well.     Plan  - GOC discussion lead by palliative care - comfort feeds  - completing treatment of c diff colitis as above- last dose of vancomycin tomorrow  - fistula did not function properly, but now s/p fistulagram and functioning normally.  continue with dialysis.   - now pending new long term care placement that can take dialysis patients who cannot independently transfer from bed to chair    35 minutes spent on this encounter, including face to face with patient, care coordination and documentation.

## 2024-04-14 NOTE — PROGRESS NOTE ADULT - SUBJECTIVE AND OBJECTIVE BOX
--------INCOMPLETE NOTE--------  OVERNIGHT EVENTS: NAEO    SUBJECTIVE  Pt was seen and examined at bedside. Pleasant, awake and alert.     Patient denies any fevers/ chills, n/v, headache, acute SOB, chest pain, abdominal pain, genitourinary sx    12-point review of systems otherwise negative      Vital Signs Last 24 Hrs  T(C): 36.8 (14 Apr 2024 11:22), Max: 36.8 (14 Apr 2024 11:22)  T(F): 98.3 (14 Apr 2024 11:22), Max: 98.3 (14 Apr 2024 11:22)  HR: 80 (14 Apr 2024 11:22) (74 - 87)  BP: 127/67 (14 Apr 2024 11:22) (127/67 - 150/73)  BP(mean): --  RR: 18 (14 Apr 2024 11:22) (17 - 18)  SpO2: 99% (14 Apr 2024 11:22) (97% - 100%)    Parameters below as of 14 Apr 2024 11:22  Patient On (Oxygen Delivery Method): nasal cannula  O2 Flow (L/min): 2        PHYSICAL EXAM     General: NAD, conversational on 2L  HEENT: MMM  Respiratory: CTA B/L; normal WOB  Cardiovascular: RRR; no m/r/g  Gastrointestinal: Soft; NTND; + bowel sounds  : no suprapubic tenderness  Vascular: extremities WWP; no edema/cyanosis, LUE AVF w/ palpable thrill  MSK: R BKA, L side contracted  Neurological: A&Ox3    LABS:              CAPILLARY BLOOD GLUCOSE      POCT Blood Glucose.: 125 mg/dL (12 Apr 2024 21:58)

## 2024-04-14 NOTE — PROGRESS NOTE ADULT - ASSESSMENT
58M T2DM, CVA x2 w/ residual L-sided weakness, early onset dementia, HTN, HLD, DVT, CKD V (Cr ~6, not on HD), BPH, PSH L toe amputation, R BKA, AVF creation (5/2023) BIBEMS from nursing home for anemia requiring transfusion, found to meet SIRS criteria s/p transfusion (FNHTR vs. sepsis) w/ elevated BUN/Cr, admitted for VS derangement. Initiated HD 4/4. Also found to have C. diff. c/c/b aspiration PNA vs pneumonitis. Clinically improving on Abx and HD. further c/c/b AVF malfunction 4/9, resolved s/p fistulogram 4/12.

## 2024-04-14 NOTE — PROGRESS NOTE ADULT - PROBLEM SELECTOR PLAN 3
per HIE, prior Hx of HD. However no active HD according to NH staff.   Bun >100s, Cr 5.8 12/2023 -> 7.8 on admission. sCr improving w/ HD    - renal consulted for HD, consented by HCP Leo Cisneros [phone]  - discontinued home NaHCO3 650mg TID  - Hgb 7.1 4/6. Transfuse 1 unit pRBC with dialysis per HIE, prior Hx of HD. However no active HD according to NH staff.   Bun >100s, Cr 5.8 12/2023 -> 7.8 on admission. sCr improving w/ HD    - renal consulted for HD, consented by HCP Leo Cisneros [phone]  - discontinued home NaHCO3 650mg TID

## 2024-04-15 DIAGNOSIS — F01.50 VASCULAR DEMENTIA, UNSPECIFIED SEVERITY, WITHOUT BEHAVIORAL DISTURBANCE, PSYCHOTIC DISTURBANCE, MOOD DISTURBANCE, AND ANXIETY: ICD-10-CM

## 2024-04-15 DIAGNOSIS — Z71.89 OTHER SPECIFIED COUNSELING: ICD-10-CM

## 2024-04-15 DIAGNOSIS — R53.2 FUNCTIONAL QUADRIPLEGIA: ICD-10-CM

## 2024-04-15 DIAGNOSIS — Z51.5 ENCOUNTER FOR PALLIATIVE CARE: ICD-10-CM

## 2024-04-15 LAB
ANION GAP SERPL CALC-SCNC: 5 MMOL/L — SIGNIFICANT CHANGE UP (ref 5–17)
BLD GP AB SCN SERPL QL: NEGATIVE — SIGNIFICANT CHANGE UP
BUN SERPL-MCNC: 28 MG/DL — HIGH (ref 7–23)
CALCIUM SERPL-MCNC: 9 MG/DL — SIGNIFICANT CHANGE UP (ref 8.4–10.5)
CHLORIDE SERPL-SCNC: 100 MMOL/L — SIGNIFICANT CHANGE UP (ref 96–108)
CO2 SERPL-SCNC: 33 MMOL/L — HIGH (ref 22–31)
CREAT SERPL-MCNC: 3.5 MG/DL — HIGH (ref 0.5–1.3)
EGFR: 19 ML/MIN/1.73M2 — LOW
GLUCOSE SERPL-MCNC: 121 MG/DL — HIGH (ref 70–99)
HCT VFR BLD CALC: 24.4 % — LOW (ref 39–50)
HGB BLD-MCNC: 7.4 G/DL — LOW (ref 13–17)
MAGNESIUM SERPL-MCNC: 1.8 MG/DL — SIGNIFICANT CHANGE UP (ref 1.6–2.6)
MCHC RBC-ENTMCNC: 28.6 PG — SIGNIFICANT CHANGE UP (ref 27–34)
MCHC RBC-ENTMCNC: 30.3 GM/DL — LOW (ref 32–36)
MCV RBC AUTO: 94.2 FL — SIGNIFICANT CHANGE UP (ref 80–100)
NRBC # BLD: 0 /100 WBCS — SIGNIFICANT CHANGE UP (ref 0–0)
PHOSPHATE SERPL-MCNC: 2.8 MG/DL — SIGNIFICANT CHANGE UP (ref 2.5–4.5)
PLATELET # BLD AUTO: 352 K/UL — SIGNIFICANT CHANGE UP (ref 150–400)
POTASSIUM SERPL-MCNC: 3.6 MMOL/L — SIGNIFICANT CHANGE UP (ref 3.5–5.3)
POTASSIUM SERPL-SCNC: 3.6 MMOL/L — SIGNIFICANT CHANGE UP (ref 3.5–5.3)
RBC # BLD: 2.59 M/UL — LOW (ref 4.2–5.8)
RBC # FLD: 15.2 % — HIGH (ref 10.3–14.5)
RH IG SCN BLD-IMP: POSITIVE — SIGNIFICANT CHANGE UP
SODIUM SERPL-SCNC: 138 MMOL/L — SIGNIFICANT CHANGE UP (ref 135–145)
WBC # BLD: 8.13 K/UL — SIGNIFICANT CHANGE UP (ref 3.8–10.5)
WBC # FLD AUTO: 8.13 K/UL — SIGNIFICANT CHANGE UP (ref 3.8–10.5)

## 2024-04-15 PROCEDURE — 99232 SBSQ HOSP IP/OBS MODERATE 35: CPT | Mod: GC

## 2024-04-15 PROCEDURE — 90935 HEMODIALYSIS ONE EVALUATION: CPT | Mod: GC

## 2024-04-15 RX ORDER — ERYTHROPOIETIN 10000 [IU]/ML
6000 INJECTION, SOLUTION INTRAVENOUS; SUBCUTANEOUS ONCE
Refills: 0 | Status: COMPLETED | OUTPATIENT
Start: 2024-04-15 | End: 2024-04-15

## 2024-04-15 RX ADMIN — Medication 125 MILLIGRAM(S): at 17:06

## 2024-04-15 RX ADMIN — Medication 125 MILLIGRAM(S): at 00:01

## 2024-04-15 RX ADMIN — CARVEDILOL PHOSPHATE 3.12 MILLIGRAM(S): 80 CAPSULE, EXTENDED RELEASE ORAL at 06:44

## 2024-04-15 RX ADMIN — CARVEDILOL PHOSPHATE 3.12 MILLIGRAM(S): 80 CAPSULE, EXTENDED RELEASE ORAL at 17:05

## 2024-04-15 RX ADMIN — TAMSULOSIN HYDROCHLORIDE 0.4 MILLIGRAM(S): 0.4 CAPSULE ORAL at 22:03

## 2024-04-15 RX ADMIN — Medication 125 MILLIGRAM(S): at 12:38

## 2024-04-15 RX ADMIN — HEPARIN SODIUM 5000 UNIT(S): 5000 INJECTION INTRAVENOUS; SUBCUTANEOUS at 22:03

## 2024-04-15 RX ADMIN — ERYTHROPOIETIN 6000 UNIT(S): 10000 INJECTION, SOLUTION INTRAVENOUS; SUBCUTANEOUS at 09:52

## 2024-04-15 RX ADMIN — Medication 1 TABLET(S): at 17:06

## 2024-04-15 RX ADMIN — Medication 81 MILLIGRAM(S): at 17:04

## 2024-04-15 RX ADMIN — FINASTERIDE 5 MILLIGRAM(S): 5 TABLET, FILM COATED ORAL at 17:05

## 2024-04-15 RX ADMIN — Medication 125 MILLIGRAM(S): at 06:44

## 2024-04-15 RX ADMIN — HEPARIN SODIUM 5000 UNIT(S): 5000 INJECTION INTRAVENOUS; SUBCUTANEOUS at 14:22

## 2024-04-15 RX ADMIN — HEPARIN SODIUM 5000 UNIT(S): 5000 INJECTION INTRAVENOUS; SUBCUTANEOUS at 06:44

## 2024-04-15 RX ADMIN — Medication 50 MICROGRAM(S): at 06:44

## 2024-04-15 RX ADMIN — ATORVASTATIN CALCIUM 80 MILLIGRAM(S): 80 TABLET, FILM COATED ORAL at 22:03

## 2024-04-15 NOTE — PHYSICAL THERAPY INITIAL EVALUATION ADULT - PHYSICAL ASSIST/NONPHYSICAL ASSIST: SUPINE/SIT, REHAB EVAL
----- Message from Alannah Dela Cruz sent at 4/15/2024  1:49 PM CDT -----  Patient would like to reschedule the appointment that was on 04/16/2024 to a different date. Call back number is [phone]. Thx.EL  
supervision/1 person assist/verbal cues

## 2024-04-15 NOTE — PROGRESS NOTE ADULT - ASSESSMENT
58Y M initially admitted worsening renal function, found to be uremic, anemic, initiated on HD,  hospital course complicated by c.diff colitis, aspiration PNA, and outflow stenosis of AVF (s/p fistulogram and venoplasty )  Seen and evaluated on HD, tolerating well, continue HD as prescribed, pending placement with HD center     ESRD  HD today as below   Hemodialysis Treatment.:     Schedule: Once, Modality: Hemodialysis, Access: Internal Jugular Central Venous Catheter    Dialyzer: Optiflux [id number], Time: 180 Min    Blood Flow: 400 mL/Min , Dialysate Flow: 500 mL/Min, Dialysate Temp: 36.5, Tubinmm (Adult)    Target Fluid Removal: 2 Liters    Dialysate Electrolytes (mEq/L): Potassium 2, Calcium 2.5, Sodium 138, Bicarbonate 35  Renal diet  Fluid restriction <1.2L/day  Strict I&O, daily weights  Pending placement   Next HD     HTN :  BP at goal   UF with HD as tolerated     # Access  LUE AVF functional s/p venoplasty  for outflow stenosis     # Anemia  Hb not at goal 7.6  Iron sat 11%  Defer iron in setting of active infection  epo w/ HD    # CKD-BMD  Calcium 9.3  Phos 4.7. At goal 3-5.5.

## 2024-04-15 NOTE — CHART NOTE - NSCHARTNOTEFT_GEN_A_CORE
Admitting Diagnosis:   Patient is a 58y old  Male who presents with a chief complaint of fever (15 Apr 2024 09:40)  PAST MEDICAL & SURGICAL HISTORY:  Type 2 diabetes mellitus  Cerebral artery occlusion with cerebral infarction  Hyperlipidemia  Hypertension  H/O diabetic retinopathy  ESRD (end stage renal disease)  S/P below knee amputation, right  S/P arteriovenous (AV) fistula creation    Current Nutrition Order: minced and moist diet with 1200mL fluid restriction + renal diet + Nepro oral nutrition supplement 1x/day (420 calories, 19g protein per serving)    PO Intake: Good (%) [   ]  Fair (50-75%) [   ] Poor (<25%) [ x ]     GI Issues: noted with trouble tolerating minced and moist diet textures, trouble with water/liquids as well per nursing; noted with no nausea or emesis, fecal incontinence noted; BM on 4/13/24; no distention noted    Pain: no pain/discomfort noted    Skin Integrity: healed sacral wound noted; no incisions documented; noted with trace left ankle edema; Adolfo: 13     Labs:   04-15    138  |  100  |  28<H>  ----------------------------<  121<H>  3.6   |  33<H>  |  3.50<H>    Ca    9.0      15 Apr 2024 09:56  Phos  2.8     04-15  Mg     1.8     04-15    CAPILLARY BLOOD GLUCOSE    Medications:  MEDICATIONS  (STANDING):  aspirin enteric coated 81 milliGRAM(s) Oral every 24 hours  atorvastatin 80 milliGRAM(s) Oral at bedtime  carvedilol 3.125 milliGRAM(s) Oral every 12 hours  finasteride 5 milliGRAM(s) Oral every 24 hours  heparin   Injectable 5000 Unit(s) SubCutaneous every 8 hours  levothyroxine 50 MICROGram(s) Oral every 24 hours  Nephro-wendy 1 Tablet(s) Oral every 24 hours  tamsulosin 0.4 milliGRAM(s) Oral at bedtime  vancomycin    Solution 125 milliGRAM(s) Oral every 6 hours    MEDICATIONS  (PRN):  Admission Anthropometrics:  Height for BMI (FEET)	6 Feet  Height for BMI (INCHES)	0 Inch(s)  Height for BMI (CENTIMETERS)	182.88 Centimeter(s)  Weight for BMI (lbs)	128.7 lb  Weight for BMI (kg)	58.4 kg  Body Mass Index	17.4    Estimated energy needs:   Estimated Energy Needs From (joselyn/kg)	30  Estimated Energy Needs To (joselyn/kg)	35  Estimated Energy Needs Calculated From (joselyn/kg)	1752  Estimated Energy Needs Calculated To (joselyn/kg)	2044    Estimated Protein Needs From (g/kg)	1.3  Estimated Protein Needs To (g/kg)	1.5  Estimated Protein Needs Calculated From (g/kg)	75.92  Estimated Protein Needs Calculated To (g/kg)	87.6    Estimated needs based on dosing wt as <120% IBW 167lb/76.1kg (77%) and per RD judgement. Needs adjusted for age, malnutrition, HD, and clinical status. IBW adjusted for right BKA (-6%). Defer fluids to team.    Subjective:   58M T2DM, CVA x2 w/ residual L-sided weakness, early onset dementia, HTN, HLD, DVT, CKD V (Cr ~6, not on HD), BPH, PSH L toe amputation, R BKA, AVF creation (5/2023) BIBEMS from nursing home for anemia requiring transfusion, found to meet SIRS criteria s/p transfusion (FNHTR vs. sepsis) w/ elevated BUN/Cr, admitted for VS derangement. Initiated HD 4/4. Also found to have C. diff. c/c/b aspiration PNA vs pneumonitis. Clinically improving on Abx and HD.     Pt was resting in bed, on 2L of nasal cannula, SpO2 at 99%. RD unable to arouse pt at the time. RD spoke to pt's nurse and obtained information from the electronic medical records, medical team, nursing. Per pt's RN, pt noted with trouble tolerating minced and moist diet textures, trouble with water/liquids as well per nursing; noted with no nausea or emesis, fecal incontinence noted; BM on 4/13/24; no distention noted. No pain/discomfort noted. Healed sacral wound noted; no incisions documented; noted with trace left ankle edema. Labs reviewed: elevated POCT glucose (108-125 range), BUN (53), Cr (6.69), serum Glucose (116), low eGFR (9), RD to monitor trends. Will recommend to continue oral nutrition supplement. RD to follow.     Previous Nutrition Diagnosis: Malnutrition severe protein-calorie malnutrition suspect prolonged inadequate intake mild-moderate muscle and fat wasting, poor intake in-house    Active [ X ]  Resolved [   ]    Goal:  Pt to consistently meet >75% nutrient needs via most appropriate route for nutrition. Reduce signs and symptoms of protein-calorie malnutrition.    Nutrition Recommendations:  **Recommend consulting speech/swallow (SLP) team for updated *SAFEST* texture and liquid consistencies**  --Recommend continue oral nutrition supplement Nepro once/day IF medically feasible and tolerated, consult speech/swallow (SLP) recommendations  --Recommend continue Nephrovite if pt can safely tolerate  --Follow intake closely, adjust nutrition plan prn  --Maintain aspiration precautions at all times  -Weekly (hemodialysis) weights  -Monitor chemistry, GI function, and skin integrity   -RD diet education prn    Education: Deferred at this time, RD to follow up prn.    Risk Level: High [ X ] Moderate [   ] Low [   ]. Admitting Diagnosis:   Patient is a 58y old  Male who presents with a chief complaint of fever (15 Apr 2024 09:40)  PAST MEDICAL & SURGICAL HISTORY:  Type 2 diabetes mellitus  Cerebral artery occlusion with cerebral infarction  Hyperlipidemia  Hypertension  H/O diabetic retinopathy  ESRD (end stage renal disease)  S/P below knee amputation, right  S/P arteriovenous (AV) fistula creation    Current Nutrition Order: minced and moist diet with 1200mL fluid restriction + renal diet + Nepro oral nutrition supplement 1x/day (420 calories, 19g protein per serving)    PO Intake: Good (%) [   ]  Fair (50-75%) [   ] Poor (<25%) [ x ]     GI Issues: noted with trouble tolerating minced and moist diet textures, trouble with water/liquids as well per nursing; noted with no nausea or emesis, fecal incontinence noted; BM on 4/13/24; no distention noted    Pain: no pain/discomfort noted    Skin Integrity: healed sacral wound noted; no incisions documented; noted with trace left ankle edema; Adolfo: 13     Labs:   04-15    138  |  100  |  28<H>  ----------------------------<  121<H>  3.6   |  33<H>  |  3.50<H>    Ca    9.0      15 Apr 2024 09:56  Phos  2.8     04-15  Mg     1.8     04-15    CAPILLARY BLOOD GLUCOSE    Medications:  MEDICATIONS  (STANDING):  aspirin enteric coated 81 milliGRAM(s) Oral every 24 hours  atorvastatin 80 milliGRAM(s) Oral at bedtime  carvedilol 3.125 milliGRAM(s) Oral every 12 hours  finasteride 5 milliGRAM(s) Oral every 24 hours  heparin   Injectable 5000 Unit(s) SubCutaneous every 8 hours  levothyroxine 50 MICROGram(s) Oral every 24 hours  Nephro-wendy 1 Tablet(s) Oral every 24 hours  tamsulosin 0.4 milliGRAM(s) Oral at bedtime  vancomycin    Solution 125 milliGRAM(s) Oral every 6 hours    MEDICATIONS  (PRN):  Admission Anthropometrics:  Height for BMI (FEET)	6 Feet  Height for BMI (INCHES)	0 Inch(s)  Height for BMI (CENTIMETERS)	182.88 Centimeter(s)  Weight for BMI (lbs)	128.7 lb  Weight for BMI (kg)	58.4 kg  Body Mass Index	17.4    Estimated energy needs:   Estimated Energy Needs From (joselyn/kg)	30  Estimated Energy Needs To (joselyn/kg)	35  Estimated Energy Needs Calculated From (joselyn/kg)	1752  Estimated Energy Needs Calculated To (joselyn/kg)	2044    Estimated Protein Needs From (g/kg)	1.3  Estimated Protein Needs To (g/kg)	1.5  Estimated Protein Needs Calculated From (g/kg)	75.92  Estimated Protein Needs Calculated To (g/kg)	87.6    Estimated needs based on dosing wt as <120% IBW 167lb/76.1kg (77%) and per RD judgement. Needs adjusted for age, malnutrition, HD, and clinical status. IBW adjusted for right BKA (-6%). Defer fluids to team.    Subjective:   58M T2DM, CVA x2 w/ residual L-sided weakness, early onset dementia, HTN, HLD, DVT, CKD V (Cr ~6, not on HD), BPH, PSH L toe amputation, R BKA, AVF creation (5/2023) BIBEMS from nursing home for anemia requiring transfusion, found to meet SIRS criteria s/p transfusion (FNHTR vs. sepsis) w/ elevated BUN/Cr, admitted for VS derangement. Initiated HD 4/4. Also found to have C. diff. c/c/b aspiration PNA vs pneumonitis. Clinically improving on Abx and HD.     Pt was resting in bed, on 2L of nasal cannula, SpO2 at 99%. RD unable to arouse pt at the time. RD spoke to pt's nurse and obtained information from the electronic medical records, medical team, nursing. Per pt's RN, pt noted with trouble tolerating minced and moist diet textures, trouble with water/liquids as well per nursing; noted with no nausea or emesis, fecal incontinence noted; BM on 4/13/24; no distention noted. No pain/discomfort noted. Healed sacral wound noted; no incisions documented; noted with trace left ankle edema. Labs reviewed: elevated POCT glucose (108-125 range), BUN (53), Cr (6.69), serum Glucose (116), low eGFR (9), RD to monitor trends. Will recommend to continue oral nutrition supplement if safe and medically feasible. RD to follow.     Previous Nutrition Diagnosis: Malnutrition severe protein-calorie malnutrition suspect prolonged inadequate intake mild-moderate muscle and fat wasting, poor intake in-house    Active [ X ]  Resolved [   ]    Goal:  Pt to consistently meet >75% nutrient needs via most appropriate route for nutrition. Reduce signs and symptoms of protein-calorie malnutrition.    Nutrition Recommendations:  **Recommend consulting speech/swallow (SLP) team for updated *SAFEST* texture and liquid consistencies**  --Recommend continue oral nutrition supplement Nepro once/day IF medically feasible and tolerated, consult speech/swallow (SLP) recommendations  --Recommend continue Nephrovite if pt can safely tolerate  --Follow intake closely, adjust nutrition plan prn  --Maintain aspiration precautions at all times  -Weekly (hemodialysis) weights  -Monitor chemistry, GI function, and skin integrity   -RD diet education prn  **RD has paged medical team**    Education: Deferred at this time, RD to follow up prn.    Risk Level: High [ X ] Moderate [   ] Low [   ]. Admitting Diagnosis:   Patient is a 58y old  Male who presents with a chief complaint of fever (15 Apr 2024 09:40)  PAST MEDICAL & SURGICAL HISTORY:  Type 2 diabetes mellitus  Cerebral artery occlusion with cerebral infarction  Hyperlipidemia  Hypertension  H/O diabetic retinopathy  ESRD (end stage renal disease)  S/P below knee amputation, right  S/P arteriovenous (AV) fistula creation    Current Nutrition Order: minced and moist diet with 1200mL fluid restriction + renal diet + Nepro oral nutrition supplement 1x/day (420 calories, 19g protein per serving)    PO Intake: Good (%) [   ]  Fair (50-75%) [   ] Poor (<25%) [ x ]     GI Issues: noted with trouble tolerating minced and moist diet textures, trouble with water/liquids as well per nursing; noted with no nausea or emesis, fecal incontinence noted; BM on 4/13/24; no distention noted    Pain: no pain/discomfort noted    Skin Integrity: healed sacral wound noted; no incisions documented; noted with trace left ankle edema; Adolfo: 13     Labs:   04-15    138  |  100  |  28<H>  ----------------------------<  121<H>  3.6   |  33<H>  |  3.50<H>    Ca    9.0      15 Apr 2024 09:56  Phos  2.8     04-15  Mg     1.8     04-15    CAPILLARY BLOOD GLUCOSE    Medications:  MEDICATIONS  (STANDING):  aspirin enteric coated 81 milliGRAM(s) Oral every 24 hours  atorvastatin 80 milliGRAM(s) Oral at bedtime  carvedilol 3.125 milliGRAM(s) Oral every 12 hours  finasteride 5 milliGRAM(s) Oral every 24 hours  heparin   Injectable 5000 Unit(s) SubCutaneous every 8 hours  levothyroxine 50 MICROGram(s) Oral every 24 hours  Nephro-wendy 1 Tablet(s) Oral every 24 hours  tamsulosin 0.4 milliGRAM(s) Oral at bedtime  vancomycin    Solution 125 milliGRAM(s) Oral every 6 hours    MEDICATIONS  (PRN):  Admission Anthropometrics:  Height for BMI (FEET)	6 Feet  Height for BMI (INCHES)	0 Inch(s)  Height for BMI (CENTIMETERS)	182.88 Centimeter(s)  Weight for BMI (lbs)	128.7 lb  Weight for BMI (kg)	58.4 kg  Body Mass Index	17.4    Estimated energy needs:   Estimated Energy Needs From (joselyn/kg)	30  Estimated Energy Needs To (joselyn/kg)	35  Estimated Energy Needs Calculated From (joselyn/kg)	1752  Estimated Energy Needs Calculated To (joselyn/kg)	2044    Estimated Protein Needs From (g/kg)	1.3  Estimated Protein Needs To (g/kg)	1.5  Estimated Protein Needs Calculated From (g/kg)	75.92  Estimated Protein Needs Calculated To (g/kg)	87.6    Estimated needs based on dosing wt as <120% IBW 167lb/76.1kg (77%) and per RD judgement. Needs adjusted for age, malnutrition, HD, and clinical status. IBW adjusted for right BKA (-6%). Defer fluids to team.    Subjective:   58M T2DM, CVA x2 w/ residual L-sided weakness, early onset dementia, HTN, HLD, DVT, CKD V (Cr ~6, not on HD), BPH, PSH L toe amputation, R BKA, AVF creation (5/2023) BIBEMS from nursing home for anemia requiring transfusion, found to meet SIRS criteria s/p transfusion (FNHTR vs. sepsis) w/ elevated BUN/Cr, admitted for VS derangement. Initiated HD 4/4. Also found to have C. diff. c/c/b aspiration PNA vs pneumonitis. Clinically improving on Abx and HD.     Pt was resting in bed, on 2L of nasal cannula, SpO2 at 99%. RD unable to arouse pt at the time. RD spoke to pt's nurse and obtained information from the electronic medical records, medical team, nursing. Per pt's RN, pt noted with trouble tolerating minced and moist diet textures, trouble with water/liquids as well per nursing. RD spoke to medical team, who stated they spoke to the pt's family who "understood the aspiration risk", per medical team, pt's family is comfortable with this and with "pleasure feeds", RD to follow up. Pt noted with no nausea or emesis, fecal incontinence noted; BM on 4/13/24; no distention noted. No pain/discomfort noted. Healed sacral wound noted; no incisions documented; noted with trace left ankle edema. Labs reviewed: elevated POCT glucose (108-125 range), BUN (53), Cr (6.69), serum Glucose (116), low eGFR (9), RD to monitor trends. Will recommend to continue oral nutrition supplement if safe and medically feasible. RD to follow.     Previous Nutrition Diagnosis: Malnutrition severe protein-calorie malnutrition suspect prolonged inadequate intake mild-moderate muscle and fat wasting, poor intake in-house    Active [ X ]  Resolved [   ]    Goal:  Pt to consistently meet >75% nutrient needs via most appropriate route for nutrition. Reduce signs and symptoms of protein-calorie malnutrition.    Nutrition Recommendations:  **Recommend consulting speech/swallow (SLP) team for updated, safest texture and liquid consistencies**  --Recommend continue oral nutrition supplement Nepro once/day IF medically feasible and tolerated, consult speech/swallow (SLP) recommendations  --Recommend continue Nephrovite if pt can safely tolerate  --Follow intake closely, adjust nutrition plan prn  --Maintain aspiration precautions at all times  -Weekly (hemodialysis) weights  -Monitor chemistry, GI function, and skin integrity   -RD diet education prn  **RD has paged medical team**    Education: Deferred at this time, RD to follow up prn.    Risk Level: High [ X ] Moderate [   ] Low [   ].

## 2024-04-15 NOTE — PROGRESS NOTE ADULT - ATTENDING COMMENTS
I: HTN controlled. Seen on dialysis.   A: ESRD. Anemia stable.   P: Continue dialysis. BERNARDINO at HD.

## 2024-04-15 NOTE — PROGRESS NOTE ADULT - ATTENDING COMMENTS
58 YOM with PMH of T2DM, HTN, HLD, stroke x2 (ischemic, embolic) with residual L weakness, ESRD, RBKA, BPH admitted for acute on chronic anemia s/p pBRC transfusion. Started on HD this admission via LUE AVF (c/b AVF malfunction s/p venoplasty 4/12 for outflow stenosis, tolerated HD today). Hospital course c/b c-diff colitis (PO vanc EOT 4/16) and aspiration pneumonitis/PNA (s/p 5-day course ceftriaxone EOT 4/9). Evaluated by SLP who advised who advised high risk aspiration. Evaluated by palliative, patient/family opting to cont comfort feeds with understand that patient remains high aspiration risk.     Dispo - pending LTC acceptance (new HD), medically optimized for discharge

## 2024-04-15 NOTE — CHART NOTE - NSCHARTNOTEFT_GEN_A_CORE
Goals are established; Symptoms are managed. Palliative Care will SIGN OFF.    Please reconsult with any new issues or concerns: Call [phone]  Kodi Javed, Palliative Care Attending

## 2024-04-15 NOTE — PROGRESS NOTE ADULT - PROBLEM SELECTOR PLAN 3
per HIE, prior Hx of HD. However no active HD according to NH staff.   Bun >100s, Cr 5.8 12/2023 -> 7.8 on admission. sCr improving w/ HD    - renal consulted for HD, consented by HCP Leo Cisneros [phone]  - discontinued home NaHCO3 650mg TID

## 2024-04-15 NOTE — PROGRESS NOTE ADULT - SUBJECTIVE AND OBJECTIVE BOX
--------INCOMPLETE NOTE--------  OVERNIGHT EVENTS: NAEO    SUBJECTIVE  Pt was seen and examined at bedside. No acute concern.     Patient denies any fevers/ chills, n/v, headache, acute SOB, chest pain, abdominal pain, genitourinary sx    12-point review of systems otherwise negative      Vital Signs Last 24 Hrs  T(C): 36.4 (15 Apr 2024 15:30), Max: 37 (15 Apr 2024 12:05)  T(F): 97.5 (15 Apr 2024 15:30), Max: 98.6 (15 Apr 2024 12:05)  HR: 91 (15 Apr 2024 15:30) (75 - 91)  BP: 121/69 (15 Apr 2024 15:30) (104/59 - 147/71)  BP(mean): --  RR: 18 (15 Apr 2024 15:30) (17 - 18)  SpO2: 98% (15 Apr 2024 15:30) (98% - 100%)    Parameters below as of 15 Apr 2024 15:30  Patient On (Oxygen Delivery Method): nasal cannula  O2 Flow (L/min): 2        PHYSICAL EXAM   General: NAD, conversational on 2L  HEENT: MMM  Respiratory: CTA B/L; normal WOB  Cardiovascular: RRR; no m/r/g  Gastrointestinal: Soft; NTND; + bowel sounds  : no suprapubic tenderness  Vascular: extremities WWP; no edema/cyanosis, LUE AVF w/ palpable thrill  MSK: R BKA, L side contracted  Neurological: A&Ox3      LABS:                        7.4    8.13  )-----------( 352      ( 15 Apr 2024 09:56 )             24.4     04-15    138  |  100  |  28<H>  ----------------------------<  121<H>  3.6   |  33<H>  |  3.50<H>    Ca    9.0      15 Apr 2024 09:56  Phos  2.8     04-15  Mg     1.8     04-15        Urinalysis Basic - ( 15 Apr 2024 09:56 )    Color: x / Appearance: x / SG: x / pH: x  Gluc: 121 mg/dL / Ketone: x  / Bili: x / Urobili: x   Blood: x / Protein: x / Nitrite: x   Leuk Esterase: x / RBC: x / WBC x   Sq Epi: x / Non Sq Epi: x / Bacteria: x      CAPILLARY BLOOD GLUCOSE

## 2024-04-15 NOTE — PROGRESS NOTE ADULT - SUBJECTIVE AND OBJECTIVE BOX
Patient is a 58y Male seen and evaluated at HD unit. Does not offer any complaints. Tolerating HD well, continue HD as prescribed       Meds:    aspirin enteric coated 81 every 24 hours  atorvastatin 80 at bedtime  carvedilol 3.125 every 12 hours  epoetin maryellen-epbx (RETACRIT) Injectable 6000 once  finasteride 5 every 24 hours  heparin   Injectable 5000 every 8 hours  levothyroxine 50 every 24 hours  Nephro-wendy 1 every 24 hours  tamsulosin 0.4 at bedtime  vancomycin    Solution 125 every 6 hours      T(C): , Max: 36.8 (04-14-24 @ 11:22)  T(F): , Max: 98.3 (04-14-24 @ 11:22)  HR: 75 (04-15-24 @ 09:05)  BP: 121/66 (04-15-24 @ 09:05)  BP(mean): --  RR: 18 (04-15-24 @ 09:05)  SpO2: 100% (04-15-24 @ 09:05)  Wt(kg): --        Review of Systems:  ROS negative except as per HPI      PHYSICAL EXAM:  GENERAL: NAD, laying in bed tolerating HD   NECK: supple, No JVD  CHEST/LUNG: Clear to auscultation bilaterally  HEART: normal S1S2, RRR  EXTREMITIES: No clubbing, cyanosis, or edema   NEUROLOGY: AAO x3, no focal neurological deficit  ACCESS: LUE AVF with good thrill and bruit, accessed       LABS:                      RADIOLOGY & ADDITIONAL STUDIES:

## 2024-04-15 NOTE — PROGRESS NOTE ADULT - PROBLEM SELECTOR PLAN 5
Recent retention requiring SCs  - continue home meds Tamsulosin 0.4 mg qhs and Finasteride 5 mg qd.  - bladder scan q6h  - consider puckett risk vs. benefit    #T2DM  A1c 5.5. Speech and Swallow evaluation unable to be completed 4/5 due to lethargy  - CC diet   - mISS        #hypothyroidism  - continue home med synthroid 50mcg qd

## 2024-04-16 PROCEDURE — 99232 SBSQ HOSP IP/OBS MODERATE 35: CPT

## 2024-04-16 PROCEDURE — 99232 SBSQ HOSP IP/OBS MODERATE 35: CPT | Mod: GC

## 2024-04-16 RX ADMIN — ATORVASTATIN CALCIUM 80 MILLIGRAM(S): 80 TABLET, FILM COATED ORAL at 22:04

## 2024-04-16 RX ADMIN — FINASTERIDE 5 MILLIGRAM(S): 5 TABLET, FILM COATED ORAL at 17:27

## 2024-04-16 RX ADMIN — Medication 125 MILLIGRAM(S): at 00:15

## 2024-04-16 RX ADMIN — CARVEDILOL PHOSPHATE 3.12 MILLIGRAM(S): 80 CAPSULE, EXTENDED RELEASE ORAL at 17:27

## 2024-04-16 RX ADMIN — HEPARIN SODIUM 5000 UNIT(S): 5000 INJECTION INTRAVENOUS; SUBCUTANEOUS at 22:05

## 2024-04-16 RX ADMIN — Medication 81 MILLIGRAM(S): at 17:27

## 2024-04-16 RX ADMIN — Medication 1 TABLET(S): at 17:27

## 2024-04-16 RX ADMIN — TAMSULOSIN HYDROCHLORIDE 0.4 MILLIGRAM(S): 0.4 CAPSULE ORAL at 22:05

## 2024-04-16 RX ADMIN — CARVEDILOL PHOSPHATE 3.12 MILLIGRAM(S): 80 CAPSULE, EXTENDED RELEASE ORAL at 06:45

## 2024-04-16 RX ADMIN — HEPARIN SODIUM 5000 UNIT(S): 5000 INJECTION INTRAVENOUS; SUBCUTANEOUS at 13:48

## 2024-04-16 RX ADMIN — HEPARIN SODIUM 5000 UNIT(S): 5000 INJECTION INTRAVENOUS; SUBCUTANEOUS at 06:45

## 2024-04-16 RX ADMIN — Medication 50 MICROGRAM(S): at 06:45

## 2024-04-16 NOTE — PROGRESS NOTE ADULT - ATTENDING COMMENTS
events noted, chart reviewed  59 yo M with ESRD on HD.  New started HD after presenting with uremic symptoms  hospital course complicated by c.diff colitis, aspiration PNA, and outflow stenosis of AVF (s/p fistulogram and venoplasty 4/12).  tolerated HD adequately yesterday, but UF limited by low BPs- as above, hold antihypertensive meds in AM  stable today  for repeat HD tomorrow 4/17

## 2024-04-16 NOTE — PROGRESS NOTE ADULT - ATTENDING COMMENTS
58 YOM with PMH of T2DM, HTN, HLD, stroke x2 (ischemic, embolic) with residual L weakness, ESRD, RBKA, BPH admitted for acute on chronic anemia s/p [facility] transfusion. Started on HD this admission via LUE AVF (c/b AVF malfunction s/p venoplasty 4/12 for outflow stenosis, tolerated HD 4/15). Hospital course c/b c-diff colitis s/p 10-day course of PO vancomycin (EOT 4/16/24) and aspiration pneumonitis/PNA (s/p 5-day course ceftriaxone EOT 4/9/24). Evaluated by SLP who advised who advised high risk aspiration. Evaluated by palliative, patient/family opting to cont comfort feeds with understand that patient remains high aspiration risk.     Dispo - pending LTC acceptance (new HD), medically optimized for discharge.

## 2024-04-16 NOTE — PROGRESS NOTE ADULT - SUBJECTIVE AND OBJECTIVE BOX
Nephrology progress note    Seen at bedside. No acute complaints, feeling well. S/p HD yesterday, 2L UF ordered, only achieved 1L due to low BP.    Allergies:  No Known Allergies    Hospital Medications:   MEDICATIONS  (STANDING):  aspirin enteric coated 81 milliGRAM(s) Oral every 24 hours  atorvastatin 80 milliGRAM(s) Oral at bedtime  carvedilol 3.125 milliGRAM(s) Oral every 12 hours  finasteride 5 milliGRAM(s) Oral every 24 hours  heparin   Injectable 5000 Unit(s) SubCutaneous every 8 hours  levothyroxine 50 MICROGram(s) Oral every 24 hours  Nephro-wendy 1 Tablet(s) Oral every 24 hours  tamsulosin 0.4 milliGRAM(s) Oral at bedtime    REVIEW OF SYSTEMS:  All other review of systems is negative unless indicated above.    VITALS:  T(F): 97.7 (04-16-24 @ 09:30), Max: 97.8 (04-15-24 @ 21:12)  HR: 76 (04-16-24 @ 09:30)  BP: 135/71 (04-16-24 @ 09:30)  RR: 17 (04-16-24 @ 09:30)  SpO2: 98% (04-16-24 @ 09:30)  Wt(kg): --    04-15 @ 07:01  -  04-16 @ 07:00  --------------------------------------------------------  IN: 0 mL / OUT: 1000 mL / NET: -1000 mL        PHYSICAL EXAM:  GENERAL: NAD, laying in bed  NECK: supple, No JVD  CHEST/LUNG: Clear to auscultation bilaterally  HEART: normal S1S2, RRR  EXTREMITIES: No clubbing, cyanosis, or edema   NEUROLOGY: Awake, alert, interactive  ACCESS: LUE AVF with good thrill and bruit     LABS:  04-15    138  |  100  |  28<H>  ----------------------------<  121<H>  3.6   |  33<H>  |  3.50<H>    Ca    9.0      15 Apr 2024 09:56  Phos  2.8     04-15  Mg     1.8     04-15                            7.4    8.13  )-----------( 352      ( 15 Apr 2024 09:56 )             24.4       Urine Studies:  Creatinine Trend: 3.50<--, 6.69<--, 6.49<--, 4.86<--, 3.63<--, 4.96<--  Urinalysis Basic - ( 15 Apr 2024 09:56 )    Color:  / Appearance:  / SG:  / pH:   Gluc: 121 mg/dL / Ketone:   / Bili:  / Urobili:    Blood:  / Protein:  / Nitrite:    Leuk Esterase:  / RBC:  / WBC    Sq Epi:  / Non Sq Epi:  / Bacteria:         RADIOLOGY & ADDITIONAL STUDIES:  reviewed

## 2024-04-16 NOTE — PROGRESS NOTE ADULT - SUBJECTIVE AND OBJECTIVE BOX
--------INCOMPLETE NOTE--------  OVERNIGHT EVENTS: SRINIEO    SUBJECTIVE  Pt was seen and examined at bedside. In good spirits.     Patient denies any fevers/ chills, n/v, headache, acute SOB, chest pain, abdominal pain, genitourinary sx    12-point review of systems otherwise negative      Vital Signs Last 24 Hrs  T(C): 36.3 (16 Apr 2024 17:00), Max: 36.6 (15 Apr 2024 21:12)  T(F): 97.3 (16 Apr 2024 17:00), Max: 97.8 (15 Apr 2024 21:12)  HR: 84 (16 Apr 2024 17:00) (76 - 84)  BP: 161/73 (16 Apr 2024 17:00) (121/72 - 161/73)  BP(mean): --  RR: 18 (16 Apr 2024 17:00) (17 - 18)  SpO2: 97% (16 Apr 2024 17:00) (97% - 98%)    Parameters below as of 16 Apr 2024 09:30  Patient On (Oxygen Delivery Method): nasal cannula  O2 Flow (L/min): 2        PHYSICAL EXAM   General: NAD, conversational on 2L  HEENT: MMM  Respiratory: CTA B/L; normal WOB  Cardiovascular: RRR; no m/r/g  Gastrointestinal: Soft; NTND; + bowel sounds  : no suprapubic tenderness  Vascular: extremities WWP; no edema/cyanosis, LUE AVF w/ palpable thrill  MSK: R BKA  Neurological: A&Ox3    LABS:                        7.4    8.13  )-----------( 352      ( 15 Apr 2024 09:56 )             24.4     04-15    138  |  100  |  28<H>  ----------------------------<  121<H>  3.6   |  33<H>  |  3.50<H>    Ca    9.0      15 Apr 2024 09:56  Phos  2.8     04-15  Mg     1.8     04-15        Urinalysis Basic - ( 15 Apr 2024 09:56 )    Color: x / Appearance: x / SG: x / pH: x  Gluc: 121 mg/dL / Ketone: x  / Bili: x / Urobili: x   Blood: x / Protein: x / Nitrite: x   Leuk Esterase: x / RBC: x / WBC x   Sq Epi: x / Non Sq Epi: x / Bacteria: x      CAPILLARY BLOOD GLUCOSE

## 2024-04-16 NOTE — PROGRESS NOTE ADULT - ASSESSMENT
58Y M initially admitted worsening renal function, found to be uremic and anemic. Initiated on HD, hospital course complicated by c.diff colitis, aspiration PNA, and outflow stenosis of AVF (s/p fistulogram and venoplasty 4/12).    ESRD on HD  S/p HD yesterday, did not achieve fluid goal due to soft BP. Please hold carvedilol prior to HD tomorrow and will attempt 2L again.   Renal diet  Fluid restriction <1.2L/day  Strict I&O, daily weights  Pending placement   Next HD 4/17    HTN:  BP at goal   UF with HD as tolerated   Carvedilol 3.125 BID    Access  LUE AVF functional s/p venoplasty 4/12 for outflow stenosis     Anemia  Hb not at goal 7.4  Iron sat 11%  Patient no longer on abx, wbc wnl, can consider adding IV iron supplement 200mg x5  epo w/ HD    CKD-BMD  Calcium 9.0  Phos 2.8. Below goal 3-5.5. No indication for phos binder

## 2024-04-16 NOTE — PROGRESS NOTE ADULT - PROBLEM SELECTOR PLAN 3
per HIE, prior Hx of HD. However no active HD according to NH staff.   Bun >100s, Cr 5.8 12/2023 -> 7.8 on admission. sCr improving w/ HD    - renal consulted for HD, consented by HCP Leo Cisneros [phone]  - discontinued home NaHCO3 650mg TID

## 2024-04-16 NOTE — PROGRESS NOTE ADULT - ASSESSMENT
58M T2DM, CVA x2 w/ residual L-sided weakness, early onset dementia, HTN, HLD, DVT, CKD V (Cr ~6, not on HD), BPH, PSH L toe amputation, R BKA, AVF creation (5/2023) BIBEMS from nursing home for anemia requiring transfusion, found to meet SIRS criteria s/p transfusion ([facility] vs. sepsis) w/ elevated BUN/Cr, admitted for VS derangement. Initiated HD 4/4. Also found to have C. diff. c/c/b aspiration PNA vs pneumonitis. Clinically improving on Abx and HD. further c/c/b AVF malfunction 4/9, resolved s/p fistulogram 4/12.  58M T2DM, CVA x2 w/ residual L-sided weakness, early onset dementia, HTN, HLD, DVT, CKD V (Cr ~6, not on HD), BPH, PSH L toe amputation, R BKA, AVF creation (5/2023) BIBEMS from nursing home for anemia requiring transfusion, found to meet SIRS criteria s/p transfusion ([facility] vs. sepsis) w/ elevated BUN/Cr, admitted for VS derangement. Initiated HD 4/4. Also found to have C. diff. c/c/b aspiration PNA vs pneumonitis. Clinically improving on Abx and HD. further c/c/b AVF malfunction 4/9, resolved s/p fistulogram 4/12. medically optimized for dispo

## 2024-04-17 LAB
ANION GAP SERPL CALC-SCNC: 4 MMOL/L — LOW (ref 5–17)
BUN SERPL-MCNC: 32 MG/DL — HIGH (ref 7–23)
CALCIUM SERPL-MCNC: 9.7 MG/DL — SIGNIFICANT CHANGE UP (ref 8.4–10.5)
CHLORIDE SERPL-SCNC: 98 MMOL/L — SIGNIFICANT CHANGE UP (ref 96–108)
CO2 SERPL-SCNC: 35 MMOL/L — HIGH (ref 22–31)
CREAT SERPL-MCNC: 5.25 MG/DL — HIGH (ref 0.5–1.3)
EGFR: 12 ML/MIN/1.73M2 — LOW
GLUCOSE SERPL-MCNC: 104 MG/DL — HIGH (ref 70–99)
HCT VFR BLD CALC: 25.4 % — LOW (ref 39–50)
HGB BLD-MCNC: 7.6 G/DL — LOW (ref 13–17)
MAGNESIUM SERPL-MCNC: 2 MG/DL — SIGNIFICANT CHANGE UP (ref 1.6–2.6)
MCHC RBC-ENTMCNC: 28.3 PG — SIGNIFICANT CHANGE UP (ref 27–34)
MCHC RBC-ENTMCNC: 29.9 GM/DL — LOW (ref 32–36)
MCV RBC AUTO: 94.4 FL — SIGNIFICANT CHANGE UP (ref 80–100)
NRBC # BLD: 0 /100 WBCS — SIGNIFICANT CHANGE UP (ref 0–0)
PHOSPHATE SERPL-MCNC: 4.5 MG/DL — SIGNIFICANT CHANGE UP (ref 2.5–4.5)
PLATELET # BLD AUTO: 435 K/UL — HIGH (ref 150–400)
POTASSIUM SERPL-MCNC: 4.2 MMOL/L — SIGNIFICANT CHANGE UP (ref 3.5–5.3)
POTASSIUM SERPL-SCNC: 4.2 MMOL/L — SIGNIFICANT CHANGE UP (ref 3.5–5.3)
RBC # BLD: 2.69 M/UL — LOW (ref 4.2–5.8)
RBC # FLD: 15.8 % — HIGH (ref 10.3–14.5)
SODIUM SERPL-SCNC: 137 MMOL/L — SIGNIFICANT CHANGE UP (ref 135–145)
WBC # BLD: 7.46 K/UL — SIGNIFICANT CHANGE UP (ref 3.8–10.5)
WBC # FLD AUTO: 7.46 K/UL — SIGNIFICANT CHANGE UP (ref 3.8–10.5)

## 2024-04-17 PROCEDURE — 90935 HEMODIALYSIS ONE EVALUATION: CPT

## 2024-04-17 PROCEDURE — 99232 SBSQ HOSP IP/OBS MODERATE 35: CPT | Mod: GC

## 2024-04-17 RX ORDER — ASPIRIN/CALCIUM CARB/MAGNESIUM 324 MG
1 TABLET ORAL
Qty: 0 | Refills: 0 | DISCHARGE
Start: 2024-04-17

## 2024-04-17 RX ORDER — IRON SUCROSE 20 MG/ML
300 INJECTION, SOLUTION INTRAVENOUS ONCE
Refills: 0 | Status: COMPLETED | OUTPATIENT
Start: 2024-04-17 | End: 2024-04-17

## 2024-04-17 RX ORDER — ASPIRIN/CALCIUM CARB/MAGNESIUM 324 MG
1 TABLET ORAL
Refills: 0 | DISCHARGE

## 2024-04-17 RX ORDER — TAMSULOSIN HYDROCHLORIDE 0.4 MG/1
1 CAPSULE ORAL
Refills: 0 | DISCHARGE

## 2024-04-17 RX ORDER — ERYTHROPOIETIN 10000 [IU]/ML
6000 INJECTION, SOLUTION INTRAVENOUS; SUBCUTANEOUS ONCE
Refills: 0 | Status: COMPLETED | OUTPATIENT
Start: 2024-04-17 | End: 2024-04-17

## 2024-04-17 RX ORDER — CARVEDILOL PHOSPHATE 80 MG/1
1 CAPSULE, EXTENDED RELEASE ORAL
Refills: 0 | DISCHARGE

## 2024-04-17 RX ORDER — FINASTERIDE 5 MG/1
1 TABLET, FILM COATED ORAL
Refills: 0 | DISCHARGE

## 2024-04-17 RX ORDER — HYDRALAZINE HCL 50 MG
1 TABLET ORAL
Refills: 0 | DISCHARGE

## 2024-04-17 RX ORDER — CARVEDILOL PHOSPHATE 80 MG/1
1 CAPSULE, EXTENDED RELEASE ORAL
Qty: 0 | Refills: 0 | DISCHARGE
Start: 2024-04-17

## 2024-04-17 RX ORDER — TAMSULOSIN HYDROCHLORIDE 0.4 MG/1
1 CAPSULE ORAL
Qty: 0 | Refills: 0 | DISCHARGE
Start: 2024-04-17

## 2024-04-17 RX ORDER — ATORVASTATIN CALCIUM 80 MG/1
1 TABLET, FILM COATED ORAL
Qty: 0 | Refills: 0 | DISCHARGE
Start: 2024-04-17

## 2024-04-17 RX ORDER — FINASTERIDE 5 MG/1
1 TABLET, FILM COATED ORAL
Qty: 0 | Refills: 0 | DISCHARGE
Start: 2024-04-17

## 2024-04-17 RX ADMIN — CARVEDILOL PHOSPHATE 3.12 MILLIGRAM(S): 80 CAPSULE, EXTENDED RELEASE ORAL at 07:04

## 2024-04-17 RX ADMIN — IRON SUCROSE 176.67 MILLIGRAM(S): 20 INJECTION, SOLUTION INTRAVENOUS at 10:45

## 2024-04-17 RX ADMIN — Medication 81 MILLIGRAM(S): at 17:27

## 2024-04-17 RX ADMIN — HEPARIN SODIUM 5000 UNIT(S): 5000 INJECTION INTRAVENOUS; SUBCUTANEOUS at 14:58

## 2024-04-17 RX ADMIN — FINASTERIDE 5 MILLIGRAM(S): 5 TABLET, FILM COATED ORAL at 17:27

## 2024-04-17 RX ADMIN — HEPARIN SODIUM 5000 UNIT(S): 5000 INJECTION INTRAVENOUS; SUBCUTANEOUS at 07:03

## 2024-04-17 RX ADMIN — HEPARIN SODIUM 5000 UNIT(S): 5000 INJECTION INTRAVENOUS; SUBCUTANEOUS at 22:45

## 2024-04-17 RX ADMIN — Medication 1 TABLET(S): at 17:27

## 2024-04-17 RX ADMIN — TAMSULOSIN HYDROCHLORIDE 0.4 MILLIGRAM(S): 0.4 CAPSULE ORAL at 22:45

## 2024-04-17 RX ADMIN — ERYTHROPOIETIN 6000 UNIT(S): 10000 INJECTION, SOLUTION INTRAVENOUS; SUBCUTANEOUS at 11:20

## 2024-04-17 RX ADMIN — CARVEDILOL PHOSPHATE 3.12 MILLIGRAM(S): 80 CAPSULE, EXTENDED RELEASE ORAL at 17:27

## 2024-04-17 RX ADMIN — Medication 50 MICROGRAM(S): at 07:03

## 2024-04-17 RX ADMIN — ATORVASTATIN CALCIUM 80 MILLIGRAM(S): 80 TABLET, FILM COATED ORAL at 22:45

## 2024-04-17 NOTE — PROGRESS NOTE ADULT - SUBJECTIVE AND OBJECTIVE BOX
OVERNIGHT EVENTS:   Bladder scan 275cc, urinated 200cc.  SUBJECTIVE / INTERVAL HPI: Patient seen and examined at bedside.     VITAL SIGNS:  Vital Signs Last 24 Hrs  T(C): 36.6 (17 Apr 2024 09:25), Max: 37 (17 Apr 2024 07:00)  T(F): 97.8 (17 Apr 2024 09:25), Max: 98.6 (17 Apr 2024 07:00)  HR: 75 (17 Apr 2024 09:25) (75 - 84)  BP: 122/70 (17 Apr 2024 09:25) (122/70 - 169/74)  BP(mean): --  RR: 16 (17 Apr 2024 09:25) (16 - 18)  SpO2: 95% (17 Apr 2024 09:25) (95% - 98%)    Parameters below as of 17 Apr 2024 09:25  Patient On (Oxygen Delivery Method): nasal cannula  O2 Flow (L/min): 2      PHYSICAL EXAM:    General: NAD, only respond to yes/no questions  HEENT: MMM  Respiratory: CTA B/L; normal WOB  Cardiovascular: RRR; no m/r/g  Gastrointestinal: Soft; NTND; + bowel sounds  : no suprapubic tenderness  Vascular: extremities WWP; no edema/cyanosis, LUE AVF w/ palpable thrill  MSK: R BKA, L side contracted  Neurological: A&Ox1    MEDICATIONS:  MEDICATIONS  (STANDING):  aspirin enteric coated 81 milliGRAM(s) Oral every 24 hours  atorvastatin 80 milliGRAM(s) Oral at bedtime  carvedilol 3.125 milliGRAM(s) Oral every 12 hours  epoetin maryellen-epbx (RETACRIT) Injectable 6000 Unit(s) IV Push once  finasteride 5 milliGRAM(s) Oral every 24 hours  heparin   Injectable 5000 Unit(s) SubCutaneous every 8 hours  iron sucrose IVPB 300 milliGRAM(s) IV Intermittent once  levothyroxine 50 MICROGram(s) Oral every 24 hours  Nephro-wendy 1 Tablet(s) Oral every 24 hours  tamsulosin 0.4 milliGRAM(s) Oral at bedtime    MEDICATIONS  (PRN):      ALLERGIES:  Allergies    No Known Allergies    Intolerances        LABS:                        7.6    7.46  )-----------( 435      ( 17 Apr 2024 09:49 )             25.4     04-17    137  |  98  |  32<H>  ----------------------------<  104<H>  4.2   |  35<H>  |  5.25<H>    Ca    9.7      17 Apr 2024 09:49  Phos  4.5     04-17  Mg     2.0     04-17        Urinalysis Basic - ( 17 Apr 2024 09:49 )    Color: x / Appearance: x / SG: x / pH: x  Gluc: 104 mg/dL / Ketone: x  / Bili: x / Urobili: x   Blood: x / Protein: x / Nitrite: x   Leuk Esterase: x / RBC: x / WBC x   Sq Epi: x / Non Sq Epi: x / Bacteria: x      CAPILLARY BLOOD GLUCOSE          RADIOLOGY & ADDITIONAL TESTS: Reviewed.

## 2024-04-17 NOTE — PROGRESS NOTE ADULT - PROBLEM SELECTOR PLAN 4
Hx of C. diff 12/2023 during last admission. p/w watery diarrhea. C diff positive 4/5.  5 episodes of watery diarrhea. 4/6 2 episodes overnight  GI PCR negative    - Vancomycin 125mg PO q6 for x 10d (4/5-4/15) COMPLETED

## 2024-04-17 NOTE — PROGRESS NOTE ADULT - ASSESSMENT
58M T2DM, CVA x2 w/ residual L-sided weakness, early onset dementia, HTN, HLD, DVT, CKD V (Cr ~6, not on HD), BPH, PSH L toe amputation, R BKA, AVF creation (5/2023) BIBEMS from nursing home for anemia requiring transfusion, found to meet SIRS criteria s/p transfusion ([facility] vs. sepsis) w/ elevated BUN/Cr, admitted for VS derangement. Initiated HD 4/4. Also found to have C. diff. c/c/b aspiration PNA vs pneumonitis. Clinically improving on Abx and HD. further c/c/b AVF malfunction 4/9, resolved s/p fistulogram 4/12. medically optimized for dispo

## 2024-04-17 NOTE — PROGRESS NOTE ADULT - ASSESSMENT
58Y M now ESRD hospital course complicated by c.diff colitis, aspiration PNA, and outflow stenosis of AVF (s/p fistulogram and venoplasty 4/12), pending placement, continue in patient HD management   At present clinically stable, seen and evaluated on HD tolerating well, BP stable aim for 2L UF, as last HD on 4/15 UF limited due to low BP   Continue with EPO and start Iron 300mg x3 given no further active infection and Tsat 11%   Calcium/Phos okay, no indication for binder   If still admitted plan for next HD 4/19, please hold BP meds on HD days to achieve optimal UF

## 2024-04-17 NOTE — PROGRESS NOTE ADULT - ATTENDING COMMENTS
59 yo M with ESRD on HD.  New started HD after presenting with uremic symptoms  seen and evaluated while on dialysis  tolerating treatment well  c/w gentle UF as outlined above    hospital course complicated by c.diff colitis, aspiration PNA, and outflow stenosis of AVF (s/p fistulogram and venoplasty 4/12).

## 2024-04-17 NOTE — PROGRESS NOTE ADULT - SUBJECTIVE AND OBJECTIVE BOX
Patient is a 58y Male seen and evaluated at HD unit, /78, continue HD as prescribed with goal 2L UF      Meds:    aspirin enteric coated 81 every 24 hours  atorvastatin 80 at bedtime  carvedilol 3.125 every 12 hours  finasteride 5 every 24 hours  heparin   Injectable 5000 every 8 hours  levothyroxine 50 every 24 hours  Nephro-wendy 1 every 24 hours  tamsulosin 0.4 at bedtime      T(C): , Max: 37 (04-17-24 @ 07:00)  T(F): , Max: 98.6 (04-17-24 @ 07:00)  HR: 75 (04-17-24 @ 09:25)  BP: 122/70 (04-17-24 @ 09:25)  BP(mean): --  RR: 16 (04-17-24 @ 09:25)  SpO2: 95% (04-17-24 @ 09:25)  Wt(kg): --    04-16 @ 07:01  -  04-17 @ 07:00  --------------------------------------------------------  IN: 0 mL / OUT: 300 mL / NET: -300 mL          Review of Systems:  ROS negative except as per HPI      PHYSICAL EXAM:  GENERAL: NAD, laying in bed, tolerating HD   NECK: supple, No JVD  CHEST/LUNG: Clear to auscultation bilaterally  HEART: normal S1S2, RRR  EXTREMITIES: No clubbing, cyanosis, or edema   NEUROLOGY: Awake, alert, interactive  ACCESS: LUE AVF with good thrill and bruit, accessed       LABS:                        7.6    7.46  )-----------( 435      ( 17 Apr 2024 09:49 )             25.4     04-17    137  |  98  |  32<H>  ----------------------------<  104<H>  4.2   |  35<H>  |  5.25<H>    Ca    9.7      17 Apr 2024 09:49  Phos  4.5     04-17  Mg     2.0     04-17          Urinalysis Basic - ( 17 Apr 2024 09:49 )    Color: x / Appearance: x / SG: x / pH: x  Gluc: 104 mg/dL / Ketone: x  / Bili: x / Urobili: x   Blood: x / Protein: x / Nitrite: x   Leuk Esterase: x / RBC: x / WBC x   Sq Epi: x / Non Sq Epi: x / Bacteria: x            RADIOLOGY & ADDITIONAL STUDIES:

## 2024-04-17 NOTE — PROGRESS NOTE ADULT - ATTENDING COMMENTS
58 YOM with PMH of T2DM, HTN, HLD, stroke x2 (ischemic, embolic) with residual L weakness, ESRD, RBKA, BPH admitted for acute on [facility]. Started on HD this admission via LUE AVF (c/b AVF malfunction s/p venoplasty 4/12 for outflow stenosis). Hospital course c/b c-diff colitis s/p 10-day course of PO vancomycin (EOT 4/16/24) and aspiration pneumonitis/PNA (s/p 5-day course ceftriaxone EOT 4/9/24). Evaluated by SLP who advised who advised high risk aspiration. Evaluated by palliative, patient/family opting to cont comfort feeds with understand that patient remains high aspiration risk.     [facility] LTC pending auth, medically optimized for discharge

## 2024-04-17 NOTE — PROGRESS NOTE ADULT - PROBLEM SELECTOR PLAN 7
- hold home med hydralazine 50 qd, nifedipine 120mg qd   - continue home Coreg 3.125 BID    #Hypertensive Urgency - RESOLVED

## 2024-04-17 NOTE — PROGRESS NOTE ADULT - PROBLEM SELECTOR PLAN 3
per HIE, prior Hx of HD. However no active HD according to NH staff.   Bun >100s, Cr 5.8 12/2023 -> 7.8 on admission. sCr improving w/ HD    - renal consulted for HD, consented by HCP Leo Cisneros [phone]  - discontinued home NaHCO3 650mg TID

## 2024-04-18 LAB
ALBUMIN SERPL ELPH-MCNC: 3.6 G/DL — SIGNIFICANT CHANGE UP (ref 3.3–5)
ALP SERPL-CCNC: 86 U/L — SIGNIFICANT CHANGE UP (ref 40–120)
ALT FLD-CCNC: 21 U/L — SIGNIFICANT CHANGE UP (ref 10–45)
ANION GAP SERPL CALC-SCNC: 13 MMOL/L — SIGNIFICANT CHANGE UP (ref 5–17)
AST SERPL-CCNC: 23 U/L — SIGNIFICANT CHANGE UP (ref 10–40)
BASOPHILS # BLD AUTO: 0.05 K/UL — SIGNIFICANT CHANGE UP (ref 0–0.2)
BASOPHILS NFR BLD AUTO: 0.4 % — SIGNIFICANT CHANGE UP (ref 0–2)
BILIRUB SERPL-MCNC: 0.2 MG/DL — SIGNIFICANT CHANGE UP (ref 0.2–1.2)
BUN SERPL-MCNC: 24 MG/DL — HIGH (ref 7–23)
CALCIUM SERPL-MCNC: 9.8 MG/DL — SIGNIFICANT CHANGE UP (ref 8.4–10.5)
CHLORIDE SERPL-SCNC: 98 MMOL/L — SIGNIFICANT CHANGE UP (ref 96–108)
CO2 SERPL-SCNC: 29 MMOL/L — SIGNIFICANT CHANGE UP (ref 22–31)
CREAT SERPL-MCNC: 4.53 MG/DL — HIGH (ref 0.5–1.3)
EGFR: 14 ML/MIN/1.73M2 — LOW
EOSINOPHIL # BLD AUTO: 0.16 K/UL — SIGNIFICANT CHANGE UP (ref 0–0.5)
EOSINOPHIL NFR BLD AUTO: 1.4 % — SIGNIFICANT CHANGE UP (ref 0–6)
GLUCOSE SERPL-MCNC: 119 MG/DL — HIGH (ref 70–99)
HCT VFR BLD CALC: 28.1 % — LOW (ref 39–50)
HGB BLD-MCNC: 8.7 G/DL — LOW (ref 13–17)
IMM GRANULOCYTES NFR BLD AUTO: 1 % — HIGH (ref 0–0.9)
LYMPHOCYTES # BLD AUTO: 1.76 K/UL — SIGNIFICANT CHANGE UP (ref 1–3.3)
LYMPHOCYTES # BLD AUTO: 15.3 % — SIGNIFICANT CHANGE UP (ref 13–44)
MAGNESIUM SERPL-MCNC: 2.1 MG/DL — SIGNIFICANT CHANGE UP (ref 1.6–2.6)
MCHC RBC-ENTMCNC: 28.9 PG — SIGNIFICANT CHANGE UP (ref 27–34)
MCHC RBC-ENTMCNC: 31 GM/DL — LOW (ref 32–36)
MCV RBC AUTO: 93.4 FL — SIGNIFICANT CHANGE UP (ref 80–100)
MONOCYTES # BLD AUTO: 1.13 K/UL — HIGH (ref 0–0.9)
MONOCYTES NFR BLD AUTO: 9.8 % — SIGNIFICANT CHANGE UP (ref 2–14)
NEUTROPHILS # BLD AUTO: 8.27 K/UL — HIGH (ref 1.8–7.4)
NEUTROPHILS NFR BLD AUTO: 72.1 % — SIGNIFICANT CHANGE UP (ref 43–77)
NRBC # BLD: 0 /100 WBCS — SIGNIFICANT CHANGE UP (ref 0–0)
PHOSPHATE SERPL-MCNC: 3.4 MG/DL — SIGNIFICANT CHANGE UP (ref 2.5–4.5)
PLATELET # BLD AUTO: 435 K/UL — HIGH (ref 150–400)
POTASSIUM SERPL-MCNC: 3.9 MMOL/L — SIGNIFICANT CHANGE UP (ref 3.5–5.3)
POTASSIUM SERPL-SCNC: 3.9 MMOL/L — SIGNIFICANT CHANGE UP (ref 3.5–5.3)
PROT SERPL-MCNC: 7.2 G/DL — SIGNIFICANT CHANGE UP (ref 6–8.3)
RBC # BLD: 3.01 M/UL — LOW (ref 4.2–5.8)
RBC # FLD: 16.1 % — HIGH (ref 10.3–14.5)
SODIUM SERPL-SCNC: 140 MMOL/L — SIGNIFICANT CHANGE UP (ref 135–145)
WBC # BLD: 11.49 K/UL — HIGH (ref 3.8–10.5)
WBC # FLD AUTO: 11.49 K/UL — HIGH (ref 3.8–10.5)

## 2024-04-18 PROCEDURE — 99232 SBSQ HOSP IP/OBS MODERATE 35: CPT | Mod: GC

## 2024-04-18 RX ORDER — IRON SUCROSE 20 MG/ML
300 INJECTION, SOLUTION INTRAVENOUS
Refills: 0 | Status: DISCONTINUED | OUTPATIENT
Start: 2024-04-18 | End: 2024-04-19

## 2024-04-18 RX ADMIN — CARVEDILOL PHOSPHATE 3.12 MILLIGRAM(S): 80 CAPSULE, EXTENDED RELEASE ORAL at 17:25

## 2024-04-18 RX ADMIN — ATORVASTATIN CALCIUM 80 MILLIGRAM(S): 80 TABLET, FILM COATED ORAL at 22:26

## 2024-04-18 RX ADMIN — Medication 50 MICROGRAM(S): at 06:52

## 2024-04-18 RX ADMIN — HEPARIN SODIUM 5000 UNIT(S): 5000 INJECTION INTRAVENOUS; SUBCUTANEOUS at 06:52

## 2024-04-18 RX ADMIN — HEPARIN SODIUM 5000 UNIT(S): 5000 INJECTION INTRAVENOUS; SUBCUTANEOUS at 14:35

## 2024-04-18 RX ADMIN — IRON SUCROSE 176.67 MILLIGRAM(S): 20 INJECTION, SOLUTION INTRAVENOUS at 11:43

## 2024-04-18 RX ADMIN — TAMSULOSIN HYDROCHLORIDE 0.4 MILLIGRAM(S): 0.4 CAPSULE ORAL at 22:26

## 2024-04-18 RX ADMIN — Medication 81 MILLIGRAM(S): at 17:25

## 2024-04-18 RX ADMIN — Medication 1 TABLET(S): at 17:25

## 2024-04-18 RX ADMIN — FINASTERIDE 5 MILLIGRAM(S): 5 TABLET, FILM COATED ORAL at 17:25

## 2024-04-18 RX ADMIN — CARVEDILOL PHOSPHATE 3.12 MILLIGRAM(S): 80 CAPSULE, EXTENDED RELEASE ORAL at 06:52

## 2024-04-18 RX ADMIN — HEPARIN SODIUM 5000 UNIT(S): 5000 INJECTION INTRAVENOUS; SUBCUTANEOUS at 22:26

## 2024-04-18 NOTE — PROGRESS NOTE ADULT - PROBLEM SELECTOR PLAN 4
Hx of C. diff 12/2023 during last admission. p/w watery diarrhea. C diff positive 4/5.  5 episodes of watery diarrhea. 4/6 2 episodes overnight  GI PCR negative , - Vancomycin 125mg PO q6 for x 10d (4/5-4/15) COMPLETED  On 4/18 pt spiked a WBC count 11,49 from 7.46 the day before  - Check rectal temp

## 2024-04-18 NOTE — CHART NOTE - NSCHARTNOTESELECT_GEN_ALL_CORE
Nutrition Follow Up/Nutrition Services
Nutrition Services
Rapid Response
Follow Up/Nutrition Services
Nutrition Services
Off Service Note
PPD/Event Note
Palliative Care Coordination
SW ED NOTE

## 2024-04-18 NOTE — PROGRESS NOTE ADULT - PROBLEM SELECTOR PLAN 3
per HIE, prior Hx of HD. However no active HD according to NH staff.   Bun >100s, Cr 5.8 12/2023 -> 7.8 on admission. sCr improving w/ HD    - renal consulted for HD, consented by HCP Leo Cisneros [phone]  - discontinued home NaHCO3 650mg TID  - Will start IV iron today

## 2024-04-18 NOTE — PROGRESS NOTE ADULT - ATTENDING COMMENTS
58 YOM with PMH of T2DM, HTN, HLD, stroke x2 (ischemic, embolic) with residual L weakness, ESRD, RBKA, BPH admitted for acute on [facility]. Started on HD this admission via LUE AVF (c/b AVF malfunction s/p venoplasty 4/12 for outflow stenosis). Hospital course c/b c-diff colitis s/p 10-day course of PO vancomycin (EOT 4/16/24) and aspiration pneumonitis/PNA (s/p 5-day course ceftriaxone EOT 4/9/24). Evaluated by SLP who advised who advised high risk aspiration. Evaluated by palliative, patient/family opting to cont comfort feeds with understand that patient remains high aspiration risk.     Leukocytosis noted, patient non-toxic appearing and HDS w/o new s/s infection. Check rectal temp.     [facility] pending auth

## 2024-04-18 NOTE — CHART NOTE - NSCHARTNOTEFT_GEN_A_CORE
Admitting Diagnosis:   Patient is a 58y old  Male who presents with a chief complaint of fever (18 Apr 2024 09:48)      PAST MEDICAL & SURGICAL HISTORY:  Type 2 diabetes mellitus      Cerebral artery occlusion with cerebral infarction      Hyperlipidemia      Hypertension      H/O diabetic retinopathy      ESRD (end stage renal disease)      S/P below knee amputation, right      S/P arteriovenous (AV) fistula creation          Current Nutrition Order:  Minced and moist, 1.2L FR, Renal, Nepro 1x/d (425kcal, 19g pro)     PO Intake: Good (%) [   ]  Fair (50-75%) [   ] Poor (<25%) [   ]- fluctuating, requires assistance and encouragement     GI Issues: No complaints of N/V. Patient reports BM 4/17. Completed C. diff tx    Pain: Pt denied complaints of pain at time of visit     Skin Integrity: Adolfo 13, no edema  Healed sacral pressure injuries     Labs:   04-18    140  |  98  |  24<H>  ----------------------------<  119<H>  3.9   |  29  |  4.53<H>    Ca    9.8      18 Apr 2024 05:30  Phos  3.4     04-18  Mg     2.1     04-18    TPro  7.2  /  Alb  3.6  /  TBili  0.2  /  DBili  x   /  AST  23  /  ALT  21  /  AlkPhos  86  04-18    CAPILLARY BLOOD GLUCOSE          Medications:  MEDICATIONS  (STANDING):  aspirin enteric coated 81 milliGRAM(s) Oral every 24 hours  atorvastatin 80 milliGRAM(s) Oral at bedtime  carvedilol 3.125 milliGRAM(s) Oral every 12 hours  finasteride 5 milliGRAM(s) Oral every 24 hours  heparin   Injectable 5000 Unit(s) SubCutaneous every 8 hours  iron sucrose IVPB 300 milliGRAM(s) IV Intermittent every 7 days  levothyroxine 50 MICROGram(s) Oral every 24 hours  Nephro-wendy 1 Tablet(s) Oral every 24 hours  tamsulosin 0.4 milliGRAM(s) Oral at bedtime    MEDICATIONS  (PRN):    Weights:  54.7kg (dry, 4/17)    Admission Anthropometrics:  Height for BMI (FEET)	6 Feet  Height for BMI (INCHES)	0 Inch(s)  Height for BMI (CENTIMETERS)	182.88 Centimeter(s)  Weight for BMI (lbs)	128.7 lb  Weight for BMI (kg)	58.4 kg  Body Mass Index	17.4    Estimated energy needs:   Estimated Energy Needs From (joselyn/kg)	30  Estimated Energy Needs To (joselyn/kg)	35  Estimated Energy Needs Calculated From (joselyn/kg)	1752  Estimated Energy Needs Calculated To (joselyn/kg)	2044    Estimated Protein Needs From (g/kg)	1.3  Estimated Protein Needs To (g/kg)	1.5  Estimated Protein Needs Calculated From (g/kg)	75.92  Estimated Protein Needs Calculated To (g/kg)	87.6    Estimated needs based on dosing wt as <120% IBW 167lb/76.1kg (77%) and per RD judgement. Needs adjusted for age, malnutrition, HD, and clinical status. IBW adjusted for right BKA (-6%). Defer fluids to team.    Subjective:   58M T2DM, CVA x2 w/ residual L-sided weakness, early onset dementia, HTN, HLD, DVT, CKD V (Cr ~6, not on HD), BPH, PSH L toe amputation, R BKA, AVF creation (5/2023) BIBEMS from nursing home for anemia requiring transfusion, found to meet SIRS criteria s/p transfusion (FNHTR vs. sepsis) w/ elevated BUN/Cr, admitted for VS derangement. Initiated HD 4/4. Also found to have C. diff. c/c/b aspiration PNA vs pneumonitis. Clinically improving on Abx and HD. Course further c/c/b AVF malfunction 4/9, resolved s/p fistulogram 4/12. Pt made DNR/DNI/MEWS exempt. Pt seen by SLP and deemed not fully appropriate for PO diet at this time, but family requesting comfort/pleasure feeds and confirmed understanding of aspiration risk.     Last HD 4/17. Pt seen in room, awake, alert, pleasant, conversive. Suspect not entirely oriented/reliable. Pt reports appetite is good and he has not had any issues chewing or swallowing. Observed open Nepro at bedside. RN unsure of exact amount consumed but reports that pt generally eats more when fed/encouraged. Pt denied N/V/C/D or pain. Reports last BM was 4/17. Afebrile. Nutritionally pertinent labs: WBC 11.49 (H, trending up), lytes WNL, BUN 24/Cr 4.53 (H, trending down), POC , 108mg/dL. Pending LTACH placement. Will continue to follow per RD protocol.        Previous Nutrition Diagnosis: Malnutrition severe protein-calorie malnutrition suspect prolonged inadequate intake mild-moderate muscle and fat wasting, poor intake in-house    Active [ X ]  Resolved [   ]    Goal:  Pt to consistently meet >75% nutrient needs via most appropriate route for nutrition. Reduce signs and symptoms of protein-calorie malnutrition.    Nutrition Recommendations:  1. Keep nutrition aligned with GOC at all times   2. Encourage PO intake and assist at meals as necessary  3. Monitor lytes and replete prn  4. Pain regimen per team   5. Can consider liberalizing diet if lytes remain WNL    Education: Discussed need for increased nutrient intake w/focus on protein-rich foods    Risk Level: High [  ] Moderate [ X  ] Low [   ].

## 2024-04-18 NOTE — PROGRESS NOTE ADULT - SUBJECTIVE AND OBJECTIVE BOX
OVERNIGHT EVENTS: no events     SUBJECTIVE / INTERVAL HPI: Patient seen and examined at bedside.     VITAL SIGNS:  Vital Signs Last 24 Hrs  T(C): 36.9 (18 Apr 2024 06:29), Max: 36.9 (18 Apr 2024 06:29)  T(F): 98.5 (18 Apr 2024 06:29), Max: 98.5 (18 Apr 2024 06:29)  HR: 85 (18 Apr 2024 06:29) (75 - 93)  BP: 106/68 (18 Apr 2024 06:29) (106/68 - 127/81)  BP(mean): --  RR: 16 (18 Apr 2024 06:29) (15 - 16)  SpO2: 97% (18 Apr 2024 06:29) (94% - 98%)    Parameters below as of 17 Apr 2024 22:42  Patient On (Oxygen Delivery Method): nasal cannula  O2 Flow (L/min): 2      PHYSICAL EXAM:    General: NAD, only respond to yes/no questions  HEENT: MMM  Respiratory: CTA B/L; normal WOB  Cardiovascular: RRR; no m/r/g  Gastrointestinal: Soft; NTND; + bowel sounds  : no suprapubic tenderness  Vascular: extremities WWP; no edema/cyanosis, LUE AVF w/ palpable thrill  MSK: R BKA, L side contracted  Neurological: A & O x 1    MEDICATIONS:  MEDICATIONS  (STANDING):  aspirin enteric coated 81 milliGRAM(s) Oral every 24 hours  atorvastatin 80 milliGRAM(s) Oral at bedtime  carvedilol 3.125 milliGRAM(s) Oral every 12 hours  finasteride 5 milliGRAM(s) Oral every 24 hours  heparin   Injectable 5000 Unit(s) SubCutaneous every 8 hours  levothyroxine 50 MICROGram(s) Oral every 24 hours  Nephro-wendy 1 Tablet(s) Oral every 24 hours  tamsulosin 0.4 milliGRAM(s) Oral at bedtime    MEDICATIONS  (PRN):      ALLERGIES:  Allergies    No Known Allergies    Intolerances        LABS:                        8.7    11.49 )-----------( 435      ( 18 Apr 2024 05:30 )             28.1     04-18    140  |  98  |  24<H>  ----------------------------<  119<H>  3.9   |  29  |  4.53<H>    Ca    9.8      18 Apr 2024 05:30  Phos  3.4     04-18  Mg     2.1     04-18    TPro  7.2  /  Alb  3.6  /  TBili  0.2  /  DBili  x   /  AST  23  /  ALT  21  /  AlkPhos  86  04-18      Urinalysis Basic - ( 18 Apr 2024 05:30 )    Color: x / Appearance: x / SG: x / pH: x  Gluc: 119 mg/dL / Ketone: x  / Bili: x / Urobili: x   Blood: x / Protein: x / Nitrite: x   Leuk Esterase: x / RBC: x / WBC x   Sq Epi: x / Non Sq Epi: x / Bacteria: x      CAPILLARY BLOOD GLUCOSE          RADIOLOGY & ADDITIONAL TESTS: Reviewed.

## 2024-04-19 ENCOUNTER — TRANSCRIPTION ENCOUNTER (OUTPATIENT)
Age: 59
End: 2024-04-19

## 2024-04-19 LAB
ALBUMIN SERPL ELPH-MCNC: 3.5 G/DL — SIGNIFICANT CHANGE UP (ref 3.3–5)
ALP SERPL-CCNC: 82 U/L — SIGNIFICANT CHANGE UP (ref 40–120)
ALT FLD-CCNC: 25 U/L — SIGNIFICANT CHANGE UP (ref 10–45)
ANION GAP SERPL CALC-SCNC: 14 MMOL/L — SIGNIFICANT CHANGE UP (ref 5–17)
AST SERPL-CCNC: 26 U/L — SIGNIFICANT CHANGE UP (ref 10–40)
BASOPHILS # BLD AUTO: 0.03 K/UL — SIGNIFICANT CHANGE UP (ref 0–0.2)
BASOPHILS NFR BLD AUTO: 0.3 % — SIGNIFICANT CHANGE UP (ref 0–2)
BILIRUB SERPL-MCNC: 0.2 MG/DL — SIGNIFICANT CHANGE UP (ref 0.2–1.2)
BUN SERPL-MCNC: 32 MG/DL — HIGH (ref 7–23)
CALCIUM SERPL-MCNC: 9.8 MG/DL — SIGNIFICANT CHANGE UP (ref 8.4–10.5)
CHLORIDE SERPL-SCNC: 97 MMOL/L — SIGNIFICANT CHANGE UP (ref 96–108)
CO2 SERPL-SCNC: 26 MMOL/L — SIGNIFICANT CHANGE UP (ref 22–31)
CREAT SERPL-MCNC: 5.77 MG/DL — HIGH (ref 0.5–1.3)
EGFR: 11 ML/MIN/1.73M2 — LOW
EOSINOPHIL # BLD AUTO: 0.16 K/UL — SIGNIFICANT CHANGE UP (ref 0–0.5)
EOSINOPHIL NFR BLD AUTO: 1.6 % — SIGNIFICANT CHANGE UP (ref 0–6)
GLUCOSE SERPL-MCNC: 107 MG/DL — HIGH (ref 70–99)
HCT VFR BLD CALC: 27.1 % — LOW (ref 39–50)
HGB BLD-MCNC: 8.3 G/DL — LOW (ref 13–17)
IMM GRANULOCYTES NFR BLD AUTO: 1 % — HIGH (ref 0–0.9)
LYMPHOCYTES # BLD AUTO: 1.67 K/UL — SIGNIFICANT CHANGE UP (ref 1–3.3)
LYMPHOCYTES # BLD AUTO: 16.5 % — SIGNIFICANT CHANGE UP (ref 13–44)
MAGNESIUM SERPL-MCNC: 2.5 MG/DL — SIGNIFICANT CHANGE UP (ref 1.6–2.6)
MCHC RBC-ENTMCNC: 29 PG — SIGNIFICANT CHANGE UP (ref 27–34)
MCHC RBC-ENTMCNC: 30.6 GM/DL — LOW (ref 32–36)
MCV RBC AUTO: 94.8 FL — SIGNIFICANT CHANGE UP (ref 80–100)
MONOCYTES # BLD AUTO: 1.24 K/UL — HIGH (ref 0–0.9)
MONOCYTES NFR BLD AUTO: 12.2 % — SIGNIFICANT CHANGE UP (ref 2–14)
NEUTROPHILS # BLD AUTO: 6.95 K/UL — SIGNIFICANT CHANGE UP (ref 1.8–7.4)
NEUTROPHILS NFR BLD AUTO: 68.4 % — SIGNIFICANT CHANGE UP (ref 43–77)
NRBC # BLD: 0 /100 WBCS — SIGNIFICANT CHANGE UP (ref 0–0)
PHOSPHATE SERPL-MCNC: 4.1 MG/DL — SIGNIFICANT CHANGE UP (ref 2.5–4.5)
PLATELET # BLD AUTO: 453 K/UL — HIGH (ref 150–400)
POTASSIUM SERPL-MCNC: 4.3 MMOL/L — SIGNIFICANT CHANGE UP (ref 3.5–5.3)
POTASSIUM SERPL-SCNC: 4.3 MMOL/L — SIGNIFICANT CHANGE UP (ref 3.5–5.3)
PROT SERPL-MCNC: 7.5 G/DL — SIGNIFICANT CHANGE UP (ref 6–8.3)
RBC # BLD: 2.86 M/UL — LOW (ref 4.2–5.8)
RBC # FLD: 16.4 % — HIGH (ref 10.3–14.5)
SODIUM SERPL-SCNC: 137 MMOL/L — SIGNIFICANT CHANGE UP (ref 135–145)
WBC # BLD: 10.15 K/UL — SIGNIFICANT CHANGE UP (ref 3.8–10.5)
WBC # FLD AUTO: 10.15 K/UL — SIGNIFICANT CHANGE UP (ref 3.8–10.5)

## 2024-04-19 PROCEDURE — 99239 HOSP IP/OBS DSCHRG MGMT >30: CPT | Mod: GC

## 2024-04-19 PROCEDURE — 90937 HEMODIALYSIS REPEATED EVAL: CPT

## 2024-04-19 RX ORDER — IRON SUCROSE 20 MG/ML
300 INJECTION, SOLUTION INTRAVENOUS ONCE
Refills: 0 | Status: DISCONTINUED | OUTPATIENT
Start: 2024-04-19 | End: 2024-04-19

## 2024-04-19 RX ORDER — IRON SUCROSE 20 MG/ML
300 INJECTION, SOLUTION INTRAVENOUS ONCE
Refills: 0 | Status: COMPLETED | OUTPATIENT
Start: 2024-04-19 | End: 2024-04-19

## 2024-04-19 RX ORDER — CARVEDILOL PHOSPHATE 80 MG/1
1 CAPSULE, EXTENDED RELEASE ORAL
Qty: 0 | Refills: 0 | DISCHARGE
Start: 2024-04-19

## 2024-04-19 RX ORDER — ERYTHROPOIETIN 10000 [IU]/ML
6000 INJECTION, SOLUTION INTRAVENOUS; SUBCUTANEOUS ONCE
Refills: 0 | Status: COMPLETED | OUTPATIENT
Start: 2024-04-19 | End: 2024-04-19

## 2024-04-19 RX ORDER — NIFEDIPINE 30 MG
1 TABLET, EXTENDED RELEASE 24 HR ORAL
Refills: 0 | DISCHARGE

## 2024-04-19 RX ORDER — CARVEDILOL PHOSPHATE 80 MG/1
3.12 CAPSULE, EXTENDED RELEASE ORAL EVERY 12 HOURS
Refills: 0 | Status: DISCONTINUED | OUTPATIENT
Start: 2024-04-19 | End: 2024-04-20

## 2024-04-19 RX ADMIN — ATORVASTATIN CALCIUM 80 MILLIGRAM(S): 80 TABLET, FILM COATED ORAL at 21:23

## 2024-04-19 RX ADMIN — HEPARIN SODIUM 5000 UNIT(S): 5000 INJECTION INTRAVENOUS; SUBCUTANEOUS at 06:22

## 2024-04-19 RX ADMIN — Medication 81 MILLIGRAM(S): at 17:00

## 2024-04-19 RX ADMIN — TAMSULOSIN HYDROCHLORIDE 0.4 MILLIGRAM(S): 0.4 CAPSULE ORAL at 21:23

## 2024-04-19 RX ADMIN — ERYTHROPOIETIN 6000 UNIT(S): 10000 INJECTION, SOLUTION INTRAVENOUS; SUBCUTANEOUS at 11:50

## 2024-04-19 RX ADMIN — Medication 1 TABLET(S): at 17:14

## 2024-04-19 RX ADMIN — FINASTERIDE 5 MILLIGRAM(S): 5 TABLET, FILM COATED ORAL at 17:00

## 2024-04-19 RX ADMIN — CARVEDILOL PHOSPHATE 3.12 MILLIGRAM(S): 80 CAPSULE, EXTENDED RELEASE ORAL at 17:16

## 2024-04-19 RX ADMIN — CARVEDILOL PHOSPHATE 3.12 MILLIGRAM(S): 80 CAPSULE, EXTENDED RELEASE ORAL at 06:20

## 2024-04-19 RX ADMIN — HEPARIN SODIUM 5000 UNIT(S): 5000 INJECTION INTRAVENOUS; SUBCUTANEOUS at 21:23

## 2024-04-19 RX ADMIN — IRON SUCROSE 176.67 MILLIGRAM(S): 20 INJECTION, SOLUTION INTRAVENOUS at 13:03

## 2024-04-19 RX ADMIN — Medication 50 MICROGRAM(S): at 06:20

## 2024-04-19 NOTE — DISCHARGE NOTE NURSING/CASE MANAGEMENT/SOCIAL WORK - PATIENT PORTAL LINK FT
You can access the FollowMyHealth Patient Portal offered by [facility] by registering at the following website: [email or web address]. By joining [employer]’s FollowMyHealth portal, you will also be able to view your health information using other applications (apps) compatible with our system.

## 2024-04-19 NOTE — PROGRESS NOTE ADULT - SUBJECTIVE AND OBJECTIVE BOX
OVERNIGHT EVENTS: no events     SUBJECTIVE / INTERVAL HPI: Patient seen and examined at bedside.     VITAL SIGNS:  Vital Signs Last 24 Hrs  T(C): 36.8 (19 Apr 2024 09:20), Max: 37.3 (18 Apr 2024 20:59)  T(F): 98.3 (19 Apr 2024 09:20), Max: 99.1 (18 Apr 2024 20:59)  HR: 83 (19 Apr 2024 09:20) (74 - 98)  BP: 148/79 (19 Apr 2024 09:20) (110/67 - 149/81)  BP(mean): --  RR: 17 (19 Apr 2024 09:20) (15 - 18)  SpO2: 100% (19 Apr 2024 09:20) (94% - 100%)    Parameters below as of 19 Apr 2024 09:20  Patient On (Oxygen Delivery Method): room air  O2 Flow (L/min): 2      PHYSICAL EXAM:    General: NAD, only respond to yes/no questions  HEENT: MMM  Respiratory: CTA B/L; normal WOB  Cardiovascular: RRR; no m/r/g  Gastrointestinal: Soft; NTND; + bowel sounds  : no suprapubic tenderness  Vascular: extremities WWP; no edema/cyanosis, LUE AVF w/ palpable thrill  MSK: R BKA, L side contracted  Neurological: A & O x     MEDICATIONS:  MEDICATIONS  (STANDING):  aspirin enteric coated 81 milliGRAM(s) Oral every 24 hours  atorvastatin 80 milliGRAM(s) Oral at bedtime  carvedilol 3.125 milliGRAM(s) Oral every 12 hours  epoetin maryellen-epbx (RETACRIT) Injectable 6000 Unit(s) IV Push once  finasteride 5 milliGRAM(s) Oral every 24 hours  heparin   Injectable 5000 Unit(s) SubCutaneous every 8 hours  iron sucrose IVPB 300 milliGRAM(s) IV Intermittent once  levothyroxine 50 MICROGram(s) Oral every 24 hours  Nephro-wendy 1 Tablet(s) Oral every 24 hours  tamsulosin 0.4 milliGRAM(s) Oral at bedtime    MEDICATIONS  (PRN):      ALLERGIES:  Allergies    No Known Allergies    Intolerances        LABS:                        8.3    10.15 )-----------( 453      ( 19 Apr 2024 07:09 )             27.1     04-19    137  |  97  |  32<H>  ----------------------------<  107<H>  4.3   |  26  |  5.77<H>    Ca    9.8      19 Apr 2024 07:09  Phos  4.1     04-19  Mg     2.5     04-19    TPro  7.5  /  Alb  3.5  /  TBili  0.2  /  DBili  x   /  AST  26  /  ALT  25  /  AlkPhos  82  04-19      Urinalysis Basic - ( 19 Apr 2024 07:09 )    Color: x / Appearance: x / SG: x / pH: x  Gluc: 107 mg/dL / Ketone: x  / Bili: x / Urobili: x   Blood: x / Protein: x / Nitrite: x   Leuk Esterase: x / RBC: x / WBC x   Sq Epi: x / Non Sq Epi: x / Bacteria: x      CAPILLARY BLOOD GLUCOSE          RADIOLOGY & ADDITIONAL TESTS: Reviewed.

## 2024-04-19 NOTE — PROGRESS NOTE ADULT - ATTENDING SUPERVISION STATEMENT
Fellow
Satisfactory
Resident

## 2024-04-19 NOTE — PROGRESS NOTE ADULT - ASSESSMENT
58Y M initially admitted worsening renal function, found to be uremic and anemic. Initiated on HD, hospital course complicated by c.diff colitis, aspiration PNA, and outflow stenosis of AVF (s/p fistulogram and venoplasty ).    ESRD on HD MWF  Cont HD today per schedule for clearance and volume management  Renal diet  Fluid restriction <1.2L/day  Strict I&O, daily weights  Pending placement     Hemodialysis Treatment.:     Schedule: Once, Modality: Hemodialysis, Access: Arteriovenous Fistula    Dialyzer: Optiflux [id number], Time: 180 Min    Blood Flow: 400 mL/Min , Dialysate Flow: 500 mL/Min, Dialysate Temp: 36.5, Tubinmm (Adult)    Target Dry Weight: 55 kG    Dialysate Electrolytes (mEq/L): Potassium 3, Calcium 2.5, Sodium 138, Bicarbonate 35    HTN:  BP at goal   UF with HD as tolerated   Carvedilol 3.125 BID    Access  LUE AVF functional s/p venoplasty  for outflow stenosis     Anemia  Hb not at goal 7.4  Iron sat 11%  Patient no longer on abx, wbc wnl, can consider adding IV iron supplement 200mg x5  epo w/ HD    CKD-BMD  Calcium 9.0  Phos 2.8. Below goal 3-5.5. No indication for phos binder   58Y M initially admitted worsening renal function, found to be uremic and anemic. Initiated on HD, hospital course complicated by c.diff colitis, aspiration PNA, and outflow stenosis of AVF (s/p fistulogram and venoplasty ).    ESRD on HD MWF  Cont HD today per schedule for clearance and volume management  Renal diet  Fluid restriction <1.2L/day  Strict I&O, daily weights  Pending placement     Hemodialysis Treatment.:     Schedule: Once, Modality: Hemodialysis, Access: Arteriovenous Fistula    Dialyzer: Optiflux [id number], Time: 180 Min    Blood Flow: 400 mL/Min , Dialysate Flow: 500 mL/Min, Dialysate Temp: 36.5, Tubinmm (Adult)    Target Dry Weight: 55 kG    Dialysate Electrolytes (mEq/L): Potassium 3, Calcium 2.5, Sodium 138, Bicarbonate 35    HTN:  BP at goal   UF with HD as tolerated   Carvedilol 3.125 BID    Access  LUE AVF functional s/p venoplasty  for outflow stenosis     Anemia  Hb not at goal 8.3  Iron sat 11%  Patient no longer on abx, wbc wnl, receiving IV iron supplement 300mg x3  epo w/ HD    CKD-BMD  Calcium 9.8  Phos 4.1. At goal 3-5.5. No indication for phos binder

## 2024-04-19 NOTE — PROGRESS NOTE ADULT - ATTENDING COMMENTS
seen and evaluated again to assure hemodynamic stability.    asymptomatic  c/w full treatment as outlined above

## 2024-04-19 NOTE — PROGRESS NOTE ADULT - SUBJECTIVE AND OBJECTIVE BOX
Nephrology progress note    Seen on HD. Tolerated last treatment, suspect patient is near dry weight although only bed weights available. Today tolerating treatment, no complaints. HDS. Continue HD as ordered.    Allergies:  No Known Allergies    Hospital Medications:   MEDICATIONS  (STANDING):  aspirin enteric coated 81 milliGRAM(s) Oral every 24 hours  atorvastatin 80 milliGRAM(s) Oral at bedtime  carvedilol 3.125 milliGRAM(s) Oral every 12 hours  epoetin maryellen-epbx (RETACRIT) Injectable 6000 Unit(s) IV Push once  finasteride 5 milliGRAM(s) Oral every 24 hours  heparin   Injectable 5000 Unit(s) SubCutaneous every 8 hours  iron sucrose IVPB 300 milliGRAM(s) IV Intermittent once  levothyroxine 50 MICROGram(s) Oral every 24 hours  Nephro-wendy 1 Tablet(s) Oral every 24 hours  tamsulosin 0.4 milliGRAM(s) Oral at bedtime    REVIEW OF SYSTEMS:  All other review of systems is negative unless indicated above.    VITALS:  T(F): 98.3 (04-19-24 @ 09:20), Max: 99.1 (04-18-24 @ 20:59)  HR: 83 (04-19-24 @ 09:20)  BP: 148/79 (04-19-24 @ 09:20)  RR: 17 (04-19-24 @ 09:20)  SpO2: 100% (04-19-24 @ 09:20)  Wt(kg): --    04-17 @ 07:01  -  04-18 @ 07:00  --------------------------------------------------------  IN: 0 mL / OUT: 2000 mL / NET: -2000 mL        PHYSICAL EXAM:  GENERAL: NAD, lying in bed on HD  NECK: supple, No JVD  CHEST/LUNG: Clear to auscultation bilaterally  HEART: normal S1S2, RRR  EXTREMITIES: No clubbing, cyanosis, or edema   NEUROLOGY: Awake, alert, interactive  ACCESS: XOCHILT ORNELAS accessed    LABS:  04-19    137  |  97  |  32<H>  ----------------------------<  107<H>  4.3   |  26  |  5.77<H>    Ca    9.8      19 Apr 2024 07:09  Phos  4.1     04-19  Mg     2.5     04-19    TPro  7.5  /  Alb  3.5  /  TBili  0.2  /  DBili      /  AST  26  /  ALT  25  /  AlkPhos  82  04-19                          8.3    10.15 )-----------( 453      ( 19 Apr 2024 07:09 )             27.1       Urine Studies:  Creatinine Trend: 5.77<--, 4.53<--, 5.25<--, 3.50<--, 6.69<--, 6.49<--  Urinalysis Basic - ( 19 Apr 2024 07:09 )    Color:  / Appearance:  / SG:  / pH:   Gluc: 107 mg/dL / Ketone:   / Bili:  / Urobili:    Blood:  / Protein:  / Nitrite:    Leuk Esterase:  / RBC:  / WBC    Sq Epi:  / Non Sq Epi:  / Bacteria:         RADIOLOGY & ADDITIONAL STUDIES:  reviewed

## 2024-04-19 NOTE — PROGRESS NOTE ADULT - PROBLEM SELECTOR PLAN 3
per HIE, prior Hx of HD. However no active HD according to NH staff.   Bun >100s, Cr 5.8 12/2023 -> 7.8 on admission. sCr improving w/ HD    - renal consulted for HD, consented by HCP Leo Cisneros [phone]  - discontinued home NaHCO3 650mg TID  - cw start IV iron

## 2024-04-19 NOTE — PROGRESS NOTE ADULT - SUBJECTIVE AND OBJECTIVE BOX
Patient seen again on HD tolerating procedure well. Patient does not offer any complaints and is hemodynamically stable. Continue HD as prescribed.     Hemodialysis Treatment.:     Schedule: Once, Modality: Hemodialysis, Access: Arteriovenous Fistula    Dialyzer: Optiflux [id number], Time: 180 Min    Blood Flow: 400 mL/Min , Dialysate Flow: 500 mL/Min, Dialysate Temp: 36.5, Tubinmm (Adult)    Target UF: 1.5L    Dialysate Electrolytes (mEq/L): Potassium 3, Calcium 2.5, Sodium 138, Bicarbonate 35    Vitals   T(F): 97.5  HR: 91  BP: 111/72  RR: 17  SpO2: 99%    PHYSICAL EXAM:  GENERAL: NAD, lying in bed on HD  NECK: supple, No JVD  CHEST/LUNG: Clear to auscultation bilaterally  HEART: normal S1S2, RRR  EXTREMITIES: No clubbing, cyanosis, or edema   NEUROLOGY: Awake, alert, interactive  ACCESS: LUE AVF accessed

## 2024-04-19 NOTE — PROGRESS NOTE ADULT - ATTENDING COMMENTS
57 yo M with new start HD after presenting with uremic symptoms  seen and evaluated while on dialysis  tolerating treatment well  c/w gentle UF as outlined above    hospital course complicated by c.diff colitis, aspiration PNA, and outflow stenosis of AVF (s/p fistulogram and venoplasty 4/12).

## 2024-04-19 NOTE — PROGRESS NOTE ADULT - ATTENDING COMMENTS
58 YOM with PMH of T2DM, HTN, HLD, stroke x2 (ischemic, embolic) with residual L weakness, ESRD, RBKA, BPH admitted for acute on chronic anemia s/p [facility] transfusion. Started on HD this admission via LUE AVF (c/b AVF malfunction s/p venoplasty 4/12 for outflow stenosis). Hospital course c/b c-diff colitis s/p 10-day course of PO vancomycin (EOT 4/16/24) and aspiration pneumonitis/PNA (s/p 5-day course ceftriaxone EOT 4/9/24). Evaluated by SLP who advised who advised high risk aspiration. Evaluated by palliative, patient/family opting to cont comfort feeds with understand that patient remains high aspiration risk.     Leukocytosis resolved w/o intervention, patient non-toxic appearing, afebrile + HDS w/o new s/s infection.     [facility] pending auth

## 2024-04-19 NOTE — DISCHARGE NOTE NURSING/CASE MANAGEMENT/SOCIAL WORK - NSDCVIVACCINE_GEN_ALL_CORE_FT
influenza, injectable, quadrivalent, preservative free; 19-Feb-2019 10:20; Sandy Naranjo (RN); [employer]; [id number] (Exp. Date: 30-Jun-2019); IntraMuscular; Deltoid Left.; 0.5 milliLiter(s); VIS (VIS Published: 07-Aug-2015, VIS Presented: 19-Feb-2019);

## 2024-04-20 VITALS
RESPIRATION RATE: 15 BRPM | SYSTOLIC BLOOD PRESSURE: 103 MMHG | OXYGEN SATURATION: 96 % | HEART RATE: 83 BPM | TEMPERATURE: 98 F | DIASTOLIC BLOOD PRESSURE: 72 MMHG

## 2024-04-20 PROCEDURE — 87340 HEPATITIS B SURFACE AG IA: CPT

## 2024-04-20 PROCEDURE — 97535 SELF CARE MNGMENT TRAINING: CPT

## 2024-04-20 PROCEDURE — 92610 EVALUATE SWALLOWING FUNCTION: CPT

## 2024-04-20 PROCEDURE — 86704 HEP B CORE ANTIBODY TOTAL: CPT

## 2024-04-20 PROCEDURE — 87899 AGENT NOS ASSAY W/OPTIC: CPT

## 2024-04-20 PROCEDURE — 84466 ASSAY OF TRANSFERRIN: CPT

## 2024-04-20 PROCEDURE — 82746 ASSAY OF FOLIC ACID SERUM: CPT

## 2024-04-20 PROCEDURE — 82962 GLUCOSE BLOOD TEST: CPT

## 2024-04-20 PROCEDURE — 86480 TB TEST CELL IMMUN MEASURE: CPT

## 2024-04-20 PROCEDURE — 83970 ASSAY OF PARATHORMONE: CPT

## 2024-04-20 PROCEDURE — 82607 VITAMIN B-12: CPT

## 2024-04-20 PROCEDURE — 87086 URINE CULTURE/COLONY COUNT: CPT

## 2024-04-20 PROCEDURE — 93990 DOPPLER FLOW TESTING: CPT

## 2024-04-20 PROCEDURE — 96375 TX/PRO/DX INJ NEW DRUG ADDON: CPT

## 2024-04-20 PROCEDURE — 87493 C DIFF AMPLIFIED PROBE: CPT

## 2024-04-20 PROCEDURE — 83010 ASSAY OF HAPTOGLOBIN QUANT: CPT

## 2024-04-20 PROCEDURE — 97110 THERAPEUTIC EXERCISES: CPT

## 2024-04-20 PROCEDURE — 97530 THERAPEUTIC ACTIVITIES: CPT

## 2024-04-20 PROCEDURE — 99285 EMERGENCY DEPT VISIT HI MDM: CPT

## 2024-04-20 PROCEDURE — 76000 FLUOROSCOPY <1 HR PHYS/QHP: CPT

## 2024-04-20 PROCEDURE — 85045 AUTOMATED RETICULOCYTE COUNT: CPT

## 2024-04-20 PROCEDURE — 84145 PROCALCITONIN (PCT): CPT

## 2024-04-20 PROCEDURE — 87040 BLOOD CULTURE FOR BACTERIA: CPT

## 2024-04-20 PROCEDURE — 86705 HEP B CORE ANTIBODY IGM: CPT

## 2024-04-20 PROCEDURE — 84443 ASSAY THYROID STIM HORMONE: CPT

## 2024-04-20 PROCEDURE — 81001 URINALYSIS AUTO W/SCOPE: CPT

## 2024-04-20 PROCEDURE — 83615 LACTATE (LD) (LDH) ENZYME: CPT

## 2024-04-20 PROCEDURE — 36430 TRANSFUSION BLD/BLD COMPNT: CPT

## 2024-04-20 PROCEDURE — 96374 THER/PROPH/DIAG INJ IV PUSH: CPT

## 2024-04-20 PROCEDURE — 87507 IADNA-DNA/RNA PROBE TQ 12-25: CPT

## 2024-04-20 PROCEDURE — 87449 NOS EACH ORGANISM AG IA: CPT

## 2024-04-20 PROCEDURE — P9016: CPT

## 2024-04-20 PROCEDURE — 83735 ASSAY OF MAGNESIUM: CPT

## 2024-04-20 PROCEDURE — 80048 BASIC METABOLIC PNL TOTAL CA: CPT

## 2024-04-20 PROCEDURE — 71045 X-RAY EXAM CHEST 1 VIEW: CPT

## 2024-04-20 PROCEDURE — 84100 ASSAY OF PHOSPHORUS: CPT

## 2024-04-20 PROCEDURE — 87640 STAPH A DNA AMP PROBE: CPT

## 2024-04-20 PROCEDURE — 83605 ASSAY OF LACTIC ACID: CPT

## 2024-04-20 PROCEDURE — 80053 COMPREHEN METABOLIC PANEL: CPT

## 2024-04-20 PROCEDURE — 82310 ASSAY OF CALCIUM: CPT

## 2024-04-20 PROCEDURE — 87641 MR-STAPH DNA AMP PROBE: CPT

## 2024-04-20 PROCEDURE — 90935 HEMODIALYSIS ONE EVALUATION: CPT

## 2024-04-20 PROCEDURE — 82728 ASSAY OF FERRITIN: CPT

## 2024-04-20 PROCEDURE — C1894: CPT

## 2024-04-20 PROCEDURE — 86900 BLOOD TYPING SEROLOGIC ABO: CPT

## 2024-04-20 PROCEDURE — C1887: CPT

## 2024-04-20 PROCEDURE — 97161 PT EVAL LOW COMPLEX 20 MIN: CPT

## 2024-04-20 PROCEDURE — 96376 TX/PRO/DX INJ SAME DRUG ADON: CPT

## 2024-04-20 PROCEDURE — 36415 COLL VENOUS BLD VENIPUNCTURE: CPT

## 2024-04-20 PROCEDURE — 0225U NFCT DS DNA&RNA 21 SARSCOV2: CPT

## 2024-04-20 PROCEDURE — 86706 HEP B SURFACE ANTIBODY: CPT

## 2024-04-20 PROCEDURE — 92526 ORAL FUNCTION THERAPY: CPT

## 2024-04-20 PROCEDURE — 85027 COMPLETE CBC AUTOMATED: CPT

## 2024-04-20 PROCEDURE — C1725: CPT

## 2024-04-20 PROCEDURE — 83540 ASSAY OF IRON: CPT

## 2024-04-20 PROCEDURE — 85610 PROTHROMBIN TIME: CPT

## 2024-04-20 PROCEDURE — 86850 RBC ANTIBODY SCREEN: CPT

## 2024-04-20 PROCEDURE — 86901 BLOOD TYPING SEROLOGIC RH(D): CPT

## 2024-04-20 PROCEDURE — 86880 COOMBS TEST DIRECT: CPT

## 2024-04-20 PROCEDURE — 86709 HEPATITIS A IGM ANTIBODY: CPT

## 2024-04-20 PROCEDURE — 86803 HEPATITIS C AB TEST: CPT

## 2024-04-20 PROCEDURE — 86923 COMPATIBILITY TEST ELECTRIC: CPT

## 2024-04-20 PROCEDURE — 85730 THROMBOPLASTIN TIME PARTIAL: CPT

## 2024-04-20 PROCEDURE — 97165 OT EVAL LOW COMPLEX 30 MIN: CPT

## 2024-04-20 PROCEDURE — 83036 HEMOGLOBIN GLYCOSYLATED A1C: CPT

## 2024-04-20 PROCEDURE — 85025 COMPLETE CBC W/AUTO DIFF WBC: CPT

## 2024-04-20 PROCEDURE — 87324 CLOSTRIDIUM AG IA: CPT

## 2024-04-20 PROCEDURE — 83550 IRON BINDING TEST: CPT

## 2024-04-20 PROCEDURE — C1769: CPT

## 2024-04-20 RX ADMIN — HEPARIN SODIUM 5000 UNIT(S): 5000 INJECTION INTRAVENOUS; SUBCUTANEOUS at 06:12

## 2024-04-20 RX ADMIN — Medication 50 MICROGRAM(S): at 06:04

## 2024-04-20 RX ADMIN — CARVEDILOL PHOSPHATE 3.12 MILLIGRAM(S): 80 CAPSULE, EXTENDED RELEASE ORAL at 06:12

## 2024-04-20 NOTE — PROGRESS NOTE ADULT - PROBLEM SELECTOR PLAN 3
per HIE, prior Hx of HD. However no active HD according to NH staff.   Bun >100s, Cr 5.8 12/2023 -> 7.8 on admission. sCr improving w/ HD    - renal consulted for HD, consented by HCP Leo Cisneros [phone]  - discontinued home NaHCO3 650mg TID  - cw start IV iron

## 2024-04-20 NOTE — PROGRESS NOTE ADULT - NUTRITIONAL ASSESSMENT
This patient has been assessed with a concern for Malnutrition and has been determined to have a diagnosis/diagnoses of Severe protein-calorie malnutrition and Underweight (BMI < 19).    This patient is being managed with:   Diet Minced and Moist-  1200mL Fluid Restriction ([id number])  For patients receiving Renal Replacement - No Protein Restr No Conc K No Conc Phos Low Sodium (RENAL)  Supplement Feeding Modality:  Oral  Nepro Cans or Servings Per Day:  1       Frequency:  Daily  Entered: Apr 12 2024  3:00PM  
This patient has been assessed with a concern for Malnutrition and has been determined to have a diagnosis/diagnoses of Severe protein-calorie malnutrition and Underweight (BMI < 19).    This patient is being managed with:   Diet Minced and Moist-  1200mL Fluid Restriction ([id number])  For patients receiving Renal Replacement - No Protein Restr No Conc K No Conc Phos Low Sodium (RENAL)  Supplement Feeding Modality:  Oral  Nepro Cans or Servings Per Day:  1       Frequency:  Daily  Entered: Apr 12 2024  3:00PM  
This patient has been assessed with a concern for Malnutrition and has been determined to have a diagnosis/diagnoses of Severe protein-calorie malnutrition and Underweight (BMI < 19).    This patient is being managed with:   Diet NPO-  Except Medications  Entered: Apr 5 2024  4:00PM  
This patient has been assessed with a concern for Malnutrition and has been determined to have a diagnosis/diagnoses of Severe protein-calorie malnutrition and Underweight (BMI < 19).    This patient is being managed with:   Diet Minced and Moist-  1200mL Fluid Restriction ([id number])  For patients receiving Renal Replacement - No Protein Restr No Conc K No Conc Phos Low Sodium (RENAL)  Supplement Feeding Modality:  Oral  Nepro Cans or Servings Per Day:  1       Frequency:  Daily  Entered: Apr 12 2024  3:00PM  
This patient has been assessed with a concern for Malnutrition and has been determined to have a diagnosis/diagnoses of Severe protein-calorie malnutrition and Underweight (BMI < 19).    This patient is being managed with:   Diet NPO after Midnight-     NPO Start Date: 11-Apr-2024   NPO Start Time: 23:59  Except Medications  Entered: Apr 11 2024  8:04PM    Diet Minced and Moist-  1200mL Fluid Restriction ([id number])  For patients receiving Renal Replacement - No Protein Restr No Conc K No Conc Phos Low Sodium (RENAL)  Supplement Feeding Modality:  Oral  Nepro Cans or Servings Per Day:  1       Frequency:  Daily  Entered: Apr 11 2024  8:02AM  
This patient has been assessed with a concern for Malnutrition and has been determined to have a diagnosis/diagnoses of Severe protein-calorie malnutrition and Underweight (BMI < 19).    This patient is being managed with:   Diet Minced and Moist-  1500mL Fluid Restriction ([id number])  For patients receiving Renal Replacement - No Protein Restr No Conc K No Conc Phos Low Sodium (RENAL)  Entered: Apr 9 2024  7:44AM  
This patient has been assessed with a concern for Malnutrition and has been determined to have a diagnosis/diagnoses of Moderate protein-calorie malnutrition and Underweight (BMI < 19).    This patient is being managed with:   Diet Minced and Moist-  For patients receiving Renal Replacement - No Protein Restr No Conc K No Conc Phos Low Sodium (RENAL)  Supplement Feeding Modality:  Oral  Nepro Cans or Servings Per Day:  1       Frequency:  Two Times a day  Entered: Dec 17 2023  3:39PM  
This patient has been assessed with a concern for Malnutrition and has been determined to have a diagnosis/diagnoses of Severe protein-calorie malnutrition and Underweight (BMI < 19).    This patient is being managed with:   Diet Minced and Moist-  1200mL Fluid Restriction ([id number])  For patients receiving Renal Replacement - No Protein Restr No Conc K No Conc Phos Low Sodium (RENAL)  Entered: Apr 10 2024 12:04AM  
This patient has been assessed with a concern for Malnutrition and has been determined to have a diagnosis/diagnoses of Severe protein-calorie malnutrition and Underweight (BMI < 19).    This patient is being managed with:   Diet NPO-  Except Medications  Entered: Apr 5 2024  4:00PM  
This patient has been assessed with a concern for Malnutrition and has been determined to have a diagnosis/diagnoses of Severe protein-calorie malnutrition and Underweight (BMI < 19).    This patient is being managed with:   Diet NPO-  Except Medications  Entered: Apr 5 2024  4:00PM  
This patient has been assessed with a concern for Malnutrition and has been determined to have a diagnosis/diagnoses of Underweight (BMI < 19) and Severe protein-calorie malnutrition.    This patient is being managed with:   Diet Minced and Moist-  1200mL Fluid Restriction ([id number])  For patients receiving Renal Replacement - No Protein Restr No Conc K No Conc Phos Low Sodium (RENAL)  Supplement Feeding Modality:  Oral  Nepro Cans or Servings Per Day:  1       Frequency:  Daily  Entered: Apr 11 2024  8:02AM  
This patient has been assessed with a concern for Malnutrition and has been determined to have a diagnosis/diagnoses of Underweight (BMI < 19) and Severe protein-calorie malnutrition.    This patient is being managed with:   Diet NPO after Midnight-     NPO Start Date: 11-Apr-2024   NPO Start Time: 23:59  Except Medications  Entered: Apr 11 2024  8:04PM    Diet Minced and Moist-  1200mL Fluid Restriction ([id number])  For patients receiving Renal Replacement - No Protein Restr No Conc K No Conc Phos Low Sodium (RENAL)  Supplement Feeding Modality:  Oral  Nepro Cans or Servings Per Day:  1       Frequency:  Daily  Entered: Apr 11 2024  8:02AM  
This patient has been assessed with a concern for Malnutrition and has been determined to have a diagnosis/diagnoses of Moderate protein-calorie malnutrition and Underweight (BMI < 19).    This patient is being managed with:   Diet Minced and Moist-  For patients receiving Renal Replacement - No Protein Restr No Conc K No Conc Phos Low Sodium (RENAL)  Supplement Feeding Modality:  Oral  Nepro Cans or Servings Per Day:  1       Frequency:  Two Times a day  Entered: Dec 17 2023  3:39PM  
This patient has been assessed with a concern for Malnutrition and has been determined to have a diagnosis/diagnoses of Severe protein-calorie malnutrition and Underweight (BMI < 19).    This patient is being managed with:   Diet Minced and Moist-  1200mL Fluid Restriction ([id number])  For patients receiving Renal Replacement - No Protein Restr No Conc K No Conc Phos Low Sodium (RENAL)  Supplement Feeding Modality:  Oral  Nepro Cans or Servings Per Day:  1       Frequency:  Daily  Entered: Apr 12 2024  3:00PM  
This patient has been assessed with a concern for Malnutrition and has been determined to have a diagnosis/diagnoses of Severe protein-calorie malnutrition and Underweight (BMI < 19).    This patient is being managed with:   Diet Minced and Moist-  1200mL Fluid Restriction ([id number])  For patients receiving Renal Replacement - No Protein Restr No Conc K No Conc Phos Low Sodium (RENAL)  Supplement Feeding Modality:  Oral  Nepro Cans or Servings Per Day:  1       Frequency:  Daily  Entered: Apr 12 2024  3:00PM  
This patient has been assessed with a concern for Malnutrition and has been determined to have a diagnosis/diagnoses of Severe protein-calorie malnutrition and Underweight (BMI < 19).    This patient is being managed with:   Diet Minced and Moist-  1200mL Fluid Restriction ([id number])  For patients receiving Renal Replacement - No Protein Restr No Conc K No Conc Phos Low Sodium (RENAL)  Supplement Feeding Modality:  Oral  Nepro Cans or Servings Per Day:  1       Frequency:  Daily  Entered: Apr 12 2024  3:00PM  
This patient has been assessed with a concern for Malnutrition and has been determined to have a diagnosis/diagnoses of Severe protein-calorie malnutrition and Underweight (BMI < 19).    This patient is being managed with:   Diet Minced and Moist-  1200mL Fluid Restriction ([id number])  For patients receiving Renal Replacement - No Protein Restr No Conc K No Conc Phos Low Sodium (RENAL)  Supplement Feeding Modality:  Oral  Nepro Cans or Servings Per Day:  1       Frequency:  Daily  Entered: Apr 12 2024  3:00PM  
This patient has been assessed with a concern for Malnutrition and has been determined to have a diagnosis/diagnoses of Severe protein-calorie malnutrition and Underweight (BMI < 19).    This patient is being managed with:   Diet Minced and Moist-  1200mL Fluid Restriction ([id number])  For patients receiving Renal Replacement - No Protein Restr No Conc K No Conc Phos Low Sodium (RENAL)  Supplement Feeding Modality:  Oral  Nepro Cans or Servings Per Day:  1       Frequency:  Daily  Entered: Apr 12 2024  3:00PM  
This patient has been assessed with a concern for Malnutrition and has been determined to have a diagnosis/diagnoses of Severe protein-calorie malnutrition and Underweight (BMI < 19).    This patient is being managed with:   Diet Minced and Moist-  1200mL Fluid Restriction ([id number])  For patients receiving Renal Replacement - No Protein Restr No Conc K No Conc Phos Low Sodium (RENAL)  Supplement Feeding Modality:  Oral  Nepro Cans or Servings Per Day:  1       Frequency:  Daily  Entered: Apr 12 2024  3:00PM  
This patient has been assessed with a concern for Malnutrition and has been determined to have a diagnosis/diagnoses of Severe protein-calorie malnutrition and Underweight (BMI < 19).    This patient is being managed with:   Diet Minced and Moist-  1200mL Fluid Restriction ([id number])  For patients receiving Renal Replacement - No Protein Restr No Conc K No Conc Phos Low Sodium (RENAL)  Supplement Feeding Modality:  Oral  Nepro Cans or Servings Per Day:  1       Frequency:  Daily  Entered: Apr 12 2024  3:00PM

## 2024-04-20 NOTE — PROGRESS NOTE ADULT - PROVIDER SPECIALTY LIST ADULT
Nephrology
Palliative Care
Nephrology
Palliative Care
Vascular Surgery
Internal Medicine
Nephrology
Nephrology
Hospitalist
Internal Medicine
Internal Medicine
Hospitalist
Internal Medicine

## 2024-04-24 DIAGNOSIS — F03.90 UNSPECIFIED DEMENTIA, UNSPECIFIED SEVERITY, WITHOUT BEHAVIORAL DISTURBANCE, PSYCHOTIC DISTURBANCE, MOOD DISTURBANCE, AND ANXIETY: ICD-10-CM

## 2024-04-24 DIAGNOSIS — T82.868A THROMBOSIS DUE TO VASCULAR PROSTHETIC DEVICES, IMPLANTS AND GRAFTS, INITIAL ENCOUNTER: ICD-10-CM

## 2024-04-24 DIAGNOSIS — Z89.511 ACQUIRED ABSENCE OF RIGHT LEG BELOW KNEE: ICD-10-CM

## 2024-04-24 DIAGNOSIS — I16.0 HYPERTENSIVE URGENCY: ICD-10-CM

## 2024-04-24 DIAGNOSIS — G93.41 METABOLIC ENCEPHALOPATHY: ICD-10-CM

## 2024-04-24 DIAGNOSIS — Z79.82 LONG TERM (CURRENT) USE OF ASPIRIN: ICD-10-CM

## 2024-04-24 DIAGNOSIS — I12.0 HYPERTENSIVE CHRONIC KIDNEY DISEASE WITH STAGE 5 CHRONIC KIDNEY DISEASE OR END STAGE RENAL DISEASE: ICD-10-CM

## 2024-04-24 DIAGNOSIS — I69.354 HEMIPLEGIA AND HEMIPARESIS FOLLOWING CEREBRAL INFARCTION AFFECTING LEFT NON-DOMINANT SIDE: ICD-10-CM

## 2024-04-24 DIAGNOSIS — A41.9 SEPSIS, UNSPECIFIED ORGANISM: ICD-10-CM

## 2024-04-24 DIAGNOSIS — N25.81 SECONDARY HYPERPARATHYROIDISM OF RENAL ORIGIN: ICD-10-CM

## 2024-04-24 DIAGNOSIS — N40.0 BENIGN PROSTATIC HYPERPLASIA WITHOUT LOWER URINARY TRACT SYMPTOMS: ICD-10-CM
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
## 2024-04-24 DIAGNOSIS — Z79.4 LONG TERM (CURRENT) USE OF INSULIN: ICD-10-CM

## 2024-04-24 DIAGNOSIS — Z99.2 DEPENDENCE ON RENAL DIALYSIS: ICD-10-CM

## 2024-04-24 DIAGNOSIS — Z79.02 LONG TERM (CURRENT) USE OF ANTITHROMBOTICS/ANTIPLATELETS: ICD-10-CM

## 2024-04-24 DIAGNOSIS — D63.1 ANEMIA IN CHRONIC KIDNEY DISEASE: ICD-10-CM

## 2024-04-24 DIAGNOSIS — E43 UNSPECIFIED SEVERE PROTEIN-CALORIE MALNUTRITION: ICD-10-CM

## 2024-04-24 DIAGNOSIS — A04.72 ENTEROCOLITIS DUE TO CLOSTRIDIUM DIFFICILE, NOT SPECIFIED AS RECURRENT: ICD-10-CM

## 2024-04-24 DIAGNOSIS — R65.20 SEVERE SEPSIS WITHOUT SEPTIC SHOCK: ICD-10-CM

## 2024-04-24 DIAGNOSIS — I74.2 EMBOLISM AND THROMBOSIS OF ARTERIES OF THE UPPER EXTREMITIES: ICD-10-CM

## 2024-04-24 DIAGNOSIS — E11.22 TYPE 2 DIABETES MELLITUS WITH DIABETIC CHRONIC KIDNEY DISEASE: ICD-10-CM

## 2024-04-24 DIAGNOSIS — Z66 DO NOT RESUSCITATE: ICD-10-CM

## 2024-04-24 DIAGNOSIS — N18.6 END STAGE RENAL DISEASE: ICD-10-CM

## 2024-04-24 DIAGNOSIS — J69.0 PNEUMONITIS DUE TO INHALATION OF FOOD AND VOMIT: ICD-10-CM

## 2024-04-24 DIAGNOSIS — E03.9 HYPOTHYROIDISM, UNSPECIFIED: ICD-10-CM

## 2024-04-24 DIAGNOSIS — E78.5 HYPERLIPIDEMIA, UNSPECIFIED: ICD-10-CM

## 2024-05-10 ENCOUNTER — APPOINTMENT (OUTPATIENT)
Dept: NEPHROLOGY | Facility: CLINIC | Age: 59
End: 2024-05-10

## 2024-05-20 ENCOUNTER — APPOINTMENT (OUTPATIENT)
Dept: HEART AND VASCULAR | Facility: CLINIC | Age: 59
End: 2024-05-20
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
## 2024-08-22 NOTE — ED PROVIDER NOTE - CADM POA CENTRAL LINE
Called for Post -SP check up: Medial branch blocks targeting the RIGHT C3-4, C4-5, and C5-6 facet joints with fluoroscopic guidance #2     Change in pain: Yes    Concerns? No    Confirmed [facility] appt? Yes    
No

## 2024-09-17 NOTE — PROGRESS NOTE ADULT - CONVERSATION DETAILS
Reviewed patient's hospital course and interval developments. Discussed advanced directives including code status, HCP completion, and hospice eligibility. Patient unable to participate in complex medical decision making. Explored GOC regarding nutrition, dialysis, codes status, and hospice.    According to HCP:   -being able to eat was important to patient so would not accept tube feeds and NPO, also concerned that patient would pull out feeding tube -> allow to pleasure feeds as tolerated with an understanding of the risk of aspiration    -patient has expressed and shown his desire to be comfortable but has not made his health a priority, HCP believes the patient has been trending to the end of his life since his mother  a few years ago -> would want to allow a natural death in the event of cardiac so MOLST completed with DNR/DNI    -was accepting of dialysis initiation during outpatient Nephro appointments and is currently tolerating HD sessions -> will continue HD for now with the understanding that hospice would be pursued if patient can no longer tolerate HD
Spoke with cousin HCP 4/4 1400    Per HCP, Trung has had several conversation w/ pt regarding GOC. However, patient has been dejected and avoidant on the topic. Trung has only been informed and contacted when consent is needed.     Per Trung, it has been difficult for him to get the whole medical picture. Resident updated Trung with pt's PMHx, current clinical course and possible prognosis.     GOC conversation was briefly visited but Trung requested a meeting in person w/ patient present 4/8 1pm. However Trung mentioned that it is preferrable to be "more comfortable transition when the inevitable"
Triggering events leading to humiliation, shame, and/or despair (e.g., Loss of relationship, financial or health status) (real or anticipated)

## 2024-10-11 NOTE — PRE-ANESTHESIA EVALUATION ADULT - NSANTHPMHFT_GEN_ALL_CORE
No 58M T2DM, CVA x2 w/ residual L-sided weakness, early onset dementia, HTN, HLD, DVT, CKD V (Cr ~6, not on HD), BPH, PSH L toe amputation, R BKA, AVF creation (5/2023) BIBEMS from nursing home for anemia requiring transfusion, found to meet SIRS criteria s/p transfusion ([facility]TR vs. sepsis) w/ elevated BUN/Cr, admitted for VS derangement. Initiated HD 4/4. Also found to have C. diff. c/c/b aspiration PNA vs pneumonitis. Clinically improving on Abx and HD. further c/c/b AVF malfunction 4/9. Tentative fistulogram 4/12.

## 2024-10-14 NOTE — SWALLOW VFSS/MBS ASSESSMENT ADULT - SLP PERTINENT HISTORY OF CURRENT PROBLEM
Name: Norma Trejo ADMIT: 10/13/2024   : 1930  PCP: Sharon Parry APRN    MRN: [id number] LOS: 0 days   AGE/SEX: 89 y.o. female  ROOM: [facility]     Subjective   Subjective   Patient awake and resting in chair, family present, she has ongoing left arm discomfort, but improved from prior.       Objective   Objective   Vital Signs  Temp:  [97.7 °F (36.5 °C)-98.2 °F (36.8 °C)] 98.2 °F (36.8 °C)  Heart Rate:  [60-76] 61  Resp:  [14-16] 14  BP: (114-159)/(54-74) 142/64  SpO2:  [94 %-97 %] 94 %  on   ;   Device (Oxygen Therapy): room air  There is no height or weight on file to calculate BMI.  Physical Exam  Vitals and nursing note reviewed.   Constitutional:       General: She is not in acute distress.     Comments: Elderly, chronically ill appearing   HENT:      Head: Atraumatic.   Eyes:      Extraocular Movements: Extraocular movements intact.      Conjunctiva/sclera: Conjunctivae normal.   Cardiovascular:      Rate and Rhythm: Normal rate and regular rhythm.      Pulses: Normal pulses.      Heart sounds: Normal heart sounds.   Pulmonary:      Effort: Pulmonary effort is normal. No respiratory distress.      Breath sounds: Normal breath sounds.   Abdominal:      General: Bowel sounds are normal.      Palpations: Abdomen is soft.      Tenderness: There is no abdominal tenderness.   Musculoskeletal:         General: Tenderness present.      Right lower leg: No edema.      Left lower leg: No edema.   Skin:     General: Skin is warm and dry.   Neurological:      Mental Status: She is alert.       Results Review     I reviewed the patient's new clinical results.  Results from last 7 days   Lab Units 10/14/24  0627 10/13/24  1314   WBC 10*3/mm3 6.39 10.31   HEMOGLOBIN g/dL 12.7 12.8   PLATELETS 10*3/mm3 175 188     Results from last 7 days   Lab Units 10/14/24  0627 10/13/24  1408   SODIUM mmol/L 138 139   POTASSIUM mmol/L 4.1 4.6   CHLORIDE mmol/L 104 103   CO2 mmol/L 23.4 25.0   BUN mg/dL 16 17  "  CREATININE mg/dL 1.09* 1.06*   GLUCOSE mg/dL 104* 113*   EGFR mL/min/1.73 47.2* 48.8*     Results from last 7 days   Lab Units 10/14/24  0627 10/13/24  1408   ALBUMIN g/dL 3.5 3.9   BILIRUBIN mg/dL 0.8 0.7   ALK PHOS U/L 65 70   AST (SGOT) U/L 30 39*   ALT (SGPT) U/L 29 36*     Results from last 7 days   Lab Units 10/14/24  0627 10/13/24  1408   CALCIUM mg/dL 8.9 9.2   ALBUMIN g/dL 3.5 3.9       No results found for: \"HGBA1C\", \"POCGLU\"    XR Chest 1 View    Result Date: 10/13/2024  No acute cardiopulmonary process  This report was finalized on 10/13/2024 1:31 PM by Dr. Phani Bustillos M.D on Workstation: [id number]      CT Head Without Contrast    Result Date: 10/13/2024   Small localized acute subdural hematoma measuring up to approximately 4 mm in width and 8 mm in greatest craniocaudad dimensions that is seen along the right aspect of the falx cerebri and medial to the right occipital lobe.   TECHNIQUE: CT scan of the cervical spine was obtained with 1 mm axial bone algorithm and 2 mm axial soft tissue algorithm images. Sagittal and coronal reconstructed images were obtained.  FINDINGS:  There is no evidence for acute fracture or bony malalignment involving the cervical spine. Mild chronic appearing vertebral body height loss is seen at C3, C4, C5, C6, and C7.  A disc osteophyte complex at C3-4 and C6-7 result in relatively mild degrees of canal narrowing. Mild foraminal narrowing is seen at C3-4 and C4-5 secondary to uncovertebral joint hypertrophy.  Incidental note is made of a partially visualized groundglass abnormality within the visualized right lung apex which measures up to approximately 2.3 x 1.8 cm in greatest axial dimensions within its visualized portions. Similar findings were seen on a prior chest CT dated 9/25/2023. The etiology of this abnormality is unclear and this could be inflammatory or neoplastic. This could be further evaluated with a CT scan of the chest for more comprehensive " 57M with PMHx of DM2, CVA x 2 w/ residual L sided weakness, early onset dementia, HTN, HLD, CKD V (creatinine 7-8, not on HD, AV fistula 5/23), BPH with recent admission for starvation ketosis and gas gangrene s/p rBKA, presented with weakness and diarrhea x 1 day from nursing home. Found to be C.diff positive, Admitted for sepsis 2/2 C.diff colitis. assessment.  IMPRESSION:  No evidence for acute fracture or bony malalignment involving the cervical spine.  Incidental degenerative phenomena as discussed above.  Incidental note is made of a partially visualized groundglass abnormality within the visualized right lung apex which measures up to approximately 2.3 x 1.8 cm in greatest axial dimensions within its visualized portions. Similar findings were seen on a prior chest CT dated 9/25/2023. The etiology of this abnormality is unclear and this could be inflammatory or neoplastic. This could be further evaluated with a CT scan of the chest for more comprehensive assessment.  The findings of this report were discussed with Katia Rojas on 10/13/2024 at approximately 12:30 p.m.    Radiation dose reduction techniques were utilized, including automated exposure control and exposure modulation based on body size.  This report was finalized on 10/13/2024 12:34 PM by Dr. Donato Carranza M.D on Workstation: [id number]      CT Cervical Spine Without Contrast    Result Date: 10/13/2024   Small localized acute subdural hematoma measuring up to approximately 4 mm in width and 8 mm in greatest craniocaudad dimensions that is seen along the right aspect of the falx cerebri and medial to the right occipital lobe.   TECHNIQUE: CT scan of the cervical spine was obtained with 1 mm axial bone algorithm and 2 mm axial soft tissue algorithm images. Sagittal and coronal reconstructed images were obtained.  FINDINGS:  There is no evidence for acute fracture or bony malalignment involving the cervical spine. Mild chronic appearing vertebral body height loss is seen at C3, C4, C5, C6, and C7.  A disc osteophyte complex at C3-4 and C6-7 result in relatively mild degrees of canal narrowing. Mild foraminal narrowing is seen at C3-4 and C4-5 secondary to uncovertebral joint hypertrophy.  Incidental note is made of a partially visualized groundglass abnormality within the visualized right  lung apex which measures up to approximately 2.3 x 1.8 cm in greatest axial dimensions within its visualized portions. Similar findings were seen on a prior chest CT dated 9/25/2023. The etiology of this abnormality is unclear and this could be inflammatory or neoplastic. This could be further evaluated with a CT scan of the chest for more comprehensive assessment.  IMPRESSION:  No evidence for acute fracture or bony malalignment involving the cervical spine.  Incidental degenerative phenomena as discussed above.  Incidental note is made of a partially visualized groundglass abnormality within the visualized right lung apex which measures up to approximately 2.3 x 1.8 cm in greatest axial dimensions within its visualized portions. Similar findings were seen on a prior chest CT dated 9/25/2023. The etiology of this abnormality is unclear and this could be inflammatory or neoplastic. This could be further evaluated with a CT scan of the chest for more comprehensive assessment.  The findings of this report were discussed with Katia Rojas on 10/13/2024 at approximately 12:30 p.m.    Radiation dose reduction techniques were utilized, including automated exposure control and exposure modulation based on body size.  This report was finalized on 10/13/2024 12:34 PM by Dr. Donato Carranza M.D on Workstation: [id number]      XR Wrist 3+ View Left    Result Date: 10/13/2024  No fracture or dislocation.  This report was finalized on 10/13/2024 12:13 PM by Dr. Phani Bustillos M.D on Workstation: [id number]      XR ELBOW 2 VIEW LEFT    Result Date: 10/13/2024  No fracture or dislocation  This report was finalized on 10/13/2024 12:09 PM by Dr. Phani Bustillos M.D on Workstation: [id number]       I have personally reviewed all medications:  Scheduled Medications  amitriptyline, 37.5 mg, Oral, Nightly  amLODIPine, 5 mg, Oral, Q24H  atorvastatin, 20 mg, Oral, Daily  cholecalciferol, 1,000 Units, Oral, Daily  guaiFENesin,  1,200 mg, Oral, BID  hydroCHLOROthiazide, 12.5 mg, Oral, Daily  metoprolol tartrate, 50 mg, Oral, BID  prednisoLONE acetate, 1 drop, Both Eyes, Q8H  sodium chloride, 10 mL, Intravenous, Q12H  vitamin B-12, 50 mcg, Oral, Daily  Vonoprazan Fumarate, 10 mg, Oral, Daily    Infusions   Diet  Diet: Regular/House; Fluid Consistency: Thin (IDDSI 0)    I have personally reviewed:  [x]  Laboratory   []  Microbiology   [x]  Radiology   [x]  EKG/Telemetry  []  Cardiology/Vascular   []  Pathology    []  Records       Assessment/Plan     Active Hospital Problems    Diagnosis  POA    **Acute subdural hematoma [S06.5XAA]  Yes    Prediabetes [R73.03]  Yes    Cardiac pacemaker in situ [Z95.0]  Yes    PAF (paroxysmal atrial fibrillation) [I48.0]  Yes    History of CVA (cerebrovascular accident) [Z86.73]  Not Applicable    Benign essential hypertension [I10]  Yes      Resolved Hospital Problems   No resolved problems to display.       89 y.o. female admitted with Acute subdural hematoma.    Mechanical fall  Subdural hematoma  - Fall prior to arrival, imaging obtained and reviewed. Neurosurgery consulted. Will continue to follow their plans/recommendations. Greatly appreciate their help.  - Hold ASA, Eliquis for now.  - PT/OT, fall precautions.    Pain in left arm  - Imaging negative for fracture or dislocation. PT/OT, fall precautions.    Atrial fibrillation on long term anticoagulation  - [id number] score: 4.  - Vitals, telemetry reviewed, patient appears to be stable, rate controlled.  - Continue current treatment as prescribed. Holding Eliquis.Continue telemetry monitoring.    Hypertension  - Blood pressure appears stable and acceptable acutely at this time. No indications to warrant acute changes/intervention at this time.  - Continue current medications as prescribed. Trend BP to guide ongoing management decisions.    Prediabetes with Hyperglycemia  - Glucose elevated on most recent labs. Patient without known history of T2DM.   Most recent hemoglobin A1c 5.80% (02/29/24).  - Monitor for now, continue with POC glucose checks, can add correctional SSI if needed.    Chronic kidney disease stage 3A  - On most recent labs, patient's creatinine found to be stable and near apparent baseline (around ~1.1 on average), per review of previous lab values and outside records.  - No indications to warrant acute changes and/or intervention at this time.  - Order repeat BMP in AM.    History of CVA  - Holding aspirin, continue statin.      SCDs for DVT prophylaxis.  Full code.  Discussed with patient, family, nursing staff, and consulting provider.  Anticipate discharge  prior facility vs SNF  tomorrow.  Expected discharge date/ time has not been documented.      Georges Lopez DO  [facility]  10/14/24

## 2024-10-17 NOTE — PROGRESS NOTE ADULT - PROBLEM/PLAN-12
Case Management Assessment & Discharge Planning Note    Patient name Tim Alanis Jr.  Location /-01 MRN [id number]  : 2002 Date 10/17/2024       Current Admission Date: 10/16/2024  Current Admission Diagnosis:Epileptic seizure, generalized (HCC)   Patient Active Problem List    Diagnosis Date Noted Date Diagnosed    Hyperammonemia (HCC) 10/17/2024     BALWINDER (acute kidney injury) (HCC) 10/07/2024     Polysubstance abuse (HCC) 10/07/2024     Breakthrough seizure (HCC) 2024     Epileptic seizure, generalized (HCC) 2023     Metabolic acidosis 2023     SIRS (systemic inflammatory response syndrome) ([facility]) 2023     Drug use 2023       LOS (days): 1  Geometric Mean LOS (GMLOS) (days):   Days to GMLOS:     OBJECTIVE:  PATIENT READMITTED TO HOSPITAL  Risk of Unplanned Readmission Score: 24.42         Current admission status: Inpatient  Referral Reason: Drug/Alcohol Abuse (dc planning, drug and etoh)    Preferred Pharmacy:   [id number] - [place], [place] - [address]  [address]  [place] [address]  Phone: [phone] Fax: [phone]    [facility] [address] [place] [address] [address] [facility]  [address] [facility] [address]  Phone: [phone] Fax: [phone]    Primary Care Provider: No primary care provider on file.    Primary Insurance: [employer]  Secondary Insurance:     Patient has been sleeping, not interactive. CM called and spoke to wife to obtain baseline information. CM discussed the role of CM in helping the patient develop a discharge plan and assist the patient in carry out their plan.      ASSESSMENT:  Active Health Care Proxies       Meghna Cardenas Health Care Representative - Mother   Primary Phone: [phone] (Mobile)                 Advance Directives  Does patient have a Health Care POA?: No  Was patient offered paperwork?: No (declined)  Does patient have Advance Directives?: No  (declined)  Was patient offered paperwork?: No  Primary Contact: Meghna Cardenas (Mother)  [phone]         Readmission Root Cause  30 Day Readmission: Yes (left AMA at B- 10/6/2024 - 10/8/2024 (2 days)) seizures)  During your hospital stay, did someone (provider, nurse, ) explain your care to you in a way you could understand?: Refused to Provide Information  Did you feel medically stable to leave the hospital?: Refused to Provide Information  Were you able to pay for your medication at the pharmacy?: Refused to Provide Information  Did you have reliable transportation to take you to your appointments?: Refused to Provide Information  Patient was readmitted due to: seizures  Action Plan: Needs a discussion On Substance abuse ( Warm handoff) and need to be established for care -- new PCP    Patient Information  Admitted from:: Home  Mental Status: Other (Comment) (sound asleep will not engage in concersation)  During Assessment patient was accompanied by: Parent  Assessment information provided by:: Parent  Primary Caregiver: Self  Support Systems: Parent  [facility]  What city do you live in?: [facility]  Home entry access options. Select all that apply.: No steps to enter home  Type of Current Residence: 2 story home  Upon entering residence, is there a bedroom on the main floor (no further steps)?: Yes (sleeps on day bed)  Upon entering residence, is there a bathroom on the main floor (no further steps)?: No  Indicate which floors of current residence have a bathroom (select all the apply):: 2nd Floor  Living Arrangements: Lives w/ Parent(s)  Is patient a ?: Yes  Is patient active with [facility] ( Affairs)?: No    Activities of Daily Living Prior to Admission  Functional Status: Independent  Completes ADLs independently?: Yes  Level of ADL dependence: Assistance  Ambulates independently?: Yes  Does patient use assisted devices?: No  Does patient currently own DME?:  No  Does patient have a history of Outpatient Therapy (PT/OT)?: No  Does the patient have a history of Short-Term Rehab?: No  Does patient have a history of HHC?: No  Does patient currently have HHC?: No         Patient Information Continued  Income Source: Unemployed  Does patient have prescription coverage?: Yes  Does patient receive dialysis treatments?: No  Does patient have a history of substance abuse?: Yes  Historical substance use preference: Methamphetamines  Is patient currently in treatment for substance abuse?:  (NO, this needs to  be discussed with pt when he is awake and interacting.)  Does patient have a history of Mental Health Diagnosis?: No         Means of Transportation  Means of Transport to Appts:: Family transport      Social Determinants of Health (SDOH)      Flowsheet Row Most Recent Value   Housing Stability    In the last 12 months, was there a time when you were not able to pay the mortgage or rent on time? N   In the past 12 months, how many times have you moved where you were living? 0   At any time in the past 12 months, were you homeless or living in a shelter (including now)? N   Transportation Needs    In the past 12 months, has lack of transportation kept you from medical appointments or from getting medications? no   In the past 12 months, has lack of transportation kept you from meetings, work, or from getting things needed for daily living? No   Food Insecurity    Within the past 12 months, you worried that your food would run out before you got the money to buy more. Never true   Within the past 12 months, the food you bought just didn't last and you didn't have money to get more. Never true   Utilities    In the past 12 months has the electric, gas, oil, or water company threatened to shut off services in your home? No          Patient has lived on and off with his Mother. He is currently living with her now, for the past year. Prior to that he was living with a good family  "friend Steve in [place]. Per Meghna he came to live with her, as he was hanging around the wrong people in [place] and getting in trouble, doing drugs.  Per Meghna, pt has always had \" anger issues\" went to school in [place]   Per Meghna he was in intermediate units in school due to his anger issue, no formal diagnosis of learning disability per Meghna, but she feels it takes her son a while to understand some things.  Hx of Court Order treatment in [place] for Substance abuse in the past. Has been arrested for breaking in a home that he thought was his Mothers and possession of drugs. Per Mom patient does use THC and has tested to Meth in the past.  He does not drive, r/t seizures.  Meghna could not tell me who his Neurologist is, however it appears to be Torsten Overton.  Pt does not have a PCP. Per Meghna Sukh was to follow up with establishing care  with a provider.  Meghna requested to see if CM can assist with finding pt a new PCP.  CM called [facility] - pt was assigned to Dr Membreno, however sees Pediatric patients up to age 21.  Searched providers using [employer] insurance and identified Torsten Seymour does participate with patients insurance.  PCP appointment was made of 10/21 and placed time on the AVS     DISCHARGE DETAILS:    Discharge planning discussed with:: Meghna Cardenas  [place] of Choice: Yes     CM contacted family/caregiver?: Yes        Contacts  Patient Contacts: Meghna Cardenas (Mother)  [phone]  Relationship to Patient:: Family  Contact Method: Phone  Phone Number: [phone]  Reason/Outcome: Continuity of Care, Discharge Planning    Requested Home Health Care         Is the patient interested in HHC at discharge?: No    DME Referral Provided  Referral made for DME?: No    Other Referral/Resources/Interventions Provided:  Referral Comments: PCP appointment was made and placed on the AVS.... Unable to offer a Warm Handoff, as pt is not awake to be " interactive.         Treatment Team Recommendation: Home  Discharge Destination Plan:: Home  Transport at Discharge : Family     CM will follow for dc needs. Need to discuss with patient his substance abuse and initiate a Warm Handoff when appropriate.                                                         DISPLAY PLAN FREE TEXT

## 2024-11-14 NOTE — PROVIDER CONTACT NOTE (OTHER) - ASSESSMENT
no diarhea; wbc elevated; pt abdominal area distended. no diarhea; wbc elevated; pt abdominal area distended; h/o antibiotics none

## 2025-04-09 NOTE — DISCHARGE NOTE PROVIDER - NSDCCPCAREPLAN_GEN_ALL_CORE_FT
Stable , Continue Metoprolol 50 daily      Try to stop these things that can elevate blood pressure: ALCOHOL, salt, caffeine, energy drinks, diet pills, sudafed, taking NSAIDS daily (advil, alleve, ibuprofen, naproxen) and birth control  DECREASE SALT (fast foods, frozen, canned, processed foods, ham, turkey, fried foods, chips, crackers, etc) & drink 8 glasses of water a day with minimal caffeine   Your HEART is RELAXED when Blood pressure < 129/79.   Your heart thickens & weakens when BP is always > 140/90     PRINCIPAL DISCHARGE DIAGNOSIS  Diagnosis: DKA (diabetic ketoacidosis)  Assessment and Plan of Treatment:       SECONDARY DISCHARGE DIAGNOSES  Diagnosis: Gas gangrene of lower extremity  Assessment and Plan of Treatment:     Diagnosis: Normocytic anemia  Assessment and Plan of Treatment:     Diagnosis: Sepsis  Assessment and Plan of Treatment:     Diagnosis: Diabetes  Assessment and Plan of Treatment:     Diagnosis: Neutrophilic leukocytosis  Assessment and Plan of Treatment:      done

## 2025-06-14 NOTE — ED ADULT TRIAGE NOTE - MODE OF ARRIVAL
Justin from [place] called me back. I gave him information on patient and he stated that he would interrogate.    Ambulance [facility]

## 2025-07-23 NOTE — ED ADULT NURSE NOTE - SKIN TEMPERATURE MOISTURE
No care due was identified.  [facility] Embedded Care Due Messages. Reference number: [id number].   7/23/2025 10:55:33 AM CDT   warm

## (undated) DEVICE — DRSG CURITY GAUZE SPONGE 4 X 4" 12-PLY

## (undated) DEVICE — CATH IV OPTIVA 16G X 2"

## (undated) DEVICE — DRSG TEGADERM 4X10"

## (undated) DEVICE — MARKING PEN W RULER

## (undated) DEVICE — DRSG STOCKINETTE TUBULAR COTTON 1PLY 6X48"

## (undated) DEVICE — GLV 6.5 PROTEXIS (WHITE)

## (undated) DEVICE — SUT PROLENE 5-0 36" RB-1

## (undated) DEVICE — SUT SILK 2-0 18" SH (POP-OFF)

## (undated) DEVICE — SUT VICRYL 2-0 27" CT-1 UNDYED

## (undated) DEVICE — CANISTER SPECIMEN CONVERTOR PLASTIC

## (undated) DEVICE — SUT MONOCRYL 4-0 27" PS-2 UNDYED

## (undated) DEVICE — GEL AQUSNC PACKET 20GR

## (undated) DEVICE — SUT PLEDGET SOFT MEDIUM 1/4" X 1/8" X 1/16" X6

## (undated) DEVICE — SUT PROLENE 7-0 18" BV

## (undated) DEVICE — SUT SILK 4-0 18" TIES

## (undated) DEVICE — DRSG WEBRIL 6"

## (undated) DEVICE — GOWN IMPERV XL

## (undated) DEVICE — STAPLER SKIN PROXIMATE

## (undated) DEVICE — DRAPE PROBE COVER LATEX FREE 3X96"

## (undated) DEVICE — POLISHER OR CAUTERY TIP

## (undated) DEVICE — DRSG STOCKINETTE IMPERVIOUS XL

## (undated) DEVICE — SUT VICRYL 2-0 27" CT-1

## (undated) DEVICE — DRSG ACE BANDAGE 6"

## (undated) DEVICE — DRSG ACE BANDAGE 4"

## (undated) DEVICE — SPECIMEN CONTAINER 4OZ

## (undated) DEVICE — GLV 7.5 PROTEXIS (WHITE)

## (undated) DEVICE — PACK UPPER BODY

## (undated) DEVICE — SUT SILK 2-0 12-18"

## (undated) DEVICE — WARMING BLANKET LOWER ADULT

## (undated) DEVICE — SUT VICRYL PLUS 2-0 18" CT-1 (POP-OFF)

## (undated) DEVICE — SAW BLADE STRYKER SAGITTAL WIDE MED

## (undated) DEVICE — Device

## (undated) DEVICE — TOURNIQUET ESMARK 4"

## (undated) DEVICE — GOWN ROYAL SILK XL

## (undated) DEVICE — NDL HYPO SAFE 25G X 1.5" (ORANGE)

## (undated) DEVICE — DRSG XEROFORM 5 X 9"

## (undated) DEVICE — SUT VICRYL 0 18" CT-1 UNDYED (POP-OFF)

## (undated) DEVICE — DRAPE 1/2 SHEET 40X57"

## (undated) DEVICE — DRAPE TOWEL BLUE 17" X 24"

## (undated) DEVICE — BAG DECANTER IV STERILE

## (undated) DEVICE — TORQUE DEVICE FOR GUIDEWIRE 0.0100.038"

## (undated) DEVICE — GLV 8 PROTEXIS (WHITE)

## (undated) DEVICE — GLV 7 PROTEXIS (WHITE)

## (undated) DEVICE — INFLATOR ENCORE 26

## (undated) DEVICE — SUCTION YANKAUER BULBOUS TIP W VENT

## (undated) DEVICE — WARMING BLANKET UPPER ADULT

## (undated) DEVICE — ELCTR BOVIE PENCIL SMOKE EVACUATION

## (undated) DEVICE — GOWN XL

## (undated) DEVICE — LAP PAD 18 X 18"

## (undated) DEVICE — DRSG KERLIX ROLL 4.5"

## (undated) DEVICE — SUT VICRYL 0 36" CT-1

## (undated) DEVICE — TOURNIQUET CUFF 34" DUAL PORT W PLC

## (undated) DEVICE — DRAPE TOWEL WHITE 18" X 26"

## (undated) DEVICE — SAW BLADE STRYKER SAGITTAL 25X86.5X1.32MM

## (undated) DEVICE — PACK ANGIOGRAM LNX SURGICOUNT

## (undated) DEVICE — DRAIN PENROSE .5" X 18" LATEX

## (undated) DEVICE — SUT PROLENE 6-0 30" RB-2

## (undated) DEVICE — PACK VASCULAR MINOR